# Patient Record
Sex: FEMALE | Race: WHITE | Employment: UNEMPLOYED | URBAN - METROPOLITAN AREA
[De-identification: names, ages, dates, MRNs, and addresses within clinical notes are randomized per-mention and may not be internally consistent; named-entity substitution may affect disease eponyms.]

---

## 2017-02-17 ENCOUNTER — HOSPITAL ENCOUNTER (OUTPATIENT)
Dept: MAMMOGRAPHY | Age: 62
Discharge: HOME | End: 2017-02-20
Attending: INTERNAL MEDICINE | Admitting: INTERNAL MEDICINE

## 2017-02-17 DIAGNOSIS — Z12.39 SCREENING BREAST EXAMINATION: ICD-10-CM

## 2017-02-27 ENCOUNTER — HOSPITAL ENCOUNTER (OUTPATIENT)
Dept: ULTRASOUND IMAGING | Age: 62
Discharge: HOME | End: 2017-02-27
Attending: INTERNAL MEDICINE | Admitting: INTERNAL MEDICINE

## 2017-02-27 DIAGNOSIS — R92.8 ABNORMAL MAMMOGRAM: ICD-10-CM

## 2017-02-27 DIAGNOSIS — N64.89 BREAST ASYMMETRY: ICD-10-CM

## 2017-08-08 ENCOUNTER — HOSPITAL ENCOUNTER (OUTPATIENT)
Dept: ULTRASOUND IMAGING | Age: 62
Discharge: HOME | End: 2017-08-08
Attending: FAMILY MEDICINE | Admitting: FAMILY MEDICINE

## 2017-08-08 DIAGNOSIS — E04.1 THYROID NODULE: ICD-10-CM

## 2017-08-09 ENCOUNTER — HOSPITAL ENCOUNTER (OUTPATIENT)
Dept: ULTRASOUND IMAGING | Age: 62
Discharge: HOME | End: 2017-08-09
Attending: FAMILY MEDICINE | Admitting: FAMILY MEDICINE

## 2017-08-09 DIAGNOSIS — R92.8 CATEGORY 3 MAMMOGRAPHY RESULT WITH SHORT FOLLOW-UP INTERVAL SUGGESTED FOR PROBABLY BENIGN FINDING: ICD-10-CM

## 2017-08-09 DIAGNOSIS — Z09 FOLLOW-UP EXAM, 3-6 MONTHS SINCE PREVIOUS EXAM: ICD-10-CM

## 2017-08-09 DIAGNOSIS — N64.89 BREAST ASYMMETRY: ICD-10-CM

## 2017-09-21 ENCOUNTER — OFFICE VISIT (OUTPATIENT)
Dept: CARDIOLOGY CLINIC | Age: 62
End: 2017-09-21

## 2017-09-21 VITALS
BODY MASS INDEX: 47.09 KG/M2 | WEIGHT: 293 LBS | HEIGHT: 66 IN | SYSTOLIC BLOOD PRESSURE: 120 MMHG | HEART RATE: 70 BPM | DIASTOLIC BLOOD PRESSURE: 64 MMHG

## 2017-09-21 DIAGNOSIS — I21.4 NSTEMI (NON-ST ELEVATED MYOCARDIAL INFARCTION) (HCC): Primary | ICD-10-CM

## 2017-09-21 PROCEDURE — 99214 OFFICE O/P EST MOD 30 MIN: CPT | Performed by: INTERNAL MEDICINE

## 2017-09-21 RX ORDER — SIMVASTATIN 40 MG
40 TABLET ORAL NIGHTLY
Qty: 30 TABLET | Refills: 3 | Status: SHIPPED | OUTPATIENT
Start: 2017-09-21 | End: 2018-06-08 | Stop reason: SDUPTHER

## 2017-09-21 RX ORDER — FUROSEMIDE 40 MG/1
40 TABLET ORAL 2 TIMES DAILY
Qty: 60 TABLET | Refills: 3 | Status: ON HOLD | OUTPATIENT
Start: 2017-09-21 | End: 2018-02-16

## 2017-09-21 RX ORDER — LISINOPRIL 5 MG/1
TABLET ORAL
Status: ON HOLD | COMMUNITY
Start: 2017-09-08 | End: 2018-02-16 | Stop reason: HOSPADM

## 2017-09-21 RX ORDER — POTASSIUM CHLORIDE 20 MEQ/1
TABLET, EXTENDED RELEASE ORAL
COMMUNITY
Start: 2017-09-08 | End: 2018-06-08 | Stop reason: SDUPTHER

## 2017-09-21 RX ORDER — CLOPIDOGREL BISULFATE 75 MG/1
75 TABLET ORAL DAILY
Qty: 30 TABLET | Refills: 3 | Status: SHIPPED | OUTPATIENT
Start: 2017-09-21 | End: 2018-03-21 | Stop reason: SDUPTHER

## 2017-09-21 RX ORDER — VILAZODONE HYDROCHLORIDE 10 MG/1
10 TABLET ORAL 2 TIMES DAILY
Status: ON HOLD | COMMUNITY
End: 2019-08-28 | Stop reason: HOSPADM

## 2017-11-01 ENCOUNTER — TELEPHONE (OUTPATIENT)
Dept: CARDIOLOGY CLINIC | Age: 62
End: 2017-11-01

## 2018-01-30 PROBLEM — J96.90 RESPIRATORY FAILURE (HCC): Status: ACTIVE | Noted: 2018-01-30

## 2018-01-31 DIAGNOSIS — I47.1 SVT (SUPRAVENTRICULAR TACHYCARDIA) (HCC): Primary | ICD-10-CM

## 2018-02-07 PROBLEM — J44.9 COPD (CHRONIC OBSTRUCTIVE PULMONARY DISEASE) (HCC): Status: ACTIVE | Noted: 2018-02-07

## 2018-02-10 PROBLEM — R26.9 GAIT DISTURBANCE: Status: ACTIVE | Noted: 2018-02-10

## 2018-03-02 ENCOUNTER — TELEPHONE (OUTPATIENT)
Dept: CARDIOLOGY CLINIC | Age: 63
End: 2018-03-02

## 2018-03-19 PROBLEM — J96.11 CHRONIC RESPIRATORY FAILURE WITH HYPOXIA AND HYPERCAPNIA (HCC): Status: ACTIVE | Noted: 2018-01-30

## 2018-03-19 PROBLEM — J96.12 CHRONIC RESPIRATORY FAILURE WITH HYPOXIA AND HYPERCAPNIA (HCC): Status: ACTIVE | Noted: 2018-01-30

## 2018-03-21 DIAGNOSIS — I21.4 NSTEMI (NON-ST ELEVATED MYOCARDIAL INFARCTION) (HCC): ICD-10-CM

## 2018-03-21 RX ORDER — CLOPIDOGREL BISULFATE 75 MG/1
75 TABLET ORAL DAILY
Qty: 30 TABLET | Refills: 1 | Status: SHIPPED | OUTPATIENT
Start: 2018-03-21 | End: 2018-06-08 | Stop reason: SDUPTHER

## 2018-03-27 ENCOUNTER — OFFICE VISIT (OUTPATIENT)
Dept: CARDIOLOGY CLINIC | Age: 63
End: 2018-03-27

## 2018-03-27 VITALS
HEIGHT: 66 IN | WEIGHT: 293 LBS | DIASTOLIC BLOOD PRESSURE: 78 MMHG | HEART RATE: 78 BPM | BODY MASS INDEX: 47.09 KG/M2 | SYSTOLIC BLOOD PRESSURE: 126 MMHG

## 2018-03-27 DIAGNOSIS — I35.0 NONRHEUMATIC AORTIC VALVE STENOSIS: Primary | ICD-10-CM

## 2018-03-27 PROCEDURE — G8427 DOCREV CUR MEDS BY ELIG CLIN: HCPCS | Performed by: INTERNAL MEDICINE

## 2018-03-27 PROCEDURE — G8484 FLU IMMUNIZE NO ADMIN: HCPCS | Performed by: INTERNAL MEDICINE

## 2018-03-27 PROCEDURE — 3014F SCREEN MAMMO DOC REV: CPT | Performed by: INTERNAL MEDICINE

## 2018-03-27 PROCEDURE — G8417 CALC BMI ABV UP PARAM F/U: HCPCS | Performed by: INTERNAL MEDICINE

## 2018-03-27 PROCEDURE — 99214 OFFICE O/P EST MOD 30 MIN: CPT | Performed by: INTERNAL MEDICINE

## 2018-03-27 PROCEDURE — 3017F COLORECTAL CA SCREEN DOC REV: CPT | Performed by: INTERNAL MEDICINE

## 2018-03-27 PROCEDURE — 1036F TOBACCO NON-USER: CPT | Performed by: INTERNAL MEDICINE

## 2018-03-27 PROCEDURE — G8598 ASA/ANTIPLAT THER USED: HCPCS | Performed by: INTERNAL MEDICINE

## 2018-03-27 NOTE — PROGRESS NOTES
Base) MCG/ACT inhaler Inhale 2 puffs into the lungs      pantoprazole (PROTONIX) 40 MG tablet Take 40 mg by mouth daily      vilazodone HCl (VIIBRYD) 10 MG TABS Take 10 mg by mouth      potassium chloride (KLOR-CON M) 20 MEQ extended release tablet Take by mouth      simvastatin (ZOCOR) 40 MG tablet Take 1 tablet by mouth nightly 30 tablet 3    Insulin Degludec (TRESIBA FLEXTOUCH SC) Inject into the skin Take 45 units q am      carvedilol (COREG) 3.125 MG tablet Take 3.125 mg by mouth 2 times daily       vitamin D (ERGOCALCIFEROL) 55433 UNITS CAPS capsule Take 50,000 Units by mouth once a week       pramipexole (MIRAPEX) 1 MG tablet Take 1 mg by mouth 3 times daily       fexofenadine (ALLEGRA) 180 MG tablet Take 180 mg by mouth daily       gabapentin (NEURONTIN) 800 MG tablet Take 800 mg by mouth 3 times daily       Ascorbic Acid (VITAMIN C) 500 MG tablet Take 500 mg by mouth daily       cyanocobalamin 1000 MCG/ML injection Inject 1,000 mcg into the muscle every 30 days       HUMALOG KWIKPEN 200 UNIT/ML SOPN pen Inject 35 Units into the skin 3 times daily (before meals)       aspirin 81 MG tablet Take 81 mg by mouth daily      BiPAP Machine MISC by BiPAP route nightly       No current facility-administered medications for this visit. Allergies: Patient has no known allergies.   Past Medical History:   Diagnosis Date    Aortic stenosis     Asthma     Cancer ([address]     Cervical    Carotid artery stenosis     Chest pain     CHF (congestive heart failure) (HCC)     Chronic back pain     COPD exacerbation (HCC)     Depression     Diabetes mellitus ([address]     Family history of coronary artery disease     Fatty liver 10/27/2016    H/O aortic valve stenosis     H/O echocardiogram 11/22/2016    EF 55%, Aortic valve area is 0.84 cm2 with a mean gradient of 36 suggestive of severe aortic stenosis, mild to mos LVH    Headache     Heart murmur     History of nuclear stress test 11/17/2016 and motor nerves examined and were normal  Psychiatric: normal mood and affect    No results found for: CKTOTAL, CKMB, CKMBINDEX, TROPONINI  BNP:  No results found for: BNP  PT/INR:  No results found for: PTINR  Lab Results   Component Value Date    LABA1C 6.0 01/30/2018    LABA1C 8.7 (H) 09/04/2017     No results found for: CHOL, TRIG, HDL, LDLCALC, LDLDIRECT  Lab Results   Component Value Date    ALT 12 01/30/2018    AST 26 01/30/2018     TSH:  No results found for: TSH    Impression:  Aaliyah Graham is a 63 y. o.year old who  has a past medical history of Aortic stenosis; Asthma; Cancer ([facility] [address] Carotid artery stenosis; Chest pain; CHF (congestive heart failure) ([address] Chronic back pain; COPD exacerbation ([address] Depression; Diabetes mellitus ([address] Family history of coronary artery disease; Fatty liver; H/O aortic valve stenosis; H/O echocardiogram; Headache; Heart murmur; History of nuclear stress test; Hypertension; Morbid obesity ([address] Neuropathy ([address] NSTEMI (non-ST elevated myocardial infarction) ([address] Obesity; Osteoarthritis; Palpitations; Peripheral edema; Restless legs syndrome; Sleep apnea; Sleep apnea; and SOB (shortness of breath). and presents with     Plan:  1. S/p TARV: Recovered, she required 12 units of blood transfusion since TAVR  2. Anemia: ? Etiology, agree with Hematology consult and will also recommend GI. 1. Morbid obesity: recommend bariatric surgery, continue lasix to 40 mg daily  2. HTN: stable, continue coreg,   3. DM: stable, continue metformin  4. CAROTID bruit: carotid doppler REVIEWED, has 0-49% stenosis, continue medication as mentioned above  3. Dyslipidemia:stable, off medicationHealth Health maintenance: exerise and diet  All labs, medications and tests reviewed, continue all other medications of all above medical condition listed as is.     @SIA@

## 2018-04-02 ENCOUNTER — HOSPITAL ENCOUNTER (OUTPATIENT)
Dept: OTHER | Age: 63
Discharge: HOME | End: 2018-04-02
Attending: INTERNAL MEDICINE | Admitting: INTERNAL MEDICINE

## 2018-04-02 LAB
ALBUMIN SERPL-MCNC: 4 GM/DL (ref 3.4–5)
ALP BLD-CCNC: 107 IU/L (ref 40–129)
ALT SERPL-CCNC: 13 U/L (ref 10–40)
ANION GAP SERPL CALCULATED.3IONS-SCNC: 7 MMOL/L (ref 4–16)
AST SERPL-CCNC: 21 IU/L (ref 15–37)
BILIRUB SERPL-MCNC: 0.2 MG/DL (ref 0–1)
BUN BLDV-MCNC: 11 MG/DL (ref 6–23)
CALCIUM SERPL-MCNC: 9.3 MG/DL (ref 8.3–10.6)
CHLORIDE BLD-SCNC: 94 MMOL/L (ref 99–110)
CO2: 37 MMOL/L (ref 21–32)
CREAT SERPL-MCNC: 0.6 MG/DL (ref 0.6–1.1)
FERRITIN: 35 NG/ML (ref 15–150)
FOLATE: >20 NG/ML (ref 3.1–17.5)
GFR AFRICAN AMERICAN: >60 ML/MIN/1.73M2
GFR NON-AFRICAN AMERICAN: >60 ML/MIN/1.73M2
GLUCOSE BLD-MCNC: 161 MG/DL (ref 70–99)
IRON: 24 UG/DL (ref 37–145)
PCT TRANSFERRIN: 7 % (ref 10–44)
POTASSIUM SERPL-SCNC: 3.8 MMOL/L (ref 3.5–5.1)
SODIUM BLD-SCNC: 138 MMOL/L (ref 135–145)
TOTAL IRON BINDING CAPACITY: 335 UG/DL (ref 250–450)
TOTAL PROTEIN: 7.5 GM/DL (ref 6.4–8.2)
UNSATURATED IRON BINDING CAPACITY: 311 UG/DL (ref 110–370)
VITAMIN B-12: >2000 PG/ML (ref 211–911)

## 2018-06-08 ENCOUNTER — OFFICE VISIT (OUTPATIENT)
Dept: CARDIOLOGY CLINIC | Age: 63
End: 2018-06-08

## 2018-06-08 VITALS
WEIGHT: 293 LBS | HEART RATE: 84 BPM | SYSTOLIC BLOOD PRESSURE: 118 MMHG | HEIGHT: 66 IN | DIASTOLIC BLOOD PRESSURE: 70 MMHG | BODY MASS INDEX: 47.09 KG/M2

## 2018-06-08 DIAGNOSIS — I21.4 NSTEMI (NON-ST ELEVATED MYOCARDIAL INFARCTION) (HCC): ICD-10-CM

## 2018-06-08 PROCEDURE — 1036F TOBACCO NON-USER: CPT | Performed by: INTERNAL MEDICINE

## 2018-06-08 PROCEDURE — G8427 DOCREV CUR MEDS BY ELIG CLIN: HCPCS | Performed by: INTERNAL MEDICINE

## 2018-06-08 PROCEDURE — 99214 OFFICE O/P EST MOD 30 MIN: CPT | Performed by: INTERNAL MEDICINE

## 2018-06-08 PROCEDURE — 3014F SCREEN MAMMO DOC REV: CPT | Performed by: INTERNAL MEDICINE

## 2018-06-08 PROCEDURE — G8598 ASA/ANTIPLAT THER USED: HCPCS | Performed by: INTERNAL MEDICINE

## 2018-06-08 PROCEDURE — G8417 CALC BMI ABV UP PARAM F/U: HCPCS | Performed by: INTERNAL MEDICINE

## 2018-06-08 PROCEDURE — 3017F COLORECTAL CA SCREEN DOC REV: CPT | Performed by: INTERNAL MEDICINE

## 2018-06-08 RX ORDER — CLOPIDOGREL BISULFATE 75 MG/1
75 TABLET ORAL DAILY
Qty: 30 TABLET | Refills: 1 | Status: SHIPPED | OUTPATIENT
Start: 2018-06-08 | End: 2018-08-20 | Stop reason: SDUPTHER

## 2018-06-08 RX ORDER — NYSTATIN 10B UNIT
POWDER (EA) MISCELLANEOUS 2 TIMES DAILY
COMMUNITY
End: 2020-01-01

## 2018-06-08 RX ORDER — CARVEDILOL 3.12 MG/1
3.12 TABLET ORAL 2 TIMES DAILY
Qty: 60 TABLET | Refills: 3 | Status: ON HOLD | OUTPATIENT
Start: 2018-06-08 | End: 2019-09-27 | Stop reason: HOSPADM

## 2018-06-08 RX ORDER — PNV NO.95/FERROUS FUM/FOLIC AC 28MG-0.8MG
325 TABLET ORAL DAILY
COMMUNITY
End: 2018-09-04

## 2018-06-08 RX ORDER — SIMVASTATIN 40 MG
40 TABLET ORAL NIGHTLY
Qty: 30 TABLET | Refills: 3 | Status: ON HOLD | OUTPATIENT
Start: 2018-06-08 | End: 2019-09-27 | Stop reason: HOSPADM

## 2018-06-08 RX ORDER — FUROSEMIDE 40 MG/1
40 TABLET ORAL 2 TIMES DAILY
Qty: 60 TABLET | Refills: 3 | Status: ON HOLD | OUTPATIENT
Start: 2018-06-08 | End: 2019-06-27 | Stop reason: HOSPADM

## 2018-06-08 RX ORDER — POTASSIUM CHLORIDE 20 MEQ/1
20 TABLET, EXTENDED RELEASE ORAL DAILY
Qty: 60 TABLET | Refills: 3 | Status: ON HOLD | OUTPATIENT
Start: 2018-06-08 | End: 2019-08-21 | Stop reason: SDUPTHER

## 2018-06-08 RX ORDER — MECOBAL/LEVOMEFOLAT CA/B6 PHOS 2-3-35 MG
1 TABLET ORAL 2 TIMES DAILY
Status: ON HOLD | COMMUNITY
End: 2019-06-27 | Stop reason: HOSPADM

## 2018-06-18 ENCOUNTER — HOSPITAL ENCOUNTER (OUTPATIENT)
Dept: OTHER | Age: 63
Discharge: HOME | End: 2018-06-18
Attending: INTERNAL MEDICINE | Admitting: INTERNAL MEDICINE

## 2018-06-18 LAB
ALBUMIN SERPL-MCNC: 4 GM/DL (ref 3.4–5)
ALP BLD-CCNC: 89 IU/L (ref 40–129)
ALT SERPL-CCNC: 15 U/L (ref 10–40)
ANION GAP SERPL CALCULATED.3IONS-SCNC: 9 MMOL/L (ref 4–16)
AST SERPL-CCNC: 24 IU/L (ref 15–37)
BILIRUB SERPL-MCNC: 0.3 MG/DL (ref 0–1)
BUN BLDV-MCNC: 18 MG/DL (ref 6–23)
CALCIUM SERPL-MCNC: 9.6 MG/DL (ref 8.3–10.6)
CHLORIDE BLD-SCNC: 97 MMOL/L (ref 99–110)
CO2: 37 MMOL/L (ref 21–32)
CREAT SERPL-MCNC: 0.6 MG/DL (ref 0.6–1.1)
FERRITIN: 78 NG/ML (ref 15–150)
GFR AFRICAN AMERICAN: >60 ML/MIN/1.73M2
GFR NON-AFRICAN AMERICAN: >60 ML/MIN/1.73M2
GLUCOSE BLD-MCNC: 197 MG/DL (ref 70–99)
IRON: 34 UG/DL (ref 37–145)
PCT TRANSFERRIN: 11 % (ref 10–44)
POTASSIUM SERPL-SCNC: 4.9 MMOL/L (ref 3.5–5.1)
SODIUM BLD-SCNC: 143 MMOL/L (ref 135–145)
TOTAL IRON BINDING CAPACITY: 302 UG/DL (ref 250–450)
TOTAL PROTEIN: 7.6 GM/DL (ref 6.4–8.2)
UNSATURATED IRON BINDING CAPACITY: 268 UG/DL (ref 110–370)

## 2018-07-16 LAB
ALBUMIN SERPL-MCNC: NORMAL G/DL
ALP BLD-CCNC: NORMAL U/L
ALT SERPL-CCNC: NORMAL U/L
ANION GAP SERPL CALCULATED.3IONS-SCNC: NORMAL MMOL/L
AST SERPL-CCNC: NORMAL U/L
BILIRUB SERPL-MCNC: NORMAL MG/DL (ref 0.1–1.4)
BUN BLDV-MCNC: 19 MG/DL
CALCIUM SERPL-MCNC: 9.2 MG/DL
CHLORIDE BLD-SCNC: 96 MMOL/L
CO2: 33 MMOL/L
CREAT SERPL-MCNC: 0.56 MG/DL
GFR CALCULATED: NORMAL
GLUCOSE BLD-MCNC: 192 MG/DL
POTASSIUM SERPL-SCNC: 4.4 MMOL/L
SODIUM BLD-SCNC: 141 MMOL/L
TOTAL PROTEIN: NORMAL

## 2018-07-17 ENCOUNTER — OFFICE VISIT (OUTPATIENT)
Dept: CARDIOLOGY CLINIC | Age: 63
End: 2018-07-17

## 2018-07-17 VITALS
SYSTOLIC BLOOD PRESSURE: 138 MMHG | HEART RATE: 76 BPM | BODY MASS INDEX: 47.09 KG/M2 | WEIGHT: 293 LBS | DIASTOLIC BLOOD PRESSURE: 68 MMHG | HEIGHT: 66 IN

## 2018-07-17 DIAGNOSIS — E66.01 OBESITY, MORBID (HCC): ICD-10-CM

## 2018-07-17 DIAGNOSIS — I35.0 NONRHEUMATIC AORTIC VALVE STENOSIS: Primary | ICD-10-CM

## 2018-07-17 PROCEDURE — G8427 DOCREV CUR MEDS BY ELIG CLIN: HCPCS | Performed by: INTERNAL MEDICINE

## 2018-07-17 PROCEDURE — 1036F TOBACCO NON-USER: CPT | Performed by: INTERNAL MEDICINE

## 2018-07-17 PROCEDURE — 99214 OFFICE O/P EST MOD 30 MIN: CPT | Performed by: INTERNAL MEDICINE

## 2018-07-17 PROCEDURE — G8417 CALC BMI ABV UP PARAM F/U: HCPCS | Performed by: INTERNAL MEDICINE

## 2018-07-17 PROCEDURE — G8598 ASA/ANTIPLAT THER USED: HCPCS | Performed by: INTERNAL MEDICINE

## 2018-07-17 PROCEDURE — 3014F SCREEN MAMMO DOC REV: CPT | Performed by: INTERNAL MEDICINE

## 2018-07-17 PROCEDURE — 3017F COLORECTAL CA SCREEN DOC REV: CPT | Performed by: INTERNAL MEDICINE

## 2018-07-17 RX ORDER — PHENTERMINE HYDROCHLORIDE 37.5 MG/1
37.5 TABLET ORAL
Qty: 30 TABLET | Refills: 0 | Status: SHIPPED | OUTPATIENT
Start: 2018-07-17 | End: 2018-08-13 | Stop reason: SDUPTHER

## 2018-07-17 NOTE — PROGRESS NOTES
artery disease     Fatty liver 10/27/2016    H/O aortic valve stenosis     H/O echocardiogram 2016    EF 55%, Aortic valve area is 0.84 cm2 with a mean gradient of 36 suggestive of severe aortic stenosis, mild to mos LVH    Headache     Heart murmur     History of nuclear stress test 2016    lexiscan-normal,EF70%    Hypertension     Morbid obesity (HCC)     BMI=73.72 kg/m2    Neuropathy (HCC)     [facility]non-[facility]) [facility]     Obesity     Osteoarthritis     Palpitations     Peripheral edema     Restless legs syndrome     Sleep apnea     Has a CPAP    Sleep apnea     SOB (shortness of breath)      Past Surgical History:   Procedure Laterality Date     SECTION      CHOLECYSTECTOMY      GALLBLADDER SURGERY      HYSTERECTOMY      NECK SURGERY      Tumor    TUBAL LIGATION       Family History   Problem Relation Age of Onset    Heart Failure Mother     Heart Attack Mother     Hypertension Mother     Coronary Art Dis Mother         Had PTCA w/stenting.     Heart Failure Father     Heart Failure Brother     Heart Disease Mother     High Blood Pressure Mother     Obesity Mother     Diabetes Mother     Heart Disease Father     High Blood Pressure Father     Obesity Father     Heart Disease Brother     High Blood Pressure Brother     Obesity Brother      Social History   Substance Use Topics    Smoking status: Former Smoker     Types: Cigarettes     Quit date: 3/19/2003    Smokeless tobacco: Never Used      Comment: quit 15 years ago    Alcohol use No      [unfilled]  Review of Systems: morbidy obese  · Constitutional: No Fever or Weight Loss   · Eyes: No Decreased Vision  · ENT: No Headaches, Hearing Loss or Vertigo  · Cardiovascular: No chest pain, dyspnea on exertion, palpitations or loss of consciousness  · Respiratory: No cough or wheezing    · Gastrointestinal: No abdominal pain, appetite loss, blood in stools, constipation, diarrhea or heartburn  · Genitourinary: No dysuria, trouble voiding, or hematuria  · Musculoskeletal:  No gait disturbance, weakness or joint complaints  · Integumentary: No rash or pruritis  · Neurological: No TIA or stroke symptoms  · Psychiatric: No anxiety or depression  · Endocrine: No malaise, fatigue or temperature intolerance  · Hematologic/Lymphatic: No bleeding problems, blood clots or swollen lymph nodes  · Allergic/Immunologic: No nasal congestion or hives  All systems negative except as marked. Objective:  /68   Pulse 76   Ht 5' 6\" (1.676 m)   Wt (!) 461 lb 3.2 oz (209.2 kg)   BMI 74.44 kg/m²   Wt Readings from Last 3 Encounters:   07/17/18 (!) 461 lb 3.2 oz (209.2 kg)   06/08/18 (!) 463 lb (210 kg)   03/27/18 (!) 440 lb (199.6 kg)     Body mass index is 74.44 kg/m². GENERAL - Alert, oriented, pleasant, in no apparent distress,normal grooming  HEENT  pupils are reactive to light and accomodation, cornea intact, conjunctive normal color, ears are normal in exam,throat exam in normal, teeth, gum and palate are normal, oral mucosa is normal without any notation of pallor or cyanosis  Neck - Supple. No jugular venous distention noted. No carotid bruits, no apical -carotid delay  Respiratory:  Normal breath sounds, No respiratory distress, No wheezing, No chest tenderness. ,no use of accessory muscles, diaphragm movement is normal  Cardiovascular: (PMI) apex non displaced,no lifts no thrills, no s3,no s4, Normal heart rate, Normal rhythm, No murmurs, No rubs, No gallops.  Carotid arteries pulse and amplitude are normal no bruit, no abdominal bruit noted ( normal abdominal aorta ausculation), femoral arteries pulse and amplitude are normal no bruit, pedal pulses are normal  Femoral pulses have normal amplitude, no bruits   Extremities - No cyanosis, clubbing, or significant edema, no varicose veins    Abdomen  No masses, tenderness, or organomegaly, no hepato-splenomegally, no bruits  Musculoskeletal  No significant edema, no kyphosis or scoliosis, no deformity in any extremity noted, muscle strength and tone are normal  Skin: no ulcer,no scar,no stasis dermatitis   Neurologic  alert oriented times 3,Cranial nerves II through XII are grossly intact. There were no gross focal neurologic abnormalities. All sensory and motor nerves examined and were normal  Psychiatric: normal mood and affect    No results found for: CKTOTAL, CKMB, CKMBINDEX, TROPONINI  BNP:  No results found for: BNP  PT/INR:  No results found for: PTINR  Lab Results   Component Value Date    LABA1C 6.0 01/30/2018    LABA1C 8.7 (H) 09/04/2017     No results found for: CHOL, TRIG, HDL, LDLCALC, LDLDIRECT  Lab Results   Component Value Date    ALT 15 06/18/2018    AST 24 06/18/2018     TSH:  No results found for: TSH    Impression:  Dot Raphael is a 66 y. o.year old who  has a past medical history of Aortic stenosis; Asthma; Cancer ([address] Carotid artery stenosis; Chest pain; CHF (congestive heart failure) ([address] Chronic back pain; COPD exacerbation ([address] Depression; Diabetes mellitus ([address] Family history of coronary artery disease; Fatty liver; H/O aortic valve stenosis; H/O echocardiogram; Headache; Heart murmur; History of nuclear stress test; Hypertension; Morbid obesity ([address] Neuropathy ([address] NSTEMI (non-ST elevated myocardial infarction) ([address] Obesity; Osteoarthritis; Palpitations; Peripheral edema; Restless legs syndrome; Sleep apnea; Sleep apnea; and SOB (shortness of breath). and presents with     Plan:  1. S/p TARV: Recovered, she required 12 units of blood transfusion since TAVR  2. Anemia: ? Etiology, agree with Hematology consult and will also recommend GI.will get lab test results  12. Morbid obesity: recommend bariatric surgery, continue lasix to 40 mg daily, will get labs test results, patient is not ready for sx yet, discussed with her again today,will add adipex  13. Sleep apnea\" recommend to see sleep specialist  14.  HTN: stable,

## 2018-08-13 ENCOUNTER — OFFICE VISIT (OUTPATIENT)
Dept: CARDIOLOGY CLINIC | Age: 63
End: 2018-08-13

## 2018-08-13 VITALS
DIASTOLIC BLOOD PRESSURE: 88 MMHG | HEART RATE: 86 BPM | SYSTOLIC BLOOD PRESSURE: 138 MMHG | HEIGHT: 66 IN | BODY MASS INDEX: 47.09 KG/M2 | WEIGHT: 293 LBS

## 2018-08-13 DIAGNOSIS — E66.01 OBESITY, MORBID (HCC): ICD-10-CM

## 2018-08-13 PROCEDURE — 99214 OFFICE O/P EST MOD 30 MIN: CPT | Performed by: INTERNAL MEDICINE

## 2018-08-13 PROCEDURE — 3014F SCREEN MAMMO DOC REV: CPT | Performed by: INTERNAL MEDICINE

## 2018-08-13 PROCEDURE — 3017F COLORECTAL CA SCREEN DOC REV: CPT | Performed by: INTERNAL MEDICINE

## 2018-08-13 PROCEDURE — G8428 CUR MEDS NOT DOCUMENT: HCPCS | Performed by: INTERNAL MEDICINE

## 2018-08-13 PROCEDURE — G8417 CALC BMI ABV UP PARAM F/U: HCPCS | Performed by: INTERNAL MEDICINE

## 2018-08-13 PROCEDURE — G8598 ASA/ANTIPLAT THER USED: HCPCS | Performed by: INTERNAL MEDICINE

## 2018-08-13 PROCEDURE — 1036F TOBACCO NON-USER: CPT | Performed by: INTERNAL MEDICINE

## 2018-08-13 RX ORDER — PHENTERMINE HYDROCHLORIDE 37.5 MG/1
37.5 TABLET ORAL
Qty: 30 TABLET | Refills: 0 | Status: SHIPPED | OUTPATIENT
Start: 2018-08-13 | End: 2018-09-12

## 2018-08-13 NOTE — PROGRESS NOTES
Patient did not bring medication bottles or list. Medications were verbally verified with the patient. Patient was reminded to bring medication bottles to appointments.

## 2018-08-13 NOTE — PROGRESS NOTES
Doug Correa MD        OFFICE  FOLLOWUP NOTE    Chief complaints: patient is here for management of s/p TAVR at [facility], DM, Obesity, COPD, Carotid stenosis     Subjective: patient feels better, no chest pain, no shortness of breath, no dizziness, no palpitations    HPI Jose Jenkins is a 66 y. o.year old who  has a past medical history of Aortic stenosis; Asthma; Cancer ([facility] [address] Carotid artery stenosis; Chest pain; CHF (congestive heart failure) ([facility] [address] Chronic back pain; COPD exacerbation ([facility] [address] Depression; Diabetes mellitus ([facility] [address] Family history of coronary artery disease; Fatty liver; H/O aortic valve stenosis; H/O echocardiogram; Headache; Heart murmur; History of nuclear stress test; Hypertension; Morbid obesity ([facility] [address] Neuropathy; NSTEMI (non-ST elevated myocardial infarction) ([facility] [address] Obesity; Osteoarthritis; Palpitations; Peripheral edema; Restless legs syndrome; Sleep apnea; Sleep apnea; and SOB (shortness of breath). and presents for management of s/p TAVR at [facility], DM, Obesity, COPD, Carotid stenosis which are well controlled  She lost 5 lbs on adipex. Current Outpatient Prescriptions   Medication Sig Dispense Refill    phentermine (ADIPEX-P) 37.5 MG tablet Take 1 tablet by mouth every morning (before breakfast) for 30 days. . 30 tablet 0    Suvorexant (BELSOMRA) 10 MG TABS Take 10 mg by mouth daily. Nayla Ram Ferrous Sulfate (IRON) 325 (65 Fe) MG TABS Take 325 mg by mouth daily      L-Methylfolate-B6-B12 (FOLTANX) 3-35-2 MG TABS Take by mouth      nystatin (MYCOSTATIN) POWD powder Apply topically 2 times daily      clopidogrel (PLAVIX) 75 MG tablet Take 1 tablet by mouth daily Patient has appointment on 3/27/18 more refills with be given after she keeps her office visit 30 tablet 1    potassium chloride (KLOR-CON M) 20 MEQ extended release tablet Take 1 tablet by mouth daily 60 tablet 3    furosemide (LASIX) 40 MG tablet Take 1 tablet by mouth 2 times daily 60 tablet 3    carvedilol (COREG) 3.125 MG tablet Take 1 tablet by mouth 2 times daily 60 tablet 3    simvastatin (ZOCOR) 40 MG tablet Take 1 tablet by mouth nightly 30 tablet 3    BiPAP Machine MISC by BiPAP route nightly      metFORMIN (GLUCOPHAGE) 500 MG tablet Take 1 tablet by mouth 2 times daily (with meals) 60 tablet 0    ipratropium-albuterol (DUONEB) 0.5-2.5 (3) MG/3ML SOLN nebulizer solution Inhale 3 mLs into the lungs 4 times daily 360 mL 0    Nebulizers (AIRIAL COMPACT MINI NEBULIZER) MISC 1 Device by Does not apply route 4 times daily 1 each 0    albuterol sulfate  (90 Base) MCG/ACT inhaler Inhale 2 puffs into the lungs      pantoprazole (PROTONIX) 40 MG tablet Take 40 mg by mouth daily      vilazodone HCl (VIIBRYD) 10 MG TABS Take 10 mg by mouth      Insulin Degludec (TRESIBA FLEXTOUCH SC) Inject into the skin Take 45 units q am      vitamin D (ERGOCALCIFEROL) 18352 UNITS CAPS capsule Take 50,000 Units by mouth once a week       pramipexole (MIRAPEX) 1 MG tablet Take 1 mg by mouth 3 times daily       fexofenadine (ALLEGRA) 180 MG tablet Take 180 mg by mouth daily       gabapentin (NEURONTIN) 800 MG tablet Take 800 mg by mouth 3 times daily       Ascorbic Acid (VITAMIN C) 500 MG tablet Take 500 mg by mouth daily       cyanocobalamin 1000 MCG/ML injection Inject 1,000 mcg into the muscle every 30 days       HUMALOG KWIKPEN 200 UNIT/ML SOPN pen Inject 35 Units into the skin 3 times daily (before meals)       aspirin 81 MG tablet Take 81 mg by mouth daily       No current facility-administered medications for this visit. Allergies: Patient has no known allergies.   Past Medical History:   Diagnosis Date    Aortic stenosis     Asthma     Cancer ([facility] [address]     Cervical    Carotid artery stenosis     Chest pain     CHF (congestive heart failure) (HCC)     Chronic back pain     COPD exacerbation (HCC)     Depression     Diabetes mellitus ([facility] [address]     Family history of coronary artery disease  Fatty liver 10/27/2016    H/O aortic valve stenosis     H/O echocardiogram 2016    EF 55%, Aortic valve area is 0.84 cm2 with a mean gradient of 36 suggestive of severe aortic stenosis, mild to mos LVH    Headache     Heart murmur     History of nuclear stress test 2016    lexiscan-normal,EF70%    Hypertension     Morbid obesity (HCC)     BMI=73.72 kg/m2    Neuropathy     NSTEMI (non-ST elevated myocardial infarction) (HCC)     Obesity     Osteoarthritis     Palpitations     Peripheral edema     Restless legs syndrome     Sleep apnea     Has a CPAP    Sleep apnea     SOB (shortness of breath)      Past Surgical History:   Procedure Laterality Date     SECTION      CHOLECYSTECTOMY      GALLBLADDER SURGERY      HYSTERECTOMY      NECK SURGERY      Tumor    TUBAL LIGATION       Family History   Problem Relation Age of Onset    Heart Failure Mother     Heart Attack Mother     Hypertension Mother     Coronary Art Dis Mother         Had PTCA w/stenting.     Heart Failure Father     Heart Failure Brother     Heart Disease Mother     High Blood Pressure Mother     Obesity Mother     Diabetes Mother     Heart Disease Father     High Blood Pressure Father     Obesity Father     Heart Disease Brother     High Blood Pressure Brother     Obesity Brother      Social History   Substance Use Topics    Smoking status: Former Smoker     Types: Cigarettes     Quit date: 3/19/2003    Smokeless tobacco: Never Used      Comment: quit 15 years ago    Alcohol use No      [unfilled]  Review of Systems:   · Constitutional: No Fever or Weight Loss   · Eyes: No Decreased Vision  · ENT: No Headaches, Hearing Loss or Vertigo  · Cardiovascular: No chest pain, dyspnea on exertion, palpitations or loss of consciousness  · Respiratory: No cough or wheezing    · Gastrointestinal: No abdominal pain, appetite loss, blood in stools, constipation, diarrhea or heartburn  · Genitourinary: No dysuria, trouble voiding, or hematuria  · Musculoskeletal:  No gait disturbance, weakness or joint complaints  · Integumentary: No rash or pruritis  · Neurological: No TIA or stroke symptoms  · Psychiatric: No anxiety or depression  · Endocrine: No malaise, fatigue or temperature intolerance  · Hematologic/Lymphatic: No bleeding problems, blood clots or swollen lymph nodes  · Allergic/Immunologic: No nasal congestion or hives  All systems negative except as marked. Objective:  /88   Pulse 86   Ht 5' 6\" (1.676 m)   Wt (!) 456 lb 6.4 oz (207 kg)   BMI 73.66 kg/m²   Wt Readings from Last 3 Encounters:   08/13/18 (!) 456 lb 6.4 oz (207 kg)   08/13/18 (!) 440 lb (199.6 kg)   07/17/18 (!) 461 lb 3.2 oz (209.2 kg)     Body mass index is 73.66 kg/m². GENERAL - Alert, oriented, pleasant, in no apparent distress,normal grooming  HEENT  pupils are reactive to light and accomodation, cornea intact, conjunctive normal color, ears are normal in exam,throat exam in normal, teeth, gum and palate are normal, oral mucosa is normal without any notation of pallor or cyanosis  Neck - Supple. No jugular venous distention noted. No carotid bruits, no apical -carotid delay  Respiratory:  Normal breath sounds, No respiratory distress, No wheezing, No chest tenderness. ,no use of accessory muscles, diaphragm movement is normal  Cardiovascular: (PMI) apex non displaced,no lifts no thrills, no s3,no s4, Normal heart rate, Normal rhythm, No murmurs, No rubs, No gallops.  Carotid arteries pulse and amplitude are normal no bruit, no abdominal bruit noted ( normal abdominal aorta ausculation), femoral arteries pulse and amplitude are normal no bruit, pedal pulses are normal  Femoral pulses have normal amplitude, no bruits   Extremities - No cyanosis, clubbing, or significant edema, no varicose veins    Abdomen  No masses, tenderness, or organomegaly, no hepato-splenomegally, no bruits  Musculoskeletal  No significant edema, no kyphosis or scoliosis, no deformity in any extremity noted, muscle strength and tone are normal  Skin: no ulcer,no scar,no stasis dermatitis   Neurologic  alert oriented times 3,Cranial nerves II through XII are grossly intact. There were no gross focal neurologic abnormalities. All sensory and motor nerves examined and were normal  Psychiatric: normal mood and affect    No results found for: CKTOTAL, CKMB, CKMBINDEX, TROPONINI  BNP:  No results found for: BNP  PT/INR:  No results found for: PTINR  Lab Results   Component Value Date    LABA1C 6.0 01/30/2018    LABA1C 8.7 (H) 09/04/2017     No results found for: CHOL, TRIG, HDL, LDLCALC, LDLDIRECT  Lab Results   Component Value Date    ALT 15 06/18/2018    AST 24 06/18/2018     TSH:  No results found for: TSH    Impression:  Wendi Rodriguez is a 66 y. o.year old who  has a past medical history of Aortic stenosis; Asthma; Cancer ([facility] [address] Carotid artery stenosis; Chest pain; CHF (congestive heart failure) ([address] Chronic back pain; COPD exacerbation ([address] Depression; Diabetes mellitus ([address] Family history of coronary artery disease; Fatty liver; H/O aortic valve stenosis; H/O echocardiogram; Headache; Heart murmur; History of nuclear stress test; Hypertension; Morbid obesity ([address] Neuropathy; NSTEMI (non-ST elevated myocardial infarction) ([address] Obesity; Osteoarthritis; Palpitations; Peripheral edema; Restless legs syndrome; Sleep apnea; Sleep apnea; and SOB (shortness of breath). and presents with     Plan:  1. S/p TARV: Recovered, she required 12 units of blood transfusion since TAVR  2. Anemia: ? Etiology, agree with Hematology consult and will also recommend GI.will get lab test results  1. Morbid obesity: recommend bariatric surgery, continue lasix to 40 mg daily, will get labs test results, patient is not ready for sx yet, discussed with her again today,will renew adipex, she is still reluctant  2. Sleep apnea\" recommend to see sleep specialist  3.  HTN: stable, continue coreg,   4. DM: stable, continue metformin  5. CAROTID bruit: carotid doppler REVIEWED, has 0-49% stenosis, continue medication as mentioned above  Dyslipidemia:stable, off medicationHealth   All labs, medications and tests reviewed, continue all other medications of all above medical condition listed as is.

## 2018-08-20 DIAGNOSIS — I21.4 NSTEMI (NON-ST ELEVATED MYOCARDIAL INFARCTION) (HCC): ICD-10-CM

## 2018-08-20 RX ORDER — CLOPIDOGREL BISULFATE 75 MG/1
75 TABLET ORAL DAILY
Qty: 90 TABLET | Refills: 3 | Status: SHIPPED | OUTPATIENT
Start: 2018-08-20

## 2018-09-04 ENCOUNTER — TELEPHONE (OUTPATIENT)
Dept: CARDIOLOGY CLINIC | Age: 63
End: 2018-09-04

## 2018-10-16 ENCOUNTER — HOSPITAL ENCOUNTER (OUTPATIENT)
Age: 63
Setting detail: SPECIMEN
Discharge: HOME | End: 2018-10-16
Payer: COMMERCIAL

## 2018-10-16 LAB
ALBUMIN SERPL-MCNC: 4 GM/DL (ref 3.4–5)
ALP BLD-CCNC: 94 IU/L (ref 40–129)
ALT SERPL-CCNC: 26 U/L (ref 10–40)
ANION GAP SERPL CALCULATED.3IONS-SCNC: 12 MMOL/L (ref 4–16)
AST SERPL-CCNC: 29 IU/L (ref 15–37)
BILIRUB SERPL-MCNC: 0.3 MG/DL (ref 0–1)
BUN BLDV-MCNC: 13 MG/DL (ref 6–23)
CALCIUM SERPL-MCNC: 9.5 MG/DL (ref 8.3–10.6)
CHLORIDE BLD-SCNC: 94 MMOL/L (ref 99–110)
CO2: 32 MMOL/L (ref 21–32)
CREAT SERPL-MCNC: 0.5 MG/DL (ref 0.6–1.1)
FERRITIN: 30 NG/ML (ref 15–150)
FOLATE: >20 NG/ML (ref 3.1–17.5)
GFR AFRICAN AMERICAN: >60 ML/MIN/1.73M2
GFR NON-AFRICAN AMERICAN: >60 ML/MIN/1.73M2
GLUCOSE BLD-MCNC: 230 MG/DL (ref 70–99)
IRON: 32 UG/DL (ref 37–145)
PCT TRANSFERRIN: 9 % (ref 10–44)
POTASSIUM SERPL-SCNC: 4.1 MMOL/L (ref 3.5–5.1)
RETICULOCYTE COUNT PCT: 2 % (ref 0.2–2.2)
SODIUM BLD-SCNC: 138 MMOL/L (ref 135–145)
TOTAL IRON BINDING CAPACITY: 343 UG/DL (ref 250–450)
TOTAL PROTEIN: 7.9 GM/DL (ref 6.4–8.2)
UNSATURATED IRON BINDING CAPACITY: 311 UG/DL (ref 110–370)
VITAMIN B-12: >2000 PG/ML (ref 211–911)

## 2018-10-16 PROCEDURE — 80053 COMPREHEN METABOLIC PANEL: CPT

## 2018-10-16 PROCEDURE — 83540 ASSAY OF IRON: CPT

## 2018-10-16 PROCEDURE — 83550 IRON BINDING TEST: CPT

## 2018-10-16 PROCEDURE — 85045 AUTOMATED RETICULOCYTE COUNT: CPT

## 2018-10-16 PROCEDURE — 82746 ASSAY OF FOLIC ACID SERUM: CPT

## 2018-10-16 PROCEDURE — 82607 VITAMIN B-12: CPT

## 2018-10-16 PROCEDURE — 82728 ASSAY OF FERRITIN: CPT

## 2018-11-24 ENCOUNTER — HOSPITAL ENCOUNTER (INPATIENT)
Age: 63
LOS: 6 days | Discharge: HOME HEALTH | DRG: 361 | End: 2018-11-30
Attending: EMERGENCY MEDICINE | Admitting: INTERNAL MEDICINE
Payer: COMMERCIAL

## 2018-11-24 ENCOUNTER — APPOINTMENT (OUTPATIENT)
Dept: GENERAL RADIOLOGY | Age: 63
DRG: 361 | End: 2018-11-24
Payer: COMMERCIAL

## 2018-11-24 DIAGNOSIS — E13.621: Primary | ICD-10-CM

## 2018-11-24 DIAGNOSIS — L97.522 SKIN ULCER OF LEFT FOOT WITH FAT LAYER EXPOSED (HCC): ICD-10-CM

## 2018-11-24 DIAGNOSIS — L03.116 CELLULITIS OF LEFT LOWER EXTREMITY: ICD-10-CM

## 2018-11-24 DIAGNOSIS — L97.428: Primary | ICD-10-CM

## 2018-11-24 PROBLEM — L03.90 CELLULITIS: Status: ACTIVE | Noted: 2018-11-24

## 2018-11-24 LAB
ALBUMIN SERPL-MCNC: 3.5 GM/DL (ref 3.4–5)
ALP BLD-CCNC: 98 IU/L (ref 40–129)
ALT SERPL-CCNC: 17 U/L (ref 10–40)
ANION GAP SERPL CALCULATED.3IONS-SCNC: 7 MMOL/L (ref 4–16)
AST SERPL-CCNC: 20 IU/L (ref 15–37)
BASOPHILS ABSOLUTE: 0.1 K/CU MM
BASOPHILS RELATIVE PERCENT: 0.8 % (ref 0–1)
BILIRUB SERPL-MCNC: 0.3 MG/DL (ref 0–1)
BUN BLDV-MCNC: 10 MG/DL (ref 6–23)
CALCIUM SERPL-MCNC: 9.4 MG/DL (ref 8.3–10.6)
CHLORIDE BLD-SCNC: 97 MMOL/L (ref 99–110)
CO2: 36 MMOL/L (ref 21–32)
CREAT SERPL-MCNC: 0.5 MG/DL (ref 0.6–1.1)
DIFFERENTIAL TYPE: ABNORMAL
EOSINOPHILS ABSOLUTE: 0.2 K/CU MM
EOSINOPHILS RELATIVE PERCENT: 2.5 % (ref 0–3)
ESTIMATED AVERAGE GLUCOSE: 223 MG/DL
GFR AFRICAN AMERICAN: >60 ML/MIN/1.73M2
GFR NON-AFRICAN AMERICAN: >60 ML/MIN/1.73M2
GLUCOSE BLD-MCNC: 127 MG/DL (ref 70–99)
GLUCOSE BLD-MCNC: 154 MG/DL (ref 70–99)
GLUCOSE BLD-MCNC: 163 MG/DL (ref 70–99)
GLUCOSE BLD-MCNC: 209 MG/DL (ref 70–99)
HBA1C MFR BLD: 9.4 % (ref 4.2–6.3)
HCT VFR BLD CALC: 32.6 % (ref 37–47)
HEMOGLOBIN: 9.6 GM/DL (ref 12.5–16)
IMMATURE NEUTROPHIL %: 0.9 % (ref 0–0.43)
LACTATE: 1.3 MMOL/L (ref 0.4–2)
LYMPHOCYTES ABSOLUTE: 1.6 K/CU MM
LYMPHOCYTES RELATIVE PERCENT: 24.4 % (ref 24–44)
MCH RBC QN AUTO: 24.1 PG (ref 27–31)
MCHC RBC AUTO-ENTMCNC: 29.4 % (ref 32–36)
MCV RBC AUTO: 81.9 FL (ref 78–100)
MONOCYTES ABSOLUTE: 0.6 K/CU MM
MONOCYTES RELATIVE PERCENT: 9.9 % (ref 0–4)
NUCLEATED RBC %: 0 %
PDW BLD-RTO: 16.1 % (ref 11.7–14.9)
PLATELET # BLD: 309 K/CU MM (ref 140–440)
PMV BLD AUTO: 9.2 FL (ref 7.5–11.1)
POTASSIUM SERPL-SCNC: 4 MMOL/L (ref 3.5–5.1)
RBC # BLD: 3.98 M/CU MM (ref 4.2–5.4)
SEGMENTED NEUTROPHILS ABSOLUTE COUNT: 3.9 K/CU MM
SEGMENTED NEUTROPHILS RELATIVE PERCENT: 61.5 % (ref 36–66)
SODIUM BLD-SCNC: 140 MMOL/L (ref 135–145)
TOTAL IMMATURE NEUTOROPHIL: 0.06 K/CU MM
TOTAL NUCLEATED RBC: 0 K/CU MM
TOTAL PROTEIN: 7.9 GM/DL (ref 6.4–8.2)
WBC # BLD: 6.3 K/CU MM (ref 4–10.5)

## 2018-11-24 PROCEDURE — 6370000000 HC RX 637 (ALT 250 FOR IP): Performed by: INTERNAL MEDICINE

## 2018-11-24 PROCEDURE — 6360000002 HC RX W HCPCS: Performed by: INTERNAL MEDICINE

## 2018-11-24 PROCEDURE — 2700000000 HC OXYGEN THERAPY PER DAY

## 2018-11-24 PROCEDURE — 87040 BLOOD CULTURE FOR BACTERIA: CPT

## 2018-11-24 PROCEDURE — 94640 AIRWAY INHALATION TREATMENT: CPT

## 2018-11-24 PROCEDURE — 6360000002 HC RX W HCPCS: Performed by: PHYSICIAN ASSISTANT

## 2018-11-24 PROCEDURE — 82962 GLUCOSE BLOOD TEST: CPT

## 2018-11-24 PROCEDURE — 80053 COMPREHEN METABOLIC PANEL: CPT

## 2018-11-24 PROCEDURE — 2500000003 HC RX 250 WO HCPCS: Performed by: INTERNAL MEDICINE

## 2018-11-24 PROCEDURE — 85025 COMPLETE CBC W/AUTO DIFF WBC: CPT

## 2018-11-24 PROCEDURE — 6370000000 HC RX 637 (ALT 250 FOR IP): Performed by: SURGERY

## 2018-11-24 PROCEDURE — 96375 TX/PRO/DX INJ NEW DRUG ADDON: CPT

## 2018-11-24 PROCEDURE — 94761 N-INVAS EAR/PLS OXIMETRY MLT: CPT

## 2018-11-24 PROCEDURE — 73620 X-RAY EXAM OF FOOT: CPT

## 2018-11-24 PROCEDURE — 83605 ASSAY OF LACTIC ACID: CPT

## 2018-11-24 PROCEDURE — 6360000002 HC RX W HCPCS: Performed by: SURGERY

## 2018-11-24 PROCEDURE — 96367 TX/PROPH/DG ADDL SEQ IV INF: CPT

## 2018-11-24 PROCEDURE — 2580000003 HC RX 258: Performed by: PHYSICIAN ASSISTANT

## 2018-11-24 PROCEDURE — 1200000000 HC SEMI PRIVATE

## 2018-11-24 PROCEDURE — 83036 HEMOGLOBIN GLYCOSYLATED A1C: CPT

## 2018-11-24 PROCEDURE — 2580000003 HC RX 258: Performed by: INTERNAL MEDICINE

## 2018-11-24 PROCEDURE — 99284 EMERGENCY DEPT VISIT MOD MDM: CPT

## 2018-11-24 PROCEDURE — 96365 THER/PROPH/DIAG IV INF INIT: CPT

## 2018-11-24 PROCEDURE — 94660 CPAP INITIATION&MGMT: CPT

## 2018-11-24 PROCEDURE — 99254 IP/OBS CNSLTJ NEW/EST MOD 60: CPT | Performed by: SURGERY

## 2018-11-24 RX ORDER — CLOPIDOGREL BISULFATE 75 MG/1
75 TABLET ORAL DAILY
Status: DISCONTINUED | OUTPATIENT
Start: 2018-11-24 | End: 2018-11-30 | Stop reason: HOSPADM

## 2018-11-24 RX ORDER — ONDANSETRON 2 MG/ML
4 INJECTION INTRAMUSCULAR; INTRAVENOUS EVERY 30 MIN PRN
Status: DISCONTINUED | OUTPATIENT
Start: 2018-11-24 | End: 2018-11-24

## 2018-11-24 RX ORDER — TRAMADOL HYDROCHLORIDE 50 MG/1
50 TABLET ORAL EVERY 6 HOURS PRN
Status: DISCONTINUED | OUTPATIENT
Start: 2018-11-24 | End: 2018-11-30 | Stop reason: HOSPADM

## 2018-11-24 RX ORDER — ASCORBIC ACID 500 MG
500 TABLET ORAL DAILY
Status: DISCONTINUED | OUTPATIENT
Start: 2018-11-24 | End: 2018-11-30 | Stop reason: HOSPADM

## 2018-11-24 RX ORDER — NICOTINE POLACRILEX 4 MG
15 LOZENGE BUCCAL PRN
Status: DISCONTINUED | OUTPATIENT
Start: 2018-11-24 | End: 2018-11-30 | Stop reason: HOSPADM

## 2018-11-24 RX ORDER — SODIUM CHLORIDE 9 MG/ML
INJECTION, SOLUTION INTRAVENOUS CONTINUOUS
Status: DISCONTINUED | OUTPATIENT
Start: 2018-11-24 | End: 2018-11-24

## 2018-11-24 RX ORDER — MORPHINE SULFATE 4 MG/ML
4 INJECTION, SOLUTION INTRAMUSCULAR; INTRAVENOUS
Status: DISPENSED | OUTPATIENT
Start: 2018-11-24 | End: 2018-11-26

## 2018-11-24 RX ORDER — B12/LEVOMEFOLATE CALCIUM/B-6 2-1.13-25
1 TABLET ORAL 2 TIMES DAILY
Status: DISCONTINUED | OUTPATIENT
Start: 2018-11-24 | End: 2018-11-30 | Stop reason: HOSPADM

## 2018-11-24 RX ORDER — CARVEDILOL 3.12 MG/1
3.12 TABLET ORAL 2 TIMES DAILY
Status: DISCONTINUED | OUTPATIENT
Start: 2018-11-24 | End: 2018-11-30 | Stop reason: HOSPADM

## 2018-11-24 RX ORDER — PANTOPRAZOLE SODIUM 40 MG/1
40 TABLET, DELAYED RELEASE ORAL DAILY
Status: DISCONTINUED | OUTPATIENT
Start: 2018-11-24 | End: 2018-11-30 | Stop reason: HOSPADM

## 2018-11-24 RX ORDER — DEXTROSE MONOHYDRATE 50 MG/ML
100 INJECTION, SOLUTION INTRAVENOUS PRN
Status: DISCONTINUED | OUTPATIENT
Start: 2018-11-24 | End: 2018-11-30 | Stop reason: HOSPADM

## 2018-11-24 RX ORDER — POTASSIUM CHLORIDE 20 MEQ/1
20 TABLET, EXTENDED RELEASE ORAL DAILY
Status: DISCONTINUED | OUTPATIENT
Start: 2018-11-24 | End: 2018-11-30 | Stop reason: HOSPADM

## 2018-11-24 RX ORDER — ERGOCALCIFEROL 1.25 MG/1
50000 CAPSULE ORAL
Status: DISCONTINUED | OUTPATIENT
Start: 2018-11-26 | End: 2018-11-24 | Stop reason: CLARIF

## 2018-11-24 RX ORDER — SIMVASTATIN 40 MG
40 TABLET ORAL NIGHTLY
Status: DISCONTINUED | OUTPATIENT
Start: 2018-11-24 | End: 2018-11-30 | Stop reason: HOSPADM

## 2018-11-24 RX ORDER — IPRATROPIUM BROMIDE AND ALBUTEROL SULFATE 2.5; .5 MG/3ML; MG/3ML
SOLUTION RESPIRATORY (INHALATION)
Status: DISPENSED
Start: 2018-11-24 | End: 2018-11-24

## 2018-11-24 RX ORDER — ERGOCALCIFEROL 1.25 MG/1
50000 CAPSULE ORAL
Status: DISCONTINUED | OUTPATIENT
Start: 2018-11-25 | End: 2018-11-30 | Stop reason: HOSPADM

## 2018-11-24 RX ORDER — MORPHINE SULFATE 2 MG/ML
2 INJECTION, SOLUTION INTRAMUSCULAR; INTRAVENOUS
Status: DISPENSED | OUTPATIENT
Start: 2018-11-24 | End: 2018-11-26

## 2018-11-24 RX ORDER — ACETAMINOPHEN 325 MG/1
650 TABLET ORAL EVERY 4 HOURS PRN
Status: DISCONTINUED | OUTPATIENT
Start: 2018-11-24 | End: 2018-11-30 | Stop reason: HOSPADM

## 2018-11-24 RX ORDER — IPRATROPIUM BROMIDE AND ALBUTEROL SULFATE 2.5; .5 MG/3ML; MG/3ML
3 SOLUTION RESPIRATORY (INHALATION) 4 TIMES DAILY
Status: DISCONTINUED | OUTPATIENT
Start: 2018-11-24 | End: 2018-11-30 | Stop reason: HOSPADM

## 2018-11-24 RX ORDER — INSULIN GLARGINE 100 [IU]/ML
45 INJECTION, SOLUTION SUBCUTANEOUS
Status: DISCONTINUED | OUTPATIENT
Start: 2018-11-25 | End: 2018-11-27

## 2018-11-24 RX ORDER — PRAMIPEXOLE DIHYDROCHLORIDE 0.25 MG/1
1 TABLET ORAL 3 TIMES DAILY
Status: DISCONTINUED | OUTPATIENT
Start: 2018-11-24 | End: 2018-11-30 | Stop reason: HOSPADM

## 2018-11-24 RX ORDER — ALBUTEROL SULFATE 90 UG/1
2 AEROSOL, METERED RESPIRATORY (INHALATION) EVERY 4 HOURS PRN
Status: DISCONTINUED | OUTPATIENT
Start: 2018-11-24 | End: 2018-11-30 | Stop reason: HOSPADM

## 2018-11-24 RX ORDER — ASPIRIN 81 MG/1
81 TABLET, CHEWABLE ORAL DAILY
Status: DISCONTINUED | OUTPATIENT
Start: 2018-11-24 | End: 2018-11-30 | Stop reason: HOSPADM

## 2018-11-24 RX ORDER — MORPHINE SULFATE 4 MG/ML
4 INJECTION, SOLUTION INTRAMUSCULAR; INTRAVENOUS EVERY 30 MIN PRN
Status: DISCONTINUED | OUTPATIENT
Start: 2018-11-24 | End: 2018-11-24

## 2018-11-24 RX ORDER — DEXTROSE MONOHYDRATE 25 G/50ML
12.5 INJECTION, SOLUTION INTRAVENOUS PRN
Status: DISCONTINUED | OUTPATIENT
Start: 2018-11-24 | End: 2018-11-30 | Stop reason: HOSPADM

## 2018-11-24 RX ORDER — ONDANSETRON 2 MG/ML
4 INJECTION INTRAMUSCULAR; INTRAVENOUS EVERY 6 HOURS PRN
Status: DISCONTINUED | OUTPATIENT
Start: 2018-11-24 | End: 2018-11-30 | Stop reason: HOSPADM

## 2018-11-24 RX ORDER — VILAZODONE HYDROCHLORIDE 10 MG/1
10 TABLET ORAL 2 TIMES DAILY
Status: DISCONTINUED | OUTPATIENT
Start: 2018-11-24 | End: 2018-11-30 | Stop reason: HOSPADM

## 2018-11-24 RX ORDER — SODIUM CHLORIDE 0.9 % (FLUSH) 0.9 %
10 SYRINGE (ML) INJECTION EVERY 12 HOURS SCHEDULED
Status: DISCONTINUED | OUTPATIENT
Start: 2018-11-24 | End: 2018-11-30 | Stop reason: HOSPADM

## 2018-11-24 RX ORDER — SODIUM CHLORIDE 0.9 % (FLUSH) 0.9 %
10 SYRINGE (ML) INJECTION PRN
Status: DISCONTINUED | OUTPATIENT
Start: 2018-11-24 | End: 2018-11-30 | Stop reason: HOSPADM

## 2018-11-24 RX ORDER — GABAPENTIN 400 MG/1
800 CAPSULE ORAL 3 TIMES DAILY
Status: DISCONTINUED | OUTPATIENT
Start: 2018-11-24 | End: 2018-11-30 | Stop reason: HOSPADM

## 2018-11-24 RX ORDER — TRAMADOL HYDROCHLORIDE 50 MG/1
100 TABLET ORAL EVERY 6 HOURS PRN
Status: DISCONTINUED | OUTPATIENT
Start: 2018-11-24 | End: 2018-11-30 | Stop reason: HOSPADM

## 2018-11-24 RX ADMIN — CLOPIDOGREL BISULFATE 75 MG: 75 TABLET ORAL at 12:34

## 2018-11-24 RX ADMIN — PRAMIPEXOLE DIHYDROCHLORIDE 1 MG: 0.25 TABLET ORAL at 21:32

## 2018-11-24 RX ADMIN — TAZOBACTAM SODIUM AND PIPERACILLIN SODIUM 4.5 G: 500; 4 INJECTION, SOLUTION INTRAVENOUS at 22:31

## 2018-11-24 RX ADMIN — VANCOMYCIN HYDROCHLORIDE 2000 MG: 1 INJECTION, POWDER, LYOPHILIZED, FOR SOLUTION INTRAVENOUS at 19:02

## 2018-11-24 RX ADMIN — TAZOBACTAM SODIUM AND PIPERACILLIN SODIUM 3.38 G: 375; 3 INJECTION, SOLUTION INTRAVENOUS at 15:44

## 2018-11-24 RX ADMIN — SODIUM HYPOCHLORITE: 2.5 SOLUTION TOPICAL at 21:32

## 2018-11-24 RX ADMIN — INSULIN LISPRO 19 UNITS: 100 INJECTION, SOLUTION INTRAVENOUS; SUBCUTANEOUS at 17:34

## 2018-11-24 RX ADMIN — OXYCODONE HYDROCHLORIDE AND ACETAMINOPHEN 500 MG: 500 TABLET ORAL at 12:34

## 2018-11-24 RX ADMIN — CARVEDILOL 3.12 MG: 3.12 TABLET, FILM COATED ORAL at 12:33

## 2018-11-24 RX ADMIN — VILAZODONE HYDROCHLORIDE 10 MG: 10 TABLET ORAL at 21:32

## 2018-11-24 RX ADMIN — INSULIN LISPRO 19 UNITS: 100 INJECTION, SOLUTION INTRAVENOUS; SUBCUTANEOUS at 12:41

## 2018-11-24 RX ADMIN — VANCOMYCIN HYDROCHLORIDE 2000 MG: 1 INJECTION, POWDER, LYOPHILIZED, FOR SOLUTION INTRAVENOUS at 10:41

## 2018-11-24 RX ADMIN — INSULIN LISPRO 4 UNITS: 100 INJECTION, SOLUTION INTRAVENOUS; SUBCUTANEOUS at 12:41

## 2018-11-24 RX ADMIN — GABAPENTIN 800 MG: 400 CAPSULE ORAL at 21:32

## 2018-11-24 RX ADMIN — MORPHINE SULFATE 4 MG: 4 INJECTION, SOLUTION INTRAMUSCULAR; INTRAVENOUS at 21:42

## 2018-11-24 RX ADMIN — MORPHINE SULFATE 2 MG: 2 INJECTION, SOLUTION INTRAMUSCULAR; INTRAVENOUS at 18:48

## 2018-11-24 RX ADMIN — VILAZODONE HYDROCHLORIDE 10 MG: 10 TABLET ORAL at 12:34

## 2018-11-24 RX ADMIN — IPRATROPIUM BROMIDE AND ALBUTEROL SULFATE 3 ML: .5; 3 SOLUTION RESPIRATORY (INHALATION) at 21:08

## 2018-11-24 RX ADMIN — SODIUM CHLORIDE: 9 INJECTION, SOLUTION INTRAVENOUS at 12:02

## 2018-11-24 RX ADMIN — TRAMADOL HYDROCHLORIDE 100 MG: 50 TABLET, FILM COATED ORAL at 13:39

## 2018-11-24 RX ADMIN — CARVEDILOL 3.12 MG: 3.12 TABLET, FILM COATED ORAL at 21:32

## 2018-11-24 RX ADMIN — IPRATROPIUM BROMIDE AND ALBUTEROL SULFATE 3 ML: .5; 3 SOLUTION RESPIRATORY (INHALATION) at 11:57

## 2018-11-24 RX ADMIN — Medication 1 TABLET: at 12:33

## 2018-11-24 RX ADMIN — IPRATROPIUM BROMIDE AND ALBUTEROL SULFATE 3 ML: .5; 3 SOLUTION RESPIRATORY (INHALATION) at 15:41

## 2018-11-24 RX ADMIN — INSULIN LISPRO 2 UNITS: 100 INJECTION, SOLUTION INTRAVENOUS; SUBCUTANEOUS at 17:33

## 2018-11-24 RX ADMIN — Medication 1 TABLET: at 21:32

## 2018-11-24 RX ADMIN — TAZOBACTAM SODIUM AND PIPERACILLIN SODIUM 4.5 G: 500; 4 INJECTION, SOLUTION INTRAVENOUS at 08:48

## 2018-11-24 RX ADMIN — ASPIRIN 81 MG 81 MG: 81 TABLET ORAL at 12:33

## 2018-11-24 RX ADMIN — SODIUM CHLORIDE: 9 INJECTION, SOLUTION INTRAVENOUS at 10:41

## 2018-11-24 RX ADMIN — SIMVASTATIN 40 MG: 40 TABLET, FILM COATED ORAL at 21:32

## 2018-11-24 RX ADMIN — PANTOPRAZOLE SODIUM 40 MG: 40 TABLET, DELAYED RELEASE ORAL at 12:34

## 2018-11-24 RX ADMIN — MORPHINE SULFATE 4 MG: 4 INJECTION, SOLUTION INTRAMUSCULAR; INTRAVENOUS at 07:57

## 2018-11-24 RX ADMIN — POTASSIUM CHLORIDE 20 MEQ: 20 TABLET, EXTENDED RELEASE ORAL at 12:33

## 2018-11-24 RX ADMIN — MICONAZOLE NITRATE: 2 POWDER TOPICAL at 21:34

## 2018-11-24 RX ADMIN — ENOXAPARIN SODIUM 40 MG: 40 INJECTION SUBCUTANEOUS at 12:37

## 2018-11-24 RX ADMIN — PRAMIPEXOLE DIHYDROCHLORIDE 1 MG: 0.25 TABLET ORAL at 12:34

## 2018-11-24 RX ADMIN — GABAPENTIN 800 MG: 400 CAPSULE ORAL at 12:33

## 2018-11-24 RX ADMIN — SODIUM CHLORIDE, PRESERVATIVE FREE 10 ML: 5 INJECTION INTRAVENOUS at 21:34

## 2018-11-24 ASSESSMENT — ENCOUNTER SYMPTOMS
COLOR CHANGE: 0
VOMITING: 0
EYE REDNESS: 0
SORE THROAT: 0
ABDOMINAL DISTENTION: 0
SHORTNESS OF BREATH: 0
NAUSEA: 0
EYE DISCHARGE: 0
ABDOMINAL PAIN: 0
CONSTIPATION: 0
CHEST TIGHTNESS: 0
DIARRHEA: 0

## 2018-11-24 ASSESSMENT — PAIN DESCRIPTION - PAIN TYPE
TYPE: ACUTE PAIN

## 2018-11-24 ASSESSMENT — PAIN DESCRIPTION - ORIENTATION
ORIENTATION: LEFT

## 2018-11-24 ASSESSMENT — PAIN SCALES - GENERAL
PAINLEVEL_OUTOF10: 7
PAINLEVEL_OUTOF10: 10
PAINLEVEL_OUTOF10: 9
PAINLEVEL_OUTOF10: 8
PAINLEVEL_OUTOF10: 10
PAINLEVEL_OUTOF10: 8
PAINLEVEL_OUTOF10: 7
PAINLEVEL_OUTOF10: 7
PAINLEVEL_OUTOF10: 9

## 2018-11-24 ASSESSMENT — PAIN DESCRIPTION - FREQUENCY
FREQUENCY: CONTINUOUS

## 2018-11-24 ASSESSMENT — PAIN DESCRIPTION - PROGRESSION
CLINICAL_PROGRESSION: NOT CHANGED
CLINICAL_PROGRESSION: GRADUALLY WORSENING
CLINICAL_PROGRESSION: NOT CHANGED
CLINICAL_PROGRESSION: NOT CHANGED
CLINICAL_PROGRESSION: GRADUALLY WORSENING

## 2018-11-24 ASSESSMENT — PAIN DESCRIPTION - LOCATION
LOCATION: FOOT

## 2018-11-24 ASSESSMENT — PAIN DESCRIPTION - DESCRIPTORS
DESCRIPTORS: ACHING
DESCRIPTORS: ACHING
DESCRIPTORS: BURNING;ACHING;SORE
DESCRIPTORS: ACHING;BURNING;SORE

## 2018-11-24 ASSESSMENT — PAIN DESCRIPTION - ONSET
ONSET: ON-GOING
ONSET: GRADUAL

## 2018-11-24 NOTE — ED NOTES
Spoke with Belinda Clarke on [facility] to give report and she stated that she is going to contact the hospitalist in regards to patient not being on Bipap and not really needing ICU stepdown floor.    Awaiting on call back        Cristy Vega RN  11/24/18 [id number]

## 2018-11-24 NOTE — CONSULTS
infarction) ([facility] [[email or web address]     Obesity     Osteoarthritis     Palpitations     Peripheral edema     Restless legs syndrome     Sleep apnea     Has a CPAP    Sleep apnea     SOB (shortness of breath)        Past Surgical History:    Past Surgical History:   Procedure Laterality Date     SECTION      CHOLECYSTECTOMY      GALLBLADDER SURGERY      HYSTERECTOMY      NECK SURGERY      Tumor    TUBAL LIGATION         Current Medications:   Current Facility-Administered Medications   Medication Dose Route Frequency Provider Last Rate Last Dose    albuterol sulfate  (90 Base) MCG/ACT inhaler 2 puff  2 puff Inhalation Q4H PRN Silvia Dennis MD        vitamin C (ASCORBIC ACID) tablet 500 mg  500 mg Oral Daily Silvia Dennis MD   500 mg at 18 1234    aspirin chewable tablet 81 mg  81 mg Oral Daily Silvia Dennis MD   81 mg at 18 1233    carvedilol (COREG) tablet 3.125 mg  3.125 mg Oral BID Silvia Dennis MD   3.125 mg at 18 1233    clopidogrel (PLAVIX) tablet 75 mg  75 mg Oral Daily Silvia Dennis MD   75 mg at 18 1234    gabapentin (NEURONTIN) capsule 800 mg  800 mg Oral TID Silvia Dennis MD   800 mg at 18 1233    ipratropium-albuterol (DUONEB) nebulizer solution 3 mL  3 mL Inhalation 4x daily Silvia Dennis MD   3 mL at  6009    folic acid-pyridoxine-cyancobalamin (FOLTX) 1.13-25-2 MG TABS 1 tablet  1 tablet Oral BID Silvia Dennis MD   1 tablet at 18 1233    pantoprazole (PROTONIX) tablet 40 mg  40 mg Oral Daily Silvia Dennis MD   40 mg at 18 1234    pramipexole (MIRAPEX) tablet 1 mg  1 mg Oral TID Siliva Dennis MD   1 mg at 18 1234    simvastatin (ZOCOR) tablet 40 mg  40 mg Oral Nightly Silvia Dennis MD        vilazodone HCl (VIIBRYD) TABS 10 mg  10 mg Oral BID Silvia Dennis MD   10 mg at 18 1234    potassium chloride (KLOR-CON M) extended release tablet 20 mEq  20 and sore throat. Eyes: Negative for discharge and redness. Respiratory: Negative for chest tightness and shortness of breath. Cardiovascular: Negative for chest pain and palpitations. Gastrointestinal: Negative for abdominal distention, abdominal pain, constipation, diarrhea, nausea and vomiting. Genitourinary: Negative for dysuria and flank pain. Musculoskeletal: Positive for gait problem (pain in left foot with walking). Negative for myalgias. Skin: Positive for wound (left foot, back of left leg). Negative for color change. Neurological: Negative for dizziness and numbness. Psychiatric/Behavioral: Negative for confusion. The patient is not nervous/anxious. I have reviewed the patient's information pertinent to this visit, including medical history, family history, social history and review of systems. PHYSICAL EXAM:    Vitals:    11/24/18 1100 11/24/18 1130 11/24/18 1158 11/24/18 1541   BP: (!) 134/37 (!) 133/49     Pulse: 74      Resp: 17      Temp:  97.8 °F (36.6 °C)     TempSrc:  Oral     SpO2: 97%  98% 96%   Weight:       Height:           Physical Exam   Constitutional: She is oriented to person, place, and time. She appears well-developed. No distress. Morbidly obese   HENT:   Head: Normocephalic and atraumatic. Eyes: Pupils are equal, round, and reactive to light. Right eye exhibits no discharge. Left eye exhibits no discharge. Neck: Normal range of motion. No tracheal deviation present. Cardiovascular: Normal rate and regular rhythm. Pulmonary/Chest: Effort normal. No respiratory distress. She has no wheezes. Abdominal: Soft. There is no tenderness. There is no rebound and no guarding. Morbidly obese, seborrheic crusting over the lower portion of the pannus   Musculoskeletal: She exhibits tenderness (TTP plantar aspect of left foot). She exhibits no deformity. Neurological: She is alert and oriented to person, place, and time. Skin: Skin is warm and dry.

## 2018-11-24 NOTE — ED NOTES
Patient arrives to the ED with complaints of foot pain and leg swelling , she states it has been going on for months and she just can't take the pain anymore. She complains her left foot pain the worse. She was referred to a foot doctor and she has an appointment with them on Decemeber 2.       Zain Lilly RN  11/24/18 9990

## 2018-11-24 NOTE — ED PROVIDER NOTES
that may be inappropriate. If there are any questions or concerns please feel free to contact the dictating provider for clarification.        Brandie Washington MD  11/24/18 6345

## 2018-11-24 NOTE — ED PROVIDER NOTES
Cholecystectomy;  section; Hysterectomy; Tubal ligation; Neck surgery; and Gallbladder surgery. Home medications:   Prior to Admission medications    Medication Sig Start Date End Date Taking? Authorizing Provider   clopidogrel (PLAVIX) 75 MG tablet Take 1 tablet by mouth daily Patient has appointment on 3/27/18 more refills with be given after she keeps her office visit 18   Stanislav Gallegos MD   Suvorexant (BELSOMRA) 10 MG TABS Take 10 mg by mouth daily. Sendy Bustillos     Historical Provider, MD   L-Methylfolate-B6-B12 (FOLTANX) 3-35-2 MG TABS Take 1 tablet by mouth 2 times daily     Historical Provider, MD   nystatin (MYCOSTATIN) POWD powder Apply topically 2 times daily    Historical Provider, MD   potassium chloride (KLOR-CON M) 20 MEQ extended release tablet Take 1 tablet by mouth daily 18   Venice Norman MD   furosemide (LASIX) 40 MG tablet Take 1 tablet by mouth 2 times daily  Patient taking differently: Take 40 mg by mouth daily  18   Venice Norman MD   carvedilol (COREG) 3.125 MG tablet Take 1 tablet by mouth 2 times daily 18   Venice Norman MD   simvastatin (ZOCOR) 40 MG tablet Take 1 tablet by mouth nightly 18   Venice Norman MD   BiPAP Machine MISC by BiPAP route nightly    Historical Provider, MD   metFORMIN (GLUCOPHAGE) 500 MG tablet Take 1 tablet by mouth 2 times daily (with meals) 18   MONI Barrios MD   ipratropium-albuterol (DUONEB) 0.5-2.5 (3) MG/3ML SOLN nebulizer solution Inhale 3 mLs into the lungs 4 times daily 18   MONI Barrios MD   Nebulizers (AIRIAL COMPACT MINI NEBULIZER) MISC 1 Device by Does not apply route 4 times daily 18   MONI Barrios MD   albuterol sulfate  (90 Base) MCG/ACT inhaler Inhale 2 puffs into the lungs    Historical Provider, MD   pantoprazole (PROTONIX) 40 MG tablet Take 40 mg by mouth daily    Historical Provider, MD   vilazodone HCl (VIIBRYD) 10 MG TABS Take 10 mg by mouth 2 times daily

## 2018-11-24 NOTE — ED NOTES
Spoke with Nursing Supervisor and was advised that the bed assignment would be changed again back to a [facility] bed.       Madison Randhawa RN  11/24/18 7345

## 2018-11-25 ENCOUNTER — APPOINTMENT (OUTPATIENT)
Dept: ULTRASOUND IMAGING | Age: 63
DRG: 361 | End: 2018-11-25
Payer: COMMERCIAL

## 2018-11-25 ENCOUNTER — APPOINTMENT (OUTPATIENT)
Dept: CT IMAGING | Age: 63
DRG: 361 | End: 2018-11-25
Payer: COMMERCIAL

## 2018-11-25 LAB
ANION GAP SERPL CALCULATED.3IONS-SCNC: 8 MMOL/L (ref 4–16)
BUN BLDV-MCNC: 12 MG/DL (ref 6–23)
C-REACTIVE PROTEIN, HIGH SENSITIVITY: 27.4 MG/L
CALCIUM SERPL-MCNC: 8.6 MG/DL (ref 8.3–10.6)
CHLORIDE BLD-SCNC: 97 MMOL/L (ref 99–110)
CO2: 36 MMOL/L (ref 21–32)
CREAT SERPL-MCNC: 0.7 MG/DL (ref 0.6–1.1)
ERYTHROCYTE SEDIMENTATION RATE: 93 MM/HR (ref 0–30)
GFR AFRICAN AMERICAN: >60 ML/MIN/1.73M2
GFR NON-AFRICAN AMERICAN: >60 ML/MIN/1.73M2
GLUCOSE BLD-MCNC: 111 MG/DL (ref 70–99)
GLUCOSE BLD-MCNC: 118 MG/DL (ref 70–99)
GLUCOSE BLD-MCNC: 124 MG/DL (ref 70–99)
GLUCOSE BLD-MCNC: 128 MG/DL (ref 70–99)
GLUCOSE BLD-MCNC: 159 MG/DL (ref 70–99)
GLUCOSE BLD-MCNC: 165 MG/DL (ref 70–99)
GLUCOSE BLD-MCNC: 167 MG/DL (ref 70–99)
HCT VFR BLD CALC: 29.5 % (ref 37–47)
HEMOGLOBIN: 8.4 GM/DL (ref 12.5–16)
MCH RBC QN AUTO: 24.3 PG (ref 27–31)
MCHC RBC AUTO-ENTMCNC: 28.5 % (ref 32–36)
MCV RBC AUTO: 85.3 FL (ref 78–100)
PDW BLD-RTO: 16.6 % (ref 11.7–14.9)
PLATELET # BLD: 272 K/CU MM (ref 140–440)
PMV BLD AUTO: 9.4 FL (ref 7.5–11.1)
POTASSIUM SERPL-SCNC: 4.3 MMOL/L (ref 3.5–5.1)
RBC # BLD: 3.46 M/CU MM (ref 4.2–5.4)
REASON FOR REJECTION: NORMAL
REJECTED TEST: NORMAL
SODIUM BLD-SCNC: 141 MMOL/L (ref 135–145)
SOURCE: NORMAL
VANCOMYCIN RANDOM: 43.1 UG/ML
WBC # BLD: 5.1 K/CU MM (ref 4–10.5)

## 2018-11-25 PROCEDURE — 1200000000 HC SEMI PRIVATE

## 2018-11-25 PROCEDURE — 6360000002 HC RX W HCPCS: Performed by: INTERNAL MEDICINE

## 2018-11-25 PROCEDURE — 80202 ASSAY OF VANCOMYCIN: CPT

## 2018-11-25 PROCEDURE — 94640 AIRWAY INHALATION TREATMENT: CPT

## 2018-11-25 PROCEDURE — 99232 SBSQ HOSP IP/OBS MODERATE 35: CPT | Performed by: SURGERY

## 2018-11-25 PROCEDURE — 85652 RBC SED RATE AUTOMATED: CPT

## 2018-11-25 PROCEDURE — 2700000000 HC OXYGEN THERAPY PER DAY

## 2018-11-25 PROCEDURE — 82962 GLUCOSE BLOOD TEST: CPT

## 2018-11-25 PROCEDURE — 73701 CT LOWER EXTREMITY W/DYE: CPT

## 2018-11-25 PROCEDURE — 90686 IIV4 VACC NO PRSV 0.5 ML IM: CPT | Performed by: INTERNAL MEDICINE

## 2018-11-25 PROCEDURE — 85027 COMPLETE CBC AUTOMATED: CPT

## 2018-11-25 PROCEDURE — 80048 BASIC METABOLIC PNL TOTAL CA: CPT

## 2018-11-25 PROCEDURE — 93925 LOWER EXTREMITY STUDY: CPT

## 2018-11-25 PROCEDURE — 6370000000 HC RX 637 (ALT 250 FOR IP): Performed by: INTERNAL MEDICINE

## 2018-11-25 PROCEDURE — 86140 C-REACTIVE PROTEIN: CPT

## 2018-11-25 PROCEDURE — G0008 ADMIN INFLUENZA VIRUS VAC: HCPCS | Performed by: INTERNAL MEDICINE

## 2018-11-25 PROCEDURE — 6360000004 HC RX CONTRAST MEDICATION: Performed by: INTERNAL MEDICINE

## 2018-11-25 PROCEDURE — 6360000002 HC RX W HCPCS: Performed by: SURGERY

## 2018-11-25 PROCEDURE — 94760 N-INVAS EAR/PLS OXIMETRY 1: CPT

## 2018-11-25 PROCEDURE — 36415 COLL VENOUS BLD VENIPUNCTURE: CPT

## 2018-11-25 PROCEDURE — 2580000003 HC RX 258: Performed by: INTERNAL MEDICINE

## 2018-11-25 PROCEDURE — 94761 N-INVAS EAR/PLS OXIMETRY MLT: CPT

## 2018-11-25 RX ORDER — 0.9 % SODIUM CHLORIDE 0.9 %
10 VIAL (ML) INJECTION
Status: COMPLETED | OUTPATIENT
Start: 2018-11-25 | End: 2018-11-25

## 2018-11-25 RX ORDER — SODIUM CHLORIDE 9 MG/ML
INJECTION, SOLUTION INTRAVENOUS ONCE
Status: COMPLETED | OUTPATIENT
Start: 2018-11-25 | End: 2018-11-25

## 2018-11-25 RX ADMIN — INFLUENZA A VIRUS A/MICHIGAN/45/2015 X-275 (H1N1) ANTIGEN (FORMALDEHYDE INACTIVATED), INFLUENZA A VIRUS A/SINGAPORE/INFIMH-16-0019/2016 IVR-186 (H3N2) ANTIGEN (FORMALDEHYDE INACTIVATED), INFLUENZA B VIRUS B/PHUKET/3073/2013 ANTIGEN (FORMALDEHYDE INACTIVATED), AND INFLUENZA B VIRUS B/MARYLAND/15/2016 BX-69A ANTIGEN (FORMALDEHYDE INACTIVATED) 0.5 ML: 15; 15; 15; 15 INJECTION, SUSPENSION INTRAMUSCULAR at 08:24

## 2018-11-25 RX ADMIN — SODIUM CHLORIDE, PRESERVATIVE FREE 10 ML: 5 INJECTION INTRAVENOUS at 08:12

## 2018-11-25 RX ADMIN — SODIUM HYPOCHLORITE: 2.5 SOLUTION TOPICAL at 10:47

## 2018-11-25 RX ADMIN — VANCOMYCIN HYDROCHLORIDE 2000 MG: 1 INJECTION, POWDER, LYOPHILIZED, FOR SOLUTION INTRAVENOUS at 18:21

## 2018-11-25 RX ADMIN — POTASSIUM CHLORIDE 20 MEQ: 20 TABLET, EXTENDED RELEASE ORAL at 08:10

## 2018-11-25 RX ADMIN — PRAMIPEXOLE DIHYDROCHLORIDE 1 MG: 0.25 TABLET ORAL at 13:53

## 2018-11-25 RX ADMIN — IPRATROPIUM BROMIDE AND ALBUTEROL SULFATE 3 ML: .5; 3 SOLUTION RESPIRATORY (INHALATION) at 11:34

## 2018-11-25 RX ADMIN — TRAMADOL HYDROCHLORIDE 100 MG: 50 TABLET, FILM COATED ORAL at 08:12

## 2018-11-25 RX ADMIN — PRAMIPEXOLE DIHYDROCHLORIDE 1 MG: 0.25 TABLET ORAL at 22:00

## 2018-11-25 RX ADMIN — IPRATROPIUM BROMIDE AND ALBUTEROL SULFATE 3 ML: .5; 3 SOLUTION RESPIRATORY (INHALATION) at 16:11

## 2018-11-25 RX ADMIN — OXYCODONE HYDROCHLORIDE AND ACETAMINOPHEN 500 MG: 500 TABLET ORAL at 08:10

## 2018-11-25 RX ADMIN — VILAZODONE HYDROCHLORIDE 10 MG: 10 TABLET ORAL at 22:01

## 2018-11-25 RX ADMIN — GABAPENTIN 800 MG: 400 CAPSULE ORAL at 05:57

## 2018-11-25 RX ADMIN — CARVEDILOL 3.12 MG: 3.12 TABLET, FILM COATED ORAL at 08:12

## 2018-11-25 RX ADMIN — ENOXAPARIN SODIUM 40 MG: 40 INJECTION SUBCUTANEOUS at 08:12

## 2018-11-25 RX ADMIN — SODIUM CHLORIDE 10 ML: 9 INJECTION, SOLUTION INTRAMUSCULAR; INTRAVENOUS; SUBCUTANEOUS at 09:29

## 2018-11-25 RX ADMIN — INSULIN GLARGINE 45 UNITS: 100 INJECTION, SOLUTION SUBCUTANEOUS at 06:29

## 2018-11-25 RX ADMIN — VANCOMYCIN HYDROCHLORIDE 2000 MG: 1 INJECTION, POWDER, LYOPHILIZED, FOR SOLUTION INTRAVENOUS at 10:51

## 2018-11-25 RX ADMIN — ASPIRIN 81 MG 81 MG: 81 TABLET ORAL at 08:10

## 2018-11-25 RX ADMIN — Medication 1 TABLET: at 22:01

## 2018-11-25 RX ADMIN — IOPAMIDOL 75 ML: 755 INJECTION, SOLUTION INTRAVENOUS at 09:28

## 2018-11-25 RX ADMIN — VILAZODONE HYDROCHLORIDE 10 MG: 10 TABLET ORAL at 08:11

## 2018-11-25 RX ADMIN — GABAPENTIN 800 MG: 400 CAPSULE ORAL at 22:01

## 2018-11-25 RX ADMIN — SODIUM CHLORIDE: 9 INJECTION, SOLUTION INTRAVENOUS at 08:28

## 2018-11-25 RX ADMIN — CARVEDILOL 3.12 MG: 3.12 TABLET, FILM COATED ORAL at 22:00

## 2018-11-25 RX ADMIN — INSULIN LISPRO 19 UNITS: 100 INJECTION, SOLUTION INTRAVENOUS; SUBCUTANEOUS at 10:52

## 2018-11-25 RX ADMIN — PRAMIPEXOLE DIHYDROCHLORIDE 1 MG: 0.25 TABLET ORAL at 08:09

## 2018-11-25 RX ADMIN — MORPHINE SULFATE 2 MG: 2 INJECTION, SOLUTION INTRAMUSCULAR; INTRAVENOUS at 10:53

## 2018-11-25 RX ADMIN — CLOPIDOGREL BISULFATE 75 MG: 75 TABLET ORAL at 08:10

## 2018-11-25 RX ADMIN — MORPHINE SULFATE 2 MG: 2 INJECTION, SOLUTION INTRAMUSCULAR; INTRAVENOUS at 22:04

## 2018-11-25 RX ADMIN — TAZOBACTAM SODIUM AND PIPERACILLIN SODIUM 4.5 G: 500; 4 INJECTION, SOLUTION INTRAVENOUS at 22:36

## 2018-11-25 RX ADMIN — INSULIN LISPRO 2 UNITS: 100 INJECTION, SOLUTION INTRAVENOUS; SUBCUTANEOUS at 10:52

## 2018-11-25 RX ADMIN — TAZOBACTAM SODIUM AND PIPERACILLIN SODIUM 4.5 G: 500; 4 INJECTION, SOLUTION INTRAVENOUS at 06:28

## 2018-11-25 RX ADMIN — MICONAZOLE NITRATE: 2 POWDER TOPICAL at 10:47

## 2018-11-25 RX ADMIN — Medication 1 TABLET: at 08:10

## 2018-11-25 RX ADMIN — ERGOCALCIFEROL 50000 UNITS: 1.25 CAPSULE ORAL at 08:13

## 2018-11-25 RX ADMIN — MICONAZOLE NITRATE: 2 POWDER TOPICAL at 22:02

## 2018-11-25 RX ADMIN — INSULIN LISPRO 19 UNITS: 100 INJECTION, SOLUTION INTRAVENOUS; SUBCUTANEOUS at 18:22

## 2018-11-25 RX ADMIN — TAZOBACTAM SODIUM AND PIPERACILLIN SODIUM 4.5 G: 500; 4 INJECTION, SOLUTION INTRAVENOUS at 14:37

## 2018-11-25 RX ADMIN — PANTOPRAZOLE SODIUM 40 MG: 40 TABLET, DELAYED RELEASE ORAL at 05:57

## 2018-11-25 RX ADMIN — GABAPENTIN 800 MG: 400 CAPSULE ORAL at 13:53

## 2018-11-25 RX ADMIN — IPRATROPIUM BROMIDE AND ALBUTEROL SULFATE 3 ML: .5; 3 SOLUTION RESPIRATORY (INHALATION) at 21:01

## 2018-11-25 RX ADMIN — SODIUM CHLORIDE, PRESERVATIVE FREE 10 ML: 5 INJECTION INTRAVENOUS at 22:02

## 2018-11-25 RX ADMIN — VANCOMYCIN HYDROCHLORIDE 2000 MG: 1 INJECTION, POWDER, LYOPHILIZED, FOR SOLUTION INTRAVENOUS at 03:35

## 2018-11-25 RX ADMIN — SIMVASTATIN 40 MG: 40 TABLET, FILM COATED ORAL at 22:01

## 2018-11-25 ASSESSMENT — PAIN SCALES - GENERAL
PAINLEVEL_OUTOF10: 5
PAINLEVEL_OUTOF10: 9
PAINLEVEL_OUTOF10: 7
PAINLEVEL_OUTOF10: 7
PAINLEVEL_OUTOF10: 10
PAINLEVEL_OUTOF10: 9
PAINLEVEL_OUTOF10: 8
PAINLEVEL_OUTOF10: 9

## 2018-11-25 ASSESSMENT — PAIN DESCRIPTION - PROGRESSION
CLINICAL_PROGRESSION: GRADUALLY IMPROVING
CLINICAL_PROGRESSION: GRADUALLY WORSENING
CLINICAL_PROGRESSION: GRADUALLY IMPROVING

## 2018-11-25 ASSESSMENT — PAIN DESCRIPTION - PAIN TYPE
TYPE: ACUTE PAIN
TYPE: ACUTE PAIN

## 2018-11-25 ASSESSMENT — PAIN DESCRIPTION - ONSET
ONSET: ON-GOING
ONSET: ON-GOING

## 2018-11-25 ASSESSMENT — PAIN DESCRIPTION - DESCRIPTORS
DESCRIPTORS: ACHING
DESCRIPTORS: ACHING

## 2018-11-25 ASSESSMENT — PAIN DESCRIPTION - ORIENTATION
ORIENTATION: LEFT
ORIENTATION: LEFT

## 2018-11-25 ASSESSMENT — PAIN DESCRIPTION - LOCATION
LOCATION: FOOT
LOCATION: FOOT

## 2018-11-25 ASSESSMENT — PAIN DESCRIPTION - FREQUENCY
FREQUENCY: CONTINUOUS
FREQUENCY: CONTINUOUS

## 2018-11-25 NOTE — PROGRESS NOTES
11/25/18 0458   NIV Type   Equipment Type v60   Mode BIPAP   Mask Type Full face mask   Mask Size Small   Bonnet size Small   Settings/Measurements   Comfort Level Good   Using Accessory Muscles No   IPAP 18 cmH20   EPAP 6 cmH2O   Rate Ordered 12   Resp 16   SpO2 96   FiO2  30 %   I Time/ I Time % 1.35 s   Vt Exhaled 595 mL   Mask Leak (lpm) 1 lpm   Patient Observation   Observations pt sleeping   Alarm Settings   Alarms On Y   Press Low Alarm 5 cmH2O   High Pressure Alarm 25 cmH2O   Delay Alarm 20 sec(s)   Apnea (secs) 20 secs   Resp Rate Low Alarm 13   High Respiratory Rate 40 br/min

## 2018-11-26 ENCOUNTER — APPOINTMENT (OUTPATIENT)
Dept: NUCLEAR MEDICINE | Age: 63
DRG: 361 | End: 2018-11-26
Payer: COMMERCIAL

## 2018-11-26 LAB
ANION GAP SERPL CALCULATED.3IONS-SCNC: 7 MMOL/L (ref 4–16)
BUN BLDV-MCNC: 13 MG/DL (ref 6–23)
CALCIUM SERPL-MCNC: 8.6 MG/DL (ref 8.3–10.6)
CHLORIDE BLD-SCNC: 96 MMOL/L (ref 99–110)
CO2: 34 MMOL/L (ref 21–32)
CREAT SERPL-MCNC: 0.8 MG/DL (ref 0.6–1.1)
GFR AFRICAN AMERICAN: >60 ML/MIN/1.73M2
GFR NON-AFRICAN AMERICAN: >60 ML/MIN/1.73M2
GLUCOSE BLD-MCNC: 101 MG/DL (ref 70–99)
GLUCOSE BLD-MCNC: 113 MG/DL (ref 70–99)
GLUCOSE BLD-MCNC: 129 MG/DL (ref 70–99)
GLUCOSE BLD-MCNC: 146 MG/DL (ref 70–99)
GLUCOSE BLD-MCNC: 161 MG/DL (ref 70–99)
GLUCOSE BLD-MCNC: 171 MG/DL (ref 70–99)
GLUCOSE BLD-MCNC: 78 MG/DL (ref 70–99)
HCT VFR BLD CALC: 28.8 % (ref 37–47)
HEMOGLOBIN: 8.4 GM/DL (ref 12.5–16)
MCH RBC QN AUTO: 24.7 PG (ref 27–31)
MCHC RBC AUTO-ENTMCNC: 29.2 % (ref 32–36)
MCV RBC AUTO: 84.7 FL (ref 78–100)
PDW BLD-RTO: 16.6 % (ref 11.7–14.9)
PLATELET # BLD: 248 K/CU MM (ref 140–440)
PMV BLD AUTO: 9 FL (ref 7.5–11.1)
POTASSIUM SERPL-SCNC: 4.2 MMOL/L (ref 3.5–5.1)
RBC # BLD: 3.4 M/CU MM (ref 4.2–5.4)
SODIUM BLD-SCNC: 137 MMOL/L (ref 135–145)
VANCOMYCIN TROUGH: 33.1 UG/ML (ref 10–20)
WBC # BLD: 6 K/CU MM (ref 4–10.5)

## 2018-11-26 PROCEDURE — 82962 GLUCOSE BLOOD TEST: CPT

## 2018-11-26 PROCEDURE — 80048 BASIC METABOLIC PNL TOTAL CA: CPT

## 2018-11-26 PROCEDURE — 2580000003 HC RX 258: Performed by: INTERNAL MEDICINE

## 2018-11-26 PROCEDURE — 6370000000 HC RX 637 (ALT 250 FOR IP): Performed by: SURGERY

## 2018-11-26 PROCEDURE — 80202 ASSAY OF VANCOMYCIN: CPT

## 2018-11-26 PROCEDURE — 1200000000 HC SEMI PRIVATE

## 2018-11-26 PROCEDURE — 94761 N-INVAS EAR/PLS OXIMETRY MLT: CPT

## 2018-11-26 PROCEDURE — 94640 AIRWAY INHALATION TREATMENT: CPT

## 2018-11-26 PROCEDURE — 87071 CULTURE AEROBIC QUANT OTHER: CPT

## 2018-11-26 PROCEDURE — 85027 COMPLETE CBC AUTOMATED: CPT

## 2018-11-26 PROCEDURE — 99232 SBSQ HOSP IP/OBS MODERATE 35: CPT | Performed by: SURGERY

## 2018-11-26 PROCEDURE — 3430000000 HC RX DIAGNOSTIC RADIOPHARMACEUTICAL: Performed by: INTERNAL MEDICINE

## 2018-11-26 PROCEDURE — 6370000000 HC RX 637 (ALT 250 FOR IP): Performed by: INTERNAL MEDICINE

## 2018-11-26 PROCEDURE — 11042 DBRDMT SUBQ TIS 1ST 20SQCM/<: CPT | Performed by: SURGERY

## 2018-11-26 PROCEDURE — 78315 BONE IMAGING 3 PHASE: CPT

## 2018-11-26 PROCEDURE — 6360000002 HC RX W HCPCS: Performed by: INTERNAL MEDICINE

## 2018-11-26 PROCEDURE — 2500000003 HC RX 250 WO HCPCS: Performed by: SURGERY

## 2018-11-26 PROCEDURE — A9503 TC99M MEDRONATE: HCPCS | Performed by: INTERNAL MEDICINE

## 2018-11-26 PROCEDURE — 99254 IP/OBS CNSLTJ NEW/EST MOD 60: CPT | Performed by: INTERNAL MEDICINE

## 2018-11-26 PROCEDURE — 87073 CULTURE BACTERIA ANAEROBIC: CPT

## 2018-11-26 PROCEDURE — 36415 COLL VENOUS BLD VENIPUNCTURE: CPT

## 2018-11-26 PROCEDURE — 2700000000 HC OXYGEN THERAPY PER DAY

## 2018-11-26 PROCEDURE — 0HBNXZZ EXCISION OF LEFT FOOT SKIN, EXTERNAL APPROACH: ICD-10-PCS | Performed by: SURGERY

## 2018-11-26 PROCEDURE — 87186 SC STD MICRODIL/AGAR DIL: CPT

## 2018-11-26 PROCEDURE — 2500000003 HC RX 250 WO HCPCS: Performed by: INTERNAL MEDICINE

## 2018-11-26 PROCEDURE — 6360000002 HC RX W HCPCS: Performed by: SURGERY

## 2018-11-26 PROCEDURE — 94660 CPAP INITIATION&MGMT: CPT

## 2018-11-26 PROCEDURE — 87077 CULTURE AEROBIC IDENTIFY: CPT

## 2018-11-26 RX ORDER — TC 99M MEDRONATE 20 MG/10ML
25 INJECTION, POWDER, LYOPHILIZED, FOR SOLUTION INTRAVENOUS
Status: COMPLETED | OUTPATIENT
Start: 2018-11-26 | End: 2018-11-26

## 2018-11-26 RX ORDER — LIDOCAINE HYDROCHLORIDE 10 MG/ML
20 INJECTION, SOLUTION INFILTRATION; PERINEURAL ONCE
Status: COMPLETED | OUTPATIENT
Start: 2018-11-26 | End: 2018-11-26

## 2018-11-26 RX ADMIN — TC 99M MEDRONATE 25 MILLICURIE: 20 INJECTION, POWDER, LYOPHILIZED, FOR SOLUTION INTRAVENOUS at 13:15

## 2018-11-26 RX ADMIN — CARVEDILOL 3.12 MG: 3.12 TABLET, FILM COATED ORAL at 21:33

## 2018-11-26 RX ADMIN — PRAMIPEXOLE DIHYDROCHLORIDE 1 MG: 0.25 TABLET ORAL at 21:32

## 2018-11-26 RX ADMIN — VILAZODONE HYDROCHLORIDE 10 MG: 10 TABLET ORAL at 09:19

## 2018-11-26 RX ADMIN — PANTOPRAZOLE SODIUM 40 MG: 40 TABLET, DELAYED RELEASE ORAL at 06:09

## 2018-11-26 RX ADMIN — ENOXAPARIN SODIUM 40 MG: 40 INJECTION SUBCUTANEOUS at 09:18

## 2018-11-26 RX ADMIN — INSULIN LISPRO 19 UNITS: 100 INJECTION, SOLUTION INTRAVENOUS; SUBCUTANEOUS at 09:19

## 2018-11-26 RX ADMIN — INSULIN GLARGINE 45 UNITS: 100 INJECTION, SOLUTION SUBCUTANEOUS at 06:09

## 2018-11-26 RX ADMIN — METRONIDAZOLE 500 MG: 500 INJECTION, SOLUTION INTRAVENOUS at 13:49

## 2018-11-26 RX ADMIN — TAZOBACTAM SODIUM AND PIPERACILLIN SODIUM 4.5 G: 500; 4 INJECTION, SOLUTION INTRAVENOUS at 06:46

## 2018-11-26 RX ADMIN — IPRATROPIUM BROMIDE AND ALBUTEROL SULFATE 3 ML: .5; 3 SOLUTION RESPIRATORY (INHALATION) at 16:54

## 2018-11-26 RX ADMIN — IPRATROPIUM BROMIDE AND ALBUTEROL SULFATE 3 ML: .5; 3 SOLUTION RESPIRATORY (INHALATION) at 12:43

## 2018-11-26 RX ADMIN — CLOPIDOGREL BISULFATE 75 MG: 75 TABLET ORAL at 09:18

## 2018-11-26 RX ADMIN — CEFEPIME HYDROCHLORIDE 2 G: 2 INJECTION, POWDER, FOR SOLUTION INTRAVENOUS at 15:18

## 2018-11-26 RX ADMIN — MICONAZOLE NITRATE: 2 POWDER TOPICAL at 10:51

## 2018-11-26 RX ADMIN — INSULIN LISPRO 2 UNITS: 100 INJECTION, SOLUTION INTRAVENOUS; SUBCUTANEOUS at 09:21

## 2018-11-26 RX ADMIN — SODIUM CHLORIDE, PRESERVATIVE FREE 10 ML: 5 INJECTION INTRAVENOUS at 10:20

## 2018-11-26 RX ADMIN — VANCOMYCIN HYDROCHLORIDE 2000 MG: 1 INJECTION, POWDER, LYOPHILIZED, FOR SOLUTION INTRAVENOUS at 02:53

## 2018-11-26 RX ADMIN — TRAMADOL HYDROCHLORIDE 100 MG: 50 TABLET, FILM COATED ORAL at 06:16

## 2018-11-26 RX ADMIN — CARVEDILOL 3.12 MG: 3.12 TABLET, FILM COATED ORAL at 09:19

## 2018-11-26 RX ADMIN — POTASSIUM CHLORIDE 20 MEQ: 20 TABLET, EXTENDED RELEASE ORAL at 09:18

## 2018-11-26 RX ADMIN — SODIUM CHLORIDE, PRESERVATIVE FREE 10 ML: 5 INJECTION INTRAVENOUS at 21:35

## 2018-11-26 RX ADMIN — GABAPENTIN 800 MG: 400 CAPSULE ORAL at 21:33

## 2018-11-26 RX ADMIN — MORPHINE SULFATE 2 MG: 2 INJECTION, SOLUTION INTRAMUSCULAR; INTRAVENOUS at 09:29

## 2018-11-26 RX ADMIN — MORPHINE SULFATE 2 MG: 2 INJECTION, SOLUTION INTRAMUSCULAR; INTRAVENOUS at 14:07

## 2018-11-26 RX ADMIN — TRAMADOL HYDROCHLORIDE 100 MG: 50 TABLET, FILM COATED ORAL at 21:32

## 2018-11-26 RX ADMIN — METRONIDAZOLE 500 MG: 500 INJECTION, SOLUTION INTRAVENOUS at 22:16

## 2018-11-26 RX ADMIN — VILAZODONE HYDROCHLORIDE 10 MG: 10 TABLET ORAL at 21:33

## 2018-11-26 RX ADMIN — ASPIRIN 81 MG 81 MG: 81 TABLET ORAL at 09:18

## 2018-11-26 RX ADMIN — MICONAZOLE NITRATE: 2 POWDER TOPICAL at 22:16

## 2018-11-26 RX ADMIN — CEFEPIME HYDROCHLORIDE 2 G: 2 INJECTION, POWDER, FOR SOLUTION INTRAVENOUS at 21:35

## 2018-11-26 RX ADMIN — INSULIN LISPRO 19 UNITS: 100 INJECTION, SOLUTION INTRAVENOUS; SUBCUTANEOUS at 13:51

## 2018-11-26 RX ADMIN — GABAPENTIN 800 MG: 400 CAPSULE ORAL at 13:50

## 2018-11-26 RX ADMIN — GABAPENTIN 800 MG: 400 CAPSULE ORAL at 06:09

## 2018-11-26 RX ADMIN — LIDOCAINE HYDROCHLORIDE 20 ML: 10 INJECTION, SOLUTION INFILTRATION; PERINEURAL at 17:45

## 2018-11-26 RX ADMIN — PRAMIPEXOLE DIHYDROCHLORIDE 1 MG: 0.25 TABLET ORAL at 09:29

## 2018-11-26 RX ADMIN — SIMVASTATIN 40 MG: 40 TABLET, FILM COATED ORAL at 21:33

## 2018-11-26 RX ADMIN — OXYCODONE HYDROCHLORIDE AND ACETAMINOPHEN 500 MG: 500 TABLET ORAL at 09:18

## 2018-11-26 RX ADMIN — PRAMIPEXOLE DIHYDROCHLORIDE 1 MG: 0.25 TABLET ORAL at 13:50

## 2018-11-26 RX ADMIN — SODIUM HYPOCHLORITE: 2.5 SOLUTION TOPICAL at 15:00

## 2018-11-26 RX ADMIN — Medication 1 TABLET: at 09:18

## 2018-11-26 RX ADMIN — IPRATROPIUM BROMIDE AND ALBUTEROL SULFATE 3 ML: .5; 3 SOLUTION RESPIRATORY (INHALATION) at 20:51

## 2018-11-26 RX ADMIN — IPRATROPIUM BROMIDE AND ALBUTEROL SULFATE 3 ML: .5; 3 SOLUTION RESPIRATORY (INHALATION) at 08:52

## 2018-11-26 RX ADMIN — Medication 1 TABLET: at 21:33

## 2018-11-26 ASSESSMENT — PAIN SCALES - GENERAL
PAINLEVEL_OUTOF10: 0
PAINLEVEL_OUTOF10: 7
PAINLEVEL_OUTOF10: 8
PAINLEVEL_OUTOF10: 7
PAINLEVEL_OUTOF10: 4
PAINLEVEL_OUTOF10: 8
PAINLEVEL_OUTOF10: 8

## 2018-11-26 ASSESSMENT — PAIN DESCRIPTION - LOCATION: LOCATION: FOOT

## 2018-11-26 ASSESSMENT — PAIN DESCRIPTION - PAIN TYPE
TYPE: ACUTE PAIN

## 2018-11-26 ASSESSMENT — PAIN DESCRIPTION - ORIENTATION: ORIENTATION: LEFT

## 2018-11-26 ASSESSMENT — PAIN DESCRIPTION - PROGRESSION
CLINICAL_PROGRESSION: GRADUALLY IMPROVING
CLINICAL_PROGRESSION: GRADUALLY WORSENING

## 2018-11-26 ASSESSMENT — PAIN DESCRIPTION - ONSET: ONSET: ON-GOING

## 2018-11-26 ASSESSMENT — PAIN DESCRIPTION - DESCRIPTORS: DESCRIPTORS: ACHING

## 2018-11-26 ASSESSMENT — PAIN DESCRIPTION - FREQUENCY: FREQUENCY: CONTINUOUS

## 2018-11-26 NOTE — PROGRESS NOTES
11/26/18 0400   NIV Type   Equipment Type v60   Mode BIPAP   Mask Type Full face mask   Mask Size Small   Bonnet size Small   Settings/Measurements   Comfort Level Good   Using Accessory Muscles No   IPAP 18 cmH20   EPAP 6 cmH2O   Rate Ordered 12   Resp 17   FiO2  30 %   I Time/ I Time % 1.35 s   Vt Exhaled 595 mL   Mask Leak (lpm) 3 lpm   Patient Observation   Observations pt sleeping   Alarm Settings   Alarms On Y   Press Low Alarm 5 cmH2O   High Pressure Alarm 25 cmH2O   Delay Alarm 20 sec(s)   Apnea (secs) 20 secs   Resp Rate Low Alarm 13   High Respiratory Rate 40 br/min

## 2018-11-26 NOTE — PROGRESS NOTES
Medications:   Medications:    vitamin C  500 mg Oral Daily    aspirin  81 mg Oral Daily    carvedilol  3.125 mg Oral BID    clopidogrel  75 mg Oral Daily    gabapentin  800 mg Oral TID    ipratropium-albuterol  3 mL Inhalation 4x daily    folic acid-pyridoxine-cyancobalamin  1 tablet Oral BID    pantoprazole  40 mg Oral Daily    pramipexole  1 mg Oral TID    simvastatin  40 mg Oral Nightly    vilazodone HCl  10 mg Oral BID    potassium chloride  20 mEq Oral Daily    insulin lispro  0-12 Units Subcutaneous TID WC    insulin lispro  0-6 Units Subcutaneous Nightly    insulin lispro  0.08 Units/kg Subcutaneous TID WC    sodium chloride flush  10 mL Intravenous 2 times per day    enoxaparin  40 mg Subcutaneous Daily    insulin glargine  45 Units Subcutaneous QAM AC    vitamin D  50,000 Units Oral Once per day on Sun Wed    vancomycin  2,000 mg Intravenous Q8H    miconazole   Topical BID    piperacillin-tazobactam  4.5 g Intravenous Q8H    sodium hypochlorite   Irrigation Daily      Infusions:    dextrose       PRN Meds:     albuterol sulfate HFA 2 puff Q4H PRN   glucose 15 g PRN   dextrose 12.5 g PRN   glucagon (rDNA) 1 mg PRN   dextrose 100 mL/hr PRN   sodium chloride flush 10 mL PRN   magnesium hydroxide 30 mL Daily PRN   ondansetron 4 mg Q6H PRN   acetaminophen 650 mg Q4H PRN   traMADol 50 mg Q6H PRN   Or     traMADol 100 mg Q6H PRN   morphine 2 mg Q2H PRN   Or     morphine 4 mg Q2H PRN         Electronically signed by India Rudolph MD on 11/26/2018 at 11:06 AM

## 2018-11-26 NOTE — CONSULTS
Infectious Disease Consult Note  2018   Patient Name: Micheal Butt : 1955   Impression   Left foot diabetic ulcer with cellulitis extending to the shin  o Surrounding tenderness at the ulcer site- head of the 5th metatarsal may be suggestive of phlegmon  o Also did not probe to bone, however the base showed a necrotic slough. We will need to rule out osteomyelitis. Unfortunately patient's obesity does not allow for MRI. We will undertake bone scan however, this might be sub-optimal especially in the setting of Charcot arthropathyFor the more will need to obtain deep wound cultures to help guide antimicrobial therapy  o Vancomycin trough is supratherapeutic and has been placed on hold.  Morbid obesity BMI 71.8 kg/m²   Multi-morbidity: per PMHx  Plan:   MRSA groin screen   To reduce the risk of nephrotoxicity, will discontinue Zosyn and commence cefepime and Flagyl  · Pharmacy to continue to dose vancomycin    Bone scan to rule out osteomyelitis   Would need surgical assistance with obtaining deep wound cultures to help guide antimicrobial therapy,this could be done after the bone scan    Would advise optimization of diabetic control  Thank you for allowing me to consult in the care of this patient.  ------------------------  REASON FOR CONSULT: Infective syndrome   Requested by: Dr. Fox Xiong is a 66 y.o. obese white female with DM for 10 years, poorly compliant with medication who was admitted 2018 for further evaluation and management of left foot pain, swelling and redness over the last 3 days. The patient endorses noticing a blister 2 weeks ago. The patient's pain steadily worsened. She denies any nausea, vomiting, abdominal pain, cough. CT left foot no clear osteomyelitis. She was last admitted in the hospital in 2018 and was treated for pneumonia. On this admission she has received vancomycin and Zosyn. A  who is present, states that:  The

## 2018-11-27 ENCOUNTER — APPOINTMENT (OUTPATIENT)
Dept: CT IMAGING | Age: 63
DRG: 361 | End: 2018-11-27
Payer: COMMERCIAL

## 2018-11-27 LAB
ANION GAP SERPL CALCULATED.3IONS-SCNC: 9 MMOL/L (ref 4–16)
BUN BLDV-MCNC: 15 MG/DL (ref 6–23)
CALCIUM SERPL-MCNC: 8.9 MG/DL (ref 8.3–10.6)
CHLORIDE BLD-SCNC: 98 MMOL/L (ref 99–110)
CO2: 32 MMOL/L (ref 21–32)
CREAT SERPL-MCNC: 0.9 MG/DL (ref 0.6–1.1)
GFR AFRICAN AMERICAN: >60 ML/MIN/1.73M2
GFR NON-AFRICAN AMERICAN: >60 ML/MIN/1.73M2
GLUCOSE BLD-MCNC: 107 MG/DL (ref 70–99)
GLUCOSE BLD-MCNC: 112 MG/DL (ref 70–99)
GLUCOSE BLD-MCNC: 120 MG/DL (ref 70–99)
GLUCOSE BLD-MCNC: 126 MG/DL (ref 70–99)
GLUCOSE BLD-MCNC: 130 MG/DL (ref 70–99)
POTASSIUM SERPL-SCNC: 4.8 MMOL/L (ref 3.5–5.1)
SODIUM BLD-SCNC: 139 MMOL/L (ref 135–145)
VANCOMYCIN RANDOM: 20 UG/ML

## 2018-11-27 PROCEDURE — 99024 POSTOP FOLLOW-UP VISIT: CPT | Performed by: SURGERY

## 2018-11-27 PROCEDURE — 6370000000 HC RX 637 (ALT 250 FOR IP): Performed by: INTERNAL MEDICINE

## 2018-11-27 PROCEDURE — 6360000004 HC RX CONTRAST MEDICATION: Performed by: INTERNAL MEDICINE

## 2018-11-27 PROCEDURE — 87071 CULTURE AEROBIC QUANT OTHER: CPT

## 2018-11-27 PROCEDURE — 82962 GLUCOSE BLOOD TEST: CPT

## 2018-11-27 PROCEDURE — 99211 OFF/OP EST MAY X REQ PHY/QHP: CPT

## 2018-11-27 PROCEDURE — 80048 BASIC METABOLIC PNL TOTAL CA: CPT

## 2018-11-27 PROCEDURE — 87073 CULTURE BACTERIA ANAEROBIC: CPT

## 2018-11-27 PROCEDURE — 2500000003 HC RX 250 WO HCPCS: Performed by: INTERNAL MEDICINE

## 2018-11-27 PROCEDURE — 2580000003 HC RX 258: Performed by: INTERNAL MEDICINE

## 2018-11-27 PROCEDURE — 6360000002 HC RX W HCPCS: Performed by: INTERNAL MEDICINE

## 2018-11-27 PROCEDURE — 94640 AIRWAY INHALATION TREATMENT: CPT

## 2018-11-27 PROCEDURE — 94660 CPAP INITIATION&MGMT: CPT

## 2018-11-27 PROCEDURE — 2700000000 HC OXYGEN THERAPY PER DAY

## 2018-11-27 PROCEDURE — 99232 SBSQ HOSP IP/OBS MODERATE 35: CPT | Performed by: INTERNAL MEDICINE

## 2018-11-27 PROCEDURE — 36415 COLL VENOUS BLD VENIPUNCTURE: CPT

## 2018-11-27 PROCEDURE — 94761 N-INVAS EAR/PLS OXIMETRY MLT: CPT

## 2018-11-27 PROCEDURE — 75635 CT ANGIO ABDOMINAL ARTERIES: CPT

## 2018-11-27 PROCEDURE — 80202 ASSAY OF VANCOMYCIN: CPT

## 2018-11-27 PROCEDURE — 1200000000 HC SEMI PRIVATE

## 2018-11-27 RX ORDER — INSULIN GLARGINE 100 [IU]/ML
20 INJECTION, SOLUTION SUBCUTANEOUS
Status: DISCONTINUED | OUTPATIENT
Start: 2018-11-28 | End: 2018-11-27

## 2018-11-27 RX ORDER — SODIUM CHLORIDE 9 MG/ML
INJECTION, SOLUTION INTRAVENOUS CONTINUOUS
Status: DISCONTINUED | OUTPATIENT
Start: 2018-11-27 | End: 2018-11-30 | Stop reason: HOSPADM

## 2018-11-27 RX ORDER — INSULIN GLARGINE 100 [IU]/ML
20 INJECTION, SOLUTION SUBCUTANEOUS NIGHTLY
Status: DISCONTINUED | OUTPATIENT
Start: 2018-11-27 | End: 2018-11-28

## 2018-11-27 RX ORDER — 0.9 % SODIUM CHLORIDE 0.9 %
10 VIAL (ML) INJECTION
Status: COMPLETED | OUTPATIENT
Start: 2018-11-27 | End: 2018-11-27

## 2018-11-27 RX ADMIN — POTASSIUM CHLORIDE 20 MEQ: 20 TABLET, EXTENDED RELEASE ORAL at 09:42

## 2018-11-27 RX ADMIN — GABAPENTIN 800 MG: 400 CAPSULE ORAL at 13:51

## 2018-11-27 RX ADMIN — PRAMIPEXOLE DIHYDROCHLORIDE 1 MG: 0.25 TABLET ORAL at 20:18

## 2018-11-27 RX ADMIN — SIMVASTATIN 40 MG: 40 TABLET, FILM COATED ORAL at 20:19

## 2018-11-27 RX ADMIN — MICONAZOLE NITRATE: 2 POWDER TOPICAL at 20:19

## 2018-11-27 RX ADMIN — CEFEPIME HYDROCHLORIDE 2 G: 2 INJECTION, POWDER, FOR SOLUTION INTRAVENOUS at 05:03

## 2018-11-27 RX ADMIN — OXYCODONE HYDROCHLORIDE AND ACETAMINOPHEN 500 MG: 500 TABLET ORAL at 09:42

## 2018-11-27 RX ADMIN — CLOPIDOGREL BISULFATE 75 MG: 75 TABLET ORAL at 09:42

## 2018-11-27 RX ADMIN — PRAMIPEXOLE DIHYDROCHLORIDE 1 MG: 0.25 TABLET ORAL at 13:51

## 2018-11-27 RX ADMIN — IPRATROPIUM BROMIDE AND ALBUTEROL SULFATE 3 ML: .5; 3 SOLUTION RESPIRATORY (INHALATION) at 12:10

## 2018-11-27 RX ADMIN — CEFEPIME HYDROCHLORIDE 2 G: 2 INJECTION, POWDER, FOR SOLUTION INTRAVENOUS at 20:18

## 2018-11-27 RX ADMIN — SODIUM CHLORIDE: 9 INJECTION, SOLUTION INTRAVENOUS at 13:38

## 2018-11-27 RX ADMIN — IPRATROPIUM BROMIDE AND ALBUTEROL SULFATE 3 ML: .5; 3 SOLUTION RESPIRATORY (INHALATION) at 08:46

## 2018-11-27 RX ADMIN — PRAMIPEXOLE DIHYDROCHLORIDE 1 MG: 0.25 TABLET ORAL at 09:43

## 2018-11-27 RX ADMIN — SODIUM CHLORIDE 10 ML: 9 INJECTION, SOLUTION INTRAMUSCULAR; INTRAVENOUS; SUBCUTANEOUS at 13:12

## 2018-11-27 RX ADMIN — CARVEDILOL 3.12 MG: 3.12 TABLET, FILM COATED ORAL at 09:42

## 2018-11-27 RX ADMIN — INSULIN GLARGINE 45 UNITS: 100 INJECTION, SOLUTION SUBCUTANEOUS at 09:49

## 2018-11-27 RX ADMIN — CEFEPIME HYDROCHLORIDE 2 G: 2 INJECTION, POWDER, FOR SOLUTION INTRAVENOUS at 13:42

## 2018-11-27 RX ADMIN — VILAZODONE HYDROCHLORIDE 10 MG: 10 TABLET ORAL at 09:43

## 2018-11-27 RX ADMIN — GABAPENTIN 800 MG: 400 CAPSULE ORAL at 05:52

## 2018-11-27 RX ADMIN — IPRATROPIUM BROMIDE AND ALBUTEROL SULFATE 3 ML: .5; 3 SOLUTION RESPIRATORY (INHALATION) at 17:07

## 2018-11-27 RX ADMIN — ENOXAPARIN SODIUM 40 MG: 40 INJECTION SUBCUTANEOUS at 09:44

## 2018-11-27 RX ADMIN — Medication 1 TABLET: at 09:42

## 2018-11-27 RX ADMIN — METRONIDAZOLE 500 MG: 500 INJECTION, SOLUTION INTRAVENOUS at 05:52

## 2018-11-27 RX ADMIN — TRAMADOL HYDROCHLORIDE 100 MG: 50 TABLET, FILM COATED ORAL at 13:52

## 2018-11-27 RX ADMIN — Medication 1 TABLET: at 20:19

## 2018-11-27 RX ADMIN — IOPAMIDOL 200 ML: 755 INJECTION, SOLUTION INTRAVENOUS at 13:12

## 2018-11-27 RX ADMIN — PANTOPRAZOLE SODIUM 40 MG: 40 TABLET, DELAYED RELEASE ORAL at 05:52

## 2018-11-27 RX ADMIN — SODIUM HYPOCHLORITE: 2.5 SOLUTION TOPICAL at 09:40

## 2018-11-27 RX ADMIN — METRONIDAZOLE 500 MG: 500 INJECTION, SOLUTION INTRAVENOUS at 21:22

## 2018-11-27 RX ADMIN — MICONAZOLE NITRATE: 2 POWDER TOPICAL at 09:43

## 2018-11-27 RX ADMIN — METRONIDAZOLE 500 MG: 500 INJECTION, SOLUTION INTRAVENOUS at 13:56

## 2018-11-27 RX ADMIN — ASPIRIN 81 MG 81 MG: 81 TABLET ORAL at 09:42

## 2018-11-27 RX ADMIN — SODIUM CHLORIDE, PRESERVATIVE FREE 10 ML: 5 INJECTION INTRAVENOUS at 09:42

## 2018-11-27 RX ADMIN — GABAPENTIN 800 MG: 400 CAPSULE ORAL at 20:19

## 2018-11-27 RX ADMIN — IPRATROPIUM BROMIDE AND ALBUTEROL SULFATE 3 ML: .5; 3 SOLUTION RESPIRATORY (INHALATION) at 19:55

## 2018-11-27 RX ADMIN — CARVEDILOL 3.12 MG: 3.12 TABLET, FILM COATED ORAL at 20:19

## 2018-11-27 RX ADMIN — TRAMADOL HYDROCHLORIDE 100 MG: 50 TABLET, FILM COATED ORAL at 05:52

## 2018-11-27 RX ADMIN — VILAZODONE HYDROCHLORIDE 10 MG: 10 TABLET ORAL at 20:18

## 2018-11-27 ASSESSMENT — PAIN SCALES - GENERAL
PAINLEVEL_OUTOF10: 7
PAINLEVEL_OUTOF10: 8
PAINLEVEL_OUTOF10: 10

## 2018-11-27 ASSESSMENT — PAIN DESCRIPTION - FREQUENCY: FREQUENCY: CONTINUOUS

## 2018-11-27 ASSESSMENT — PAIN DESCRIPTION - DESCRIPTORS
DESCRIPTORS: ACHING;THROBBING
DESCRIPTORS: NUMBNESS;SHARP

## 2018-11-27 ASSESSMENT — PAIN DESCRIPTION - ORIENTATION: ORIENTATION: LEFT

## 2018-11-27 ASSESSMENT — PAIN DESCRIPTION - LOCATION
LOCATION: FOOT
LOCATION: BACK

## 2018-11-27 ASSESSMENT — PAIN DESCRIPTION - PAIN TYPE: TYPE: ACUTE PAIN

## 2018-11-27 ASSESSMENT — PAIN DESCRIPTION - ONSET
ONSET: ON-GOING
ONSET: ON-GOING

## 2018-11-27 NOTE — PROGRESS NOTES
Infectious Disease Progress Note  2018   Patient Name: Mckenzie Bhatt : 1955   Impression  · Left foot diabetic ulcer with cellulitis extending to the shin  ? ESR 93; CRP 27.4  ? Improving pain  ? Bone scan does not show definitive osteomyelitis  ? Wound culture ngtd     · DM: HbA1c 9.4%  · Morbid obesity BMI 71.8 kg/m²  · Multi-morbidity: per PMHx  Plan:  · F/u MRSA groin screen  · Continue vancomycin, cefepime and flagyl  · Appreciate Surgical service assistance, deep wound cultures were obtained on   · Would advise optimization of diabetic control    Ongoing Antimicrobial Therapy  Vancomycin   Cefepime ? Flagyl   Completed Antimicrobial Therapy  Piperacillin-tazobactam  History:? Interval history noted: bed side wound deep tissue culture sent on   Improving pain, denies n/v/d/f  Physical Exam:  Vital Signs: /61   Pulse 70   Temp 98.9 °F (37.2 °C) (Oral)   Resp 19   Ht 5' 6\" (1.676 m)   Wt (!) 444 lb 0.1 oz (201.4 kg)   SpO2 90%   BMI 71.66 kg/m²     Gen: alert and oriented X3, no distress  Skin: no stigmata of endocarditis  Wounds: Left foot plantar's office at the base of the fifth metatarsal wound base has slough. Surrounding area is markedly tender however no expressed purulence. Redness extending over the dorsum to just below the knee. HEMT: AT/NC Oropharynx pink, moist, and without lesions or exudates; dentition in good state of repair  Eyes: PERRLA, EOMI, conjunctiva pink, sclera anicteric. Neck: Supple. Trachea midline. No LAD. Chest: no distress and CTA. Good air movement. Heart: RRR and no MRG. Abd: soft, non-distended, no tenderness, no hepatomegaly. Normoactive bowel sounds. Ext: no clubbing, cyanosis, or edema  Catheter Site: without erythema or tenderness  Neuro: Mental status intact.  CN 2-12 intact and no focal sensory or motor deficits     Radiologic / Imaging / TESTING  Bone scan reviewed see EMR: no osteomyelitis     Labs: Morbid obesity ([address]    Asthma    Diabetes mellitus ([address]    Hypertension    Sleep apnea    Family history of coronary artery disease    Chest pain    SOB (shortness of breath)    Palpitations    Peripheral edema    Hypervolemia    Chronic respiratory failure with hypoxia and hypercapnia (HCC)    Acute exacerbation of chronic obstructive pulmonary disease (COPD) (HCC)    Chronic obstructive pulmonary disease (HCC)    Gait disturbance    Acute on chronic respiratory failure with hypoxia and hypercapnia (HCC)    Cellulitis    Skin ulcer of left foot with fat layer exposed ([address]       Active Problems  Active Problems:    Cellulitis    Skin ulcer of left foot with fat layer exposed ([address]  Resolved Problems:    * No resolved hospital problems.  *    Electronically signed by: Electronically signed by James Shultz MD on 11/27/2018 at 9:16 AM

## 2018-11-27 NOTE — PROGRESS NOTES
Rested comfortably in bed through out night, call light in reach. Pain well controlled with PRN medication. MRSA screening of groin sent to lab per orders. Up to bedside commode with 2 person assist and walker without difficulty X2. Dressing to left foot clean, dry and intact. VSS. Will cont to monitor.

## 2018-11-27 NOTE — CARE COORDINATION
Reviewed chart for continued discharge planning. Spoke with Dr. Carmen Beaulieu and plan for continued IV Abx upon discharge remains pending on culture results. Pt has had [facility] previously and will likely need them again for wound care and potential long term IV Abx. CM to cont to follow until plan of care established.

## 2018-11-27 NOTE — CONSULTS
[address] [id number] Continence Nurse  Consult Note       Milind Moser  AGE: 66 y.o. GENDER: female  : 1955  TODAY'S DATE:  2018    Subjective:     Reason for  Evaluation and Assessment: wound care assessment      Milind Moser is a 66 y.o. female referred by:   [x] Physician  [] Nursing  [] Other:     Wound Identification:  Wound Type: diabetic  Contributing Factors: diabetes        PAST MEDICAL HISTORY        Diagnosis Date    Aortic stenosis     Asthma     Cancer ([facility]     Cervical    Carotid artery stenosis     Chest pain     [facility]congestive [facility] failure) ([facility])     Chronic back pain     COPD exacerbation ([facility]     Depression     Diabetes mellitus ([facility]     Family history of coronary artery disease     Fatty liver 10/27/2016    H/O aortic valve stenosis     H/O echocardiogram 2016    EF 55%, Aortic valve area is 0.84 cm2 with a mean gradient of 36 suggestive of severe aortic stenosis, mild to mos LVH    Headache     Heart murmur     History of nuclear stress test 2016    lexiscan-normal,EF70%    Hypertension     Morbid obesity ([facility])     BMI=73.72 kg/m2    Neuropathy     NSTEMI (non-ST elevated myocardial infarction) ([facility])     Obesity     Osteoarthritis     Palpitations     Peripheral edema     Restless legs syndrome     Sleep apnea     Has a CPAP    Sleep apnea     SOB (shortness of breath)        PAST SURGICAL HISTORY    Past Surgical History:   Procedure Laterality Date     SECTION      CHOLECYSTECTOMY      GALLBLADDER SURGERY      HYSTERECTOMY      NECK SURGERY      Tumor    TUBAL LIGATION         FAMILY HISTORY    Family History   Problem Relation Age of Onset    Heart Failure Mother     Heart Attack Mother     Hypertension Mother     Coronary Art Dis Mother         Had PTCA w/stenting.     Heart Failure Father     Heart Failure Brother     Heart Disease Mother     High Blood Pressure Mother     Obesity Mother    Leatha Rodriguez Diabetes Mother     Heart Disease Father     High Blood Pressure Father     Obesity Father     Heart Disease Brother     High Blood Pressure Brother     Obesity Brother        SOCIAL HISTORY    Social History   Substance Use Topics    Smoking status: Former Smoker     Types: Cigarettes     Quit date: 3/19/2003    Smokeless tobacco: Never Used      Comment: quit 15 years ago    Alcohol use No       ALLERGIES    No Known Allergies    MEDICATIONS    No current facility-administered medications on file prior to encounter.       Current Outpatient Prescriptions on File Prior to Encounter   Medication Sig Dispense Refill    clopidogrel (PLAVIX) 75 MG tablet Take 1 tablet by mouth daily Patient has appointment on 3/27/18 more refills with be given after she keeps her office visit 90 tablet 3    L-Methylfolate-B6-B12 (FOLTANX) 3-35-2 MG TABS Take 1 tablet by mouth 2 times daily       nystatin (MYCOSTATIN) POWD powder Apply topically 2 times daily      potassium chloride (KLOR-CON M) 20 MEQ extended release tablet Take 1 tablet by mouth daily 60 tablet 3    furosemide (LASIX) 40 MG tablet Take 1 tablet by mouth 2 times daily (Patient taking differently: Take 40 mg by mouth daily ) 60 tablet 3    carvedilol (COREG) 3.125 MG tablet Take 1 tablet by mouth 2 times daily 60 tablet 3    simvastatin (ZOCOR) 40 MG tablet Take 1 tablet by mouth nightly 30 tablet 3    BiPAP Machine MISC by BiPAP route nightly      metFORMIN (GLUCOPHAGE) 500 MG tablet Take 1 tablet by mouth 2 times daily (with meals) 60 tablet 0    ipratropium-albuterol (DUONEB) 0.5-2.5 (3) MG/3ML SOLN nebulizer solution Inhale 3 mLs into the lungs 4 times daily 360 mL 0    Nebulizers (AIRIAL COMPACT MINI NEBULIZER) MISC 1 Device by Does not apply route 4 times daily 1 each 0    albuterol sulfate  (90 Base) MCG/ACT inhaler Inhale 2 puffs into the lungs      pantoprazole (PROTONIX) 40 MG tablet Take 40 mg by mouth daily      vilazodone HCl

## 2018-11-27 NOTE — PLAN OF CARE
Problem: Risk for Impaired Skin Integrity  Goal: Tissue integrity - skin and mucous membranes  Structural intactness and normal physiological function of skin and  mucous membranes.    Outcome: Ongoing      Problem: Pain:  Goal: Pain level will decrease  Pain level will decrease   Outcome: Ongoing      Problem: Falls - Risk of:  Goal: Will remain free from falls  Will remain free from falls   Outcome: Ongoing    Goal: Absence of physical injury  Absence of physical injury   Outcome: Ongoing

## 2018-11-28 LAB
GLUCOSE BLD-MCNC: 116 MG/DL (ref 70–99)
GLUCOSE BLD-MCNC: 122 MG/DL (ref 70–99)
GLUCOSE BLD-MCNC: 126 MG/DL (ref 70–99)
GLUCOSE BLD-MCNC: 93 MG/DL (ref 70–99)
GLUCOSE BLD-MCNC: 98 MG/DL (ref 70–99)
VANCOMYCIN RANDOM: 11 UG/ML

## 2018-11-28 PROCEDURE — 1200000000 HC SEMI PRIVATE

## 2018-11-28 PROCEDURE — 80202 ASSAY OF VANCOMYCIN: CPT

## 2018-11-28 PROCEDURE — 94760 N-INVAS EAR/PLS OXIMETRY 1: CPT

## 2018-11-28 PROCEDURE — 94660 CPAP INITIATION&MGMT: CPT

## 2018-11-28 PROCEDURE — 82962 GLUCOSE BLOOD TEST: CPT

## 2018-11-28 PROCEDURE — 6370000000 HC RX 637 (ALT 250 FOR IP): Performed by: INTERNAL MEDICINE

## 2018-11-28 PROCEDURE — 6360000002 HC RX W HCPCS: Performed by: INTERNAL MEDICINE

## 2018-11-28 PROCEDURE — 36415 COLL VENOUS BLD VENIPUNCTURE: CPT

## 2018-11-28 PROCEDURE — 94640 AIRWAY INHALATION TREATMENT: CPT

## 2018-11-28 PROCEDURE — 2500000003 HC RX 250 WO HCPCS: Performed by: INTERNAL MEDICINE

## 2018-11-28 PROCEDURE — 2580000003 HC RX 258: Performed by: INTERNAL MEDICINE

## 2018-11-28 PROCEDURE — 2700000000 HC OXYGEN THERAPY PER DAY

## 2018-11-28 PROCEDURE — 99232 SBSQ HOSP IP/OBS MODERATE 35: CPT | Performed by: INTERNAL MEDICINE

## 2018-11-28 RX ORDER — INSULIN GLARGINE 100 [IU]/ML
15 INJECTION, SOLUTION SUBCUTANEOUS NIGHTLY
Status: DISCONTINUED | OUTPATIENT
Start: 2018-11-28 | End: 2018-11-30

## 2018-11-28 RX ADMIN — SODIUM CHLORIDE, PRESERVATIVE FREE 10 ML: 5 INJECTION INTRAVENOUS at 23:55

## 2018-11-28 RX ADMIN — Medication 1 TABLET: at 09:40

## 2018-11-28 RX ADMIN — VILAZODONE HYDROCHLORIDE 10 MG: 10 TABLET ORAL at 23:56

## 2018-11-28 RX ADMIN — PRAMIPEXOLE DIHYDROCHLORIDE 1 MG: 0.25 TABLET ORAL at 23:57

## 2018-11-28 RX ADMIN — Medication 1 TABLET: at 23:56

## 2018-11-28 RX ADMIN — CEFEPIME HYDROCHLORIDE 2 G: 2 INJECTION, POWDER, FOR SOLUTION INTRAVENOUS at 23:57

## 2018-11-28 RX ADMIN — PRAMIPEXOLE DIHYDROCHLORIDE 1 MG: 0.25 TABLET ORAL at 14:32

## 2018-11-28 RX ADMIN — ASPIRIN 81 MG 81 MG: 81 TABLET ORAL at 09:40

## 2018-11-28 RX ADMIN — POTASSIUM CHLORIDE 20 MEQ: 20 TABLET, EXTENDED RELEASE ORAL at 09:40

## 2018-11-28 RX ADMIN — SIMVASTATIN 40 MG: 40 TABLET, FILM COATED ORAL at 23:56

## 2018-11-28 RX ADMIN — GABAPENTIN 800 MG: 400 CAPSULE ORAL at 14:32

## 2018-11-28 RX ADMIN — SODIUM CHLORIDE, PRESERVATIVE FREE 10 ML: 5 INJECTION INTRAVENOUS at 09:38

## 2018-11-28 RX ADMIN — MICONAZOLE NITRATE: 2 POWDER TOPICAL at 09:37

## 2018-11-28 RX ADMIN — PRAMIPEXOLE DIHYDROCHLORIDE 1 MG: 0.25 TABLET ORAL at 09:40

## 2018-11-28 RX ADMIN — SODIUM HYPOCHLORITE: 2.5 SOLUTION TOPICAL at 09:37

## 2018-11-28 RX ADMIN — IPRATROPIUM BROMIDE AND ALBUTEROL SULFATE 3 ML: .5; 3 SOLUTION RESPIRATORY (INHALATION) at 07:36

## 2018-11-28 RX ADMIN — METRONIDAZOLE 500 MG: 500 INJECTION, SOLUTION INTRAVENOUS at 14:32

## 2018-11-28 RX ADMIN — IPRATROPIUM BROMIDE AND ALBUTEROL SULFATE 3 ML: .5; 3 SOLUTION RESPIRATORY (INHALATION) at 22:54

## 2018-11-28 RX ADMIN — CEFEPIME HYDROCHLORIDE 2 G: 2 INJECTION, POWDER, FOR SOLUTION INTRAVENOUS at 04:12

## 2018-11-28 RX ADMIN — SODIUM CHLORIDE: 9 INJECTION, SOLUTION INTRAVENOUS at 02:29

## 2018-11-28 RX ADMIN — CEFEPIME HYDROCHLORIDE 2 G: 2 INJECTION, POWDER, FOR SOLUTION INTRAVENOUS at 13:33

## 2018-11-28 RX ADMIN — TRAMADOL HYDROCHLORIDE 100 MG: 50 TABLET, FILM COATED ORAL at 09:40

## 2018-11-28 RX ADMIN — IPRATROPIUM BROMIDE AND ALBUTEROL SULFATE 3 ML: .5; 3 SOLUTION RESPIRATORY (INHALATION) at 15:55

## 2018-11-28 RX ADMIN — GABAPENTIN 800 MG: 400 CAPSULE ORAL at 05:23

## 2018-11-28 RX ADMIN — VILAZODONE HYDROCHLORIDE 10 MG: 10 TABLET ORAL at 09:40

## 2018-11-28 RX ADMIN — OXYCODONE HYDROCHLORIDE AND ACETAMINOPHEN 500 MG: 500 TABLET ORAL at 09:40

## 2018-11-28 RX ADMIN — METRONIDAZOLE 500 MG: 500 INJECTION, SOLUTION INTRAVENOUS at 05:23

## 2018-11-28 RX ADMIN — ENOXAPARIN SODIUM 40 MG: 40 INJECTION SUBCUTANEOUS at 09:39

## 2018-11-28 RX ADMIN — ONDANSETRON 4 MG: 2 INJECTION INTRAMUSCULAR; INTRAVENOUS at 12:59

## 2018-11-28 RX ADMIN — ERGOCALCIFEROL 50000 UNITS: 1.25 CAPSULE ORAL at 09:39

## 2018-11-28 RX ADMIN — SODIUM CHLORIDE: 9 INJECTION, SOLUTION INTRAVENOUS at 23:51

## 2018-11-28 RX ADMIN — CARVEDILOL 3.12 MG: 3.12 TABLET, FILM COATED ORAL at 09:40

## 2018-11-28 RX ADMIN — GABAPENTIN 800 MG: 400 CAPSULE ORAL at 23:56

## 2018-11-28 RX ADMIN — VANCOMYCIN HYDROCHLORIDE 1500 MG: 500 INJECTION, POWDER, LYOPHILIZED, FOR SOLUTION INTRAVENOUS at 12:22

## 2018-11-28 RX ADMIN — CLOPIDOGREL BISULFATE 75 MG: 75 TABLET ORAL at 09:40

## 2018-11-28 RX ADMIN — PANTOPRAZOLE SODIUM 40 MG: 40 TABLET, DELAYED RELEASE ORAL at 05:23

## 2018-11-28 RX ADMIN — CARVEDILOL 3.12 MG: 3.12 TABLET, FILM COATED ORAL at 23:56

## 2018-11-28 ASSESSMENT — PAIN SCALES - GENERAL
PAINLEVEL_OUTOF10: 0
PAINLEVEL_OUTOF10: 6

## 2018-11-28 ASSESSMENT — PAIN DESCRIPTION - ORIENTATION: ORIENTATION: LEFT

## 2018-11-28 ASSESSMENT — PAIN DESCRIPTION - FREQUENCY: FREQUENCY: CONTINUOUS

## 2018-11-28 ASSESSMENT — PAIN DESCRIPTION - LOCATION: LOCATION: LEG

## 2018-11-28 ASSESSMENT — PAIN DESCRIPTION - DESCRIPTORS: DESCRIPTORS: NUMBNESS

## 2018-11-28 ASSESSMENT — PAIN DESCRIPTION - ONSET: ONSET: ON-GOING

## 2018-11-28 ASSESSMENT — PAIN DESCRIPTION - PAIN TYPE: TYPE: CHRONIC PAIN

## 2018-11-28 NOTE — CONSULTS
Labs      11/27/18   1345  11/27/18   1652  11/27/18 2011   POCGLU  120*  130*  107*     Hemoglobin A1C:   Lab Results   Component Value Date    LABA1C 9.4 11/24/2018     Free T4:   Lab Results   Component Value Date    T4FREE 1.07 09/04/2018     Free T3: No results found for: FT3  TSH High Sensitivity:   Lab Results   Component Value Date    TSHHS 1.500 09/04/2018       Xr Foot Left (2 Views)    Result Date: 11/24/2018  EXAMINATION: 2 XRAY VIEWS OF THE LEFT FOOT 11/24/2018 8:37 am COMPARISON: None. HISTORY: Chronic left foot pain and swelling which has been worsening. Diabetic foot wound, concern for osteomyelitis. FINDINGS: No definite erosive changes are seen although patient is osteopenic. Diffuse soft tissue swelling. Achilles and plantar enthesopathy. Extensive atherosclerotic calcification. Diffuse soft tissue swelling of the left foot is nonspecific but could relate to cellulitis. No definite erosive changes seen to suggest osteomyelitis although the patient is osteopenic. If clinically indicated, consider MRI for further evaluation. Nm Bone Scan 3 Phase    Result Date: 11/26/2018  EXAMINATION: THREE PHASE BONE SCAN 11/26/2018 4:36 pm TECHNIQUE: The patient was injected intravenously with 21 mCi of 99 mTc MDP. Initial blood flow and pool images of the feet were acquired. After 3 hours, delayed bone images were acquired.  COMPARISON: Left foot 11/24/2018 HISTORY: ORDERING SYSTEM PROVIDED HISTORY: left foot osteomyelitis TECHNOLOGIST PROVIDED HISTORY: Ordering Physician Provided Reason for Exam: left foot osteo Acuity: Acute Type of Exam: Ongoing Additional signs and symptoms: patient is diabetic; patient with sore on LT foot; patient states came in for foot pain; Relevant Medical/Surgical History: patient is diabetic; patient with sore on LT foot; patient states came in for foot pain; FINDINGS: There is mild asymmetric diffuse blood flow and blood pool activity to the left foot as compared to

## 2018-11-29 LAB
BUN BLDV-MCNC: 12 MG/DL (ref 6–23)
CREAT SERPL-MCNC: 0.8 MG/DL (ref 0.6–1.1)
CULTURE: NORMAL
CULTURE: NORMAL
GFR AFRICAN AMERICAN: >60 ML/MIN/1.73M2
GFR NON-AFRICAN AMERICAN: >60 ML/MIN/1.73M2
GLUCOSE BLD-MCNC: 114 MG/DL (ref 70–99)
GLUCOSE BLD-MCNC: 131 MG/DL (ref 70–99)
GLUCOSE BLD-MCNC: 137 MG/DL (ref 70–99)
GLUCOSE BLD-MCNC: 168 MG/DL (ref 70–99)
Lab: NORMAL
Lab: NORMAL
REPORT STATUS: NORMAL
REPORT STATUS: NORMAL
SPECIMEN: NORMAL
SPECIMEN: NORMAL
VANCOMYCIN TROUGH: 11.9 UG/ML (ref 10–20)

## 2018-11-29 PROCEDURE — 94660 CPAP INITIATION&MGMT: CPT

## 2018-11-29 PROCEDURE — 82962 GLUCOSE BLOOD TEST: CPT

## 2018-11-29 PROCEDURE — 99232 SBSQ HOSP IP/OBS MODERATE 35: CPT | Performed by: INTERNAL MEDICINE

## 2018-11-29 PROCEDURE — 2500000003 HC RX 250 WO HCPCS: Performed by: INTERNAL MEDICINE

## 2018-11-29 PROCEDURE — 6370000000 HC RX 637 (ALT 250 FOR IP): Performed by: INTERNAL MEDICINE

## 2018-11-29 PROCEDURE — 2580000003 HC RX 258: Performed by: INTERNAL MEDICINE

## 2018-11-29 PROCEDURE — 94761 N-INVAS EAR/PLS OXIMETRY MLT: CPT

## 2018-11-29 PROCEDURE — 94640 AIRWAY INHALATION TREATMENT: CPT

## 2018-11-29 PROCEDURE — 80202 ASSAY OF VANCOMYCIN: CPT

## 2018-11-29 PROCEDURE — 2700000000 HC OXYGEN THERAPY PER DAY

## 2018-11-29 PROCEDURE — 82565 ASSAY OF CREATININE: CPT

## 2018-11-29 PROCEDURE — 1200000000 HC SEMI PRIVATE

## 2018-11-29 PROCEDURE — 6360000002 HC RX W HCPCS: Performed by: INTERNAL MEDICINE

## 2018-11-29 PROCEDURE — 36415 COLL VENOUS BLD VENIPUNCTURE: CPT

## 2018-11-29 PROCEDURE — 99024 POSTOP FOLLOW-UP VISIT: CPT | Performed by: SURGERY

## 2018-11-29 PROCEDURE — 84520 ASSAY OF UREA NITROGEN: CPT

## 2018-11-29 RX ADMIN — CARVEDILOL 3.12 MG: 3.12 TABLET, FILM COATED ORAL at 22:16

## 2018-11-29 RX ADMIN — CEFEPIME HYDROCHLORIDE 2 G: 2 INJECTION, POWDER, FOR SOLUTION INTRAVENOUS at 17:14

## 2018-11-29 RX ADMIN — PANTOPRAZOLE SODIUM 40 MG: 40 TABLET, DELAYED RELEASE ORAL at 06:52

## 2018-11-29 RX ADMIN — CARVEDILOL 3.12 MG: 3.12 TABLET, FILM COATED ORAL at 08:36

## 2018-11-29 RX ADMIN — MICONAZOLE NITRATE: 2 POWDER TOPICAL at 08:39

## 2018-11-29 RX ADMIN — PRAMIPEXOLE DIHYDROCHLORIDE 1 MG: 0.25 TABLET ORAL at 08:36

## 2018-11-29 RX ADMIN — METRONIDAZOLE 500 MG: 500 INJECTION, SOLUTION INTRAVENOUS at 00:10

## 2018-11-29 RX ADMIN — VILAZODONE HYDROCHLORIDE 10 MG: 10 TABLET ORAL at 08:36

## 2018-11-29 RX ADMIN — SIMVASTATIN 40 MG: 40 TABLET, FILM COATED ORAL at 22:16

## 2018-11-29 RX ADMIN — INSULIN GLARGINE 15 UNITS: 100 INJECTION, SOLUTION SUBCUTANEOUS at 22:15

## 2018-11-29 RX ADMIN — PRAMIPEXOLE DIHYDROCHLORIDE 1 MG: 0.25 TABLET ORAL at 17:14

## 2018-11-29 RX ADMIN — GABAPENTIN 800 MG: 400 CAPSULE ORAL at 06:52

## 2018-11-29 RX ADMIN — CLOPIDOGREL BISULFATE 75 MG: 75 TABLET ORAL at 08:36

## 2018-11-29 RX ADMIN — Medication 1 TABLET: at 08:36

## 2018-11-29 RX ADMIN — MICONAZOLE NITRATE: 2 POWDER TOPICAL at 23:09

## 2018-11-29 RX ADMIN — OXYCODONE HYDROCHLORIDE AND ACETAMINOPHEN 500 MG: 500 TABLET ORAL at 08:36

## 2018-11-29 RX ADMIN — VILAZODONE HYDROCHLORIDE 10 MG: 10 TABLET ORAL at 22:15

## 2018-11-29 RX ADMIN — IPRATROPIUM BROMIDE AND ALBUTEROL SULFATE 3 ML: .5; 3 SOLUTION RESPIRATORY (INHALATION) at 15:57

## 2018-11-29 RX ADMIN — ASPIRIN 81 MG 81 MG: 81 TABLET ORAL at 08:36

## 2018-11-29 RX ADMIN — SODIUM CHLORIDE: 9 INJECTION, SOLUTION INTRAVENOUS at 17:14

## 2018-11-29 RX ADMIN — PRAMIPEXOLE DIHYDROCHLORIDE 1 MG: 0.25 TABLET ORAL at 23:08

## 2018-11-29 RX ADMIN — TRAMADOL HYDROCHLORIDE 100 MG: 50 TABLET, FILM COATED ORAL at 22:30

## 2018-11-29 RX ADMIN — POTASSIUM CHLORIDE 20 MEQ: 20 TABLET, EXTENDED RELEASE ORAL at 08:36

## 2018-11-29 RX ADMIN — ENOXAPARIN SODIUM 40 MG: 40 INJECTION SUBCUTANEOUS at 08:40

## 2018-11-29 RX ADMIN — IPRATROPIUM BROMIDE AND ALBUTEROL SULFATE 3 ML: .5; 3 SOLUTION RESPIRATORY (INHALATION) at 11:54

## 2018-11-29 RX ADMIN — SODIUM CHLORIDE, PRESERVATIVE FREE 10 ML: 5 INJECTION INTRAVENOUS at 22:30

## 2018-11-29 RX ADMIN — INSULIN LISPRO 2 UNITS: 100 INJECTION, SOLUTION INTRAVENOUS; SUBCUTANEOUS at 18:58

## 2018-11-29 RX ADMIN — GABAPENTIN 800 MG: 400 CAPSULE ORAL at 17:14

## 2018-11-29 RX ADMIN — MICONAZOLE NITRATE: 2 POWDER TOPICAL at 00:11

## 2018-11-29 RX ADMIN — CEFEPIME HYDROCHLORIDE 2 G: 2 INJECTION, POWDER, FOR SOLUTION INTRAVENOUS at 05:24

## 2018-11-29 RX ADMIN — METRONIDAZOLE 500 MG: 500 INJECTION, SOLUTION INTRAVENOUS at 05:04

## 2018-11-29 RX ADMIN — CEFEPIME HYDROCHLORIDE 2 G: 2 INJECTION, POWDER, FOR SOLUTION INTRAVENOUS at 22:31

## 2018-11-29 RX ADMIN — GABAPENTIN 800 MG: 400 CAPSULE ORAL at 22:16

## 2018-11-29 RX ADMIN — IPRATROPIUM BROMIDE AND ALBUTEROL SULFATE 3 ML: .5; 3 SOLUTION RESPIRATORY (INHALATION) at 08:17

## 2018-11-29 RX ADMIN — SODIUM HYPOCHLORITE: 2.5 SOLUTION TOPICAL at 08:39

## 2018-11-29 RX ADMIN — IPRATROPIUM BROMIDE AND ALBUTEROL SULFATE 3 ML: .5; 3 SOLUTION RESPIRATORY (INHALATION) at 22:48

## 2018-11-29 RX ADMIN — INSULIN LISPRO 20 UNITS: 100 INJECTION, SOLUTION INTRAVENOUS; SUBCUTANEOUS at 18:59

## 2018-11-29 RX ADMIN — Medication 1 TABLET: at 22:15

## 2018-11-29 ASSESSMENT — PAIN SCALES - GENERAL: PAINLEVEL_OUTOF10: 8

## 2018-11-29 NOTE — PROGRESS NOTES
If clinically indicated, consider MRI for further evaluation. Nm Bone Scan 3 Phase    Result Date: 11/26/2018  EXAMINATION: THREE PHASE BONE SCAN 11/26/2018 4:36 pm TECHNIQUE: The patient was injected intravenously with 21 mCi of 99 mTc MDP. Initial blood flow and pool images of the feet were acquired. After 3 hours, delayed bone images were acquired. COMPARISON: Left foot 11/24/2018 HISTORY: ORDERING SYSTEM PROVIDED HISTORY: left foot osteomyelitis TECHNOLOGIST PROVIDED HISTORY: Ordering Physician Provided Reason for Exam: left foot osteo Acuity: Acute Type of Exam: Ongoing Additional signs and symptoms: patient is diabetic; patient with sore on LT foot; patient states came in for foot pain; Relevant Medical/Surgical History: patient is diabetic; patient with sore on LT foot; patient states came in for foot pain; FINDINGS: There is mild asymmetric diffuse blood flow and blood pool activity to the left foot as compared to the right. Delayed images show no abnormal foci of radiotracer deposition, particularly in the lateral left forefoot. 1.   Diffuse asymmetric blood flow and blood pool activity to the left foot may be due to soft tissue inflammation and/or altered weight-bearing. 2.  No delayed phase uptake concerning for osteomyelitis. Ct Foot Left W Contrast    Result Date: 11/25/2018  EXAMINATION: CT OF THE LEFT FOOT WITH CONTRAST 11/25/2018 9:30 am TECHNIQUE:   1. Ulceration along the plantar foot at the level of the 5th metatarsal head with no well-defined fluid collection identified to suggest abscess. 2. No CT evidence for osteomyelitis. 3. Diffuse subcutaneous edema. Correlate clinically for cellulitis. 4. Mild-to-moderate osteoarthritis. 5. Osteopenia.      Vl Dup Lower Extremity Arteries Bilateral    Result Date: 11/25/2018  EXAMINATION: ARTERIAL DUPLEX ULTRASOUND OF THE BILATERAL LOWER EXTREMITIES  11/25/2018 9:09 am TECHNIQUE: Duplex ultrasound and Doppler images were obtained of the Heart:  regular rate and rhythm  Abdomen: soft, non-tender; bowel sounds normal; no masses,  no organomegaly  Musculoskeletal: Normal  Extremities: extremities normal, , no edemaLeft foot ulcer  Neurologic:  Awake, alert, oriented to name, place and time. Cranial nerves II-XII are grossly intact. Motor is  intact. Sensory neuropathy. ,  and gait is abnormal and unstable. Assessment:     Patient Active Problem List:     Morbid obesity with BMI of 70 and over, adult [facility])     Essential hypertension     Dyslipidemia     Fecal incontinence     Mild intermittent asthma without complication     S/P laparoscopic cholecystectomy     Type 2 diabetes mellitus without complication, with long-term current use of insulin (HCC)     Fatty liver     Arthritis     H/O parotidectomy     Venous stasis dermatitis of both lower extremities     Aortic stenosis     Morbid obesity (HCC)     Asthma     Diabetes mellitus ([address]     Hypertension     Sleep apnea     Family history of coronary artery disease     Chest pain     SOB (shortness of breath)     Palpitations     Peripheral edema     Hypervolemia     Chronic respiratory failure with hypoxia and hypercapnia (HCC)     Acute exacerbation of chronic obstructive pulmonary disease (COPD) (HCC)     Chronic obstructive pulmonary disease (HCC)     Gait disturbance     Acute on chronic respiratory failure with hypoxia and hypercapnia (HCC)     Cellulitis     Skin ulcer of left foot with fat layer exposed ([address]     Diabetic ulcer of left midfoot associated with type 2 diabetes mellitus, with necrosis of muscle ([address]      Plan:     1. Reviewed POC blood glucose . Labs and X ray results   2. Reviewed Current Medicines   3. On meal/ Correction bolus Humalog/ Basal Lantus Insulin regime\  4. Monitor Blood glucose frequently   5. Modified  the dose of Insulin/ other medicines as needed   6. Will follow     .      Miguel Harmon MD

## 2018-11-29 NOTE — PROGRESS NOTES
11/29/2018  0055 Pt still feeling nauseated. Pt doesn't want to wear the Bipap right now. Pt refused it for now.

## 2018-11-30 VITALS
SYSTOLIC BLOOD PRESSURE: 151 MMHG | OXYGEN SATURATION: 98 % | HEIGHT: 66 IN | RESPIRATION RATE: 20 BRPM | DIASTOLIC BLOOD PRESSURE: 62 MMHG | TEMPERATURE: 98 F | HEART RATE: 75 BPM | WEIGHT: 293 LBS | BODY MASS INDEX: 47.09 KG/M2

## 2018-11-30 LAB
CULTURE: NORMAL
GLUCOSE BLD-MCNC: 121 MG/DL (ref 70–99)
GLUCOSE BLD-MCNC: 86 MG/DL (ref 70–99)
Lab: NORMAL
ORGANISM: NORMAL
REPORT STATUS: NORMAL
SPECIMEN: NORMAL

## 2018-11-30 PROCEDURE — 2580000003 HC RX 258: Performed by: INTERNAL MEDICINE

## 2018-11-30 PROCEDURE — 94660 CPAP INITIATION&MGMT: CPT

## 2018-11-30 PROCEDURE — 6370000000 HC RX 637 (ALT 250 FOR IP): Performed by: INTERNAL MEDICINE

## 2018-11-30 PROCEDURE — 82962 GLUCOSE BLOOD TEST: CPT

## 2018-11-30 PROCEDURE — 6360000002 HC RX W HCPCS: Performed by: INTERNAL MEDICINE

## 2018-11-30 PROCEDURE — 94640 AIRWAY INHALATION TREATMENT: CPT

## 2018-11-30 PROCEDURE — 94761 N-INVAS EAR/PLS OXIMETRY MLT: CPT

## 2018-11-30 PROCEDURE — 99222 1ST HOSP IP/OBS MODERATE 55: CPT | Performed by: INTERNAL MEDICINE

## 2018-11-30 PROCEDURE — 2700000000 HC OXYGEN THERAPY PER DAY

## 2018-11-30 RX ORDER — MOXIFLOXACIN HYDROCHLORIDE 400 MG/1
400 TABLET ORAL DAILY
Qty: 10 TABLET | Refills: 0 | Status: SHIPPED | OUTPATIENT
Start: 2018-11-30 | End: 2018-12-10

## 2018-11-30 RX ORDER — TRAMADOL HYDROCHLORIDE 50 MG/1
50 TABLET ORAL EVERY 6 HOURS PRN
Qty: 30 TABLET | Refills: 0 | Status: SHIPPED | OUTPATIENT
Start: 2018-11-30 | End: 2018-12-10

## 2018-11-30 RX ORDER — CIPROFLOXACIN 500 MG/1
500 TABLET, FILM COATED ORAL 2 TIMES DAILY
Qty: 14 TABLET | Refills: 0 | Status: SHIPPED | OUTPATIENT
Start: 2018-11-30 | End: 2018-11-30 | Stop reason: HOSPADM

## 2018-11-30 RX ORDER — LACTOBACILLUS RHAMNOSUS GG 10B CELL
1 CAPSULE ORAL DAILY
Qty: 10 CAPSULE | Refills: 0 | Status: SHIPPED | OUTPATIENT
Start: 2018-11-30 | End: 2018-12-10

## 2018-11-30 RX ORDER — INSULIN LISPRO 200 [IU]/ML
15 INJECTION, SOLUTION SUBCUTANEOUS
Qty: 2 PEN | Refills: 3 | Status: ON HOLD | OUTPATIENT
Start: 2018-11-30 | End: 2020-01-01 | Stop reason: HOSPADM

## 2018-11-30 RX ORDER — GREEN TEA/HOODIA GORDONII 315-12.5MG
1 CAPSULE ORAL DAILY
Qty: 30 TABLET | Refills: 0 | Status: SHIPPED | OUTPATIENT
Start: 2018-11-30 | End: 2018-12-30

## 2018-11-30 RX ORDER — INSULIN GLARGINE 100 [IU]/ML
10 INJECTION, SOLUTION SUBCUTANEOUS NIGHTLY
Status: DISCONTINUED | OUTPATIENT
Start: 2018-11-30 | End: 2018-11-30 | Stop reason: HOSPADM

## 2018-11-30 RX ADMIN — ASPIRIN 81 MG 81 MG: 81 TABLET ORAL at 10:16

## 2018-11-30 RX ADMIN — PANTOPRAZOLE SODIUM 40 MG: 40 TABLET, DELAYED RELEASE ORAL at 05:45

## 2018-11-30 RX ADMIN — CEFEPIME HYDROCHLORIDE 2 G: 2 INJECTION, POWDER, FOR SOLUTION INTRAVENOUS at 05:45

## 2018-11-30 RX ADMIN — POTASSIUM CHLORIDE 20 MEQ: 20 TABLET, EXTENDED RELEASE ORAL at 10:16

## 2018-11-30 RX ADMIN — ENOXAPARIN SODIUM 40 MG: 40 INJECTION SUBCUTANEOUS at 10:16

## 2018-11-30 RX ADMIN — VILAZODONE HYDROCHLORIDE 10 MG: 10 TABLET ORAL at 11:21

## 2018-11-30 RX ADMIN — GABAPENTIN 800 MG: 400 CAPSULE ORAL at 05:45

## 2018-11-30 RX ADMIN — IPRATROPIUM BROMIDE AND ALBUTEROL SULFATE 3 ML: .5; 3 SOLUTION RESPIRATORY (INHALATION) at 12:00

## 2018-11-30 RX ADMIN — MICONAZOLE NITRATE: 2 POWDER TOPICAL at 10:23

## 2018-11-30 RX ADMIN — Medication 1 TABLET: at 10:53

## 2018-11-30 RX ADMIN — SODIUM CHLORIDE: 9 INJECTION, SOLUTION INTRAVENOUS at 10:15

## 2018-11-30 RX ADMIN — SODIUM HYPOCHLORITE: 2.5 SOLUTION TOPICAL at 10:55

## 2018-11-30 RX ADMIN — CLOPIDOGREL BISULFATE 75 MG: 75 TABLET ORAL at 10:16

## 2018-11-30 RX ADMIN — INSULIN LISPRO 15 UNITS: 100 INJECTION, SOLUTION INTRAVENOUS; SUBCUTANEOUS at 10:24

## 2018-11-30 RX ADMIN — SODIUM CHLORIDE, PRESERVATIVE FREE 10 ML: 5 INJECTION INTRAVENOUS at 10:55

## 2018-11-30 RX ADMIN — IPRATROPIUM BROMIDE AND ALBUTEROL SULFATE 3 ML: .5; 3 SOLUTION RESPIRATORY (INHALATION) at 08:18

## 2018-11-30 RX ADMIN — CARVEDILOL 3.12 MG: 3.12 TABLET, FILM COATED ORAL at 10:16

## 2018-11-30 RX ADMIN — OXYCODONE HYDROCHLORIDE AND ACETAMINOPHEN 500 MG: 500 TABLET ORAL at 10:16

## 2018-11-30 RX ADMIN — PRAMIPEXOLE DIHYDROCHLORIDE 1 MG: 0.25 TABLET ORAL at 10:54

## 2018-11-30 NOTE — DISCHARGE SUMMARY
discharge in the examination, evaluation, counseling and review of medications and discharge plan: 40 minutes       Discharge Physician Signed: Jovanni

## 2018-12-01 LAB
CULTURE: NORMAL
Lab: NORMAL
REPORT STATUS: NORMAL
SPECIMEN: NORMAL

## 2018-12-17 ENCOUNTER — HOSPITAL ENCOUNTER (OUTPATIENT)
Dept: ULTRASOUND IMAGING | Age: 63
Discharge: HOME | End: 2018-12-17
Payer: COMMERCIAL

## 2018-12-17 DIAGNOSIS — L97.509 TYPE 2 DIABETES MELLITUS WITH FOOT ULCER, UNSPECIFIED WHETHER LONG TERM INSULIN USE (HCC): ICD-10-CM

## 2018-12-17 DIAGNOSIS — E11.621 TYPE 2 DIABETES MELLITUS WITH FOOT ULCER, UNSPECIFIED WHETHER LONG TERM INSULIN USE (HCC): ICD-10-CM

## 2018-12-17 PROCEDURE — 93922 UPR/L XTREMITY ART 2 LEVELS: CPT

## 2019-01-01 NOTE — PROGRESS NOTES
Acute on chronic respiratory failure with hypoxia and hypercapnia (HCC)     Cellulitis     Skin ulcer of left foot with fat layer exposed ([address]     Diabetic ulcer of left midfoot associated with type 2 diabetes mellitus, with necrosis of muscle ([address]      Plan:     1. Reviewed POC blood glucose . Labs and X ray results   2. Reviewed Current Medicines   3. On meal/ Correction bolus Humalog/ Basal Lantus Insulin regime\  4. Monitor Blood glucose frequently   5. Modified  the dose of Insulin/ other medicines as needed   6. Will follow     .      Mike Chapin MD

## 2019-02-05 ENCOUNTER — APPOINTMENT (OUTPATIENT)
Dept: GENERAL RADIOLOGY | Age: 64
DRG: 194 | End: 2019-02-05
Payer: COMMERCIAL

## 2019-02-05 ENCOUNTER — HOSPITAL ENCOUNTER (INPATIENT)
Age: 64
LOS: 2 days | Discharge: HOME HEALTH | DRG: 194 | End: 2019-02-08
Attending: EMERGENCY MEDICINE | Admitting: INTERNAL MEDICINE
Payer: COMMERCIAL

## 2019-02-05 DIAGNOSIS — J44.1 COPD EXACERBATION (HCC): ICD-10-CM

## 2019-02-05 DIAGNOSIS — A41.9 SEPTICEMIA (HCC): ICD-10-CM

## 2019-02-05 DIAGNOSIS — J18.9 PNEUMONIA DUE TO ORGANISM: ICD-10-CM

## 2019-02-05 DIAGNOSIS — R06.00 DYSPNEA, UNSPECIFIED TYPE: Primary | ICD-10-CM

## 2019-02-05 DIAGNOSIS — R50.9 FEVER, UNSPECIFIED FEVER CAUSE: ICD-10-CM

## 2019-02-05 LAB
ALBUMIN SERPL-MCNC: 3.3 GM/DL (ref 3.4–5)
ALP BLD-CCNC: 84 IU/L (ref 40–129)
ALT SERPL-CCNC: 12 U/L (ref 10–40)
ANION GAP SERPL CALCULATED.3IONS-SCNC: 8 MMOL/L (ref 4–16)
AST SERPL-CCNC: 18 IU/L (ref 15–37)
BACTERIA: NEGATIVE /HPF
BASOPHILS ABSOLUTE: 0.1 K/CU MM
BASOPHILS RELATIVE PERCENT: 0.6 % (ref 0–1)
BILIRUB SERPL-MCNC: 0.3 MG/DL (ref 0–1)
BILIRUBIN URINE: NEGATIVE MG/DL
BLOOD, URINE: NEGATIVE
BUN BLDV-MCNC: 14 MG/DL (ref 6–23)
CALCIUM SERPL-MCNC: 9.3 MG/DL (ref 8.3–10.6)
CHLORIDE BLD-SCNC: 92 MMOL/L (ref 99–110)
CLARITY: CLEAR
CO2: 34 MMOL/L (ref 21–32)
COLOR: YELLOW
CREAT SERPL-MCNC: 0.5 MG/DL (ref 0.6–1.1)
DIFFERENTIAL TYPE: ABNORMAL
EOSINOPHILS ABSOLUTE: 0.1 K/CU MM
EOSINOPHILS RELATIVE PERCENT: 1.1 % (ref 0–3)
GFR AFRICAN AMERICAN: >60 ML/MIN/1.73M2
GFR NON-AFRICAN AMERICAN: >60 ML/MIN/1.73M2
GLUCOSE BLD-MCNC: 173 MG/DL (ref 70–99)
GLUCOSE, URINE: NEGATIVE MG/DL
HCT VFR BLD CALC: 33.4 % (ref 37–47)
HEMOGLOBIN: 9.8 GM/DL (ref 12.5–16)
IMMATURE NEUTROPHIL %: 1.4 % (ref 0–0.43)
KETONES, URINE: NEGATIVE MG/DL
LACTATE: 2 MMOL/L (ref 0.4–2)
LEUKOCYTE ESTERASE, URINE: ABNORMAL
LYMPHOCYTES ABSOLUTE: 0.5 K/CU MM
LYMPHOCYTES RELATIVE PERCENT: 6.4 % (ref 24–44)
MCH RBC QN AUTO: 24.5 PG (ref 27–31)
MCHC RBC AUTO-ENTMCNC: 29.3 % (ref 32–36)
MCV RBC AUTO: 83.5 FL (ref 78–100)
MONOCYTES ABSOLUTE: 0.6 K/CU MM
MONOCYTES RELATIVE PERCENT: 6.5 % (ref 0–4)
MUCUS: ABNORMAL HPF
NITRITE URINE, QUANTITATIVE: NEGATIVE
NUCLEATED RBC %: 0 %
PDW BLD-RTO: 16.7 % (ref 11.7–14.9)
PH, URINE: 8 (ref 5–8)
PLATELET # BLD: 289 K/CU MM (ref 140–440)
PMV BLD AUTO: 9 FL (ref 7.5–11.1)
POTASSIUM SERPL-SCNC: 4.8 MMOL/L (ref 3.5–5.1)
PRO-BNP: 398.8 PG/ML
PROTEIN UA: 100 MG/DL
RAPID INFLUENZA  B AGN: NEGATIVE
RAPID INFLUENZA A AGN: NEGATIVE
RBC # BLD: 4 M/CU MM (ref 4.2–5.4)
RBC URINE: ABNORMAL /HPF (ref 0–6)
SEGMENTED NEUTROPHILS ABSOLUTE COUNT: 7.1 K/CU MM
SEGMENTED NEUTROPHILS RELATIVE PERCENT: 84 % (ref 36–66)
SODIUM BLD-SCNC: 134 MMOL/L (ref 135–145)
SPECIFIC GRAVITY UA: 1.01 (ref 1–1.03)
SQUAMOUS EPITHELIAL: <1 /HPF
TOTAL IMMATURE NEUTOROPHIL: 0.12 K/CU MM
TOTAL NUCLEATED RBC: 0 K/CU MM
TOTAL PROTEIN: 7.6 GM/DL (ref 6.4–8.2)
TRANSITIONAL EPITHELIAL: <1 /HPF
TRICHOMONAS: ABNORMAL /HPF
TROPONIN T: <0.01 NG/ML
UROBILINOGEN, URINE: NORMAL MG/DL (ref 0.2–1)
WBC # BLD: 8.4 K/CU MM (ref 4–10.5)
WBC UA: 9 /HPF (ref 0–5)

## 2019-02-05 PROCEDURE — 81001 URINALYSIS AUTO W/SCOPE: CPT

## 2019-02-05 PROCEDURE — 2580000003 HC RX 258: Performed by: PHYSICIAN ASSISTANT

## 2019-02-05 PROCEDURE — 87804 INFLUENZA ASSAY W/OPTIC: CPT

## 2019-02-05 PROCEDURE — 96375 TX/PRO/DX INJ NEW DRUG ADDON: CPT

## 2019-02-05 PROCEDURE — 36415 COLL VENOUS BLD VENIPUNCTURE: CPT

## 2019-02-05 PROCEDURE — 84484 ASSAY OF TROPONIN QUANT: CPT

## 2019-02-05 PROCEDURE — 96365 THER/PROPH/DIAG IV INF INIT: CPT

## 2019-02-05 PROCEDURE — 83605 ASSAY OF LACTIC ACID: CPT

## 2019-02-05 PROCEDURE — 6360000002 HC RX W HCPCS: Performed by: PHYSICIAN ASSISTANT

## 2019-02-05 PROCEDURE — 83880 ASSAY OF NATRIURETIC PEPTIDE: CPT

## 2019-02-05 PROCEDURE — 85025 COMPLETE CBC W/AUTO DIFF WBC: CPT

## 2019-02-05 PROCEDURE — 80053 COMPREHEN METABOLIC PANEL: CPT

## 2019-02-05 PROCEDURE — 87040 BLOOD CULTURE FOR BACTERIA: CPT

## 2019-02-05 PROCEDURE — 96366 THER/PROPH/DIAG IV INF ADDON: CPT

## 2019-02-05 PROCEDURE — 96361 HYDRATE IV INFUSION ADD-ON: CPT

## 2019-02-05 PROCEDURE — 71046 X-RAY EXAM CHEST 2 VIEWS: CPT

## 2019-02-05 PROCEDURE — 6370000000 HC RX 637 (ALT 250 FOR IP): Performed by: PHYSICIAN ASSISTANT

## 2019-02-05 PROCEDURE — 99285 EMERGENCY DEPT VISIT HI MDM: CPT

## 2019-02-05 RX ORDER — IPRATROPIUM BROMIDE AND ALBUTEROL SULFATE 2.5; .5 MG/3ML; MG/3ML
1 SOLUTION RESPIRATORY (INHALATION) ONCE
Status: COMPLETED | OUTPATIENT
Start: 2019-02-05 | End: 2019-02-06

## 2019-02-05 RX ORDER — METHYLPREDNISOLONE SODIUM SUCCINATE 125 MG/2ML
40 INJECTION, POWDER, LYOPHILIZED, FOR SOLUTION INTRAMUSCULAR; INTRAVENOUS ONCE
Status: COMPLETED | OUTPATIENT
Start: 2019-02-05 | End: 2019-02-06

## 2019-02-05 RX ORDER — 0.9 % SODIUM CHLORIDE 0.9 %
1000 INTRAVENOUS SOLUTION INTRAVENOUS ONCE
Status: COMPLETED | OUTPATIENT
Start: 2019-02-05 | End: 2019-02-06

## 2019-02-05 RX ORDER — ACETAMINOPHEN 500 MG
1000 TABLET ORAL ONCE
Status: COMPLETED | OUTPATIENT
Start: 2019-02-05 | End: 2019-02-05

## 2019-02-05 RX ADMIN — ACETAMINOPHEN 1000 MG: 500 TABLET ORAL at 20:41

## 2019-02-05 RX ADMIN — AZITHROMYCIN MONOHYDRATE 500 MG: 500 INJECTION, POWDER, LYOPHILIZED, FOR SOLUTION INTRAVENOUS at 20:41

## 2019-02-05 RX ADMIN — SODIUM CHLORIDE 1000 ML: 900 INJECTION INTRAVENOUS at 20:41

## 2019-02-05 RX ADMIN — CEFTRIAXONE SODIUM 1 G: 1 INJECTION, POWDER, FOR SOLUTION INTRAMUSCULAR; INTRAVENOUS at 19:52

## 2019-02-05 ASSESSMENT — PAIN DESCRIPTION - LOCATION
LOCATION: ABDOMEN
LOCATION: GENERALIZED

## 2019-02-05 ASSESSMENT — PAIN DESCRIPTION - DESCRIPTORS: DESCRIPTORS: ACHING

## 2019-02-05 ASSESSMENT — PAIN SCALES - GENERAL
PAINLEVEL_OUTOF10: 10
PAINLEVEL_OUTOF10: 10

## 2019-02-05 ASSESSMENT — PAIN DESCRIPTION - PROGRESSION: CLINICAL_PROGRESSION: NOT CHANGED

## 2019-02-05 ASSESSMENT — PAIN DESCRIPTION - PAIN TYPE
TYPE: ACUTE PAIN
TYPE: ACUTE PAIN

## 2019-02-05 ASSESSMENT — PAIN DESCRIPTION - FREQUENCY: FREQUENCY: CONTINUOUS

## 2019-02-05 ASSESSMENT — PAIN DESCRIPTION - ONSET: ONSET: ON-GOING

## 2019-02-06 PROBLEM — J18.9 PNEUMONIA: Status: ACTIVE | Noted: 2019-02-06

## 2019-02-06 PROBLEM — I50.1 PULMONARY EDEMA WITH CONGESTIVE HEART FAILURE (HCC): Status: ACTIVE | Noted: 2019-02-06

## 2019-02-06 LAB
ANION GAP SERPL CALCULATED.3IONS-SCNC: 11 MMOL/L (ref 4–16)
BUN BLDV-MCNC: 18 MG/DL (ref 6–23)
CALCIUM SERPL-MCNC: 9 MG/DL (ref 8.3–10.6)
CHLORIDE BLD-SCNC: 91 MMOL/L (ref 99–110)
CO2: 34 MMOL/L (ref 21–32)
CREAT SERPL-MCNC: 0.7 MG/DL (ref 0.6–1.1)
GFR AFRICAN AMERICAN: >60 ML/MIN/1.73M2
GFR NON-AFRICAN AMERICAN: >60 ML/MIN/1.73M2
GLUCOSE BLD-MCNC: 192 MG/DL (ref 70–99)
GLUCOSE BLD-MCNC: 197 MG/DL (ref 70–99)
GLUCOSE BLD-MCNC: 239 MG/DL (ref 70–99)
GLUCOSE BLD-MCNC: 315 MG/DL (ref 70–99)
GLUCOSE BLD-MCNC: 355 MG/DL (ref 70–99)
GLUCOSE BLD-MCNC: 388 MG/DL (ref 70–99)
LV EF: 53 %
LVEF MODALITY: NORMAL
POTASSIUM SERPL-SCNC: 4.6 MMOL/L (ref 3.5–5.1)
SODIUM BLD-SCNC: 136 MMOL/L (ref 135–145)
TSH HIGH SENSITIVITY: 0.82 UIU/ML (ref 0.27–4.2)

## 2019-02-06 PROCEDURE — 2140000000 HC CCU INTERMEDIATE R&B

## 2019-02-06 PROCEDURE — 6370000000 HC RX 637 (ALT 250 FOR IP): Performed by: EMERGENCY MEDICINE

## 2019-02-06 PROCEDURE — 51702 INSERT TEMP BLADDER CATH: CPT

## 2019-02-06 PROCEDURE — 94640 AIRWAY INHALATION TREATMENT: CPT

## 2019-02-06 PROCEDURE — 82962 GLUCOSE BLOOD TEST: CPT

## 2019-02-06 PROCEDURE — 99253 IP/OBS CNSLTJ NEW/EST LOW 45: CPT | Performed by: INTERNAL MEDICINE

## 2019-02-06 PROCEDURE — 6360000004 HC RX CONTRAST MEDICATION: Performed by: INTERNAL MEDICINE

## 2019-02-06 PROCEDURE — 84443 ASSAY THYROID STIM HORMONE: CPT

## 2019-02-06 PROCEDURE — 2500000003 HC RX 250 WO HCPCS: Performed by: HOSPITALIST

## 2019-02-06 PROCEDURE — 2700000000 HC OXYGEN THERAPY PER DAY

## 2019-02-06 PROCEDURE — 94660 CPAP INITIATION&MGMT: CPT

## 2019-02-06 PROCEDURE — 6370000000 HC RX 637 (ALT 250 FOR IP): Performed by: HOSPITALIST

## 2019-02-06 PROCEDURE — 36415 COLL VENOUS BLD VENIPUNCTURE: CPT

## 2019-02-06 PROCEDURE — 94761 N-INVAS EAR/PLS OXIMETRY MLT: CPT

## 2019-02-06 PROCEDURE — 6370000000 HC RX 637 (ALT 250 FOR IP): Performed by: INTERNAL MEDICINE

## 2019-02-06 PROCEDURE — 6360000002 HC RX W HCPCS: Performed by: HOSPITALIST

## 2019-02-06 PROCEDURE — 6360000002 HC RX W HCPCS: Performed by: EMERGENCY MEDICINE

## 2019-02-06 PROCEDURE — 99211 OFF/OP EST MAY X REQ PHY/QHP: CPT

## 2019-02-06 PROCEDURE — C8929 TTE W OR WO FOL WCON,DOPPLER: HCPCS

## 2019-02-06 PROCEDURE — 2580000003 HC RX 258: Performed by: INTERNAL MEDICINE

## 2019-02-06 PROCEDURE — 6360000002 HC RX W HCPCS: Performed by: INTERNAL MEDICINE

## 2019-02-06 PROCEDURE — 80048 BASIC METABOLIC PNL TOTAL CA: CPT

## 2019-02-06 RX ORDER — CARVEDILOL 3.12 MG/1
3.12 TABLET ORAL 2 TIMES DAILY
Status: DISCONTINUED | OUTPATIENT
Start: 2019-02-06 | End: 2019-02-08 | Stop reason: HOSPADM

## 2019-02-06 RX ORDER — DEXTROSE MONOHYDRATE 50 MG/ML
100 INJECTION, SOLUTION INTRAVENOUS PRN
Status: DISCONTINUED | OUTPATIENT
Start: 2019-02-06 | End: 2019-02-08 | Stop reason: HOSPADM

## 2019-02-06 RX ORDER — IPRATROPIUM BROMIDE AND ALBUTEROL SULFATE 2.5; .5 MG/3ML; MG/3ML
3 SOLUTION RESPIRATORY (INHALATION) 4 TIMES DAILY
Status: DISCONTINUED | OUTPATIENT
Start: 2019-02-06 | End: 2019-02-08 | Stop reason: HOSPADM

## 2019-02-06 RX ORDER — FUROSEMIDE 10 MG/ML
40 INJECTION INTRAMUSCULAR; INTRAVENOUS 2 TIMES DAILY
Status: DISCONTINUED | OUTPATIENT
Start: 2019-02-06 | End: 2019-02-08

## 2019-02-06 RX ORDER — SIMVASTATIN 40 MG
40 TABLET ORAL NIGHTLY
Status: DISCONTINUED | OUTPATIENT
Start: 2019-02-06 | End: 2019-02-08 | Stop reason: HOSPADM

## 2019-02-06 RX ORDER — CLOPIDOGREL BISULFATE 75 MG/1
75 TABLET ORAL DAILY
Status: DISCONTINUED | OUTPATIENT
Start: 2019-02-06 | End: 2019-02-08 | Stop reason: HOSPADM

## 2019-02-06 RX ORDER — GABAPENTIN 400 MG/1
800 CAPSULE ORAL 3 TIMES DAILY
Status: DISCONTINUED | OUTPATIENT
Start: 2019-02-06 | End: 2019-02-08 | Stop reason: HOSPADM

## 2019-02-06 RX ORDER — POTASSIUM CHLORIDE 20MEQ/15ML
40 LIQUID (ML) ORAL PRN
Status: DISCONTINUED | OUTPATIENT
Start: 2019-02-06 | End: 2019-02-08 | Stop reason: HOSPADM

## 2019-02-06 RX ORDER — PANTOPRAZOLE SODIUM 40 MG/1
40 TABLET, DELAYED RELEASE ORAL DAILY
Status: DISCONTINUED | OUTPATIENT
Start: 2019-02-06 | End: 2019-02-08 | Stop reason: HOSPADM

## 2019-02-06 RX ORDER — DEXTROSE MONOHYDRATE 25 G/50ML
12.5 INJECTION, SOLUTION INTRAVENOUS PRN
Status: DISCONTINUED | OUTPATIENT
Start: 2019-02-06 | End: 2019-02-08 | Stop reason: HOSPADM

## 2019-02-06 RX ORDER — ASPIRIN 81 MG/1
81 TABLET, CHEWABLE ORAL DAILY
Status: DISCONTINUED | OUTPATIENT
Start: 2019-02-06 | End: 2019-02-08 | Stop reason: HOSPADM

## 2019-02-06 RX ORDER — POTASSIUM CHLORIDE 7.45 MG/ML
10 INJECTION INTRAVENOUS PRN
Status: DISCONTINUED | OUTPATIENT
Start: 2019-02-06 | End: 2019-02-08 | Stop reason: HOSPADM

## 2019-02-06 RX ORDER — ONDANSETRON 2 MG/ML
4 INJECTION INTRAMUSCULAR; INTRAVENOUS EVERY 6 HOURS PRN
Status: DISCONTINUED | OUTPATIENT
Start: 2019-02-06 | End: 2019-02-08 | Stop reason: HOSPADM

## 2019-02-06 RX ORDER — INSULIN GLARGINE 100 [IU]/ML
15 INJECTION, SOLUTION SUBCUTANEOUS NIGHTLY
Status: DISCONTINUED | OUTPATIENT
Start: 2019-02-06 | End: 2019-02-08 | Stop reason: HOSPADM

## 2019-02-06 RX ORDER — PRAMIPEXOLE DIHYDROCHLORIDE 1 MG/1
1 TABLET ORAL 3 TIMES DAILY
Status: DISCONTINUED | OUTPATIENT
Start: 2019-02-06 | End: 2019-02-08 | Stop reason: HOSPADM

## 2019-02-06 RX ORDER — LISINOPRIL 5 MG/1
5 TABLET ORAL DAILY
Status: DISCONTINUED | OUTPATIENT
Start: 2019-02-06 | End: 2019-02-08 | Stop reason: HOSPADM

## 2019-02-06 RX ORDER — VILAZODONE HYDROCHLORIDE 10 MG/1
10 TABLET ORAL 2 TIMES DAILY
Status: DISCONTINUED | OUTPATIENT
Start: 2019-02-06 | End: 2019-02-08 | Stop reason: HOSPADM

## 2019-02-06 RX ORDER — NICOTINE POLACRILEX 4 MG
15 LOZENGE BUCCAL PRN
Status: DISCONTINUED | OUTPATIENT
Start: 2019-02-06 | End: 2019-02-08 | Stop reason: HOSPADM

## 2019-02-06 RX ORDER — POTASSIUM CHLORIDE 20 MEQ/1
40 TABLET, EXTENDED RELEASE ORAL PRN
Status: DISCONTINUED | OUTPATIENT
Start: 2019-02-06 | End: 2019-02-08 | Stop reason: HOSPADM

## 2019-02-06 RX ORDER — MAGNESIUM SULFATE 1 G/100ML
1 INJECTION INTRAVENOUS PRN
Status: DISCONTINUED | OUTPATIENT
Start: 2019-02-06 | End: 2019-02-08 | Stop reason: HOSPADM

## 2019-02-06 RX ADMIN — METFORMIN HYDROCHLORIDE 500 MG: 500 TABLET ORAL at 08:46

## 2019-02-06 RX ADMIN — FUROSEMIDE 10 MG/HR: 10 INJECTION, SOLUTION INTRAVENOUS at 03:22

## 2019-02-06 RX ADMIN — GABAPENTIN 800 MG: 400 CAPSULE ORAL at 08:46

## 2019-02-06 RX ADMIN — PRAMIPEXOLE DIHYDROCHLORIDE 1 MG: 1 TABLET ORAL at 08:46

## 2019-02-06 RX ADMIN — ENOXAPARIN SODIUM 40 MG: 100 INJECTION SUBCUTANEOUS at 08:45

## 2019-02-06 RX ADMIN — MICONAZOLE NITRATE: 2 POWDER TOPICAL at 22:35

## 2019-02-06 RX ADMIN — INSULIN LISPRO 15 UNITS: 100 INJECTION, SOLUTION INTRAVENOUS; SUBCUTANEOUS at 08:47

## 2019-02-06 RX ADMIN — PERFLUTREN 1 MG: 6.52 INJECTION, SUSPENSION INTRAVENOUS at 11:17

## 2019-02-06 RX ADMIN — PRAMIPEXOLE DIHYDROCHLORIDE 1 MG: 1 TABLET ORAL at 22:36

## 2019-02-06 RX ADMIN — GABAPENTIN 800 MG: 400 CAPSULE ORAL at 22:34

## 2019-02-06 RX ADMIN — CARVEDILOL 3.12 MG: 3.12 TABLET, FILM COATED ORAL at 08:46

## 2019-02-06 RX ADMIN — INSULIN LISPRO 4 UNITS: 100 INJECTION, SOLUTION INTRAVENOUS; SUBCUTANEOUS at 17:36

## 2019-02-06 RX ADMIN — METHYLPREDNISOLONE SODIUM SUCCINATE 40 MG: 125 INJECTION, POWDER, LYOPHILIZED, FOR SOLUTION INTRAMUSCULAR; INTRAVENOUS at 00:08

## 2019-02-06 RX ADMIN — COLLAGENASE SANTYL: 250 OINTMENT TOPICAL at 14:10

## 2019-02-06 RX ADMIN — VILAZODONE HYDROCHLORIDE 10 MG: 10 TABLET ORAL at 22:36

## 2019-02-06 RX ADMIN — INSULIN LISPRO 15 UNITS: 100 INJECTION, SOLUTION INTRAVENOUS; SUBCUTANEOUS at 12:56

## 2019-02-06 RX ADMIN — SIMVASTATIN 40 MG: 40 TABLET, FILM COATED ORAL at 22:35

## 2019-02-06 RX ADMIN — IPRATROPIUM BROMIDE AND ALBUTEROL SULFATE 3 ML: .5; 3 SOLUTION RESPIRATORY (INHALATION) at 12:30

## 2019-02-06 RX ADMIN — IPRATROPIUM BROMIDE AND ALBUTEROL SULFATE 1 AMPULE: .5; 3 SOLUTION RESPIRATORY (INHALATION) at 00:01

## 2019-02-06 RX ADMIN — LISINOPRIL 5 MG: 5 TABLET ORAL at 08:46

## 2019-02-06 RX ADMIN — FUROSEMIDE 40 MG: 10 INJECTION, SOLUTION INTRAMUSCULAR; INTRAVENOUS at 17:35

## 2019-02-06 RX ADMIN — FUROSEMIDE 40 MG: 10 INJECTION, SOLUTION INTRAMUSCULAR; INTRAVENOUS at 11:50

## 2019-02-06 RX ADMIN — INSULIN LISPRO 15 UNITS: 100 INJECTION, SOLUTION INTRAVENOUS; SUBCUTANEOUS at 17:42

## 2019-02-06 RX ADMIN — INSULIN LISPRO 1 UNITS: 100 INJECTION, SOLUTION INTRAVENOUS; SUBCUTANEOUS at 22:40

## 2019-02-06 RX ADMIN — CLOPIDOGREL BISULFATE 75 MG: 75 TABLET, FILM COATED ORAL at 08:45

## 2019-02-06 RX ADMIN — INSULIN LISPRO 10 UNITS: 100 INJECTION, SOLUTION INTRAVENOUS; SUBCUTANEOUS at 12:56

## 2019-02-06 RX ADMIN — ASPIRIN 81 MG 81 MG: 81 TABLET ORAL at 08:46

## 2019-02-06 RX ADMIN — IPRATROPIUM BROMIDE AND ALBUTEROL SULFATE 3 ML: .5; 3 SOLUTION RESPIRATORY (INHALATION) at 15:56

## 2019-02-06 RX ADMIN — GABAPENTIN 800 MG: 400 CAPSULE ORAL at 17:35

## 2019-02-06 RX ADMIN — INSULIN GLARGINE 15 UNITS: 100 INJECTION, SOLUTION SUBCUTANEOUS at 22:39

## 2019-02-06 RX ADMIN — CARVEDILOL 3.12 MG: 3.12 TABLET, FILM COATED ORAL at 17:36

## 2019-02-06 RX ADMIN — VILAZODONE HYDROCHLORIDE 10 MG: 10 TABLET ORAL at 08:46

## 2019-02-06 RX ADMIN — COLLAGENASE SANTYL: 250 OINTMENT TOPICAL at 03:22

## 2019-02-06 RX ADMIN — VILAZODONE HYDROCHLORIDE 10 MG: 10 TABLET ORAL at 03:22

## 2019-02-06 RX ADMIN — AZITHROMYCIN MONOHYDRATE 500 MG: 500 INJECTION, POWDER, LYOPHILIZED, FOR SOLUTION INTRAVENOUS at 22:34

## 2019-02-06 RX ADMIN — CARVEDILOL 3.12 MG: 3.12 TABLET, FILM COATED ORAL at 03:24

## 2019-02-06 RX ADMIN — CEFTRIAXONE 1 G: 1 INJECTION, POWDER, FOR SOLUTION INTRAMUSCULAR; INTRAVENOUS at 22:34

## 2019-02-06 RX ADMIN — IPRATROPIUM BROMIDE AND ALBUTEROL SULFATE 3 ML: .5; 3 SOLUTION RESPIRATORY (INHALATION) at 08:32

## 2019-02-06 RX ADMIN — MICONAZOLE NITRATE: 2 POWDER TOPICAL at 17:36

## 2019-02-06 RX ADMIN — METFORMIN HYDROCHLORIDE 500 MG: 500 TABLET ORAL at 17:35

## 2019-02-06 RX ADMIN — PRAMIPEXOLE DIHYDROCHLORIDE 1 MG: 1 TABLET ORAL at 17:46

## 2019-02-06 RX ADMIN — CARVEDILOL 3.12 MG: 3.12 TABLET, FILM COATED ORAL at 22:35

## 2019-02-06 RX ADMIN — PANTOPRAZOLE SODIUM 40 MG: 40 TABLET, DELAYED RELEASE ORAL at 08:46

## 2019-02-06 RX ADMIN — IPRATROPIUM BROMIDE AND ALBUTEROL SULFATE 3 ML: .5; 3 SOLUTION RESPIRATORY (INHALATION) at 19:45

## 2019-02-06 ASSESSMENT — PAIN SCALES - GENERAL
PAINLEVEL_OUTOF10: 0

## 2019-02-07 LAB
ANION GAP SERPL CALCULATED.3IONS-SCNC: 10 MMOL/L (ref 4–16)
BASOPHILS ABSOLUTE: 0 K/CU MM
BASOPHILS RELATIVE PERCENT: 0.5 % (ref 0–1)
BUN BLDV-MCNC: 25 MG/DL (ref 6–23)
CALCIUM SERPL-MCNC: 8.8 MG/DL (ref 8.3–10.6)
CHLORIDE BLD-SCNC: 92 MMOL/L (ref 99–110)
CHOLESTEROL: 186 MG/DL
CO2: 36 MMOL/L (ref 21–32)
CREAT SERPL-MCNC: 0.8 MG/DL (ref 0.6–1.1)
DIFFERENTIAL TYPE: ABNORMAL
EOSINOPHILS ABSOLUTE: 0.1 K/CU MM
EOSINOPHILS RELATIVE PERCENT: 1.1 % (ref 0–3)
GFR AFRICAN AMERICAN: >60 ML/MIN/1.73M2
GFR NON-AFRICAN AMERICAN: >60 ML/MIN/1.73M2
GLUCOSE BLD-MCNC: 123 MG/DL (ref 70–99)
GLUCOSE BLD-MCNC: 139 MG/DL (ref 70–99)
GLUCOSE BLD-MCNC: 142 MG/DL (ref 70–99)
GLUCOSE BLD-MCNC: 148 MG/DL (ref 70–99)
GLUCOSE BLD-MCNC: 150 MG/DL (ref 70–99)
GLUCOSE BLD-MCNC: 153 MG/DL (ref 70–99)
HCT VFR BLD CALC: 29.2 % (ref 37–47)
HDLC SERPL-MCNC: 39 MG/DL
HEMOGLOBIN: 8.7 GM/DL (ref 12.5–16)
IMMATURE NEUTROPHIL %: 1.6 % (ref 0–0.43)
LDL CHOLESTEROL DIRECT: 141 MG/DL
LYMPHOCYTES ABSOLUTE: 1.3 K/CU MM
LYMPHOCYTES RELATIVE PERCENT: 17.1 % (ref 24–44)
MAGNESIUM: 1.9 MG/DL (ref 1.8–2.4)
MCH RBC QN AUTO: 24.7 PG (ref 27–31)
MCHC RBC AUTO-ENTMCNC: 29.8 % (ref 32–36)
MCV RBC AUTO: 83 FL (ref 78–100)
MONOCYTES ABSOLUTE: 0.8 K/CU MM
MONOCYTES RELATIVE PERCENT: 11.1 % (ref 0–4)
NUCLEATED RBC %: 0 %
PDW BLD-RTO: 16.7 % (ref 11.7–14.9)
PLATELET # BLD: 273 K/CU MM (ref 140–440)
PMV BLD AUTO: 9.5 FL (ref 7.5–11.1)
POTASSIUM SERPL-SCNC: 3.9 MMOL/L (ref 3.5–5.1)
PROCALCITONIN: 0.16
RBC # BLD: 3.52 M/CU MM (ref 4.2–5.4)
SEGMENTED NEUTROPHILS ABSOLUTE COUNT: 5.2 K/CU MM
SEGMENTED NEUTROPHILS RELATIVE PERCENT: 68.6 % (ref 36–66)
SODIUM BLD-SCNC: 138 MMOL/L (ref 135–145)
TOTAL IMMATURE NEUTOROPHIL: 0.12 K/CU MM
TOTAL NUCLEATED RBC: 0 K/CU MM
TOTAL RETICULOCYTE COUNT: 0.07 K/CU MM
TRIGL SERPL-MCNC: 122 MG/DL
WBC # BLD: 7.6 K/CU MM (ref 4–10.5)

## 2019-02-07 PROCEDURE — 2700000000 HC OXYGEN THERAPY PER DAY

## 2019-02-07 PROCEDURE — 6360000002 HC RX W HCPCS: Performed by: INTERNAL MEDICINE

## 2019-02-07 PROCEDURE — 94761 N-INVAS EAR/PLS OXIMETRY MLT: CPT

## 2019-02-07 PROCEDURE — 83721 ASSAY OF BLOOD LIPOPROTEIN: CPT

## 2019-02-07 PROCEDURE — 36415 COLL VENOUS BLD VENIPUNCTURE: CPT

## 2019-02-07 PROCEDURE — 85025 COMPLETE CBC W/AUTO DIFF WBC: CPT

## 2019-02-07 PROCEDURE — 2140000000 HC CCU INTERMEDIATE R&B

## 2019-02-07 PROCEDURE — 2580000003 HC RX 258: Performed by: INTERNAL MEDICINE

## 2019-02-07 PROCEDURE — 6370000000 HC RX 637 (ALT 250 FOR IP): Performed by: HOSPITALIST

## 2019-02-07 PROCEDURE — 82962 GLUCOSE BLOOD TEST: CPT

## 2019-02-07 PROCEDURE — 6360000002 HC RX W HCPCS: Performed by: HOSPITALIST

## 2019-02-07 PROCEDURE — 84145 PROCALCITONIN (PCT): CPT

## 2019-02-07 PROCEDURE — 80061 LIPID PANEL: CPT

## 2019-02-07 PROCEDURE — 94640 AIRWAY INHALATION TREATMENT: CPT

## 2019-02-07 PROCEDURE — 83735 ASSAY OF MAGNESIUM: CPT

## 2019-02-07 PROCEDURE — 6370000000 HC RX 637 (ALT 250 FOR IP): Performed by: INTERNAL MEDICINE

## 2019-02-07 PROCEDURE — 99232 SBSQ HOSP IP/OBS MODERATE 35: CPT | Performed by: INTERNAL MEDICINE

## 2019-02-07 PROCEDURE — 80048 BASIC METABOLIC PNL TOTAL CA: CPT

## 2019-02-07 PROCEDURE — 94660 CPAP INITIATION&MGMT: CPT

## 2019-02-07 RX ADMIN — CARVEDILOL 3.12 MG: 3.12 TABLET, FILM COATED ORAL at 23:35

## 2019-02-07 RX ADMIN — METFORMIN HYDROCHLORIDE 500 MG: 500 TABLET ORAL at 17:01

## 2019-02-07 RX ADMIN — INSULIN LISPRO 15 UNITS: 100 INJECTION, SOLUTION INTRAVENOUS; SUBCUTANEOUS at 17:01

## 2019-02-07 RX ADMIN — PANTOPRAZOLE SODIUM 40 MG: 40 TABLET, DELAYED RELEASE ORAL at 06:48

## 2019-02-07 RX ADMIN — INSULIN LISPRO 2 UNITS: 100 INJECTION, SOLUTION INTRAVENOUS; SUBCUTANEOUS at 17:01

## 2019-02-07 RX ADMIN — PRAMIPEXOLE DIHYDROCHLORIDE 1 MG: 1 TABLET ORAL at 23:35

## 2019-02-07 RX ADMIN — FUROSEMIDE 40 MG: 10 INJECTION, SOLUTION INTRAMUSCULAR; INTRAVENOUS at 17:01

## 2019-02-07 RX ADMIN — INSULIN LISPRO 15 UNITS: 100 INJECTION, SOLUTION INTRAVENOUS; SUBCUTANEOUS at 09:06

## 2019-02-07 RX ADMIN — INSULIN GLARGINE 5 UNITS: 100 INJECTION, SOLUTION SUBCUTANEOUS at 23:46

## 2019-02-07 RX ADMIN — IPRATROPIUM BROMIDE AND ALBUTEROL SULFATE 3 ML: .5; 3 SOLUTION RESPIRATORY (INHALATION) at 11:11

## 2019-02-07 RX ADMIN — IPRATROPIUM BROMIDE AND ALBUTEROL SULFATE 3 ML: .5; 3 SOLUTION RESPIRATORY (INHALATION) at 15:09

## 2019-02-07 RX ADMIN — SIMVASTATIN 40 MG: 40 TABLET, FILM COATED ORAL at 23:35

## 2019-02-07 RX ADMIN — AZITHROMYCIN MONOHYDRATE 500 MG: 500 INJECTION, POWDER, LYOPHILIZED, FOR SOLUTION INTRAVENOUS at 23:57

## 2019-02-07 RX ADMIN — MICONAZOLE NITRATE: 2 POWDER TOPICAL at 09:13

## 2019-02-07 RX ADMIN — PRAMIPEXOLE DIHYDROCHLORIDE 1 MG: 1 TABLET ORAL at 14:14

## 2019-02-07 RX ADMIN — GABAPENTIN 800 MG: 400 CAPSULE ORAL at 09:11

## 2019-02-07 RX ADMIN — VILAZODONE HYDROCHLORIDE 10 MG: 10 TABLET ORAL at 23:36

## 2019-02-07 RX ADMIN — LISINOPRIL 5 MG: 5 TABLET ORAL at 09:12

## 2019-02-07 RX ADMIN — GABAPENTIN 800 MG: 400 CAPSULE ORAL at 14:14

## 2019-02-07 RX ADMIN — ENOXAPARIN SODIUM 40 MG: 100 INJECTION SUBCUTANEOUS at 09:11

## 2019-02-07 RX ADMIN — COLLAGENASE SANTYL: 250 OINTMENT TOPICAL at 09:13

## 2019-02-07 RX ADMIN — INSULIN LISPRO 2 UNITS: 100 INJECTION, SOLUTION INTRAVENOUS; SUBCUTANEOUS at 09:06

## 2019-02-07 RX ADMIN — CEFTRIAXONE 1 G: 1 INJECTION, POWDER, FOR SOLUTION INTRAMUSCULAR; INTRAVENOUS at 23:27

## 2019-02-07 RX ADMIN — CLOPIDOGREL BISULFATE 75 MG: 75 TABLET, FILM COATED ORAL at 09:11

## 2019-02-07 RX ADMIN — ASPIRIN 81 MG 81 MG: 81 TABLET ORAL at 09:12

## 2019-02-07 RX ADMIN — GABAPENTIN 800 MG: 400 CAPSULE ORAL at 23:35

## 2019-02-07 RX ADMIN — FUROSEMIDE 40 MG: 10 INJECTION, SOLUTION INTRAMUSCULAR; INTRAVENOUS at 09:11

## 2019-02-07 RX ADMIN — PRAMIPEXOLE DIHYDROCHLORIDE 1 MG: 1 TABLET ORAL at 09:12

## 2019-02-07 RX ADMIN — IPRATROPIUM BROMIDE AND ALBUTEROL SULFATE 3 ML: .5; 3 SOLUTION RESPIRATORY (INHALATION) at 21:53

## 2019-02-07 RX ADMIN — CARVEDILOL 3.12 MG: 3.12 TABLET, FILM COATED ORAL at 09:12

## 2019-02-07 RX ADMIN — IPRATROPIUM BROMIDE AND ALBUTEROL SULFATE 3 ML: .5; 3 SOLUTION RESPIRATORY (INHALATION) at 07:27

## 2019-02-07 RX ADMIN — METFORMIN HYDROCHLORIDE 500 MG: 500 TABLET ORAL at 09:12

## 2019-02-07 RX ADMIN — INSULIN LISPRO 15 UNITS: 100 INJECTION, SOLUTION INTRAVENOUS; SUBCUTANEOUS at 12:08

## 2019-02-07 RX ADMIN — VILAZODONE HYDROCHLORIDE 10 MG: 10 TABLET ORAL at 09:12

## 2019-02-07 ASSESSMENT — PAIN SCALES - GENERAL: PAINLEVEL_OUTOF10: 0

## 2019-02-08 VITALS
SYSTOLIC BLOOD PRESSURE: 111 MMHG | HEART RATE: 71 BPM | WEIGHT: 293 LBS | RESPIRATION RATE: 19 BRPM | TEMPERATURE: 98.1 F | HEIGHT: 66 IN | BODY MASS INDEX: 47.09 KG/M2 | DIASTOLIC BLOOD PRESSURE: 47 MMHG | OXYGEN SATURATION: 95 %

## 2019-02-08 PROBLEM — I50.1 PULMONARY EDEMA WITH CONGESTIVE HEART FAILURE (HCC): Status: RESOLVED | Noted: 2019-02-06 | Resolved: 2019-02-08

## 2019-02-08 LAB
ANION GAP SERPL CALCULATED.3IONS-SCNC: 11 MMOL/L (ref 4–16)
BASOPHILS ABSOLUTE: 0 K/CU MM
BASOPHILS RELATIVE PERCENT: 0.7 % (ref 0–1)
BUN BLDV-MCNC: 32 MG/DL (ref 6–23)
CALCIUM SERPL-MCNC: 8.7 MG/DL (ref 8.3–10.6)
CHLORIDE BLD-SCNC: 92 MMOL/L (ref 99–110)
CO2: 37 MMOL/L (ref 21–32)
CREAT SERPL-MCNC: 0.8 MG/DL (ref 0.6–1.1)
DIFFERENTIAL TYPE: ABNORMAL
EOSINOPHILS ABSOLUTE: 0.1 K/CU MM
EOSINOPHILS RELATIVE PERCENT: 1.9 % (ref 0–3)
GFR AFRICAN AMERICAN: >60 ML/MIN/1.73M2
GFR NON-AFRICAN AMERICAN: >60 ML/MIN/1.73M2
GLUCOSE BLD-MCNC: 119 MG/DL (ref 70–99)
GLUCOSE BLD-MCNC: 121 MG/DL (ref 70–99)
GLUCOSE BLD-MCNC: 135 MG/DL (ref 70–99)
GLUCOSE BLD-MCNC: 136 MG/DL (ref 70–99)
HCT VFR BLD CALC: 30.7 % (ref 37–47)
HEMOGLOBIN: 8.7 GM/DL (ref 12.5–16)
IMMATURE NEUTROPHIL %: 1 % (ref 0–0.43)
LYMPHOCYTES ABSOLUTE: 1.5 K/CU MM
LYMPHOCYTES RELATIVE PERCENT: 26 % (ref 24–44)
MCH RBC QN AUTO: 23.8 PG (ref 27–31)
MCHC RBC AUTO-ENTMCNC: 28.3 % (ref 32–36)
MCV RBC AUTO: 84.1 FL (ref 78–100)
MONOCYTES ABSOLUTE: 0.6 K/CU MM
MONOCYTES RELATIVE PERCENT: 10.5 % (ref 0–4)
NUCLEATED RBC %: 0 %
PDW BLD-RTO: 16.8 % (ref 11.7–14.9)
PLATELET # BLD: 284 K/CU MM (ref 140–440)
PMV BLD AUTO: 9.4 FL (ref 7.5–11.1)
POTASSIUM SERPL-SCNC: 3.8 MMOL/L (ref 3.5–5.1)
PRO-BNP: 455.7 PG/ML
RBC # BLD: 3.65 M/CU MM (ref 4.2–5.4)
SEGMENTED NEUTROPHILS ABSOLUTE COUNT: 3.5 K/CU MM
SEGMENTED NEUTROPHILS RELATIVE PERCENT: 59.9 % (ref 36–66)
SODIUM BLD-SCNC: 140 MMOL/L (ref 135–145)
TOTAL IMMATURE NEUTOROPHIL: 0.06 K/CU MM
TOTAL NUCLEATED RBC: 0 K/CU MM
WBC # BLD: 5.9 K/CU MM (ref 4–10.5)

## 2019-02-08 PROCEDURE — 36415 COLL VENOUS BLD VENIPUNCTURE: CPT

## 2019-02-08 PROCEDURE — 6370000000 HC RX 637 (ALT 250 FOR IP): Performed by: INTERNAL MEDICINE

## 2019-02-08 PROCEDURE — 99231 SBSQ HOSP IP/OBS SF/LOW 25: CPT | Performed by: INTERNAL MEDICINE

## 2019-02-08 PROCEDURE — 82962 GLUCOSE BLOOD TEST: CPT

## 2019-02-08 PROCEDURE — 6360000002 HC RX W HCPCS: Performed by: INTERNAL MEDICINE

## 2019-02-08 PROCEDURE — 83880 ASSAY OF NATRIURETIC PEPTIDE: CPT

## 2019-02-08 PROCEDURE — 85025 COMPLETE CBC W/AUTO DIFF WBC: CPT

## 2019-02-08 PROCEDURE — 80048 BASIC METABOLIC PNL TOTAL CA: CPT

## 2019-02-08 PROCEDURE — 94660 CPAP INITIATION&MGMT: CPT

## 2019-02-08 PROCEDURE — 2700000000 HC OXYGEN THERAPY PER DAY

## 2019-02-08 PROCEDURE — 94761 N-INVAS EAR/PLS OXIMETRY MLT: CPT

## 2019-02-08 PROCEDURE — 94640 AIRWAY INHALATION TREATMENT: CPT

## 2019-02-08 PROCEDURE — 6370000000 HC RX 637 (ALT 250 FOR IP): Performed by: HOSPITALIST

## 2019-02-08 RX ORDER — FUROSEMIDE 40 MG/1
40 TABLET ORAL 2 TIMES DAILY
Status: DISCONTINUED | OUTPATIENT
Start: 2019-02-08 | End: 2019-02-08 | Stop reason: HOSPADM

## 2019-02-08 RX ORDER — ACETAMINOPHEN 325 MG/1
650 TABLET ORAL EVERY 4 HOURS PRN
Status: DISCONTINUED | OUTPATIENT
Start: 2019-02-08 | End: 2019-02-08 | Stop reason: HOSPADM

## 2019-02-08 RX ORDER — LISINOPRIL 5 MG/1
5 TABLET ORAL DAILY
Qty: 30 TABLET | Refills: 3 | Status: SHIPPED | OUTPATIENT
Start: 2019-02-09 | End: 2019-04-02 | Stop reason: SDUPTHER

## 2019-02-08 RX ORDER — LEVOFLOXACIN 750 MG/1
750 TABLET ORAL DAILY
Qty: 5 TABLET | Refills: 0 | Status: SHIPPED | OUTPATIENT
Start: 2019-02-08 | End: 2019-02-13

## 2019-02-08 RX ADMIN — GABAPENTIN 800 MG: 400 CAPSULE ORAL at 14:59

## 2019-02-08 RX ADMIN — IPRATROPIUM BROMIDE AND ALBUTEROL SULFATE 3 ML: .5; 3 SOLUTION RESPIRATORY (INHALATION) at 07:36

## 2019-02-08 RX ADMIN — CLOPIDOGREL BISULFATE 75 MG: 75 TABLET, FILM COATED ORAL at 09:41

## 2019-02-08 RX ADMIN — ENOXAPARIN SODIUM 40 MG: 100 INJECTION SUBCUTANEOUS at 09:43

## 2019-02-08 RX ADMIN — ASPIRIN 81 MG 81 MG: 81 TABLET ORAL at 09:42

## 2019-02-08 RX ADMIN — CARVEDILOL 3.12 MG: 3.12 TABLET, FILM COATED ORAL at 09:41

## 2019-02-08 RX ADMIN — INSULIN LISPRO 15 UNITS: 100 INJECTION, SOLUTION INTRAVENOUS; SUBCUTANEOUS at 09:44

## 2019-02-08 RX ADMIN — METFORMIN HYDROCHLORIDE 500 MG: 500 TABLET ORAL at 09:41

## 2019-02-08 RX ADMIN — PRAMIPEXOLE DIHYDROCHLORIDE 1 MG: 1 TABLET ORAL at 14:59

## 2019-02-08 RX ADMIN — PANTOPRAZOLE SODIUM 40 MG: 40 TABLET, DELAYED RELEASE ORAL at 07:01

## 2019-02-08 RX ADMIN — LISINOPRIL 5 MG: 5 TABLET ORAL at 09:40

## 2019-02-08 RX ADMIN — COLLAGENASE SANTYL: 250 OINTMENT TOPICAL at 09:49

## 2019-02-08 RX ADMIN — GABAPENTIN 800 MG: 400 CAPSULE ORAL at 09:40

## 2019-02-08 RX ADMIN — PRAMIPEXOLE DIHYDROCHLORIDE 1 MG: 1 TABLET ORAL at 09:40

## 2019-02-08 RX ADMIN — INSULIN LISPRO 15 UNITS: 100 INJECTION, SOLUTION INTRAVENOUS; SUBCUTANEOUS at 12:06

## 2019-02-08 RX ADMIN — VILAZODONE HYDROCHLORIDE 10 MG: 10 TABLET ORAL at 09:40

## 2019-02-08 RX ADMIN — MICONAZOLE NITRATE: 2 POWDER TOPICAL at 09:42

## 2019-02-08 RX ADMIN — IPRATROPIUM BROMIDE AND ALBUTEROL SULFATE 3 ML: .5; 3 SOLUTION RESPIRATORY (INHALATION) at 11:47

## 2019-02-08 ASSESSMENT — PAIN SCALES - GENERAL: PAINLEVEL_OUTOF10: 0

## 2019-02-10 LAB
CULTURE: NORMAL
CULTURE: NORMAL
Lab: NORMAL
Lab: NORMAL
REPORT STATUS: NORMAL
REPORT STATUS: NORMAL
SPECIMEN: NORMAL
SPECIMEN: NORMAL

## 2019-02-13 LAB
EKG ATRIAL RATE: 105 BPM
EKG DIAGNOSIS: NORMAL
EKG Q-T INTERVAL: 316 MS
EKG QRS DURATION: 80 MS
EKG QTC CALCULATION (BAZETT): 421 MS
EKG R AXIS: 31 DEGREES
EKG T AXIS: 69 DEGREES
EKG VENTRICULAR RATE: 107 BPM

## 2019-02-26 ENCOUNTER — OFFICE VISIT (OUTPATIENT)
Dept: CARDIOLOGY CLINIC | Age: 64
End: 2019-02-26
Payer: COMMERCIAL

## 2019-02-26 VITALS
WEIGHT: 293 LBS | HEIGHT: 66 IN | BODY MASS INDEX: 47.09 KG/M2 | HEART RATE: 70 BPM | DIASTOLIC BLOOD PRESSURE: 72 MMHG | SYSTOLIC BLOOD PRESSURE: 118 MMHG

## 2019-02-26 DIAGNOSIS — E66.01 CLASS 3 SEVERE OBESITY DUE TO EXCESS CALORIES IN ADULT, UNSPECIFIED BMI, UNSPECIFIED WHETHER SERIOUS COMORBIDITY PRESENT (HCC): ICD-10-CM

## 2019-02-26 DIAGNOSIS — I50.30 DIASTOLIC CONGESTIVE HEART FAILURE, UNSPECIFIED HF CHRONICITY (HCC): Primary | ICD-10-CM

## 2019-02-26 PROCEDURE — G8427 DOCREV CUR MEDS BY ELIG CLIN: HCPCS | Performed by: INTERNAL MEDICINE

## 2019-02-26 PROCEDURE — 1036F TOBACCO NON-USER: CPT | Performed by: INTERNAL MEDICINE

## 2019-02-26 PROCEDURE — G8417 CALC BMI ABV UP PARAM F/U: HCPCS | Performed by: INTERNAL MEDICINE

## 2019-02-26 PROCEDURE — G8482 FLU IMMUNIZE ORDER/ADMIN: HCPCS | Performed by: INTERNAL MEDICINE

## 2019-02-26 PROCEDURE — 3017F COLORECTAL CA SCREEN DOC REV: CPT | Performed by: INTERNAL MEDICINE

## 2019-02-26 PROCEDURE — 99214 OFFICE O/P EST MOD 30 MIN: CPT | Performed by: INTERNAL MEDICINE

## 2019-02-26 RX ORDER — DULOXETIN HYDROCHLORIDE 60 MG/1
30 CAPSULE, DELAYED RELEASE ORAL DAILY
Status: ON HOLD | COMMUNITY
End: 2019-08-28 | Stop reason: HOSPADM

## 2019-04-02 ENCOUNTER — OFFICE VISIT (OUTPATIENT)
Dept: CARDIOLOGY CLINIC | Age: 64
End: 2019-04-02
Payer: COMMERCIAL

## 2019-04-02 VITALS
OXYGEN SATURATION: 97 % | WEIGHT: 293 LBS | BODY MASS INDEX: 47.09 KG/M2 | SYSTOLIC BLOOD PRESSURE: 118 MMHG | HEIGHT: 66 IN | HEART RATE: 83 BPM | DIASTOLIC BLOOD PRESSURE: 70 MMHG

## 2019-04-02 DIAGNOSIS — E66.01 MORBID OBESITY WITH BMI OF 70 AND OVER, ADULT (HCC): Primary | ICD-10-CM

## 2019-04-02 DIAGNOSIS — I38 VHD (VALVULAR HEART DISEASE): ICD-10-CM

## 2019-04-02 DIAGNOSIS — E78.5 DYSLIPIDEMIA: ICD-10-CM

## 2019-04-02 DIAGNOSIS — I10 ESSENTIAL HYPERTENSION: ICD-10-CM

## 2019-04-02 DIAGNOSIS — R06.02 SOB (SHORTNESS OF BREATH): ICD-10-CM

## 2019-04-02 PROCEDURE — G8417 CALC BMI ABV UP PARAM F/U: HCPCS | Performed by: NURSE PRACTITIONER

## 2019-04-02 PROCEDURE — 1036F TOBACCO NON-USER: CPT | Performed by: NURSE PRACTITIONER

## 2019-04-02 PROCEDURE — 99214 OFFICE O/P EST MOD 30 MIN: CPT | Performed by: NURSE PRACTITIONER

## 2019-04-02 PROCEDURE — G8427 DOCREV CUR MEDS BY ELIG CLIN: HCPCS | Performed by: NURSE PRACTITIONER

## 2019-04-02 PROCEDURE — 3017F COLORECTAL CA SCREEN DOC REV: CPT | Performed by: NURSE PRACTITIONER

## 2019-04-02 RX ORDER — LISINOPRIL 5 MG/1
5 TABLET ORAL DAILY
Qty: 30 TABLET | Refills: 3 | Status: ON HOLD | OUTPATIENT
Start: 2019-04-02 | End: 2019-08-28 | Stop reason: HOSPADM

## 2019-04-02 NOTE — PROGRESS NOTES
[facility] PHYSICAL REHABILITATION [facility] [address] [facility],[address]  Phone: [phone]    Fax [phone]                  Adryan Shelby MD, Aung Ashford MD, [address], MD, MD Verónica Reyes MD Delia Folks, MD  Nuvia Velez, [facility] [employer] [facility], APRN      4/2/2019    RE: Bandar Law  ([id number])                               TO:   Lucy Pantoja, THEODORE - NP  The primary cardiologist is Dr. Joseph Lantigua    CC: follow up CHF    HPI:    Thank you for involving me in taking care of your patient Bandar Law. She is a 66y.o. year old female with a history of morbid obesity  HTN HLP  SOB and CHF and VHD with TAVR and is being seen in the office today as follow up. She reports that she had mild leg edema. She feels it is getting worse  Her weight is down 1 pound since last visit    Her SOB is ongoing. She does use oxygen around the clock. There is no PND or orthopnea.  She does have HENRY however this is not new or increased  She denies chest pain or palpitations        Vitals:    04/02/19 1707   BP: 118/70   Pulse: 83   SpO2: 97%       Current Outpatient Medications   Medication Sig Dispense Refill    lisinopril (PRINIVIL;ZESTRIL) 5 MG tablet Take 1 tablet by mouth daily 30 tablet 3    DULoxetine (CYMBALTA) 60 MG extended release capsule Take 60 mg by mouth daily      HUMALOG KWIKPEN 200 UNIT/ML SOPN pen Inject 15 Units into the skin 3 times daily (before meals) (Patient taking differently: Inject 35 Units into the skin 3 times daily (before meals) ) 2 pen 3    Insulin Degludec (TRESIBA FLEXTOUCH) 100 UNIT/ML SOPN Inject 15 Units into the skin nightly (Patient taking differently: Inject 45 Units into the skin nightly ) 1 pen 0    clopidogrel (PLAVIX) 75 MG tablet Take 1 tablet by mouth daily Patient has appointment on 3/27/18 more refills with be given after she keeps her office visit 90 tablet (congestive heart failure) ([facility])     Chronic back pain     COPD exacerbation ([facility])     Depression     Diabetes mellitus ([facility]     Family history of coronary artery disease     Fatty liver 10/27/2016    H/O aortic valve stenosis     H/O echocardiogram 2016    EF 55%, Aortic valve area is 0.84 cm2 with a mean gradient of 36 suggestive of severe aortic stenosis, mild to mos LVH    Headache     Heart murmur     History of nuclear stress test 2016    lexiscan-normal,EF70%    Morbid obesity ([facility])     BMI=73.72 kg/m2    Neuropathy     NSTEMI (non-ST elevated myocardial infarction) [facility] 2018    Obesity     Osteoarthritis     Palpitations     Peripheral edema     Restless legs syndrome     Sleep apnea     Has a CPAP    Sleep apnea     SOB (shortness of breath)      Past Surgical History:   Procedure Laterality Date    AORTIC VALVE REPLACEMENT  2017    29mm EvolutR TAVR     SECTION      CHOLECYSTECTOMY      GALLBLADDER SURGERY      HYSTERECTOMY      NECK SURGERY      Tumor    TUBAL LIGATION       Family History   Problem Relation Age of Onset    Heart Failure Mother     Heart Attack Mother     Hypertension Mother     Coronary Art Dis Mother         Had PTCA w/stenting.     Heart Failure Father     Heart Failure Brother     Heart Disease Mother     High Blood Pressure Mother     Obesity Mother     Diabetes Mother     Heart Disease Father     High Blood Pressure Father     Obesity Father     Heart Disease Brother     High Blood Pressure Brother     Obesity Brother      Social History     Tobacco Use    Smoking status: Former Smoker     Types: Cigarettes     Last attempt to quit: 3/19/2003     Years since quittin.0    Smokeless tobacco: Never Used    Tobacco comment: quit 15 years ago   Substance Use Topics    Alcohol use: No        Review of Systems:   · Constitutional: No Fever,no unintentional weight Loss   · Eyes: No change in Vision: · ENT: No Headaches, Hearing Loss or Vertigo. No tinnitus   · Cardiovascular: as per note above   · Respiratory: No cough or wheezing and as per note above. · Gastrointestinal: no abdominal pain, no appetite loss, no blood in stools, constipation, or diarrhea, No heartburn  · Genitourinary: No dysuria, trouble voiding, or hematuria  · Musculoskeletal: decreased strength to legs and arms Yes,  back pain- No: myalgia No,  Arthralgia- Yes  · Integumentary: No rash or pruritis  · Neurological: No TIA or stroke symptoms  · Psychiatric: Anxiety- No: depression-  No   · Endocrine: No malaise-No, fatigue Yes,- no temperature intolerance  · Hematologic/Lymphatic: No bleeding problems, blood clots or swollen lymph nodes  · Allergic/Immunologic: No nasal congestion or hives    Objective:      Physical Exam:  /70   Pulse 83   Ht 5' 6\" (1.676 m)   Wt (!) 434 lb (196.9 kg)   SpO2 97%   BMI 70.05 kg/m²   Wt Readings from Last 3 Encounters:   04/02/19 (!) 434 lb (196.9 kg)   02/26/19 (!) 435 lb (197.3 kg)   02/07/19 (!) 439 lb 14.4 oz (199.5 kg)     Body mass index is 70.05 kg/m². GENERAL - Alert, oriented, pleasant, in no apparent distress. Head unremarkable  Eyes - pupils equal and reactive to light - bilateral conjunctiva are pink: sclera are white   ENT - external ears intact, nose is intact:  tongue is midline pink and moist  Neck - Supple. No jugular venous distention noted. No carotid bruits appreciated. Cardiovascular - Normal S1 and S2:  murmur appreciated Yes, No gallop. Regular rate- Yes,  rhythm regular-Yes. Extremities - No cyanosis, clubbing, trace edema to lower legs. Pulmonary - No respiratory distress. No wheezes or rales. Chest is clear  Pulses: Bilateral radial  pulses normal  Abdomen -  Soft no tenderness, non distended   Musculoskeletal - Normal movement of all extremities   Neurologic - alert and oriented: There are no gross focal neurologic abnormalities.    Skin-  No rash: No Patient is encouraged to exercise even a brisk walk for 30 minutes at least 3 to 4 times a week. Lifestyle and risk factor modificatons discussed. Various goals are discussed and questions answered. Continue current medications. Appropriate prescriptions are addressed. Questions answered and patient verbalizes understanding. Call for any problems, questions, or concerns.

## 2019-05-07 ENCOUNTER — HOSPITAL ENCOUNTER (OUTPATIENT)
Age: 64
Setting detail: SPECIMEN
Discharge: HOME | End: 2019-05-07
Payer: COMMERCIAL

## 2019-05-07 LAB
ALBUMIN SERPL-MCNC: 3.8 GM/DL (ref 3.4–5)
ALP BLD-CCNC: 103 IU/L (ref 40–128)
ALT SERPL-CCNC: 14 U/L (ref 10–40)
ANION GAP SERPL CALCULATED.3IONS-SCNC: 11 MMOL/L (ref 4–16)
AST SERPL-CCNC: 17 IU/L (ref 15–37)
BILIRUB SERPL-MCNC: 0.3 MG/DL (ref 0–1)
BUN BLDV-MCNC: 11 MG/DL (ref 6–23)
CALCIUM SERPL-MCNC: 9 MG/DL (ref 8.3–10.6)
CHLORIDE BLD-SCNC: 92 MMOL/L (ref 99–110)
CO2: 33 MMOL/L (ref 21–32)
CREAT SERPL-MCNC: 0.6 MG/DL (ref 0.6–1.1)
ERYTHROCYTE SEDIMENTATION RATE: 92 MM/HR (ref 0–30)
GFR AFRICAN AMERICAN: >60 ML/MIN/1.73M2
GFR NON-AFRICAN AMERICAN: >60 ML/MIN/1.73M2
GLUCOSE BLD-MCNC: 314 MG/DL (ref 70–99)
POTASSIUM SERPL-SCNC: 4.3 MMOL/L (ref 3.5–5.1)
SODIUM BLD-SCNC: 136 MMOL/L (ref 135–145)
TOTAL PROTEIN: 6.8 GM/DL (ref 6.4–8.2)

## 2019-05-07 PROCEDURE — 82607 VITAMIN B-12: CPT

## 2019-05-07 PROCEDURE — 82728 ASSAY OF FERRITIN: CPT

## 2019-05-07 PROCEDURE — 80053 COMPREHEN METABOLIC PANEL: CPT

## 2019-05-07 PROCEDURE — 83550 IRON BINDING TEST: CPT

## 2019-05-07 PROCEDURE — 85652 RBC SED RATE AUTOMATED: CPT

## 2019-05-07 PROCEDURE — 83540 ASSAY OF IRON: CPT

## 2019-05-07 PROCEDURE — 82746 ASSAY OF FOLIC ACID SERUM: CPT

## 2019-05-07 PROCEDURE — 83615 LACTATE (LD) (LDH) ENZYME: CPT

## 2019-05-08 LAB
FERRITIN: 16 NG/ML (ref 15–150)
FOLATE: >20 NG/ML (ref 3.1–17.5)
IRON: 28 UG/DL (ref 37–145)
LACTATE DEHYDROGENASE: 238 IU/L (ref 120–246)
PCT TRANSFERRIN: 8 % (ref 10–44)
TOTAL IRON BINDING CAPACITY: 338 UG/DL (ref 250–450)
UNSATURATED IRON BINDING CAPACITY: 310 UG/DL (ref 110–370)
VITAMIN B-12: 879.1 PG/ML (ref 211–911)

## 2019-06-16 ENCOUNTER — HOSPITAL ENCOUNTER (INPATIENT)
Age: 64
LOS: 11 days | Discharge: HOME HEALTH | DRG: 720 | End: 2019-06-27
Attending: EMERGENCY MEDICINE | Admitting: FAMILY MEDICINE
Payer: COMMERCIAL

## 2019-06-16 ENCOUNTER — APPOINTMENT (OUTPATIENT)
Dept: GENERAL RADIOLOGY | Age: 64
DRG: 720 | End: 2019-06-16
Payer: COMMERCIAL

## 2019-06-16 ENCOUNTER — APPOINTMENT (OUTPATIENT)
Dept: CT IMAGING | Age: 64
DRG: 720 | End: 2019-06-16
Payer: COMMERCIAL

## 2019-06-16 DIAGNOSIS — R40.0 SOMNOLENCE: ICD-10-CM

## 2019-06-16 DIAGNOSIS — R73.9 HYPERGLYCEMIA: ICD-10-CM

## 2019-06-16 DIAGNOSIS — R65.21 SEPTIC SHOCK (HCC): Primary | ICD-10-CM

## 2019-06-16 DIAGNOSIS — D72.829 LEUKOCYTOSIS, UNSPECIFIED TYPE: ICD-10-CM

## 2019-06-16 DIAGNOSIS — A41.9 SEPTIC SHOCK (HCC): Primary | ICD-10-CM

## 2019-06-16 DIAGNOSIS — J18.9 PNEUMONIA DUE TO ORGANISM: ICD-10-CM

## 2019-06-16 PROBLEM — E11.65 UNCONTROLLED TYPE 2 DIABETES MELLITUS WITH HYPERGLYCEMIA (HCC): Status: ACTIVE | Noted: 2019-06-16

## 2019-06-16 PROBLEM — E66.01 CLASS 3 SEVERE OBESITY DUE TO EXCESS CALORIES WITH BODY MASS INDEX (BMI) GREATER THAN OR EQUAL TO 70 IN ADULT (HCC): Status: ACTIVE | Noted: 2019-06-16

## 2019-06-16 LAB
ALBUMIN SERPL-MCNC: 3.8 GM/DL (ref 3.4–5)
ALP BLD-CCNC: 99 IU/L (ref 40–128)
ALT SERPL-CCNC: 15 U/L (ref 10–40)
ANION GAP SERPL CALCULATED.3IONS-SCNC: 13 MMOL/L (ref 4–16)
AST SERPL-CCNC: 17 IU/L (ref 15–37)
BACTERIA: NEGATIVE /HPF
BASOPHILS ABSOLUTE: 0.1 K/CU MM
BASOPHILS RELATIVE PERCENT: 0.6 % (ref 0–1)
BILIRUB SERPL-MCNC: 0.4 MG/DL (ref 0–1)
BILIRUBIN URINE: NEGATIVE MG/DL
BLOOD, URINE: NEGATIVE
BUN BLDV-MCNC: 14 MG/DL (ref 6–23)
CALCIUM SERPL-MCNC: 9.6 MG/DL (ref 8.3–10.6)
CHLORIDE BLD-SCNC: 90 MMOL/L (ref 99–110)
CLARITY: CLEAR
CO2: 31 MMOL/L (ref 21–32)
COLOR: ABNORMAL
CREAT SERPL-MCNC: 0.6 MG/DL (ref 0.6–1.1)
DIFFERENTIAL TYPE: ABNORMAL
EOSINOPHILS ABSOLUTE: 0.1 K/CU MM
EOSINOPHILS RELATIVE PERCENT: 0.8 % (ref 0–3)
ESTIMATED AVERAGE GLUCOSE: 275 MG/DL
GFR AFRICAN AMERICAN: >60 ML/MIN/1.73M2
GFR NON-AFRICAN AMERICAN: >60 ML/MIN/1.73M2
GLUCOSE BLD-MCNC: 276 MG/DL (ref 70–99)
GLUCOSE BLD-MCNC: 281 MG/DL (ref 70–99)
GLUCOSE BLD-MCNC: 307 MG/DL (ref 70–99)
GLUCOSE, URINE: >500 MG/DL
HBA1C MFR BLD: 11.2 % (ref 4.2–6.3)
HCT VFR BLD CALC: 32.3 % (ref 37–47)
HEMOGLOBIN: 9.2 GM/DL (ref 12.5–16)
IMMATURE NEUTROPHIL %: 1.3 % (ref 0–0.43)
KETONES, URINE: NEGATIVE MG/DL
LACTATE: ABNORMAL MMOL/L (ref 0.4–2)
LACTIC ACID, SEPSIS: 1.5 MMOL/L (ref 0.5–1.9)
LEUKOCYTE ESTERASE, URINE: NEGATIVE
LYMPHOCYTES ABSOLUTE: 0.7 K/CU MM
LYMPHOCYTES RELATIVE PERCENT: 5.7 % (ref 24–44)
MCH RBC QN AUTO: 22.9 PG (ref 27–31)
MCHC RBC AUTO-ENTMCNC: 28.5 % (ref 32–36)
MCV RBC AUTO: 80.3 FL (ref 78–100)
MONOCYTES ABSOLUTE: 0.9 K/CU MM
MONOCYTES RELATIVE PERCENT: 7.8 % (ref 0–4)
NITRITE URINE, QUANTITATIVE: NEGATIVE
NUCLEATED RBC %: 0 %
PDW BLD-RTO: 15.9 % (ref 11.7–14.9)
PH, URINE: 9 (ref 5–8)
PLATELET # BLD: 281 K/CU MM (ref 140–440)
PMV BLD AUTO: 9.6 FL (ref 7.5–11.1)
POTASSIUM SERPL-SCNC: 4.1 MMOL/L (ref 3.5–5.1)
PRO-BNP: 273 PG/ML
PROTEIN UA: 100 MG/DL
RBC # BLD: 4.02 M/CU MM (ref 4.2–5.4)
RBC URINE: 1 /HPF (ref 0–6)
SEGMENTED NEUTROPHILS ABSOLUTE COUNT: 10 K/CU MM
SEGMENTED NEUTROPHILS RELATIVE PERCENT: 83.8 % (ref 36–66)
SODIUM BLD-SCNC: 134 MMOL/L (ref 135–145)
SPECIFIC GRAVITY UA: 1.01 (ref 1–1.03)
SQUAMOUS EPITHELIAL: <1 /HPF
TOTAL IMMATURE NEUTOROPHIL: 0.15 K/CU MM
TOTAL NUCLEATED RBC: 0 K/CU MM
TOTAL PROTEIN: 8.3 GM/DL (ref 6.4–8.2)
TRICHOMONAS: ABNORMAL /HPF
TROPONIN T: <0.01 NG/ML
UROBILINOGEN, URINE: NORMAL MG/DL (ref 0.2–1)
WBC # BLD: 11.9 K/CU MM (ref 4–10.5)
WBC UA: <1 /HPF (ref 0–5)

## 2019-06-16 PROCEDURE — 87899 AGENT NOS ASSAY W/OPTIC: CPT

## 2019-06-16 PROCEDURE — 87798 DETECT AGENT NOS DNA AMP: CPT

## 2019-06-16 PROCEDURE — 96367 TX/PROPH/DG ADDL SEQ IV INF: CPT

## 2019-06-16 PROCEDURE — 80053 COMPREHEN METABOLIC PANEL: CPT

## 2019-06-16 PROCEDURE — 96365 THER/PROPH/DIAG IV INF INIT: CPT

## 2019-06-16 PROCEDURE — 2500000003 HC RX 250 WO HCPCS: Performed by: EMERGENCY MEDICINE

## 2019-06-16 PROCEDURE — 87081 CULTURE SCREEN ONLY: CPT

## 2019-06-16 PROCEDURE — 6370000000 HC RX 637 (ALT 250 FOR IP): Performed by: EMERGENCY MEDICINE

## 2019-06-16 PROCEDURE — 81001 URINALYSIS AUTO W/SCOPE: CPT

## 2019-06-16 PROCEDURE — 87486 CHLMYD PNEUM DNA AMP PROBE: CPT

## 2019-06-16 PROCEDURE — 83036 HEMOGLOBIN GLYCOSYLATED A1C: CPT

## 2019-06-16 PROCEDURE — 87581 M.PNEUMON DNA AMP PROBE: CPT

## 2019-06-16 PROCEDURE — 6370000000 HC RX 637 (ALT 250 FOR IP): Performed by: NURSE PRACTITIONER

## 2019-06-16 PROCEDURE — 71045 X-RAY EXAM CHEST 1 VIEW: CPT

## 2019-06-16 PROCEDURE — 99285 EMERGENCY DEPT VISIT HI MDM: CPT

## 2019-06-16 PROCEDURE — 4500000027

## 2019-06-16 PROCEDURE — 96375 TX/PRO/DX INJ NEW DRUG ADDON: CPT

## 2019-06-16 PROCEDURE — 87040 BLOOD CULTURE FOR BACTERIA: CPT

## 2019-06-16 PROCEDURE — 93005 ELECTROCARDIOGRAM TRACING: CPT | Performed by: EMERGENCY MEDICINE

## 2019-06-16 PROCEDURE — 6360000002 HC RX W HCPCS: Performed by: EMERGENCY MEDICINE

## 2019-06-16 PROCEDURE — 87186 SC STD MICRODIL/AGAR DIL: CPT

## 2019-06-16 PROCEDURE — 87150 DNA/RNA AMPLIFIED PROBE: CPT

## 2019-06-16 PROCEDURE — 4500000029 HC MAJOR PROCEDURE

## 2019-06-16 PROCEDURE — 2580000003 HC RX 258: Performed by: NURSE PRACTITIONER

## 2019-06-16 PROCEDURE — 96366 THER/PROPH/DIAG IV INF ADDON: CPT

## 2019-06-16 PROCEDURE — 2000000000 HC ICU R&B

## 2019-06-16 PROCEDURE — 87449 NOS EACH ORGANISM AG IA: CPT

## 2019-06-16 PROCEDURE — 83605 ASSAY OF LACTIC ACID: CPT

## 2019-06-16 PROCEDURE — 2580000003 HC RX 258: Performed by: EMERGENCY MEDICINE

## 2019-06-16 PROCEDURE — 70450 CT HEAD/BRAIN W/O DYE: CPT

## 2019-06-16 PROCEDURE — 83880 ASSAY OF NATRIURETIC PEPTIDE: CPT

## 2019-06-16 PROCEDURE — 82962 GLUCOSE BLOOD TEST: CPT

## 2019-06-16 PROCEDURE — 85025 COMPLETE CBC W/AUTO DIFF WBC: CPT

## 2019-06-16 PROCEDURE — 36415 COLL VENOUS BLD VENIPUNCTURE: CPT

## 2019-06-16 PROCEDURE — 84484 ASSAY OF TROPONIN QUANT: CPT

## 2019-06-16 PROCEDURE — 96361 HYDRATE IV INFUSION ADD-ON: CPT

## 2019-06-16 PROCEDURE — 87086 URINE CULTURE/COLONY COUNT: CPT

## 2019-06-16 PROCEDURE — 87633 RESP VIRUS 12-25 TARGETS: CPT

## 2019-06-16 RX ORDER — IPRATROPIUM BROMIDE AND ALBUTEROL SULFATE 2.5; .5 MG/3ML; MG/3ML
1 SOLUTION RESPIRATORY (INHALATION) EVERY 4 HOURS PRN
Status: DISCONTINUED | OUTPATIENT
Start: 2019-06-16 | End: 2019-06-27 | Stop reason: HOSPADM

## 2019-06-16 RX ORDER — ACETAMINOPHEN 500 MG
1000 TABLET ORAL ONCE
Status: COMPLETED | OUTPATIENT
Start: 2019-06-16 | End: 2019-06-16

## 2019-06-16 RX ORDER — KETOROLAC TROMETHAMINE 30 MG/ML
15 INJECTION, SOLUTION INTRAMUSCULAR; INTRAVENOUS ONCE
Status: COMPLETED | OUTPATIENT
Start: 2019-06-16 | End: 2019-06-16

## 2019-06-16 RX ORDER — ONDANSETRON 2 MG/ML
4 INJECTION INTRAMUSCULAR; INTRAVENOUS EVERY 6 HOURS PRN
Status: DISCONTINUED | OUTPATIENT
Start: 2019-06-16 | End: 2019-06-27 | Stop reason: HOSPADM

## 2019-06-16 RX ORDER — SODIUM CHLORIDE 0.9 % (FLUSH) 0.9 %
10 SYRINGE (ML) INJECTION PRN
Status: DISCONTINUED | OUTPATIENT
Start: 2019-06-16 | End: 2019-06-27 | Stop reason: HOSPADM

## 2019-06-16 RX ORDER — DEXTROSE MONOHYDRATE 50 MG/ML
100 INJECTION, SOLUTION INTRAVENOUS PRN
Status: DISCONTINUED | OUTPATIENT
Start: 2019-06-16 | End: 2019-06-27 | Stop reason: HOSPADM

## 2019-06-16 RX ORDER — PANTOPRAZOLE SODIUM 40 MG/10ML
40 INJECTION, POWDER, LYOPHILIZED, FOR SOLUTION INTRAVENOUS DAILY
Status: DISCONTINUED | OUTPATIENT
Start: 2019-06-17 | End: 2019-06-27 | Stop reason: HOSPADM

## 2019-06-16 RX ORDER — SODIUM CHLORIDE 0.9 % (FLUSH) 0.9 %
10 SYRINGE (ML) INJECTION EVERY 12 HOURS SCHEDULED
Status: DISCONTINUED | OUTPATIENT
Start: 2019-06-16 | End: 2019-06-27 | Stop reason: HOSPADM

## 2019-06-16 RX ORDER — 0.9 % SODIUM CHLORIDE 0.9 %
1000 INTRAVENOUS SOLUTION INTRAVENOUS ONCE
Status: COMPLETED | OUTPATIENT
Start: 2019-06-16 | End: 2019-06-16

## 2019-06-16 RX ORDER — INSULIN GLARGINE 100 [IU]/ML
25 INJECTION, SOLUTION SUBCUTANEOUS NIGHTLY
Status: DISCONTINUED | OUTPATIENT
Start: 2019-06-17 | End: 2019-06-18

## 2019-06-16 RX ORDER — ACETAMINOPHEN 325 MG/1
650 TABLET ORAL EVERY 4 HOURS PRN
Status: DISCONTINUED | OUTPATIENT
Start: 2019-06-16 | End: 2019-06-27 | Stop reason: HOSPADM

## 2019-06-16 RX ORDER — NYSTATIN 100000 U/G
CREAM TOPICAL 2 TIMES DAILY
Status: DISCONTINUED | OUTPATIENT
Start: 2019-06-16 | End: 2019-06-27 | Stop reason: HOSPADM

## 2019-06-16 RX ORDER — NICOTINE POLACRILEX 4 MG
15 LOZENGE BUCCAL PRN
Status: DISCONTINUED | OUTPATIENT
Start: 2019-06-16 | End: 2019-06-27 | Stop reason: HOSPADM

## 2019-06-16 RX ORDER — DEXTROSE MONOHYDRATE 25 G/50ML
12.5 INJECTION, SOLUTION INTRAVENOUS PRN
Status: DISCONTINUED | OUTPATIENT
Start: 2019-06-16 | End: 2019-06-27 | Stop reason: HOSPADM

## 2019-06-16 RX ORDER — INSULIN GLARGINE 100 [IU]/ML
15 INJECTION, SOLUTION SUBCUTANEOUS NIGHTLY
Status: DISCONTINUED | OUTPATIENT
Start: 2019-06-16 | End: 2019-06-16

## 2019-06-16 RX ORDER — INSULIN GLARGINE 100 [IU]/ML
10 INJECTION, SOLUTION SUBCUTANEOUS ONCE
Status: COMPLETED | OUTPATIENT
Start: 2019-06-17 | End: 2019-06-17

## 2019-06-16 RX ORDER — SODIUM CHLORIDE 9 MG/ML
INJECTION, SOLUTION INTRAVENOUS CONTINUOUS
Status: DISCONTINUED | OUTPATIENT
Start: 2019-06-16 | End: 2019-06-21

## 2019-06-16 RX ORDER — ACETAMINOPHEN 10 MG/ML
1000 INJECTION, SOLUTION INTRAVENOUS ONCE
Status: COMPLETED | OUTPATIENT
Start: 2019-06-16 | End: 2019-06-17

## 2019-06-16 RX ADMIN — ACETAMINOPHEN 1000 MG: 500 TABLET ORAL at 15:42

## 2019-06-16 RX ADMIN — KETOROLAC TROMETHAMINE 15 MG: 30 INJECTION, SOLUTION INTRAMUSCULAR; INTRAVENOUS at 17:51

## 2019-06-16 RX ADMIN — INSULIN GLARGINE 15 UNITS: 100 INJECTION, SOLUTION SUBCUTANEOUS at 21:59

## 2019-06-16 RX ADMIN — INSULIN LISPRO 5 UNITS: 100 INJECTION, SOLUTION INTRAVENOUS; SUBCUTANEOUS at 21:59

## 2019-06-16 RX ADMIN — ACETAMINOPHEN 1000 MG: 10 INJECTION, SOLUTION INTRAVENOUS at 22:17

## 2019-06-16 RX ADMIN — SODIUM CHLORIDE, PRESERVATIVE FREE 10 ML: 5 INJECTION INTRAVENOUS at 22:16

## 2019-06-16 RX ADMIN — SODIUM CHLORIDE 1000 ML: 9 INJECTION, SOLUTION INTRAVENOUS at 18:35

## 2019-06-16 RX ADMIN — VANCOMYCIN HYDROCHLORIDE 2000 MG: 1 INJECTION, POWDER, LYOPHILIZED, FOR SOLUTION INTRAVENOUS at 17:10

## 2019-06-16 RX ADMIN — SODIUM CHLORIDE 1000 ML: 9 INJECTION, SOLUTION INTRAVENOUS at 15:42

## 2019-06-16 RX ADMIN — CEFEPIME 2 G: 2 INJECTION, POWDER, FOR SOLUTION INTRAVENOUS at 16:20

## 2019-06-16 RX ADMIN — SODIUM CHLORIDE: 9 INJECTION, SOLUTION INTRAVENOUS at 21:30

## 2019-06-16 RX ADMIN — Medication 5 MCG/MIN: at 19:32

## 2019-06-16 ASSESSMENT — PAIN DESCRIPTION - LOCATION
LOCATION: FOOT
LOCATION: HEAD

## 2019-06-16 ASSESSMENT — PAIN DESCRIPTION - PAIN TYPE
TYPE: ACUTE PAIN
TYPE: ACUTE PAIN

## 2019-06-16 ASSESSMENT — PAIN SCALES - WONG BAKER
WONGBAKER_NUMERICALRESPONSE: 6
WONGBAKER_NUMERICALRESPONSE: 6

## 2019-06-16 ASSESSMENT — PAIN DESCRIPTION - ORIENTATION: ORIENTATION: LEFT

## 2019-06-16 ASSESSMENT — PAIN SCALES - GENERAL: PAINLEVEL_OUTOF10: 8

## 2019-06-16 NOTE — ED NOTES
Dr. Elbert Jett notified of patient's BP 79/30. Awaiting orders.      Wale Watters RN  06/16/19 9511

## 2019-06-16 NOTE — ED NOTES
Attempted to give report to ICU nurse. They will call this nurse back.      Kaci Lane, RN  06/16/19 [id number]

## 2019-06-16 NOTE — ED TRIAGE NOTES
Patient to ED from home with reports of increased shortness of breath x 3 weeks, headache, and left foot pain from diabetic ulcer. Patient alert and oriented x 4.

## 2019-06-16 NOTE — ED NOTES
This nurse called pharmacy enquiring about Vancomycin. Pharmacy to send medication when it's ready.      Chu Dietz RN  06/16/19 6610

## 2019-06-16 NOTE — ED PROVIDER NOTES
eMERGENCY dEPARTMENT eNCOUnter      CHIEF COMPLAINT:   Altered Mental Status    CRITICAL CARE NOTE:  There was a high probability of clinically significant life-threatening deterioration of the patient's condition requiring my urgent intervention. Total critical care time is 51 minutes  This includes vital sign monitoring, pulse oximetry monitoring, telemetry monitoring, clinical response to the IV medications, reviewing the nursing notes, consultation time, dictation/documetation time. (This time excludes time spent performing procedures). HPI: Tim Del Toro is a 60 y.o. female who presents via EMS from home for evaluation of altered mental status. The patient is confused and so information is obtained from her  at bedside. He states that she has not been feeling well for the past 3 weeks, however she has been too weak to go to the doctor. She began to complain of worsening shortness of breath today and so her daughter called the ambulance. On arrival to the hospital, the nurse states that the patient was awake and alert. However, when I go to evaluate the patient and she is lethargic and unable to provide any history.  states she has been coughing. She has a diabetic foot ulcer on her left foot that they have been treating. No further information is able to be obtained. REVIEW OF SYSTEMS:   \"Remaining review of systems unable to obtain due to patient with altered mental status. I have reviewed the nursing triage documentation and agree unless otherwise noted below. \"      PAST MEDICAL HISTORY:   Past Medical History:   Diagnosis Date    Aortic stenosis     Asthma     Cancer ([facility] [address]     Cervical    Carotid artery stenosis     Chest pain     CHF (congestive heart failure) (HCC)     Chronic back pain     COPD exacerbation (HCC)     Depression     Diabetes mellitus ([facility] [address]     Family history of coronary artery disease     Fatty liver 10/27/2016    H/O aortic valve stenosis     H/O echocardiogram 2016    EF 55%, Aortic valve area is 0.84 cm2 with a mean gradient of 36 suggestive of severe aortic stenosis, mild to mos LVH    Headache     Heart murmur     History of nuclear stress test 2016    lexiscan-normal,EF70%    Morbid obesity (HCC)     BMI=73.72 kg/m2    Neuropathy     NSTEMI (non-ST elevated myocardial infarction) ([facility] [address] 2018    Obesity     Osteoarthritis     Palpitations     Peripheral edema     Restless legs syndrome     Sleep apnea     Has a CPAP    Sleep apnea     SOB (shortness of breath)        CURRENT MEDICATIONS:   Home medications reviewed. SURGICAL HISTORY:   Past Surgical History:   Procedure Laterality Date    AORTIC VALVE REPLACEMENT  2017    29mm EvolutR TAVR     SECTION      CHOLECYSTECTOMY      GALLBLADDER SURGERY      HYSTERECTOMY      NECK SURGERY      Tumor    TUBAL LIGATION         FAMILY HISTORY:   Family History   Problem Relation Age of Onset    Heart Failure Mother     Heart Attack Mother     Hypertension Mother     Coronary Art Dis Mother         Had PTCA w/stenting.     Heart Failure Father     Heart Failure Brother     Heart Disease Mother     High Blood Pressure Mother     Obesity Mother     Diabetes Mother     Heart Disease Father     High Blood Pressure Father     Obesity Father     Heart Disease Brother     High Blood Pressure Brother     Obesity Brother        SOCIAL HISTORY:   Social History     Socioeconomic History    Marital status:      Spouse name: Not on file    Number of children: Not on file    Years of education: Not on file    Highest education level: Not on file   Occupational History    Not on file   Social Needs    Financial resource strain: Not on file    Food insecurity:     Worry: Not on file     Inability: Not on file    Transportation needs:     Medical: Not on file     Non-medical: Not on file   Tobacco Use    Smoking status: Former Smoker     Types: Cigarettes     Last attempt to quit: 3/19/2003     Years since quittin.2    Smokeless tobacco: Never Used    Tobacco comment: quit 15 years ago   Substance and Sexual Activity    Alcohol use: No    Drug use: No    Sexual activity: Never   Lifestyle    Physical activity:     Days per week: Not on file     Minutes per session: Not on file    Stress: Not on file   Relationships    Social connections:     Talks on phone: Not on file     Gets together: Not on file     Attends [employer] service: Not on file     Active member of club or organization: Not on file     Attends meetings of clubs or organizations: Not on file     Relationship status: Not on file    Intimate partner violence:     Fear of current or ex partner: Not on file     Emotionally abused: Not on file     Physically abused: Not on file     Forced sexual activity: Not on file   Other Topics Concern    Not on file   Social History Narrative    ** Merged History Encounter **            ALLERGIES: Patient has no known allergies. PHYSICAL EXAM:  VITAL SIGNS:   ED Triage Vitals [19 1512]   Enc Vitals Group      BP (!) 120/103      Pulse 110      Resp (!) 100      Temp 100.4 °F (38 °C)      Temp Source Oral      SpO2 100 %      Weight (!) 434 lb (196.9 kg)      Height 5' 6\" (1.676 m)      Head Circumference       Peak Flow       Pain Score       Pain Loc       Pain Edu? Excl. in [address]? Constitutional: Drowsy, Ill appearing, Febrile  HENT: Normocephalic, Atraumatic, Bilateral external ears normal, Oropharynx dry, No oral exudates, Nose normal.  Eyes:  PERRL, EOMI, Conjunctiva normal, No discharge. Neck: Normal range of motion, No tenderness, Supple, No stridor, No meningeal signs,No lymphadenopathy   Cardiovascular:  Tachycardic, Regular rhythm  Pulmonary/Chest:  Diminished breath sounds bilaterally, Dyspneic  Abdomen:   Bowel sounds normal, Soft, No tenderness, No masses, No pulsatile masses  Back:  No tenderness, No CVA tenderness  Extremities:  Normal range of motion, Intact distal pulses, No edema, No tenderness, Feet are extremely dry and flaking, There is a well healed diabetic ulcer noted to the ball of the left foot with no evidence of infection  Neurologic:  Confused, Follows some simple commands, Hand grasps equal bilaterally, Moves all extremities spontaneously  Skin:  Warm, Dry, No erythema, No rash      EKG Interpretation  Interpreted by me  Compared to 2/5/19  Rhythm: sinus tachycardia  Rate: tachycardic 112  Axis: normal  Ectopy: none  Conduction: normal  ST Segments: no acute change  T Waves: no acute change  Clinical Impression: sinus tachycardia    Cardiac Monitor Strip Interpretation  Interpreted by me  Monitor strip interpreted for greater than 10 seconds  Rhythm: sinus tachycardia  Rate: tachycardic  Ectopy: none  ST Segments: normal      Radiology / Procedures:     XR CHEST PORTABLE (Final result)   Result time 06/16/19 21:57:35   Final result by Jasvir Tom MD (06/16/19 21:57:35)                Impression:    Right subclavian central venous catheter terminating in the superior vena  cava. Narrative:    EXAMINATION:  ONE XRAY VIEW OF THE CHEST    6/16/2019 8:44 pm    COMPARISON:  Chest radiograph 06/16/2019. HISTORY:  ORDERING SYSTEM PROVIDED HISTORY: CVC placement  TECHNOLOGIST PROVIDED HISTORY:  Reason for exam:->CVC placement  Ordering Physician Provided Reason for Exam: line placement    FINDINGS:  A right subclavian central venous catheter terminates in the superior vena  cava.  The cardiomediastinal silhouette is unchanged.  At the lung bases are  not included in the field of view.  No pneumothorax.  Pulmonary vascular  congestion without significant change.  No acute osseous abnormality.                    CT Head WO Contrast (Final result)   Result time 06/16/19 20:06:56   Final result by Scot Beckford MD (06/16/19 20:06:56)                Impression:    No acute intracranial abnormality. Narrative:    EXAMINATION:  CT OF THE HEAD WITHOUT CONTRAST  6/16/2019 7:16 pm    TECHNIQUE:  CT of the head was performed without the administration of intravenous  contrast. Dose modulation, iterative reconstruction, and/or weight based  adjustment of the mA/kV was utilized to reduce the radiation dose to as low  as reasonably achievable. COMPARISON:  None. HISTORY:  ORDERING SYSTEM PROVIDED HISTORY: CONFUSION/DELIRIUM, ALTERED LOC, UNEXPLAINED  TECHNOLOGIST PROVIDED HISTORY:  Has a \"code stroke\" or \"stroke alert\" been called? ->No  Ordering Physician Provided Reason for Exam: Confusion/delirium, altered loc,  unexplained; pt is 434 lbs repeated lower brain scan due to scatter.  Best  images possible  Acuity: Acute  Type of Exam: Initial  Relevant Medical/Surgical History: none    FINDINGS:  BRAIN/VENTRICLES: There is no acute intracranial hemorrhage, mass effect or  midline shift.  No abnormal extra-axial fluid collection.  The gray-white  differentiation is maintained without evidence of an acute infarct.  There is  no evidence of hydrocephalus. ORBITS: The visualized portion of the orbits demonstrate no acute abnormality. SINUSES: There is an opacified right sphenoid sinus. SOFT TISSUES/SKULL:  No acute abnormality of the visualized skull or soft  tissues.                    XR CHEST 1 VW (Preliminary result)   Result time 06/16/19 17:20:15   Procedure changed from XR CHEST STANDARD (2 VW)   Preliminary result by Vel Green MD (06/16/19 17:20:15)                Impression:    Pulmonary vascular congestion and patchy bilateral perihilar and bibasilar  airspace opacities may reflect pulmonary edema or atypical infection.             Narrative:    EXAMINATION:  ONE XRAY VIEW OF THE CHEST    6/16/2019 4:11 pm    COMPARISON:  Chest radiograph 02/05/2019    HISTORY:  ORDERING SYSTEM PROVIDED HISTORY: Fever, shortness of breath  TECHNOLOGIST PROVIDED HISTORY:  Reason for exam:->Fever, Abnormal; Notable for the following components:    Hemoglobin A1C 11.2 (*)     All other components within normal limits   POCT GLUCOSE - Abnormal; Notable for the following components:   POCT GLUCOSE - Abnormal; Notable for the following components:   POCT GLUCOSE - Abnormal; Notable for the following components:     Please see [facility] procedure note for central line placement    ED COURSE & MEDICAL DECISION MAKING:  Pertinent Labs & Imaging studies reviewed. (See chart for details)  On exam, the patient is febrile and ill appearing. She is tachycardic. She is initially normotensive, but then has an episode of hypertension followed by hypotension. She is otherwise hemodynamically stable. She is lethargic with a decreased level of alertness.  states that this is typical for her when she is sick. EKG shows sinus tachycardia with no ST elevation or depression. Labs are obtained and are significant for leukocytosis, anemia, hyperglycemia and an elevated lactic acid. CT head is negative. Chest x-ray shows pulmonary vascular congestion and patchy bilateral perihilar and bibasilar airspace opacities that may reflect pulmonary edema or atypical infection. The patient was treated with 2 L of IV normal saline. She was not treated with 30 ml/kg of IV normal saline due to concerns for pulmonary edema on CXR. She was also treated with Tylenol and Toradol for her fever. The fever initially improved, but then came back and she was given IV Tylenol as it has been 4 hours since her first dose of Tylenol. She was treated empirically with vancomycin and cefepime. Despite fluid resuscitation, the patient became increasingly hypotensive. Levophed was started peripherally and a central line was placed. Chest x-ray shows appropriate placement of a right IJ. The Levophed was switched to a central line. I suspect that the patient has septic shock due to pneumonia. She also may have pulmonary edema.  I have a low suspicion for CVA, intracranial hemorrhage, intoxication, psychosis, meningitis, brain mass, or impending respiratory failure. I recommended admission to the hospital and the patient/family were agreeable. I discussed the case with the hospitalist who will admit the patient for further treatment and care. Clinical Impression:  1. Septic shock ([address]    2. Pneumonia due to organism    3. Somnolence    4. Hyperglycemia    5. Leukocytosis, unspecified type        Comment: Please note this report has been produced using speech recognition software and may contain errors related to that system including errors in grammar, punctuation, and spelling, as well as words and phrases that may be inappropriate. If there are any questions or concerns please feel free to contact the dictating provider for clarification.           Giuseppe Elias MD  06/17/19 [address], [place]  06/17/19 1053

## 2019-06-16 NOTE — PROGRESS NOTES
[address] [facility]  consulted by Dr. Kanika Suarez for monitoring and adjustment. Indication for treatment: Diabetic foot ulcer  Goal trough: 15 mcg/mL     Pertinent Laboratory Values:   Temp Readings from Last 3 Encounters:   06/16/19 100.4 °F (38 °C) (Oral)   02/08/19 98.1 °F (36.7 °C) (Oral)   11/30/18 98 °F (36.7 °C) (Oral)     Recent Labs     06/16/19  1522   WBC 11.9*   LACTATE 3.3  LACTI CALLED TO DR BAKER @ [phone] [facility] MLT  RESULTS READ BACK  *     Recent Labs     06/16/19  1522   BUN 14   CREATININE 0.6     Estimated Creatinine Clearance: 175 mL/min (based on SCr of 0.6 mg/dL). No intake or output data in the 24 hours ending 06/16/19 1623      Vancomycin level:   TROUGH:  No results for input(s): VANCOTROUGH in the last 72 hours. RANDOM:  No results for input(s): VANCORANDOM in the last 72 hours. Assessment:  WBC and temperature: elevated  SCr, BUN, and urine output: at baseline    Plan: Will order a one time dose of vancomycin 2000 mg IVPB   Vancomycin will need to be re-ordered if to continue upon admission     Thank you for the consult.   Jake Mcmullen, [address]  6/16/2019 4:23 PM

## 2019-06-16 NOTE — ED NOTES
Bed: ED-22  Expected date:   Expected time:   Means of arrival:   Comments:  christine Brower RN  06/16/19 [id number]

## 2019-06-16 NOTE — H&P
History and Physical  [facility] [place] [facility] [place]   Internal Medicine Hospitalist        Name:  Natalya Wilkinson /Age/Sex:   (60 y.o. female)   MRN & CSN:  [id number] & [id number] Admission Date/Time: 2019  3:10 PM   Location:  ED22/ED-22 PCP: Cedric Soto 15 Day: 1      Supervising Physician: Dr. Delfina House     Chief Complaint: Shortness of Breath; Headache; Foot Pain, and Confusion. Assessment and Plan:   Natalya Wilkinson is a 60 y.o. morbidly obese female who presents with Sepsis ([address]     Septic shock, possibly d/t CAP vs foot ulcer - met Sepsis criteria by: fever (100.4), tachypnea, tachycardia, hypotensive, WBC 11.9, Lactic 3.3   Community acquired pneumonia - CXR result; pulmonary vascular congestion and patchy bilateral perihilar and bibasilar  airspace opacities may reflect atypical infection or pulmonary edema but no signs of fluid overload. - admit inpatient, telemetry monitoring         - ABx: Cefepime, Vanc         - cont Levophed started in ED         - cont oxygen support via NC         - continue IVF resuscitation         - series Lactate         - check daily lab works in AM         - pending blood and urine CXs, Resp PCR, Legionella, sterp antigen, UA            Acute metabolic encephalopathy, likely 2/2 to above condition - became lethargic, drowsy, and confused in ED.         - Neuro checks         - orthostatic BP and pulse         - Fall precautions         - pending CT head     Uncontrolled type 2 DM with hyperglycemia - serum glucose 307; Last HgbA1C 9.4 (2018); consult Endocrinology, Dr. Herminio Beach, hold DM2 home med for now, c/w Degludec + ISS, monitor POCT, diabetic diet, pending HgbA1C.  Class 3 severe obesity due to excess calories with BMI greater than or equal to 70 in adult - current BMI 70.2, consult dietician for weight control, low carb diet, and plan based diet.      Diabetic ulcer of left plantar associated with type 2 diabetes mellitus -  stated wounds appears much better, B/L LE very dry, no drainage noted, wound care, f/u at wound center podiatry, Dr. Turcios Single consulted, pending XR left foot, wound cx.  Chronic Illnesses: will continue current home medications unless contraindicated by above plan and assessment.          - COPD/Asthma - not in exacerbation, stable on her BL 4 L oxygen at home via NC w/ O2 sat >95%. Continue nebulization.          - CHF - does not appear decompensated, daily weights/monitor I/O, Low sodium diet, cont to monitor for clinical symptoms of hypervolemia. - Hyperlipidemia         - Hypertension - now hypotensive, hold BP meds, hypotensive, closed BP , MAP monitoring.         - Restless legs syndrome     Current diagnosis and plan of management discussed with the patient and family at the time of admission in lay language who agree to the above plan and disposition of admission for further care. All concerns and questions addressed. Patient assessment and plan in conjunction with supervising physician - Dr. Glenys Estrada NPO effective now, may change once patient fully alert/awake. DVT Prophylaxis [x] Lovenox, []  Heparin, [] SCDs, [] Ambulation  [] Long term AC   GI Prophylaxis [x] PPI,  [] H2 Blocker,  [] Carafate,  [] Diet,  [] No GI prophylaxis, N/A: patient is not under significant medical stress, non-ICU or is receiving a diet/tube feeds   Code Status Full CODE status, discussed with patient and family at bedside upon admission. Disposition Patient requires continued admission due to Sepsis [facility]), community acquired pneumonia. Discharge Plan: Patient plans to return home upon discharge.    MDM [] Low, [] Moderate,[x]  High  Patient's risk as above due to:      [x] One or more chronic illnesses with mild exacerbation progression      [] Two or more stable chronic illnesses      [] Undiagnosed new problem with uncertain prognosis      [] Elective major surgery      []Prescription drug management     History of Present Illness:     Principal Problem: Sepsis ([address]  Bryn Dykes is a 60 y.o. morbidly obese female who presents to the ED with  for complaints of worsening shortness of breath, headache, not feeling good, and confusion. Patient has PMH of severely morbid obesity, COPD/Asthma, CHF, DM type 2, hyperlipidemia, hypertension, and restless legs syndrome. Patient appears lethargic, very drowsy, and open eyes to voice. She is able to answers question in 1-2 words and follow commands. However patient is confused and poor historian.  at bedside stated that the patient appears always like this when she has infection. Patient reports that she was not feeling good for over 3 weeks and does not want to go to the doctor because she is not strong enough.  stated that today, patient has been complaining of worsening shortness of breath and shaking, then daughter called ambulance. Radha Moore, ED nurse stated that the patient actually awake, alert, and oriented to to person, situation, but disoriented to time and place when she arrived to ED. Pt seen and examined. Patient denies CP, palpitation, fever, chills or diaphoresis, and cough. Denies any other symptoms including paresthesias, abdominal pain, N/V/D, constipation, changes in bowels, dysuria, hematuria, frequency or urgency, and B/L weakness. Upon interview, the patient, , and ED nurse provided the history as above. ED Course: Discussed case with ED physician prior to admission. ROS: As per HPI, otherwise 10-systems reviewed negative.     Objective:   No intake or output data in the 24 hours ending 06/16/19 1909     Vitals:   Vitals:    06/16/19 1810 06/16/19 1811 06/16/19 1832 06/16/19 1903   BP: (!) 113/44 (!) 113/44 (!) 95/45 (!) 79/30   Pulse: 106 107 110 110   Resp: 25 22 23 22   Temp: 99.2 °F (37.3 °C)      TempSrc: Oral      SpO2: 100% 100% 97% 98%   Weight: Height:         Physical Exam: 06/16/19    GEN  -lethargic, drowsy, appearing severe morbidly obese female, lying in bed, cooperative but unable to give adequate history. NAD. No body habitus. Appears given age. EYES -PERRL. No vision changes. No scleral erythema, discharge, or conjunctivitis. HENT -MM are moist. Oral pharynx without exudates, no evidence of thrush. NECK -Supple, no apparent thyromegaly or masses. RESP -tachypnea, LS diminished bilateral, with scattered rhonchi. Symmetric chest movement. No respiratory distress noted. C/V  -S1/S2 auscultated. RRR, tachycardia without appreciable M/R/G. No JVD or carotid bruits. Peripheral pulses equal bilaterally and palpable. No peripheral edema. No reproducible chest wall tenderness. GI  -Abdomen is soft non-distended, no significant tenderness. No masses or guarding. + BS in all quadrants. Rectal exam deferred.   -No CVA tenderness. Mata catheter is not present. LYMPH  -No palpable cervical lymphadenopathy and no hepatosplenomegaly. No petechiae or ecchymoses. MS  -B/L extremities strong muscles strength. Full movements. No gross joint deformities. No swelling, intact sensation symmetrical.   SKIN  -Normal coloration, warm, dry. No open wounds or ulcers. Left foot ulcer dry, appears healing, no drainage noted. NEURO  -CN 2-12 appear grossly intact, normal speech, no lateralizing weakness. [address] [facility], drowsy, oriented to place and situation but disoriented to person and time. Limited verbal interaction due to condition.      Past Medical History:      Past Medical History:   Diagnosis Date    Aortic stenosis     Asthma     Cancer ([facility]     Cervical    Carotid artery stenosis     Chest pain     CHF (congestive heart failure) (HCC)     Chronic back pain     COPD exacerbation (HCC)     Depression     Diabetes mellitus ([facility]     Family history of coronary artery disease     Fatty liver 10/27/2016    H/O aortic valve stenosis     H/O echocardiogram 2016    EF 55%, Aortic valve area is 0.84 cm2 with a mean gradient of 36 suggestive of severe aortic stenosis, mild to mos LVH    Headache     Heart murmur     History of nuclear stress test 2016    lexiscan-normal,EF70%    Morbid obesity (HCC)     BMI=73.72 kg/m2    Neuropathy     NSTEMI (non-ST elevated myocardial infarction) ([facility] 2018    Obesity     Osteoarthritis     Palpitations     Peripheral edema     Restless legs syndrome     Sleep apnea     Has a CPAP    Sleep apnea     SOB (shortness of breath)      Past Surgery History:  Patient  has a past surgical history that includes Cholecystectomy;  section; Hysterectomy; Tubal ligation; Neck surgery; Gallbladder surgery; and Aortic valve replacement (2017). Social History:    FAM HX: Assessed: family history includes Coronary Art Dis in her mother; Diabetes in her mother; Heart Attack in her mother; Heart Disease in her brother, father, and mother; Heart Failure in her brother, father, and mother; High Blood Pressure in her brother, father, and mother; Hypertension in her mother; Obesity in her brother, father, and mother.   Soc HX:   Social History     Socioeconomic History    Marital status:      Spouse name: None    Number of children: None    Years of education: None    Highest education level: None   Occupational History    None   Social Needs    Financial resource strain: None    Food insecurity:     Worry: None     Inability: None    Transportation needs:     Medical: None     Non-medical: None   Tobacco Use    Smoking status: Former Smoker     Types: Cigarettes     Last attempt to quit: 3/19/2003     Years since quittin.2    Smokeless tobacco: Never Used    Tobacco comment: quit 15 years ago   Substance and Sexual Activity    Alcohol use: No    Drug use: No    Sexual activity: Never   Lifestyle    Physical activity:     Days per week: None     Minutes per session: None    Stress: None   Relationships    Social connections:     Talks on phone: None     Gets together: None     Attends Latter-day service: None     Active member of club or organization: None     Attends meetings of clubs or organizations: None     Relationship status: None    Intimate partner violence:     Fear of current or ex partner: None     Emotionally abused: None     Physically abused: None     Forced sexual activity: None   Other Topics Concern    None   Social History Narrative    ** Merged History Encounter **          TOBACCO:   reports that she quit smoking about 16 years ago. Her smoking use included cigarettes. She has never used smokeless tobacco.  ETOH:   reports that she does not drink alcohol. Drugs:  reports that she does not use drugs. Allergies: No Known Allergies  Medications:   Medications:    vancomycin  2,000 mg Intravenous Once    sodium chloride  1,000 mL Intravenous Once      Infusions:   PRN Meds:    Prior to Admission Meds:  Prior to Admission medications    Medication Sig Start Date End Date Taking?  Authorizing Provider   lisinopril (PRINIVIL;ZESTRIL) 5 MG tablet Take 1 tablet by mouth daily 4/2/19   Levonne Lung, APRN - CNP   DULoxetine (CYMBALTA) 60 MG extended release capsule Take 60 mg by mouth daily    Historical Provider, MD   HUMALOG KWIKPEN 200 UNIT/ML SOPN pen Inject 15 Units into the skin 3 times daily (before meals)  Patient taking differently: Inject 35 Units into the skin 3 times daily (before meals)  11/30/18   Celso Vasques MD   Insulin Degludec (TRESIBA FLEXTOUCH) 100 UNIT/ML SOPN Inject 15 Units into the skin nightly  Patient taking differently: Inject 45 Units into the skin nightly  11/30/18   Celso Vasques MD   clopidogrel (PLAVIX) 75 MG tablet Take 1 tablet by mouth daily Patient has appointment on 3/27/18 more refills with be given after she keeps her office visit 8/20/18   Adryan Shelby MD   W-Nhisoykiqoze-P5-B12 (FOLTANX) 3-35-2 MG TABS Take 1 tablet by mouth 2 times daily     Historical Provider, MD   nystatin (MYCOSTATIN) POWD powder Apply topically 2 times daily    Historical Provider, MD   potassium chloride (KLOR-CON M) 20 MEQ extended release tablet Take 1 tablet by mouth daily 6/8/18   Rakesh Gu MD   furosemide (LASIX) 40 MG tablet Take 1 tablet by mouth 2 times daily  Patient taking differently: Take 40 mg by mouth daily  6/8/18   Rakesh Gu MD   carvedilol (COREG) 3.125 MG tablet Take 1 tablet by mouth 2 times daily 6/8/18   Rakesh Gu MD   simvastatin (ZOCOR) 40 MG tablet Take 1 tablet by mouth nightly 6/8/18   Rakesh uG MD   BiPAP Machine MISC by BiPAP route nightly    Historical Provider, MD   metFORMIN (GLUCOPHAGE) 500 MG tablet Take 1 tablet by mouth 2 times daily (with meals) 2/16/18   MONI Bone MD   ipratropium-albuterol (DUONEB) 0.5-2.5 (3) MG/3ML SOLN nebulizer solution Inhale 3 mLs into the lungs 4 times daily 2/16/18   MONI Bone MD   Nebulizers (AIRIAL COMPACT MINI NEBULIZER) MISC 1 Device by Does not apply route 4 times daily 2/16/18   MONI Bone MD   albuterol sulfate  (90 Base) MCG/ACT inhaler Inhale 2 puffs into the lungs    Historical Provider, MD   pantoprazole (PROTONIX) 40 MG tablet Take 40 mg by mouth daily    Historical Provider, MD   vilazodone HCl (VIIBRYD) 10 MG TABS Take 10 mg by mouth 2 times daily     Historical Provider, MD   vitamin D (ERGOCALCIFEROL) 80747 UNITS CAPS capsule Take 50,000 Units by mouth twice a week On Sundays and wednesdays 10/21/16   Historical Provider, MD   pramipexole (MIRAPEX) 1 MG tablet Take 1 mg by mouth 3 times daily  10/21/16   Historical Provider, MD   fexofenadine (ALLEGRA) 180 MG tablet Take 180 mg by mouth daily  10/21/16   Historical Provider, MD   gabapentin (NEURONTIN) 800 MG tablet Take 800 mg by mouth 3 times daily  10/21/16   Historical Provider, MD   Ascorbic Acid (VITAMIN C) 500 MG tablet Take 500 mg by mouth

## 2019-06-16 NOTE — ED PROVIDER NOTES
_______________________________________________________________________      [employer] Procedure Note    Indication: central venous monitoring  Consent:   Verbal/written consent obtained from    Procedure:   -Time out for patient identification performed. - The patient was positioned appropriately and the skin over the right Internal jugular was prepped with betadine and draped in a sterile fashion  - Local anesthesia was obtained by infiltration using 2% Lidocaine without epinephrine  -  Using ultrasound assistance, a large bore needle was used to identify the vein. - A guide wire was then inserted into the vein through the needle. A triple lumen catheter was then inserted into the vessel over the guide wire using the Seldinger technique  - All ports showed good, free flowing blood return and were flushed with saline solution.  - The catheter was then securely fastened to the skin with sutures and covered with a sterile dressing.    - A post procedure X-ray was ordered. - The patient tolerated the procedure well.     Complications: None      _______________________________________________________________________     My care for this patient was in the above procedure only     Ashley Orellana PA-C  06/17/19 [id number]

## 2019-06-16 NOTE — ED NOTES
Bed: 03TR-03  Expected date:   Expected time:   Means of arrival:   Comments:  Moving  [address] [facility]  06/16/19 5612

## 2019-06-17 ENCOUNTER — APPOINTMENT (OUTPATIENT)
Dept: GENERAL RADIOLOGY | Age: 64
DRG: 720 | End: 2019-06-17
Payer: COMMERCIAL

## 2019-06-17 ENCOUNTER — APPOINTMENT (OUTPATIENT)
Dept: ULTRASOUND IMAGING | Age: 64
DRG: 720 | End: 2019-06-17
Payer: COMMERCIAL

## 2019-06-17 LAB
ADENOVIRUS DETECTION BY PCR: NOT DETECTED
ANION GAP SERPL CALCULATED.3IONS-SCNC: 8 MMOL/L (ref 4–16)
BASOPHILS ABSOLUTE: 0 K/CU MM
BASOPHILS RELATIVE PERCENT: 0.3 % (ref 0–1)
BORDETELLA PERTUSSIS PCR: NOT DETECTED
BUN BLDV-MCNC: 16 MG/DL (ref 6–23)
CALCIUM SERPL-MCNC: 8.5 MG/DL (ref 8.3–10.6)
CHLAMYDOPHILA PNEUMONIA PCR: NOT DETECTED
CHLORIDE BLD-SCNC: 92 MMOL/L (ref 99–110)
CO2: 33 MMOL/L (ref 21–32)
CORONAVIRUS 229E PCR: NOT DETECTED
CORONAVIRUS HKU1 PCR: NOT DETECTED
CORONAVIRUS NL63 PCR: NOT DETECTED
CORONAVIRUS OC43 PCR: NOT DETECTED
CREAT SERPL-MCNC: 0.8 MG/DL (ref 0.6–1.1)
DIFFERENTIAL TYPE: ABNORMAL
EOSINOPHILS ABSOLUTE: 0 K/CU MM
EOSINOPHILS RELATIVE PERCENT: 0.2 % (ref 0–3)
GFR AFRICAN AMERICAN: >60 ML/MIN/1.73M2
GFR NON-AFRICAN AMERICAN: >60 ML/MIN/1.73M2
GLUCOSE BLD-MCNC: 195 MG/DL (ref 70–99)
GLUCOSE BLD-MCNC: 223 MG/DL (ref 70–99)
GLUCOSE BLD-MCNC: 224 MG/DL (ref 70–99)
GLUCOSE BLD-MCNC: 254 MG/DL (ref 70–99)
GLUCOSE BLD-MCNC: 270 MG/DL (ref 70–99)
GLUCOSE BLD-MCNC: 276 MG/DL (ref 70–99)
GLUCOSE BLD-MCNC: 311 MG/DL (ref 70–99)
HCT VFR BLD CALC: 27.3 % (ref 37–47)
HEMOGLOBIN: 7.7 GM/DL (ref 12.5–16)
HUMAN METAPNEUMOVIRUS PCR: NOT DETECTED
IMMATURE NEUTROPHIL %: 1.2 % (ref 0–0.43)
INFLUENZA A BY PCR: NOT DETECTED
INFLUENZA A H1 (2009) PCR: NOT DETECTED
INFLUENZA A H1 PANDEMIC PCR: NOT DETECTED
INFLUENZA A H3 PCR: NOT DETECTED
INFLUENZA B BY PCR: NOT DETECTED
LACTATE: 1.2 MMOL/L (ref 0.4–2)
LACTIC ACID, SEPSIS: ABNORMAL MMOL/L (ref 0.5–1.9)
LEGIONELLA URINARY AG: NEGATIVE
LYMPHOCYTES ABSOLUTE: 0.8 K/CU MM
LYMPHOCYTES RELATIVE PERCENT: 6.8 % (ref 24–44)
MCH RBC QN AUTO: 22.7 PG (ref 27–31)
MCHC RBC AUTO-ENTMCNC: 28.2 % (ref 32–36)
MCV RBC AUTO: 80.5 FL (ref 78–100)
MONOCYTES ABSOLUTE: 0.9 K/CU MM
MONOCYTES RELATIVE PERCENT: 6.9 % (ref 0–4)
MYCOPLASMA PNEUMONIAE PCR: NOT DETECTED
NUCLEATED RBC %: 0 %
PARAINFLUENZA 1 PCR: NOT DETECTED
PARAINFLUENZA 2 PCR: NOT DETECTED
PARAINFLUENZA 3 PCR: NOT DETECTED
PARAINFLUENZA 4 PCR: NOT DETECTED
PDW BLD-RTO: 16.3 % (ref 11.7–14.9)
PLATELET # BLD: 233 K/CU MM (ref 140–440)
PMV BLD AUTO: 9.4 FL (ref 7.5–11.1)
POTASSIUM SERPL-SCNC: 4.2 MMOL/L (ref 3.5–5.1)
RBC # BLD: 3.39 M/CU MM (ref 4.2–5.4)
RHINOVIRUS ENTEROVIRUS PCR: NOT DETECTED
RSV PCR: NOT DETECTED
SEGMENTED NEUTROPHILS ABSOLUTE COUNT: 10.4 K/CU MM
SEGMENTED NEUTROPHILS RELATIVE PERCENT: 84.6 % (ref 36–66)
SODIUM BLD-SCNC: 133 MMOL/L (ref 135–145)
STREP PNEUMONIAE ANTIGEN: NORMAL
TOTAL IMMATURE NEUTOROPHIL: 0.15 K/CU MM
TOTAL NUCLEATED RBC: 0 K/CU MM
WBC # BLD: 12.3 K/CU MM (ref 4–10.5)

## 2019-06-17 PROCEDURE — 6370000000 HC RX 637 (ALT 250 FOR IP): Performed by: NURSE PRACTITIONER

## 2019-06-17 PROCEDURE — 2000000000 HC ICU R&B

## 2019-06-17 PROCEDURE — 85025 COMPLETE CBC W/AUTO DIFF WBC: CPT

## 2019-06-17 PROCEDURE — 36592 COLLECT BLOOD FROM PICC: CPT

## 2019-06-17 PROCEDURE — 83605 ASSAY OF LACTIC ACID: CPT

## 2019-06-17 PROCEDURE — 6370000000 HC RX 637 (ALT 250 FOR IP): Performed by: HOSPITALIST

## 2019-06-17 PROCEDURE — 94660 CPAP INITIATION&MGMT: CPT

## 2019-06-17 PROCEDURE — 93010 ELECTROCARDIOGRAM REPORT: CPT | Performed by: INTERNAL MEDICINE

## 2019-06-17 PROCEDURE — 6360000002 HC RX W HCPCS: Performed by: FAMILY MEDICINE

## 2019-06-17 PROCEDURE — C9113 INJ PANTOPRAZOLE SODIUM, VIA: HCPCS | Performed by: NURSE PRACTITIONER

## 2019-06-17 PROCEDURE — 94761 N-INVAS EAR/PLS OXIMETRY MLT: CPT

## 2019-06-17 PROCEDURE — 93970 EXTREMITY STUDY: CPT

## 2019-06-17 PROCEDURE — 73620 X-RAY EXAM OF FOOT: CPT

## 2019-06-17 PROCEDURE — 6370000000 HC RX 637 (ALT 250 FOR IP): Performed by: INTERNAL MEDICINE

## 2019-06-17 PROCEDURE — 2580000003 HC RX 258: Performed by: NURSE PRACTITIONER

## 2019-06-17 PROCEDURE — 6360000002 HC RX W HCPCS: Performed by: NURSE PRACTITIONER

## 2019-06-17 PROCEDURE — 2580000003 HC RX 258: Performed by: FAMILY MEDICINE

## 2019-06-17 PROCEDURE — 80048 BASIC METABOLIC PNL TOTAL CA: CPT

## 2019-06-17 PROCEDURE — 2700000000 HC OXYGEN THERAPY PER DAY

## 2019-06-17 PROCEDURE — 99211 OFF/OP EST MAY X REQ PHY/QHP: CPT

## 2019-06-17 PROCEDURE — 82962 GLUCOSE BLOOD TEST: CPT

## 2019-06-17 RX ORDER — 0.9 % SODIUM CHLORIDE 0.9 %
500 INTRAVENOUS SOLUTION INTRAVENOUS ONCE
Status: COMPLETED | OUTPATIENT
Start: 2019-06-17 | End: 2019-06-17

## 2019-06-17 RX ORDER — PRAMIPEXOLE DIHYDROCHLORIDE 1 MG/1
1 TABLET ORAL 3 TIMES DAILY
Status: DISCONTINUED | OUTPATIENT
Start: 2019-06-17 | End: 2019-06-27 | Stop reason: HOSPADM

## 2019-06-17 RX ORDER — GABAPENTIN 400 MG/1
800 CAPSULE ORAL 3 TIMES DAILY
Status: DISCONTINUED | OUTPATIENT
Start: 2019-06-17 | End: 2019-06-25

## 2019-06-17 RX ADMIN — SODIUM CHLORIDE, PRESERVATIVE FREE 10 ML: 5 INJECTION INTRAVENOUS at 08:34

## 2019-06-17 RX ADMIN — PRAMIPEXOLE DIHYDROCHLORIDE 1 MG: 0.25 TABLET ORAL at 05:03

## 2019-06-17 RX ADMIN — PRAMIPEXOLE DIHYDROCHLORIDE 1 MG: 0.25 TABLET ORAL at 13:16

## 2019-06-17 RX ADMIN — INSULIN GLARGINE 10 UNITS: 100 INJECTION, SOLUTION SUBCUTANEOUS at 00:36

## 2019-06-17 RX ADMIN — GABAPENTIN 800 MG: 400 CAPSULE ORAL at 08:33

## 2019-06-17 RX ADMIN — VANCOMYCIN HYDROCHLORIDE 1500 MG: 5 INJECTION, POWDER, LYOPHILIZED, FOR SOLUTION INTRAVENOUS at 17:19

## 2019-06-17 RX ADMIN — INSULIN LISPRO 20 UNITS: 100 INJECTION, SOLUTION INTRAVENOUS; SUBCUTANEOUS at 17:47

## 2019-06-17 RX ADMIN — GABAPENTIN 800 MG: 400 CAPSULE ORAL at 05:03

## 2019-06-17 RX ADMIN — VANCOMYCIN HYDROCHLORIDE 1500 MG: 5 INJECTION, POWDER, LYOPHILIZED, FOR SOLUTION INTRAVENOUS at 04:33

## 2019-06-17 RX ADMIN — INSULIN GLARGINE 25 UNITS: 100 INJECTION, SOLUTION SUBCUTANEOUS at 21:29

## 2019-06-17 RX ADMIN — NYSTATIN CREAM: 100000 CREAM TOPICAL at 08:58

## 2019-06-17 RX ADMIN — ACETAMINOPHEN 650 MG: 325 TABLET ORAL at 04:33

## 2019-06-17 RX ADMIN — SODIUM CHLORIDE 500 ML: 9 INJECTION, SOLUTION INTRAVENOUS at 01:15

## 2019-06-17 RX ADMIN — NYSTATIN CREAM: 100000 CREAM TOPICAL at 22:46

## 2019-06-17 RX ADMIN — PANTOPRAZOLE SODIUM 40 MG: 40 INJECTION, POWDER, FOR SOLUTION INTRAVENOUS at 08:33

## 2019-06-17 RX ADMIN — CEFEPIME 2 G: 2 INJECTION, POWDER, FOR SOLUTION INTRAVENOUS at 15:35

## 2019-06-17 RX ADMIN — INSULIN LISPRO 20 UNITS: 100 INJECTION, SOLUTION INTRAVENOUS; SUBCUTANEOUS at 13:17

## 2019-06-17 RX ADMIN — SODIUM CHLORIDE: 9 INJECTION, SOLUTION INTRAVENOUS at 18:28

## 2019-06-17 RX ADMIN — NYSTATIN CREAM: 100000 CREAM TOPICAL at 00:19

## 2019-06-17 RX ADMIN — INSULIN LISPRO 4 UNITS: 100 INJECTION, SOLUTION INTRAVENOUS; SUBCUTANEOUS at 12:59

## 2019-06-17 RX ADMIN — GABAPENTIN 800 MG: 400 CAPSULE ORAL at 13:27

## 2019-06-17 RX ADMIN — INSULIN LISPRO 3 UNITS: 100 INJECTION, SOLUTION INTRAVENOUS; SUBCUTANEOUS at 03:04

## 2019-06-17 RX ADMIN — SODIUM CHLORIDE: 9 INJECTION, SOLUTION INTRAVENOUS at 03:17

## 2019-06-17 RX ADMIN — CEFEPIME 2 G: 2 INJECTION, POWDER, FOR SOLUTION INTRAVENOUS at 05:59

## 2019-06-17 RX ADMIN — INSULIN LISPRO 8 UNITS: 100 INJECTION, SOLUTION INTRAVENOUS; SUBCUTANEOUS at 09:16

## 2019-06-17 RX ADMIN — GABAPENTIN 800 MG: 400 CAPSULE ORAL at 21:19

## 2019-06-17 RX ADMIN — ACETAMINOPHEN 650 MG: 325 TABLET ORAL at 09:32

## 2019-06-17 RX ADMIN — INSULIN LISPRO 15 UNITS: 100 INJECTION, SOLUTION INTRAVENOUS; SUBCUTANEOUS at 09:18

## 2019-06-17 RX ADMIN — INSULIN LISPRO 2 UNITS: 100 INJECTION, SOLUTION INTRAVENOUS; SUBCUTANEOUS at 21:26

## 2019-06-17 RX ADMIN — ENOXAPARIN SODIUM 40 MG: 100 INJECTION SUBCUTANEOUS at 08:33

## 2019-06-17 RX ADMIN — ACETAMINOPHEN 650 MG: 325 TABLET ORAL at 19:10

## 2019-06-17 RX ADMIN — INSULIN LISPRO 2 UNITS: 100 INJECTION, SOLUTION INTRAVENOUS; SUBCUTANEOUS at 17:49

## 2019-06-17 RX ADMIN — PRAMIPEXOLE DIHYDROCHLORIDE 1 MG: 0.25 TABLET ORAL at 21:18

## 2019-06-17 ASSESSMENT — PAIN DESCRIPTION - ORIENTATION
ORIENTATION: LOWER
ORIENTATION: LEFT;RIGHT

## 2019-06-17 ASSESSMENT — PAIN SCALES - GENERAL
PAINLEVEL_OUTOF10: 0
PAINLEVEL_OUTOF10: 4
PAINLEVEL_OUTOF10: 0
PAINLEVEL_OUTOF10: 0
PAINLEVEL_OUTOF10: 5
PAINLEVEL_OUTOF10: 0
PAINLEVEL_OUTOF10: 5

## 2019-06-17 ASSESSMENT — PAIN DESCRIPTION - LOCATION
LOCATION: HEAD
LOCATION: COCCYX
LOCATION: HEAD

## 2019-06-17 ASSESSMENT — PAIN SCALES - WONG BAKER
WONGBAKER_NUMERICALRESPONSE: 6
WONGBAKER_NUMERICALRESPONSE: 6

## 2019-06-17 ASSESSMENT — PAIN DESCRIPTION - DESCRIPTORS
DESCRIPTORS: ACHING;HEADACHE
DESCRIPTORS: HEADACHE
DESCRIPTORS: HEADACHE

## 2019-06-17 ASSESSMENT — PAIN DESCRIPTION - PAIN TYPE
TYPE: ACUTE PAIN
TYPE: ACUTE PAIN
TYPE: ACUTE PAIN;CHRONIC PAIN

## 2019-06-17 NOTE — PLAN OF CARE
Nutrition Problem: Increased nutrient needs  Intervention: Food and/or Nutrient Delivery: Continue current diet, Start ONS  Nutritional Goals: pt will consume greater than 75% of her meals and supplements provided

## 2019-06-17 NOTE — PROGRESS NOTES
Patient UOP since 2025 100ml aminah, hazy, malodorous. Dr. Apolinar Nageotte notified. Give 500ml NS bolus over 200 hours.

## 2019-06-17 NOTE — PROGRESS NOTES
Patient arrived to room 2114 at this time. 9 mcg Levophed running from ER. Patient oriented to person, place, situation, disoriented to time. Temp 102.7 and placed on cooling blanket. Oriented to room and surroundings. Skin assessment performed with FARIHA Gary. Heavy yeast growth noted to abdomen, abdominal pannus, [place] area, BLE and feet. Redness and excoriation noted in [place] area and abdominal pannus with moisture associated skin breakdown and excoriation under right abdominal pannus. Areas cleansed with soap and water and interdry placed in abdominal pannus and under breast. Antifungal cream applied to belly and feet. Patient has a small skin tear noted in the gluteal cleft, vascular arevalo on BLE and an unstageable chronic diabetic foot ulcer on left bottom of foot. Podiatry consulted for foot wound. Will continue to monitor.

## 2019-06-17 NOTE — PROGRESS NOTES
Pressure Mother     Obesity Mother     Diabetes Mother     Heart Disease Father     High Blood Pressure Father     Obesity Father     Heart Disease Brother     High Blood Pressure Brother     Obesity Brother        SOCIAL HISTORY    Social History     Tobacco Use    Smoking status: Former Smoker     Types: Cigarettes     Last attempt to quit: 3/19/2003     Years since quittin.2    Smokeless tobacco: Never Used    Tobacco comment: quit 15 years ago   Substance Use Topics    Alcohol use: No    Drug use: No       ALLERGIES    No Known Allergies    MEDICATIONS    No current facility-administered medications on file prior to encounter.       Current Outpatient Medications on File Prior to Encounter   Medication Sig Dispense Refill    BiPAP Machine MISC by BiPAP route nightly      lisinopril (PRINIVIL;ZESTRIL) 5 MG tablet Take 1 tablet by mouth daily 30 tablet 3    DULoxetine (CYMBALTA) 60 MG extended release capsule Take 30 mg by mouth daily       HUMALOG KWIKPEN 200 UNIT/ML SOPN pen Inject 15 Units into the skin 3 times daily (before meals) (Patient taking differently: Inject 35 Units into the skin 3 times daily (before meals) ) 2 pen 3    Insulin Degludec (TRESIBA FLEXTOUCH) 100 UNIT/ML SOPN Inject 15 Units into the skin nightly (Patient taking differently: Inject 45 Units into the skin nightly ) 1 pen 0    clopidogrel (PLAVIX) 75 MG tablet Take 1 tablet by mouth daily Patient has appointment on 3/27/18 more refills with be given after she keeps her office visit 90 tablet 3    L-Methylfolate-B6-B12 (FOLTANX) 3-35-2 MG TABS Take 1 tablet by mouth 2 times daily       nystatin (MYCOSTATIN) POWD powder Apply topically 2 times daily      potassium chloride (KLOR-CON M) 20 MEQ extended release tablet Take 1 tablet by mouth daily 60 tablet 3    furosemide (LASIX) 40 MG tablet Take 1 tablet by mouth 2 times daily (Patient taking differently: Take 40 mg by mouth daily ) 60 tablet 3    carvedilol (COREG) 3.125 MG tablet Take 1 tablet by mouth 2 times daily 60 tablet 3    simvastatin (ZOCOR) 40 MG tablet Take 1 tablet by mouth nightly 30 tablet 3    metFORMIN (GLUCOPHAGE) 500 MG tablet Take 1 tablet by mouth 2 times daily (with meals) 60 tablet 0    ipratropium-albuterol (DUONEB) 0.5-2.5 (3) MG/3ML SOLN nebulizer solution Inhale 3 mLs into the lungs 4 times daily 360 mL 0    Nebulizers (AIRRevivn COMPACT MINI NEBULIZER) MISC 1 Device by Does not apply route 4 times daily 1 each 0    albuterol sulfate  (90 Base) MCG/ACT inhaler Inhale 2 puffs into the lungs      pantoprazole (PROTONIX) 40 MG tablet Take 40 mg by mouth daily      vilazodone HCl (VIIBRYD) 10 MG TABS Take 10 mg by mouth 2 times daily       vitamin D (ERGOCALCIFEROL) 63850 UNITS CAPS capsule Take 50,000 Units by mouth twice a week On Sundays and wednesdays      pramipexole (MIRAPEX) 1 MG tablet Take 1 mg by mouth 3 times daily       fexofenadine (ALLEGRA) 180 MG tablet Take 180 mg by mouth daily       gabapentin (NEURONTIN) 800 MG tablet Take 800 mg by mouth 3 times daily       Ascorbic Acid (VITAMIN C) 500 MG tablet Take 500 mg by mouth daily       aspirin 81 MG tablet Take 81 mg by mouth daily           Objective:      BP (!) 132/50   Pulse 82   Temp 100 °F (37.8 °C) (Axillary)   Resp 23   Ht 5' 6\" (1.676 m)   Wt (!) 453 lb 3.2 oz (205.6 kg)   SpO2 92%   BMI 73.15 kg/m²   Yaya Risk Score: Yaya Scale Score: 11    LABS    CBC:   Lab Results   Component Value Date    WBC 12.3 06/17/2019    RBC 3.39 06/17/2019    HGB 7.7 06/17/2019    HCT 27.3 06/17/2019    MCV 80.5 06/17/2019    MCH 22.7 06/17/2019    MCHC 28.2 06/17/2019    RDW 16.3 06/17/2019     06/17/2019    MPV 9.4 06/17/2019     CMP:    Lab Results   Component Value Date     06/17/2019    K 4.2 06/17/2019    CL 92 06/17/2019    CO2 33 06/17/2019    BUN 16 06/17/2019    CREATININE 0.8 06/17/2019    GFRAA >60 06/17/2019    LABGLOM >60 06/17/2019 GLUCOSE 276 06/17/2019    PROT 8.3 06/16/2019    LABALBU 3.8 06/16/2019    CALCIUM 8.5 06/17/2019    BILITOT 0.4 06/16/2019    ALKPHOS 99 06/16/2019    AST 17 06/16/2019    ALT 15 06/16/2019     Albumin:    Lab Results   Component Value Date    LABALBU 3.8 06/16/2019     PT/INR:    Lab Results   Component Value Date    PROTIME 12.9 01/30/2018    INR 1.11 01/30/2018     HgBA1c:    Lab Results   Component Value Date    LABA1C 11.2 06/16/2019         Assessment:     Patient Active Problem List   Diagnosis    Morbid obesity with BMI of 70 and over, adult ([address]    Essential hypertension    Dyslipidemia    Fecal incontinence    Mild intermittent asthma without complication    S/P laparoscopic cholecystectomy    Type 2 diabetes mellitus without complication, with long-term current use of insulin (HCC)    Fatty liver    Arthritis    H/O parotidectomy    Venous stasis dermatitis of both lower extremities    Aortic stenosis    Morbid obesity ([address]    Asthma    Diabetes mellitus ([address]    Hypertension    Sleep apnea    Family history of coronary artery disease    Chest pain    SOB (shortness of breath)    Palpitations    Peripheral edema    Hypervolemia    Chronic respiratory failure with hypoxia and hypercapnia (HCC)    Acute exacerbation of chronic obstructive pulmonary disease (COPD) (HCC)    Chronic obstructive pulmonary disease (HCC)    Gait disturbance    Acute on chronic respiratory failure with hypoxia and hypercapnia (HCC)    Cellulitis    Skin ulcer of left foot with fat layer exposed ([address]    Diabetic ulcer of left midfoot associated with type 2 diabetes mellitus, with necrosis of muscle (HCC)    Pneumonia    VHD (valvular heart disease)    Septic shock ([address]    Community acquired pneumonia    Uncontrolled type 2 diabetes mellitus with hyperglycemia (HCC)    Class 3 severe obesity due to excess calories with body mass index (BMI) greater than or equal to 70 in adult [facility]) Measurements:  Wound 11/24/18 Other (Comment) Thigh Left open (Active)   Number of days: 204       Wound 11/24/18 Other (Comment) Thigh Left; Inner open (Active)   Number of days: 204       Wound 02/06/19 Other (Comment) Left plantar DFU (Active)   Number of days: 131       Wound 06/16/19 Foot Left;Plantar white yello, healed callous (Active)   Wound Pressure Unstageable 6/16/2019  9:00 PM   Dressing Status Clean;Dry; Intact 6/17/2019 10:16 AM   Dressing Changed Changed/New 6/17/2019 10:16 AM   Dressing/Treatment Open to air 6/17/2019  8:32 AM   Wound Cleansed Rinsed/Irrigated with saline 6/17/2019 10:16 AM   Wound Length (cm) 0.2 cm 6/17/2019 10:16 AM   Wound Width (cm) 0.5 cm 6/17/2019 10:16 AM   Wound Depth (cm) 0.4 cm 6/17/2019 10:16 AM   Wound Surface Area (cm^2) 0.1 cm^2 6/17/2019 10:16 AM   Change in Wound Size % (l*w) 97.5 6/17/2019 10:16 AM   Wound Volume (cm^3) 0.04 cm^3 6/17/2019 10:16 AM   Distance Tunneling (cm) 0 cm 6/17/2019 10:16 AM   Tunneling Position ___ O'Clock 0 6/17/2019 10:16 AM   Undermining Starts ___ O'Clock 0 6/17/2019 10:16 AM   Undermining Ends___ O'Clock 0 6/17/2019 10:16 AM   Undermining Maxium Distance (cm) 0 6/17/2019 10:16 AM   Wound Assessment Yellow; White 6/17/2019 10:16 AM   Drainage Amount None 6/17/2019 10:16 AM   Odor None 6/17/2019 10:16 AM   Margins Defined edges 6/17/2019 10:16 AM   Sierra-wound Assessment Clean;Dry; Intact 6/17/2019 10:16 AM   Non-staged Wound Description Full thickness 6/17/2019 10:16 AM   [place]%Wound Bed 0 6/17/2019 10:16 AM   Red%Wound Bed 0 6/17/2019 10:16 AM   Yellow%Wound Bed 10 6/17/2019 10:16 AM   Black%Wound Bed 0 6/17/2019 10:16 AM   Purple%Wound Bed 0 6/17/2019 10:16 AM   Other%Wound Bed 90 white 6/17/2019 10:16 AM   Number of days: 0       Wound 06/17/19 Thigh Left;Proximal;Posterior (Active)   Wound Other 6/17/2019 10:16 AM   Dressing/Treatment Moisture barrier 6/17/2019 10:16 AM   Wound Cleansed Rinsed/Irrigated with saline 6/17/2019 10:16 AM Wound Length (cm) 1 cm 6/17/2019 10:16 AM   Wound Width (cm) 1 cm 6/17/2019 10:16 AM   Wound Depth (cm) 0.1 cm 6/17/2019 10:16 AM   Wound Surface Area (cm^2) 1 cm^2 6/17/2019 10:16 AM   Wound Volume (cm^3) 0.1 cm^3 6/17/2019 10:16 AM   Distance Tunneling (cm) 0 cm 6/17/2019 10:16 AM   Tunneling Position ___ O'Clock 0 6/17/2019 10:16 AM   Undermining Starts ___ O'Clock 0 6/17/2019 10:16 AM   Undermining Ends___ O'Clock 0 6/17/2019 10:16 AM   Undermining Maxium Distance (cm) 0 6/17/2019 10:16 AM   Wound Assessment Yellow; Purple 6/17/2019 10:16 AM   Drainage Amount None 6/17/2019 10:16 AM   Margins Attached edges 6/17/2019 10:16 AM   Sierra-wound Assessment Pink 6/17/2019 10:16 AM   [place] II%Wound Bed 0 6/17/2019 10:16 AM   Red%Wound Bed 0 6/17/2019 10:16 AM   Yellow%Wound Bed 50 6/17/2019 10:16 AM   Black%Wound Bed 0 6/17/2019 10:16 AM   Purple%Wound Bed 50 6/17/2019 10:16 AM   Other%Wound Bed 0 6/17/2019 10:16 AM   Number of days: 0       Response to treatment:  Well tolerated by patient. Pain Assessment:  Severity:  0  Quality of pain: 0  Wound Pain Timing/Severity: 0  Premedicated: no    Plan:     Plan of Care: Wound 06/16/19 Foot Left;Plantar white yello, healed callous-Dressing/Treatment: (sorbact, fibracol, mepilex border)  Wound 06/17/19 Thigh Left;Proximal;Posterior-Dressing/Treatment: Moisture barrier    Specialty Bed Required : yes  [] Low Air Loss   [x] Pressure Redistribution  [] Fluid Immersion  [] Bariatric  [] Total Pressure Relief  [] Other:     Discharge Plan:  Placement for patient upon discharge: TBD  Hospice Care: no  Patient appropriate for Outpatient [address] [place] [address] Road: no    Patient/Caregiver Teaching:  Level of patient/caregiver understanding able to:   Direction given at bedside.          Electronically signed by Koby Pal LPN,  on 9/44/0938 at 11:28 AM

## 2019-06-17 NOTE — PROGRESS NOTES
HOSPITALIST PROGRESS NOTE  Date: 6/17/2019   Name: Augie Wilkinson   MRN: [id number]   YOB: 1955      Subjective/Interval Hx:   Patient remains very lethargic, has been trying to cough up material in her chest.  Also complains of continued shortness of breath especially with exertion    Objective:   Physical Exam:   BP (!) 132/50   Pulse 82   Temp 100 °F (37.8 °C) (Axillary)   Resp 23   Ht 5' 6\" (1.676 m)   Wt (!) 453 lb 3.2 oz (205.6 kg)   SpO2 92%   BMI 73.15 kg/m²   General: no acute distress, well nourished and well hydrated  HEENT: NCAT  Heart: S1S2 RRR  Lungs: Coarse scattered rhonchi anterior chest with diffuse expiratory wheezing posterior chest, respiratory effort normal  Abdomen: soft, NT/ND, positive bowel sounds  Extremities: no pitting edema, nontender   Neuro: patient is awake, alert and orientated times 3, no gross deficits  Skin: no rashes or ecchymosis        Meds:   Meds:    vancomycin  1,500 mg Intravenous Q12H    gabapentin  800 mg Oral TID    pramipexole  1 mg Oral TID    sodium chloride flush  10 mL Intravenous 2 times per day    sodium chloride flush  10 mL Intravenous 2 times per day    enoxaparin  40 mg Subcutaneous Daily    pantoprazole  40 mg Intravenous Daily    cefepime  2 g Intravenous Q12H    nystatin   Topical BID    insulin glargine  25 Units Subcutaneous Nightly    insulin lispro  15 Units Subcutaneous TID WC    insulin lispro  0-12 Units Subcutaneous TID WC    insulin lispro  0-6 Units Subcutaneous 2 times per day      Infusions:    sodium chloride 100 mL/hr at 06/17/19 0317    dextrose      norepinephrine 1 mcg/min (06/17/19 1125)     PRN Meds:   sodium chloride flush 10 mL PRN   sodium chloride flush 10 mL PRN   magnesium hydroxide 30 mL Daily PRN   ondansetron 4 mg Q6H PRN   ipratropium-albuterol 1 ampule Q4H PRN   acetaminophen 650 mg Q4H PRN   glucose 15 g PRN   dextrose 12.5 g PRN   glucagon (rDNA) 1 mg PRN   dextrose 100 mL/hr PRN Data/Labs:     Recent Labs     06/16/19  1522 06/17/19  0500   WBC 11.9* 12.3*   HGB 9.2* 7.7*   HCT 32.3* 27.3*    233      Recent Labs     06/16/19  1522 06/17/19  0500   * 133*   K 4.1 4.2   CL 90* 92*   CO2 31 33*   BUN 14 16   CREATININE 0.6 0.8     Recent Labs     06/16/19  1522   AST 17   ALT 15   BILITOT 0.4   ALKPHOS 99     No results for input(s): INR in the last 72 hours. Recent Labs     06/16/19  1522   TROPONINT <0.010       I/O last 3 completed shifts: In: 2737 [I.V.:2237; IV Piggyback:500]  Out: 950 [Urine:950]    Intake/Output Summary (Last 24 hours) at 6/17/2019 1130  Last data filed at 6/17/2019 1031  Gross per 24 hour   Intake 2877 ml   Output 950 ml   Net 1927 ml        Assessment/Plan:   1. Sepsis due to suspected pneumonia-chest x-ray is normal but patient's lungs are very diminished in the posterior bases with wheezing symptomatically is very lethargic and ill appearing. Will treat with empiric IV antibiotics, IV fluids, DuoNeb treatments, steroids  2. Insulin-dependent diabetes mellitus-Lantus at night, sliding scale, monitor blood glucose  3. GERD-Protonix IV daily  4.  Diabetic neuropathy-gabapentin 3 times a day    DVT Prophylaxis: lovenox  Diet: DIET CARB CONTROL;  Code Status: Full Code    Dispo: when stable     Electronically signed by Juan Keith MD on 6/17/2019 at 11:30 AM

## 2019-06-17 NOTE — CONSULTS
Endocrinology   Consult Note  Dear Doctor Vanessa Alexander     Thank You for the Consult     Pt. Was Admitted for : Redness of breath leg pain and headache    Reason for Consult: Better control of blood glucose      History Obtained From:  Patient/ EMR       HISTORY OF PRESENT ILLNESS:                The patient is a 60 y.o. female with significant past medical history of morbid obesity, aortic stenosis had aortic valve replaced, asthma, cervical cancer, carotid artery disease, CHF, COPD, diabetes mellitus was admitted to hospital for severe shortness of breath headache and leg pain confusion. I was  consulted for better control of blood glucose. ROS:   Pt's ROS done in detail. Abnormal ROS are noted in Medical and Surgical History Section below:      Other Medical History:        Diagnosis Date    Aortic stenosis     Asthma     Cancer ([facility]     Cervical    Carotid artery stenosis     Chest pain     CHF (congestive heart failure) ([facility])     Chronic back pain     COPD exacerbation ([facility])     Depression     Diabetes mellitus ([facility]     Family history of coronary artery disease     Fatty liver 10/27/2016    H/O aortic valve stenosis     H/O echocardiogram 2016    EF 55%, Aortic valve area is 0.84 cm2 with a mean gradient of 36 suggestive of severe aortic stenosis, mild to mos LVH    Headache     Heart murmur     History of nuclear stress test 2016    lexiscan-normal,EF70%    Morbid obesity ([facility])     BMI=73.72 kg/m2    Neuropathy     NSTEMI (non-ST elevated myocardial infarction) ([facility] 2018    Obesity     Osteoarthritis     Palpitations     Peripheral edema     Restless legs syndrome     Sleep apnea     Has a CPAP    Sleep apnea     SOB (shortness of breath)      Surgical History:        Procedure Laterality Date    AORTIC VALVE REPLACEMENT  2017    29mm EvolutR TAVR     SECTION      CHOLECYSTECTOMY      GALLBLADDER SURGERY      HYSTERECTOMY      NECK SURGERY 6/17/2019] insulin glargine  10 Units Subcutaneous Once    [START ON 6/17/2019] insulin glargine  25 Units Subcutaneous Nightly    [START ON 6/17/2019] insulin lispro  15 Units Subcutaneous TID WC    [START ON 6/17/2019] insulin lispro  0-12 Units Subcutaneous TID WC    [START ON 6/17/2019] insulin lispro  0-6 Units Subcutaneous 2 times per day      Infusions:    sodium chloride 100 mL/hr at 06/16/19 2130    dextrose      norepinephrine 5 mcg/min (06/16/19 2235)         IMPRESSION    Patient Active Problem List   Diagnosis    Morbid obesity with BMI of 70 and over, adult ([facility]    Essential hypertension    Dyslipidemia    Fecal incontinence    Mild intermittent asthma without complication    S/P laparoscopic cholecystectomy    Type 2 diabetes mellitus without complication, with long-term current use of insulin (HCC)    Fatty liver    Arthritis    H/O parotidectomy    Venous stasis dermatitis of both lower extremities    Aortic stenosis    Morbid obesity (HCC)    Asthma    Diabetes mellitus ([address]    Hypertension    Sleep apnea    Family history of coronary artery disease    Chest pain    SOB (shortness of breath)    Palpitations    Peripheral edema    Hypervolemia    Chronic respiratory failure with hypoxia and hypercapnia (HCC)    Acute exacerbation of chronic obstructive pulmonary disease (COPD) (HCC)    Chronic obstructive pulmonary disease (HCC)    Gait disturbance    Acute on chronic respiratory failure with hypoxia and hypercapnia (HCC)    Cellulitis    Skin ulcer of left foot with fat layer exposed ([address]    Diabetic ulcer of left midfoot associated with type 2 diabetes mellitus, with necrosis of muscle (HCC)    Pneumonia    VHD (valvular heart disease)    Septic shock ([address]    Community acquired pneumonia    Uncontrolled type 2 diabetes mellitus with hyperglycemia (HCC)    Class 3 severe obesity due to excess calories with body mass index (BMI) greater than or equal to 79 in adult [facility])         RECOMMENDATIONS:      1. Reviewed POC blood glucose . Labs and X ray results   2. Reviewed Home and Current Medicines   3. Will Start On meal/ Correction bolus Humalog/ Lantus Insulin regime   4. Monitor Blood glucose frequently   5. Modify  the dose of Insulin as needed        Will follow with you  Again thank you for sharing pt's care with me.      Truly yours,       Adan Dougherty MD

## 2019-06-17 NOTE — CONSULTS
Nutrition Assessment    Type and Reason for Visit: Initial, Consult    Nutrition Recommendations:   · Continue current  · Start low calorie, high protein supplements    Nutrition Assessment: RD consulted for diet education regarding a healthy diet. Spoke with pt family as pt was asleep during my visit. Pt family states that she is having a hard time giving up diet soda and is wanting to have bariatric surgery but the cost is difficult for them. Pt also has an unstagable wound on her heel. Will order low calorie, high protein supplements and continue to follow. Malnutrition Assessment:  · Malnutrition Status: At risk for malnutrition  · Context: Chronic illness  · Findings of the 6 clinical characteristics of malnutrition (Minimum of 2 out of 6 clinical characteristics is required to make the diagnosis of moderate or severe Protein Calorie Malnutrition based on AND/ASPEN Guidelines):  1. Energy Intake-Greater than 75% of estimated energy requirement, Greater than or equal to 3 months    2. Weight Loss-No significant weight loss, in 1 year  3. Fat Loss-No significant subcutaneous fat loss, Orbital  4. Muscle Loss-No significant muscle mass loss, Clavicles (pectoralis and deltoids)  5. Fluid Accumulation-No significant fluid accumulation, Extremities  6.   Strength-Not measured    Nutrition Risk Level: High    Nutrient Needs:  · Estimated Daily Total Kcal: 5333-3028 based on [address] jeor  · Estimated Daily Protein (g): 77-90 based on 1.2-1.4 g/kg/IBW  · Estimated Daily Total Fluid (ml/day): per nephrology    Nutrition Diagnosis:   · Problem: Increased nutrient needs  · Etiology: related to Endocrine dysfunction     Signs and symptoms:  as evidenced by Presence of wounds    Objective Information:  · Wound Type: Diabetic Ulcer, Unstageable, Pressure Ulcer  · Current Nutrition Therapies:  · Oral Diet Orders: Carb Control 4 Carbs/Meal   · Oral Diet intake: %  · Oral Nutrition Supplement (ONS) Orders: None  · Anthropometric Measures:  · Ht: 5' 6\" (167.6 cm)   · Current Body Wt: 453 lb (205.5 kg)  · Admission Body Wt: 453 lb (205.5 kg)  · Usual Body Wt: 444 lb (201.4 kg)  · % Weight Change: none noted  · Ideal Body Wt: 142 lb (64.4 kg), % Ideal Body 319%  · BMI Classification: BMI > or equal to 40.0 Obese Class III    Nutrition Interventions:   Continue current diet, Start ONS  Continued Inpatient Monitoring, Education Initiated, Coordination of Care    Nutrition Evaluation:   · Evaluation: Goals set   · Goals: pt will consume greater than 75% of her meals and supplements provided    · Monitoring: Meal Intake, Pertinent Labs, Weight, Supplement Intake, Wound Healing      Electronically signed by Hilton Murry RD, LD on 3/98/76 at 12:31 PM    Contact Number: [phone]

## 2019-06-18 LAB
ANION GAP SERPL CALCULATED.3IONS-SCNC: 7 MMOL/L (ref 4–16)
BASOPHILS ABSOLUTE: 0 K/CU MM
BASOPHILS RELATIVE PERCENT: 0.5 % (ref 0–1)
BUN BLDV-MCNC: 13 MG/DL (ref 6–23)
CALCIUM SERPL-MCNC: 8.3 MG/DL (ref 8.3–10.6)
CHLORIDE BLD-SCNC: 95 MMOL/L (ref 99–110)
CO2: 33 MMOL/L (ref 21–32)
CREAT SERPL-MCNC: 0.7 MG/DL (ref 0.6–1.1)
CULTURE: NORMAL
CULTURE: NORMAL
DIFFERENTIAL TYPE: ABNORMAL
DOSE AMOUNT: NORMAL
DOSE TIME: NORMAL
EOSINOPHILS ABSOLUTE: 0.1 K/CU MM
EOSINOPHILS RELATIVE PERCENT: 0.9 % (ref 0–3)
ERYTHROCYTE SEDIMENTATION RATE: >120 MM/HR (ref 0–30)
GFR AFRICAN AMERICAN: >60 ML/MIN/1.73M2
GFR NON-AFRICAN AMERICAN: >60 ML/MIN/1.73M2
GLUCOSE BLD-MCNC: 199 MG/DL (ref 70–99)
GLUCOSE BLD-MCNC: 209 MG/DL (ref 70–99)
GLUCOSE BLD-MCNC: 222 MG/DL (ref 70–99)
GLUCOSE BLD-MCNC: 234 MG/DL (ref 70–99)
GLUCOSE BLD-MCNC: 254 MG/DL (ref 70–99)
GLUCOSE BLD-MCNC: 259 MG/DL (ref 70–99)
HCT VFR BLD CALC: 25.5 % (ref 37–47)
HEMOGLOBIN: 7 GM/DL (ref 12.5–16)
HIGH SENSITIVE C-REACTIVE PROTEIN: 209.9 MG/L
IMMATURE NEUTROPHIL %: 1.4 % (ref 0–0.43)
LYMPHOCYTES ABSOLUTE: 1.1 K/CU MM
LYMPHOCYTES RELATIVE PERCENT: 12.7 % (ref 24–44)
Lab: NORMAL
Lab: NORMAL
MCH RBC QN AUTO: 22.4 PG (ref 27–31)
MCHC RBC AUTO-ENTMCNC: 27.5 % (ref 32–36)
MCV RBC AUTO: 81.7 FL (ref 78–100)
MONOCYTES ABSOLUTE: 0.9 K/CU MM
MONOCYTES RELATIVE PERCENT: 10.4 % (ref 0–4)
NUCLEATED RBC %: 0 %
PDW BLD-RTO: 16.4 % (ref 11.7–14.9)
PLATELET # BLD: 210 K/CU MM (ref 140–440)
PMV BLD AUTO: 9.8 FL (ref 7.5–11.1)
POTASSIUM SERPL-SCNC: 4.1 MMOL/L (ref 3.5–5.1)
RBC # BLD: 3.12 M/CU MM (ref 4.2–5.4)
SEGMENTED NEUTROPHILS ABSOLUTE COUNT: 6.3 K/CU MM
SEGMENTED NEUTROPHILS RELATIVE PERCENT: 74.1 % (ref 36–66)
SODIUM BLD-SCNC: 135 MMOL/L (ref 135–145)
SPECIMEN: NORMAL
SPECIMEN: NORMAL
TOTAL IMMATURE NEUTOROPHIL: 0.12 K/CU MM
TOTAL NUCLEATED RBC: 0 K/CU MM
VANCOMYCIN TROUGH: 10.1 UG/ML (ref 10–20)
WBC # BLD: 8.6 K/CU MM (ref 4–10.5)

## 2019-06-18 PROCEDURE — C9113 INJ PANTOPRAZOLE SODIUM, VIA: HCPCS | Performed by: NURSE PRACTITIONER

## 2019-06-18 PROCEDURE — 2580000003 HC RX 258: Performed by: INTERNAL MEDICINE

## 2019-06-18 PROCEDURE — 85025 COMPLETE CBC W/AUTO DIFF WBC: CPT

## 2019-06-18 PROCEDURE — 86141 C-REACTIVE PROTEIN HS: CPT

## 2019-06-18 PROCEDURE — 6370000000 HC RX 637 (ALT 250 FOR IP): Performed by: NURSE PRACTITIONER

## 2019-06-18 PROCEDURE — 82962 GLUCOSE BLOOD TEST: CPT

## 2019-06-18 PROCEDURE — 80202 ASSAY OF VANCOMYCIN: CPT

## 2019-06-18 PROCEDURE — 1200000000 HC SEMI PRIVATE

## 2019-06-18 PROCEDURE — 99254 IP/OBS CNSLTJ NEW/EST MOD 60: CPT | Performed by: INTERNAL MEDICINE

## 2019-06-18 PROCEDURE — 2580000003 HC RX 258: Performed by: NURSE PRACTITIONER

## 2019-06-18 PROCEDURE — 6370000000 HC RX 637 (ALT 250 FOR IP): Performed by: INTERNAL MEDICINE

## 2019-06-18 PROCEDURE — 6360000002 HC RX W HCPCS: Performed by: NURSE PRACTITIONER

## 2019-06-18 PROCEDURE — 2580000003 HC RX 258: Performed by: FAMILY MEDICINE

## 2019-06-18 PROCEDURE — 6360000002 HC RX W HCPCS: Performed by: FAMILY MEDICINE

## 2019-06-18 PROCEDURE — 6360000002 HC RX W HCPCS: Performed by: INTERNAL MEDICINE

## 2019-06-18 PROCEDURE — 6370000000 HC RX 637 (ALT 250 FOR IP): Performed by: HOSPITALIST

## 2019-06-18 PROCEDURE — 85652 RBC SED RATE AUTOMATED: CPT

## 2019-06-18 PROCEDURE — 80048 BASIC METABOLIC PNL TOTAL CA: CPT

## 2019-06-18 RX ORDER — INSULIN GLARGINE 100 [IU]/ML
30 INJECTION, SOLUTION SUBCUTANEOUS NIGHTLY
Status: DISCONTINUED | OUTPATIENT
Start: 2019-06-18 | End: 2019-06-19

## 2019-06-18 RX ADMIN — PRAMIPEXOLE DIHYDROCHLORIDE 1 MG: 0.25 TABLET ORAL at 09:05

## 2019-06-18 RX ADMIN — INSULIN LISPRO 25 UNITS: 100 INJECTION, SOLUTION INTRAVENOUS; SUBCUTANEOUS at 18:19

## 2019-06-18 RX ADMIN — SODIUM CHLORIDE, PRESERVATIVE FREE 10 ML: 5 INJECTION INTRAVENOUS at 21:38

## 2019-06-18 RX ADMIN — SODIUM CHLORIDE 3 G: 900 INJECTION, SOLUTION INTRAVENOUS at 19:18

## 2019-06-18 RX ADMIN — SODIUM CHLORIDE, PRESERVATIVE FREE 10 ML: 5 INJECTION INTRAVENOUS at 09:06

## 2019-06-18 RX ADMIN — INSULIN LISPRO 25 UNITS: 100 INJECTION, SOLUTION INTRAVENOUS; SUBCUTANEOUS at 09:13

## 2019-06-18 RX ADMIN — PANTOPRAZOLE SODIUM 40 MG: 40 INJECTION, POWDER, FOR SOLUTION INTRAVENOUS at 09:04

## 2019-06-18 RX ADMIN — CEFEPIME 2 G: 2 INJECTION, POWDER, FOR SOLUTION INTRAVENOUS at 15:32

## 2019-06-18 RX ADMIN — GABAPENTIN 800 MG: 400 CAPSULE ORAL at 09:05

## 2019-06-18 RX ADMIN — NYSTATIN CREAM: 100000 CREAM TOPICAL at 15:54

## 2019-06-18 RX ADMIN — INSULIN LISPRO 6 UNITS: 100 INJECTION, SOLUTION INTRAVENOUS; SUBCUTANEOUS at 14:27

## 2019-06-18 RX ADMIN — INSULIN LISPRO 6 UNITS: 100 INJECTION, SOLUTION INTRAVENOUS; SUBCUTANEOUS at 18:18

## 2019-06-18 RX ADMIN — INSULIN GLARGINE 30 UNITS: 100 INJECTION, SOLUTION SUBCUTANEOUS at 21:31

## 2019-06-18 RX ADMIN — LINAGLIPTIN 5 MG: 5 TABLET, FILM COATED ORAL at 09:05

## 2019-06-18 RX ADMIN — INSULIN LISPRO 4 UNITS: 100 INJECTION, SOLUTION INTRAVENOUS; SUBCUTANEOUS at 09:14

## 2019-06-18 RX ADMIN — INSULIN LISPRO 2 UNITS: 100 INJECTION, SOLUTION INTRAVENOUS; SUBCUTANEOUS at 02:56

## 2019-06-18 RX ADMIN — INSULIN LISPRO 2 UNITS: 100 INJECTION, SOLUTION INTRAVENOUS; SUBCUTANEOUS at 21:31

## 2019-06-18 RX ADMIN — VANCOMYCIN HYDROCHLORIDE 1500 MG: 5 INJECTION, POWDER, LYOPHILIZED, FOR SOLUTION INTRAVENOUS at 07:07

## 2019-06-18 RX ADMIN — CEFEPIME 2 G: 2 INJECTION, POWDER, FOR SOLUTION INTRAVENOUS at 07:07

## 2019-06-18 RX ADMIN — INSULIN LISPRO 25 UNITS: 100 INJECTION, SOLUTION INTRAVENOUS; SUBCUTANEOUS at 14:24

## 2019-06-18 RX ADMIN — ENOXAPARIN SODIUM 40 MG: 100 INJECTION SUBCUTANEOUS at 09:05

## 2019-06-18 RX ADMIN — SODIUM CHLORIDE, PRESERVATIVE FREE 10 ML: 5 INJECTION INTRAVENOUS at 09:05

## 2019-06-18 RX ADMIN — GABAPENTIN 800 MG: 400 CAPSULE ORAL at 21:31

## 2019-06-18 RX ADMIN — PRAMIPEXOLE DIHYDROCHLORIDE 1 MG: 0.25 TABLET ORAL at 15:29

## 2019-06-18 RX ADMIN — NYSTATIN CREAM: 100000 CREAM TOPICAL at 21:31

## 2019-06-18 RX ADMIN — GABAPENTIN 800 MG: 400 CAPSULE ORAL at 15:29

## 2019-06-18 RX ADMIN — ACETAMINOPHEN 650 MG: 325 TABLET ORAL at 02:43

## 2019-06-18 ASSESSMENT — PAIN SCALES - GENERAL
PAINLEVEL_OUTOF10: 8
PAINLEVEL_OUTOF10: 0
PAINLEVEL_OUTOF10: 0
PAINLEVEL_OUTOF10: 4
PAINLEVEL_OUTOF10: 0
PAINLEVEL_OUTOF10: 0

## 2019-06-18 ASSESSMENT — PAIN DESCRIPTION - LOCATION: LOCATION: COCCYX

## 2019-06-18 ASSESSMENT — PAIN SCALES - WONG BAKER
WONGBAKER_NUMERICALRESPONSE: 6

## 2019-06-18 ASSESSMENT — PAIN DESCRIPTION - PAIN TYPE: TYPE: ACUTE PAIN;CHRONIC PAIN

## 2019-06-18 ASSESSMENT — PAIN DESCRIPTION - ORIENTATION: ORIENTATION: LOWER

## 2019-06-18 NOTE — CONSULTS
[address] [place] [address], [facility]                                  CONSULTATION    PATIENT NAME: Christianne Magdaleno                 :        1955  MED REC NO:   [id number]                          ROOM:       2114  ACCOUNT NO:   [de-identified]                           ADMIT DATE: 2019  PROVIDER:     Jocelyn Juarez DPM    CONSULT DATE:  2019    HISTORY OF PRESENT ILLNESS:  The patient is a very pleasant 74-year-old  female who was seen at bedside for consultation of left diabetic foot  ulcer. The patient subsequently is being following in her office with  Dr. Mariel Graham and most recently did have a graft put on that foot  in order to help this region heal, but has not been seen in about 4 to 6  weeks due to her having difficulty leaving home and states that she has  not been feeling well. The patient is seen at bedside today. The  patient denies any nausea, vomiting, fever or chills. The patient's past medical history is reviewed and submitted to chart as  well as her family history and social history. The patient did have an elevated white count at 12.3, but it is down to  8.6. PHYSICAL EXAMINATION:  VITAL SIGNS:  The patient's physical examination does show that  currently her vitals are 98.9 temperature, respirations 17, pulse 86,  and blood pressure 140/49. GENERAL:  The patient's physical examination does shows that she has  palpable pulses, DP and PT on the left. Cap fill time less than 5  seconds. Skin temperature is within normal limits. DERMATOLOGICAL:  The patient does have atrophic skin changes. The  patient _____ skin changes. The patient does have an ulceration that  measures 4 mm in diameter, a 2 mm in depth with a granular base, little  bit of minimal hyperkeratosis. There is no deep probe to bone. There  is no extending redness or cellulitis associated to the region. There  is no purulence associated to the region. There is no sinus tracking  noted. No odor present  to the site as well. ASSESSMENT AT THIS TIME:  Diabetic ulceration to the left plantar foot  which is full thickness in nature. TREATMENT:  The patient at this time is educated about her condition. The patient currently has been getting dressed with Josiephine Edward,  and Mepilex border which I definitely agree that is perfectly acceptable  excellent choice. The patient does have an offloading shoe, but it is  at home. I want her to make arrangements with her  to bring that  in. She is also going to utilize a walker. The patient must understand  that in order to get this plantar wound to heal that it is imperative  that she try to reduce the pressure to the site as much as possible. I  know this is going to be a difficult task for her and we understand that  but that would be our recommendation. Continue with current dressing  changes and antibiotics appropriate. The patient at this time, when she  gets discharge, please make sure she has a followup appointment to see  us in the office [phone] because I talked to Dr. Blaze Pierson that in  the future if it is slow to heal then she will consider putting another  graft on in the office.         Steven Pineda DPM    D: 06/18/2019 8:00:08       T: 06/18/2019 8:59:35     LUANA  Job#: [id number]     Doc#: [id number]    CC:

## 2019-06-18 NOTE — PROGRESS NOTES
Pt insisted that she would rather use the toilet set and refused all attempts to get her to use the bed pan. Her legs were almost out of bed when I answered her call light. Pt stated that she had  had no bowel movement on a long while and would rather prefer to have it this time sitting on the toilet seat.

## 2019-06-18 NOTE — CONSULTS
Infectious Disease Consult Note  2019   Patient Name: Remington Rogers : 1955   Impression   Sepsis secondary to GBS bacteremia  · Unknown source, previously treated for infected left foot diabetic ulcer in 2018. Incidentally deep wound culture was positive for Morganella morganii, GBS and coagulase-negative Staphylococcus. May have left foot abscess/osteomyelitis. Other possibility might be GI source but there are no localizing signs  · Urinalysis was negative  · Unlikely to be pneumonia   DM  · Morbid obesity   Multi-morbidity: per PMHx hx of TAVR  Plan:   Discontinue vancomycin and cefepime   Start Unasyn   Bone scan   Repeat blood cultures   If negative, obtain CT of the abdomen and pelvis with p.o. and IV contrast   Obtain CRP and ESR    Thank you for allowing me to consult in the care of this patient.  ------------------------  REASON FOR CONSULT: Infective syndrome   Requested by: Dr. Augustine Valdes is a 60 y.o. morbidly obese female with type 2 diabetes mellitus, diabetic polyneuropathy, ANMOL on CPAP who was admitted 2019 for further evaluation and management of 3-week history of malaise. Prompted to come to the hospital due to sudden onset of AMS, confusion and incoherent speech. Endorsed shortness of breath at rest, but denied productive cough. She denies dysuria, abdominal pain. She has a left plantar ulcer which now has a callus over it. Previously treated in 2018 for cellulitis, wound culture was positive for Morganella morganii, group B streptococcus (GBS) and coagulase-negative Staphylococcus (CON S) with moxifloxacin. She denies diarrhea. She was treated with vancomycin and cefepime. 2 of 2 sets of blood cultures were positive for GBS. ? Infectious diseases service was consulted to evaluate the pt, and recommend further investigative and therapeutic measures.   ROS: Other systems reviewed Including eyes, ENT, respiratory, cardiovascular, GI, , dermatologic, neurologic, psych, hem/lymphatic, musculoskeletal and endocrine were negative other than what is mentioned above.      Patient Active Problem List    Diagnosis Date Noted    Acute exacerbation of chronic obstructive pulmonary disease (COPD) ([address]      Priority: High    Hypervolemia      Priority: High    Septic shock ([address] 06/16/2019    Community acquired pneumonia 06/16/2019    Uncontrolled type 2 diabetes mellitus with hyperglycemia ([address] 06/16/2019    Class 3 severe obesity due to excess calories with body mass index (BMI) greater than or equal to 70 in adult [facility]) 06/16/2019    VHD (valvular heart disease) 04/02/2019    Pneumonia 02/06/2019    Diabetic ulcer of left midfoot associated with type 2 diabetes mellitus, with necrosis of muscle ([address]     Skin ulcer of left foot with fat layer exposed ([address]     Cellulitis 11/24/2018    Acute on chronic respiratory failure with hypoxia and hypercapnia (HCC)     Gait disturbance 02/10/2018    Chronic obstructive pulmonary disease ([address] 02/07/2018    Chronic respiratory failure with hypoxia and hypercapnia ([address] 01/30/2018    Morbid obesity ([address]     Asthma     Diabetes mellitus ([address]     Hypertension     Sleep apnea     Family history of coronary artery disease     Chest pain     SOB (shortness of breath)     Palpitations     Peripheral edema     Aortic stenosis     Morbid obesity with BMI of 70 and over, adult ([address] 10/27/2016    Essential hypertension 10/27/2016    Dyslipidemia 10/27/2016    Fecal incontinence 10/27/2016    Mild intermittent asthma without complication 04/97/0732    S/P laparoscopic cholecystectomy 10/27/2016    Type 2 diabetes mellitus without complication, with long-term current use of insulin ([address] 10/27/2016    Fatty liver 10/27/2016    Arthritis 10/27/2016    H/O parotidectomy 10/27/2016    Venous stasis dermatitis of both lower extremities 10/27/2016     Past Medical History:   Diagnosis Date    Aortic stenosis     Asthma     Cancer ([facility]     Cervical    Carotid artery stenosis     Chest pain     CHF [facility]congestive heart failure) [facility]     Chronic back pain     COPD exacerbation ([facility])     Depression     Diabetes mellitus ([facility]     Family history of coronary artery disease     Fatty liver 10/27/2016    H/O aortic valve stenosis     H/O echocardiogram 2016    EF 55%, Aortic valve area is 0.84 cm2 with a mean gradient of 36 suggestive of severe aortic stenosis, mild to mos LVH    Headache     Heart murmur     History of nuclear stress test 2016    lexiscan-normal,EF70%    Morbid obesity ([facility])     BMI=73.72 kg/m2    Neuropathy     NSTEMI (non-ST elevated myocardial infarction) ([facility] 2018    Obesity     Osteoarthritis     Palpitations     Peripheral edema     Restless legs syndrome     Sleep apnea     Has a CPAP    Sleep apnea     SOB (shortness of breath)       Past Surgical History:   Procedure Laterality Date    AORTIC VALVE REPLACEMENT  2017    29mm EvolutR TAVR     SECTION      CHOLECYSTECTOMY      GALLBLADDER SURGERY      HYSTERECTOMY      NECK SURGERY      Tumor    TUBAL LIGATION        Family History   Problem Relation Age of Onset    Heart Failure Mother     Heart Attack Mother     Hypertension Mother     Coronary Art Dis Mother         Had PTCA w/stenting.  Heart Failure Father     Heart Failure Brother     Heart Disease Mother     High Blood Pressure Mother     Obesity Mother     Diabetes Mother     Heart Disease Father     High Blood Pressure Father     Obesity Father     Heart Disease Brother     High Blood Pressure Brother     Obesity Brother       Infectious disease related family history - not contibutory.    SOCIAL HISTORY  Social History     Tobacco Use    Smoking status: Former Smoker     Types: Cigarettes     Last attempt to quit: 3/19/2003     Years since quittin.2    Smokeless tobacco: Never Used    Tobacco comment: quit 15 years ago   Substance Use Topics    Alcohol use: No       Born:  [address] Occupation:   No recent travel of significance.  No recent unusual exposures.  NO pets   ? ALLERGIES  No Known Allergies   MEDICATIONS  Reviewed and are per the chart/EMR. ? Antibiotics:   Vancomycin 6/16-18  Cefepime 6/16-18  ?  -------------------------------------------------------------------------------------------------------------------    Vital Signs:  Vitals:    06/18/19 1402   BP: (!) 125/46   Pulse: 83   Resp: 19   Temp:    SpO2: 100%         Exam:    VS: noted; wt 205.6 kg  Gen: alert and oriented X3, no distress  Skin: no stigmata of endocarditis  Wounds: Left plantar ulcer, does not probe to bone has callus over it C/D/I  HEMT: AT/NC Oropharynx pink, moist, and without lesions or exudates; dentition in good state of repair  Eyes: PERRLA, EOMI, conjunctiva pink, sclera anicteric. Neck: Supple. Trachea midline. No LAD. Chest: no distress and CTA. Good air movement. Heart: RRR and no MRG. Abd: soft, non-distended, no tenderness, no hepatomegaly. Normoactive bowel sounds. Ext: no clubbing, cyanosis, or edema  Catheter Site: without erythema or tenderness  Neuro: Mental status intact. CN 2-12 intact and no focal sensory or motor deficits    ? Diagnostic Studies: reviewed  ? ? I have examined this patient and available medical records on this date and have made the above observations, conclusions and recommendations. Electronically signed by: Electronically signed by Isa Milton MD on 6/18/2019 at 2:30 PM  This dictation was created with voice recognition software. While attempts have been made to review the dictation as it is transcribed, on occasion the spoken word can be misinterpreted by the technology leading to omissions or inappropriate words, phrases or sentences.     Electronically signed by: Electronically signed by Isa Milton MD on

## 2019-06-18 NOTE — PROGRESS NOTES
Progress Note( Dr. Pradeep Nicole)  6/17/2019  Subjective:   Admit Date: 6/16/2019  PCP: THEODROE Roberts NP    Admitted For : Shortness of breath leg pain and headache    Consulted For: Better control of blood glucose    Interval History: Somewhat better but still complaining of leg pain    Denies any chest pains,   Denies SOB . Denies nausea or vomiting. No new bowel or bladder symptoms. Intake/Output Summary (Last 24 hours) at 6/17/2019 2248  Last data filed at 6/17/2019 1912  Gross per 24 hour   Intake 4833.43 ml   Output 1350 ml   Net 3483.43 ml       DATA    CBC:   Recent Labs     06/16/19  1522 06/17/19  0500   WBC 11.9* 12.3*   HGB 9.2* 7.7*    233    CMP:  Recent Labs     06/16/19  1522 06/17/19  0500   * 133*   K 4.1 4.2   CL 90* 92*   CO2 31 33*   BUN 14 16   CREATININE 0.6 0.8   CALCIUM 9.6 8.5   PROT 8.3*  --    LABALBU 3.8  --    BILITOT 0.4  --    ALKPHOS 99  --    AST 17  --    ALT 15  --      Lipids:   Lab Results   Component Value Date    CHOL 186 02/07/2019    HDL 39 02/07/2019    TRIG 122 02/07/2019     Glucose:  Recent Labs     06/17/19  1258 06/17/19  1743 06/17/19  2125   POCGLU 223* 195* 224*     [id number]:  Lab Results   Component Value Date    LABA1C 11.2 06/16/2019     High Sensitivity TSH:   Lab Results   Component Value Date    TSHHS 0.820 02/06/2019     Free T3: No results found for: FT3  Free T4:  Lab Results   Component Value Date    T4FREE 1.07 09/04/2018       Xr Foot Left (2 Views)    Result Date: 6/17/2019  EXAMINATION: TWO XRAY VIEWS OF THE LEFT FOOT 6/17/2019 8:52 am COMPARISON: 11/24/2018 HISTORY: ORDERING SYSTEM PROVIDED HISTORY: left diabetic ulcer TECHNOLOGIST PROVIDED HISTORY: Reason for exam:->left diabetic ulcer Ordering Physician Provided Reason for Exam: left diabetic ulcer FINDINGS: No fracture or dislocation is demonstrated. The Lisfranc alignment is maintained. No erosive changes to suggest osteomyelitis are demonstrated.  There is a moderate plantar calcaneal spur. Mild degenerative changes of the midfoot are present. Enthesophyte formation at the Achilles insertion is noted. Peripheral vascular atherosclerotic calcifications are present. There is generalized soft tissue swelling, which is most notable over the anterior ankle. No acute osseous abnormality of the left foot. No evidence of osteomyelitis. Ct Head Wo Contrast    Result Date: 6/16/2019  EXAMINATION: CT OF THE HEAD WITHOUT CONTRAST  6/16/2019 7:16 pm TECHNIQUE: CT of the head was performed without the administration of intravenous contrast. Dose modulation, iterative reconstruction, and/or weight based adjustment of the mA/kV was utilized to reduce the radiation dose to as low as reasonably achievable. COMPARISON: None. HISTORY: ORDERING SYSTEM PROVIDED HISTORY: CONFUSION/DELIRIUM, ALTERED LOC, UNEXPLAINED TECHNOLOGIST PROVIDED HISTORY: Has a \"code stroke\" or \"stroke alert\" been called? ->No Ordering Physician Provided Reason for Exam: Confusion/delirium, altered loc, unexplained; pt is 434 lbs repeated lower brain scan due to scatter. Best images possible Acuity: Acute Type of Exam: Initial Relevant Medical/Surgical History: none FINDINGS: BRAIN/VENTRICLES: There is no acute intracranial hemorrhage, mass effect or midline shift. No abnormal extra-axial fluid collection. The gray-white differentiation is maintained without evidence of an acute infarct. There is no evidence of hydrocephalus. ORBITS: The visualized portion of the orbits demonstrate no acute abnormality. SINUSES: There is an opacified right sphenoid sinus. SOFT TISSUES/SKULL:  No acute abnormality of the visualized skull or soft tissues. No acute intracranial abnormality. Xr Chest Portable    Result Date: 6/16/2019  EXAMINATION: ONE XRAY VIEW OF THE CHEST 6/16/2019 8:44 pm COMPARISON: Chest radiograph 06/16/2019.  HISTORY: ORDERING SYSTEM PROVIDED HISTORY: CVC placement TECHNOLOGIST PROVIDED HISTORY: Reason for exam:->CVC placement Ordering Physician Provided Reason for Exam: line placement FINDINGS: A right subclavian central venous catheter terminates in the superior vena cava. The cardiomediastinal silhouette is unchanged. At the lung bases are not included in the field of view. No pneumothorax. Pulmonary vascular congestion without significant change. No acute osseous abnormality. Right subclavian central venous catheter terminating in the superior vena cava. Xr Chest 1 Vw    Result Date: 6/17/2019  EXAMINATION: ONE XRAY VIEW OF THE CHEST 6/16/2019 4:11 pm COMPARISON: Chest radiograph 02/05/2019 HISTORY: ORDERING SYSTEM PROVIDED HISTORY: Fever, shortness of breath TECHNOLOGIST PROVIDED HISTORY: Reason for exam:->Fever, shortness of breath Ordering Physician Provided Reason for Exam: fever, shortness of breath Acuity: Acute Type of Exam: Initial Relevant Medical/Surgical History: pt unresponsive and suffers from morbid obesity FINDINGS: Stable cardiomegaly. Pulmonary vascular congestion. Patchy bilateral perihilar and bibasilar airspace opacities. No pleural effusion or pneumothorax. Pulmonary vascular congestion and patchy bilateral perihilar and bibasilar airspace opacities may reflect pulmonary edema or atypical infection. Vl Dup Lower Extremity Venous Bilateral    Result Date: 6/17/2019  EXAMINATION: DUPLEX VENOUS ULTRASOUND OF THE BILATERAL LOWER EXTREMITIES, 6/17/2019 6:03 am TECHNIQUE: Duplex ultrasound and Doppler images were obtained of the bilateral lower extremities COMPARISON: None. HISTORY: ORDERING SYSTEM PROVIDED HISTORY: edema TECHNOLOGIST PROVIDED HISTORY: Reason for exam:->edema Ordering Physician Provided Reason for Exam: edema Acuity: Chronic Type of Exam: Initial FINDINGS: The visualized veins of the bilateral lower extremities are patent and free of echogenic thrombus. The veins are normally compressible and have normal phasic flow.  Evaluation is technically limited by body habitus. No evidence of DVT in either lower extremity. Technically limited by body habitus. Scheduled Medicines   Medications:    vancomycin  1,500 mg Intravenous Q12H    gabapentin  800 mg Oral TID    pramipexole  1 mg Oral TID    insulin lispro  20 Units Subcutaneous TID     sodium chloride flush  10 mL Intravenous 2 times per day    sodium chloride flush  10 mL Intravenous 2 times per day    enoxaparin  40 mg Subcutaneous Daily    pantoprazole  40 mg Intravenous Daily    cefepime  2 g Intravenous Q12H    nystatin   Topical BID    insulin glargine  25 Units Subcutaneous Nightly    insulin lispro  0-12 Units Subcutaneous TID WC    insulin lispro  0-6 Units Subcutaneous 2 times per day      Infusions:    sodium chloride 100 mL/hr at 06/17/19 1828    dextrose           Objective:   Vitals: BP (!) 140/49   Pulse 86   Temp 98.9 °F (37.2 °C) (Oral)   Resp 17   Ht 5' 6\" (1.676 m)   Wt (!) 453 lb 3.2 oz (205.6 kg)   SpO2 98%   BMI 73.15 kg/m²   General appearance: alert and cooperative with exam  Neck: no JVD or bruit  Thyroid : Normal lobes   Lungs: Has Vesicular Breath sounds   Heart:  regular rate and rhythm  Abdomen: soft, non-tender; bowel sounds normal; no masses,  no organomegaly  Musculoskeletal: Normal  Extremities: extremities normal, , as edema. Leg cellulitis  Neurologic:  Awake, alert, oriented to name, place and time. Cranial nerves II-XII are grossly intact. Motor is  intact. Sensory is intact. ,  and gait is abnormal and unstable.     Assessment:     Patient Active Problem List:     Morbid obesity with BMI of 70 and over, adult [facility])     Essential hypertension     Dyslipidemia     Fecal incontinence     Mild intermittent asthma without complication     S/P laparoscopic cholecystectomy     Type 2 diabetes mellitus without complication, with long-term current use of insulin (HCC)     Fatty liver     Arthritis     H/O parotidectomy     Venous stasis dermatitis of both lower extremities     Aortic stenosis     Morbid obesity (HCC)     Asthma     Diabetes mellitus ([address]     Hypertension     Sleep apnea     Family history of coronary artery disease     Chest pain     SOB (shortness of breath)     Palpitations     Peripheral edema     Hypervolemia     Chronic respiratory failure with hypoxia and hypercapnia (HCC)     Acute exacerbation of chronic obstructive pulmonary disease (COPD) (HCC)     Chronic obstructive pulmonary disease (HCC)     Gait disturbance     Acute on chronic respiratory failure with hypoxia and hypercapnia (HCC)     Cellulitis     Skin ulcer of left foot with fat layer exposed ([address]     Diabetic ulcer of left midfoot associated with type 2 diabetes mellitus, with necrosis of muscle (HCC)     Pneumonia     VHD (valvular heart disease)     Septic shock (HCC)     Community acquired pneumonia     Uncontrolled type 2 diabetes mellitus with hyperglycemia (HCC)     Class 3 severe obesity due to excess calories with body mass index (BMI) greater than or equal to 70 in adult [facility])      Plan:     1. Reviewed POC blood glucose . Labs and X ray results   2. Reviewed Current Medicines   3. On meal/ Correction bolus Humalog/ Basal Lantus Insulin regime / and Oral Hypoglycemic drugs   4. Monitor Blood glucose frequently   5. Modified  the dose of Insulin/ other medicines as needed   6. Will follow     .      Delia Villatoro MD

## 2019-06-18 NOTE — PROGRESS NOTES
[address] [facility]  consulted by Dr. Paul Brandt for monitoring and adjustment. Indication for treatment: Septic Shock 2/2 CAP vs. Diabetic Foot Ulcer  Goal trough: 15 mcg/mL     Pertinent Laboratory Values:   Temp Readings from Last 3 Encounters:   19 98.4 °F (36.9 °C) (Oral)   19 98.1 °F (36.7 °C) (Oral)   18 98 °F (36.7 °C) (Oral)     Recent Labs     19  1522 19  0220 19  0500 19  0650   WBC 11.9*  --  12.3* 8.6   LACTATE 3.3  LACTI CALLED TO DR BAKER @ [phone] [id number] [facility]  RESULTS READ BACK  * 1.2  --   --      Recent Labs     19  1522 19  0500 19  0650   BUN 14 16 13   CREATININE 0.6 0.8 0.7     Estimated Creatinine Clearance: 155 mL/min (based on SCr of 0.7 mg/dL). Intake/Output Summary (Last 24 hours) at 2019 1258  Last data filed at 2019 1041  Gross per 24 hour   Intake 2256.43 ml   Output 2500 ml   Net -243.57 ml       Pertinent Cultures:  Date    Source    Results     Blood    Beta Strep, PCR     Urine    NGTD     MRSA nasal   Negative     Resp panel   Negative     Legionella/Strep  Negative    Vancomycin level:   TROUGH:    Recent Labs     19  0650   VANCOTROUGH 10.1     RANDOM:  No results for input(s): VANCORANDOM in the last 72 hours. Assessment:  WBC and temperature: WBC WNL. Tmax = 101.2F.  SCr, BUN, and urine output: Stable, WNL. Day(s) of therapy: #2  Vancomycin level: 10.1 mcg/mL    Plan:  Vancomycin trough level obtained 14 hours post-previous vancomycin dose. Level was obtained after x3 doses. Expect true trough level ~15 mcg/mL if obtained at 12 hours and at steady state (>4 doses). Renal labs are stable at this time. Plan to continue current vancomycin dosin mg IVPB every 12 hours. Plan to repeat a trough level in 72 hours to check for accumulation 2/2 BMI. Pharmacy will continue to monitor patient and adjust therapy as indicated.     Thank you for the consult,    Shara Phillips, PharmD, [facility]

## 2019-06-18 NOTE — CARE COORDINATION
Cm met with pt to initiate discharge planning. Pt sitting up in chair. Pt admitted with sepsis. Pt, spouse, dgt (only child) and grandaughter reside together in a 2 story home with pt's bed, bath and laundry on the main floor. Pt's  works outside the home. Pt's dgt is not working outside the home. Pt's  and dgt drive. Pt has the following DME; BSC, bipap, nebulizer, 02, walker and glucometer. Pt is interested in getting a trapeze and shower chair. Pt has PCP and insurance to assist with cost of Rxs. Pt has had HC in the past but does not anticipate a need for it at discharge although she states that having a HHA was helpful with her bathing. Cm discussed with pt whether she has considered bariatric surgery. Pt has considered it and has gone to informational meetings but states that a 300.00 dollar payment is required (in installments) and she has not felt that she had the money to do so. Cm encouraged pt to make an appt with the surgeon or his office to see if alternative arrangements could be made. Pt anticipates a discharge to home. Cm will investigate a possible trapeze and shower chair for pt at discharge. White board updated with CM name and pH#.

## 2019-06-19 ENCOUNTER — APPOINTMENT (OUTPATIENT)
Dept: NUCLEAR MEDICINE | Age: 64
DRG: 720 | End: 2019-06-19
Payer: COMMERCIAL

## 2019-06-19 LAB
ANION GAP SERPL CALCULATED.3IONS-SCNC: 6 MMOL/L (ref 4–16)
BASOPHILS ABSOLUTE: 0 K/CU MM
BASOPHILS ABSOLUTE: 0 K/CU MM
BASOPHILS RELATIVE PERCENT: 0.5 % (ref 0–1)
BASOPHILS RELATIVE PERCENT: 0.5 % (ref 0–1)
BUN BLDV-MCNC: 18 MG/DL (ref 6–23)
CALCIUM SERPL-MCNC: 8.5 MG/DL (ref 8.3–10.6)
CHLORIDE BLD-SCNC: 96 MMOL/L (ref 99–110)
CO2: 35 MMOL/L (ref 21–32)
CREAT SERPL-MCNC: 0.8 MG/DL (ref 0.6–1.1)
CULTURE: ABNORMAL
DIFFERENTIAL TYPE: ABNORMAL
DIFFERENTIAL TYPE: ABNORMAL
EOSINOPHILS ABSOLUTE: 0.1 K/CU MM
EOSINOPHILS ABSOLUTE: 0.1 K/CU MM
EOSINOPHILS RELATIVE PERCENT: 1.5 % (ref 0–3)
EOSINOPHILS RELATIVE PERCENT: 2.1 % (ref 0–3)
FERRITIN: 67 NG/ML (ref 15–150)
FOLATE: >20 NG/ML (ref 3.1–17.5)
GFR AFRICAN AMERICAN: >60 ML/MIN/1.73M2
GFR NON-AFRICAN AMERICAN: >60 ML/MIN/1.73M2
GLUCOSE BLD-MCNC: 182 MG/DL (ref 70–99)
GLUCOSE BLD-MCNC: 188 MG/DL (ref 70–99)
GLUCOSE BLD-MCNC: 199 MG/DL (ref 70–99)
GLUCOSE BLD-MCNC: 206 MG/DL (ref 70–99)
GLUCOSE BLD-MCNC: 220 MG/DL (ref 70–99)
GLUCOSE BLD-MCNC: 225 MG/DL (ref 70–99)
HCT VFR BLD CALC: 24.1 % (ref 37–47)
HCT VFR BLD CALC: 25.2 % (ref 37–47)
HEMOGLOBIN: 7.1 GM/DL (ref 12.5–16)
HEMOGLOBIN: ABNORMAL GM/DL (ref 12.5–16)
IMMATURE NEUTROPHIL %: 2 % (ref 0–0.43)
IMMATURE NEUTROPHIL %: 2.2 % (ref 0–0.43)
IRON: 18 UG/DL (ref 37–145)
LYMPHOCYTES ABSOLUTE: 1 K/CU MM
LYMPHOCYTES ABSOLUTE: 1.1 K/CU MM
LYMPHOCYTES RELATIVE PERCENT: 13.5 % (ref 24–44)
LYMPHOCYTES RELATIVE PERCENT: 15.7 % (ref 24–44)
Lab: ABNORMAL
Lab: ABNORMAL
MCH RBC QN AUTO: 23 PG (ref 27–31)
MCH RBC QN AUTO: 23 PG (ref 27–31)
MCHC RBC AUTO-ENTMCNC: 28.2 % (ref 32–36)
MCHC RBC AUTO-ENTMCNC: 28.2 % (ref 32–36)
MCV RBC AUTO: 81.4 FL (ref 78–100)
MCV RBC AUTO: 81.6 FL (ref 78–100)
MONOCYTES ABSOLUTE: 0.7 K/CU MM
MONOCYTES ABSOLUTE: 1 K/CU MM
MONOCYTES RELATIVE PERCENT: 10.7 % (ref 0–4)
MONOCYTES RELATIVE PERCENT: 12.3 % (ref 0–4)
NUCLEATED RBC %: 0 %
NUCLEATED RBC %: 0 %
PCT TRANSFERRIN: 6 % (ref 10–44)
PDW BLD-RTO: 16.4 % (ref 11.7–14.9)
PDW BLD-RTO: 16.5 % (ref 11.7–14.9)
PLATELET # BLD: 208 K/CU MM (ref 140–440)
PLATELET # BLD: 208 K/CU MM (ref 140–440)
PMV BLD AUTO: 9.7 FL (ref 7.5–11.1)
PMV BLD AUTO: 9.9 FL (ref 7.5–11.1)
POTASSIUM SERPL-SCNC: 4.1 MMOL/L (ref 3.5–5.1)
RBC # BLD: 2.96 M/CU MM (ref 4.2–5.4)
RBC # BLD: 3.09 M/CU MM (ref 4.2–5.4)
RETICULOCYTE COUNT PCT: 1.6 % (ref 0.2–2.2)
SEGMENTED NEUTROPHILS ABSOLUTE COUNT: 4.3 K/CU MM
SEGMENTED NEUTROPHILS ABSOLUTE COUNT: 5.5 K/CU MM
SEGMENTED NEUTROPHILS RELATIVE PERCENT: 68.8 % (ref 36–66)
SEGMENTED NEUTROPHILS RELATIVE PERCENT: 70.2 % (ref 36–66)
SODIUM BLD-SCNC: 137 MMOL/L (ref 135–145)
SPECIMEN: ABNORMAL
SPECIMEN: ABNORMAL
TOTAL IMMATURE NEUTOROPHIL: 0.14 K/CU MM
TOTAL IMMATURE NEUTOROPHIL: 0.16 K/CU MM
TOTAL IRON BINDING CAPACITY: 287 UG/DL (ref 250–450)
TOTAL NUCLEATED RBC: 0 K/CU MM
TOTAL NUCLEATED RBC: 0 K/CU MM
UNSATURATED IRON BINDING CAPACITY: 269 UG/DL (ref 110–370)
VITAMIN B-12: 983.4 PG/ML (ref 211–911)
WBC # BLD: 6.3 K/CU MM (ref 4–10.5)
WBC # BLD: 7.9 K/CU MM (ref 4–10.5)

## 2019-06-19 PROCEDURE — 78315 BONE IMAGING 3 PHASE: CPT

## 2019-06-19 PROCEDURE — 6360000002 HC RX W HCPCS: Performed by: NURSE PRACTITIONER

## 2019-06-19 PROCEDURE — 6370000000 HC RX 637 (ALT 250 FOR IP): Performed by: INTERNAL MEDICINE

## 2019-06-19 PROCEDURE — 3430000000 HC RX DIAGNOSTIC RADIOPHARMACEUTICAL: Performed by: INTERNAL MEDICINE

## 2019-06-19 PROCEDURE — P9016 RBC LEUKOCYTES REDUCED: HCPCS

## 2019-06-19 PROCEDURE — 6360000002 HC RX W HCPCS: Performed by: INTERNAL MEDICINE

## 2019-06-19 PROCEDURE — 83550 IRON BINDING TEST: CPT

## 2019-06-19 PROCEDURE — 36430 TRANSFUSION BLD/BLD COMPNT: CPT

## 2019-06-19 PROCEDURE — 83540 ASSAY OF IRON: CPT

## 2019-06-19 PROCEDURE — 2580000003 HC RX 258: Performed by: NURSE PRACTITIONER

## 2019-06-19 PROCEDURE — 80048 BASIC METABOLIC PNL TOTAL CA: CPT

## 2019-06-19 PROCEDURE — 86901 BLOOD TYPING SEROLOGIC RH(D): CPT

## 2019-06-19 PROCEDURE — 1200000000 HC SEMI PRIVATE

## 2019-06-19 PROCEDURE — 94660 CPAP INITIATION&MGMT: CPT

## 2019-06-19 PROCEDURE — 6370000000 HC RX 637 (ALT 250 FOR IP): Performed by: NURSE PRACTITIONER

## 2019-06-19 PROCEDURE — 99233 SBSQ HOSP IP/OBS HIGH 50: CPT | Performed by: INTERNAL MEDICINE

## 2019-06-19 PROCEDURE — 82962 GLUCOSE BLOOD TEST: CPT

## 2019-06-19 PROCEDURE — A9503 TC99M MEDRONATE: HCPCS | Performed by: INTERNAL MEDICINE

## 2019-06-19 PROCEDURE — 2580000003 HC RX 258: Performed by: INTERNAL MEDICINE

## 2019-06-19 PROCEDURE — 6370000000 HC RX 637 (ALT 250 FOR IP): Performed by: HOSPITALIST

## 2019-06-19 PROCEDURE — 85025 COMPLETE CBC W/AUTO DIFF WBC: CPT

## 2019-06-19 PROCEDURE — 82607 VITAMIN B-12: CPT

## 2019-06-19 PROCEDURE — 86922 COMPATIBILITY TEST ANTIGLOB: CPT

## 2019-06-19 PROCEDURE — 82728 ASSAY OF FERRITIN: CPT

## 2019-06-19 PROCEDURE — 82746 ASSAY OF FOLIC ACID SERUM: CPT

## 2019-06-19 PROCEDURE — C9113 INJ PANTOPRAZOLE SODIUM, VIA: HCPCS | Performed by: NURSE PRACTITIONER

## 2019-06-19 PROCEDURE — 86850 RBC ANTIBODY SCREEN: CPT

## 2019-06-19 PROCEDURE — 85045 AUTOMATED RETICULOCYTE COUNT: CPT

## 2019-06-19 PROCEDURE — 86900 BLOOD TYPING SEROLOGIC ABO: CPT

## 2019-06-19 RX ORDER — 0.9 % SODIUM CHLORIDE 0.9 %
250 INTRAVENOUS SOLUTION INTRAVENOUS ONCE
Status: COMPLETED | OUTPATIENT
Start: 2019-06-19 | End: 2019-06-20

## 2019-06-19 RX ORDER — INSULIN GLARGINE 100 [IU]/ML
40 INJECTION, SOLUTION SUBCUTANEOUS NIGHTLY
Status: DISCONTINUED | OUTPATIENT
Start: 2019-06-19 | End: 2019-06-27 | Stop reason: HOSPADM

## 2019-06-19 RX ORDER — TC 99M MEDRONATE 20 MG/10ML
25 INJECTION, POWDER, LYOPHILIZED, FOR SOLUTION INTRAVENOUS
Status: COMPLETED | OUTPATIENT
Start: 2019-06-19 | End: 2019-06-19

## 2019-06-19 RX ADMIN — INSULIN LISPRO 3 UNITS: 100 INJECTION, SOLUTION INTRAVENOUS; SUBCUTANEOUS at 09:51

## 2019-06-19 RX ADMIN — INSULIN LISPRO 25 UNITS: 100 INJECTION, SOLUTION INTRAVENOUS; SUBCUTANEOUS at 09:53

## 2019-06-19 RX ADMIN — INSULIN LISPRO 6 UNITS: 100 INJECTION, SOLUTION INTRAVENOUS; SUBCUTANEOUS at 13:14

## 2019-06-19 RX ADMIN — SODIUM CHLORIDE, PRESERVATIVE FREE 10 ML: 5 INJECTION INTRAVENOUS at 22:13

## 2019-06-19 RX ADMIN — INSULIN GLARGINE 40 UNITS: 100 INJECTION, SOLUTION SUBCUTANEOUS at 22:07

## 2019-06-19 RX ADMIN — INSULIN LISPRO 25 UNITS: 100 INJECTION, SOLUTION INTRAVENOUS; SUBCUTANEOUS at 13:15

## 2019-06-19 RX ADMIN — PRAMIPEXOLE DIHYDROCHLORIDE 1 MG: 0.25 TABLET ORAL at 22:06

## 2019-06-19 RX ADMIN — INSULIN LISPRO 3 UNITS: 100 INJECTION, SOLUTION INTRAVENOUS; SUBCUTANEOUS at 18:33

## 2019-06-19 RX ADMIN — SODIUM CHLORIDE, PRESERVATIVE FREE 10 ML: 5 INJECTION INTRAVENOUS at 22:12

## 2019-06-19 RX ADMIN — ACETAMINOPHEN 650 MG: 325 TABLET ORAL at 01:56

## 2019-06-19 RX ADMIN — PANTOPRAZOLE SODIUM 40 MG: 40 INJECTION, POWDER, FOR SOLUTION INTRAVENOUS at 09:50

## 2019-06-19 RX ADMIN — NYSTATIN CREAM: 100000 CREAM TOPICAL at 22:06

## 2019-06-19 RX ADMIN — PRAMIPEXOLE DIHYDROCHLORIDE 1 MG: 0.25 TABLET ORAL at 13:14

## 2019-06-19 RX ADMIN — INSULIN LISPRO 25 UNITS: 100 INJECTION, SOLUTION INTRAVENOUS; SUBCUTANEOUS at 18:35

## 2019-06-19 RX ADMIN — INSULIN LISPRO 3 UNITS: 100 INJECTION, SOLUTION INTRAVENOUS; SUBCUTANEOUS at 22:07

## 2019-06-19 RX ADMIN — INSULIN LISPRO 2 UNITS: 100 INJECTION, SOLUTION INTRAVENOUS; SUBCUTANEOUS at 01:57

## 2019-06-19 RX ADMIN — GABAPENTIN 800 MG: 400 CAPSULE ORAL at 22:05

## 2019-06-19 RX ADMIN — LINAGLIPTIN 5 MG: 5 TABLET, FILM COATED ORAL at 09:50

## 2019-06-19 RX ADMIN — SODIUM CHLORIDE 3 G: 900 INJECTION, SOLUTION INTRAVENOUS at 06:47

## 2019-06-19 RX ADMIN — SODIUM CHLORIDE 3 G: 900 INJECTION, SOLUTION INTRAVENOUS at 17:52

## 2019-06-19 RX ADMIN — SODIUM CHLORIDE 3 G: 900 INJECTION, SOLUTION INTRAVENOUS at 12:05

## 2019-06-19 RX ADMIN — ENOXAPARIN SODIUM 40 MG: 100 INJECTION SUBCUTANEOUS at 09:50

## 2019-06-19 RX ADMIN — NYSTATIN CREAM: 100000 CREAM TOPICAL at 09:50

## 2019-06-19 RX ADMIN — GABAPENTIN 800 MG: 400 CAPSULE ORAL at 09:50

## 2019-06-19 RX ADMIN — SODIUM CHLORIDE 250 ML: 9 INJECTION, SOLUTION INTRAVENOUS at 16:29

## 2019-06-19 RX ADMIN — GABAPENTIN 800 MG: 400 CAPSULE ORAL at 13:13

## 2019-06-19 RX ADMIN — SODIUM CHLORIDE 3 G: 900 INJECTION, SOLUTION INTRAVENOUS at 00:25

## 2019-06-19 RX ADMIN — TC 99M MEDRONATE 25 MILLICURIE: 20 INJECTION, POWDER, LYOPHILIZED, FOR SOLUTION INTRAVENOUS at 10:15

## 2019-06-19 RX ADMIN — SODIUM CHLORIDE, PRESERVATIVE FREE 10 ML: 5 INJECTION INTRAVENOUS at 09:51

## 2019-06-19 ASSESSMENT — PAIN SCALES - GENERAL
PAINLEVEL_OUTOF10: 0
PAINLEVEL_OUTOF10: 0

## 2019-06-19 NOTE — PROGRESS NOTES
hours. Recent Labs     06/16/19  1522   TROPONINT <0.010       I/O last 3 completed shifts: In: 2256.4 [P.O.:540; I.V.:1116.4; IV Piggyback:600]  Out: 2500 [Urine:2500]    Intake/Output Summary (Last 24 hours) at 6/18/2019 2239  Last data filed at 6/18/2019 2138  Gross per 24 hour   Intake 560 ml   Output 2200 ml   Net -1640 ml        Assessment/Plan:   1. Sepsis due to Group B strep-bone scan to be done  2. Insulin-dependent diabetes mellitus-Lantus at night, sliding scale, monitor blood glucose - continue to monitor sugar  3. GERD-Protonix IV daily  4. Diabetic neuropathy-gabapentin 3 times a day    DVT Prophylaxis: lovenox  Diet: DIET CARB CONTROL;   Dietary Nutrition Supplements: Low Calorie High Protein Supplement  Code Status: Full Code    Dispo: when stable     Electronically signed by Ponce Lira MD on 6/18/2019 at 10:39 PM

## 2019-06-19 NOTE — PROGRESS NOTES
MMOL/L    Chloride 96 (L) 99 - 110 mMol/L    CO2 35 (H) 21 - 32 MMOL/L    Anion Gap 6 4 - 16    BUN 18 6 - 23 MG/DL    CREATININE 0.8 0.6 - 1.1 MG/DL    Glucose 206 (H) 70 - 99 MG/DL    Calcium 8.5 8.3 - 10.6 MG/DL    GFR Non-African American >60 >60 mL/min/1.73m2    GFR African American >60 >60 mL/min/1.73m2   CBC auto differential    Collection Time: 06/19/19  4:50 AM   Result Value Ref Range    WBC 7.9 4.0 - 10.5 K/CU MM    RBC 2.96 (L) 4.2 - 5.4 M/CU MM    Hemoglobin (LL) 12.5 - 16.0 GM/DL     6.8  HGB CALLED TO RICARDO SHAIKH RN 6/19 0515 BY CARLYN GUEVARA      Hematocrit 24.1 (L) 37 - 47 %    MCV 81.4 78 - 100 FL    MCH 23.0 (L) 27 - 31 PG    MCHC 28.2 (L) 32.0 - 36.0 %    RDW 16.4 (H) 11.7 - 14.9 %    Platelets 622 122 - 887 K/CU MM    MPV 9.7 7.5 - 11.1 FL    Differential Type AUTOMATED DIFFERENTIAL     Segs Relative 70.2 (H) 36 - 66 %    Lymphocytes % 13.5 (L) 24 - 44 %    Monocytes % 12.3 (H) 0 - 4 %    Eosinophils % 1.5 0 - 3 %    Basophils % 0.5 0 - 1 %    Segs Absolute 5.5 K/CU MM    Lymphocytes # 1.1 K/CU MM    Monocytes # 1.0 K/CU MM    Eosinophils # 0.1 K/CU MM    Basophils # 0.0 K/CU MM    Nucleated RBC % 0.0 %    Total Nucleated RBC 0.0 K/CU MM    Total Immature Neutrophil 0.16 K/CU MM    Immature Neutrophil % 2.0 (H) 0 - 0.43 %   POCT Glucose    Collection Time: 06/19/19  8:56 AM   Result Value Ref Range    POC Glucose 199 (H) 70 - 99 MG/DL   ANEMIA PANEL    Collection Time: 06/19/19 12:10 PM   Result Value Ref Range    WBC 6.3 4.0 - 10.5 K/CU MM    RBC 3.09 (L) 4.2 - 5.4 M/CU MM    Hemoglobin 7.1 (L) 12.5 - 16.0 GM/DL    Hematocrit 25.2 (L) 37 - 47 %    MCV 81.6 78 - 100 FL    MCH 23.0 (L) 27 - 31 PG    MCHC 28.2 (L) 32.0 - 36.0 %    RDW 16.5 (H) 11.7 - 14.9 %    Platelets 411 959 - 908 K/CU MM    MPV 9.9 7.5 - 11.1 FL    Differential Type AUTOMATED DIFFERENTIAL     Segs Relative 68.8 (H) 36 - 66 %    Lymphocytes % 15.7 (L) 24 - 44 %    Monocytes % 10.7 (H) 0 - 4 %    Eosinophils % 2.1 0 - 3 % failure with hypoxia and hypercapnia (HCC)    Acute exacerbation of chronic obstructive pulmonary disease (COPD) (HCC)    Chronic obstructive pulmonary disease (HCC)    Gait disturbance    Acute on chronic respiratory failure with hypoxia and hypercapnia (HCC)    Cellulitis    Skin ulcer of left foot with fat layer exposed ([address]    Diabetic ulcer of left midfoot associated with type 2 diabetes mellitus, with necrosis of muscle (HCC)    Pneumonia    VHD (valvular heart disease)    Septic shock (HCC)    Community acquired pneumonia    Uncontrolled type 2 diabetes mellitus with hyperglycemia (HCC)    Class 3 severe obesity due to excess calories with body mass index (BMI) greater than or equal to 70 in [facility])       Active Problems  Principal Problem:    Septic shock (HCC)  Active Problems:    Diabetic ulcer of left midfoot associated with type 2 diabetes mellitus, with necrosis of muscle (HCC)    Community acquired pneumonia    Uncontrolled type 2 diabetes mellitus with hyperglycemia (HCC)    Class 3 severe obesity due to excess calories with body mass index (BMI) greater than or equal to 70 in [facility])  Resolved Problems:    * No resolved hospital problems.  *    Electronically signed by: Electronically signed by Tiara Flores MD on 6/19/2019 at 7:48 PM

## 2019-06-19 NOTE — PROGRESS NOTES
Progress Note( Dr. Parra Ant)  6/18/2019  Subjective:   Admit Date: 6/16/2019  PCP: THEODORE Bonilla NP    Admitted For : Shortness of breath leg pain and headache    Consulted For: Better control of blood glucose    Interval History: Somewhat better but still complaining of leg pain    Denies any chest pains,   Denies SOB . Denies nausea or vomiting. No new bowel or bladder symptoms. Intake/Output Summary (Last 24 hours) at 6/18/2019 2247  Last data filed at 6/18/2019 2138  Gross per 24 hour   Intake 560 ml   Output 2200 ml   Net -1640 ml       DATA    CBC:   Recent Labs     06/16/19  1522 06/17/19  0500 06/18/19  0650   WBC 11.9* 12.3* 8.6   HGB 9.2* 7.7* 7.0*    233 210    CMP:  Recent Labs     06/16/19  1522 06/17/19  0500 06/18/19  0650   * 133* 135   K 4.1 4.2 4.1   CL 90* 92* 95*   CO2 31 33* 33*   BUN 14 16 13   CREATININE 0.6 0.8 0.7   CALCIUM 9.6 8.5 8.3   PROT 8.3*  --   --    LABALBU 3.8  --   --    BILITOT 0.4  --   --    ALKPHOS 99  --   --    AST 17  --   --    ALT 15  --   --      Lipids:   Lab Results   Component Value Date    CHOL 186 02/07/2019    HDL 39 02/07/2019    TRIG 122 02/07/2019     Glucose:  Recent Labs     06/18/19  1423 06/18/19  1817 06/18/19  2130   POCGLU 254* 259* 222*     MynsippervL8X:  Lab Results   Component Value Date    LABA1C 11.2 06/16/2019     High Sensitivity TSH:   Lab Results   Component Value Date    TSHHS 0.820 02/06/2019     Free T3: No results found for: FT3  Free T4:  Lab Results   Component Value Date    T4FREE 1.07 09/04/2018       Xr Foot Left (2 Views)    Result Date: 6/17/2019  EXAMINATION: TWO XRAY VIEWS OF THE LEFT FOOT 6/17/2019 8:52 am COMPARISON: 11/24/2018 HISTORY: ORDERING SYSTEM PROVIDED HISTORY: left diabetic ulcer   No acute osseous abnormality of the left foot. No evidence of osteomyelitis.      Ct Head Wo Contrast    Result Date: 6/16/2019  EXAMINATION: CT OF THE HEAD WITHOUT CONTRAST  6/16/2019 7:16 pm     No acute intracranial abnormality. Xr Chest 1 Vw    Result Date: 6/17/2019  EXAMINATION: ONE XRAY VIEW OF THE CHEST 6/16/2019 4:11 pm COMPARISON: Chest radiograph 02/05/2019 HISTORY: ORDERING SYSTEM PROVIDED HISTORY: Fever, shortness of breath      Pulmonary vascular congestion and patchy bilateral perihilar and bibasilar airspace opacities may reflect pulmonary edema or atypical infection. Vl Dup Lower Extremity Venous Bilateral    Result Date: 6/17/2019  EXAMINATION: DUPLEX VENOUS ULTRASOUND OF THE BILATERAL LOWER EXTREMITIES, 6/17/2019 6:03 am        No evidence of DVT in either lower extremity. Technically limited by body habitus. Scheduled Medicines   Medications:    insulin glargine  30 Units Subcutaneous Nightly    insulin lispro  25 Units Subcutaneous TID WC    linagliptin  5 mg Oral Daily    ampicillin-sulbactam  3 g Intravenous Q6H    gabapentin  800 mg Oral TID    pramipexole  1 mg Oral TID    sodium chloride flush  10 mL Intravenous 2 times per day    sodium chloride flush  10 mL Intravenous 2 times per day    enoxaparin  40 mg Subcutaneous Daily    pantoprazole  40 mg Intravenous Daily    nystatin   Topical BID    insulin lispro  0-12 Units Subcutaneous TID WC    insulin lispro  0-6 Units Subcutaneous 2 times per day      Infusions:    sodium chloride 100 mL/hr at 06/17/19 1828    dextrose           Objective:   Vitals: BP (!) 153/70   Pulse 93   Temp 99.3 °F (37.4 °C) (Oral)   Resp 22   Ht 5' 6\" (1.676 m)   Wt (!) 453 lb 3.2 oz (205.6 kg)   SpO2 94%   BMI 73.15 kg/m²   General appearance: alert and cooperative with exam  Neck: no JVD or bruit  Thyroid : Normal lobes   Lungs: Has Vesicular Breath sounds   Heart:  regular rate and rhythm  Abdomen: soft, non-tender; bowel sounds normal; no masses,  no organomegaly  Musculoskeletal: Normal  Extremities: extremities normal, , edema ulcerations of both legs  Neurologic:  Awake, alert, oriented to name, place and time.   Cranial

## 2019-06-19 NOTE — PROGRESS NOTES
Progress Note( Dr. Rangel Echeverria)  6/19/2019  Subjective:   Admit Date: 6/16/2019  PCP: THEODORE Moran NP    Admitted For : Shortness of breath leg pain and headache    Consulted For: Better control of blood glucose    Interval History: Somewhat better but still complaining of leg pain    Denies any chest pains,   Denies SOB . Denies nausea or vomiting. No new bowel or bladder symptoms. Intake/Output Summary (Last 24 hours) at 6/19/2019 0650  Last data filed at 6/18/2019 2138  Gross per 24 hour   Intake 560 ml   Output 2200 ml   Net -1640 ml       DATA    CBC:   Recent Labs     06/17/19  0500 06/18/19  0650 06/19/19  0450   WBC 12.3* 8.6 7.9   HGB 7.7* 7.0* 6.8  HGB CALLED TO RICARDO SHAIKH RN 6/19 0515 BY CARLYN GUEVARA  *    210 208    CMP:  Recent Labs     06/16/19  1522 06/17/19  0500 06/18/19  0650 06/19/19  0450   * 133* 135 137   K 4.1 4.2 4.1 4.1   CL 90* 92* 95* 96*   CO2 31 33* 33* 35*   BUN 14 16 13 18   CREATININE 0.6 0.8 0.7 0.8   CALCIUM 9.6 8.5 8.3 8.5   PROT 8.3*  --   --   --    LABALBU 3.8  --   --   --    BILITOT 0.4  --   --   --    ALKPHOS 99  --   --   --    AST 17  --   --   --    ALT 15  --   --   --      Lipids:   Lab Results   Component Value Date    CHOL 186 02/07/2019    HDL 39 02/07/2019    TRIG 122 02/07/2019     Glucose:  Recent Labs     06/18/19  1817 06/18/19  2130 06/19/19  0153   POCGLU 259* 222* 220*     OoiynajtbuO4L:  Lab Results   Component Value Date    LABA1C 11.2 06/16/2019     High Sensitivity TSH:   Lab Results   Component Value Date    TSHHS 0.820 02/06/2019     Free T3: No results found for: FT3  Free T4:  Lab Results   Component Value Date    T4FREE 1.07 09/04/2018       Xr Foot Left (2 Views)    Result Date: 6/17/2019  EXAMINATION: TWO XRAY VIEWS OF THE LEFT FOOT 6/17/2019 8:52 am COMPARISON: 11/24/2018 HISTORY: ORDERING SYSTEM PROVIDED HISTORY: left diabetic ulcer   No acute osseous abnormality of the left foot. No evidence of osteomyelitis. organomegaly  Musculoskeletal: Normal  Extremities: extremities normal, , edema ulcerations of both legs  Neurologic:  Awake, alert, oriented to name, place and time. Cranial nerves II-XII are grossly intact. Motor is  intact. Sensory neuropathy t.,  and gait is abnormal and unstable    Assessment:     Patient Active Problem List:     Morbid obesity with BMI of 70 and over, [facility])     Essential hypertension     Dyslipidemia     Fecal incontinence     Mild intermittent asthma without complication     S/P laparoscopic cholecystectomy     Type 2 diabetes mellitus without complication, with long-term current use of insulin (HCC)     Fatty liver     Arthritis     H/O parotidectomy     Venous stasis dermatitis of both lower extremities     Aortic stenosis     Morbid obesity (HCC)     Asthma     Diabetes mellitus ([address]     Hypertension     Sleep apnea     Family history of coronary artery disease     Chest pain     SOB (shortness of breath)     Palpitations     Peripheral edema     Hypervolemia     Chronic respiratory failure with hypoxia and hypercapnia (HCC)     Acute exacerbation of chronic obstructive pulmonary disease (COPD) (HCC)     Chronic obstructive pulmonary disease (HCC)     Gait disturbance     Acute on chronic respiratory failure with hypoxia and hypercapnia (HCC)     Cellulitis     Skin ulcer of left foot with fat layer exposed ([address]     Diabetic ulcer of left midfoot associated with type 2 diabetes mellitus, with necrosis of muscle (HCC)     Pneumonia     VHD (valvular heart disease)     Septic shock ([address]     Community acquired pneumonia     Uncontrolled type 2 diabetes mellitus with hyperglycemia ([facility])     Class 3 severe obesity due to excess calories with body mass index (BMI) greater than or equal to 70 in [facility])      Plan:     1. Reviewed POC blood glucose . Labs and X ray results   2. Reviewed Current Medicines   3.  On meal/ Correction bolus Humalog/ Basal Lantus Insulin regime 4. Monitor Blood glucose frequently   5. Modified  the dose of Insulin/ other medicines as needed   6. Will follow     .      Cara Robert MD

## 2019-06-20 ENCOUNTER — APPOINTMENT (OUTPATIENT)
Dept: CT IMAGING | Age: 64
DRG: 720 | End: 2019-06-20
Payer: COMMERCIAL

## 2019-06-20 LAB
ABO/RH: NORMAL
ANION GAP SERPL CALCULATED.3IONS-SCNC: 7 MMOL/L (ref 4–16)
ANISOCYTOSIS: ABNORMAL
ANTIBODY SCREEN: NEGATIVE
BANDED NEUTROPHILS ABSOLUTE COUNT: 0.77 K/CU MM
BANDED NEUTROPHILS RELATIVE PERCENT: 11 % (ref 5–11)
BASOPHILS ABSOLUTE: 0.1 K/CU MM
BASOPHILS RELATIVE PERCENT: 1 % (ref 0–1)
BUN BLDV-MCNC: 21 MG/DL (ref 6–23)
CALCIUM SERPL-MCNC: 8.6 MG/DL (ref 8.3–10.6)
CHLORIDE BLD-SCNC: 98 MMOL/L (ref 99–110)
CO2: 34 MMOL/L (ref 21–32)
COMPONENT: NORMAL
CREAT SERPL-MCNC: 0.8 MG/DL (ref 0.6–1.1)
CROSSMATCH RESULT: NORMAL
DIFFERENTIAL TYPE: ABNORMAL
EOSINOPHILS ABSOLUTE: 0.2 K/CU MM
EOSINOPHILS RELATIVE PERCENT: 3 % (ref 0–3)
GFR AFRICAN AMERICAN: >60 ML/MIN/1.73M2
GFR NON-AFRICAN AMERICAN: >60 ML/MIN/1.73M2
GLUCOSE BLD-MCNC: 150 MG/DL (ref 70–99)
GLUCOSE BLD-MCNC: 153 MG/DL (ref 70–99)
GLUCOSE BLD-MCNC: 163 MG/DL (ref 70–99)
GLUCOSE BLD-MCNC: 174 MG/DL (ref 70–99)
GLUCOSE BLD-MCNC: 175 MG/DL (ref 70–99)
GLUCOSE BLD-MCNC: 178 MG/DL (ref 70–99)
HCT VFR BLD CALC: 25.4 % (ref 37–47)
HEMOGLOBIN: 7.1 GM/DL (ref 12.5–16)
LYMPHOCYTES ABSOLUTE: 1 K/CU MM
LYMPHOCYTES RELATIVE PERCENT: 14 % (ref 24–44)
MCH RBC QN AUTO: 23.1 PG (ref 27–31)
MCHC RBC AUTO-ENTMCNC: 28 % (ref 32–36)
MCV RBC AUTO: 82.7 FL (ref 78–100)
METAMYELOCYTES ABSOLUTE COUNT: 0.14 K/CU MM
METAMYELOCYTES PERCENT: 2 %
MONOCYTES ABSOLUTE: 0.7 K/CU MM
MONOCYTES RELATIVE PERCENT: 10 % (ref 0–4)
PDW BLD-RTO: 16.5 % (ref 11.7–14.9)
PLATELET # BLD: 232 K/CU MM (ref 140–440)
PLT MORPHOLOGY: ABNORMAL
PMV BLD AUTO: 10 FL (ref 7.5–11.1)
POLYCHROMASIA: ABNORMAL
POTASSIUM SERPL-SCNC: 4.2 MMOL/L (ref 3.5–5.1)
RBC # BLD: 3.07 M/CU MM (ref 4.2–5.4)
SEGMENTED NEUTROPHILS ABSOLUTE COUNT: 4.1 K/CU MM
SEGMENTED NEUTROPHILS RELATIVE PERCENT: 59 % (ref 36–66)
SODIUM BLD-SCNC: 139 MMOL/L (ref 135–145)
STATUS: NORMAL
TRANSFUSION STATUS: NORMAL
UNIT DIVISION: 0
UNIT NUMBER: NORMAL
WBC # BLD: 7 K/CU MM (ref 4–10.5)

## 2019-06-20 PROCEDURE — 2700000000 HC OXYGEN THERAPY PER DAY

## 2019-06-20 PROCEDURE — C9113 INJ PANTOPRAZOLE SODIUM, VIA: HCPCS | Performed by: NURSE PRACTITIONER

## 2019-06-20 PROCEDURE — 1200000000 HC SEMI PRIVATE

## 2019-06-20 PROCEDURE — 82962 GLUCOSE BLOOD TEST: CPT

## 2019-06-20 PROCEDURE — 6360000002 HC RX W HCPCS: Performed by: INTERNAL MEDICINE

## 2019-06-20 PROCEDURE — 80048 BASIC METABOLIC PNL TOTAL CA: CPT

## 2019-06-20 PROCEDURE — 36415 COLL VENOUS BLD VENIPUNCTURE: CPT

## 2019-06-20 PROCEDURE — 99253 IP/OBS CNSLTJ NEW/EST LOW 45: CPT | Performed by: INTERNAL MEDICINE

## 2019-06-20 PROCEDURE — 87040 BLOOD CULTURE FOR BACTERIA: CPT

## 2019-06-20 PROCEDURE — 2580000003 HC RX 258: Performed by: INTERNAL MEDICINE

## 2019-06-20 PROCEDURE — 85007 BL SMEAR W/DIFF WBC COUNT: CPT

## 2019-06-20 PROCEDURE — 85027 COMPLETE CBC AUTOMATED: CPT

## 2019-06-20 PROCEDURE — 74177 CT ABD & PELVIS W/CONTRAST: CPT

## 2019-06-20 PROCEDURE — 6360000004 HC RX CONTRAST MEDICATION: Performed by: INTERNAL MEDICINE

## 2019-06-20 PROCEDURE — 6360000002 HC RX W HCPCS: Performed by: NURSE PRACTITIONER

## 2019-06-20 PROCEDURE — 99232 SBSQ HOSP IP/OBS MODERATE 35: CPT | Performed by: INTERNAL MEDICINE

## 2019-06-20 PROCEDURE — 94761 N-INVAS EAR/PLS OXIMETRY MLT: CPT

## 2019-06-20 PROCEDURE — 6370000000 HC RX 637 (ALT 250 FOR IP): Performed by: INTERNAL MEDICINE

## 2019-06-20 PROCEDURE — 2580000003 HC RX 258: Performed by: NURSE PRACTITIONER

## 2019-06-20 PROCEDURE — 6370000000 HC RX 637 (ALT 250 FOR IP): Performed by: HOSPITALIST

## 2019-06-20 RX ORDER — 0.9 % SODIUM CHLORIDE 0.9 %
10 VIAL (ML) INJECTION
Status: DISCONTINUED | OUTPATIENT
Start: 2019-06-20 | End: 2019-06-27 | Stop reason: HOSPADM

## 2019-06-20 RX ADMIN — SODIUM CHLORIDE, PRESERVATIVE FREE 10 ML: 5 INJECTION INTRAVENOUS at 10:23

## 2019-06-20 RX ADMIN — INSULIN LISPRO 3 UNITS: 100 INJECTION, SOLUTION INTRAVENOUS; SUBCUTANEOUS at 03:26

## 2019-06-20 RX ADMIN — SODIUM CHLORIDE 3 G: 900 INJECTION, SOLUTION INTRAVENOUS at 12:25

## 2019-06-20 RX ADMIN — IOHEXOL 50 ML: 240 INJECTION, SOLUTION INTRATHECAL; INTRAVASCULAR; INTRAVENOUS; ORAL at 11:21

## 2019-06-20 RX ADMIN — SODIUM CHLORIDE 3 G: 900 INJECTION, SOLUTION INTRAVENOUS at 01:25

## 2019-06-20 RX ADMIN — INSULIN GLARGINE 40 UNITS: 100 INJECTION, SOLUTION SUBCUTANEOUS at 21:31

## 2019-06-20 RX ADMIN — NYSTATIN CREAM: 100000 CREAM TOPICAL at 10:22

## 2019-06-20 RX ADMIN — INSULIN LISPRO 25 UNITS: 100 INJECTION, SOLUTION INTRAVENOUS; SUBCUTANEOUS at 13:43

## 2019-06-20 RX ADMIN — GABAPENTIN 800 MG: 400 CAPSULE ORAL at 14:34

## 2019-06-20 RX ADMIN — ENOXAPARIN SODIUM 40 MG: 100 INJECTION SUBCUTANEOUS at 10:21

## 2019-06-20 RX ADMIN — SODIUM CHLORIDE, PRESERVATIVE FREE 10 ML: 5 INJECTION INTRAVENOUS at 21:24

## 2019-06-20 RX ADMIN — IOPAMIDOL 75 ML: 755 INJECTION, SOLUTION INTRAVENOUS at 11:21

## 2019-06-20 RX ADMIN — INSULIN LISPRO 25 UNITS: 100 INJECTION, SOLUTION INTRAVENOUS; SUBCUTANEOUS at 17:55

## 2019-06-20 RX ADMIN — INSULIN LISPRO 3 UNITS: 100 INJECTION, SOLUTION INTRAVENOUS; SUBCUTANEOUS at 21:30

## 2019-06-20 RX ADMIN — PRAMIPEXOLE DIHYDROCHLORIDE 1 MG: 0.25 TABLET ORAL at 21:23

## 2019-06-20 RX ADMIN — PANTOPRAZOLE SODIUM 40 MG: 40 INJECTION, POWDER, FOR SOLUTION INTRAVENOUS at 10:21

## 2019-06-20 RX ADMIN — SODIUM CHLORIDE 3 G: 900 INJECTION, SOLUTION INTRAVENOUS at 23:54

## 2019-06-20 RX ADMIN — SODIUM CHLORIDE, PRESERVATIVE FREE 10 ML: 5 INJECTION INTRAVENOUS at 10:02

## 2019-06-20 RX ADMIN — SODIUM CHLORIDE 3 G: 900 INJECTION, SOLUTION INTRAVENOUS at 06:26

## 2019-06-20 RX ADMIN — INSULIN LISPRO 3 UNITS: 100 INJECTION, SOLUTION INTRAVENOUS; SUBCUTANEOUS at 13:39

## 2019-06-20 RX ADMIN — NYSTATIN CREAM: 100000 CREAM TOPICAL at 21:30

## 2019-06-20 RX ADMIN — GABAPENTIN 800 MG: 400 CAPSULE ORAL at 21:23

## 2019-06-20 RX ADMIN — PRAMIPEXOLE DIHYDROCHLORIDE 1 MG: 0.25 TABLET ORAL at 14:35

## 2019-06-20 RX ADMIN — SODIUM CHLORIDE 3 G: 900 INJECTION, SOLUTION INTRAVENOUS at 18:34

## 2019-06-20 RX ADMIN — INSULIN LISPRO 3 UNITS: 100 INJECTION, SOLUTION INTRAVENOUS; SUBCUTANEOUS at 17:52

## 2019-06-20 ASSESSMENT — PAIN SCALES - GENERAL
PAINLEVEL_OUTOF10: 0
PAINLEVEL_OUTOF10: 0

## 2019-06-20 NOTE — CARE COORDINATION
Cm spoke with Maria Luisa Alvarado at [facility] [employer] who advised that they do have a trapeze that can accomodate pt's weight. They not have a shower bench or shower chair that will accomodate pt's weight. They also are unable to bill pt's insurance for the Shower bench due to reimbursement issues. The company will order a shower seat for pt that will accomodate 400.00+ pounds but pt would need to pay for the seat upfront and then the company would order it for the pt. The bariatric shower chair is 85.36 and the bariatric transfer tub bench is 125. 34.

## 2019-06-20 NOTE — CONSULTS
CARDIOLOGY CONSULT NOTE     Reason for consultation:  Need for TE Echo    Referring physician:   Manuel Swift MD     Primary care physician:  Merlin Love, APRN - NP    History of present illness:  59 Y/O Morbidly Obese white female S/P TAVR 2017, now with GBS bacteremia, consulted for TE Echo. Past medical history:    has a past medical history of Aortic stenosis, Asthma, Cancer ([address] Carotid artery stenosis, Chest pain, CHF (congestive heart failure) ([address] Chronic back pain, COPD exacerbation ([address] Depression, Diabetes mellitus ([address] Family history of coronary artery disease, Fatty liver, H/O aortic valve stenosis, H/O echocardiogram, Headache, Heart murmur, History of nuclear stress test, Morbid obesity ([address] Neuropathy, NSTEMI (non-ST elevated myocardial infarction) ([address] Obesity, Osteoarthritis, Palpitations, Peripheral edema, Restless legs syndrome, Sleep apnea, Sleep apnea, and SOB (shortness of breath). Past surgical history:   has a past surgical history that includes Cholecystectomy;  section; Hysterectomy; Tubal ligation; Neck surgery; Gallbladder surgery; and Aortic valve replacement (2017). Social History:   reports that she quit smoking about 16 years ago. Her smoking use included cigarettes. She has never used smokeless tobacco. She reports that she does not drink alcohol or use drugs. Family history:  As Reviewed family history includes Coronary Art Dis in her mother; Diabetes in her mother; Heart Attack in her mother; Heart Disease in her brother, father, and mother; Heart Failure in her brother, father, and mother; High Blood Pressure in her brother, father, and mother; Hypertension in her mother; Obesity in her brother, father, and mother.     No Known Allergies      sodium chloride (PF) 0.9 % injection 10 mL ONCE PRN   insulin glargine (LANTUS) injection vial 40 Units Nightly   insulin lispro (HUMALOG) lymph nodes  · Allergic/Immunologic: No nasal congestion or hives    Physical Examination:    /63   Pulse 80   Temp 97.7 °F (36.5 °C)   Resp 18   Ht 5' 6\" (1.676 m)   Wt (!) 453 lb 3.2 oz (205.6 kg)   SpO2 96%   BMI 73.15 kg/m²      General Appearance:  Non-obese/Well Nourished  1. Skin: It is warm & dry. No rashes noted. 2. Eyes: No conjunctival Pallor seen. No jaundice noted. 3. HEENT: atraumatic / normocephalic. EOMI. Neck is supple there is no elevation of JVD. No thyromegaly  4. Respiratory:  · Resp Assessment: Normal  · Resp Auscultation: Normal breath sounds without dullness  5. Cardiovascular:  · ALZANYMMRFEU:7/4 systolic murmur. · Carotid Arteries: Normal  · Pedal Pulses: 2+ and equal   6. Abdomen:  · Cannot Palpate  7. Musculoskeletal: No joint deformities. No muscle wasting  8. Extremities:  ·  No Cyanosis or Clubbing. edema + skin changes  9. Rectal / genital: deferred. 10. Neurological/Psychiatric:  · Oriented to time, place, and person  · Non-anxious      Lab Review   Recent Labs     06/20/19  0520   WBC 7.0   HGB 7.1*   HCT 25.4*         Recent Labs     06/20/19  0520      K 4.2   CL 98*   CO2 34*   BUN 21   CREATININE 0.8     No results for input(s): AST, ALT, ALB, BILIDIR, BILITOT, ALKPHOS in the last 72 hours. No results for input(s): TROPONINI in the last 72 hours.   No results found for: BNP  Lab Results   Component Value Date    INR 1.11 01/30/2018    PROTIME 12.9 01/30/2018       ECHO:   Limited study due to patients body habitus.   Definity given during procedure.   Left ventricular function is low normal, EF is estimated at 50-55 %.   Grade III diastolic dysfunction.   Severe left ventricular hypertrophy.   S/P 29 mm EvolutR TAVR 11/13/2017   No para valvular leak or significant gradient across the valve   Structurally normal mitral valve.   Trace mitral regurgitation is present.   Tricuspid valve is structurally normal.   RVSP is 31 mmHg.   Trace tricuspid regurgitation.   Trace/physiological pericardial fluid noted, No difference compared to   previous echo in Feb 2018   No evidence of pleural effusion.     Assessment: GBS Bacteremia  Patient Active Problem List   Diagnosis Code    Morbid obesity with BMI of 70 and over, adult ([facility] E66.01, Z68.45    Essential hypertension I10    Dyslipidemia E78.5    Fecal incontinence R15.9    Mild intermittent asthma without complication N91.27    S/P laparoscopic cholecystectomy Z90.49    Type 2 diabetes mellitus without complication, with long-term current use of insulin ([facility]) E11.9, Z79.4    Fatty liver K76.0    Arthritis M19.90    H/O parotidectomy Z90.49    Venous stasis dermatitis of both lower extremities I87.2    Aortic stenosis I35.0    Morbid obesity ([facility]) E66.01    Asthma J45.909    Diabetes mellitus ([facility] E11.9    Hypertension I10    Sleep apnea G47.30    Family history of coronary artery disease Z82.49    Chest pain R07.9    SOB (shortness of breath) R06.02    Palpitations R00.2    Peripheral edema R60.9    Hypervolemia E87.70    Chronic respiratory failure with hypoxia and hypercapnia ([facility]) J96.11, J96.12    Acute exacerbation of chronic obstructive pulmonary disease (COPD) ([facility]) J44.1    Chronic obstructive pulmonary disease ([facility]) J44.9    Gait disturbance R26.9    Acute on chronic respiratory failure with hypoxia and hypercapnia ([facility]) J96.21, J96.22    Cellulitis L03.90    Skin ulcer of left foot with fat layer exposed ([facility] L97.522    Diabetic ulcer of left midfoot associated with type 2 diabetes mellitus, with necrosis of muscle ([facility]) E11.621, L97.423    Pneumonia J18.9    VHD (valvular heart disease) I38    Septic shock ([facility]) A41.9, R65.21    Community acquired pneumonia J18.9    Uncontrolled type 2 diabetes mellitus with hyperglycemia ([facility]) E11.65    Class 3 severe obesity due to excess calories with body mass index (BMI) greater than or equal to 70 in adult ([facility] E66.01, Z68.45       Recommendations:   Understand the need for TE Echo but with patient's body habitus will have anesthesia help out for the procedure. Patient wants to be knocked out during the procedure too. Could not do the procedure today as patient was not NPO. Will keep NPO past midnight & try to get the study done tomorrow.        Zena Ansari MD, 6/20/2019 6:47 PM

## 2019-06-20 NOTE — PROGRESS NOTES
Continues to refuse to be repositioned. Risks of not changing positions reviewed and verbalized understanding.

## 2019-06-20 NOTE — PLAN OF CARE
Problem: Falls - Risk of:  Goal: Will remain free from falls  Description  Will remain free from falls  Outcome: Ongoing  Goal: Absence of physical injury  Description  Absence of physical injury  Outcome: Ongoing     Problem: Risk for Impaired Skin Integrity  Goal: Tissue integrity - skin and mucous membranes  Description  Structural intactness and normal physiological function of skin and  mucous membranes.   Outcome: Ongoing     Problem: Serum Glucose Level - Abnormal:  Goal: Ability to maintain appropriate glucose levels has stabilized  Description  Ability to maintain appropriate glucose levels has stabilized  Outcome: Ongoing     Problem: Cardiac Output - Decreased:  Goal: Hemodynamic stability will improve  Description  Hemodynamic stability will improve  Outcome: Ongoing     Problem: Nutrition  Goal: Optimal nutrition therapy  Outcome: Ongoing

## 2019-06-20 NOTE — PROGRESS NOTES
Progress Note( Dr. Maricarmen Dietz)  6/20/2019  Subjective:   Admit Date: 6/16/2019  PCP: THEODORE Burks NP    Admitted For : Shortness of breath leg pain and headache    Consulted For: Better control of blood glucose    Interval History: Somewhat better but still complaining of leg pain    Denies any chest pains,   Denies SOB . Denies nausea or vomiting. No new bowel or bladder symptoms. Intake/Output Summary (Last 24 hours) at 6/20/2019 0648  Last data filed at 6/20/2019 0511  Gross per 24 hour   Intake 825 ml   Output 1825 ml   Net -1000 ml       DATA    CBC:   Recent Labs     06/19/19  0450 06/19/19  1210 06/20/19  0520   WBC 7.9 6.3 7.0   HGB 6.8  HGB CALLED TO RICARDO SHAIKH RN 6/19 0515 BY CARLYN GUEVARA  * 7.1* 7.1*    208 232    CMP:  Recent Labs     06/18/19  0650 06/19/19  0450 06/20/19  0520    137 139   K 4.1 4.1 4.2   CL 95* 96* 98*   CO2 33* 35* 34*   BUN 13 18 21   CREATININE 0.7 0.8 0.8   CALCIUM 8.3 8.5 8.6     Lipids:   Lab Results   Component Value Date    CHOL 186 02/07/2019    HDL 39 02/07/2019    TRIG 122 02/07/2019     Glucose:  Recent Labs     06/19/19  1751 06/19/19  2204 06/20/19  0323   POCGLU 182* 188* 163*     [id number]:  Lab Results   Component Value Date    LABA1C 11.2 06/16/2019     High Sensitivity TSH:   Lab Results   Component Value Date    TSHHS 0.820 02/06/2019     Free T3: No results found for: FT3  Free T4:  Lab Results   Component Value Date    T4FREE 1.07 09/04/2018       Xr Foot Left (2 Views)    Result Date: 6/17/2019  EXAMINATION: TWO XRAY VIEWS OF THE LEFT FOOT 6/17/2019 8:52 am COMPARISON: 11/24/2018 HISTORY: ORDERING SYSTEM PROVIDED HISTORY: left diabetic ulcer   No acute osseous abnormality of the left foot. No evidence of osteomyelitis. Ct Head Wo Contrast    Result Date: 6/16/2019  EXAMINATION: CT OF THE HEAD WITHOUT CONTRAST  6/16/2019 7:16 pm     No acute intracranial abnormality.          Xr Chest 1 Vw    Result Date: 6/17/2019  EXAMINATION: ONE XRAY VIEW OF THE CHEST 6/16/2019 4:11 pm COMPARISON: Chest radiograph 02/05/2019 HISTORY: ORDERING SYSTEM PROVIDED HISTORY: Fever, shortness of breath      Pulmonary vascular congestion and patchy bilateral perihilar and bibasilar airspace opacities may reflect pulmonary edema or atypical infection. Vl Dup Lower Extremity Venous Bilateral    Result Date: 6/17/2019  EXAMINATION: DUPLEX VENOUS ULTRASOUND OF THE BILATERAL LOWER EXTREMITIES, 6/17/2019 6:03 am        No evidence of DVT in either lower extremity. Technically limited by body habitus. Scheduled Medicines   Medications:    insulin glargine  40 Units Subcutaneous Nightly    insulin lispro  0-18 Units Subcutaneous TID WC    insulin lispro  0-18 Units Subcutaneous 2 times per day    linagliptin  5 mg Oral Daily    insulin lispro  25 Units Subcutaneous TID WC    ampicillin-sulbactam  3 g Intravenous Q6H    gabapentin  800 mg Oral TID    pramipexole  1 mg Oral TID    sodium chloride flush  10 mL Intravenous 2 times per day    sodium chloride flush  10 mL Intravenous 2 times per day    enoxaparin  40 mg Subcutaneous Daily    pantoprazole  40 mg Intravenous Daily    nystatin   Topical BID      Infusions:    sodium chloride 100 mL/hr at 06/17/19 1828    dextrose           Objective:   Vitals: BP (!) 126/59   Pulse 81   Temp 98.5 °F (36.9 °C) (Oral)   Resp 16   Ht 5' 6\" (1.676 m)   Wt (!) 453 lb 3.2 oz (205.6 kg)   SpO2 97%   BMI 73.15 kg/m²   General appearance: alert and cooperative with exam  Neck: no JVD or bruit  Thyroid : Normal lobes   Lungs: Has Vesicular Breath sounds   Heart:  regular rate and rhythm  Abdomen: soft, non-tender; bowel sounds normal; no masses,  no organomegaly  Musculoskeletal: Normal  Extremities: extremities normal, , edema ulcerations of both legs  Neurologic:  Awake, alert, oriented to name, place and time. Cranial nerves II-XII are grossly intact. Motor is  intact.   Sensory neuropathy t.,  and gait is abnormal and unstable    Assessment:     Patient Active Problem List:     Morbid obesity with BMI of 70 and over, adult [facility])     Essential hypertension     Dyslipidemia     Fecal incontinence     Mild intermittent asthma without complication     S/P laparoscopic cholecystectomy     Type 2 diabetes mellitus without complication, with long-term current use of insulin (HCC)     Fatty liver     Arthritis     H/O parotidectomy     Venous stasis dermatitis of both lower extremities     Aortic stenosis     Morbid obesity (HCC)     Asthma     Diabetes mellitus ([facility] [address]     Hypertension     Sleep apnea     Family history of coronary artery disease     Chest pain     SOB (shortness of breath)     Palpitations     Peripheral edema     Hypervolemia     Chronic respiratory failure with hypoxia and hypercapnia (HCC)     Acute exacerbation of chronic obstructive pulmonary disease (COPD) (HCC)     Chronic obstructive pulmonary disease (HCC)     Gait disturbance     Acute on chronic respiratory failure with hypoxia and hypercapnia (HCC)     Cellulitis     Skin ulcer of left foot with fat layer exposed ([facility] [address]     Diabetic ulcer of left midfoot associated with type 2 diabetes mellitus, with necrosis of muscle (HCC)     Pneumonia     VHD (valvular heart disease)     Septic shock ([facility] [address]     Community acquired pneumonia     Uncontrolled type 2 diabetes mellitus with hyperglycemia ([facility])     Class 3 severe obesity due to excess calories with body mass index (BMI) greater than or equal to 70 in adult [facility])      Plan:     1. Reviewed POC blood glucose . Labs and X ray results   2. Reviewed Current Medicines   3. On meal/ Correction bolus Humalog/ Basal Lantus Insulin regime   4. Monitor Blood glucose frequently   5. Modified  the dose of Insulin/ other medicines as needed   6. Will follow     .      Emily Sheets MD

## 2019-06-20 NOTE — PROGRESS NOTES
Infectious Disease Progress Note  2019   Patient Name: Bandar Law : 1955   Impression  · Sepsis secondary to GBS bacteremia  ? Afebrile for 24 hours  ? Bone scan negative, CT of the abdomen and pelvis with IV contrast does not show an abscess. ? Blood cx  2/2  ? Hx of TAVR in 2017, 3 week hx of being unwell, consider endocarditis  · DM  ? Morbid obesity  · Multi-morbidity: per PMHx  Plan:  · discontinue Unasyn   · Start ampicillin  · YANI: Cardiology consult  · Blood cx 2 sets ordered, if repeat blood cultures are negative and there is no endocarditis and will treat with 2 weeks of ampicillin      Ongoing Antimicrobial Therapy  Ampicillin   Completed Antimicrobial Therapy  Vancomycin   Cefepime   Unasyn ? History:? Interval history noted, GBS bacteremia  Feels better, denies abdominal pain, fever or chills. Denies n/v/d or untoward effects of antibiotics  Physical Exam:  Vital Signs: BP (!) 126/59   Pulse 81   Temp 98.5 °F (36.9 °C) (Oral)   Resp 16   Ht 5' 6\" (1.676 m)   Wt (!) 453 lb 3.2 oz (205.6 kg)   SpO2 97%   BMI 73.15 kg/m²     Gen: alert and oriented X3, no distress  Skin: no stigmata of endocarditis  Wounds: Left plantar ulcer, does not probe to bone has callus over it C/D/I  HEMT: AT/NC Oropharynx pink, moist, and without lesions or exudates; dentition in good state of repair  Eyes: PERRLA, EOMI, conjunctiva pink, sclera anicteric. Neck: Supple. Trachea midline. No LAD. Chest: no distress and CTA. Good air movement. Heart: RRR and no MRG. Abd: soft, non-distended, no tenderness, no hepatomegaly. Normoactive bowel sounds. Ext: no clubbing, cyanosis, or edema  Catheter Site: without erythema or tenderness  Neuro: Mental status intact. CN 2-12 intact and no focal sensory or motor deficits       Radiologic / Imaging / TESTING  Bone scan.   CT abdomen and pelvis with p.o. and IV contrast.     Labs:    Recent Results (from the past 24 hour(s))

## 2019-06-21 ENCOUNTER — ANESTHESIA (OUTPATIENT)
Dept: NON INVASIVE DIAGNOSTICS | Age: 64
DRG: 720 | End: 2019-06-21
Payer: COMMERCIAL

## 2019-06-21 ENCOUNTER — APPOINTMENT (OUTPATIENT)
Dept: GENERAL RADIOLOGY | Age: 64
DRG: 720 | End: 2019-06-21
Payer: COMMERCIAL

## 2019-06-21 ENCOUNTER — ANESTHESIA EVENT (OUTPATIENT)
Dept: NON INVASIVE DIAGNOSTICS | Age: 64
DRG: 720 | End: 2019-06-21
Payer: COMMERCIAL

## 2019-06-21 LAB
ANION GAP SERPL CALCULATED.3IONS-SCNC: 7 MMOL/L (ref 4–16)
ANISOCYTOSIS: ABNORMAL
BANDED NEUTROPHILS ABSOLUTE COUNT: 0.82 K/CU MM
BANDED NEUTROPHILS RELATIVE PERCENT: 12 % (ref 5–11)
BASOPHILS ABSOLUTE: 0.1 K/CU MM
BASOPHILS RELATIVE PERCENT: 2 % (ref 0–1)
BUN BLDV-MCNC: 16 MG/DL (ref 6–23)
CALCIUM SERPL-MCNC: 8.8 MG/DL (ref 8.3–10.6)
CHLORIDE BLD-SCNC: 98 MMOL/L (ref 99–110)
CO2: 35 MMOL/L (ref 21–32)
CREAT SERPL-MCNC: 0.6 MG/DL (ref 0.6–1.1)
DIFFERENTIAL TYPE: ABNORMAL
DOSE AMOUNT: ABNORMAL
DOSE TIME: ABNORMAL
EOSINOPHILS ABSOLUTE: 0.3 K/CU MM
EOSINOPHILS RELATIVE PERCENT: 4 % (ref 0–3)
GFR AFRICAN AMERICAN: >60 ML/MIN/1.73M2
GFR NON-AFRICAN AMERICAN: >60 ML/MIN/1.73M2
GLUCOSE BLD-MCNC: 131 MG/DL (ref 70–99)
GLUCOSE BLD-MCNC: 140 MG/DL (ref 70–99)
GLUCOSE BLD-MCNC: 150 MG/DL (ref 70–99)
GLUCOSE BLD-MCNC: 190 MG/DL (ref 70–99)
GLUCOSE BLD-MCNC: 208 MG/DL (ref 70–99)
HCT VFR BLD CALC: 27.1 % (ref 37–47)
HEMOGLOBIN: 7.5 GM/DL (ref 12.5–16)
LV EF: 53 %
LVEF MODALITY: NORMAL
LYMPHOCYTES ABSOLUTE: 1.4 K/CU MM
LYMPHOCYTES RELATIVE PERCENT: 21 % (ref 24–44)
MCH RBC QN AUTO: 22.7 PG (ref 27–31)
MCHC RBC AUTO-ENTMCNC: 27.7 % (ref 32–36)
MCV RBC AUTO: 82.1 FL (ref 78–100)
METAMYELOCYTES ABSOLUTE COUNT: 0.2 K/CU MM
METAMYELOCYTES PERCENT: 3 %
MONOCYTES ABSOLUTE: 0.5 K/CU MM
MONOCYTES RELATIVE PERCENT: 8 % (ref 0–4)
MYELOCYTE PERCENT: 2 %
MYELOCYTES ABSOLUTE COUNT: 0.14 K/CU MM
NUCLEATED RED BLOOD CELLS: 1
PDW BLD-RTO: 16.8 % (ref 11.7–14.9)
PLATELET # BLD: 275 K/CU MM (ref 140–440)
PLT MORPHOLOGY: ABNORMAL
PMV BLD AUTO: 9.6 FL (ref 7.5–11.1)
POLYCHROMASIA: ABNORMAL
POTASSIUM SERPL-SCNC: 4.4 MMOL/L (ref 3.5–5.1)
RBC # BLD: 3.3 M/CU MM (ref 4.2–5.4)
SEGMENTED NEUTROPHILS ABSOLUTE COUNT: 3.3 K/CU MM
SEGMENTED NEUTROPHILS RELATIVE PERCENT: 48 % (ref 36–66)
SODIUM BLD-SCNC: 140 MMOL/L (ref 135–145)
VANCOMYCIN TROUGH: <4 UG/ML (ref 10–20)
WBC # BLD: 6.8 K/CU MM (ref 4–10.5)

## 2019-06-21 PROCEDURE — 80202 ASSAY OF VANCOMYCIN: CPT

## 2019-06-21 PROCEDURE — 6370000000 HC RX 637 (ALT 250 FOR IP): Performed by: NURSE PRACTITIONER

## 2019-06-21 PROCEDURE — 2580000003 HC RX 258: Performed by: NURSE PRACTITIONER

## 2019-06-21 PROCEDURE — 1200000000 HC SEMI PRIVATE

## 2019-06-21 PROCEDURE — 6370000000 HC RX 637 (ALT 250 FOR IP): Performed by: HOSPITALIST

## 2019-06-21 PROCEDURE — 2580000003 HC RX 258: Performed by: INTERNAL MEDICINE

## 2019-06-21 PROCEDURE — C9113 INJ PANTOPRAZOLE SODIUM, VIA: HCPCS | Performed by: NURSE PRACTITIONER

## 2019-06-21 PROCEDURE — 6360000002 HC RX W HCPCS: Performed by: INTERNAL MEDICINE

## 2019-06-21 PROCEDURE — 6370000000 HC RX 637 (ALT 250 FOR IP): Performed by: INTERNAL MEDICINE

## 2019-06-21 PROCEDURE — 82962 GLUCOSE BLOOD TEST: CPT

## 2019-06-21 PROCEDURE — 2700000000 HC OXYGEN THERAPY PER DAY

## 2019-06-21 PROCEDURE — 99232 SBSQ HOSP IP/OBS MODERATE 35: CPT | Performed by: INTERNAL MEDICINE

## 2019-06-21 PROCEDURE — APPSS30 APP SPLIT SHARED TIME 16-30 MINUTES: Performed by: NURSE PRACTITIONER

## 2019-06-21 PROCEDURE — 85027 COMPLETE CBC AUTOMATED: CPT

## 2019-06-21 PROCEDURE — 6360000002 HC RX W HCPCS: Performed by: NURSE PRACTITIONER

## 2019-06-21 PROCEDURE — 85007 BL SMEAR W/DIFF WBC COUNT: CPT

## 2019-06-21 PROCEDURE — 93306 TTE W/DOPPLER COMPLETE: CPT

## 2019-06-21 PROCEDURE — 80048 BASIC METABOLIC PNL TOTAL CA: CPT

## 2019-06-21 PROCEDURE — 94761 N-INVAS EAR/PLS OXIMETRY MLT: CPT

## 2019-06-21 PROCEDURE — 71045 X-RAY EXAM CHEST 1 VIEW: CPT

## 2019-06-21 RX ORDER — POTASSIUM CHLORIDE 20 MEQ/1
20 TABLET, EXTENDED RELEASE ORAL 2 TIMES DAILY WITH MEALS
Status: COMPLETED | OUTPATIENT
Start: 2019-06-21 | End: 2019-06-22

## 2019-06-21 RX ORDER — FUROSEMIDE 40 MG/1
40 TABLET ORAL DAILY
Status: DISCONTINUED | OUTPATIENT
Start: 2019-06-22 | End: 2019-06-22

## 2019-06-21 RX ORDER — FUROSEMIDE 10 MG/ML
40 INJECTION INTRAMUSCULAR; INTRAVENOUS EVERY 12 HOURS SCHEDULED
Status: COMPLETED | OUTPATIENT
Start: 2019-06-21 | End: 2019-06-22

## 2019-06-21 RX ADMIN — SODIUM CHLORIDE 3 G: 900 INJECTION, SOLUTION INTRAVENOUS at 17:54

## 2019-06-21 RX ADMIN — SODIUM CHLORIDE 3 G: 900 INJECTION, SOLUTION INTRAVENOUS at 11:35

## 2019-06-21 RX ADMIN — SODIUM CHLORIDE, PRESERVATIVE FREE 10 ML: 5 INJECTION INTRAVENOUS at 21:22

## 2019-06-21 RX ADMIN — INSULIN LISPRO 3 UNITS: 100 INJECTION, SOLUTION INTRAVENOUS; SUBCUTANEOUS at 17:59

## 2019-06-21 RX ADMIN — LINAGLIPTIN 5 MG: 5 TABLET, FILM COATED ORAL at 11:36

## 2019-06-21 RX ADMIN — PRAMIPEXOLE DIHYDROCHLORIDE 1 MG: 0.25 TABLET ORAL at 11:36

## 2019-06-21 RX ADMIN — ACETAMINOPHEN 650 MG: 325 TABLET ORAL at 15:40

## 2019-06-21 RX ADMIN — INSULIN GLARGINE 40 UNITS: 100 INJECTION, SOLUTION SUBCUTANEOUS at 23:02

## 2019-06-21 RX ADMIN — IRON SUCROSE 300 MG: 20 INJECTION, SOLUTION INTRAVENOUS at 11:44

## 2019-06-21 RX ADMIN — SODIUM CHLORIDE 3 G: 900 INJECTION, SOLUTION INTRAVENOUS at 06:02

## 2019-06-21 RX ADMIN — GABAPENTIN 800 MG: 400 CAPSULE ORAL at 11:36

## 2019-06-21 RX ADMIN — POTASSIUM CHLORIDE 20 MEQ: 20 TABLET, EXTENDED RELEASE ORAL at 21:21

## 2019-06-21 RX ADMIN — INSULIN LISPRO 6 UNITS: 100 INJECTION, SOLUTION INTRAVENOUS; SUBCUTANEOUS at 23:07

## 2019-06-21 RX ADMIN — PRAMIPEXOLE DIHYDROCHLORIDE 1 MG: 0.25 TABLET ORAL at 21:21

## 2019-06-21 RX ADMIN — ENOXAPARIN SODIUM 40 MG: 100 INJECTION SUBCUTANEOUS at 11:36

## 2019-06-21 RX ADMIN — FUROSEMIDE 40 MG: 10 INJECTION, SOLUTION INTRAMUSCULAR; INTRAVENOUS at 21:18

## 2019-06-21 RX ADMIN — PANTOPRAZOLE SODIUM 40 MG: 40 INJECTION, POWDER, FOR SOLUTION INTRAVENOUS at 11:35

## 2019-06-21 RX ADMIN — GABAPENTIN 800 MG: 400 CAPSULE ORAL at 21:20

## 2019-06-21 ASSESSMENT — ENCOUNTER SYMPTOMS: SHORTNESS OF BREATH: 1

## 2019-06-21 ASSESSMENT — PAIN SCALES - GENERAL
PAINLEVEL_OUTOF10: 0
PAINLEVEL_OUTOF10: 0
PAINLEVEL_OUTOF10: 6

## 2019-06-21 ASSESSMENT — LIFESTYLE VARIABLES: SMOKING_STATUS: 0

## 2019-06-21 NOTE — CONSULTS
Past Surgical History:   Procedure Laterality Date    AORTIC VALVE REPLACEMENT  2017    29mm EvolutR TAVR     SECTION      CHOLECYSTECTOMY      GALLBLADDER SURGERY      HYSTERECTOMY      NECK SURGERY      Tumor    TUBAL LIGATION         FAMILY HISTORY    Family History   Problem Relation Age of Onset    Heart Failure Mother     Heart Attack Mother     Hypertension Mother     Coronary Art Dis Mother         Had PTCA w/stenting.     Heart Failure Father     Heart Failure Brother     Heart Disease Mother     High Blood Pressure Mother     Obesity Mother     Diabetes Mother     Heart Disease Father     High Blood Pressure Father     Obesity Father     Heart Disease Brother     High Blood Pressure Brother     Obesity Brother        SOCIAL HISTORY    Social History     Socioeconomic History    Marital status:      Spouse name: None    Number of children: None    Years of education: None    Highest education level: None   Occupational History    None   Social Needs    Financial resource strain: None    Food insecurity:     Worry: None     Inability: None    Transportation needs:     Medical: None     Non-medical: None   Tobacco Use    Smoking status: Former Smoker     Types: Cigarettes     Last attempt to quit: 3/19/2003     Years since quittin.2    Smokeless tobacco: Never Used    Tobacco comment: quit 15 years ago   Substance and Sexual Activity    Alcohol use: No    Drug use: No    Sexual activity: Never   Lifestyle    Physical activity:     Days per week: None     Minutes per session: None    Stress: None   Relationships    Social connections:     Talks on phone: None     Gets together: None     Attends Rastafarian service: None     Active member of club or organization: None     Attends meetings of clubs or organizations: None     Relationship status: None    Intimate partner violence:     Fear of current or ex partner: None     Emotionally abused: None Physically abused: None     Forced sexual activity: None   Other Topics Concern    None   Social History Narrative    ** Merged History Encounter **            REVIEW OF SYSTEMS    Constitutional:  Denies fever, chills, loss of appetite, weight loss. Has fatigue, tiredness and weakness   HEENT:  Denies swelling of neck glands  Respiratory:  Denies cough, shortness of breath or hemoptysis  Cardiovascular:  Denies chest pain, palpitations or swelling   GI:  Denies abdominal pain, nausea, vomiting,, constipation or diarrhea   Musculoskeletal:  Denies back pain   Skin:  Denies rash   Neurologic:  Denies headache, focal weakness or sensory changes   All systems negative except as marked. PHYSICAL EXAM    Vitals: BP (!) 128/53   Pulse 83   Temp 98.2 °F (36.8 °C) (Oral)   Resp 20   Ht 5' 6\" (1.676 m)   Wt (!) 453 lb 3.2 oz (205.6 kg)   SpO2 96%   BMI 73.15 kg/m²   General appearance: alert, appears stated age and cooperative  Skin: Skin color, texture, turgor normal. No rashes or lesions  HEENT: Head: Normocephalic, no lesions, without obvious abnormality.   Neck: no adenopathy, no carotid bruit, no JVD, supple, symmetrical, trachea midline and thyroid not enlarged, symmetric, no tenderness/mass/nodules  Lungs: clear to auscultation bilaterally  Heart: regular rate and rhythm, S1, S2 normal, no murmur, click, rub or gallop  Abdomen: soft, non-tender; bowel sounds normal; no masses,  no organomegaly  Extremities: Chronic venous stasis signs present, left plantar ulcer noticed   Neurologic: Mental status: Alert, oriented, thought content appropriate    LABORATORY RESULTS  CBC:   Recent Labs     06/19/19  1210 06/20/19  0520 06/21/19  0400   WBC 6.3 7.0 6.8   HGB 7.1* 7.1* 7.5*    232 275     BMP:    Recent Labs     06/19/19  0450 06/20/19  0520 06/21/19  0400    139 140   K 4.1 4.2 4.4   CL 96* 98* 98*   CO2 35* 34* 35*   BUN 18 21 16   CREATININE 0.8 0.8 0.6   GLUCOSE 206* 150* 131*     Hepatic: No results for input(s): AST, ALT, ALB, BILITOT, ALKPHOS in the last 72 hours. INR: No results for input(s): INR in the last 72 hours. ASSESSMENT  1. Iron deficiency anemia due to bleeding from [place] lesion    RECOMMENDATION  I have been following her for iron deficiency anemia and she requires iron infusion periodically. She missed iron infusion appointment on 5, 2019. Her iron study is consistent with iron infusion and I will give iron infusion when she is in hospital.     Please continue with other management as per primary team. We will follow the patient. Thank you for allowing me to participate in the care of your patient.

## 2019-06-21 NOTE — PROGRESS NOTES
hepatomegaly. Normoactive bowel sounds. Ext: no clubbing, cyanosis, or edema  Catheter Site: right IJ CVC without erythema or tenderness  Neuro: Mental status intact. CN 2-12 intact and no focal sensory or motor deficits       Radiologic / Imaging / TESTING  Bone scan.   CT abdomen and pelvis with p.o. and IV contrast.     Labs:    Recent Results (from the past 24 hour(s))   POCT Glucose    Collection Time: 06/20/19 12:14 PM   Result Value Ref Range    POC Glucose 178 (H) 70 - 99 MG/DL   POCT Glucose    Collection Time: 06/20/19  5:12 PM   Result Value Ref Range    POC Glucose 174 (H) 70 - 99 MG/DL   POCT Glucose    Collection Time: 06/20/19  8:59 PM   Result Value Ref Range    POC Glucose 175 (H) 70 - 99 MG/DL   POCT Glucose    Collection Time: 06/21/19  2:38 AM   Result Value Ref Range    POC Glucose 140 (H) 70 - 99 MG/DL   Basic Metabolic Panel w/ Reflex to MG    Collection Time: 06/21/19  4:00 AM   Result Value Ref Range    Sodium 140 135 - 145 MMOL/L    Potassium 4.4 3.5 - 5.1 MMOL/L    Chloride 98 (L) 99 - 110 mMol/L    CO2 35 (H) 21 - 32 MMOL/L    Anion Gap 7 4 - 16    BUN 16 6 - 23 MG/DL    CREATININE 0.6 0.6 - 1.1 MG/DL    Glucose 131 (H) 70 - 99 MG/DL    Calcium 8.8 8.3 - 10.6 MG/DL    GFR Non-African American >60 >60 mL/min/1.73m2    GFR African American >60 >60 mL/min/1.73m2   CBC auto differential    Collection Time: 06/21/19  4:00 AM   Result Value Ref Range    WBC 6.8 4.0 - 10.5 K/CU MM    RBC 3.30 (L) 4.2 - 5.4 M/CU MM    Hemoglobin 7.5 (L) 12.5 - 16.0 GM/DL    Hematocrit 27.1 (L) 37 - 47 %    MCV 82.1 78 - 100 FL    MCH 22.7 (L) 27 - 31 PG    MCHC 27.7 (L) 32.0 - 36.0 %    RDW 16.8 (H) 11.7 - 14.9 %    Platelets 677 436 - 134 K/CU MM    MPV 9.6 7.5 - 11.1 FL    Myelocytes Relative 2 (H) 0.0 %    Metamyelocytes Relative 3 (H) 0.0 %    Bands Relative 12 (H) 5 - 11 %    Segs Relative 48.0 36 - 66 %    Eosinophils % 4.0 (H) 0 - 3 %    Basophils % 2.0 (H) 0 - 1 %    Lymphocytes % 21.0 (L) 24 - 44 % Monocytes % 8.0 (H) 0 - 4 %    nRBC 1     Myelocytes Absolute 0.14 K/CU MM    Metamyelocytes Absolute 0.20 K/CU MM    Bands Absolute 0.82 K/CU MM    Segs Absolute 3.3 K/CU MM    Eosinophils # 0.3 K/CU MM    Basophils # 0.1 K/CU MM    Lymphocytes # 1.4 K/CU MM    Monocytes # 0.5 K/CU MM    Differential Type MANUAL DIFFERENTIAL     Anisocytosis 1+     Polychromasia 1+     PLT Morphology FEW  LARGE  PLT NOTED ON SCAN      Vancomycin, trough    Collection Time: 06/21/19  4:00 AM   Result Value Ref Range    Vancomycin Tr <4.0 (L) 10 - 20 UG/ML    DOSE AMOUNT DOSE AMT.  GIVEN - 1500 mg     DOSE TIME DOSE TIME GIVEN - 0630/1830      CULTURE results: Invalid input(s): BLOOD CULTURE,  URINE CULTURE, SURGICAL CULTURE    Diagnosis:  Patient Active Problem List   Diagnosis    Morbid obesity with BMI of 70 and over, adult ([address]    Essential hypertension    Dyslipidemia    Fecal incontinence    Mild intermittent asthma without complication    S/P laparoscopic cholecystectomy    Type 2 diabetes mellitus without complication, with long-term current use of insulin (HCC)    Fatty liver    Arthritis    H/O parotidectomy    Venous stasis dermatitis of both lower extremities    Aortic stenosis    Morbid obesity ([address]    Asthma    Diabetes mellitus ([address]    Hypertension    Sleep apnea    Family history of coronary artery disease    Chest pain    SOB (shortness of breath)    Palpitations    Peripheral edema    Hypervolemia    Chronic respiratory failure with hypoxia and hypercapnia (HCC)    Acute exacerbation of chronic obstructive pulmonary disease (COPD) (HCC)    Chronic obstructive pulmonary disease (HCC)    Gait disturbance    Acute on chronic respiratory failure with hypoxia and hypercapnia (HCC)    Cellulitis    Skin ulcer of left foot with fat layer exposed ([address]    Diabetic ulcer of left midfoot associated with type 2 diabetes mellitus, with necrosis of muscle (HCC)    Pneumonia    VHD (valvular heart disease)    Septic shock ([address]    Community acquired pneumonia    Uncontrolled type 2 diabetes mellitus with hyperglycemia (HCC)    Class 3 severe obesity due to excess calories with body mass index (BMI) greater than or equal to 70 in adult [facility])       Active Problems  Principal Problem:    Septic shock (HCC)  Active Problems:    Diabetic ulcer of left midfoot associated with type 2 diabetes mellitus, with necrosis of muscle ([address]    Community acquired pneumonia    Uncontrolled type 2 diabetes mellitus with hyperglycemia (HCC)    Class 3 severe obesity due to excess calories with body mass index (BMI) greater than or equal to 70 in [facility])  Resolved Problems:    * No resolved hospital problems.  *    Electronically signed by: Electronically signed by Mirta Vincent MD on 6/21/2019 at 9:40 AM

## 2019-06-21 NOTE — ANESTHESIA PRE PROCEDURE
Department of Anesthesiology  Preprocedure Note       Name:  Sandrine Haskins   Age:  60 y.o.  :  1955                                          MRN:  [id number]         Date:  2019      Surgeon: * Surgery not found *    Procedure: TRANSESOPHAGEAL ECHOCARDIOGRAM    Medications prior to admission:   Prior to Admission medications    Medication Sig Start Date End Date Taking?  Authorizing Provider   lisinopril (PRINIVIL;ZESTRIL) 5 MG tablet Take 1 tablet by mouth daily 19   THEODORE Solorzano - CNP   DULoxetine (CYMBALTA) 60 MG extended release capsule Take 30 mg by mouth daily     Historical Provider, MD   HUMALOG KWIKPEN 200 UNIT/ML SOPN pen Inject 15 Units into the skin 3 times daily (before meals)  Patient taking differently: Inject 35 Units into the skin 3 times daily (before meals)  18   MD Jovanni   Insulin Degludec (TRESIBA FLEXTOUCH) 100 UNIT/ML SOPN Inject 15 Units into the skin nightly  Patient taking differently: Inject 45 Units into the skin nightly  18   MD Jovanni   clopidogrel (PLAVIX) 75 MG tablet Take 1 tablet by mouth daily Patient has appointment on 3/27/18 more refills with be given after she keeps her office visit 18   Jacquelin Stanton MD   O-Rgrgwlsudyfa-Z1-B12 (FOLTANX) 3-35-2 MG TABS Take 1 tablet by mouth 2 times daily     Historical Provider, MD   nystatin (MYCOSTATIN) POWD powder Apply topically 2 times daily    Historical Provider, MD   potassium chloride (KLOR-CON M) 20 MEQ extended release tablet Take 1 tablet by mouth daily 18   Anna Calvert MD   furosemide (LASIX) 40 MG tablet Take 1 tablet by mouth 2 times daily  Patient taking differently: Take 40 mg by mouth daily  18   Anna Calvert MD   carvedilol (COREG) 3.125 MG tablet Take 1 tablet by mouth 2 times daily 18   Anna Calvert MD   simvastatin (ZOCOR) 40 MG tablet Take 1 tablet by mouth nightly 18   Anna Calvert MD   BiPAP Machine MISC by BiPAP route nightly    Historical Provider, MD   metFORMIN (GLUCOPHAGE) 500 MG tablet Take 1 tablet by mouth 2 times daily (with meals) 2/16/18   C Saad Calvillo MD   ipratropium-albuterol (DUONEB) 0.5-2.5 (3) MG/3ML SOLN nebulizer solution Inhale 3 mLs into the lungs 4 times daily 2/16/18   C Saad Calvillo MD   Nebulizers Paty Hudson COMPACT MINI NEBULIZER) MISC 1 Device by Does not apply route 4 times daily 2/16/18   C Saad Calvillo MD   albuterol sulfate  (90 Base) MCG/ACT inhaler Inhale 2 puffs into the lungs    Historical Provider, MD   pantoprazole (PROTONIX) 40 MG tablet Take 40 mg by mouth daily    Historical Provider, MD   vilazodone HCl (VIIBRYD) 10 MG TABS Take 10 mg by mouth 2 times daily     Historical Provider, MD   vitamin D (ERGOCALCIFEROL) 41222 UNITS CAPS capsule Take 50,000 Units by mouth twice a week On Sundays and wednesdays 10/21/16   Historical Provider, MD   pramipexole (MIRAPEX) 1 MG tablet Take 1 mg by mouth 3 times daily  10/21/16   Historical Provider, MD   fexofenadine (ALLEGRA) 180 MG tablet Take 180 mg by mouth daily  10/21/16   Historical Provider, MD   gabapentin (NEURONTIN) 800 MG tablet Take 800 mg by mouth 3 times daily  10/21/16   Historical Provider, MD   Ascorbic Acid (VITAMIN C) 500 MG tablet Take 500 mg by mouth daily  10/17/16   Historical Provider, MD   aspirin 81 MG tablet Take 81 mg by mouth daily    Historical Provider, MD       Current medications:    No current facility-administered medications for this encounter. No current outpatient medications on file.      Facility-Administered Medications Ordered in Other Encounters   Medication Dose Route Frequency Provider Last Rate Last Dose    iron sucrose (VENOFER) 300 mg in sodium chloride 0.9 % 250 mL IVPB  300 mg Intravenous Q24H Mesfin Mario MD        sodium chloride (PF) 0.9 % injection 10 mL  10 mL Intravenous ONCE PRN Shahana Worrell MD        insulin glargine (LANTUS) injection vial 40 Units  40 Units Subcutaneous Nightly Alexandria Neff MD   40 Units at 06/20/19 2131    insulin lispro (HUMALOG) injection vial 0-18 Units  0-18 Units Subcutaneous TID  Alexandria Neff MD   3 Units at 06/20/19 1752    insulin lispro (HUMALOG) injection vial 0-18 Units  0-18 Units Subcutaneous 2 times per day Alexandria Neff MD   3 Units at 06/20/19 2130    linagliptin (TRADJENTA) tablet 5 mg  5 mg Oral Daily Alexandria Neff MD   5 mg at 06/19/19 0950    insulin lispro (HUMALOG) injection vial 25 Units  25 Units Subcutaneous TID  Alexandria Neff MD   25 Units at 06/20/19 1755    ampicillin-sulbactam (UNASYN) 3 g ivpb minibag  3 g Intravenous Q6H Kiki Thakur MD   Stopped at 06/21/19 3166    gabapentin (NEURONTIN) capsule 800 mg  800 mg Oral TID Scott Ramesh MD   800 mg at 06/20/19 2123    pramipexole (MIRAPEX) tablet 1 mg  1 mg Oral TID Scott Ramesh MD   1 mg at 06/20/19 2123    sodium chloride flush 0.9 % injection 10 mL  10 mL Intravenous 2 times per day Tyler Dejon, APRN - CNP   10 mL at 06/20/19 2124    sodium chloride flush 0.9 % injection 10 mL  10 mL Intravenous PRN Tyler Dejon, APRN - CNP        0.9 % sodium chloride infusion   Intravenous Continuous Tyler Dejon APRN -  mL/hr at 06/17/19 1828      sodium chloride flush 0.9 % injection 10 mL  10 mL Intravenous 2 times per day Tyler Dejon APRN - CNP   10 mL at 06/20/19 2124    sodium chloride flush 0.9 % injection 10 mL  10 mL Intravenous PRN Tyler Djeon, APRN - CNP        magnesium hydroxide (MILK OF MAGNESIA) 400 MG/5ML suspension 30 mL  30 mL Oral Daily PRN Tyler Dejon APRN - CNP        ondansetron (ZOFRAN) injection 4 mg  4 mg Intravenous Q6H PRN Chelsey Lala APRN - CNP        enoxaparin (LOVENOX) injection 40 mg  40 mg Subcutaneous Daily THEODORE Llanos - CNP   40 mg at 06/20/19 1021    pantoprazole (PROTONIX) injection 40 mg  40 mg Intravenous Daily THEODORE Seo CNP   40 mg at 06/20/19 1021    ipratropium-albuterol (DUONEB) nebulizer solution 1 ampule  1 ampule Inhalation Q4H PRN Tegan Buckley, APRN - CNP        acetaminophen (TYLENOL) tablet 650 mg  650 mg Oral Q4H PRN Clyde Marcio, APRN - CNP   650 mg at 06/19/19 0156    glucose (GLUTOSE) 40 % oral gel 15 g  15 g Oral PRN Chelsey Lala, APRN - CNP        dextrose 50 % IV solution  12.5 g Intravenous PRN Chelsey Lala, APRN - CNP        glucagon (rDNA) injection 1 mg  1 mg Intramuscular PRN Chelsey Lala, APRN - CNP        dextrose 5 % solution  100 mL/hr Intravenous PRN Chelsey Lala, APRN - CNP        nystatin (MYCOSTATIN) cream   Topical BID Ky Connolly, APRN - CNP           Allergies:  No Known Allergies    Problem List:    Patient Active Problem List   Diagnosis Code    Morbid obesity with BMI of 70 and over, adult ([facility] E66.01, Z68.45    Essential hypertension I10    Dyslipidemia E78.5    Fecal incontinence R15.9    Mild intermittent asthma without complication U25.66    S/P laparoscopic cholecystectomy Z90.49    Type 2 diabetes mellitus without complication, with long-term current use of insulin ([facility] E11.9, Z79.4    Fatty liver K76.0    Arthritis M19.90    H/O parotidectomy Z90.49    Venous stasis dermatitis of both lower extremities I87.2    Aortic stenosis I35.0    Morbid obesity ([facility] E66.01    Asthma J45.909    Diabetes mellitus ([facility] E11.9    Hypertension I10    Sleep apnea G47.30    Family history of coronary artery disease Z82.49    Chest pain R07.9    SOB (shortness of breath) R06.02    Palpitations R00.2    Peripheral edema R60.9    Hypervolemia E87.70    Chronic respiratory failure with hypoxia and hypercapnia (HCC) J96.11, J96.12    Acute exacerbation of chronic obstructive pulmonary disease (COPD) (HCC) J44.1    Chronic obstructive pulmonary disease (HCC) J44.9    Gait disturbance R26.9    Acute on chronic respiratory failure with hypoxia and hypercapnia (HCC) J96.21, J96.22    Cellulitis L03.90    Skin ulcer of left foot with fat layer exposed ([facility] L97.522    Diabetic ulcer of left midfoot associated with type 2 diabetes mellitus, with necrosis of muscle ([facility]) E11.621, L97.423    Pneumonia J18.9    VHD (valvular heart disease) I38    Septic shock ([facility]) A41.9, R65.21    Community acquired pneumonia J18.9    Uncontrolled type 2 diabetes mellitus with hyperglycemia ([facility]) E11.65    Class 3 severe obesity due to excess calories with body mass index (BMI) greater than or equal to 70 in adult ([facility]) E66.01, Z68.45       Past Medical History:        Diagnosis Date    Aortic stenosis     Asthma     Cancer ([facility]     Cervical    Carotid artery stenosis     Chest pain     CHF (congestive heart failure) ([facility])     Chronic back pain     COPD exacerbation ([facility])     Depression     Diabetes mellitus ([facility]     Family history of coronary artery disease     Fatty liver 10/27/2016    H/O aortic valve stenosis     H/O echocardiogram 2016    EF 55%, Aortic valve area is 0.84 cm2 with a mean gradient of 36 suggestive of severe aortic stenosis, mild to mos LVH    Headache     Heart murmur     History of nuclear stress test 2016    lexiscan-normal,EF70%    Morbid obesity ([facility])     BMI=73.72 kg/m2    Neuropathy     NSTEMI (non-ST elevated myocardial infarction) ([facility] 2018    Obesity     Osteoarthritis     Palpitations     Peripheral edema     Restless legs syndrome     Sleep apnea     Has a CPAP    Sleep apnea     SOB (shortness of breath)        Past Surgical History:        Procedure Laterality Date    AORTIC VALVE REPLACEMENT  2017    29mm EvolutR TAVR     SECTION      CHOLECYSTECTOMY      GALLBLADDER SURGERY      HYSTERECTOMY      NECK SURGERY      Tumor    TUBAL LIGATION         Social History:    Social History     Tobacco Use    Smoking status: Former Smoker     Types: Cigarettes     Last attempt to quit: 3/19/2003     Years since [facility]:      Comment: Numerous missing and loose teeth. Very poor dentition. Spoke to patient regarding the risk off teeth coming out due to bite block. Patient voiced understanding. She states ' they need to come out they are very loose. \"    Pulmonary:normal exam    (+) pneumonia: resolved,  COPD:  shortness of breath:  sleep apnea: on CPAP,  asthma:     (-) not a current smoker ( Former - Quit 03)                          ROS comment: Former Smoker  Quit Smokin03       Cardiovascular:  Exercise tolerance: poor (<4 METS),   (+) hypertension:, valvular problems/murmurs ( s/p TAVR): AS, past MI: > 6 months, CAD:, CHF ( Peripheral edema): diastolic, hyperlipidemia      ECG reviewed      Echocardiogram reviewed  Stress test reviewed       Beta Blocker:  Order written and Dose within 24 Hrs      ROS comment: ECHO 2019:  Summary   Limited study due to patients body habitus.   Definity given during procedure.   Left ventricular function is low normal, EF is estimated at 50-55 %.   Grade III diastolic dysfunction.   Severe left ventricular hypertrophy.   S/P 29 mm EvolutR TAVR 2017   No para valvular leak or significant gradient across the valve   Structurally normal mitral valve.   Trace mitral regurgitation is present.   Tricuspid valve is structurally normal.   RVSP is 31 mmHg.   Trace tricuspid regurgitation.   Trace/physiological pericardial fluid noted, No difference compared to   previous echo in 2018   No evidence of pleural effusion.      Signature      ------------------------------------------------------------------   Electronically signed by Maranda Telles MD (Interpreting   physician) on 2019 at 02:21 PM   ------------------------------------------------------------------    STRESS TEST:  lexiscan-normal,EF70%    ECG:  Sinus tachycardia   Low voltage QRS   Nonspecific ST abnormality   Abnormal ECG   When compared with ECG of 2019 19:01,   No significant change was found   Confirmed by Juliana Dickson MD, Artemio Primer ([id number]) on 6/17/2019 3:49:57 PM      Neuro/Psych:   (+) neuromuscular disease ( RLS):, headaches:,             GI/Hepatic/Renal:   (+) liver disease ( fatty liver):, morbid obesity          Endo/Other:    (+) Diabetes (neuropathy)Type II DM, , : arthritis: OA., malignancy/cancer ( cervical). ROS comment: CT FINDINGS:  Lower Chest: Mild ground-glass changes at the lung base with subsegmental  right basilar airspace disease.  No pleural effusion is identified.  Aortic  valvular replacement noted.  No distal esophageal thickening is identified.     Organs: Hepatomegaly is identified.  No focal liver mass is identified.  No  adrenal mass is identified.  No pancreatic mass.  No peripancreatic  inflammatory process is identified.  Cholecystectomy changes are noted.  No  focal enhancing or hypodense mass identified within the kidneys.  No renal  calculi, ureteral calculi, or urinary tract obstruction is identified.     GI/Bowel: Colonic diverticulosis is identified.  No acute diverticulitis is  identified.  Bowel contrast is noted, with no extravasation of contrast  identified.  Contrast reaches the level of the cecum.  The appendix is not  clearly identified, though no secondary signs of acute appendicitis.  No  ileus or obstruction.     Pelvis: No pelvic mass is identified.  A Mata catheter is seen within the  bladder.  A small amount of air is seen in the bladder lumen is well related  to Mata catheter placement.  No free fluid.     Peritoneum/Retroperitoneum: Diffuse atherosclerotic disease identified  throughout the aorta, iliac branches as well as mesenteric branches.  No  intraperitoneal free air is identified.  No retroperitoneal or mesenteric  bulky lymphadenopathy is identified.  Diastasis of the rectus musculature is  identified, with fat and bowel herniation seen within the umbilical hernia.   No bowel obstruction is identified.     Bones/Soft Tissues: There is skin thickening and subcutaneous stranding  identified within the patient's pannus and anterior abdominal wall seen just  below the umbilicus.  No acute osseous abnormality is identified.  Multilevel  degenerative changes are seen within the spine.  No destructive changes are  identified. Abdominal:   (+) obese,         Vascular:   + PVD, aortic or cerebral ( carotid artery stenosis), . Anesthesia Plan      MAC     ASA 4       Induction: intravenous. Anesthetic plan and risks discussed with patient. Jodee Concepcion, APRN - CRNA   6/21/2019      Pre Anesthesia Evaluation complete. Anesthesia plan, risks, benefits, alternatives, and personnel discussed with patient and/or legal guardian. Patient and/or legal guardian verbalized an understanding and agreed to proceed. Anesthesia plan discussed with care team members and agreed upon.   Karyle Rise, APRN - CRNA  6/21/2019    Dr. Ata Dillard cancelled case preoperatively

## 2019-06-21 NOTE — PROGRESS NOTES
Discussed with Dr. Brittni Huntley, ID, Transthoracic Echo ordered.      Electronically signed by THEODORE Caldwell CNP on 6/21/2019 at 1:18 PM

## 2019-06-21 NOTE — PROGRESS NOTES
ANESTHESIA DISCUSSED WITH PATIENT THE POSSIBILITY OF LOSING TEETH THAT ARE LOOSE DURING YANI. PATIENT VERBALIZED UNDERSTANDING.

## 2019-06-22 ENCOUNTER — APPOINTMENT (OUTPATIENT)
Dept: GENERAL RADIOLOGY | Age: 64
DRG: 720 | End: 2019-06-22
Payer: COMMERCIAL

## 2019-06-22 LAB
ANION GAP SERPL CALCULATED.3IONS-SCNC: 7 MMOL/L (ref 4–16)
ANION GAP SERPL CALCULATED.3IONS-SCNC: 7 MMOL/L (ref 4–16)
ANISOCYTOSIS: ABNORMAL
BANDED NEUTROPHILS ABSOLUTE COUNT: 0.8 K/CU MM
BANDED NEUTROPHILS RELATIVE PERCENT: 12 % (ref 5–11)
BASE EXCESS MIXED: ABNORMAL (ref 0–2.3)
BUN BLDV-MCNC: 10 MG/DL (ref 6–23)
BUN BLDV-MCNC: 9 MG/DL (ref 6–23)
CALCIUM SERPL-MCNC: 8.7 MG/DL (ref 8.3–10.6)
CALCIUM SERPL-MCNC: 9.1 MG/DL (ref 8.3–10.6)
CHLORIDE BLD-SCNC: 95 MMOL/L (ref 99–110)
CHLORIDE BLD-SCNC: 99 MMOL/L (ref 99–110)
CO2: 39 MMOL/L (ref 21–32)
CO2: 42 MMOL/L (ref 21–32)
COMMENT: ABNORMAL
CREAT SERPL-MCNC: 0.6 MG/DL (ref 0.6–1.1)
CREAT SERPL-MCNC: 0.7 MG/DL (ref 0.6–1.1)
DIFFERENTIAL TYPE: ABNORMAL
EOSINOPHILS ABSOLUTE: 0.3 K/CU MM
EOSINOPHILS RELATIVE PERCENT: 5 % (ref 0–3)
GFR AFRICAN AMERICAN: >60 ML/MIN/1.73M2
GFR AFRICAN AMERICAN: >60 ML/MIN/1.73M2
GFR NON-AFRICAN AMERICAN: >60 ML/MIN/1.73M2
GFR NON-AFRICAN AMERICAN: >60 ML/MIN/1.73M2
GLUCOSE BLD-MCNC: 163 MG/DL (ref 70–99)
GLUCOSE BLD-MCNC: 164 MG/DL (ref 70–99)
GLUCOSE BLD-MCNC: 174 MG/DL (ref 70–99)
GLUCOSE BLD-MCNC: 174 MG/DL (ref 70–99)
GLUCOSE BLD-MCNC: 175 MG/DL (ref 70–99)
GLUCOSE BLD-MCNC: 176 MG/DL (ref 70–99)
GLUCOSE BLD-MCNC: 179 MG/DL (ref 70–99)
GLUCOSE BLD-MCNC: 192 MG/DL (ref 70–99)
HCO3 VENOUS: 48.9 MMOL/L (ref 19–25)
HCT VFR BLD CALC: 28 % (ref 37–47)
HEMOGLOBIN: 7.6 GM/DL (ref 12.5–16)
HYPOCHROMIA: ABNORMAL
LYMPHOCYTES ABSOLUTE: 1.4 K/CU MM
LYMPHOCYTES RELATIVE PERCENT: 21 % (ref 24–44)
MCH RBC QN AUTO: 22.7 PG (ref 27–31)
MCHC RBC AUTO-ENTMCNC: 27.1 % (ref 32–36)
MCV RBC AUTO: 83.6 FL (ref 78–100)
MICROCYTES: ABNORMAL
MONOCYTES ABSOLUTE: 0.2 K/CU MM
MONOCYTES RELATIVE PERCENT: 3 % (ref 0–4)
MYELOCYTE PERCENT: 6 %
MYELOCYTES ABSOLUTE COUNT: 0.4 K/CU MM
NUCLEATED RED BLOOD CELLS: 2
O2 SAT, VEN: 93.3 % (ref 50–70)
PCO2, VEN: 79 MMHG (ref 38–52)
PDW BLD-RTO: 17 % (ref 11.7–14.9)
PH VENOUS: 7.4 (ref 7.32–7.42)
PLATELET # BLD: 302 K/CU MM (ref 140–440)
PMV BLD AUTO: 9.3 FL (ref 7.5–11.1)
PO2, VEN: 76 MMHG (ref 28–48)
POLYCHROMASIA: ABNORMAL
POTASSIUM SERPL-SCNC: 4.2 MMOL/L (ref 3.5–5.1)
POTASSIUM SERPL-SCNC: 4.4 MMOL/L (ref 3.5–5.1)
PROMYELOCYTES ABSOLUTE COUNT: 0.13 K/CU MM
PROMYELOCYTES PERCENT: 2 %
RBC # BLD: 3.35 M/CU MM (ref 4.2–5.4)
SEGMENTED NEUTROPHILS ABSOLUTE COUNT: 3.5 K/CU MM
SEGMENTED NEUTROPHILS RELATIVE PERCENT: 51 % (ref 36–66)
SODIUM BLD-SCNC: 144 MMOL/L (ref 135–145)
SODIUM BLD-SCNC: 145 MMOL/L (ref 135–145)
WBC # BLD: 6.7 K/CU MM (ref 4–10.5)

## 2019-06-22 PROCEDURE — 6360000002 HC RX W HCPCS: Performed by: NURSE PRACTITIONER

## 2019-06-22 PROCEDURE — 6360000002 HC RX W HCPCS: Performed by: INTERNAL MEDICINE

## 2019-06-22 PROCEDURE — 2580000003 HC RX 258: Performed by: NURSE PRACTITIONER

## 2019-06-22 PROCEDURE — 71045 X-RAY EXAM CHEST 1 VIEW: CPT

## 2019-06-22 PROCEDURE — 82805 BLOOD GASES W/O2 SATURATION: CPT

## 2019-06-22 PROCEDURE — 2580000003 HC RX 258: Performed by: INTERNAL MEDICINE

## 2019-06-22 PROCEDURE — 94660 CPAP INITIATION&MGMT: CPT

## 2019-06-22 PROCEDURE — 2140000000 HC CCU INTERMEDIATE R&B

## 2019-06-22 PROCEDURE — 6370000000 HC RX 637 (ALT 250 FOR IP): Performed by: INTERNAL MEDICINE

## 2019-06-22 PROCEDURE — 80048 BASIC METABOLIC PNL TOTAL CA: CPT

## 2019-06-22 PROCEDURE — 6370000000 HC RX 637 (ALT 250 FOR IP): Performed by: NURSE PRACTITIONER

## 2019-06-22 PROCEDURE — 6370000000 HC RX 637 (ALT 250 FOR IP): Performed by: HOSPITALIST

## 2019-06-22 PROCEDURE — 82962 GLUCOSE BLOOD TEST: CPT

## 2019-06-22 PROCEDURE — 85007 BL SMEAR W/DIFF WBC COUNT: CPT

## 2019-06-22 PROCEDURE — 85027 COMPLETE CBC AUTOMATED: CPT

## 2019-06-22 PROCEDURE — 94640 AIRWAY INHALATION TREATMENT: CPT

## 2019-06-22 PROCEDURE — C9113 INJ PANTOPRAZOLE SODIUM, VIA: HCPCS | Performed by: NURSE PRACTITIONER

## 2019-06-22 RX ORDER — METOLAZONE 5 MG/1
5 TABLET ORAL ONCE
Status: COMPLETED | OUTPATIENT
Start: 2019-06-22 | End: 2019-06-22

## 2019-06-22 RX ORDER — FUROSEMIDE 10 MG/ML
40 INJECTION INTRAMUSCULAR; INTRAVENOUS 2 TIMES DAILY
Status: DISCONTINUED | OUTPATIENT
Start: 2019-06-22 | End: 2019-06-22

## 2019-06-22 RX ORDER — FUROSEMIDE 10 MG/ML
40 INJECTION INTRAMUSCULAR; INTRAVENOUS 2 TIMES DAILY
Status: DISCONTINUED | OUTPATIENT
Start: 2019-06-23 | End: 2019-06-25

## 2019-06-22 RX ORDER — POTASSIUM CHLORIDE 20 MEQ/1
40 TABLET, EXTENDED RELEASE ORAL ONCE
Status: COMPLETED | OUTPATIENT
Start: 2019-06-22 | End: 2019-06-22

## 2019-06-22 RX ADMIN — INSULIN LISPRO 3 UNITS: 100 INJECTION, SOLUTION INTRAVENOUS; SUBCUTANEOUS at 18:12

## 2019-06-22 RX ADMIN — IPRATROPIUM BROMIDE AND ALBUTEROL SULFATE 1 AMPULE: .5; 3 SOLUTION RESPIRATORY (INHALATION) at 01:20

## 2019-06-22 RX ADMIN — PRAMIPEXOLE DIHYDROCHLORIDE 1 MG: 0.25 TABLET ORAL at 09:12

## 2019-06-22 RX ADMIN — INSULIN LISPRO 3 UNITS: 100 INJECTION, SOLUTION INTRAVENOUS; SUBCUTANEOUS at 03:00

## 2019-06-22 RX ADMIN — SODIUM CHLORIDE, PRESERVATIVE FREE 10 ML: 5 INJECTION INTRAVENOUS at 09:16

## 2019-06-22 RX ADMIN — SODIUM CHLORIDE 3 G: 900 INJECTION, SOLUTION INTRAVENOUS at 18:09

## 2019-06-22 RX ADMIN — FUROSEMIDE 40 MG: 10 INJECTION, SOLUTION INTRAMUSCULAR; INTRAVENOUS at 12:37

## 2019-06-22 RX ADMIN — GABAPENTIN 800 MG: 400 CAPSULE ORAL at 14:31

## 2019-06-22 RX ADMIN — GABAPENTIN 800 MG: 400 CAPSULE ORAL at 09:10

## 2019-06-22 RX ADMIN — POTASSIUM CHLORIDE 20 MEQ: 20 TABLET, EXTENDED RELEASE ORAL at 09:11

## 2019-06-22 RX ADMIN — SODIUM CHLORIDE 3 G: 900 INJECTION, SOLUTION INTRAVENOUS at 05:45

## 2019-06-22 RX ADMIN — SODIUM CHLORIDE, PRESERVATIVE FREE 10 ML: 5 INJECTION INTRAVENOUS at 20:25

## 2019-06-22 RX ADMIN — PANTOPRAZOLE SODIUM 40 MG: 40 INJECTION, POWDER, FOR SOLUTION INTRAVENOUS at 09:10

## 2019-06-22 RX ADMIN — INSULIN LISPRO 3 UNITS: 100 INJECTION, SOLUTION INTRAVENOUS; SUBCUTANEOUS at 09:23

## 2019-06-22 RX ADMIN — PRAMIPEXOLE DIHYDROCHLORIDE 1 MG: 0.25 TABLET ORAL at 14:31

## 2019-06-22 RX ADMIN — INSULIN LISPRO 3 UNITS: 100 INJECTION, SOLUTION INTRAVENOUS; SUBCUTANEOUS at 22:13

## 2019-06-22 RX ADMIN — FUROSEMIDE 40 MG: 10 INJECTION, SOLUTION INTRAMUSCULAR; INTRAVENOUS at 09:11

## 2019-06-22 RX ADMIN — PRAMIPEXOLE DIHYDROCHLORIDE 1 MG: 0.25 TABLET ORAL at 22:12

## 2019-06-22 RX ADMIN — SODIUM CHLORIDE 3 G: 900 INJECTION, SOLUTION INTRAVENOUS at 01:13

## 2019-06-22 RX ADMIN — SODIUM CHLORIDE 3 G: 900 INJECTION, SOLUTION INTRAVENOUS at 12:37

## 2019-06-22 RX ADMIN — GABAPENTIN 800 MG: 400 CAPSULE ORAL at 20:28

## 2019-06-22 RX ADMIN — METOLAZONE 5 MG: 5 TABLET ORAL at 12:06

## 2019-06-22 RX ADMIN — INSULIN GLARGINE 40 UNITS: 100 INJECTION, SOLUTION SUBCUTANEOUS at 22:13

## 2019-06-22 RX ADMIN — POTASSIUM CHLORIDE 40 MEQ: 20 TABLET, EXTENDED RELEASE ORAL at 12:05

## 2019-06-22 RX ADMIN — ENOXAPARIN SODIUM 40 MG: 100 INJECTION SUBCUTANEOUS at 09:11

## 2019-06-22 RX ADMIN — LINAGLIPTIN 5 MG: 5 TABLET, FILM COATED ORAL at 09:12

## 2019-06-22 RX ADMIN — INSULIN LISPRO 3 UNITS: 100 INJECTION, SOLUTION INTRAVENOUS; SUBCUTANEOUS at 13:18

## 2019-06-22 RX ADMIN — IRON SUCROSE 300 MG: 20 INJECTION, SOLUTION INTRAVENOUS at 09:57

## 2019-06-22 ASSESSMENT — PAIN SCALES - GENERAL: PAINLEVEL_OUTOF10: 0

## 2019-06-22 NOTE — PROGRESS NOTES
HOSPITALIST PROGRESS NOTE  Date: 6/21/2019   Name: Bonnie Mtz   MRN: [id number]   YOB: 1955      Subjective/Interval Hx:   Having some dyspnea today    Objective:   Physical Exam:   BP (!) 149/65   Pulse 81   Temp 98.3 °F (36.8 °C) (Oral)   Resp 24   Ht 5' 6\" (1.676 m)   Wt (!) 453 lb 3.2 oz (205.6 kg)   SpO2 96%   BMI 73.15 kg/m²   Physical Exam   Constitutional: She appears well-developed. Pulmonary/Chest:   Mild dyspnea with speech   Neurological: She is alert.            Meds:   Meds:    iron sucrose  300 mg Intravenous Q24H    furosemide  40 mg Intravenous Q12H    potassium chloride  20 mEq Oral BID WC    [START ON 6/22/2019] furosemide  40 mg Oral Daily    insulin glargine  40 Units Subcutaneous Nightly    insulin lispro  0-18 Units Subcutaneous TID WC    insulin lispro  0-18 Units Subcutaneous 2 times per day    linagliptin  5 mg Oral Daily    insulin lispro  25 Units Subcutaneous TID WC    ampicillin-sulbactam  3 g Intravenous Q6H    gabapentin  800 mg Oral TID    pramipexole  1 mg Oral TID    sodium chloride flush  10 mL Intravenous 2 times per day    sodium chloride flush  10 mL Intravenous 2 times per day    enoxaparin  40 mg Subcutaneous Daily    pantoprazole  40 mg Intravenous Daily    nystatin   Topical BID      Infusions:    dextrose       PRN Meds:     sodium chloride (PF) 10 mL ONCE PRN   sodium chloride flush 10 mL PRN   sodium chloride flush 10 mL PRN   magnesium hydroxide 30 mL Daily PRN   ondansetron 4 mg Q6H PRN   ipratropium-albuterol 1 ampule Q4H PRN   acetaminophen 650 mg Q4H PRN   glucose 15 g PRN   dextrose 12.5 g PRN   glucagon (rDNA) 1 mg PRN   dextrose 100 mL/hr PRN       Data/Labs:     Recent Labs     06/19/19  1210 06/20/19  0520 06/21/19  0400   WBC 6.3 7.0 6.8   HGB 7.1* 7.1* 7.5*   HCT 25.2* 25.4* 27.1*    232 275      Recent Labs     06/19/19  0450 06/20/19  0520 06/21/19  0400    139 140   K 4.1 4.2 4.4   CL 96*

## 2019-06-22 NOTE — PROGRESS NOTES
Progress Note( Dr. Bernardino Goff)  6/21/2019  Subjective:   Admit Date: 6/16/2019  PCP: THEODORE Castellano NP    Admitted For : Shortness of breath leg pain and headache    Consulted For: Better control of blood glucose    Interval History: Somewhat better but still complaining of leg pain    Denies any chest pains,   Denies SOB . Denies nausea or vomiting. No new bowel or bladder symptoms. Intake/Output Summary (Last 24 hours) at 6/21/2019 2301  Last data filed at 6/21/2019 2100  Gross per 24 hour   Intake 200 ml   Output 1550 ml   Net -1350 ml       DATA    CBC:   Recent Labs     06/19/19  1210 06/20/19  0520 06/21/19  0400   WBC 6.3 7.0 6.8   HGB 7.1* 7.1* 7.5*    232 275    CMP:  Recent Labs     06/19/19  0450 06/20/19  0520 06/21/19  0400    139 140   K 4.1 4.2 4.4   CL 96* 98* 98*   CO2 35* 34* 35*   BUN 18 21 16   CREATININE 0.8 0.8 0.6   CALCIUM 8.5 8.6 8.8     Lipids:   Lab Results   Component Value Date    CHOL 186 02/07/2019    HDL 39 02/07/2019    TRIG 122 02/07/2019     Glucose:  Recent Labs     06/21/19  0238 06/21/19  1143 06/21/19  1758   POCGLU 140* 150* 190*     QldcqhnvlcC8R:  Lab Results   Component Value Date    LABA1C 11.2 06/16/2019     High Sensitivity TSH:   Lab Results   Component Value Date    TSHHS 0.820 02/06/2019     Free T3: No results found for: FT3  Free T4:  Lab Results   Component Value Date    T4FREE 1.07 09/04/2018       Xr Foot Left (2 Views)    Result Date: 6/17/2019  EXAMINATION: TWO XRAY VIEWS OF THE LEFT FOOT 6/17/2019 8:52 am COMPARISON: 11/24/2018 HISTORY: ORDERING SYSTEM PROVIDED HISTORY: left diabetic ulcer   No acute osseous abnormality of the left foot. No evidence of osteomyelitis. Ct Head Wo Contrast    Result Date: 6/16/2019  EXAMINATION: CT OF THE HEAD WITHOUT CONTRAST  6/16/2019 7:16 pm     No acute intracranial abnormality.          Xr Chest 1 Vw    Result Date: 6/17/2019  EXAMINATION: ONE XRAY VIEW OF THE CHEST 6/16/2019 4:11 pm COMPARISON: Chest radiograph 02/05/2019 HISTORY: ORDERING SYSTEM PROVIDED HISTORY: Fever, shortness of breath      Pulmonary vascular congestion and patchy bilateral perihilar and bibasilar airspace opacities may reflect pulmonary edema or atypical infection. Vl Dup Lower Extremity Venous Bilateral    Result Date: 6/17/2019  EXAMINATION: DUPLEX VENOUS ULTRASOUND OF THE BILATERAL LOWER EXTREMITIES, 6/17/2019 6:03 am        No evidence of DVT in either lower extremity. Technically limited by body habitus.        Scheduled Medicines   Medications:    iron sucrose  300 mg Intravenous Q24H    furosemide  40 mg Intravenous 2 times per day    potassium chloride  20 mEq Oral BID WC    [START ON 6/22/2019] furosemide  40 mg Oral Daily    insulin glargine  40 Units Subcutaneous Nightly    insulin lispro  0-18 Units Subcutaneous TID WC    insulin lispro  0-18 Units Subcutaneous 2 times per day    linagliptin  5 mg Oral Daily    insulin lispro  25 Units Subcutaneous TID     ampicillin-sulbactam  3 g Intravenous Q6H    gabapentin  800 mg Oral TID    pramipexole  1 mg Oral TID    sodium chloride flush  10 mL Intravenous 2 times per day    sodium chloride flush  10 mL Intravenous 2 times per day    enoxaparin  40 mg Subcutaneous Daily    pantoprazole  40 mg Intravenous Daily    nystatin   Topical BID      Infusions:    dextrose           Objective:   Vitals: BP (!) 129/58   Pulse 84   Temp 98.3 °F (36.8 °C) (Oral)   Resp 18   Ht 5' 6\" (1.676 m)   Wt (!) 453 lb 3.2 oz (205.6 kg)   SpO2 97%   BMI 73.15 kg/m²   General appearance: alert and cooperative with exam  Neck: no JVD or bruit  Thyroid : Normal lobes   Lungs: Has Vesicular Breath sounds   Heart:  regular rate and rhythm  Abdomen: soft, non-tender; bowel sounds normal; no masses,  no organomegaly  Musculoskeletal: Normal  Extremities: extremities normal, , edema ulcerations of both legs  Neurologic:  Awake, alert, oriented to name, place and

## 2019-06-23 ENCOUNTER — APPOINTMENT (OUTPATIENT)
Dept: GENERAL RADIOLOGY | Age: 64
DRG: 720 | End: 2019-06-23
Payer: COMMERCIAL

## 2019-06-23 LAB
ANION GAP SERPL CALCULATED.3IONS-SCNC: 4 MMOL/L (ref 4–16)
ANISOCYTOSIS: ABNORMAL
BANDED NEUTROPHILS ABSOLUTE COUNT: 0.6 K/CU MM
BANDED NEUTROPHILS RELATIVE PERCENT: 7 % (ref 5–11)
BASE EXCESS MIXED: ABNORMAL (ref 0–2.3)
BUN BLDV-MCNC: 10 MG/DL (ref 6–23)
C-REACTIVE PROTEIN, HIGH SENSITIVITY: 71.1 MG/L
CALCIUM SERPL-MCNC: 9.1 MG/DL (ref 8.3–10.6)
CARBON MONOXIDE, BLOOD: 2.5 % (ref 0–5)
CHLORIDE BLD-SCNC: 95 MMOL/L (ref 99–110)
CO2 CONTENT: 50.9 MMOL/L (ref 19–24)
CO2: 44 MMOL/L (ref 21–32)
COMMENT: ABNORMAL
CREAT SERPL-MCNC: 0.6 MG/DL (ref 0.6–1.1)
DIFFERENTIAL TYPE: ABNORMAL
EOSINOPHILS ABSOLUTE: 0.3 K/CU MM
EOSINOPHILS RELATIVE PERCENT: 4 % (ref 0–3)
ERYTHROCYTE SEDIMENTATION RATE: 116 MM/HR (ref 0–30)
GFR AFRICAN AMERICAN: >60 ML/MIN/1.73M2
GFR NON-AFRICAN AMERICAN: >60 ML/MIN/1.73M2
GLUCOSE BLD-MCNC: 101 MG/DL (ref 70–99)
GLUCOSE BLD-MCNC: 132 MG/DL (ref 70–99)
GLUCOSE BLD-MCNC: 161 MG/DL (ref 70–99)
GLUCOSE BLD-MCNC: 165 MG/DL (ref 70–99)
GLUCOSE BLD-MCNC: 167 MG/DL (ref 70–99)
GLUCOSE BLD-MCNC: 187 MG/DL (ref 70–99)
HCO3 ARTERIAL: 48.6 MMOL/L (ref 18–23)
HCT VFR BLD CALC: 28.9 % (ref 37–47)
HEMOGLOBIN: 7.9 GM/DL (ref 12.5–16)
HYPOCHROMIA: ABNORMAL
LYMPHOCYTES ABSOLUTE: 1.5 K/CU MM
LYMPHOCYTES RELATIVE PERCENT: 18 % (ref 24–44)
MACROCYTES: ABNORMAL
MCH RBC QN AUTO: 23.2 PG (ref 27–31)
MCHC RBC AUTO-ENTMCNC: 27.3 % (ref 32–36)
MCV RBC AUTO: 84.8 FL (ref 78–100)
METAMYELOCYTES ABSOLUTE COUNT: 0.43 K/CU MM
METAMYELOCYTES PERCENT: 5 %
METHEMOGLOBIN ARTERIAL: 0.9 %
MICROCYTES: ABNORMAL
MONOCYTES ABSOLUTE: 0.3 K/CU MM
MONOCYTES RELATIVE PERCENT: 3 % (ref 0–4)
MYELOCYTE PERCENT: 7 %
MYELOCYTES ABSOLUTE COUNT: 0.6 K/CU MM
O2 SATURATION: 96.5 % (ref 96–97)
PCO2 ARTERIAL: 75 MMHG (ref 32–45)
PDW BLD-RTO: 17.4 % (ref 11.7–14.9)
PH BLOOD: 7.42 (ref 7.34–7.45)
PLATELET # BLD: 316 K/CU MM (ref 140–440)
PMV BLD AUTO: 9.3 FL (ref 7.5–11.1)
PO2 ARTERIAL: 157 MMHG (ref 75–100)
POLYCHROMASIA: ABNORMAL
POTASSIUM SERPL-SCNC: 4.2 MMOL/L (ref 3.5–5.1)
PRO-BNP: 880.2 PG/ML
PROMYELOCYTES ABSOLUTE COUNT: 0.26 K/CU MM
PROMYELOCYTES PERCENT: 3 %
RBC # BLD: 3.41 M/CU MM (ref 4.2–5.4)
SEGMENTED NEUTROPHILS ABSOLUTE COUNT: 4.5 K/CU MM
SEGMENTED NEUTROPHILS RELATIVE PERCENT: 53 % (ref 36–66)
SODIUM BLD-SCNC: 143 MMOL/L (ref 135–145)
WBC # BLD: 8.5 K/CU MM (ref 4–10.5)

## 2019-06-23 PROCEDURE — 80048 BASIC METABOLIC PNL TOTAL CA: CPT

## 2019-06-23 PROCEDURE — 85027 COMPLETE CBC AUTOMATED: CPT

## 2019-06-23 PROCEDURE — 2580000003 HC RX 258: Performed by: INTERNAL MEDICINE

## 2019-06-23 PROCEDURE — 2700000000 HC OXYGEN THERAPY PER DAY

## 2019-06-23 PROCEDURE — 85652 RBC SED RATE AUTOMATED: CPT

## 2019-06-23 PROCEDURE — 82803 BLOOD GASES ANY COMBINATION: CPT

## 2019-06-23 PROCEDURE — 85007 BL SMEAR W/DIFF WBC COUNT: CPT

## 2019-06-23 PROCEDURE — 71045 X-RAY EXAM CHEST 1 VIEW: CPT

## 2019-06-23 PROCEDURE — 82962 GLUCOSE BLOOD TEST: CPT

## 2019-06-23 PROCEDURE — 99253 IP/OBS CNSLTJ NEW/EST LOW 45: CPT | Performed by: INTERNAL MEDICINE

## 2019-06-23 PROCEDURE — 94761 N-INVAS EAR/PLS OXIMETRY MLT: CPT

## 2019-06-23 PROCEDURE — 6360000002 HC RX W HCPCS: Performed by: INTERNAL MEDICINE

## 2019-06-23 PROCEDURE — 2140000000 HC CCU INTERMEDIATE R&B

## 2019-06-23 PROCEDURE — 6360000002 HC RX W HCPCS: Performed by: NURSE PRACTITIONER

## 2019-06-23 PROCEDURE — 83880 ASSAY OF NATRIURETIC PEPTIDE: CPT

## 2019-06-23 PROCEDURE — 6370000000 HC RX 637 (ALT 250 FOR IP): Performed by: HOSPITALIST

## 2019-06-23 PROCEDURE — 6370000000 HC RX 637 (ALT 250 FOR IP): Performed by: NURSE PRACTITIONER

## 2019-06-23 PROCEDURE — 6370000000 HC RX 637 (ALT 250 FOR IP): Performed by: INTERNAL MEDICINE

## 2019-06-23 PROCEDURE — 86140 C-REACTIVE PROTEIN: CPT

## 2019-06-23 PROCEDURE — 94660 CPAP INITIATION&MGMT: CPT

## 2019-06-23 PROCEDURE — 2580000003 HC RX 258: Performed by: NURSE PRACTITIONER

## 2019-06-23 PROCEDURE — C9113 INJ PANTOPRAZOLE SODIUM, VIA: HCPCS | Performed by: NURSE PRACTITIONER

## 2019-06-23 RX ADMIN — PANTOPRAZOLE SODIUM 40 MG: 40 INJECTION, POWDER, FOR SOLUTION INTRAVENOUS at 10:17

## 2019-06-23 RX ADMIN — GABAPENTIN 800 MG: 400 CAPSULE ORAL at 17:00

## 2019-06-23 RX ADMIN — SODIUM CHLORIDE, PRESERVATIVE FREE 10 ML: 5 INJECTION INTRAVENOUS at 10:18

## 2019-06-23 RX ADMIN — FUROSEMIDE 40 MG: 10 INJECTION, SOLUTION INTRAMUSCULAR; INTRAVENOUS at 18:32

## 2019-06-23 RX ADMIN — INSULIN LISPRO 25 UNITS: 100 INJECTION, SOLUTION INTRAVENOUS; SUBCUTANEOUS at 12:20

## 2019-06-23 RX ADMIN — SODIUM CHLORIDE 3 G: 900 INJECTION, SOLUTION INTRAVENOUS at 01:15

## 2019-06-23 RX ADMIN — INSULIN GLARGINE 20 UNITS: 100 INJECTION, SOLUTION SUBCUTANEOUS at 23:52

## 2019-06-23 RX ADMIN — PRAMIPEXOLE DIHYDROCHLORIDE 1 MG: 0.25 TABLET ORAL at 17:01

## 2019-06-23 RX ADMIN — ENOXAPARIN SODIUM 40 MG: 100 INJECTION SUBCUTANEOUS at 10:18

## 2019-06-23 RX ADMIN — INSULIN LISPRO 25 UNITS: 100 INJECTION, SOLUTION INTRAVENOUS; SUBCUTANEOUS at 09:08

## 2019-06-23 RX ADMIN — SODIUM CHLORIDE, PRESERVATIVE FREE 10 ML: 5 INJECTION INTRAVENOUS at 23:29

## 2019-06-23 RX ADMIN — IRON SUCROSE 300 MG: 20 INJECTION, SOLUTION INTRAVENOUS at 10:20

## 2019-06-23 RX ADMIN — SODIUM CHLORIDE, PRESERVATIVE FREE 10 ML: 5 INJECTION INTRAVENOUS at 10:17

## 2019-06-23 RX ADMIN — SODIUM CHLORIDE, PRESERVATIVE FREE 10 ML: 5 INJECTION INTRAVENOUS at 23:30

## 2019-06-23 RX ADMIN — SODIUM CHLORIDE 3 G: 900 INJECTION, SOLUTION INTRAVENOUS at 06:16

## 2019-06-23 RX ADMIN — LINAGLIPTIN 5 MG: 5 TABLET, FILM COATED ORAL at 10:17

## 2019-06-23 RX ADMIN — INSULIN LISPRO 3 UNITS: 100 INJECTION, SOLUTION INTRAVENOUS; SUBCUTANEOUS at 09:06

## 2019-06-23 RX ADMIN — NYSTATIN CREAM: 100000 CREAM TOPICAL at 17:02

## 2019-06-23 RX ADMIN — INSULIN LISPRO 3 UNITS: 100 INJECTION, SOLUTION INTRAVENOUS; SUBCUTANEOUS at 12:18

## 2019-06-23 RX ADMIN — PRAMIPEXOLE DIHYDROCHLORIDE 1 MG: 0.25 TABLET ORAL at 23:26

## 2019-06-23 RX ADMIN — FUROSEMIDE 40 MG: 10 INJECTION, SOLUTION INTRAMUSCULAR; INTRAVENOUS at 10:17

## 2019-06-23 RX ADMIN — GABAPENTIN 800 MG: 400 CAPSULE ORAL at 10:17

## 2019-06-23 RX ADMIN — PRAMIPEXOLE DIHYDROCHLORIDE 1 MG: 0.25 TABLET ORAL at 10:21

## 2019-06-23 RX ADMIN — GABAPENTIN 800 MG: 400 CAPSULE ORAL at 23:26

## 2019-06-23 RX ADMIN — SODIUM CHLORIDE 3 G: 900 INJECTION, SOLUTION INTRAVENOUS at 18:32

## 2019-06-23 RX ADMIN — SODIUM CHLORIDE 3 G: 900 INJECTION, SOLUTION INTRAVENOUS at 12:18

## 2019-06-23 RX ADMIN — INSULIN LISPRO 3 UNITS: 100 INJECTION, SOLUTION INTRAVENOUS; SUBCUTANEOUS at 01:49

## 2019-06-23 RX ADMIN — NYSTATIN CREAM: 100000 CREAM TOPICAL at 23:29

## 2019-06-23 ASSESSMENT — PAIN SCALES - GENERAL: PAINLEVEL_OUTOF10: 0

## 2019-06-23 NOTE — PROGRESS NOTES
06/23/19 1538   NIV Type   Equipment Type v60   Mode BIPAP   Mask Type Full face mask   Mask Size Large   Settings/Measurements   Comfort Level Good   Using Accessory Muscles No   IPAP 22 cmH20   EPAP 12 cmH2O   Rate Ordered 12   Resp 21   SpO2 96   FiO2  30 %   Vt Exhaled 696 mL   Mask Leak (lpm) 41 lpm   Skin Protection for O2 Device No   Alarm Settings   Alarms On Y   Press Low Alarm 3 cmH2O   High Pressure Alarm 30 cmH2O   Delay Alarm 20 sec(s)   Resp Rate Low Alarm 12   High Respiratory Rate 35 br/min   bipap settings changed per order of pulmonology

## 2019-06-23 NOTE — PROGRESS NOTES
Notified hospitalist pt has desated intermittently throughout the shift several times and is currently on 60%fio2

## 2019-06-23 NOTE — CONSULTS
Pulmonary Consult Note      Reason for Consult: Hypoxia  Requesting Physician: Dr. Deng Kaplan:   CHIEF COMPLAINT :SOB    Patient Active Problem List    Diagnosis Date Noted    Acute exacerbation of chronic obstructive pulmonary disease (COPD) ([address]      Priority: High    Hypervolemia      Priority: High    Bacteremia due to group B Streptococcus     Septic shock ([address] 06/16/2019    Community acquired pneumonia 06/16/2019    Uncontrolled type 2 diabetes mellitus with hyperglycemia ([address] 06/16/2019    Class 3 severe obesity due to excess calories with body mass index (BMI) greater than or equal to 70 in adult [facility]) 06/16/2019    VHD (valvular heart disease) 04/02/2019     Overview Note:     H/o TAVR      Pneumonia 02/06/2019    Diabetic ulcer of left midfoot associated with type 2 diabetes mellitus, with necrosis of muscle ([address]     Skin ulcer of left foot with fat layer exposed ([address]     Cellulitis 11/24/2018    Acute on chronic respiratory failure with hypoxia and hypercapnia (HCC)     Gait disturbance 02/10/2018    Chronic obstructive pulmonary disease ([address] 02/07/2018    Chronic respiratory failure with hypoxia and hypercapnia ([address] 01/30/2018    Morbid obesity ([address]     Asthma     Diabetes mellitus ([address]     Hypertension     Sleep apnea     Family history of coronary artery disease     Chest pain     SOB (shortness of breath)     Palpitations     Peripheral edema     Aortic stenosis     Morbid obesity with BMI of 70 and over, adult ([address] 10/27/2016    Essential hypertension 10/27/2016    Dyslipidemia 10/27/2016    Fecal incontinence 10/27/2016    Mild intermittent asthma without complication 04/02/2398    S/P laparoscopic cholecystectomy 10/27/2016    Type 2 diabetes mellitus without complication, with long-term current use of insulin ([address] 10/27/2016    Fatty liver 10/27/2016    Arthritis 10/27/2016    H/O parotidectomy 10/27/2016    Venous stasis dermatitis of both lower extremities 10/27/2016        HPI:                The patient is a 60 y.o. female with significant past medical history of Carotid stenosis, CHD, COPD, depression, DM, Morbid obesity, ANMOL  presents with complaints of SOB and headache. He was found to have hypercapnea and hypoxia. He had no fever, no n/v, no abdominal pian, no diarrhea or dysuria. He was put on a BIPAP. At this time he is lying in th bed He is not in acute resp distress.       Past Medical History:      Diagnosis Date    Aortic stenosis     Asthma     Cancer ([facility]     Cervical    Carotid artery stenosis     Chest pain     CH[facility]congestive heart failure) ([facility])     Chronic back pain     COPD exacerbation ([facility])     Depression     Diabetes mellitus ([facility]     Family history of coronary artery disease     Fatty liver 10/27/2016    H/O aortic valve stenosis     H/O echocardiogram 2016    EF 55%, Aortic valve area is 0.84 cm2 with a mean gradient of 36 suggestive of severe aortic stenosis, mild to mos LVH    Headache     Heart murmur     History of nuclear stress test 2016    lexiscan-normal,EF70%    Morbid obesity ([facility])     BMI=73.72 kg/m2    Neuropathy     NSTEMI (non-ST elevated myocardial infarction) ([facility] 2018    Obesity     Osteoarthritis     Palpitations     Peripheral edema     Restless legs syndrome     Sleep apnea     Has a CPAP    Sleep apnea     SOB (shortness of breath)       Past Surgical History:        Procedure Laterality Date    AORTIC VALVE REPLACEMENT  2017    29mm EvolutR TAVR     SECTION      CHOLECYSTECTOMY      GALLBLADDER SURGERY      HYSTERECTOMY      NECK SURGERY      Tumor    TUBAL LIGATION       Current Medications:     furosemide  40 mg Intravenous BID    insulin glargine  40 Units Subcutaneous Nightly    insulin lispro  0-18 Units Subcutaneous TID WC    insulin lispro  0-18 Units Subcutaneous 2 times per day    linagliptin  5 mg Oral Daily    insulin lispro  25 Units Subcutaneous TID     ampicillin-sulbactam  3 g Intravenous Q6H    gabapentin  800 mg Oral TID    pramipexole  1 mg Oral TID    sodium chloride flush  10 mL Intravenous 2 times per day    sodium chloride flush  10 mL Intravenous 2 times per day    enoxaparin  40 mg Subcutaneous Daily    pantoprazole  40 mg Intravenous Daily    nystatin   Topical BID     Allergies:    Social History:    TOBACCO:   reports that she quit smoking about 16 years ago. Her smoking use included cigarettes. She has never used smokeless tobacco.  ETOH:   reports that she does not drink alcohol. Patient currently lives independently    Family History:       Problem Relation Age of Onset    Heart Failure Mother     Heart Attack Mother     Hypertension Mother     Coronary Art Dis Mother         Had PTCA w/stenting.  Heart Failure Father     Heart Failure Brother     Heart Disease Mother     High Blood Pressure Mother     Obesity Mother     Diabetes Mother     Heart Disease Father     High Blood Pressure Father     Obesity Father     Heart Disease Brother     High Blood Pressure Brother     Obesity Brother        REVIEW OF SYSTEMS:    CONSTITUTIONAL:  negative for fevers, chills, diaphoresis, activity change, appetite change, fatigue, night sweats and unexpected weight change.    EYES:  negative for blurred vision, eye discharge, visual disturbance and icterus  HEENT:  negative for hearing loss, tinnitus, ear drainage, sinus pressure, nasal congestion, epistaxis and snoring  RESPIRATORY:  See HPI  CARDIOVASCULAR:  negative for chest pain, palpitations, exertional chest pressure/discomfort, edema, syncope  GASTROINTESTINAL:  negative for nausea, vomiting, diarrhea, constipation, blood in stool and abdominal pain  GENITOURINARY:  negative for frequency, dysuria, urinary incontinence, decreased urine volume, and hematuria  HEMATOLOGIC/LYMPHATIC:  negative for easy bruising, bleeding and lymphadenopathy  ALLERGIC/IMMUNOLOGIC:  negative for recurrent infections, angioedema, anaphylaxis and drug reactions  ENDOCRINE:  negative for weight changes and diabetic symptoms including polyuria, polydipsia and polyphagia  MUSCULOSKELETAL:  negative for  pain, joint swelling, decreased range of motion and muscle weakness  NEUROLOGICAL:  negative for headaches, slurred speech, unilateral weakness  PSYCHIATRIC/BEHAVIORAL: negative for hallucinations, behavioral problems, confusion and agitation. Objective:   PHYSICAL EXAM:      VITALS:  /77   Pulse 83   Temp 98.8 °F (37.1 °C)   Resp 25   Ht 5' 6\" (1.676 m)   Wt (!) 446 lb 3.4 oz (202.4 kg)   SpO2 96%   BMI 72.02 kg/m²   24HR INTAKE/OUTPUT:      Intake/Output Summary (Last 24 hours) at 2019 1517  Last data filed at 2019 1420  Gross per 24 hour   Intake 653.6 ml   Output 7650 ml   Net -6996.4 ml     CURRENT PULSE OXIMETRY:  SpO2: 96 %  24HR PULSE OXIMETRY RANGE:  SpO2  Av.1 %  Min: 72 %  Max: 99 %    CONSTITUTIONAL:  awake, alert, cooperative, no apparent distress, and appears stated age  NECK:  Supple, symmetrical, trachea midline, no adenopathy, thyroid symmetric, not enlarged and no tenderness, skin normal  LUNGS:  Decreased air entry bilateral bases  CARDIOVASCULAR:  normal S1 and S2, no edema and no JVD  ABDOMEN:  normal bowel sounds, non-distended and no masses palpated, and no tenderness to palpation. No hepatospleenomegaly  LYMPHADENOPATHY:  no axillary or supraclavicular adenopathy. No cervical adnenopathy  PSYCHIATRIC: Oriented to person place and time. No obvious depression or anxiety. MUSCULOSKELETAL: No obvious misalignment or effusion of the joints. No clubbing, cyanosis of the digits. SKIN:  normal skin color, texture, turgor and no redness, warmth, or swelling.  No palpable nodules    DATA:    Old records have been reviewed  CBC with Differential:    Lab Results   Component Value Date    WBC 8.5 2019    RBC ulcer of left foot with fat layer exposed ([address]     Cellulitis 11/24/2018    Acute on chronic respiratory failure with hypoxia and hypercapnia (HCC)     Gait disturbance 02/10/2018    Chronic obstructive pulmonary disease (HCC) 02/07/2018    Chronic respiratory failure with hypoxia and hypercapnia ([address] 01/30/2018    Morbid obesity ([address]     Asthma     Diabetes mellitus ([address]     Hypertension     Sleep apnea     Family history of coronary artery disease     Chest pain     SOB (shortness of breath)     Palpitations     Peripheral edema     Aortic stenosis     Morbid obesity with BMI of 70 and over, adult ([address] 10/27/2016    Essential hypertension 10/27/2016    Dyslipidemia 10/27/2016    Fecal incontinence 10/27/2016    Mild intermittent asthma without complication 83/72/8580    S/P laparoscopic cholecystectomy 10/27/2016    Type 2 diabetes mellitus without complication, with long-term current use of insulin ([facility] 10/27/2016    Fatty liver 10/27/2016    Arthritis 10/27/2016    H/O parotidectomy 10/27/2016    Venous stasis dermatitis of both lower extremities 10/27/2016     Anemia  Acute on chronic Hypoxic hypercapneic resp failure  Chronic Metabolic alkalosis  Morbid Obesity  ANMOL  ? OHS  Pulmonary edema  Bilateral Pleural effusions  Diastolic Dysfunction    PLAN  1. BIPAP  2. ICS  3. CPT  4.c/w nebs  5. Abx  6. F/u C&S  7. DVT and GI prophylaxis  8.  C/w present management  Electronically signed by Mehran Condon MD on 6/23/2019 at 3:17 PM

## 2019-06-23 NOTE — CARE COORDINATION
Consult received d/t pt will be going home on an IV atb, possibly Unasyn 3 grams q 6hrs times 2 weeks according to Dr Jeffrey Asp note on 6/21. CM met with pt to discuss the need for a SNF d/t the frequency of the IV atb. Pt adamantly refused a SNF. She states that her daughter has medical training and will be able to administer the other doses of the IV atb. Pt is agreeable to [place] [id number] and does not have a preference. [facility] Referral made to Wendy/[employer]. Facesheet and H&P faxed. IV atb will need to be set up with a pharmacy and will need to notify [employer] of the pharmacy. CM to follow and will order the IV atb when order is in the [place] [address] order.   TE

## 2019-06-24 ENCOUNTER — APPOINTMENT (OUTPATIENT)
Dept: GENERAL RADIOLOGY | Age: 64
DRG: 720 | End: 2019-06-24
Payer: COMMERCIAL

## 2019-06-24 LAB
ANION GAP SERPL CALCULATED.3IONS-SCNC: 7 MMOL/L (ref 4–16)
ANISOCYTOSIS: ABNORMAL
BANDED NEUTROPHILS ABSOLUTE COUNT: 0.84 K/CU MM
BANDED NEUTROPHILS RELATIVE PERCENT: 10 % (ref 5–11)
BASOPHILIC STIPPLING: PRESENT
BASOPHILS ABSOLUTE: 0.2 K/CU MM
BASOPHILS RELATIVE PERCENT: 2 % (ref 0–1)
BUN BLDV-MCNC: 11 MG/DL (ref 6–23)
CALCIUM SERPL-MCNC: 9.2 MG/DL (ref 8.3–10.6)
CHLORIDE BLD-SCNC: 91 MMOL/L (ref 99–110)
CO2: 47 MMOL/L (ref 21–32)
CREAT SERPL-MCNC: 0.7 MG/DL (ref 0.6–1.1)
DIFFERENTIAL TYPE: ABNORMAL
EOSINOPHILS ABSOLUTE: 0.3 K/CU MM
EOSINOPHILS RELATIVE PERCENT: 3 % (ref 0–3)
GFR AFRICAN AMERICAN: >60 ML/MIN/1.73M2
GFR NON-AFRICAN AMERICAN: >60 ML/MIN/1.73M2
GLUCOSE BLD-MCNC: 110 MG/DL (ref 70–99)
GLUCOSE BLD-MCNC: 123 MG/DL (ref 70–99)
GLUCOSE BLD-MCNC: 126 MG/DL (ref 70–99)
GLUCOSE BLD-MCNC: 131 MG/DL (ref 70–99)
GLUCOSE BLD-MCNC: 159 MG/DL (ref 70–99)
HCT VFR BLD CALC: 30.2 % (ref 37–47)
HEMOGLOBIN: 8.1 GM/DL (ref 12.5–16)
HYPOCHROMIA: ABNORMAL
LYMPHOCYTES ABSOLUTE: 1.8 K/CU MM
LYMPHOCYTES RELATIVE PERCENT: 22 % (ref 24–44)
MACROCYTES: ABNORMAL
MCH RBC QN AUTO: 22.9 PG (ref 27–31)
MCHC RBC AUTO-ENTMCNC: 26.8 % (ref 32–36)
MCV RBC AUTO: 85.6 FL (ref 78–100)
METAMYELOCYTES ABSOLUTE COUNT: 0.25 K/CU MM
METAMYELOCYTES PERCENT: 3 %
MICROCYTES: ABNORMAL
MONOCYTES ABSOLUTE: 0.3 K/CU MM
MONOCYTES RELATIVE PERCENT: 3 % (ref 0–4)
MYELOCYTE PERCENT: 5 %
MYELOCYTES ABSOLUTE COUNT: 0.42 K/CU MM
NUCLEATED RED BLOOD CELLS: 1
PDW BLD-RTO: 17.8 % (ref 11.7–14.9)
PLATELET # BLD: 322 K/CU MM (ref 140–440)
PMV BLD AUTO: 8.9 FL (ref 7.5–11.1)
POTASSIUM SERPL-SCNC: 3.9 MMOL/L (ref 3.5–5.1)
PRO-BNP: 650.5 PG/ML
RBC # BLD: 3.53 M/CU MM (ref 4.2–5.4)
SEGMENTED NEUTROPHILS ABSOLUTE COUNT: 4.3 K/CU MM
SEGMENTED NEUTROPHILS RELATIVE PERCENT: 52 % (ref 36–66)
SODIUM BLD-SCNC: 145 MMOL/L (ref 135–145)
WBC # BLD: 8.4 K/CU MM (ref 4–10.5)

## 2019-06-24 PROCEDURE — 36569 INSJ PICC 5 YR+ W/O IMAGING: CPT

## 2019-06-24 PROCEDURE — 83880 ASSAY OF NATRIURETIC PEPTIDE: CPT

## 2019-06-24 PROCEDURE — 6370000000 HC RX 637 (ALT 250 FOR IP): Performed by: HOSPITALIST

## 2019-06-24 PROCEDURE — 6360000002 HC RX W HCPCS: Performed by: INTERNAL MEDICINE

## 2019-06-24 PROCEDURE — 6370000000 HC RX 637 (ALT 250 FOR IP): Performed by: INTERNAL MEDICINE

## 2019-06-24 PROCEDURE — 1200000000 HC SEMI PRIVATE

## 2019-06-24 PROCEDURE — C1751 CATH, INF, PER/CENT/MIDLINE: HCPCS

## 2019-06-24 PROCEDURE — 2580000003 HC RX 258: Performed by: INTERNAL MEDICINE

## 2019-06-24 PROCEDURE — 76937 US GUIDE VASCULAR ACCESS: CPT

## 2019-06-24 PROCEDURE — 02HV33Z INSERTION OF INFUSION DEVICE INTO SUPERIOR VENA CAVA, PERCUTANEOUS APPROACH: ICD-10-PCS | Performed by: FAMILY MEDICINE

## 2019-06-24 PROCEDURE — 82962 GLUCOSE BLOOD TEST: CPT

## 2019-06-24 PROCEDURE — C9113 INJ PANTOPRAZOLE SODIUM, VIA: HCPCS | Performed by: NURSE PRACTITIONER

## 2019-06-24 PROCEDURE — 80048 BASIC METABOLIC PNL TOTAL CA: CPT

## 2019-06-24 PROCEDURE — 2580000003 HC RX 258: Performed by: NURSE PRACTITIONER

## 2019-06-24 PROCEDURE — 85027 COMPLETE CBC AUTOMATED: CPT

## 2019-06-24 PROCEDURE — 2700000000 HC OXYGEN THERAPY PER DAY

## 2019-06-24 PROCEDURE — 71045 X-RAY EXAM CHEST 1 VIEW: CPT

## 2019-06-24 PROCEDURE — 6360000002 HC RX W HCPCS: Performed by: NURSE PRACTITIONER

## 2019-06-24 PROCEDURE — 85007 BL SMEAR W/DIFF WBC COUNT: CPT

## 2019-06-24 PROCEDURE — 2500000003 HC RX 250 WO HCPCS: Performed by: INTERNAL MEDICINE

## 2019-06-24 PROCEDURE — 94761 N-INVAS EAR/PLS OXIMETRY MLT: CPT

## 2019-06-24 PROCEDURE — 99211 OFF/OP EST MAY X REQ PHY/QHP: CPT

## 2019-06-24 PROCEDURE — 99232 SBSQ HOSP IP/OBS MODERATE 35: CPT | Performed by: INTERNAL MEDICINE

## 2019-06-24 RX ORDER — SODIUM CHLORIDE 0.9 % (FLUSH) 0.9 %
10 SYRINGE (ML) INJECTION PRN
Status: DISCONTINUED | OUTPATIENT
Start: 2019-06-24 | End: 2019-06-27 | Stop reason: HOSPADM

## 2019-06-24 RX ORDER — SODIUM CHLORIDE 0.9 % (FLUSH) 0.9 %
10 SYRINGE (ML) INJECTION EVERY 12 HOURS SCHEDULED
Status: DISCONTINUED | OUTPATIENT
Start: 2019-06-24 | End: 2019-06-27 | Stop reason: HOSPADM

## 2019-06-24 RX ORDER — LIDOCAINE HYDROCHLORIDE 10 MG/ML
5 INJECTION, SOLUTION EPIDURAL; INFILTRATION; INTRACAUDAL; PERINEURAL ONCE
Status: COMPLETED | OUTPATIENT
Start: 2019-06-24 | End: 2019-06-24

## 2019-06-24 RX ORDER — METOLAZONE 5 MG/1
5 TABLET ORAL DAILY
Status: DISCONTINUED | OUTPATIENT
Start: 2019-06-24 | End: 2019-06-25

## 2019-06-24 RX ORDER — LISINOPRIL 5 MG/1
5 TABLET ORAL DAILY
Status: DISCONTINUED | OUTPATIENT
Start: 2019-06-24 | End: 2019-06-25

## 2019-06-24 RX ORDER — CARVEDILOL 3.12 MG/1
3.12 TABLET ORAL 2 TIMES DAILY
Status: DISCONTINUED | OUTPATIENT
Start: 2019-06-24 | End: 2019-06-27 | Stop reason: HOSPADM

## 2019-06-24 RX ORDER — ACETAZOLAMIDE 250 MG/1
500 TABLET ORAL 2 TIMES DAILY
Status: DISCONTINUED | OUTPATIENT
Start: 2019-06-24 | End: 2019-06-27 | Stop reason: HOSPADM

## 2019-06-24 RX ORDER — METRONIDAZOLE 250 MG/1
500 TABLET ORAL EVERY 8 HOURS SCHEDULED
Status: DISCONTINUED | OUTPATIENT
Start: 2019-06-24 | End: 2019-06-27 | Stop reason: HOSPADM

## 2019-06-24 RX ADMIN — METRONIDAZOLE 500 MG: 250 TABLET ORAL at 14:13

## 2019-06-24 RX ADMIN — LIDOCAINE HYDROCHLORIDE 5 ML: 10 INJECTION, SOLUTION EPIDURAL; INFILTRATION; INTRACAUDAL; PERINEURAL at 16:22

## 2019-06-24 RX ADMIN — SODIUM CHLORIDE 3 G: 900 INJECTION, SOLUTION INTRAVENOUS at 06:43

## 2019-06-24 RX ADMIN — ACETAZOLAMIDE 500 MG: 250 TABLET ORAL at 22:32

## 2019-06-24 RX ADMIN — FUROSEMIDE 40 MG: 10 INJECTION, SOLUTION INTRAMUSCULAR; INTRAVENOUS at 18:06

## 2019-06-24 RX ADMIN — ENOXAPARIN SODIUM 40 MG: 100 INJECTION SUBCUTANEOUS at 10:27

## 2019-06-24 RX ADMIN — GABAPENTIN 800 MG: 400 CAPSULE ORAL at 10:26

## 2019-06-24 RX ADMIN — PANTOPRAZOLE SODIUM 40 MG: 40 INJECTION, POWDER, FOR SOLUTION INTRAVENOUS at 10:22

## 2019-06-24 RX ADMIN — CARVEDILOL 3.12 MG: 3.12 TABLET, FILM COATED ORAL at 17:13

## 2019-06-24 RX ADMIN — LISINOPRIL 5 MG: 5 TABLET ORAL at 17:13

## 2019-06-24 RX ADMIN — GABAPENTIN 800 MG: 400 CAPSULE ORAL at 22:29

## 2019-06-24 RX ADMIN — SODIUM CHLORIDE 3 G: 900 INJECTION, SOLUTION INTRAVENOUS at 01:36

## 2019-06-24 RX ADMIN — PRAMIPEXOLE DIHYDROCHLORIDE 1 MG: 0.25 TABLET ORAL at 14:13

## 2019-06-24 RX ADMIN — SODIUM CHLORIDE, PRESERVATIVE FREE 10 ML: 5 INJECTION INTRAVENOUS at 13:56

## 2019-06-24 RX ADMIN — METRONIDAZOLE 500 MG: 250 TABLET ORAL at 22:30

## 2019-06-24 RX ADMIN — GABAPENTIN 800 MG: 400 CAPSULE ORAL at 14:13

## 2019-06-24 RX ADMIN — NYSTATIN CREAM: 100000 CREAM TOPICAL at 10:28

## 2019-06-24 RX ADMIN — CEFTRIAXONE SODIUM 2 G: 2 INJECTION, POWDER, FOR SOLUTION INTRAMUSCULAR; INTRAVENOUS at 14:06

## 2019-06-24 RX ADMIN — SODIUM CHLORIDE, PRESERVATIVE FREE 10 ML: 5 INJECTION INTRAVENOUS at 10:29

## 2019-06-24 RX ADMIN — INSULIN LISPRO 25 UNITS: 100 INJECTION, SOLUTION INTRAVENOUS; SUBCUTANEOUS at 13:52

## 2019-06-24 RX ADMIN — METOLAZONE 5 MG: 5 TABLET ORAL at 22:30

## 2019-06-24 RX ADMIN — SODIUM CHLORIDE, PRESERVATIVE FREE 10 ML: 5 INJECTION INTRAVENOUS at 10:22

## 2019-06-24 RX ADMIN — FUROSEMIDE 40 MG: 10 INJECTION, SOLUTION INTRAMUSCULAR; INTRAVENOUS at 10:23

## 2019-06-24 RX ADMIN — INSULIN LISPRO 3 UNITS: 100 INJECTION, SOLUTION INTRAVENOUS; SUBCUTANEOUS at 13:49

## 2019-06-24 RX ADMIN — PRAMIPEXOLE DIHYDROCHLORIDE 1 MG: 0.25 TABLET ORAL at 10:40

## 2019-06-24 ASSESSMENT — PAIN SCALES - GENERAL
PAINLEVEL_OUTOF10: 0

## 2019-06-24 NOTE — PROGRESS NOTES
Progress Note( Dr. Chuck Morton)  6/23/2019  Subjective:   Admit Date: 6/16/2019  PCP: THEODORE Barry NP    Admitted For : Shortness of breath leg pain and headache    Consulted For: Better control of blood glucose    Interval History: Somewhat better but still complaining of leg pain    Denies any chest pains,   Denies SOB . Denies nausea or vomiting. No new bowel or bladder symptoms. Intake/Output Summary (Last 24 hours) at 6/23/2019 2305  Last data filed at 6/23/2019 1643  Gross per 24 hour   Intake 340 ml   Output 4300 ml   Net -3960 ml       DATA    CBC:   Recent Labs     06/21/19  0400 06/22/19  0530 06/23/19  0600   WBC 6.8 6.7 8.5   HGB 7.5* 7.6* 7.9*    302 316    CMP:  Recent Labs     06/22/19  0530 06/22/19  1805 06/23/19  0600    144 143   K 4.2 4.4 4.2   CL 99 95* 95*   CO2 39* 42* 44*   BUN 10 9 10   CREATININE 0.7 0.6 0.6   CALCIUM 8.7 9.1 9.1     Lipids:   Lab Results   Component Value Date    CHOL 186 02/07/2019    HDL 39 02/07/2019    TRIG 122 02/07/2019     Glucose:  Recent Labs     06/23/19  0756 06/23/19  1120 06/23/19  1610   POCGLU 165* 161* 101*     [id number]:  Lab Results   Component Value Date    LABA1C 11.2 06/16/2019     High Sensitivity TSH:   Lab Results   Component Value Date    TSHHS 0.820 02/06/2019     Free T3: No results found for: FT3  Free T4:  Lab Results   Component Value Date    T4FREE 1.07 09/04/2018       Xr Foot Left (2 Views)    Result Date: 6/17/2019  EXAMINATION: TWO XRAY VIEWS OF THE LEFT FOOT 6/17/2019 8:52 am COMPARISON: 11/24/2018 HISTORY: ORDERING SYSTEM PROVIDED HISTORY: left diabetic ulcer   No acute osseous abnormality of the left foot. No evidence of osteomyelitis. Ct Head Wo Contrast    Result Date: 6/16/2019  EXAMINATION: CT OF THE HEAD WITHOUT CONTRAST  6/16/2019 7:16 pm     No acute intracranial abnormality.          Xr Chest 1 Vw    Result Date: 6/17/2019  EXAMINATION: ONE XRAY VIEW OF THE CHEST 6/16/2019 4:11 pm COMPARISON: Chest radiograph 02/05/2019 HISTORY: ORDERING SYSTEM PROVIDED HISTORY: Fever, shortness of breath      Pulmonary vascular congestion and patchy bilateral perihilar and bibasilar airspace opacities may reflect pulmonary edema or atypical infection. Vl Dup Lower Extremity Venous Bilateral    Result Date: 6/17/2019  EXAMINATION: DUPLEX VENOUS ULTRASOUND OF THE BILATERAL LOWER EXTREMITIES, 6/17/2019 6:03 am        No evidence of DVT in either lower extremity. Technically limited by body habitus. Scheduled Medicines   Medications:    furosemide  40 mg Intravenous BID    insulin glargine  40 Units Subcutaneous Nightly    insulin lispro  0-18 Units Subcutaneous TID WC    insulin lispro  0-18 Units Subcutaneous 2 times per day    linagliptin  5 mg Oral Daily    insulin lispro  25 Units Subcutaneous TID WC    ampicillin-sulbactam  3 g Intravenous Q6H    gabapentin  800 mg Oral TID    pramipexole  1 mg Oral TID    sodium chloride flush  10 mL Intravenous 2 times per day    sodium chloride flush  10 mL Intravenous 2 times per day    enoxaparin  40 mg Subcutaneous Daily    pantoprazole  40 mg Intravenous Daily    nystatin   Topical BID      Infusions:    dextrose           Objective:   Vitals: /77   Pulse 83   Temp 98.8 °F (37.1 °C)   Resp 21   Ht 5' 6\" (1.676 m)   Wt (!) 446 lb 3.4 oz (202.4 kg)   SpO2 96%   BMI 72.02 kg/m²   General appearance: alert and cooperative with exam  Neck: no JVD or bruit  Thyroid : Normal lobes   Lungs: Has Vesicular Breath sounds   Heart:  regular rate and rhythm  Abdomen: soft, non-tender; bowel sounds normal; no masses,  no organomegaly  Musculoskeletal: Normal  Extremities: extremities normal, , edema ulcerations of both legs  Neurologic:  Awake, alert, oriented to name, place and time. Cranial nerves II-XII are grossly intact. Motor is  intact.   Sensory neuropathy t.,  and gait is abnormal and unstable    Assessment:     Patient Active Problem List:     Morbid obesity with BMI of 70 and over, adult [facility])     Essential hypertension     Dyslipidemia     Fecal incontinence     Mild intermittent asthma without complication     S/P laparoscopic cholecystectomy     Type 2 diabetes mellitus without complication, with long-term current use of insulin (HCC)     Fatty liver     Arthritis     H/O parotidectomy     Venous stasis dermatitis of both lower extremities     Aortic stenosis     Morbid obesity (HCC)     Asthma     Diabetes mellitus ([facility]     Hypertension     Sleep apnea     Family history of coronary artery disease     Chest pain     SOB (shortness of breath)     Palpitations     Peripheral edema     Hypervolemia     Chronic respiratory failure with hypoxia and hypercapnia (HCC)     Acute exacerbation of chronic obstructive pulmonary disease (COPD) (HCC)     Chronic obstructive pulmonary disease (HCC)     Gait disturbance     Acute on chronic respiratory failure with hypoxia and hypercapnia (HCC)     Cellulitis     Skin ulcer of left foot with fat layer exposed ([facility]     Diabetic ulcer of left midfoot associated with type 2 diabetes mellitus, with necrosis of muscle (HCC)     Pneumonia     VHD (valvular heart disease)     Septic shock ([facility]     Community acquired pneumonia     Uncontrolled type 2 diabetes mellitus with hyperglycemia (HCC)     Class 3 severe obesity due to excess calories with body mass index (BMI) greater than or equal to 70 in adult [facility])      Plan:     1. Reviewed POC blood glucose . Labs and X ray results   2. Reviewed Current Medicines   3. On meal/ Correction bolus Humalog/ Basal Lantus Insulin regime   4. Monitor Blood glucose frequently   5. Modified  the dose of Insulin/ other medicines as needed   6. Will follow     .      Murtaza Hudson MD

## 2019-06-24 NOTE — CONSULTS
Consult for midline for outpatient antibiotics. Education regarding insertion of Midline reviewed with patient. Risk/benefit/and alternatives discussed. verbalized understanding. Consent given by Patient . Time out done at 1600. Midline inserted right upper arm  without difficulty per protocol. Pt tolerated well. Good blood return and flushes easily. Educational booklet left at bedside.   Carlos Morataya

## 2019-06-24 NOTE — PROGRESS NOTES
[facility]  [facility]  Consult Note       Joaquim Meckel Douberly  AGE: 60 y.o. GENDER: female  : 1955  TODAY'S DATE:  2019    Subjective:     Reason for [address] Evaluation and Assessment: assess area to left plantar and left thigh/buttock      Ana Case is a 60 y.o. female referred by:   [x] Physician  [] Nursing  [] Other:     Wound Identification:  Wound Type: diabetic  Contributing Factors: diabetes        PAST MEDICAL HISTORY        Diagnosis Date    Aortic stenosis     Asthma     Cancer ([facility]     Cervical    Carotid artery stenosis     Chest pain     CH[facility]congestive heart failure) ([facility])     Chronic back pain     COPD exacerbation ([facility]     Depression     Diabetes mellitus ([facility]     Family history of coronary artery disease     Fatty liver 10/27/2016    H/O aortic valve stenosis     H/O echocardiogram 2016    EF 55%, Aortic valve area is 0.84 cm2 with a mean gradient of 36 suggestive of severe aortic stenosis, mild to mos LVH    Headache     Heart murmur     History of nuclear stress test 2016    lexiscan-normal,EF70%    Morbid obesity ([facility])     BMI=73.72 kg/m2    Neuropathy     NSTEMI (non-ST elevated myocardial infarction) ([facility] 2018    Obesity     Osteoarthritis     Palpitations     Peripheral edema     Restless legs syndrome     Sleep apnea     Has a CPAP    Sleep apnea     SOB (shortness of breath)        PAST SURGICAL HISTORY    Past Surgical History:   Procedure Laterality Date    AORTIC VALVE REPLACEMENT  2017    29mm EvolutR TAVR     SECTION      CHOLECYSTECTOMY      GALLBLADDER SURGERY      HYSTERECTOMY      NECK SURGERY      Tumor    TUBAL LIGATION         FAMILY HISTORY    Family History   Problem Relation Age of Onset    Heart Failure Mother     Heart Attack Mother     Hypertension Mother     Coronary Art Dis Mother         Had PTCA w/stenting.     Heart Failure Father     Heart Failure Brother     Heart Disease Mother     High Blood Pressure Mother     Obesity Mother     Diabetes Mother     Heart Disease Father     High Blood Pressure Father     Obesity Father     Heart Disease Brother     High Blood Pressure Brother     Obesity Brother        SOCIAL HISTORY    Social History     Tobacco Use    Smoking status: Former Smoker     Types: Cigarettes     Last attempt to quit: 3/19/2003     Years since quittin.2    Smokeless tobacco: Never Used    Tobacco comment: quit 15 years ago   Substance Use Topics    Alcohol use: No    Drug use: No       ALLERGIES    No Known Allergies    MEDICATIONS    No current facility-administered medications on file prior to encounter.       Current Outpatient Medications on File Prior to Encounter   Medication Sig Dispense Refill    BiPAP Machine MISC by BiPAP route nightly      lisinopril (PRINIVIL;ZESTRIL) 5 MG tablet Take 1 tablet by mouth daily 30 tablet 3    DULoxetine (CYMBALTA) 60 MG extended release capsule Take 30 mg by mouth daily       HUMALOG KWIKPEN 200 UNIT/ML SOPN pen Inject 15 Units into the skin 3 times daily (before meals) (Patient taking differently: Inject 35 Units into the skin 3 times daily (before meals) ) 2 pen 3    Insulin Degludec (TRESIBA FLEXTOUCH) 100 UNIT/ML SOPN Inject 15 Units into the skin nightly (Patient taking differently: Inject 45 Units into the skin nightly ) 1 pen 0    clopidogrel (PLAVIX) 75 MG tablet Take 1 tablet by mouth daily Patient has appointment on 3/27/18 more refills with be given after she keeps her office visit 90 tablet 3    L-Methylfolate-B6-B12 (FOLTANX) 3-35-2 MG TABS Take 1 tablet by mouth 2 times daily       nystatin (MYCOSTATIN) POWD powder Apply topically 2 times daily      potassium chloride (KLOR-CON M) 20 MEQ extended release tablet Take 1 tablet by mouth daily 60 tablet 3    furosemide (LASIX) 40 MG tablet Take 1 tablet by mouth 2 times daily (Patient taking differently: Take 40 mg by mouth daily ) 60 tablet 3    carvedilol (COREG) 3.125 MG tablet Take 1 tablet by mouth 2 times daily 60 tablet 3    simvastatin (ZOCOR) 40 MG tablet Take 1 tablet by mouth nightly 30 tablet 3    metFORMIN (GLUCOPHAGE) 500 MG tablet Take 1 tablet by mouth 2 times daily (with meals) 60 tablet 0    ipratropium-albuterol (DUONEB) 0.5-2.5 (3) MG/3ML SOLN nebulizer solution Inhale 3 mLs into the lungs 4 times daily 360 mL 0    Nebulizers (AIRIAL COMPACT MINI NEBULIZER) MISC 1 Device by Does not apply route 4 times daily 1 each 0    albuterol sulfate  (90 Base) MCG/ACT inhaler Inhale 2 puffs into the lungs      pantoprazole (PROTONIX) 40 MG tablet Take 40 mg by mouth daily      vilazodone HCl (VIIBRYD) 10 MG TABS Take 10 mg by mouth 2 times daily       vitamin D (ERGOCALCIFEROL) 74145 UNITS CAPS capsule Take 50,000 Units by mouth twice a week On Sundays and wednesdays      pramipexole (MIRAPEX) 1 MG tablet Take 1 mg by mouth 3 times daily       fexofenadine (ALLEGRA) 180 MG tablet Take 180 mg by mouth daily       gabapentin (NEURONTIN) 800 MG tablet Take 800 mg by mouth 3 times daily       Ascorbic Acid (VITAMIN C) 500 MG tablet Take 500 mg by mouth daily       aspirin 81 MG tablet Take 81 mg by mouth daily           Objective:      BP (!) 142/49   Pulse 71   Temp 99 °F (37.2 °C)   Resp 23   Ht 5' 6\" (1.676 m)   Wt (!) 442 lb 14.5 oz (200.9 kg)   SpO2 98%   BMI 71.49 kg/m²   Yaya Risk Score: Yaya Scale Score: 18    LABS    CBC:   Lab Results   Component Value Date    WBC 8.4 06/24/2019    RBC 3.53 06/24/2019    HGB 8.1 06/24/2019    HCT 30.2 06/24/2019    MCV 85.6 06/24/2019    MCH 22.9 06/24/2019    MCHC 26.8 06/24/2019    RDW 17.8 06/24/2019     06/24/2019    MPV 8.9 06/24/2019     CMP:    Lab Results   Component Value Date     06/24/2019    K 3.9 06/24/2019    CL 91 06/24/2019    CO2 47 06/24/2019    BUN 11 06/24/2019    CREATININE 0.7 06/24/2019    GFRAA >60 06/24/2019    LABGLOM >60 06/24/2019    GLUCOSE 123 06/24/2019    PROT 8.3 06/16/2019    LABALBU 3.8 06/16/2019    CALCIUM 9.2 06/24/2019    BILITOT 0.4 06/16/2019    ALKPHOS 99 06/16/2019    AST 17 06/16/2019    ALT 15 06/16/2019     Albumin:    Lab Results   Component Value Date    LABALBU 3.8 06/16/2019     PT/INR:    Lab Results   Component Value Date    PROTIME 12.9 01/30/2018    INR 1.11 01/30/2018     HgBA1c:    Lab Results   Component Value Date    LABA1C 11.2 06/16/2019         Assessment:     Patient Active Problem List   Diagnosis    Morbid obesity with BMI of 70 and over, adult ([address]    Essential hypertension    Dyslipidemia    Fecal incontinence    Mild intermittent asthma without complication    S/P laparoscopic cholecystectomy    Type 2 diabetes mellitus without complication, with long-term current use of insulin ([address]    Fatty liver    Arthritis    H/O parotidectomy    Venous stasis dermatitis of both lower extremities    Aortic stenosis    Morbid obesity ([address]    Asthma    Diabetes mellitus ([address]    Hypertension    Sleep apnea    Family history of coronary artery disease    Chest pain    SOB (shortness of breath)    Palpitations    Peripheral edema    Hypervolemia    Chronic respiratory failure with hypoxia and hypercapnia (HCC)    Acute exacerbation of chronic obstructive pulmonary disease (COPD) (HCC)    Chronic obstructive pulmonary disease (HCC)    Gait disturbance    Acute on chronic respiratory failure with hypoxia and hypercapnia (HCC)    Cellulitis    Skin ulcer of left foot with fat layer exposed ([address]    Diabetic ulcer of left midfoot associated with type 2 diabetes mellitus, with necrosis of muscle (HCC)    Pneumonia    VHD (valvular heart disease)    Septic shock ([address]    Community acquired pneumonia    Uncontrolled type 2 diabetes mellitus with hyperglycemia (HCC)    Class 3 severe obesity due to excess calories with body mass index (BMI) greater than or equal to 70 in adult AM   Yellow%Wound Bed 0 6/24/2019 10:17 AM   Black%Wound Bed 0 6/24/2019 10:17 AM   Purple%Wound Bed 0 6/24/2019 10:17 AM   Other%Wound Bed 100 brown 6/24/2019 10:17 AM   Number of days: 7       Wound 06/17/19 Thigh Left;Proximal;Posterior (Active)   Wound Other 6/17/2019 10:16 AM   Dressing Status Intact 6/23/2019 11:17 PM   Dressing Changed Changed/New 6/20/2019  4:12 PM   Dressing/Treatment Moisture barrier 6/24/2019 10:17 AM   Wound Cleansed Other (Comment) 6/23/2019  5:05 PM   Wound Length (cm) 0 cm 6/24/2019 10:17 AM   Wound Width (cm) 0 cm 6/24/2019 10:17 AM   Wound Depth (cm) 0 cm 6/24/2019 10:17 AM   Wound Surface Area (cm^2) 0 cm^2 6/24/2019 10:17 AM   Change in Wound Size % (l*w) 100 6/24/2019 10:17 AM   Wound Volume (cm^3) 0 cm^3 6/24/2019 10:17 AM   Wound Healing % 100 6/24/2019 10:17 AM   Distance Tunneling (cm) 0 cm 6/17/2019 10:16 AM   Tunneling Position ___ O'Clock 0 6/24/2019 10:17 AM   Undermining Starts ___ O'Clock 0 6/24/2019 10:17 AM   Undermining Ends___ O'Clock 0 6/24/2019 10:17 AM   Undermining Maxium Distance (cm) 0 6/24/2019 10:17 AM   Wound Assessment Clean;Dry; Intact 6/24/2019 10:17 AM   Drainage Amount None 6/24/2019 10:17 AM   Margins Attached edges 6/24/2019 10:17 AM   [place]-wound Assessment Pink 6/24/2019 10:17 AM   [place]%Wound Bed 100 6/24/2019 10:17 AM   Red%Wound Bed 0 6/24/2019 10:17 AM   Yellow%Wound Bed 0 6/24/2019 10:17 AM   Black%Wound Bed 0 6/24/2019 10:17 AM   Purple%Wound Bed 0 6/24/2019 10:17 AM   Other%Wound Bed 0 6/24/2019 10:17 AM   Number of days: 6       Response to treatment:  Well tolerated by patient.      Pain Assessment:  Severity:  0  Quality of pain: 0  Wound Pain Timing/Severity: 0  Premedicated: no    Plan:     Plan of Care: Wound 06/16/19 Foot Left;Plantar white yellow, healed callous-Dressing/Treatment: (sorbact, fibracol, mepilex border)  Wound 06/17/19 Thigh Left;Proximal;Posterior-Dressing/Treatment: Moisture barrier    Specialty Bed Required : yes  [] Low Air Loss   [x] Pressure Redistribution  [] Fluid Immersion  [] Bariatric  [] Total Pressure Relief  [] Other:     Discharge Plan:  Placement for patient upon discharge: home  Hospice Care: no  Patient appropriate for [address]: no    Patient/Caregiver Teaching:  Level of patient/caregiver understanding able to:   Patient able to verbalize understanding       Electronically signed by Heri Roper LPN,  on 2/55/9937 at 10:20 AM

## 2019-06-24 NOTE — PROGRESS NOTES
COMPARISON: Chest radiograph 02/05/2019 HISTORY: ORDERING SYSTEM PROVIDED HISTORY: Fever, shortness of breath      Pulmonary vascular congestion and patchy bilateral perihilar and bibasilar airspace opacities may reflect pulmonary edema or atypical infection. Vl Dup Lower Extremity Venous Bilateral    Result Date: 6/17/2019  EXAMINATION: DUPLEX VENOUS ULTRASOUND OF THE BILATERAL LOWER EXTREMITIES, 6/17/2019 6:03 am        No evidence of DVT in either lower extremity. Technically limited by body habitus. Scheduled Medicines   Medications:    furosemide  40 mg Intravenous BID    insulin glargine  40 Units Subcutaneous Nightly    insulin lispro  0-18 Units Subcutaneous TID WC    insulin lispro  0-18 Units Subcutaneous 2 times per day    linagliptin  5 mg Oral Daily    insulin lispro  25 Units Subcutaneous TID WC    ampicillin-sulbactam  3 g Intravenous Q6H    gabapentin  800 mg Oral TID    pramipexole  1 mg Oral TID    sodium chloride flush  10 mL Intravenous 2 times per day    sodium chloride flush  10 mL Intravenous 2 times per day    enoxaparin  40 mg Subcutaneous Daily    pantoprazole  40 mg Intravenous Daily    nystatin   Topical BID      Infusions:    dextrose           Objective:   Vitals: /77   Pulse 83   Temp 98.8 °F (37.1 °C)   Resp 21   Ht 5' 6\" (1.676 m)   Wt (!) 446 lb 3.4 oz (202.4 kg)   SpO2 96%   BMI 72.02 kg/m²   General appearance: alert and cooperative with exam  Neck: no JVD or bruit  Thyroid : Normal lobes   Lungs: Has Vesicular Breath sounds   Heart:  regular rate and rhythm  Abdomen: soft, non-tender; bowel sounds normal; no masses,  no organomegaly  Musculoskeletal: Normal  Extremities: extremities normal, , edema ulcerations of both legs  Neurologic:  Awake, alert, oriented to name, place and time. Cranial nerves II-XII are grossly intact. Motor is  intact.   Sensory neuropathy t.,  and gait is abnormal and unstable    Assessment:     Patient Active Problem List:     Morbid obesity with BMI of 70 and over, adult [facility])     Essential hypertension     Dyslipidemia     Fecal incontinence     Mild intermittent asthma without complication     S/P laparoscopic cholecystectomy     Type 2 diabetes mellitus without complication, with long-term current use of insulin (HCC)     Fatty liver     Arthritis     H/O parotidectomy     Venous stasis dermatitis of both lower extremities     Aortic stenosis     Morbid obesity (HCC)     Asthma     Diabetes mellitus ([facility]     Hypertension     Sleep apnea     Family history of coronary artery disease     Chest pain     SOB (shortness of breath)     Palpitations     Peripheral edema     Hypervolemia     Chronic respiratory failure with hypoxia and hypercapnia (HCC)     Acute exacerbation of chronic obstructive pulmonary disease (COPD) (HCC)     Chronic obstructive pulmonary disease (HCC)     Gait disturbance     Acute on chronic respiratory failure with hypoxia and hypercapnia (HCC)     Cellulitis     Skin ulcer of left foot with fat layer exposed ([facility]     Diabetic ulcer of left midfoot associated with type 2 diabetes mellitus, with necrosis of muscle (HCC)     Pneumonia     VHD (valvular heart disease)     Septic shock ([facility]     Community acquired pneumonia     Uncontrolled type 2 diabetes mellitus with hyperglycemia (HCC)     Class 3 severe obesity due to excess calories with body mass index (BMI) greater than or equal to 70 in adult [facility])      Plan:     1. Reviewed POC blood glucose . Labs and X ray results   2. Reviewed Current Medicines   3. On meal/ Correction bolus Humalog/ Basal Lantus Insulin regime   4. Monitor Blood glucose frequently   5. Modified  the dose of Insulin/ other medicines as needed   6. Will follow     .      Trevor Escudero MD

## 2019-06-24 NOTE — PROGRESS NOTES
Transferred to [facility] room [id number] [id number]. Gave telephone report to [facility] Patient transferred with her belongings. Meal tray brought done to her. Blood sugar 110.

## 2019-06-24 NOTE — PROGRESS NOTES
results for input(s): INR in the last 72 hours. No results for input(s): CKTOTAL, CKMB, CKMBINDEX, TROPONINT in the last 72 hours. I/O last 3 completed shifts: In: 340 [P.O.:240; IV Piggyback:100]  Out: 4300 [Urine:4300]    Intake/Output Summary (Last 24 hours) at 6/23/2019 2319  Last data filed at 6/23/2019 1643  Gross per 24 hour   Intake 340 ml   Output 4300 ml   Net -3960 ml        Assessment/Plan:       Diurese    Needs iv abx at home    Needs trapeze at home        Sepsis due to Group B strep  -6/19 - CT abdomen to look for source, continue antibiotics  -6/20 - CT no obvious source - YANI planned as she is s/p TAVR  -6/21 - not cleared for YANI due to respiratory status - CXR showed pulmonary edema - IV Lasix ordered, was on PO Lasix at home which has been on hold since admission  -6/22 - had 4 L urine output after Lasix and Zaroxolyn , later became more hypercapnic and hypoxic and transferred from [place] [address] to [place] - will consult pulmonary    Acute pulmonary edema  -6/23 - finally breathing better today, but SAT drops to 85% at times when supine, and at times lethargic and has to be woken up - rehcec    YANI NOT DONE Friday DUE TO RESPIRATORY STATUS - NEED TO REASSESS HER STATUS TOMORROW AND DISCUSS WITH ID/CARDIOLOGY    1. Insulin-dependent diabetes mellitus-Lantus at night, sliding scale, monitor blood glucose - continue to monitor sugar - continue insulin - blood sugar at goal today  2. GERD-Protonix IV daily  3. Diabetic neuropathy-gabapentin 3 times a day  5.    Anemia - HGB 6.8 on 6/19 - hospital day 3 - 1 units given, will consult oncology - she missed appts with Dr. Sabrina Bobo due to mobility issues  -6/20 - HGB is 7.1 today  -6/21 - IV iron given      LLE skin graft 6 weeks ago     WB status LLE              DVT Prophylaxis: lovenox  Diet: DIET CARB CONTROL; Daily Fluid Restriction: 1800 ml  Code Status: Full Code    Dispo: when stable     Electronically signed by Dana Izaguirre MD on 6/23/2019 at 11:19 PM

## 2019-06-24 NOTE — PROGRESS NOTES
Progress Note( Dr. Abner Abernathy)  6/24/2019  Subjective:   Admit Date: 6/16/2019  PCP: THEODORE Burk NP    Admitted For : Shortness of breath leg pain and headache    Consulted For: Better control of blood glucose    Interval History: Somewhat better but still complaining of leg pain    Denies any chest pains,   Denies SOB . Denies nausea or vomiting. No new bowel or bladder symptoms. Intake/Output Summary (Last 24 hours) at 6/24/2019 0729  Last data filed at 6/24/2019 5082  Gross per 24 hour   Intake 250 ml   Output 5050 ml   Net -4800 ml       DATA    CBC:   Recent Labs     06/22/19  0530 06/23/19  0600 06/24/19  0430   WBC 6.7 8.5 8.4   HGB 7.6* 7.9* 8.1*    316 322    CMP:  Recent Labs     06/22/19  1805 06/23/19  0600 06/24/19  0430    143 145   K 4.4 4.2 3.9   CL 95* 95* 91*   CO2 42* 44* 47*   BUN 9 10 11   CREATININE 0.6 0.6 0.7   CALCIUM 9.1 9.1 9.2     Lipids:   Lab Results   Component Value Date    CHOL 186 02/07/2019    HDL 39 02/07/2019    TRIG 122 02/07/2019     Glucose:  Recent Labs     06/23/19  1120 06/23/19  1610 06/23/19  2256   POCGLU 161* 101* 132*     [id number]:  Lab Results   Component Value Date    LABA1C 11.2 06/16/2019     High Sensitivity TSH:   Lab Results   Component Value Date    TSHHS 0.820 02/06/2019     Free T3: No results found for: FT3  Free T4:  Lab Results   Component Value Date    T4FREE 1.07 09/04/2018       Xr Foot Left (2 Views)    Result Date: 6/17/2019  EXAMINATION: TWO XRAY VIEWS OF THE LEFT FOOT 6/17/2019 8:52 am COMPARISON: 11/24/2018 HISTORY: ORDERING SYSTEM PROVIDED HISTORY: left diabetic ulcer   No acute osseous abnormality of the left foot. No evidence of osteomyelitis. Ct Head Wo Contrast    Result Date: 6/16/2019  EXAMINATION: CT OF THE HEAD WITHOUT CONTRAST  6/16/2019 7:16 pm     No acute intracranial abnormality.          Xr Chest 1 Vw    Result Date: 6/17/2019  EXAMINATION: ONE XRAY VIEW OF THE CHEST 6/16/2019 4:11 pm COMPARISON: Chest radiograph 02/05/2019 HISTORY: ORDERING SYSTEM PROVIDED HISTORY: Fever, shortness of breath      Pulmonary vascular congestion and patchy bilateral perihilar and bibasilar airspace opacities may reflect pulmonary edema or atypical infection. Vl Dup Lower Extremity Venous Bilateral    Result Date: 6/17/2019  EXAMINATION: DUPLEX VENOUS ULTRASOUND OF THE BILATERAL LOWER EXTREMITIES, 6/17/2019 6:03 am        No evidence of DVT in either lower extremity. Technically limited by body habitus. Scheduled Medicines   Medications:    furosemide  40 mg Intravenous BID    insulin glargine  40 Units Subcutaneous Nightly    insulin lispro  0-18 Units Subcutaneous TID WC    insulin lispro  0-18 Units Subcutaneous 2 times per day    linagliptin  5 mg Oral Daily    insulin lispro  25 Units Subcutaneous TID WC    ampicillin-sulbactam  3 g Intravenous Q6H    gabapentin  800 mg Oral TID    pramipexole  1 mg Oral TID    sodium chloride flush  10 mL Intravenous 2 times per day    sodium chloride flush  10 mL Intravenous 2 times per day    enoxaparin  40 mg Subcutaneous Daily    pantoprazole  40 mg Intravenous Daily    nystatin   Topical BID      Infusions:    dextrose           Objective:   Vitals: BP (!) 112/33   Pulse 71   Temp 98.9 °F (37.2 °C) (Oral)   Resp 23   Ht 5' 6\" (1.676 m)   Wt (!) 442 lb 14.5 oz (200.9 kg)   SpO2 98%   BMI 71.49 kg/m²   General appearance: alert and cooperative with exam  Neck: no JVD or bruit  Thyroid : Normal lobes   Lungs: Has Vesicular Breath sounds   Heart:  regular rate and rhythm  Abdomen: soft, non-tender; bowel sounds normal; no masses,  no organomegaly  Musculoskeletal: Normal  Extremities: extremities normal, , edema ulcerations of both legs  Neurologic:  Awake, alert, oriented to name, place and time. Cranial nerves II-XII are grossly intact. Motor is  intact.   Sensory neuropathy t.,  and gait is abnormal and unstable,  Mostly bed ridden Assessment:     Patient Active Problem List:     Morbid obesity with BMI of 70 and over, adult [facility])     Essential hypertension     Dyslipidemia     Fecal incontinence     Mild intermittent asthma without complication     S/P laparoscopic cholecystectomy     Type 2 diabetes mellitus without complication, with long-term current use of insulin (HCC)     Fatty liver     Arthritis     H/O parotidectomy     Venous stasis dermatitis of both lower extremities     Aortic stenosis     Morbid obesity (HCC)     Asthma     Diabetes mellitus ([facility]     Hypertension     Sleep apnea     Family history of coronary artery disease     Chest pain     SOB (shortness of breath)     Palpitations     Peripheral edema     Hypervolemia     Chronic respiratory failure with hypoxia and hypercapnia (HCC)     Acute exacerbation of chronic obstructive pulmonary disease (COPD) (HCC)     Chronic obstructive pulmonary disease (HCC)     Gait disturbance     Acute on chronic respiratory failure with hypoxia and hypercapnia (HCC)     Cellulitis     Skin ulcer of left foot with fat layer exposed ([facility]     Diabetic ulcer of left midfoot associated with type 2 diabetes mellitus, with necrosis of muscle (HCC)     Pneumonia     VHD (valvular heart disease)     Septic shock ([facility]     Community acquired pneumonia     Uncontrolled type 2 diabetes mellitus with hyperglycemia ([facility])     Class 3 severe obesity due to excess calories with body mass index (BMI) greater than or equal to 70 in adult [facility])      Plan:     1. Reviewed POC blood glucose . Labs and X ray results   2. Reviewed Current Medicines   3. On meal/ Correction bolus Humalog/ Basal Lantus Insulin regime   4. Monitor Blood glucose frequently   5. Modified  the dose of Insulin/ other medicines as needed   6. Will follow     .      Luis Antonio Leigh MD

## 2019-06-24 NOTE — PROGRESS NOTES
Infectious Disease Progress Note  2019   Patient Name: Lora Yo : 1955   Impression  · Sepsis secondary to GBS bacteremia  ? Afebrile for 48 hours  ? Bone scan negative, CT of the abdomen and pelvis with IV contrast does not show an abscess. ? Blood cx   ? Hx of TAVR in 2017, 3 week hx of being unwell, endocarditis was considered. Unfortunately low oxygen saturation, loose teeth, obesity posed greater risks to doing a YANI, therefore this was cancelled  ? Dental decay may be possible source of infection, hence Unasyn used which would cover anerobes  ? TTE poor quality owing to body habitus  · DM  ? Morbid obesity  · Multi-morbidity: per PMHx  Plan:  · discontinue Unasyn  · Midline order placed  · Ceftriaxone 2 g IV daily and metronidazole 500 mg po q 8h (end date 19)  Weekly labs drawn on Monday during the course of treatment  CBC, CMP, ESR, CRP  Fax results to Attn: [facility] Staff  · # [phone]  · Requires dental extraction  · f/up in 2 weeks      Ongoing Antimicrobial Therapy  Cefriaxone   Metronidazole   Completed Antimicrobial Therapy  Vancomycin -  Cefepime -  Unasyn -? History:? Interval history noted, GBS bacteremia  Feels better, denies abdominal pain, fever or chills. Denies n/v/d or untoward effects of antibiotics  Physical Exam:  Vital Signs: BP (!) 112/33   Pulse 71   Temp 98.9 °F (37.2 °C) (Oral)   Resp 23   Ht 5' 6\" (1.676 m)   Wt (!) 442 lb 14.5 oz (200.9 kg)   SpO2 98%   BMI 71.49 kg/m²     Gen: alert and oriented X3, no distress  Skin: no stigmata of endocarditis  Wounds: Left plantar callus  HEMT: AT/NC Oropharynx pink, moist, and without lesions or exudates; dentition in good state of repair  Eyes: PERRLA, EOMI, conjunctiva pink, sclera anicteric. Neck: Supple. Trachea midline. No LAD. Chest: no distress and CTA. Good air movement. Heart: RRR and no MRG.    Abd: soft, non-distended, no tenderness, Absolute 0.84 K/CU MM    Segs Absolute 4.3 K/CU MM    Eosinophils # 0.3 K/CU MM    Basophils # 0.2 K/CU MM    Lymphocytes # 1.8 K/CU MM    Monocytes # 0.3 K/CU MM    Differential Type MANUAL DIFFERENTIAL     Anisocytosis 1+     Macrocytes 1+     Microcytes 2+     Hypochromia 2+     Basophilic Stippling PRESENT    Brain Natriuretic Peptide    Collection Time: 06/24/19  4:30 AM   Result Value Ref Range    Pro-.5 (H) <300 PG/ML   POCT Glucose    Collection Time: 06/24/19  7:16 AM   Result Value Ref Range    POC Glucose 131 (H) 70 - 99 MG/DL     CULTURE results: Invalid input(s): BLOOD CULTURE,  URINE CULTURE, SURGICAL CULTURE    Diagnosis:  Patient Active Problem List   Diagnosis    Morbid obesity with BMI of 70 and over, adult ([facility] [address]    Essential hypertension    Dyslipidemia    Fecal incontinence    Mild intermittent asthma without complication    S/P laparoscopic cholecystectomy    Type 2 diabetes mellitus without complication, with long-term current use of insulin (HCC)    Fatty liver    Arthritis    H/O parotidectomy    Venous stasis dermatitis of both lower extremities    Aortic stenosis    Morbid obesity (HCC)    Asthma    Diabetes mellitus ([address]    Hypertension    Sleep apnea    Family history of coronary artery disease    Chest pain    SOB (shortness of breath)    Palpitations    Peripheral edema    Hypervolemia    Chronic respiratory failure with hypoxia and hypercapnia (HCC)    Acute exacerbation of chronic obstructive pulmonary disease (COPD) (HCC)    Chronic obstructive pulmonary disease (HCC)    Gait disturbance    Acute on chronic respiratory failure with hypoxia and hypercapnia (HCC)    Cellulitis    Skin ulcer of left foot with fat layer exposed ([address]    Diabetic ulcer of left midfoot associated with type 2 diabetes mellitus, with necrosis of muscle (HCC)    Pneumonia    VHD (valvular heart disease)    Septic shock ([address]    Community acquired pneumonia    Uncontrolled type 2 diabetes mellitus with hyperglycemia (HCC)    Class 3 severe obesity due to excess calories with body mass index (BMI) greater than or equal to 70 in adult [facility])    Bacteremia due to group B Streptococcus       Active Problems  Principal Problem:    Septic shock (HCC)  Active Problems:    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  *    Electronically signed by: Electronically signed by Shahana Worrell MD on 6/24/2019 at 10:15 AM

## 2019-06-25 ENCOUNTER — APPOINTMENT (OUTPATIENT)
Dept: GENERAL RADIOLOGY | Age: 64
DRG: 720 | End: 2019-06-25
Payer: COMMERCIAL

## 2019-06-25 LAB
ALBUMIN SERPL-MCNC: 3.7 GM/DL (ref 3.4–5)
ANION GAP SERPL CALCULATED.3IONS-SCNC: 10 MMOL/L (ref 4–16)
ANISOCYTOSIS: ABNORMAL
BANDED NEUTROPHILS ABSOLUTE COUNT: 0.75 K/CU MM
BANDED NEUTROPHILS RELATIVE PERCENT: 9 % (ref 5–11)
BUN BLDV-MCNC: 17 MG/DL (ref 6–23)
CALCIUM SERPL-MCNC: 9.2 MG/DL (ref 8.3–10.6)
CHLORIDE BLD-SCNC: 87 MMOL/L (ref 99–110)
CO2: 45 MMOL/L (ref 21–32)
CREAT SERPL-MCNC: 1.1 MG/DL (ref 0.6–1.1)
CULTURE: NORMAL
CULTURE: NORMAL
DIFFERENTIAL TYPE: ABNORMAL
GFR AFRICAN AMERICAN: >60 ML/MIN/1.73M2
GFR NON-AFRICAN AMERICAN: 50 ML/MIN/1.73M2
GLUCOSE BLD-MCNC: 123 MG/DL (ref 70–99)
GLUCOSE BLD-MCNC: 134 MG/DL (ref 70–99)
GLUCOSE BLD-MCNC: 139 MG/DL (ref 70–99)
GLUCOSE BLD-MCNC: 144 MG/DL (ref 70–99)
GLUCOSE BLD-MCNC: 155 MG/DL (ref 70–99)
GLUCOSE BLD-MCNC: 159 MG/DL (ref 70–99)
GLUCOSE BLD-MCNC: 97 MG/DL (ref 70–99)
HCT VFR BLD CALC: 32.1 % (ref 37–47)
HEMOGLOBIN: 8.9 GM/DL (ref 12.5–16)
HYPOCHROMIA: ABNORMAL
LYMPHOCYTES ABSOLUTE: 2.1 K/CU MM
LYMPHOCYTES RELATIVE PERCENT: 25 % (ref 24–44)
Lab: NORMAL
Lab: NORMAL
MACROCYTES: ABNORMAL
MAGNESIUM: 1.6 MG/DL (ref 1.8–2.4)
MCH RBC QN AUTO: 23.5 PG (ref 27–31)
MCHC RBC AUTO-ENTMCNC: 27.7 % (ref 32–36)
MCV RBC AUTO: 84.7 FL (ref 78–100)
METAMYELOCYTES ABSOLUTE COUNT: 0.25 K/CU MM
METAMYELOCYTES PERCENT: 3 %
MICROCYTES: ABNORMAL
MONOCYTES ABSOLUTE: 0.3 K/CU MM
MONOCYTES RELATIVE PERCENT: 3 % (ref 0–4)
MYELOCYTE PERCENT: 4 %
MYELOCYTES ABSOLUTE COUNT: 0.33 K/CU MM
PDW BLD-RTO: 19.5 % (ref 11.7–14.9)
PHOSPHORUS: 4.8 MG/DL (ref 2.5–4.9)
PLATELET # BLD: 357 K/CU MM (ref 140–440)
PMV BLD AUTO: 9.3 FL (ref 7.5–11.1)
POTASSIUM SERPL-SCNC: 4.3 MMOL/L (ref 3.5–5.1)
PRO-BNP: 248.9 PG/ML
RBC # BLD: 3.79 M/CU MM (ref 4.2–5.4)
SEGMENTED NEUTROPHILS ABSOLUTE COUNT: 4.6 K/CU MM
SEGMENTED NEUTROPHILS RELATIVE PERCENT: 56 % (ref 36–66)
SODIUM BLD-SCNC: 142 MMOL/L (ref 135–145)
SPECIMEN: NORMAL
SPECIMEN: NORMAL
WBC # BLD: 8.3 K/CU MM (ref 4–10.5)

## 2019-06-25 PROCEDURE — 6360000002 HC RX W HCPCS: Performed by: NURSE PRACTITIONER

## 2019-06-25 PROCEDURE — 1200000000 HC SEMI PRIVATE

## 2019-06-25 PROCEDURE — 2580000003 HC RX 258: Performed by: INTERNAL MEDICINE

## 2019-06-25 PROCEDURE — 83735 ASSAY OF MAGNESIUM: CPT

## 2019-06-25 PROCEDURE — 94660 CPAP INITIATION&MGMT: CPT

## 2019-06-25 PROCEDURE — 6370000000 HC RX 637 (ALT 250 FOR IP): Performed by: HOSPITALIST

## 2019-06-25 PROCEDURE — C9113 INJ PANTOPRAZOLE SODIUM, VIA: HCPCS | Performed by: NURSE PRACTITIONER

## 2019-06-25 PROCEDURE — 83880 ASSAY OF NATRIURETIC PEPTIDE: CPT

## 2019-06-25 PROCEDURE — 6360000002 HC RX W HCPCS: Performed by: INTERNAL MEDICINE

## 2019-06-25 PROCEDURE — 85007 BL SMEAR W/DIFF WBC COUNT: CPT

## 2019-06-25 PROCEDURE — 84100 ASSAY OF PHOSPHORUS: CPT

## 2019-06-25 PROCEDURE — 6370000000 HC RX 637 (ALT 250 FOR IP): Performed by: INTERNAL MEDICINE

## 2019-06-25 PROCEDURE — 80048 BASIC METABOLIC PNL TOTAL CA: CPT

## 2019-06-25 PROCEDURE — 85027 COMPLETE CBC AUTOMATED: CPT

## 2019-06-25 PROCEDURE — 99232 SBSQ HOSP IP/OBS MODERATE 35: CPT | Performed by: INTERNAL MEDICINE

## 2019-06-25 PROCEDURE — 36592 COLLECT BLOOD FROM PICC: CPT

## 2019-06-25 PROCEDURE — 71045 X-RAY EXAM CHEST 1 VIEW: CPT

## 2019-06-25 PROCEDURE — 82040 ASSAY OF SERUM ALBUMIN: CPT

## 2019-06-25 RX ORDER — ASPIRIN 81 MG/1
81 TABLET, CHEWABLE ORAL DAILY
Status: DISCONTINUED | OUTPATIENT
Start: 2019-06-25 | End: 2019-06-27 | Stop reason: HOSPADM

## 2019-06-25 RX ORDER — SIMVASTATIN 40 MG
40 TABLET ORAL NIGHTLY
Status: DISCONTINUED | OUTPATIENT
Start: 2019-06-25 | End: 2019-06-27 | Stop reason: HOSPADM

## 2019-06-25 RX ORDER — DULOXETIN HYDROCHLORIDE 30 MG/1
30 CAPSULE, DELAYED RELEASE ORAL DAILY
Status: DISCONTINUED | OUTPATIENT
Start: 2019-06-25 | End: 2019-06-27 | Stop reason: HOSPADM

## 2019-06-25 RX ORDER — CLOPIDOGREL BISULFATE 75 MG/1
75 TABLET ORAL DAILY
Status: DISCONTINUED | OUTPATIENT
Start: 2019-06-25 | End: 2019-06-27 | Stop reason: HOSPADM

## 2019-06-25 RX ADMIN — INSULIN LISPRO 3 UNITS: 100 INJECTION, SOLUTION INTRAVENOUS; SUBCUTANEOUS at 14:34

## 2019-06-25 RX ADMIN — PRAMIPEXOLE DIHYDROCHLORIDE 1 MG: 0.25 TABLET ORAL at 00:09

## 2019-06-25 RX ADMIN — INSULIN LISPRO 25 UNITS: 100 INJECTION, SOLUTION INTRAVENOUS; SUBCUTANEOUS at 14:35

## 2019-06-25 RX ADMIN — SIMVASTATIN 40 MG: 40 TABLET, FILM COATED ORAL at 21:44

## 2019-06-25 RX ADMIN — ENOXAPARIN SODIUM 40 MG: 100 INJECTION SUBCUTANEOUS at 09:12

## 2019-06-25 RX ADMIN — PRAMIPEXOLE DIHYDROCHLORIDE 1 MG: 0.25 TABLET ORAL at 09:15

## 2019-06-25 RX ADMIN — PRAMIPEXOLE DIHYDROCHLORIDE 1 MG: 0.25 TABLET ORAL at 21:44

## 2019-06-25 RX ADMIN — METRONIDAZOLE 500 MG: 250 TABLET ORAL at 21:44

## 2019-06-25 RX ADMIN — ACETAZOLAMIDE 500 MG: 250 TABLET ORAL at 09:15

## 2019-06-25 RX ADMIN — GABAPENTIN 800 MG: 400 CAPSULE ORAL at 14:36

## 2019-06-25 RX ADMIN — ACETAZOLAMIDE 500 MG: 250 TABLET ORAL at 21:44

## 2019-06-25 RX ADMIN — METRONIDAZOLE 500 MG: 250 TABLET ORAL at 14:36

## 2019-06-25 RX ADMIN — PRAMIPEXOLE DIHYDROCHLORIDE 1 MG: 0.25 TABLET ORAL at 14:36

## 2019-06-25 RX ADMIN — PANTOPRAZOLE SODIUM 40 MG: 40 INJECTION, POWDER, FOR SOLUTION INTRAVENOUS at 09:36

## 2019-06-25 RX ADMIN — LINAGLIPTIN 5 MG: 5 TABLET, FILM COATED ORAL at 09:13

## 2019-06-25 RX ADMIN — METRONIDAZOLE 500 MG: 250 TABLET ORAL at 06:42

## 2019-06-25 RX ADMIN — METOLAZONE 5 MG: 5 TABLET ORAL at 09:14

## 2019-06-25 RX ADMIN — DULOXETINE HYDROCHLORIDE 30 MG: 30 CAPSULE, DELAYED RELEASE ORAL at 09:12

## 2019-06-25 RX ADMIN — FUROSEMIDE 40 MG: 10 INJECTION, SOLUTION INTRAMUSCULAR; INTRAVENOUS at 09:34

## 2019-06-25 RX ADMIN — LISINOPRIL 5 MG: 5 TABLET ORAL at 09:13

## 2019-06-25 RX ADMIN — CLOPIDOGREL BISULFATE 75 MG: 75 TABLET ORAL at 09:11

## 2019-06-25 RX ADMIN — CEFTRIAXONE SODIUM 2 G: 2 INJECTION, POWDER, FOR SOLUTION INTRAMUSCULAR; INTRAVENOUS at 14:37

## 2019-06-25 RX ADMIN — GABAPENTIN 800 MG: 400 CAPSULE ORAL at 09:11

## 2019-06-25 RX ADMIN — ASPIRIN 81 MG 81 MG: 81 TABLET ORAL at 09:14

## 2019-06-25 RX ADMIN — CARVEDILOL 3.12 MG: 3.12 TABLET, FILM COATED ORAL at 09:12

## 2019-06-25 RX ADMIN — SODIUM CHLORIDE, PRESERVATIVE FREE 10 ML: 5 INJECTION INTRAVENOUS at 21:44

## 2019-06-25 RX ADMIN — INSULIN LISPRO 3 UNITS: 100 INJECTION, SOLUTION INTRAVENOUS; SUBCUTANEOUS at 03:28

## 2019-06-25 NOTE — CONSULTS
[address] [place] [address], [address] [place] [address]                                  CONSULTATION    PATIENT NAME: vEelina Sahu                 :        1955  MED REC NO:   [id number]                          ROOM:       0379  ACCOUNT NO:   [de-identified]                           ADMIT DATE: 2019  PROVIDER:     Carmen Blue MD    CONSULT DATE:  2019    CONSULT REQUESTED BY:  Octaviano Mcwilliams MD    REASON FOR CONSULT:  Fluid overload and metabolic alkalosis. BRIEF HISTORY:  The patient is a morbidly obese 74-year-old female who  had valvular disease and status post TAVR, was brought to the emergency  room on 2019 with mental status changes and increasing shortness  of breath. In the emergency room, the patient was hemodynamically  stable, but underwent several diagnostic tests, which include  biochemical and imaging. Imaging study suggested pulmonary edema. Biochemical testing suggested sepsis and she was subsequently admitted. Her hospital course was complicated by group B beta streptococcus  bacteremia of unknown origin. She is on antimicrobial agent. No TEA  could be done because of shortness of breath, dental problem, etc.  She  was diuresed successfully but despite of that still had pulmonary edema  by chest x-ray and also developed metabolic alkalosis; hence the renal  consult was requested. Her plain urinalysis showed 100 mg per deciliter of albumin. I had  quantified mixture. She does not have significant proteinuria. She is  diabetic since . Fortunately, her creatinine is still 0.7, but she is anemic with a  hemoglobin of 8.1 or so. PAST MEDICAL HISTORY:  1.  Diabetes mellitus since , has been in insulin therapy since  .  2.  Several acute decompensated CHF, mainly from diastolic dysfunction. 3.  Aortic valvular disease. She underwent TAVR.   4.  Obstructive sleep apnea and COPD.  She is on BiPAP as well as home  oxygen therapy. 5.  Morbid obesity with chronic hypercapnia. 6.  Probable neuropathy. 7.  Hypertension. 8.  Hyperlipidemia. PAST SURGICAL HISTORY:  1. Significant for aortic valve replacement that was done with TAVR. 2.  . 3.  Hysterectomy. 4.  Gallbladder surgery. OB/GYN HISTORY:   1, para 1. She had  emergency. Unsure whether she had preeclampsia or not. FAMILY HISTORY:  Heart disease runs in the family. SOCIAL HISTORY:  The patient is  for 27 years, but she has one  daughter of course who is 39years old. She is retired. Used to work  in a convenience store. She retired in . She has some activity  level, but not great. She does use the walker. ALLERGIES:  No known drug allergies. CURRENT MEDICATIONS:  Here in the hospital include:  She is on Lasix 40  mg IV b.i.d. She is on Coreg, Rocephin, Lovenox, gabapentin, several  insulin regimen, Tradjenta, lisinopril, Protonix, and Mirapex. REVIEW OF SYSTEMS:  She feels better, has less shortness of breath. Still has some dyspnea on exertion. Leg edema is better. No fever,  chills, or rigor. No specific urinary symptoms. She does have Mata  catheter. Rest of the review of systems is negative other than previous  paragraph. PHYSICAL EXAMINATION:  VITAL SIGNS:  At the time of examination reveal temperature afebrile,  blood pressure 130s/40s, pulse 70, respiratory rate 17, saturating 97%  with oxygen. GENERAL:  The patient without any acute distress. She does have some  dyspnea on exertion. She uses a walker. HEENT:  Head and neck:  Normocephalic, atraumatic. Eyes:  Positive  conjunctival pallor. Ear, mouth and throat:  Poor oral hygiene. CARDIOVASCULAR:  Irregular rhythm. RESPIRATORY:  Poor airflow. Few crackles at the base. ABDOMEN:  Obese. EXTREMITIES:  Edema probably 1+. LABORATORY VALUES AND ANCILLARY SERVICES:  As mentioned earlier. Hemoglobin is 8.1. Her bicarb is 47, potassium is 3.9, BUN and  creatinine 11 and 0.7 respectively. IMPRESSION:  A 60-year-old female with metabolic alkalosis and fluid  overload. 1.  Metabolic alkalosis. It is likely from diuretics and chronic CO2  retention. 2.  Severe fluid overload with underlying morbid obesity as well as  diastolic dysfunction and aortic valve replacement. 3.  Sepsis with group A beta streptococcus of unknown source, on  antibiotic. 4.  Obstructive sleep apnea, COPD, and morbid obesity. 5.  Profound anemia. PLAN:  1. Add thiazide as well as p.o. carbonic anhydrase inhibitor for  alkalosis. 2.  Redo UA and urinary indices. 3.  Watch for adequate plasma refill. 4.  Try to get her as close as normal volume as possible. 5.  Anemia workup if possible. If she has significant proteinuria, then  add some serology of course. Otherwise follow clinically including  proBNP level.         Sarai Silverman MD    D: 06/24/2019 20:14:53       T: 06/25/2019 1:18:05     [email or web address]  Job#: [id number]     Doc#: [id number]    CC:

## 2019-06-25 NOTE — PROGRESS NOTES
Pt seen ,examined,interviewed and chart reviewed. Please see the dictated consult for details     Imp :   1. Met alkalosis- likely from diuretics and Ch co2 re tension  2. Severe fluid overload with underlying morbid obesity. diastolic dys Fx and TAVR   3. Sepsis with Gr B beta streo- unknown source on abx   4. ANMOL/COPD/ morbid obesity  5. Anemia has sever iron def      Plan:  1. Add tahzide and po CA inh ( acetazolamide)  2. Redo UA and urinary indices   3. Watch for adequate plams refill \"   4. Try get to her as close to normovolemia as posbe;  5. Periodic pro BNP   6.  Follow clinically       Thanks for the consult    #[id number]

## 2019-06-25 NOTE — PROGRESS NOTES
Progress Note( Dr. Shandra Jules)  6/25/2019  Subjective:   Admit Date: 6/16/2019  PCP: THEODORE Forrester NP    Admitted For : Shortness of breath leg pain and headache    Consulted For: Better control of blood glucose    Interval History: Somewhat better but still complaining of leg pain    Denies any chest pains,   Denies SOB . Denies nausea or vomiting. No new bowel or bladder symptoms. Intake/Output Summary (Last 24 hours) at 6/25/2019 0701  Last data filed at 6/25/2019 0645  Gross per 24 hour   Intake 320 ml   Output 3090 ml   Net -2770 ml       DATA    CBC:   Recent Labs     06/23/19  0600 06/24/19  0430   WBC 8.5 8.4   HGB 7.9* 8.1*    322    CMP:  Recent Labs     06/22/19  1805 06/23/19  0600 06/24/19  0430    143 145   K 4.4 4.2 3.9   CL 95* 95* 91*   CO2 42* 44* 47*   BUN 9 10 11   CREATININE 0.6 0.6 0.7   CALCIUM 9.1 9.1 9.2     Lipids:   Lab Results   Component Value Date    CHOL 186 02/07/2019    HDL 39 02/07/2019    TRIG 122 02/07/2019     Glucose:  Recent Labs     06/24/19  1708 06/25/19  0004 06/25/19  0324   POCGLU 110* 139* 144*     [id number]:  Lab Results   Component Value Date    LABA1C 11.2 06/16/2019     High Sensitivity TSH:   Lab Results   Component Value Date    TSHHS 0.820 02/06/2019     Free T3: No results found for: FT3  Free T4:  Lab Results   Component Value Date    T4FREE 1.07 09/04/2018       Xr Foot Left (2 Views)    Result Date: 6/17/2019  EXAMINATION: TWO XRAY VIEWS OF THE LEFT FOOT 6/17/2019 8:52 am COMPARISON: 11/24/2018 HISTORY: ORDERING SYSTEM PROVIDED HISTORY: left diabetic ulcer   No acute osseous abnormality of the left foot. No evidence of osteomyelitis. Ct Head Wo Contrast    Result Date: 6/16/2019  EXAMINATION: CT OF THE HEAD WITHOUT CONTRAST  6/16/2019 7:16 pm     No acute intracranial abnormality.          Xr Chest 1 Vw    Result Date: 6/17/2019  EXAMINATION: ONE XRAY VIEW OF THE CHEST 6/16/2019 4:11 pm COMPARISON: Chest radiograph 02/05/2019 HISTORY: ORDERING SYSTEM PROVIDED HISTORY: Fever, shortness of breath      Pulmonary vascular congestion and patchy bilateral perihilar and bibasilar airspace opacities may reflect pulmonary edema or atypical infection. Vl Dup Lower Extremity Venous Bilateral    Result Date: 6/17/2019  EXAMINATION: DUPLEX VENOUS ULTRASOUND OF THE BILATERAL LOWER EXTREMITIES, 6/17/2019 6:03 am        No evidence of DVT in either lower extremity. Technically limited by body habitus.        Scheduled Medicines   Medications:    aspirin  81 mg Oral Daily    clopidogrel  75 mg Oral Daily    DULoxetine  30 mg Oral Daily    simvastatin  40 mg Oral Nightly    metroNIDAZOLE  500 mg Oral 3 times per day    cefTRIAXone (ROCEPHIN) IV  2 g Intravenous Q24H    sodium chloride flush  10 mL Intravenous 2 times per day    carvedilol  3.125 mg Oral BID    lisinopril  5 mg Oral Daily    acetaZOLAMIDE  500 mg Oral BID    metolazone  5 mg Oral Daily    furosemide  40 mg Intravenous BID    insulin glargine  40 Units Subcutaneous Nightly    insulin lispro  0-18 Units Subcutaneous TID WC    insulin lispro  0-18 Units Subcutaneous 2 times per day    linagliptin  5 mg Oral Daily    insulin lispro  25 Units Subcutaneous TID WC    gabapentin  800 mg Oral TID    pramipexole  1 mg Oral TID    sodium chloride flush  10 mL Intravenous 2 times per day    sodium chloride flush  10 mL Intravenous 2 times per day    enoxaparin  40 mg Subcutaneous Daily    pantoprazole  40 mg Intravenous Daily    nystatin   Topical BID      Infusions:    dextrose           Objective:   Vitals: BP (!) 91/43   Pulse 62   Temp 97.8 °F (36.6 °C) (Oral)   Resp 16   Ht 5' 6\" (1.676 m)   Wt (!) 442 lb 14.5 oz (200.9 kg)   SpO2 100%   BMI 71.49 kg/m²   General appearance: alert and cooperative with exam  Neck: no JVD or bruit  Thyroid : Normal lobes   Lungs: Has Vesicular Breath sounds   Heart:  regular rate and rhythm  Abdomen: soft, non-tender; bowel sounds normal; no masses,  no organomegaly  Musculoskeletal: Normal  Extremities: extremities normal, , edema ulcerations of both legs  Neurologic:  Awake, alert, oriented to name, place and time. Cranial nerves II-XII are grossly intact. Motor is  intact. Sensory neuropathy t.,  and gait is abnormal and unstable. ,mostly bed ridden     Assessment:     Patient Active Problem List:     Morbid obesity with BMI of 70 and over, [facility])     Essential hypertension     Dyslipidemia     Fecal incontinence     Mild intermittent asthma without complication     S/P laparoscopic cholecystectomy     Type 2 diabetes mellitus without complication, with long-term current use of insulin (HCC)     Fatty liver     Arthritis     H/O parotidectomy     Venous stasis dermatitis of both lower extremities     Aortic stenosis     Morbid obesity (HCC)     Asthma     Diabetes mellitus ([address]     Hypertension     Sleep apnea     Family history of coronary artery disease     Chest pain     SOB (shortness of breath)     Palpitations     Peripheral edema     Hypervolemia     Chronic respiratory failure with hypoxia and hypercapnia (HCC)     Acute exacerbation of chronic obstructive pulmonary disease (COPD) (HCC)     Chronic obstructive pulmonary disease (HCC)     Gait disturbance     Acute on chronic respiratory failure with hypoxia and hypercapnia (HCC)     Cellulitis     Skin ulcer of left foot with fat layer exposed ([address]     Diabetic ulcer of left midfoot associated with type 2 diabetes mellitus, with necrosis of muscle (HCC)     Pneumonia     VHD (valvular heart disease)     Septic shock ([address]     Community acquired pneumonia     Uncontrolled type 2 diabetes mellitus with hyperglycemia (HCC)     Class 3 severe obesity due to excess calories with body mass index (BMI) greater than or equal to 70 in [facility])      Plan:     1. Reviewed POC blood glucose . Labs and X ray results   2. Reviewed Current Medicines   3.  On meal/ Correction bolus Humalog/ Basal Lantus Insulin regime   4. Monitor Blood glucose frequently   5. Modified  the dose of Insulin/ other medicines as needed   6. Will follow     .      Cara Robert MD

## 2019-06-25 NOTE — PROGRESS NOTES
HOSPITALIST PROGRESS NOTE  Date: 6/25/2019   Name: Therese Nuno   MRN: [id number]   YOB: 1955      Subjective/Interval Hx: Woke up wheezing and felt she had \"crackles\", becomes hypoxic quickly when takes off oxygen    Objective:   Physical Exam:   BP (!) 110/39   Pulse 62   Temp 98.5 °F (36.9 °C) (Oral)   Resp 19   Ht 5' 6\" (1.676 m)   Wt (!) 442 lb 14.5 oz (200.9 kg)   SpO2 99%   BMI 71.49 kg/m²   Physical Exam   Constitutional: She appears well-developed. Pulmonary/Chest: Effort normal.   Mild dyspnea with speech has now resolved   Skin: Skin is dry.            Meds:   Meds:    metroNIDAZOLE  500 mg Oral 3 times per day    cefTRIAXone (ROCEPHIN) IV  2 g Intravenous Q24H    sodium chloride flush  10 mL Intravenous 2 times per day    carvedilol  3.125 mg Oral BID    lisinopril  5 mg Oral Daily    acetaZOLAMIDE  500 mg Oral BID    metolazone  5 mg Oral Daily    furosemide  40 mg Intravenous BID    insulin glargine  40 Units Subcutaneous Nightly    insulin lispro  0-18 Units Subcutaneous TID WC    insulin lispro  0-18 Units Subcutaneous 2 times per day    linagliptin  5 mg Oral Daily    insulin lispro  25 Units Subcutaneous TID WC    gabapentin  800 mg Oral TID    pramipexole  1 mg Oral TID    sodium chloride flush  10 mL Intravenous 2 times per day    sodium chloride flush  10 mL Intravenous 2 times per day    enoxaparin  40 mg Subcutaneous Daily    pantoprazole  40 mg Intravenous Daily    nystatin   Topical BID      Infusions:    dextrose       PRN Meds:     sodium chloride flush 10 mL PRN   sodium chloride (PF) 10 mL ONCE PRN   sodium chloride flush 10 mL PRN   sodium chloride flush 10 mL PRN   magnesium hydroxide 30 mL Daily PRN   ondansetron 4 mg Q6H PRN   ipratropium-albuterol 1 ampule Q4H PRN   acetaminophen 650 mg Q4H PRN   glucose 15 g PRN   dextrose 12.5 g PRN   glucagon (rDNA) 1 mg PRN   dextrose 100 mL/hr PRN       Data/Labs:     Recent Labs 06/22/19  0530 06/23/19  0600 06/24/19  0430   WBC 6.7 8.5 8.4   HGB 7.6* 7.9* 8.1*   HCT 28.0* 28.9* 30.2*    316 322      Recent Labs     06/22/19  1805 06/23/19  0600 06/24/19  0430    143 145   K 4.4 4.2 3.9   CL 95* 95* 91*   CO2 42* 44* 47*   BUN 9 10 11   CREATININE 0.6 0.6 0.7     No results for input(s): AST, ALT, ALB, BILIDIR, BILITOT, ALKPHOS in the last 72 hours. No results for input(s): INR in the last 72 hours. No results for input(s): CKTOTAL, CKMB, CKMBINDEX, TROPONINT in the last 72 hours. I/O last 3 completed shifts: In: 230 [P.O.:220; I.V.:10]  Out: 4740 [Urine:4740]    Intake/Output Summary (Last 24 hours) at 6/25/2019 0504  Last data filed at 6/24/2019 2200  Gross per 24 hour   Intake 220 ml   Output 2890 ml   Net -2670 ml        Assessment/Plan:       Needs trapeze at home    Plan - despite 17 liter negative I/O past 3 days remains in pulmonary edema - not ready for dc today, consult nephrology to assist with diuresis. Discussed with ID - OK to dc from ID perspective. Sepsis due to Group B strep bacteremia, with septic shock needing pressors   -6/20 - CT no obvious source - YANI planned as she is s/p TAVR  -6/21 - not cleared for YANI due to respiratory status - CXR showed pulmonary edema - IV Lasix ordered, was on PO Lasix at home which has been on hold since admission  -6/24 - cs nephrology for severe fluid overload, discussed with ID, anticipate home on IV abx - Rocephin and Flagyl until July 2    Acute pulmonary edema  -acute on chronic diastolic heart failure  -suspect right sided heart failure as well  -6/24 - restart home meds Coreg and lisinopril as BP high today,     Volume overload  -6/24 - becoming very alkalotic, which can exacerbate hypercapnia, consult nephrology to assist with diuresis    Severe morbid obesity Body mass index is 71.49 kg/m².  with acute on chronic hypercapnic respiratory failure, chronic metabolic alkalosis, ANMOL, and possible OHS -

## 2019-06-25 NOTE — PROGRESS NOTES
Department of Internal Medicine  General Internal Medicine  Attending Progress Note      SUBJECTIVE:  She has no complaints at this time.       OBJECTIVE      Medications    Current Facility-Administered Medications: aspirin chewable tablet 81 mg, 81 mg, Oral, Daily  clopidogrel (PLAVIX) tablet 75 mg, 75 mg, Oral, Daily  DULoxetine (CYMBALTA) extended release capsule 30 mg, 30 mg, Oral, Daily  simvastatin (ZOCOR) tablet 40 mg, 40 mg, Oral, Nightly  metroNIDAZOLE (FLAGYL) tablet 500 mg, 500 mg, Oral, 3 times per day  cefTRIAXone (ROCEPHIN) 2 g in dextrose 5 % 50 mL IVPB, 2 g, Intravenous, Q24H  sodium chloride flush 0.9 % injection 10 mL, 10 mL, Intravenous, 2 times per day  sodium chloride flush 0.9 % injection 10 mL, 10 mL, Intravenous, PRN  carvedilol (COREG) tablet 3.125 mg, 3.125 mg, Oral, BID  acetaZOLAMIDE (DIAMOX) tablet 500 mg, 500 mg, Oral, BID  sodium chloride (PF) 0.9 % injection 10 mL, 10 mL, Intravenous, ONCE PRN  insulin glargine (LANTUS) injection vial 40 Units, 40 Units, Subcutaneous, Nightly  insulin lispro (HUMALOG) injection vial 0-18 Units, 0-18 Units, Subcutaneous, TID WC  insulin lispro (HUMALOG) injection vial 0-18 Units, 0-18 Units, Subcutaneous, 2 times per day  linagliptin (TRADJENTA) tablet 5 mg, 5 mg, Oral, Daily  insulin lispro (HUMALOG) injection vial 25 Units, 25 Units, Subcutaneous, TID WC  pramipexole (MIRAPEX) tablet 1 mg, 1 mg, Oral, TID  sodium chloride flush 0.9 % injection 10 mL, 10 mL, Intravenous, 2 times per day  sodium chloride flush 0.9 % injection 10 mL, 10 mL, Intravenous, PRN  sodium chloride flush 0.9 % injection 10 mL, 10 mL, Intravenous, 2 times per day  sodium chloride flush 0.9 % injection 10 mL, 10 mL, Intravenous, PRN  magnesium hydroxide (MILK OF MAGNESIA) 400 MG/5ML suspension 30 mL, 30 mL, Oral, Daily PRN  ondansetron (ZOFRAN) injection 4 mg, 4 mg, Intravenous, Q6H PRN  enoxaparin (LOVENOX) injection 40 mg, 40 mg, Subcutaneous, Daily  pantoprazole (PROTONIX) injection 40 mg, 40 mg, Intravenous, Daily  ipratropium-albuterol (DUONEB) nebulizer solution 1 ampule, 1 ampule, Inhalation, Q4H PRN  acetaminophen (TYLENOL) tablet 650 mg, 650 mg, Oral, Q4H PRN  glucose (GLUTOSE) 40 % oral gel 15 g, 15 g, Oral, PRN  dextrose 50 % IV solution, 12.5 g, Intravenous, PRN  glucagon (rDNA) injection 1 mg, 1 mg, Intramuscular, PRN  dextrose 5 % solution, 100 mL/hr, Intravenous, PRN  nystatin (MYCOSTATIN) cream, , Topical, BID  Physical    VITALS:  BP (!) 116/45   Pulse 67   Temp 98.3 °F (36.8 °C) (Oral)   Resp 17   Ht 5' 6\" (1.676 m)   Wt (!) 442 lb 14.5 oz (200.9 kg)   SpO2 94%   BMI 71.49 kg/m²   Gen - A & O x 3  HEENT - PERRLA, EOMI  CV - RRR no M/G/R, normal s1, s2  Lungs - CTA bilatearlly no W/C/R  Abd - soft, NT, ND  Ext -  +  Mottled skin      Data    CBC:   Lab Results   Component Value Date    WBC 8.3 06/25/2019    RBC 3.79 06/25/2019    HGB 8.9 06/25/2019    HCT 32.1 06/25/2019    MCV 84.7 06/25/2019    MCH 23.5 06/25/2019    MCHC 27.7 06/25/2019    RDW 19.5 06/25/2019     06/25/2019    MPV 9.3 06/25/2019       ASSESSMENT AND PLAN    Needs trapeze at home     Plan - despite 17 liter negative I/O past 3 days remains in pulmonary edema - not ready for dc today, consult nephrology to assist with diuresis.   Discussed with ID - OK to dc from ID perspective.            Sepsis due to Group B strep bacteremia, with septic shock needing pressors   -6/20 - CT no obvious source - YANI planned as she is s/p TAVR  -6/21 - not cleared for YANI due to respiratory status - CXR showed pulmonary edema - IV Lasix ordered, was on PO Lasix at home which has been on hold since admission  -6/24 - cs nephrology for severe fluid overload, discussed with ID, anticipate home on IV abx - Rocephin and Flagyl until July 2     Acute pulmonary edema  -acute on chronic diastolic heart failure  -suspect right sided heart failure as well  -6/24 - restart home meds Coreg and lisinopril as BP high

## 2019-06-25 NOTE — PROGRESS NOTES
Nephrology Progress Note  6/25/2019 3:19 PM        Subjective:   Admit Date: 6/16/2019  PCP: THEODORE Fields NP    Interval History: sleeping . Could not wake her up    Diet: ? poor    ROS:  Low / concentrated  Urine with BIPAP     Data:     Current med's:    aspirin  81 mg Oral Daily    clopidogrel  75 mg Oral Daily    DULoxetine  30 mg Oral Daily    simvastatin  40 mg Oral Nightly    metroNIDAZOLE  500 mg Oral 3 times per day    cefTRIAXone (ROCEPHIN) IV  2 g Intravenous Q24H    sodium chloride flush  10 mL Intravenous 2 times per day    carvedilol  3.125 mg Oral BID    lisinopril  5 mg Oral Daily    acetaZOLAMIDE  500 mg Oral BID    insulin glargine  40 Units Subcutaneous Nightly    insulin lispro  0-18 Units Subcutaneous TID WC    insulin lispro  0-18 Units Subcutaneous 2 times per day    linagliptin  5 mg Oral Daily    insulin lispro  25 Units Subcutaneous TID WC    pramipexole  1 mg Oral TID    sodium chloride flush  10 mL Intravenous 2 times per day    sodium chloride flush  10 mL Intravenous 2 times per day    enoxaparin  40 mg Subcutaneous Daily    pantoprazole  40 mg Intravenous Daily    nystatin   Topical BID      dextrose           I/O last 3 completed shifts:   In: 200 [P.O.:200]  Out: 1590 [[id number]]    CBC:   Recent Labs     06/23/19  0600 06/24/19  0430 06/25/19  1015   WBC 8.5 8.4 8.3   HGB 7.9* 8.1* 8.9*    322 357          Recent Labs     06/23/19  0600 06/24/19  0430 06/25/19  1015    145 142   K 4.2 3.9 4.3   CL 95* 91* 87*   CO2 44* 47* 45*   BUN 10 11 17   CREATININE 0.6 0.7 1.1   GLUCOSE 167* 123* 159*       Lab Results   Component Value Date    CALCIUM 9.2 06/25/2019    PHOS 4.8 06/25/2019       Objective:     Vitals: BP (!) 98/46   Pulse 66   Temp 98.3 °F (36.8 °C) (Axillary)   Resp 18   Ht 5' 6\" (1.676 m)   Wt (!) 442 lb 14.5 oz (200.9 kg)   SpO2 100%   BMI 71.49 kg/m²     General appearance:  Sleeping   HEENT:  No pallor  Neck: supple  Lungs:   Limited exam   Heart:  irregular  Abdomen: soft  Extremities:  No overt edema now     Problem List :         Impression :     1. JOSE- UOP also slowing down - she need loop holiday - also has low BP with acei mught be causing reduced renal perfusion hold  acei for now   2. Met alkalosis- better and keep diamox for now   3. Sever fluid poevarldo with morbid obesity   4. Sepsis with gr B beta step / anemia     Recommendation/Plan  :     1. Keep diamox  2. Hold loop/ thiazide  3. Repeat CXR in am   4. Pro BNP level  5. See how she does   6. She may have good UOP as post ATI diuresis - assuming she has some structural ATI   7.  Follow clinically       Margaret Alcazar MD

## 2019-06-25 NOTE — PROGRESS NOTES
Nutrition Assessment    Type and Reason for Visit: Reassess    Nutrition Recommendations: Continue carb controlled diet    Resume low calorie, high protein ensure if requested by patient   Encourage consistent meal intake     Nutrition Assessment: Remains on carb controlled diet, 1800 ml fluid restriction with meal intake usually %. Remains with increased nutrient needs at this time. Malnutrition Assessment:  · Malnutrition Status: At risk for malnutrition  · Context: Chronic illness  · Findings of the 6 clinical characteristics of malnutrition (Minimum of 2 out of 6 clinical characteristics is required to make the diagnosis of moderate or severe Protein Calorie Malnutrition based on AND/ASPEN Guidelines):  1. Energy Intake-Greater than 75% of estimated energy requirement, Greater than or equal to 3 months    2. Weight Loss-No significant weight loss, in 1 year  3. Fat Loss-No significant subcutaneous fat loss, Orbital  4. Muscle Loss-No significant muscle mass loss, Clavicles (pectoralis and deltoids)  5. Fluid Accumulation-No significant fluid accumulation, Extremities  6.   Strength-Not measured    Nutrition Risk Level: High    Nutrient Needs:  · Estimated Daily Total Kcal: 7352-1826 based on [address] jeor  · Estimated Daily Protein (g): 77-90 based on 1.2-1.4 g/kg/IBW  · Estimated Daily Total Fluid (ml/day): per nephrology    Nutrition Diagnosis:   · Problem: Increased nutrient needs  · Etiology: related to Endocrine dysfunction     Signs and symptoms:  as evidenced by Presence of wounds    Objective Information:  · Nutrition-Focused Physical Findings: some shortness of breath with talking, obese female, poor dentition   · Wound Type: Diabetic Ulcer, Unstageable, Pressure Ulcer  · Current Nutrition Therapies:  · Oral Diet Orders: Carb Control 5 Carbs/Meal, Fluid Restriction   · Oral Diet intake: %, 51-75%  · Oral Nutrition Supplement (ONS) Orders: None  · ONS intake:

## 2019-06-25 NOTE — PROGRESS NOTES
Infectious Disease Progress Note  2019   Patient Name: Avtar Ozuna : 1955   Impression  · Sepsis secondary to GBS bacteremia  ? Afebrile  ? Bone scan negative, CT of the abdomen and pelvis with IV contrast does not show an abscess. ? Blood cx   ? Hx of TAVR in 2017, 3 week hx of being unwell, endocarditis was considered. Unfortunately low oxygen saturation, loose teeth, obesity posed greater risks to doing a YANI, therefore this was cancelled  ? Dental decay may be possible source of infection, hence Unasyn used which would cover anerobes  ? TTE poor quality owing to body habitus  · DM  ? Morbid obesity  · Multi-morbidity: per PMHx  Plan:  · Ceftriaxone 2 g IV daily and metronidazole 500 mg po q 8h (end date 19)  Weekly labs drawn on Monday during the course of treatment  CBC, CMP, ESR, CRP  Fax results to Attn: [facility] Staff  · # [phone]  · Requires dental extraction  · f/up in 2 weeks  · Will sign off and not follow the patient actively, please reconsult as needed. Ongoing Antimicrobial Therapy  Cefriaxone   Metronidazole   Completed Antimicrobial Therapy  Vancomycin -  Cefepime -  Unasyn -? History:? Interval history noted, GBS bacteremia  Feels better, denies abdominal pain, fever or chills. Denies n/v/d or untoward effects of antibiotics  Physical Exam:  Vital Signs: BP (!) 91/43   Pulse 62   Temp 97.8 °F (36.6 °C) (Oral)   Resp 16   Ht 5' 6\" (1.676 m)   Wt (!) 442 lb 14.5 oz (200.9 kg)   SpO2 100%   BMI 71.49 kg/m²     Gen: alert and oriented X3, no distress  Skin: no stigmata of endocarditis  Wounds: Left plantar callus  HEMT: AT/NC Oropharynx pink, moist, and without lesions or exudates; dentition in good state of repair  Eyes: PERRLA, EOMI, conjunctiva pink, sclera anicteric. Neck: Supple. Trachea midline. No LAD. Chest: no distress and CTA. Good air movement. Heart: RRR and no MRG.    Abd: soft, non-distended, no tenderness, no hepatomegaly. Normoactive bowel sounds. Ext: no clubbing, cyanosis, or edema  Catheter Site: right IJ CVC without erythema or tenderness  Neuro: Mental status intact. CN 2-12 intact and no focal sensory or motor deficits       Radiologic / Imaging / TESTING  Bone scan.   CT abdomen and pelvis with p.o. and IV contrast.     Labs:    Recent Results (from the past 24 hour(s))   POCT Glucose    Collection Time: 06/24/19 11:59 AM   Result Value Ref Range    POC Glucose 159 (H) 70 - 99 MG/DL   POCT Glucose    Collection Time: 06/24/19  5:08 PM   Result Value Ref Range    POC Glucose 110 (H) 70 - 99 MG/DL   POCT Glucose    Collection Time: 06/25/19 12:04 AM   Result Value Ref Range    POC Glucose 139 (H) 70 - 99 MG/DL   POCT Glucose    Collection Time: 06/25/19  3:24 AM   Result Value Ref Range    POC Glucose 144 (H) 70 - 99 MG/DL   POCT Glucose    Collection Time: 06/25/19  7:59 AM   Result Value Ref Range    POC Glucose 123 (H) 70 - 99 MG/DL     CULTURE results: Invalid input(s): BLOOD CULTURE,  URINE CULTURE, SURGICAL CULTURE    Diagnosis:  Patient Active Problem List   Diagnosis    Morbid obesity with BMI of 70 and over, adult ([address]    Essential hypertension    Dyslipidemia    Fecal incontinence    Mild intermittent asthma without complication    S/P laparoscopic cholecystectomy    Type 2 diabetes mellitus without complication, with long-term current use of insulin (HCC)    Fatty liver    Arthritis    H/O parotidectomy    Venous stasis dermatitis of both lower extremities    Aortic stenosis    Morbid obesity (HCC)    Asthma    Diabetes mellitus ([address]    Hypertension    Sleep apnea    Family history of coronary artery disease    Chest pain    SOB (shortness of breath)    Palpitations    Peripheral edema    Hypervolemia    Chronic respiratory failure with hypoxia and hypercapnia (HCC)    Acute exacerbation of chronic obstructive pulmonary disease (COPD) ([address]    Chronic obstructive pulmonary disease (HCC)    Gait disturbance    Acute on chronic respiratory failure with hypoxia and hypercapnia (HCC)    Cellulitis    Skin ulcer of left foot with fat layer exposed ([address]    Diabetic ulcer of left midfoot associated with type 2 diabetes mellitus, with necrosis of muscle (HCC)    Pneumonia    VHD (valvular heart disease)    Septic shock (HCC)    Community acquired pneumonia    Uncontrolled type 2 diabetes mellitus with hyperglycemia ([facility])    Class 3 severe obesity due to excess calories with body mass index (BMI) greater than or equal to 70 in adult [facility])    Bacteremia due to group B Streptococcus       Active Problems  Principal Problem:    Septic shock (HCC)  Active Problems:    Diabetic ulcer of left midfoot associated with type 2 diabetes mellitus, with necrosis of muscle (HCC)    Community acquired pneumonia    Uncontrolled type 2 diabetes mellitus with hyperglycemia ([facility])    Class 3 severe obesity due to excess calories with body mass index (BMI) greater than or equal to 70 in adult [facility])    Bacteremia due to group B Streptococcus  Resolved Problems:    * No resolved hospital problems.  *    Electronically signed by: Electronically signed by Isa Milton MD on 6/25/2019 at 8:18 AM

## 2019-06-26 ENCOUNTER — APPOINTMENT (OUTPATIENT)
Dept: GENERAL RADIOLOGY | Age: 64
DRG: 720 | End: 2019-06-26
Payer: COMMERCIAL

## 2019-06-26 LAB
ANION GAP SERPL CALCULATED.3IONS-SCNC: 9 MMOL/L (ref 4–16)
ANISOCYTOSIS: ABNORMAL
BANDED NEUTROPHILS ABSOLUTE COUNT: 0.06 K/CU MM
BANDED NEUTROPHILS RELATIVE PERCENT: 1 % (ref 5–11)
BASE EXCESS MIXED: ABNORMAL (ref 0–2.3)
BASOPHILIC STIPPLING: PRESENT
BUN BLDV-MCNC: 21 MG/DL (ref 6–23)
CALCIUM SERPL-MCNC: 8.4 MG/DL (ref 8.3–10.6)
CARBON MONOXIDE, BLOOD: 2.7 % (ref 0–5)
CHLORIDE BLD-SCNC: 89 MMOL/L (ref 99–110)
CO2 CONTENT: 51.4 MMOL/L (ref 19–24)
CO2: 46 MMOL/L (ref 21–32)
COMMENT: ABNORMAL
CREAT SERPL-MCNC: 1 MG/DL (ref 0.6–1.1)
DIFFERENTIAL TYPE: ABNORMAL
EOSINOPHILS ABSOLUTE: 0.1 K/CU MM
EOSINOPHILS RELATIVE PERCENT: 1 % (ref 0–3)
GFR AFRICAN AMERICAN: >60 ML/MIN/1.73M2
GFR NON-AFRICAN AMERICAN: 56 ML/MIN/1.73M2
GIANT PLATELETS: PRESENT
GLUCOSE BLD-MCNC: 140 MG/DL (ref 70–99)
GLUCOSE BLD-MCNC: 140 MG/DL (ref 70–99)
GLUCOSE BLD-MCNC: 141 MG/DL (ref 70–99)
GLUCOSE BLD-MCNC: 157 MG/DL (ref 70–99)
GLUCOSE BLD-MCNC: 175 MG/DL (ref 70–99)
HCO3 ARTERIAL: 49.1 MMOL/L (ref 18–23)
HCT VFR BLD CALC: 31.5 % (ref 37–47)
HEMOGLOBIN: 8.3 GM/DL (ref 12.5–16)
HYPOCHROMIA: ABNORMAL
LYMPHOCYTES ABSOLUTE: 0.9 K/CU MM
LYMPHOCYTES RELATIVE PERCENT: 16 % (ref 24–44)
MACROCYTES: ABNORMAL
MCH RBC QN AUTO: 23.1 PG (ref 27–31)
MCHC RBC AUTO-ENTMCNC: 26.3 % (ref 32–36)
MCV RBC AUTO: 87.5 FL (ref 78–100)
METAMYELOCYTES ABSOLUTE COUNT: 0.06 K/CU MM
METAMYELOCYTES PERCENT: 1 %
METHEMOGLOBIN ARTERIAL: 1.3 %
MONOCYTES ABSOLUTE: 0.4 K/CU MM
MONOCYTES RELATIVE PERCENT: 7 % (ref 0–4)
MYELOCYTE PERCENT: 1 %
MYELOCYTES ABSOLUTE COUNT: 0.06 K/CU MM
O2 SATURATION: 91.2 % (ref 96–97)
PCO2 ARTERIAL: 74 MMHG (ref 32–45)
PDW BLD-RTO: 19.3 % (ref 11.7–14.9)
PH BLOOD: 7.43 (ref 7.34–7.45)
PLATELET # BLD: 315 K/CU MM (ref 140–440)
PLT MORPHOLOGY: ABNORMAL
PMV BLD AUTO: 9.6 FL (ref 7.5–11.1)
PO2 ARTERIAL: 64 MMHG (ref 75–100)
POLYCHROMASIA: ABNORMAL
POTASSIUM SERPL-SCNC: 3.4 MMOL/L (ref 3.5–5.1)
PRO-BNP: 253.6 PG/ML
RBC # BLD: 3.6 M/CU MM (ref 4.2–5.4)
SEGMENTED NEUTROPHILS ABSOLUTE COUNT: 4.3 K/CU MM
SEGMENTED NEUTROPHILS RELATIVE PERCENT: 73 % (ref 36–66)
SODIUM BLD-SCNC: 144 MMOL/L (ref 135–145)
STOMATOCYTES: ABNORMAL
TOXIC GRANULATION: PRESENT
WBC # BLD: 5.9 K/CU MM (ref 4–10.5)

## 2019-06-26 PROCEDURE — 85007 BL SMEAR W/DIFF WBC COUNT: CPT

## 2019-06-26 PROCEDURE — 6370000000 HC RX 637 (ALT 250 FOR IP): Performed by: INTERNAL MEDICINE

## 2019-06-26 PROCEDURE — 6370000000 HC RX 637 (ALT 250 FOR IP): Performed by: HOSPITALIST

## 2019-06-26 PROCEDURE — 83880 ASSAY OF NATRIURETIC PEPTIDE: CPT

## 2019-06-26 PROCEDURE — 2580000003 HC RX 258: Performed by: NURSE PRACTITIONER

## 2019-06-26 PROCEDURE — 80048 BASIC METABOLIC PNL TOTAL CA: CPT

## 2019-06-26 PROCEDURE — 36592 COLLECT BLOOD FROM PICC: CPT

## 2019-06-26 PROCEDURE — 2580000003 HC RX 258: Performed by: INTERNAL MEDICINE

## 2019-06-26 PROCEDURE — 71045 X-RAY EXAM CHEST 1 VIEW: CPT

## 2019-06-26 PROCEDURE — 82803 BLOOD GASES ANY COMBINATION: CPT

## 2019-06-26 PROCEDURE — 36600 WITHDRAWAL OF ARTERIAL BLOOD: CPT

## 2019-06-26 PROCEDURE — 1200000000 HC SEMI PRIVATE

## 2019-06-26 PROCEDURE — 82962 GLUCOSE BLOOD TEST: CPT

## 2019-06-26 PROCEDURE — 94660 CPAP INITIATION&MGMT: CPT

## 2019-06-26 PROCEDURE — C9113 INJ PANTOPRAZOLE SODIUM, VIA: HCPCS | Performed by: NURSE PRACTITIONER

## 2019-06-26 PROCEDURE — 6360000002 HC RX W HCPCS: Performed by: INTERNAL MEDICINE

## 2019-06-26 PROCEDURE — 6360000002 HC RX W HCPCS: Performed by: NURSE PRACTITIONER

## 2019-06-26 PROCEDURE — 2700000000 HC OXYGEN THERAPY PER DAY

## 2019-06-26 PROCEDURE — 85027 COMPLETE CBC AUTOMATED: CPT

## 2019-06-26 RX ORDER — INSULIN GLARGINE 100 [IU]/ML
20 INJECTION, SOLUTION SUBCUTANEOUS ONCE
Status: COMPLETED | OUTPATIENT
Start: 2019-06-26 | End: 2019-06-26

## 2019-06-26 RX ORDER — POTASSIUM CHLORIDE 20 MEQ/1
40 TABLET, EXTENDED RELEASE ORAL 2 TIMES DAILY WITH MEALS
Status: COMPLETED | OUTPATIENT
Start: 2019-06-26 | End: 2019-06-27

## 2019-06-26 RX ADMIN — METRONIDAZOLE 500 MG: 250 TABLET ORAL at 12:32

## 2019-06-26 RX ADMIN — METRONIDAZOLE 500 MG: 250 TABLET ORAL at 07:59

## 2019-06-26 RX ADMIN — PRAMIPEXOLE DIHYDROCHLORIDE 1 MG: 0.25 TABLET ORAL at 07:59

## 2019-06-26 RX ADMIN — CEFTRIAXONE SODIUM 2 G: 2 INJECTION, POWDER, FOR SOLUTION INTRAMUSCULAR; INTRAVENOUS at 12:33

## 2019-06-26 RX ADMIN — POTASSIUM CHLORIDE 40 MEQ: 20 TABLET, EXTENDED RELEASE ORAL at 17:32

## 2019-06-26 RX ADMIN — PRAMIPEXOLE DIHYDROCHLORIDE 1 MG: 0.25 TABLET ORAL at 12:32

## 2019-06-26 RX ADMIN — INSULIN LISPRO 3 UNITS: 100 INJECTION, SOLUTION INTRAVENOUS; SUBCUTANEOUS at 22:15

## 2019-06-26 RX ADMIN — INSULIN LISPRO 3 UNITS: 100 INJECTION, SOLUTION INTRAVENOUS; SUBCUTANEOUS at 17:42

## 2019-06-26 RX ADMIN — SODIUM CHLORIDE, PRESERVATIVE FREE 10 ML: 5 INJECTION INTRAVENOUS at 22:03

## 2019-06-26 RX ADMIN — ASPIRIN 81 MG 81 MG: 81 TABLET ORAL at 08:00

## 2019-06-26 RX ADMIN — ACETAZOLAMIDE 500 MG: 250 TABLET ORAL at 08:02

## 2019-06-26 RX ADMIN — CARVEDILOL 3.12 MG: 3.12 TABLET, FILM COATED ORAL at 22:01

## 2019-06-26 RX ADMIN — INSULIN LISPRO 25 UNITS: 100 INJECTION, SOLUTION INTRAVENOUS; SUBCUTANEOUS at 17:44

## 2019-06-26 RX ADMIN — SODIUM CHLORIDE, PRESERVATIVE FREE 10 ML: 5 INJECTION INTRAVENOUS at 22:04

## 2019-06-26 RX ADMIN — INSULIN GLARGINE 20 UNITS: 100 INJECTION, SOLUTION SUBCUTANEOUS at 23:01

## 2019-06-26 RX ADMIN — METRONIDAZOLE 500 MG: 250 TABLET ORAL at 22:01

## 2019-06-26 RX ADMIN — SIMVASTATIN 40 MG: 40 TABLET, FILM COATED ORAL at 22:01

## 2019-06-26 RX ADMIN — ACETAZOLAMIDE 500 MG: 250 TABLET ORAL at 22:01

## 2019-06-26 RX ADMIN — DULOXETINE HYDROCHLORIDE 30 MG: 30 CAPSULE, DELAYED RELEASE ORAL at 08:00

## 2019-06-26 RX ADMIN — INSULIN LISPRO 3 UNITS: 100 INJECTION, SOLUTION INTRAVENOUS; SUBCUTANEOUS at 02:44

## 2019-06-26 RX ADMIN — LINAGLIPTIN 5 MG: 5 TABLET, FILM COATED ORAL at 08:00

## 2019-06-26 RX ADMIN — ENOXAPARIN SODIUM 40 MG: 100 INJECTION SUBCUTANEOUS at 08:00

## 2019-06-26 RX ADMIN — PANTOPRAZOLE SODIUM 40 MG: 40 INJECTION, POWDER, FOR SOLUTION INTRAVENOUS at 07:59

## 2019-06-26 RX ADMIN — PRAMIPEXOLE DIHYDROCHLORIDE 1 MG: 0.25 TABLET ORAL at 22:15

## 2019-06-26 RX ADMIN — CARVEDILOL 3.12 MG: 3.12 TABLET, FILM COATED ORAL at 07:59

## 2019-06-26 RX ADMIN — CLOPIDOGREL BISULFATE 75 MG: 75 TABLET ORAL at 07:59

## 2019-06-26 ASSESSMENT — PAIN SCALES - GENERAL: PAINLEVEL_OUTOF10: 0

## 2019-06-26 NOTE — PROGRESS NOTES
today,      Volume overload  -6/24 - becoming very alkalotic, which can exacerbate hypercapnia, consult nephrology to assist with diuresis     Severe morbid obesity Body mass index is 71.49 kg/m². with acute on chronic hypercapnic respiratory failure, chronic metabolic alkalosis, ANMOL, and possible OHS - appreciate pulm cs = on BiPAP at home, continue     Insulin-dependent diabetes mellitus- continue insulin     GERD-Protonix IV daily     Diabetic neuropathy-gabapentin 3 times a day     Anemia - HGB 6.8 on 6/19 - hospital day 3 - 1 units given, will consult oncology - she missed appts with Dr. Chace Paredes due to mobility issues  -6/20 - HGB is 7.1 today  -6/24 - had a 3 day course of iron 300 mg IV daily ending yesterday     LLE skin graft 6 weeks ago     WB status LLE apparently limited due to above skin graft         Venus Oppenheim MD

## 2019-06-26 NOTE — PROGRESS NOTES
Progress Note( Dr. Low Hartman)  6/26/2019  Subjective:   Admit Date: 6/16/2019  PCP: THEODORE Tamez NP    Admitted For : Shortness of breath leg pain and headache    Consulted For: Better control of blood glucose    Interval History: Somewhat better but still complaining of leg pain    Denies any chest pains,   Denies SOB . Denies nausea or vomiting. No new bowel or bladder symptoms. Intake/Output Summary (Last 24 hours) at 6/26/2019 0701  Last data filed at 6/26/2019 2581  Gross per 24 hour   Intake --   Output 1400 ml   Net -1400 ml       DATA    CBC:   Recent Labs     06/24/19  0430 06/25/19  1015   WBC 8.4 8.3   HGB 8.1* 8.9*    357    CMP:  Recent Labs     06/24/19  0430 06/25/19  1015    142   K 3.9 4.3   CL 91* 87*   CO2 47* 45*   BUN 11 17   CREATININE 0.7 1.1   CALCIUM 9.2 9.2   LABALBU  --  3.7     Lipids:   Lab Results   Component Value Date    CHOL 186 02/07/2019    HDL 39 02/07/2019    TRIG 122 02/07/2019     Glucose:  Recent Labs     06/25/19  1738 06/25/19  2137 06/26/19  0240   POCGLU 97 134* 140*     [id number]:  Lab Results   Component Value Date    LABA1C 11.2 06/16/2019     High Sensitivity TSH:   Lab Results   Component Value Date    TSHHS 0.820 02/06/2019     Free T3: No results found for: FT3  Free T4:  Lab Results   Component Value Date    T4FREE 1.07 09/04/2018       Xr Foot Left (2 Views)    Result Date: 6/17/2019  EXAMINATION: TWO XRAY VIEWS OF THE LEFT FOOT 6/17/2019 8:52 am COMPARISON: 11/24/2018 HISTORY: ORDERING SYSTEM PROVIDED HISTORY: left diabetic ulcer   No acute osseous abnormality of the left foot. No evidence of osteomyelitis. Ct Head Wo Contrast    Result Date: 6/16/2019  EXAMINATION: CT OF THE HEAD WITHOUT CONTRAST  6/16/2019 7:16 pm     No acute intracranial abnormality.          Xr Chest 1 Vw    Result Date: 6/17/2019  EXAMINATION: ONE XRAY VIEW OF THE CHEST 6/16/2019 4:11 pm COMPARISON: Chest radiograph 02/05/2019 HISTORY: ORDERING SYSTEM PROVIDED HISTORY: Fever, shortness of breath      Pulmonary vascular congestion and patchy bilateral perihilar and bibasilar airspace opacities may reflect pulmonary edema or atypical infection. Vl Dup Lower Extremity Venous Bilateral    Result Date: 6/17/2019  EXAMINATION: DUPLEX VENOUS ULTRASOUND OF THE BILATERAL LOWER EXTREMITIES, 6/17/2019 6:03 am        No evidence of DVT in either lower extremity. Technically limited by body habitus.        Scheduled Medicines   Medications:    aspirin  81 mg Oral Daily    clopidogrel  75 mg Oral Daily    DULoxetine  30 mg Oral Daily    simvastatin  40 mg Oral Nightly    metroNIDAZOLE  500 mg Oral 3 times per day    cefTRIAXone (ROCEPHIN) IV  2 g Intravenous Q24H    sodium chloride flush  10 mL Intravenous 2 times per day    carvedilol  3.125 mg Oral BID    acetaZOLAMIDE  500 mg Oral BID    insulin glargine  40 Units Subcutaneous Nightly    insulin lispro  0-18 Units Subcutaneous TID WC    insulin lispro  0-18 Units Subcutaneous 2 times per day    linagliptin  5 mg Oral Daily    insulin lispro  25 Units Subcutaneous TID WC    pramipexole  1 mg Oral TID    sodium chloride flush  10 mL Intravenous 2 times per day    sodium chloride flush  10 mL Intravenous 2 times per day    enoxaparin  40 mg Subcutaneous Daily    pantoprazole  40 mg Intravenous Daily    nystatin   Topical BID      Infusions:    dextrose           Objective:   Vitals: BP (!) 117/51   Pulse 59   Temp 97.8 °F (36.6 °C) (Oral)   Resp 16   Ht 5' 6\" (1.676 m)   Wt (!) 442 lb 14.5 oz (200.9 kg)   SpO2 100%   BMI 71.49 kg/m²   General appearance: alert and cooperative with exam  Neck: no JVD or bruit  Thyroid : Normal lobes   Lungs: Has Vesicular Breath sounds   Heart:  regular rate and rhythm  Abdomen: soft, non-tender; bowel sounds normal; no masses,  no organomegaly  Musculoskeletal: Normal  Extremities: extremities normal, , edema ulcerations of both legs  Neurologic:  Awake, alert, oriented to name, place and time. Cranial nerves II-XII are grossly intact. Motor is  intact. Sensory neuropathy t.,  and gait is abnormal and unstable. ,mostly bed ridden     Assessment:     Patient Active Problem List:     Morbid obesity with BMI of 70 and over, adult [facility])     Essential hypertension     Dyslipidemia     Fecal incontinence     Mild intermittent asthma without complication     S/P laparoscopic cholecystectomy     Type 2 diabetes mellitus without complication, with long-term current use of insulin (HCC)     Fatty liver     Arthritis     H/O parotidectomy     Venous stasis dermatitis of both lower extremities     Aortic stenosis     Morbid obesity (HCC)     Asthma     Diabetes mellitus ([facility]     Hypertension     Sleep apnea     Family history of coronary artery disease     Chest pain     SOB (shortness of breath)     Palpitations     Peripheral edema     Hypervolemia     Chronic respiratory failure with hypoxia and hypercapnia (HCC)     Acute exacerbation of chronic obstructive pulmonary disease (COPD) (HCC)     Chronic obstructive pulmonary disease (HCC)     Gait disturbance     Acute on chronic respiratory failure with hypoxia and hypercapnia (HCC)     Cellulitis     Skin ulcer of left foot with fat layer exposed ([facility]     Diabetic ulcer of left midfoot associated with type 2 diabetes mellitus, with necrosis of muscle (HCC)     Pneumonia     VHD (valvular heart disease)     Septic shock ([facility]     Community acquired pneumonia     Uncontrolled type 2 diabetes mellitus with hyperglycemia (HCC)     Class 3 severe obesity due to excess calories with body mass index (BMI) greater than or equal to 70 in [facility])      Plan:     1. Reviewed POC blood glucose . Labs and X ray results   2. Reviewed Current Medicines   3. On meal/ Correction bolus Humalog/ Basal Lantus Insulin regime   4. Monitor Blood glucose frequently   5. Modified  the dose of Insulin/ other medicines as needed   6. Will follow     . Janet Byrne MD

## 2019-06-26 NOTE — PROGRESS NOTES
Hospitalist and patient aware of patients room change. Patient stated that patient wants to make family aware of room change.

## 2019-06-26 NOTE — PROGRESS NOTES
Nephrology Progress Note  6/26/2019 6:42 AM        Subjective:   Admit Date: 6/16/2019  PCP: THEODORE Forrester NP    Interval History: awake and had good conversation this am     Diet: better    ROS:  feels much better with breathing    Data:     Current med's:    aspirin  81 mg Oral Daily    clopidogrel  75 mg Oral Daily    DULoxetine  30 mg Oral Daily    simvastatin  40 mg Oral Nightly    metroNIDAZOLE  500 mg Oral 3 times per day    cefTRIAXone (ROCEPHIN) IV  2 g Intravenous Q24H    sodium chloride flush  10 mL Intravenous 2 times per day    carvedilol  3.125 mg Oral BID    acetaZOLAMIDE  500 mg Oral BID    insulin glargine  40 Units Subcutaneous Nightly    insulin lispro  0-18 Units Subcutaneous TID WC    insulin lispro  0-18 Units Subcutaneous 2 times per day    linagliptin  5 mg Oral Daily    insulin lispro  25 Units Subcutaneous TID WC    pramipexole  1 mg Oral TID    sodium chloride flush  10 mL Intravenous 2 times per day    sodium chloride flush  10 mL Intravenous 2 times per day    enoxaparin  40 mg Subcutaneous Daily    pantoprazole  40 mg Intravenous Daily    nystatin   Topical BID      dextrose           I/O last 3 completed shifts:   In: 100 [P.O.:100]  Out: 1450 [Urine:1450]    CBC:   Recent Labs     06/24/19  0430 06/25/19  1015   WBC 8.4 8.3   HGB 8.1* 8.9*    357          Recent Labs     06/24/19  0430 06/25/19  1015    142   K 3.9 4.3   CL 91* 87*   CO2 47* 45*   BUN 11 17   CREATININE 0.7 1.1   GLUCOSE 123* 159*       Lab Results   Component Value Date    CALCIUM 9.2 06/25/2019    PHOS 4.8 06/25/2019       Objective:     Vitals: BP (!) 117/51   Pulse 59   Temp 97.8 °F (36.6 °C) (Oral)   Resp 16   Ht 5' 6\" (1.676 m)   Wt (!) 442 lb 14.5 oz (200.9 kg)   SpO2 100%   BMI 71.49 kg/m²     General appearance:  No distress  HEENT:  Mild pallor  Neck:  supple  Lungs:  Limited study- no gross crackles  Heart:  Seems irregular  Abdomen: soft  Extremities:

## 2019-06-26 NOTE — PROGRESS NOTES
pulmonary      SUBJECTIVE: she is much more alert and feels better     OBJECTIVE    VITALS:  BP (!) 117/51   Pulse 59   Temp 97.8 °F (36.6 °C) (Oral)   Resp 16   Ht 5' 6\" (1.676 m)   Wt (!) 442 lb 14.5 oz (200.9 kg)   SpO2 100%   BMI 71.49 kg/m²   HEAD AND FACE EXAM:  No throat injection, no active exudate,no thrush  NECK EXAM;No JVD, no masses, symmetrical  CHEST EXAM; Expansion equal and symmetrical, no masses  LUNG EXAM; Good breath sounds bilaterally. There are expiratory wheezes both lungs, there are crackles at both lung bases  CARDIOVASCULAR EXAM: Positive S1 and S2, no S3 or S4, no clicks ,no murmurs  RIGHT AND LEFT LOWER EXTRIMITY EXAM: No edema, no swelling, no inflamation  CNS EXAM: Alert and oriented X3          LABS   Lab Results   Component Value Date    WBC 5.9 06/26/2019    HGB 8.3 (L) 06/26/2019    HCT 31.5 (L) 06/26/2019    MCV 87.5 06/26/2019     06/26/2019     Lab Results   Component Value Date    CREATININE 1.0 06/26/2019    BUN 21 06/26/2019     06/26/2019    K 3.4 (L) 06/26/2019    CL 89 (L) 06/26/2019    CO2 46 (H) 06/26/2019     Lab Results   Component Value Date    INR 1.11 01/30/2018    PROTIME 12.9 01/30/2018          Lab Results   Component Value Date    PHOS 4.8 06/25/2019    PHOS 4.0 01/30/2018    PHOS 3.0 11/17/2017        Recent Labs     06/26/19  0900   PH 7.43   PO2ART 64*   FSZ0NDF 74.0*   O2SAT 91.2*         Wt Readings from Last 3 Encounters:   06/24/19 (!) 442 lb 14.5 oz (200.9 kg)   04/02/19 (!) 434 lb (196.9 kg)   02/26/19 (!) 435 lb (197.3 kg)               ASSESMENT  Ac on ch resp failure  Ac copd  ronni  chf  Small roberto pl effusion        PLAN  1. cpm  2.  She has bipap and uses it at home    6/26/2019  Daryn Isbell M.D.

## 2019-06-27 VITALS
SYSTOLIC BLOOD PRESSURE: 153 MMHG | BODY MASS INDEX: 47.09 KG/M2 | HEART RATE: 77 BPM | WEIGHT: 293 LBS | HEIGHT: 66 IN | OXYGEN SATURATION: 92 % | TEMPERATURE: 97.5 F | DIASTOLIC BLOOD PRESSURE: 69 MMHG | RESPIRATION RATE: 19 BRPM

## 2019-06-27 PROBLEM — R65.21 SEPTIC SHOCK (HCC): Status: RESOLVED | Noted: 2019-06-16 | Resolved: 2019-06-27

## 2019-06-27 PROBLEM — E11.65 UNCONTROLLED TYPE 2 DIABETES MELLITUS WITH HYPERGLYCEMIA (HCC): Status: RESOLVED | Noted: 2019-06-16 | Resolved: 2019-06-27

## 2019-06-27 PROBLEM — A41.9 SEPTIC SHOCK (HCC): Status: RESOLVED | Noted: 2019-06-16 | Resolved: 2019-06-27

## 2019-06-27 PROBLEM — J18.9 COMMUNITY ACQUIRED PNEUMONIA: Status: RESOLVED | Noted: 2019-06-16 | Resolved: 2019-06-27

## 2019-06-27 LAB
ANION GAP SERPL CALCULATED.3IONS-SCNC: 9 MMOL/L (ref 4–16)
ANISOCYTOSIS: ABNORMAL
BACTERIA: NEGATIVE /HPF
BANDED NEUTROPHILS ABSOLUTE COUNT: 0.87 K/CU MM
BANDED NEUTROPHILS RELATIVE PERCENT: 15 % (ref 5–11)
BASOPHILIC STIPPLING: PRESENT
BILIRUBIN URINE: NEGATIVE MG/DL
BLOOD, URINE: ABNORMAL
BUN BLDV-MCNC: 17 MG/DL (ref 6–23)
CALCIUM SERPL-MCNC: 8.8 MG/DL (ref 8.3–10.6)
CHLORIDE BLD-SCNC: 93 MMOL/L (ref 99–110)
CLARITY: ABNORMAL
CO2: 40 MMOL/L (ref 21–32)
COLOR: YELLOW
CREAT SERPL-MCNC: 0.7 MG/DL (ref 0.6–1.1)
CREATININE URINE: 59.6 MG/DL (ref 28–217)
DIFFERENTIAL TYPE: ABNORMAL
EOSINOPHILS ABSOLUTE: 0.2 K/CU MM
EOSINOPHILS RELATIVE PERCENT: 3 % (ref 0–3)
GFR AFRICAN AMERICAN: >60 ML/MIN/1.73M2
GFR NON-AFRICAN AMERICAN: >60 ML/MIN/1.73M2
GLUCOSE BLD-MCNC: 115 MG/DL (ref 70–99)
GLUCOSE BLD-MCNC: 132 MG/DL (ref 70–99)
GLUCOSE BLD-MCNC: 133 MG/DL (ref 70–99)
GLUCOSE BLD-MCNC: 145 MG/DL (ref 70–99)
GLUCOSE, URINE: NEGATIVE MG/DL
HCT VFR BLD CALC: 32.6 % (ref 37–47)
HEMOGLOBIN: 8.9 GM/DL (ref 12.5–16)
KETONES, URINE: NEGATIVE MG/DL
LEUKOCYTE ESTERASE, URINE: ABNORMAL
LYMPHOCYTES ABSOLUTE: 1 K/CU MM
LYMPHOCYTES RELATIVE PERCENT: 17 % (ref 24–44)
MACROCYTES: ABNORMAL
MCH RBC QN AUTO: 23.5 PG (ref 27–31)
MCHC RBC AUTO-ENTMCNC: 27.3 % (ref 32–36)
MCV RBC AUTO: 86 FL (ref 78–100)
METAMYELOCYTES ABSOLUTE COUNT: 0.23 K/CU MM
METAMYELOCYTES PERCENT: 4 %
MONOCYTES ABSOLUTE: 0.4 K/CU MM
MONOCYTES RELATIVE PERCENT: 7 % (ref 0–4)
MYELOCYTE PERCENT: 1 %
MYELOCYTES ABSOLUTE COUNT: 0.06 K/CU MM
NITRITE URINE, QUANTITATIVE: NEGATIVE
PDW BLD-RTO: 19.4 % (ref 11.7–14.9)
PH, URINE: 9 (ref 5–8)
PLATELET # BLD: 285 K/CU MM (ref 140–440)
PMV BLD AUTO: 9.3 FL (ref 7.5–11.1)
POLYCHROMASIA: ABNORMAL
POTASSIUM SERPL-SCNC: 4 MMOL/L (ref 3.5–5.1)
PRO-BNP: 352.6 PG/ML
PROT/CREAT RATIO, UR: ABNORMAL
PROTEIN UA: 100 MG/DL
RBC # BLD: 3.79 M/CU MM (ref 4.2–5.4)
RBC URINE: 97 /HPF (ref 0–6)
SEGMENTED NEUTROPHILS ABSOLUTE COUNT: 3 K/CU MM
SEGMENTED NEUTROPHILS RELATIVE PERCENT: 53 % (ref 36–66)
SODIUM BLD-SCNC: 142 MMOL/L (ref 135–145)
SODIUM URINE: 159 MMOL/L (ref 35–167)
SPECIFIC GRAVITY UA: 1 (ref 1–1.03)
SQUAMOUS EPITHELIAL: 1 /HPF
TOXIC GRANULATION: PRESENT
URINE TOTAL PROTEIN: 74.8 MG/DL
UROBILINOGEN, URINE: <2 MG/DL (ref 0.2–1)
WBC # BLD: 5.8 K/CU MM (ref 4–10.5)
WBC UA: 7 /HPF (ref 0–5)

## 2019-06-27 PROCEDURE — 94660 CPAP INITIATION&MGMT: CPT

## 2019-06-27 PROCEDURE — 2700000000 HC OXYGEN THERAPY PER DAY

## 2019-06-27 PROCEDURE — 82962 GLUCOSE BLOOD TEST: CPT

## 2019-06-27 PROCEDURE — 94761 N-INVAS EAR/PLS OXIMETRY MLT: CPT

## 2019-06-27 PROCEDURE — 82570 ASSAY OF URINE CREATININE: CPT

## 2019-06-27 PROCEDURE — 6370000000 HC RX 637 (ALT 250 FOR IP): Performed by: INTERNAL MEDICINE

## 2019-06-27 PROCEDURE — 85007 BL SMEAR W/DIFF WBC COUNT: CPT

## 2019-06-27 PROCEDURE — 6370000000 HC RX 637 (ALT 250 FOR IP): Performed by: HOSPITALIST

## 2019-06-27 PROCEDURE — 84300 ASSAY OF URINE SODIUM: CPT

## 2019-06-27 PROCEDURE — 84156 ASSAY OF PROTEIN URINE: CPT

## 2019-06-27 PROCEDURE — 36592 COLLECT BLOOD FROM PICC: CPT

## 2019-06-27 PROCEDURE — 80048 BASIC METABOLIC PNL TOTAL CA: CPT

## 2019-06-27 PROCEDURE — 6360000002 HC RX W HCPCS: Performed by: INTERNAL MEDICINE

## 2019-06-27 PROCEDURE — C9113 INJ PANTOPRAZOLE SODIUM, VIA: HCPCS | Performed by: NURSE PRACTITIONER

## 2019-06-27 PROCEDURE — 2580000003 HC RX 258: Performed by: INTERNAL MEDICINE

## 2019-06-27 PROCEDURE — 6360000002 HC RX W HCPCS: Performed by: NURSE PRACTITIONER

## 2019-06-27 PROCEDURE — 85027 COMPLETE CBC AUTOMATED: CPT

## 2019-06-27 PROCEDURE — 81001 URINALYSIS AUTO W/SCOPE: CPT

## 2019-06-27 PROCEDURE — 83880 ASSAY OF NATRIURETIC PEPTIDE: CPT

## 2019-06-27 PROCEDURE — 36415 COLL VENOUS BLD VENIPUNCTURE: CPT

## 2019-06-27 RX ORDER — CHLORTHALIDONE 25 MG/1
25 TABLET ORAL DAILY
Qty: 30 TABLET | Refills: 3 | Status: ON HOLD | OUTPATIENT
Start: 2019-06-27 | End: 2019-08-21 | Stop reason: HOSPADM

## 2019-06-27 RX ORDER — ACETAZOLAMIDE 250 MG/1
500 TABLET ORAL 2 TIMES DAILY
Qty: 90 TABLET | Refills: 3 | Status: ON HOLD | OUTPATIENT
Start: 2019-06-27 | End: 2019-08-28 | Stop reason: HOSPADM

## 2019-06-27 RX ORDER — TORSEMIDE 20 MG/1
20 TABLET ORAL 2 TIMES DAILY
Qty: 60 TABLET | Refills: 1 | Status: ON HOLD | OUTPATIENT
Start: 2019-06-27 | End: 2019-08-28 | Stop reason: SDUPTHER

## 2019-06-27 RX ADMIN — INSULIN LISPRO 3 UNITS: 100 INJECTION, SOLUTION INTRAVENOUS; SUBCUTANEOUS at 09:49

## 2019-06-27 RX ADMIN — CLOPIDOGREL BISULFATE 75 MG: 75 TABLET ORAL at 09:32

## 2019-06-27 RX ADMIN — CEFTRIAXONE SODIUM 2 G: 2 INJECTION, POWDER, FOR SOLUTION INTRAMUSCULAR; INTRAVENOUS at 13:39

## 2019-06-27 RX ADMIN — CARVEDILOL 3.12 MG: 3.12 TABLET, FILM COATED ORAL at 09:32

## 2019-06-27 RX ADMIN — LINAGLIPTIN 5 MG: 5 TABLET, FILM COATED ORAL at 09:32

## 2019-06-27 RX ADMIN — DULOXETINE HYDROCHLORIDE 30 MG: 30 CAPSULE, DELAYED RELEASE ORAL at 09:32

## 2019-06-27 RX ADMIN — ASPIRIN 81 MG 81 MG: 81 TABLET ORAL at 09:32

## 2019-06-27 RX ADMIN — PRAMIPEXOLE DIHYDROCHLORIDE 1 MG: 0.25 TABLET ORAL at 13:51

## 2019-06-27 RX ADMIN — SODIUM CHLORIDE, PRESERVATIVE FREE 10 ML: 5 INJECTION INTRAVENOUS at 10:11

## 2019-06-27 RX ADMIN — METRONIDAZOLE 500 MG: 250 TABLET ORAL at 13:51

## 2019-06-27 RX ADMIN — INSULIN LISPRO 25 UNITS: 100 INJECTION, SOLUTION INTRAVENOUS; SUBCUTANEOUS at 09:49

## 2019-06-27 RX ADMIN — METRONIDAZOLE 500 MG: 250 TABLET ORAL at 06:07

## 2019-06-27 RX ADMIN — ENOXAPARIN SODIUM 40 MG: 100 INJECTION SUBCUTANEOUS at 09:33

## 2019-06-27 RX ADMIN — POTASSIUM CHLORIDE 40 MEQ: 20 TABLET, EXTENDED RELEASE ORAL at 09:32

## 2019-06-27 RX ADMIN — PRAMIPEXOLE DIHYDROCHLORIDE 1 MG: 0.25 TABLET ORAL at 09:31

## 2019-06-27 RX ADMIN — PANTOPRAZOLE SODIUM 40 MG: 40 INJECTION, POWDER, FOR SOLUTION INTRAVENOUS at 10:13

## 2019-06-27 RX ADMIN — ACETAZOLAMIDE 500 MG: 250 TABLET ORAL at 09:31

## 2019-06-27 ASSESSMENT — PAIN SCALES - GENERAL: PAINLEVEL_OUTOF10: 0

## 2019-06-27 NOTE — PROGRESS NOTES
Liaison aware of discharge & will initiate home infusion. Nurse Akila Alas will give atb's before pt leaves today. Next dose will be due 6/28/19. Delivery tonight.

## 2019-06-27 NOTE — PLAN OF CARE
Problem: Falls - Risk of:  Goal: Will remain free from falls  Description  Will remain free from falls  6/27/2019 0006 by Morgan Regan RN  Outcome: Ongoing  6/26/2019 1824 by Sukhwinder Ardon. FARIHA Ling  Outcome: Ongoing  Goal: Absence of physical injury  Description  Absence of physical injury  6/27/2019 0006 by Morgan Regan RN  Outcome: Ongoing  6/26/2019 1824 by Sukhwinder Ardon. FARIHA Ling  Outcome: Ongoing     Problem: Risk for Impaired Skin Integrity  Goal: Tissue integrity - skin and mucous membranes  Description  Structural intactness and normal physiological function of skin and  mucous membranes. 6/27/2019 0006 by Morgan Regan RN  Outcome: Ongoing  6/26/2019 1824 by Sukhwinder Ardon. FARIHA Ling  Outcome: Ongoing     Problem: Serum Glucose Level - Abnormal:  Goal: Ability to maintain appropriate glucose levels has stabilized  Description  Ability to maintain appropriate glucose levels has stabilized  6/27/2019 0006 by Morgan Regan RN  Outcome: Ongoing  6/26/2019 1824 by Sukhwinder Ardon. FARIHA Ling  Outcome: Ongoing     Problem: Cardiac Output - Decreased:  Goal: Hemodynamic stability will improve  Description  Hemodynamic stability will improve  6/27/2019 0006 by Morgan Regan RN  Outcome: Ongoing  6/26/2019 1824 by Trinh Ling RN  Outcome: Ongoing     Problem: Nutrition  Goal: Optimal nutrition therapy  6/27/2019 0006 by Morgan Regan RN  Outcome: Ongoing  6/26/2019 1824 by Sukhwinder Ardon.  FARIHA Ling  Outcome: Ongoing

## 2019-06-27 NOTE — PROGRESS NOTES
Progress Note( Dr. Venancio Lee)  6/27/2019  Subjective:   Admit Date: 6/16/2019  PCP: THEODORE Perdomo - NP    Admitted For : Shortness of breath leg pain and headache    Consulted For: Better control of blood glucose    Interval History: Somewhat better but less leg pain    Denies any chest pains,   Denies SOB . Denies nausea or vomiting. No new bowel or bladder symptoms. Intake/Output Summary (Last 24 hours) at 6/27/2019 0730  Last data filed at 6/27/2019 0501  Gross per 24 hour   Intake --   Output 2000 ml   Net -2000 ml       DATA    CBC:   Recent Labs     06/25/19  1015 06/26/19  0621 06/27/19  0634   WBC 8.3 5.9 5.8   HGB 8.9* 8.3* 8.9*    315 285    CMP:  Recent Labs     06/25/19  1015 06/26/19  0621 06/27/19  0634    144 142   K 4.3 3.4* 4.0   CL 87* 89* 93*   CO2 45* 46* 40*   BUN 17 21 17   CREATININE 1.1 1.0 0.7   CALCIUM 9.2 8.4 8.8   LABALBU 3.7  --   --      Lipids:   Lab Results   Component Value Date    CHOL 186 02/07/2019    HDL 39 02/07/2019    TRIG 122 02/07/2019     Glucose:  Recent Labs     06/26/19  1737 06/26/19  2213 06/27/19  0205   POCGLU 157* 140* 132*     [id number]:  Lab Results   Component Value Date    LABA1C 11.2 06/16/2019     High Sensitivity TSH:   Lab Results   Component Value Date    TSHHS 0.820 02/06/2019     Free T3: No results found for: FT3  Free T4:  Lab Results   Component Value Date    T4FREE 1.07 09/04/2018       Xr Foot Left (2 Views)    Result Date: 6/17/2019  EXAMINATION: TWO XRAY VIEWS OF THE LEFT FOOT 6/17/2019 8:52 am COMPARISON: 11/24/2018 HISTORY: ORDERING SYSTEM PROVIDED HISTORY: left diabetic ulcer   No acute osseous abnormality of the left foot. No evidence of osteomyelitis. Ct Head Wo Contrast    Result Date: 6/16/2019  EXAMINATION: CT OF THE HEAD WITHOUT CONTRAST  6/16/2019 7:16 pm     No acute intracranial abnormality.          Xr Chest 1 Vw    Result Date: 6/17/2019  EXAMINATION: ONE XRAY VIEW OF THE CHEST 6/16/2019 4:11 pm COMPARISON: Chest radiograph 02/05/2019 HISTORY: ORDERING SYSTEM PROVIDED HISTORY: Fever, shortness of breath      Pulmonary vascular congestion and patchy bilateral perihilar and bibasilar airspace opacities may reflect pulmonary edema or atypical infection. Vl Dup Lower Extremity Venous Bilateral    Result Date: 6/17/2019  EXAMINATION: DUPLEX VENOUS ULTRASOUND OF THE BILATERAL LOWER EXTREMITIES, 6/17/2019 6:03 am        No evidence of DVT in either lower extremity. Technically limited by body habitus.        Scheduled Medicines   Medications:    potassium chloride  40 mEq Oral BID WC    aspirin  81 mg Oral Daily    clopidogrel  75 mg Oral Daily    DULoxetine  30 mg Oral Daily    simvastatin  40 mg Oral Nightly    metroNIDAZOLE  500 mg Oral 3 times per day    cefTRIAXone (ROCEPHIN) IV  2 g Intravenous Q24H    sodium chloride flush  10 mL Intravenous 2 times per day    carvedilol  3.125 mg Oral BID    acetaZOLAMIDE  500 mg Oral BID    insulin glargine  40 Units Subcutaneous Nightly    insulin lispro  0-18 Units Subcutaneous TID WC    insulin lispro  0-18 Units Subcutaneous 2 times per day    linagliptin  5 mg Oral Daily    insulin lispro  25 Units Subcutaneous TID WC    pramipexole  1 mg Oral TID    sodium chloride flush  10 mL Intravenous 2 times per day    sodium chloride flush  10 mL Intravenous 2 times per day    enoxaparin  40 mg Subcutaneous Daily    pantoprazole  40 mg Intravenous Daily    nystatin   Topical BID      Infusions:    dextrose           Objective:   Vitals: /62   Pulse 67   Temp 97.6 °F (36.4 °C) (Oral)   Resp 17   Ht 5' 6\" (1.676 m)   Wt (!) 432 lb 1.6 oz (196 kg)   SpO2 96%   BMI 69.74 kg/m²   General appearance: alert and cooperative with exam  Neck: no JVD or bruit  Thyroid : Normal lobes   Lungs: Has Vesicular Breath sounds   Heart:  regular rate and rhythm  Abdomen: soft, non-tender; bowel sounds normal; no masses,  no organomegaly  Musculoskeletal: Normal  Extremities: extremities normal, , edema ulcerations of both legs  Neurologic:  Awake, alert, oriented to name, place and time. Cranial nerves II-XII are grossly intact. Motor is  intact. Sensory neuropathy t.,  and gait is abnormal and unstable. ,mostly bed ridden     Assessment:     Patient Active Problem List:     Morbid obesity with BMI of 70 and over, [facility])     Essential hypertension     Dyslipidemia     Fecal incontinence     Mild intermittent asthma without complication     S/P laparoscopic cholecystectomy     Type 2 diabetes mellitus without complication, with long-term current use of insulin (HCC)     Fatty liver     Arthritis     H/O parotidectomy     Venous stasis dermatitis of both lower extremities     Aortic stenosis     Morbid obesity (HCC)     Asthma     Diabetes mellitus ([address]     Hypertension     Sleep apnea     Family history of coronary artery disease     Chest pain     SOB (shortness of breath)     Palpitations     Peripheral edema     Hypervolemia     Chronic respiratory failure with hypoxia and hypercapnia (HCC)     Acute exacerbation of chronic obstructive pulmonary disease (COPD) (HCC)     Chronic obstructive pulmonary disease (HCC)     Gait disturbance     Acute on chronic respiratory failure with hypoxia and hypercapnia (HCC)     Cellulitis     Skin ulcer of left foot with fat layer exposed ([address]     Diabetic ulcer of left midfoot associated with type 2 diabetes mellitus, with necrosis of muscle (HCC)     Pneumonia     VHD (valvular heart disease)     Septic shock ([address]     Community acquired pneumonia     Uncontrolled type 2 diabetes mellitus with hyperglycemia ([facility])     Class 3 severe obesity due to excess calories with body mass index (BMI) greater than or equal to 70 in [facility])      Plan:     1. Reviewed POC blood glucose . Labs and X ray results   2. Reviewed Current Medicines   3. On meal/ Correction bolus Humalog/ Basal Lantus Insulin regime   4.  Monitor Blood glucose frequently   5. Modified  the dose of Insulin/ other medicines as needed   6. Will follow     .      Murtaza Hudson MD

## 2019-06-27 NOTE — PROGRESS NOTES
Patient pulled out CVC line. Stated \" I thought it was a sticker. \" Site assessed, bleeding present with sutures still intact on line. Site cleaned up and sutures removed. Dry dressing applied with Tegaderm cover. ARINA Wilkins, notified and aware of the situation. Patient still has PICC line to right upper arm.

## 2019-06-27 NOTE — CARE COORDINATION
Pt on discharge- notified [facility] of pt's discharge and faxed info/orders to [phone]. Notified Tatiana with [facility]/ [facility] of pt's discharge as well.

## 2019-06-27 NOTE — PROGRESS NOTES
Nephrology Progress Note  6/27/2019 10:41 AM        Subjective:   Admit Date: 6/16/2019  PCP: THEODORE Moran NP    Interval History: doing better    Diet: better    ROS:  Off BIPAP    Data:     Current meds:    aspirin  81 mg Oral Daily    clopidogrel  75 mg Oral Daily    DULoxetine  30 mg Oral Daily    simvastatin  40 mg Oral Nightly    metroNIDAZOLE  500 mg Oral 3 times per day    cefTRIAXone (ROCEPHIN) IV  2 g Intravenous Q24H    sodium chloride flush  10 mL Intravenous 2 times per day    carvedilol  3.125 mg Oral BID    acetaZOLAMIDE  500 mg Oral BID    insulin glargine  40 Units Subcutaneous Nightly    insulin lispro  0-18 Units Subcutaneous TID WC    insulin lispro  0-18 Units Subcutaneous 2 times per day    linagliptin  5 mg Oral Daily    insulin lispro  25 Units Subcutaneous TID WC    pramipexole  1 mg Oral TID    sodium chloride flush  10 mL Intravenous 2 times per day    sodium chloride flush  10 mL Intravenous 2 times per day    enoxaparin  40 mg Subcutaneous Daily    pantoprazole  40 mg Intravenous Daily    nystatin   Topical BID      dextrose           I/O last 3 completed shifts:  In: -   Out: 2000 [Urine:2000]    CBC:   Recent Labs     06/25/19  1015 06/26/19  0621 06/27/19  0634   WBC 8.3 5.9 5.8   HGB 8.9* 8.3* 8.9*    315 285          Recent Labs     06/25/19  1015 06/26/19  0621 06/27/19  0634    144 142   K 4.3 3.4* 4.0   CL 87* 89* 93*   CO2 45* 46* 40*   BUN 17 21 17   CREATININE 1.1 1.0 0.7   GLUCOSE 159* 141* 133*       Lab Results   Component Value Date    CALCIUM 8.8 06/27/2019    PHOS 4.8 06/25/2019       Objective:     Vitals: /62   Pulse 67   Temp 97.6 °F (36.4 °C) (Oral)   Resp 16   Ht 5' 6\" (1.676 m)   Wt (!) 432 lb 1.6 oz (196 kg)   SpO2 96%   BMI 69.74 kg/m²     General appearance:  No distress, alert and orineted  HEENT:  No pallor  Neck:  supple  Lungs:  No gross crackles  Heart:  Seems irregular  Abdomen: soft  Extremities: Mild edema       Problem List :         Impression :     1. JOSE- better   2. ADHF/ fluid overload-   3. Sepsis - with GBS  4. DM with proteinuria -     Recommendation/Plan  :     1. Ok to d/c   2. Restart acei small dose - lisinopril 5/d   3. Also may d/c with torsemide 20 BID and diamox and chlorthalidone 25/d   4. BMP, mg in 2 wks  5.  F/u with  Me  in 1 mo       Arnold Ramos MD

## 2019-06-27 NOTE — DISCHARGE SUMMARY
Discharge Summary    Name:  Chantel Fuller /Age/Sex: [birth year]  [de-identified]60 y.o. female)   MRN & CSN:  [id number] & [id number] Admission Date/Time: 2019  3:10 PM   Attending:  Sorin Lopes MD Discharging Physician: Rosa Medrano MD     Hospital Course:   Chantel Fuller is a 60 y.o.  female  who presents with Septic shock ([address] from group B strep bacteremia. Her condition improved. She was seen by ID and nephrology. She will start chlorthalidone, acetazolamide, and change furosemide to torsemide on discharge. She needs a trapeze at home due to her class III obesity. She also needs home health for home antibiotics. She will need ceftriaxone and metronidazole on discharge until . The patient expressed appropriate understanding of and agreement with the discharge recommendations, medications, and plan.      Consults this admission:  PHARMACY TO DOSE VANCOMYCIN  IP CONSULT TO HOSPITALIST  IP CONSULT TO DIETITIAN  IP CONSULT TO PODIATRY  IP CONSULT TO ENDOCRINOLOGY  IP CONSULT TO INFECTIOUS DISEASES  IP CONSULT TO ENDOCRINOLOGY  IP CONSULT TO ONCOLOGY  IP CONSULT TO CARDIOLOGY  IP CONSULT TO CASE MANAGEMENT  IP CONSULT TO PULMONOLOGY  IP CONSULT TO PULMONOLOGY    Discharge Instruction:   Follow up appointments:  ID in 2 weeks  Primary care physician:  within 2 weeks    Diet:  low fat, low cholesterol diet   Activity: activity as tolerated  Disposition: Discharged to:   []Home, [x]C, []SNF, []Acute Rehab, []Hospice   Condition on discharge: Stable    Discharge Medications:      Suzanna Diaz   Home Medication Instructions KARINA:[id number]    Printed on:19 1240   Medication Information                      acetaZOLAMIDE (DIAMOX) 250 MG tablet  Take 2 tablets by mouth 2 times daily             albuterol sulfate  (90 Base) MCG/ACT inhaler  Inhale 2 puffs into the lungs             Ascorbic Acid (VITAMIN C) 500 MG tablet  Take 500 mg by mouth daily aspirin 81 MG tablet  Take 81 mg by mouth daily             BiPAP Machine MISC  by BiPAP route nightly             carvedilol (COREG) 3.125 MG tablet  Take 1 tablet by mouth 2 times daily             chlorthalidone (HYGROTON) 25 MG tablet  Take 1 tablet by mouth daily             clopidogrel (PLAVIX) 75 MG tablet  Take 1 tablet by mouth daily Patient has appointment on 3/27/18 more refills with be given after she keeps her office visit             DULoxetine (CYMBALTA) 60 MG extended release capsule  Take 30 mg by mouth daily              fexofenadine (ALLEGRA) 180 MG tablet  Take 180 mg by mouth daily              gabapentin (NEURONTIN) 800 MG tablet  Take 800 mg by mouth 3 times daily              HUMALOG KWIKPEN 200 UNIT/ML SOPN pen  Inject 15 Units into the skin 3 times daily (before meals)             Insulin Degludec (TRESIBA FLEXTOUCH) 100 UNIT/ML SOPN  Inject 15 Units into the skin nightly             ipratropium-albuterol (DUONEB) 0.5-2.5 (3) MG/3ML SOLN nebulizer solution  Inhale 3 mLs into the lungs 4 times daily             lisinopril (PRINIVIL;ZESTRIL) 5 MG tablet  Take 1 tablet by mouth daily             metFORMIN (GLUCOPHAGE) 500 MG tablet  Take 1 tablet by mouth 2 times daily (with meals)             Nebulizers ([employer]Me[employer] COMPACT MINI NEBULIZER) MISC  1 Device by Does not apply route 4 times daily             nystatin (MYCOSTATIN) POWD powder  Apply topically 2 times daily             pantoprazole (PROTONIX) 40 MG tablet  Take 40 mg by mouth daily             potassium chloride (KLOR-CON M) 20 MEQ extended release tablet  Take 1 tablet by mouth daily             pramipexole (MIRAPEX) 1 MG tablet  Take 1 mg by mouth 3 times daily              simvastatin (ZOCOR) 40 MG tablet  Take 1 tablet by mouth nightly             torsemide (DEMADEX) 20 MG tablet  Take 1 tablet by mouth 2 times daily             vilazodone HCl (VIIBRYD) 10 MG TABS  Take 10 mg by mouth 2 times daily              vitamin D (ERGOCALCIFEROL) 24295 UNITS CAPS capsule  Take 50,000 Units by mouth twice a week On Sundays and wednesdays                 Objective Findings at Discharge:   BP (!) 153/69   Pulse 77   Temp 97.5 °F (36.4 °C)   Resp 19   Ht 5' 6\" (1.676 m)   Wt (!) 432 lb 1.6 oz (196 kg)   SpO2 92%   BMI 69.74 kg/m²            PHYSICAL EXAM   GEN Awake female, sitting upright in bed in no apparent distress. Appears given age. EYES Pupils are equally round. No scleral erythema, discharge, or conjunctivitis. HENT Mucous membranes are moist. Oral pharynx without exudates, no evidence of thrush. NECK Supple, no apparent thyromegaly or masses. RESP Clear to auscultation, no wheezes, rales or rhonchi. Symmetric chest movement while on room air. CARDIO/VASC S1/S2 auscultated. Regular rate without appreciable murmurs, rubs, or gallops. No JVD or carotid bruits. Peripheral pulses equal bilaterally and palpable. No peripheral edema. GI Abdomen is soft without significant tenderness, masses, or guarding. Bowel sounds are normoactive. Rectal exam deferred.  No costovertebral angle tenderness. Normal appearing external genitalia. Mata catheter is not present. HEME/LYMPH No palpable cervical lymphadenopathy and no hepatosplenomegaly. No petechiae or ecchymoses. MSK No gross joint deformities. SKIN Normal coloration, warm, dry. NEURO Cranial nerves appear grossly intact, normal speech, no lateralizing weakness. PSYCH Awake, alert, oriented x 4. Affect appropriate.     BMP/CBC  Recent Labs     06/25/19  1015 06/26/19  0621 06/27/19  0634    144 142   K 4.3 3.4* 4.0   CL 87* 89* 93*   CO2 45* 46* 40*   BUN 17 21 17   CREATININE 1.1 1.0 0.7   WBC 8.3 5.9 5.8   HCT 32.1* 31.5* 32.6*    315 285           Discharge Time of 35 minutes    Electronically signed by Venus Oppenheim, MD on 6/27/2019 at 12:46 PM

## 2019-06-28 LAB
EKG ATRIAL RATE: 112 BPM
EKG DIAGNOSIS: NORMAL
EKG P AXIS: 109 DEGREES
EKG P-R INTERVAL: 190 MS
EKG Q-T INTERVAL: 302 MS
EKG QRS DURATION: 80 MS
EKG QTC CALCULATION (BAZETT): 412 MS
EKG R AXIS: 33 DEGREES
EKG T AXIS: 80 DEGREES
EKG VENTRICULAR RATE: 112 BPM

## 2019-07-02 ENCOUNTER — TELEPHONE (OUTPATIENT)
Dept: INFECTIOUS DISEASES | Age: 64
End: 2019-07-02

## 2019-07-05 ENCOUNTER — HOSPITAL ENCOUNTER (EMERGENCY)
Age: 64
Discharge: HOME | End: 2019-07-05
Attending: EMERGENCY MEDICINE
Payer: COMMERCIAL

## 2019-07-05 VITALS
RESPIRATION RATE: 18 BRPM | TEMPERATURE: 98.6 F | WEIGHT: 293 LBS | HEIGHT: 66 IN | BODY MASS INDEX: 47.09 KG/M2 | DIASTOLIC BLOOD PRESSURE: 39 MMHG | SYSTOLIC BLOOD PRESSURE: 96 MMHG | HEART RATE: 84 BPM | OXYGEN SATURATION: 97 %

## 2019-07-05 DIAGNOSIS — Z45.2 PIC LINE (PERIPHERALLY INSERTED CENTRAL CATHETER) REMOVAL: Primary | ICD-10-CM

## 2019-07-05 PROCEDURE — 99283 EMERGENCY DEPT VISIT LOW MDM: CPT

## 2019-07-05 NOTE — ED PROVIDER NOTES
eMERGENCY dEPARTMENT eNCOUnter      PCP: THEODORE Perez NP    CHIEF COMPLAINT    Chief Complaint   Patient presents with    Vascular Access Problem     pt presents to ED with her  due to PICC line coming out; pt had been recieving antibiotics at home; home nurse sent patient to ED to have another PICC line placed       HPI    Carmina Azar is a 60 y.o. female who presents with concern for PICC line coming out. States she is been getting IV antibiotics at home due to sepsis that she was admitted for recently. She states that she had one more dose to get last night but the day prior the PICC line came out. She states that the home health nurse came out, completely removed it and she was not able to give her the antibiotics. She states she has 1 dose left. She states that she was told to come to the emergency department get a new PICC line, and then they would give her 1 dose tomorrow and then take it out. She has no other complaints at this time. REVIEW OF SYSTEMS    Constitutional:  Denies fever, chills.    HENT:  Denies sore throat or ear pain   Cardiovascular:  Denies chest pain, palpitations   Respiratory:  Denies cough or shortness of breath    GI:  Denies abdominal pain, nausea, vomiting, or diarrhea  :  Denies any urinary symptoms, flank pain  Musculoskeletal:  Denies back pain, extremity pain  Skin:  Denies rash, color change  Neurologic:  Denies headache, focal weakness or sensory changes   Lymphatic:  Denies swollen glands, edema    All other review of systems are negative  See HPI and nursing notes for additional information     PAST MEDICAL AND SURGICAL HISTORY    Past Medical History:   Diagnosis Date    Aortic stenosis     Asthma     [facility]     Cervical    Carotid artery stenosis     Chest pain     CHF (congestive heart failure) (HCC)     Chronic back pain     COPD exacerbation (HCC)     Depression     Diabetes mellitus [facility]     Family history of office visit 90 tablet 3    nystatin (MYCOSTATIN) POWD powder Apply topically 2 times daily      potassium chloride (KLOR-CON M) 20 MEQ extended release tablet Take 1 tablet by mouth daily 60 tablet 3    carvedilol (COREG) 3.125 MG tablet Take 1 tablet by mouth 2 times daily 60 tablet 3    simvastatin (ZOCOR) 40 MG tablet Take 1 tablet by mouth nightly 30 tablet 3    BiPAP Machine MISC by BiPAP route nightly      metFORMIN (GLUCOPHAGE) 500 MG tablet Take 1 tablet by mouth 2 times daily (with meals) 60 tablet 0    ipratropium-albuterol (DUONEB) 0.5-2.5 (3) MG/3ML SOLN nebulizer solution Inhale 3 mLs into the lungs 4 times daily 360 mL 0    Nebulizers ([employer] COMPACT MINI NEBULIZER) MISC 1 Device by Does not apply route 4 times daily 1 each 0    albuterol sulfate  (90 Base) MCG/ACT inhaler Inhale 2 puffs into the lungs      pantoprazole (PROTONIX) 40 MG tablet Take 40 mg by mouth daily      vilazodone HCl (VIIBRYD) 10 MG TABS Take 10 mg by mouth 2 times daily       vitamin D (ERGOCALCIFEROL) 58338 UNITS CAPS capsule Take 50,000 Units by mouth twice a week On Sundays and wednesdays      pramipexole (MIRAPEX) 1 MG tablet Take 1 mg by mouth 3 times daily       fexofenadine (ALLEGRA) 180 MG tablet Take 180 mg by mouth daily       gabapentin (NEURONTIN) 800 MG tablet Take 800 mg by mouth 3 times daily       Ascorbic Acid (VITAMIN C) 500 MG tablet Take 500 mg by mouth daily       aspirin 81 MG tablet Take 81 mg by mouth daily         ALLERGIES    No Known Allergies    SOCIAL AND FAMILY HISTORY    Social History     Socioeconomic History    Marital status:      Spouse name: None    Number of children: None    Years of education: None    Highest education level: None   Occupational History    None   Social Needs    Financial resource strain: None    Food insecurity:     Worry: None     Inability: None    Transportation needs:     Medical: None     Non-medical: None   Tobacco Use   

## 2019-08-11 ENCOUNTER — APPOINTMENT (OUTPATIENT)
Dept: GENERAL RADIOLOGY | Age: 64
DRG: 720 | End: 2019-08-11
Payer: COMMERCIAL

## 2019-08-11 ENCOUNTER — APPOINTMENT (OUTPATIENT)
Dept: CT IMAGING | Age: 64
DRG: 720 | End: 2019-08-11
Payer: COMMERCIAL

## 2019-08-11 ENCOUNTER — HOSPITAL ENCOUNTER (INPATIENT)
Age: 64
LOS: 9 days | Discharge: HOME HEALTH | DRG: 720 | End: 2019-08-21
Attending: EMERGENCY MEDICINE | Admitting: INTERNAL MEDICINE
Payer: COMMERCIAL

## 2019-08-11 DIAGNOSIS — A41.9 SEPTICEMIA (HCC): Primary | ICD-10-CM

## 2019-08-11 DIAGNOSIS — L97.501 DIABETIC ULCER OF FOOT ASSOCIATED WITH DIABETES MELLITUS DUE TO UNDERLYING CONDITION, LIMITED TO BREAKDOWN OF SKIN, UNSPECIFIED LATERALITY, UNSPECIFIED PART OF FOOT (HCC): ICD-10-CM

## 2019-08-11 DIAGNOSIS — E08.621 DIABETIC ULCER OF FOOT ASSOCIATED WITH DIABETES MELLITUS DUE TO UNDERLYING CONDITION, LIMITED TO BREAKDOWN OF SKIN, UNSPECIFIED LATERALITY, UNSPECIFIED PART OF FOOT (HCC): ICD-10-CM

## 2019-08-11 LAB
ALBUMIN SERPL-MCNC: 3.2 GM/DL (ref 3.4–5)
ALP BLD-CCNC: 84 IU/L (ref 40–129)
ALT SERPL-CCNC: 12 U/L (ref 10–40)
ANION GAP SERPL CALCULATED.3IONS-SCNC: 10 MMOL/L (ref 4–16)
AST SERPL-CCNC: 16 IU/L (ref 15–37)
BACTERIA: NEGATIVE /HPF
BASE EXCESS MIXED: ABNORMAL (ref 0–2.3)
BASOPHILS ABSOLUTE: 0 K/CU MM
BASOPHILS RELATIVE PERCENT: 0.3 % (ref 0–1)
BETA-HYDROXYBUTYRATE: 1.7 MG/DL (ref 0–3)
BILIRUB SERPL-MCNC: 0.2 MG/DL (ref 0–1)
BILIRUBIN URINE: NEGATIVE MG/DL
BLOOD, URINE: NEGATIVE
BUN BLDV-MCNC: 28 MG/DL (ref 6–23)
CALCIUM SERPL-MCNC: 8.8 MG/DL (ref 8.3–10.6)
CHLORIDE BLD-SCNC: 94 MMOL/L (ref 99–110)
CLARITY: CLEAR
CO2: 29 MMOL/L (ref 21–32)
COLOR: YELLOW
COMMENT: ABNORMAL
CREAT SERPL-MCNC: 1 MG/DL (ref 0.6–1.1)
DIFFERENTIAL TYPE: ABNORMAL
EOSINOPHILS ABSOLUTE: 0.1 K/CU MM
EOSINOPHILS RELATIVE PERCENT: 1 % (ref 0–3)
GFR AFRICAN AMERICAN: >60 ML/MIN/1.73M2
GFR NON-AFRICAN AMERICAN: 56 ML/MIN/1.73M2
GLUCOSE BLD-MCNC: 201 MG/DL (ref 70–99)
GLUCOSE BLD-MCNC: 207 MG/DL (ref 70–99)
GLUCOSE, URINE: NEGATIVE MG/DL
HCO3 VENOUS: 29.9 MMOL/L (ref 19–25)
HCT VFR BLD CALC: 31.3 % (ref 37–47)
HEMOGLOBIN: 8.9 GM/DL (ref 12.5–16)
HYALINE CASTS: 10 /LPF
IMMATURE NEUTROPHIL %: 0.5 % (ref 0–0.43)
INR BLD: 1.03 INDEX
KETONES, URINE: NEGATIVE MG/DL
LACTATE: 1.8 MMOL/L (ref 0.4–2)
LEUKOCYTE ESTERASE, URINE: ABNORMAL
LYMPHOCYTES ABSOLUTE: 0.9 K/CU MM
LYMPHOCYTES RELATIVE PERCENT: 7.5 % (ref 24–44)
MCH RBC QN AUTO: 24.7 PG (ref 27–31)
MCHC RBC AUTO-ENTMCNC: 28.4 % (ref 32–36)
MCV RBC AUTO: 86.9 FL (ref 78–100)
MONOCYTES ABSOLUTE: 0.6 K/CU MM
MONOCYTES RELATIVE PERCENT: 5.4 % (ref 0–4)
NITRITE URINE, QUANTITATIVE: NEGATIVE
NUCLEATED RBC %: 0 %
O2 SAT, VEN: 94.3 % (ref 50–70)
PCO2, VEN: 44 MMHG (ref 38–52)
PDW BLD-RTO: 19.3 % (ref 11.7–14.9)
PH VENOUS: 7.44 (ref 7.32–7.42)
PH, URINE: 5 (ref 5–8)
PLATELET # BLD: 228 K/CU MM (ref 140–440)
PMV BLD AUTO: 9 FL (ref 7.5–11.1)
PO2, VEN: 91 MMHG (ref 28–48)
POTASSIUM SERPL-SCNC: 4.2 MMOL/L (ref 3.5–5.1)
PROTEIN UA: NEGATIVE MG/DL
PROTHROMBIN TIME: 11.7 SECONDS (ref 9.12–12.5)
RBC # BLD: 3.6 M/CU MM (ref 4.2–5.4)
RBC URINE: ABNORMAL /HPF (ref 0–6)
SEGMENTED NEUTROPHILS ABSOLUTE COUNT: 9.8 K/CU MM
SEGMENTED NEUTROPHILS RELATIVE PERCENT: 85.3 % (ref 36–66)
SODIUM BLD-SCNC: 133 MMOL/L (ref 135–145)
SPECIFIC GRAVITY UA: 1.01 (ref 1–1.03)
TOTAL IMMATURE NEUTOROPHIL: 0.06 K/CU MM
TOTAL NUCLEATED RBC: 0 K/CU MM
TOTAL PROTEIN: 7.2 GM/DL (ref 6.4–8.2)
TRICHOMONAS: ABNORMAL /HPF
TROPONIN T: <0.01 NG/ML
UROBILINOGEN, URINE: NORMAL MG/DL (ref 0.2–1)
WBC # BLD: 11.5 K/CU MM (ref 4–10.5)
WBC UA: <1 /HPF (ref 0–5)

## 2019-08-11 PROCEDURE — 85610 PROTHROMBIN TIME: CPT

## 2019-08-11 PROCEDURE — 96365 THER/PROPH/DIAG IV INF INIT: CPT

## 2019-08-11 PROCEDURE — 93005 ELECTROCARDIOGRAM TRACING: CPT | Performed by: PHYSICIAN ASSISTANT

## 2019-08-11 PROCEDURE — 6360000002 HC RX W HCPCS: Performed by: EMERGENCY MEDICINE

## 2019-08-11 PROCEDURE — 2580000003 HC RX 258: Performed by: EMERGENCY MEDICINE

## 2019-08-11 PROCEDURE — 85025 COMPLETE CBC W/AUTO DIFF WBC: CPT

## 2019-08-11 PROCEDURE — 82805 BLOOD GASES W/O2 SATURATION: CPT

## 2019-08-11 PROCEDURE — 6370000000 HC RX 637 (ALT 250 FOR IP)

## 2019-08-11 PROCEDURE — 80053 COMPREHEN METABOLIC PANEL: CPT

## 2019-08-11 PROCEDURE — 2580000003 HC RX 258: Performed by: PHYSICIAN ASSISTANT

## 2019-08-11 PROCEDURE — 81001 URINALYSIS AUTO W/SCOPE: CPT

## 2019-08-11 PROCEDURE — 6360000002 HC RX W HCPCS: Performed by: PHYSICIAN ASSISTANT

## 2019-08-11 PROCEDURE — 83605 ASSAY OF LACTIC ACID: CPT

## 2019-08-11 PROCEDURE — 70450 CT HEAD/BRAIN W/O DYE: CPT

## 2019-08-11 PROCEDURE — 71046 X-RAY EXAM CHEST 2 VIEWS: CPT

## 2019-08-11 PROCEDURE — 99285 EMERGENCY DEPT VISIT HI MDM: CPT

## 2019-08-11 PROCEDURE — 82010 KETONE BODYS QUAN: CPT

## 2019-08-11 PROCEDURE — 82962 GLUCOSE BLOOD TEST: CPT

## 2019-08-11 PROCEDURE — 96367 TX/PROPH/DG ADDL SEQ IV INF: CPT

## 2019-08-11 PROCEDURE — 96366 THER/PROPH/DIAG IV INF ADDON: CPT

## 2019-08-11 PROCEDURE — 84484 ASSAY OF TROPONIN QUANT: CPT

## 2019-08-11 RX ORDER — ACETAMINOPHEN 650 MG/1
650 SUPPOSITORY RECTAL ONCE
Status: DISCONTINUED | OUTPATIENT
Start: 2019-08-11 | End: 2019-08-11

## 2019-08-11 RX ORDER — ACETAMINOPHEN 325 MG/1
650 TABLET ORAL ONCE
Status: COMPLETED | OUTPATIENT
Start: 2019-08-11 | End: 2019-08-11

## 2019-08-11 RX ORDER — ACETAMINOPHEN 325 MG/1
TABLET ORAL
Status: COMPLETED
Start: 2019-08-11 | End: 2019-08-11

## 2019-08-11 RX ORDER — 0.9 % SODIUM CHLORIDE 0.9 %
1000 INTRAVENOUS SOLUTION INTRAVENOUS ONCE
Status: COMPLETED | OUTPATIENT
Start: 2019-08-11 | End: 2019-08-11

## 2019-08-11 RX ORDER — 0.9 % SODIUM CHLORIDE 0.9 %
1000 INTRAVENOUS SOLUTION INTRAVENOUS ONCE
Status: COMPLETED | OUTPATIENT
Start: 2019-08-11 | End: 2019-08-12

## 2019-08-11 RX ADMIN — ACETAMINOPHEN 650 MG: 325 TABLET ORAL at 20:40

## 2019-08-11 RX ADMIN — CEFEPIME 2 G: 2 INJECTION, POWDER, FOR SOLUTION INTRAVENOUS at 20:10

## 2019-08-11 RX ADMIN — SODIUM CHLORIDE 1000 ML: 9 INJECTION, SOLUTION INTRAVENOUS at 19:45

## 2019-08-11 RX ADMIN — VANCOMYCIN HYDROCHLORIDE 2000 MG: 1 INJECTION, POWDER, LYOPHILIZED, FOR SOLUTION INTRAVENOUS at 20:42

## 2019-08-11 RX ADMIN — SODIUM CHLORIDE 1000 ML: 9 INJECTION, SOLUTION INTRAVENOUS at 02:10

## 2019-08-11 ASSESSMENT — PAIN SCALES - GENERAL: PAINLEVEL_OUTOF10: 5

## 2019-08-11 NOTE — ED PROVIDER NOTES
Intravenous Q6H PRN Shraddha Coe MD        enoxaparin (LOVENOX) injection 40 mg  40 mg Subcutaneous Daily Shraddha Coe MD   40 mg at 08/25/19 0957    norepinephrine (LEVOPHED) 16 mg in dextrose 5% 250 mL infusion  10 mcg/min Intravenous Continuous Shraddha Coe MD   Stopped at 08/25/19 1342    lidocaine PF 1 % injection 5 mL  5 mL Intradermal Once Charles Padilla MD        sodium chloride flush 0.9 % injection 10 mL  10 mL Intravenous 2 times per day Charles Padilla MD   10 mL at 08/25/19 1000    sodium chloride flush 0.9 % injection 10 mL  10 mL Intravenous PRN Charles Padilla MD   10 mL at 08/23/19 2036     No Known Allergies    Nursing Notes Reviewed    Physical Exam:  ED Triage Vitals [08/11/19 1916]   Enc Vitals Group      BP (!) 113/51      Pulse 88      Resp 22      Temp 100.8 °F (38.2 °C)      Temp Source Oral      SpO2 98 %      Weight (!) 455 lb (206.4 kg)      Height 5' 6\" (1.676 m)      Head Circumference       Peak Flow       Pain Score       Pain Loc       Pain Edu? Excl. in [address]? General :Patient is awake alert oriented person place and time no acute distress nontoxic appearing  HEENT: Pupils are equally round and reactive to light extraocular motors are intact conjunctivae clear sclerae white there is no injection no icterus. Nose without any rhinorrhea or epistaxis. Oral mucosa is moist no exudate buccal mucosa shows no ulcerations. Uvula is midline    Neck: Neck is supple full range of motion trachea midline thyroid nonpalpable  Cardiac: Heart regular rate rhythm no murmurs rubs clicks or gallops  Lungs: Lungs are clear to auscultation there is no wheezing rhonchi or rales. There is no use of accessory muscles no nasal flaring identified. Chest wall: There is no tenderness to palpation over the chest wall or over ribs  Abdomen: Abdomen is soft nontender nondistended.  There is no firm or pulsatile masses no rebound rigidity or guarding negative Spencer's negative McBurney, no YELLOW    Clarity, UA CLEAR CLEAR    Glucose, Urine NEGATIVE NEGATIVE MG/DL    Bilirubin Urine NEGATIVE NEGATIVE MG/DL    Ketones, Urine NEGATIVE NEGATIVE MG/DL    Specific Gravity, UA 1.008 1.001 - 1.035    Blood, Urine NEGATIVE NEGATIVE    pH, Urine 5.0 5.0 - 8.0    Protein, UA NEGATIVE NEGATIVE MG/DL    Urobilinogen, Urine NORMAL 0.2 - 1.0 MG/DL    Nitrite Urine, Quantitative NEGATIVE NEGATIVE    Leukocyte Esterase, Urine TRACE (A) NEGATIVE    RBC, UA NONE SEEN 0 - 6 /HPF    WBC, UA <1 0 - 5 /HPF    Bacteria, UA NEGATIVE NEGATIVE /HPF    Trichomonas, UA NONE SEEN NONE SEEN /HPF    Hyaline Casts, UA 10 /LPF   Lactic Acid, Plasma   Result Value Ref Range    Lactate 1.8 0.4 - 2.0 mMOL/L   Blood Gas, Venous   Result Value Ref Range    pH, Maikel 7.44 (H) 7.32 - 7.42    pCO2, Maikel 44 38 - 52 mmHG    pO2, Maikel 91 (H) 28 - 48 mmHG    Base Exc, Mixed 5  PLUS   (H) 0 - 2.3    HCO3, Venous 29.9 (H) 19 - 25 MMOL/L    O2 Sat, Maikel 94.3 (H) 50 - 70 %    Comment VBG    Beta-Hydroxybutyrate   Result Value Ref Range    Beta-Hydroxybutyrate 1.7 0.0 - 3.0 MG/DL   PT - INR   Result Value Ref Range    Protime 11.7 9.12 - 12.5 SECONDS    INR 1.03 INDEX   Troponin   Result Value Ref Range    Troponin T <0.010 <0.01 NG/ML   Lactate, Sepsis   Result Value Ref Range    Lactic Acid, Sepsis 1.1 0.5 - 1.9 mMOL/L   Basic Metabolic Panel w/ Reflex to MG   Result Value Ref Range    Sodium 137 135 - 145 MMOL/L    Potassium 4.5 3.5 - 5.1 MMOL/L    Chloride 98 (L) 99 - 110 mMol/L    CO2 31 21 - 32 MMOL/L    Anion Gap 8 4 - 16    BUN 30 (H) 6 - 23 MG/DL    CREATININE 1.4 (H) 0.6 - 1.1 MG/DL    Glucose 261 (H) 70 - 99 MG/DL    Calcium 8.4 8.3 - 10.6 MG/DL    GFR Non- 38 (L) >60 mL/min/1.73m2    GFR  46 (L) >60 mL/min/1.73m2   Blood gas, arterial   Result Value Ref Range    pH, Bld 7.28 (L) 7.34 - 7.45    pCO2, Arterial 67.0 (H) 32 - 45 MMHG    pO2, Arterial 91 75 - 100 MMHG    Base Exc, Mixed 2.8  PLUS   (H) 0 - 2.3 HCO3, Arterial 31.5 (H) 18 - 23 MMOL/L    CO2 Content 33.6 (H) 19 - 24 MMOL/L    O2 Sat 96.3 96 - 97 %    Carbon Monoxide, Blood 2.9 0 - 5 %    Methemoglobin, Arterial 0.9 <1.5 %    Comment 30 .95    C-Reactive Protein   Result Value Ref Range    CRP, High Sensitivity 46.6 mg/L   Procalcitonin   Result Value Ref Range    Procalcitonin 0.116    Blood gas, arterial   Result Value Ref Range    pH, Bld 7.24 (L) 7.34 - 7.45    pCO2, Arterial 75.0 (H) 32 - 45 MMHG    pO2, Arterial 85 75 - 100 MMHG    Base Exc, Mixed 2.4  PLUS   (H) 0 - 2.3    HCO3, Arterial 32.1 (H) 18 - 23 MMOL/L    CO2 Content 34.4 (H) 19 - 24 MMOL/L    O2 Sat 95.3 (L) 96 - 97 %    Carbon Monoxide, Blood 2.8 0 - 5 %    Methemoglobin, Arterial 1.2 <1.5 %    Comment BIPAP 15 5 .40    Sedimentation Rate   Result Value Ref Range    Sed Rate 73 (H) 0 - 30 MM/HR   Blood gas, arterial   Result Value Ref Range    pH, Bld 7.36 7.34 - 7.45    pCO2, Arterial 55.0 (H) 32 - 45 MMHG    pO2, Arterial 206 (H) 75 - 100 MMHG    Base Exc, Mixed 4.2  PLUS   (H) 0 - 2.3    HCO3, Arterial 31.1 (H) 18 - 23 MMOL/L    CO2 Content 32.8 (H) 19 - 24 MMOL/L    O2 Sat 97.0 96 - 97 %    Carbon Monoxide, Blood 2.1 0 - 5 %    Methemoglobin, Arterial 1.0 <1.5 %    Comment AC 16 500 .70 PEEP 5    CBC auto differential   Result Value Ref Range    WBC 6.7 4.0 - 10.5 K/CU MM    RBC 3.52 (L) 4.2 - 5.4 M/CU MM    Hemoglobin 8.5 (L) 12.5 - 16.0 GM/DL    Hematocrit 29.9 (L) 37 - 47 %    MCV 84.9 78 - 100 FL    MCH 24.1 (L) 27 - 31 PG    MCHC 28.4 (L) 32.0 - 36.0 %    RDW 19.6 (H) 11.7 - 14.9 %    Platelets 698 356 - 656 K/CU MM    MPV 9.6 7.5 - 11.1 FL    Differential Type AUTOMATED DIFFERENTIAL     Segs Relative 89.7 (H) 36 - 66 %    Lymphocytes % 6.7 (L) 24 - 44 %    Monocytes % 1.8 0 - 4 %    Eosinophils % 0.3 0 - 3 %    Basophils % 0.3 0 - 1 %    Segs Absolute 6.0 K/CU MM    Lymphocytes Absolute 0.5 K/CU MM    Monocytes Absolute 0.1 K/CU MM    Eosinophils Absolute 0.0 K/CU MM    Basophils MG/DL   POCT Glucose   Result Value Ref Range    POC Glucose 180 (H) 70 - 99 MG/DL   POCT Glucose   Result Value Ref Range    POC Glucose 174 (H) 70 - 99 MG/DL   POCT Glucose   Result Value Ref Range    POC Glucose 238 (H) 70 - 99 MG/DL   POCT Glucose   Result Value Ref Range    POC Glucose 170 (H) 70 - 99 MG/DL   POCT Glucose   Result Value Ref Range    POC Glucose 230 (H) 70 - 99 MG/DL   POCT Glucose   Result Value Ref Range    POC Glucose 161 (H) 70 - 99 MG/DL   POCT Glucose   Result Value Ref Range    POC Glucose 185 (H) 70 - 99 MG/DL   POCT Glucose   Result Value Ref Range    POC Glucose 200 (H) 70 - 99 MG/DL   POCT Glucose   Result Value Ref Range    POC Glucose 155 (H) 70 - 99 MG/DL   POCT Glucose   Result Value Ref Range    POC Glucose 228 (H) 70 - 99 MG/DL   POCT Glucose   Result Value Ref Range    POC Glucose 156 (H) 70 - 99 MG/DL   POCT Glucose   Result Value Ref Range    POC Glucose 242 (H) 70 - 99 MG/DL   POCT Glucose   Result Value Ref Range    POC Glucose 159 (H) 70 - 99 MG/DL   POCT Glucose   Result Value Ref Range    POC Glucose 170 (H) 70 - 99 MG/DL   POCT Glucose   Result Value Ref Range    POC Glucose 165 (H) 70 - 99 MG/DL   POCT Glucose   Result Value Ref Range    POC Glucose 180 (H) 70 - 99 MG/DL   POCT Glucose   Result Value Ref Range    POC Glucose 254 (H) 70 - 99 MG/DL   POCT Glucose   Result Value Ref Range    POC Glucose 185 (H) 70 - 99 MG/DL   POCT Glucose   Result Value Ref Range    POC Glucose 166 (H) 70 - 99 MG/DL   POCT Glucose   Result Value Ref Range    POC Glucose 197 (H) 70 - 99 MG/DL   POCT Glucose   Result Value Ref Range    POC Glucose 265 (H) 70 - 99 MG/DL   POCT Glucose   Result Value Ref Range    POC Glucose 243 (H) 70 - 99 MG/DL   POCT Glucose   Result Value Ref Range    POC Glucose 237 (H) 70 - 99 MG/DL   POCT Glucose   Result Value Ref Range    POC Glucose 193 (H) 70 - 99 MG/DL   POCT Glucose   Result Value Ref Range    POC Glucose 267 (H) 70 - 99 MG/DL   EKG 12 Lead CT Head WO Contrast   Final Result   No acute intracranial abnormality. Right sphenoid sinus disease with evidence of sinus expansion, perhaps due to   mucocele. There is suggestion of thinning of the walls of the sphenoid   sinus, unclear if this is related to patient positioning or motion. Dedicated imaging of the sinuses may consider on outpatient basis as   clinically warranted. Critical results were called by Dr. Abigail Duke to [id number] on   8/11/2019 at 20:08. EKG (if obtained):   Please See Note of attending physician for EKG interpretation. Chart review shows recent radiograph(s):  No results found. MDM:   Was admitted here to the emergency department. For evaluation for generalized weakness. There was some concern by her  the patient had some slurred speech even though patient denies it. Patient is neurologically intact upon initial repeat examinations. Stroke alert was called upon arrival.  And stroke team neurology was consulted. They advised that patient is not a candidate for TPA. Secondary to her not having any significant focal deficits as well as her \"last known well\" being outside of the window. Patient work-up is essentially unremarkable here for any acute neurologic processes urinalysis is clean CBC shows no acute abnormalities to account for patient's symptoms. Metabolic panel did reveal some hyperglycemia as well as beta BUN likely secondary to decreased oral intake. Remainder of her work-up was negative here. However patient will require admission    On-call physician Was consulted regarding patient.   After thorough discussion regarding patient's history, physical exam.  laboratory values, radiographic evidence (if applicable  theymyself as well as my attending physician agreed Given the patient's presenting concerns, medical history and clinical findings, the patient will be admitted at this time to undergo further evaluation by mouth daily, Disp-30 tablet, R-1Print      predniSONE (DELTASONE) 10 MG tablet Take 1 tablet by mouth daily for 5 days, Disp-5 tablet, R-0Print               Comment: Please note this report has been produced using speech recognition software and may contain errors related to that system including errors in grammar, punctuation, and spelling, as well as words and phrases that may be inappropriate. If there are any questions or concerns please feel free to contact the dictating provider for clarification.       [facility], [address] [place] [address]  08/25/19 2723

## 2019-08-12 ENCOUNTER — APPOINTMENT (OUTPATIENT)
Dept: GENERAL RADIOLOGY | Age: 64
DRG: 720 | End: 2019-08-12
Payer: COMMERCIAL

## 2019-08-12 ENCOUNTER — ANESTHESIA (OUTPATIENT)
Dept: ICU | Age: 64
DRG: 720 | End: 2019-08-12
Payer: COMMERCIAL

## 2019-08-12 ENCOUNTER — ANESTHESIA EVENT (OUTPATIENT)
Dept: ICU | Age: 64
DRG: 720 | End: 2019-08-12
Payer: COMMERCIAL

## 2019-08-12 PROBLEM — A41.9 SEPSIS (HCC): Status: ACTIVE | Noted: 2019-08-12

## 2019-08-12 LAB
ANION GAP SERPL CALCULATED.3IONS-SCNC: 8 MMOL/L (ref 4–16)
BASE EXCESS MIXED: ABNORMAL (ref 0–2.3)
BUN BLDV-MCNC: 30 MG/DL (ref 6–23)
CALCIUM SERPL-MCNC: 8.4 MG/DL (ref 8.3–10.6)
CARBON MONOXIDE, BLOOD: 2.1 % (ref 0–5)
CARBON MONOXIDE, BLOOD: 2.8 % (ref 0–5)
CARBON MONOXIDE, BLOOD: 2.9 % (ref 0–5)
CHLORIDE BLD-SCNC: 98 MMOL/L (ref 99–110)
CO2 CONTENT: 32.8 MMOL/L (ref 19–24)
CO2 CONTENT: 33.6 MMOL/L (ref 19–24)
CO2 CONTENT: 34.4 MMOL/L (ref 19–24)
CO2: 31 MMOL/L (ref 21–32)
COMMENT: 30 .95
COMMENT: ABNORMAL
COMMENT: ABNORMAL
CREAT SERPL-MCNC: 1.4 MG/DL (ref 0.6–1.1)
ERYTHROCYTE SEDIMENTATION RATE: 73 MM/HR (ref 0–30)
GFR AFRICAN AMERICAN: 46 ML/MIN/1.73M2
GFR NON-AFRICAN AMERICAN: 38 ML/MIN/1.73M2
GLUCOSE BLD-MCNC: 155 MG/DL (ref 70–99)
GLUCOSE BLD-MCNC: 159 MG/DL (ref 70–99)
GLUCOSE BLD-MCNC: 190 MG/DL (ref 70–99)
GLUCOSE BLD-MCNC: 194 MG/DL (ref 70–99)
GLUCOSE BLD-MCNC: 205 MG/DL (ref 70–99)
GLUCOSE BLD-MCNC: 261 MG/DL (ref 70–99)
GLUCOSE BLD-MCNC: 265 MG/DL (ref 70–99)
HCO3 ARTERIAL: 31.1 MMOL/L (ref 18–23)
HCO3 ARTERIAL: 31.5 MMOL/L (ref 18–23)
HCO3 ARTERIAL: 32.1 MMOL/L (ref 18–23)
HIGH SENSITIVE C-REACTIVE PROTEIN: 46.6 MG/L
LACTIC ACID, SEPSIS: 1.1 MMOL/L (ref 0.5–1.9)
LV EF: 70 %
LVEF MODALITY: NORMAL
METHEMOGLOBIN ARTERIAL: 0.9 %
METHEMOGLOBIN ARTERIAL: 1 %
METHEMOGLOBIN ARTERIAL: 1.2 %
O2 SATURATION: 95.3 % (ref 96–97)
O2 SATURATION: 96.3 % (ref 96–97)
O2 SATURATION: 97 % (ref 96–97)
PCO2 ARTERIAL: 55 MMHG (ref 32–45)
PCO2 ARTERIAL: 67 MMHG (ref 32–45)
PCO2 ARTERIAL: 75 MMHG (ref 32–45)
PH BLOOD: 7.24 (ref 7.34–7.45)
PH BLOOD: 7.28 (ref 7.34–7.45)
PH BLOOD: 7.36 (ref 7.34–7.45)
PO2 ARTERIAL: 206 MMHG (ref 75–100)
PO2 ARTERIAL: 85 MMHG (ref 75–100)
PO2 ARTERIAL: 91 MMHG (ref 75–100)
POTASSIUM SERPL-SCNC: 4.5 MMOL/L (ref 3.5–5.1)
PRO-BNP: 393.5 PG/ML
PROCALCITONIN: 0.12
SODIUM BLD-SCNC: 137 MMOL/L (ref 135–145)

## 2019-08-12 PROCEDURE — 96366 THER/PROPH/DIAG IV INF ADDON: CPT

## 2019-08-12 PROCEDURE — 80202 ASSAY OF VANCOMYCIN: CPT

## 2019-08-12 PROCEDURE — 2580000003 HC RX 258

## 2019-08-12 PROCEDURE — 2500000003 HC RX 250 WO HCPCS: Performed by: ANESTHESIOLOGY

## 2019-08-12 PROCEDURE — 31500 INSERT EMERGENCY AIRWAY: CPT

## 2019-08-12 PROCEDURE — 51702 INSERT TEMP BLADDER CATH: CPT

## 2019-08-12 PROCEDURE — 73630 X-RAY EXAM OF FOOT: CPT

## 2019-08-12 PROCEDURE — 0BH17EZ INSERTION OF ENDOTRACHEAL AIRWAY INTO TRACHEA, VIA NATURAL OR ARTIFICIAL OPENING: ICD-10-PCS | Performed by: INTERNAL MEDICINE

## 2019-08-12 PROCEDURE — 84145 PROCALCITONIN (PCT): CPT

## 2019-08-12 PROCEDURE — 6370000000 HC RX 637 (ALT 250 FOR IP): Performed by: INTERNAL MEDICINE

## 2019-08-12 PROCEDURE — 87040 BLOOD CULTURE FOR BACTERIA: CPT

## 2019-08-12 PROCEDURE — 93010 ELECTROCARDIOGRAM REPORT: CPT | Performed by: INTERNAL MEDICINE

## 2019-08-12 PROCEDURE — 5A1945Z RESPIRATORY VENTILATION, 24-96 CONSECUTIVE HOURS: ICD-10-PCS | Performed by: INTERNAL MEDICINE

## 2019-08-12 PROCEDURE — C8929 TTE W OR WO FOL WCON,DOPPLER: HCPCS

## 2019-08-12 PROCEDURE — 6360000004 HC RX CONTRAST MEDICATION: Performed by: INTERNAL MEDICINE

## 2019-08-12 PROCEDURE — 80048 BASIC METABOLIC PNL TOTAL CA: CPT

## 2019-08-12 PROCEDURE — 83605 ASSAY OF LACTIC ACID: CPT

## 2019-08-12 PROCEDURE — 2500000003 HC RX 250 WO HCPCS: Performed by: INTERNAL MEDICINE

## 2019-08-12 PROCEDURE — 82803 BLOOD GASES ANY COMBINATION: CPT

## 2019-08-12 PROCEDURE — 85652 RBC SED RATE AUTOMATED: CPT

## 2019-08-12 PROCEDURE — 2700000000 HC OXYGEN THERAPY PER DAY

## 2019-08-12 PROCEDURE — 2000000000 HC ICU R&B

## 2019-08-12 PROCEDURE — 94761 N-INVAS EAR/PLS OXIMETRY MLT: CPT

## 2019-08-12 PROCEDURE — 6360000002 HC RX W HCPCS: Performed by: INTERNAL MEDICINE

## 2019-08-12 PROCEDURE — 99254 IP/OBS CNSLTJ NEW/EST MOD 60: CPT | Performed by: INTERNAL MEDICINE

## 2019-08-12 PROCEDURE — 2500000003 HC RX 250 WO HCPCS

## 2019-08-12 PROCEDURE — 83880 ASSAY OF NATRIURETIC PEPTIDE: CPT

## 2019-08-12 PROCEDURE — 36415 COLL VENOUS BLD VENIPUNCTURE: CPT

## 2019-08-12 PROCEDURE — 94640 AIRWAY INHALATION TREATMENT: CPT

## 2019-08-12 PROCEDURE — 36600 WITHDRAWAL OF ARTERIAL BLOOD: CPT

## 2019-08-12 PROCEDURE — 31500 INSERT EMERGENCY AIRWAY: CPT | Performed by: ANESTHESIOLOGY

## 2019-08-12 PROCEDURE — 71045 X-RAY EXAM CHEST 1 VIEW: CPT

## 2019-08-12 PROCEDURE — 86141 C-REACTIVE PROTEIN HS: CPT

## 2019-08-12 PROCEDURE — 2580000003 HC RX 258: Performed by: PHYSICIAN ASSISTANT

## 2019-08-12 PROCEDURE — 2580000003 HC RX 258: Performed by: INTERNAL MEDICINE

## 2019-08-12 PROCEDURE — 82962 GLUCOSE BLOOD TEST: CPT

## 2019-08-12 PROCEDURE — 94660 CPAP INITIATION&MGMT: CPT

## 2019-08-12 PROCEDURE — 94002 VENT MGMT INPAT INIT DAY: CPT

## 2019-08-12 RX ORDER — ROCURONIUM BROMIDE 10 MG/ML
INJECTION, SOLUTION INTRAVENOUS PRN
Status: DISCONTINUED | OUTPATIENT
Start: 2019-08-12 | End: 2019-08-13 | Stop reason: HOSPADM

## 2019-08-12 RX ORDER — DULOXETIN HYDROCHLORIDE 30 MG/1
30 CAPSULE, DELAYED RELEASE ORAL DAILY
Status: DISCONTINUED | OUTPATIENT
Start: 2019-08-12 | End: 2019-08-21 | Stop reason: HOSPADM

## 2019-08-12 RX ORDER — CETIRIZINE HYDROCHLORIDE 10 MG/1
10 TABLET ORAL DAILY
Status: DISCONTINUED | OUTPATIENT
Start: 2019-08-12 | End: 2019-08-21 | Stop reason: HOSPADM

## 2019-08-12 RX ORDER — PANTOPRAZOLE SODIUM 40 MG/1
40 TABLET, DELAYED RELEASE ORAL DAILY
Status: DISCONTINUED | OUTPATIENT
Start: 2019-08-12 | End: 2019-08-13

## 2019-08-12 RX ORDER — VILAZODONE HYDROCHLORIDE 10 MG/1
10 TABLET ORAL 2 TIMES DAILY
Status: DISCONTINUED | OUTPATIENT
Start: 2019-08-12 | End: 2019-08-21 | Stop reason: HOSPADM

## 2019-08-12 RX ORDER — SIMVASTATIN 40 MG
40 TABLET ORAL NIGHTLY
Status: DISCONTINUED | OUTPATIENT
Start: 2019-08-12 | End: 2019-08-21 | Stop reason: HOSPADM

## 2019-08-12 RX ORDER — MIDODRINE HYDROCHLORIDE 5 MG/1
5 TABLET ORAL
Status: DISCONTINUED | OUTPATIENT
Start: 2019-08-12 | End: 2019-08-15

## 2019-08-12 RX ORDER — METHYLPREDNISOLONE SODIUM SUCCINATE 40 MG/ML
40 INJECTION, POWDER, LYOPHILIZED, FOR SOLUTION INTRAMUSCULAR; INTRAVENOUS EVERY 12 HOURS
Status: DISCONTINUED | OUTPATIENT
Start: 2019-08-12 | End: 2019-08-16

## 2019-08-12 RX ORDER — DEXTROSE MONOHYDRATE 50 MG/ML
100 INJECTION, SOLUTION INTRAVENOUS PRN
Status: DISCONTINUED | OUTPATIENT
Start: 2019-08-12 | End: 2019-08-21 | Stop reason: HOSPADM

## 2019-08-12 RX ORDER — GABAPENTIN 400 MG/1
800 CAPSULE ORAL 3 TIMES DAILY
Status: DISCONTINUED | OUTPATIENT
Start: 2019-08-12 | End: 2019-08-21 | Stop reason: HOSPADM

## 2019-08-12 RX ORDER — ASCORBIC ACID 500 MG
500 TABLET ORAL DAILY
Status: DISCONTINUED | OUTPATIENT
Start: 2019-08-12 | End: 2019-08-21 | Stop reason: HOSPADM

## 2019-08-12 RX ORDER — ACETAZOLAMIDE 250 MG/1
500 TABLET ORAL 2 TIMES DAILY
Status: DISCONTINUED | OUTPATIENT
Start: 2019-08-12 | End: 2019-08-21 | Stop reason: HOSPADM

## 2019-08-12 RX ORDER — CLOPIDOGREL BISULFATE 75 MG/1
75 TABLET ORAL DAILY
Status: DISCONTINUED | OUTPATIENT
Start: 2019-08-12 | End: 2019-08-21 | Stop reason: HOSPADM

## 2019-08-12 RX ORDER — NICOTINE POLACRILEX 4 MG
15 LOZENGE BUCCAL PRN
Status: DISCONTINUED | OUTPATIENT
Start: 2019-08-12 | End: 2019-08-21 | Stop reason: HOSPADM

## 2019-08-12 RX ORDER — SODIUM CHLORIDE 0.9 % (FLUSH) 0.9 %
10 SYRINGE (ML) INJECTION PRN
Status: DISCONTINUED | OUTPATIENT
Start: 2019-08-12 | End: 2019-08-21 | Stop reason: HOSPADM

## 2019-08-12 RX ORDER — SODIUM CHLORIDE 0.9 % (FLUSH) 0.9 %
10 SYRINGE (ML) INJECTION EVERY 12 HOURS SCHEDULED
Status: DISCONTINUED | OUTPATIENT
Start: 2019-08-12 | End: 2019-08-21 | Stop reason: HOSPADM

## 2019-08-12 RX ORDER — ALBUTEROL SULFATE 90 UG/1
2 AEROSOL, METERED RESPIRATORY (INHALATION) EVERY 4 HOURS PRN
Status: DISCONTINUED | OUTPATIENT
Start: 2019-08-12 | End: 2019-08-21 | Stop reason: HOSPADM

## 2019-08-12 RX ORDER — SODIUM CHLORIDE 9 MG/ML
INJECTION, SOLUTION INTRAVENOUS CONTINUOUS
Status: DISCONTINUED | OUTPATIENT
Start: 2019-08-12 | End: 2019-08-12

## 2019-08-12 RX ORDER — PRAMIPEXOLE DIHYDROCHLORIDE 1 MG/1
1 TABLET ORAL 3 TIMES DAILY
Status: DISCONTINUED | OUTPATIENT
Start: 2019-08-12 | End: 2019-08-21 | Stop reason: HOSPADM

## 2019-08-12 RX ORDER — ETOMIDATE 2 MG/ML
INJECTION INTRAVENOUS PRN
Status: DISCONTINUED | OUTPATIENT
Start: 2019-08-12 | End: 2019-08-13 | Stop reason: HOSPADM

## 2019-08-12 RX ORDER — CHLORHEXIDINE GLUCONATE 0.12 MG/ML
15 RINSE ORAL 2 TIMES DAILY
Status: DISCONTINUED | OUTPATIENT
Start: 2019-08-12 | End: 2019-08-15

## 2019-08-12 RX ORDER — IPRATROPIUM BROMIDE AND ALBUTEROL SULFATE 2.5; .5 MG/3ML; MG/3ML
3 SOLUTION RESPIRATORY (INHALATION) 4 TIMES DAILY
Status: DISCONTINUED | OUTPATIENT
Start: 2019-08-12 | End: 2019-08-14

## 2019-08-12 RX ORDER — INSULIN GLARGINE 100 [IU]/ML
15 INJECTION, SOLUTION SUBCUTANEOUS NIGHTLY
Status: DISCONTINUED | OUTPATIENT
Start: 2019-08-12 | End: 2019-08-14

## 2019-08-12 RX ORDER — SIMVASTATIN 40 MG
40 TABLET ORAL NIGHTLY
Status: DISCONTINUED | OUTPATIENT
Start: 2019-08-12 | End: 2019-08-12

## 2019-08-12 RX ORDER — 0.9 % SODIUM CHLORIDE 0.9 %
1000 INTRAVENOUS SOLUTION INTRAVENOUS ONCE
Status: COMPLETED | OUTPATIENT
Start: 2019-08-12 | End: 2019-08-12

## 2019-08-12 RX ORDER — PROPOFOL 10 MG/ML
10 INJECTION, EMULSION INTRAVENOUS CONTINUOUS
Status: DISCONTINUED | OUTPATIENT
Start: 2019-08-12 | End: 2019-08-15

## 2019-08-12 RX ORDER — POTASSIUM CHLORIDE 20 MEQ/1
20 TABLET, EXTENDED RELEASE ORAL DAILY
Status: DISCONTINUED | OUTPATIENT
Start: 2019-08-12 | End: 2019-08-19

## 2019-08-12 RX ORDER — ASPIRIN 81 MG/1
81 TABLET, CHEWABLE ORAL DAILY
Status: DISCONTINUED | OUTPATIENT
Start: 2019-08-12 | End: 2019-08-21 | Stop reason: HOSPADM

## 2019-08-12 RX ORDER — DEXTROSE MONOHYDRATE 25 G/50ML
12.5 INJECTION, SOLUTION INTRAVENOUS PRN
Status: DISCONTINUED | OUTPATIENT
Start: 2019-08-12 | End: 2019-08-21 | Stop reason: HOSPADM

## 2019-08-12 RX ADMIN — MIDODRINE HYDROCHLORIDE 5 MG: 5 TABLET ORAL at 21:32

## 2019-08-12 RX ADMIN — SODIUM CHLORIDE: 900 INJECTION INTRAVENOUS at 02:29

## 2019-08-12 RX ADMIN — PANTOPRAZOLE SODIUM 40 MG: 40 TABLET, DELAYED RELEASE ORAL at 07:00

## 2019-08-12 RX ADMIN — ACETAZOLAMIDE 500 MG: 250 TABLET ORAL at 21:32

## 2019-08-12 RX ADMIN — OXYCODONE HYDROCHLORIDE AND ACETAMINOPHEN 500 MG: 500 TABLET ORAL at 09:41

## 2019-08-12 RX ADMIN — GABAPENTIN 800 MG: 400 CAPSULE ORAL at 21:32

## 2019-08-12 RX ADMIN — INSULIN LISPRO 2 UNITS: 100 INJECTION, SOLUTION INTRAVENOUS; SUBCUTANEOUS at 18:42

## 2019-08-12 RX ADMIN — ROCURONIUM BROMIDE 50 MG: 10 INJECTION INTRAVENOUS at 16:44

## 2019-08-12 RX ADMIN — Medication 10 ML: at 21:33

## 2019-08-12 RX ADMIN — MICONAZOLE NITRATE: 20 POWDER TOPICAL at 03:21

## 2019-08-12 RX ADMIN — IPRATROPIUM BROMIDE AND ALBUTEROL SULFATE 3 ML: .5; 3 SOLUTION RESPIRATORY (INHALATION) at 15:38

## 2019-08-12 RX ADMIN — ENOXAPARIN SODIUM 40 MG: 40 INJECTION SUBCUTANEOUS at 09:40

## 2019-08-12 RX ADMIN — SIMVASTATIN 40 MG: 40 TABLET, FILM COATED ORAL at 21:32

## 2019-08-12 RX ADMIN — CEFEPIME HYDROCHLORIDE 2 G: 2 INJECTION, POWDER, FOR SOLUTION INTRAVENOUS at 21:32

## 2019-08-12 RX ADMIN — ACETAZOLAMIDE 500 MG: 250 TABLET ORAL at 09:41

## 2019-08-12 RX ADMIN — INSULIN LISPRO 2 UNITS: 100 INJECTION, SOLUTION INTRAVENOUS; SUBCUTANEOUS at 21:33

## 2019-08-12 RX ADMIN — IPRATROPIUM BROMIDE AND ALBUTEROL SULFATE 3 ML: .5; 3 SOLUTION RESPIRATORY (INHALATION) at 20:30

## 2019-08-12 RX ADMIN — IPRATROPIUM BROMIDE AND ALBUTEROL SULFATE 3 ML: .5; 3 SOLUTION RESPIRATORY (INHALATION) at 12:17

## 2019-08-12 RX ADMIN — PRAMIPEXOLE DIHYDROCHLORIDE 1 MG: 1 TABLET ORAL at 09:41

## 2019-08-12 RX ADMIN — VANCOMYCIN HYDROCHLORIDE 1500 MG: 5 INJECTION, POWDER, LYOPHILIZED, FOR SOLUTION INTRAVENOUS at 22:36

## 2019-08-12 RX ADMIN — MICONAZOLE NITRATE: 20 POWDER TOPICAL at 21:43

## 2019-08-12 RX ADMIN — PROPOFOL 30 MCG/KG/MIN: 10 INJECTION, EMULSION INTRAVENOUS at 21:38

## 2019-08-12 RX ADMIN — ASPIRIN 81 MG 81 MG: 81 TABLET ORAL at 09:41

## 2019-08-12 RX ADMIN — VILAZODONE HYDROCHLORIDE 10 MG: 10 TABLET ORAL at 21:33

## 2019-08-12 RX ADMIN — Medication 25 MCG/HR: at 18:27

## 2019-08-12 RX ADMIN — CLOPIDOGREL BISULFATE 75 MG: 75 TABLET ORAL at 09:41

## 2019-08-12 RX ADMIN — CHLORHEXIDINE GLUCONATE 0.12% ORAL RINSE 15 ML: 1.2 LIQUID ORAL at 21:32

## 2019-08-12 RX ADMIN — PROPOFOL 10 MCG/KG/MIN: 10 INJECTION, EMULSION INTRAVENOUS at 17:12

## 2019-08-12 RX ADMIN — METHYLPREDNISOLONE SODIUM SUCCINATE 40 MG: 40 INJECTION, POWDER, LYOPHILIZED, FOR SOLUTION INTRAMUSCULAR; INTRAVENOUS at 18:27

## 2019-08-12 RX ADMIN — ETOMIDATE 16 MG: 2 INJECTION, SOLUTION INTRAVENOUS at 16:51

## 2019-08-12 RX ADMIN — INSULIN LISPRO 2 UNITS: 100 INJECTION, SOLUTION INTRAVENOUS; SUBCUTANEOUS at 13:23

## 2019-08-12 RX ADMIN — CETIRIZINE HYDROCHLORIDE 10 MG: 10 TABLET, FILM COATED ORAL at 09:41

## 2019-08-12 RX ADMIN — VILAZODONE HYDROCHLORIDE 10 MG: 10 TABLET ORAL at 09:45

## 2019-08-12 RX ADMIN — CEFEPIME HYDROCHLORIDE 2 G: 2 INJECTION, POWDER, FOR SOLUTION INTRAVENOUS at 04:27

## 2019-08-12 RX ADMIN — DULOXETINE HYDROCHLORIDE 30 MG: 30 CAPSULE, DELAYED RELEASE ORAL at 09:41

## 2019-08-12 RX ADMIN — SODIUM CHLORIDE 1000 ML: 9 INJECTION, SOLUTION INTRAVENOUS at 01:07

## 2019-08-12 RX ADMIN — POTASSIUM CHLORIDE 20 MEQ: 20 TABLET, EXTENDED RELEASE ORAL at 09:41

## 2019-08-12 RX ADMIN — PERFLUTREN 2.2 ML: 6.52 INJECTION, SUSPENSION INTRAVENOUS at 13:52

## 2019-08-12 RX ADMIN — PRAMIPEXOLE DIHYDROCHLORIDE 1 MG: 1 TABLET ORAL at 21:32

## 2019-08-12 RX ADMIN — GABAPENTIN 800 MG: 400 CAPSULE ORAL at 09:41

## 2019-08-12 RX ADMIN — INSULIN GLARGINE 15 UNITS: 100 INJECTION, SOLUTION SUBCUTANEOUS at 21:33

## 2019-08-12 RX ADMIN — MICONAZOLE NITRATE: 20 POWDER TOPICAL at 09:42

## 2019-08-12 RX ADMIN — CEFEPIME HYDROCHLORIDE 2 G: 2 INJECTION, POWDER, FOR SOLUTION INTRAVENOUS at 13:06

## 2019-08-12 RX ADMIN — INSULIN LISPRO 4 UNITS: 100 INJECTION, SOLUTION INTRAVENOUS; SUBCUTANEOUS at 09:44

## 2019-08-12 RX ADMIN — IPRATROPIUM BROMIDE AND ALBUTEROL SULFATE 3 ML: .5; 3 SOLUTION RESPIRATORY (INHALATION) at 08:48

## 2019-08-12 RX ADMIN — Medication 10 ML: at 09:41

## 2019-08-12 RX ADMIN — VANCOMYCIN HYDROCHLORIDE 1500 MG: 5 INJECTION, POWDER, LYOPHILIZED, FOR SOLUTION INTRAVENOUS at 09:45

## 2019-08-12 ASSESSMENT — PULMONARY FUNCTION TESTS
PIF_VALUE: 26
PIF_VALUE: 25

## 2019-08-12 ASSESSMENT — PAIN DESCRIPTION - LOCATION
LOCATION: GENERALIZED;LEG
LOCATION: LEG

## 2019-08-12 ASSESSMENT — PAIN DESCRIPTION - DESCRIPTORS
DESCRIPTORS: ACHING
DESCRIPTORS: ACHING

## 2019-08-12 ASSESSMENT — PAIN DESCRIPTION - FREQUENCY
FREQUENCY: INTERMITTENT
FREQUENCY: INTERMITTENT

## 2019-08-12 ASSESSMENT — PAIN SCALES - GENERAL
PAINLEVEL_OUTOF10: 4
PAINLEVEL_OUTOF10: 4
PAINLEVEL_OUTOF10: 0

## 2019-08-12 ASSESSMENT — PAIN DESCRIPTION - ORIENTATION
ORIENTATION: RIGHT;LEFT
ORIENTATION: RIGHT;LEFT

## 2019-08-12 ASSESSMENT — PAIN DESCRIPTION - PAIN TYPE
TYPE: CHRONIC PAIN
TYPE: CHRONIC PAIN

## 2019-08-12 NOTE — CONSULTS
Infectious Disease Consult Note  2019   Patient Name: Dolores Guzman : 1955   Impression   Fever  · AMS, elevated pulse pressure  · Know from previous admission for GBS bacteremia and treated for 4 weeks   · No clear source of infection. Non-infectious etiology may be playing a role: venous thrombosis, drug fever, central fever   Encephalopathy   JOSE   Left foot plantar ulcer  o Tenderness to the touch, no overt sign of infection however.  Multi-morbidity: per PMHx diabetes mellitus, COPD, chronic hypoxic respiratory failure, aortic stenosis status post TAVR. Plan:   Continue vancomycin and cefepime   Follow-up blood cultures   Check ABG, limited echocardiogram   Check procalcitonin, BNP,ESR and CRP   Left foot x-ray    Thank you for allowing me to consult in the care of this patient.  ------------------------  REASON FOR CONSULT: Infective syndrome   Requested by: Dr. Agustina El  History obtained from chart review, RN Darrall Kocher and family member as the patient is somnolent    HPI:Patient is a 60 y.o. white female who was admitted 2019 for further evaluation and management of  morbid obesity, diabetes mellitus type 2, insulin-dependent, COPD, asthma, chronic respiratory failure on 4 L of oxygen, restless leg syndrome, aortic stenosis status post TAVR, diastolic congestive heart failure, chronic diabetic foot ulcer, chronic anemia, obstructive sleep apnea, chronic bilateral lower extremity edema known to me from previous admission where she was treated for group B strep bacteremia. No clear source was found hence she was treated presumptively for endocarditis. Patient was admitted on 2019 after she was brought in by her family for altered mental status. History is obtained from the chart the patient is somnolent. She is arousable but unable to answer questions clearly. Apparently she was fine until afternoon. She began to stop as per her .   On admission she had a dermatitis of both lower extremities 10/27/2016     Past Medical History:   Diagnosis Date    Aortic stenosis     Asthma     Cancer ([facility]     Cervical    Carotid artery stenosis     Chest pain     [facility]est[facility] failure) [facility]     Chronic back pain     COPD exacerbation ([facility])     Depression     Diabetes mellitus ([facility]     Family history of coronary artery disease     Fatty liver 10/27/2016    H/O aortic valve stenosis     H/O echocardiogram 2016    EF 55%, Aortic valve area is 0.84 cm2 with a mean gradient of 36 suggestive of severe aortic stenosis, mild to mos LVH    Headache     Heart murmur     History of nuclear stress test 2016    lexiscan-normal,EF70%    Morbid obesity ([facility])     BMI=73.72 kg/m2    Neuropathy     NSTEMI (non-ST elevated myocardial infarction) ([facility] 2018    Obesity     Osteoarthritis     Palpitations     Peripheral edema     Restless legs syndrome     Sleep apnea     Has a CPAP    Sleep apnea     SOB (shortness of breath)       Past Surgical History:   Procedure Laterality Date    AORTIC VALVE REPLACEMENT  2017    29mm EvolutR TAVR     SECTION      CHOLECYSTECTOMY      GALLBLADDER SURGERY      HYSTERECTOMY      NECK SURGERY      Tumor    TUBAL LIGATION        Family History   Problem Relation Age of Onset    Heart Failure Mother     Heart Attack Mother     Hypertension Mother     Coronary Art Dis Mother         Had PTCA w/stenting.  Heart Failure Father     Heart Failure Brother     Heart Disease Mother     High Blood Pressure Mother     Obesity Mother     Diabetes Mother     Heart Disease Father     High Blood Pressure Father     Obesity Father     Heart Disease Brother     High Blood Pressure Brother     Obesity Brother       Infectious disease related family history - not contibutory.    SOCIAL HISTORY  Social History     Tobacco Use    Smoking status: Former Smoker     Types: Cigarettes     Last

## 2019-08-12 NOTE — PROGRESS NOTES
Discuss ABG with Jed Valiente, she will send Dr. Waylon Gonzales a message. Results for Yoana Lin (MRN [id number]) as of 8/12/2019 18:31   Ref. Range 8/12/2019 18:00   pH, Bld Latest Ref Range: 7.34 - 7.45  7.36   O2 Sat Latest Ref Range: 96 - 97 % 97.0   Carbon Monoxide, Blood Latest Ref Range: 0 - 5 % 2.1   CO2 Content Latest Ref Range: 19 - 24 MMOL/L 32.8 (H)   pCO2, Arterial Latest Ref Range: 32 - 45 MMHG 55.0 (H)   pO2, Arterial Latest Ref Range: 75 - 100 MMHG 206 (H)   HCO3, Arterial Latest Ref Range: 18 - 23 MMOL/L 31.1 (H)   Methemoglobin, Arterial Latest Ref Range: <1.5 % 1.0   Base Exc, Mixed Latest Ref Range: 0 - 2.3  4.2...  (H)

## 2019-08-12 NOTE — PROGRESS NOTES
I called [facility] ED RN, and informed him that the bariatric bed arrived to ICU room 2111, he said he would be up soon.

## 2019-08-12 NOTE — PLAN OF CARE
Nutrition Problem: Increased nutrient needs  Intervention: Food and/or Nutrient Delivery: Continue current diet, Start ONS  Nutritional Goals: pt will consume greater than 75% of her meals and supplements

## 2019-08-12 NOTE — H&P
Latest Ref Range: 99 - 110 mMol/L 98 (L)   CO2 Latest Ref Range: 21 - 32 MMOL/L 31   BUN Latest Ref Range: 6 - 23 MG/DL 30 (H)   Creatinine Latest Ref Range: 0.6 - 1.1 MG/DL 1.4 (H)   Anion Gap Latest Ref Range: 4 - 16  8   GFR Non- Latest Ref Range: >60 mL/min/1.73m2 38 (L)   GFR  Latest Ref Range: >60 mL/min/1.73m2 46 (L)   Lactic Acid, Sepsis Latest Ref Range: 0.5 - 1.9 mMOL/L 1.1   Glucose Latest Ref Range: 70 - 99 MG/ (H)   Calcium Latest Ref Range: 8.3 - 10.6 MG/DL 8.4     Results for Radha Sung (MRN [id number]) as of 8/12/2019 05:57   Ref.  Range 8/11/2019 19:05   WBC Latest Ref Range: 4.0 - 10.5 K/CU MM 11.5 (H)   RBC Latest Ref Range: 4.2 - 5.4 M/CU MM 3.60 (L)   Hemoglobin Quant Latest Ref Range: 12.5 - 16.0 GM/DL 8.9 (L)   Hematocrit Latest Ref Range: 37 - 47 % 31.3 (L)   MCV Latest Ref Range: 78 - 100 FL 86.9   MCH Latest Ref Range: 27 - 31 PG 24.7 (L)   MCHC Latest Ref Range: 32.0 - 36.0 % 28.4 (L)   MPV Latest Ref Range: 7.5 - 11.1 FL 9.0   RDW Latest Ref Range: 11.7 - 14.9 % 19.3 (H)   Platelet Count Latest Ref Range: 140 - 440 K/CU    Lymphocyte % Latest Ref Range: 24 - 44 % 7.5 (L)   Monocytes % Latest Ref Range: 0 - 4 % 5.4 (H)   Eosinophils % Latest Ref Range: 0 - 3 % 1.0   Basophils % Latest Ref Range: 0 - 1 % 0.3   Lymphocytes # Latest Units: K/CU MM 0.9   Monocytes # Latest Units: K/CU MM 0.6   Eosinophils # Latest Units: K/CU MM 0.1   Basophils # Latest Units: K/CU MM 0.0     Recent Imaging    Narrative   EXAMINATION:   TWO XRAY VIEWS OF THE CHEST       8/11/2019 9:29 pm       COMPARISON:   06/26/2019.       HISTORY:   ORDERING SYSTEM PROVIDED HISTORY: altered   TECHNOLOGIST PROVIDED HISTORY:   Reason for exam:->altered   Reason for Exam: altered mental status   Acuity: Acute   Type of Exam: Initial   Additional signs and symptoms: lateral exposures problematic due to morbid   obesity       FINDINGS:   Study is limited by patient's body

## 2019-08-12 NOTE — PROGRESS NOTES
Patient attached to vent with the settings AC 16 500 100% peep 5. O2 titrated to 80%. Patient intubated by Dr. Dallin Adrian and DIMAS Rosado at bedside. Will continue to monitor.

## 2019-08-12 NOTE — CONSULTS
BID  albuterol sulfate  (90 Base) MCG/ACT inhaler 2 puff, 2 puff, Inhalation, Q4H PRN  vitamin C (ASCORBIC ACID) tablet 500 mg, 500 mg, Oral, Daily  aspirin chewable tablet 81 mg, 81 mg, Oral, Daily  clopidogrel (PLAVIX) tablet 75 mg, 75 mg, Oral, Daily  DULoxetine (CYMBALTA) extended release capsule 30 mg, 30 mg, Oral, Daily  cetirizine (ZYRTEC) tablet 10 mg, 10 mg, Oral, Daily  gabapentin (NEURONTIN) capsule 800 mg, 800 mg, Oral, TID  ipratropium-albuterol (DUONEB) nebulizer solution 3 mL, 3 mL, Inhalation, 4x Daily  miconazole (MICOTIN) 2 % powder, , Topical, BID  pantoprazole (PROTONIX) tablet 40 mg, 40 mg, Oral, Daily  potassium chloride (KLOR-CON M) extended release tablet 20 mEq, 20 mEq, Oral, Daily  pramipexole (MIRAPEX) tablet 1 mg, 1 mg, Oral, TID  vilazodone HCl (VIIBRYD) TABS 10 mg, 10 mg, Oral, BID  insulin lispro (HUMALOG) injection vial 0-12 Units, 0-12 Units, Subcutaneous, TID WC  insulin lispro (HUMALOG) injection vial 0-6 Units, 0-6 Units, Subcutaneous, Nightly  glucose (GLUTOSE) 40 % oral gel 15 g, 15 g, Oral, PRN  dextrose 50 % IV solution, 12.5 g, Intravenous, PRN  glucagon (rDNA) injection 1 mg, 1 mg, Intramuscular, PRN  dextrose 5 % solution, 100 mL/hr, Intravenous, PRN  insulin glargine (LANTUS) injection vial 15 Units, 15 Units, Subcutaneous, Nightly  vancomycin (VANCOCIN) 1,500 mg in dextrose 5 % 500 mL IVPB, 1,500 mg, Intravenous, Q12H  simvastatin (ZOCOR) tablet 40 mg, 40 mg, Oral, Nightly  chlorhexidine (PERIDEX) 0.12 % solution 15 mL, 15 mL, Mouth/Throat, BID  propofol injection, 10 mcg/kg/min, Intravenous, Continuous  methylPREDNISolone sodium (SOLU-MEDROL) injection 40 mg, 40 mg, Intravenous, Q12H  Facility-Administered Medications Ordered in Other Encounters: etomidate (AMIDATE) injection, , , PRN  rocuronium (ZEMURON) injection, , , PRN    No Known Allergies    Social History:    Social History     Socioeconomic History    Marital status:      Spouse name: None   

## 2019-08-12 NOTE — PROGRESS NOTES
PHARMACY TO DOSE VANCOMYCIN PER DR. Los Quintana    INFECTION BEING TREATED = Sepsis  GOAL TROUGH = 15-20 mcg/ml  CULTURES = blood-- pending  OTHER ABT'S = cefepime    AGE = 60 y.o.     SEX = female  HEIGHT = 5' 6\" (1.676 m)  Wt Readings from Last 3 Encounters:   08/12/19 (!) 359 lb 6.4 oz (163 kg)   07/05/19 (!) 432 lb (196 kg)   06/27/19 (!) 432 lb 1.6 oz (196 kg)     Estimated Creatinine Clearance: 66 mL/min (A) (based on SCr of 1.4 mg/dL (H)).     Recent Labs     08/11/19  1905 08/11/19  1908 08/12/19  0439   WBC 11.5*  --   --    BUN 28*  --  30*   CREATININE 1.0  --  1.4*   LACTATE  --  1.8  --      DOSING PLAN COMMENTS:  - Patient received a loading dose of vancomycin 2000 mg in the ED  - Plan to start a scheduled regimen of vancomycin 1500 mg q12h    VANCO TROUGH SCHEDULED FOR 08/12/2019 @[address] [place] [address]  8/12/2019   5:45 AM  _______________________________________

## 2019-08-13 ENCOUNTER — APPOINTMENT (OUTPATIENT)
Dept: GENERAL RADIOLOGY | Age: 64
DRG: 720 | End: 2019-08-13
Payer: COMMERCIAL

## 2019-08-13 LAB
ANION GAP SERPL CALCULATED.3IONS-SCNC: 13 MMOL/L (ref 4–16)
BASOPHILS ABSOLUTE: 0 K/CU MM
BASOPHILS RELATIVE PERCENT: 0.3 % (ref 0–1)
BUN BLDV-MCNC: 32 MG/DL (ref 6–23)
CALCIUM SERPL-MCNC: 8.6 MG/DL (ref 8.3–10.6)
CHLORIDE BLD-SCNC: 97 MMOL/L (ref 99–110)
CO2: 26 MMOL/L (ref 21–32)
CREAT SERPL-MCNC: 1 MG/DL (ref 0.6–1.1)
DIFFERENTIAL TYPE: ABNORMAL
DOSE AMOUNT: ABNORMAL
DOSE TIME: ABNORMAL
EOSINOPHILS ABSOLUTE: 0 K/CU MM
EOSINOPHILS RELATIVE PERCENT: 0.3 % (ref 0–3)
GFR AFRICAN AMERICAN: >60 ML/MIN/1.73M2
GFR NON-AFRICAN AMERICAN: 56 ML/MIN/1.73M2
GLUCOSE BLD-MCNC: 249 MG/DL (ref 70–99)
GLUCOSE BLD-MCNC: 250 MG/DL (ref 70–99)
GLUCOSE BLD-MCNC: 253 MG/DL (ref 70–99)
GLUCOSE BLD-MCNC: 269 MG/DL (ref 70–99)
GLUCOSE BLD-MCNC: 273 MG/DL (ref 70–99)
GLUCOSE BLD-MCNC: 284 MG/DL (ref 70–99)
GLUCOSE BLD-MCNC: 285 MG/DL (ref 70–99)
HCT VFR BLD CALC: 29.9 % (ref 37–47)
HEMOGLOBIN: 8.5 GM/DL (ref 12.5–16)
IMMATURE NEUTROPHIL %: 1.2 % (ref 0–0.43)
LYMPHOCYTES ABSOLUTE: 0.5 K/CU MM
LYMPHOCYTES RELATIVE PERCENT: 6.7 % (ref 24–44)
MCH RBC QN AUTO: 24.1 PG (ref 27–31)
MCHC RBC AUTO-ENTMCNC: 28.4 % (ref 32–36)
MCV RBC AUTO: 84.9 FL (ref 78–100)
MONOCYTES ABSOLUTE: 0.1 K/CU MM
MONOCYTES RELATIVE PERCENT: 1.8 % (ref 0–4)
NUCLEATED RBC %: 0 %
PDW BLD-RTO: 19.6 % (ref 11.7–14.9)
PLATELET # BLD: 208 K/CU MM (ref 140–440)
PMV BLD AUTO: 9.6 FL (ref 7.5–11.1)
POTASSIUM SERPL-SCNC: 4.8 MMOL/L (ref 3.5–5.1)
PROCALCITONIN: 0.08
RBC # BLD: 3.52 M/CU MM (ref 4.2–5.4)
SEGMENTED NEUTROPHILS ABSOLUTE COUNT: 6 K/CU MM
SEGMENTED NEUTROPHILS RELATIVE PERCENT: 89.7 % (ref 36–66)
SODIUM BLD-SCNC: 136 MMOL/L (ref 135–145)
TOTAL IMMATURE NEUTOROPHIL: 0.08 K/CU MM
TOTAL NUCLEATED RBC: 0 K/CU MM
VANCOMYCIN TROUGH: 20.5 UG/ML (ref 10–20)
WBC # BLD: 6.7 K/CU MM (ref 4–10.5)

## 2019-08-13 PROCEDURE — 6360000002 HC RX W HCPCS: Performed by: INTERNAL MEDICINE

## 2019-08-13 PROCEDURE — 80048 BASIC METABOLIC PNL TOTAL CA: CPT

## 2019-08-13 PROCEDURE — C9113 INJ PANTOPRAZOLE SODIUM, VIA: HCPCS | Performed by: HOSPITALIST

## 2019-08-13 PROCEDURE — 87205 SMEAR GRAM STAIN: CPT

## 2019-08-13 PROCEDURE — 87070 CULTURE OTHR SPECIMN AEROBIC: CPT

## 2019-08-13 PROCEDURE — 6360000002 HC RX W HCPCS: Performed by: HOSPITALIST

## 2019-08-13 PROCEDURE — 31720 CLEARANCE OF AIRWAYS: CPT

## 2019-08-13 PROCEDURE — 94750 HC PULMONARY COMPLIANCE STUDY: CPT

## 2019-08-13 PROCEDURE — 94761 N-INVAS EAR/PLS OXIMETRY MLT: CPT

## 2019-08-13 PROCEDURE — 76937 US GUIDE VASCULAR ACCESS: CPT

## 2019-08-13 PROCEDURE — 2000000000 HC ICU R&B

## 2019-08-13 PROCEDURE — 2580000003 HC RX 258: Performed by: INTERNAL MEDICINE

## 2019-08-13 PROCEDURE — 82962 GLUCOSE BLOOD TEST: CPT

## 2019-08-13 PROCEDURE — 2700000000 HC OXYGEN THERAPY PER DAY

## 2019-08-13 PROCEDURE — 94003 VENT MGMT INPAT SUBQ DAY: CPT

## 2019-08-13 PROCEDURE — 99211 OFF/OP EST MAY X REQ PHY/QHP: CPT

## 2019-08-13 PROCEDURE — 2500000003 HC RX 250 WO HCPCS: Performed by: INTERNAL MEDICINE

## 2019-08-13 PROCEDURE — 6370000000 HC RX 637 (ALT 250 FOR IP): Performed by: INTERNAL MEDICINE

## 2019-08-13 PROCEDURE — 71045 X-RAY EXAM CHEST 1 VIEW: CPT

## 2019-08-13 PROCEDURE — 85025 COMPLETE CBC W/AUTO DIFF WBC: CPT

## 2019-08-13 PROCEDURE — 89220 SPUTUM SPECIMEN COLLECTION: CPT

## 2019-08-13 PROCEDURE — 36569 INSJ PICC 5 YR+ W/O IMAGING: CPT

## 2019-08-13 PROCEDURE — 80202 ASSAY OF VANCOMYCIN: CPT

## 2019-08-13 PROCEDURE — C1751 CATH, INF, PER/CENT/MIDLINE: HCPCS

## 2019-08-13 PROCEDURE — 2580000003 HC RX 258: Performed by: HOSPITALIST

## 2019-08-13 PROCEDURE — 94640 AIRWAY INHALATION TREATMENT: CPT

## 2019-08-13 PROCEDURE — 36415 COLL VENOUS BLD VENIPUNCTURE: CPT

## 2019-08-13 PROCEDURE — 84145 PROCALCITONIN (PCT): CPT

## 2019-08-13 PROCEDURE — 2500000003 HC RX 250 WO HCPCS: Performed by: HOSPITALIST

## 2019-08-13 RX ORDER — SODIUM CHLORIDE 0.9 % (FLUSH) 0.9 %
10 SYRINGE (ML) INJECTION EVERY 12 HOURS SCHEDULED
Status: DISCONTINUED | OUTPATIENT
Start: 2019-08-13 | End: 2019-08-21 | Stop reason: HOSPADM

## 2019-08-13 RX ORDER — PANTOPRAZOLE SODIUM 40 MG/10ML
40 INJECTION, POWDER, LYOPHILIZED, FOR SOLUTION INTRAVENOUS DAILY
Status: DISCONTINUED | OUTPATIENT
Start: 2019-08-13 | End: 2019-08-21 | Stop reason: HOSPADM

## 2019-08-13 RX ORDER — LIDOCAINE HYDROCHLORIDE 10 MG/ML
5 INJECTION, SOLUTION EPIDURAL; INFILTRATION; INTRACAUDAL; PERINEURAL ONCE
Status: COMPLETED | OUTPATIENT
Start: 2019-08-13 | End: 2019-08-13

## 2019-08-13 RX ORDER — SODIUM CHLORIDE 0.9 % (FLUSH) 0.9 %
10 SYRINGE (ML) INJECTION PRN
Status: DISCONTINUED | OUTPATIENT
Start: 2019-08-13 | End: 2019-08-21 | Stop reason: HOSPADM

## 2019-08-13 RX ADMIN — ACETAZOLAMIDE 500 MG: 250 TABLET ORAL at 08:35

## 2019-08-13 RX ADMIN — IPRATROPIUM BROMIDE AND ALBUTEROL SULFATE 3 ML: .5; 3 SOLUTION RESPIRATORY (INHALATION) at 11:55

## 2019-08-13 RX ADMIN — VANCOMYCIN HYDROCHLORIDE 1500 MG: 5 INJECTION, POWDER, LYOPHILIZED, FOR SOLUTION INTRAVENOUS at 11:20

## 2019-08-13 RX ADMIN — CHLORHEXIDINE GLUCONATE 0.12% ORAL RINSE 15 ML: 1.2 LIQUID ORAL at 08:36

## 2019-08-13 RX ADMIN — PROPOFOL 40 MCG/KG/MIN: 10 INJECTION, EMULSION INTRAVENOUS at 11:20

## 2019-08-13 RX ADMIN — OXYCODONE HYDROCHLORIDE AND ACETAMINOPHEN 500 MG: 500 TABLET ORAL at 08:35

## 2019-08-13 RX ADMIN — GABAPENTIN 800 MG: 400 CAPSULE ORAL at 20:18

## 2019-08-13 RX ADMIN — GABAPENTIN 800 MG: 400 CAPSULE ORAL at 14:34

## 2019-08-13 RX ADMIN — INSULIN LISPRO 6 UNITS: 100 INJECTION, SOLUTION INTRAVENOUS; SUBCUTANEOUS at 02:11

## 2019-08-13 RX ADMIN — INSULIN GLARGINE 15 UNITS: 100 INJECTION, SOLUTION SUBCUTANEOUS at 20:59

## 2019-08-13 RX ADMIN — Medication 10 ML: at 20:19

## 2019-08-13 RX ADMIN — PROPOFOL 50 MCG/KG/MIN: 10 INJECTION, EMULSION INTRAVENOUS at 16:19

## 2019-08-13 RX ADMIN — METHYLPREDNISOLONE SODIUM SUCCINATE 40 MG: 40 INJECTION, POWDER, LYOPHILIZED, FOR SOLUTION INTRAMUSCULAR; INTRAVENOUS at 16:28

## 2019-08-13 RX ADMIN — PROPOFOL 40 MCG/KG/MIN: 10 INJECTION, EMULSION INTRAVENOUS at 05:53

## 2019-08-13 RX ADMIN — IPRATROPIUM BROMIDE AND ALBUTEROL SULFATE 3 ML: .5; 3 SOLUTION RESPIRATORY (INHALATION) at 15:44

## 2019-08-13 RX ADMIN — INSULIN LISPRO 6 UNITS: 100 INJECTION, SOLUTION INTRAVENOUS; SUBCUTANEOUS at 15:05

## 2019-08-13 RX ADMIN — CETIRIZINE HYDROCHLORIDE 10 MG: 10 TABLET, FILM COATED ORAL at 08:35

## 2019-08-13 RX ADMIN — PROPOFOL 40 MCG/KG/MIN: 10 INJECTION, EMULSION INTRAVENOUS at 00:39

## 2019-08-13 RX ADMIN — ACETAZOLAMIDE 500 MG: 250 TABLET ORAL at 20:18

## 2019-08-13 RX ADMIN — INSULIN LISPRO 6 UNITS: 100 INJECTION, SOLUTION INTRAVENOUS; SUBCUTANEOUS at 18:11

## 2019-08-13 RX ADMIN — CEFEPIME HYDROCHLORIDE 2 G: 2 INJECTION, POWDER, FOR SOLUTION INTRAVENOUS at 20:11

## 2019-08-13 RX ADMIN — Medication 10 ML: at 08:36

## 2019-08-13 RX ADMIN — VILAZODONE HYDROCHLORIDE 10 MG: 10 TABLET ORAL at 20:18

## 2019-08-13 RX ADMIN — CHLORHEXIDINE GLUCONATE 0.12% ORAL RINSE 15 ML: 1.2 LIQUID ORAL at 20:18

## 2019-08-13 RX ADMIN — MICONAZOLE NITRATE: 20 POWDER TOPICAL at 20:23

## 2019-08-13 RX ADMIN — CLOPIDOGREL BISULFATE 75 MG: 75 TABLET ORAL at 08:35

## 2019-08-13 RX ADMIN — CEFEPIME HYDROCHLORIDE 2 G: 2 INJECTION, POWDER, FOR SOLUTION INTRAVENOUS at 14:35

## 2019-08-13 RX ADMIN — PROPOFOL 40 MCG/KG/MIN: 10 INJECTION, EMULSION INTRAVENOUS at 21:07

## 2019-08-13 RX ADMIN — DULOXETINE HYDROCHLORIDE 30 MG: 30 CAPSULE, DELAYED RELEASE ORAL at 08:35

## 2019-08-13 RX ADMIN — LIDOCAINE HYDROCHLORIDE 5 ML: 10 INJECTION, SOLUTION EPIDURAL; INFILTRATION; INTRACAUDAL; PERINEURAL at 13:52

## 2019-08-13 RX ADMIN — PANTOPRAZOLE SODIUM 40 MG: 40 INJECTION, POWDER, FOR SOLUTION INTRAVENOUS at 16:28

## 2019-08-13 RX ADMIN — Medication 10 ML: at 20:23

## 2019-08-13 RX ADMIN — PROPOFOL 40 MCG/KG/MIN: 10 INJECTION, EMULSION INTRAVENOUS at 02:50

## 2019-08-13 RX ADMIN — Medication 50 MCG/HR: at 14:40

## 2019-08-13 RX ADMIN — CEFEPIME HYDROCHLORIDE 2 G: 2 INJECTION, POWDER, FOR SOLUTION INTRAVENOUS at 04:09

## 2019-08-13 RX ADMIN — ENOXAPARIN SODIUM 40 MG: 40 INJECTION SUBCUTANEOUS at 08:36

## 2019-08-13 RX ADMIN — PRAMIPEXOLE DIHYDROCHLORIDE 1 MG: 1 TABLET ORAL at 14:55

## 2019-08-13 RX ADMIN — INSULIN LISPRO 6 UNITS: 100 INJECTION, SOLUTION INTRAVENOUS; SUBCUTANEOUS at 21:00

## 2019-08-13 RX ADMIN — IPRATROPIUM BROMIDE AND ALBUTEROL SULFATE 3 ML: .5; 3 SOLUTION RESPIRATORY (INHALATION) at 07:57

## 2019-08-13 RX ADMIN — METHYLPREDNISOLONE SODIUM SUCCINATE 40 MG: 40 INJECTION, POWDER, LYOPHILIZED, FOR SOLUTION INTRAMUSCULAR; INTRAVENOUS at 05:43

## 2019-08-13 RX ADMIN — PRAMIPEXOLE DIHYDROCHLORIDE 1 MG: 1 TABLET ORAL at 08:35

## 2019-08-13 RX ADMIN — MIDODRINE HYDROCHLORIDE 5 MG: 5 TABLET ORAL at 16:28

## 2019-08-13 RX ADMIN — PROPOFOL 40 MCG/KG/MIN: 10 INJECTION, EMULSION INTRAVENOUS at 18:39

## 2019-08-13 RX ADMIN — INSULIN LISPRO 4 UNITS: 100 INJECTION, SOLUTION INTRAVENOUS; SUBCUTANEOUS at 11:34

## 2019-08-13 RX ADMIN — VILAZODONE HYDROCHLORIDE 10 MG: 10 TABLET ORAL at 08:40

## 2019-08-13 RX ADMIN — PRAMIPEXOLE DIHYDROCHLORIDE 1 MG: 1 TABLET ORAL at 20:18

## 2019-08-13 RX ADMIN — ASPIRIN 81 MG 81 MG: 81 TABLET ORAL at 08:35

## 2019-08-13 RX ADMIN — SIMVASTATIN 40 MG: 40 TABLET, FILM COATED ORAL at 20:23

## 2019-08-13 RX ADMIN — MICONAZOLE NITRATE: 20 POWDER TOPICAL at 08:37

## 2019-08-13 RX ADMIN — INSULIN LISPRO 6 UNITS: 100 INJECTION, SOLUTION INTRAVENOUS; SUBCUTANEOUS at 05:43

## 2019-08-13 RX ADMIN — GABAPENTIN 800 MG: 400 CAPSULE ORAL at 08:35

## 2019-08-13 RX ADMIN — PROPOFOL 40 MCG/KG/MIN: 10 INJECTION, EMULSION INTRAVENOUS at 14:03

## 2019-08-13 RX ADMIN — IPRATROPIUM BROMIDE AND ALBUTEROL SULFATE 3 ML: .5; 3 SOLUTION RESPIRATORY (INHALATION) at 19:45

## 2019-08-13 ASSESSMENT — PULMONARY FUNCTION TESTS
PIF_VALUE: 29
PIF_VALUE: 26
PIF_VALUE: 28
PIF_VALUE: 27
PIF_VALUE: 30
PIF_VALUE: 30
PIF_VALUE: 25
PIF_VALUE: 30
PIF_VALUE: 29
PIF_VALUE: 27
PIF_VALUE: 25
PIF_VALUE: 26
PIF_VALUE: 27
PIF_VALUE: 27
PIF_VALUE: 24
PIF_VALUE: 28
PIF_VALUE: 24
PIF_VALUE: 28
PIF_VALUE: 24
PIF_VALUE: 25
PIF_VALUE: 23
PIF_VALUE: 28
PIF_VALUE: 25
PIF_VALUE: 27
PIF_VALUE: 27
PIF_VALUE: 28
PIF_VALUE: 25
PIF_VALUE: 27
PIF_VALUE: 25

## 2019-08-13 ASSESSMENT — PAIN SCALES - GENERAL
PAINLEVEL_OUTOF10: 0

## 2019-08-13 NOTE — CONSULTS
Consult completed. Procedure/rationale explained to pt's daughter @ bedside including benefits vs potential risks/complications; questions answered & consent obtained. #5Fr Triple Lumen PowerPICC HF initiated to RUE Basilic Vein using sterile, UltraSound-guided technique without difficulty/complications. Sterile dressing with SkinPrep, StatLock Securing Device, BioPatch, SwabCaps, and Limb Precautions band applied. Pt tolerated well without s/s of anxiety/distress/pain. Marylen Deis, Primary RN, notified and no other c/o or needs noted or reported.

## 2019-08-13 NOTE — CONSULTS
[address] [address] Continence Nurse  Consult Note       Onetha Alpers  AGE: 60 y.o. GENDER: female  : 1955  TODAY'S DATE:  2019    Subjective:     Reason for Evaluation and Assessment: eval Onetha Alpers is a 60 y.o. female referred by:   [x] Physician  [] Nursing  [] Other:     Wound Identification:  Wound Type: diabetic  Contributing Factors: diabetes, poor glucose control and obesity        PAST MEDICAL HISTORY        Diagnosis Date    Aortic stenosis     Asthma     Cancer ([facility]     Cervical    Carotid artery stenosis     Chest pain     CHF [facility]congestive heart failure) ([facility]     Chronic back pain     COPD exacerbation ([facility]     Depression     Diabetes mellitus ([facility]     Family history of coronary artery disease     Fatty liver 10/27/2016    H/O aortic valve stenosis     H/O echocardiogram 2016    EF 55%, Aortic valve area is 0.84 cm2 with a mean gradient of 36 suggestive of severe aortic stenosis, mild to mos LVH    Headache     Heart murmur     History of nuclear stress test 2016    lexiscan-normal,EF70%    Morbid obesity ([facility])     BMI=73.72 kg/m2    Neuropathy     NSTEMI (non-ST elevated myocardial infarction) ([facility] 2018    Obesity     Osteoarthritis     Palpitations     Peripheral edema     Restless legs syndrome     Sleep apnea     Has a CPAP    Sleep apnea     SOB (shortness of breath)        PAST SURGICAL HISTORY    Past Surgical History:   Procedure Laterality Date    AORTIC VALVE REPLACEMENT  2017    29mm EvolutR TAVR     SECTION      CHOLECYSTECTOMY      GALLBLADDER SURGERY      HYSTERECTOMY      NECK SURGERY      Tumor    TUBAL LIGATION         FAMILY HISTORY    Family History   Problem Relation Age of Onset    Heart Failure Mother     Heart Attack Mother     Hypertension Mother     Coronary Art Dis Mother         Had PTCA w/stenting.     Heart Failure Father     Heart Failure Brother     Heart Disease 08/13/2019    LABGLOM 56 08/13/2019    GLUCOSE 273 08/13/2019    PROT 7.2 08/11/2019    LABALBU 3.2 08/11/2019    CALCIUM 8.6 08/13/2019    BILITOT 0.2 08/11/2019    ALKPHOS 84 08/11/2019    AST 16 08/11/2019    ALT 12 08/11/2019     Albumin:    Lab Results   Component Value Date    LABALBU 3.2 08/11/2019     PT/INR:    Lab Results   Component Value Date    PROTIME 11.7 08/11/2019    INR 1.03 08/11/2019     HgBA1c:    Lab Results   Component Value Date    LABA1C 11.2 06/16/2019         Assessment:     Patient Active Problem List   Diagnosis    Morbid obesity with BMI of 70 and over, adult ([address]    Essential hypertension    Dyslipidemia    Fecal incontinence    Mild intermittent asthma without complication    S/P laparoscopic cholecystectomy    Type 2 diabetes mellitus without complication, with long-term current use of insulin ([address]    Fatty liver    Arthritis    H/O parotidectomy    Venous stasis dermatitis of both lower extremities    Aortic stenosis    Morbid obesity ([address]    Asthma    Diabetes mellitus ([address]    Hypertension    Sleep apnea    Family history of coronary artery disease    Chest pain    SOB (shortness of breath)    Palpitations    Peripheral edema    Hypervolemia    Chronic respiratory failure with hypoxia and hypercapnia (HCC)    Acute exacerbation of chronic obstructive pulmonary disease (COPD) (HCC)    Chronic obstructive pulmonary disease (HCC)    Gait disturbance    Acute on chronic respiratory failure with hypoxia and hypercapnia (HCC)    Cellulitis    Skin ulcer of left foot with fat layer exposed ([address]    Pneumonia    VHD (valvular heart disease)    Class 3 severe obesity due to excess calories with body mass index (BMI) greater than or equal to 70 in adult ([address]    Sepsis ([address]       Measurements:  Wound 11/24/18 Other (Comment) Thigh Left open (Active)   Number of days: 261       Wound 11/24/18 Other (Comment) Thigh Left; Inner open (Active)   Number of 8/13/2019 10:16 AM   Purple%Wound Bed 0 8/13/2019 10:16 AM   Other%Wound Bed 0 8/13/2019 10:16 AM   Number of days: 1       Response to treatment:  Well tolerated by patient. Pain Assessment:  Severity:    Quality of pain:   Wound Pain Timing/Severity:   Premedicated:     Plan:     Plan of Care: Wound 08/12/19 Foot Plantar;Left;Upper-Dressing/Treatment: (fibracol, mepilex border)    Open area dressed with fibracol and mepilex border. Heels intact. Place interdry to all folds for moisture management.     Specialty Bed Required : yes  [] Low Air Loss   [] Pressure Redistribution  [] Fluid Immersion  [x] Bariatric  [] Total Pressure Relief  [] Other:     Discharge Plan:  Placement for patient upon discharge: TBD  Hospice Care: no  Patient appropriate for Outpatient [address] [facility] [address]: [facility]    Patient/Caregiver Teaching:  Level of patient/caregiver understanding able to:   Care explained as given       Electronically signed by Long Chan LPN, on 6/69/1161 at 10:36 AM

## 2019-08-13 NOTE — PROGRESS NOTES
Wound  -seen by wound care  7. Morbid obesity, BMI is 83.12 kg/m². Will need referral to bariatrics as outpatient  8. ANMOL   on BiPAP at home  9. COPD   Duonebs   10. Chronic BLE edema. 11. HTN  hold Coreg as blood pressure is on the lower side  -on proamatine. 12. DMII, insulin-dependent w/ neuropathy  SSI and lantus  On gabapentin 600 mg TID for neuropathy  13. Dyslipidemia  continue Zocor  14. Mood disorder  continue vilazodone (Viibryd)  15. Depression  Continue Cymbalta   16.  RLS  on Mirapex apparently for RLS - continue    DVT Prophylaxis: lovenox  GI Prophylaxis: protonix  Diet: Diet NPO Effective Now  Home O2: 4LNC  Code Status: Full Code      Dispo:  -see if can be extubated tomorrow    Aletha Cranker, MD  8/13/2019    Meds:   Meds:    sodium chloride flush  10 mL Intravenous 2 times per day    [START ON 8/14/2019] vancomycin  1,500 mg Intravenous Q18H    pantoprazole  40 mg Intravenous Daily    sodium chloride flush  10 mL Intravenous 2 times per day    enoxaparin  40 mg Subcutaneous Daily    cefepime  2 g Intravenous Q8H    acetaZOLAMIDE  500 mg Oral BID    vitamin C  500 mg Oral Daily    aspirin  81 mg Oral Daily    clopidogrel  75 mg Oral Daily    DULoxetine  30 mg Oral Daily    cetirizine  10 mg Oral Daily    gabapentin  800 mg Oral TID    ipratropium-albuterol  3 mL Inhalation 4x Daily    miconazole   Topical BID    potassium chloride  20 mEq Oral Daily    pramipexole  1 mg Oral TID    vilazodone HCl  10 mg Oral BID    insulin glargine  15 Units Subcutaneous Nightly    simvastatin  40 mg Oral Nightly    chlorhexidine  15 mL Mouth/Throat BID    methylPREDNISolone  40 mg Intravenous Q12H    midodrine  5 mg Oral TID WC    insulin lispro  0-12 Units Subcutaneous Q4H      Infusions:    [START ON 8/14/2019] dexmedetomidine (PRECEDEX) IV infusion      dextrose      propofol 50 mcg/kg/min (08/13/19 1404)    fentanyl 50 mcg/hr (08/13/19 1440)     PRN Meds:   sodium chloride flush 10 mL PRN   sodium chloride flush 10 mL PRN   albuterol sulfate HFA 2 puff Q4H PRN   glucose 15 g PRN   dextrose 12.5 g PRN   glucagon (rDNA) 1 mg PRN   dextrose 100 mL/hr PRN       Data/Labs:     Recent Labs     08/11/19 1905 08/13/19  0545   WBC 11.5* 6.7   HGB 8.9* 8.5*   HCT 31.3* 29.9*    208      Recent Labs     08/11/19  1905 08/12/19  0439 08/13/19  0545   * 137 136   K 4.2 4.5 4.8   CL 94* 98* 97*   CO2 29 31 26   BUN 28* 30* 32*   CREATININE 1.0 1.4* 1.0     Recent Labs     08/11/19 1905   AST 16   ALT 12   BILITOT 0.2   ALKPHOS 84     Recent Labs     08/11/19 1905   INR 1.03     Recent Labs     08/11/19 1905   TROPONINT <0.010     HgBA1c:   Lab Results   Component Value Date    LABA1C 11.2 06/16/2019     CALCIUM:  8.6/26 (08/13 0545)    I/O last 3 completed shifts:   In: 8692 [I.V.:9623]  Out: 1855 [Urine:1525; Emesis/NG output:330]    Intake/Output Summary (Last 24 hours) at 8/13/2019 1606  Last data filed at 8/13/2019 1600  Gross per 24 hour   Intake 3762 ml   Output 2955 ml   Net 807 ml

## 2019-08-14 ENCOUNTER — APPOINTMENT (OUTPATIENT)
Dept: GENERAL RADIOLOGY | Age: 64
DRG: 720 | End: 2019-08-14
Payer: COMMERCIAL

## 2019-08-14 LAB
ANION GAP SERPL CALCULATED.3IONS-SCNC: 9 MMOL/L (ref 4–16)
BASE EXCESS MIXED: ABNORMAL (ref 0–2.3)
BUN BLDV-MCNC: 29 MG/DL (ref 6–23)
CALCIUM SERPL-MCNC: 9 MG/DL (ref 8.3–10.6)
CARBON MONOXIDE, BLOOD: 1.9 % (ref 0–5)
CHLORIDE BLD-SCNC: 94 MMOL/L (ref 99–110)
CO2 CONTENT: 32.7 MMOL/L (ref 19–24)
CO2: 30 MMOL/L (ref 21–32)
COMMENT: ABNORMAL
CREAT SERPL-MCNC: 0.8 MG/DL (ref 0.6–1.1)
GFR AFRICAN AMERICAN: >60 ML/MIN/1.73M2
GFR NON-AFRICAN AMERICAN: >60 ML/MIN/1.73M2
GLUCOSE BLD-MCNC: 174 MG/DL (ref 70–99)
GLUCOSE BLD-MCNC: 190 MG/DL (ref 70–99)
GLUCOSE BLD-MCNC: 228 MG/DL (ref 70–99)
GLUCOSE BLD-MCNC: 229 MG/DL (ref 70–99)
GLUCOSE BLD-MCNC: 238 MG/DL (ref 70–99)
GLUCOSE BLD-MCNC: 239 MG/DL (ref 70–99)
GLUCOSE BLD-MCNC: 260 MG/DL (ref 70–99)
GLUCOSE BLD-MCNC: 269 MG/DL (ref 70–99)
HCO3 ARTERIAL: 30.9 MMOL/L (ref 18–23)
HCT VFR BLD CALC: 31.9 % (ref 37–47)
HEMOGLOBIN: 9.5 GM/DL (ref 12.5–16)
MCH RBC QN AUTO: 24.5 PG (ref 27–31)
MCHC RBC AUTO-ENTMCNC: 29.8 % (ref 32–36)
MCV RBC AUTO: 82.4 FL (ref 78–100)
METHEMOGLOBIN ARTERIAL: 1.2 %
O2 SATURATION: 94.5 % (ref 96–97)
PCO2 ARTERIAL: 60 MMHG (ref 32–45)
PDW BLD-RTO: 19 % (ref 11.7–14.9)
PH BLOOD: 7.32 (ref 7.34–7.45)
PLATELET # BLD: 228 K/CU MM (ref 140–440)
PMV BLD AUTO: 9.7 FL (ref 7.5–11.1)
PO2 ARTERIAL: 79 MMHG (ref 75–100)
POTASSIUM SERPL-SCNC: 4.6 MMOL/L (ref 3.5–5.1)
PRO-BNP: 931 PG/ML
RBC # BLD: 3.87 M/CU MM (ref 4.2–5.4)
SODIUM BLD-SCNC: 133 MMOL/L (ref 135–145)
WBC # BLD: 6.3 K/CU MM (ref 4–10.5)

## 2019-08-14 PROCEDURE — 31720 CLEARANCE OF AIRWAYS: CPT

## 2019-08-14 PROCEDURE — 6370000000 HC RX 637 (ALT 250 FOR IP): Performed by: INTERNAL MEDICINE

## 2019-08-14 PROCEDURE — 80048 BASIC METABOLIC PNL TOTAL CA: CPT

## 2019-08-14 PROCEDURE — 94003 VENT MGMT INPAT SUBQ DAY: CPT

## 2019-08-14 PROCEDURE — 94761 N-INVAS EAR/PLS OXIMETRY MLT: CPT

## 2019-08-14 PROCEDURE — 2500000003 HC RX 250 WO HCPCS: Performed by: INTERNAL MEDICINE

## 2019-08-14 PROCEDURE — 2580000003 HC RX 258: Performed by: HOSPITALIST

## 2019-08-14 PROCEDURE — C9113 INJ PANTOPRAZOLE SODIUM, VIA: HCPCS | Performed by: HOSPITALIST

## 2019-08-14 PROCEDURE — 99233 SBSQ HOSP IP/OBS HIGH 50: CPT | Performed by: INTERNAL MEDICINE

## 2019-08-14 PROCEDURE — 71045 X-RAY EXAM CHEST 1 VIEW: CPT

## 2019-08-14 PROCEDURE — 2700000000 HC OXYGEN THERAPY PER DAY

## 2019-08-14 PROCEDURE — 82803 BLOOD GASES ANY COMBINATION: CPT

## 2019-08-14 PROCEDURE — 2000000000 HC ICU R&B

## 2019-08-14 PROCEDURE — 2580000003 HC RX 258: Performed by: INTERNAL MEDICINE

## 2019-08-14 PROCEDURE — 6360000002 HC RX W HCPCS: Performed by: INTERNAL MEDICINE

## 2019-08-14 PROCEDURE — 94660 CPAP INITIATION&MGMT: CPT

## 2019-08-14 PROCEDURE — 36592 COLLECT BLOOD FROM PICC: CPT

## 2019-08-14 PROCEDURE — 85027 COMPLETE CBC AUTOMATED: CPT

## 2019-08-14 PROCEDURE — 6360000002 HC RX W HCPCS: Performed by: HOSPITALIST

## 2019-08-14 PROCEDURE — 89220 SPUTUM SPECIMEN COLLECTION: CPT

## 2019-08-14 PROCEDURE — 94640 AIRWAY INHALATION TREATMENT: CPT

## 2019-08-14 PROCEDURE — 36600 WITHDRAWAL OF ARTERIAL BLOOD: CPT

## 2019-08-14 PROCEDURE — 82962 GLUCOSE BLOOD TEST: CPT

## 2019-08-14 PROCEDURE — 83880 ASSAY OF NATRIURETIC PEPTIDE: CPT

## 2019-08-14 RX ORDER — FUROSEMIDE 10 MG/ML
20 INJECTION INTRAMUSCULAR; INTRAVENOUS 2 TIMES DAILY
Status: DISCONTINUED | OUTPATIENT
Start: 2019-08-15 | End: 2019-08-18

## 2019-08-14 RX ORDER — INSULIN GLARGINE 100 [IU]/ML
20 INJECTION, SOLUTION SUBCUTANEOUS NIGHTLY
Status: DISCONTINUED | OUTPATIENT
Start: 2019-08-15 | End: 2019-08-15

## 2019-08-14 RX ORDER — IPRATROPIUM BROMIDE AND ALBUTEROL SULFATE 2.5; .5 MG/3ML; MG/3ML
1 SOLUTION RESPIRATORY (INHALATION)
Status: DISCONTINUED | OUTPATIENT
Start: 2019-08-14 | End: 2019-08-21 | Stop reason: HOSPADM

## 2019-08-14 RX ADMIN — ACETAZOLAMIDE 500 MG: 250 TABLET ORAL at 08:08

## 2019-08-14 RX ADMIN — INSULIN LISPRO 4 UNITS: 100 INJECTION, SOLUTION INTRAVENOUS; SUBCUTANEOUS at 14:12

## 2019-08-14 RX ADMIN — INSULIN LISPRO 6 UNITS: 100 INJECTION, SOLUTION INTRAVENOUS; SUBCUTANEOUS at 01:55

## 2019-08-14 RX ADMIN — VANCOMYCIN HYDROCHLORIDE 1500 MG: 5 INJECTION, POWDER, LYOPHILIZED, FOR SOLUTION INTRAVENOUS at 06:08

## 2019-08-14 RX ADMIN — ACETAZOLAMIDE 500 MG: 250 TABLET ORAL at 20:14

## 2019-08-14 RX ADMIN — IPRATROPIUM BROMIDE AND ALBUTEROL SULFATE 1 AMPULE: .5; 3 SOLUTION RESPIRATORY (INHALATION) at 20:30

## 2019-08-14 RX ADMIN — CHLORHEXIDINE GLUCONATE 0.12% ORAL RINSE 15 ML: 1.2 LIQUID ORAL at 08:08

## 2019-08-14 RX ADMIN — INSULIN LISPRO 2 UNITS: 100 INJECTION, SOLUTION INTRAVENOUS; SUBCUTANEOUS at 18:01

## 2019-08-14 RX ADMIN — GABAPENTIN 800 MG: 400 CAPSULE ORAL at 20:14

## 2019-08-14 RX ADMIN — Medication 10 ML: at 08:09

## 2019-08-14 RX ADMIN — Medication 10 ML: at 20:15

## 2019-08-14 RX ADMIN — MICONAZOLE NITRATE: 20 POWDER TOPICAL at 08:11

## 2019-08-14 RX ADMIN — INSULIN LISPRO 6 UNITS: 100 INJECTION, SOLUTION INTRAVENOUS; SUBCUTANEOUS at 09:33

## 2019-08-14 RX ADMIN — INSULIN GLARGINE 15 UNITS: 100 INJECTION, SOLUTION SUBCUTANEOUS at 20:13

## 2019-08-14 RX ADMIN — SIMVASTATIN 40 MG: 40 TABLET, FILM COATED ORAL at 20:14

## 2019-08-14 RX ADMIN — VILAZODONE HYDROCHLORIDE 10 MG: 10 TABLET ORAL at 08:11

## 2019-08-14 RX ADMIN — INSULIN LISPRO 4 UNITS: 100 INJECTION, SOLUTION INTRAVENOUS; SUBCUTANEOUS at 22:26

## 2019-08-14 RX ADMIN — MICONAZOLE NITRATE: 20 POWDER TOPICAL at 20:14

## 2019-08-14 RX ADMIN — CETIRIZINE HYDROCHLORIDE 10 MG: 10 TABLET, FILM COATED ORAL at 08:08

## 2019-08-14 RX ADMIN — CLOPIDOGREL BISULFATE 75 MG: 75 TABLET ORAL at 08:08

## 2019-08-14 RX ADMIN — METHYLPREDNISOLONE SODIUM SUCCINATE 40 MG: 40 INJECTION, POWDER, LYOPHILIZED, FOR SOLUTION INTRAMUSCULAR; INTRAVENOUS at 18:01

## 2019-08-14 RX ADMIN — PRAMIPEXOLE DIHYDROCHLORIDE 1 MG: 1 TABLET ORAL at 08:11

## 2019-08-14 RX ADMIN — OXYCODONE HYDROCHLORIDE AND ACETAMINOPHEN 500 MG: 500 TABLET ORAL at 08:08

## 2019-08-14 RX ADMIN — PRAMIPEXOLE DIHYDROCHLORIDE 1 MG: 1 TABLET ORAL at 20:14

## 2019-08-14 RX ADMIN — CEFEPIME HYDROCHLORIDE 2 G: 2 INJECTION, POWDER, FOR SOLUTION INTRAVENOUS at 04:20

## 2019-08-14 RX ADMIN — IPRATROPIUM BROMIDE AND ALBUTEROL SULFATE 1 AMPULE: .5; 3 SOLUTION RESPIRATORY (INHALATION) at 15:55

## 2019-08-14 RX ADMIN — ASPIRIN 81 MG 81 MG: 81 TABLET ORAL at 08:08

## 2019-08-14 RX ADMIN — INSULIN LISPRO 4 UNITS: 100 INJECTION, SOLUTION INTRAVENOUS; SUBCUTANEOUS at 06:08

## 2019-08-14 RX ADMIN — ENOXAPARIN SODIUM 40 MG: 40 INJECTION SUBCUTANEOUS at 08:07

## 2019-08-14 RX ADMIN — PROPOFOL 40 MCG/KG/MIN: 10 INJECTION, EMULSION INTRAVENOUS at 00:18

## 2019-08-14 RX ADMIN — METHYLPREDNISOLONE SODIUM SUCCINATE 40 MG: 40 INJECTION, POWDER, LYOPHILIZED, FOR SOLUTION INTRAMUSCULAR; INTRAVENOUS at 04:43

## 2019-08-14 RX ADMIN — VILAZODONE HYDROCHLORIDE 10 MG: 10 TABLET ORAL at 20:14

## 2019-08-14 RX ADMIN — Medication 10 ML: at 08:13

## 2019-08-14 RX ADMIN — PANTOPRAZOLE SODIUM 40 MG: 40 INJECTION, POWDER, FOR SOLUTION INTRAVENOUS at 08:08

## 2019-08-14 RX ADMIN — GABAPENTIN 800 MG: 400 CAPSULE ORAL at 08:08

## 2019-08-14 RX ADMIN — IPRATROPIUM BROMIDE AND ALBUTEROL SULFATE 3 ML: .5; 3 SOLUTION RESPIRATORY (INHALATION) at 07:37

## 2019-08-14 RX ADMIN — DEXMEDETOMIDINE HYDROCHLORIDE 0.2 MCG/KG/HR: 100 INJECTION, SOLUTION INTRAVENOUS at 04:43

## 2019-08-14 RX ADMIN — PROPOFOL 40 MCG/KG/MIN: 10 INJECTION, EMULSION INTRAVENOUS at 03:14

## 2019-08-14 RX ADMIN — IPRATROPIUM BROMIDE AND ALBUTEROL SULFATE 1 AMPULE: .5; 3 SOLUTION RESPIRATORY (INHALATION) at 11:24

## 2019-08-14 ASSESSMENT — PULMONARY FUNCTION TESTS
PIF_VALUE: 20
PIF_VALUE: 21
PIF_VALUE: 20
PIF_VALUE: 21
PIF_VALUE: 24
PIF_VALUE: 26
PIF_VALUE: 21
PIF_VALUE: 21
PIF_VALUE: 27
PIF_VALUE: 27
PIF_VALUE: 21
PIF_VALUE: 28
PIF_VALUE: 25
PIF_VALUE: 25
PIF_VALUE: 23
PIF_VALUE: 20

## 2019-08-14 ASSESSMENT — PAIN SCALES - GENERAL
PAINLEVEL_OUTOF10: 0
PAINLEVEL_OUTOF10: 1

## 2019-08-14 NOTE — PROGRESS NOTES
Foot Wound  Seen by wound care  7. Morbid obesity, BMI is 83.12 kg/m². Will need referral to bariatrics as outpatient  8. ANMOL   on BiPAP at home  9. COPD   Duonebs   10. Chronic BLE edema. 11. HTN  hold Coreg as blood pressure is on the lower side  -place parameters on proamatine  12. DMII, insulin-dependent w/ neuropathy  SSI and lantus  On gabapentin 600 mg TID for neuropathy  -increase lantus and start prandial humalog  13. Dyslipidemia  continue Zocor  14. Mood disorder  continue vilazodone (Viibryd)  15. Depression  Continue Cymbalta   16.  RLS  on Mirapex apparently for RLS - continue    DVT Prophylaxis: lovenox  GI Prophylaxis: protonix  Diet: Diet NPO Effective Now  Home O2: 4LNC, bipap at night  Code Status: Full Code      Dispo:  -PT/OT, try to get patient off daytime bipap    Faustino Haile MD  8/14/2019    Meds:   Meds:    ipratropium-albuterol  1 ampule Inhalation Q4H WA    sodium chloride flush  10 mL Intravenous 2 times per day    pantoprazole  40 mg Intravenous Daily    sodium chloride flush  10 mL Intravenous 2 times per day    enoxaparin  40 mg Subcutaneous Daily    acetaZOLAMIDE  500 mg Oral BID    vitamin C  500 mg Oral Daily    aspirin  81 mg Oral Daily    clopidogrel  75 mg Oral Daily    DULoxetine  30 mg Oral Daily    cetirizine  10 mg Oral Daily    gabapentin  800 mg Oral TID    miconazole   Topical BID    potassium chloride  20 mEq Oral Daily    pramipexole  1 mg Oral TID    vilazodone HCl  10 mg Oral BID    insulin glargine  15 Units Subcutaneous Nightly    simvastatin  40 mg Oral Nightly    chlorhexidine  15 mL Mouth/Throat BID    methylPREDNISolone  40 mg Intravenous Q12H    midodrine  5 mg Oral TID WC    insulin lispro  0-12 Units Subcutaneous Q4H      Infusions:    dexmedetomidine (PRECEDEX) IV infusion 0.4 mcg/kg/hr (08/14/19 0558)    dextrose      propofol Stopped (08/14/19 0520)    fentanyl Stopped (08/14/19 0530)     PRN Meds:     sodium chloride flush 10 mL

## 2019-08-14 NOTE — PROGRESS NOTES
[facility]  consulted by Dr. Kade Morrison for monitoring and adjustment. Indication for treatment: Febrile, hx of GBS bacteremia   Goal trough: 15 mcg/mL     Pertinent Laboratory Values:   Temp Readings from Last 3 Encounters:   08/14/19 97 °F (36.1 °C) (Rectal)   07/05/19 98.6 °F (37 °C) (Oral)   06/27/19 97.5 °F (36.4 °C)     Recent Labs     08/11/19  1905 08/11/19  1908 08/13/19  0545 08/14/19  0550   WBC 11.5*  --  6.7 6.3   LACTATE  --  1.8  --   --      Recent Labs     08/12/19  0439 08/13/19  0545 08/14/19  0550   BUN 30* 32* 29*   CREATININE 1.4* 1.0 0.8     Estimated Creatinine Clearance: 116 mL/min (based on SCr of 0.8 mg/dL). Intake/Output Summary (Last 24 hours) at 8/14/2019 0836  Last data filed at 8/14/2019 7971  Gross per 24 hour   Intake 1830 ml   Output 3080 ml   Net -1250 ml       Vancomycin level:   TROUGH:    Recent Labs     08/13/19  1011   VANCOTROUGH 20.5*     RANDOM:  No results for input(s): VANCORANDOM in the last 72 hours. Assessment:  · WBC and temperature: WNL  · SCr, BUN, and urine output: stable   · Day(s) of therapy: 4   · Vancomycin level: previously supra-therapeutic    Plan:  · Ms. Chapman was on vancomycin 1500 mg q12h IVPB with a supra-therapeutic trough   · Dose reduced to vancomycin 1500 mg q18h IVPB   · Repeat trough tomorrow AM   · Pharmacy will continue to monitor patient and adjust therapy as indicated    Thank you for the consult.   Andrew Westbrook, [address]  8/14/2019 8:36 AM

## 2019-08-15 ENCOUNTER — APPOINTMENT (OUTPATIENT)
Dept: GENERAL RADIOLOGY | Age: 64
DRG: 720 | End: 2019-08-15
Payer: COMMERCIAL

## 2019-08-15 LAB
ANION GAP SERPL CALCULATED.3IONS-SCNC: 7 MMOL/L (ref 4–16)
BUN BLDV-MCNC: 30 MG/DL (ref 6–23)
CALCIUM SERPL-MCNC: 9.2 MG/DL (ref 8.3–10.6)
CHLORIDE BLD-SCNC: 96 MMOL/L (ref 99–110)
CO2: 31 MMOL/L (ref 21–32)
CREAT SERPL-MCNC: 0.7 MG/DL (ref 0.6–1.1)
DOSE AMOUNT: NORMAL
DOSE TIME: NORMAL
GFR AFRICAN AMERICAN: >60 ML/MIN/1.73M2
GFR NON-AFRICAN AMERICAN: >60 ML/MIN/1.73M2
GLUCOSE BLD-MCNC: 142 MG/DL (ref 70–99)
GLUCOSE BLD-MCNC: 150 MG/DL (ref 70–99)
GLUCOSE BLD-MCNC: 159 MG/DL (ref 70–99)
GLUCOSE BLD-MCNC: 174 MG/DL (ref 70–99)
GLUCOSE BLD-MCNC: 180 MG/DL (ref 70–99)
GLUCOSE BLD-MCNC: 213 MG/DL (ref 70–99)
GLUCOSE BLD-MCNC: 217 MG/DL (ref 70–99)
HCT VFR BLD CALC: 31.4 % (ref 37–47)
HEMOGLOBIN: 9.3 GM/DL (ref 12.5–16)
MCH RBC QN AUTO: 24.3 PG (ref 27–31)
MCHC RBC AUTO-ENTMCNC: 29.6 % (ref 32–36)
MCV RBC AUTO: 82.2 FL (ref 78–100)
PDW BLD-RTO: 19.4 % (ref 11.7–14.9)
PLATELET # BLD: 246 K/CU MM (ref 140–440)
PMV BLD AUTO: 9.5 FL (ref 7.5–11.1)
POTASSIUM SERPL-SCNC: 4 MMOL/L (ref 3.5–5.1)
RBC # BLD: 3.82 M/CU MM (ref 4.2–5.4)
SODIUM BLD-SCNC: 134 MMOL/L (ref 135–145)
VANCOMYCIN TROUGH: 10.9 UG/ML (ref 10–20)
WBC # BLD: 8.3 K/CU MM (ref 4–10.5)

## 2019-08-15 PROCEDURE — 85027 COMPLETE CBC AUTOMATED: CPT

## 2019-08-15 PROCEDURE — 94761 N-INVAS EAR/PLS OXIMETRY MLT: CPT

## 2019-08-15 PROCEDURE — 82962 GLUCOSE BLOOD TEST: CPT

## 2019-08-15 PROCEDURE — 2580000003 HC RX 258: Performed by: INTERNAL MEDICINE

## 2019-08-15 PROCEDURE — 6370000000 HC RX 637 (ALT 250 FOR IP): Performed by: INTERNAL MEDICINE

## 2019-08-15 PROCEDURE — 6370000000 HC RX 637 (ALT 250 FOR IP): Performed by: HOSPITALIST

## 2019-08-15 PROCEDURE — C9113 INJ PANTOPRAZOLE SODIUM, VIA: HCPCS | Performed by: HOSPITALIST

## 2019-08-15 PROCEDURE — 2580000003 HC RX 258: Performed by: HOSPITALIST

## 2019-08-15 PROCEDURE — 6360000002 HC RX W HCPCS: Performed by: HOSPITALIST

## 2019-08-15 PROCEDURE — 2060000000 HC ICU INTERMEDIATE R&B

## 2019-08-15 PROCEDURE — 2700000000 HC OXYGEN THERAPY PER DAY

## 2019-08-15 PROCEDURE — 94640 AIRWAY INHALATION TREATMENT: CPT

## 2019-08-15 PROCEDURE — 36592 COLLECT BLOOD FROM PICC: CPT

## 2019-08-15 PROCEDURE — 80048 BASIC METABOLIC PNL TOTAL CA: CPT

## 2019-08-15 PROCEDURE — 6360000002 HC RX W HCPCS: Performed by: INTERNAL MEDICINE

## 2019-08-15 PROCEDURE — 94660 CPAP INITIATION&MGMT: CPT

## 2019-08-15 PROCEDURE — 99231 SBSQ HOSP IP/OBS SF/LOW 25: CPT | Performed by: INTERNAL MEDICINE

## 2019-08-15 PROCEDURE — 80202 ASSAY OF VANCOMYCIN: CPT

## 2019-08-15 RX ORDER — INSULIN GLARGINE 100 [IU]/ML
19 INJECTION, SOLUTION SUBCUTANEOUS NIGHTLY
Status: DISCONTINUED | OUTPATIENT
Start: 2019-08-15 | End: 2019-08-21 | Stop reason: HOSPADM

## 2019-08-15 RX ADMIN — POTASSIUM CHLORIDE 20 MEQ: 20 TABLET, EXTENDED RELEASE ORAL at 09:51

## 2019-08-15 RX ADMIN — IPRATROPIUM BROMIDE AND ALBUTEROL SULFATE 1 AMPULE: .5; 3 SOLUTION RESPIRATORY (INHALATION) at 21:16

## 2019-08-15 RX ADMIN — IPRATROPIUM BROMIDE AND ALBUTEROL SULFATE 1 AMPULE: .5; 3 SOLUTION RESPIRATORY (INHALATION) at 15:36

## 2019-08-15 RX ADMIN — MICONAZOLE NITRATE: 20 POWDER TOPICAL at 10:06

## 2019-08-15 RX ADMIN — INSULIN LISPRO 2 UNITS: 100 INJECTION, SOLUTION INTRAVENOUS; SUBCUTANEOUS at 18:40

## 2019-08-15 RX ADMIN — PRAMIPEXOLE DIHYDROCHLORIDE 1 MG: 1 TABLET ORAL at 10:05

## 2019-08-15 RX ADMIN — IPRATROPIUM BROMIDE AND ALBUTEROL SULFATE 1 AMPULE: .5; 3 SOLUTION RESPIRATORY (INHALATION) at 12:13

## 2019-08-15 RX ADMIN — Medication 10 ML: at 06:31

## 2019-08-15 RX ADMIN — SIMVASTATIN 40 MG: 40 TABLET, FILM COATED ORAL at 20:43

## 2019-08-15 RX ADMIN — FUROSEMIDE 20 MG: 10 INJECTION, SOLUTION INTRAMUSCULAR; INTRAVENOUS at 17:48

## 2019-08-15 RX ADMIN — GABAPENTIN 800 MG: 400 CAPSULE ORAL at 20:43

## 2019-08-15 RX ADMIN — Medication 10 ML: at 20:43

## 2019-08-15 RX ADMIN — INSULIN LISPRO 6 UNITS: 100 INJECTION, SOLUTION INTRAVENOUS; SUBCUTANEOUS at 13:37

## 2019-08-15 RX ADMIN — INSULIN LISPRO 2 UNITS: 100 INJECTION, SOLUTION INTRAVENOUS; SUBCUTANEOUS at 20:42

## 2019-08-15 RX ADMIN — GABAPENTIN 800 MG: 400 CAPSULE ORAL at 17:47

## 2019-08-15 RX ADMIN — Medication 10 ML: at 20:42

## 2019-08-15 RX ADMIN — ENOXAPARIN SODIUM 40 MG: 40 INJECTION SUBCUTANEOUS at 09:50

## 2019-08-15 RX ADMIN — PRAMIPEXOLE DIHYDROCHLORIDE 1 MG: 1 TABLET ORAL at 20:42

## 2019-08-15 RX ADMIN — GABAPENTIN 800 MG: 400 CAPSULE ORAL at 09:50

## 2019-08-15 RX ADMIN — INSULIN LISPRO 4 UNITS: 100 INJECTION, SOLUTION INTRAVENOUS; SUBCUTANEOUS at 13:36

## 2019-08-15 RX ADMIN — PANTOPRAZOLE SODIUM 40 MG: 40 INJECTION, POWDER, FOR SOLUTION INTRAVENOUS at 09:50

## 2019-08-15 RX ADMIN — FUROSEMIDE 20 MG: 10 INJECTION, SOLUTION INTRAMUSCULAR; INTRAVENOUS at 09:50

## 2019-08-15 RX ADMIN — ACETAZOLAMIDE 500 MG: 250 TABLET ORAL at 20:43

## 2019-08-15 RX ADMIN — CLOPIDOGREL BISULFATE 75 MG: 75 TABLET ORAL at 09:51

## 2019-08-15 RX ADMIN — INSULIN LISPRO 2 UNITS: 100 INJECTION, SOLUTION INTRAVENOUS; SUBCUTANEOUS at 06:24

## 2019-08-15 RX ADMIN — INSULIN GLARGINE 19 UNITS: 100 INJECTION, SOLUTION SUBCUTANEOUS at 20:42

## 2019-08-15 RX ADMIN — METHYLPREDNISOLONE SODIUM SUCCINATE 40 MG: 40 INJECTION, POWDER, LYOPHILIZED, FOR SOLUTION INTRAMUSCULAR; INTRAVENOUS at 17:48

## 2019-08-15 RX ADMIN — DULOXETINE HYDROCHLORIDE 30 MG: 30 CAPSULE, DELAYED RELEASE ORAL at 09:51

## 2019-08-15 RX ADMIN — CETIRIZINE HYDROCHLORIDE 10 MG: 10 TABLET, FILM COATED ORAL at 09:50

## 2019-08-15 RX ADMIN — VILAZODONE HYDROCHLORIDE 10 MG: 10 TABLET ORAL at 20:42

## 2019-08-15 RX ADMIN — VILAZODONE HYDROCHLORIDE 10 MG: 10 TABLET ORAL at 10:06

## 2019-08-15 RX ADMIN — ASPIRIN 81 MG 81 MG: 81 TABLET ORAL at 09:51

## 2019-08-15 RX ADMIN — ACETAZOLAMIDE 500 MG: 250 TABLET ORAL at 09:50

## 2019-08-15 RX ADMIN — Medication 10 ML: at 09:51

## 2019-08-15 RX ADMIN — INSULIN LISPRO 4 UNITS: 100 INJECTION, SOLUTION INTRAVENOUS; SUBCUTANEOUS at 01:42

## 2019-08-15 RX ADMIN — PRAMIPEXOLE DIHYDROCHLORIDE 1 MG: 1 TABLET ORAL at 17:47

## 2019-08-15 RX ADMIN — OXYCODONE HYDROCHLORIDE AND ACETAMINOPHEN 500 MG: 500 TABLET ORAL at 09:51

## 2019-08-15 RX ADMIN — METHYLPREDNISOLONE SODIUM SUCCINATE 40 MG: 40 INJECTION, POWDER, LYOPHILIZED, FOR SOLUTION INTRAMUSCULAR; INTRAVENOUS at 06:31

## 2019-08-15 RX ADMIN — Medication 10 ML: at 09:52

## 2019-08-15 RX ADMIN — INSULIN LISPRO 6 UNITS: 100 INJECTION, SOLUTION INTRAVENOUS; SUBCUTANEOUS at 18:42

## 2019-08-15 ASSESSMENT — PAIN SCALES - GENERAL
PAINLEVEL_OUTOF10: 0
PAINLEVEL_OUTOF10: 0

## 2019-08-15 NOTE — PROGRESS NOTES
[facility] PROGRESS NOTE  Date: 8/15/2019   Name: Lauren See   MRN: [id number]   YOB: 1955  Chief Complaint   Patient presents with    Other     possible stroke alert, slurred speech, no energy,         Subjective/Interval Hx:     -on bipap when asleep and on nasal canula when awake  -blood pressures stable without the need of midodrine  -making urine    ROS: negative unless mentioned above  Objective:   Physical Exam:   BP (!) 147/53   Pulse 73   Temp 98.6 °F (37 °C) (Rectal)   Resp 16   Ht 5' 6\" (1.676 m)   Wt (!) 359 lb 1.6 oz (162.9 kg)   SpO2 94%   BMI 57.96 kg/m²   CONSTITUTIONAL:  no acute distress, laying in bed  HENT:  NCAT  LUNGS:  On bipap, fine wheeze  CARDIOVASCULAR:  s1s2 rrr  ABDOMEN:  Soft nt nd  GENITAL/URINARY:  Has hernandez  MUSCULOSKELETAL/Extremities:  Trace edema  NEUROLOGIC:  Awake and alert, oriented to person and place  SKIN:  No gross lesion    Assessment/Plan:     1. Possible sepsis with no clear source of infection:  Fever,  hypotension, leukocytosis. UA unremarkable. CXR: no acute process. Left foot Diabetic ulcer does not appear infected. on vancomycin, cefepime empirically. ID following. Wound Care following. Procalcitonin: 0.076. ESR 73. Left foot XR: soft tissue swelling, no osteomyelitis  -ID has stopped abx since there is no clear source of infection. Observe. BCx: NGTD  2. Acute on Chronic Respiratory Failure, due to hyperapnea,COPD exacerbation/CHF  Intubated 8/12 due to hypercapnia. Failed bipap. ABG improved today. Pulm following. On solumedrol. Echo EF70%. Extubated 8/14. CXR suggest pulm edema. BP has improved. Started on lasix. Repeat BNP is slightly elevated. Daily weights and I/Os  -Breathing appears to be improving. 3. Mild JOSE  IVF. Cr mckenzie then improved. 4. Encephalopathy, likely due to hypercapnea and hypoxia  -improving  5. Acute COPD exacerbation  -steroids, bronchodilators, bipap  6.  Aortic stenosis s/p TAVR  Stable at this

## 2019-08-16 ENCOUNTER — APPOINTMENT (OUTPATIENT)
Dept: GENERAL RADIOLOGY | Age: 64
DRG: 720 | End: 2019-08-16
Payer: COMMERCIAL

## 2019-08-16 LAB
ANION GAP SERPL CALCULATED.3IONS-SCNC: 5 MMOL/L (ref 4–16)
BUN BLDV-MCNC: 30 MG/DL (ref 6–23)
CALCIUM SERPL-MCNC: 9.4 MG/DL (ref 8.3–10.6)
CHLORIDE BLD-SCNC: 98 MMOL/L (ref 99–110)
CO2: 37 MMOL/L (ref 21–32)
CREAT SERPL-MCNC: 0.7 MG/DL (ref 0.6–1.1)
CULTURE: ABNORMAL
GFR AFRICAN AMERICAN: >60 ML/MIN/1.73M2
GFR NON-AFRICAN AMERICAN: >60 ML/MIN/1.73M2
GLUCOSE BLD-MCNC: 149 MG/DL (ref 70–99)
GLUCOSE BLD-MCNC: 170 MG/DL (ref 70–99)
GLUCOSE BLD-MCNC: 174 MG/DL (ref 70–99)
GLUCOSE BLD-MCNC: 230 MG/DL (ref 70–99)
GLUCOSE BLD-MCNC: 238 MG/DL (ref 70–99)
GRAM SMEAR: ABNORMAL
HCT VFR BLD CALC: 32.7 % (ref 37–47)
HEMOGLOBIN: 9.3 GM/DL (ref 12.5–16)
Lab: ABNORMAL
MCH RBC QN AUTO: 23.7 PG (ref 27–31)
MCHC RBC AUTO-ENTMCNC: 28.4 % (ref 32–36)
MCV RBC AUTO: 83.4 FL (ref 78–100)
PDW BLD-RTO: 19.4 % (ref 11.7–14.9)
PLATELET # BLD: 247 K/CU MM (ref 140–440)
PMV BLD AUTO: 9.3 FL (ref 7.5–11.1)
POTASSIUM SERPL-SCNC: 3.9 MMOL/L (ref 3.5–5.1)
RBC # BLD: 3.92 M/CU MM (ref 4.2–5.4)
SODIUM BLD-SCNC: 140 MMOL/L (ref 135–145)
SPECIMEN: ABNORMAL
WBC # BLD: 7.4 K/CU MM (ref 4–10.5)

## 2019-08-16 PROCEDURE — 2140000000 HC CCU INTERMEDIATE R&B

## 2019-08-16 PROCEDURE — 80048 BASIC METABOLIC PNL TOTAL CA: CPT

## 2019-08-16 PROCEDURE — 6370000000 HC RX 637 (ALT 250 FOR IP): Performed by: HOSPITALIST

## 2019-08-16 PROCEDURE — 2580000003 HC RX 258: Performed by: HOSPITALIST

## 2019-08-16 PROCEDURE — 85027 COMPLETE CBC AUTOMATED: CPT

## 2019-08-16 PROCEDURE — C9113 INJ PANTOPRAZOLE SODIUM, VIA: HCPCS | Performed by: HOSPITALIST

## 2019-08-16 PROCEDURE — 97116 GAIT TRAINING THERAPY: CPT

## 2019-08-16 PROCEDURE — 6370000000 HC RX 637 (ALT 250 FOR IP): Performed by: INTERNAL MEDICINE

## 2019-08-16 PROCEDURE — 82962 GLUCOSE BLOOD TEST: CPT

## 2019-08-16 PROCEDURE — 97163 PT EVAL HIGH COMPLEX 45 MIN: CPT

## 2019-08-16 PROCEDURE — 2580000003 HC RX 258: Performed by: INTERNAL MEDICINE

## 2019-08-16 PROCEDURE — 6360000002 HC RX W HCPCS: Performed by: INTERNAL MEDICINE

## 2019-08-16 PROCEDURE — 97530 THERAPEUTIC ACTIVITIES: CPT

## 2019-08-16 PROCEDURE — 97166 OT EVAL MOD COMPLEX 45 MIN: CPT

## 2019-08-16 PROCEDURE — 94640 AIRWAY INHALATION TREATMENT: CPT

## 2019-08-16 PROCEDURE — 6360000002 HC RX W HCPCS: Performed by: HOSPITALIST

## 2019-08-16 PROCEDURE — 97535 SELF CARE MNGMENT TRAINING: CPT

## 2019-08-16 RX ORDER — METHYLPREDNISOLONE SODIUM SUCCINATE 40 MG/ML
20 INJECTION, POWDER, LYOPHILIZED, FOR SOLUTION INTRAMUSCULAR; INTRAVENOUS EVERY 12 HOURS
Status: DISCONTINUED | OUTPATIENT
Start: 2019-08-16 | End: 2019-08-18

## 2019-08-16 RX ADMIN — Medication 10 ML: at 21:31

## 2019-08-16 RX ADMIN — FUROSEMIDE 20 MG: 10 INJECTION, SOLUTION INTRAMUSCULAR; INTRAVENOUS at 17:08

## 2019-08-16 RX ADMIN — ACETAZOLAMIDE 500 MG: 250 TABLET ORAL at 10:18

## 2019-08-16 RX ADMIN — INSULIN LISPRO 6 UNITS: 100 INJECTION, SOLUTION INTRAVENOUS; SUBCUTANEOUS at 13:20

## 2019-08-16 RX ADMIN — INSULIN LISPRO 4 UNITS: 100 INJECTION, SOLUTION INTRAVENOUS; SUBCUTANEOUS at 13:19

## 2019-08-16 RX ADMIN — PRAMIPEXOLE DIHYDROCHLORIDE 1 MG: 1 TABLET ORAL at 21:30

## 2019-08-16 RX ADMIN — INSULIN LISPRO 6 UNITS: 100 INJECTION, SOLUTION INTRAVENOUS; SUBCUTANEOUS at 17:10

## 2019-08-16 RX ADMIN — POTASSIUM CHLORIDE 20 MEQ: 20 TABLET, EXTENDED RELEASE ORAL at 10:15

## 2019-08-16 RX ADMIN — GABAPENTIN 800 MG: 400 CAPSULE ORAL at 10:15

## 2019-08-16 RX ADMIN — ACETAZOLAMIDE 500 MG: 250 TABLET ORAL at 21:31

## 2019-08-16 RX ADMIN — MICONAZOLE NITRATE: 20 POWDER TOPICAL at 01:27

## 2019-08-16 RX ADMIN — INSULIN GLARGINE 19 UNITS: 100 INJECTION, SOLUTION SUBCUTANEOUS at 22:48

## 2019-08-16 RX ADMIN — PANTOPRAZOLE SODIUM 40 MG: 40 INJECTION, POWDER, FOR SOLUTION INTRAVENOUS at 10:15

## 2019-08-16 RX ADMIN — INSULIN LISPRO 2 UNITS: 100 INJECTION, SOLUTION INTRAVENOUS; SUBCUTANEOUS at 10:20

## 2019-08-16 RX ADMIN — OXYCODONE HYDROCHLORIDE AND ACETAMINOPHEN 500 MG: 500 TABLET ORAL at 10:15

## 2019-08-16 RX ADMIN — FUROSEMIDE 20 MG: 10 INJECTION, SOLUTION INTRAMUSCULAR; INTRAVENOUS at 10:16

## 2019-08-16 RX ADMIN — SIMVASTATIN 40 MG: 40 TABLET, FILM COATED ORAL at 21:29

## 2019-08-16 RX ADMIN — GABAPENTIN 800 MG: 400 CAPSULE ORAL at 13:18

## 2019-08-16 RX ADMIN — GABAPENTIN 800 MG: 400 CAPSULE ORAL at 21:29

## 2019-08-16 RX ADMIN — MICONAZOLE NITRATE: 20 POWDER TOPICAL at 22:53

## 2019-08-16 RX ADMIN — DULOXETINE HYDROCHLORIDE 30 MG: 30 CAPSULE, DELAYED RELEASE ORAL at 10:15

## 2019-08-16 RX ADMIN — MICONAZOLE NITRATE: 20 POWDER TOPICAL at 10:18

## 2019-08-16 RX ADMIN — IPRATROPIUM BROMIDE AND ALBUTEROL SULFATE 1 AMPULE: .5; 3 SOLUTION RESPIRATORY (INHALATION) at 19:36

## 2019-08-16 RX ADMIN — ASPIRIN 81 MG 81 MG: 81 TABLET ORAL at 10:15

## 2019-08-16 RX ADMIN — Medication 10 ML: at 10:18

## 2019-08-16 RX ADMIN — METHYLPREDNISOLONE SODIUM SUCCINATE 40 MG: 40 INJECTION, POWDER, LYOPHILIZED, FOR SOLUTION INTRAMUSCULAR; INTRAVENOUS at 06:57

## 2019-08-16 RX ADMIN — PRAMIPEXOLE DIHYDROCHLORIDE 1 MG: 1 TABLET ORAL at 13:19

## 2019-08-16 RX ADMIN — VILAZODONE HYDROCHLORIDE 10 MG: 10 TABLET ORAL at 13:19

## 2019-08-16 RX ADMIN — METHYLPREDNISOLONE SODIUM SUCCINATE 20 MG: 40 INJECTION, POWDER, FOR SOLUTION INTRAMUSCULAR; INTRAVENOUS at 17:07

## 2019-08-16 RX ADMIN — Medication 10 ML: at 22:54

## 2019-08-16 RX ADMIN — Medication 10 ML: at 10:15

## 2019-08-16 RX ADMIN — ENOXAPARIN SODIUM 40 MG: 40 INJECTION SUBCUTANEOUS at 10:16

## 2019-08-16 RX ADMIN — CLOPIDOGREL BISULFATE 75 MG: 75 TABLET ORAL at 10:15

## 2019-08-16 RX ADMIN — INSULIN LISPRO 2 UNITS: 100 INJECTION, SOLUTION INTRAVENOUS; SUBCUTANEOUS at 17:08

## 2019-08-16 RX ADMIN — INSULIN LISPRO 4 UNITS: 100 INJECTION, SOLUTION INTRAVENOUS; SUBCUTANEOUS at 22:48

## 2019-08-16 RX ADMIN — CETIRIZINE HYDROCHLORIDE 10 MG: 10 TABLET, FILM COATED ORAL at 10:15

## 2019-08-16 RX ADMIN — VILAZODONE HYDROCHLORIDE 10 MG: 10 TABLET ORAL at 21:29

## 2019-08-16 RX ADMIN — INSULIN LISPRO 6 UNITS: 100 INJECTION, SOLUTION INTRAVENOUS; SUBCUTANEOUS at 10:26

## 2019-08-16 ASSESSMENT — PAIN SCALES - GENERAL
PAINLEVEL_OUTOF10: 0

## 2019-08-16 NOTE — PROGRESS NOTES
Dtr has RA but does not need physical assist (has a lot of Dr appointments throughout the week)     Examination of body systems (includes body structures/functions, activity/participation limitations):  · Orientation: WFL   · Cognition:  WFL but delayed processing  · Observation:  Received pt in bed. Alert and cooperative. 3-4 L of O2. · Vision:  glasses  · Hearing:  WFL  · ROM:  Lacking 30* AROM of shldrs. Otherwise WFL. · Strength: 4-/5 shldrs, 5/5 distally  · Sensation: WFL    ADLs  Feeding: ROBERTO    Grooming: SBA in seated    Dressing: UB Rachael in seated LB DEP. Unable to manage distal components, even at baseline. Brief management not assessed    Bathing: UB Rachael in seated LB DEP/Max. Can likely mange upper LEs in seated. Toileting: ModA (assisted with buttock hygiene)    *Some ADL determined per observation of actual ADL performance, functional mobility, balance, activity tolerance, and cognition. AM-PAC 6 click short form for inpatient daily activity:  Raw Score: 14  24/24 = unimpaired  23/24 = 1-20% impaired   20/24-22/24 = 21-40% impaired  15/24-19/24 = 41-59% impaired   10/24-14/24 = 60%-79% impaired  7/24-9/24 = 80%-99% impaired  6/24 = 100% impaired    Functional Mobility  Bed mobility:   Supine to sit: MaxAx2 c bed features  Sit to supine: MOdA (assisted c LEs into bed)      Sitting balance: Supervision    Transfers:   Sit to stand: CGA from EOB, ModA from low commode c cue to use grab bar. Stand to sit: CGA c cues for safe hand placement. Standing balance: CGA c 1-2 UE support on walker static and light dynamic    Ambulation:  CGA c RW within room . Slow pace. Activity tolerance  Good, but below baseline.      Assessment:  Assessment  Performance deficits / Impairments: Decreased endurance, Decreased high-level IADLs, Decreased ADL status, Decreased functional mobility , Decreased ROM, Decreased strength, Decreased safe awareness, Decreased balance  Treatment Diagnosis: resp

## 2019-08-16 NOTE — PROGRESS NOTES
Discharge Recommendations: Subacute/Skilled Nursing Facility  Equipment: no needs at this time     Goals:  Short term goals  Time Frame for Short term goals: 1 week   Short term goal 1: Pt will perform sit><supine maxA   Short term goal 2: Pt will transfer sit><stand SBA   Short term goal 3: Pt will transfer to bed/recliner SBA   Short term goal 4: Pt will ambulate 50ft (with pressure relief shoe) SBA using RW   Short term goal 5: Pt will ascend/descend curb step with RW CGA        Treatment plan:  Strengthening; Balance Training; Transfer Training; ROM; Functional Mobility Training; ADL/Self-care Training; Endurance Training; Gait Training; Stair training; Neuromuscular Re-education; Home Exercise Program; Modalities; Safety Education & Training; Equipment Evaluation, Education, & procurement; Positioning; Patient/Caregiver Education & Training    Recommendations for NURSING mobility: up to [facility] with RW.  Ambulate to bathroom (if pressure relief shoe available)     Time:   Time in: 1010  Time out: 1109  Timed treatment minutes: 39  Total time: 59    Electronically signed by:    Hali English [id number]  8/16/2019, 11:57 AM

## 2019-08-16 NOTE — PROGRESS NOTES
Nutrition Assessment    Type and Reason for Visit: Reassess    Nutrition Recommendations:   Continue current diet  Resume wound healing oral nutrition supplement bid    Nutrition Assessment: Adequate intake, consuming greater than half of meals. Will resume wound healing oral supplement. Pt working with therapy during room visit. Malnutrition Assessment:  · Malnutrition Status: At risk for malnutrition    Nutrition Risk Level:  Moderate    Nutrient Needs:  · Estimated Daily Total Kcal: 5785-7672 based on [address] jeor  · Estimated Daily Protein (g): 61-73 based on 1-1.2 g/kg/IBW  · Estimated Daily Total Fluid (ml/day): per nephrology    Nutrition Diagnosis:   · Problem: Increased nutrient needs  · Etiology: related to Endocrine dysfunction     Signs and symptoms:  as evidenced by Presence of wounds    Objective Information:  · Wound Type: Diabetic Ulcer  · Current Nutrition Therapies:  · Oral Diet Orders: Cardiac, Carb Control 4 Carbs/Meal, 2gm Sodium   · Oral Diet intake: 51-75%  · Oral Nutrition Supplement (ONS) Orders: None  · Anthropometric Measures:  · Ht: 5' 6\" (167.6 cm)   · Current Body Wt: 430 lb (195 kg)  · Admission Body Wt: 359 lb (162.8 kg)  · Usual Body Wt: (FRANKI)  · % Weight Change:    FRANKI  · Ideal Body Wt: 130 lb (59 kg), % [place] Body 331  · BMI Classification: BMI > or equal to 40.0 Obese Class III    Nutrition Interventions:   Continue current diet, Start ONS  Continued Inpatient Monitoring, Education not appropriate at this time, Coordination of Care    Nutrition Evaluation:   · Evaluation: Progressing toward goals   · Goals: pt will consume greater than 75% of her meals and supplements    · Monitoring: Meal Intake, Weight, Pertinent Labs, Supplement Intake, Wound Healing      Electronically signed by Baljeet Silver RD, LD on 8/16/19 at 11:58 AM    Contact Number: [phone]

## 2019-08-17 ENCOUNTER — APPOINTMENT (OUTPATIENT)
Dept: GENERAL RADIOLOGY | Age: 64
DRG: 720 | End: 2019-08-17
Payer: COMMERCIAL

## 2019-08-17 LAB
ANION GAP SERPL CALCULATED.3IONS-SCNC: 8 MMOL/L (ref 4–16)
BUN BLDV-MCNC: 30 MG/DL (ref 6–23)
CALCIUM SERPL-MCNC: 9.6 MG/DL (ref 8.3–10.6)
CHLORIDE BLD-SCNC: 98 MMOL/L (ref 99–110)
CLOSTRIDIUM DIFFICILE, PCR: ABNORMAL
CLOSTRIDIUM DIFFICILE, PCR: ABNORMAL
CO2: 37 MMOL/L (ref 21–32)
CREAT SERPL-MCNC: 0.7 MG/DL (ref 0.6–1.1)
CULTURE: NORMAL
CULTURE: NORMAL
GFR AFRICAN AMERICAN: >60 ML/MIN/1.73M2
GFR NON-AFRICAN AMERICAN: >60 ML/MIN/1.73M2
GLUCOSE BLD-MCNC: 133 MG/DL (ref 70–99)
GLUCOSE BLD-MCNC: 155 MG/DL (ref 70–99)
GLUCOSE BLD-MCNC: 161 MG/DL (ref 70–99)
GLUCOSE BLD-MCNC: 185 MG/DL (ref 70–99)
GLUCOSE BLD-MCNC: 200 MG/DL (ref 70–99)
GLUCOSE BLD-MCNC: 228 MG/DL (ref 70–99)
HCT VFR BLD CALC: 34.3 % (ref 37–47)
HEMOGLOBIN: 9.7 GM/DL (ref 12.5–16)
Lab: NORMAL
Lab: NORMAL
MCH RBC QN AUTO: 23.8 PG (ref 27–31)
MCHC RBC AUTO-ENTMCNC: 28.3 % (ref 32–36)
MCV RBC AUTO: 84.1 FL (ref 78–100)
PDW BLD-RTO: 19.1 % (ref 11.7–14.9)
PLATELET # BLD: 268 K/CU MM (ref 140–440)
PMV BLD AUTO: 9.2 FL (ref 7.5–11.1)
POTASSIUM SERPL-SCNC: 3.7 MMOL/L (ref 3.5–5.1)
RBC # BLD: 4.08 M/CU MM (ref 4.2–5.4)
SODIUM BLD-SCNC: 143 MMOL/L (ref 135–145)
SPECIMEN: NORMAL
SPECIMEN: NORMAL
WBC # BLD: 8.6 K/CU MM (ref 4–10.5)

## 2019-08-17 PROCEDURE — 94640 AIRWAY INHALATION TREATMENT: CPT

## 2019-08-17 PROCEDURE — 94660 CPAP INITIATION&MGMT: CPT

## 2019-08-17 PROCEDURE — 80048 BASIC METABOLIC PNL TOTAL CA: CPT

## 2019-08-17 PROCEDURE — 2140000000 HC CCU INTERMEDIATE R&B

## 2019-08-17 PROCEDURE — 6360000002 HC RX W HCPCS: Performed by: HOSPITALIST

## 2019-08-17 PROCEDURE — 6370000000 HC RX 637 (ALT 250 FOR IP): Performed by: HOSPITALIST

## 2019-08-17 PROCEDURE — 6360000002 HC RX W HCPCS: Performed by: INTERNAL MEDICINE

## 2019-08-17 PROCEDURE — 6370000000 HC RX 637 (ALT 250 FOR IP): Performed by: INTERNAL MEDICINE

## 2019-08-17 PROCEDURE — 85027 COMPLETE CBC AUTOMATED: CPT

## 2019-08-17 PROCEDURE — 2700000000 HC OXYGEN THERAPY PER DAY

## 2019-08-17 PROCEDURE — 87324 CLOSTRIDIUM AG IA: CPT

## 2019-08-17 PROCEDURE — 2580000003 HC RX 258: Performed by: INTERNAL MEDICINE

## 2019-08-17 PROCEDURE — 94761 N-INVAS EAR/PLS OXIMETRY MLT: CPT

## 2019-08-17 PROCEDURE — 82962 GLUCOSE BLOOD TEST: CPT

## 2019-08-17 PROCEDURE — C9113 INJ PANTOPRAZOLE SODIUM, VIA: HCPCS | Performed by: HOSPITALIST

## 2019-08-17 PROCEDURE — 2580000003 HC RX 258: Performed by: HOSPITALIST

## 2019-08-17 RX ADMIN — ENOXAPARIN SODIUM 40 MG: 40 INJECTION SUBCUTANEOUS at 09:51

## 2019-08-17 RX ADMIN — ASPIRIN 81 MG 81 MG: 81 TABLET ORAL at 09:50

## 2019-08-17 RX ADMIN — INSULIN GLARGINE 19 UNITS: 100 INJECTION, SOLUTION SUBCUTANEOUS at 21:24

## 2019-08-17 RX ADMIN — MICONAZOLE NITRATE: 20 POWDER TOPICAL at 21:18

## 2019-08-17 RX ADMIN — PRAMIPEXOLE DIHYDROCHLORIDE 1 MG: 1 TABLET ORAL at 10:19

## 2019-08-17 RX ADMIN — Medication 10 ML: at 21:15

## 2019-08-17 RX ADMIN — ACETAZOLAMIDE 500 MG: 250 TABLET ORAL at 21:19

## 2019-08-17 RX ADMIN — POTASSIUM CHLORIDE 20 MEQ: 20 TABLET, EXTENDED RELEASE ORAL at 09:50

## 2019-08-17 RX ADMIN — PRAMIPEXOLE DIHYDROCHLORIDE 1 MG: 1 TABLET ORAL at 13:50

## 2019-08-17 RX ADMIN — METHYLPREDNISOLONE SODIUM SUCCINATE 20 MG: 40 INJECTION, POWDER, FOR SOLUTION INTRAMUSCULAR; INTRAVENOUS at 05:21

## 2019-08-17 RX ADMIN — GABAPENTIN 800 MG: 400 CAPSULE ORAL at 09:50

## 2019-08-17 RX ADMIN — OXYCODONE HYDROCHLORIDE AND ACETAMINOPHEN 500 MG: 500 TABLET ORAL at 09:50

## 2019-08-17 RX ADMIN — FUROSEMIDE 20 MG: 10 INJECTION, SOLUTION INTRAMUSCULAR; INTRAVENOUS at 09:51

## 2019-08-17 RX ADMIN — PRAMIPEXOLE DIHYDROCHLORIDE 1 MG: 1 TABLET ORAL at 21:18

## 2019-08-17 RX ADMIN — Medication 125 MG: at 23:59

## 2019-08-17 RX ADMIN — INSULIN LISPRO 6 UNITS: 100 INJECTION, SOLUTION INTRAVENOUS; SUBCUTANEOUS at 12:20

## 2019-08-17 RX ADMIN — ACETAZOLAMIDE 500 MG: 250 TABLET ORAL at 10:19

## 2019-08-17 RX ADMIN — IPRATROPIUM BROMIDE AND ALBUTEROL SULFATE 1 AMPULE: .5; 3 SOLUTION RESPIRATORY (INHALATION) at 20:23

## 2019-08-17 RX ADMIN — MICONAZOLE NITRATE: 20 POWDER TOPICAL at 10:20

## 2019-08-17 RX ADMIN — Medication 10 ML: at 09:51

## 2019-08-17 RX ADMIN — INSULIN LISPRO 6 UNITS: 100 INJECTION, SOLUTION INTRAVENOUS; SUBCUTANEOUS at 17:56

## 2019-08-17 RX ADMIN — GABAPENTIN 800 MG: 400 CAPSULE ORAL at 13:50

## 2019-08-17 RX ADMIN — CLOPIDOGREL BISULFATE 75 MG: 75 TABLET ORAL at 09:50

## 2019-08-17 RX ADMIN — INSULIN LISPRO 2 UNITS: 100 INJECTION, SOLUTION INTRAVENOUS; SUBCUTANEOUS at 10:17

## 2019-08-17 RX ADMIN — INSULIN LISPRO 4 UNITS: 100 INJECTION, SOLUTION INTRAVENOUS; SUBCUTANEOUS at 21:24

## 2019-08-17 RX ADMIN — CETIRIZINE HYDROCHLORIDE 10 MG: 10 TABLET, FILM COATED ORAL at 09:50

## 2019-08-17 RX ADMIN — VILAZODONE HYDROCHLORIDE 10 MG: 10 TABLET ORAL at 10:19

## 2019-08-17 RX ADMIN — INSULIN LISPRO 2 UNITS: 100 INJECTION, SOLUTION INTRAVENOUS; SUBCUTANEOUS at 17:57

## 2019-08-17 RX ADMIN — FUROSEMIDE 20 MG: 10 INJECTION, SOLUTION INTRAMUSCULAR; INTRAVENOUS at 17:00

## 2019-08-17 RX ADMIN — IPRATROPIUM BROMIDE AND ALBUTEROL SULFATE 1 AMPULE: .5; 3 SOLUTION RESPIRATORY (INHALATION) at 12:42

## 2019-08-17 RX ADMIN — Medication 10 ML: at 09:52

## 2019-08-17 RX ADMIN — GABAPENTIN 800 MG: 400 CAPSULE ORAL at 21:18

## 2019-08-17 RX ADMIN — VILAZODONE HYDROCHLORIDE 10 MG: 10 TABLET ORAL at 21:19

## 2019-08-17 RX ADMIN — Medication 125 MG: at 16:59

## 2019-08-17 RX ADMIN — DULOXETINE HYDROCHLORIDE 30 MG: 30 CAPSULE, DELAYED RELEASE ORAL at 10:20

## 2019-08-17 RX ADMIN — METHYLPREDNISOLONE SODIUM SUCCINATE 20 MG: 40 INJECTION, POWDER, FOR SOLUTION INTRAMUSCULAR; INTRAVENOUS at 16:29

## 2019-08-17 RX ADMIN — INSULIN LISPRO 6 UNITS: 100 INJECTION, SOLUTION INTRAVENOUS; SUBCUTANEOUS at 10:18

## 2019-08-17 RX ADMIN — PANTOPRAZOLE SODIUM 40 MG: 40 INJECTION, POWDER, FOR SOLUTION INTRAVENOUS at 09:51

## 2019-08-17 RX ADMIN — SIMVASTATIN 40 MG: 40 TABLET, FILM COATED ORAL at 21:19

## 2019-08-17 RX ADMIN — INSULIN LISPRO 4 UNITS: 100 INJECTION, SOLUTION INTRAVENOUS; SUBCUTANEOUS at 12:20

## 2019-08-17 RX ADMIN — IPRATROPIUM BROMIDE AND ALBUTEROL SULFATE 1 AMPULE: .5; 3 SOLUTION RESPIRATORY (INHALATION) at 09:02

## 2019-08-17 ASSESSMENT — PAIN SCALES - GENERAL
PAINLEVEL_OUTOF10: 0
PAINLEVEL_OUTOF10: 0

## 2019-08-17 NOTE — PROGRESS NOTES
pulmonary      SUBJECTIVE: doing fair     OBJECTIVE    VITALS:  BP (!) 142/57   Pulse 64   Temp 97.8 °F (36.6 °C) (Oral)   Resp 14   Ht 5' 6\" (1.676 m)   Wt (!) 430 lb 1.6 oz (195.1 kg)   SpO2 98%   BMI 69.42 kg/m²   HEAD AND FACE EXAM:  No throat injection, no active exudate,no thrush  NECK EXAM;No JVD, no masses, symmetrical  CHEST EXAM; Expansion equal and symmetrical, no masses  LUNG EXAM; Good breath sounds bilaterally. There are expiratory wheezes both lungs, there are crackles at both lung bases  CARDIOVASCULAR EXAM: Positive S1 and S2, no S3 or S4, no clicks ,no murmurs  RIGHT AND LEFT LOWER EXTRIMITY EXAM: No edema, no swelling, no inflamation  CNS EXAM: Alert and oriented X3          LABS   Lab Results   Component Value Date    WBC 8.6 08/17/2019    HGB 9.7 (L) 08/17/2019    HCT 34.3 (L) 08/17/2019    MCV 84.1 08/17/2019     08/17/2019     Lab Results   Component Value Date    CREATININE 0.7 08/17/2019    BUN 30 (H) 08/17/2019     08/17/2019    K 3.7 08/17/2019    CL 98 (L) 08/17/2019    CO2 37 (H) 08/17/2019     Lab Results   Component Value Date    INR 1.03 08/11/2019    PROTIME 11.7 08/11/2019          Lab Results   Component Value Date    PHOS 4.8 06/25/2019    PHOS 4.0 01/30/2018    PHOS 3.0 11/17/2017      No results for input(s): PH, PO2ART, RIT1UCK, HCO3, BEART, O2SAT in the last 72 hours. Wt Readings from Last 3 Encounters:   08/16/19 (!) 430 lb 1.6 oz (195.1 kg)   07/05/19 (!) 432 lb (196 kg)   06/27/19 (!) 432 lb 1.6 oz (196 kg)               ASSESMENT  Ac on ch resp failure  Ac copd  ronni        PLAN  1. cpm  2.  Cont pap rx    8/17/2019  Ubaldo Davis M.D.

## 2019-08-17 NOTE — PROGRESS NOTES
Was paged by nurse that c diff is positive  Started on PO vancomycin 125 mg q6h oral solution  On isolation and precaution

## 2019-08-17 NOTE — PROGRESS NOTES
DULoxetine  30 mg Oral Daily    cetirizine  10 mg Oral Daily    gabapentin  800 mg Oral TID    miconazole   Topical BID    potassium chloride  20 mEq Oral Daily    pramipexole  1 mg Oral TID    vilazodone HCl  10 mg Oral BID    simvastatin  40 mg Oral Nightly      Infusions:    dextrose       PRN Meds:     sodium chloride flush 10 mL PRN   sodium chloride flush 10 mL PRN   albuterol sulfate HFA 2 puff Q4H PRN   glucose 15 g PRN   dextrose 12.5 g PRN   glucagon (rDNA) 1 mg PRN   dextrose 100 mL/hr PRN       Data/Labs:     Recent Labs     08/15/19  0500 08/16/19  0820 08/17/19  0520   WBC 8.3 7.4 8.6   HGB 9.3* 9.3* 9.7*   HCT 31.4* 32.7* 34.3*    247 268      Recent Labs     08/15/19  0500 08/16/19  0820 08/17/19  0520   * 140 143   K 4.0 3.9 3.7   CL 96* 98* 98*   CO2 31 37* 37*   BUN 30* 30* 30*   CREATININE 0.7 0.7 0.7     No results for input(s): AST, ALT, ALB, BILIDIR, BILITOT, ALKPHOS in the last 72 hours. No results for input(s): INR in the last 72 hours. No results for input(s): CKTOTAL, CKMB, CKMBINDEX, TROPONINT in the last 72 hours. HgBA1c:   Lab Results   Component Value Date    LABA1C 11.2 06/16/2019     CALCIUM:  9.6/37 (08/17 0520)    I/O last 3 completed shifts:   In: 480 [P.O.:480]  Out: 5250 [Urine:5250]    Intake/Output Summary (Last 24 hours) at 8/17/2019 1054  Last data filed at 8/17/2019 0650  Gross per 24 hour   Intake 480 ml   Output 4550 ml   Net -4070 ml

## 2019-08-18 ENCOUNTER — APPOINTMENT (OUTPATIENT)
Dept: GENERAL RADIOLOGY | Age: 64
DRG: 720 | End: 2019-08-18
Payer: COMMERCIAL

## 2019-08-18 LAB
ALBUMIN SERPL-MCNC: 3.5 GM/DL (ref 3.4–5)
ALP BLD-CCNC: 71 IU/L (ref 40–129)
ALT SERPL-CCNC: 10 U/L (ref 10–40)
ANION GAP SERPL CALCULATED.3IONS-SCNC: 7 MMOL/L (ref 4–16)
AST SERPL-CCNC: 9 IU/L (ref 15–37)
BACTERIA: NEGATIVE /HPF
BILIRUB SERPL-MCNC: 0.2 MG/DL (ref 0–1)
BILIRUBIN URINE: NEGATIVE MG/DL
BLOOD, URINE: ABNORMAL
BUN BLDV-MCNC: 30 MG/DL (ref 6–23)
CALCIUM SERPL-MCNC: 9.7 MG/DL (ref 8.3–10.6)
CHLORIDE BLD-SCNC: 95 MMOL/L (ref 99–110)
CHLORIDE URINE RANDOM: 124 MMOL/L (ref 43–210)
CLARITY: CLEAR
CO2: 36 MMOL/L (ref 21–32)
COLOR: ABNORMAL
CREAT SERPL-MCNC: 0.7 MG/DL (ref 0.6–1.1)
CREATININE URINE: 18.8 MG/DL (ref 28–217)
GFR AFRICAN AMERICAN: >60 ML/MIN/1.73M2
GFR NON-AFRICAN AMERICAN: >60 ML/MIN/1.73M2
GLUCOSE BLD-MCNC: 156 MG/DL (ref 70–99)
GLUCOSE BLD-MCNC: 156 MG/DL (ref 70–99)
GLUCOSE BLD-MCNC: 159 MG/DL (ref 70–99)
GLUCOSE BLD-MCNC: 170 MG/DL (ref 70–99)
GLUCOSE BLD-MCNC: 242 MG/DL (ref 70–99)
GLUCOSE, URINE: NEGATIVE MG/DL
HCT VFR BLD CALC: 35.1 % (ref 37–47)
HEMOGLOBIN: 10.3 GM/DL (ref 12.5–16)
KETONES, URINE: NEGATIVE MG/DL
LEUKOCYTE ESTERASE, URINE: NEGATIVE
MAGNESIUM: 1.6 MG/DL (ref 1.8–2.4)
MCH RBC QN AUTO: 24.4 PG (ref 27–31)
MCHC RBC AUTO-ENTMCNC: 29.3 % (ref 32–36)
MCV RBC AUTO: 83.2 FL (ref 78–100)
MUCUS: ABNORMAL HPF
NITRITE URINE, QUANTITATIVE: NEGATIVE
PDW BLD-RTO: 19.1 % (ref 11.7–14.9)
PH, URINE: 7 (ref 5–8)
PHOSPHORUS: 3.6 MG/DL (ref 2.5–4.9)
PLATELET # BLD: 267 K/CU MM (ref 140–440)
PMV BLD AUTO: 9.2 FL (ref 7.5–11.1)
POTASSIUM SERPL-SCNC: 4 MMOL/L (ref 3.5–5.1)
POTASSIUM, UR: 19 MMOL/L (ref 22–119)
PROT/CREAT RATIO, UR: ABNORMAL
PROTEIN UA: NEGATIVE MG/DL
RBC # BLD: 4.22 M/CU MM (ref 4.2–5.4)
RBC URINE: 7 /HPF (ref 0–6)
SODIUM BLD-SCNC: 138 MMOL/L (ref 135–145)
SODIUM URINE: 131 MMOL/L (ref 35–167)
SPECIFIC GRAVITY UA: 1.01 (ref 1–1.03)
SQUAMOUS EPITHELIAL: <1 /HPF
TOTAL PROTEIN: 6.9 GM/DL (ref 6.4–8.2)
TRICHOMONAS: ABNORMAL /HPF
URINE TOTAL PROTEIN: 12.3 MG/DL
UROBILINOGEN, URINE: NORMAL MG/DL (ref 0.2–1)
WBC # BLD: 9.5 K/CU MM (ref 4–10.5)
WBC UA: <1 /HPF (ref 0–5)

## 2019-08-18 PROCEDURE — 6360000002 HC RX W HCPCS: Performed by: HOSPITALIST

## 2019-08-18 PROCEDURE — 71045 X-RAY EXAM CHEST 1 VIEW: CPT

## 2019-08-18 PROCEDURE — 6370000000 HC RX 637 (ALT 250 FOR IP): Performed by: HOSPITALIST

## 2019-08-18 PROCEDURE — 80048 BASIC METABOLIC PNL TOTAL CA: CPT

## 2019-08-18 PROCEDURE — 82570 ASSAY OF URINE CREATININE: CPT

## 2019-08-18 PROCEDURE — 2580000003 HC RX 258: Performed by: HOSPITALIST

## 2019-08-18 PROCEDURE — C9113 INJ PANTOPRAZOLE SODIUM, VIA: HCPCS | Performed by: HOSPITALIST

## 2019-08-18 PROCEDURE — 94640 AIRWAY INHALATION TREATMENT: CPT

## 2019-08-18 PROCEDURE — 6370000000 HC RX 637 (ALT 250 FOR IP): Performed by: INTERNAL MEDICINE

## 2019-08-18 PROCEDURE — 6360000002 HC RX W HCPCS: Performed by: INTERNAL MEDICINE

## 2019-08-18 PROCEDURE — 94660 CPAP INITIATION&MGMT: CPT

## 2019-08-18 PROCEDURE — 85027 COMPLETE CBC AUTOMATED: CPT

## 2019-08-18 PROCEDURE — 84100 ASSAY OF PHOSPHORUS: CPT

## 2019-08-18 PROCEDURE — 83735 ASSAY OF MAGNESIUM: CPT

## 2019-08-18 PROCEDURE — 2140000000 HC CCU INTERMEDIATE R&B

## 2019-08-18 PROCEDURE — 2700000000 HC OXYGEN THERAPY PER DAY

## 2019-08-18 PROCEDURE — 2580000003 HC RX 258: Performed by: INTERNAL MEDICINE

## 2019-08-18 PROCEDURE — 84133 ASSAY OF URINE POTASSIUM: CPT

## 2019-08-18 PROCEDURE — 82962 GLUCOSE BLOOD TEST: CPT

## 2019-08-18 PROCEDURE — 80053 COMPREHEN METABOLIC PANEL: CPT

## 2019-08-18 PROCEDURE — 84300 ASSAY OF URINE SODIUM: CPT

## 2019-08-18 PROCEDURE — 94761 N-INVAS EAR/PLS OXIMETRY MLT: CPT

## 2019-08-18 PROCEDURE — 82436 ASSAY OF URINE CHLORIDE: CPT

## 2019-08-18 PROCEDURE — 81001 URINALYSIS AUTO W/SCOPE: CPT

## 2019-08-18 PROCEDURE — 84156 ASSAY OF PROTEIN URINE: CPT

## 2019-08-18 RX ORDER — METOLAZONE 2.5 MG/1
2.5 TABLET ORAL DAILY
Status: DISCONTINUED | OUTPATIENT
Start: 2019-08-18 | End: 2019-08-19

## 2019-08-18 RX ORDER — MAGNESIUM SULFATE 4 G/50ML
4 INJECTION INTRAVENOUS ONCE
Status: COMPLETED | OUTPATIENT
Start: 2019-08-18 | End: 2019-08-18

## 2019-08-18 RX ORDER — METHYLPREDNISOLONE SODIUM SUCCINATE 40 MG/ML
20 INJECTION, POWDER, LYOPHILIZED, FOR SOLUTION INTRAMUSCULAR; INTRAVENOUS DAILY
Status: DISCONTINUED | OUTPATIENT
Start: 2019-08-19 | End: 2019-08-21 | Stop reason: HOSPADM

## 2019-08-18 RX ORDER — TORSEMIDE 20 MG/1
20 TABLET ORAL 2 TIMES DAILY
Status: DISCONTINUED | OUTPATIENT
Start: 2019-08-18 | End: 2019-08-19

## 2019-08-18 RX ADMIN — INSULIN LISPRO 2 UNITS: 100 INJECTION, SOLUTION INTRAVENOUS; SUBCUTANEOUS at 16:44

## 2019-08-18 RX ADMIN — ENOXAPARIN SODIUM 40 MG: 40 INJECTION SUBCUTANEOUS at 08:36

## 2019-08-18 RX ADMIN — INSULIN LISPRO 2 UNITS: 100 INJECTION, SOLUTION INTRAVENOUS; SUBCUTANEOUS at 08:40

## 2019-08-18 RX ADMIN — Medication 10 ML: at 23:30

## 2019-08-18 RX ADMIN — CETIRIZINE HYDROCHLORIDE 10 MG: 10 TABLET, FILM COATED ORAL at 08:35

## 2019-08-18 RX ADMIN — PANTOPRAZOLE SODIUM 40 MG: 40 INJECTION, POWDER, FOR SOLUTION INTRAVENOUS at 08:36

## 2019-08-18 RX ADMIN — GABAPENTIN 800 MG: 400 CAPSULE ORAL at 23:29

## 2019-08-18 RX ADMIN — IPRATROPIUM BROMIDE AND ALBUTEROL SULFATE 1 AMPULE: .5; 3 SOLUTION RESPIRATORY (INHALATION) at 09:00

## 2019-08-18 RX ADMIN — INSULIN GLARGINE 19 UNITS: 100 INJECTION, SOLUTION SUBCUTANEOUS at 23:25

## 2019-08-18 RX ADMIN — PRAMIPEXOLE DIHYDROCHLORIDE 1 MG: 1 TABLET ORAL at 11:52

## 2019-08-18 RX ADMIN — MICONAZOLE NITRATE: 20 POWDER TOPICAL at 23:32

## 2019-08-18 RX ADMIN — Medication 125 MG: at 18:35

## 2019-08-18 RX ADMIN — PRAMIPEXOLE DIHYDROCHLORIDE 1 MG: 1 TABLET ORAL at 16:50

## 2019-08-18 RX ADMIN — MAGNESIUM SULFATE HEPTAHYDRATE 4 G: 80 INJECTION, SOLUTION INTRAVENOUS at 11:56

## 2019-08-18 RX ADMIN — Medication 10 ML: at 08:37

## 2019-08-18 RX ADMIN — Medication 125 MG: at 12:06

## 2019-08-18 RX ADMIN — GABAPENTIN 800 MG: 400 CAPSULE ORAL at 08:36

## 2019-08-18 RX ADMIN — ASPIRIN 81 MG 81 MG: 81 TABLET ORAL at 08:35

## 2019-08-18 RX ADMIN — POTASSIUM CHLORIDE 20 MEQ: 20 TABLET, EXTENDED RELEASE ORAL at 08:35

## 2019-08-18 RX ADMIN — INSULIN LISPRO 6 UNITS: 100 INJECTION, SOLUTION INTRAVENOUS; SUBCUTANEOUS at 08:43

## 2019-08-18 RX ADMIN — OXYCODONE HYDROCHLORIDE AND ACETAMINOPHEN 500 MG: 500 TABLET ORAL at 08:36

## 2019-08-18 RX ADMIN — VILAZODONE HYDROCHLORIDE 10 MG: 10 TABLET ORAL at 23:29

## 2019-08-18 RX ADMIN — METOLAZONE 2.5 MG: 2.5 TABLET ORAL at 14:38

## 2019-08-18 RX ADMIN — TORSEMIDE 20 MG: 20 TABLET ORAL at 11:53

## 2019-08-18 RX ADMIN — INSULIN LISPRO 2 UNITS: 100 INJECTION, SOLUTION INTRAVENOUS; SUBCUTANEOUS at 23:25

## 2019-08-18 RX ADMIN — FUROSEMIDE 20 MG: 10 INJECTION, SOLUTION INTRAMUSCULAR; INTRAVENOUS at 08:36

## 2019-08-18 RX ADMIN — ACETAZOLAMIDE 500 MG: 250 TABLET ORAL at 08:35

## 2019-08-18 RX ADMIN — INSULIN LISPRO 6 UNITS: 100 INJECTION, SOLUTION INTRAVENOUS; SUBCUTANEOUS at 16:47

## 2019-08-18 RX ADMIN — CLOPIDOGREL BISULFATE 75 MG: 75 TABLET ORAL at 08:36

## 2019-08-18 RX ADMIN — MICONAZOLE NITRATE: 20 POWDER TOPICAL at 08:38

## 2019-08-18 RX ADMIN — VILAZODONE HYDROCHLORIDE 10 MG: 10 TABLET ORAL at 08:36

## 2019-08-18 RX ADMIN — PRAMIPEXOLE DIHYDROCHLORIDE 1 MG: 1 TABLET ORAL at 23:29

## 2019-08-18 RX ADMIN — ACETAZOLAMIDE 500 MG: 250 TABLET ORAL at 23:29

## 2019-08-18 RX ADMIN — SIMVASTATIN 40 MG: 40 TABLET, FILM COATED ORAL at 23:29

## 2019-08-18 RX ADMIN — INSULIN LISPRO 6 UNITS: 100 INJECTION, SOLUTION INTRAVENOUS; SUBCUTANEOUS at 12:09

## 2019-08-18 RX ADMIN — IPRATROPIUM BROMIDE AND ALBUTEROL SULFATE 1 AMPULE: .5; 3 SOLUTION RESPIRATORY (INHALATION) at 19:55

## 2019-08-18 RX ADMIN — INSULIN LISPRO 4 UNITS: 100 INJECTION, SOLUTION INTRAVENOUS; SUBCUTANEOUS at 12:07

## 2019-08-18 RX ADMIN — TORSEMIDE 20 MG: 20 TABLET ORAL at 18:35

## 2019-08-18 RX ADMIN — DULOXETINE HYDROCHLORIDE 30 MG: 30 CAPSULE, DELAYED RELEASE ORAL at 08:35

## 2019-08-18 RX ADMIN — Medication 125 MG: at 23:42

## 2019-08-18 RX ADMIN — METHYLPREDNISOLONE SODIUM SUCCINATE 20 MG: 40 INJECTION, POWDER, FOR SOLUTION INTRAMUSCULAR; INTRAVENOUS at 06:48

## 2019-08-18 RX ADMIN — Medication 125 MG: at 06:49

## 2019-08-18 RX ADMIN — GABAPENTIN 800 MG: 400 CAPSULE ORAL at 14:38

## 2019-08-18 NOTE — PROGRESS NOTES
encephalopathy--due to sepsis of unknown source, and resp failure and ARF,  pt with sepsis, acute on chronic resp failure, JOSE, treated with IV antibiotics, IVF, tylenol, labs, resolved  5. Acute COPD exacerbation  Bronchodilators, bipap  -Pulm tapering steroids  6. Aortic stenosis s/p TAVR  Stable at this time  7. Chronic anemia  sees Dr. Morna Babinski for intermittent iron infusions. Hgb appears to be stable, check iron studies  8. Left Foot Wound  Seen by wound care, no evidence of osteo  9. Morbid obesity, BMI is 83.12 kg/m². Will need referral to bariatrics as outpatient, nutrition consulted  10. ANMOL-nocturnal cpap     11. HTN   stable  12. DMII, insulin-dependent w/ neuropathy--uncontrolled, a1c 11.2, endo consulted  SSI and lantus. On gabapentin 600 mg TID for neuropathy. Increased lantus and start prandial humalog  13. Dyslipidemia  continue Zocor  14. Mood disorder  continue vilazodone (Viibryd)  15. Depression  Continue Cymbalta   16.  RLS  on Mirapex        PT/OT Eval Status:ordered    DVT Prophylaxis: SQ lovenox  Diet: DIET CARDIAC; Carb Control: 4 carb choices (60 gms)/meal; Low Sodium (2 GM)  Dietary Nutrition Supplements: Wound Healing Oral Supplement  Code Status: Full Code      Dispo - d/c hernandez tomorrow, renal diuretics and BP recs, weaning steroids, stopped IV lasix and switched to PO demadex, d/c to SNF vs. C in 1-2 days    Camilo Hahn MD

## 2019-08-18 NOTE — PROGRESS NOTES
pulmonary      SUBJECTIVE: no sob     OBJECTIVE    VITALS:  /82   Pulse 68   Temp 98.9 °F (37.2 °C) (Oral)   Resp 19   Ht 5' 6\" (1.676 m)   Wt (!) 430 lb 1.6 oz (195.1 kg)   SpO2 97%   BMI 69.42 kg/m²   HEAD AND FACE EXAM:  No throat injection, no active exudate,no thrush  NECK EXAM;No JVD, no masses, symmetrical  CHEST EXAM; Expansion equal and symmetrical, no masses  LUNG EXAM; Good breath sounds bilaterally. There are expiratory wheezes both lungs, there are crackles at both lung bases  CARDIOVASCULAR EXAM: Positive S1 and S2, no S3 or S4, no clicks ,no murmurs  RIGHT AND LEFT LOWER EXTRIMITY EXAM: No edema, no swelling, no inflamation            LABS   Lab Results   Component Value Date    WBC 9.5 08/18/2019    HGB 10.3 (L) 08/18/2019    HCT 35.1 (L) 08/18/2019    MCV 83.2 08/18/2019     08/18/2019     Lab Results   Component Value Date    CREATININE 0.7 08/18/2019    BUN 30 (H) 08/18/2019     08/18/2019    K 4.0 08/18/2019    CL 95 (L) 08/18/2019    CO2 36 (H) 08/18/2019     Lab Results   Component Value Date    INR 1.03 08/11/2019    PROTIME 11.7 08/11/2019          Lab Results   Component Value Date    PHOS 3.6 08/18/2019    PHOS 4.8 06/25/2019    PHOS 4.0 01/30/2018      No results for input(s): PH, PO2ART, JSC7KOP, HCO3, BEART, O2SAT in the last 72 hours. Wt Readings from Last 3 Encounters:   08/16/19 (!) 430 lb 1.6 oz (195.1 kg)   07/05/19 (!) 432 lb (196 kg)   06/27/19 (!) 432 lb 1.6 oz (196 kg)               ASSESMENT  Ac on ch resp failure  Ac copd  ronni        PLAN  1. cpm  2. On tapering steroids  3.  Pap rx at [facility]    8/18/2019  Jon Hillman M.D. complains of pain/discomfort

## 2019-08-19 ENCOUNTER — APPOINTMENT (OUTPATIENT)
Dept: GENERAL RADIOLOGY | Age: 64
DRG: 720 | End: 2019-08-19
Payer: COMMERCIAL

## 2019-08-19 LAB
ALBUMIN SERPL-MCNC: 3.9 GM/DL (ref 3.4–5)
ANION GAP SERPL CALCULATED.3IONS-SCNC: 10 MMOL/L (ref 4–16)
BUN BLDV-MCNC: 39 MG/DL (ref 6–23)
CALCIUM SERPL-MCNC: 10.2 MG/DL (ref 8.3–10.6)
CHLORIDE BLD-SCNC: 85 MMOL/L (ref 99–110)
CO2: 42 MMOL/L (ref 21–32)
CREAT SERPL-MCNC: 0.9 MG/DL (ref 0.6–1.1)
FERRITIN: 39 NG/ML (ref 15–150)
FOLATE: 16.6 NG/ML (ref 3.1–17.5)
GFR AFRICAN AMERICAN: >60 ML/MIN/1.73M2
GFR NON-AFRICAN AMERICAN: >60 ML/MIN/1.73M2
GLUCOSE BLD-MCNC: 145 MG/DL (ref 70–99)
GLUCOSE BLD-MCNC: 165 MG/DL (ref 70–99)
GLUCOSE BLD-MCNC: 180 MG/DL (ref 70–99)
GLUCOSE BLD-MCNC: 185 MG/DL (ref 70–99)
GLUCOSE BLD-MCNC: 254 MG/DL (ref 70–99)
HCT VFR BLD CALC: 39.4 % (ref 37–47)
HEMOGLOBIN: 11.5 GM/DL (ref 12.5–16)
IRON: 37 UG/DL (ref 37–145)
MCH RBC QN AUTO: 24.2 PG (ref 27–31)
MCHC RBC AUTO-ENTMCNC: 29.2 % (ref 32–36)
MCV RBC AUTO: 82.9 FL (ref 78–100)
PCT TRANSFERRIN: 12 % (ref 10–44)
PDW BLD-RTO: 19.3 % (ref 11.7–14.9)
PHOSPHORUS: 4.9 MG/DL (ref 2.5–4.9)
PLATELET # BLD: 283 K/CU MM (ref 140–440)
PMV BLD AUTO: 9.1 FL (ref 7.5–11.1)
POTASSIUM SERPL-SCNC: 3.2 MMOL/L (ref 3.5–5.1)
RBC # BLD: 4.75 M/CU MM (ref 4.2–5.4)
SODIUM BLD-SCNC: 137 MMOL/L (ref 135–145)
TOTAL IRON BINDING CAPACITY: 308 UG/DL (ref 250–450)
UNSATURATED IRON BINDING CAPACITY: 271 UG/DL (ref 110–370)
VITAMIN B-12: 1021 PG/ML (ref 211–911)
WBC # BLD: 10.7 K/CU MM (ref 4–10.5)

## 2019-08-19 PROCEDURE — 80048 BASIC METABOLIC PNL TOTAL CA: CPT

## 2019-08-19 PROCEDURE — 80069 RENAL FUNCTION PANEL: CPT

## 2019-08-19 PROCEDURE — 97530 THERAPEUTIC ACTIVITIES: CPT

## 2019-08-19 PROCEDURE — 94761 N-INVAS EAR/PLS OXIMETRY MLT: CPT

## 2019-08-19 PROCEDURE — 6370000000 HC RX 637 (ALT 250 FOR IP): Performed by: INTERNAL MEDICINE

## 2019-08-19 PROCEDURE — 85027 COMPLETE CBC AUTOMATED: CPT

## 2019-08-19 PROCEDURE — 83550 IRON BINDING TEST: CPT

## 2019-08-19 PROCEDURE — 2580000003 HC RX 258: Performed by: INTERNAL MEDICINE

## 2019-08-19 PROCEDURE — 6360000002 HC RX W HCPCS: Performed by: INTERNAL MEDICINE

## 2019-08-19 PROCEDURE — 82728 ASSAY OF FERRITIN: CPT

## 2019-08-19 PROCEDURE — 87086 URINE CULTURE/COLONY COUNT: CPT

## 2019-08-19 PROCEDURE — 2140000000 HC CCU INTERMEDIATE R&B

## 2019-08-19 PROCEDURE — 6370000000 HC RX 637 (ALT 250 FOR IP): Performed by: HOSPITALIST

## 2019-08-19 PROCEDURE — 94640 AIRWAY INHALATION TREATMENT: CPT

## 2019-08-19 PROCEDURE — 82962 GLUCOSE BLOOD TEST: CPT

## 2019-08-19 PROCEDURE — 82607 VITAMIN B-12: CPT

## 2019-08-19 PROCEDURE — 97116 GAIT TRAINING THERAPY: CPT

## 2019-08-19 PROCEDURE — 97535 SELF CARE MNGMENT TRAINING: CPT

## 2019-08-19 PROCEDURE — 2580000003 HC RX 258: Performed by: HOSPITALIST

## 2019-08-19 PROCEDURE — 83540 ASSAY OF IRON: CPT

## 2019-08-19 PROCEDURE — 82746 ASSAY OF FOLIC ACID SERUM: CPT

## 2019-08-19 PROCEDURE — C9113 INJ PANTOPRAZOLE SODIUM, VIA: HCPCS | Performed by: HOSPITALIST

## 2019-08-19 PROCEDURE — 6360000002 HC RX W HCPCS: Performed by: HOSPITALIST

## 2019-08-19 PROCEDURE — 2700000000 HC OXYGEN THERAPY PER DAY

## 2019-08-19 PROCEDURE — 94660 CPAP INITIATION&MGMT: CPT

## 2019-08-19 RX ORDER — TORSEMIDE 20 MG/1
20 TABLET ORAL DAILY
Status: DISCONTINUED | OUTPATIENT
Start: 2019-08-20 | End: 2019-08-19

## 2019-08-19 RX ORDER — TORSEMIDE 20 MG/1
20 TABLET ORAL 2 TIMES DAILY
Status: DISCONTINUED | OUTPATIENT
Start: 2019-08-19 | End: 2019-08-21 | Stop reason: HOSPADM

## 2019-08-19 RX ORDER — METOLAZONE 2.5 MG/1
2.5 TABLET ORAL DAILY
Status: DISCONTINUED | OUTPATIENT
Start: 2019-08-19 | End: 2019-08-21 | Stop reason: HOSPADM

## 2019-08-19 RX ORDER — CALCIUM CARBONATE 200(500)MG
500 TABLET,CHEWABLE ORAL 3 TIMES DAILY PRN
Status: DISCONTINUED | OUTPATIENT
Start: 2019-08-19 | End: 2019-08-21 | Stop reason: HOSPADM

## 2019-08-19 RX ORDER — POTASSIUM CHLORIDE 20 MEQ/1
40 TABLET, EXTENDED RELEASE ORAL ONCE
Status: COMPLETED | OUTPATIENT
Start: 2019-08-19 | End: 2019-08-19

## 2019-08-19 RX ORDER — METOLAZONE 2.5 MG/1
2.5 TABLET ORAL DAILY
Status: DISCONTINUED | OUTPATIENT
Start: 2019-08-20 | End: 2019-08-19

## 2019-08-19 RX ORDER — POTASSIUM CHLORIDE 20 MEQ/1
20 TABLET, EXTENDED RELEASE ORAL ONCE
Status: COMPLETED | OUTPATIENT
Start: 2019-08-19 | End: 2019-08-19

## 2019-08-19 RX ADMIN — IPRATROPIUM BROMIDE AND ALBUTEROL SULFATE 1 AMPULE: .5; 3 SOLUTION RESPIRATORY (INHALATION) at 15:54

## 2019-08-19 RX ADMIN — GABAPENTIN 800 MG: 400 CAPSULE ORAL at 16:02

## 2019-08-19 RX ADMIN — TORSEMIDE 20 MG: 20 TABLET ORAL at 18:03

## 2019-08-19 RX ADMIN — Medication 10 ML: at 21:42

## 2019-08-19 RX ADMIN — VILAZODONE HYDROCHLORIDE 10 MG: 10 TABLET ORAL at 21:42

## 2019-08-19 RX ADMIN — POTASSIUM CHLORIDE 40 MEQ: 20 TABLET, EXTENDED RELEASE ORAL at 11:21

## 2019-08-19 RX ADMIN — INSULIN GLARGINE 19 UNITS: 100 INJECTION, SOLUTION SUBCUTANEOUS at 21:45

## 2019-08-19 RX ADMIN — Medication 125 MG: at 18:09

## 2019-08-19 RX ADMIN — INSULIN LISPRO 2 UNITS: 100 INJECTION, SOLUTION INTRAVENOUS; SUBCUTANEOUS at 12:35

## 2019-08-19 RX ADMIN — PANTOPRAZOLE SODIUM 40 MG: 40 INJECTION, POWDER, FOR SOLUTION INTRAVENOUS at 11:20

## 2019-08-19 RX ADMIN — Medication 10 ML: at 11:23

## 2019-08-19 RX ADMIN — MICONAZOLE NITRATE: 20 POWDER TOPICAL at 12:34

## 2019-08-19 RX ADMIN — IPRATROPIUM BROMIDE AND ALBUTEROL SULFATE 1 AMPULE: .5; 3 SOLUTION RESPIRATORY (INHALATION) at 11:51

## 2019-08-19 RX ADMIN — PRAMIPEXOLE DIHYDROCHLORIDE 1 MG: 1 TABLET ORAL at 21:42

## 2019-08-19 RX ADMIN — CLOPIDOGREL BISULFATE 75 MG: 75 TABLET ORAL at 11:19

## 2019-08-19 RX ADMIN — ASPIRIN 81 MG 81 MG: 81 TABLET ORAL at 11:20

## 2019-08-19 RX ADMIN — CETIRIZINE HYDROCHLORIDE 10 MG: 10 TABLET, FILM COATED ORAL at 11:19

## 2019-08-19 RX ADMIN — ENOXAPARIN SODIUM 40 MG: 40 INJECTION SUBCUTANEOUS at 11:21

## 2019-08-19 RX ADMIN — METOLAZONE 2.5 MG: 2.5 TABLET ORAL at 18:03

## 2019-08-19 RX ADMIN — IPRATROPIUM BROMIDE AND ALBUTEROL SULFATE 1 AMPULE: .5; 3 SOLUTION RESPIRATORY (INHALATION) at 08:23

## 2019-08-19 RX ADMIN — VILAZODONE HYDROCHLORIDE 10 MG: 10 TABLET ORAL at 11:21

## 2019-08-19 RX ADMIN — Medication 125 MG: at 07:06

## 2019-08-19 RX ADMIN — POTASSIUM CHLORIDE 20 MEQ: 20 TABLET, EXTENDED RELEASE ORAL at 21:42

## 2019-08-19 RX ADMIN — PRAMIPEXOLE DIHYDROCHLORIDE 1 MG: 1 TABLET ORAL at 16:03

## 2019-08-19 RX ADMIN — METHYLPREDNISOLONE SODIUM SUCCINATE 20 MG: 40 INJECTION, POWDER, FOR SOLUTION INTRAMUSCULAR; INTRAVENOUS at 11:20

## 2019-08-19 RX ADMIN — Medication 125 MG: at 12:34

## 2019-08-19 RX ADMIN — GABAPENTIN 800 MG: 400 CAPSULE ORAL at 21:42

## 2019-08-19 RX ADMIN — INSULIN LISPRO 6 UNITS: 100 INJECTION, SOLUTION INTRAVENOUS; SUBCUTANEOUS at 17:55

## 2019-08-19 RX ADMIN — INSULIN LISPRO 2 UNITS: 100 INJECTION, SOLUTION INTRAVENOUS; SUBCUTANEOUS at 21:45

## 2019-08-19 RX ADMIN — SIMVASTATIN 40 MG: 40 TABLET, FILM COATED ORAL at 21:42

## 2019-08-19 RX ADMIN — IPRATROPIUM BROMIDE AND ALBUTEROL SULFATE 1 AMPULE: .5; 3 SOLUTION RESPIRATORY (INHALATION) at 20:08

## 2019-08-19 RX ADMIN — OXYCODONE HYDROCHLORIDE AND ACETAMINOPHEN 500 MG: 500 TABLET ORAL at 11:20

## 2019-08-19 RX ADMIN — GABAPENTIN 800 MG: 400 CAPSULE ORAL at 11:20

## 2019-08-19 RX ADMIN — DULOXETINE HYDROCHLORIDE 30 MG: 30 CAPSULE, DELAYED RELEASE ORAL at 11:19

## 2019-08-19 RX ADMIN — ACETAZOLAMIDE 500 MG: 250 TABLET ORAL at 11:22

## 2019-08-19 RX ADMIN — INSULIN LISPRO 6 UNITS: 100 INJECTION, SOLUTION INTRAVENOUS; SUBCUTANEOUS at 17:56

## 2019-08-19 RX ADMIN — PRAMIPEXOLE DIHYDROCHLORIDE 1 MG: 1 TABLET ORAL at 11:22

## 2019-08-19 RX ADMIN — INSULIN LISPRO 6 UNITS: 100 INJECTION, SOLUTION INTRAVENOUS; SUBCUTANEOUS at 12:39

## 2019-08-19 RX ADMIN — ACETAZOLAMIDE 500 MG: 250 TABLET ORAL at 21:42

## 2019-08-19 ASSESSMENT — PAIN SCALES - GENERAL
PAINLEVEL_OUTOF10: 0
PAINLEVEL_OUTOF10: 0

## 2019-08-19 NOTE — PROGRESS NOTES
Physical Therapy    Physical Therapy Treatment Note  Name: Jennifer Alejandro MRN: [id number] :   1955   Date:  2019   Admission Date: 2019 Room:  [address] [place] [address]   Restrictions/Precautions:        Fall risk  Communication with other providers:  Co-tx with HANEY, handoff to RN  Subjective:  Patient states:  Amenable to therapy  Pain:   Location, Type, Intensity (0/10 to 10/10): Denies  Objective:    Observation:  Sitting EOB with HANEY. Treatment, including education/measures:  STS: performed from bed and chair with min A x2 and RW, cues for anterior weight shift and scooting to edge of service, increased time to performed transfer. Performed from commode with min/mod A x2, standing balance for pericare. After STS from commode patient with intermittent extreme tachy (HR in 180's) briefly and then returned to 110-120, discussed with RN and patient asymptomatic. Gait: ambulated x30ft with slow kalpana and narrow YOUNG, cues for increased YOUNG and decreased AD excursion. Performed with chair follow for safety. Assessment / Impression:       Patient's tolerance of treatment:  Tolerated well   Adverse Reaction: None  Significant change in status and impact:  Improved mobility  Barriers to improvement:  Strength, endurance, balance, BMI  Plan for Next Session:    Cont POC.   Time in:  1435  Time out:  1505  Timed treatment minutes:  30  Total treatment time:  30    Previously filed items:  Social/Functional History  Lives With: Spouse, Daughter(spouse works, dtr home w/ pt 90% of the time per pt. )  Type of Home: House  Home Layout: Two level, Able to Live on Main level with bedroom/bathroom, Performs ADL's on one level  Home Access: Stairs to enter without rails  Entrance Stairs - Number of Steps: 2+1  Bathroom Shower/Tub: Walk-in shower  Bathroom Toilet: Standard  Bathroom Equipment: 3-in-1 commode, Shower chair(keeps BSC beside her chair )  Home Equipment: 4 wheeled walker, Oxygen, Reacher,

## 2019-08-20 LAB
ALBUMIN SERPL-MCNC: 4.2 GM/DL (ref 3.4–5)
ANION GAP SERPL CALCULATED.3IONS-SCNC: 14 MMOL/L (ref 4–16)
BUN BLDV-MCNC: 56 MG/DL (ref 6–23)
CALCIUM SERPL-MCNC: 10.7 MG/DL (ref 8.3–10.6)
CHLORIDE BLD-SCNC: 87 MMOL/L (ref 99–110)
CO2: 41 MMOL/L (ref 21–32)
CREAT SERPL-MCNC: 0.9 MG/DL (ref 0.6–1.1)
CULTURE: NORMAL
GFR AFRICAN AMERICAN: >60 ML/MIN/1.73M2
GFR NON-AFRICAN AMERICAN: >60 ML/MIN/1.73M2
GLUCOSE BLD-MCNC: 166 MG/DL (ref 70–99)
GLUCOSE BLD-MCNC: 179 MG/DL (ref 70–99)
GLUCOSE BLD-MCNC: 197 MG/DL (ref 70–99)
GLUCOSE BLD-MCNC: 237 MG/DL (ref 70–99)
GLUCOSE BLD-MCNC: 243 MG/DL (ref 70–99)
GLUCOSE BLD-MCNC: 265 MG/DL (ref 70–99)
HCT VFR BLD CALC: 41.3 % (ref 37–47)
HEMOGLOBIN: 11.9 GM/DL (ref 12.5–16)
Lab: NORMAL
MAGNESIUM: 1.8 MG/DL (ref 1.8–2.4)
MCH RBC QN AUTO: 24.1 PG (ref 27–31)
MCHC RBC AUTO-ENTMCNC: 28.8 % (ref 32–36)
MCV RBC AUTO: 83.6 FL (ref 78–100)
PDW BLD-RTO: 19.3 % (ref 11.7–14.9)
PHOSPHORUS: 5.6 MG/DL (ref 2.5–4.9)
PLATELET # BLD: 275 K/CU MM (ref 140–440)
PMV BLD AUTO: 9.3 FL (ref 7.5–11.1)
POTASSIUM SERPL-SCNC: 3.2 MMOL/L (ref 3.5–5.1)
RBC # BLD: 4.94 M/CU MM (ref 4.2–5.4)
SODIUM BLD-SCNC: 142 MMOL/L (ref 135–145)
SPECIMEN: NORMAL
WBC # BLD: 10.6 K/CU MM (ref 4–10.5)

## 2019-08-20 PROCEDURE — 6360000002 HC RX W HCPCS: Performed by: HOSPITALIST

## 2019-08-20 PROCEDURE — 6370000000 HC RX 637 (ALT 250 FOR IP): Performed by: INTERNAL MEDICINE

## 2019-08-20 PROCEDURE — 2140000000 HC CCU INTERMEDIATE R&B

## 2019-08-20 PROCEDURE — 85027 COMPLETE CBC AUTOMATED: CPT

## 2019-08-20 PROCEDURE — 80069 RENAL FUNCTION PANEL: CPT

## 2019-08-20 PROCEDURE — 97110 THERAPEUTIC EXERCISES: CPT

## 2019-08-20 PROCEDURE — 6360000002 HC RX W HCPCS: Performed by: INTERNAL MEDICINE

## 2019-08-20 PROCEDURE — 94660 CPAP INITIATION&MGMT: CPT

## 2019-08-20 PROCEDURE — C9113 INJ PANTOPRAZOLE SODIUM, VIA: HCPCS | Performed by: HOSPITALIST

## 2019-08-20 PROCEDURE — 97116 GAIT TRAINING THERAPY: CPT

## 2019-08-20 PROCEDURE — 97530 THERAPEUTIC ACTIVITIES: CPT

## 2019-08-20 PROCEDURE — 2700000000 HC OXYGEN THERAPY PER DAY

## 2019-08-20 PROCEDURE — 6370000000 HC RX 637 (ALT 250 FOR IP): Performed by: HOSPITALIST

## 2019-08-20 PROCEDURE — 2580000003 HC RX 258: Performed by: HOSPITALIST

## 2019-08-20 PROCEDURE — 83735 ASSAY OF MAGNESIUM: CPT

## 2019-08-20 PROCEDURE — 80048 BASIC METABOLIC PNL TOTAL CA: CPT

## 2019-08-20 PROCEDURE — 94761 N-INVAS EAR/PLS OXIMETRY MLT: CPT

## 2019-08-20 PROCEDURE — 2580000003 HC RX 258: Performed by: INTERNAL MEDICINE

## 2019-08-20 PROCEDURE — 82962 GLUCOSE BLOOD TEST: CPT

## 2019-08-20 RX ORDER — POTASSIUM CHLORIDE 20 MEQ/1
20 TABLET, EXTENDED RELEASE ORAL 2 TIMES DAILY WITH MEALS
Status: DISCONTINUED | OUTPATIENT
Start: 2019-08-20 | End: 2019-08-21 | Stop reason: HOSPADM

## 2019-08-20 RX ADMIN — GABAPENTIN 800 MG: 400 CAPSULE ORAL at 09:04

## 2019-08-20 RX ADMIN — MICONAZOLE NITRATE: 20 POWDER TOPICAL at 09:29

## 2019-08-20 RX ADMIN — CETIRIZINE HYDROCHLORIDE 10 MG: 10 TABLET, FILM COATED ORAL at 09:04

## 2019-08-20 RX ADMIN — SIMVASTATIN 40 MG: 40 TABLET, FILM COATED ORAL at 21:28

## 2019-08-20 RX ADMIN — PRAMIPEXOLE DIHYDROCHLORIDE 1 MG: 1 TABLET ORAL at 21:28

## 2019-08-20 RX ADMIN — Medication 10 ML: at 21:30

## 2019-08-20 RX ADMIN — VILAZODONE HYDROCHLORIDE 10 MG: 10 TABLET ORAL at 21:28

## 2019-08-20 RX ADMIN — POTASSIUM CHLORIDE 20 MEQ: 20 TABLET, EXTENDED RELEASE ORAL at 17:32

## 2019-08-20 RX ADMIN — INSULIN LISPRO 6 UNITS: 100 INJECTION, SOLUTION INTRAVENOUS; SUBCUTANEOUS at 09:20

## 2019-08-20 RX ADMIN — INSULIN LISPRO 2 UNITS: 100 INJECTION, SOLUTION INTRAVENOUS; SUBCUTANEOUS at 12:29

## 2019-08-20 RX ADMIN — INSULIN GLARGINE 19 UNITS: 100 INJECTION, SOLUTION SUBCUTANEOUS at 21:29

## 2019-08-20 RX ADMIN — MICONAZOLE NITRATE: 20 POWDER TOPICAL at 21:27

## 2019-08-20 RX ADMIN — PRAMIPEXOLE DIHYDROCHLORIDE 1 MG: 1 TABLET ORAL at 16:17

## 2019-08-20 RX ADMIN — TORSEMIDE 20 MG: 20 TABLET ORAL at 09:04

## 2019-08-20 RX ADMIN — CLOPIDOGREL BISULFATE 75 MG: 75 TABLET ORAL at 09:04

## 2019-08-20 RX ADMIN — ASPIRIN 81 MG 81 MG: 81 TABLET ORAL at 09:04

## 2019-08-20 RX ADMIN — INSULIN LISPRO 4 UNITS: 100 INJECTION, SOLUTION INTRAVENOUS; SUBCUTANEOUS at 18:32

## 2019-08-20 RX ADMIN — Medication 125 MG: at 01:26

## 2019-08-20 RX ADMIN — INSULIN LISPRO 2 UNITS: 100 INJECTION, SOLUTION INTRAVENOUS; SUBCUTANEOUS at 09:20

## 2019-08-20 RX ADMIN — PRAMIPEXOLE DIHYDROCHLORIDE 1 MG: 1 TABLET ORAL at 09:04

## 2019-08-20 RX ADMIN — OXYCODONE HYDROCHLORIDE AND ACETAMINOPHEN 500 MG: 500 TABLET ORAL at 09:04

## 2019-08-20 RX ADMIN — INSULIN LISPRO 4 UNITS: 100 INJECTION, SOLUTION INTRAVENOUS; SUBCUTANEOUS at 21:28

## 2019-08-20 RX ADMIN — ACETAZOLAMIDE 500 MG: 250 TABLET ORAL at 09:04

## 2019-08-20 RX ADMIN — Medication 125 MG: at 17:33

## 2019-08-20 RX ADMIN — GABAPENTIN 800 MG: 400 CAPSULE ORAL at 16:17

## 2019-08-20 RX ADMIN — DULOXETINE HYDROCHLORIDE 30 MG: 30 CAPSULE, DELAYED RELEASE ORAL at 09:04

## 2019-08-20 RX ADMIN — INSULIN LISPRO 6 UNITS: 100 INJECTION, SOLUTION INTRAVENOUS; SUBCUTANEOUS at 12:28

## 2019-08-20 RX ADMIN — POTASSIUM CHLORIDE 20 MEQ: 20 TABLET, EXTENDED RELEASE ORAL at 12:33

## 2019-08-20 RX ADMIN — ENOXAPARIN SODIUM 40 MG: 40 INJECTION SUBCUTANEOUS at 09:05

## 2019-08-20 RX ADMIN — VILAZODONE HYDROCHLORIDE 10 MG: 10 TABLET ORAL at 09:04

## 2019-08-20 RX ADMIN — PANTOPRAZOLE SODIUM 40 MG: 40 INJECTION, POWDER, FOR SOLUTION INTRAVENOUS at 09:05

## 2019-08-20 RX ADMIN — Medication 125 MG: at 05:48

## 2019-08-20 RX ADMIN — METHYLPREDNISOLONE SODIUM SUCCINATE 20 MG: 40 INJECTION, POWDER, FOR SOLUTION INTRAMUSCULAR; INTRAVENOUS at 09:05

## 2019-08-20 RX ADMIN — METOLAZONE 2.5 MG: 2.5 TABLET ORAL at 09:04

## 2019-08-20 RX ADMIN — Medication 125 MG: at 12:36

## 2019-08-20 RX ADMIN — TORSEMIDE 20 MG: 20 TABLET ORAL at 17:33

## 2019-08-20 RX ADMIN — Medication 10 ML: at 09:06

## 2019-08-20 RX ADMIN — GABAPENTIN 800 MG: 400 CAPSULE ORAL at 21:28

## 2019-08-20 RX ADMIN — ACETAZOLAMIDE 500 MG: 250 TABLET ORAL at 21:28

## 2019-08-20 RX ADMIN — INSULIN LISPRO 6 UNITS: 100 INJECTION, SOLUTION INTRAVENOUS; SUBCUTANEOUS at 18:33

## 2019-08-20 ASSESSMENT — PAIN SCALES - GENERAL
PAINLEVEL_OUTOF10: 0
PAINLEVEL_OUTOF10: 0

## 2019-08-20 NOTE — PROGRESS NOTES
Physical Therapy    Physical Therapy Treatment Note  Name: Tabatha Tafoya MRN: [id number] :   1955   Date:  2019   Admission Date: 2019 Room:  [address] [place], [place] [address]A   Restrictions/Precautions:        Contact precautions, fall risk   Communication with other providers:  Tremayne Whitaker RN states pt is ok to see for therapy. Co-treat with OT  Subjective:  Patient states: Agreeable to therapy and eager to go home. Pain:   Location, Type, Intensity (0/10 to 10/10): Expressed some discomfort in back of L knee, no number given. Objective:    Observation:  Patient sitting in chair A&O watching tv. Treatment, including education/measures:  Transfers  Scooting : SBA  Sit to stand :Min A of 1 and CGA of 1  Stand to sit : Min A of 1   Gait:  Pt amb with RW for 65 ft with SBA x1 and chair follow with assist of tele and O2, 2L. Pt had a slow kalpana and narrow YOUNG. Pt needed VC's for deep breathing and pathway. Sitting Exercises: Ankle pumps x 10  LAQ's x 10  Marching x 10   Pillow squeezes x 10  Hip Abd x 10, with Green T-Band  Therapeutic Exercise:  Therapeutic exercises were instructed today. Cues were given for technique, safety, recruitment, and rationale. Cues were verbal and/or tactile. Safety  Patient left safely in the chair, with call light/phone in reach was found by nursing. Gait belt was used for transfers and gait. Assessment / Impression:    Patient's tolerance of treatment:  Good, patient able to complete all exercises and walking well. Pt had some difficulty following cues.   Adverse Reaction: none  Significant change in status and impact:  none  Barriers to improvement:  Endurance, strength, BMI  Plan for Next Session:    Will cont to work towards pt's goals per her tolerance  Time in:  1323  Time out:  1431  Timed treatment minutes:  68  Total treatment time:  76  Previously filed items:  Social/Functional History  Lives With: Spouse, Daughter(spouse works, dtr home w/ pt 90% of the time per

## 2019-08-20 NOTE — PROGRESS NOTES
Occupational Therapy    Occupational Therapy Treatment Note  Name: Evan Juarez MRN: [id number] :   1955   Date:  2019   Admission Date: 2019 Room:  [address] [place] [id number]   Restrictions/Precautions:    Contact precautions    Communication with other providers:   Cleared for treatment by Isael Mirza RN. Subjective:  Patient states:  Pt agreeable to do therapy. Pain:   Location, Type, Intensity (0/10 to 10/10):  0/10    Objective:    Observation:  Pt alert and oriented. Objective Measures:  HR up to 122 when completing functional mobility and recovered to 88 at rest.  Treatment, including education:  Pt co-treated with PT for safety concerns. Sit to stand to RW with Min A x 1 SBA x 1. Pt completed functional mobility x 65' with SBA x 2 . For BUE strengthening for ADL & functional mobility Indep pt performed BUE strengthening HEP c 3# hand weights x 10 reps x 6 exercises with Slight difficulty. Assessment / Impression:        Patient's tolerance of treatment: Good   Adverse Reaction: None  Significant change in status and impact:  None  Barriers to improvement:  None    Plan for Next Session:    Continue with POC.     Time in:  1323  Time out: 1431  Timed treatment minutes: 68  Total treatment time:  68  Electronically signed by:    Van Lopez, [address]    2019, 2:22 PM    Previously filed values:

## 2019-08-20 NOTE — PROGRESS NOTES
Recent Labs     08/18/19  0645 08/19/19  0715 08/20/19  0550    137 142   K 4.0 3.2* 3.2*   CL 95* 85* 87*   CO2 36* 42* 41*   BUN 30* 39* 56*   CREATININE 0.7 0.9 0.9   GLUCOSE 156* 145* 179*     Hepatic:   Recent Labs     08/18/19  0645   AST 9*   ALT 10   BILITOT 0.2   ALKPHOS 71     Troponin: No results for input(s): TROPONINI in the last 72 hours. BNP: No results for input(s): BNP in the last 72 hours. Lipids: No results for input(s): CHOL, HDL in the last 72 hours. Invalid input(s): LDLCALCU  ABGs:   Lab Results   Component Value Date    PO2ART 79 08/14/2019    MMM3SIN 60.0 08/14/2019     INR: No results for input(s): INR in the last 72 hours. Renal Labs  Albumin:    Lab Results   Component Value Date    LABALBU 4.2 08/20/2019     Calcium:    Lab Results   Component Value Date    CALCIUM 10.7 08/20/2019     Phosphorus:    Lab Results   Component Value Date    PHOS 5.6 08/20/2019     U/A:    Lab Results   Component Value Date    NITRU NEGATIVE 08/18/2019    COLORU STRAW 08/18/2019    WBCUA <1 08/18/2019    RBCUA 7 08/18/2019    MUCUS RARE 08/18/2019    TRICHOMONAS NONE SEEN 08/18/2019    YEAST OCCASIONAL 01/30/2018    BACTERIA NEGATIVE 08/18/2019    CLARITYU CLEAR 08/18/2019    SPECGRAV 1.008 08/18/2019    UROBILINOGEN NORMAL 08/18/2019    BILIRUBINUR NEGATIVE 08/18/2019    BLOODU SMALL 08/18/2019    KETUA NEGATIVE 08/18/2019     ABG:    Lab Results   Component Value Date    BNM6HID 60.0 08/14/2019    PO2ART 79 08/14/2019    PVS0CBG 30.9 08/14/2019     HgBA1c:    Lab Results   Component Value Date    LABA1C 11.2 06/16/2019     Microalbumen/Creatinine ratio:  No components found for: RUCREAT          Objective:   Vitals: /61   Pulse 84   Temp 98.1 °F (36.7 °C) (Oral)   Resp 17   Ht 5' 6\" (1.676 m)   Wt (!) 421 lb 14.4 oz (191.4 kg)   SpO2 94%   BMI 68.10 kg/m²   General appearance: awake weak  HEENT: Head: Normal, normocephalic, atraumatic.   Neck: supple, symmetrical, trachea

## 2019-08-21 VITALS
TEMPERATURE: 97.8 F | WEIGHT: 293 LBS | RESPIRATION RATE: 15 BRPM | SYSTOLIC BLOOD PRESSURE: 140 MMHG | BODY MASS INDEX: 47.09 KG/M2 | HEART RATE: 76 BPM | OXYGEN SATURATION: 95 % | DIASTOLIC BLOOD PRESSURE: 63 MMHG | HEIGHT: 66 IN

## 2019-08-21 LAB
ALBUMIN SERPL-MCNC: 4.3 GM/DL (ref 3.4–5)
ANION GAP SERPL CALCULATED.3IONS-SCNC: 14 MMOL/L (ref 4–16)
BUN BLDV-MCNC: 70 MG/DL (ref 6–23)
CALCIUM SERPL-MCNC: 10.4 MG/DL (ref 8.3–10.6)
CHLORIDE BLD-SCNC: 84 MMOL/L (ref 99–110)
CO2: 42 MMOL/L (ref 21–32)
CREAT SERPL-MCNC: 1.1 MG/DL (ref 0.6–1.1)
GFR AFRICAN AMERICAN: >60 ML/MIN/1.73M2
GFR NON-AFRICAN AMERICAN: 50 ML/MIN/1.73M2
GLUCOSE BLD-MCNC: 193 MG/DL (ref 70–99)
GLUCOSE BLD-MCNC: 194 MG/DL (ref 70–99)
GLUCOSE BLD-MCNC: 267 MG/DL (ref 70–99)
HCT VFR BLD CALC: 41.6 % (ref 37–47)
HEMOGLOBIN: 12.3 GM/DL (ref 12.5–16)
MAGNESIUM: 2 MG/DL (ref 1.8–2.4)
MCH RBC QN AUTO: 24.4 PG (ref 27–31)
MCHC RBC AUTO-ENTMCNC: 29.6 % (ref 32–36)
MCV RBC AUTO: 82.5 FL (ref 78–100)
PDW BLD-RTO: 19 % (ref 11.7–14.9)
PHOSPHORUS: 5.6 MG/DL (ref 2.5–4.9)
PLATELET # BLD: 303 K/CU MM (ref 140–440)
PMV BLD AUTO: 9.6 FL (ref 7.5–11.1)
POTASSIUM SERPL-SCNC: 3.1 MMOL/L (ref 3.5–5.1)
RBC # BLD: 5.04 M/CU MM (ref 4.2–5.4)
SODIUM BLD-SCNC: 140 MMOL/L (ref 135–145)
WBC # BLD: 10.4 K/CU MM (ref 4–10.5)

## 2019-08-21 PROCEDURE — 80069 RENAL FUNCTION PANEL: CPT

## 2019-08-21 PROCEDURE — 6370000000 HC RX 637 (ALT 250 FOR IP): Performed by: INTERNAL MEDICINE

## 2019-08-21 PROCEDURE — C9113 INJ PANTOPRAZOLE SODIUM, VIA: HCPCS | Performed by: HOSPITALIST

## 2019-08-21 PROCEDURE — 94761 N-INVAS EAR/PLS OXIMETRY MLT: CPT

## 2019-08-21 PROCEDURE — 85027 COMPLETE CBC AUTOMATED: CPT

## 2019-08-21 PROCEDURE — 6360000002 HC RX W HCPCS: Performed by: INTERNAL MEDICINE

## 2019-08-21 PROCEDURE — 6360000002 HC RX W HCPCS: Performed by: HOSPITALIST

## 2019-08-21 PROCEDURE — 2700000000 HC OXYGEN THERAPY PER DAY

## 2019-08-21 PROCEDURE — 82962 GLUCOSE BLOOD TEST: CPT

## 2019-08-21 PROCEDURE — 6370000000 HC RX 637 (ALT 250 FOR IP): Performed by: HOSPITALIST

## 2019-08-21 PROCEDURE — 80048 BASIC METABOLIC PNL TOTAL CA: CPT

## 2019-08-21 PROCEDURE — 2580000003 HC RX 258: Performed by: INTERNAL MEDICINE

## 2019-08-21 PROCEDURE — 94640 AIRWAY INHALATION TREATMENT: CPT

## 2019-08-21 PROCEDURE — 83735 ASSAY OF MAGNESIUM: CPT

## 2019-08-21 PROCEDURE — 2580000003 HC RX 258: Performed by: HOSPITALIST

## 2019-08-21 RX ORDER — METOLAZONE 2.5 MG/1
2.5 TABLET ORAL DAILY
Qty: 30 TABLET | Refills: 1 | Status: ON HOLD | OUTPATIENT
Start: 2019-08-22 | End: 2019-08-28 | Stop reason: HOSPADM

## 2019-08-21 RX ORDER — POTASSIUM CHLORIDE 20 MEQ/1
20 TABLET, EXTENDED RELEASE ORAL 2 TIMES DAILY
Qty: 60 TABLET | Refills: 1 | Status: ON HOLD | OUTPATIENT
Start: 2019-08-21 | End: 2019-08-28 | Stop reason: HOSPADM

## 2019-08-21 RX ORDER — PREDNISONE 10 MG/1
10 TABLET ORAL DAILY
Qty: 5 TABLET | Refills: 0 | Status: ON HOLD | OUTPATIENT
Start: 2019-08-21 | End: 2019-08-28 | Stop reason: HOSPADM

## 2019-08-21 RX ADMIN — CLOPIDOGREL BISULFATE 75 MG: 75 TABLET ORAL at 08:26

## 2019-08-21 RX ADMIN — Medication 10 ML: at 08:25

## 2019-08-21 RX ADMIN — INSULIN LISPRO 6 UNITS: 100 INJECTION, SOLUTION INTRAVENOUS; SUBCUTANEOUS at 11:25

## 2019-08-21 RX ADMIN — POTASSIUM CHLORIDE 20 MEQ: 20 TABLET, EXTENDED RELEASE ORAL at 08:25

## 2019-08-21 RX ADMIN — ACETAZOLAMIDE 500 MG: 250 TABLET ORAL at 08:25

## 2019-08-21 RX ADMIN — DULOXETINE HYDROCHLORIDE 30 MG: 30 CAPSULE, DELAYED RELEASE ORAL at 08:25

## 2019-08-21 RX ADMIN — METHYLPREDNISOLONE SODIUM SUCCINATE 20 MG: 40 INJECTION, POWDER, FOR SOLUTION INTRAMUSCULAR; INTRAVENOUS at 08:25

## 2019-08-21 RX ADMIN — METOLAZONE 2.5 MG: 2.5 TABLET ORAL at 08:26

## 2019-08-21 RX ADMIN — INSULIN LISPRO 6 UNITS: 100 INJECTION, SOLUTION INTRAVENOUS; SUBCUTANEOUS at 11:27

## 2019-08-21 RX ADMIN — Medication 125 MG: at 00:38

## 2019-08-21 RX ADMIN — TORSEMIDE 20 MG: 20 TABLET ORAL at 08:25

## 2019-08-21 RX ADMIN — INSULIN LISPRO 2 UNITS: 100 INJECTION, SOLUTION INTRAVENOUS; SUBCUTANEOUS at 06:32

## 2019-08-21 RX ADMIN — OXYCODONE HYDROCHLORIDE AND ACETAMINOPHEN 500 MG: 500 TABLET ORAL at 08:25

## 2019-08-21 RX ADMIN — PRAMIPEXOLE DIHYDROCHLORIDE 1 MG: 1 TABLET ORAL at 08:26

## 2019-08-21 RX ADMIN — PANTOPRAZOLE SODIUM 40 MG: 40 INJECTION, POWDER, FOR SOLUTION INTRAVENOUS at 08:25

## 2019-08-21 RX ADMIN — MICONAZOLE NITRATE: 20 POWDER TOPICAL at 11:23

## 2019-08-21 RX ADMIN — CETIRIZINE HYDROCHLORIDE 10 MG: 10 TABLET, FILM COATED ORAL at 08:26

## 2019-08-21 RX ADMIN — Medication 10 ML: at 08:27

## 2019-08-21 RX ADMIN — IPRATROPIUM BROMIDE AND ALBUTEROL SULFATE 1 AMPULE: .5; 3 SOLUTION RESPIRATORY (INHALATION) at 08:40

## 2019-08-21 RX ADMIN — ENOXAPARIN SODIUM 40 MG: 40 INJECTION SUBCUTANEOUS at 08:26

## 2019-08-21 RX ADMIN — IPRATROPIUM BROMIDE AND ALBUTEROL SULFATE 1 AMPULE: .5; 3 SOLUTION RESPIRATORY (INHALATION) at 14:40

## 2019-08-21 RX ADMIN — Medication 125 MG: at 06:33

## 2019-08-21 RX ADMIN — GABAPENTIN 800 MG: 400 CAPSULE ORAL at 08:26

## 2019-08-21 RX ADMIN — ASPIRIN 81 MG 81 MG: 81 TABLET ORAL at 08:25

## 2019-08-21 RX ADMIN — VILAZODONE HYDROCHLORIDE 10 MG: 10 TABLET ORAL at 08:26

## 2019-08-21 RX ADMIN — Medication 125 MG: at 11:23

## 2019-08-21 ASSESSMENT — PAIN SCALES - GENERAL: PAINLEVEL_OUTOF10: 0

## 2019-08-21 NOTE — PROGRESS NOTES
pulmonary      SUBJECTIVE: stable     OBJECTIVE    VITALS:  BP (!) 140/63   Pulse 76   Temp 97.8 °F (36.6 °C) (Oral)   Resp 14   Ht 5' 6\" (1.676 m)   Wt (!) 421 lb 14.4 oz (191.4 kg)   SpO2 93%   BMI 68.10 kg/m²   HEAD AND FACE EXAM:  No throat injection, no active exudate,no thrush  NECK EXAM;No JVD, no masses, symmetrical  CHEST EXAM; Expansion equal and symmetrical, no masses  LUNG EXAM; Good breath sounds bilaterally. There are expiratory wheezes both lungs, there are crackles at both lung bases  CARDIOVASCULAR EXAM: Positive S1 and S2, no S3 or S4, no clicks ,no murmurs  RIGHT AND LEFT LOWER EXTRIMITY EXAM: No edema, no swelling, no inflamation  CNS EXAM: Alert and oriented X3          LABS   Lab Results   Component Value Date    WBC 10.4 08/21/2019    HGB 12.3 (L) 08/21/2019    HCT 41.6 08/21/2019    MCV 82.5 08/21/2019     08/21/2019     Lab Results   Component Value Date    CREATININE 1.1 08/21/2019    BUN 70 (H) 08/21/2019     08/21/2019    K 3.1 (L) 08/21/2019    CL 84 (L) 08/21/2019    CO2 42 (H) 08/21/2019     Lab Results   Component Value Date    INR 1.03 08/11/2019    PROTIME 11.7 08/11/2019          Lab Results   Component Value Date    PHOS 5.6 08/21/2019    PHOS 5.6 08/20/2019    PHOS 4.9 08/19/2019      No results for input(s): PH, PO2ART, QUO6RGJ, HCO3, BEART, O2SAT in the last 72 hours. Wt Readings from Last 3 Encounters:   08/19/19 (!) 421 lb 14.4 oz (191.4 kg)   07/05/19 (!) 432 lb (196 kg)   06/27/19 (!) 432 lb 1.6 oz (196 kg)               ASSESMENT  Ac on ch resp failure  Ac copd  ronni        PLAN  1. cpm  2.  Cont pap rx    8/21/2019  Susana Ramirez M.D.

## 2019-08-21 NOTE — DISCHARGE SUMMARY
Reason for exam:->ETT placement Reason for Exam: ETT placement Acuity: Acute Type of Exam: Initial Additional signs and symptoms: na Relevant Medical/Surgical History: diabetes, chf, copd FINDINGS: The cardiac silhouette is enlarged and stable. Stable prominence of the central pulmonary vascularity. There is evidence of previous TAVR. Dependent right basilar opacification, improved compared to the previous study. The lungs otherwise are grossly clear. Endotracheal tube has been withdrawn with tip approximately 3.9 cm above the lizz. Enteric catheter extends below the lower margin of the image. Postoperative clips in the left axilla. Satisfactory position of endotracheal tube. Cardiomegaly with central vascular congestion. Right basilar opacification with interval improvement. Xr Chest Portable    Result Date: 8/12/2019  EXAMINATION: ONE XRAY VIEW OF THE CHEST 8/12/2019 5:02 pm COMPARISON: August 11, 2019 HISTORY: ORDERING SYSTEM PROVIDED HISTORY: ett placement TECHNOLOGIST PROVIDED HISTORY: Reason for exam:->ett placement Reason for Exam: ETplacement Type of Exam: Subsequent/Follow-up FINDINGS: Endotracheal tube with tip within the proximal right mainstem bronchus. Cardiac silhouette is enlarged. Pulmonary vascular congestion. Bibasilar opacities. No acute bony abnormality. Endotracheal tube with tip in the right mainstem bronchus proximally. Recommend repositioning.  Findings as above which may represent congestive heart failure with edema       Discharge Time of 39 minutes    Electronically signed by Kota Samayoa MD on 8/21/2019 at 12:15 PM

## 2019-08-22 ENCOUNTER — APPOINTMENT (OUTPATIENT)
Dept: GENERAL RADIOLOGY | Age: 64
DRG: 720 | End: 2019-08-22
Payer: COMMERCIAL

## 2019-08-22 ENCOUNTER — APPOINTMENT (OUTPATIENT)
Dept: CT IMAGING | Age: 64
DRG: 720 | End: 2019-08-22
Payer: COMMERCIAL

## 2019-08-22 ENCOUNTER — HOSPITAL ENCOUNTER (INPATIENT)
Age: 64
LOS: 7 days | Discharge: NURSING, REHAB OR LONG-TERM CARE | DRG: 720 | End: 2019-08-29
Attending: EMERGENCY MEDICINE | Admitting: INTERNAL MEDICINE
Payer: COMMERCIAL

## 2019-08-22 DIAGNOSIS — R65.10 SIRS (SYSTEMIC INFLAMMATORY RESPONSE SYNDROME) (HCC): Primary | ICD-10-CM

## 2019-08-22 DIAGNOSIS — I95.9 HYPOTENSION, UNSPECIFIED HYPOTENSION TYPE: ICD-10-CM

## 2019-08-22 PROBLEM — N17.9 ACUTE KIDNEY INJURY (HCC): Status: ACTIVE | Noted: 2019-08-22

## 2019-08-22 PROBLEM — R57.9 SHOCK, UNSPECIFIED (HCC): Status: ACTIVE | Noted: 2019-08-22

## 2019-08-22 PROBLEM — G93.41 ACUTE METABOLIC ENCEPHALOPATHY: Status: ACTIVE | Noted: 2019-08-22

## 2019-08-22 LAB
ALBUMIN SERPL-MCNC: 3.7 GM/DL (ref 3.4–5)
ALP BLD-CCNC: 74 IU/L (ref 40–129)
ALT SERPL-CCNC: 13 U/L (ref 10–40)
AMMONIA: 43 UMOL/L (ref 11–51)
ANION GAP SERPL CALCULATED.3IONS-SCNC: 16 MMOL/L (ref 4–16)
AST SERPL-CCNC: 15 IU/L (ref 15–37)
BACTERIA: NEGATIVE /HPF
BASE EXCESS MIXED: ABNORMAL (ref 0–2.3)
BASOPHILS ABSOLUTE: 0.1 K/CU MM
BASOPHILS RELATIVE PERCENT: 0.8 % (ref 0–1)
BILIRUB SERPL-MCNC: 0.4 MG/DL (ref 0–1)
BILIRUBIN URINE: NEGATIVE MG/DL
BLOOD, URINE: NEGATIVE
BUN BLDV-MCNC: 86 MG/DL (ref 6–23)
CALCIUM SERPL-MCNC: 9.9 MG/DL (ref 8.3–10.6)
CARBON MONOXIDE, BLOOD: 2.2 % (ref 0–5)
CHLORIDE BLD-SCNC: 80 MMOL/L (ref 99–110)
CLARITY: ABNORMAL
CO2 CONTENT: 52.7 MMOL/L (ref 19–24)
CO2: 35 MMOL/L (ref 21–32)
COLOR: YELLOW
COMMENT: ABNORMAL
CREAT SERPL-MCNC: 1.9 MG/DL (ref 0.6–1.1)
DIFFERENTIAL TYPE: ABNORMAL
EKG ATRIAL RATE: 63 BPM
EKG ATRIAL RATE: 85 BPM
EKG DIAGNOSIS: NORMAL
EKG DIAGNOSIS: NORMAL
EKG P AXIS: 15 DEGREES
EKG P AXIS: 46 DEGREES
EKG P-R INTERVAL: 176 MS
EKG P-R INTERVAL: 204 MS
EKG Q-T INTERVAL: 372 MS
EKG Q-T INTERVAL: 464 MS
EKG QRS DURATION: 80 MS
EKG QRS DURATION: 94 MS
EKG QTC CALCULATION (BAZETT): 442 MS
EKG QTC CALCULATION (BAZETT): 474 MS
EKG R AXIS: 14 DEGREES
EKG R AXIS: 14 DEGREES
EKG T AXIS: 58 DEGREES
EKG T AXIS: 60 DEGREES
EKG VENTRICULAR RATE: 63 BPM
EKG VENTRICULAR RATE: 85 BPM
EOSINOPHILS ABSOLUTE: 0.1 K/CU MM
EOSINOPHILS RELATIVE PERCENT: 1 % (ref 0–3)
GFR AFRICAN AMERICAN: 32 ML/MIN/1.73M2
GFR NON-AFRICAN AMERICAN: 27 ML/MIN/1.73M2
GLUCOSE BLD-MCNC: 266 MG/DL (ref 70–99)
GLUCOSE BLD-MCNC: 313 MG/DL (ref 70–99)
GLUCOSE, URINE: NEGATIVE MG/DL
HCO3 ARTERIAL: 50.4 MMOL/L (ref 18–23)
HCT VFR BLD CALC: 41.5 % (ref 37–47)
HEMOGLOBIN: 11.8 GM/DL (ref 12.5–16)
HYALINE CASTS: 5 /LPF
IMMATURE NEUTROPHIL %: 1.4 % (ref 0–0.43)
KETONES, URINE: NEGATIVE MG/DL
LACTATE: ABNORMAL MMOL/L (ref 0.4–2)
LEUKOCYTE ESTERASE, URINE: NEGATIVE
LYMPHOCYTES ABSOLUTE: 1.3 K/CU MM
LYMPHOCYTES RELATIVE PERCENT: 10.2 % (ref 24–44)
MCH RBC QN AUTO: 24.1 PG (ref 27–31)
MCHC RBC AUTO-ENTMCNC: 28.4 % (ref 32–36)
MCV RBC AUTO: 84.9 FL (ref 78–100)
METHEMOGLOBIN ARTERIAL: 0.6 %
MONOCYTES ABSOLUTE: 0.9 K/CU MM
MONOCYTES RELATIVE PERCENT: 6.9 % (ref 0–4)
MUCUS: ABNORMAL HPF
NITRITE URINE, QUANTITATIVE: NEGATIVE
NUCLEATED RBC %: 0 %
O2 SATURATION: 97.3 % (ref 96–97)
PCO2 ARTERIAL: 76 MMHG (ref 32–45)
PDW BLD-RTO: 19.3 % (ref 11.7–14.9)
PH BLOOD: 7.43 (ref 7.34–7.45)
PH, URINE: 5 (ref 5–8)
PLATELET # BLD: 302 K/CU MM (ref 140–440)
PMV BLD AUTO: 9.9 FL (ref 7.5–11.1)
PO2 ARTERIAL: 188 MMHG (ref 75–100)
POTASSIUM SERPL-SCNC: 3.2 MMOL/L (ref 3.5–5.1)
PROTEIN UA: NEGATIVE MG/DL
RBC # BLD: 4.89 M/CU MM (ref 4.2–5.4)
RBC URINE: ABNORMAL /HPF (ref 0–6)
SEGMENTED NEUTROPHILS ABSOLUTE COUNT: 10.4 K/CU MM
SEGMENTED NEUTROPHILS RELATIVE PERCENT: 79.7 % (ref 36–66)
SODIUM BLD-SCNC: 131 MMOL/L (ref 135–145)
SPECIFIC GRAVITY UA: 1.01 (ref 1–1.03)
SQUAMOUS EPITHELIAL: 1 /HPF
TOTAL IMMATURE NEUTOROPHIL: 0.18 K/CU MM
TOTAL NUCLEATED RBC: 0 K/CU MM
TOTAL PROTEIN: 7.8 GM/DL (ref 6.4–8.2)
TRICHOMONAS: ABNORMAL /HPF
TROPONIN T: 0.01 NG/ML
UROBILINOGEN, URINE: NORMAL MG/DL (ref 0.2–1)
WBC # BLD: 13 K/CU MM (ref 4–10.5)
WBC UA: <1 /HPF (ref 0–5)

## 2019-08-22 PROCEDURE — 36569 INSJ PICC 5 YR+ W/O IMAGING: CPT

## 2019-08-22 PROCEDURE — 84484 ASSAY OF TROPONIN QUANT: CPT

## 2019-08-22 PROCEDURE — 87040 BLOOD CULTURE FOR BACTERIA: CPT

## 2019-08-22 PROCEDURE — 2580000003 HC RX 258: Performed by: EMERGENCY MEDICINE

## 2019-08-22 PROCEDURE — 71045 X-RAY EXAM CHEST 1 VIEW: CPT

## 2019-08-22 PROCEDURE — 2000000000 HC ICU R&B

## 2019-08-22 PROCEDURE — 94660 CPAP INITIATION&MGMT: CPT

## 2019-08-22 PROCEDURE — 87324 CLOSTRIDIUM AG IA: CPT

## 2019-08-22 PROCEDURE — 93005 ELECTROCARDIOGRAM TRACING: CPT | Performed by: EMERGENCY MEDICINE

## 2019-08-22 PROCEDURE — 93010 ELECTROCARDIOGRAM REPORT: CPT | Performed by: INTERNAL MEDICINE

## 2019-08-22 PROCEDURE — 82140 ASSAY OF AMMONIA: CPT

## 2019-08-22 PROCEDURE — 2580000003 HC RX 258

## 2019-08-22 PROCEDURE — G0378 HOSPITAL OBSERVATION PER HR: HCPCS

## 2019-08-22 PROCEDURE — 81001 URINALYSIS AUTO W/SCOPE: CPT

## 2019-08-22 PROCEDURE — 83605 ASSAY OF LACTIC ACID: CPT

## 2019-08-22 PROCEDURE — 82803 BLOOD GASES ANY COMBINATION: CPT

## 2019-08-22 PROCEDURE — 6360000002 HC RX W HCPCS: Performed by: INTERNAL MEDICINE

## 2019-08-22 PROCEDURE — 93005 ELECTROCARDIOGRAM TRACING: CPT | Performed by: INTERNAL MEDICINE

## 2019-08-22 PROCEDURE — 96365 THER/PROPH/DIAG IV INF INIT: CPT

## 2019-08-22 PROCEDURE — 2580000003 HC RX 258: Performed by: INTERNAL MEDICINE

## 2019-08-22 PROCEDURE — 96366 THER/PROPH/DIAG IV INF ADDON: CPT

## 2019-08-22 PROCEDURE — 36415 COLL VENOUS BLD VENIPUNCTURE: CPT

## 2019-08-22 PROCEDURE — 4A023N6 MEASUREMENT OF CARDIAC SAMPLING AND PRESSURE, RIGHT HEART, PERCUTANEOUS APPROACH: ICD-10-PCS | Performed by: INTERNAL MEDICINE

## 2019-08-22 PROCEDURE — 2500000003 HC RX 250 WO HCPCS: Performed by: EMERGENCY MEDICINE

## 2019-08-22 PROCEDURE — 76937 US GUIDE VASCULAR ACCESS: CPT

## 2019-08-22 PROCEDURE — 6370000000 HC RX 637 (ALT 250 FOR IP): Performed by: INTERNAL MEDICINE

## 2019-08-22 PROCEDURE — 36600 WITHDRAWAL OF ARTERIAL BLOOD: CPT

## 2019-08-22 PROCEDURE — 02HV33Z INSERTION OF INFUSION DEVICE INTO SUPERIOR VENA CAVA, PERCUTANEOUS APPROACH: ICD-10-PCS | Performed by: EMERGENCY MEDICINE

## 2019-08-22 PROCEDURE — 94761 N-INVAS EAR/PLS OXIMETRY MLT: CPT

## 2019-08-22 PROCEDURE — C1751 CATH, INF, PER/CENT/MIDLINE: HCPCS

## 2019-08-22 PROCEDURE — 96367 TX/PROPH/DG ADDL SEQ IV INF: CPT

## 2019-08-22 PROCEDURE — 82962 GLUCOSE BLOOD TEST: CPT

## 2019-08-22 PROCEDURE — 70450 CT HEAD/BRAIN W/O DYE: CPT

## 2019-08-22 PROCEDURE — 2700000000 HC OXYGEN THERAPY PER DAY

## 2019-08-22 PROCEDURE — 96368 THER/DIAG CONCURRENT INF: CPT

## 2019-08-22 PROCEDURE — 85025 COMPLETE CBC W/AUTO DIFF WBC: CPT

## 2019-08-22 PROCEDURE — 6360000002 HC RX W HCPCS: Performed by: EMERGENCY MEDICINE

## 2019-08-22 PROCEDURE — 99285 EMERGENCY DEPT VISIT HI MDM: CPT

## 2019-08-22 PROCEDURE — 80053 COMPREHEN METABOLIC PANEL: CPT

## 2019-08-22 RX ORDER — SODIUM CHLORIDE 9 MG/ML
INJECTION, SOLUTION INTRAVENOUS CONTINUOUS
Status: DISCONTINUED | OUTPATIENT
Start: 2019-08-22 | End: 2019-08-23

## 2019-08-22 RX ORDER — LINEZOLID 2 MG/ML
600 INJECTION, SOLUTION INTRAVENOUS EVERY 12 HOURS
Status: DISCONTINUED | OUTPATIENT
Start: 2019-08-22 | End: 2019-08-26

## 2019-08-22 RX ORDER — LACTOBACILLUS RHAMNOSUS GG 10B CELL
1 CAPSULE ORAL DAILY
Status: DISCONTINUED | OUTPATIENT
Start: 2019-08-23 | End: 2019-08-29 | Stop reason: HOSPADM

## 2019-08-22 RX ORDER — IPRATROPIUM BROMIDE AND ALBUTEROL SULFATE 2.5; .5 MG/3ML; MG/3ML
3 SOLUTION RESPIRATORY (INHALATION)
Status: DISCONTINUED | OUTPATIENT
Start: 2019-08-23 | End: 2019-08-29 | Stop reason: HOSPADM

## 2019-08-22 RX ORDER — INSULIN GLARGINE 100 [IU]/ML
15 INJECTION, SOLUTION SUBCUTANEOUS NIGHTLY
Status: DISCONTINUED | OUTPATIENT
Start: 2019-08-22 | End: 2019-08-23

## 2019-08-22 RX ORDER — LIDOCAINE HYDROCHLORIDE 10 MG/ML
5 INJECTION, SOLUTION EPIDURAL; INFILTRATION; INTRACAUDAL; PERINEURAL ONCE
Status: DISCONTINUED | OUTPATIENT
Start: 2019-08-22 | End: 2019-08-29 | Stop reason: HOSPADM

## 2019-08-22 RX ORDER — ERGOCALCIFEROL 1.25 MG/1
50000 CAPSULE ORAL
Status: DISCONTINUED | OUTPATIENT
Start: 2019-08-22 | End: 2019-08-29 | Stop reason: HOSPADM

## 2019-08-22 RX ORDER — SODIUM CHLORIDE 0.9 % (FLUSH) 0.9 %
10 SYRINGE (ML) INJECTION EVERY 12 HOURS SCHEDULED
Status: DISCONTINUED | OUTPATIENT
Start: 2019-08-22 | End: 2019-08-29 | Stop reason: HOSPADM

## 2019-08-22 RX ORDER — VANCOMYCIN HYDROCHLORIDE 1 G/200ML
1000 INJECTION, SOLUTION INTRAVENOUS ONCE
Status: COMPLETED | OUTPATIENT
Start: 2019-08-22 | End: 2019-08-22

## 2019-08-22 RX ORDER — ASPIRIN 81 MG/1
81 TABLET, CHEWABLE ORAL DAILY
Status: DISCONTINUED | OUTPATIENT
Start: 2019-08-23 | End: 2019-08-29 | Stop reason: HOSPADM

## 2019-08-22 RX ORDER — SIMVASTATIN 40 MG
40 TABLET ORAL NIGHTLY
Status: DISCONTINUED | OUTPATIENT
Start: 2019-08-22 | End: 2019-08-29 | Stop reason: HOSPADM

## 2019-08-22 RX ORDER — CLOPIDOGREL BISULFATE 75 MG/1
75 TABLET ORAL DAILY
Status: DISCONTINUED | OUTPATIENT
Start: 2019-08-23 | End: 2019-08-29 | Stop reason: HOSPADM

## 2019-08-22 RX ORDER — 0.9 % SODIUM CHLORIDE 0.9 %
1000 INTRAVENOUS SOLUTION INTRAVENOUS ONCE
Status: COMPLETED | OUTPATIENT
Start: 2019-08-22 | End: 2019-08-22

## 2019-08-22 RX ORDER — SODIUM CHLORIDE 9 MG/ML
INJECTION, SOLUTION INTRAVENOUS
Status: COMPLETED
Start: 2019-08-22 | End: 2019-08-22

## 2019-08-22 RX ORDER — SODIUM CHLORIDE 9 MG/ML
INJECTION, SOLUTION INTRAVENOUS CONTINUOUS
Status: DISCONTINUED | OUTPATIENT
Start: 2019-08-22 | End: 2019-08-24

## 2019-08-22 RX ORDER — SODIUM CHLORIDE 0.9 % (FLUSH) 0.9 %
10 SYRINGE (ML) INJECTION PRN
Status: DISCONTINUED | OUTPATIENT
Start: 2019-08-22 | End: 2019-08-29 | Stop reason: HOSPADM

## 2019-08-22 RX ORDER — ONDANSETRON 2 MG/ML
4 INJECTION INTRAMUSCULAR; INTRAVENOUS EVERY 6 HOURS PRN
Status: DISCONTINUED | OUTPATIENT
Start: 2019-08-22 | End: 2019-08-29 | Stop reason: HOSPADM

## 2019-08-22 RX ORDER — GABAPENTIN 300 MG/1
300 CAPSULE ORAL 3 TIMES DAILY
Status: DISCONTINUED | OUTPATIENT
Start: 2019-08-22 | End: 2019-08-25

## 2019-08-22 RX ORDER — PANTOPRAZOLE SODIUM 40 MG/10ML
40 INJECTION, POWDER, LYOPHILIZED, FOR SOLUTION INTRAVENOUS DAILY
Status: DISCONTINUED | OUTPATIENT
Start: 2019-08-23 | End: 2019-08-29 | Stop reason: HOSPADM

## 2019-08-22 RX ADMIN — SIMVASTATIN 40 MG: 40 TABLET, FILM COATED ORAL at 23:01

## 2019-08-22 RX ADMIN — INSULIN GLARGINE 15 UNITS: 100 INJECTION, SOLUTION SUBCUTANEOUS at 22:31

## 2019-08-22 RX ADMIN — SODIUM CHLORIDE 1000 ML: 9 INJECTION, SOLUTION INTRAVENOUS at 18:30

## 2019-08-22 RX ADMIN — CEFEPIME HYDROCHLORIDE 2 G: 2 INJECTION, POWDER, FOR SOLUTION INTRAVENOUS at 18:02

## 2019-08-22 RX ADMIN — SODIUM CHLORIDE: 9 INJECTION, SOLUTION INTRAVENOUS at 20:30

## 2019-08-22 RX ADMIN — VANCOMYCIN HYDROCHLORIDE 1000 MG: 1 INJECTION, SOLUTION INTRAVENOUS at 20:34

## 2019-08-22 RX ADMIN — Medication 2 MCG/MIN: at 19:41

## 2019-08-22 RX ADMIN — PIPERACILLIN SODIUM,TAZOBACTAM SODIUM 2.25 G: 2; .25 INJECTION, POWDER, FOR SOLUTION INTRAVENOUS at 23:16

## 2019-08-22 RX ADMIN — ERGOCALCIFEROL 50000 UNITS: 1.25 CAPSULE ORAL at 23:03

## 2019-08-22 RX ADMIN — GABAPENTIN 300 MG: 300 CAPSULE ORAL at 23:01

## 2019-08-22 RX ADMIN — Medication 125 MG: at 23:03

## 2019-08-22 RX ADMIN — SODIUM CHLORIDE 1000 ML: 9 INJECTION, SOLUTION INTRAVENOUS at 18:03

## 2019-08-22 RX ADMIN — LINEZOLID 600 MG: 600 INJECTION, SOLUTION INTRAVENOUS at 23:11

## 2019-08-22 ASSESSMENT — PAIN SCALES - GENERAL: PAINLEVEL_OUTOF10: 0

## 2019-08-22 NOTE — ED PROVIDER NOTES
as words and phrases that may be inappropriate. Efforts were made to edit the dictations.         Dariusz Cueva MD  08/22/19 5415

## 2019-08-22 NOTE — H&P
23 MMOL/L    CO2 Content 52.7 (H) 19 - 24 MMOL/L    O2 Sat 97.3 (H) 96 - 97 %    Carbon Monoxide, Blood 2.2 0 - 5 %    Methemoglobin, Arterial 0.6 <1.5 %    Comment 6L NC    Lactic Acid, Plasma   Result Value Ref Range    Lactate (HH) 0.4 - 2.0 mMOL/L     2.9  LACTI CALLED TO ED [facility] MD [id number] [address] MLT  RESULTS READ BACK     EKG 12 Lead   Result Value Ref Range    Ventricular Rate 63 BPM    Atrial Rate 63 BPM    P-R Interval 176 ms    QRS Duration 94 ms    Q-T Interval 464 ms    QTc Calculation (Bazett) 474 ms    P Axis 15 degrees    R Axis 14 degrees    T Axis 58 degrees    Diagnosis       Normal sinus rhythm  Cannot rule out Anterior infarct , age undetermined  Abnormal ECG  When compared with ECG of 11-AUG-2019 19:31,  No significant change was found       XR CHEST PORTABLE   Final Result   1. No acute cardiopulmonary disease. 2. Cardiomegaly. CT Head WO Contrast    (Results Pending)          ASSESSMENT/IMPRESSION:        Acute kidney injury ([address]): Likely from dehydration. Gentle hydration and nephro consult  Mata catheter placement and replace electrolytes. Closely monitor BMP.  Acute metabolic encephalopathy likely from hypotension and hypercapnia.  Shock, unspecified ([address]): Broad-spectrum antibiotics and vancomycin oral for C. difficile   Uncontrolled diabetes mellitus ([address]): Lantus, prandial insulin and sliding scale. Endo consult   Chronic respiratory failure with hypoxia and hypercapnia ([address]): Continue home BiPAP. Pulmonology consult and nebulization treatments. Monitor in ICU for potential intubation if needed   Morbid obesity with BMI of 70 and over, adult [facility])    ? DVT prophylaxis: Lovenox     Old records reviewed. Patient is a Full Code   Total critical care time spent bedside 42 minutes    All  concerns addressed at this time, and family is in agreement with current treatment plan.      Renetta Naik MD   Hospitalist at [facility]

## 2019-08-23 ENCOUNTER — APPOINTMENT (OUTPATIENT)
Dept: CT IMAGING | Age: 64
DRG: 720 | End: 2019-08-23
Payer: COMMERCIAL

## 2019-08-23 LAB
ANION GAP SERPL CALCULATED.3IONS-SCNC: 19 MMOL/L (ref 4–16)
BACTERIA: NEGATIVE /HPF
BASE EXCESS MIXED: ABNORMAL (ref 0–2.3)
BILIRUBIN URINE: NEGATIVE MG/DL
BLOOD, URINE: ABNORMAL
BUN BLDV-MCNC: 80 MG/DL (ref 6–23)
CALCIUM SERPL-MCNC: 9 MG/DL (ref 8.3–10.6)
CARBON MONOXIDE, BLOOD: 2 % (ref 0–5)
CHLORIDE BLD-SCNC: 87 MMOL/L (ref 99–110)
CLARITY: ABNORMAL
CLOSTRIDIUM DIFFICILE, PCR: NORMAL
CLOSTRIDIUM DIFFICILE, PCR: NORMAL
CO2 CONTENT: 42.3 MMOL/L (ref 19–24)
CO2: 33 MMOL/L (ref 21–32)
COLOR: YELLOW
COMMENT: ABNORMAL
CREAT SERPL-MCNC: 1.4 MG/DL (ref 0.6–1.1)
EKG ATRIAL RATE: 63 BPM
EKG DIAGNOSIS: NORMAL
EKG P AXIS: 41 DEGREES
EKG P-R INTERVAL: 222 MS
EKG Q-T INTERVAL: 452 MS
EKG QRS DURATION: 104 MS
EKG QTC CALCULATION (BAZETT): 462 MS
EKG R AXIS: 9 DEGREES
EKG T AXIS: 81 DEGREES
EKG VENTRICULAR RATE: 63 BPM
GFR AFRICAN AMERICAN: 46 ML/MIN/1.73M2
GFR NON-AFRICAN AMERICAN: 38 ML/MIN/1.73M2
GLUCOSE BLD-MCNC: 213 MG/DL (ref 70–99)
GLUCOSE BLD-MCNC: 245 MG/DL (ref 70–99)
GLUCOSE BLD-MCNC: 269 MG/DL (ref 70–99)
GLUCOSE BLD-MCNC: 274 MG/DL (ref 70–99)
GLUCOSE BLD-MCNC: 290 MG/DL (ref 70–99)
GLUCOSE, URINE: 150 MG/DL
HCO3 ARTERIAL: 40.2 MMOL/L (ref 18–23)
HCT VFR BLD CALC: 41.3 % (ref 37–47)
HEMOCCULT STL QL: POSITIVE
HEMOGLOBIN: 12.4 GM/DL (ref 12.5–16)
HIGH SENSITIVE C-REACTIVE PROTEIN: 43.6 MG/L
KETONES, URINE: NEGATIVE MG/DL
LACTATE: 1.1 MMOL/L (ref 0.4–2)
LEUKOCYTE ESTERASE, URINE: ABNORMAL
MAGNESIUM: 2 MG/DL (ref 1.8–2.4)
MAGNESIUM: 2.2 MG/DL (ref 1.8–2.4)
MCH RBC QN AUTO: 24.6 PG (ref 27–31)
MCHC RBC AUTO-ENTMCNC: 30 % (ref 32–36)
MCV RBC AUTO: 81.8 FL (ref 78–100)
METHEMOGLOBIN ARTERIAL: 1.3 %
MUCUS: ABNORMAL HPF
NITRITE URINE, QUANTITATIVE: NEGATIVE
O2 SATURATION: 95.4 % (ref 96–97)
PCO2 ARTERIAL: 68 MMHG (ref 32–45)
PDW BLD-RTO: 19.9 % (ref 11.7–14.9)
PH BLOOD: 7.38 (ref 7.34–7.45)
PH, URINE: 5 (ref 5–8)
PHOSPHORUS: 3.8 MG/DL (ref 2.5–4.9)
PHOSPHORUS: 4.6 MG/DL (ref 2.5–4.9)
PLATELET # BLD: 292 K/CU MM (ref 140–440)
PMV BLD AUTO: 10.5 FL (ref 7.5–11.1)
PO2 ARTERIAL: 93 MMHG (ref 75–100)
POTASSIUM SERPL-SCNC: 3.2 MMOL/L (ref 3.5–5.1)
POTASSIUM SERPL-SCNC: ABNORMAL MMOL/L (ref 3.5–5.1)
PROCALCITONIN: 0.25
PROTEIN UA: 30 MG/DL
RBC # BLD: 5.05 M/CU MM (ref 4.2–5.4)
RBC URINE: 7 /HPF (ref 0–6)
REASON FOR REJECTION: NORMAL
REJECTED TEST: NORMAL
SODIUM BLD-SCNC: 139 MMOL/L (ref 135–145)
SPECIFIC GRAVITY UA: 1.01 (ref 1–1.03)
SQUAMOUS EPITHELIAL: 1 /HPF
TOTAL CK: 230 IU/L (ref 26–140)
TRANSITIONAL EPITHELIAL: 1 /HPF
TRICHOMONAS: ABNORMAL /HPF
TROPONIN T: 0.01 NG/ML
TROPONIN T: 0.03 NG/ML
UROBILINOGEN, URINE: NORMAL MG/DL (ref 0.2–1)
WBC # BLD: 21.7 K/CU MM (ref 4–10.5)
WBC CLUMP: ABNORMAL /HPF
WBC UA: 47 /HPF (ref 0–5)
YEAST: ABNORMAL /HPF

## 2019-08-23 PROCEDURE — 2700000000 HC OXYGEN THERAPY PER DAY

## 2019-08-23 PROCEDURE — 96367 TX/PROPH/DG ADDL SEQ IV INF: CPT

## 2019-08-23 PROCEDURE — 82550 ASSAY OF CK (CPK): CPT

## 2019-08-23 PROCEDURE — 83605 ASSAY OF LACTIC ACID: CPT

## 2019-08-23 PROCEDURE — C9113 INJ PANTOPRAZOLE SODIUM, VIA: HCPCS | Performed by: INTERNAL MEDICINE

## 2019-08-23 PROCEDURE — 84100 ASSAY OF PHOSPHORUS: CPT

## 2019-08-23 PROCEDURE — 96366 THER/PROPH/DIAG IV INF ADDON: CPT

## 2019-08-23 PROCEDURE — G0378 HOSPITAL OBSERVATION PER HR: HCPCS

## 2019-08-23 PROCEDURE — 82803 BLOOD GASES ANY COMBINATION: CPT

## 2019-08-23 PROCEDURE — 86141 C-REACTIVE PROTEIN HS: CPT

## 2019-08-23 PROCEDURE — 81001 URINALYSIS AUTO W/SCOPE: CPT

## 2019-08-23 PROCEDURE — 96375 TX/PRO/DX INJ NEW DRUG ADDON: CPT

## 2019-08-23 PROCEDURE — 2580000003 HC RX 258: Performed by: EMERGENCY MEDICINE

## 2019-08-23 PROCEDURE — 6370000000 HC RX 637 (ALT 250 FOR IP): Performed by: INTERNAL MEDICINE

## 2019-08-23 PROCEDURE — 2580000003 HC RX 258: Performed by: INTERNAL MEDICINE

## 2019-08-23 PROCEDURE — 93010 ELECTROCARDIOGRAM REPORT: CPT | Performed by: INTERNAL MEDICINE

## 2019-08-23 PROCEDURE — 2000000000 HC ICU R&B

## 2019-08-23 PROCEDURE — 84145 PROCALCITONIN (PCT): CPT

## 2019-08-23 PROCEDURE — 82962 GLUCOSE BLOOD TEST: CPT

## 2019-08-23 PROCEDURE — 6360000002 HC RX W HCPCS: Performed by: INTERNAL MEDICINE

## 2019-08-23 PROCEDURE — 96372 THER/PROPH/DIAG INJ SC/IM: CPT

## 2019-08-23 PROCEDURE — 94660 CPAP INITIATION&MGMT: CPT

## 2019-08-23 PROCEDURE — 94761 N-INVAS EAR/PLS OXIMETRY MLT: CPT

## 2019-08-23 PROCEDURE — 74176 CT ABD & PELVIS W/O CONTRAST: CPT

## 2019-08-23 PROCEDURE — 36600 WITHDRAWAL OF ARTERIAL BLOOD: CPT

## 2019-08-23 PROCEDURE — 84484 ASSAY OF TROPONIN QUANT: CPT

## 2019-08-23 PROCEDURE — 36592 COLLECT BLOOD FROM PICC: CPT

## 2019-08-23 PROCEDURE — 84132 ASSAY OF SERUM POTASSIUM: CPT

## 2019-08-23 PROCEDURE — 83735 ASSAY OF MAGNESIUM: CPT

## 2019-08-23 PROCEDURE — 87086 URINE CULTURE/COLONY COUNT: CPT

## 2019-08-23 PROCEDURE — 93005 ELECTROCARDIOGRAM TRACING: CPT | Performed by: INTERNAL MEDICINE

## 2019-08-23 PROCEDURE — 85027 COMPLETE CBC AUTOMATED: CPT

## 2019-08-23 PROCEDURE — 80048 BASIC METABOLIC PNL TOTAL CA: CPT

## 2019-08-23 PROCEDURE — 94640 AIRWAY INHALATION TREATMENT: CPT

## 2019-08-23 PROCEDURE — 82272 OCCULT BLD FECES 1-3 TESTS: CPT

## 2019-08-23 PROCEDURE — 99255 IP/OBS CONSLTJ NEW/EST HI 80: CPT | Performed by: INTERNAL MEDICINE

## 2019-08-23 RX ORDER — GUAIFENESIN 600 MG/1
600 TABLET, EXTENDED RELEASE ORAL 2 TIMES DAILY
Qty: 30 TABLET | Refills: 0 | Status: SHIPPED | OUTPATIENT
Start: 2019-08-23 | End: 2019-08-23 | Stop reason: SDUPTHER

## 2019-08-23 RX ORDER — DEXTROSE MONOHYDRATE 50 MG/ML
100 INJECTION, SOLUTION INTRAVENOUS PRN
Status: DISCONTINUED | OUTPATIENT
Start: 2019-08-23 | End: 2019-08-29 | Stop reason: HOSPADM

## 2019-08-23 RX ORDER — POTASSIUM CHLORIDE 29.8 MG/ML
60 INJECTION INTRAVENOUS ONCE
Status: DISCONTINUED | OUTPATIENT
Start: 2019-08-23 | End: 2019-08-23 | Stop reason: SDUPTHER

## 2019-08-23 RX ORDER — POTASSIUM CHLORIDE 29.8 MG/ML
60 INJECTION INTRAVENOUS ONCE
Status: COMPLETED | OUTPATIENT
Start: 2019-08-23 | End: 2019-08-23

## 2019-08-23 RX ORDER — POTASSIUM CHLORIDE 29.8 MG/ML
20 INJECTION INTRAVENOUS ONCE
Status: COMPLETED | OUTPATIENT
Start: 2019-08-23 | End: 2019-08-23

## 2019-08-23 RX ORDER — POTASSIUM CHLORIDE 7.45 MG/ML
10 INJECTION INTRAVENOUS PRN
Status: DISCONTINUED | OUTPATIENT
Start: 2019-08-23 | End: 2019-08-23 | Stop reason: CLARIF

## 2019-08-23 RX ORDER — GUAIFENESIN 600 MG/1
600 TABLET, EXTENDED RELEASE ORAL 2 TIMES DAILY
Qty: 30 TABLET | Refills: 0 | Status: SHIPPED | OUTPATIENT
Start: 2019-08-23 | End: 2019-09-07

## 2019-08-23 RX ORDER — MIDODRINE HYDROCHLORIDE 5 MG/1
5 TABLET ORAL
Status: DISCONTINUED | OUTPATIENT
Start: 2019-08-23 | End: 2019-08-27

## 2019-08-23 RX ORDER — NICOTINE POLACRILEX 4 MG
15 LOZENGE BUCCAL PRN
Status: DISCONTINUED | OUTPATIENT
Start: 2019-08-23 | End: 2019-08-29 | Stop reason: HOSPADM

## 2019-08-23 RX ORDER — POTASSIUM CHLORIDE 29.8 MG/ML
20 INJECTION INTRAVENOUS ONCE
Status: COMPLETED | OUTPATIENT
Start: 2019-08-23 | End: 2019-08-24

## 2019-08-23 RX ORDER — DEXTROSE MONOHYDRATE 25 G/50ML
12.5 INJECTION, SOLUTION INTRAVENOUS PRN
Status: DISCONTINUED | OUTPATIENT
Start: 2019-08-23 | End: 2019-08-29 | Stop reason: HOSPADM

## 2019-08-23 RX ORDER — PRAMIPEXOLE DIHYDROCHLORIDE 0.25 MG/1
1 TABLET ORAL 3 TIMES DAILY
Status: DISCONTINUED | OUTPATIENT
Start: 2019-08-23 | End: 2019-08-29 | Stop reason: HOSPADM

## 2019-08-23 RX ORDER — INSULIN GLARGINE 100 [IU]/ML
30 INJECTION, SOLUTION SUBCUTANEOUS NIGHTLY
Status: DISCONTINUED | OUTPATIENT
Start: 2019-08-23 | End: 2019-08-24

## 2019-08-23 RX ORDER — ESMOLOL HYDROCHLORIDE 10 MG/ML
50 INJECTION, SOLUTION INTRAVENOUS CONTINUOUS
Status: DISCONTINUED | OUTPATIENT
Start: 2019-08-23 | End: 2019-08-23 | Stop reason: CLARIF

## 2019-08-23 RX ADMIN — PIPERACILLIN SODIUM,TAZOBACTAM SODIUM 2.25 G: 2; .25 INJECTION, POWDER, FOR SOLUTION INTRAVENOUS at 14:16

## 2019-08-23 RX ADMIN — IPRATROPIUM BROMIDE AND ALBUTEROL SULFATE 3 ML: .5; 3 SOLUTION RESPIRATORY (INHALATION) at 21:18

## 2019-08-23 RX ADMIN — PANTOPRAZOLE SODIUM 40 MG: 40 INJECTION, POWDER, FOR SOLUTION INTRAVENOUS at 08:38

## 2019-08-23 RX ADMIN — SODIUM CHLORIDE: 9 INJECTION, SOLUTION INTRAVENOUS at 20:34

## 2019-08-23 RX ADMIN — Medication 125 MG: at 12:02

## 2019-08-23 RX ADMIN — LINEZOLID 600 MG: 600 INJECTION, SOLUTION INTRAVENOUS at 10:22

## 2019-08-23 RX ADMIN — GABAPENTIN 300 MG: 300 CAPSULE ORAL at 22:00

## 2019-08-23 RX ADMIN — SODIUM CHLORIDE, PRESERVATIVE FREE 10 ML: 5 INJECTION INTRAVENOUS at 08:38

## 2019-08-23 RX ADMIN — POTASSIUM CHLORIDE 20 MEQ: 400 INJECTION, SOLUTION INTRAVENOUS at 22:01

## 2019-08-23 RX ADMIN — ENOXAPARIN SODIUM 40 MG: 100 INJECTION SUBCUTANEOUS at 08:38

## 2019-08-23 RX ADMIN — LINEZOLID 600 MG: 600 INJECTION, SOLUTION INTRAVENOUS at 22:21

## 2019-08-23 RX ADMIN — SODIUM CHLORIDE, PRESERVATIVE FREE 10 ML: 5 INJECTION INTRAVENOUS at 20:36

## 2019-08-23 RX ADMIN — INSULIN GLARGINE 30 UNITS: 100 INJECTION, SOLUTION SUBCUTANEOUS at 21:07

## 2019-08-23 RX ADMIN — SODIUM CHLORIDE: 9 INJECTION, SOLUTION INTRAVENOUS at 06:03

## 2019-08-23 RX ADMIN — IPRATROPIUM BROMIDE AND ALBUTEROL SULFATE 3 ML: .5; 3 SOLUTION RESPIRATORY (INHALATION) at 07:55

## 2019-08-23 RX ADMIN — Medication 125 MG: at 06:02

## 2019-08-23 RX ADMIN — POTASSIUM CHLORIDE 60 MEQ: 400 INJECTION, SOLUTION INTRAVENOUS at 12:25

## 2019-08-23 RX ADMIN — POTASSIUM CHLORIDE 20 MEQ: 400 INJECTION, SOLUTION INTRAVENOUS at 21:00

## 2019-08-23 RX ADMIN — SIMVASTATIN 40 MG: 40 TABLET, FILM COATED ORAL at 21:59

## 2019-08-23 RX ADMIN — Medication 125 MG: at 17:19

## 2019-08-23 RX ADMIN — PRAMIPEXOLE DIHYDROCHLORIDE 1 MG: 0.25 TABLET ORAL at 21:59

## 2019-08-23 RX ADMIN — SODIUM CHLORIDE, PRESERVATIVE FREE 10 ML: 5 INJECTION INTRAVENOUS at 00:04

## 2019-08-23 RX ADMIN — PIPERACILLIN SODIUM,TAZOBACTAM SODIUM 2.25 G: 2; .25 INJECTION, POWDER, FOR SOLUTION INTRAVENOUS at 06:03

## 2019-08-23 RX ADMIN — Medication 10 ML: at 20:36

## 2019-08-23 RX ADMIN — SODIUM CHLORIDE, PRESERVATIVE FREE 10 ML: 5 INJECTION INTRAVENOUS at 08:54

## 2019-08-23 RX ADMIN — PIPERACILLIN SODIUM,TAZOBACTAM SODIUM 2.25 G: 2; .25 INJECTION, POWDER, FOR SOLUTION INTRAVENOUS at 22:08

## 2019-08-23 ASSESSMENT — PAIN SCALES - GENERAL
PAINLEVEL_OUTOF10: 0

## 2019-08-23 NOTE — CONSULTS
MLT  RESULTS READ BACK  *   CL 84* 80* 87*   CO2 42* 35* 33*   BUN 70* 86* 80*   CREATININE 1.1 1.9* 1.4*   GLUCOSE 194* 313* 274*     Magnesium:   Lab Results   Component Value Date    MG 2.2 08/23/2019     Hepatic:   Recent Labs     08/22/19  1355   AST 15   ALT 13   BILITOT 0.4   ALKPHOS 74     No results for input(s): LIPASE, AMYLASE in the last 72 hours. No results for input(s): PROTIME, INR in the last 72 hours. No results for input(s): PTT in the last 72 hours. Lipids: No results for input(s): CHOL, HDL in the last 72 hours. Invalid input(s): LDLCALCU  INR: No results for input(s): INR in the last 72 hours.   TSH: No results found for: TSH    Intake/Output Summary (Last 24 hours) at 8/23/2019 1617  Last data filed at 8/23/2019 0615  Gross per 24 hour   Intake 1335.71 ml   Output 1800 ml   Net -464.29 ml      sodium chloride 100 mL/hr at 08/23/19 0603    norepinephrine 7 mcg/min (08/23/19 1556)       IMPRESSION:      Patient Active Problem List   Diagnosis Code    Morbid obesity with BMI of 70 and over, adult ([email or web address] E66.01, Z68.45    Essential hypertension I10    Dyslipidemia E78.5    Fecal incontinence R15.9    Mild intermittent asthma without complication U20.05    S/P laparoscopic cholecystectomy Z90.49    Type 2 diabetes mellitus without complication, with long-term current use of insulin ([facility]) E11.9, Z79.4    Fatty liver K76.0    Arthritis M19.90    H/O parotidectomy Z90.49    Venous stasis dermatitis of both lower extremities I87.2    Aortic stenosis I35.0    Morbid obesity ([facility]) E66.01    Asthma J45.909    Diabetes mellitus ([facility] E11.9    Hypertension I10    Sleep apnea G47.30    Family history of coronary artery disease Z82.49    Chest pain R07.9    SOB (shortness of breath) R06.02    Palpitations R00.2    Peripheral edema R60.9    Hypervolemia E87.70    Chronic respiratory failure with hypoxia and hypercapnia ([facility]) J96.11, J96.12    Acute exacerbation of chronic

## 2019-08-23 NOTE — ED NOTES
Pt noted to have bigeminal PVC's.   Tele strip printed, attempted to catch on repeat EKG, pt back in sinus rhythm.  ekg shown to Dr. Malena Dotson RN  08/22/19 2056       Jack Angelo RN  08/22/19 1726

## 2019-08-23 NOTE — CONSULTS
08/21/19  0600 08/22/19  1355   WBC 10.4 13.0*   HGB 12.3* 11.8*    302     BMP:    Recent Labs     08/21/19  0600 08/22/19  1355    131*   K 3.1* 3.2*   CL 84* 80*   CO2 42* 35*   BUN 70* 86*   CREATININE 1.1 1.9*   GLUCOSE 194* 313*     Hepatic:   Recent Labs     08/22/19  1355   AST 15   ALT 13   BILITOT 0.4   ALKPHOS 74     Troponin: No results for input(s): TROPONINI in the last 72 hours. Mg, Phos:   Recent Labs     08/21/19  0600   MG 2.0   PHOS 5.6*       ABGs:   Lab Results   Component Value Date    PO2ART 93 08/23/2019    SMC7NTT 68.0 08/23/2019     INR: No results for input(s): INR in the last 72 hours. -----------------------------------------------------------------  Assessment and Recommendations    IMP:  JOSE from ATN in setting of sepsis and on ace, loop, and thiazide at discharge  Hypokalemia   AMS in above setting of sepsis and increased   CO2  sepsis with ongoing c.  Diff infection   decompensated heart failure with diastolic dysfunction   DM2   Hx HTN but currently hypotensive with sepsis   ANMOL with bipap    PLAN:   Hold all ace/diuretics/nsaids  Gentle IVF ut closely monitor for overload with heart failure   Monitor BMP  Renal labs and urine  Monitor I&O's  Replete K+  On bipap   Monitor blood gases  ID consulted   abx   Cultures pending   SSI and Lantus   Monitor volume   Daily weight   Will follow      Patient Active Problem List   Diagnosis Code    Morbid obesity with BMI of 70 and over, adult ([facility] E66.01, Z68.45    Essential hypertension I10    Dyslipidemia E78.5    Fecal incontinence R15.9    Mild intermittent asthma without complication T93.48    S/P laparoscopic cholecystectomy Z90.49    Type 2 diabetes mellitus without complication, with long-term current use of insulin ([facility]) E11.9, Z79.4    Fatty liver K76.0    Arthritis M19.90    H/O parotidectomy Z90.49    Venous stasis dermatitis of both lower extremities I87.2    Aortic stenosis I35.0    Morbid obesity

## 2019-08-23 NOTE — CONSULTS
(HUMALOG) injection vial 10 Units, 10 Units, Subcutaneous, TID WC  insulin glargine (LANTUS) injection vial 15 Units, 15 Units, Subcutaneous, Nightly  ipratropium-albuterol (DUONEB) nebulizer solution 3 mL, 3 mL, Inhalation, Q4H WA  linezolid (ZYVOX) IVPB 600 mg, 600 mg, Intravenous, Q12H  piperacillin-tazobactam (ZOSYN) 2.25 g in dextrose 5 % 50 mL IVPB (mini-bag), 2.25 g, Intravenous, Q8H  pantoprazole (PROTONIX) injection 40 mg, 40 mg, Intravenous, Daily  simvastatin (ZOCOR) tablet 40 mg, 40 mg, Oral, Nightly  0.9 % sodium chloride infusion, , Intravenous, Continuous  vancomycin (VANCOCIN) oral solution 125 mg, 125 mg, Oral, 4 times per day  vitamin D (ERGOCALCIFEROL) capsule 50,000 Units, 50,000 Units, Oral, Once per day on Mon Thu  lactobacillus (CULTURELLE) capsule 1 capsule, 1 capsule, Oral, Daily  sodium chloride flush 0.9 % injection 10 mL, 10 mL, Intravenous, 2 times per day  sodium chloride flush 0.9 % injection 10 mL, 10 mL, Intravenous, PRN  magnesium hydroxide (MILK OF MAGNESIA) 400 MG/5ML suspension 30 mL, 30 mL, Oral, Daily PRN  ondansetron (ZOFRAN) injection 4 mg, 4 mg, Intravenous, Q6H PRN  enoxaparin (LOVENOX) injection 40 mg, 40 mg, Subcutaneous, Daily  norepinephrine (LEVOPHED) 16 mg in dextrose 5% 250 mL infusion, 10 mcg/min, Intravenous, Continuous  lidocaine PF 1 % injection 5 mL, 5 mL, Intradermal, Once  sodium chloride flush 0.9 % injection 10 mL, 10 mL, Intravenous, 2 times per day  sodium chloride flush 0.9 % injection 10 mL, 10 mL, Intravenous, PRN    No Known Allergies    Social History:    Social History     Socioeconomic History    Marital status:      Spouse name: None    Number of children: None    Years of education: None    Highest education level: None   Occupational History    None   Social Needs    Financial resource strain: None    Food insecurity:     Worry: None     Inability: None    Transportation needs:     Medical: None     Non-medical: None   Tobacco Use  Smoking status: Former Smoker     Types: Cigarettes     Last attempt to quit: 3/19/2003     Years since quittin.4    Smokeless tobacco: Never Used    Tobacco comment: quit 15 years ago   Substance and Sexual Activity    Alcohol use: No    Drug use: No    Sexual activity: Never   Lifestyle    Physical activity:     Days per week: None     Minutes per session: None    Stress: None   Relationships    Social connections:     Talks on phone: None     Gets together: None     Attends Taoism service: None     Active member of club or organization: None     Attends meetings of clubs or organizations: None     Relationship status: None    Intimate partner violence:     Fear of current or ex partner: None     Emotionally abused: None     Physically abused: None     Forced sexual activity: None   Other Topics Concern    None   Social History Narrative    ** Merged History Encounter **            Family History:   Family History   Problem Relation Age of Onset    Heart Failure Mother     Heart Attack Mother     Hypertension Mother     Coronary Art Dis Mother         Had PTCA w/stenting.     Heart Failure Father     Heart Failure Brother     Heart Disease Mother     High Blood Pressure Mother     Obesity Mother     Diabetes Mother     Heart Disease Father     High Blood Pressure Father     Obesity Father     Heart Disease Brother     High Blood Pressure Brother     Obesity Brother        Immunization:  Immunization History   Administered Date(s) Administered    Influenza Vaccine, unspecified formulation 10/02/2017    Influenza, Quadv, 6 mo and older, IM, PF (Flulaval, Fluarix) 2018           Objective:   PHYSICAL EXAM:      VITALS:    Vitals:    19 1032 19 1047 19 1102 19 1117   BP: (!) 68/55 (!) 94/40 (!) 96/41 (!) 109/44   Pulse: 69 63 62 61   Resp: 16 15 14 14   Temp:       TempSrc:       SpO2: 100% 100% 100% 100%   Weight:       Height:               NECK:

## 2019-08-23 NOTE — ED NOTES
Attempted to call report to ICU RN, no answer.  FARIHA Otoole to have pt RN call me back     Michael Rogers RN  08/22/19 2052

## 2019-08-23 NOTE — CONSULTS
Endocrinology   Consult Note  Dear Doctor    Thank You for the Consult     Pt. Was Admitted for : Change in mental state and severe hypoxia with oxygen desaturation  Patient was discharged yesterday and was returned and was admitted today    Reason for Consult: Better control of blood glucose      History Obtained From:  Patient/ EMR       HISTORY OF PRESENT ILLNESS:                The patient is a 60 y.o. female with significant past medical history of aortic stenosis, cervical cancer, morbid obesity, COPD, congestive heart failure, sleep apnea on CPAP, was recently discharged home after being treated for his sepsis only to be readmitted to hospital this morning because patient was very sleepy not arousable and possibly hypoxic with with oxygen desaturation. I was  consulted for better control of blood glucose. ROS:   Pt's ROS done in detail. Abnormal ROS are noted in Medical and Surgical History Section below:      Other Medical History:        Diagnosis Date    Acute kidney injury ([facility] 8/22/2019    Aortic stenosis     Asthma     Cancer [facility])     Cervical    Carotid artery stenosis     Chest pain     CH[facility]congestive [facility] failure) [facility]     Chronic back pain     COPD exacerbation ([facility] [address]     Depression     Diabetes mellitus ([address]     Family history of coronary artery disease     Fatty liver 10/27/2016    H/O aortic valve stenosis     H/O echocardiogram 11/22/2016    EF 55%, Aortic valve area is 0.84 cm2 with a mean gradient of 36 suggestive of severe aortic stenosis, mild to mos LVH    Headache     Heart murmur     History of nuclear stress test 11/17/2016    lexiscan-normal,EF70%    Morbid obesity ([facility])     BMI=73.72 kg/m2    Neuropathy     NSTEMI (non-ST elevated myocardial infarction) ([address] 02/07/2018    Obesity     Osteoarthritis     Palpitations     Peripheral edema     Restless legs syndrome     Sleep apnea     Has a CPAP    Sleep apnea     SOB (shortness of breath) Surgical History:        Procedure Laterality Date    AORTIC VALVE REPLACEMENT  2017    29mm EvolutR TAVR     SECTION      CHOLECYSTECTOMY      GALLBLADDER SURGERY      HYSTERECTOMY      NECK SURGERY      Tumor    TUBAL LIGATION         Allergies:  Patient has no known allergies. Family History:       Problem Relation Age of Onset    Heart Failure Mother     Heart Attack Mother     Hypertension Mother     Coronary Art Dis Mother         Had PTCA w/stenting.  Heart Failure Father     Heart Failure Brother     Heart Disease Mother     High Blood Pressure Mother     Obesity Mother     Diabetes Mother     Heart Disease Father     High Blood Pressure Father     Obesity Father     Heart Disease Brother     High Blood Pressure Brother     Obesity Brother      REVIEW OF SYSTEMS:  Review of System Done as noted above     PHYSICAL EXAM:      Vitals:    BP (!) 130/59   Pulse 70   Temp 97.6 °F (36.4 °C) (Axillary)   Resp 18   Ht 5' 6\" (1.676 m)   Wt (!) 391 lb 12.1 oz (177.7 kg)   SpO2 98%   BMI 63.23 kg/m²     CONSTITUTIONAL:  awake but very drowsy and has BiPAP on her this time t, cooperative, appears stated age   EYES:  vision intact Fundoscopic Exam not performed   ENT:Normal  NECK:  Supple, No JVD. Thyroid Exam:Normal   LUNGS:  Has Vesicular Breath Sounds, has  wheezing and rales as BiPAP on her  CARDIOVASCULAR:  Normal apical impulse, regular rate and rhythm, normal S1 and S2, no S3 or S4, and has no  murmur   ABDOMEN:  No scars, normal bowel sounds, soft, non-distended, non-tender, no masses palpated, no hepatolienomegaly  Musculoskeletal: Normal  Extremities: Normal, peripheral pulses normal, , has no edema   NEUROLOGIC:  Awake, drowsy, oriented to name, place and time. Cranial nerves II-XII are grossly intact. Motor is  intact.   Sensory neuropathy t. ,  and gait is abnormal and stable gait  DATA:    CBC:   Recent Labs     19  0600 19  1355

## 2019-08-23 NOTE — PROGRESS NOTES
Effort normal.   Skin: Skin is warm.          Medications:   Medications:    insulin lispro  0-6 Units Subcutaneous TID WC    insulin lispro  0-3 Units Subcutaneous Nightly    aspirin  81 mg Oral Daily    clopidogrel  75 mg Oral Daily    gabapentin  300 mg Oral TID    insulin lispro  10 Units Subcutaneous TID WC    insulin glargine  15 Units Subcutaneous Nightly    ipratropium-albuterol  3 mL Inhalation Q4H WA    linezolid  600 mg Intravenous Q12H    piperacillin-tazobactam  2.25 g Intravenous Q8H    pantoprazole  40 mg Intravenous Daily    simvastatin  40 mg Oral Nightly    vancomycin  125 mg Oral 4 times per day    vitamin D  50,000 Units Oral Once per day on Mon Thu    lactobacillus  1 capsule Oral Daily    sodium chloride flush  10 mL Intravenous 2 times per day    enoxaparin  40 mg Subcutaneous Daily    lidocaine PF  5 mL Intradermal Once    sodium chloride flush  10 mL Intravenous 2 times per day      Infusions:    sodium chloride 150 mL/hr at 08/22/19 2030    sodium chloride 100 mL/hr at 08/23/19 0603    norepinephrine 2 mcg/min (08/23/19 0615)     PRN Meds:   sodium chloride flush 10 mL PRN   magnesium hydroxide 30 mL Daily PRN   ondansetron 4 mg Q6H PRN   sodium chloride flush 10 mL PRN         Electronically signed by Ning Wells MD on 8/23/2019 at 9:33 AM

## 2019-08-24 ENCOUNTER — APPOINTMENT (OUTPATIENT)
Dept: GENERAL RADIOLOGY | Age: 64
DRG: 720 | End: 2019-08-24
Payer: COMMERCIAL

## 2019-08-24 PROBLEM — R65.10 SIRS (SYSTEMIC INFLAMMATORY RESPONSE SYNDROME) (HCC): Status: ACTIVE | Noted: 2019-08-12

## 2019-08-24 LAB
ALBUMIN SERPL-MCNC: 3.6 GM/DL (ref 3.4–5)
ALP BLD-CCNC: 72 IU/L (ref 40–129)
ALT SERPL-CCNC: 10 U/L (ref 10–40)
ANION GAP SERPL CALCULATED.3IONS-SCNC: 14 MMOL/L (ref 4–16)
AST SERPL-CCNC: 15 IU/L (ref 15–37)
BILIRUB SERPL-MCNC: 0.4 MG/DL (ref 0–1)
BUN BLDV-MCNC: 65 MG/DL (ref 6–23)
CALCIUM SERPL-MCNC: 8.9 MG/DL (ref 8.3–10.6)
CHLORIDE BLD-SCNC: 93 MMOL/L (ref 99–110)
CO2: 32 MMOL/L (ref 21–32)
CREAT SERPL-MCNC: 1.1 MG/DL (ref 0.6–1.1)
CULTURE: NORMAL
ESTIMATED AVERAGE GLUCOSE: 220 MG/DL
GFR AFRICAN AMERICAN: >60 ML/MIN/1.73M2
GFR NON-AFRICAN AMERICAN: 50 ML/MIN/1.73M2
GLUCOSE BLD-MCNC: 206 MG/DL (ref 70–99)
GLUCOSE BLD-MCNC: 226 MG/DL (ref 70–99)
GLUCOSE BLD-MCNC: 255 MG/DL (ref 70–99)
GLUCOSE BLD-MCNC: 259 MG/DL (ref 70–99)
GLUCOSE BLD-MCNC: 275 MG/DL (ref 70–99)
GLUCOSE BLD-MCNC: 276 MG/DL (ref 70–99)
GLUCOSE BLD-MCNC: 283 MG/DL (ref 70–99)
HBA1C MFR BLD: 9.3 % (ref 4.2–6.3)
LACTATE: 1.2 MMOL/L (ref 0.4–2)
Lab: NORMAL
MAGNESIUM: 1.9 MG/DL (ref 1.8–2.4)
PHOSPHORUS: 2.5 MG/DL (ref 2.5–4.9)
POTASSIUM SERPL-SCNC: 3.1 MMOL/L (ref 3.5–5.1)
PRO-BNP: 424.9 PG/ML
PRO-BNP: 484.7 PG/ML
SODIUM BLD-SCNC: 139 MMOL/L (ref 135–145)
SPECIMEN: NORMAL
TOTAL PROTEIN: 6.6 GM/DL (ref 6.4–8.2)
TROPONIN T: 0.02 NG/ML

## 2019-08-24 PROCEDURE — 2580000003 HC RX 258: Performed by: EMERGENCY MEDICINE

## 2019-08-24 PROCEDURE — G0378 HOSPITAL OBSERVATION PER HR: HCPCS

## 2019-08-24 PROCEDURE — 6370000000 HC RX 637 (ALT 250 FOR IP): Performed by: INTERNAL MEDICINE

## 2019-08-24 PROCEDURE — 96372 THER/PROPH/DIAG INJ SC/IM: CPT

## 2019-08-24 PROCEDURE — 82962 GLUCOSE BLOOD TEST: CPT

## 2019-08-24 PROCEDURE — 96366 THER/PROPH/DIAG IV INF ADDON: CPT

## 2019-08-24 PROCEDURE — 94640 AIRWAY INHALATION TREATMENT: CPT

## 2019-08-24 PROCEDURE — 71045 X-RAY EXAM CHEST 1 VIEW: CPT

## 2019-08-24 PROCEDURE — 2700000000 HC OXYGEN THERAPY PER DAY

## 2019-08-24 PROCEDURE — 84100 ASSAY OF PHOSPHORUS: CPT

## 2019-08-24 PROCEDURE — 36592 COLLECT BLOOD FROM PICC: CPT

## 2019-08-24 PROCEDURE — 83036 HEMOGLOBIN GLYCOSYLATED A1C: CPT

## 2019-08-24 PROCEDURE — 6360000002 HC RX W HCPCS: Performed by: INTERNAL MEDICINE

## 2019-08-24 PROCEDURE — 2000000000 HC ICU R&B

## 2019-08-24 PROCEDURE — 80053 COMPREHEN METABOLIC PANEL: CPT

## 2019-08-24 PROCEDURE — 99233 SBSQ HOSP IP/OBS HIGH 50: CPT | Performed by: INTERNAL MEDICINE

## 2019-08-24 PROCEDURE — 83880 ASSAY OF NATRIURETIC PEPTIDE: CPT

## 2019-08-24 PROCEDURE — 2580000003 HC RX 258: Performed by: INTERNAL MEDICINE

## 2019-08-24 PROCEDURE — 94664 DEMO&/EVAL PT USE INHALER: CPT

## 2019-08-24 PROCEDURE — C9113 INJ PANTOPRAZOLE SODIUM, VIA: HCPCS | Performed by: INTERNAL MEDICINE

## 2019-08-24 PROCEDURE — 94761 N-INVAS EAR/PLS OXIMETRY MLT: CPT

## 2019-08-24 PROCEDURE — 2500000003 HC RX 250 WO HCPCS: Performed by: INTERNAL MEDICINE

## 2019-08-24 PROCEDURE — 83735 ASSAY OF MAGNESIUM: CPT

## 2019-08-24 PROCEDURE — 96376 TX/PRO/DX INJ SAME DRUG ADON: CPT

## 2019-08-24 PROCEDURE — 83605 ASSAY OF LACTIC ACID: CPT

## 2019-08-24 PROCEDURE — 84484 ASSAY OF TROPONIN QUANT: CPT

## 2019-08-24 RX ORDER — INSULIN GLARGINE 100 [IU]/ML
40 INJECTION, SOLUTION SUBCUTANEOUS NIGHTLY
Status: DISCONTINUED | OUTPATIENT
Start: 2019-08-24 | End: 2019-08-25

## 2019-08-24 RX ADMIN — ASPIRIN 81 MG 81 MG: 81 TABLET ORAL at 08:15

## 2019-08-24 RX ADMIN — ENOXAPARIN SODIUM 40 MG: 100 INJECTION SUBCUTANEOUS at 08:14

## 2019-08-24 RX ADMIN — SODIUM CHLORIDE, PRESERVATIVE FREE 10 ML: 5 INJECTION INTRAVENOUS at 08:16

## 2019-08-24 RX ADMIN — Medication 125 MG: at 00:07

## 2019-08-24 RX ADMIN — INSULIN GLARGINE 40 UNITS: 100 INJECTION, SOLUTION SUBCUTANEOUS at 21:12

## 2019-08-24 RX ADMIN — Medication 9 MCG/MIN: at 15:44

## 2019-08-24 RX ADMIN — PRAMIPEXOLE DIHYDROCHLORIDE 1 MG: 0.25 TABLET ORAL at 14:37

## 2019-08-24 RX ADMIN — POTASSIUM CHLORIDE 20 MEQ: 400 INJECTION, SOLUTION INTRAVENOUS at 00:05

## 2019-08-24 RX ADMIN — MIDODRINE HYDROCHLORIDE 5 MG: 5 TABLET ORAL at 16:36

## 2019-08-24 RX ADMIN — Medication 125 MG: at 06:03

## 2019-08-24 RX ADMIN — PIPERACILLIN SODIUM,TAZOBACTAM SODIUM 2.25 G: 2; .25 INJECTION, POWDER, FOR SOLUTION INTRAVENOUS at 21:51

## 2019-08-24 RX ADMIN — SODIUM CHLORIDE, PRESERVATIVE FREE 10 ML: 5 INJECTION INTRAVENOUS at 21:10

## 2019-08-24 RX ADMIN — Medication 125 MG: at 18:08

## 2019-08-24 RX ADMIN — IPRATROPIUM BROMIDE AND ALBUTEROL SULFATE 3 ML: .5; 3 SOLUTION RESPIRATORY (INHALATION) at 16:25

## 2019-08-24 RX ADMIN — GABAPENTIN 300 MG: 300 CAPSULE ORAL at 14:38

## 2019-08-24 RX ADMIN — PRAMIPEXOLE DIHYDROCHLORIDE 1 MG: 0.25 TABLET ORAL at 08:45

## 2019-08-24 RX ADMIN — Medication 1 CAPSULE: at 08:15

## 2019-08-24 RX ADMIN — PANTOPRAZOLE SODIUM 40 MG: 40 INJECTION, POWDER, FOR SOLUTION INTRAVENOUS at 08:14

## 2019-08-24 RX ADMIN — PRAMIPEXOLE DIHYDROCHLORIDE 1 MG: 0.25 TABLET ORAL at 21:07

## 2019-08-24 RX ADMIN — CLOPIDOGREL BISULFATE 75 MG: 75 TABLET ORAL at 08:14

## 2019-08-24 RX ADMIN — IPRATROPIUM BROMIDE AND ALBUTEROL SULFATE 3 ML: .5; 3 SOLUTION RESPIRATORY (INHALATION) at 12:07

## 2019-08-24 RX ADMIN — SIMVASTATIN 40 MG: 40 TABLET, FILM COATED ORAL at 21:07

## 2019-08-24 RX ADMIN — MIDODRINE HYDROCHLORIDE 5 MG: 5 TABLET ORAL at 13:00

## 2019-08-24 RX ADMIN — SODIUM CHLORIDE: 9 INJECTION, SOLUTION INTRAVENOUS at 07:25

## 2019-08-24 RX ADMIN — IPRATROPIUM BROMIDE AND ALBUTEROL SULFATE 3 ML: .5; 3 SOLUTION RESPIRATORY (INHALATION) at 07:28

## 2019-08-24 RX ADMIN — Medication 125 MG: at 13:19

## 2019-08-24 RX ADMIN — GABAPENTIN 300 MG: 300 CAPSULE ORAL at 21:07

## 2019-08-24 RX ADMIN — PIPERACILLIN SODIUM,TAZOBACTAM SODIUM 2.25 G: 2; .25 INJECTION, POWDER, FOR SOLUTION INTRAVENOUS at 14:38

## 2019-08-24 RX ADMIN — PIPERACILLIN SODIUM,TAZOBACTAM SODIUM 2.25 G: 2; .25 INJECTION, POWDER, FOR SOLUTION INTRAVENOUS at 06:03

## 2019-08-24 RX ADMIN — GABAPENTIN 300 MG: 300 CAPSULE ORAL at 08:14

## 2019-08-24 RX ADMIN — LINEZOLID 600 MG: 600 INJECTION, SOLUTION INTRAVENOUS at 11:11

## 2019-08-24 RX ADMIN — SODIUM CHLORIDE, PRESERVATIVE FREE 10 ML: 5 INJECTION INTRAVENOUS at 08:15

## 2019-08-24 RX ADMIN — SODIUM CHLORIDE, PRESERVATIVE FREE 10 ML: 5 INJECTION INTRAVENOUS at 21:09

## 2019-08-24 RX ADMIN — MIDODRINE HYDROCHLORIDE 5 MG: 5 TABLET ORAL at 08:15

## 2019-08-24 RX ADMIN — LINEZOLID 600 MG: 600 INJECTION, SOLUTION INTRAVENOUS at 21:48

## 2019-08-24 ASSESSMENT — PAIN SCALES - GENERAL
PAINLEVEL_OUTOF10: 0

## 2019-08-24 NOTE — PROGRESS NOTES
Hospitalist Progress Note      Name:  Lokesh Rivas /Age/Sex: 1955  (60 y.o. female)   MRN & CSN:  [id number] & [id number] Admission Date/Time: 2019  1:52 PM   Location:  -A PCP: THEODORE Lovett NP         Hospital Day: 3    Assessment and Plan:   Lokesh Rivas is a 60 y.o.  female  who presents with:    Presenting problems:   Mental status change  Hypotensive  PICC placed in ER  Zosyn, Zyvox, PO Vanco started  Lactic acid 2.9 in ER      10 am - Levo 1 mcg, BiPAP,       Assessment and Plan       Acute kidney injury [facility]): Improving with hydration. Continue Mata,Closely monitor BMP.  Acute metabolic encephalopathy likely from hypotension and hypercapnia. Improving   Shock, unspecified ([address]): Broad-spectrum antibiotics and vancomycin oral for C. Difficile.  Uncontrolled diabetes mellitus ([address]): Lantus, prandial insulin and sliding scale. Endo consult   Chronic respiratory failure with hypoxia and hypercapnia ([address]): Continue home BiPAP. Pulmonology consult and nebulization treatments.  Morbid obesity with BMI of 70 and over, adult [facility])    Subjective:     Patient is looking much more awake today  Denied any complaints to me. No acute overnight events    Objective: Intake/Output Summary (Last 24 hours) at 2019 1551  Last data filed at 2019 1450  Gross per 24 hour   Intake 4244.16 ml   Output 1710 ml   Net 2534.16 ml      Vitals:   Vitals:    19 1517   BP: 125/60   Pulse: 66   Resp: 19   Temp:    SpO2: 97%     Physical Exam:    Physical Exam   Constitutional: She is oriented to person, place, and time. She appears well-developed. HENT:   Head: Normocephalic and atraumatic. Neck: Neck supple. No JVD present. No tracheal deviation present. Cardiovascular: Normal rate and regular rhythm. Pulmonary/Chest: Effort normal.   Slightly diminished BS B/L   Abdominal: Soft.  Bowel sounds are normal.   Neurological: She is alert and

## 2019-08-24 NOTE — PROGRESS NOTES
AM   Result Value Ref Range    Procalcitonin 0.249    POCT Glucose    Collection Time: 08/23/19 12:00 PM   Result Value Ref Range    POC Glucose 290 (H) 70 - 99 MG/DL   POCT Glucose    Collection Time: 08/23/19  5:17 PM   Result Value Ref Range    POC Glucose 245 (H) 70 - 99 MG/DL   Potassium Lab    Collection Time: 08/23/19  5:35 PM   Result Value Ref Range    Potassium 3.2 (L) 3.5 - 5.1 MMOL/L   Magnesium    Collection Time: 08/23/19  5:35 PM   Result Value Ref Range    Magnesium 2.0 1.8 - 2.4 mg/dl   Phosphorus    Collection Time: 08/23/19  5:35 PM   Result Value Ref Range    Phosphorus 3.8 2.5 - 4.9 MG/DL   EKG 12 Lead    Collection Time: 08/23/19  6:01 PM   Result Value Ref Range    Ventricular Rate 64 BPM    Atrial Rate 64 BPM    P-R Interval 204 ms    QRS Duration 96 ms    Q-T Interval 444 ms    QTc Calculation (Bazett) 458 ms    P Axis 18 degrees    R Axis 19 degrees    T Axis 21 degrees    Diagnosis       Normal sinus rhythm  Low voltage QRS  Cannot rule out Inferior infarct (cited on or before 22-AUG-2019)  Cannot rule out Anterior infarct , age undetermined  Abnormal ECG  When compared with ECG of 22-AUG-2019 20:43,  No significant change was found     POCT Glucose    Collection Time: 08/23/19  9:05 PM   Result Value Ref Range    POC Glucose 213 (H) 70 - 99 MG/DL     CULTURE results: Invalid input(s): BLOOD CULTURE,  URINE CULTURE, SURGICAL CULTURE    Diagnosis:  Patient Active Problem List   Diagnosis    Morbid obesity with BMI of 70 and over, adult ([facility] [address]    Essential hypertension    Dyslipidemia    Fecal incontinence    Mild intermittent asthma without complication    S/P laparoscopic cholecystectomy    Type 2 diabetes mellitus without complication, with long-term current use of insulin (HCC)    Fatty liver    Arthritis    H/O parotidectomy    Venous stasis dermatitis of both lower extremities    Aortic stenosis    Morbid obesity (HCC)    Asthma    Diabetes mellitus ([address]    Hypertension

## 2019-08-24 NOTE — PROGRESS NOTES
Nephrology Progress Note  8/24/2019 8:29 AM        Subjective:   Admit Date: 8/22/2019  PCP: THEODORE Robles NP    Interval History: with BIPAP    Diet: ? Some     ROS:  Low UOP/ wt gain - sob- still with pressor     Data:     Current meds:    insulin glargine  40 Units Subcutaneous Nightly    midodrine  5 mg Oral TID WC    insulin lispro  0-18 Units Subcutaneous Q4H    pramipexole  1 mg Oral TID    aspirin  81 mg Oral Daily    clopidogrel  75 mg Oral Daily    gabapentin  300 mg Oral TID    ipratropium-albuterol  3 mL Inhalation Q4H WA    linezolid  600 mg Intravenous Q12H    piperacillin-tazobactam  2.25 g Intravenous Q8H    pantoprazole  40 mg Intravenous Daily    simvastatin  40 mg Oral Nightly    vancomycin  125 mg Oral 4 times per day    vitamin D  50,000 Units Oral Once per day on Mon Thu    lactobacillus  1 capsule Oral Daily    sodium chloride flush  10 mL Intravenous 2 times per day    enoxaparin  40 mg Subcutaneous Daily    lidocaine PF  5 mL Intradermal Once    sodium chloride flush  10 mL Intravenous 2 times per day      dextrose      norepinephrine 10 mcg/min (08/23/19 2010)         I/O last 3 completed shifts:   In: 3689.8 [I.V.:3111.8; IV Piggyback:578]  Out: 2527 [Urine:1205; JQKGJ:958]    CBC:   Recent Labs     08/22/19  1355 08/23/19  0935   WBC 13.0* 21.7*   HGB 11.8* 12.4*    292          Recent Labs     08/22/19  1355 08/23/19  0935 08/23/19  1735   * 139  --    K 3.2* 2.8  K CALLED TO JACQUELINE ACEVEDO RN @ [phone] [id number] BY FRANSISCO HENNING  RESULTS READ BACK  * 3.2*   CL 80* 87*  --    CO2 35* 33*  --    BUN 86* 80*  --    CREATININE 1.9* 1.4*  --    GLUCOSE 313* 274*  --        Lab Results   Component Value Date    CALCIUM 9.0 08/23/2019    PHOS 2.5 08/24/2019       Objective:     Vitals: BP (!) 99/33   Pulse 69   Temp 98.4 °F (36.9 °C) (Oral)   Resp 23   Ht 5' 6\" (1.676 m)   Wt (!) 465 lb 14.4 oz (211.3 kg)   SpO2 99%   BMI 75.20 kg/m²     General

## 2019-08-24 NOTE — PROGRESS NOTES
Progress Note( Dr. Ina Mccarthy)  8/24/2019  Subjective:   Admit Date: 8/22/2019  PCP: THEODORE Mendez NP    Admitted For :Change in mental state and severe hypoxia with oxygen desaturation  Patient was discharged  and was returned and was admitted day after discharge . Consulted For:Better control of Blood glucose     Interval History: Feela better On BIPAP    Denies any chest pains,   Still SOB  On BiPAP . Denies nausea or vomiting. No new bowel or bladder symptoms.        Intake/Output Summary (Last 24 hours) at 8/24/2019 1727  Last data filed at 8/24/2019 1647  Gross per 24 hour   Intake 4334.61 ml   Output 2910 ml   Net 1424.61 ml       DATA    CBC:   Recent Labs     08/22/19  1355 08/23/19  0935   WBC 13.0* 21.7*   HGB 11.8* 12.4*    292    CMP:  Recent Labs     08/22/19  1355 08/23/19  0935 08/23/19  1735 08/24/19  1325   * 139  --  139   K 3.2* 2.8  K CALLED TO JACQUELINE ACEVEDO RN @ [phone] [id number] BY FRANSISCO MLT  RESULTS READ BACK  * 3.2* 3.1*   CL 80* 87*  --  93*   CO2 35* 33*  --  32   BUN 86* 80*  --  65*   CREATININE 1.9* 1.4*  --  1.1   CALCIUM 9.9 9.0  --  8.9   PROT 7.8  --   --  6.6   LABALBU 3.7  --   --  3.6   BILITOT 0.4  --   --  0.4   ALKPHOS 74  --   --  72   AST 15  --   --  15   ALT 13  --   --  10     Lipids:   Lab Results   Component Value Date    CHOL 186 02/07/2019    HDL 39 02/07/2019    TRIG 122 02/07/2019     Glucose:  Recent Labs     08/24/19  0852 08/24/19  1315 08/24/19  1627   POCGLU 226* 276* 259*     [id number]:  Lab Results   Component Value Date    LABA1C 9.3 08/24/2019     High Sensitivity TSH:   Lab Results   Component Value Date    TSHHS 0.820 02/06/2019     Free T3: No results found for: FT3  Free T4:  Lab Results   Component Value Date    T4FREE 1.07 09/04/2018       Ct Abdomen Pelvis Wo Contrast Additional Contrast? None    Result Date: 8/24/2019  EXAMINATION: CT OF THE ABDOMEN AND PELVIS WITHOUT CONTRAST, 8/23/2019 9:44 pm TORDERING SYSTEM

## 2019-08-24 NOTE — PROGRESS NOTES
Uncontrolled diabetes mellitus ([address] 08/22/2019    Sepsis ([address] 08/12/2019    Class 3 severe obesity due to excess calories with body mass index (BMI) greater than or equal to 70 in adult [facility]) 06/16/2019    VHD (valvular heart disease) 04/02/2019     Overview Note:     H/o TAVR      Pneumonia 02/06/2019    Skin ulcer of left foot with fat layer exposed ([address]     Cellulitis 11/24/2018    Acute on chronic respiratory failure with hypoxia and hypercapnia (HCC)     Gait disturbance 02/10/2018    Chronic obstructive pulmonary disease ([address] 02/07/2018    Chronic respiratory failure with hypoxia and hypercapnia ([address] 01/30/2018    Morbid obesity ([address]     Asthma     Diabetes mellitus ([address]     Hypertension     Sleep apnea     Family history of coronary artery disease     Chest pain     SOB (shortness of breath)     Palpitations     Peripheral edema     Aortic stenosis     Morbid obesity with BMI of 70 and over, adult ([address] 10/27/2016    Essential hypertension 10/27/2016    Dyslipidemia 10/27/2016    Fecal incontinence 10/27/2016    Mild intermittent asthma without complication 47/44/9872    S/P laparoscopic cholecystectomy 10/27/2016    Type 2 diabetes mellitus without complication, with long-term current use of insulin ([address] 10/27/2016    Fatty liver 10/27/2016    Arthritis 10/27/2016    H/O parotidectomy 10/27/2016    Venous stasis dermatitis of both lower extremities 10/27/2016     Leukocytosis  JOSE- improved  Morbid Obesity  Acute on chronic hypoxic hypercapneic resp failure  COPD  C diff Colitis     1. BIPAP  2. Nebs  3. Abx  4. F/u C&S  5. Wean off Levophed for a MAP > 65 mmhg  6. PO Vanco  7. DVT and GI Prophylaxis  8. C/w present management  9. D/c to step down in am  No follow-ups on file.     Electronically signed by Ciera Mejía MD on 8/24/2019 at 11:10 AM

## 2019-08-24 NOTE — CARE COORDINATION
TC to pt's spouse and dghtr to continue discharge planning. Per spouse he and his dghtr agree pt needs SNF at discharge and they would like [facility] as it is close to their home. Permission given to share PHI. Faxed info to Casandra (at both [facility] and her office in [place]). Left vm for Kristine to follow up with [facility]. CM on Monday regarding referral/ bed availability. Placed sticky note requesting PT/OT orders and placed whiteboard note to therapy requesting evals as they will be needed for SNF precert.

## 2019-08-24 NOTE — DISCHARGE INSTR - COC
E11.9    Hypertension I10    Sleep apnea G47.30    Family history of coronary artery disease Z82.49    Chest pain R07.9    SOB (shortness of breath) R06.02    Palpitations R00.2    Peripheral edema R60.9    Hypervolemia E87.70    Chronic respiratory failure with hypoxia and hypercapnia ([facility]) J96.11, J96.12    Acute exacerbation of chronic obstructive pulmonary disease (COPD) ([facility]) J44.1    Chronic obstructive pulmonary disease ([facility]) J44.9    Gait disturbance R26.9    Acute on chronic respiratory failure with hypoxia and hypercapnia ([facility]) J96.21, J96.22    Cellulitis L03.90    Skin ulcer of left foot with fat layer exposed ([facility] L97.522    Pneumonia J18.9    VHD (valvular heart disease) I38    Class 3 severe obesity due to excess calories with body mass index (BMI) greater than or equal to 70 in adult ([facility] E66.01, Z68.45    Sepsis ([facility]) A41.9    Acute kidney injury ([facility] N17.9    Acute metabolic encephalopathy Q25.14    Shock, unspecified ([facility]) R57.9    Uncontrolled diabetes mellitus ([facility] E11.65       Isolation/Infection:   Isolation          C Diff Contact            Nurse Assessment:  Last Vital Signs: BP (!) 99/33   Pulse 69   Temp 98.4 °F (36.9 °C) (Oral)   Resp 23   Ht 5' 6\" (1.676 m)   Wt (!) 465 lb 14.4 oz (211.3 kg)   SpO2 99%   BMI 75.20 kg/m²     Last documented pain score (0-10 scale): Pain Level: 0  Last Weight:   Wt Readings from Last 1 Encounters:   08/24/19 (!) 465 lb 14.4 oz (211.3 kg)     Mental Status:  oriented and alert    IV Access:  - None    Nursing Mobility/ADLs:  Walking   Assisted  Transfer  Assisted  Bathing  Assisted  Dressing  Assisted  Toileting  Assisted  Feeding  Independent  Med Admin  Independent  Med Delivery   whole    Wound Care Documentation and Therapy:  Wound 11/24/18 Other (Comment) Thigh Left open (Active)   Number of days: 272       Wound 11/24/18 Other (Comment) Thigh Left; Inner open (Active)   Number of days: 272       Wound 02/06/19 Other H&P    PHYSICIAN SIGNATURE:  Electronically signed by Britta Balderas MD on 8/29/19 at 12:37 PM

## 2019-08-25 LAB
ANION GAP SERPL CALCULATED.3IONS-SCNC: 13 MMOL/L (ref 4–16)
BUN BLDV-MCNC: 45 MG/DL (ref 6–23)
CALCIUM SERPL-MCNC: 9.4 MG/DL (ref 8.3–10.6)
CHLORIDE BLD-SCNC: 92 MMOL/L (ref 99–110)
CO2: 35 MMOL/L (ref 21–32)
CREAT SERPL-MCNC: 0.8 MG/DL (ref 0.6–1.1)
EKG ATRIAL RATE: 64 BPM
EKG DIAGNOSIS: NORMAL
EKG P AXIS: 18 DEGREES
EKG P-R INTERVAL: 204 MS
EKG Q-T INTERVAL: 444 MS
EKG QRS DURATION: 96 MS
EKG QTC CALCULATION (BAZETT): 458 MS
EKG R AXIS: 19 DEGREES
EKG T AXIS: 21 DEGREES
EKG VENTRICULAR RATE: 64 BPM
GFR AFRICAN AMERICAN: >60 ML/MIN/1.73M2
GFR NON-AFRICAN AMERICAN: >60 ML/MIN/1.73M2
GLUCOSE BLD-MCNC: 206 MG/DL (ref 70–99)
GLUCOSE BLD-MCNC: 214 MG/DL (ref 70–99)
GLUCOSE BLD-MCNC: 236 MG/DL (ref 70–99)
GLUCOSE BLD-MCNC: 237 MG/DL (ref 70–99)
GLUCOSE BLD-MCNC: 246 MG/DL (ref 70–99)
GLUCOSE BLD-MCNC: 265 MG/DL (ref 70–99)
GLUCOSE BLD-MCNC: 295 MG/DL (ref 70–99)
LACTATE: 1.1 MMOL/L (ref 0.4–2)
PHOSPHORUS: 1.9 MG/DL (ref 2.5–4.9)
POTASSIUM SERPL-SCNC: ABNORMAL MMOL/L (ref 3.5–5.1)
SODIUM BLD-SCNC: 140 MMOL/L (ref 135–145)

## 2019-08-25 PROCEDURE — 6360000002 HC RX W HCPCS: Performed by: INTERNAL MEDICINE

## 2019-08-25 PROCEDURE — 82962 GLUCOSE BLOOD TEST: CPT

## 2019-08-25 PROCEDURE — 2700000000 HC OXYGEN THERAPY PER DAY

## 2019-08-25 PROCEDURE — 83605 ASSAY OF LACTIC ACID: CPT

## 2019-08-25 PROCEDURE — G0378 HOSPITAL OBSERVATION PER HR: HCPCS

## 2019-08-25 PROCEDURE — 2580000003 HC RX 258: Performed by: EMERGENCY MEDICINE

## 2019-08-25 PROCEDURE — 2580000003 HC RX 258: Performed by: INTERNAL MEDICINE

## 2019-08-25 PROCEDURE — 36415 COLL VENOUS BLD VENIPUNCTURE: CPT

## 2019-08-25 PROCEDURE — 96376 TX/PRO/DX INJ SAME DRUG ADON: CPT

## 2019-08-25 PROCEDURE — 94640 AIRWAY INHALATION TREATMENT: CPT

## 2019-08-25 PROCEDURE — 36592 COLLECT BLOOD FROM PICC: CPT

## 2019-08-25 PROCEDURE — 96367 TX/PROPH/DG ADDL SEQ IV INF: CPT

## 2019-08-25 PROCEDURE — 6370000000 HC RX 637 (ALT 250 FOR IP): Performed by: INTERNAL MEDICINE

## 2019-08-25 PROCEDURE — 2000000000 HC ICU R&B

## 2019-08-25 PROCEDURE — 96366 THER/PROPH/DIAG IV INF ADDON: CPT

## 2019-08-25 PROCEDURE — 80048 BASIC METABOLIC PNL TOTAL CA: CPT

## 2019-08-25 PROCEDURE — 93010 ELECTROCARDIOGRAM REPORT: CPT | Performed by: INTERNAL MEDICINE

## 2019-08-25 PROCEDURE — 84100 ASSAY OF PHOSPHORUS: CPT

## 2019-08-25 PROCEDURE — C9113 INJ PANTOPRAZOLE SODIUM, VIA: HCPCS | Performed by: INTERNAL MEDICINE

## 2019-08-25 PROCEDURE — 96372 THER/PROPH/DIAG INJ SC/IM: CPT

## 2019-08-25 PROCEDURE — 99233 SBSQ HOSP IP/OBS HIGH 50: CPT | Performed by: INTERNAL MEDICINE

## 2019-08-25 RX ORDER — POTASSIUM CHLORIDE 20 MEQ/1
40 TABLET, EXTENDED RELEASE ORAL 2 TIMES DAILY WITH MEALS
Status: DISCONTINUED | OUTPATIENT
Start: 2019-08-25 | End: 2019-08-27

## 2019-08-25 RX ORDER — POTASSIUM CHLORIDE 29.8 MG/ML
20 INJECTION INTRAVENOUS
Status: COMPLETED | OUTPATIENT
Start: 2019-08-25 | End: 2019-08-25

## 2019-08-25 RX ORDER — POTASSIUM CHLORIDE 29.8 MG/ML
80 INJECTION INTRAVENOUS ONCE
Status: DISCONTINUED | OUTPATIENT
Start: 2019-08-25 | End: 2019-08-25 | Stop reason: CLARIF

## 2019-08-25 RX ORDER — POTASSIUM CHLORIDE 29.8 MG/ML
60 INJECTION INTRAVENOUS ONCE
Status: DISCONTINUED | OUTPATIENT
Start: 2019-08-25 | End: 2019-08-25 | Stop reason: ALTCHOICE

## 2019-08-25 RX ORDER — INSULIN GLARGINE 100 [IU]/ML
50 INJECTION, SOLUTION SUBCUTANEOUS NIGHTLY
Status: DISCONTINUED | OUTPATIENT
Start: 2019-08-25 | End: 2019-08-29 | Stop reason: HOSPADM

## 2019-08-25 RX ORDER — SPIRONOLACTONE 25 MG/1
25 TABLET ORAL DAILY
Status: DISCONTINUED | OUTPATIENT
Start: 2019-08-25 | End: 2019-08-29 | Stop reason: HOSPADM

## 2019-08-25 RX ADMIN — IPRATROPIUM BROMIDE AND ALBUTEROL SULFATE 3 ML: .5; 3 SOLUTION RESPIRATORY (INHALATION) at 11:20

## 2019-08-25 RX ADMIN — POTASSIUM CHLORIDE 40 MEQ: 20 TABLET, EXTENDED RELEASE ORAL at 19:55

## 2019-08-25 RX ADMIN — MIDODRINE HYDROCHLORIDE 5 MG: 5 TABLET ORAL at 19:55

## 2019-08-25 RX ADMIN — MIDODRINE HYDROCHLORIDE 5 MG: 5 TABLET ORAL at 12:24

## 2019-08-25 RX ADMIN — MIDODRINE HYDROCHLORIDE 5 MG: 5 TABLET ORAL at 09:55

## 2019-08-25 RX ADMIN — Medication 125 MG: at 12:34

## 2019-08-25 RX ADMIN — POTASSIUM CHLORIDE 40 MEQ: 20 TABLET, EXTENDED RELEASE ORAL at 09:55

## 2019-08-25 RX ADMIN — PANTOPRAZOLE SODIUM 40 MG: 40 INJECTION, POWDER, FOR SOLUTION INTRAVENOUS at 09:56

## 2019-08-25 RX ADMIN — PRAMIPEXOLE DIHYDROCHLORIDE 1 MG: 0.25 TABLET ORAL at 10:10

## 2019-08-25 RX ADMIN — POTASSIUM CHLORIDE 20 MEQ: 400 INJECTION, SOLUTION INTRAVENOUS at 12:24

## 2019-08-25 RX ADMIN — Medication 1 CAPSULE: at 09:55

## 2019-08-25 RX ADMIN — Medication 125 MG: at 23:01

## 2019-08-25 RX ADMIN — Medication 125 MG: at 00:29

## 2019-08-25 RX ADMIN — PRAMIPEXOLE DIHYDROCHLORIDE 1 MG: 0.25 TABLET ORAL at 21:34

## 2019-08-25 RX ADMIN — Medication 125 MG: at 06:22

## 2019-08-25 RX ADMIN — SODIUM CHLORIDE, PRESERVATIVE FREE 10 ML: 5 INJECTION INTRAVENOUS at 10:00

## 2019-08-25 RX ADMIN — POTASSIUM CHLORIDE 20 MEQ: 400 INJECTION, SOLUTION INTRAVENOUS at 18:50

## 2019-08-25 RX ADMIN — LINEZOLID 600 MG: 600 INJECTION, SOLUTION INTRAVENOUS at 21:42

## 2019-08-25 RX ADMIN — DIBASIC SODIUM PHOSPHATE, MONOBASIC POTASSIUM PHOSPHATE AND MONOBASIC SODIUM PHOSPHATE 1 TABLET: 852; 155; 130 TABLET ORAL at 09:53

## 2019-08-25 RX ADMIN — PRAMIPEXOLE DIHYDROCHLORIDE 1 MG: 0.25 TABLET ORAL at 13:40

## 2019-08-25 RX ADMIN — PIPERACILLIN SODIUM,TAZOBACTAM SODIUM 2.25 G: 2; .25 INJECTION, POWDER, FOR SOLUTION INTRAVENOUS at 06:20

## 2019-08-25 RX ADMIN — LINEZOLID 600 MG: 600 INJECTION, SOLUTION INTRAVENOUS at 12:23

## 2019-08-25 RX ADMIN — POTASSIUM CHLORIDE 20 MEQ: 400 INJECTION, SOLUTION INTRAVENOUS at 17:19

## 2019-08-25 RX ADMIN — INSULIN GLARGINE 50 UNITS: 100 INJECTION, SOLUTION SUBCUTANEOUS at 21:39

## 2019-08-25 RX ADMIN — DIBASIC SODIUM PHOSPHATE, MONOBASIC POTASSIUM PHOSPHATE AND MONOBASIC SODIUM PHOSPHATE 1 TABLET: 852; 155; 130 TABLET ORAL at 21:34

## 2019-08-25 RX ADMIN — Medication 125 MG: at 18:51

## 2019-08-25 RX ADMIN — SODIUM CHLORIDE, PRESERVATIVE FREE 10 ML: 5 INJECTION INTRAVENOUS at 21:43

## 2019-08-25 RX ADMIN — PIPERACILLIN SODIUM,TAZOBACTAM SODIUM 2.25 G: 2; .25 INJECTION, POWDER, FOR SOLUTION INTRAVENOUS at 13:46

## 2019-08-25 RX ADMIN — ASPIRIN 81 MG 81 MG: 81 TABLET ORAL at 09:55

## 2019-08-25 RX ADMIN — POTASSIUM CHLORIDE 20 MEQ: 400 INJECTION, SOLUTION INTRAVENOUS at 09:56

## 2019-08-25 RX ADMIN — SPIRONOLACTONE 25 MG: 25 TABLET ORAL at 10:02

## 2019-08-25 RX ADMIN — ENOXAPARIN SODIUM 40 MG: 100 INJECTION SUBCUTANEOUS at 09:57

## 2019-08-25 RX ADMIN — CLOPIDOGREL BISULFATE 75 MG: 75 TABLET ORAL at 09:55

## 2019-08-25 RX ADMIN — PIPERACILLIN SODIUM,TAZOBACTAM SODIUM 2.25 G: 2; .25 INJECTION, POWDER, FOR SOLUTION INTRAVENOUS at 22:56

## 2019-08-25 RX ADMIN — SIMVASTATIN 40 MG: 40 TABLET, FILM COATED ORAL at 21:34

## 2019-08-25 ASSESSMENT — PAIN SCALES - GENERAL
PAINLEVEL_OUTOF10: 0

## 2019-08-25 NOTE — PROGRESS NOTES
General appearance: alert and cooperative with exam  Neck: no JVD or bruit  Thyroid : Normal lobes Nodular Goiter   Lungs: Has Vesicular Breath sounds has wheezing and rales . ONBIPAP   Heart:  regular rate and rhythm systolic murmur  Abdomen: soft, non-tender; bowel sounds normal; no masses,  no organomegaly  Musculoskeletal: Normal  Extremities: extremities normal, , has  edema  Neurologic:  Awake, alert, oriented to name, place and time. Cranial nerves II-XII are grossly intact. Motor weakness. Sensory neuropathy ,  and gait is abnormal. And unstable     Assessment:     Patient Active Problem List:     Morbid obesity with BMI of 70 and over, adult [facility])     Essential hypertension     Dyslipidemia     Fecal incontinence     Mild intermittent asthma without complication     S/P laparoscopic cholecystectomy     Type 2 diabetes mellitus without complication, with long-term current use of insulin (HCC)     Fatty liver     Arthritis     H/O parotidectomy     Venous stasis dermatitis of both lower extremities     Aortic stenosis     Asthma     Sleep apnean     SOB (shortness of breath)     Palpitations     Peripheral edema     Hypervolemia     Chronic respiratory failure with hypoxia and hypercapnia (HCC)     Acute exacerbation of chronic obstructive pulmonary disease (COPD) (HCC)     Gait disturbance     Acute metabolic encephalopathy     Shock, unspecified ([address]        Plan:     1. Reviewed POC blood glucose . Labs and X ray results   2. Reviewed Current Medicines   3. On Correction bolus Humalog/ Basal Lantus Insulin regime   4. Monitor Blood glucose frequently   5. Modified  the dose of Insulin/ other medicines as needed   6. Will follow     .      Andrew Covarrubias MD

## 2019-08-26 LAB
ALBUMIN SERPL-MCNC: 3.4 GM/DL (ref 3.4–5)
ALP BLD-CCNC: 65 IU/L (ref 40–129)
ALT SERPL-CCNC: 10 U/L (ref 10–40)
ANION GAP SERPL CALCULATED.3IONS-SCNC: 10 MMOL/L (ref 4–16)
AST SERPL-CCNC: 14 IU/L (ref 15–37)
BASOPHILS ABSOLUTE: 0.1 K/CU MM
BASOPHILS RELATIVE PERCENT: 0.7 % (ref 0–1)
BILIRUB SERPL-MCNC: 0.4 MG/DL (ref 0–1)
BUN BLDV-MCNC: 31 MG/DL (ref 6–23)
CALCIUM SERPL-MCNC: 9.1 MG/DL (ref 8.3–10.6)
CHLORIDE BLD-SCNC: 91 MMOL/L (ref 99–110)
CO2: 33 MMOL/L (ref 21–32)
CREAT SERPL-MCNC: 0.8 MG/DL (ref 0.6–1.1)
DIFFERENTIAL TYPE: ABNORMAL
EOSINOPHILS ABSOLUTE: 0.4 K/CU MM
EOSINOPHILS RELATIVE PERCENT: 3.5 % (ref 0–3)
GFR AFRICAN AMERICAN: >60 ML/MIN/1.73M2
GFR NON-AFRICAN AMERICAN: >60 ML/MIN/1.73M2
GLUCOSE BLD-MCNC: 153 MG/DL (ref 70–99)
GLUCOSE BLD-MCNC: 155 MG/DL (ref 70–99)
GLUCOSE BLD-MCNC: 155 MG/DL (ref 70–99)
GLUCOSE BLD-MCNC: 164 MG/DL (ref 70–99)
GLUCOSE BLD-MCNC: 173 MG/DL (ref 70–99)
GLUCOSE BLD-MCNC: 173 MG/DL (ref 70–99)
GLUCOSE BLD-MCNC: 255 MG/DL (ref 70–99)
HCT VFR BLD CALC: 34.2 % (ref 37–47)
HEMOGLOBIN: 10 GM/DL (ref 12.5–16)
IMMATURE NEUTROPHIL %: 1.4 % (ref 0–0.43)
LACTATE: 1.1 MMOL/L (ref 0.4–2)
LYMPHOCYTES ABSOLUTE: 2 K/CU MM
LYMPHOCYTES RELATIVE PERCENT: 20.1 % (ref 24–44)
MAGNESIUM: 2 MG/DL (ref 1.8–2.4)
MCH RBC QN AUTO: 24.7 PG (ref 27–31)
MCHC RBC AUTO-ENTMCNC: 29.2 % (ref 32–36)
MCV RBC AUTO: 84.4 FL (ref 78–100)
MONOCYTES ABSOLUTE: 0.7 K/CU MM
MONOCYTES RELATIVE PERCENT: 6.6 % (ref 0–4)
NUCLEATED RBC %: 0 %
PDW BLD-RTO: 19.3 % (ref 11.7–14.9)
PHOSPHORUS: 1.5 MG/DL (ref 2.5–4.9)
PLATELET # BLD: 208 K/CU MM (ref 140–440)
PMV BLD AUTO: 10.1 FL (ref 7.5–11.1)
POTASSIUM SERPL-SCNC: 3.7 MMOL/L (ref 3.5–5.1)
RBC # BLD: 4.05 M/CU MM (ref 4.2–5.4)
SEGMENTED NEUTROPHILS ABSOLUTE COUNT: 6.8 K/CU MM
SEGMENTED NEUTROPHILS RELATIVE PERCENT: 67.7 % (ref 36–66)
SODIUM BLD-SCNC: 134 MMOL/L (ref 135–145)
TOTAL IMMATURE NEUTOROPHIL: 0.14 K/CU MM
TOTAL NUCLEATED RBC: 0 K/CU MM
TOTAL PROTEIN: 6.2 GM/DL (ref 6.4–8.2)
WBC # BLD: 10 K/CU MM (ref 4–10.5)

## 2019-08-26 PROCEDURE — 6370000000 HC RX 637 (ALT 250 FOR IP): Performed by: INTERNAL MEDICINE

## 2019-08-26 PROCEDURE — C9113 INJ PANTOPRAZOLE SODIUM, VIA: HCPCS | Performed by: INTERNAL MEDICINE

## 2019-08-26 PROCEDURE — 1200000000 HC SEMI PRIVATE

## 2019-08-26 PROCEDURE — 96366 THER/PROPH/DIAG IV INF ADDON: CPT

## 2019-08-26 PROCEDURE — 96367 TX/PROPH/DG ADDL SEQ IV INF: CPT

## 2019-08-26 PROCEDURE — 85025 COMPLETE CBC W/AUTO DIFF WBC: CPT

## 2019-08-26 PROCEDURE — 6360000002 HC RX W HCPCS: Performed by: INTERNAL MEDICINE

## 2019-08-26 PROCEDURE — 96375 TX/PRO/DX INJ NEW DRUG ADDON: CPT

## 2019-08-26 PROCEDURE — 82962 GLUCOSE BLOOD TEST: CPT

## 2019-08-26 PROCEDURE — 2700000000 HC OXYGEN THERAPY PER DAY

## 2019-08-26 PROCEDURE — 97530 THERAPEUTIC ACTIVITIES: CPT

## 2019-08-26 PROCEDURE — 99232 SBSQ HOSP IP/OBS MODERATE 35: CPT | Performed by: INTERNAL MEDICINE

## 2019-08-26 PROCEDURE — 94761 N-INVAS EAR/PLS OXIMETRY MLT: CPT

## 2019-08-26 PROCEDURE — 97535 SELF CARE MNGMENT TRAINING: CPT

## 2019-08-26 PROCEDURE — 83735 ASSAY OF MAGNESIUM: CPT

## 2019-08-26 PROCEDURE — 2580000003 HC RX 258: Performed by: INTERNAL MEDICINE

## 2019-08-26 PROCEDURE — 2580000003 HC RX 258: Performed by: EMERGENCY MEDICINE

## 2019-08-26 PROCEDURE — 96376 TX/PRO/DX INJ SAME DRUG ADON: CPT

## 2019-08-26 PROCEDURE — 97166 OT EVAL MOD COMPLEX 45 MIN: CPT

## 2019-08-26 PROCEDURE — 80053 COMPREHEN METABOLIC PANEL: CPT

## 2019-08-26 PROCEDURE — 2500000003 HC RX 250 WO HCPCS: Performed by: INTERNAL MEDICINE

## 2019-08-26 PROCEDURE — 83605 ASSAY OF LACTIC ACID: CPT

## 2019-08-26 PROCEDURE — 97162 PT EVAL MOD COMPLEX 30 MIN: CPT

## 2019-08-26 PROCEDURE — 97116 GAIT TRAINING THERAPY: CPT

## 2019-08-26 PROCEDURE — 94660 CPAP INITIATION&MGMT: CPT

## 2019-08-26 PROCEDURE — 84100 ASSAY OF PHOSPHORUS: CPT

## 2019-08-26 PROCEDURE — 96372 THER/PROPH/DIAG INJ SC/IM: CPT

## 2019-08-26 PROCEDURE — G0378 HOSPITAL OBSERVATION PER HR: HCPCS

## 2019-08-26 PROCEDURE — 94640 AIRWAY INHALATION TREATMENT: CPT

## 2019-08-26 RX ORDER — SODIUM PHOSPHATE, MONOBASIC, MONOHYDRATE 276; 142 MG/ML; MG/ML
20 INJECTION, SOLUTION INTRAVENOUS ONCE
Status: DISCONTINUED | OUTPATIENT
Start: 2019-08-26 | End: 2019-08-26 | Stop reason: CLARIF

## 2019-08-26 RX ADMIN — POTASSIUM CHLORIDE 40 MEQ: 20 TABLET, EXTENDED RELEASE ORAL at 08:57

## 2019-08-26 RX ADMIN — IPRATROPIUM BROMIDE AND ALBUTEROL SULFATE 3 ML: .5; 3 SOLUTION RESPIRATORY (INHALATION) at 07:47

## 2019-08-26 RX ADMIN — Medication 125 MG: at 18:21

## 2019-08-26 RX ADMIN — Medication 1 CAPSULE: at 08:57

## 2019-08-26 RX ADMIN — POTASSIUM CHLORIDE 40 MEQ: 20 TABLET, EXTENDED RELEASE ORAL at 18:21

## 2019-08-26 RX ADMIN — ENOXAPARIN SODIUM 40 MG: 100 INJECTION SUBCUTANEOUS at 08:58

## 2019-08-26 RX ADMIN — SODIUM PHOSPHATE, MONOBASIC, MONOHYDRATE 20 MMOL: 276; 142 INJECTION, SOLUTION INTRAVENOUS at 10:24

## 2019-08-26 RX ADMIN — ASPIRIN 81 MG 81 MG: 81 TABLET ORAL at 08:57

## 2019-08-26 RX ADMIN — DIBASIC SODIUM PHOSPHATE, MONOBASIC POTASSIUM PHOSPHATE AND MONOBASIC SODIUM PHOSPHATE 1 TABLET: 852; 155; 130 TABLET ORAL at 23:04

## 2019-08-26 RX ADMIN — PRAMIPEXOLE DIHYDROCHLORIDE 1 MG: 0.25 TABLET ORAL at 23:04

## 2019-08-26 RX ADMIN — PANTOPRAZOLE SODIUM 40 MG: 40 INJECTION, POWDER, FOR SOLUTION INTRAVENOUS at 08:56

## 2019-08-26 RX ADMIN — SPIRONOLACTONE 25 MG: 25 TABLET ORAL at 08:57

## 2019-08-26 RX ADMIN — Medication 125 MG: at 13:18

## 2019-08-26 RX ADMIN — ONDANSETRON 4 MG: 2 INJECTION INTRAMUSCULAR; INTRAVENOUS at 09:15

## 2019-08-26 RX ADMIN — ERGOCALCIFEROL 50000 UNITS: 1.25 CAPSULE ORAL at 18:21

## 2019-08-26 RX ADMIN — LINEZOLID 600 MG: 600 INJECTION, SOLUTION INTRAVENOUS at 10:51

## 2019-08-26 RX ADMIN — SODIUM CHLORIDE, PRESERVATIVE FREE 10 ML: 5 INJECTION INTRAVENOUS at 10:28

## 2019-08-26 RX ADMIN — PRAMIPEXOLE DIHYDROCHLORIDE 1 MG: 0.25 TABLET ORAL at 08:56

## 2019-08-26 RX ADMIN — CLOPIDOGREL BISULFATE 75 MG: 75 TABLET ORAL at 08:57

## 2019-08-26 RX ADMIN — PRAMIPEXOLE DIHYDROCHLORIDE 1 MG: 0.25 TABLET ORAL at 15:15

## 2019-08-26 RX ADMIN — DIBASIC SODIUM PHOSPHATE, MONOBASIC POTASSIUM PHOSPHATE AND MONOBASIC SODIUM PHOSPHATE 1 TABLET: 852; 155; 130 TABLET ORAL at 08:56

## 2019-08-26 RX ADMIN — MIDODRINE HYDROCHLORIDE 5 MG: 5 TABLET ORAL at 08:57

## 2019-08-26 RX ADMIN — SIMVASTATIN 40 MG: 40 TABLET, FILM COATED ORAL at 23:04

## 2019-08-26 RX ADMIN — Medication 125 MG: at 05:40

## 2019-08-26 RX ADMIN — PIPERACILLIN SODIUM,TAZOBACTAM SODIUM 2.25 G: 2; .25 INJECTION, POWDER, FOR SOLUTION INTRAVENOUS at 05:41

## 2019-08-26 ASSESSMENT — PAIN SCALES - GENERAL: PAINLEVEL_OUTOF10: 0

## 2019-08-26 NOTE — PROGRESS NOTES
Occupational Therapy   Occupational Therapy Initial Assessment  Date: 2019   Patient Name: Bennett Mccurdy  MRN: [id number]     : 1955    Date of Service: 2019    Discharge Recommendations:  Subacute/Skilled Nursing Facility  OT Equipment Recommendations  Other: defer    Assessment   Performance deficits / Impairments: Decreased functional mobility ; Decreased strength;Decreased endurance;Decreased ADL status; Decreased safe awareness;Decreased high-level IADLs;Decreased balance;Decreased cognition  Treatment Diagnosis: Shock  Prognosis: Good  Decision Making: Medium Complexity  Assistance / Modification: Pt is a 59 yo female admitted from home for shock. Pt currently presents w/ above deficits and requires increased assistance for functional activities. Pt would benefit from continued acute care OT services w/ discharge to SNF. OT Education: OT Role;Plan of Care;Transfer Training;Equipment  Patient Education: use of grab bars for safe toilet transfers  Barriers to Learning: decreased cognition  REQUIRES OT FOLLOW UP: Yes  Activity Tolerance  Activity Tolerance: Patient Tolerated treatment well  Safety Devices  Safety Devices in place: Yes  Type of devices: All fall risk precautions in place;Call light within reach; Chair alarm in place; Left in chair;Patient at risk for falls;Gait belt;Nurse notified(family in room at end of session)  Restraints  Initially in place: No           Patient Diagnosis(es): The primary encounter diagnosis was SIRS (systemic inflammatory response syndrome) ([facility] [address] A diagnosis of Hypotension, unspecified hypotension type was also pertinent to this visit.      has a past medical history of Acute kidney injury ([address] Aortic stenosis, Asthma, Bacteremia due to group B Streptococcus, Cancer ([address] Carotid artery stenosis, Chest pain, CHF (congestive heart failure) ([address] Chronic back pain, COPD exacerbation ([address] Depression, Diabetes mellitus ([address] Family history of coronary artery disease, Fatty liver, GI bleed, H/O aortic valve stenosis, H/O echocardiogram, Headache, Heart murmur, History of nuclear stress test, Morbid obesity ([facility] [address] Neuropathy, NSTEMI (non-ST elevated myocardial infarction) ([facility] [address] Obesity, Osteoarthritis, Palpitations, Peripheral edema, Restless legs syndrome, Sleep apnea, Sleep apnea, and SOB (shortness of breath). has a past surgical history that includes Cholecystectomy;  section; Hysterectomy; Tubal ligation; Neck surgery; Gallbladder surgery; and Aortic valve replacement (2017). Treatment Diagnosis: Shock      Restrictions  Restrictions/Precautions  Restrictions/Precautions: General Precautions, Fall Risk, Contact Precautions  Required Braces or Orthoses?: No    Subjective   General  Chart Reviewed: Yes  Patient assessed for rehabilitation services?: Yes  Family / Caregiver Present: Yes  Patient Currently in Pain: No  Vital Signs  Patient Currently in Pain: No     Social/Functional History  Social/Functional History  Lives With: Family( and daughter)  Type of Home: House  Home Layout: Two level, Able to Live on Main level with bedroom/bathroom, Performs ADL's on one level  Home Access: Stairs to enter with rails  Entrance Stairs - Number of Steps: 3  Entrance Stairs - Rails: Left  Bathroom Shower/Tub: Walk-in shower, Shower chair without back  Bathroom Accessibility: Accessible  Home Equipment: Oxygen, Rolling walker, Reacher(4L of 02 at home)  Receives Help From: Family  ADL Assistance: Needs assistance(for LE ADLs only)  Homemaking Assistance: Needs assistance(can do chores sitting upright)  Ambulation Assistance: Independent  Transfer Assistance: Independent  Active : No  Patient's  Info: family   Occupation: Retired  Additional Comments: Pt reports one fall in last month slipping off of toilet.         Objective   Vision: Impaired  Vision Exceptions: Wears glasses at all times  Hearing: Within functional limits

## 2019-08-26 NOTE — PROGRESS NOTES
[facility]        Progress Note       2019  8:25 AM    Patient:    Santiago Euceda  : 1955   60 y.o. MRN: [id number]  Admitted: 2019  1:52 PM ATT: Gisselle Addison MD   [id number]  AdmitDx: Shock, unspecified ([facility] [address] [R57.9]  SIRS (systemic inflammatory response syndrome) ([facility] [address] [R65.10]  Hypotension, unspecified hypotension type [I95.9]  PCP: THEODORE Metcalf - NP    SUBJECTIVE:  Chart reviewed, events noted  Patient feeling better. No complaints. No BM. Tolerating diet some. Has nausea at times. Denies vomiting. Denies abdominal pain.     ROS:  The positive ROS will be identified in bold     CONSTITUTIONAL:  Neg  Weight loss, fatigue, fever  MOUTH/THROAT:  Neg  Bleeding gums, hoarseness or sore throat  RESPIRATORY:   Neg SOB, wheeze, cough, hemoptysis or bronchitis  CARDIOVASCULAR:  Neg Chest pain, palpitations, dyspnea on exertion, edema  GASTROINTESTINAL:  SEE HPI  HEMATOLOGIC/LYMPHATIC:  Neg  Anemia, bleeding tendency  MUSCULOSKELETAL: Neg  New myalgias, joint pain, swelling or stiffness  NEUROLOGICAL:  Neg  Loss of Consciousness, memory loss, forgetfulness, periods of confusion, difficulty concentrating, seizures, insomnia, aphasia    SKIN:  Neg No itching, rashes, or sores  PSYCHIATRIC:  Neg Depression, personality changes, anxiety    OBJECTIVE:      BP (!) 100/46   Pulse 65   Temp 98 °F (36.7 °C)   Resp 14   Ht 5' 6\" (1.676 m)   Wt (!) 465 lb 6.4 oz (211.1 kg)   SpO2 96%   BMI 75.12 kg/m²     NAD, appears comfortable, obese  Lips and mucous membranes pink and moist  RRR, Nl s1s2  Lungs CTA bilaterally, respirations even and unlabored on NC  Abdomen soft, ND, NT, bowel sounds normal  Skin pink, warm and dry  2+ edema bilateral lower extremities   AAOx3    CBC:   Recent Labs     19  0935 19  0510   WBC 21.7* 10.0   HGB 12.4* 10.0*    208     BMP:    Recent Labs     19  1325 19  0533 19  0510    140 ID following  Transfuse to keep hgb > 7.0  H&H Daily  Encurage activity    Discussed plan of care with patient     Patient clinical, biochemical, and radiological information discussed with Dr. Shanti Lewis. He agrees with the assessment and plan. David Phillips CNP  8/26/2019  8:25 AM     I have seen and examined this patient personally, and independently of the nurse practitioner. The plan was developed mutually at the time of the visit with the patient. David Phillips and myself have spoken with patient, nursing staff and provided written and verbal instructions .     The above note has been reviewed and I agree with the Assessment,  Diagnosis, and Treatment plan as suggested by David Phillips CNP      [address] [facility] gastroenterology

## 2019-08-26 NOTE — PROGRESS NOTES
Medications:    insulin lispro  0-18 Units Subcutaneous TID     insulin lispro  10 Units Subcutaneous TID     insulin lispro  0-18 Units Subcutaneous 2 times per day    insulin glargine  50 Units Subcutaneous Nightly    spironolactone  25 mg Oral Daily    potassium chloride  40 mEq Oral BID     phosphorus  250 mg Oral BID    midodrine  5 mg Oral TID     pramipexole  1 mg Oral TID    aspirin  81 mg Oral Daily    clopidogrel  75 mg Oral Daily    ipratropium-albuterol  3 mL Inhalation Q4H WA    pantoprazole  40 mg Intravenous Daily    simvastatin  40 mg Oral Nightly    vancomycin  125 mg Oral 4 times per day    vitamin D  50,000 Units Oral Once per day on Mon Thu    lactobacillus  1 capsule Oral Daily    sodium chloride flush  10 mL Intravenous 2 times per day    enoxaparin  40 mg Subcutaneous Daily    lidocaine PF  5 mL Intradermal Once    sodium chloride flush  10 mL Intravenous 2 times per day      Infusions:    dextrose      norepinephrine Stopped (08/25/19 1342)     PRN Meds:     glucose 15 g PRN   dextrose 12.5 g PRN   glucagon (rDNA) 1 mg PRN   dextrose 100 mL/hr PRN   sodium chloride flush 10 mL PRN   magnesium hydroxide 30 mL Daily PRN   ondansetron 4 mg Q6H PRN   sodium chloride flush 10 mL PRN         Electronically signed by Buck Alvarez MD on 8/26/2019 at 2:34 PM

## 2019-08-26 NOTE — PROGRESS NOTES
Nephrology Progress Note  8/26/2019 8:50 AM  Subjective:   Admit Date: 8/22/2019  PCP: THEODORE Jones - NP  Interval History: out of bed to bedside commode; diarrhea slowing down; renal stable and +UOP    Diet: DIET GENERAL; Carb Control: 4 carb choices (60 gms)/meal  Pain is:Mild  Data:   Scheduled Meds:   insulin lispro  0-18 Units Subcutaneous TID WC    insulin lispro  10 Units Subcutaneous TID WC    insulin lispro  0-18 Units Subcutaneous 2 times per day    sodium phosphates  20 mEq Intravenous Once    insulin glargine  50 Units Subcutaneous Nightly    spironolactone  25 mg Oral Daily    potassium chloride  40 mEq Oral BID WC    phosphorus  250 mg Oral BID    midodrine  5 mg Oral TID WC    pramipexole  1 mg Oral TID    aspirin  81 mg Oral Daily    clopidogrel  75 mg Oral Daily    ipratropium-albuterol  3 mL Inhalation Q4H WA    linezolid  600 mg Intravenous Q12H    piperacillin-tazobactam  2.25 g Intravenous Q8H    pantoprazole  40 mg Intravenous Daily    simvastatin  40 mg Oral Nightly    vancomycin  125 mg Oral 4 times per day    vitamin D  50,000 Units Oral Once per day on Mon Thu    lactobacillus  1 capsule Oral Daily    sodium chloride flush  10 mL Intravenous 2 times per day    enoxaparin  40 mg Subcutaneous Daily    lidocaine PF  5 mL Intradermal Once    sodium chloride flush  10 mL Intravenous 2 times per day     Continuous Infusions:   dextrose      norepinephrine Stopped (08/25/19 1342)     PRN Meds:glucose, dextrose, glucagon (rDNA), dextrose, sodium chloride flush, magnesium hydroxide, ondansetron, sodium chloride flush  I/O last 3 completed shifts: In: 1081.7 [I.V.:419.1; IV Piggyback:662.6]  Out: 1100 [Urine:950; Stool:150]  No intake/output data recorded.     Intake/Output Summary (Last 24 hours) at 8/26/2019 0850  Last data filed at 8/26/2019 0543  Gross per 24 hour   Intake 1081.66 ml   Output 1100 ml   Net -18.34 ml     CBC:   Recent Labs     08/23/19  0935

## 2019-08-26 NOTE — PROGRESS NOTES
 Type 2 diabetes mellitus without complication, with long-term current use of insulin (HCC)    Fatty liver    Arthritis    H/O parotidectomy    Venous stasis dermatitis of both lower extremities    Aortic stenosis    Morbid obesity (HCC)    Asthma    Diabetes mellitus ([address]    Hypertension    Sleep apnea    Family history of coronary artery disease    Chest pain    SOB (shortness of breath)    Palpitations    Peripheral edema    Hypervolemia    Chronic respiratory failure with hypoxia and hypercapnia (HCC)    Acute exacerbation of chronic obstructive pulmonary disease (COPD) (HCC)    Chronic obstructive pulmonary disease (HCC)    Gait disturbance    Acute on chronic respiratory failure with hypoxia and hypercapnia (HCC)    Cellulitis    Skin ulcer of left foot with fat layer exposed ([address]    Pneumonia    VHD (valvular heart disease)    Class 3 severe obesity due to excess calories with body mass index (BMI) greater than or equal to 70 in adult ([address]    SIRS (systemic inflammatory response syndrome) (HCC)    Acute kidney injury ([address]    Acute metabolic encephalopathy    Shock, unspecified ([address]    Uncontrolled diabetes mellitus ([address]       Active Problems  Principal Problem:    Shock, unspecified ([address]  Active Problems: Morbid obesity with BMI of 70 and over, adult ([address]    Chronic respiratory failure with hypoxia and hypercapnia (HCC)    SIRS (systemic inflammatory response syndrome) (HCC)    Acute kidney injury ([address]    Acute metabolic encephalopathy    Uncontrolled diabetes mellitus ([address]  Resolved Problems:    * No resolved hospital problems. *    Electronically signed by: Electronically signed by Irma Peña.  THEODORE Shah CNP on 8/26/2019 at 10:14 AM

## 2019-08-27 LAB
CULTURE: NORMAL
GLUCOSE BLD-MCNC: 124 MG/DL (ref 70–99)
GLUCOSE BLD-MCNC: 137 MG/DL (ref 70–99)
GLUCOSE BLD-MCNC: 157 MG/DL (ref 70–99)
GLUCOSE BLD-MCNC: 166 MG/DL (ref 70–99)
LACTATE: 1 MMOL/L (ref 0.4–2)
Lab: NORMAL
PHOSPHORUS: 2.5 MG/DL (ref 2.5–4.9)
SPECIMEN: NORMAL

## 2019-08-27 PROCEDURE — 6370000000 HC RX 637 (ALT 250 FOR IP): Performed by: INTERNAL MEDICINE

## 2019-08-27 PROCEDURE — G0378 HOSPITAL OBSERVATION PER HR: HCPCS

## 2019-08-27 PROCEDURE — 94640 AIRWAY INHALATION TREATMENT: CPT

## 2019-08-27 PROCEDURE — 6360000002 HC RX W HCPCS: Performed by: INTERNAL MEDICINE

## 2019-08-27 PROCEDURE — 83605 ASSAY OF LACTIC ACID: CPT

## 2019-08-27 PROCEDURE — C9113 INJ PANTOPRAZOLE SODIUM, VIA: HCPCS | Performed by: INTERNAL MEDICINE

## 2019-08-27 PROCEDURE — 2580000003 HC RX 258: Performed by: INTERNAL MEDICINE

## 2019-08-27 PROCEDURE — 99231 SBSQ HOSP IP/OBS SF/LOW 25: CPT | Performed by: INTERNAL MEDICINE

## 2019-08-27 PROCEDURE — 1200000000 HC SEMI PRIVATE

## 2019-08-27 PROCEDURE — 82962 GLUCOSE BLOOD TEST: CPT

## 2019-08-27 PROCEDURE — 84100 ASSAY OF PHOSPHORUS: CPT

## 2019-08-27 PROCEDURE — 80048 BASIC METABOLIC PNL TOTAL CA: CPT

## 2019-08-27 PROCEDURE — 96376 TX/PRO/DX INJ SAME DRUG ADON: CPT

## 2019-08-27 PROCEDURE — 94761 N-INVAS EAR/PLS OXIMETRY MLT: CPT

## 2019-08-27 PROCEDURE — 2580000003 HC RX 258: Performed by: EMERGENCY MEDICINE

## 2019-08-27 PROCEDURE — 96372 THER/PROPH/DIAG INJ SC/IM: CPT

## 2019-08-27 RX ORDER — POTASSIUM CHLORIDE 750 MG/1
10 TABLET, FILM COATED, EXTENDED RELEASE ORAL 2 TIMES DAILY WITH MEALS
Status: DISCONTINUED | OUTPATIENT
Start: 2019-08-27 | End: 2019-08-29 | Stop reason: HOSPADM

## 2019-08-27 RX ORDER — INSULIN GLARGINE 100 [IU]/ML
20 INJECTION, SOLUTION SUBCUTANEOUS ONCE
Status: COMPLETED | OUTPATIENT
Start: 2019-08-27 | End: 2019-08-27

## 2019-08-27 RX ORDER — CARVEDILOL 3.12 MG/1
3.12 TABLET ORAL 2 TIMES DAILY
Status: DISCONTINUED | OUTPATIENT
Start: 2019-08-27 | End: 2019-08-29 | Stop reason: HOSPADM

## 2019-08-27 RX ADMIN — ASPIRIN 81 MG 81 MG: 81 TABLET ORAL at 08:42

## 2019-08-27 RX ADMIN — DIBASIC SODIUM PHOSPHATE, MONOBASIC POTASSIUM PHOSPHATE AND MONOBASIC SODIUM PHOSPHATE 1 TABLET: 852; 155; 130 TABLET ORAL at 21:22

## 2019-08-27 RX ADMIN — IPRATROPIUM BROMIDE AND ALBUTEROL SULFATE 3 ML: .5; 3 SOLUTION RESPIRATORY (INHALATION) at 22:34

## 2019-08-27 RX ADMIN — Medication 125 MG: at 06:33

## 2019-08-27 RX ADMIN — CARVEDILOL 3.12 MG: 3.12 TABLET, FILM COATED ORAL at 13:29

## 2019-08-27 RX ADMIN — SODIUM CHLORIDE, PRESERVATIVE FREE 10 ML: 5 INJECTION INTRAVENOUS at 21:23

## 2019-08-27 RX ADMIN — INSULIN GLARGINE 20 UNITS: 100 INJECTION, SOLUTION SUBCUTANEOUS at 22:32

## 2019-08-27 RX ADMIN — Medication 125 MG: at 02:37

## 2019-08-27 RX ADMIN — SODIUM CHLORIDE, PRESERVATIVE FREE 10 ML: 5 INJECTION INTRAVENOUS at 02:40

## 2019-08-27 RX ADMIN — PRAMIPEXOLE DIHYDROCHLORIDE 1 MG: 0.25 TABLET ORAL at 13:29

## 2019-08-27 RX ADMIN — Medication 125 MG: at 13:29

## 2019-08-27 RX ADMIN — DIBASIC SODIUM PHOSPHATE, MONOBASIC POTASSIUM PHOSPHATE AND MONOBASIC SODIUM PHOSPHATE 250 MG: 852; 155; 130 TABLET ORAL at 08:41

## 2019-08-27 RX ADMIN — PANTOPRAZOLE SODIUM 40 MG: 40 INJECTION, POWDER, FOR SOLUTION INTRAVENOUS at 08:41

## 2019-08-27 RX ADMIN — SIMVASTATIN 40 MG: 40 TABLET, FILM COATED ORAL at 21:23

## 2019-08-27 RX ADMIN — CLOPIDOGREL BISULFATE 75 MG: 75 TABLET ORAL at 08:42

## 2019-08-27 RX ADMIN — IPRATROPIUM BROMIDE AND ALBUTEROL SULFATE 3 ML: .5; 3 SOLUTION RESPIRATORY (INHALATION) at 16:06

## 2019-08-27 RX ADMIN — PRAMIPEXOLE DIHYDROCHLORIDE 1 MG: 0.25 TABLET ORAL at 21:23

## 2019-08-27 RX ADMIN — Medication 1 CAPSULE: at 08:41

## 2019-08-27 RX ADMIN — SODIUM CHLORIDE, PRESERVATIVE FREE 10 ML: 5 INJECTION INTRAVENOUS at 08:43

## 2019-08-27 RX ADMIN — ONDANSETRON 4 MG: 2 INJECTION INTRAMUSCULAR; INTRAVENOUS at 08:41

## 2019-08-27 RX ADMIN — IPRATROPIUM BROMIDE AND ALBUTEROL SULFATE 3 ML: .5; 3 SOLUTION RESPIRATORY (INHALATION) at 11:56

## 2019-08-27 RX ADMIN — Medication 125 MG: at 18:05

## 2019-08-27 RX ADMIN — MIDODRINE HYDROCHLORIDE 5 MG: 5 TABLET ORAL at 08:42

## 2019-08-27 RX ADMIN — Medication 125 MG: at 23:20

## 2019-08-27 RX ADMIN — POTASSIUM CHLORIDE 10 MEQ: 750 TABLET, FILM COATED, EXTENDED RELEASE ORAL at 18:05

## 2019-08-27 RX ADMIN — ENOXAPARIN SODIUM 40 MG: 100 INJECTION SUBCUTANEOUS at 08:41

## 2019-08-27 RX ADMIN — PRAMIPEXOLE DIHYDROCHLORIDE 1 MG: 0.25 TABLET ORAL at 08:41

## 2019-08-27 RX ADMIN — SODIUM CHLORIDE, PRESERVATIVE FREE 10 ML: 5 INJECTION INTRAVENOUS at 02:41

## 2019-08-27 RX ADMIN — SPIRONOLACTONE 25 MG: 25 TABLET ORAL at 08:42

## 2019-08-27 RX ADMIN — POTASSIUM CHLORIDE 40 MEQ: 20 TABLET, EXTENDED RELEASE ORAL at 08:41

## 2019-08-27 RX ADMIN — IPRATROPIUM BROMIDE AND ALBUTEROL SULFATE 3 ML: .5; 3 SOLUTION RESPIRATORY (INHALATION) at 09:06

## 2019-08-27 ASSESSMENT — PAIN SCALES - GENERAL: PAINLEVEL_OUTOF10: 0

## 2019-08-27 NOTE — PROGRESS NOTES
Hospitalist Progress Note      Name:  Jai Chapman /Age/Sex: 1955  (60 y.o. female)   MRN & CSN:  [id number] & [id number] Admission Date/Time: 2019  1:52 PM   Location:  [address] [place] [address] PCP: Erich Harper Day: 6    Assessment and Plan:   Jai Chapman is a 60 y.o.  female  who presents with:     Acute kidney injury ([address]): Improved with hydration. DC Mata,Closely monitor BMP.  Acute metabolic encephalopathy likely from hypotension and hypercapnia. Improved on BiPAP   Shock, unspecified ([address]): Broad-spectrum antibiotics and vancomycin oral for C. Difficile.  Uncontrolled diabetes mellitus ([address]): Lantus, prandial insulin and sliding scale. Endo is on board.  Chronic respiratory failure with hypoxia and hypercapnia ([address]): Continue home BiPAP. Pulmonology on board. continue nebulization treatment.  Morbid obesity with BMI of 70 and over, adult [facility])    PT/OT recommend ECF. Possible DC to ECF upon insurance approval.  Medically stable for DC    Subjective:     Patient is sitting in chair  Denied any complaints to me. No acute overnight events    Objective:     No intake or output data in the 24 hours ending 19 1526   Vitals:   Vitals:    19 1042   BP: (!) 159/72   Pulse: 86   Resp: 17   Temp: 98 °F (36.7 °C)   SpO2: 100%     Physical Exam:    Physical Exam   Constitutional: She is oriented to person, place, and time. She appears well-developed. HENT:   Head: Normocephalic and atraumatic. Neck: Neck supple. No JVD present. No tracheal deviation present. Cardiovascular: Normal rate and regular rhythm. Pulmonary/Chest: Effort normal.   Slightly diminished BS B/L   Abdominal: Soft. Bowel sounds are normal.   Neurological: She is alert and oriented to person, place, and time. Skin: Skin is warm and dry. Psychiatric: She has a normal mood and affect.          Medications:   Medications:    potassium chloride  10 mEq Oral BID WC   

## 2019-08-27 NOTE — PROGRESS NOTES
Progress Note( Dr. Ginger Flores)  8/26/2019  Subjective:   Admit Date: 8/22/2019  PCP: THEODORE Silva - NP    Admitted For :Change in mental state and severe hypoxia with oxygen desaturation  Patient was discharged  and was returned and was admitted day after discharge . Consulted For:Better control of Blood glucose     Interval History: Feel much better On BIPAP mostly at night     Denies any chest pains,   Still SOB   . Denies nausea or vomiting.has poor apatite . hust takes few bites and feels full    No new bowel or bladder symptoms. Intake/Output Summary (Last 24 hours) at 8/26/2019 2217  Last data filed at 8/26/2019 0543  Gross per 24 hour   Intake 351 ml   Output --   Net 351 ml       DATA    CBC:   Recent Labs     08/26/19  0510   WBC 10.0   HGB 10.0*       CMP:  Recent Labs     08/24/19  1325 08/25/19  0533 08/26/19  0510    140 134*   K 3.1* 2.8  K CALLED TO EDGARDO ESCALANTE RN ICU [id number] [facility]   RESULTS READ BACK  * 3.7   CL 93* 92* 91*   CO2 32 35* 33*   BUN 65* 45* 31*   CREATININE 1.1 0.8 0.8   CALCIUM 8.9 9.4 9.1   PROT 6.6  --  6.2*   LABALBU 3.6  --  3.4   BILITOT 0.4  --  0.4   ALKPHOS 72  --  65   AST 15  --  14*   ALT 10  --  10     Lipids:   Lab Results   Component Value Date    CHOL 186 02/07/2019    HDL 39 02/07/2019    TRIG 122 02/07/2019     Glucose:  Recent Labs     08/26/19  0900 08/26/19  1255 08/26/19  1727   POCGLU 155* 255* 173*     YfodyjjantA2X:  Lab Results   Component Value Date    LABA1C 9.3 08/24/2019     High Sensitivity TSH:   Lab Results   Component Value Date    TSHHS 0.820 02/06/2019     Free T3: No results found for: FT3  Free T4:  Lab Results   Component Value Date    T4FREE 1.07 09/04/2018       Ct Abdomen Pelvis Wo Contrast Additional Contrast? None    Result Date: 8/24/2019  EXAMINATION: CT OF THE ABDOMEN AND PELVIS WITHOUT CONTRAST, 8/23/2019 9:44 pm TORDERING SYSTEM PROVIDED HISTORY: Diarrhea, septic shock      No acute findings.  Colonic rate and rhythm systolic murmur  Abdomen: soft, non-tender; bowel sounds normal; no masses,  no organomegaly  Musculoskeletal: Normal  Extremities: extremities normal, , has  edema  Neurologic:  Awake, alert, oriented to name, place and time. Cranial nerves II-XII are grossly intact. Motor weakness. Sensory neuropathy ,  and gait is abnormal. And unstable     Assessment:     Patient Active Problem List:     Morbid obesity with BMI of 70 and over, adult [facility])     Essential hypertension     Dyslipidemia     Fecal incontinence     Mild intermittent asthma without complication     S/P laparoscopic cholecystectomy     Type 2 diabetes mellitus without complication, with long-term current use of insulin (HCC)     Fatty liver     Arthritis     H/O parotidectomy     Venous stasis dermatitis of both lower extremities     Aortic stenosis     Asthma     Sleep apnean     SOB (shortness of breath)     Palpitations     Peripheral edema     Hypervolemia     Chronic respiratory failure with hypoxia and hypercapnia (HCC)     Acute exacerbation of chronic obstructive pulmonary disease (COPD) (HCC)     Gait disturbance     Acute metabolic encephalopathy     Shock, unspecified ([facility] [address]        Plan:     1. Reviewed POC blood glucose . Labs and X ray results   2. Reviewed Current Medicines   3. Will start on meal +Correction bolus Humalog/ Basal Lantus Insulin regime   4. Monitor Blood glucose frequently   5. Modified  the dose of Insulin/ other medicines as needed   6. Will follow     .      Beverly Diggs MD

## 2019-08-27 NOTE — PROGRESS NOTES
Infectious Disease Progress Note  2019   Patient Name: Angela Dumont : 1955       Impression  · Shock (resolved)  ? Afebrile, no focal signs of disease  ? Concerned shock was from possible pulmonary hypertension and right ventricular failure   ? Leukocytosis likely to be stress related  ? C diff diarrhea  ? Stool is getting more formed    Plan:  · Monitor off ABX  · Cardiology consult to evaluate for right sided HF and pulmonary HTN  · Continue vancomycin orally, will need 6-week taper. Ongoing Antimicrobial Therapy:  vancomycin po -  ? Completed Antimicrobial Therapy  cefepime -19  ampicillin-sulbactam 19   linezolid -19  piperacillin-tazobactam -19  ? History:? Interval history noted  Has nausea. Denies chest pain, abdominal pain or suprapubic pain. Physical Exam:  Vital Signs: /72   Pulse 69   Temp 98.5 °F (36.9 °C) (Oral)   Resp 18   Ht 5' 6\" (1.676 m)   Wt (!) 465 lb 6.4 oz (211.1 kg)   SpO2 100%   BMI 75.12 kg/m²     Gen: alert and oriented X3, no distress  Skin: no stigmata of endocarditis  Wounds: Left foot plantar ulcer dressing clean and dry. HEMT: AT/NC Oropharynx pink, moist, and without lesions or exudates; dentition in good state of repair  Eyes: PERRLA, EOMI, conjunctiva pink, sclera anicteric. Neck: Supple. Trachea midline. No LAD. Chest: no distress and CTA. Good air movement. Heart: RRR and no MRG. Abd: soft, non-distended, no tenderness, no hepatomegaly. Normoactive bowel sounds. Ext: no clubbing, cyanosis, or edema  Catheter Site: without erythema or tenderness  Neuro: Mental status intact.  CN 2-12 intact and no focal sensory or motor deficits     Radiologic / Imaging / TESTING  2019 XR Chest Portable:  FINDINGS:   The right-sided PICC line is unchanged in position.  The cardiac silhouette   is enlarged with prominence of the pulmonary vasculature, which is minimally   improved.  Otherwise, no convincing  Sleep apnea    Family history of coronary artery disease    Chest pain    SOB (shortness of breath)    Palpitations    Peripheral edema    Hypervolemia    Chronic respiratory failure with hypoxia and hypercapnia (HCC)    Acute exacerbation of chronic obstructive pulmonary disease (COPD) (HCC)    Chronic obstructive pulmonary disease (HCC)    Gait disturbance    Acute on chronic respiratory failure with hypoxia and hypercapnia (HCC)    Cellulitis    Skin ulcer of left foot with fat layer exposed ([address]    Pneumonia    VHD (valvular heart disease)    Class 3 severe obesity due to excess calories with body mass index (BMI) greater than or equal to 70 in adult ([address]    SIRS (systemic inflammatory response syndrome) (HCC)    Acute kidney injury ([address]    Acute metabolic encephalopathy    Shock, unspecified ([address]    Uncontrolled diabetes mellitus ([address]       Active Problems  Principal Problem:    Shock, unspecified ([address]  Active Problems: Morbid obesity with BMI of 70 and over, adult ([address]    Chronic respiratory failure with hypoxia and hypercapnia (HCC)    SIRS (systemic inflammatory response syndrome) (HCC)    Acute kidney injury ([address]    Acute metabolic encephalopathy    Uncontrolled diabetes mellitus ([address]  Resolved Problems:    * No resolved hospital problems.  *    Electronically signed by: Electronically signed by Sowmya Burrell MD on 8/27/2019 at 10:37 AM

## 2019-08-27 NOTE — CARE COORDINATION
CM called and spoke with Rachello regarding precert. CM discussed pt and [place] will review the pt/ot notes from yest and call with determination. [facility]  1025 CM received call from [place] asking the width of w/c. CM into see pt and pt does not have a w/c here. Pt uses walker. CM called PT and spoke with Rikki Claude who confirmed that pt ambulated with walker. CM called Kristine back at the facility and updated. CM will await precert for SNF placement.   CM called and spoke with Fozia Ochoa with [id number]. CM provided the wgt/hgt. and asked what measurement of W/c pt would require. CM informed that pt would need a 22 in w/c and from wheel to wheel it would be 32\". CM called and informed [place] at [facility].   1230 CM called Kristine and pt has been approved to go to [facility] but precert is pending.  [facility]

## 2019-08-27 NOTE — PROGRESS NOTES
Nephrology Progress Note  8/27/2019 8:47 AM  Subjective:   Admit Date: 8/22/2019  PCP: Muriel Golden APRN - NP     Interval History: patient sitting up at edge of bed and taking  Her am medications. BMP ordered for this am; recheck SHAE Diet: DIET GENERAL; Carb Control: 4 carb choices (60 gms)/meal     Data:   Scheduled Meds:   insulin lispro  0-18 Units Subcutaneous TID WC    insulin lispro  10 Units Subcutaneous TID WC    insulin lispro  0-18 Units Subcutaneous 2 times per day    insulin glargine  50 Units Subcutaneous Nightly    spironolactone  25 mg Oral Daily    potassium chloride  40 mEq Oral BID WC    phosphorus  250 mg Oral BID    midodrine  5 mg Oral TID WC    pramipexole  1 mg Oral TID    aspirin  81 mg Oral Daily    clopidogrel  75 mg Oral Daily    ipratropium-albuterol  3 mL Inhalation Q4H WA    pantoprazole  40 mg Intravenous Daily    simvastatin  40 mg Oral Nightly    vancomycin  125 mg Oral 4 times per day    vitamin D  50,000 Units Oral Once per day on Mon Thu    lactobacillus  1 capsule Oral Daily    sodium chloride flush  10 mL Intravenous 2 times per day    enoxaparin  40 mg Subcutaneous Daily    lidocaine PF  5 mL Intradermal Once    sodium chloride flush  10 mL Intravenous 2 times per day     Continuous Infusions:   dextrose      norepinephrine Stopped (08/25/19 1342)     PRN Meds:glucose, dextrose, glucagon (rDNA), dextrose, sodium chloride flush, magnesium hydroxide, ondansetron, sodium chloride flush  No intake/output data recorded. No intake/output data recorded.   No intake or output data in the 24 hours ending 08/27/19 0847  CBC:   Recent Labs     08/26/19  0510   WBC 10.0   HGB 10.0*        BMP:    Recent Labs     08/24/19  1325 08/25/19  0533 08/26/19  0510    140 134*   K 3.1* 2.8  K CALLED TO EDGARDO ESCALANTE RN [facility] [id number] [facility]   RESULTS READ BACK  * 3.7   CL 93* 92* 91*   CO2 32 35* 33*   BUN 65* 45* 31*   CREATININE 1.1 0.8 0.8

## 2019-08-28 LAB
ALBUMIN SERPL-MCNC: 3.6 GM/DL (ref 3.4–5)
ANION GAP SERPL CALCULATED.3IONS-SCNC: 14 MMOL/L (ref 4–16)
BUN BLDV-MCNC: 15 MG/DL (ref 6–23)
CALCIUM SERPL-MCNC: 9.3 MG/DL (ref 8.3–10.6)
CHLORIDE BLD-SCNC: 94 MMOL/L (ref 99–110)
CO2: 31 MMOL/L (ref 21–32)
CREAT SERPL-MCNC: 0.8 MG/DL (ref 0.6–1.1)
GFR AFRICAN AMERICAN: >60 ML/MIN/1.73M2
GFR NON-AFRICAN AMERICAN: >60 ML/MIN/1.73M2
GLUCOSE BLD-MCNC: 109 MG/DL (ref 70–99)
GLUCOSE BLD-MCNC: 124 MG/DL (ref 70–99)
GLUCOSE BLD-MCNC: 125 MG/DL (ref 70–99)
GLUCOSE BLD-MCNC: 130 MG/DL (ref 70–99)
GLUCOSE BLD-MCNC: 153 MG/DL (ref 70–99)
LACTATE: 0.7 MMOL/L (ref 0.4–2)
PHOSPHORUS: 3.2 MG/DL (ref 2.5–4.9)
POTASSIUM SERPL-SCNC: 3.7 MMOL/L (ref 3.5–5.1)
SODIUM BLD-SCNC: 139 MMOL/L (ref 135–145)

## 2019-08-28 PROCEDURE — 6360000002 HC RX W HCPCS

## 2019-08-28 PROCEDURE — C1751 CATH, INF, PER/CENT/MIDLINE: HCPCS

## 2019-08-28 PROCEDURE — 2580000003 HC RX 258

## 2019-08-28 PROCEDURE — 96376 TX/PRO/DX INJ SAME DRUG ADON: CPT

## 2019-08-28 PROCEDURE — 82962 GLUCOSE BLOOD TEST: CPT

## 2019-08-28 PROCEDURE — 2580000003 HC RX 258: Performed by: INTERNAL MEDICINE

## 2019-08-28 PROCEDURE — 2580000003 HC RX 258: Performed by: EMERGENCY MEDICINE

## 2019-08-28 PROCEDURE — 80069 RENAL FUNCTION PANEL: CPT

## 2019-08-28 PROCEDURE — 6370000000 HC RX 637 (ALT 250 FOR IP): Performed by: INTERNAL MEDICINE

## 2019-08-28 PROCEDURE — 6360000002 HC RX W HCPCS: Performed by: INTERNAL MEDICINE

## 2019-08-28 PROCEDURE — 96372 THER/PROPH/DIAG INJ SC/IM: CPT

## 2019-08-28 PROCEDURE — C9113 INJ PANTOPRAZOLE SODIUM, VIA: HCPCS | Performed by: INTERNAL MEDICINE

## 2019-08-28 PROCEDURE — 99254 IP/OBS CNSLTJ NEW/EST MOD 60: CPT | Performed by: INTERNAL MEDICINE

## 2019-08-28 PROCEDURE — 93451 RIGHT HEART CATH: CPT

## 2019-08-28 PROCEDURE — C1769 GUIDE WIRE: HCPCS

## 2019-08-28 PROCEDURE — G0378 HOSPITAL OBSERVATION PER HR: HCPCS

## 2019-08-28 PROCEDURE — 2709999900 HC NON-CHARGEABLE SUPPLY

## 2019-08-28 PROCEDURE — 2700000000 HC OXYGEN THERAPY PER DAY

## 2019-08-28 PROCEDURE — 93451 RIGHT HEART CATH: CPT | Performed by: INTERNAL MEDICINE

## 2019-08-28 PROCEDURE — 80048 BASIC METABOLIC PNL TOTAL CA: CPT

## 2019-08-28 PROCEDURE — 94640 AIRWAY INHALATION TREATMENT: CPT

## 2019-08-28 PROCEDURE — C1894 INTRO/SHEATH, NON-LASER: HCPCS

## 2019-08-28 PROCEDURE — 1200000000 HC SEMI PRIVATE

## 2019-08-28 PROCEDURE — 2500000003 HC RX 250 WO HCPCS

## 2019-08-28 PROCEDURE — 83605 ASSAY OF LACTIC ACID: CPT

## 2019-08-28 RX ORDER — LACTOBACILLUS RHAMNOSUS GG 10B CELL
1 CAPSULE ORAL DAILY
Qty: 30 CAPSULE | Refills: 0 | Status: SHIPPED | OUTPATIENT
Start: 2019-08-28 | End: 2020-01-01

## 2019-08-28 RX ORDER — TORSEMIDE 20 MG/1
20 TABLET ORAL DAILY
Qty: 60 TABLET | Refills: 1 | Status: ON HOLD | OUTPATIENT
Start: 2019-08-28 | End: 2019-11-04 | Stop reason: SDUPTHER

## 2019-08-28 RX ORDER — SPIRONOLACTONE 25 MG/1
25 TABLET ORAL DAILY
Qty: 30 TABLET | Refills: 3 | Status: SHIPPED | OUTPATIENT
Start: 2019-08-28 | End: 2020-01-01 | Stop reason: SDUPTHER

## 2019-08-28 RX ADMIN — CARVEDILOL 3.12 MG: 3.12 TABLET, FILM COATED ORAL at 21:36

## 2019-08-28 RX ADMIN — PANTOPRAZOLE SODIUM 40 MG: 40 INJECTION, POWDER, FOR SOLUTION INTRAVENOUS at 10:10

## 2019-08-28 RX ADMIN — SODIUM CHLORIDE, PRESERVATIVE FREE 10 ML: 5 INJECTION INTRAVENOUS at 00:00

## 2019-08-28 RX ADMIN — PRAMIPEXOLE DIHYDROCHLORIDE 1 MG: 0.25 TABLET ORAL at 16:44

## 2019-08-28 RX ADMIN — Medication 125 MG: at 17:14

## 2019-08-28 RX ADMIN — IPRATROPIUM BROMIDE AND ALBUTEROL SULFATE 3 ML: .5; 3 SOLUTION RESPIRATORY (INHALATION) at 08:22

## 2019-08-28 RX ADMIN — IPRATROPIUM BROMIDE AND ALBUTEROL SULFATE 3 ML: .5; 3 SOLUTION RESPIRATORY (INHALATION) at 12:10

## 2019-08-28 RX ADMIN — SODIUM CHLORIDE, PRESERVATIVE FREE 10 ML: 5 INJECTION INTRAVENOUS at 09:35

## 2019-08-28 RX ADMIN — POTASSIUM CHLORIDE 10 MEQ: 750 TABLET, FILM COATED, EXTENDED RELEASE ORAL at 16:44

## 2019-08-28 RX ADMIN — Medication 125 MG: at 06:01

## 2019-08-28 RX ADMIN — ENOXAPARIN SODIUM 40 MG: 100 INJECTION SUBCUTANEOUS at 10:10

## 2019-08-28 RX ADMIN — SIMVASTATIN 40 MG: 40 TABLET, FILM COATED ORAL at 21:36

## 2019-08-28 RX ADMIN — PRAMIPEXOLE DIHYDROCHLORIDE 1 MG: 0.25 TABLET ORAL at 21:35

## 2019-08-28 RX ADMIN — DIBASIC SODIUM PHOSPHATE, MONOBASIC POTASSIUM PHOSPHATE AND MONOBASIC SODIUM PHOSPHATE 1 TABLET: 852; 155; 130 TABLET ORAL at 21:35

## 2019-08-28 RX ADMIN — SODIUM CHLORIDE, PRESERVATIVE FREE 10 ML: 5 INJECTION INTRAVENOUS at 09:29

## 2019-08-28 ASSESSMENT — PAIN SCALES - GENERAL
PAINLEVEL_OUTOF10: 0

## 2019-08-28 NOTE — PROGRESS NOTES
Progress Note( Dr. Monika Buckley)  8/28/2019  Subjective:   Admit Date: 8/22/2019  PCP: THEODORE Mccauley - NP    Admitted For :Change in mental state and severe hypoxia with oxygen desaturation  Patient was discharged  and was returned and was admitted day after discharge . Consulted For:Better control of Blood glucose     Interval History: Feel much better On BIPAP mostly at night   More alert      Denies any chest pains,   Still SOB   . Denies nausea or vomiting.has poor apatite . hust takes few bites and feels full    No new bowel or bladder symptoms. Intake/Output Summary (Last 24 hours) at 8/28/2019 0734  Last data filed at 8/28/2019 0615  Gross per 24 hour   Intake 360 ml   Output --   Net 360 ml       DATA    CBC:   Recent Labs     08/26/19  0510   WBC 10.0   HGB 10.0*       CMP:  Recent Labs     08/26/19  0510 08/28/19  0515   * 139   K 3.7 3.7   CL 91* 94*   CO2 33* 31   BUN 31* 15   CREATININE 0.8 0.8   CALCIUM 9.1 9.3   PROT 6.2*  --    LABALBU 3.4 3.6   BILITOT 0.4  --    ALKPHOS 65  --    AST 14*  --    ALT 10  --      Lipids:   Lab Results   Component Value Date    CHOL 186 02/07/2019    HDL 39 02/07/2019    TRIG 122 02/07/2019     Glucose:  Recent Labs     08/27/19  1758 08/27/19  2115 08/28/19  0235   POCGLU 124* 137* 125*     [id number]:  Lab Results   Component Value Date    LABA1C 9.3 08/24/2019     High Sensitivity TSH:   Lab Results   Component Value Date    TSHHS 0.820 02/06/2019     Free T3: No results found for: FT3  Free T4:  Lab Results   Component Value Date    T4FREE 1.07 09/04/2018       Ct Abdomen Pelvis Wo Contrast Additional Contrast? None    Result Date: 8/24/2019  EXAMINATION: CT OF THE ABDOMEN AND PELVIS WITHOUT CONTRAST, 8/23/2019 9:44 pm TORDERING SYSTEM PROVIDED HISTORY: Diarrhea, septic shock      No acute findings. Colonic diverticulosis. Fat containing umbilical hernia. Bibasilar atelectasis.      Ct Head Wo Contrast    Result Date:

## 2019-08-28 NOTE — CONSULTS
CARDIOLOGY CONSULT NOTE   Reason for consultation:  Right sided heart failure and pulmonary HTN management    Referring physician:  Christine Mcguire MD     Primary care physician: Annella Lombard, APRN - NP      Dear Dr. Kris Cedeño  Thanks for the consult. History of present illness:Olivia is a 60 y. o.year old who  presents with unresponsiveness,she is morbidly obese and had TAVR 2017 in [facility] was admitted for septic shock which is resolbed. , she had diabetic ulcer in the planter aspect of the left foot and was treated with antibiotics, cardiology consult is called for the evaluation of pulmonary HTN and right sided heart failure    Blood pressure, cholesterol, blood glucose and weight are well controlled. Past medical history:    has a past medical history of Acute kidney injury ([address] Aortic stenosis, Asthma, Bacteremia due to group B Streptococcus, Cancer ([address] Carotid artery stenosis, Chest pain, CHF (congestive heart failure) ([address] Chronic back pain, COPD exacerbation ([address] Depression, Diabetes mellitus ([address] Family history of coronary artery disease, Fatty liver, GI bleed, H/O aortic valve stenosis, H/O echocardiogram, Headache, Heart murmur, History of nuclear stress test, Morbid obesity ([address] Neuropathy, NSTEMI (non-ST elevated myocardial infarction) ([address] Obesity, Osteoarthritis, Palpitations, Peripheral edema, Restless legs syndrome, Sleep apnea, Sleep apnea, and SOB (shortness of breath). Past surgical history:   has a past surgical history that includes Cholecystectomy;  section; Hysterectomy; Tubal ligation; Neck surgery; Gallbladder surgery; and Aortic valve replacement (2017). Social History:   reports that she quit smoking about 16 years ago. Her smoking use included cigarettes. She has never used smokeless tobacco. She reports that she does not drink alcohol or use drugs.   Family history:   no family history of CAD, STROKE of DM    No Known Allergies      potassium chloride (KLOR-CON) extended release tablet 10 mEq BID    carvedilol (COREG) tablet 3.125 mg BID   insulin lispro (HUMALOG) injection vial 0-18 Units TID    insulin lispro (HUMALOG) injection vial 10 Units TID    insulin lispro (HUMALOG) injection vial 0-18 Units 2 times per day   insulin glargine (LANTUS) injection vial 50 Units Nightly   spironolactone (ALDACTONE) tablet 25 mg Daily   phosphorus (K PHOS NEUTRAL) tablet 1 tablet BID   glucose (GLUTOSE) 40 % oral gel 15 g PRN   dextrose 50 % IV solution PRN   glucagon (rDNA) injection 1 mg PRN   dextrose 5 % solution PRN   pramipexole (MIRAPEX) tablet 1 mg TID   aspirin chewable tablet 81 mg Daily   clopidogrel (PLAVIX) tablet 75 mg Daily   ipratropium-albuterol (DUONEB) nebulizer solution 3 mL Q4H WA   pantoprazole (PROTONIX) injection 40 mg Daily   simvastatin (ZOCOR) tablet 40 mg Nightly   vancomycin (VANCOCIN) oral solution 125 mg 4 times per day   vitamin D (ERGOCALCIFEROL) capsule 50,000 Units Once per day on Mon Thu   lactobacillus (CULTURELLE) capsule 1 capsule Daily   sodium chloride flush 0.9 % injection 10 mL 2 times per day   sodium chloride flush 0.9 % injection 10 mL PRN   magnesium hydroxide (MILK OF MAGNESIA) 400 MG/5ML suspension 30 mL Daily PRN   ondansetron (ZOFRAN) injection 4 mg Q6H PRN   enoxaparin (LOVENOX) injection 40 mg Daily   lidocaine PF 1 % injection 5 mL Once   sodium chloride flush 0.9 % injection 10 mL 2 times per day   sodium chloride flush 0.9 % injection 10 mL PRN     Current Facility-Administered Medications   Medication Dose Route Frequency Provider Last Rate Last Dose    potassium chloride (KLOR-CON) extended release tablet 10 mEq  10 mEq Oral BID  Joe Castro MD   10 mEq at 08/27/19 1805    carvedilol (COREG) tablet 3.125 mg  3.125 mg Oral BID Joe Castro MD   3.125 mg at 08/27/19 1329    insulin lispro (HUMALOG) injection vial 0-18 Units  0-18 Units Subcutaneous TID  BRYAN Mckeon MD   Stopped at Encounters:   08/25/19 (!) 465 lb 6.4 oz (211.1 kg)   08/19/19 (!) 421 lb 14.4 oz (191.4 kg)   07/05/19 (!) 432 lb (196 kg)     Body mass index is 75.12 kg/m². General Appearance:  No distress, conversant    Constitutional:  Well developed, Well nourished, No acute distress, Non-toxic appearance. HENT:  Normocephalic, Atraumatic, Bilateral external ears normal, Oropharynx moist, No oral exudates, Nose normal. Neck- Normal range of motion, No tenderness, Supple, No stridor,no apical-carotid delay, no carotid bruit  Eyes:  PERRL, EOMI, Conjunctiva normal, No discharge. Respiratory:  Normal breath sounds, No respiratory distress, No wheezing, No chest tenderness. ,no use of accessory muscles, diaphragm movement is normal  Cardiovascular: (PMI) apex non displaced,no lifts no thrills, no s3,no s4, Normal heart rate, Normal rhythm, No murmurs, No rubs, No gallops. Carotid arteries pulse and amplitude are normal no bruit, no abdominal bruit noted ( normal abdominal aorta ausculation), femoral arteries pulse and amplitude are normal no bruit, pedal pulses are normal  GI:  Bowel sounds normal, Soft, No tenderness, No masses, No pulsatile masses, no hepatosplenomegally, no bruits  : External genitalia appear normal, No masses or lesions. No discharge. No CVA tenderness. Musculoskeletal:  Intact distal pulses, No edema, No tenderness, No cyanosis, No clubbing. Good range of motion in all major joints. No tenderness to palpation or major deformities noted. Back- No tenderness. Integument:  Warm, Dry, No erythema, No rash. Skin: no rash, no ulcers  Lymphatic:  No lymphadenopathy noted. Neurologic:  Alert & oriented x 3, Normal motor function, Normal sensory function, No focal deficits noted.    Psychiatric:  Affect normal, Judgment normal, Mood normal.   Lab Review   Recent Labs     08/26/19  0510   WBC 10.0   HGB 10.0*   HCT 34.2*         Recent Labs     08/28/19  0515      K 3.7   CL 94*   CO2 31

## 2019-08-28 NOTE — PROGRESS NOTES
Nephrology Progress Note  8/28/2019 1:38 PM  Subjective:   Admit Date: 8/22/2019  PCP: THEODORE Mendez - NP     Interval History: patient resting in bed and napping; possible RHC today?; renal stable but no I&O's documented at this time     Diet: Diet NPO Effective Now     Data:   Scheduled Meds:   potassium chloride  10 mEq Oral BID WC    carvedilol  3.125 mg Oral BID    insulin lispro  0-18 Units Subcutaneous TID WC    insulin lispro  10 Units Subcutaneous TID WC    insulin lispro  0-18 Units Subcutaneous 2 times per day    insulin glargine  50 Units Subcutaneous Nightly    spironolactone  25 mg Oral Daily    phosphorus  250 mg Oral BID    pramipexole  1 mg Oral TID    aspirin  81 mg Oral Daily    clopidogrel  75 mg Oral Daily    ipratropium-albuterol  3 mL Inhalation Q4H WA    pantoprazole  40 mg Intravenous Daily    simvastatin  40 mg Oral Nightly    vancomycin  125 mg Oral 4 times per day    vitamin D  50,000 Units Oral Once per day on Mon Thu    lactobacillus  1 capsule Oral Daily    sodium chloride flush  10 mL Intravenous 2 times per day    enoxaparin  40 mg Subcutaneous Daily    lidocaine PF  5 mL Intradermal Once    sodium chloride flush  10 mL Intravenous 2 times per day     Continuous Infusions:   dextrose       PRN Meds:glucose, dextrose, glucagon (rDNA), dextrose, sodium chloride flush, magnesium hydroxide, ondansetron, sodium chloride flush  I/O last 3 completed shifts:   In: 360 [P.O.:360]  Out: -   I/O this shift:  In: 10 [I.V.:10]  Out: -     Intake/Output Summary (Last 24 hours) at 8/28/2019 1338  Last data filed at 8/28/2019 0929  Gross per 24 hour   Intake 370 ml   Output --   Net 370 ml     CBC:   Recent Labs     08/26/19  0510   WBC 10.0   HGB 10.0*        BMP:    Recent Labs     08/26/19 0510 08/28/19  0515   * 139   K 3.7 3.7   CL 91* 94*   CO2 33* 31   BUN 31* 15   CREATININE 0.8 0.8   GLUCOSE 155* 124*     Hepatic:   Recent Labs     08/26/19 0510 8/22/2019  PCP: THEODORE Mills NP    Interval History: I have personally performed face to face diagnostic evaluation on this patient. I have personally reviewed pertinent labs and imaging and agree with the care plan above. My additional findings are as follows:   Pt weak rhc today, resting in bed    Objective:   Vitals: BP (!) 120/58   Pulse 73   Temp 98.6 °F (37 °C) (Oral)   Resp 17   Ht 5' 6\" (1.676 m)   Wt (!) 465 lb 6.4 oz (211.1 kg)   SpO2 98%   BMI 75.12 kg/m²   Awake weak  Obese  edema    Assessment and Plan:  IMP:  As stated above    Plan     1 rhc today  2 keep negative  3 renal baseline  4 na stable  5 ssi  monitor           Electronically signed by Fiona Pardo MD on 8/28/2019 at 3:50 PM

## 2019-08-29 VITALS
BODY MASS INDEX: 47.09 KG/M2 | OXYGEN SATURATION: 99 % | RESPIRATION RATE: 16 BRPM | DIASTOLIC BLOOD PRESSURE: 72 MMHG | SYSTOLIC BLOOD PRESSURE: 178 MMHG | WEIGHT: 293 LBS | HEART RATE: 65 BPM | TEMPERATURE: 97.7 F | HEIGHT: 66 IN

## 2019-08-29 LAB
ALBUMIN SERPL-MCNC: 3.5 GM/DL (ref 3.4–5)
ANION GAP SERPL CALCULATED.3IONS-SCNC: 14 MMOL/L (ref 4–16)
BUN BLDV-MCNC: 10 MG/DL (ref 6–23)
CALCIUM SERPL-MCNC: 9.1 MG/DL (ref 8.3–10.6)
CHLORIDE BLD-SCNC: 94 MMOL/L (ref 99–110)
CO2: 30 MMOL/L (ref 21–32)
CREAT SERPL-MCNC: 0.7 MG/DL (ref 0.6–1.1)
GFR AFRICAN AMERICAN: >60 ML/MIN/1.73M2
GFR NON-AFRICAN AMERICAN: >60 ML/MIN/1.73M2
GLUCOSE BLD-MCNC: 125 MG/DL (ref 70–99)
GLUCOSE BLD-MCNC: 128 MG/DL (ref 70–99)
GLUCOSE BLD-MCNC: 129 MG/DL (ref 70–99)
GLUCOSE BLD-MCNC: 135 MG/DL (ref 70–99)
LACTATE: 0.7 MMOL/L (ref 0.4–2)
PHOSPHORUS: 3.5 MG/DL (ref 2.5–4.9)
POTASSIUM SERPL-SCNC: 3.6 MMOL/L (ref 3.5–5.1)
SODIUM BLD-SCNC: 138 MMOL/L (ref 135–145)

## 2019-08-29 PROCEDURE — 2700000000 HC OXYGEN THERAPY PER DAY

## 2019-08-29 PROCEDURE — 83605 ASSAY OF LACTIC ACID: CPT

## 2019-08-29 PROCEDURE — 2580000003 HC RX 258: Performed by: INTERNAL MEDICINE

## 2019-08-29 PROCEDURE — C9113 INJ PANTOPRAZOLE SODIUM, VIA: HCPCS | Performed by: INTERNAL MEDICINE

## 2019-08-29 PROCEDURE — 6370000000 HC RX 637 (ALT 250 FOR IP): Performed by: INTERNAL MEDICINE

## 2019-08-29 PROCEDURE — 84100 ASSAY OF PHOSPHORUS: CPT

## 2019-08-29 PROCEDURE — 94761 N-INVAS EAR/PLS OXIMETRY MLT: CPT

## 2019-08-29 PROCEDURE — 6360000002 HC RX W HCPCS: Performed by: INTERNAL MEDICINE

## 2019-08-29 PROCEDURE — 94660 CPAP INITIATION&MGMT: CPT

## 2019-08-29 PROCEDURE — 2580000003 HC RX 258: Performed by: EMERGENCY MEDICINE

## 2019-08-29 PROCEDURE — 94640 AIRWAY INHALATION TREATMENT: CPT

## 2019-08-29 PROCEDURE — G0378 HOSPITAL OBSERVATION PER HR: HCPCS

## 2019-08-29 PROCEDURE — 82962 GLUCOSE BLOOD TEST: CPT

## 2019-08-29 PROCEDURE — 96376 TX/PRO/DX INJ SAME DRUG ADON: CPT

## 2019-08-29 PROCEDURE — 80069 RENAL FUNCTION PANEL: CPT

## 2019-08-29 PROCEDURE — 99231 SBSQ HOSP IP/OBS SF/LOW 25: CPT | Performed by: INTERNAL MEDICINE

## 2019-08-29 PROCEDURE — 96372 THER/PROPH/DIAG INJ SC/IM: CPT

## 2019-08-29 RX ADMIN — SODIUM CHLORIDE, PRESERVATIVE FREE 10 ML: 5 INJECTION INTRAVENOUS at 09:39

## 2019-08-29 RX ADMIN — CARVEDILOL 3.12 MG: 3.12 TABLET, FILM COATED ORAL at 09:37

## 2019-08-29 RX ADMIN — PRAMIPEXOLE DIHYDROCHLORIDE 1 MG: 0.25 TABLET ORAL at 09:47

## 2019-08-29 RX ADMIN — ENOXAPARIN SODIUM 40 MG: 100 INJECTION SUBCUTANEOUS at 09:38

## 2019-08-29 RX ADMIN — SODIUM CHLORIDE, PRESERVATIVE FREE 10 ML: 5 INJECTION INTRAVENOUS at 09:38

## 2019-08-29 RX ADMIN — Medication 125 MG: at 11:59

## 2019-08-29 RX ADMIN — SPIRONOLACTONE 25 MG: 25 TABLET ORAL at 09:37

## 2019-08-29 RX ADMIN — Medication 125 MG: at 05:42

## 2019-08-29 RX ADMIN — Medication 1 CAPSULE: at 09:37

## 2019-08-29 RX ADMIN — PRAMIPEXOLE DIHYDROCHLORIDE 1 MG: 0.25 TABLET ORAL at 14:02

## 2019-08-29 RX ADMIN — DIBASIC SODIUM PHOSPHATE, MONOBASIC POTASSIUM PHOSPHATE AND MONOBASIC SODIUM PHOSPHATE 1 TABLET: 852; 155; 130 TABLET ORAL at 09:38

## 2019-08-29 RX ADMIN — PANTOPRAZOLE SODIUM 40 MG: 40 INJECTION, POWDER, FOR SOLUTION INTRAVENOUS at 09:38

## 2019-08-29 RX ADMIN — ASPIRIN 81 MG 81 MG: 81 TABLET ORAL at 09:37

## 2019-08-29 RX ADMIN — Medication 125 MG: at 00:39

## 2019-08-29 RX ADMIN — POTASSIUM CHLORIDE 10 MEQ: 750 TABLET, FILM COATED, EXTENDED RELEASE ORAL at 09:37

## 2019-08-29 RX ADMIN — CLOPIDOGREL BISULFATE 75 MG: 75 TABLET ORAL at 09:37

## 2019-08-29 RX ADMIN — IPRATROPIUM BROMIDE AND ALBUTEROL SULFATE 3 ML: .5; 3 SOLUTION RESPIRATORY (INHALATION) at 08:08

## 2019-08-29 NOTE — CARE COORDINATION
CM called Kristine at [facility]. Pt is able to go to SNF whenever medically cleared. CM will continue to follow.   1300 pt is discharged and transport has been set up for 3 pm. CM called Grace Cotter at [facility] and informed.   CM into see pt and inform. Pt shared that she will call her family. CM spoke with Lisa Gomez RN. CM will remain available for any needs or concerns.    Envelope in soft chart,

## 2019-08-29 NOTE — PROGRESS NOTES
Nephrology Progress Note  8/29/2019 12:36 PM  Subjective:   Admit Date: 8/22/2019  PCP: THEODORE Vital - NP     Interval History:  Sp RHC; no findings of pulmonary HTN. -patient is voicing no pressing health concerns today. Diet: DIET CARB CONTROL; Carb Control: 4 carb choices (60 gms)/meal     Data:   Scheduled Meds:   potassium chloride  10 mEq Oral BID WC    carvedilol  3.125 mg Oral BID    insulin lispro  0-18 Units Subcutaneous TID WC    insulin lispro  10 Units Subcutaneous TID WC    insulin lispro  0-18 Units Subcutaneous 2 times per day    insulin glargine  50 Units Subcutaneous Nightly    spironolactone  25 mg Oral Daily    phosphorus  250 mg Oral BID    pramipexole  1 mg Oral TID    aspirin  81 mg Oral Daily    clopidogrel  75 mg Oral Daily    ipratropium-albuterol  3 mL Inhalation Q4H WA    pantoprazole  40 mg Intravenous Daily    simvastatin  40 mg Oral Nightly    vancomycin  125 mg Oral 4 times per day    vitamin D  50,000 Units Oral Once per day on Mon Thu    lactobacillus  1 capsule Oral Daily    sodium chloride flush  10 mL Intravenous 2 times per day    enoxaparin  40 mg Subcutaneous Daily    lidocaine PF  5 mL Intradermal Once    sodium chloride flush  10 mL Intravenous 2 times per day     Continuous Infusions:   dextrose       PRN Meds:glucose, dextrose, glucagon (rDNA), dextrose, sodium chloride flush, magnesium hydroxide, ondansetron, sodium chloride flush  I/O last 3 completed shifts: In: 130 [P.O.:120; I.V.:10]  Out: -   No intake/output data recorded. Intake/Output Summary (Last 24 hours) at 8/29/2019 1236  Last data filed at 8/28/2019 2133  Gross per 24 hour   Intake 120 ml   Output --   Net 120 ml     CBC:   No results for input(s): WBC, HGB, PLT in the last 72 hours.   BMP:    Recent Labs     08/28/19  0515 08/29/19  0500    138   K 3.7 3.6   CL 94* 94*   CO2 31 30   BUN 15 10   CREATININE 0.8 0.7   GLUCOSE 124* 129*     Hepatic:   No results meq BID  -recheck chemistry panel before follow-up appointment with nephrology   6.   -on Lantus and SSI for diabetes management     Patient to be discharged today. Schedule post-hospital follow-up   In 1 - 2 weeks and complete lab work before appointment. Electronically signed by THEODORE Escobar CNP            Nephrology Attending Progress Note  8/29/2019 2:47 PM  Subjective:   Admit Date: 8/22/2019  PCP: THEODORE Cruz NP    Interval History: I have personally performed face to face diagnostic evaluation on this patient. I have personally reviewed pertinent labs and imaging and agree with the care plan above.  My additional findings are as follows:  Pt appear doing ok and not pulm htn, will monitor    Objective:   Vitals: BP (!) 178/72   Pulse 65   Temp 97.7 °F (36.5 °C) (Oral)   Resp 16   Ht 5' 6\" (1.676 m)   Wt (!) 465 lb 6.4 oz (211.1 kg)   SpO2 99%   BMI 75.12 kg/m²   Awake weak  Soft obese  Trace edema    Assessment and Plan:  IMP:  As stated above    Plan     1 bp increase today and adjust meds  2 volume appear stbale with RHC and keep negative  3 renal stable  4 ssi  5 K stable  Will monitor and rec rehab           Electronically signed by Bay Palomo MD on 8/29/2019 at 2:47 PM

## 2019-09-21 ENCOUNTER — HOSPITAL ENCOUNTER (INPATIENT)
Age: 64
LOS: 6 days | Discharge: HOME HEALTH | DRG: 720 | End: 2019-09-27
Attending: FAMILY MEDICINE | Admitting: INTERNAL MEDICINE
Payer: COMMERCIAL

## 2019-09-21 ENCOUNTER — APPOINTMENT (OUTPATIENT)
Dept: GENERAL RADIOLOGY | Age: 64
DRG: 720 | End: 2019-09-21
Payer: COMMERCIAL

## 2019-09-21 DIAGNOSIS — A41.9 SEPTICEMIA (HCC): Primary | ICD-10-CM

## 2019-09-21 LAB
ALBUMIN SERPL-MCNC: 2.9 GM/DL (ref 3.4–5)
ALP BLD-CCNC: 115 IU/L (ref 40–128)
ALT SERPL-CCNC: 8 U/L (ref 10–40)
ANION GAP SERPL CALCULATED.3IONS-SCNC: 13 MMOL/L (ref 4–16)
AST SERPL-CCNC: <5 IU/L (ref 15–37)
BANDED NEUTROPHILS ABSOLUTE COUNT: 5.12 K/CU MM
BANDED NEUTROPHILS RELATIVE PERCENT: 33 % (ref 5–11)
BASE EXCESS MIXED: ABNORMAL (ref 0–2.3)
BILIRUB SERPL-MCNC: 0.5 MG/DL (ref 0–1)
BUN BLDV-MCNC: 16 MG/DL (ref 6–23)
CALCIUM SERPL-MCNC: 8.5 MG/DL (ref 8.3–10.6)
CHLORIDE BLD-SCNC: 96 MMOL/L (ref 99–110)
CO2: 25 MMOL/L (ref 21–32)
COMMENT: ABNORMAL
CREAT SERPL-MCNC: 0.9 MG/DL (ref 0.6–1.1)
DIFFERENTIAL TYPE: ABNORMAL
GFR AFRICAN AMERICAN: >60 ML/MIN/1.73M2
GFR NON-AFRICAN AMERICAN: >60 ML/MIN/1.73M2
GLUCOSE BLD-MCNC: 225 MG/DL (ref 70–99)
HCO3 VENOUS: 25.4 MMOL/L (ref 19–25)
HCT VFR BLD CALC: 29.6 % (ref 37–47)
HEMOGLOBIN: 9.1 GM/DL (ref 12.5–16)
LACTATE: ABNORMAL MMOL/L (ref 0.4–2)
LYMPHOCYTES ABSOLUTE: 0.3 K/CU MM
LYMPHOCYTES RELATIVE PERCENT: 2 % (ref 24–44)
MCH RBC QN AUTO: 25.3 PG (ref 27–31)
MCHC RBC AUTO-ENTMCNC: 30.7 % (ref 32–36)
MCV RBC AUTO: 82.5 FL (ref 78–100)
MONOCYTES ABSOLUTE: 0.5 K/CU MM
MONOCYTES RELATIVE PERCENT: 3 % (ref 0–4)
O2 SAT, VEN: 90.5 % (ref 50–70)
PCO2, VEN: 43 MMHG (ref 38–52)
PDW BLD-RTO: 17.7 % (ref 11.7–14.9)
PH VENOUS: 7.38 (ref 7.32–7.42)
PLATELET # BLD: 287 K/CU MM (ref 140–440)
PMV BLD AUTO: 9.1 FL (ref 7.5–11.1)
PO2, VEN: 62 MMHG (ref 28–48)
POTASSIUM SERPL-SCNC: 3.2 MMOL/L (ref 3.5–5.1)
PRO-BNP: 2624 PG/ML
RAPID INFLUENZA  B AGN: NEGATIVE
RAPID INFLUENZA A AGN: NEGATIVE
RBC # BLD: 3.59 M/CU MM (ref 4.2–5.4)
SEGMENTED NEUTROPHILS ABSOLUTE COUNT: 9.6 K/CU MM
SEGMENTED NEUTROPHILS RELATIVE PERCENT: 62 % (ref 36–66)
SODIUM BLD-SCNC: 134 MMOL/L (ref 135–145)
TOTAL PROTEIN: 6.6 GM/DL (ref 6.4–8.2)
TROPONIN T: 0.35 NG/ML
WBC # BLD: 15.5 K/CU MM (ref 4–10.5)

## 2019-09-21 PROCEDURE — 96365 THER/PROPH/DIAG IV INF INIT: CPT

## 2019-09-21 PROCEDURE — 80053 COMPREHEN METABOLIC PANEL: CPT

## 2019-09-21 PROCEDURE — 85007 BL SMEAR W/DIFF WBC COUNT: CPT

## 2019-09-21 PROCEDURE — 94640 AIRWAY INHALATION TREATMENT: CPT

## 2019-09-21 PROCEDURE — 85027 COMPLETE CBC AUTOMATED: CPT

## 2019-09-21 PROCEDURE — 82533 TOTAL CORTISOL: CPT

## 2019-09-21 PROCEDURE — 6370000000 HC RX 637 (ALT 250 FOR IP): Performed by: FAMILY MEDICINE

## 2019-09-21 PROCEDURE — 2700000000 HC OXYGEN THERAPY PER DAY

## 2019-09-21 PROCEDURE — 94761 N-INVAS EAR/PLS OXIMETRY MLT: CPT

## 2019-09-21 PROCEDURE — 06HY33Z INSERTION OF INFUSION DEVICE INTO LOWER VEIN, PERCUTANEOUS APPROACH: ICD-10-PCS | Performed by: EMERGENCY MEDICINE

## 2019-09-21 PROCEDURE — 84484 ASSAY OF TROPONIN QUANT: CPT

## 2019-09-21 PROCEDURE — 83605 ASSAY OF LACTIC ACID: CPT

## 2019-09-21 PROCEDURE — 87804 INFLUENZA ASSAY W/OPTIC: CPT

## 2019-09-21 PROCEDURE — 94660 CPAP INITIATION&MGMT: CPT

## 2019-09-21 PROCEDURE — 82805 BLOOD GASES W/O2 SATURATION: CPT

## 2019-09-21 PROCEDURE — 2580000003 HC RX 258

## 2019-09-21 PROCEDURE — 81001 URINALYSIS AUTO W/SCOPE: CPT

## 2019-09-21 PROCEDURE — 96375 TX/PRO/DX INJ NEW DRUG ADDON: CPT

## 2019-09-21 PROCEDURE — 71045 X-RAY EXAM CHEST 1 VIEW: CPT

## 2019-09-21 PROCEDURE — 2000000000 HC ICU R&B

## 2019-09-21 PROCEDURE — 84145 PROCALCITONIN (PCT): CPT

## 2019-09-21 PROCEDURE — 87040 BLOOD CULTURE FOR BACTERIA: CPT

## 2019-09-21 PROCEDURE — 99285 EMERGENCY DEPT VISIT HI MDM: CPT

## 2019-09-21 PROCEDURE — 6360000002 HC RX W HCPCS: Performed by: FAMILY MEDICINE

## 2019-09-21 PROCEDURE — 83880 ASSAY OF NATRIURETIC PEPTIDE: CPT

## 2019-09-21 PROCEDURE — 93005 ELECTROCARDIOGRAM TRACING: CPT | Performed by: FAMILY MEDICINE

## 2019-09-21 PROCEDURE — 5A09357 ASSISTANCE WITH RESPIRATORY VENTILATION, LESS THAN 24 CONSECUTIVE HOURS, CONTINUOUS POSITIVE AIRWAY PRESSURE: ICD-10-PCS | Performed by: INTERNAL MEDICINE

## 2019-09-21 RX ORDER — IPRATROPIUM BROMIDE AND ALBUTEROL SULFATE 2.5; .5 MG/3ML; MG/3ML
3 SOLUTION RESPIRATORY (INHALATION) ONCE
Status: COMPLETED | OUTPATIENT
Start: 2019-09-21 | End: 2019-09-21

## 2019-09-21 RX ORDER — SODIUM CHLORIDE 0.9 % (FLUSH) 0.9 %
10 SYRINGE (ML) INJECTION EVERY 12 HOURS SCHEDULED
Status: DISCONTINUED | OUTPATIENT
Start: 2019-09-21 | End: 2019-09-26 | Stop reason: SDUPTHER

## 2019-09-21 RX ORDER — DEXAMETHASONE SODIUM PHOSPHATE 10 MG/ML
10 INJECTION, SOLUTION INTRAMUSCULAR; INTRAVENOUS ONCE
Status: COMPLETED | OUTPATIENT
Start: 2019-09-21 | End: 2019-09-22

## 2019-09-21 RX ORDER — ACETAMINOPHEN 500 MG
1000 TABLET ORAL ONCE
Status: COMPLETED | OUTPATIENT
Start: 2019-09-21 | End: 2019-09-21

## 2019-09-21 RX ORDER — 0.9 % SODIUM CHLORIDE 0.9 %
30 INTRAVENOUS SOLUTION INTRAVENOUS ONCE
Status: COMPLETED | OUTPATIENT
Start: 2019-09-21 | End: 2019-09-22

## 2019-09-21 RX ORDER — KETOROLAC TROMETHAMINE 30 MG/ML
30 INJECTION, SOLUTION INTRAMUSCULAR; INTRAVENOUS ONCE
Status: COMPLETED | OUTPATIENT
Start: 2019-09-21 | End: 2019-09-21

## 2019-09-21 RX ORDER — POTASSIUM CHLORIDE 20 MEQ/1
40 TABLET, EXTENDED RELEASE ORAL ONCE
Status: COMPLETED | OUTPATIENT
Start: 2019-09-21 | End: 2019-09-22

## 2019-09-21 RX ORDER — SODIUM CHLORIDE 9 MG/ML
INJECTION, SOLUTION INTRAVENOUS
Status: COMPLETED
Start: 2019-09-21 | End: 2019-09-22

## 2019-09-21 RX ORDER — 0.9 % SODIUM CHLORIDE 0.9 %
1000 INTRAVENOUS SOLUTION INTRAVENOUS ONCE
Status: DISCONTINUED | OUTPATIENT
Start: 2019-09-21 | End: 2019-09-21

## 2019-09-21 RX ORDER — SODIUM CHLORIDE 0.9 % (FLUSH) 0.9 %
10 SYRINGE (ML) INJECTION PRN
Status: DISCONTINUED | OUTPATIENT
Start: 2019-09-21 | End: 2019-09-26 | Stop reason: SDUPTHER

## 2019-09-21 RX ADMIN — KETOROLAC TROMETHAMINE 30 MG: 30 INJECTION, SOLUTION INTRAMUSCULAR at 21:27

## 2019-09-21 RX ADMIN — ACETAMINOPHEN 1000 MG: 500 TABLET ORAL at 20:07

## 2019-09-21 RX ADMIN — IPRATROPIUM BROMIDE AND ALBUTEROL SULFATE 3 AMPULE: .5; 3 SOLUTION RESPIRATORY (INHALATION) at 20:01

## 2019-09-21 RX ADMIN — Medication 1000 ML: at 21:45

## 2019-09-21 RX ADMIN — SODIUM CHLORIDE 1000 ML: 9 INJECTION, SOLUTION INTRAVENOUS at 21:45

## 2019-09-21 ASSESSMENT — PAIN SCALES - GENERAL
PAINLEVEL_OUTOF10: 10
PAINLEVEL_OUTOF10: 10

## 2019-09-21 NOTE — ED NOTES
Bed: 02TR-02  Expected date:   Expected time:   Means of arrival:   Comments:  M3: 53F Bariatric Resp Distress     Carolyn Roper RN  09/21/19 [id number]

## 2019-09-22 ENCOUNTER — APPOINTMENT (OUTPATIENT)
Dept: CT IMAGING | Age: 64
DRG: 720 | End: 2019-09-22
Payer: COMMERCIAL

## 2019-09-22 LAB
ANION GAP SERPL CALCULATED.3IONS-SCNC: 14 MMOL/L (ref 4–16)
BANDED NEUTROPHILS ABSOLUTE COUNT: 4.26 K/CU MM
BANDED NEUTROPHILS RELATIVE PERCENT: 21 % (ref 5–11)
BASOPHILS ABSOLUTE: 0.2 K/CU MM
BASOPHILS RELATIVE PERCENT: 1 % (ref 0–1)
BUN BLDV-MCNC: 18 MG/DL (ref 6–23)
CALCIUM SERPL-MCNC: 7.5 MG/DL (ref 8.3–10.6)
CHLORIDE BLD-SCNC: 98 MMOL/L (ref 99–110)
CO2: 24 MMOL/L (ref 21–32)
CORTISOL, PLASMA: 33.3
CREAT SERPL-MCNC: 1.2 MG/DL (ref 0.6–1.1)
DIFFERENTIAL TYPE: ABNORMAL
ESTIMATED AVERAGE GLUCOSE: 192 MG/DL
GFR AFRICAN AMERICAN: 55 ML/MIN/1.73M2
GFR NON-AFRICAN AMERICAN: 46 ML/MIN/1.73M2
GLUCOSE BLD-MCNC: 233 MG/DL (ref 70–99)
GLUCOSE BLD-MCNC: 270 MG/DL (ref 70–99)
GLUCOSE BLD-MCNC: 285 MG/DL (ref 70–99)
GLUCOSE BLD-MCNC: 287 MG/DL (ref 70–99)
GLUCOSE BLD-MCNC: 310 MG/DL (ref 70–99)
GLUCOSE BLD-MCNC: 311 MG/DL (ref 70–99)
HBA1C MFR BLD: 8.3 % (ref 4.2–6.3)
HCT VFR BLD CALC: 31.5 % (ref 37–47)
HEMOGLOBIN: 9.3 GM/DL (ref 12.5–16)
HIGH SENSITIVE C-REACTIVE PROTEIN: 364.2 MG/L
LACTIC ACID, SEPSIS: 1.6 MMOL/L (ref 0.5–1.9)
LACTIC ACID, SEPSIS: 2 MMOL/L (ref 0.5–1.9)
LEGIONELLA URINARY AG: NEGATIVE
LYMPHOCYTES ABSOLUTE: 1.2 K/CU MM
LYMPHOCYTES RELATIVE PERCENT: 6 % (ref 24–44)
MAGNESIUM: 1.6 MG/DL (ref 1.8–2.4)
MCH RBC QN AUTO: 24.9 PG (ref 27–31)
MCHC RBC AUTO-ENTMCNC: 29.5 % (ref 32–36)
MCV RBC AUTO: 84.2 FL (ref 78–100)
METAMYELOCYTES ABSOLUTE COUNT: 0.2 K/CU MM
METAMYELOCYTES PERCENT: 1 %
PDW BLD-RTO: 18 % (ref 11.7–14.9)
PHOSPHORUS: 4.4 MG/DL (ref 2.5–4.9)
PLATELET # BLD: 289 K/CU MM (ref 140–440)
PMV BLD AUTO: 9.4 FL (ref 7.5–11.1)
POTASSIUM SERPL-SCNC: 3.8 MMOL/L (ref 3.5–5.1)
PROCALCITONIN: 1.15
PROCALCITONIN: 2.22
RBC # BLD: 3.74 M/CU MM (ref 4.2–5.4)
SEGMENTED NEUTROPHILS ABSOLUTE COUNT: 14.4 K/CU MM
SEGMENTED NEUTROPHILS RELATIVE PERCENT: 71 % (ref 36–66)
SODIUM BLD-SCNC: 136 MMOL/L (ref 135–145)
STREP PNEUMONIAE ANTIGEN: NORMAL
TROPONIN T: 0.25 NG/ML
TROPONIN T: 0.29 NG/ML
WBC # BLD: 20.3 K/CU MM (ref 4–10.5)

## 2019-09-22 PROCEDURE — 6370000000 HC RX 637 (ALT 250 FOR IP): Performed by: INTERNAL MEDICINE

## 2019-09-22 PROCEDURE — 2000000000 HC ICU R&B

## 2019-09-22 PROCEDURE — 87150 DNA/RNA AMPLIFIED PROBE: CPT

## 2019-09-22 PROCEDURE — 87449 NOS EACH ORGANISM AG IA: CPT

## 2019-09-22 PROCEDURE — 2700000000 HC OXYGEN THERAPY PER DAY

## 2019-09-22 PROCEDURE — 84145 PROCALCITONIN (PCT): CPT

## 2019-09-22 PROCEDURE — 2580000003 HC RX 258: Performed by: INTERNAL MEDICINE

## 2019-09-22 PROCEDURE — 6360000002 HC RX W HCPCS: Performed by: INTERNAL MEDICINE

## 2019-09-22 PROCEDURE — 6370000000 HC RX 637 (ALT 250 FOR IP): Performed by: NURSE PRACTITIONER

## 2019-09-22 PROCEDURE — 94761 N-INVAS EAR/PLS OXIMETRY MLT: CPT

## 2019-09-22 PROCEDURE — 96366 THER/PROPH/DIAG IV INF ADDON: CPT

## 2019-09-22 PROCEDURE — 85007 BL SMEAR W/DIFF WBC COUNT: CPT

## 2019-09-22 PROCEDURE — 80048 BASIC METABOLIC PNL TOTAL CA: CPT

## 2019-09-22 PROCEDURE — 87186 SC STD MICRODIL/AGAR DIL: CPT

## 2019-09-22 PROCEDURE — 83605 ASSAY OF LACTIC ACID: CPT

## 2019-09-22 PROCEDURE — 6370000000 HC RX 637 (ALT 250 FOR IP): Performed by: FAMILY MEDICINE

## 2019-09-22 PROCEDURE — 83036 HEMOGLOBIN GLYCOSYLATED A1C: CPT

## 2019-09-22 PROCEDURE — 86141 C-REACTIVE PROTEIN HS: CPT

## 2019-09-22 PROCEDURE — 2500000003 HC RX 250 WO HCPCS: Performed by: INTERNAL MEDICINE

## 2019-09-22 PROCEDURE — 96375 TX/PRO/DX INJ NEW DRUG ADDON: CPT

## 2019-09-22 PROCEDURE — 36592 COLLECT BLOOD FROM PICC: CPT

## 2019-09-22 PROCEDURE — 94640 AIRWAY INHALATION TREATMENT: CPT

## 2019-09-22 PROCEDURE — 83735 ASSAY OF MAGNESIUM: CPT

## 2019-09-22 PROCEDURE — 94660 CPAP INITIATION&MGMT: CPT

## 2019-09-22 PROCEDURE — 87899 AGENT NOS ASSAY W/OPTIC: CPT

## 2019-09-22 PROCEDURE — 85027 COMPLETE CBC AUTOMATED: CPT

## 2019-09-22 PROCEDURE — 84100 ASSAY OF PHOSPHORUS: CPT

## 2019-09-22 PROCEDURE — 87040 BLOOD CULTURE FOR BACTERIA: CPT

## 2019-09-22 PROCEDURE — 96368 THER/DIAG CONCURRENT INF: CPT

## 2019-09-22 PROCEDURE — 2580000003 HC RX 258

## 2019-09-22 PROCEDURE — 86738 MYCOPLASMA ANTIBODY: CPT

## 2019-09-22 PROCEDURE — 6360000002 HC RX W HCPCS: Performed by: FAMILY MEDICINE

## 2019-09-22 PROCEDURE — 84484 ASSAY OF TROPONIN QUANT: CPT

## 2019-09-22 PROCEDURE — 99254 IP/OBS CNSLTJ NEW/EST MOD 60: CPT | Performed by: INTERNAL MEDICINE

## 2019-09-22 PROCEDURE — 93005 ELECTROCARDIOGRAM TRACING: CPT | Performed by: INTERNAL MEDICINE

## 2019-09-22 PROCEDURE — 82962 GLUCOSE BLOOD TEST: CPT

## 2019-09-22 RX ORDER — SODIUM CHLORIDE 9 MG/ML
INJECTION, SOLUTION INTRAVENOUS
Status: COMPLETED
Start: 2019-09-22 | End: 2019-09-22

## 2019-09-22 RX ORDER — ALBUTEROL SULFATE 90 UG/1
2 AEROSOL, METERED RESPIRATORY (INHALATION) EVERY 4 HOURS PRN
Status: DISCONTINUED | OUTPATIENT
Start: 2019-09-22 | End: 2019-09-27 | Stop reason: HOSPADM

## 2019-09-22 RX ORDER — ASPIRIN 81 MG/1
81 TABLET ORAL DAILY
Status: DISCONTINUED | OUTPATIENT
Start: 2019-09-22 | End: 2019-09-27 | Stop reason: HOSPADM

## 2019-09-22 RX ORDER — DEXTROSE MONOHYDRATE 25 G/50ML
12.5 INJECTION, SOLUTION INTRAVENOUS PRN
Status: DISCONTINUED | OUTPATIENT
Start: 2019-09-22 | End: 2019-09-27 | Stop reason: HOSPADM

## 2019-09-22 RX ORDER — PANTOPRAZOLE SODIUM 40 MG/1
40 TABLET, DELAYED RELEASE ORAL DAILY
Status: DISCONTINUED | OUTPATIENT
Start: 2019-09-22 | End: 2019-09-27 | Stop reason: HOSPADM

## 2019-09-22 RX ORDER — SODIUM CHLORIDE, SODIUM LACTATE, POTASSIUM CHLORIDE, CALCIUM CHLORIDE 600; 310; 30; 20 MG/100ML; MG/100ML; MG/100ML; MG/100ML
1000 INJECTION, SOLUTION INTRAVENOUS CONTINUOUS
Status: DISCONTINUED | OUTPATIENT
Start: 2019-09-22 | End: 2019-09-25

## 2019-09-22 RX ORDER — SIMVASTATIN 40 MG
40 TABLET ORAL NIGHTLY
Status: DISCONTINUED | OUTPATIENT
Start: 2019-09-22 | End: 2019-09-22

## 2019-09-22 RX ORDER — INSULIN GLARGINE 100 [IU]/ML
30 INJECTION, SOLUTION SUBCUTANEOUS NIGHTLY
Status: DISCONTINUED | OUTPATIENT
Start: 2019-09-22 | End: 2019-09-27 | Stop reason: HOSPADM

## 2019-09-22 RX ORDER — CLOPIDOGREL BISULFATE 75 MG/1
75 TABLET ORAL DAILY
Status: DISCONTINUED | OUTPATIENT
Start: 2019-09-22 | End: 2019-09-27 | Stop reason: HOSPADM

## 2019-09-22 RX ORDER — TRAMADOL HYDROCHLORIDE 50 MG/1
100 TABLET ORAL EVERY 6 HOURS PRN
Status: DISCONTINUED | OUTPATIENT
Start: 2019-09-22 | End: 2019-09-27 | Stop reason: HOSPADM

## 2019-09-22 RX ORDER — LEVOFLOXACIN 5 MG/ML
500 INJECTION, SOLUTION INTRAVENOUS EVERY 24 HOURS
Status: DISCONTINUED | OUTPATIENT
Start: 2019-09-22 | End: 2019-09-25

## 2019-09-22 RX ORDER — MIDODRINE HYDROCHLORIDE 5 MG/1
5 TABLET ORAL
Status: DISCONTINUED | OUTPATIENT
Start: 2019-09-22 | End: 2019-09-24

## 2019-09-22 RX ORDER — OXYCODONE HYDROCHLORIDE AND ACETAMINOPHEN 5; 325 MG/1; MG/1
1 TABLET ORAL EVERY 4 HOURS PRN
Status: DISCONTINUED | OUTPATIENT
Start: 2019-09-22 | End: 2019-09-27 | Stop reason: HOSPADM

## 2019-09-22 RX ORDER — MAGNESIUM SULFATE IN WATER 40 MG/ML
2 INJECTION, SOLUTION INTRAVENOUS ONCE
Status: COMPLETED | OUTPATIENT
Start: 2019-09-22 | End: 2019-09-22

## 2019-09-22 RX ORDER — IPRATROPIUM BROMIDE AND ALBUTEROL SULFATE 2.5; .5 MG/3ML; MG/3ML
3 SOLUTION RESPIRATORY (INHALATION) 4 TIMES DAILY
Status: DISCONTINUED | OUTPATIENT
Start: 2019-09-22 | End: 2019-09-27 | Stop reason: HOSPADM

## 2019-09-22 RX ORDER — ATORVASTATIN CALCIUM 40 MG/1
80 TABLET, FILM COATED ORAL NIGHTLY
Status: DISCONTINUED | OUTPATIENT
Start: 2019-09-22 | End: 2019-09-27 | Stop reason: HOSPADM

## 2019-09-22 RX ORDER — DEXTROSE MONOHYDRATE 50 MG/ML
100 INJECTION, SOLUTION INTRAVENOUS PRN
Status: DISCONTINUED | OUTPATIENT
Start: 2019-09-22 | End: 2019-09-27 | Stop reason: HOSPADM

## 2019-09-22 RX ORDER — NICOTINE POLACRILEX 4 MG
15 LOZENGE BUCCAL PRN
Status: DISCONTINUED | OUTPATIENT
Start: 2019-09-22 | End: 2019-09-27 | Stop reason: HOSPADM

## 2019-09-22 RX ADMIN — OXYCODONE HYDROCHLORIDE AND ACETAMINOPHEN 1 TABLET: 5; 325 TABLET ORAL at 08:46

## 2019-09-22 RX ADMIN — SODIUM CHLORIDE 2000 ML: 9 INJECTION, SOLUTION INTRAVENOUS at 00:41

## 2019-09-22 RX ADMIN — SODIUM CHLORIDE, POTASSIUM CHLORIDE, SODIUM LACTATE AND CALCIUM CHLORIDE 1000 ML: 600; 310; 30; 20 INJECTION, SOLUTION INTRAVENOUS at 02:01

## 2019-09-22 RX ADMIN — LEVOFLOXACIN 500 MG: 5 INJECTION, SOLUTION INTRAVENOUS at 10:15

## 2019-09-22 RX ADMIN — SODIUM CHLORIDE, POTASSIUM CHLORIDE, SODIUM LACTATE AND CALCIUM CHLORIDE 1000 ML: 600; 310; 30; 20 INJECTION, SOLUTION INTRAVENOUS at 14:38

## 2019-09-22 RX ADMIN — METOPROLOL TARTRATE 25 MG: 25 TABLET ORAL at 10:15

## 2019-09-22 RX ADMIN — Medication 10 ML: at 02:04

## 2019-09-22 RX ADMIN — HYDROMORPHONE HYDROCHLORIDE 1 MG: 1 INJECTION, SOLUTION INTRAMUSCULAR; INTRAVENOUS; SUBCUTANEOUS at 21:23

## 2019-09-22 RX ADMIN — PANTOPRAZOLE SODIUM 40 MG: 40 TABLET, DELAYED RELEASE ORAL at 08:46

## 2019-09-22 RX ADMIN — Medication 10 ML: at 11:22

## 2019-09-22 RX ADMIN — POTASSIUM CHLORIDE 40 MEQ: 20 TABLET, EXTENDED RELEASE ORAL at 00:55

## 2019-09-22 RX ADMIN — Medication 10 ML: at 21:25

## 2019-09-22 RX ADMIN — CEFEPIME HYDROCHLORIDE 2 G: 2 INJECTION, POWDER, FOR SOLUTION INTRAVENOUS at 00:30

## 2019-09-22 RX ADMIN — MICONAZOLE NITRATE: 20 POWDER TOPICAL at 08:49

## 2019-09-22 RX ADMIN — PIPERACILLIN AND TAZOBACTAM 3.38 G: 3; .375 INJECTION, POWDER, LYOPHILIZED, FOR SOLUTION INTRAVENOUS at 18:11

## 2019-09-22 RX ADMIN — VANCOMYCIN HYDROCHLORIDE 2000 MG: 1 INJECTION, POWDER, LYOPHILIZED, FOR SOLUTION INTRAVENOUS at 02:36

## 2019-09-22 RX ADMIN — IPRATROPIUM BROMIDE AND ALBUTEROL SULFATE 3 ML: .5; 3 SOLUTION RESPIRATORY (INHALATION) at 20:07

## 2019-09-22 RX ADMIN — MAGNESIUM SULFATE HEPTAHYDRATE 2 G: 40 INJECTION, SOLUTION INTRAVENOUS at 10:20

## 2019-09-22 RX ADMIN — HYDROCORTISONE SODIUM SUCCINATE 100 MG: 100 INJECTION, POWDER, FOR SOLUTION INTRAMUSCULAR; INTRAVENOUS at 00:31

## 2019-09-22 RX ADMIN — ATORVASTATIN CALCIUM 80 MG: 40 TABLET, FILM COATED ORAL at 21:22

## 2019-09-22 RX ADMIN — MIDODRINE HYDROCHLORIDE 5 MG: 5 TABLET ORAL at 17:32

## 2019-09-22 RX ADMIN — HYDROMORPHONE HYDROCHLORIDE 1 MG: 1 INJECTION, SOLUTION INTRAMUSCULAR; INTRAVENOUS; SUBCUTANEOUS at 10:16

## 2019-09-22 RX ADMIN — Medication 2000 ML: at 00:41

## 2019-09-22 RX ADMIN — LINAGLIPTIN 5 MG: 5 TABLET, FILM COATED ORAL at 10:15

## 2019-09-22 RX ADMIN — HYDROMORPHONE HYDROCHLORIDE 1 MG: 1 INJECTION, SOLUTION INTRAMUSCULAR; INTRAVENOUS; SUBCUTANEOUS at 15:27

## 2019-09-22 RX ADMIN — INSULIN LISPRO 2 UNITS: 100 INJECTION, SOLUTION INTRAVENOUS; SUBCUTANEOUS at 01:46

## 2019-09-22 RX ADMIN — IPRATROPIUM BROMIDE AND ALBUTEROL SULFATE 3 ML: .5; 3 SOLUTION RESPIRATORY (INHALATION) at 12:03

## 2019-09-22 RX ADMIN — INSULIN GLARGINE 30 UNITS: 100 INJECTION, SOLUTION SUBCUTANEOUS at 21:23

## 2019-09-22 RX ADMIN — ENOXAPARIN SODIUM 120 MG: 120 INJECTION SUBCUTANEOUS at 21:22

## 2019-09-22 RX ADMIN — VANCOMYCIN HYDROCHLORIDE 2000 MG: 1 INJECTION, POWDER, LYOPHILIZED, FOR SOLUTION INTRAVENOUS at 14:32

## 2019-09-22 RX ADMIN — Medication 10 ML: at 11:21

## 2019-09-22 RX ADMIN — MICONAZOLE NITRATE: 20 POWDER TOPICAL at 21:29

## 2019-09-22 RX ADMIN — ASPIRIN 81 MG: 81 TABLET, COATED ORAL at 08:46

## 2019-09-22 RX ADMIN — ENOXAPARIN SODIUM 120 MG: 120 INJECTION SUBCUTANEOUS at 11:21

## 2019-09-22 RX ADMIN — Medication 10 ML: at 15:28

## 2019-09-22 RX ADMIN — PIPERACILLIN AND TAZOBACTAM 3.38 G: 3; .375 INJECTION, POWDER, LYOPHILIZED, FOR SOLUTION INTRAVENOUS at 10:30

## 2019-09-22 RX ADMIN — ENOXAPARIN SODIUM 40 MG: 40 INJECTION SUBCUTANEOUS at 08:46

## 2019-09-22 RX ADMIN — Medication 10 ML: at 12:43

## 2019-09-22 RX ADMIN — Medication 10 ML: at 08:47

## 2019-09-22 RX ADMIN — Medication 10 ML: at 14:38

## 2019-09-22 RX ADMIN — MIDODRINE HYDROCHLORIDE 5 MG: 5 TABLET ORAL at 12:42

## 2019-09-22 RX ADMIN — Medication 10 ML: at 12:46

## 2019-09-22 RX ADMIN — Medication 10 MCG/MIN: at 00:44

## 2019-09-22 RX ADMIN — CLOPIDOGREL BISULFATE 75 MG: 75 TABLET ORAL at 08:46

## 2019-09-22 RX ADMIN — IPRATROPIUM BROMIDE AND ALBUTEROL SULFATE 3 ML: .5; 3 SOLUTION RESPIRATORY (INHALATION) at 08:14

## 2019-09-22 RX ADMIN — DEXAMETHASONE SODIUM PHOSPHATE 10 MG: 10 INJECTION, SOLUTION INTRAMUSCULAR; INTRAVENOUS at 00:31

## 2019-09-22 ASSESSMENT — PAIN DESCRIPTION - PAIN TYPE
TYPE: CHRONIC PAIN
TYPE: ACUTE PAIN

## 2019-09-22 ASSESSMENT — PAIN DESCRIPTION - LOCATION
LOCATION: SACRUM
LOCATION: SACRUM
LOCATION: BUTTOCKS
LOCATION: BUTTOCKS

## 2019-09-22 ASSESSMENT — PAIN SCALES - GENERAL
PAINLEVEL_OUTOF10: 10
PAINLEVEL_OUTOF10: 7
PAINLEVEL_OUTOF10: 4
PAINLEVEL_OUTOF10: 10
PAINLEVEL_OUTOF10: 10
PAINLEVEL_OUTOF10: 0
PAINLEVEL_OUTOF10: 3
PAINLEVEL_OUTOF10: 10

## 2019-09-22 ASSESSMENT — PAIN DESCRIPTION - DESCRIPTORS: DESCRIPTORS: ACHING;DISCOMFORT

## 2019-09-22 ASSESSMENT — PAIN DESCRIPTION - ORIENTATION: ORIENTATION: MID

## 2019-09-22 NOTE — ED NOTES
23: [address] returned call -- parked call @ [phone] -- notified Dr Sabrina Massey  09/21/19 [id number]

## 2019-09-22 NOTE — H&P
History and Physical      Name:  Tabatha Tafoya /Age/Sex:   (60 y.o. female)   MRN & CSN:  [id number] & [id number] Admission Date/Time: 2019  7:13 PM   [facility] PCP: Cedric Juarez 15 Day: 2    Assessment and Plan:   Tabatha Tafoya is a 60 y.o.  female  with multiple comorbidities, diabetes mellitus type 2, severe obesity, severe aortic stenosis status post TAVR, fatty liver, obstructive sleep apnea on BiPAP, restless leg syndrome, C. difficile colitis 2019 was hospitalized [facility] 2019 had shock C. difficile colitis and sepsis discharged to skilled nursing facility. Patient was discharged home 1 week ago. She presented with shortness of breath.    -Shock, probably septic. Patient had fever, tachycardia and leukocytosis, unclear source. Chest x-ray clear. Blood cultures and urine culture pending. Respiratory culture pending.   Respiratory culture pending  -Recent C. difficile colitis 2019 completed p.o. vancomycin therapy  -Hypokalemia; replete  - Elevated troponin; likely demand ischemia   -Severe obesity, weight 465 pounds  -Obstructive sleep apnea on BiPAP  -Diabetes mellitus type 2  -Status post TAVR     Plan  Admit to ICU, inpatient status  Give additional normal saline 500 mL bolus followed by lactated Ringer's at 100 mL/h  Empiric antibiotic coverage Vancomycin and cefepime  Follow blood cultures urine culture and respiratory culture  Serial lactic acid  Start levophed goal mean arterial pressure above 65 mmHg  Consult cardiology for elevated troponin; defer anticoagulation to cardiology      Diet Diet NPO Effective Now Exceptions are: Sips with Meds   DVT Prophylaxis [x] Lovenox, []  Heparin, [] SCDs, [] Ambulation   GI Prophylaxis [x] PPI,  [] H2 Blocker,  [] Carafate,  [] Diet/Tube Feeds   Code Status Full Code   Disposition Patient requires continued admission due to shock   MDM [] peripheral edema. GI Abdomen is soft without significant tenderness, masses, or guarding. Bowel sounds are normoactive. Rectal exam deferred.  No costovertebral angle tenderness. Mata catheter is present. HEME/LYMPH No palpable cervical lymphadenopathy and no hepatosplenomegaly. No petechiae or ecchymoses. MSK No gross joint deformities. SKIN Normal coloration, warm, dry. NEURO Cranial nerves appear grossly intact, normal speech, no lateralizing weakness. PSYCH Awake, alert, oriented x 4. Affect appropriate. Past Medical History:      Past Medical History:   Diagnosis Date    Acute kidney injury ([facility] 2019    Aortic stenosis     Asthma     Bacteremia due to group B Streptococcus     [facility] in 2017 - felt to be due to cellulitis    Cancer [facility])     Cervical    Carotid artery stenosis     Chest pain     CHF [facility]congestive heart failure) (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)     BMI=73.72 kg/m2    Neuropathy     NSTEMI (non-ST elevated myocardial infarction) ([facility] 2018    Obesity     Osteoarthritis     Palpitations     Peripheral edema     Restless legs syndrome     Sleep apnea     Has a CPAP    Sleep apnea     SOB (shortness of breath)      PSHX:  has a past surgical history that includes Cholecystectomy;  section; Hysterectomy; Tubal ligation;  Neck surgery; Gallbladder surgery; and Aortic valve replacement (2017). Allergies: No Known Allergies    FAM HX: family history includes Coronary Art Dis in her mother; Diabetes in her mother; Heart Attack in her mother; Heart Disease in her brother, father, and mother; Heart Failure in her brother, father, and mother; High Blood Pressure in her brother, father, and mother; Hypertension in her mother; Obesity in her brother, father, and mother.   Soc HX:   Social History     Socioeconomic History    Marital status:      Spouse name: None    Number of children: None    Years of education: None    Highest education level: None   Occupational History    None   Social Needs    Financial resource strain: None    Food insecurity:     Worry: None     Inability: None    Transportation needs:     Medical: None     Non-medical: None   Tobacco Use    Smoking status: Former Smoker     Types: Cigarettes     Last attempt to quit: 3/19/2003     Years since quittin.5    Smokeless tobacco: Never Used    Tobacco comment: quit 15 years ago   Substance and Sexual Activity    Alcohol use: No    Drug use: No    Sexual activity: Never   Lifestyle    Physical activity:     Days per week: None     Minutes per session: None    Stress: None   Relationships    Social connections:     Talks on phone: None     Gets together: None     Attends Mandaeism service: None     Active member of club or organization: None     Attends meetings of clubs or organizations: None     Relationship status: None    Intimate partner violence:     Fear of current or ex partner: None     Emotionally abused: None     Physically abused: None     Forced sexual activity: None   Other Topics Concern    None   Social History Narrative    ** Merged History Encounter **            Medications:   Medications:    insulin lispro  0-6 Units Subcutaneous Q4H    insulin lispro  0-6 Units Subcutaneous TID WC    insulin lispro  0-3 Units Subcutaneous Nightly    [COMPLETED] sodium chloride  30 mL/kg

## 2019-09-22 NOTE — CARE COORDINATION
CM review of pt chart for readmission risk, last admission 8/22-29/2019 SOB, Pt discharged to [facility] for rehab. Pt has [employer]. Into speak with pt and Nav/spouse at bedside, pt on bipap unable to talk, spoke with Tyrone Wheeler who states pt was discharge from [facility] one week ago states she came home and was doing very well walking with walker, pt has a w/c to use when needed, able to move around the house cleaning and cooking then this morning did not feel well and had increased SOB and weakness. Had low grade fever and increase need for o2, Tyrone Wheeler states he increased O2 from 2 to 3L today but pt did not improve. Pt was not able to express her wishes for a discharge plan at this time, hm with [facility] vs return to SNF. Tyrone Wheeler states pt was to be discharged from [facility] with [facility] but no agency has contacted pt. Tyrone Wheeler states pt is in need of a w/c ramp, wanting to know if this can be gotten through insurance or somewhere for free as they can not pay for one out of their own pocket. This CM unable to answer this question and at this hour of the night 2130, unable to contact anyone for information.     CM to follow for needs, explained CM to follow to discuss discharge plan of [facility] vs return to SNF. LARISA,RN/CM

## 2019-09-22 NOTE — PROGRESS NOTES
Dr. Wiliam Zavala at bedside to discuss plan of care with patient. No plans for angio at this time, new diet orders per Dr. Wiliam Zavala. All questions answered by physician at this time.

## 2019-09-22 NOTE — PROGRESS NOTES
Attempted to get CT scan of chest.  Patient was put on table, but was unable to fit into the scanner.   Discontinuing order

## 2019-09-22 NOTE — PROGRESS NOTES
Patient arrived to the unit and was transferred to the unit bed, skin assessment was done, she has multiple wound sites and bruising, she has CVC in the right femoral, IV in the right forearm, on NC when she arrived, able to answer questions, washed her up and removed the excess items from under the patient,  at bedside

## 2019-09-22 NOTE — PROGRESS NOTES
Value Date    LABA1C 8.3 09/22/2019     CARDIAC ENZYMES  No results for input(s): CKTOTAL, CKMB, CKMBINDEX, TROPONINI in the last 72 hours. Troponin:   Recent Labs     09/21/19  2105 09/22/19  0455 09/22/19  1055   TROPONINT 0.347* 0.292* 0.248*     BNP:   Recent Labs     09/21/19  2105   PROBNP 2,624*     U/A:    Lab Results   Component Value Date    COLORU YELLOW 08/23/2019    WBCUA 47 08/23/2019    RBCUA 7 08/23/2019    MUCUS RARE 08/23/2019    BACTERIA NEGATIVE 08/23/2019    CLARITYU SLIGHTLY CLOUDY 08/23/2019    SPECGRAV 1.015 08/23/2019    LEUKOCYTESUR MODERATE 08/23/2019    BLOODU MODERATE 08/23/2019       Ct Abdomen Pelvis Wo Contrast Additional Contrast? None    Result Date: 8/24/2019  EXAMINATION: CT OF THE ABDOMEN AND PELVIS WITHOUT CONTRAST, 8/23/2019 9:44 pm TECHNIQUE: CT of the abdomen and pelvis was performed without the administration of intravenous contrast. Multiplanar reformatted images are provided for review. Dose modulation, iterative reconstruction, and/or weight based adjustment of the mA/kV was utilized to reduce the radiation dose to as low as reasonably achievable. COMPARISON: None HISTORY: ORDERING SYSTEM PROVIDED HISTORY: Diarrhea, septic shock TECHNOLOGIST PROVIDED HISTORY: Additional Contrast?->None Reason for Exam: Diarrhea, septic shock, wt 475# Acuity: Unknown Type of Exam: Initial Additional signs and symptoms: None Relevant Medical/Surgical History: Hx/ AVR, marycruz, hyster, CHF FINDINGS: Lower Chest: Bibasilar atelectasis is seen. Organs: There has been a cholecystectomy. The liver, spleen, adrenal glands, pancreas, and kidneys are grossly unremarkable. GI/Bowel: There is no evidence of bowel obstruction. Appendix is normal. Colonic diverticulosis is seen. Pelvis: A Mata catheter is seen within a collapsed bladder. Rectal tube is noted. There is no free fluid within the pelvis. Peritoneum/Retroperitoneum: There is no evidence of free air or free fluid.  There is no

## 2019-09-22 NOTE — ED PROVIDER NOTES
 Heart Attack Mother     Hypertension Mother     Coronary Art Dis Mother         Had PTCA w/stenting.     Heart Failure Father     Heart Failure Brother     Heart Disease Mother     High Blood Pressure Mother     Obesity Mother     Diabetes Mother     Heart Disease Father     High Blood Pressure Father     Obesity Father     Heart Disease Brother     High Blood Pressure Brother     Obesity Brother      Social History     Socioeconomic History    Marital status:      Spouse name: Not on file    Number of children: Not on file    Years of education: Not on file    Highest education level: Not on file   Occupational History    Not on file   Social Needs    Financial resource strain: Not on file    Food insecurity:     Worry: Not on file     Inability: Not on file    Transportation needs:     Medical: Not on file     Non-medical: Not on file   Tobacco Use    Smoking status: Former Smoker     Types: Cigarettes     Last attempt to quit: 3/19/2003     Years since quittin.5    Smokeless tobacco: Never Used    Tobacco comment: quit 15 years ago   Substance and Sexual Activity    Alcohol use: No    Drug use: No    Sexual activity: Never   Lifestyle    Physical activity:     Days per week: Not on file     Minutes per session: Not on file    Stress: Not on file   Relationships    Social connections:     Talks on phone: Not on file     Gets together: Not on file     Attends Denominational service: Not on file     Active member of club or organization: Not on file     Attends meetings of clubs or organizations: Not on file     Relationship status: Not on file    Intimate partner violence:     Fear of current or ex partner: Not on file     Emotionally abused: Not on file     Physically abused: Not on file     Forced sexual activity: Not on file   Other Topics Concern    Not on file   Social History Narrative    ** Merged History Encounter **          Current Facility-Administered Medications   Medication Dose Route Frequency Provider Last Rate Last Dose    ondansetron (ZOFRAN) injection 4 mg  4 mg Intravenous Q6H PRN Doyle Myers MD   4 mg at 09/23/19 1203    pramipexole (MIRAPEX) tablet 1 mg  1 mg Oral TID Doyle Myers MD   1 mg at 09/24/19 0808    albuterol sulfate  (90 Base) MCG/ACT inhaler 2 puff  2 puff Inhalation Q4H PRN Zuleyma Rizvi MD        aspirin EC tablet 81 mg  81 mg Oral Daily Zuleyma Rizvi MD   81 mg at 09/24/19 1265    clopidogrel (PLAVIX) tablet 75 mg  75 mg Oral Daily Zuleyma Rizvi MD   75 mg at 09/24/19 0808    ipratropium-albuterol (DUONEB) nebulizer solution 3 mL  3 mL Inhalation 4x daily Zuleyma Rizvi MD   3 mL at 09/24/19 0735    miconazole (MICOTIN) 2 % powder   Topical BID Zuleyma Rizvi MD        pantoprazole (PROTONIX) tablet 40 mg  40 mg Oral Daily Zuleyma Rizvi MD   40 mg at 09/24/19 6922    lactated ringers infusion 1,000 mL  1,000 mL Intravenous Continuous Zuleyma Rizvi  mL/hr at 09/23/19 2346 1,000 mL at 09/23/19 2346    glucose (GLUTOSE) 40 % oral gel 15 g  15 g Oral PRN Zuleyma Rizvi MD        dextrose 50 % IV solution  12.5 g Intravenous PRN Zuleyma Rizvi MD        glucagon (rDNA) injection 1 mg  1 mg Intramuscular PRN Zuleyma Rizvi MD        dextrose 5 % solution  100 mL/hr Intravenous PRN Zuleyma Rizvi MD        insulin lispro (HUMALOG) injection vial 0-6 Units  0-6 Units Subcutaneous TID WC Zuleyma Rizvi MD   1 Units at 09/23/19 1818    influenza quadrivalent split vaccine (FLUZONE;FLUARIX;FLULAVAL;AFLURIA) injection 0.5 mL  0.5 mL Intramuscular Once Zuleyma Rizvi MD        insulin lispro (HUMALOG) injection vial 0-3 Units  0-3 Units Subcutaneous Nightly THEODORE Choudhury - CNP   2 Units at 09/22/19 2123    oxyCODONE-acetaminophen (PERCOCET) 5-325 MG per tablet 1 tablet  1 tablet Oral Q4H PRN THEODORE Choudhury - CNP   1 tablet at 09/22/19 0846    enoxaparin (LOVENOX) injection 120 97 %      Weight 09/21/19 1914 (!) 465 lb (210.9 kg)      Height 09/21/19 1914 5' 6\" (1.676 m)      Head Circumference --       Peak Flow --       Pain Score --       Pain Loc --       Pain Edu? --       Excl. in [address]? --        My pulse ox interpretation is - normal    General appearance:  No acute distress. Skin:  Warm. Dry. No petechiae or purpura. Eye:  Extraocular movements intact. PERRLA  Ears, nose, mouth and throat:  Oral mucosa moist, no trismus. Tympanic membranes bilaterally normal.  Oropharynx with no exudate or erythema. Neck:  Trachea midline. Supple. No cervical lymphadenopathy  Extremity:  No swelling. Normal ROM. No calf pain or asymmetric swelling. No lower extremity edema  Heart:  Regular rate and rhythm, normal S1 & S2, no extra heart sounds. Perfusion:  Intact, capillary refill less than 2 seconds  Respiratory:  Lungs clear to auscultation bilaterally. Respirations nonlabored. Abdominal:  Normal bowel sounds. Soft. No peritoneal signs. No hepatosplenomegaly. Back:  No CVA tenderness to palpation. No bruising. No CTL tenderness to palpation or step-off  Neurological:  Alert and oriented times 3. No focal neuro deficits. Cranial nerves II through XII are grossly intact.           Psychiatric:  Appropriate    I have reviewed and interpreted all of the currently available lab results from this visit (if applicable):  Results for orders placed or performed during the hospital encounter of 09/21/19   Culture blood 1   Result Value Ref Range    Specimen BLOOD     Special Requests NONE     Culture BETA STREP SPECIES SEE ACC [id number] FOR ID (A)    Culture blood 2   Result Value Ref Range    Specimen BLOOD     Special Requests NONE     Culture (A)      BETA STREPTOCOCCUS GROUP G DETECTED BY PCR SENSITIVITY IN PROGRESS    Culture       (NOTE) CALLED TO REBEKAH GAMBLE RN AT [address] [place] [address] ON [id number] BY  CARLYN EMANUEL FAXED TO PHARMACY AT 1900 ON [id number] BY  CARLYN SANTIAGO MLT CALLED Phosphatase 115 40 - 128 IU/L    GFR Non-African American >60 >60 mL/min/1.73m2    GFR African American >60 >60 mL/min/1.73m2    Anion Gap 13 4 - 16   Lactic Acid, Plasma   Result Value Ref Range    Lactate (HH) 0.4 - 2.0 mMOL/L     2.7  LACT  CALLED TO COLEEN SANTA AT 2201 ON [id number] BY  MT   RESULTS READ BACK     Troponin   Result Value Ref Range    Troponin T 0.347 (HH) <0.01 NG/ML   Brain Natriuretic Peptide   Result Value Ref Range    Pro-BNP 2,624 (H) <300 PG/ML   Blood Gas, Venous   Result Value Ref Range    pH, Maikel 7.38 7.32 - 7.42    pCO2, Maikel 43 38 - 52 mmHG    pO2, Maikel 62 (H) 28 - 48 mmHG    Base Exc, Mixed 0  PLUS   0 - 2.3    HCO3, Venous 25.4 (H) 19 - 25 MMOL/L    O2 Sat, Maikel 90.5 (H) 50 - 70 %    Comment VBG    Lactate, Sepsis   Result Value Ref Range    Lactic Acid, Sepsis 2.0 (HH) 0.5 - 1.9 mMOL/L   Cortisol   Result Value Ref Range    Cortisol, Plasma 33.3    Procalcitonin   Result Value Ref Range    Procalcitonin 1.15    Hemoglobin A1c   Result Value Ref Range    Hemoglobin A1C 8.3 (H) 4.2 - 6.3 %    eAG 192 mg/dL   Troponin   Result Value Ref Range    Troponin T 0.292 (HH) <0.01 NG/ML   Troponin   Result Value Ref Range    Troponin T 0.248 (HH) <0.01 NG/ML   Basic Metabolic Panel w/ Reflex to MG   Result Value Ref Range    Sodium 136 135 - 145 MMOL/L    Potassium 3.8 3.5 - 5.1 MMOL/L    Chloride 98 (L) 99 - 110 mMol/L    CO2 24 21 - 32 MMOL/L    Anion Gap 14 4 - 16    BUN 18 6 - 23 MG/DL    CREATININE 1.2 (H) 0.6 - 1.1 MG/DL    Glucose 311 (H) 70 - 99 MG/DL    Calcium 7.5 (L) 8.3 - 10.6 MG/DL    GFR Non- 46 (L) >60 mL/min/1.73m2    GFR  55 (L) >60 mL/min/1.73m2   CBC auto differential   Result Value Ref Range    WBC 20.3 (H) 4.0 - 10.5 K/CU MM    RBC 3.74 (L) 4.2 - 5.4 M/CU MM    Hemoglobin 9.3 (L) 12.5 - 16.0 GM/DL    Hematocrit 31.5 (L) 37 - 47 %    MCV 84.2 78 - 100 FL    MCH 24.9 (L) 27 - 31 PG    MCHC 29.5 (L) 32.0 - 36.0 %    RDW 18.0 (H) 11.7 - 14.9 %

## 2019-09-23 LAB
ANION GAP SERPL CALCULATED.3IONS-SCNC: 11 MMOL/L (ref 4–16)
BUN BLDV-MCNC: 27 MG/DL (ref 6–23)
CALCIUM SERPL-MCNC: 7.6 MG/DL (ref 8.3–10.6)
CHLORIDE BLD-SCNC: 99 MMOL/L (ref 99–110)
CO2: 26 MMOL/L (ref 21–32)
CREAT SERPL-MCNC: 1.2 MG/DL (ref 0.6–1.1)
D DIMER: 1440 NG/ML(DDU)
DOSE AMOUNT: NORMAL
DOSE TIME: NORMAL
EKG ATRIAL RATE: 101 BPM
EKG ATRIAL RATE: 76 BPM
EKG DIAGNOSIS: NORMAL
EKG DIAGNOSIS: NORMAL
EKG P AXIS: 42 DEGREES
EKG P AXIS: 48 DEGREES
EKG P-R INTERVAL: 180 MS
EKG P-R INTERVAL: 212 MS
EKG Q-T INTERVAL: 354 MS
EKG Q-T INTERVAL: 420 MS
EKG QRS DURATION: 82 MS
EKG QRS DURATION: 84 MS
EKG QTC CALCULATION (BAZETT): 459 MS
EKG QTC CALCULATION (BAZETT): 472 MS
EKG R AXIS: 22 DEGREES
EKG R AXIS: 36 DEGREES
EKG T AXIS: 38 DEGREES
EKG T AXIS: 76 DEGREES
EKG VENTRICULAR RATE: 101 BPM
EKG VENTRICULAR RATE: 76 BPM
GFR AFRICAN AMERICAN: 55 ML/MIN/1.73M2
GFR NON-AFRICAN AMERICAN: 46 ML/MIN/1.73M2
GLUCOSE BLD-MCNC: 143 MG/DL (ref 70–99)
GLUCOSE BLD-MCNC: 167 MG/DL (ref 70–99)
GLUCOSE BLD-MCNC: 170 MG/DL (ref 70–99)
GLUCOSE BLD-MCNC: 182 MG/DL (ref 70–99)
GLUCOSE BLD-MCNC: 220 MG/DL (ref 70–99)
HCT VFR BLD CALC: 29.3 % (ref 37–47)
HEMOGLOBIN: 8.6 GM/DL (ref 12.5–16)
LACTATE: 1.4 MMOL/L (ref 0.4–2)
MAGNESIUM: 2.3 MG/DL (ref 1.8–2.4)
MCH RBC QN AUTO: 24.9 PG (ref 27–31)
MCHC RBC AUTO-ENTMCNC: 29.4 % (ref 32–36)
MCV RBC AUTO: 84.9 FL (ref 78–100)
PDW BLD-RTO: 18.3 % (ref 11.7–14.9)
PHOSPHORUS: 4.5 MG/DL (ref 2.5–4.9)
PLATELET # BLD: 271 K/CU MM (ref 140–440)
PMV BLD AUTO: 9.2 FL (ref 7.5–11.1)
POTASSIUM SERPL-SCNC: 4.2 MMOL/L (ref 3.5–5.1)
RBC # BLD: 3.45 M/CU MM (ref 4.2–5.4)
SODIUM BLD-SCNC: 136 MMOL/L (ref 135–145)
VANCOMYCIN RANDOM: 15.9 UG/ML
VANCOMYCIN RANDOM: NORMAL UG/ML
WBC # BLD: 11.4 K/CU MM (ref 4–10.5)

## 2019-09-23 PROCEDURE — 80202 ASSAY OF VANCOMYCIN: CPT

## 2019-09-23 PROCEDURE — 2580000003 HC RX 258: Performed by: INTERNAL MEDICINE

## 2019-09-23 PROCEDURE — 93010 ELECTROCARDIOGRAM REPORT: CPT | Performed by: INTERNAL MEDICINE

## 2019-09-23 PROCEDURE — 82962 GLUCOSE BLOOD TEST: CPT

## 2019-09-23 PROCEDURE — 94660 CPAP INITIATION&MGMT: CPT

## 2019-09-23 PROCEDURE — 2000000000 HC ICU R&B

## 2019-09-23 PROCEDURE — 99233 SBSQ HOSP IP/OBS HIGH 50: CPT | Performed by: INTERNAL MEDICINE

## 2019-09-23 PROCEDURE — 6370000000 HC RX 637 (ALT 250 FOR IP): Performed by: INTERNAL MEDICINE

## 2019-09-23 PROCEDURE — 83735 ASSAY OF MAGNESIUM: CPT

## 2019-09-23 PROCEDURE — 2700000000 HC OXYGEN THERAPY PER DAY

## 2019-09-23 PROCEDURE — 85379 FIBRIN DEGRADATION QUANT: CPT

## 2019-09-23 PROCEDURE — 94761 N-INVAS EAR/PLS OXIMETRY MLT: CPT

## 2019-09-23 PROCEDURE — 99211 OFF/OP EST MAY X REQ PHY/QHP: CPT

## 2019-09-23 PROCEDURE — 80048 BASIC METABOLIC PNL TOTAL CA: CPT

## 2019-09-23 PROCEDURE — 6360000002 HC RX W HCPCS: Performed by: INTERNAL MEDICINE

## 2019-09-23 PROCEDURE — 84100 ASSAY OF PHOSPHORUS: CPT

## 2019-09-23 PROCEDURE — 85027 COMPLETE CBC AUTOMATED: CPT

## 2019-09-23 PROCEDURE — 94640 AIRWAY INHALATION TREATMENT: CPT

## 2019-09-23 PROCEDURE — 83605 ASSAY OF LACTIC ACID: CPT

## 2019-09-23 RX ORDER — ONDANSETRON 2 MG/ML
4 INJECTION INTRAMUSCULAR; INTRAVENOUS EVERY 6 HOURS PRN
Status: DISCONTINUED | OUTPATIENT
Start: 2019-09-23 | End: 2019-09-27 | Stop reason: HOSPADM

## 2019-09-23 RX ORDER — PRAMIPEXOLE DIHYDROCHLORIDE 0.25 MG/1
1 TABLET ORAL 3 TIMES DAILY
Status: DISCONTINUED | OUTPATIENT
Start: 2019-09-23 | End: 2019-09-26

## 2019-09-23 RX ADMIN — SODIUM CHLORIDE, POTASSIUM CHLORIDE, SODIUM LACTATE AND CALCIUM CHLORIDE 1000 ML: 600; 310; 30; 20 INJECTION, SOLUTION INTRAVENOUS at 13:52

## 2019-09-23 RX ADMIN — CLOPIDOGREL BISULFATE 75 MG: 75 TABLET ORAL at 09:26

## 2019-09-23 RX ADMIN — PIPERACILLIN AND TAZOBACTAM 3.38 G: 3; .375 INJECTION, POWDER, LYOPHILIZED, FOR SOLUTION INTRAVENOUS at 10:29

## 2019-09-23 RX ADMIN — HYDROMORPHONE HYDROCHLORIDE 1 MG: 1 INJECTION, SOLUTION INTRAMUSCULAR; INTRAVENOUS; SUBCUTANEOUS at 17:21

## 2019-09-23 RX ADMIN — ONDANSETRON 4 MG: 2 INJECTION INTRAMUSCULAR; INTRAVENOUS at 12:03

## 2019-09-23 RX ADMIN — IPRATROPIUM BROMIDE AND ALBUTEROL SULFATE 3 ML: .5; 3 SOLUTION RESPIRATORY (INHALATION) at 15:11

## 2019-09-23 RX ADMIN — HYDROMORPHONE HYDROCHLORIDE 1 MG: 1 INJECTION, SOLUTION INTRAMUSCULAR; INTRAVENOUS; SUBCUTANEOUS at 02:05

## 2019-09-23 RX ADMIN — PANTOPRAZOLE SODIUM 40 MG: 40 TABLET, DELAYED RELEASE ORAL at 09:26

## 2019-09-23 RX ADMIN — IPRATROPIUM BROMIDE AND ALBUTEROL SULFATE 3 ML: .5; 3 SOLUTION RESPIRATORY (INHALATION) at 11:06

## 2019-09-23 RX ADMIN — MIDODRINE HYDROCHLORIDE 5 MG: 5 TABLET ORAL at 12:03

## 2019-09-23 RX ADMIN — MIDODRINE HYDROCHLORIDE 5 MG: 5 TABLET ORAL at 09:27

## 2019-09-23 RX ADMIN — PIPERACILLIN AND TAZOBACTAM 3.38 G: 3; .375 INJECTION, POWDER, LYOPHILIZED, FOR SOLUTION INTRAVENOUS at 18:34

## 2019-09-23 RX ADMIN — ENOXAPARIN SODIUM 120 MG: 120 INJECTION SUBCUTANEOUS at 21:07

## 2019-09-23 RX ADMIN — IPRATROPIUM BROMIDE AND ALBUTEROL SULFATE 3 ML: .5; 3 SOLUTION RESPIRATORY (INHALATION) at 07:18

## 2019-09-23 RX ADMIN — PRAMIPEXOLE DIHYDROCHLORIDE 1 MG: 0.25 TABLET ORAL at 16:13

## 2019-09-23 RX ADMIN — Medication 10 ML: at 21:10

## 2019-09-23 RX ADMIN — SODIUM CHLORIDE, POTASSIUM CHLORIDE, SODIUM LACTATE AND CALCIUM CHLORIDE 1000 ML: 600; 310; 30; 20 INJECTION, SOLUTION INTRAVENOUS at 02:05

## 2019-09-23 RX ADMIN — IPRATROPIUM BROMIDE AND ALBUTEROL SULFATE 3 ML: .5; 3 SOLUTION RESPIRATORY (INHALATION) at 21:50

## 2019-09-23 RX ADMIN — ENOXAPARIN SODIUM 120 MG: 120 INJECTION SUBCUTANEOUS at 09:29

## 2019-09-23 RX ADMIN — HYDROMORPHONE HYDROCHLORIDE 1 MG: 1 INJECTION, SOLUTION INTRAMUSCULAR; INTRAVENOUS; SUBCUTANEOUS at 07:53

## 2019-09-23 RX ADMIN — HYDROMORPHONE HYDROCHLORIDE 1 MG: 1 INJECTION, SOLUTION INTRAMUSCULAR; INTRAVENOUS; SUBCUTANEOUS at 22:25

## 2019-09-23 RX ADMIN — PRAMIPEXOLE DIHYDROCHLORIDE 1 MG: 0.25 TABLET ORAL at 21:07

## 2019-09-23 RX ADMIN — ATORVASTATIN CALCIUM 80 MG: 40 TABLET, FILM COATED ORAL at 21:07

## 2019-09-23 RX ADMIN — Medication 10 ML: at 07:53

## 2019-09-23 RX ADMIN — VANCOMYCIN HYDROCHLORIDE 2000 MG: 1 INJECTION, POWDER, LYOPHILIZED, FOR SOLUTION INTRAVENOUS at 15:08

## 2019-09-23 RX ADMIN — INSULIN GLARGINE 30 UNITS: 100 INJECTION, SOLUTION SUBCUTANEOUS at 21:08

## 2019-09-23 RX ADMIN — SODIUM CHLORIDE, POTASSIUM CHLORIDE, SODIUM LACTATE AND CALCIUM CHLORIDE 1000 ML: 600; 310; 30; 20 INJECTION, SOLUTION INTRAVENOUS at 23:46

## 2019-09-23 RX ADMIN — MICONAZOLE NITRATE: 20 POWDER TOPICAL at 21:15

## 2019-09-23 RX ADMIN — LINAGLIPTIN 5 MG: 5 TABLET, FILM COATED ORAL at 09:27

## 2019-09-23 RX ADMIN — MICONAZOLE NITRATE: 20 POWDER TOPICAL at 10:37

## 2019-09-23 RX ADMIN — LEVOFLOXACIN 500 MG: 5 INJECTION, SOLUTION INTRAVENOUS at 09:29

## 2019-09-23 RX ADMIN — PIPERACILLIN AND TAZOBACTAM 3.38 G: 3; .375 INJECTION, POWDER, LYOPHILIZED, FOR SOLUTION INTRAVENOUS at 02:09

## 2019-09-23 RX ADMIN — ASPIRIN 81 MG: 81 TABLET, COATED ORAL at 09:26

## 2019-09-23 ASSESSMENT — PAIN DESCRIPTION - DESCRIPTORS: DESCRIPTORS: ACHING;BURNING;DISCOMFORT

## 2019-09-23 ASSESSMENT — PAIN SCALES - GENERAL
PAINLEVEL_OUTOF10: 0
PAINLEVEL_OUTOF10: 8
PAINLEVEL_OUTOF10: 10
PAINLEVEL_OUTOF10: 7
PAINLEVEL_OUTOF10: 9
PAINLEVEL_OUTOF10: 3

## 2019-09-23 ASSESSMENT — PAIN DESCRIPTION - PAIN TYPE: TYPE: ACUTE PAIN

## 2019-09-23 ASSESSMENT — PAIN DESCRIPTION - LOCATION: LOCATION: BUTTOCKS

## 2019-09-23 ASSESSMENT — PAIN DESCRIPTION - ONSET: ONSET: PROGRESSIVE

## 2019-09-23 ASSESSMENT — PAIN DESCRIPTION - ORIENTATION: ORIENTATION: MID

## 2019-09-23 ASSESSMENT — PAIN DESCRIPTION - FREQUENCY: FREQUENCY: CONTINUOUS

## 2019-09-23 NOTE — PROGRESS NOTES
Hospitalist Progress Note         Admit Date: 9/21/2019    PCP: THEODORE Metcalf NP     Chief Complaint   Patient presents with    Shortness of Breath        Assessment and Plan:      Septic shock ([address] possibly from gram-negative pneumonia: Broad-spectrum antibiotics. Gentle IV fluids,Levophed and midodrine. Unable to get CT chest while she will not fit in the scanner.  Troponin elevation likely from NSTEMI type II: Continue aspirin, statin, Plavix, BB and Lovenox.  Uncontrolled diabetes mellitus: Continue current, Lantus linagliptin, SSI. Follow Accu-Cheks   Sacral ulcer/wounds/back pain: Wound care team on board. Ultram/Dilaudid for pain.  Morbid obesity/ANMOL continue BiPAP support. Diet and exercise counseling. VTE prophylaxis LMWH for full therapeutic anticoagulation.     Current Facility-Administered Medications   Medication Dose Route Frequency Provider Last Rate Last Dose    ondansetron (ZOFRAN) injection 4 mg  4 mg Intravenous Q6H PRN Gisselle Addison MD        albuterol sulfate  (90 Base) MCG/ACT inhaler 2 puff  2 puff Inhalation Q4H PRN Adiranna Stanton MD        aspirin EC tablet 81 mg  81 mg Oral Daily Adrianna Stanton MD   81 mg at 09/23/19 0926    clopidogrel (PLAVIX) tablet 75 mg  75 mg Oral Daily Adrianna Stanton MD   75 mg at 09/23/19 0926    ipratropium-albuterol (DUONEB) nebulizer solution 3 mL  3 mL Inhalation 4x daily Adrianna Stanton MD   3 mL at 09/23/19 1106    miconazole (MICOTIN) 2 % powder   Topical BID Adrianna Stanton MD        pantoprazole (PROTONIX) tablet 40 mg  40 mg Oral Daily Adrianna Stanton MD   40 mg at 09/23/19 0926    lactated ringers infusion 1,000 mL  1,000 mL Intravenous Continuous Adrianna Stanton  mL/hr at 09/23/19 0205 1,000 mL at 09/23/19 0205    glucose (GLUTOSE) 40 % oral gel 15 g  15 g Oral PRN Adrianna Stanton MD        dextrose 50 % IV solution  12.5 g Intravenous PRN Adrianna Stanton MD        glucagon (rDNA) injection 1 mg  1

## 2019-09-23 NOTE — PROGRESS NOTES
[address] [facility]  consulted by Dr. Monica Villarreal for monitoring and adjustment. Indication for treatment: Sepsis  Goal trough: 15-20 mcg/mL     Pertinent Laboratory Values:   Temp Readings from Last 3 Encounters:   09/23/19 96 °F (35.6 °C) (Oral)   08/29/19 97.7 °F (36.5 °C) (Oral)   08/21/19 97.8 °F (36.6 °C) (Oral)     Recent Labs     09/21/19  2105 09/22/19  0455 09/23/19  0500 09/23/19  1158   WBC 15.5* 20.3*  --  11.4*   LACTATE 2.7  LACT  CALLED TO COLEEN SANTA AT 2201 ON [id number] BY Dr. [facility]   RESULTS READ BACK  *  --  1.4  --      Recent Labs     09/21/19 2105 09/22/19  0455 09/23/19  0500   BUN 16 18 27*   CREATININE 0.9 1.2* 1.2*     Estimated Creatinine Clearance: 89 mL/min (A) (based on SCr of 1.2 mg/dL (H)). Intake/Output Summary (Last 24 hours) at 9/23/2019 1412  Last data filed at 9/23/2019 0753  Gross per 24 hour   Intake 3897.09 ml   Output 1200 ml   Net 2697.09 ml       Pertinent Cultures:  Date    Source    Results  9/22   Strep pneumo/ legionella Negative  9/22   Blood    Beta strep group B   9/21   Influenza   Negative     Vancomycin level:   TROUGH:  No results for input(s): VANCOTROUGH in the last 72 hours. RANDOM:    Recent Labs     09/23/19  0500   VANCORANDOM 15.9  (NOTE)  Therapeutic Range Interpretation: Please refer to current dosing   guidelines. Assessment:  · WBC and temperature: elevated  · SCr, BUN, and urine output: Scr remains elevated  · Day(s) of therapy: 2   · Vancomycin level: ok to re-dose     Plan:  · Ms. Chapman received vancomycin 2000 mg IVPB x 2  · Repeat vancomycin 2000 mg x 1   · Random concentration tomorrow AM   · Pharmacy will continue to monitor patient and adjust therapy as indicated    Thank you for the consult.   Kayla Aranda [facility]  9/23/2019 2:12 PM

## 2019-09-24 LAB
ANION GAP SERPL CALCULATED.3IONS-SCNC: 10 MMOL/L (ref 4–16)
BUN BLDV-MCNC: 24 MG/DL (ref 6–23)
CALCIUM SERPL-MCNC: 8.5 MG/DL (ref 8.3–10.6)
CHLORIDE BLD-SCNC: 99 MMOL/L (ref 99–110)
CO2: 28 MMOL/L (ref 21–32)
CREAT SERPL-MCNC: 0.9 MG/DL (ref 0.6–1.1)
CULTURE: ABNORMAL
DOSE AMOUNT: NORMAL
DOSE TIME: NORMAL
GFR AFRICAN AMERICAN: >60 ML/MIN/1.73M2
GFR NON-AFRICAN AMERICAN: >60 ML/MIN/1.73M2
GLUCOSE BLD-MCNC: 102 MG/DL (ref 70–99)
GLUCOSE BLD-MCNC: 107 MG/DL (ref 70–99)
GLUCOSE BLD-MCNC: 113 MG/DL (ref 70–99)
GLUCOSE BLD-MCNC: 115 MG/DL (ref 70–99)
GLUCOSE BLD-MCNC: 115 MG/DL (ref 70–99)
HCT VFR BLD CALC: 29.6 % (ref 37–47)
HEMOGLOBIN: 8.7 GM/DL (ref 12.5–16)
LACTATE: 1.4 MMOL/L (ref 0.4–2)
Lab: ABNORMAL
Lab: ABNORMAL
MAGNESIUM: 2.2 MG/DL (ref 1.8–2.4)
MCH RBC QN AUTO: 24.8 PG (ref 27–31)
MCHC RBC AUTO-ENTMCNC: 29.4 % (ref 32–36)
MCV RBC AUTO: 84.3 FL (ref 78–100)
PDW BLD-RTO: 18.4 % (ref 11.7–14.9)
PHOSPHORUS: 2.9 MG/DL (ref 2.5–4.9)
PLATELET # BLD: 252 K/CU MM (ref 140–440)
PMV BLD AUTO: 9.2 FL (ref 7.5–11.1)
POTASSIUM SERPL-SCNC: 3.9 MMOL/L (ref 3.5–5.1)
RBC # BLD: 3.51 M/CU MM (ref 4.2–5.4)
SODIUM BLD-SCNC: 137 MMOL/L (ref 135–145)
SPECIMEN: ABNORMAL
SPECIMEN: ABNORMAL
VANCOMYCIN RANDOM: 16.3 UG/ML
VANCOMYCIN RANDOM: NORMAL UG/ML
WBC # BLD: 9.5 K/CU MM (ref 4–10.5)

## 2019-09-24 PROCEDURE — 6360000002 HC RX W HCPCS: Performed by: INTERNAL MEDICINE

## 2019-09-24 PROCEDURE — 83735 ASSAY OF MAGNESIUM: CPT

## 2019-09-24 PROCEDURE — 83605 ASSAY OF LACTIC ACID: CPT

## 2019-09-24 PROCEDURE — 2580000003 HC RX 258: Performed by: INTERNAL MEDICINE

## 2019-09-24 PROCEDURE — 80048 BASIC METABOLIC PNL TOTAL CA: CPT

## 2019-09-24 PROCEDURE — 2000000000 HC ICU R&B

## 2019-09-24 PROCEDURE — 99232 SBSQ HOSP IP/OBS MODERATE 35: CPT | Performed by: INTERNAL MEDICINE

## 2019-09-24 PROCEDURE — 2700000000 HC OXYGEN THERAPY PER DAY

## 2019-09-24 PROCEDURE — 80202 ASSAY OF VANCOMYCIN: CPT

## 2019-09-24 PROCEDURE — 94640 AIRWAY INHALATION TREATMENT: CPT

## 2019-09-24 PROCEDURE — 93308 TTE F-UP OR LMTD: CPT

## 2019-09-24 PROCEDURE — 84100 ASSAY OF PHOSPHORUS: CPT

## 2019-09-24 PROCEDURE — 6360000004 HC RX CONTRAST MEDICATION: Performed by: INTERNAL MEDICINE

## 2019-09-24 PROCEDURE — 85027 COMPLETE CBC AUTOMATED: CPT

## 2019-09-24 PROCEDURE — 94761 N-INVAS EAR/PLS OXIMETRY MLT: CPT

## 2019-09-24 PROCEDURE — 82962 GLUCOSE BLOOD TEST: CPT

## 2019-09-24 PROCEDURE — 6370000000 HC RX 637 (ALT 250 FOR IP): Performed by: INTERNAL MEDICINE

## 2019-09-24 PROCEDURE — B246ZZZ ULTRASONOGRAPHY OF RIGHT AND LEFT HEART: ICD-10-PCS | Performed by: INTERNAL MEDICINE

## 2019-09-24 RX ORDER — MIDODRINE HYDROCHLORIDE 5 MG/1
10 TABLET ORAL
Status: DISCONTINUED | OUTPATIENT
Start: 2019-09-24 | End: 2019-09-26

## 2019-09-24 RX ADMIN — PRAMIPEXOLE DIHYDROCHLORIDE 1 MG: 0.25 TABLET ORAL at 22:30

## 2019-09-24 RX ADMIN — PIPERACILLIN AND TAZOBACTAM 3.38 G: 3; .375 INJECTION, POWDER, LYOPHILIZED, FOR SOLUTION INTRAVENOUS at 10:55

## 2019-09-24 RX ADMIN — SODIUM CHLORIDE, POTASSIUM CHLORIDE, SODIUM LACTATE AND CALCIUM CHLORIDE 1000 ML: 600; 310; 30; 20 INJECTION, SOLUTION INTRAVENOUS at 22:06

## 2019-09-24 RX ADMIN — METOPROLOL TARTRATE 25 MG: 25 TABLET ORAL at 10:40

## 2019-09-24 RX ADMIN — PIPERACILLIN AND TAZOBACTAM 3.38 G: 3; .375 INJECTION, POWDER, LYOPHILIZED, FOR SOLUTION INTRAVENOUS at 17:44

## 2019-09-24 RX ADMIN — HYDROMORPHONE HYDROCHLORIDE 1 MG: 1 INJECTION, SOLUTION INTRAMUSCULAR; INTRAVENOUS; SUBCUTANEOUS at 15:46

## 2019-09-24 RX ADMIN — VANCOMYCIN HYDROCHLORIDE 2000 MG: 1 INJECTION, POWDER, LYOPHILIZED, FOR SOLUTION INTRAVENOUS at 12:03

## 2019-09-24 RX ADMIN — PIPERACILLIN AND TAZOBACTAM 3.38 G: 3; .375 INJECTION, POWDER, LYOPHILIZED, FOR SOLUTION INTRAVENOUS at 01:39

## 2019-09-24 RX ADMIN — SODIUM CHLORIDE, POTASSIUM CHLORIDE, SODIUM LACTATE AND CALCIUM CHLORIDE 1000 ML: 600; 310; 30; 20 INJECTION, SOLUTION INTRAVENOUS at 10:59

## 2019-09-24 RX ADMIN — Medication 10 ML: at 08:09

## 2019-09-24 RX ADMIN — PANTOPRAZOLE SODIUM 40 MG: 40 TABLET, DELAYED RELEASE ORAL at 08:08

## 2019-09-24 RX ADMIN — MIDODRINE HYDROCHLORIDE 10 MG: 5 TABLET ORAL at 10:41

## 2019-09-24 RX ADMIN — ATORVASTATIN CALCIUM 80 MG: 40 TABLET, FILM COATED ORAL at 20:30

## 2019-09-24 RX ADMIN — ASPIRIN 81 MG: 81 TABLET, COATED ORAL at 08:08

## 2019-09-24 RX ADMIN — PERFLUTREN 0.3 MG: 6.52 INJECTION, SUSPENSION INTRAVENOUS at 12:24

## 2019-09-24 RX ADMIN — IPRATROPIUM BROMIDE AND ALBUTEROL SULFATE 3 ML: .5; 3 SOLUTION RESPIRATORY (INHALATION) at 23:45

## 2019-09-24 RX ADMIN — LEVOFLOXACIN 500 MG: 5 INJECTION, SOLUTION INTRAVENOUS at 08:09

## 2019-09-24 RX ADMIN — PRAMIPEXOLE DIHYDROCHLORIDE 1 MG: 0.25 TABLET ORAL at 08:08

## 2019-09-24 RX ADMIN — CLOPIDOGREL BISULFATE 75 MG: 75 TABLET ORAL at 08:08

## 2019-09-24 RX ADMIN — PRAMIPEXOLE DIHYDROCHLORIDE 1 MG: 0.25 TABLET ORAL at 13:38

## 2019-09-24 RX ADMIN — ENOXAPARIN SODIUM 120 MG: 120 INJECTION SUBCUTANEOUS at 20:32

## 2019-09-24 RX ADMIN — MIDODRINE HYDROCHLORIDE 10 MG: 5 TABLET ORAL at 17:44

## 2019-09-24 RX ADMIN — Medication 10 ML: at 20:32

## 2019-09-24 RX ADMIN — IPRATROPIUM BROMIDE AND ALBUTEROL SULFATE 3 ML: .5; 3 SOLUTION RESPIRATORY (INHALATION) at 11:32

## 2019-09-24 RX ADMIN — MICONAZOLE NITRATE: 20 POWDER TOPICAL at 10:55

## 2019-09-24 RX ADMIN — IPRATROPIUM BROMIDE AND ALBUTEROL SULFATE 3 ML: .5; 3 SOLUTION RESPIRATORY (INHALATION) at 16:30

## 2019-09-24 RX ADMIN — LINAGLIPTIN 5 MG: 5 TABLET, FILM COATED ORAL at 08:08

## 2019-09-24 RX ADMIN — IPRATROPIUM BROMIDE AND ALBUTEROL SULFATE 3 ML: .5; 3 SOLUTION RESPIRATORY (INHALATION) at 07:35

## 2019-09-24 RX ADMIN — ENOXAPARIN SODIUM 120 MG: 120 INJECTION SUBCUTANEOUS at 08:09

## 2019-09-24 RX ADMIN — MICONAZOLE NITRATE: 20 POWDER TOPICAL at 20:31

## 2019-09-24 RX ADMIN — HYDROMORPHONE HYDROCHLORIDE 1 MG: 1 INJECTION, SOLUTION INTRAMUSCULAR; INTRAVENOUS; SUBCUTANEOUS at 08:09

## 2019-09-24 ASSESSMENT — PAIN SCALES - GENERAL
PAINLEVEL_OUTOF10: 10
PAINLEVEL_OUTOF10: 0
PAINLEVEL_OUTOF10: 10
PAINLEVEL_OUTOF10: 0
PAINLEVEL_OUTOF10: 0

## 2019-09-24 NOTE — PROGRESS NOTES
[address] [facility]  consulted by Dr. Chaparro Lazo for monitoring and adjustment. Indication for treatment: Sepsis  Goal trough: 15-20 mcg/mL     Pertinent Laboratory Values:   Temp Readings from Last 3 Encounters:   09/24/19 98.1 °F (36.7 °C) (Oral)   08/29/19 97.7 °F (36.5 °C) (Oral)   08/21/19 97.8 °F (36.6 °C) (Oral)     Recent Labs     09/21/19  2105 09/22/19  0455 09/23/19  0500 09/23/19  1158 09/24/19  0535   WBC 15.5* 20.3*  --  11.4* 9.5   LACTATE 2.7  LACT  CALLED TO COLEEN SANTA AT 2201 ON [id number] BY [facility]   RESULTS READ BACK  *  --  1.4  --  1.4     Recent Labs     09/22/19  0455 09/23/19  0500 09/24/19  0535   BUN 18 27* 24*   CREATININE 1.2* 1.2* 0.9     Estimated Creatinine Clearance: 120 mL/min (based on SCr of 0.9 mg/dL). Intake/Output Summary (Last 24 hours) at 9/24/2019 1043  Last data filed at 9/24/2019 6216  Gross per 24 hour   Intake 1983.54 ml   Output 2100 ml   Net -116.46 ml       Pertinent Cultures:  Date    Source    Results  9/22   Strep pneumo/ legionella Negative  9/22   Blood    Beta strep group B   9/21   Influenza   Negative     Vancomycin level:   TROUGH:  No results for input(s): VANCOTROUGH in the last 72 hours. RANDOM:    Recent Labs     09/23/19  0500 09/24/19  0535   VANCORANDOM 15.9  (NOTE)  Therapeutic Range Interpretation: Please refer to current dosing   guidelines. 16.3  (NOTE)  Therapeutic Range Interpretation: Please refer to current dosing   guidelines. Assessment:  · WBC and temperature: elevated  · SCr, BUN, and urine output: Scr returning to baseline   · Day(s) of therapy: 3   · Vancomycin level: ok to re-dose     Plan:  · Ms. Chapman received vancomycin 2000 mg IVPB x 3  · Start 2000 mg q18h IVPB   · Trough in 48h   · Pharmacy will continue to monitor patient and adjust therapy as indicated    Thank you for the consult.   Jefferson Pablo [place]  9/24/2019 10:43 AM

## 2019-09-24 NOTE — PROGRESS NOTES
Obesity in her brother, father, and mother. No Known Allergies      Objective:   BP (!) 107/57   Pulse 69   Temp 98.1 °F (36.7 °C) (Oral)   Resp (!) 31   Ht 5' 6\" (1.676 m)   Wt (!) 447 lb 8.5 oz (203 kg)   SpO2 94%   BMI 72.23 kg/m²       Intake/Output Summary (Last 24 hours) at 9/24/2019 1631  Last data filed at 9/24/2019 1550  Gross per 24 hour   Intake 1983.54 ml   Output 3050 ml   Net -1066.46 ml       TELEMETRY: Sinus     Physical Exam:  Constitutional:  Well developed, Well nourished, No acute distress, Non-toxic appearance. HENT:  Normocephalic, Atraumatic, Bilateral external ears normal, Oropharynx moist, No oral exudates, Nose normal. Neck- Normal range of motion, No tenderness, Supple, No stridor. Eyes:  PERRL, EOMI, Conjunctiva normal, No discharge. Respiratory:  Normal breath sounds, No respiratory distress, No wheezing, No chest tenderness. ,no use of accessory muscles, diaphragm movement is normal  Cardiovascular: (PMI) apex non displaced,no lifts no thrills, no s3,no s4, Normal heart rate, Normal rhythm, No murmurs, No rubs, No gallops. Carotid arteries pulse and amplitude are normal no bruit, no abdominal bruit noted ( normal abdominal aorta ausculation), femoral arteries pulse and amplitude are normal no bruit, pedal pulses are normal  GI:  Bowel sounds normal, Soft, No tenderness, No masses, No pulsatile masses. : External genitalia appear normal, No masses or lesions. No discharge. No CVA tenderness. Musculoskeletal:  Intact distal pulses, No edema, No tenderness, No cyanosis, No clubbing. Good range of motion in all major joints. No tenderness to palpation or major deformities noted. Back- No tenderness. Integument:  Warm, Dry, No erythema, No rash. Lymphatic:  No lymphadenopathy noted. Neurologic:  Alert & oriented x 3, Normal motor function, Normal sensory function, No focal deficits noted.    Psychiatric:  Affect normal, Judgment normal, Mood normal.     Medications:

## 2019-09-25 ENCOUNTER — APPOINTMENT (OUTPATIENT)
Dept: GENERAL RADIOLOGY | Age: 64
DRG: 720 | End: 2019-09-25
Payer: COMMERCIAL

## 2019-09-25 LAB
ANION GAP SERPL CALCULATED.3IONS-SCNC: 6 MMOL/L (ref 4–16)
BUN BLDV-MCNC: 21 MG/DL (ref 6–23)
CALCIUM SERPL-MCNC: 8.3 MG/DL (ref 8.3–10.6)
CHLORIDE BLD-SCNC: 97 MMOL/L (ref 99–110)
CO2: 31 MMOL/L (ref 21–32)
CREAT SERPL-MCNC: 0.8 MG/DL (ref 0.6–1.1)
GFR AFRICAN AMERICAN: >60 ML/MIN/1.73M2
GFR NON-AFRICAN AMERICAN: >60 ML/MIN/1.73M2
GLUCOSE BLD-MCNC: 104 MG/DL (ref 70–99)
GLUCOSE BLD-MCNC: 114 MG/DL (ref 70–99)
GLUCOSE BLD-MCNC: 116 MG/DL (ref 70–99)
GLUCOSE BLD-MCNC: 122 MG/DL (ref 70–99)
GLUCOSE BLD-MCNC: 125 MG/DL (ref 70–99)
HCT VFR BLD CALC: 27.6 % (ref 37–47)
HEMOGLOBIN: 8.1 GM/DL (ref 12.5–16)
LACTATE: 0.9 MMOL/L (ref 0.4–2)
MAGNESIUM: 2.1 MG/DL (ref 1.8–2.4)
MCH RBC QN AUTO: 25.2 PG (ref 27–31)
MCHC RBC AUTO-ENTMCNC: 29.3 % (ref 32–36)
MCV RBC AUTO: 85.7 FL (ref 78–100)
MYCOPLASMA PNEUMONIAE IGG: 1.51 U/L
MYCOPLASMA PNEUMONIAE IGG: ABNORMAL U/L
MYCOPLASMA PNEUMONIAE IGM: 0.12 U/L
MYCOPLASMA PNEUMONIAE IGM: ABNORMAL U/L
PDW BLD-RTO: 18.4 % (ref 11.7–14.9)
PHOSPHORUS: 3.1 MG/DL (ref 2.5–4.9)
PLATELET # BLD: 244 K/CU MM (ref 140–440)
PMV BLD AUTO: 9.7 FL (ref 7.5–11.1)
POTASSIUM SERPL-SCNC: 3.8 MMOL/L (ref 3.5–5.1)
RBC # BLD: 3.22 M/CU MM (ref 4.2–5.4)
SODIUM BLD-SCNC: 134 MMOL/L (ref 135–145)
WBC # BLD: 6.2 K/CU MM (ref 4–10.5)

## 2019-09-25 PROCEDURE — 83735 ASSAY OF MAGNESIUM: CPT

## 2019-09-25 PROCEDURE — 6360000002 HC RX W HCPCS: Performed by: INTERNAL MEDICINE

## 2019-09-25 PROCEDURE — 2060000000 HC ICU INTERMEDIATE R&B

## 2019-09-25 PROCEDURE — 71045 X-RAY EXAM CHEST 1 VIEW: CPT

## 2019-09-25 PROCEDURE — 90686 IIV4 VACC NO PRSV 0.5 ML IM: CPT | Performed by: INTERNAL MEDICINE

## 2019-09-25 PROCEDURE — 2700000000 HC OXYGEN THERAPY PER DAY

## 2019-09-25 PROCEDURE — G0008 ADMIN INFLUENZA VIRUS VAC: HCPCS | Performed by: INTERNAL MEDICINE

## 2019-09-25 PROCEDURE — 6370000000 HC RX 637 (ALT 250 FOR IP): Performed by: INTERNAL MEDICINE

## 2019-09-25 PROCEDURE — 6360000002 HC RX W HCPCS: Performed by: HOSPITALIST

## 2019-09-25 PROCEDURE — 2580000003 HC RX 258: Performed by: HOSPITALIST

## 2019-09-25 PROCEDURE — 84100 ASSAY OF PHOSPHORUS: CPT

## 2019-09-25 PROCEDURE — 80048 BASIC METABOLIC PNL TOTAL CA: CPT

## 2019-09-25 PROCEDURE — 36592 COLLECT BLOOD FROM PICC: CPT

## 2019-09-25 PROCEDURE — 94761 N-INVAS EAR/PLS OXIMETRY MLT: CPT

## 2019-09-25 PROCEDURE — 85027 COMPLETE CBC AUTOMATED: CPT

## 2019-09-25 PROCEDURE — 2580000003 HC RX 258: Performed by: INTERNAL MEDICINE

## 2019-09-25 PROCEDURE — 94640 AIRWAY INHALATION TREATMENT: CPT

## 2019-09-25 PROCEDURE — 6370000000 HC RX 637 (ALT 250 FOR IP): Performed by: NURSE PRACTITIONER

## 2019-09-25 PROCEDURE — 83605 ASSAY OF LACTIC ACID: CPT

## 2019-09-25 PROCEDURE — 82962 GLUCOSE BLOOD TEST: CPT

## 2019-09-25 RX ADMIN — IPRATROPIUM BROMIDE AND ALBUTEROL SULFATE 3 ML: .5; 3 SOLUTION RESPIRATORY (INHALATION) at 12:30

## 2019-09-25 RX ADMIN — METOPROLOL TARTRATE 25 MG: 25 TABLET ORAL at 09:24

## 2019-09-25 RX ADMIN — PIPERACILLIN AND TAZOBACTAM 3.38 G: 3; .375 INJECTION, POWDER, LYOPHILIZED, FOR SOLUTION INTRAVENOUS at 01:48

## 2019-09-25 RX ADMIN — MIDODRINE HYDROCHLORIDE 10 MG: 5 TABLET ORAL at 11:59

## 2019-09-25 RX ADMIN — INFLUENZA A VIRUS A/BRISBANE/02/2018 IVR-190 (H1N1) ANTIGEN (PROPIOLACTONE INACTIVATED), INFLUENZA A VIRUS A/KANSAS/14/2017 X-327 (H3N2) ANTIGEN (PROPIOLACTONE INACTIVATED), INFLUENZA B VIRUS B/MARYLAND/15/2016 ANTIGEN (PROPIOLACTONE INACTIVATED), INFLUENZA B VIRUS B/PHUKET/3073/2013 BVR-1B ANTIGEN (PROPIOLACTONE INACTIVATED) 0.5 ML: 15; 15; 15; 15 INJECTION, SUSPENSION INTRAMUSCULAR at 09:27

## 2019-09-25 RX ADMIN — LEVOFLOXACIN 500 MG: 5 INJECTION, SOLUTION INTRAVENOUS at 09:26

## 2019-09-25 RX ADMIN — Medication 10 ML: at 21:07

## 2019-09-25 RX ADMIN — PRAMIPEXOLE DIHYDROCHLORIDE 1 MG: 0.25 TABLET ORAL at 09:24

## 2019-09-25 RX ADMIN — MICONAZOLE NITRATE: 20 POWDER TOPICAL at 09:28

## 2019-09-25 RX ADMIN — HYDROMORPHONE HYDROCHLORIDE 1 MG: 1 INJECTION, SOLUTION INTRAMUSCULAR; INTRAVENOUS; SUBCUTANEOUS at 04:39

## 2019-09-25 RX ADMIN — IPRATROPIUM BROMIDE AND ALBUTEROL SULFATE 3 ML: .5; 3 SOLUTION RESPIRATORY (INHALATION) at 21:38

## 2019-09-25 RX ADMIN — CLOPIDOGREL BISULFATE 75 MG: 75 TABLET ORAL at 09:23

## 2019-09-25 RX ADMIN — MIDODRINE HYDROCHLORIDE 10 MG: 5 TABLET ORAL at 09:23

## 2019-09-25 RX ADMIN — ATORVASTATIN CALCIUM 80 MG: 40 TABLET, FILM COATED ORAL at 21:06

## 2019-09-25 RX ADMIN — ENOXAPARIN SODIUM 120 MG: 120 INJECTION SUBCUTANEOUS at 21:06

## 2019-09-25 RX ADMIN — MIDODRINE HYDROCHLORIDE 10 MG: 5 TABLET ORAL at 17:08

## 2019-09-25 RX ADMIN — VANCOMYCIN HYDROCHLORIDE 2000 MG: 1 INJECTION, POWDER, LYOPHILIZED, FOR SOLUTION INTRAVENOUS at 06:08

## 2019-09-25 RX ADMIN — LINAGLIPTIN 5 MG: 5 TABLET, FILM COATED ORAL at 09:22

## 2019-09-25 RX ADMIN — PRAMIPEXOLE DIHYDROCHLORIDE 1 MG: 0.25 TABLET ORAL at 15:18

## 2019-09-25 RX ADMIN — OXYCODONE HYDROCHLORIDE AND ACETAMINOPHEN 1 TABLET: 5; 325 TABLET ORAL at 15:18

## 2019-09-25 RX ADMIN — ENOXAPARIN SODIUM 120 MG: 120 INJECTION SUBCUTANEOUS at 09:25

## 2019-09-25 RX ADMIN — ASPIRIN 81 MG: 81 TABLET, COATED ORAL at 09:23

## 2019-09-25 RX ADMIN — PANTOPRAZOLE SODIUM 40 MG: 40 TABLET, DELAYED RELEASE ORAL at 09:23

## 2019-09-25 RX ADMIN — SODIUM CHLORIDE 3 G: 900 INJECTION INTRAVENOUS at 23:38

## 2019-09-25 RX ADMIN — DEXTROSE MONOHYDRATE 100 ML/HR: 5 INJECTION INTRAVENOUS at 09:25

## 2019-09-25 RX ADMIN — SODIUM CHLORIDE 3 G: 900 INJECTION INTRAVENOUS at 11:34

## 2019-09-25 RX ADMIN — Medication 10 ML: at 09:26

## 2019-09-25 RX ADMIN — IPRATROPIUM BROMIDE AND ALBUTEROL SULFATE 3 ML: .5; 3 SOLUTION RESPIRATORY (INHALATION) at 08:58

## 2019-09-25 RX ADMIN — PRAMIPEXOLE DIHYDROCHLORIDE 1 MG: 0.25 TABLET ORAL at 21:07

## 2019-09-25 RX ADMIN — SODIUM CHLORIDE 3 G: 900 INJECTION INTRAVENOUS at 15:47

## 2019-09-25 RX ADMIN — MICONAZOLE NITRATE: 20 POWDER TOPICAL at 21:12

## 2019-09-25 ASSESSMENT — PAIN SCALES - GENERAL
PAINLEVEL_OUTOF10: 0
PAINLEVEL_OUTOF10: 0
PAINLEVEL_OUTOF10: 10
PAINLEVEL_OUTOF10: 8
PAINLEVEL_OUTOF10: 0

## 2019-09-25 NOTE — PROGRESS NOTES
[facility] Hospitalist Progress Note     Admit Date: 9/21/2019      Hospital Day: 5      Subjective:   Pt seen and examined. She currently has no complaints. She denies SOB at rest, CP, abdominal pain, leg pain. She denies cough, sputum, fever or chills. She uses home O2 and home BiPAP. She sees Dr. Gerry Clarke. General ROS: No fever, chills. Cardiovascular ROS: no chest pain. Respiratory ROS: no cough, shortness of breath. Gastrointestinal ROS: no abdominal pain, diarrhea, N/V. Objective:   BP (!) 101/52   Pulse 54   Temp 98.3 °F (36.8 °C) (Oral)   Resp 14   Ht 5' 6\" (1.676 m)   Wt (!) 447 lb 8.5 oz (203 kg)   SpO2 98%   BMI 72.23 kg/m²    General: The patient appears as stated age, morbidly obese. Mental status: Alert, Oriented x3. Coherent. No agitation. Eyes: ALVINA. Normal conjunctiva. ENT/Mouth: normal appearing jaw and neck, no neck nodes or sinus tenderness. Clear oropharynx with moist mucous membrane. Cardiovascular:  normal rate, regular rhythm, normal S1, S2, no murmurs, rubs, clicks or gallops. No peripheral edema. Dorsal pedis pulses 2+ bilaterally. Respiratory: +mild rhonchi, no wheezes, air entry is symmetric. Gastrointestinal: soft, nontender, nondistended, no masses or organomegaly. Genitourinary:  No CVA tenderness. Musculoskletal:  no clubbing or cyanosis. No joint swelling, warmth, or tenderness. Skin:  normal coloration and turgor, no rashes, no suspicious skin lesions noted. Neurologic: Normal speech, no focal findings or movement disorder noted. Data Review  Labs were reviewed.     Lab Results   Component Value Date    WBC 6.2 09/25/2019    HGB 8.1 (L) 09/25/2019    HCT 27.6 (L) 09/25/2019    MCV 85.7 09/25/2019     09/25/2019     Lab Results   Component Value Date     09/25/2019    K 3.8 09/25/2019    CL 97 09/25/2019    CO2 31 09/25/2019    BUN 21 09/25/2019    CREATININE 0.8 09/25/2019    GLUCOSE 104 09/25/2019    CALCIUM 8.3 09/25/2019          Imaging films was personally reviewed with finding below. Xr Chest Portable    Result Date: 9/21/2019  EXAMINATION: ONE XRAY VIEW OF THE CHEST 9/21/2019 7:36 pm COMPARISON: 08/24/2019 HISTORY: ORDERING SYSTEM PROVIDED HISTORY: chest pain TECHNOLOGIST PROVIDED HISTORY: Reason for exam:->chest pain Reason for Exam: chest pain Acuity: Acute Type of Exam: Initial Additional signs and symptoms: na Relevant Medical/Surgical History: diabetes, copd FINDINGS: Low lung volumes. Stable cardiomegaly with pulmonary vascular congestion. The patient slightly rotated. No definite pleural effusion, pneumothorax or pleural effusion. The osseous structures are stable. Cardiomegaly with pulmonary vascular congestion. Telemetry EKG strip was personally reviewed. NSR. HR 60s. Assessment/Plan:     · Resolved septic shock, with Group G streptococcus bacteremia, initially suspected pneumonia due to possible GNRs and MRSA, although there was no apparent infiltrate on initial CXR. Improving VS, resp status. De-escalate zosyn and vancomycin, to Unasyn alone, targeting Group G streptococcus and possibly anaerobes. Needs 2 weeks of antibiotics. · Acute on chronic hypoxic respiratory failure with COPD and obesity hypoventilation. CXR  Continue O2 and nocturnal bipap. Consult Dr. Estephania Spain, as needed if her resp conditions worsens. · Acute metabolic encephalopathy from above, resolved. · Elevated trop, suspect type II ischemia. PE is less likely given the presence of pneumonia and bacteremia. Will hold off on further PE studies, especially she did not fit CT.   · Diabetes mellitus on insulin. Continue current Lantus and Humalog. · Sacral ulcer/wound, present on admission. Continue wound care per wound care team recommendation. Continue opioid for pain control. · Morbid obesity. Needs life style modifications as outpt. F/u with PCP. May benefit from bariatric surgery.

## 2019-09-25 NOTE — PROGRESS NOTES
Pt arrive to unit bed 2030 via bed from ICU. Pt alert and oriented at this time. All belongings brought in a bag. Pt oriented to room. Nothing needed at this time.

## 2019-09-26 ENCOUNTER — APPOINTMENT (OUTPATIENT)
Dept: ULTRASOUND IMAGING | Age: 64
DRG: 720 | End: 2019-09-26
Payer: COMMERCIAL

## 2019-09-26 LAB
ANION GAP SERPL CALCULATED.3IONS-SCNC: 9 MMOL/L (ref 4–16)
BUN BLDV-MCNC: 13 MG/DL (ref 6–23)
CALCIUM SERPL-MCNC: 8.8 MG/DL (ref 8.3–10.6)
CHLORIDE BLD-SCNC: 99 MMOL/L (ref 99–110)
CO2: 33 MMOL/L (ref 21–32)
CREAT SERPL-MCNC: 0.7 MG/DL (ref 0.6–1.1)
GFR AFRICAN AMERICAN: >60 ML/MIN/1.73M2
GFR NON-AFRICAN AMERICAN: >60 ML/MIN/1.73M2
GLUCOSE BLD-MCNC: 107 MG/DL (ref 70–99)
GLUCOSE BLD-MCNC: 114 MG/DL (ref 70–99)
GLUCOSE BLD-MCNC: 127 MG/DL (ref 70–99)
GLUCOSE BLD-MCNC: 130 MG/DL (ref 70–99)
GLUCOSE BLD-MCNC: 140 MG/DL (ref 70–99)
HCT VFR BLD CALC: 29.5 % (ref 37–47)
HEMOGLOBIN: 8.6 GM/DL (ref 12.5–16)
LACTATE: 0.8 MMOL/L (ref 0.4–2)
MAGNESIUM: 1.8 MG/DL (ref 1.8–2.4)
MCH RBC QN AUTO: 24.6 PG (ref 27–31)
MCHC RBC AUTO-ENTMCNC: 29.2 % (ref 32–36)
MCV RBC AUTO: 84.3 FL (ref 78–100)
PDW BLD-RTO: 18.4 % (ref 11.7–14.9)
PHOSPHORUS: 3.5 MG/DL (ref 2.5–4.9)
PLATELET # BLD: 274 K/CU MM (ref 140–440)
PMV BLD AUTO: 9.5 FL (ref 7.5–11.1)
POTASSIUM SERPL-SCNC: 4.1 MMOL/L (ref 3.5–5.1)
RBC # BLD: 3.5 M/CU MM (ref 4.2–5.4)
SODIUM BLD-SCNC: 141 MMOL/L (ref 135–145)
WBC # BLD: 7.1 K/CU MM (ref 4–10.5)

## 2019-09-26 PROCEDURE — 83605 ASSAY OF LACTIC ACID: CPT

## 2019-09-26 PROCEDURE — 2580000003 HC RX 258: Performed by: HOSPITALIST

## 2019-09-26 PROCEDURE — 36569 INSJ PICC 5 YR+ W/O IMAGING: CPT

## 2019-09-26 PROCEDURE — 84100 ASSAY OF PHOSPHORUS: CPT

## 2019-09-26 PROCEDURE — B54MZZA ULTRASONOGRAPHY OF RIGHT UPPER EXTREMITY VEINS, GUIDANCE: ICD-10-PCS | Performed by: HOSPITALIST

## 2019-09-26 PROCEDURE — 05HB33Z INSERTION OF INFUSION DEVICE INTO RIGHT BASILIC VEIN, PERCUTANEOUS APPROACH: ICD-10-PCS | Performed by: HOSPITALIST

## 2019-09-26 PROCEDURE — 83735 ASSAY OF MAGNESIUM: CPT

## 2019-09-26 PROCEDURE — 94660 CPAP INITIATION&MGMT: CPT

## 2019-09-26 PROCEDURE — 97116 GAIT TRAINING THERAPY: CPT

## 2019-09-26 PROCEDURE — 2060000000 HC ICU INTERMEDIATE R&B

## 2019-09-26 PROCEDURE — C1751 CATH, INF, PER/CENT/MIDLINE: HCPCS

## 2019-09-26 PROCEDURE — 80048 BASIC METABOLIC PNL TOTAL CA: CPT

## 2019-09-26 PROCEDURE — 82962 GLUCOSE BLOOD TEST: CPT

## 2019-09-26 PROCEDURE — 6370000000 HC RX 637 (ALT 250 FOR IP): Performed by: NURSE PRACTITIONER

## 2019-09-26 PROCEDURE — 6370000000 HC RX 637 (ALT 250 FOR IP): Performed by: INTERNAL MEDICINE

## 2019-09-26 PROCEDURE — 2700000000 HC OXYGEN THERAPY PER DAY

## 2019-09-26 PROCEDURE — 6360000002 HC RX W HCPCS: Performed by: INTERNAL MEDICINE

## 2019-09-26 PROCEDURE — 97162 PT EVAL MOD COMPLEX 30 MIN: CPT

## 2019-09-26 PROCEDURE — 6360000002 HC RX W HCPCS: Performed by: HOSPITALIST

## 2019-09-26 PROCEDURE — 97530 THERAPEUTIC ACTIVITIES: CPT

## 2019-09-26 PROCEDURE — 2500000003 HC RX 250 WO HCPCS: Performed by: HOSPITALIST

## 2019-09-26 PROCEDURE — 76937 US GUIDE VASCULAR ACCESS: CPT

## 2019-09-26 PROCEDURE — 94640 AIRWAY INHALATION TREATMENT: CPT

## 2019-09-26 PROCEDURE — 85027 COMPLETE CBC AUTOMATED: CPT

## 2019-09-26 PROCEDURE — 93970 EXTREMITY STUDY: CPT

## 2019-09-26 PROCEDURE — 6370000000 HC RX 637 (ALT 250 FOR IP): Performed by: HOSPITALIST

## 2019-09-26 PROCEDURE — 2580000003 HC RX 258: Performed by: INTERNAL MEDICINE

## 2019-09-26 PROCEDURE — 94761 N-INVAS EAR/PLS OXIMETRY MLT: CPT

## 2019-09-26 RX ORDER — FUROSEMIDE 40 MG/1
40 TABLET ORAL 2 TIMES DAILY
Status: DISCONTINUED | OUTPATIENT
Start: 2019-09-26 | End: 2019-09-27 | Stop reason: HOSPADM

## 2019-09-26 RX ORDER — SODIUM CHLORIDE 0.9 % (FLUSH) 0.9 %
10 SYRINGE (ML) INJECTION EVERY 12 HOURS SCHEDULED
Status: DISCONTINUED | OUTPATIENT
Start: 2019-09-26 | End: 2019-09-27 | Stop reason: HOSPADM

## 2019-09-26 RX ORDER — PRAMIPEXOLE DIHYDROCHLORIDE 1 MG/1
1 TABLET ORAL 3 TIMES DAILY
Status: DISCONTINUED | OUTPATIENT
Start: 2019-09-26 | End: 2019-09-27 | Stop reason: HOSPADM

## 2019-09-26 RX ORDER — FUROSEMIDE 40 MG/1
40 TABLET ORAL DAILY
Status: DISCONTINUED | OUTPATIENT
Start: 2019-09-26 | End: 2019-09-26

## 2019-09-26 RX ORDER — LIDOCAINE HYDROCHLORIDE 10 MG/ML
5 INJECTION, SOLUTION EPIDURAL; INFILTRATION; INTRACAUDAL; PERINEURAL ONCE
Status: COMPLETED | OUTPATIENT
Start: 2019-09-26 | End: 2019-09-26

## 2019-09-26 RX ORDER — HYDROMORPHONE HCL 110MG/55ML
1 PATIENT CONTROLLED ANALGESIA SYRINGE INTRAVENOUS
Status: DISCONTINUED | OUTPATIENT
Start: 2019-09-26 | End: 2019-09-27 | Stop reason: HOSPADM

## 2019-09-26 RX ORDER — SODIUM CHLORIDE 0.9 % (FLUSH) 0.9 %
10 SYRINGE (ML) INJECTION PRN
Status: DISCONTINUED | OUTPATIENT
Start: 2019-09-26 | End: 2019-09-27 | Stop reason: HOSPADM

## 2019-09-26 RX ADMIN — PRAMIPEXOLE DIHYDROCHLORIDE 1 MG: 1 TABLET ORAL at 11:04

## 2019-09-26 RX ADMIN — MICONAZOLE NITRATE: 20 POWDER TOPICAL at 07:54

## 2019-09-26 RX ADMIN — ATORVASTATIN CALCIUM 80 MG: 40 TABLET, FILM COATED ORAL at 20:40

## 2019-09-26 RX ADMIN — METOPROLOL TARTRATE 25 MG: 25 TABLET ORAL at 07:47

## 2019-09-26 RX ADMIN — CLOPIDOGREL BISULFATE 75 MG: 75 TABLET ORAL at 07:46

## 2019-09-26 RX ADMIN — IPRATROPIUM BROMIDE AND ALBUTEROL SULFATE 3 ML: .5; 3 SOLUTION RESPIRATORY (INHALATION) at 11:29

## 2019-09-26 RX ADMIN — Medication 10 ML: at 07:47

## 2019-09-26 RX ADMIN — METOPROLOL TARTRATE 25 MG: 25 TABLET ORAL at 20:40

## 2019-09-26 RX ADMIN — ASPIRIN 81 MG: 81 TABLET, COATED ORAL at 07:46

## 2019-09-26 RX ADMIN — SODIUM CHLORIDE 3 G: 900 INJECTION INTRAVENOUS at 04:38

## 2019-09-26 RX ADMIN — FUROSEMIDE 40 MG: 40 TABLET ORAL at 17:43

## 2019-09-26 RX ADMIN — IPRATROPIUM BROMIDE AND ALBUTEROL SULFATE 3 ML: .5; 3 SOLUTION RESPIRATORY (INHALATION) at 08:07

## 2019-09-26 RX ADMIN — LIDOCAINE HYDROCHLORIDE 5 ML: 10 INJECTION, SOLUTION EPIDURAL; INFILTRATION; INTRACAUDAL; PERINEURAL at 15:59

## 2019-09-26 RX ADMIN — PRAMIPEXOLE DIHYDROCHLORIDE 1 MG: 1 TABLET ORAL at 17:43

## 2019-09-26 RX ADMIN — LINAGLIPTIN 5 MG: 5 TABLET, FILM COATED ORAL at 07:46

## 2019-09-26 RX ADMIN — PRAMIPEXOLE DIHYDROCHLORIDE 1 MG: 1 TABLET ORAL at 20:39

## 2019-09-26 RX ADMIN — ENOXAPARIN SODIUM 120 MG: 120 INJECTION SUBCUTANEOUS at 07:48

## 2019-09-26 RX ADMIN — MIDODRINE HYDROCHLORIDE 10 MG: 5 TABLET ORAL at 07:46

## 2019-09-26 RX ADMIN — OXYCODONE HYDROCHLORIDE AND ACETAMINOPHEN 1 TABLET: 5; 325 TABLET ORAL at 07:47

## 2019-09-26 RX ADMIN — HYDROMORPHONE HYDROCHLORIDE 1 MG: 2 INJECTION, SOLUTION INTRAMUSCULAR; INTRAVENOUS; SUBCUTANEOUS at 18:20

## 2019-09-26 RX ADMIN — SODIUM CHLORIDE 3 G: 900 INJECTION INTRAVENOUS at 23:21

## 2019-09-26 RX ADMIN — IPRATROPIUM BROMIDE AND ALBUTEROL SULFATE 3 ML: .5; 3 SOLUTION RESPIRATORY (INHALATION) at 16:30

## 2019-09-26 RX ADMIN — SODIUM CHLORIDE 3 G: 900 INJECTION INTRAVENOUS at 11:05

## 2019-09-26 RX ADMIN — SODIUM CHLORIDE 3 G: 900 INJECTION INTRAVENOUS at 17:44

## 2019-09-26 RX ADMIN — INSULIN GLARGINE 30 UNITS: 100 INJECTION, SOLUTION SUBCUTANEOUS at 20:40

## 2019-09-26 RX ADMIN — IPRATROPIUM BROMIDE AND ALBUTEROL SULFATE 3 ML: .5; 3 SOLUTION RESPIRATORY (INHALATION) at 20:52

## 2019-09-26 RX ADMIN — PANTOPRAZOLE SODIUM 40 MG: 40 TABLET, DELAYED RELEASE ORAL at 07:46

## 2019-09-26 ASSESSMENT — PAIN DESCRIPTION - PAIN TYPE: TYPE: ACUTE PAIN

## 2019-09-26 ASSESSMENT — PAIN DESCRIPTION - ORIENTATION: ORIENTATION: MID

## 2019-09-26 ASSESSMENT — PAIN SCALES - GENERAL
PAINLEVEL_OUTOF10: 10
PAINLEVEL_OUTOF10: 10

## 2019-09-26 ASSESSMENT — PAIN DESCRIPTION - LOCATION: LOCATION: BUTTOCKS

## 2019-09-26 NOTE — PLAN OF CARE
1612 called Nurse Juanpablo Brown to ask if pt ready for US. Pt is getting a PIC line. She said to come in 25 min.  RE

## 2019-09-26 NOTE — CONSULTS
Consult completed. Procedure/rationale explained to pt including benefits vs potential complications/risks; questions answered & consent obtained. #4Fr PowerPICC SOLO initiated to RUE Basilic Vein using sterile, UltraSound-guided technique without difficulty/complications. Sterile dressing with SkinPrep, [employer] Device, [employer], SwabCap, and Limb Precautions band applied. Pt tolerated well & denies other c/o or needs. Sondra Daily, Primary RN, notified.
FAMILY HISTORY    Family History   Problem Relation Age of Onset    Heart Failure Mother     Heart Attack Mother     Hypertension Mother     Coronary Art Dis Mother         Had PTCA w/stenting.  Heart Failure Father     Heart Failure Brother     Heart Disease Mother     High Blood Pressure Mother     Obesity Mother     Diabetes Mother     Heart Disease Father     High Blood Pressure Father     Obesity Father     Heart Disease Brother     High Blood Pressure Brother     Obesity Brother        SOCIAL HISTORY    Social History     Tobacco Use    Smoking status: Former Smoker     Types: Cigarettes     Last attempt to quit: 3/19/2003     Years since quittin.5    Smokeless tobacco: Never Used    Tobacco comment: quit 15 years ago   Substance Use Topics    Alcohol use: No    Drug use: No       ALLERGIES    No Known Allergies    MEDICATIONS    No current facility-administered medications on file prior to encounter.       Current Outpatient Medications on File Prior to Encounter   Medication Sig Dispense Refill    Insulin Degludec (TRESIBA FLEXTOUCH) 100 UNIT/ML SOPN Inject 45 Units into the skin nightly 1 pen 0    lactobacillus (CULTURELLE) capsule Take 1 capsule by mouth daily 30 capsule 0    spironolactone (ALDACTONE) 25 MG tablet Take 1 tablet by mouth daily 30 tablet 3    torsemide (DEMADEX) 20 MG tablet Take 1 tablet by mouth daily 60 tablet 1    phosphorus (K PHOS NEUTRAL) 155-852-130 MG tablet Take 1 tablet by mouth 2 times daily 60 tablet 3    HUMALOG KWIKPEN 200 UNIT/ML SOPN pen Inject 15 Units into the skin 3 times daily (before meals) (Patient taking differently: Inject 35 Units into the skin 3 times daily (before meals) ) 2 pen 3    clopidogrel (PLAVIX) 75 MG tablet Take 1 tablet by mouth daily Patient has appointment on 3/27/18 more refills with be given after she keeps her office visit 90 tablet 3    nystatin (MYCOSTATIN) POWD powder Apply topically 2 times daily     
smoking about 16 years ago. Her smoking use included cigarettes. She has never used smokeless tobacco. She reports that she does not drink alcohol or use drugs.   Family history:   no family history of CAD, STROKE of DM    No Known Allergies      albuterol sulfate  (90 Base) MCG/ACT inhaler 2 puff Q4H PRN   aspirin EC tablet 81 mg Daily   clopidogrel (PLAVIX) tablet 75 mg Daily   ipratropium-albuterol (DUONEB) nebulizer solution 3 mL 4x daily   miconazole (MICOTIN) 2 % powder BID   pantoprazole (PROTONIX) tablet 40 mg Daily   lactated ringers infusion 1,000 mL Continuous   glucose (GLUTOSE) 40 % oral gel 15 g PRN   dextrose 50 % IV solution PRN   glucagon (rDNA) injection 1 mg PRN   dextrose 5 % solution PRN   insulin lispro (HUMALOG) injection vial 0-6 Units TID    [START ON 9/23/2019] influenza quadrivalent split vaccine (FLUZONE;FLUARIX;FLULAVAL;AFLURIA) injection 0.5 mL Once   insulin lispro (HUMALOG) injection vial 0-3 Units Nightly   oxyCODONE-acetaminophen (PERCOCET) 5-325 MG per tablet 1 tablet Q4H PRN   enoxaparin (LOVENOX) injection 120 mg BID   metoprolol tartrate (LOPRESSOR) tablet 25 mg BID   traMADol (ULTRAM) tablet 100 mg Q6H PRN   atorvastatin (LIPITOR) tablet 80 mg Nightly   piperacillin-tazobactam (ZOSYN) 3.375 g in dextrose 5 % 50 mL IVPB extended infusion (mini-bag) Q8H   levofloxacin (LEVAQUIN) 500 MG/100ML infusion 500 mg Q24H   midodrine (PROAMATINE) tablet 5 mg TID    linagliptin (TRADJENTA) tablet 5 mg Daily   HYDROmorphone (DILAUDID) injection 1 mg Q3H PRN   vancomycin (VANCOCIN) intermittent dosing (placeholder) RX Placeholder   insulin glargine (LANTUS) injection vial 30 Units Nightly   sodium chloride flush 0.9 % injection 10 mL 2 times per day   sodium chloride flush 0.9 % injection 10 mL PRN   norepinephrine (LEVOPHED) 16 mg in dextrose 5% 250 mL infusion Continuous     Current Facility-Administered Medications   Medication Dose Route Frequency Provider Last Rate Last Dose

## 2019-09-27 VITALS
WEIGHT: 293 LBS | BODY MASS INDEX: 47.09 KG/M2 | HEIGHT: 66 IN | DIASTOLIC BLOOD PRESSURE: 72 MMHG | OXYGEN SATURATION: 97 % | TEMPERATURE: 98.1 F | HEART RATE: 74 BPM | RESPIRATION RATE: 14 BRPM | SYSTOLIC BLOOD PRESSURE: 94 MMHG

## 2019-09-27 LAB
ANION GAP SERPL CALCULATED.3IONS-SCNC: 9 MMOL/L (ref 4–16)
BUN BLDV-MCNC: 9 MG/DL (ref 6–23)
CALCIUM SERPL-MCNC: 8.9 MG/DL (ref 8.3–10.6)
CHLORIDE BLD-SCNC: 99 MMOL/L (ref 99–110)
CO2: 34 MMOL/L (ref 21–32)
CREAT SERPL-MCNC: 0.6 MG/DL (ref 0.6–1.1)
GFR AFRICAN AMERICAN: >60 ML/MIN/1.73M2
GFR NON-AFRICAN AMERICAN: >60 ML/MIN/1.73M2
GLUCOSE BLD-MCNC: 107 MG/DL (ref 70–99)
GLUCOSE BLD-MCNC: 124 MG/DL (ref 70–99)
HCT VFR BLD CALC: 31.4 % (ref 37–47)
HEMOGLOBIN: 9.2 GM/DL (ref 12.5–16)
LACTATE: 1 MMOL/L (ref 0.4–2)
MAGNESIUM: 1.5 MG/DL (ref 1.8–2.4)
MCH RBC QN AUTO: 24.8 PG (ref 27–31)
MCHC RBC AUTO-ENTMCNC: 29.3 % (ref 32–36)
MCV RBC AUTO: 84.6 FL (ref 78–100)
PDW BLD-RTO: 18.2 % (ref 11.7–14.9)
PHOSPHORUS: 3.1 MG/DL (ref 2.5–4.9)
PLATELET # BLD: 313 K/CU MM (ref 140–440)
PMV BLD AUTO: 9.2 FL (ref 7.5–11.1)
POTASSIUM SERPL-SCNC: 3.8 MMOL/L (ref 3.5–5.1)
RBC # BLD: 3.71 M/CU MM (ref 4.2–5.4)
SODIUM BLD-SCNC: 142 MMOL/L (ref 135–145)
WBC # BLD: 8.8 K/CU MM (ref 4–10.5)

## 2019-09-27 PROCEDURE — 80048 BASIC METABOLIC PNL TOTAL CA: CPT

## 2019-09-27 PROCEDURE — 85027 COMPLETE CBC AUTOMATED: CPT

## 2019-09-27 PROCEDURE — 6360000002 HC RX W HCPCS: Performed by: HOSPITALIST

## 2019-09-27 PROCEDURE — 6370000000 HC RX 637 (ALT 250 FOR IP): Performed by: INTERNAL MEDICINE

## 2019-09-27 PROCEDURE — 83605 ASSAY OF LACTIC ACID: CPT

## 2019-09-27 PROCEDURE — 6370000000 HC RX 637 (ALT 250 FOR IP): Performed by: HOSPITALIST

## 2019-09-27 PROCEDURE — 82962 GLUCOSE BLOOD TEST: CPT

## 2019-09-27 PROCEDURE — 83735 ASSAY OF MAGNESIUM: CPT

## 2019-09-27 PROCEDURE — 94761 N-INVAS EAR/PLS OXIMETRY MLT: CPT

## 2019-09-27 PROCEDURE — 2580000003 HC RX 258: Performed by: HOSPITALIST

## 2019-09-27 PROCEDURE — 84100 ASSAY OF PHOSPHORUS: CPT

## 2019-09-27 PROCEDURE — 94660 CPAP INITIATION&MGMT: CPT

## 2019-09-27 PROCEDURE — 94640 AIRWAY INHALATION TREATMENT: CPT

## 2019-09-27 RX ORDER — CEFTRIAXONE 1 G/1
2 INJECTION, POWDER, FOR SOLUTION INTRAMUSCULAR; INTRAVENOUS EVERY 24 HOURS
Qty: 14 G | Refills: 0 | Status: SHIPPED | OUTPATIENT
Start: 2019-09-28 | End: 2019-10-05

## 2019-09-27 RX ORDER — ATORVASTATIN CALCIUM 80 MG/1
80 TABLET, FILM COATED ORAL NIGHTLY
Qty: 30 TABLET | Refills: 3 | Status: ON HOLD | OUTPATIENT
Start: 2019-09-27 | End: 2020-01-01 | Stop reason: SDUPTHER

## 2019-09-27 RX ADMIN — ASPIRIN 81 MG: 81 TABLET, COATED ORAL at 08:24

## 2019-09-27 RX ADMIN — CLOPIDOGREL BISULFATE 75 MG: 75 TABLET ORAL at 08:24

## 2019-09-27 RX ADMIN — Medication 10 ML: at 08:25

## 2019-09-27 RX ADMIN — SODIUM CHLORIDE 3 G: 900 INJECTION INTRAVENOUS at 11:01

## 2019-09-27 RX ADMIN — LINAGLIPTIN 5 MG: 5 TABLET, FILM COATED ORAL at 08:24

## 2019-09-27 RX ADMIN — FUROSEMIDE 40 MG: 40 TABLET ORAL at 08:24

## 2019-09-27 RX ADMIN — IPRATROPIUM BROMIDE AND ALBUTEROL SULFATE 3 ML: .5; 3 SOLUTION RESPIRATORY (INHALATION) at 07:48

## 2019-09-27 RX ADMIN — METOPROLOL TARTRATE 25 MG: 25 TABLET ORAL at 08:24

## 2019-09-27 RX ADMIN — PRAMIPEXOLE DIHYDROCHLORIDE 1 MG: 1 TABLET ORAL at 08:24

## 2019-09-27 RX ADMIN — SODIUM CHLORIDE 3 G: 900 INJECTION INTRAVENOUS at 05:08

## 2019-09-27 RX ADMIN — PANTOPRAZOLE SODIUM 40 MG: 40 TABLET, DELAYED RELEASE ORAL at 08:24

## 2019-09-27 RX ADMIN — ENOXAPARIN SODIUM 40 MG: 40 INJECTION SUBCUTANEOUS at 08:24

## 2019-09-27 RX ADMIN — CEFTRIAXONE 2 G: 1 INJECTION, POWDER, FOR SOLUTION INTRAMUSCULAR; INTRAVENOUS at 11:01

## 2019-09-27 NOTE — PROGRESS NOTES
09/26/19 2207   NIV Type   Equipment Type v60   Mode Bilevel   Mask Type Full face mask   Mask Size Large   Settings/Measurements   IPAP 15 cmH20   CPAP/EPAP 6 cmH2O   Resp 15   FiO2  40 %   I Time/ I Time % 1.1 s   Vt Exhaled 446 mL   Mask Leak (lpm) 4 lpm   Using Accessory Muscles No   SpO2 99   Alarm Settings   Alarms On Y   Press Low Alarm 3 cmH2O   High Pressure Alarm 20 cmH2O   Resp Rate Low Alarm 12   High Respiratory Rate 40 br/min

## 2019-09-27 NOTE — DISCHARGE SUMMARY
Discharge Summary    Name: Amber Kearney  :     MRN:  [id number]    Primary Care Doctor:  Eri Michele APRN - NP  Admit date:  2019  Discharge date:   19    Admitting Physician: Zuleyma Rizvi MD   Discharge Physician: Manjeet Guaman MD    Reason for admission:  Below copied from H&P and no change required. \" Amber Kearney is a 60 y.o.  female with multiple comorbidities, diabetes mellitus type 2, severe obesity, severe aortic stenosis status post TAVR, fatty liver, obstructive sleep apnea on BiPAP, restless leg syndrome, C. difficile colitis 2019 was hospitalized [facility] 2019 had shock C. difficile colitis and sepsis discharged to skilled nursing facility. Patient was discharged home 1 week ago. She was doing well. Her  reported that patient was not doing well the day of presentation. She had generalized weakness, shortness of breath, fever documented at 101 °F at home and could not get up from couch. Patient was brought to emergency room by ambulance. At the time my evaluation patient was on BiPAP FiO2 35% IPAP 12 and EPAP 6. She received 2 L normal saline bolus. MAP was 55 mmHg. Femoral central line was placed and she is about to be started on levophed. The ER temperature was 102.7 degree Fahrenheit. \"    Diagnosis / Hospital Course:       · Septic shock, with Group G streptococcus bacteremia, initially suspected pneumonia due to possible GNRs and MRSA, although there was no apparent infiltrate on initial CXR. Other potential entry is her sacral wound which shows no apparent cellulitis. She was treated empirically with zosyn and vancomycin initially with improvement. Antibiotcis was then de-escalated to Unasyn alone, targeting Group G streptococcus and possibly anaerobes, then was switched to rocephin 2g iv q24h to complete 2 weeks course. She had no evidence of endocarditis.     · Acute on chronic hypoxic respiratory failure with COPD and obesity hypoventilation. CXR did not show acute process. Continue O2 and nocturnal bipap. She did not receive steroid during this admission. · Acute metabolic encephalopathy from above, resolved. · Elevated trop, suspect type II ischemia. PE is less likely given the presence of pneumonia and bacteremia. Will hold off on further PE studies, especially she did not fit CT.   · Diabetes mellitus on insulin. Continue current Lantus and Humalog. · Sacral ulcer/wound, present on admission. Continue wound care per wound care team recommendation. Gave opioid in hospital for pain control. · Morbid obesity. Needs life style modifications as outpt. F/u with PCP. May benefit from bariatric surgery in the future. .     Condition at the time of discharge:  Stable    Disposition:  Home with HC. Discharge FOLLOW UP  THEODORE Mittal - SAVANNAH  [facility]  [phone]    Schedule an appointment as soon as possible for a visit in 4 days      Wild Avilez MD  [address] [address]  [phone]    In 2 weeks      Toney Mills MD  [place] [address], [address]  [address] [facility]  [phone]    Schedule an appointment as soon as possible for a visit in 3 weeks      Physical Examination upon discharge:   Pt was personally examined by me on the day of discharge with the following findings:  BP 94/72   Pulse 74   Temp 98.1 °F (36.7 °C) (Oral)   Resp 14   Ht 5' 6\" (1.676 m)   Wt (!) 432 lb 6.4 oz (196.1 kg)   SpO2 97%   BMI 69.79 kg/m²   General: The patient appears as stated age, morbidly obese. Mental status: Alert, Oriented x3. Coherent. No agitation. Eyes: ALVINA. Normal conjunctiva. ENT/Mouth: normal appearing jaw and neck, no neck nodes or sinus tenderness. Clear oropharynx with moist mucous membrane.    Cardiovascular:  normal rate, regular rhythm, normal S1, S2, no murmurs, rubs,

## 2019-09-27 NOTE — PROGRESS NOTES
09/27/19 0126   NIV Type   Equipment Type v60   Mode Bilevel   Mask Type Full face mask   Mask Size Large   Settings/Measurements   IPAP 15 cmH20   CPAP/EPAP 6 cmH2O   Resp 15   FiO2  40 %   I Time/ I Time % 1.1 s   Vt Exhaled 571 mL   Mask Leak (lpm) 53 lpm   Using Accessory Muscles No   SpO2 100   Alarm Settings   Alarms On Y   Press Low Alarm 3 cmH2O   High Pressure Alarm 20 cmH2O   Apnea (secs) 20 secs   Resp Rate Low Alarm 12   High Respiratory Rate 40 br/min

## 2019-10-29 ENCOUNTER — APPOINTMENT (OUTPATIENT)
Dept: GENERAL RADIOLOGY | Age: 64
DRG: 720 | End: 2019-10-29
Payer: COMMERCIAL

## 2019-10-29 ENCOUNTER — HOSPITAL ENCOUNTER (INPATIENT)
Age: 64
LOS: 6 days | Discharge: HOME HEALTH | DRG: 720 | End: 2019-11-04
Attending: EMERGENCY MEDICINE | Admitting: INTERNAL MEDICINE
Payer: COMMERCIAL

## 2019-10-29 ENCOUNTER — APPOINTMENT (OUTPATIENT)
Dept: CT IMAGING | Age: 64
DRG: 720 | End: 2019-10-29
Payer: COMMERCIAL

## 2019-10-29 DIAGNOSIS — N39.0 URINARY TRACT INFECTION WITHOUT HEMATURIA, SITE UNSPECIFIED: ICD-10-CM

## 2019-10-29 DIAGNOSIS — E83.51 HYPOCALCEMIA: ICD-10-CM

## 2019-10-29 DIAGNOSIS — E83.42 HYPOMAGNESEMIA: ICD-10-CM

## 2019-10-29 DIAGNOSIS — E87.6 HYPOKALEMIA: ICD-10-CM

## 2019-10-29 DIAGNOSIS — R41.82 ALTERED MENTAL STATUS, UNSPECIFIED ALTERED MENTAL STATUS TYPE: Primary | ICD-10-CM

## 2019-10-29 DIAGNOSIS — A41.9 SEPSIS, DUE TO UNSPECIFIED ORGANISM, UNSPECIFIED WHETHER ACUTE ORGAN DYSFUNCTION PRESENT (HCC): ICD-10-CM

## 2019-10-29 DIAGNOSIS — D64.9 ANEMIA, UNSPECIFIED TYPE: ICD-10-CM

## 2019-10-29 LAB
ALBUMIN SERPL-MCNC: 2.3 GM/DL (ref 3.4–5)
ALP BLD-CCNC: 71 IU/L (ref 40–129)
ALT SERPL-CCNC: 6 U/L (ref 10–40)
ANION GAP SERPL CALCULATED.3IONS-SCNC: 8 MMOL/L (ref 4–16)
AST SERPL-CCNC: 10 IU/L (ref 15–37)
BACTERIA: NEGATIVE /HPF
BASE EXCESS MIXED: ABNORMAL (ref 0–2.3)
BASOPHILS ABSOLUTE: 0 K/CU MM
BASOPHILS RELATIVE PERCENT: 0.3 % (ref 0–1)
BILIRUB SERPL-MCNC: 0.1 MG/DL (ref 0–1)
BILIRUBIN URINE: NEGATIVE MG/DL
BLOOD, URINE: ABNORMAL
BUN BLDV-MCNC: 31 MG/DL (ref 6–23)
CALCIUM IONIZED: 3.44 MG/DL (ref 4.48–5.28)
CALCIUM SERPL-MCNC: ABNORMAL MG/DL (ref 8.3–10.6)
CHLORIDE BLD-SCNC: 106 MMOL/L (ref 99–110)
CLARITY: ABNORMAL
CO2: 21 MMOL/L (ref 21–32)
COLOR: YELLOW
COMMENT: ABNORMAL
CREAT SERPL-MCNC: 1.1 MG/DL (ref 0.6–1.1)
DIFFERENTIAL TYPE: ABNORMAL
EOSINOPHILS ABSOLUTE: 0.1 K/CU MM
EOSINOPHILS RELATIVE PERCENT: 0.9 % (ref 0–3)
GFR AFRICAN AMERICAN: >60 ML/MIN/1.73M2
GFR NON-AFRICAN AMERICAN: 50 ML/MIN/1.73M2
GLUCOSE BLD-MCNC: 127 MG/DL (ref 70–99)
GLUCOSE BLD-MCNC: 184 MG/DL (ref 70–99)
GLUCOSE, URINE: NEGATIVE MG/DL
HCO3 VENOUS: 32.5 MMOL/L (ref 19–25)
HCT VFR BLD CALC: 22.8 % (ref 37–47)
HEMOGLOBIN: ABNORMAL GM/DL (ref 12.5–16)
IMMATURE NEUTROPHIL %: 0.6 % (ref 0–0.43)
INR BLD: 1.11 INDEX
IONIZED CA: 0.86 MMOL/L (ref 1.12–1.32)
KETONES, URINE: NEGATIVE MG/DL
LACTATE: 0.9 MMOL/L (ref 0.4–2)
LEUKOCYTE ESTERASE, URINE: ABNORMAL
LYMPHOCYTES ABSOLUTE: 0.8 K/CU MM
LYMPHOCYTES RELATIVE PERCENT: 9.7 % (ref 24–44)
MAGNESIUM: 1.5 MG/DL (ref 1.8–2.4)
MCH RBC QN AUTO: 25.4 PG (ref 27–31)
MCHC RBC AUTO-ENTMCNC: 28.1 % (ref 32–36)
MCV RBC AUTO: 90.5 FL (ref 78–100)
MONOCYTES ABSOLUTE: 0.4 K/CU MM
MONOCYTES RELATIVE PERCENT: 4.3 % (ref 0–4)
NITRITE URINE, QUANTITATIVE: NEGATIVE
NUCLEATED RBC %: 0 %
O2 SAT, VEN: 94.5 % (ref 50–70)
PCO2, VEN: 87 MMHG (ref 38–52)
PDW BLD-RTO: 17.2 % (ref 11.7–14.9)
PH VENOUS: 7.18 (ref 7.32–7.42)
PH, URINE: 5 (ref 5–8)
PLATELET # BLD: 192 K/CU MM (ref 140–440)
PMV BLD AUTO: 9.7 FL (ref 7.5–11.1)
PO2, VEN: 138 MMHG (ref 28–48)
POTASSIUM SERPL-SCNC: ABNORMAL MMOL/L (ref 3.5–5.1)
PRO-BNP: 751.8 PG/ML
PROTEIN UA: 100 MG/DL
PROTHROMBIN TIME: 12.9 SECONDS (ref 11.7–14.5)
RBC # BLD: 2.52 M/CU MM (ref 4.2–5.4)
RBC URINE: 31 /HPF (ref 0–6)
SEGMENTED NEUTROPHILS ABSOLUTE COUNT: 7.2 K/CU MM
SEGMENTED NEUTROPHILS RELATIVE PERCENT: 84.2 % (ref 36–66)
SODIUM BLD-SCNC: 135 MMOL/L (ref 135–145)
SPECIFIC GRAVITY UA: 1.02 (ref 1–1.03)
SQUAMOUS EPITHELIAL: 11 /HPF
TOTAL IMMATURE NEUTOROPHIL: 0.05 K/CU MM
TOTAL NUCLEATED RBC: 0 K/CU MM
TOTAL PROTEIN: 5.6 GM/DL (ref 6.4–8.2)
TRICHOMONAS: ABNORMAL /HPF
TROPONIN T: 0.04 NG/ML
UROBILINOGEN, URINE: NORMAL MG/DL (ref 0.2–1)
WBC # BLD: 8.6 K/CU MM (ref 4–10.5)
WBC UA: 1301 /HPF (ref 0–5)

## 2019-10-29 PROCEDURE — 85610 PROTHROMBIN TIME: CPT

## 2019-10-29 PROCEDURE — 82805 BLOOD GASES W/O2 SATURATION: CPT

## 2019-10-29 PROCEDURE — 87186 SC STD MICRODIL/AGAR DIL: CPT

## 2019-10-29 PROCEDURE — 82330 ASSAY OF CALCIUM: CPT

## 2019-10-29 PROCEDURE — 6360000002 HC RX W HCPCS: Performed by: PHYSICIAN ASSISTANT

## 2019-10-29 PROCEDURE — 87077 CULTURE AEROBIC IDENTIFY: CPT

## 2019-10-29 PROCEDURE — 74018 RADEX ABDOMEN 1 VIEW: CPT

## 2019-10-29 PROCEDURE — 70450 CT HEAD/BRAIN W/O DYE: CPT

## 2019-10-29 PROCEDURE — 83735 ASSAY OF MAGNESIUM: CPT

## 2019-10-29 PROCEDURE — 80053 COMPREHEN METABOLIC PANEL: CPT

## 2019-10-29 PROCEDURE — 84145 PROCALCITONIN (PCT): CPT

## 2019-10-29 PROCEDURE — 93005 ELECTROCARDIOGRAM TRACING: CPT | Performed by: PHYSICIAN ASSISTANT

## 2019-10-29 PROCEDURE — 86901 BLOOD TYPING SEROLOGIC RH(D): CPT

## 2019-10-29 PROCEDURE — 71045 X-RAY EXAM CHEST 1 VIEW: CPT

## 2019-10-29 PROCEDURE — 94761 N-INVAS EAR/PLS OXIMETRY MLT: CPT

## 2019-10-29 PROCEDURE — 86850 RBC ANTIBODY SCREEN: CPT

## 2019-10-29 PROCEDURE — 96367 TX/PROPH/DG ADDL SEQ IV INF: CPT

## 2019-10-29 PROCEDURE — 83880 ASSAY OF NATRIURETIC PEPTIDE: CPT

## 2019-10-29 PROCEDURE — P9016 RBC LEUKOCYTES REDUCED: HCPCS

## 2019-10-29 PROCEDURE — 86922 COMPATIBILITY TEST ANTIGLOB: CPT

## 2019-10-29 PROCEDURE — 85025 COMPLETE CBC W/AUTO DIFF WBC: CPT

## 2019-10-29 PROCEDURE — 82962 GLUCOSE BLOOD TEST: CPT

## 2019-10-29 PROCEDURE — 6360000002 HC RX W HCPCS: Performed by: INTERNAL MEDICINE

## 2019-10-29 PROCEDURE — 87040 BLOOD CULTURE FOR BACTERIA: CPT

## 2019-10-29 PROCEDURE — 99291 CRITICAL CARE FIRST HOUR: CPT

## 2019-10-29 PROCEDURE — 86900 BLOOD TYPING SEROLOGIC ABO: CPT

## 2019-10-29 PROCEDURE — 2700000000 HC OXYGEN THERAPY PER DAY

## 2019-10-29 PROCEDURE — 2500000003 HC RX 250 WO HCPCS: Performed by: EMERGENCY MEDICINE

## 2019-10-29 PROCEDURE — 96365 THER/PROPH/DIAG IV INF INIT: CPT

## 2019-10-29 PROCEDURE — 2580000003 HC RX 258: Performed by: EMERGENCY MEDICINE

## 2019-10-29 PROCEDURE — 81001 URINALYSIS AUTO W/SCOPE: CPT

## 2019-10-29 PROCEDURE — 2000000000 HC ICU R&B

## 2019-10-29 PROCEDURE — 87086 URINE CULTURE/COLONY COUNT: CPT

## 2019-10-29 PROCEDURE — 2580000003 HC RX 258: Performed by: PHYSICIAN ASSISTANT

## 2019-10-29 PROCEDURE — 84484 ASSAY OF TROPONIN QUANT: CPT

## 2019-10-29 PROCEDURE — 96361 HYDRATE IV INFUSION ADD-ON: CPT

## 2019-10-29 PROCEDURE — 94660 CPAP INITIATION&MGMT: CPT

## 2019-10-29 PROCEDURE — 96375 TX/PRO/DX INJ NEW DRUG ADDON: CPT

## 2019-10-29 PROCEDURE — 83605 ASSAY OF LACTIC ACID: CPT

## 2019-10-29 RX ORDER — POTASSIUM CHLORIDE 7.45 MG/ML
10 INJECTION INTRAVENOUS ONCE
Status: COMPLETED | OUTPATIENT
Start: 2019-10-29 | End: 2019-10-29

## 2019-10-29 RX ORDER — DEXTROSE MONOHYDRATE 25 G/50ML
12.5 INJECTION, SOLUTION INTRAVENOUS PRN
Status: DISCONTINUED | OUTPATIENT
Start: 2019-10-29 | End: 2019-11-04 | Stop reason: HOSPADM

## 2019-10-29 RX ORDER — CALCIUM CHLORIDE 100 MG/ML
1 INJECTION INTRAVENOUS; INTRAVENTRICULAR ONCE
Status: DISCONTINUED | OUTPATIENT
Start: 2019-10-29 | End: 2019-10-29

## 2019-10-29 RX ORDER — MAGNESIUM SULFATE IN WATER 40 MG/ML
2 INJECTION, SOLUTION INTRAVENOUS ONCE
Status: COMPLETED | OUTPATIENT
Start: 2019-10-29 | End: 2019-10-29

## 2019-10-29 RX ORDER — POTASSIUM CHLORIDE 7.45 MG/ML
10 INJECTION INTRAVENOUS ONCE
Status: COMPLETED | OUTPATIENT
Start: 2019-10-30 | End: 2019-10-30

## 2019-10-29 RX ORDER — POTASSIUM CHLORIDE 7.45 MG/ML
10 INJECTION INTRAVENOUS PRN
Status: DISCONTINUED | OUTPATIENT
Start: 2019-10-29 | End: 2019-11-04 | Stop reason: HOSPADM

## 2019-10-29 RX ORDER — POTASSIUM CHLORIDE 7.45 MG/ML
40 INJECTION INTRAVENOUS ONCE
Status: DISCONTINUED | OUTPATIENT
Start: 2019-10-29 | End: 2019-10-29 | Stop reason: CLARIF

## 2019-10-29 RX ORDER — VANCOMYCIN HYDROCHLORIDE 1 G/200ML
1000 INJECTION, SOLUTION INTRAVENOUS ONCE
Status: COMPLETED | OUTPATIENT
Start: 2019-10-30 | End: 2019-10-30

## 2019-10-29 RX ORDER — CALCIUM GLUCONATE 94 MG/ML
1 INJECTION, SOLUTION INTRAVENOUS ONCE
Status: COMPLETED | OUTPATIENT
Start: 2019-10-29 | End: 2019-10-29

## 2019-10-29 RX ORDER — MAGNESIUM SULFATE 1 G/100ML
1 INJECTION INTRAVENOUS PRN
Status: DISCONTINUED | OUTPATIENT
Start: 2019-10-29 | End: 2019-11-04 | Stop reason: HOSPADM

## 2019-10-29 RX ORDER — ONDANSETRON 2 MG/ML
4 INJECTION INTRAMUSCULAR; INTRAVENOUS EVERY 6 HOURS PRN
Status: DISCONTINUED | OUTPATIENT
Start: 2019-10-29 | End: 2019-11-04 | Stop reason: HOSPADM

## 2019-10-29 RX ORDER — PANTOPRAZOLE SODIUM 40 MG/10ML
40 INJECTION, POWDER, LYOPHILIZED, FOR SOLUTION INTRAVENOUS DAILY
Status: DISCONTINUED | OUTPATIENT
Start: 2019-10-30 | End: 2019-10-30

## 2019-10-29 RX ORDER — DEXTROSE MONOHYDRATE 50 MG/ML
100 INJECTION, SOLUTION INTRAVENOUS PRN
Status: DISCONTINUED | OUTPATIENT
Start: 2019-10-29 | End: 2019-11-04 | Stop reason: HOSPADM

## 2019-10-29 RX ORDER — ACETAMINOPHEN 325 MG/1
650 TABLET ORAL EVERY 4 HOURS PRN
Status: DISCONTINUED | OUTPATIENT
Start: 2019-10-29 | End: 2019-11-04 | Stop reason: HOSPADM

## 2019-10-29 RX ORDER — 0.9 % SODIUM CHLORIDE 0.9 %
250 INTRAVENOUS SOLUTION INTRAVENOUS ONCE
Status: COMPLETED | OUTPATIENT
Start: 2019-10-29 | End: 2019-10-30

## 2019-10-29 RX ORDER — IPRATROPIUM BROMIDE AND ALBUTEROL SULFATE 2.5; .5 MG/3ML; MG/3ML
1 SOLUTION RESPIRATORY (INHALATION) 4 TIMES DAILY
Status: DISCONTINUED | OUTPATIENT
Start: 2019-10-30 | End: 2019-10-30

## 2019-10-29 RX ORDER — SODIUM CHLORIDE 9 MG/ML
INJECTION, SOLUTION INTRAVENOUS
Status: DISPENSED
Start: 2019-10-29 | End: 2019-10-30

## 2019-10-29 RX ORDER — 0.9 % SODIUM CHLORIDE 0.9 %
1000 INTRAVENOUS SOLUTION INTRAVENOUS ONCE
Status: COMPLETED | OUTPATIENT
Start: 2019-10-29 | End: 2019-10-29

## 2019-10-29 RX ORDER — NICOTINE POLACRILEX 4 MG
15 LOZENGE BUCCAL PRN
Status: DISCONTINUED | OUTPATIENT
Start: 2019-10-29 | End: 2019-11-04 | Stop reason: HOSPADM

## 2019-10-29 RX ORDER — SODIUM CHLORIDE 0.9 % (FLUSH) 0.9 %
10 SYRINGE (ML) INJECTION PRN
Status: DISCONTINUED | OUTPATIENT
Start: 2019-10-29 | End: 2019-11-04 | Stop reason: HOSPADM

## 2019-10-29 RX ORDER — SODIUM CHLORIDE 0.9 % (FLUSH) 0.9 %
10 SYRINGE (ML) INJECTION EVERY 12 HOURS SCHEDULED
Status: DISCONTINUED | OUTPATIENT
Start: 2019-10-29 | End: 2019-11-04 | Stop reason: HOSPADM

## 2019-10-29 RX ORDER — VANCOMYCIN HYDROCHLORIDE 1 G/200ML
1000 INJECTION, SOLUTION INTRAVENOUS ONCE
Status: COMPLETED | OUTPATIENT
Start: 2019-10-29 | End: 2019-10-29

## 2019-10-29 RX ORDER — CLOPIDOGREL BISULFATE 75 MG/1
75 TABLET ORAL DAILY
Status: CANCELLED | OUTPATIENT
Start: 2019-10-30

## 2019-10-29 RX ORDER — POTASSIUM CHLORIDE 7.45 MG/ML
10 INJECTION INTRAVENOUS ONCE
Status: COMPLETED | OUTPATIENT
Start: 2019-10-29 | End: 2019-10-30

## 2019-10-29 RX ADMIN — CALCIUM GLUCONATE 1 G: 98 INJECTION, SOLUTION INTRAVENOUS at 21:27

## 2019-10-29 RX ADMIN — MAGNESIUM SULFATE HEPTAHYDRATE 2 G: 40 INJECTION, SOLUTION INTRAVENOUS at 21:33

## 2019-10-29 RX ADMIN — POTASSIUM CHLORIDE 10 MEQ: 7.46 INJECTION, SOLUTION INTRAVENOUS at 22:55

## 2019-10-29 RX ADMIN — SODIUM CHLORIDE 250 ML: 9 INJECTION, SOLUTION INTRAVENOUS at 20:22

## 2019-10-29 RX ADMIN — Medication 5 MCG/MIN: at 20:31

## 2019-10-29 RX ADMIN — CEFEPIME 2 G: 2 INJECTION, POWDER, FOR SOLUTION INTRAVENOUS at 20:13

## 2019-10-29 RX ADMIN — POTASSIUM CHLORIDE 10 MEQ: 7.46 INJECTION, SOLUTION INTRAVENOUS at 23:00

## 2019-10-29 RX ADMIN — SODIUM CHLORIDE 1000 ML: 9 INJECTION, SOLUTION INTRAVENOUS at 18:34

## 2019-10-29 RX ADMIN — VANCOMYCIN HYDROCHLORIDE 1000 MG: 1 INJECTION, SOLUTION INTRAVENOUS at 21:15

## 2019-10-29 RX ADMIN — POTASSIUM CHLORIDE 10 MEQ: 7.46 INJECTION, SOLUTION INTRAVENOUS at 21:23

## 2019-10-30 ENCOUNTER — APPOINTMENT (OUTPATIENT)
Dept: GENERAL RADIOLOGY | Age: 64
DRG: 720 | End: 2019-10-30
Payer: COMMERCIAL

## 2019-10-30 ENCOUNTER — ANESTHESIA EVENT (OUTPATIENT)
Dept: ICU | Age: 64
DRG: 720 | End: 2019-10-30
Payer: COMMERCIAL

## 2019-10-30 ENCOUNTER — ANESTHESIA (OUTPATIENT)
Dept: ICU | Age: 64
DRG: 720 | End: 2019-10-30
Payer: COMMERCIAL

## 2019-10-30 LAB
ADENOVIRUS DETECTION BY PCR: NOT DETECTED
ALBUMIN SERPL-MCNC: 3.2 GM/DL (ref 3.4–5)
ALP BLD-CCNC: 117 IU/L (ref 40–129)
ALT SERPL-CCNC: 11 U/L (ref 10–40)
ANION GAP SERPL CALCULATED.3IONS-SCNC: 11 MMOL/L (ref 4–16)
AST SERPL-CCNC: 18 IU/L (ref 15–37)
BANDED NEUTROPHILS ABSOLUTE COUNT: 1.28 K/CU MM
BANDED NEUTROPHILS RELATIVE PERCENT: 7 % (ref 5–11)
BASE EXCESS MIXED: ABNORMAL (ref 0–2.3)
BASE EXCESS: ABNORMAL (ref 0–2.4)
BILIRUB SERPL-MCNC: 0.4 MG/DL (ref 0–1)
BORDETELLA PERTUSSIS PCR: NOT DETECTED
BUN BLDV-MCNC: 43 MG/DL (ref 6–23)
CALCIUM IONIZED: 4.16 MG/DL (ref 4.48–5.28)
CALCIUM SERPL-MCNC: 8.2 MG/DL (ref 8.3–10.6)
CARBON MONOXIDE, BLOOD: 2 % (ref 0–5)
CARBON MONOXIDE, BLOOD: 2.3 % (ref 0–5)
CHLAMYDOPHILA PNEUMONIA PCR: NOT DETECTED
CHLORIDE BLD-SCNC: 92 MMOL/L (ref 99–110)
CO2 CONTENT: 27.9 MMOL/L (ref 19–24)
CO2 CONTENT: 29.7 MMOL/L (ref 19–24)
CO2: 28 MMOL/L (ref 21–32)
COMMENT: ABNORMAL
CORONAVIRUS 229E PCR: NOT DETECTED
CORONAVIRUS HKU1 PCR: NOT DETECTED
CORONAVIRUS NL63 PCR: NOT DETECTED
CORONAVIRUS OC43 PCR: NOT DETECTED
CORTISOL - AM: 18 UG/DL (ref 6–18.4)
CREAT SERPL-MCNC: 1.5 MG/DL (ref 0.6–1.1)
DIFFERENTIAL TYPE: ABNORMAL
ESTIMATED AVERAGE GLUCOSE: 192 MG/DL
GFR AFRICAN AMERICAN: 43 ML/MIN/1.73M2
GFR NON-AFRICAN AMERICAN: 35 ML/MIN/1.73M2
GLUCOSE BLD-MCNC: 187 MG/DL (ref 70–99)
GLUCOSE BLD-MCNC: 206 MG/DL (ref 70–99)
GLUCOSE BLD-MCNC: 210 MG/DL (ref 70–99)
GLUCOSE BLD-MCNC: 217 MG/DL (ref 70–99)
GLUCOSE BLD-MCNC: 221 MG/DL (ref 70–99)
GLUCOSE BLD-MCNC: 224 MG/DL (ref 70–99)
GLUCOSE BLD-MCNC: 232 MG/DL (ref 70–99)
HBA1C MFR BLD: 8.3 % (ref 4.2–6.3)
HCO3 ARTERIAL: 26.4 MMOL/L (ref 18–23)
HCO3 ARTERIAL: 27.9 MMOL/L (ref 18–23)
HCT VFR BLD CALC: 28.9 % (ref 37–47)
HCT VFR BLD CALC: 30.2 % (ref 37–47)
HCT VFR BLD CALC: 31.4 % (ref 37–47)
HEMOGLOBIN: 8.4 GM/DL (ref 12.5–16)
HEMOGLOBIN: 9 GM/DL (ref 12.5–16)
HEMOGLOBIN: ABNORMAL GM/DL (ref 12.5–16)
HUMAN METAPNEUMOVIRUS PCR: NOT DETECTED
INFLUENZA A BY PCR: NOT DETECTED
INFLUENZA A H1 (2009) PCR: NOT DETECTED
INFLUENZA A H1 PANDEMIC PCR: NOT DETECTED
INFLUENZA A H3 PCR: NOT DETECTED
INFLUENZA B BY PCR: NOT DETECTED
IONIZED CA: 1.04 MMOL/L (ref 1.12–1.32)
LACTATE: 1 MMOL/L (ref 0.4–2)
LACTATE: 1.3 MMOL/L (ref 0.4–2)
LACTATE: 1.7 MMOL/L (ref 0.4–2)
LYMPHOCYTES ABSOLUTE: 2 K/CU MM
LYMPHOCYTES RELATIVE PERCENT: 11 % (ref 24–44)
MAGNESIUM: 2.2 MG/DL (ref 1.8–2.4)
MCH RBC QN AUTO: 25.6 PG (ref 27–31)
MCHC RBC AUTO-ENTMCNC: 29.1 % (ref 32–36)
MCV RBC AUTO: 88.1 FL (ref 78–100)
METHEMOGLOBIN ARTERIAL: 0.9 %
METHEMOGLOBIN ARTERIAL: 1.2 %
MONOCYTES ABSOLUTE: 1.1 K/CU MM
MONOCYTES RELATIVE PERCENT: 6 % (ref 0–4)
MYCOPLASMA PNEUMONIAE PCR: NOT DETECTED
O2 SATURATION: 94.1 % (ref 96–97)
O2 SATURATION: 96.9 % (ref 96–97)
PARAINFLUENZA 1 PCR: NOT DETECTED
PARAINFLUENZA 2 PCR: NOT DETECTED
PARAINFLUENZA 3 PCR: NOT DETECTED
PARAINFLUENZA 4 PCR: NOT DETECTED
PCO2 ARTERIAL: 49 MMHG (ref 32–45)
PCO2 ARTERIAL: 58 MMHG (ref 32–45)
PDW BLD-RTO: 17.2 % (ref 11.7–14.9)
PH BLOOD: 7.29 (ref 7.34–7.45)
PH BLOOD: 7.34 (ref 7.34–7.45)
PHOSPHORUS: 6.2 MG/DL (ref 2.5–4.9)
PLATELET # BLD: 297 K/CU MM (ref 140–440)
PMV BLD AUTO: 10 FL (ref 7.5–11.1)
PO2 ARTERIAL: 226 MMHG (ref 75–100)
PO2 ARTERIAL: 69 MMHG (ref 75–100)
POTASSIUM SERPL-SCNC: 4.2 MMOL/L (ref 3.5–5.1)
PRO-BNP: 1115 PG/ML
RBC # BLD: 3.28 M/CU MM (ref 4.2–5.4)
RBC # BLD: ABNORMAL 10*6/UL
RHINOVIRUS ENTEROVIRUS PCR: NOT DETECTED
RSV PCR: NOT DETECTED
SEGMENTED NEUTROPHILS ABSOLUTE COUNT: 13.9 K/CU MM
SEGMENTED NEUTROPHILS RELATIVE PERCENT: 76 % (ref 36–66)
SODIUM BLD-SCNC: 131 MMOL/L (ref 135–145)
TOTAL CK: 69 IU/L (ref 26–140)
TOTAL PROTEIN: 7.2 GM/DL (ref 6.4–8.2)
TROPONIN T: 0.04 NG/ML
WBC # BLD: 18.3 K/CU MM (ref 4–10.5)

## 2019-10-30 PROCEDURE — 83880 ASSAY OF NATRIURETIC PEPTIDE: CPT

## 2019-10-30 PROCEDURE — 83735 ASSAY OF MAGNESIUM: CPT

## 2019-10-30 PROCEDURE — 6370000000 HC RX 637 (ALT 250 FOR IP)

## 2019-10-30 PROCEDURE — 87070 CULTURE OTHR SPECIMN AEROBIC: CPT

## 2019-10-30 PROCEDURE — 2580000003 HC RX 258: Performed by: EMERGENCY MEDICINE

## 2019-10-30 PROCEDURE — 87581 M.PNEUMON DNA AMP PROBE: CPT

## 2019-10-30 PROCEDURE — 94761 N-INVAS EAR/PLS OXIMETRY MLT: CPT

## 2019-10-30 PROCEDURE — 2500000003 HC RX 250 WO HCPCS: Performed by: EMERGENCY MEDICINE

## 2019-10-30 PROCEDURE — 6360000002 HC RX W HCPCS: Performed by: INTERNAL MEDICINE

## 2019-10-30 PROCEDURE — 82803 BLOOD GASES ANY COMBINATION: CPT

## 2019-10-30 PROCEDURE — 6370000000 HC RX 637 (ALT 250 FOR IP): Performed by: INTERNAL MEDICINE

## 2019-10-30 PROCEDURE — 85027 COMPLETE CBC AUTOMATED: CPT

## 2019-10-30 PROCEDURE — 83036 HEMOGLOBIN GLYCOSYLATED A1C: CPT

## 2019-10-30 PROCEDURE — 85014 HEMATOCRIT: CPT

## 2019-10-30 PROCEDURE — 83605 ASSAY OF LACTIC ACID: CPT

## 2019-10-30 PROCEDURE — 5A1945Z RESPIRATORY VENTILATION, 24-96 CONSECUTIVE HOURS: ICD-10-PCS | Performed by: INTERNAL MEDICINE

## 2019-10-30 PROCEDURE — 51702 INSERT TEMP BLADDER CATH: CPT

## 2019-10-30 PROCEDURE — 82533 TOTAL CORTISOL: CPT

## 2019-10-30 PROCEDURE — 84132 ASSAY OF SERUM POTASSIUM: CPT

## 2019-10-30 PROCEDURE — 2580000003 HC RX 258: Performed by: INTERNAL MEDICINE

## 2019-10-30 PROCEDURE — 6360000002 HC RX W HCPCS: Performed by: NURSE ANESTHETIST, CERTIFIED REGISTERED

## 2019-10-30 PROCEDURE — 80053 COMPREHEN METABOLIC PANEL: CPT

## 2019-10-30 PROCEDURE — 85018 HEMOGLOBIN: CPT

## 2019-10-30 PROCEDURE — 31720 CLEARANCE OF AIRWAYS: CPT

## 2019-10-30 PROCEDURE — 36592 COLLECT BLOOD FROM PICC: CPT

## 2019-10-30 PROCEDURE — 99254 IP/OBS CNSLTJ NEW/EST MOD 60: CPT | Performed by: INTERNAL MEDICINE

## 2019-10-30 PROCEDURE — 74018 RADEX ABDOMEN 1 VIEW: CPT

## 2019-10-30 PROCEDURE — 2000000000 HC ICU R&B

## 2019-10-30 PROCEDURE — 82962 GLUCOSE BLOOD TEST: CPT

## 2019-10-30 PROCEDURE — 2500000003 HC RX 250 WO HCPCS: Performed by: INTERNAL MEDICINE

## 2019-10-30 PROCEDURE — 82550 ASSAY OF CK (CPK): CPT

## 2019-10-30 PROCEDURE — 36430 TRANSFUSION BLD/BLD COMPNT: CPT

## 2019-10-30 PROCEDURE — 6360000002 HC RX W HCPCS: Performed by: NURSE PRACTITIONER

## 2019-10-30 PROCEDURE — C9113 INJ PANTOPRAZOLE SODIUM, VIA: HCPCS | Performed by: INTERNAL MEDICINE

## 2019-10-30 PROCEDURE — 6360000002 HC RX W HCPCS: Performed by: PHYSICIAN ASSISTANT

## 2019-10-30 PROCEDURE — 82330 ASSAY OF CALCIUM: CPT

## 2019-10-30 PROCEDURE — 93010 ELECTROCARDIOGRAM REPORT: CPT | Performed by: INTERNAL MEDICINE

## 2019-10-30 PROCEDURE — 89220 SPUTUM SPECIMEN COLLECTION: CPT

## 2019-10-30 PROCEDURE — 2580000003 HC RX 258: Performed by: NURSE PRACTITIONER

## 2019-10-30 PROCEDURE — 94660 CPAP INITIATION&MGMT: CPT

## 2019-10-30 PROCEDURE — 94002 VENT MGMT INPAT INIT DAY: CPT

## 2019-10-30 PROCEDURE — 84484 ASSAY OF TROPONIN QUANT: CPT

## 2019-10-30 PROCEDURE — 2500000003 HC RX 250 WO HCPCS: Performed by: NURSE ANESTHETIST, CERTIFIED REGISTERED

## 2019-10-30 PROCEDURE — 99291 CRITICAL CARE FIRST HOUR: CPT | Performed by: INTERNAL MEDICINE

## 2019-10-30 PROCEDURE — 87205 SMEAR GRAM STAIN: CPT

## 2019-10-30 PROCEDURE — 87798 DETECT AGENT NOS DNA AMP: CPT

## 2019-10-30 PROCEDURE — 2700000000 HC OXYGEN THERAPY PER DAY

## 2019-10-30 PROCEDURE — 87633 RESP VIRUS 12-25 TARGETS: CPT

## 2019-10-30 PROCEDURE — 36556 INSERT NON-TUNNEL CV CATH: CPT

## 2019-10-30 PROCEDURE — 85007 BL SMEAR W/DIFF WBC COUNT: CPT

## 2019-10-30 PROCEDURE — 36600 WITHDRAWAL OF ARTERIAL BLOOD: CPT

## 2019-10-30 PROCEDURE — 84100 ASSAY OF PHOSPHORUS: CPT

## 2019-10-30 PROCEDURE — 71045 X-RAY EXAM CHEST 1 VIEW: CPT

## 2019-10-30 PROCEDURE — 87486 CHLMYD PNEUM DNA AMP PROBE: CPT

## 2019-10-30 PROCEDURE — 0BH18EZ INSERTION OF ENDOTRACHEAL AIRWAY INTO TRACHEA, VIA NATURAL OR ARTIFICIAL OPENING ENDOSCOPIC: ICD-10-PCS | Performed by: INTERNAL MEDICINE

## 2019-10-30 PROCEDURE — 31500 INSERT EMERGENCY AIRWAY: CPT

## 2019-10-30 PROCEDURE — 94750 HC PULMONARY COMPLIANCE STUDY: CPT

## 2019-10-30 PROCEDURE — 94640 AIRWAY INHALATION TREATMENT: CPT

## 2019-10-30 PROCEDURE — 31500 INSERT EMERGENCY AIRWAY: CPT | Performed by: NURSE ANESTHETIST, CERTIFIED REGISTERED

## 2019-10-30 RX ORDER — ATORVASTATIN CALCIUM 40 MG/1
80 TABLET, FILM COATED ORAL NIGHTLY
Status: DISCONTINUED | OUTPATIENT
Start: 2019-10-30 | End: 2019-11-04 | Stop reason: HOSPADM

## 2019-10-30 RX ORDER — 0.9 % SODIUM CHLORIDE 0.9 %
500 INTRAVENOUS SOLUTION INTRAVENOUS ONCE
Status: DISCONTINUED | OUTPATIENT
Start: 2019-10-30 | End: 2019-10-30

## 2019-10-30 RX ORDER — FUROSEMIDE 10 MG/ML
20 INJECTION INTRAMUSCULAR; INTRAVENOUS 2 TIMES DAILY
Status: COMPLETED | OUTPATIENT
Start: 2019-10-30 | End: 2019-10-31

## 2019-10-30 RX ORDER — MIDAZOLAM HYDROCHLORIDE 1 MG/ML
INJECTION INTRAMUSCULAR; INTRAVENOUS PRN
Status: DISCONTINUED | OUTPATIENT
Start: 2019-10-30 | End: 2019-10-30 | Stop reason: SDUPTHER

## 2019-10-30 RX ORDER — DOPAMINE HYDROCHLORIDE 160 MG/100ML
2.5 INJECTION, SOLUTION INTRAVENOUS CONTINUOUS
Status: DISCONTINUED | OUTPATIENT
Start: 2019-10-30 | End: 2019-10-30

## 2019-10-30 RX ORDER — ACETAMINOPHEN 650 MG/1
SUPPOSITORY RECTAL
Status: COMPLETED
Start: 2019-10-30 | End: 2019-10-30

## 2019-10-30 RX ORDER — ASPIRIN 81 MG/1
81 TABLET ORAL DAILY
Status: DISCONTINUED | OUTPATIENT
Start: 2019-10-30 | End: 2019-10-30

## 2019-10-30 RX ORDER — CHLORHEXIDINE GLUCONATE 0.12 MG/ML
15 RINSE ORAL 2 TIMES DAILY
Status: DISCONTINUED | OUTPATIENT
Start: 2019-10-30 | End: 2019-11-02 | Stop reason: ALTCHOICE

## 2019-10-30 RX ORDER — ETOMIDATE 2 MG/ML
INJECTION INTRAVENOUS PRN
Status: DISCONTINUED | OUTPATIENT
Start: 2019-10-30 | End: 2019-10-30 | Stop reason: SDUPTHER

## 2019-10-30 RX ORDER — PROPOFOL 10 MG/ML
10 INJECTION, EMULSION INTRAVENOUS CONTINUOUS
Status: DISCONTINUED | OUTPATIENT
Start: 2019-10-30 | End: 2019-11-02 | Stop reason: ALTCHOICE

## 2019-10-30 RX ORDER — ACETAMINOPHEN 650 MG/1
650 SUPPOSITORY RECTAL EVERY 4 HOURS PRN
Status: DISCONTINUED | OUTPATIENT
Start: 2019-10-30 | End: 2019-11-04 | Stop reason: HOSPADM

## 2019-10-30 RX ORDER — ALBUTEROL SULFATE 90 UG/1
4 AEROSOL, METERED RESPIRATORY (INHALATION) EVERY 4 HOURS
Status: DISCONTINUED | OUTPATIENT
Start: 2019-10-30 | End: 2019-11-02

## 2019-10-30 RX ORDER — LACTOBACILLUS RHAMNOSUS GG 10B CELL
1 CAPSULE ORAL DAILY
Status: DISCONTINUED | OUTPATIENT
Start: 2019-10-30 | End: 2019-11-04 | Stop reason: HOSPADM

## 2019-10-30 RX ADMIN — Medication 10 ML: at 20:22

## 2019-10-30 RX ADMIN — Medication 4 PUFF: at 22:24

## 2019-10-30 RX ADMIN — ALBUTEROL SULFATE 4 PUFF: 90 AEROSOL, METERED RESPIRATORY (INHALATION) at 22:24

## 2019-10-30 RX ADMIN — ACETAMINOPHEN 650 MG: 650 SUPPOSITORY RECTAL at 17:53

## 2019-10-30 RX ADMIN — ACETAMINOPHEN 650 MG: 325 TABLET ORAL at 14:33

## 2019-10-30 RX ADMIN — Medication 21 MCG/MIN: at 12:24

## 2019-10-30 RX ADMIN — INSULIN LISPRO 2 UNITS: 100 INJECTION, SOLUTION INTRAVENOUS; SUBCUTANEOUS at 12:26

## 2019-10-30 RX ADMIN — INSULIN LISPRO 2 UNITS: 100 INJECTION, SOLUTION INTRAVENOUS; SUBCUTANEOUS at 01:27

## 2019-10-30 RX ADMIN — VANCOMYCIN HYDROCHLORIDE 1000 MG: 1 INJECTION, SOLUTION INTRAVENOUS at 01:32

## 2019-10-30 RX ADMIN — MICONAZOLE NITRATE: 20 POWDER TOPICAL at 11:16

## 2019-10-30 RX ADMIN — INSULIN LISPRO 2 UNITS: 100 INJECTION, SOLUTION INTRAVENOUS; SUBCUTANEOUS at 20:19

## 2019-10-30 RX ADMIN — CEFEPIME HYDROCHLORIDE 2 G: 2 INJECTION, POWDER, FOR SOLUTION INTRAVENOUS at 20:00

## 2019-10-30 RX ADMIN — PANTOPRAZOLE SODIUM 8 MG/HR: 40 INJECTION, POWDER, FOR SOLUTION INTRAVENOUS at 12:23

## 2019-10-30 RX ADMIN — CHLORHEXIDINE GLUCONATE 0.12% ORAL RINSE 15 ML: 1.2 LIQUID ORAL at 21:00

## 2019-10-30 RX ADMIN — CEFEPIME HYDROCHLORIDE 2 G: 2 INJECTION, POWDER, FOR SOLUTION INTRAVENOUS at 11:16

## 2019-10-30 RX ADMIN — ETOMIDATE 14 MG: 2 INJECTION, SOLUTION INTRAVENOUS at 09:42

## 2019-10-30 RX ADMIN — INSULIN LISPRO 2 UNITS: 100 INJECTION, SOLUTION INTRAVENOUS; SUBCUTANEOUS at 06:15

## 2019-10-30 RX ADMIN — PANTOPRAZOLE SODIUM 8 MG/HR: 40 INJECTION, POWDER, FOR SOLUTION INTRAVENOUS at 21:00

## 2019-10-30 RX ADMIN — IRON SUCROSE 300 MG: 20 INJECTION, SOLUTION INTRAVENOUS at 14:29

## 2019-10-30 RX ADMIN — SODIUM CHLORIDE 250 ML: 9 INJECTION, SOLUTION INTRAVENOUS at 00:06

## 2019-10-30 RX ADMIN — PROPOFOL 15 MCG/KG/MIN: 10 INJECTION, EMULSION INTRAVENOUS at 23:21

## 2019-10-30 RX ADMIN — PROPOFOL 10 MCG/KG/MIN: 10 INJECTION, EMULSION INTRAVENOUS at 16:48

## 2019-10-30 RX ADMIN — Medication 4 PUFF: at 15:54

## 2019-10-30 RX ADMIN — CHLORHEXIDINE GLUCONATE 0.12% ORAL RINSE 15 ML: 1.2 LIQUID ORAL at 12:24

## 2019-10-30 RX ADMIN — Medication 10 ML: at 09:00

## 2019-10-30 RX ADMIN — MIDAZOLAM 4 MG: 1 INJECTION INTRAMUSCULAR; INTRAVENOUS at 09:42

## 2019-10-30 RX ADMIN — ALBUTEROL SULFATE 4 PUFF: 90 AEROSOL, METERED RESPIRATORY (INHALATION) at 15:53

## 2019-10-30 RX ADMIN — IPRATROPIUM BROMIDE AND ALBUTEROL SULFATE 1 AMPULE: .5; 3 SOLUTION RESPIRATORY (INHALATION) at 08:35

## 2019-10-30 RX ADMIN — INSULIN LISPRO 2 UNITS: 100 INJECTION, SOLUTION INTRAVENOUS; SUBCUTANEOUS at 18:33

## 2019-10-30 RX ADMIN — ATORVASTATIN CALCIUM 80 MG: 40 TABLET, FILM COATED ORAL at 20:22

## 2019-10-30 RX ADMIN — FUROSEMIDE 20 MG: 10 INJECTION, SOLUTION INTRAVENOUS at 18:38

## 2019-10-30 RX ADMIN — POTASSIUM CHLORIDE 10 MEQ: 7.46 INJECTION, SOLUTION INTRAVENOUS at 00:06

## 2019-10-30 RX ADMIN — POTASSIUM CHLORIDE 10 MEQ: 7.46 INJECTION, SOLUTION INTRAVENOUS at 00:00

## 2019-10-30 ASSESSMENT — PULMONARY FUNCTION TESTS
PIF_VALUE: 37
PIF_VALUE: 24
PIF_VALUE: 23
PIF_VALUE: 33
PIF_VALUE: 31
PIF_VALUE: 32
PIF_VALUE: 31
PIF_VALUE: 30
PIF_VALUE: 31
PIF_VALUE: 30
PIF_VALUE: 31
PIF_VALUE: 30
PIF_VALUE: 34
PIF_VALUE: 30
PIF_VALUE: 31
PIF_VALUE: 30
PIF_VALUE: 27
PIF_VALUE: 31
PIF_VALUE: 35
PIF_VALUE: 29
PIF_VALUE: 28

## 2019-10-31 ENCOUNTER — APPOINTMENT (OUTPATIENT)
Dept: GENERAL RADIOLOGY | Age: 64
DRG: 720 | End: 2019-10-31
Payer: COMMERCIAL

## 2019-10-31 LAB
ALBUMIN SERPL-MCNC: 3.1 GM/DL (ref 3.4–5)
ALP BLD-CCNC: 127 IU/L (ref 40–128)
ALT SERPL-CCNC: 9 U/L (ref 10–40)
ANION GAP SERPL CALCULATED.3IONS-SCNC: 12 MMOL/L (ref 4–16)
AST SERPL-CCNC: 15 IU/L (ref 15–37)
BILIRUB SERPL-MCNC: 0.4 MG/DL (ref 0–1)
BUN BLDV-MCNC: 36 MG/DL (ref 6–23)
CALCIUM IONIZED: 4.48 MG/DL (ref 4.48–5.28)
CALCIUM SERPL-MCNC: 8.6 MG/DL (ref 8.3–10.6)
CHLORIDE BLD-SCNC: 99 MMOL/L (ref 99–110)
CO2: 29 MMOL/L (ref 21–32)
CREAT SERPL-MCNC: 1.2 MG/DL (ref 0.6–1.1)
GFR AFRICAN AMERICAN: 55 ML/MIN/1.73M2
GFR NON-AFRICAN AMERICAN: 46 ML/MIN/1.73M2
GLUCOSE BLD-MCNC: 193 MG/DL (ref 70–99)
GLUCOSE BLD-MCNC: 199 MG/DL (ref 70–99)
GLUCOSE BLD-MCNC: 204 MG/DL (ref 70–99)
GLUCOSE BLD-MCNC: 218 MG/DL (ref 70–99)
GLUCOSE BLD-MCNC: 225 MG/DL (ref 70–99)
GLUCOSE BLD-MCNC: 228 MG/DL (ref 70–99)
GLUCOSE BLD-MCNC: 245 MG/DL (ref 70–99)
IONIZED CA: 1.12 MMOL/L (ref 1.12–1.32)
LACTATE: 1.3 MMOL/L (ref 0.4–2)
MAGNESIUM: 2.3 MG/DL (ref 1.8–2.4)
PHOSPHORUS: 3.6 MG/DL (ref 2.5–4.9)
POTASSIUM SERPL-SCNC: 3.4 MMOL/L (ref 3.5–5.1)
POTASSIUM SERPL-SCNC: 3.6 MMOL/L (ref 3.5–5.1)
PROCALCITONIN: 0.07
SODIUM BLD-SCNC: 140 MMOL/L (ref 135–145)
TOTAL CK: 647 IU/L (ref 26–140)
TOTAL PROTEIN: 7.2 GM/DL (ref 6.4–8.2)

## 2019-10-31 PROCEDURE — 82550 ASSAY OF CK (CPK): CPT

## 2019-10-31 PROCEDURE — 2580000003 HC RX 258: Performed by: NURSE PRACTITIONER

## 2019-10-31 PROCEDURE — 2500000003 HC RX 250 WO HCPCS: Performed by: EMERGENCY MEDICINE

## 2019-10-31 PROCEDURE — 83735 ASSAY OF MAGNESIUM: CPT

## 2019-10-31 PROCEDURE — 82330 ASSAY OF CALCIUM: CPT

## 2019-10-31 PROCEDURE — C9113 INJ PANTOPRAZOLE SODIUM, VIA: HCPCS | Performed by: INTERNAL MEDICINE

## 2019-10-31 PROCEDURE — 82962 GLUCOSE BLOOD TEST: CPT

## 2019-10-31 PROCEDURE — 94761 N-INVAS EAR/PLS OXIMETRY MLT: CPT

## 2019-10-31 PROCEDURE — 6370000000 HC RX 637 (ALT 250 FOR IP): Performed by: INTERNAL MEDICINE

## 2019-10-31 PROCEDURE — 94003 VENT MGMT INPAT SUBQ DAY: CPT

## 2019-10-31 PROCEDURE — 71045 X-RAY EXAM CHEST 1 VIEW: CPT

## 2019-10-31 PROCEDURE — 99233 SBSQ HOSP IP/OBS HIGH 50: CPT | Performed by: INTERNAL MEDICINE

## 2019-10-31 PROCEDURE — 80053 COMPREHEN METABOLIC PANEL: CPT

## 2019-10-31 PROCEDURE — 36592 COLLECT BLOOD FROM PICC: CPT

## 2019-10-31 PROCEDURE — 73630 X-RAY EXAM OF FOOT: CPT

## 2019-10-31 PROCEDURE — 84100 ASSAY OF PHOSPHORUS: CPT

## 2019-10-31 PROCEDURE — 2000000000 HC ICU R&B

## 2019-10-31 PROCEDURE — 6360000002 HC RX W HCPCS: Performed by: INTERNAL MEDICINE

## 2019-10-31 PROCEDURE — 6360000002 HC RX W HCPCS

## 2019-10-31 PROCEDURE — 2580000003 HC RX 258: Performed by: INTERNAL MEDICINE

## 2019-10-31 PROCEDURE — 6370000000 HC RX 637 (ALT 250 FOR IP): Performed by: HOSPITALIST

## 2019-10-31 PROCEDURE — 2500000003 HC RX 250 WO HCPCS

## 2019-10-31 PROCEDURE — 31720 CLEARANCE OF AIRWAYS: CPT

## 2019-10-31 PROCEDURE — 84132 ASSAY OF SERUM POTASSIUM: CPT

## 2019-10-31 PROCEDURE — 6360000002 HC RX W HCPCS: Performed by: NURSE PRACTITIONER

## 2019-10-31 PROCEDURE — 99213 OFFICE O/P EST LOW 20 MIN: CPT

## 2019-10-31 PROCEDURE — 99253 IP/OBS CNSLTJ NEW/EST LOW 45: CPT | Performed by: SURGERY

## 2019-10-31 PROCEDURE — 2580000003 HC RX 258

## 2019-10-31 PROCEDURE — 94750 HC PULMONARY COMPLIANCE STUDY: CPT

## 2019-10-31 PROCEDURE — 83605 ASSAY OF LACTIC ACID: CPT

## 2019-10-31 PROCEDURE — 2700000000 HC OXYGEN THERAPY PER DAY

## 2019-10-31 PROCEDURE — 99291 CRITICAL CARE FIRST HOUR: CPT | Performed by: INTERNAL MEDICINE

## 2019-10-31 PROCEDURE — 94640 AIRWAY INHALATION TREATMENT: CPT

## 2019-10-31 PROCEDURE — 2500000003 HC RX 250 WO HCPCS: Performed by: INTERNAL MEDICINE

## 2019-10-31 RX ORDER — FUROSEMIDE 10 MG/ML
20 INJECTION INTRAMUSCULAR; INTRAVENOUS 2 TIMES DAILY
Status: DISCONTINUED | OUTPATIENT
Start: 2019-10-31 | End: 2019-11-01

## 2019-10-31 RX ORDER — SPIRONOLACTONE 25 MG/1
25 TABLET ORAL 2 TIMES DAILY
Status: DISCONTINUED | OUTPATIENT
Start: 2019-10-31 | End: 2019-11-04 | Stop reason: HOSPADM

## 2019-10-31 RX ORDER — POTASSIUM CHLORIDE 1.5 G/1.77G
20 POWDER, FOR SOLUTION ORAL 2 TIMES DAILY
Status: DISCONTINUED | OUTPATIENT
Start: 2019-10-31 | End: 2019-11-01

## 2019-10-31 RX ORDER — PANTOPRAZOLE SODIUM 40 MG/10ML
40 INJECTION, POWDER, LYOPHILIZED, FOR SOLUTION INTRAVENOUS 2 TIMES DAILY
Status: DISCONTINUED | OUTPATIENT
Start: 2019-10-31 | End: 2019-11-04 | Stop reason: HOSPADM

## 2019-10-31 RX ORDER — BACITRACIN ZINC AND POLYMYXIN B SULFATE 500; 10000 [USP'U]/G; [USP'U]/G
OINTMENT OPHTHALMIC EVERY 12 HOURS SCHEDULED
Status: DISCONTINUED | OUTPATIENT
Start: 2019-10-31 | End: 2019-11-04 | Stop reason: HOSPADM

## 2019-10-31 RX ORDER — PRAMIPEXOLE DIHYDROCHLORIDE 0.25 MG/1
1 TABLET ORAL 3 TIMES DAILY
Status: DISCONTINUED | OUTPATIENT
Start: 2019-10-31 | End: 2019-10-31

## 2019-10-31 RX ORDER — POTASSIUM BICARBONATE 25 MEQ/1
25 TABLET, EFFERVESCENT ORAL 2 TIMES DAILY
Status: DISCONTINUED | OUTPATIENT
Start: 2019-10-31 | End: 2019-10-31 | Stop reason: ALTCHOICE

## 2019-10-31 RX ORDER — POTASSIUM CHLORIDE 20 MEQ/1
40 TABLET, EXTENDED RELEASE ORAL 2 TIMES DAILY WITH MEALS
Status: DISCONTINUED | OUTPATIENT
Start: 2019-10-31 | End: 2019-10-31

## 2019-10-31 RX ORDER — PRAMIPEXOLE DIHYDROCHLORIDE 1 MG/1
1 TABLET ORAL 3 TIMES DAILY
Status: DISCONTINUED | OUTPATIENT
Start: 2019-10-31 | End: 2019-11-04

## 2019-10-31 RX ADMIN — PRAMIPEXOLE DIHYDROCHLORIDE 1 MG: 1 TABLET ORAL at 12:03

## 2019-10-31 RX ADMIN — Medication 10 ML: at 20:30

## 2019-10-31 RX ADMIN — INSULIN LISPRO 2 UNITS: 100 INJECTION, SOLUTION INTRAVENOUS; SUBCUTANEOUS at 04:05

## 2019-10-31 RX ADMIN — PROPOFOL 30 MCG/KG/MIN: 10 INJECTION, EMULSION INTRAVENOUS at 13:47

## 2019-10-31 RX ADMIN — PANTOPRAZOLE SODIUM 8 MG/HR: 40 INJECTION, POWDER, FOR SOLUTION INTRAVENOUS at 05:39

## 2019-10-31 RX ADMIN — PROPOFOL 25 MCG/KG/MIN: 10 INJECTION, EMULSION INTRAVENOUS at 08:38

## 2019-10-31 RX ADMIN — Medication 10 MCG/MIN: at 18:20

## 2019-10-31 RX ADMIN — CHLORHEXIDINE GLUCONATE 0.12% ORAL RINSE 15 ML: 1.2 LIQUID ORAL at 20:33

## 2019-10-31 RX ADMIN — Medication 4 PUFF: at 23:40

## 2019-10-31 RX ADMIN — ALBUTEROL SULFATE 4 PUFF: 90 AEROSOL, METERED RESPIRATORY (INHALATION) at 05:14

## 2019-10-31 RX ADMIN — Medication 10 ML: at 20:31

## 2019-10-31 RX ADMIN — Medication 10 ML: at 08:24

## 2019-10-31 RX ADMIN — INSULIN LISPRO 2 UNITS: 100 INJECTION, SOLUTION INTRAVENOUS; SUBCUTANEOUS at 08:31

## 2019-10-31 RX ADMIN — PANTOPRAZOLE SODIUM 40 MG: 40 INJECTION, POWDER, FOR SOLUTION INTRAVENOUS at 20:30

## 2019-10-31 RX ADMIN — Medication 10 ML: at 20:29

## 2019-10-31 RX ADMIN — PRAMIPEXOLE DIHYDROCHLORIDE 1 MG: 1 TABLET ORAL at 22:44

## 2019-10-31 RX ADMIN — MICONAZOLE NITRATE: 20 POWDER TOPICAL at 20:30

## 2019-10-31 RX ADMIN — POTASSIUM CHLORIDE 20 MEQ: 1.5 POWDER, FOR SOLUTION ORAL at 12:04

## 2019-10-31 RX ADMIN — POTASSIUM CHLORIDE 10 MEQ: 7.46 INJECTION, SOLUTION INTRAVENOUS at 05:39

## 2019-10-31 RX ADMIN — CEFEPIME HYDROCHLORIDE 2 G: 2 INJECTION, POWDER, FOR SOLUTION INTRAVENOUS at 09:59

## 2019-10-31 RX ADMIN — Medication 4 PUFF: at 20:00

## 2019-10-31 RX ADMIN — SPIRONOLACTONE 25 MG: 25 TABLET ORAL at 11:06

## 2019-10-31 RX ADMIN — ATORVASTATIN CALCIUM 80 MG: 40 TABLET, FILM COATED ORAL at 20:29

## 2019-10-31 RX ADMIN — ALBUTEROL SULFATE 4 PUFF: 90 AEROSOL, METERED RESPIRATORY (INHALATION) at 11:07

## 2019-10-31 RX ADMIN — INSULIN LISPRO 2 UNITS: 100 INJECTION, SOLUTION INTRAVENOUS; SUBCUTANEOUS at 17:04

## 2019-10-31 RX ADMIN — INSULIN LISPRO 1 UNITS: 100 INJECTION, SOLUTION INTRAVENOUS; SUBCUTANEOUS at 20:51

## 2019-10-31 RX ADMIN — Medication 4 PUFF: at 05:16

## 2019-10-31 RX ADMIN — PROPOFOL 15 MCG/KG/MIN: 10 INJECTION, EMULSION INTRAVENOUS at 05:46

## 2019-10-31 RX ADMIN — POTASSIUM CHLORIDE 20 MEQ: 1.5 POWDER, FOR SOLUTION ORAL at 20:30

## 2019-10-31 RX ADMIN — MICONAZOLE NITRATE: 20 POWDER TOPICAL at 00:08

## 2019-10-31 RX ADMIN — Medication 4 PUFF: at 07:13

## 2019-10-31 RX ADMIN — IRON SUCROSE 300 MG: 20 INJECTION, SOLUTION INTRAVENOUS at 14:16

## 2019-10-31 RX ADMIN — VANCOMYCIN HYDROCHLORIDE 2000 MG: 1 INJECTION, POWDER, LYOPHILIZED, FOR SOLUTION INTRAVENOUS at 11:30

## 2019-10-31 RX ADMIN — Medication 4 PUFF: at 00:54

## 2019-10-31 RX ADMIN — CEFEPIME HYDROCHLORIDE 2 G: 2 INJECTION, POWDER, FOR SOLUTION INTRAVENOUS at 20:30

## 2019-10-31 RX ADMIN — FUROSEMIDE 20 MG: 10 INJECTION, SOLUTION INTRAVENOUS at 11:06

## 2019-10-31 RX ADMIN — ALBUTEROL SULFATE 4 PUFF: 90 AEROSOL, METERED RESPIRATORY (INHALATION) at 23:40

## 2019-10-31 RX ADMIN — POTASSIUM CHLORIDE 10 MEQ: 7.46 INJECTION, SOLUTION INTRAVENOUS at 07:15

## 2019-10-31 RX ADMIN — VANCOMYCIN HYDROCHLORIDE 2000 MG: 1 INJECTION, POWDER, LYOPHILIZED, FOR SOLUTION INTRAVENOUS at 00:08

## 2019-10-31 RX ADMIN — FUROSEMIDE 20 MG: 10 INJECTION, SOLUTION INTRAVENOUS at 18:20

## 2019-10-31 RX ADMIN — PROPOFOL 15 MCG/KG/MIN: 10 INJECTION, EMULSION INTRAVENOUS at 18:20

## 2019-10-31 RX ADMIN — BACITRACIN ZINC AND POLYMYXIN B SULFATE: 500; 10000 OINTMENT OPHTHALMIC at 21:02

## 2019-10-31 RX ADMIN — INSULIN LISPRO 2 UNITS: 100 INJECTION, SOLUTION INTRAVENOUS; SUBCUTANEOUS at 12:10

## 2019-10-31 RX ADMIN — Medication 4 PUFF: at 11:07

## 2019-10-31 RX ADMIN — Medication 22 MCG/MIN: at 01:42

## 2019-10-31 RX ADMIN — CHLORHEXIDINE GLUCONATE 0.12% ORAL RINSE 15 ML: 1.2 LIQUID ORAL at 08:15

## 2019-10-31 RX ADMIN — POTASSIUM CHLORIDE 10 MEQ: 7.46 INJECTION, SOLUTION INTRAVENOUS at 08:06

## 2019-10-31 RX ADMIN — ALBUTEROL SULFATE 4 PUFF: 90 AEROSOL, METERED RESPIRATORY (INHALATION) at 19:57

## 2019-10-31 RX ADMIN — ALBUTEROL SULFATE 4 PUFF: 90 AEROSOL, METERED RESPIRATORY (INHALATION) at 14:12

## 2019-10-31 RX ADMIN — MICONAZOLE NITRATE: 20 POWDER TOPICAL at 08:25

## 2019-10-31 RX ADMIN — PROPOFOL 25 MCG/KG/MIN: 10 INJECTION, EMULSION INTRAVENOUS at 11:23

## 2019-10-31 RX ADMIN — ACETAMINOPHEN 650 MG: 650 SUPPOSITORY RECTAL at 17:49

## 2019-10-31 RX ADMIN — ALBUTEROL SULFATE 4 PUFF: 90 AEROSOL, METERED RESPIRATORY (INHALATION) at 07:13

## 2019-10-31 RX ADMIN — POTASSIUM CHLORIDE 10 MEQ: 7.46 INJECTION, SOLUTION INTRAVENOUS at 04:30

## 2019-10-31 RX ADMIN — Medication 10 ML: at 10:02

## 2019-10-31 RX ADMIN — SPIRONOLACTONE 25 MG: 25 TABLET ORAL at 18:20

## 2019-10-31 RX ADMIN — Medication 4 PUFF: at 14:12

## 2019-10-31 RX ADMIN — INSULIN LISPRO 1 UNITS: 100 INJECTION, SOLUTION INTRAVENOUS; SUBCUTANEOUS at 00:00

## 2019-10-31 RX ADMIN — FUROSEMIDE 20 MG: 10 INJECTION, SOLUTION INTRAVENOUS at 08:16

## 2019-10-31 RX ADMIN — ALBUTEROL SULFATE 4 PUFF: 90 AEROSOL, METERED RESPIRATORY (INHALATION) at 00:54

## 2019-10-31 RX ADMIN — Medication 1 CAPSULE: at 08:16

## 2019-10-31 ASSESSMENT — PULMONARY FUNCTION TESTS
PIF_VALUE: 30
PIF_VALUE: 28
PIF_VALUE: 27
PIF_VALUE: 21
PIF_VALUE: 30
PIF_VALUE: 37
PIF_VALUE: 32
PIF_VALUE: 32
PIF_VALUE: 29
PIF_VALUE: 29
PIF_VALUE: 32
PIF_VALUE: 32
PIF_VALUE: 31
PIF_VALUE: 30
PIF_VALUE: 34
PIF_VALUE: 34
PIF_VALUE: 29
PIF_VALUE: 28

## 2019-10-31 ASSESSMENT — PAIN SCALES - GENERAL: PAINLEVEL_OUTOF10: 2

## 2019-11-01 ENCOUNTER — APPOINTMENT (OUTPATIENT)
Dept: GENERAL RADIOLOGY | Age: 64
DRG: 720 | End: 2019-11-01
Payer: COMMERCIAL

## 2019-11-01 LAB
AMMONIA: 33 UMOL/L (ref 11–51)
ANION GAP SERPL CALCULATED.3IONS-SCNC: 11 MMOL/L (ref 4–16)
BASE EXCESS MIXED: ABNORMAL (ref 0–2.3)
BUN BLDV-MCNC: 21 MG/DL (ref 6–23)
CALCIUM IONIZED: 4.8 MG/DL (ref 4.48–5.28)
CALCIUM SERPL-MCNC: 9.3 MG/DL (ref 8.3–10.6)
CARBON MONOXIDE, BLOOD: 2.1 % (ref 0–5)
CHLORIDE BLD-SCNC: 103 MMOL/L (ref 99–110)
CO2 CONTENT: 38.6 MMOL/L (ref 19–24)
CO2: 33 MMOL/L (ref 21–32)
COMMENT: ABNORMAL
CREAT SERPL-MCNC: 0.9 MG/DL (ref 0.6–1.1)
CULTURE: ABNORMAL
CULTURE: ABNORMAL
CULTURE: NORMAL
DOSE AMOUNT: ABNORMAL
DOSE TIME: ABNORMAL
GFR AFRICAN AMERICAN: >60 ML/MIN/1.73M2
GFR NON-AFRICAN AMERICAN: >60 ML/MIN/1.73M2
GLUCOSE BLD-MCNC: 168 MG/DL (ref 70–99)
GLUCOSE BLD-MCNC: 177 MG/DL (ref 70–99)
GLUCOSE BLD-MCNC: 178 MG/DL (ref 70–99)
GLUCOSE BLD-MCNC: 179 MG/DL (ref 70–99)
GLUCOSE BLD-MCNC: 179 MG/DL (ref 70–99)
GLUCOSE BLD-MCNC: 185 MG/DL (ref 70–99)
GLUCOSE BLD-MCNC: 188 MG/DL (ref 70–99)
GRAM SMEAR: NORMAL
HCO3 ARTERIAL: 36.8 MMOL/L (ref 18–23)
HCT VFR BLD CALC: 27.6 % (ref 37–47)
HEMOCCULT STL QL: POSITIVE
HEMOGLOBIN: 8.2 GM/DL (ref 12.5–16)
IONIZED CA: 1.2 MMOL/L (ref 1.12–1.32)
LACTATE: 1.6 MMOL/L (ref 0.4–2)
Lab: ABNORMAL
Lab: NORMAL
MAGNESIUM: 1.9 MG/DL (ref 1.8–2.4)
MAGNESIUM: 2.2 MG/DL (ref 1.8–2.4)
MCH RBC QN AUTO: 25.9 PG (ref 27–31)
MCHC RBC AUTO-ENTMCNC: 29.7 % (ref 32–36)
MCV RBC AUTO: 87.1 FL (ref 78–100)
METHEMOGLOBIN ARTERIAL: 1.1 %
O2 SATURATION: 95 % (ref 96–97)
PCO2 ARTERIAL: 58 MMHG (ref 32–45)
PDW BLD-RTO: 17.4 % (ref 11.7–14.9)
PH BLOOD: 7.41 (ref 7.34–7.45)
PHOSPHORUS: 3.1 MG/DL (ref 2.5–4.9)
PLATELET # BLD: 275 K/CU MM (ref 140–440)
PMV BLD AUTO: 9.5 FL (ref 7.5–11.1)
PO2 ARTERIAL: 75 MMHG (ref 75–100)
POTASSIUM SERPL-SCNC: 3.6 MMOL/L (ref 3.5–5.1)
POTASSIUM SERPL-SCNC: 4 MMOL/L (ref 3.5–5.1)
RBC # BLD: 3.17 M/CU MM (ref 4.2–5.4)
SODIUM BLD-SCNC: 147 MMOL/L (ref 135–145)
SPECIMEN: ABNORMAL
SPECIMEN: NORMAL
TOTAL CK: 825 IU/L (ref 26–140)
TOTAL COLONY COUNT: ABNORMAL
TSH HIGH SENSITIVITY: 2.64 UIU/ML (ref 0.27–4.2)
VANCOMYCIN TROUGH: 23.1 UG/ML (ref 10–20)
WBC # BLD: 8.6 K/CU MM (ref 4–10.5)

## 2019-11-01 PROCEDURE — 84443 ASSAY THYROID STIM HORMONE: CPT

## 2019-11-01 PROCEDURE — 94761 N-INVAS EAR/PLS OXIMETRY MLT: CPT

## 2019-11-01 PROCEDURE — 82550 ASSAY OF CK (CPK): CPT

## 2019-11-01 PROCEDURE — 36600 WITHDRAWAL OF ARTERIAL BLOOD: CPT

## 2019-11-01 PROCEDURE — 6370000000 HC RX 637 (ALT 250 FOR IP): Performed by: INTERNAL MEDICINE

## 2019-11-01 PROCEDURE — 2500000003 HC RX 250 WO HCPCS

## 2019-11-01 PROCEDURE — 71045 X-RAY EXAM CHEST 1 VIEW: CPT

## 2019-11-01 PROCEDURE — 2580000003 HC RX 258: Performed by: HOSPITALIST

## 2019-11-01 PROCEDURE — 94640 AIRWAY INHALATION TREATMENT: CPT

## 2019-11-01 PROCEDURE — 99291 CRITICAL CARE FIRST HOUR: CPT | Performed by: INTERNAL MEDICINE

## 2019-11-01 PROCEDURE — 80202 ASSAY OF VANCOMYCIN: CPT

## 2019-11-01 PROCEDURE — 82803 BLOOD GASES ANY COMBINATION: CPT

## 2019-11-01 PROCEDURE — 94003 VENT MGMT INPAT SUBQ DAY: CPT

## 2019-11-01 PROCEDURE — 87086 URINE CULTURE/COLONY COUNT: CPT

## 2019-11-01 PROCEDURE — 2580000003 HC RX 258: Performed by: NURSE PRACTITIONER

## 2019-11-01 PROCEDURE — 99233 SBSQ HOSP IP/OBS HIGH 50: CPT | Performed by: INTERNAL MEDICINE

## 2019-11-01 PROCEDURE — 80048 BASIC METABOLIC PNL TOTAL CA: CPT

## 2019-11-01 PROCEDURE — 83735 ASSAY OF MAGNESIUM: CPT

## 2019-11-01 PROCEDURE — 6360000002 HC RX W HCPCS: Performed by: HOSPITALIST

## 2019-11-01 PROCEDURE — 82140 ASSAY OF AMMONIA: CPT

## 2019-11-01 PROCEDURE — 99255 IP/OBS CONSLTJ NEW/EST HI 80: CPT | Performed by: INTERNAL MEDICINE

## 2019-11-01 PROCEDURE — 82272 OCCULT BLD FECES 1-3 TESTS: CPT

## 2019-11-01 PROCEDURE — 6370000000 HC RX 637 (ALT 250 FOR IP): Performed by: HOSPITALIST

## 2019-11-01 PROCEDURE — 6360000002 HC RX W HCPCS: Performed by: NURSE PRACTITIONER

## 2019-11-01 PROCEDURE — 36592 COLLECT BLOOD FROM PICC: CPT

## 2019-11-01 PROCEDURE — 6360000002 HC RX W HCPCS: Performed by: INTERNAL MEDICINE

## 2019-11-01 PROCEDURE — 2580000003 HC RX 258: Performed by: INTERNAL MEDICINE

## 2019-11-01 PROCEDURE — 85027 COMPLETE CBC AUTOMATED: CPT

## 2019-11-01 PROCEDURE — 82962 GLUCOSE BLOOD TEST: CPT

## 2019-11-01 PROCEDURE — 31720 CLEARANCE OF AIRWAYS: CPT

## 2019-11-01 PROCEDURE — 99232 SBSQ HOSP IP/OBS MODERATE 35: CPT | Performed by: SURGERY

## 2019-11-01 PROCEDURE — C9113 INJ PANTOPRAZOLE SODIUM, VIA: HCPCS | Performed by: INTERNAL MEDICINE

## 2019-11-01 PROCEDURE — 83605 ASSAY OF LACTIC ACID: CPT

## 2019-11-01 PROCEDURE — 89220 SPUTUM SPECIMEN COLLECTION: CPT

## 2019-11-01 PROCEDURE — 84100 ASSAY OF PHOSPHORUS: CPT

## 2019-11-01 PROCEDURE — APPNB45 APP NON BILLABLE 31-45 MINUTES: Performed by: NURSE PRACTITIONER

## 2019-11-01 PROCEDURE — 84132 ASSAY OF SERUM POTASSIUM: CPT

## 2019-11-01 PROCEDURE — 6370000000 HC RX 637 (ALT 250 FOR IP): Performed by: NURSE PRACTITIONER

## 2019-11-01 PROCEDURE — 82330 ASSAY OF CALCIUM: CPT

## 2019-11-01 PROCEDURE — 2700000000 HC OXYGEN THERAPY PER DAY

## 2019-11-01 PROCEDURE — 2000000000 HC ICU R&B

## 2019-11-01 RX ORDER — METOPROLOL TARTRATE 5 MG/5ML
INJECTION INTRAVENOUS
Status: COMPLETED
Start: 2019-11-01 | End: 2019-11-01

## 2019-11-01 RX ORDER — LACTULOSE 10 G/15ML
30 SOLUTION ORAL ONCE
Status: COMPLETED | OUTPATIENT
Start: 2019-11-01 | End: 2019-11-01

## 2019-11-01 RX ORDER — METOPROLOL TARTRATE 5 MG/5ML
5 INJECTION INTRAVENOUS EVERY 6 HOURS PRN
Status: DISCONTINUED | OUTPATIENT
Start: 2019-11-01 | End: 2019-11-04 | Stop reason: HOSPADM

## 2019-11-01 RX ORDER — MAGNESIUM SULFATE IN WATER 40 MG/ML
2 INJECTION, SOLUTION INTRAVENOUS ONCE
Status: DISCONTINUED | OUTPATIENT
Start: 2019-11-01 | End: 2019-11-04

## 2019-11-01 RX ADMIN — CHLORHEXIDINE GLUCONATE 0.12% ORAL RINSE 15 ML: 1.2 LIQUID ORAL at 08:37

## 2019-11-01 RX ADMIN — MEROPENEM 1 G: 1 INJECTION, POWDER, FOR SOLUTION INTRAVENOUS at 23:28

## 2019-11-01 RX ADMIN — PANTOPRAZOLE SODIUM 40 MG: 40 INJECTION, POWDER, FOR SOLUTION INTRAVENOUS at 20:59

## 2019-11-01 RX ADMIN — Medication 4 PUFF: at 15:26

## 2019-11-01 RX ADMIN — PROPOFOL 20 MCG/KG/MIN: 10 INJECTION, EMULSION INTRAVENOUS at 14:34

## 2019-11-01 RX ADMIN — METOPROLOL TARTRATE 5 MG: 5 INJECTION INTRAVENOUS at 08:23

## 2019-11-01 RX ADMIN — BACITRACIN ZINC AND POLYMYXIN B SULFATE: 500; 10000 OINTMENT OPHTHALMIC at 10:00

## 2019-11-01 RX ADMIN — Medication 10 ML: at 21:04

## 2019-11-01 RX ADMIN — Medication 4 PUFF: at 21:46

## 2019-11-01 RX ADMIN — ALBUTEROL SULFATE 4 PUFF: 90 AEROSOL, METERED RESPIRATORY (INHALATION) at 21:45

## 2019-11-01 RX ADMIN — PROPOFOL 25 MCG/KG/MIN: 10 INJECTION, EMULSION INTRAVENOUS at 03:12

## 2019-11-01 RX ADMIN — Medication 10 ML: at 06:05

## 2019-11-01 RX ADMIN — PANTOPRAZOLE SODIUM 40 MG: 40 INJECTION, POWDER, FOR SOLUTION INTRAVENOUS at 06:05

## 2019-11-01 RX ADMIN — VANCOMYCIN HYDROCHLORIDE 2000 MG: 1 INJECTION, POWDER, LYOPHILIZED, FOR SOLUTION INTRAVENOUS at 06:05

## 2019-11-01 RX ADMIN — INSULIN LISPRO 1 UNITS: 100 INJECTION, SOLUTION INTRAVENOUS; SUBCUTANEOUS at 04:45

## 2019-11-01 RX ADMIN — SPIRONOLACTONE 25 MG: 25 TABLET ORAL at 17:47

## 2019-11-01 RX ADMIN — ALBUTEROL SULFATE 4 PUFF: 90 AEROSOL, METERED RESPIRATORY (INHALATION) at 07:41

## 2019-11-01 RX ADMIN — ATORVASTATIN CALCIUM 80 MG: 40 TABLET, FILM COATED ORAL at 20:59

## 2019-11-01 RX ADMIN — Medication 4 PUFF: at 03:29

## 2019-11-01 RX ADMIN — INSULIN LISPRO 1 UNITS: 100 INJECTION, SOLUTION INTRAVENOUS; SUBCUTANEOUS at 16:24

## 2019-11-01 RX ADMIN — Medication 4 PUFF: at 11:24

## 2019-11-01 RX ADMIN — MEROPENEM 1 G: 1 INJECTION, POWDER, FOR SOLUTION INTRAVENOUS at 14:33

## 2019-11-01 RX ADMIN — ALBUTEROL SULFATE 4 PUFF: 90 AEROSOL, METERED RESPIRATORY (INHALATION) at 11:24

## 2019-11-01 RX ADMIN — POTASSIUM CHLORIDE 20 MEQ: 1.5 POWDER, FOR SOLUTION ORAL at 08:37

## 2019-11-01 RX ADMIN — INSULIN LISPRO 1 UNITS: 100 INJECTION, SOLUTION INTRAVENOUS; SUBCUTANEOUS at 21:08

## 2019-11-01 RX ADMIN — INSULIN LISPRO 1 UNITS: 100 INJECTION, SOLUTION INTRAVENOUS; SUBCUTANEOUS at 12:54

## 2019-11-01 RX ADMIN — PRAMIPEXOLE DIHYDROCHLORIDE 1 MG: 1 TABLET ORAL at 08:38

## 2019-11-01 RX ADMIN — Medication 4 PUFF: at 07:41

## 2019-11-01 RX ADMIN — MICONAZOLE NITRATE: 20 POWDER TOPICAL at 21:05

## 2019-11-01 RX ADMIN — POTASSIUM CHLORIDE 10 MEQ: 7.46 INJECTION, SOLUTION INTRAVENOUS at 09:19

## 2019-11-01 RX ADMIN — INSULIN LISPRO 1 UNITS: 100 INJECTION, SOLUTION INTRAVENOUS; SUBCUTANEOUS at 00:29

## 2019-11-01 RX ADMIN — Medication 1 CAPSULE: at 08:38

## 2019-11-01 RX ADMIN — PRAMIPEXOLE DIHYDROCHLORIDE 1 MG: 1 TABLET ORAL at 21:04

## 2019-11-01 RX ADMIN — MICONAZOLE NITRATE: 20 POWDER TOPICAL at 08:39

## 2019-11-01 RX ADMIN — INSULIN LISPRO 1 UNITS: 100 INJECTION, SOLUTION INTRAVENOUS; SUBCUTANEOUS at 07:25

## 2019-11-01 RX ADMIN — SPIRONOLACTONE 25 MG: 25 TABLET ORAL at 08:39

## 2019-11-01 RX ADMIN — POTASSIUM CHLORIDE 10 MEQ: 7.46 INJECTION, SOLUTION INTRAVENOUS at 08:28

## 2019-11-01 RX ADMIN — AMIODARONE HYDROCHLORIDE 1 MG/MIN: 50 INJECTION, SOLUTION INTRAVENOUS at 16:13

## 2019-11-01 RX ADMIN — LACTULOSE 30 G: 10 SOLUTION ORAL at 09:16

## 2019-11-01 RX ADMIN — MAGNESIUM SULFATE HEPTAHYDRATE 1 G: 1 INJECTION, SOLUTION INTRAVENOUS at 08:24

## 2019-11-01 RX ADMIN — BACITRACIN ZINC AND POLYMYXIN B SULFATE: 500; 10000 OINTMENT OPHTHALMIC at 21:04

## 2019-11-01 RX ADMIN — PRAMIPEXOLE DIHYDROCHLORIDE 1 MG: 1 TABLET ORAL at 14:39

## 2019-11-01 RX ADMIN — IRON SUCROSE 300 MG: 20 INJECTION, SOLUTION INTRAVENOUS at 16:13

## 2019-11-01 RX ADMIN — CHLORHEXIDINE GLUCONATE 0.12% ORAL RINSE 15 ML: 1.2 LIQUID ORAL at 20:59

## 2019-11-01 RX ADMIN — PROPOFOL 20 MCG/KG/MIN: 10 INJECTION, EMULSION INTRAVENOUS at 10:59

## 2019-11-01 RX ADMIN — Medication 10 ML: at 08:38

## 2019-11-01 RX ADMIN — METOPROLOL TARTRATE 25 MG: 25 TABLET ORAL at 09:16

## 2019-11-01 RX ADMIN — MAGNESIUM SULFATE HEPTAHYDRATE 1 G: 1 INJECTION, SOLUTION INTRAVENOUS at 09:20

## 2019-11-01 RX ADMIN — PROPOFOL 20 MCG/KG/MIN: 10 INJECTION, EMULSION INTRAVENOUS at 19:40

## 2019-11-01 RX ADMIN — ALBUTEROL SULFATE 4 PUFF: 90 AEROSOL, METERED RESPIRATORY (INHALATION) at 15:26

## 2019-11-01 RX ADMIN — CEFEPIME HYDROCHLORIDE 2 G: 2 INJECTION, POWDER, FOR SOLUTION INTRAVENOUS at 07:06

## 2019-11-01 RX ADMIN — ALBUTEROL SULFATE 4 PUFF: 90 AEROSOL, METERED RESPIRATORY (INHALATION) at 03:28

## 2019-11-01 RX ADMIN — PROPOFOL 25 MCG/KG/MIN: 10 INJECTION, EMULSION INTRAVENOUS at 00:19

## 2019-11-01 ASSESSMENT — PULMONARY FUNCTION TESTS
PIF_VALUE: 29
PIF_VALUE: 25
PIF_VALUE: 31
PIF_VALUE: 30
PIF_VALUE: 21
PIF_VALUE: 27
PIF_VALUE: 29
PIF_VALUE: 24
PIF_VALUE: 21
PIF_VALUE: 30
PIF_VALUE: 21
PIF_VALUE: 29
PIF_VALUE: 21
PIF_VALUE: 31
PIF_VALUE: 30
PIF_VALUE: 20
PIF_VALUE: 28
PIF_VALUE: 32
PIF_VALUE: 32
PIF_VALUE: 31

## 2019-11-01 ASSESSMENT — PAIN SCALES - GENERAL
PAINLEVEL_OUTOF10: 0
PAINLEVEL_OUTOF10: 0

## 2019-11-02 ENCOUNTER — APPOINTMENT (OUTPATIENT)
Dept: GENERAL RADIOLOGY | Age: 64
DRG: 720 | End: 2019-11-02
Payer: COMMERCIAL

## 2019-11-02 LAB
ABO/RH: NORMAL
ALBUMIN SERPL-MCNC: 3.1 GM/DL (ref 3.4–5)
ALP BLD-CCNC: 110 IU/L (ref 40–128)
ALT SERPL-CCNC: 6 U/L (ref 10–40)
ANION GAP SERPL CALCULATED.3IONS-SCNC: 12 MMOL/L (ref 4–16)
ANTIBODY SCREEN: NEGATIVE
AST SERPL-CCNC: 20 IU/L (ref 15–37)
BASE EXCESS MIXED: ABNORMAL (ref 0–2.3)
BILIRUB SERPL-MCNC: 0.5 MG/DL (ref 0–1)
BUN BLDV-MCNC: 21 MG/DL (ref 6–23)
CALCIUM IONIZED: 4.48 MG/DL (ref 4.48–5.28)
CALCIUM SERPL-MCNC: 9.1 MG/DL (ref 8.3–10.6)
CARBON MONOXIDE, BLOOD: 2.3 % (ref 0–5)
CHLORIDE BLD-SCNC: 99 MMOL/L (ref 99–110)
CO2 CONTENT: 37.2 MMOL/L (ref 19–24)
CO2: 31 MMOL/L (ref 21–32)
COMMENT: ABNORMAL
COMPONENT: NORMAL
CREAT SERPL-MCNC: 0.9 MG/DL (ref 0.6–1.1)
CROSSMATCH RESULT: NORMAL
CULTURE: NORMAL
GFR AFRICAN AMERICAN: >60 ML/MIN/1.73M2
GFR NON-AFRICAN AMERICAN: >60 ML/MIN/1.73M2
GLUCOSE BLD-MCNC: 153 MG/DL (ref 70–99)
GLUCOSE BLD-MCNC: 159 MG/DL (ref 70–99)
GLUCOSE BLD-MCNC: 170 MG/DL (ref 70–99)
GLUCOSE BLD-MCNC: 174 MG/DL (ref 70–99)
GLUCOSE BLD-MCNC: 188 MG/DL (ref 70–99)
GLUCOSE BLD-MCNC: 218 MG/DL (ref 70–99)
GLUCOSE BLD-MCNC: 251 MG/DL (ref 70–99)
HCO3 ARTERIAL: 35.7 MMOL/L (ref 18–23)
HCT VFR BLD CALC: 28.7 % (ref 37–47)
HEMOGLOBIN: 8.4 GM/DL (ref 12.5–16)
IONIZED CA: 1.12 MMOL/L (ref 1.12–1.32)
LACTATE: 1.5 MMOL/L (ref 0.4–2)
Lab: NORMAL
MAGNESIUM: 2.1 MG/DL (ref 1.8–2.4)
MCH RBC QN AUTO: 25.9 PG (ref 27–31)
MCHC RBC AUTO-ENTMCNC: 29.3 % (ref 32–36)
MCV RBC AUTO: 88.6 FL (ref 78–100)
METHEMOGLOBIN ARTERIAL: 1.3 %
O2 SATURATION: 95.1 % (ref 96–97)
PCO2 ARTERIAL: 49 MMHG (ref 32–45)
PDW BLD-RTO: 17.6 % (ref 11.7–14.9)
PH BLOOD: 7.47 (ref 7.34–7.45)
PHOSPHORUS: 3.2 MG/DL (ref 2.5–4.9)
PLATELET # BLD: 253 K/CU MM (ref 140–440)
PMV BLD AUTO: 9.4 FL (ref 7.5–11.1)
PO2 ARTERIAL: 88 MMHG (ref 75–100)
POTASSIUM SERPL-SCNC: 4.1 MMOL/L (ref 3.5–5.1)
RBC # BLD: 3.24 M/CU MM (ref 4.2–5.4)
SODIUM BLD-SCNC: 142 MMOL/L (ref 135–145)
SPECIMEN: NORMAL
STATUS: NORMAL
TOTAL CK: 682 IU/L (ref 26–140)
TOTAL PROTEIN: 7.4 GM/DL (ref 6.4–8.2)
TRANSFUSION STATUS: NORMAL
UNIT DIVISION: 0
UNIT NUMBER: NORMAL
WBC # BLD: 10 K/CU MM (ref 4–10.5)

## 2019-11-02 PROCEDURE — 36600 WITHDRAWAL OF ARTERIAL BLOOD: CPT

## 2019-11-02 PROCEDURE — 83735 ASSAY OF MAGNESIUM: CPT

## 2019-11-02 PROCEDURE — 85027 COMPLETE CBC AUTOMATED: CPT

## 2019-11-02 PROCEDURE — 99232 SBSQ HOSP IP/OBS MODERATE 35: CPT | Performed by: INTERNAL MEDICINE

## 2019-11-02 PROCEDURE — 94003 VENT MGMT INPAT SUBQ DAY: CPT

## 2019-11-02 PROCEDURE — 6370000000 HC RX 637 (ALT 250 FOR IP): Performed by: INTERNAL MEDICINE

## 2019-11-02 PROCEDURE — 82803 BLOOD GASES ANY COMBINATION: CPT

## 2019-11-02 PROCEDURE — 6360000002 HC RX W HCPCS: Performed by: INTERNAL MEDICINE

## 2019-11-02 PROCEDURE — 80053 COMPREHEN METABOLIC PANEL: CPT

## 2019-11-02 PROCEDURE — 2700000000 HC OXYGEN THERAPY PER DAY

## 2019-11-02 PROCEDURE — 2500000003 HC RX 250 WO HCPCS: Performed by: INTERNAL MEDICINE

## 2019-11-02 PROCEDURE — 82962 GLUCOSE BLOOD TEST: CPT

## 2019-11-02 PROCEDURE — 71045 X-RAY EXAM CHEST 1 VIEW: CPT

## 2019-11-02 PROCEDURE — 6370000000 HC RX 637 (ALT 250 FOR IP): Performed by: HOSPITALIST

## 2019-11-02 PROCEDURE — C9113 INJ PANTOPRAZOLE SODIUM, VIA: HCPCS | Performed by: INTERNAL MEDICINE

## 2019-11-02 PROCEDURE — 2580000003 HC RX 258: Performed by: HOSPITALIST

## 2019-11-02 PROCEDURE — 2000000000 HC ICU R&B

## 2019-11-02 PROCEDURE — 94640 AIRWAY INHALATION TREATMENT: CPT

## 2019-11-02 PROCEDURE — 83605 ASSAY OF LACTIC ACID: CPT

## 2019-11-02 PROCEDURE — 94761 N-INVAS EAR/PLS OXIMETRY MLT: CPT

## 2019-11-02 PROCEDURE — 84100 ASSAY OF PHOSPHORUS: CPT

## 2019-11-02 PROCEDURE — 2580000003 HC RX 258: Performed by: INTERNAL MEDICINE

## 2019-11-02 PROCEDURE — 82330 ASSAY OF CALCIUM: CPT

## 2019-11-02 PROCEDURE — 87081 CULTURE SCREEN ONLY: CPT

## 2019-11-02 PROCEDURE — 82550 ASSAY OF CK (CPK): CPT

## 2019-11-02 PROCEDURE — 36592 COLLECT BLOOD FROM PICC: CPT

## 2019-11-02 PROCEDURE — 6360000002 HC RX W HCPCS: Performed by: HOSPITALIST

## 2019-11-02 RX ORDER — AMIODARONE HYDROCHLORIDE 200 MG/1
200 TABLET ORAL DAILY
Status: DISCONTINUED | OUTPATIENT
Start: 2019-11-02 | End: 2019-11-04 | Stop reason: HOSPADM

## 2019-11-02 RX ORDER — FUROSEMIDE 10 MG/ML
20 INJECTION INTRAMUSCULAR; INTRAVENOUS 2 TIMES DAILY
Status: DISCONTINUED | OUTPATIENT
Start: 2019-11-02 | End: 2019-11-04 | Stop reason: HOSPADM

## 2019-11-02 RX ORDER — IPRATROPIUM BROMIDE AND ALBUTEROL SULFATE 2.5; .5 MG/3ML; MG/3ML
1 SOLUTION RESPIRATORY (INHALATION)
Status: DISCONTINUED | OUTPATIENT
Start: 2019-11-02 | End: 2019-11-04 | Stop reason: HOSPADM

## 2019-11-02 RX ADMIN — VANCOMYCIN HYDROCHLORIDE 1500 MG: 5 INJECTION, POWDER, LYOPHILIZED, FOR SOLUTION INTRAVENOUS at 06:35

## 2019-11-02 RX ADMIN — MEROPENEM 1 G: 1 INJECTION, POWDER, FOR SOLUTION INTRAVENOUS at 22:36

## 2019-11-02 RX ADMIN — DEXMEDETOMIDINE 0.2 MCG/KG/HR: 100 INJECTION, SOLUTION, CONCENTRATE INTRAVENOUS at 04:50

## 2019-11-02 RX ADMIN — MEROPENEM 1 G: 1 INJECTION, POWDER, FOR SOLUTION INTRAVENOUS at 15:57

## 2019-11-02 RX ADMIN — IPRATROPIUM BROMIDE AND ALBUTEROL SULFATE 1 AMPULE: .5; 3 SOLUTION RESPIRATORY (INHALATION) at 15:44

## 2019-11-02 RX ADMIN — Medication 1 CAPSULE: at 10:00

## 2019-11-02 RX ADMIN — PRAMIPEXOLE DIHYDROCHLORIDE 1 MG: 1 TABLET ORAL at 10:00

## 2019-11-02 RX ADMIN — INSULIN LISPRO 1 UNITS: 100 INJECTION, SOLUTION INTRAVENOUS; SUBCUTANEOUS at 15:58

## 2019-11-02 RX ADMIN — CHLORHEXIDINE GLUCONATE 0.12% ORAL RINSE 15 ML: 1.2 LIQUID ORAL at 10:00

## 2019-11-02 RX ADMIN — PROPOFOL 20 MCG/KG/MIN: 10 INJECTION, EMULSION INTRAVENOUS at 03:30

## 2019-11-02 RX ADMIN — ALBUTEROL SULFATE 4 PUFF: 90 AEROSOL, METERED RESPIRATORY (INHALATION) at 00:30

## 2019-11-02 RX ADMIN — ALBUTEROL SULFATE 4 PUFF: 90 AEROSOL, METERED RESPIRATORY (INHALATION) at 04:20

## 2019-11-02 RX ADMIN — PANTOPRAZOLE SODIUM 40 MG: 40 INJECTION, POWDER, FOR SOLUTION INTRAVENOUS at 06:52

## 2019-11-02 RX ADMIN — MICONAZOLE NITRATE: 20 POWDER TOPICAL at 21:22

## 2019-11-02 RX ADMIN — PROPOFOL 20 MCG/KG/MIN: 10 INJECTION, EMULSION INTRAVENOUS at 00:26

## 2019-11-02 RX ADMIN — ALBUTEROL SULFATE 4 PUFF: 90 AEROSOL, METERED RESPIRATORY (INHALATION) at 08:44

## 2019-11-02 RX ADMIN — Medication 4 PUFF: at 04:21

## 2019-11-02 RX ADMIN — AMIODARONE HYDROCHLORIDE 200 MG: 200 TABLET ORAL at 21:24

## 2019-11-02 RX ADMIN — BACITRACIN ZINC AND POLYMYXIN B SULFATE: 500; 10000 OINTMENT OPHTHALMIC at 21:22

## 2019-11-02 RX ADMIN — FUROSEMIDE 20 MG: 10 INJECTION, SOLUTION INTRAMUSCULAR; INTRAVENOUS at 18:11

## 2019-11-02 RX ADMIN — Medication 4 PUFF: at 08:44

## 2019-11-02 RX ADMIN — INSULIN LISPRO 3 UNITS: 100 INJECTION, SOLUTION INTRAVENOUS; SUBCUTANEOUS at 08:46

## 2019-11-02 RX ADMIN — MICONAZOLE NITRATE: 20 POWDER TOPICAL at 10:00

## 2019-11-02 RX ADMIN — SPIRONOLACTONE 25 MG: 25 TABLET ORAL at 18:11

## 2019-11-02 RX ADMIN — MEROPENEM 1 G: 1 INJECTION, POWDER, FOR SOLUTION INTRAVENOUS at 06:20

## 2019-11-02 RX ADMIN — PANTOPRAZOLE SODIUM 40 MG: 40 INJECTION, POWDER, FOR SOLUTION INTRAVENOUS at 21:17

## 2019-11-02 RX ADMIN — INSULIN LISPRO 1 UNITS: 100 INJECTION, SOLUTION INTRAVENOUS; SUBCUTANEOUS at 00:52

## 2019-11-02 RX ADMIN — ATORVASTATIN CALCIUM 80 MG: 40 TABLET, FILM COATED ORAL at 21:16

## 2019-11-02 RX ADMIN — INSULIN LISPRO 1 UNITS: 100 INJECTION, SOLUTION INTRAVENOUS; SUBCUTANEOUS at 13:29

## 2019-11-02 RX ADMIN — INSULIN LISPRO 1 UNITS: 100 INJECTION, SOLUTION INTRAVENOUS; SUBCUTANEOUS at 20:32

## 2019-11-02 RX ADMIN — Medication 4 PUFF: at 00:35

## 2019-11-02 RX ADMIN — PRAMIPEXOLE DIHYDROCHLORIDE 1 MG: 1 TABLET ORAL at 21:18

## 2019-11-02 RX ADMIN — INSULIN LISPRO 1 UNITS: 100 INJECTION, SOLUTION INTRAVENOUS; SUBCUTANEOUS at 04:17

## 2019-11-02 RX ADMIN — DEXMEDETOMIDINE 0.6 MCG/KG/HR: 100 INJECTION, SOLUTION, CONCENTRATE INTRAVENOUS at 07:55

## 2019-11-02 RX ADMIN — Medication 10 ML: at 21:19

## 2019-11-02 RX ADMIN — SPIRONOLACTONE 25 MG: 25 TABLET ORAL at 10:00

## 2019-11-02 RX ADMIN — Medication 10 ML: at 10:00

## 2019-11-02 RX ADMIN — BACITRACIN ZINC AND POLYMYXIN B SULFATE: 500; 10000 OINTMENT OPHTHALMIC at 10:00

## 2019-11-02 ASSESSMENT — PAIN SCALES - GENERAL
PAINLEVEL_OUTOF10: 0

## 2019-11-02 ASSESSMENT — PULMONARY FUNCTION TESTS
PIF_VALUE: 21
PIF_VALUE: 40
PIF_VALUE: 40

## 2019-11-03 ENCOUNTER — APPOINTMENT (OUTPATIENT)
Dept: GENERAL RADIOLOGY | Age: 64
DRG: 720 | End: 2019-11-03
Payer: COMMERCIAL

## 2019-11-03 LAB
ALBUMIN SERPL-MCNC: 2.9 GM/DL (ref 3.4–5)
ALP BLD-CCNC: 91 IU/L (ref 40–128)
ALT SERPL-CCNC: 5 U/L (ref 10–40)
ANION GAP SERPL CALCULATED.3IONS-SCNC: 10 MMOL/L (ref 4–16)
AST SERPL-CCNC: 18 IU/L (ref 15–37)
BILIRUB SERPL-MCNC: 0.4 MG/DL (ref 0–1)
BUN BLDV-MCNC: 17 MG/DL (ref 6–23)
CALCIUM IONIZED: 4.28 MG/DL (ref 4.48–5.28)
CALCIUM SERPL-MCNC: 8.7 MG/DL (ref 8.3–10.6)
CHLORIDE BLD-SCNC: 97 MMOL/L (ref 99–110)
CO2: 33 MMOL/L (ref 21–32)
CREAT SERPL-MCNC: 0.7 MG/DL (ref 0.6–1.1)
CULTURE: NORMAL
DOSE AMOUNT: ABNORMAL
DOSE TIME: ABNORMAL
GFR AFRICAN AMERICAN: >60 ML/MIN/1.73M2
GFR NON-AFRICAN AMERICAN: >60 ML/MIN/1.73M2
GLUCOSE BLD-MCNC: 124 MG/DL (ref 70–99)
GLUCOSE BLD-MCNC: 137 MG/DL (ref 70–99)
GLUCOSE BLD-MCNC: 139 MG/DL (ref 70–99)
GLUCOSE BLD-MCNC: 144 MG/DL (ref 70–99)
GLUCOSE BLD-MCNC: 145 MG/DL (ref 70–99)
GLUCOSE BLD-MCNC: 146 MG/DL (ref 70–99)
GLUCOSE BLD-MCNC: 149 MG/DL (ref 70–99)
IONIZED CA: 1.07 MMOL/L (ref 1.12–1.32)
LACTATE: 1 MMOL/L (ref 0.4–2)
Lab: NORMAL
MAGNESIUM: 1.8 MG/DL (ref 1.8–2.4)
PHOSPHORUS: 2.9 MG/DL (ref 2.5–4.9)
POTASSIUM SERPL-SCNC: 3.8 MMOL/L (ref 3.5–5.1)
SODIUM BLD-SCNC: 140 MMOL/L (ref 135–145)
SPECIMEN: NORMAL
TOTAL CK: 454 IU/L (ref 26–140)
TOTAL PROTEIN: 6.9 GM/DL (ref 6.4–8.2)
VANCOMYCIN TROUGH: 25.4 UG/ML (ref 10–20)

## 2019-11-03 PROCEDURE — 6360000002 HC RX W HCPCS: Performed by: HOSPITALIST

## 2019-11-03 PROCEDURE — C9113 INJ PANTOPRAZOLE SODIUM, VIA: HCPCS | Performed by: INTERNAL MEDICINE

## 2019-11-03 PROCEDURE — 2580000003 HC RX 258: Performed by: HOSPITALIST

## 2019-11-03 PROCEDURE — 94660 CPAP INITIATION&MGMT: CPT

## 2019-11-03 PROCEDURE — 82550 ASSAY OF CK (CPK): CPT

## 2019-11-03 PROCEDURE — 2700000000 HC OXYGEN THERAPY PER DAY

## 2019-11-03 PROCEDURE — 80202 ASSAY OF VANCOMYCIN: CPT

## 2019-11-03 PROCEDURE — 80053 COMPREHEN METABOLIC PANEL: CPT

## 2019-11-03 PROCEDURE — 6370000000 HC RX 637 (ALT 250 FOR IP): Performed by: HOSPITALIST

## 2019-11-03 PROCEDURE — 6360000002 HC RX W HCPCS: Performed by: INTERNAL MEDICINE

## 2019-11-03 PROCEDURE — 6370000000 HC RX 637 (ALT 250 FOR IP): Performed by: INTERNAL MEDICINE

## 2019-11-03 PROCEDURE — 6370000000 HC RX 637 (ALT 250 FOR IP): Performed by: NURSE PRACTITIONER

## 2019-11-03 PROCEDURE — 94761 N-INVAS EAR/PLS OXIMETRY MLT: CPT

## 2019-11-03 PROCEDURE — 82962 GLUCOSE BLOOD TEST: CPT

## 2019-11-03 PROCEDURE — 83735 ASSAY OF MAGNESIUM: CPT

## 2019-11-03 PROCEDURE — 83605 ASSAY OF LACTIC ACID: CPT

## 2019-11-03 PROCEDURE — 84100 ASSAY OF PHOSPHORUS: CPT

## 2019-11-03 PROCEDURE — 94640 AIRWAY INHALATION TREATMENT: CPT

## 2019-11-03 PROCEDURE — 2580000003 HC RX 258: Performed by: INTERNAL MEDICINE

## 2019-11-03 PROCEDURE — 82330 ASSAY OF CALCIUM: CPT

## 2019-11-03 PROCEDURE — 1200000000 HC SEMI PRIVATE

## 2019-11-03 PROCEDURE — 82565 ASSAY OF CREATININE: CPT

## 2019-11-03 RX ADMIN — INSULIN LISPRO 1 UNITS: 100 INJECTION, SOLUTION INTRAVENOUS; SUBCUTANEOUS at 04:24

## 2019-11-03 RX ADMIN — METOPROLOL TARTRATE 25 MG: 25 TABLET ORAL at 08:38

## 2019-11-03 RX ADMIN — IPRATROPIUM BROMIDE AND ALBUTEROL SULFATE 1 AMPULE: .5; 3 SOLUTION RESPIRATORY (INHALATION) at 08:45

## 2019-11-03 RX ADMIN — IPRATROPIUM BROMIDE AND ALBUTEROL SULFATE 1 AMPULE: .5; 3 SOLUTION RESPIRATORY (INHALATION) at 00:50

## 2019-11-03 RX ADMIN — METOPROLOL TARTRATE 25 MG: 25 TABLET ORAL at 22:05

## 2019-11-03 RX ADMIN — PRAMIPEXOLE DIHYDROCHLORIDE 1 MG: 1 TABLET ORAL at 08:42

## 2019-11-03 RX ADMIN — PANTOPRAZOLE SODIUM 40 MG: 40 INJECTION, POWDER, FOR SOLUTION INTRAVENOUS at 22:10

## 2019-11-03 RX ADMIN — FUROSEMIDE 20 MG: 10 INJECTION, SOLUTION INTRAMUSCULAR; INTRAVENOUS at 09:40

## 2019-11-03 RX ADMIN — Medication 10 ML: at 06:51

## 2019-11-03 RX ADMIN — PANTOPRAZOLE SODIUM 40 MG: 40 INJECTION, POWDER, FOR SOLUTION INTRAVENOUS at 06:51

## 2019-11-03 RX ADMIN — INSULIN LISPRO 1 UNITS: 100 INJECTION, SOLUTION INTRAVENOUS; SUBCUTANEOUS at 07:58

## 2019-11-03 RX ADMIN — IPRATROPIUM BROMIDE AND ALBUTEROL SULFATE 1 AMPULE: .5; 3 SOLUTION RESPIRATORY (INHALATION) at 21:22

## 2019-11-03 RX ADMIN — PRAMIPEXOLE DIHYDROCHLORIDE 1 MG: 1 TABLET ORAL at 21:59

## 2019-11-03 RX ADMIN — BACITRACIN ZINC AND POLYMYXIN B SULFATE: 500; 10000 OINTMENT OPHTHALMIC at 08:46

## 2019-11-03 RX ADMIN — BACITRACIN ZINC AND POLYMYXIN B SULFATE: 500; 10000 OINTMENT OPHTHALMIC at 22:01

## 2019-11-03 RX ADMIN — ENOXAPARIN SODIUM 40 MG: 40 INJECTION SUBCUTANEOUS at 11:34

## 2019-11-03 RX ADMIN — Medication 10 ML: at 22:01

## 2019-11-03 RX ADMIN — INSULIN LISPRO 1 UNITS: 100 INJECTION, SOLUTION INTRAVENOUS; SUBCUTANEOUS at 00:10

## 2019-11-03 RX ADMIN — SPIRONOLACTONE 25 MG: 25 TABLET ORAL at 17:18

## 2019-11-03 RX ADMIN — VANCOMYCIN HYDROCHLORIDE 1500 MG: 5 INJECTION, POWDER, LYOPHILIZED, FOR SOLUTION INTRAVENOUS at 01:27

## 2019-11-03 RX ADMIN — MEROPENEM 1 G: 1 INJECTION, POWDER, FOR SOLUTION INTRAVENOUS at 22:08

## 2019-11-03 RX ADMIN — MEROPENEM 1 G: 1 INJECTION, POWDER, FOR SOLUTION INTRAVENOUS at 06:50

## 2019-11-03 RX ADMIN — MEROPENEM 1 G: 1 INJECTION, POWDER, FOR SOLUTION INTRAVENOUS at 15:06

## 2019-11-03 RX ADMIN — MICONAZOLE NITRATE: 20 POWDER TOPICAL at 08:46

## 2019-11-03 RX ADMIN — INSULIN LISPRO 1 UNITS: 100 INJECTION, SOLUTION INTRAVENOUS; SUBCUTANEOUS at 11:26

## 2019-11-03 RX ADMIN — MICONAZOLE NITRATE: 20 POWDER TOPICAL at 22:02

## 2019-11-03 RX ADMIN — Medication 40 ML: at 08:38

## 2019-11-03 RX ADMIN — IPRATROPIUM BROMIDE AND ALBUTEROL SULFATE 1 AMPULE: .5; 3 SOLUTION RESPIRATORY (INHALATION) at 12:30

## 2019-11-03 RX ADMIN — FUROSEMIDE 20 MG: 10 INJECTION, SOLUTION INTRAMUSCULAR; INTRAVENOUS at 17:18

## 2019-11-03 RX ADMIN — PRAMIPEXOLE DIHYDROCHLORIDE 1 MG: 1 TABLET ORAL at 15:06

## 2019-11-03 RX ADMIN — SPIRONOLACTONE 25 MG: 25 TABLET ORAL at 08:38

## 2019-11-03 RX ADMIN — AMIODARONE HYDROCHLORIDE 200 MG: 200 TABLET ORAL at 08:38

## 2019-11-03 RX ADMIN — ATORVASTATIN CALCIUM 80 MG: 40 TABLET, FILM COATED ORAL at 21:59

## 2019-11-03 RX ADMIN — Medication 1 CAPSULE: at 08:38

## 2019-11-03 ASSESSMENT — PAIN SCALES - GENERAL
PAINLEVEL_OUTOF10: 0

## 2019-11-04 VITALS
HEART RATE: 66 BPM | DIASTOLIC BLOOD PRESSURE: 56 MMHG | SYSTOLIC BLOOD PRESSURE: 138 MMHG | WEIGHT: 293 LBS | TEMPERATURE: 98.3 F | HEIGHT: 68 IN | OXYGEN SATURATION: 98 % | RESPIRATION RATE: 18 BRPM | BODY MASS INDEX: 44.41 KG/M2

## 2019-11-04 LAB
ANION GAP SERPL CALCULATED.3IONS-SCNC: 7 MMOL/L (ref 4–16)
BUN BLDV-MCNC: 15 MG/DL (ref 6–23)
CALCIUM IONIZED: 4.4 MG/DL (ref 4.48–5.28)
CALCIUM SERPL-MCNC: 9 MG/DL (ref 8.3–10.6)
CHLORIDE BLD-SCNC: 96 MMOL/L (ref 99–110)
CO2: 37 MMOL/L (ref 21–32)
CREAT SERPL-MCNC: 0.7 MG/DL (ref 0.6–1.1)
GFR AFRICAN AMERICAN: >60 ML/MIN/1.73M2
GFR NON-AFRICAN AMERICAN: >60 ML/MIN/1.73M2
GLUCOSE BLD-MCNC: 114 MG/DL (ref 70–99)
GLUCOSE BLD-MCNC: 124 MG/DL (ref 70–99)
GLUCOSE BLD-MCNC: 124 MG/DL (ref 70–99)
GLUCOSE BLD-MCNC: 126 MG/DL (ref 70–99)
GLUCOSE BLD-MCNC: 138 MG/DL (ref 70–99)
IONIZED CA: 1.1 MMOL/L (ref 1.12–1.32)
LACTATE: 1.1 MMOL/L (ref 0.4–2)
MAGNESIUM: 1.6 MG/DL (ref 1.8–2.4)
PHOSPHORUS: 2.6 MG/DL (ref 2.5–4.9)
POTASSIUM SERPL-SCNC: 4 MMOL/L (ref 3.5–5.1)
SODIUM BLD-SCNC: 140 MMOL/L (ref 135–145)
TOTAL CK: 325 IU/L (ref 26–140)

## 2019-11-04 PROCEDURE — 6370000000 HC RX 637 (ALT 250 FOR IP): Performed by: INTERNAL MEDICINE

## 2019-11-04 PROCEDURE — 02HV33Z INSERTION OF INFUSION DEVICE INTO SUPERIOR VENA CAVA, PERCUTANEOUS APPROACH: ICD-10-PCS | Performed by: INTERNAL MEDICINE

## 2019-11-04 PROCEDURE — 6370000000 HC RX 637 (ALT 250 FOR IP): Performed by: HOSPITALIST

## 2019-11-04 PROCEDURE — 82550 ASSAY OF CK (CPK): CPT

## 2019-11-04 PROCEDURE — C1751 CATH, INF, PER/CENT/MIDLINE: HCPCS

## 2019-11-04 PROCEDURE — 2700000000 HC OXYGEN THERAPY PER DAY

## 2019-11-04 PROCEDURE — 99231 SBSQ HOSP IP/OBS SF/LOW 25: CPT | Performed by: INTERNAL MEDICINE

## 2019-11-04 PROCEDURE — 85018 HEMOGLOBIN: CPT

## 2019-11-04 PROCEDURE — 6360000002 HC RX W HCPCS: Performed by: INTERNAL MEDICINE

## 2019-11-04 PROCEDURE — 2580000003 HC RX 258: Performed by: HOSPITALIST

## 2019-11-04 PROCEDURE — 94761 N-INVAS EAR/PLS OXIMETRY MLT: CPT

## 2019-11-04 PROCEDURE — 84100 ASSAY OF PHOSPHORUS: CPT

## 2019-11-04 PROCEDURE — 36410 VNPNXR 3YR/> PHY/QHP DX/THER: CPT

## 2019-11-04 PROCEDURE — 76937 US GUIDE VASCULAR ACCESS: CPT

## 2019-11-04 PROCEDURE — 85014 HEMATOCRIT: CPT

## 2019-11-04 PROCEDURE — 2580000003 HC RX 258: Performed by: INTERNAL MEDICINE

## 2019-11-04 PROCEDURE — 36415 COLL VENOUS BLD VENIPUNCTURE: CPT

## 2019-11-04 PROCEDURE — 83605 ASSAY OF LACTIC ACID: CPT

## 2019-11-04 PROCEDURE — 80048 BASIC METABOLIC PNL TOTAL CA: CPT

## 2019-11-04 PROCEDURE — 82330 ASSAY OF CALCIUM: CPT

## 2019-11-04 PROCEDURE — 6360000002 HC RX W HCPCS: Performed by: HOSPITALIST

## 2019-11-04 PROCEDURE — C9113 INJ PANTOPRAZOLE SODIUM, VIA: HCPCS | Performed by: INTERNAL MEDICINE

## 2019-11-04 PROCEDURE — 99232 SBSQ HOSP IP/OBS MODERATE 35: CPT | Performed by: INTERNAL MEDICINE

## 2019-11-04 PROCEDURE — 6370000000 HC RX 637 (ALT 250 FOR IP): Performed by: NURSE PRACTITIONER

## 2019-11-04 PROCEDURE — 83735 ASSAY OF MAGNESIUM: CPT

## 2019-11-04 PROCEDURE — APPSS30 APP SPLIT SHARED TIME 16-30 MINUTES: Performed by: NURSE PRACTITIONER

## 2019-11-04 PROCEDURE — 82962 GLUCOSE BLOOD TEST: CPT

## 2019-11-04 PROCEDURE — 94640 AIRWAY INHALATION TREATMENT: CPT

## 2019-11-04 RX ORDER — MAGNESIUM SULFATE IN WATER 40 MG/ML
2 INJECTION, SOLUTION INTRAVENOUS ONCE
Status: COMPLETED | OUTPATIENT
Start: 2019-11-04 | End: 2019-11-04

## 2019-11-04 RX ORDER — ERGOCALCIFEROL 1.25 MG/1
50000 CAPSULE ORAL WEEKLY
Qty: 4 CAPSULE | Refills: 0 | Status: SHIPPED | OUTPATIENT
Start: 2019-11-04 | End: 2020-01-01

## 2019-11-04 RX ORDER — PRAMIPEXOLE DIHYDROCHLORIDE 1 MG/1
1 TABLET ORAL 3 TIMES DAILY
Status: DISCONTINUED | OUTPATIENT
Start: 2019-11-04 | End: 2019-11-04 | Stop reason: HOSPADM

## 2019-11-04 RX ORDER — CALCIUM CARBONATE 200(500)MG
750 TABLET,CHEWABLE ORAL 2 TIMES DAILY
Qty: 90 TABLET | Refills: 0 | Status: SHIPPED | OUTPATIENT
Start: 2019-11-04 | End: 2019-12-04

## 2019-11-04 RX ORDER — CALCITRIOL 0.25 UG/1
0.25 CAPSULE, LIQUID FILLED ORAL DAILY
Status: DISCONTINUED | OUTPATIENT
Start: 2019-11-04 | End: 2019-11-04 | Stop reason: HOSPADM

## 2019-11-04 RX ORDER — TORSEMIDE 20 MG/1
20 TABLET ORAL 2 TIMES DAILY
Qty: 60 TABLET | Refills: 1 | Status: SHIPPED | OUTPATIENT
Start: 2019-11-04 | End: 2020-01-01

## 2019-11-04 RX ORDER — AMIODARONE HYDROCHLORIDE 200 MG/1
200 TABLET ORAL DAILY
Qty: 30 TABLET | Refills: 3 | Status: SHIPPED | OUTPATIENT
Start: 2019-11-04

## 2019-11-04 RX ORDER — CALCIUM CARBONATE 200(500)MG
750 TABLET,CHEWABLE ORAL 2 TIMES DAILY
Status: DISCONTINUED | OUTPATIENT
Start: 2019-11-04 | End: 2019-11-04 | Stop reason: HOSPADM

## 2019-11-04 RX ORDER — CALCITRIOL 0.25 UG/1
0.25 CAPSULE, LIQUID FILLED ORAL DAILY
Qty: 30 CAPSULE | Refills: 3 | Status: ON HOLD | OUTPATIENT
Start: 2019-11-04 | End: 2020-01-01 | Stop reason: HOSPADM

## 2019-11-04 RX ORDER — ERGOCALCIFEROL 1.25 MG/1
50000 CAPSULE ORAL WEEKLY
Status: DISCONTINUED | OUTPATIENT
Start: 2019-11-04 | End: 2019-11-04 | Stop reason: HOSPADM

## 2019-11-04 RX ADMIN — MICONAZOLE NITRATE: 20 POWDER TOPICAL at 10:17

## 2019-11-04 RX ADMIN — AMIODARONE HYDROCHLORIDE 200 MG: 200 TABLET ORAL at 10:08

## 2019-11-04 RX ADMIN — IPRATROPIUM BROMIDE AND ALBUTEROL SULFATE 1 AMPULE: .5; 3 SOLUTION RESPIRATORY (INHALATION) at 08:34

## 2019-11-04 RX ADMIN — ERGOCALCIFEROL 50000 UNITS: 1.25 CAPSULE ORAL at 12:19

## 2019-11-04 RX ADMIN — IPRATROPIUM BROMIDE AND ALBUTEROL SULFATE 1 AMPULE: .5; 3 SOLUTION RESPIRATORY (INHALATION) at 11:33

## 2019-11-04 RX ADMIN — CALCIUM GLUCONATE 2 G: 98 INJECTION, SOLUTION INTRAVENOUS at 10:32

## 2019-11-04 RX ADMIN — BACITRACIN ZINC AND POLYMYXIN B SULFATE: 500; 10000 OINTMENT OPHTHALMIC at 10:04

## 2019-11-04 RX ADMIN — MEROPENEM 1 G: 1 INJECTION, POWDER, FOR SOLUTION INTRAVENOUS at 06:17

## 2019-11-04 RX ADMIN — Medication 10 ML: at 10:07

## 2019-11-04 RX ADMIN — Medication 10 ML: at 10:33

## 2019-11-04 RX ADMIN — SPIRONOLACTONE 25 MG: 25 TABLET ORAL at 10:08

## 2019-11-04 RX ADMIN — MAGNESIUM SULFATE HEPTAHYDRATE 2 G: 40 INJECTION, SOLUTION INTRAVENOUS at 11:46

## 2019-11-04 RX ADMIN — ENOXAPARIN SODIUM 40 MG: 40 INJECTION SUBCUTANEOUS at 10:03

## 2019-11-04 RX ADMIN — CALCIUM CARBONATE 750 MG: 500 TABLET, CHEWABLE ORAL at 12:19

## 2019-11-04 RX ADMIN — Medication 1 CAPSULE: at 10:08

## 2019-11-04 RX ADMIN — PANTOPRAZOLE SODIUM 40 MG: 40 INJECTION, POWDER, FOR SOLUTION INTRAVENOUS at 06:17

## 2019-11-04 RX ADMIN — MEROPENEM 1 G: 1 INJECTION, POWDER, FOR SOLUTION INTRAVENOUS at 15:51

## 2019-11-04 RX ADMIN — FUROSEMIDE 20 MG: 10 INJECTION, SOLUTION INTRAMUSCULAR; INTRAVENOUS at 10:14

## 2019-11-04 RX ADMIN — PRAMIPEXOLE DIHYDROCHLORIDE 1 MG: 1 TABLET ORAL at 13:56

## 2019-11-04 RX ADMIN — PRAMIPEXOLE DIHYDROCHLORIDE 1 MG: 1 TABLET ORAL at 10:07

## 2019-11-04 RX ADMIN — CALCITRIOL CAPSULES 0.25 MCG 0.25 MCG: 0.25 CAPSULE ORAL at 12:19

## 2019-11-04 RX ADMIN — METOPROLOL TARTRATE 25 MG: 25 TABLET ORAL at 10:08

## 2019-11-04 ASSESSMENT — PAIN SCALES - GENERAL
PAINLEVEL_OUTOF10: 0
PAINLEVEL_OUTOF10: 0

## 2019-11-05 LAB
EKG ATRIAL RATE: 49 BPM
EKG DIAGNOSIS: NORMAL
EKG Q-T INTERVAL: 526 MS
EKG QRS DURATION: 92 MS
EKG QTC CALCULATION (BAZETT): 469 MS
EKG R AXIS: 44 DEGREES
EKG T AXIS: 74 DEGREES
EKG VENTRICULAR RATE: 48 BPM

## 2019-11-14 ENCOUNTER — TELEPHONE (OUTPATIENT)
Dept: INFECTIOUS DISEASES | Age: 64
End: 2019-11-14

## 2020-01-01 ENCOUNTER — APPOINTMENT (OUTPATIENT)
Dept: CT IMAGING | Age: 65
DRG: 720 | End: 2020-01-01
Payer: COMMERCIAL

## 2020-01-01 ENCOUNTER — APPOINTMENT (OUTPATIENT)
Dept: GENERAL RADIOLOGY | Age: 65
DRG: 720 | End: 2020-01-01
Payer: COMMERCIAL

## 2020-01-01 ENCOUNTER — APPOINTMENT (OUTPATIENT)
Dept: GENERAL RADIOLOGY | Age: 65
End: 2020-01-01
Payer: COMMERCIAL

## 2020-01-01 ENCOUNTER — HOSPITAL ENCOUNTER (EMERGENCY)
Age: 65
Discharge: ANOTHER HOSPITAL | End: 2020-05-24
Attending: EMERGENCY MEDICINE
Payer: COMMERCIAL

## 2020-01-01 ENCOUNTER — TELEPHONE (OUTPATIENT)
Dept: CARDIOLOGY CLINIC | Age: 65
End: 2020-01-01

## 2020-01-01 ENCOUNTER — HOSPITAL ENCOUNTER (INPATIENT)
Age: 65
LOS: 11 days | Discharge: HOME HEALTH | DRG: 194 | End: 2020-07-06
Attending: EMERGENCY MEDICINE | Admitting: INTERNAL MEDICINE
Payer: COMMERCIAL

## 2020-01-01 ENCOUNTER — ANESTHESIA (OUTPATIENT)
Dept: ICU | Age: 65
DRG: 720 | End: 2020-01-01
Payer: COMMERCIAL

## 2020-01-01 ENCOUNTER — APPOINTMENT (OUTPATIENT)
Dept: CT IMAGING | Age: 65
End: 2020-01-01
Payer: COMMERCIAL

## 2020-01-01 ENCOUNTER — APPOINTMENT (OUTPATIENT)
Dept: INTERVENTIONAL RADIOLOGY/VASCULAR | Age: 65
DRG: 720 | End: 2020-01-01
Payer: COMMERCIAL

## 2020-01-01 ENCOUNTER — APPOINTMENT (OUTPATIENT)
Dept: ULTRASOUND IMAGING | Age: 65
DRG: 720 | End: 2020-01-01
Payer: COMMERCIAL

## 2020-01-01 ENCOUNTER — ANESTHESIA EVENT (OUTPATIENT)
Dept: ICU | Age: 65
DRG: 720 | End: 2020-01-01
Payer: COMMERCIAL

## 2020-01-01 ENCOUNTER — CARE COORDINATION (OUTPATIENT)
Dept: CARE COORDINATION | Age: 65
End: 2020-01-01

## 2020-01-01 ENCOUNTER — CARE COORDINATION (OUTPATIENT)
Dept: CASE MANAGEMENT | Age: 65
End: 2020-01-01

## 2020-01-01 ENCOUNTER — HOSPITAL ENCOUNTER (EMERGENCY)
Age: 65
Discharge: HOME | End: 2020-12-09
Attending: EMERGENCY MEDICINE
Payer: COMMERCIAL

## 2020-01-01 ENCOUNTER — HOSPITAL ENCOUNTER (EMERGENCY)
Age: 65
Discharge: ANOTHER HOSPITAL | End: 2020-06-04
Payer: COMMERCIAL

## 2020-01-01 ENCOUNTER — APPOINTMENT (OUTPATIENT)
Dept: GENERAL RADIOLOGY | Age: 65
DRG: 194 | End: 2020-01-01
Payer: COMMERCIAL

## 2020-01-01 ENCOUNTER — VIRTUAL VISIT (OUTPATIENT)
Dept: CARDIOLOGY CLINIC | Age: 65
End: 2020-01-01
Payer: COMMERCIAL

## 2020-01-01 ENCOUNTER — APPOINTMENT (OUTPATIENT)
Dept: INTERVENTIONAL RADIOLOGY/VASCULAR | Age: 65
DRG: 194 | End: 2020-01-01
Payer: COMMERCIAL

## 2020-01-01 ENCOUNTER — HOSPITAL ENCOUNTER (EMERGENCY)
Age: 65
Discharge: HOME | End: 2020-04-14
Payer: COMMERCIAL

## 2020-01-01 ENCOUNTER — HOSPITAL ENCOUNTER (INPATIENT)
Age: 65
LOS: 14 days | Discharge: NURSING, REHAB OR LONG-TERM CARE | DRG: 720 | End: 2020-09-08
Attending: EMERGENCY MEDICINE | Admitting: INTERNAL MEDICINE
Payer: COMMERCIAL

## 2020-01-01 ENCOUNTER — HOSPITAL ENCOUNTER (EMERGENCY)
Age: 65
Discharge: HOME | End: 2020-04-08
Attending: EMERGENCY MEDICINE
Payer: COMMERCIAL

## 2020-01-01 VITALS
HEART RATE: 62 BPM | OXYGEN SATURATION: 98 % | BODY MASS INDEX: 47.09 KG/M2 | SYSTOLIC BLOOD PRESSURE: 141 MMHG | RESPIRATION RATE: 23 BRPM | DIASTOLIC BLOOD PRESSURE: 73 MMHG | TEMPERATURE: 98 F | WEIGHT: 293 LBS | HEIGHT: 66 IN

## 2020-01-01 VITALS
RESPIRATION RATE: 20 BRPM | TEMPERATURE: 99.9 F | OXYGEN SATURATION: 100 % | HEART RATE: 63 BPM | HEIGHT: 66 IN | DIASTOLIC BLOOD PRESSURE: 44 MMHG | WEIGHT: 293 LBS | BODY MASS INDEX: 47.09 KG/M2 | SYSTOLIC BLOOD PRESSURE: 115 MMHG

## 2020-01-01 VITALS
HEART RATE: 69 BPM | SYSTOLIC BLOOD PRESSURE: 114 MMHG | OXYGEN SATURATION: 95 % | RESPIRATION RATE: 17 BRPM | TEMPERATURE: 97.6 F | DIASTOLIC BLOOD PRESSURE: 53 MMHG

## 2020-01-01 VITALS
DIASTOLIC BLOOD PRESSURE: 75 MMHG | HEIGHT: 66 IN | HEART RATE: 65 BPM | WEIGHT: 293 LBS | SYSTOLIC BLOOD PRESSURE: 154 MMHG | OXYGEN SATURATION: 100 % | RESPIRATION RATE: 20 BRPM | TEMPERATURE: 96.6 F | BODY MASS INDEX: 47.09 KG/M2

## 2020-01-01 VITALS
BODY MASS INDEX: 47.09 KG/M2 | DIASTOLIC BLOOD PRESSURE: 82 MMHG | WEIGHT: 293 LBS | RESPIRATION RATE: 16 BRPM | HEIGHT: 66 IN | TEMPERATURE: 97.8 F | SYSTOLIC BLOOD PRESSURE: 135 MMHG | HEART RATE: 66 BPM | OXYGEN SATURATION: 92 %

## 2020-01-01 VITALS
WEIGHT: 293 LBS | SYSTOLIC BLOOD PRESSURE: 105 MMHG | TEMPERATURE: 98.4 F | RESPIRATION RATE: 18 BRPM | HEART RATE: 61 BPM | OXYGEN SATURATION: 100 % | BODY MASS INDEX: 47.09 KG/M2 | HEIGHT: 66 IN | DIASTOLIC BLOOD PRESSURE: 35 MMHG

## 2020-01-01 VITALS
HEIGHT: 68 IN | RESPIRATION RATE: 12 BRPM | TEMPERATURE: 97.6 F | DIASTOLIC BLOOD PRESSURE: 46 MMHG | BODY MASS INDEX: 44.41 KG/M2 | HEART RATE: 64 BPM | OXYGEN SATURATION: 97 % | WEIGHT: 293 LBS | SYSTOLIC BLOOD PRESSURE: 124 MMHG

## 2020-01-01 LAB
ABO/RH: NORMAL
ACETAMINOPHEN LEVEL: <5 UG/ML (ref 15–30)
ADENOVIRUS DETECTION BY PCR: NOT DETECTED
ADENOVIRUS DETECTION BY PCR: NOT DETECTED
ALBUMIN SERPL-MCNC: 2.9 GM/DL (ref 3.4–5)
ALBUMIN SERPL-MCNC: 2.9 GM/DL (ref 3.4–5)
ALBUMIN SERPL-MCNC: 3 GM/DL (ref 3.4–5)
ALBUMIN SERPL-MCNC: 3.2 GM/DL (ref 3.4–5)
ALBUMIN SERPL-MCNC: 3.2 GM/DL (ref 3.4–5)
ALBUMIN SERPL-MCNC: 3.3 GM/DL (ref 3.4–5)
ALBUMIN SERPL-MCNC: 3.4 GM/DL (ref 3.4–5)
ALBUMIN SERPL-MCNC: 3.5 GM/DL (ref 3.4–5)
ALBUMIN SERPL-MCNC: 3.6 GM/DL (ref 3.4–5)
ALBUMIN SERPL-MCNC: 3.7 GM/DL (ref 3.4–5)
ALBUMIN SERPL-MCNC: 3.7 GM/DL (ref 3.4–5)
ALBUMIN SERPL-MCNC: 3.8 GM/DL (ref 3.4–5)
ALBUMIN SERPL-MCNC: 3.9 GM/DL (ref 3.4–5)
ALBUMIN SERPL-MCNC: 3.9 GM/DL (ref 3.4–5)
ALBUMIN SERPL-MCNC: 4 GM/DL (ref 3.4–5)
ALCOHOL SCREEN SERUM: <0.01 %WT/VOL
ALP BLD-CCNC: 105 IU/L (ref 40–129)
ALP BLD-CCNC: 106 IU/L (ref 40–129)
ALP BLD-CCNC: 107 IU/L (ref 40–128)
ALP BLD-CCNC: 118 IU/L (ref 40–129)
ALP BLD-CCNC: 119 IU/L (ref 40–128)
ALP BLD-CCNC: 120 IU/L (ref 40–129)
ALP BLD-CCNC: 122 IU/L (ref 40–128)
ALP BLD-CCNC: 122 IU/L (ref 40–129)
ALP BLD-CCNC: 122 IU/L (ref 40–129)
ALP BLD-CCNC: 125 IU/L (ref 40–128)
ALP BLD-CCNC: 130 IU/L (ref 40–129)
ALP BLD-CCNC: 140 IU/L (ref 40–128)
ALP BLD-CCNC: 165 IU/L (ref 40–128)
ALP BLD-CCNC: 64 IU/L (ref 40–128)
ALP BLD-CCNC: 65 IU/L (ref 40–128)
ALP BLD-CCNC: 66 IU/L (ref 40–128)
ALP BLD-CCNC: 69 IU/L (ref 40–129)
ALP BLD-CCNC: 71 IU/L (ref 40–128)
ALP BLD-CCNC: 76 IU/L (ref 40–128)
ALP BLD-CCNC: 78 IU/L (ref 40–129)
ALP BLD-CCNC: 79 IU/L (ref 40–128)
ALP BLD-CCNC: 79 IU/L (ref 40–129)
ALP BLD-CCNC: 81 IU/L (ref 40–128)
ALP BLD-CCNC: 86 IU/L (ref 40–128)
ALP BLD-CCNC: 90 IU/L (ref 40–128)
ALP BLD-CCNC: 94 IU/L (ref 40–128)
ALT SERPL-CCNC: 10 U/L (ref 10–40)
ALT SERPL-CCNC: 11 U/L (ref 10–40)
ALT SERPL-CCNC: 12 U/L (ref 10–40)
ALT SERPL-CCNC: 13 U/L (ref 10–40)
ALT SERPL-CCNC: 15 U/L (ref 10–40)
ALT SERPL-CCNC: 15 U/L (ref 10–40)
ALT SERPL-CCNC: 16 U/L (ref 10–40)
ALT SERPL-CCNC: 5 U/L (ref 10–40)
ALT SERPL-CCNC: 6 U/L (ref 10–40)
ALT SERPL-CCNC: 7 U/L (ref 10–40)
ALT SERPL-CCNC: 8 U/L (ref 10–40)
AMMONIA: 30 UMOL/L (ref 11–51)
AMMONIA: 36 UMOL/L (ref 11–51)
AMPHETAMINES: NEGATIVE
ANION GAP SERPL CALCULATED.3IONS-SCNC: 10 MMOL/L (ref 4–16)
ANION GAP SERPL CALCULATED.3IONS-SCNC: 11 MMOL/L (ref 4–16)
ANION GAP SERPL CALCULATED.3IONS-SCNC: 12 MMOL/L (ref 4–16)
ANION GAP SERPL CALCULATED.3IONS-SCNC: 13 MMOL/L (ref 4–16)
ANION GAP SERPL CALCULATED.3IONS-SCNC: 13 MMOL/L (ref 4–16)
ANION GAP SERPL CALCULATED.3IONS-SCNC: 14 MMOL/L (ref 4–16)
ANION GAP SERPL CALCULATED.3IONS-SCNC: 15 MMOL/L (ref 4–16)
ANION GAP SERPL CALCULATED.3IONS-SCNC: 15 MMOL/L (ref 4–16)
ANION GAP SERPL CALCULATED.3IONS-SCNC: 20 MMOL/L (ref 4–16)
ANION GAP SERPL CALCULATED.3IONS-SCNC: 3 MMOL/L (ref 4–16)
ANION GAP SERPL CALCULATED.3IONS-SCNC: 4 MMOL/L (ref 4–16)
ANION GAP SERPL CALCULATED.3IONS-SCNC: 4 MMOL/L (ref 4–16)
ANION GAP SERPL CALCULATED.3IONS-SCNC: 5 MMOL/L (ref 4–16)
ANION GAP SERPL CALCULATED.3IONS-SCNC: 6 MMOL/L (ref 4–16)
ANION GAP SERPL CALCULATED.3IONS-SCNC: 7 MMOL/L (ref 4–16)
ANION GAP SERPL CALCULATED.3IONS-SCNC: 8 MMOL/L (ref 4–16)
ANION GAP SERPL CALCULATED.3IONS-SCNC: 8 MMOL/L (ref 4–16)
ANION GAP SERPL CALCULATED.3IONS-SCNC: 9 MMOL/L (ref 4–16)
ANISOCYTOSIS: ABNORMAL
ANISOCYTOSIS: ABNORMAL
ANTIBODY SCREEN: NEGATIVE
APTT: 26.2 SECONDS (ref 25.1–37.1)
APTT: 28.3 SECONDS (ref 25.1–37.1)
APTT: 28.5 SECONDS (ref 25.1–37.1)
APTT: 30 SECONDS (ref 25.1–37.1)
APTT: 30 SECONDS (ref 25.1–37.1)
APTT: 30.4 SECONDS (ref 25.1–37.1)
APTT: 30.6 SECONDS (ref 25.1–37.1)
APTT: 30.8 SECONDS (ref 25.1–37.1)
APTT: 31.8 SECONDS (ref 25.1–37.1)
APTT: 32 SECONDS (ref 25.1–37.1)
APTT: 33.7 SECONDS (ref 25.1–37.1)
APTT: 35.2 SECONDS (ref 25.1–37.1)
AST SERPL-CCNC: 10 IU/L (ref 15–37)
AST SERPL-CCNC: 10 IU/L (ref 15–37)
AST SERPL-CCNC: 11 IU/L (ref 15–37)
AST SERPL-CCNC: 12 IU/L (ref 15–37)
AST SERPL-CCNC: 13 IU/L (ref 15–37)
AST SERPL-CCNC: 14 IU/L (ref 15–37)
AST SERPL-CCNC: 14 IU/L (ref 15–37)
AST SERPL-CCNC: 15 IU/L (ref 15–37)
AST SERPL-CCNC: 15 IU/L (ref 15–37)
AST SERPL-CCNC: 16 IU/L (ref 15–37)
AST SERPL-CCNC: 17 IU/L (ref 15–37)
AST SERPL-CCNC: 17 IU/L (ref 15–37)
AST SERPL-CCNC: 19 IU/L (ref 15–37)
AST SERPL-CCNC: 23 IU/L (ref 15–37)
AST SERPL-CCNC: 24 IU/L (ref 15–37)
AST SERPL-CCNC: 24 IU/L (ref 15–37)
AST SERPL-CCNC: 7 IU/L (ref 15–37)
AST SERPL-CCNC: 8 IU/L (ref 15–37)
AST SERPL-CCNC: 81 IU/L (ref 15–37)
AST SERPL-CCNC: 9 IU/L (ref 15–37)
AST SERPL-CCNC: 9 IU/L (ref 15–37)
BACTERIA: ABNORMAL /HPF
BACTERIA: NEGATIVE /HPF
BARBITURATE SCREEN URINE: NEGATIVE
BASE EXCESS MIXED: 1.1 (ref 0–2.3)
BASE EXCESS MIXED: 15 (ref 0–2.3)
BASE EXCESS MIXED: 16.5 (ref 0–2.3)
BASE EXCESS MIXED: 17.4 (ref 0–2.3)
BASE EXCESS MIXED: 2.5 (ref 0–2.3)
BASE EXCESS MIXED: 2.8 (ref 0–2.3)
BASE EXCESS MIXED: 21.8 (ref 0–2.3)
BASE EXCESS MIXED: 24.1 (ref 0–2.3)
BASE EXCESS MIXED: 25.7 (ref 0–2.3)
BASE EXCESS MIXED: 26 (ref 0–2.3)
BASE EXCESS MIXED: 3.2 (ref 0–2.3)
BASE EXCESS MIXED: 6.3 (ref 0–2.3)
BASE EXCESS MIXED: 6.5 (ref 0–2.3)
BASE EXCESS MIXED: 6.9 (ref 0–2.3)
BASE EXCESS MIXED: 8.2 (ref 0–2.3)
BASE EXCESS: 7 (ref 0–2.4)
BASE EXCESS: 8 (ref 0–2.4)
BASOPHILS ABSOLUTE: 0 K/CU MM
BASOPHILS ABSOLUTE: 0.1 K/CU MM
BASOPHILS RELATIVE PERCENT: 0 % (ref 0–1)
BASOPHILS RELATIVE PERCENT: 0 % (ref 0–1)
BASOPHILS RELATIVE PERCENT: 0.2 % (ref 0–1)
BASOPHILS RELATIVE PERCENT: 0.3 % (ref 0–1)
BASOPHILS RELATIVE PERCENT: 0.3 % (ref 0–1)
BASOPHILS RELATIVE PERCENT: 0.4 % (ref 0–1)
BASOPHILS RELATIVE PERCENT: 0.5 % (ref 0–1)
BASOPHILS RELATIVE PERCENT: 0.6 % (ref 0–1)
BASOPHILS RELATIVE PERCENT: 0.6 % (ref 0–1)
BASOPHILS RELATIVE PERCENT: 0.7 % (ref 0–1)
BASOPHILS RELATIVE PERCENT: 0.8 % (ref 0–1)
BASOPHILS RELATIVE PERCENT: 0.8 % (ref 0–1)
BASOPHILS RELATIVE PERCENT: 0.9 % (ref 0–1)
BASOPHILS RELATIVE PERCENT: 1 % (ref 0–1)
BASOPHILS RELATIVE PERCENT: 1.1 % (ref 0–1)
BASOPHILS RELATIVE PERCENT: 1.2 % (ref 0–1)
BASOPHILS RELATIVE PERCENT: 1.4 % (ref 0–1)
BENZODIAZEPINE SCREEN, URINE: NEGATIVE
BILIRUB SERPL-MCNC: 0.2 MG/DL (ref 0–1)
BILIRUB SERPL-MCNC: 0.3 MG/DL (ref 0–1)
BILIRUB SERPL-MCNC: 0.4 MG/DL (ref 0–1)
BILIRUB SERPL-MCNC: 0.5 MG/DL (ref 0–1)
BILIRUB SERPL-MCNC: 0.6 MG/DL (ref 0–1)
BILIRUB SERPL-MCNC: 0.7 MG/DL (ref 0–1)
BILIRUB SERPL-MCNC: 0.9 MG/DL (ref 0–1)
BILIRUBIN URINE: NEGATIVE MG/DL
BLOOD, URINE: ABNORMAL
BLOOD, URINE: NEGATIVE
BORDETELLA PARAPERTUSSIS BY PCR: NOT DETECTED
BORDETELLA PARAPERTUSSIS BY PCR: NOT DETECTED
BORDETELLA PERTUSSIS PCR: NOT DETECTED
BORDETELLA PERTUSSIS PCR: NOT DETECTED
BUN BLDV-MCNC: 121 MG/DL (ref 6–23)
BUN BLDV-MCNC: 14 MG/DL (ref 6–23)
BUN BLDV-MCNC: 14 MG/DL (ref 6–23)
BUN BLDV-MCNC: 16 MG/DL (ref 6–23)
BUN BLDV-MCNC: 17 MG/DL (ref 6–23)
BUN BLDV-MCNC: 17 MG/DL (ref 6–23)
BUN BLDV-MCNC: 19 MG/DL (ref 6–23)
BUN BLDV-MCNC: 20 MG/DL (ref 6–23)
BUN BLDV-MCNC: 21 MG/DL (ref 6–23)
BUN BLDV-MCNC: 22 MG/DL (ref 6–23)
BUN BLDV-MCNC: 22 MG/DL (ref 6–23)
BUN BLDV-MCNC: 23 MG/DL (ref 6–23)
BUN BLDV-MCNC: 25 MG/DL (ref 6–23)
BUN BLDV-MCNC: 25 MG/DL (ref 6–23)
BUN BLDV-MCNC: 26 MG/DL (ref 6–23)
BUN BLDV-MCNC: 27 MG/DL (ref 6–23)
BUN BLDV-MCNC: 28 MG/DL (ref 6–23)
BUN BLDV-MCNC: 28 MG/DL (ref 6–23)
BUN BLDV-MCNC: 29 MG/DL (ref 6–23)
BUN BLDV-MCNC: 30 MG/DL (ref 6–23)
BUN BLDV-MCNC: 32 MG/DL (ref 6–23)
BUN BLDV-MCNC: 35 MG/DL (ref 6–23)
BUN BLDV-MCNC: 36 MG/DL (ref 6–23)
BUN BLDV-MCNC: 37 MG/DL (ref 6–23)
BUN BLDV-MCNC: 38 MG/DL (ref 6–23)
BUN BLDV-MCNC: 39 MG/DL (ref 6–23)
BUN BLDV-MCNC: 39 MG/DL (ref 6–23)
BUN BLDV-MCNC: 41 MG/DL (ref 6–23)
BUN BLDV-MCNC: 41 MG/DL (ref 6–23)
BUN BLDV-MCNC: 43 MG/DL (ref 6–23)
BUN BLDV-MCNC: 45 MG/DL (ref 6–23)
BUN BLDV-MCNC: 47 MG/DL (ref 6–23)
BUN BLDV-MCNC: 88 MG/DL (ref 6–23)
BUN BLDV-MCNC: 89 MG/DL (ref 6–23)
BURR CELLS: ABNORMAL
CALCIUM SERPL-MCNC: 10 MG/DL (ref 8.3–10.6)
CALCIUM SERPL-MCNC: 7.7 MG/DL (ref 8.3–10.6)
CALCIUM SERPL-MCNC: 7.8 MG/DL (ref 8.3–10.6)
CALCIUM SERPL-MCNC: 7.9 MG/DL (ref 8.3–10.6)
CALCIUM SERPL-MCNC: 7.9 MG/DL (ref 8.3–10.6)
CALCIUM SERPL-MCNC: 8 MG/DL (ref 8.3–10.6)
CALCIUM SERPL-MCNC: 8.2 MG/DL (ref 8.3–10.6)
CALCIUM SERPL-MCNC: 8.3 MG/DL (ref 8.3–10.6)
CALCIUM SERPL-MCNC: 8.5 MG/DL (ref 8.3–10.6)
CALCIUM SERPL-MCNC: 8.6 MG/DL (ref 8.3–10.6)
CALCIUM SERPL-MCNC: 8.8 MG/DL (ref 8.3–10.6)
CALCIUM SERPL-MCNC: 8.9 MG/DL (ref 8.3–10.6)
CALCIUM SERPL-MCNC: 9 MG/DL (ref 8.3–10.6)
CALCIUM SERPL-MCNC: 9.1 MG/DL (ref 8.3–10.6)
CALCIUM SERPL-MCNC: 9.2 MG/DL (ref 8.3–10.6)
CALCIUM SERPL-MCNC: 9.3 MG/DL (ref 8.3–10.6)
CALCIUM SERPL-MCNC: 9.5 MG/DL (ref 8.3–10.6)
CALCIUM SERPL-MCNC: 9.8 MG/DL (ref 8.3–10.6)
CALCIUM SERPL-MCNC: 9.9 MG/DL (ref 8.3–10.6)
CANNABINOID SCREEN URINE: NEGATIVE
CARBON MONOXIDE, BLOOD: 1.5 % (ref 0–5)
CARBON MONOXIDE, BLOOD: 1.5 % (ref 0–5)
CARBON MONOXIDE, BLOOD: 1.6 % (ref 0–5)
CARBON MONOXIDE, BLOOD: 1.6 % (ref 0–5)
CARBON MONOXIDE, BLOOD: 1.7 % (ref 0–5)
CARBON MONOXIDE, BLOOD: 1.9 % (ref 0–5)
CARBON MONOXIDE, BLOOD: 1.9 % (ref 0–5)
CARBON MONOXIDE, BLOOD: 2 % (ref 0–5)
CARBON MONOXIDE, BLOOD: 2 % (ref 0–5)
CARBON MONOXIDE, BLOOD: 2.1 % (ref 0–5)
CARBON MONOXIDE, BLOOD: 2.2 % (ref 0–5)
CARBON MONOXIDE, BLOOD: 2.3 % (ref 0–5)
CARBON MONOXIDE, BLOOD: 2.3 % (ref 0–5)
CARBON MONOXIDE, BLOOD: 2.4 % (ref 0–5)
CHLAMYDOPHILA PNEUMONIA PCR: NOT DETECTED
CHLAMYDOPHILA PNEUMONIA PCR: NOT DETECTED
CHLORIDE BLD-SCNC: 100 MMOL/L (ref 99–110)
CHLORIDE BLD-SCNC: 101 MMOL/L (ref 99–110)
CHLORIDE BLD-SCNC: 102 MMOL/L (ref 99–110)
CHLORIDE BLD-SCNC: 103 MMOL/L (ref 99–110)
CHLORIDE BLD-SCNC: 104 MMOL/L (ref 99–110)
CHLORIDE BLD-SCNC: 104 MMOL/L (ref 99–110)
CHLORIDE BLD-SCNC: 105 MMOL/L (ref 99–110)
CHLORIDE BLD-SCNC: 85 MMOL/L (ref 99–110)
CHLORIDE BLD-SCNC: 86 MMOL/L (ref 99–110)
CHLORIDE BLD-SCNC: 87 MMOL/L (ref 99–110)
CHLORIDE BLD-SCNC: 87 MMOL/L (ref 99–110)
CHLORIDE BLD-SCNC: 88 MMOL/L (ref 99–110)
CHLORIDE BLD-SCNC: 89 MMOL/L (ref 99–110)
CHLORIDE BLD-SCNC: 89 MMOL/L (ref 99–110)
CHLORIDE BLD-SCNC: 90 MMOL/L (ref 99–110)
CHLORIDE BLD-SCNC: 94 MMOL/L (ref 99–110)
CHLORIDE BLD-SCNC: 95 MMOL/L (ref 99–110)
CHLORIDE BLD-SCNC: 95 MMOL/L (ref 99–110)
CHLORIDE BLD-SCNC: 96 MMOL/L (ref 99–110)
CHLORIDE BLD-SCNC: 97 MMOL/L (ref 99–110)
CHLORIDE BLD-SCNC: 98 MMOL/L (ref 99–110)
CHLORIDE BLD-SCNC: 98 MMOL/L (ref 99–110)
CHLORIDE BLD-SCNC: 99 MMOL/L (ref 99–110)
CHOLESTEROL: 117 MG/DL
CHP ED QC CHECK: 124
CHP ED QC CHECK: NORMAL
CLARITY: ABNORMAL
CLARITY: CLEAR
CLARITY: CLEAR
CO2 CONTENT: 23.5 MMOL/L (ref 19–24)
CO2 CONTENT: 24 MMOL/L (ref 19–24)
CO2 CONTENT: 29.3 MMOL/L (ref 19–24)
CO2 CONTENT: 30 MMOL/L (ref 19–24)
CO2 CONTENT: 31.5 MMOL/L (ref 19–24)
CO2 CONTENT: 35.9 MMOL/L (ref 19–24)
CO2 CONTENT: 36.5 MMOL/L (ref 19–24)
CO2 CONTENT: 37.7 MMOL/L (ref 19–24)
CO2 CONTENT: 44.8 MMOL/L (ref 19–24)
CO2 CONTENT: 46.3 MMOL/L (ref 19–24)
CO2 CONTENT: 55.6 MMOL/L (ref 19–24)
CO2 CONTENT: 56.3 MMOL/L (ref 19–24)
CO2 CONTENT: 56.4 MMOL/L (ref 19–24)
CO2 CONTENT: 58.1 MMOL/L (ref 19–24)
CO2: 19 MMOL/L (ref 21–32)
CO2: 27 MMOL/L (ref 21–32)
CO2: 28 MMOL/L (ref 21–32)
CO2: 28 MMOL/L (ref 21–32)
CO2: 29 MMOL/L (ref 21–32)
CO2: 29 MMOL/L (ref 21–32)
CO2: 30 MMOL/L (ref 21–32)
CO2: 31 MMOL/L (ref 21–32)
CO2: 31 MMOL/L (ref 21–32)
CO2: 32 MMOL/L (ref 21–32)
CO2: 33 MMOL/L (ref 21–32)
CO2: 35 MMOL/L (ref 21–32)
CO2: 35 MMOL/L (ref 21–32)
CO2: 36 MMOL/L (ref 21–32)
CO2: 38 MMOL/L (ref 21–32)
CO2: 38 MMOL/L (ref 21–32)
CO2: 39 MMOL/L (ref 21–32)
CO2: 39 MMOL/L (ref 21–32)
CO2: 42 MMOL/L (ref 21–32)
CO2: 44 MMOL/L (ref 21–32)
CO2: 44 MMOL/L (ref 21–32)
CO2: 45 MMOL/L (ref 21–32)
CO2: 48 MMOL/L (ref 21–32)
CO2: 49 MMOL/L (ref 21–32)
CO2: 50 MMOL/L (ref 21–32)
CO2: 50 MMOL/L (ref 21–32)
COCAINE METABOLITE: NEGATIVE
COLOR: ABNORMAL
COLOR: YELLOW
COMMENT: ABNORMAL
COMPONENT: NORMAL
CORONAVIRUS 229E PCR: NOT DETECTED
CORONAVIRUS 229E PCR: NOT DETECTED
CORONAVIRUS HKU1 PCR: NOT DETECTED
CORONAVIRUS HKU1 PCR: NOT DETECTED
CORONAVIRUS NL63 PCR: NOT DETECTED
CORONAVIRUS NL63 PCR: NOT DETECTED
CORONAVIRUS OC43 PCR: NOT DETECTED
CORONAVIRUS OC43 PCR: NOT DETECTED
CREAT SERPL-MCNC: 0.4 MG/DL (ref 0.6–1.1)
CREAT SERPL-MCNC: 0.8 MG/DL (ref 0.6–1.1)
CREAT SERPL-MCNC: 0.9 MG/DL (ref 0.6–1.1)
CREAT SERPL-MCNC: 1 MG/DL (ref 0.6–1.1)
CREAT SERPL-MCNC: 1.1 MG/DL (ref 0.6–1.1)
CREAT SERPL-MCNC: 1.2 MG/DL (ref 0.6–1.1)
CREAT SERPL-MCNC: 1.3 MG/DL (ref 0.6–1.1)
CREAT SERPL-MCNC: 1.5 MG/DL (ref 0.6–1.1)
CREAT SERPL-MCNC: 1.6 MG/DL (ref 0.6–1.1)
CREAT SERPL-MCNC: 1.6 MG/DL (ref 0.6–1.1)
CREAT SERPL-MCNC: 1.7 MG/DL (ref 0.6–1.1)
CREAT SERPL-MCNC: 1.7 MG/DL (ref 0.6–1.1)
CREAT SERPL-MCNC: 1.8 MG/DL (ref 0.6–1.1)
CREAT SERPL-MCNC: 1.8 MG/DL (ref 0.6–1.1)
CREAT SERPL-MCNC: 2 MG/DL (ref 0.6–1.1)
CREAT SERPL-MCNC: 2.1 MG/DL (ref 0.6–1.1)
CREAT SERPL-MCNC: 2.2 MG/DL (ref 0.6–1.1)
CREAT SERPL-MCNC: 2.2 MG/DL (ref 0.6–1.1)
CREAT SERPL-MCNC: 2.4 MG/DL (ref 0.6–1.1)
CREAT SERPL-MCNC: 3 MG/DL (ref 0.6–1.1)
CREAT SERPL-MCNC: 3.4 MG/DL (ref 0.6–1.1)
CREAT SERPL-MCNC: 3.6 MG/DL (ref 0.6–1.1)
CREAT SERPL-MCNC: 3.7 MG/DL (ref 0.6–1.1)
CREAT SERPL-MCNC: 3.7 MG/DL (ref 0.6–1.1)
CREAT SERPL-MCNC: 5.2 MG/DL (ref 0.6–1.1)
CREATININE URINE: 220.9 MG/DL
CROSSMATCH RESULT: NORMAL
CULTURE: ABNORMAL
CULTURE: NORMAL
D DIMER: 1729 NG/ML(DDU)
D DIMER: 2145 NG/ML(DDU)
D DIMER: 418 NG/ML(DDU)
DIFFERENTIAL TYPE: ABNORMAL
DOHLE BODIES: PRESENT
DOSE AMOUNT: ABNORMAL
DOSE AMOUNT: ABNORMAL
DOSE AMOUNT: NORMAL
DOSE TIME: ABNORMAL
DOSE TIME: ABNORMAL
DOSE TIME: NORMAL
EKG ATRIAL RATE: 101 BPM
EKG ATRIAL RATE: 258 BPM
EKG ATRIAL RATE: 288 BPM
EKG ATRIAL RATE: 56 BPM
EKG ATRIAL RATE: 58 BPM
EKG ATRIAL RATE: 59 BPM
EKG ATRIAL RATE: 69 BPM
EKG ATRIAL RATE: 72 BPM
EKG DIAGNOSIS: NORMAL
EKG P AXIS: 51 DEGREES
EKG P AXIS: 52 DEGREES
EKG P AXIS: 57 DEGREES
EKG P AXIS: 63 DEGREES
EKG P AXIS: 76 DEGREES
EKG P-R INTERVAL: 202 MS
EKG P-R INTERVAL: 206 MS
EKG P-R INTERVAL: 224 MS
EKG P-R INTERVAL: 234 MS
EKG P-R INTERVAL: 248 MS
EKG Q-T INTERVAL: 406 MS
EKG Q-T INTERVAL: 408 MS
EKG Q-T INTERVAL: 438 MS
EKG Q-T INTERVAL: 458 MS
EKG Q-T INTERVAL: 458 MS
EKG Q-T INTERVAL: 510 MS
EKG Q-T INTERVAL: 556 MS
EKG Q-T INTERVAL: 580 MS
EKG QRS DURATION: 104 MS
EKG QRS DURATION: 112 MS
EKG QRS DURATION: 78 MS
EKG QRS DURATION: 82 MS
EKG QRS DURATION: 84 MS
EKG QRS DURATION: 88 MS
EKG QRS DURATION: 88 MS
EKG QRS DURATION: 94 MS
EKG QTC CALCULATION (BAZETT): 433 MS
EKG QTC CALCULATION (BAZETT): 435 MS
EKG QTC CALCULATION (BAZETT): 441 MS
EKG QTC CALCULATION (BAZETT): 446 MS
EKG QTC CALCULATION (BAZETT): 449 MS
EKG QTC CALCULATION (BAZETT): 455 MS
EKG QTC CALCULATION (BAZETT): 561 MS
EKG QTC CALCULATION (BAZETT): 680 MS
EKG R AXIS: 10 DEGREES
EKG R AXIS: 14 DEGREES
EKG R AXIS: 20 DEGREES
EKG R AXIS: 27 DEGREES
EKG R AXIS: 32 DEGREES
EKG R AXIS: 38 DEGREES
EKG R AXIS: 42 DEGREES
EKG R AXIS: 53 DEGREES
EKG T AXIS: 18 DEGREES
EKG T AXIS: 57 DEGREES
EKG T AXIS: 59 DEGREES
EKG T AXIS: 60 DEGREES
EKG T AXIS: 62 DEGREES
EKG T AXIS: 66 DEGREES
EKG T AXIS: 67 DEGREES
EKG T AXIS: 73 DEGREES
EKG VENTRICULAR RATE: 37 BPM
EKG VENTRICULAR RATE: 56 BPM
EKG VENTRICULAR RATE: 58 BPM
EKG VENTRICULAR RATE: 59 BPM
EKG VENTRICULAR RATE: 69 BPM
EKG VENTRICULAR RATE: 72 BPM
EKG VENTRICULAR RATE: 73 BPM
EKG VENTRICULAR RATE: 90 BPM
EMERGENT DISEASE RESULT: NORMAL
EOSINOPHILS ABSOLUTE: 0 K/CU MM
EOSINOPHILS ABSOLUTE: 0.1 K/CU MM
EOSINOPHILS ABSOLUTE: 0.2 K/CU MM
EOSINOPHILS ABSOLUTE: 0.3 K/CU MM
EOSINOPHILS ABSOLUTE: 0.4 K/CU MM
EOSINOPHILS RELATIVE PERCENT: 0 % (ref 0–3)
EOSINOPHILS RELATIVE PERCENT: 0.2 % (ref 0–3)
EOSINOPHILS RELATIVE PERCENT: 0.2 % (ref 0–3)
EOSINOPHILS RELATIVE PERCENT: 0.3 % (ref 0–3)
EOSINOPHILS RELATIVE PERCENT: 0.5 % (ref 0–3)
EOSINOPHILS RELATIVE PERCENT: 0.9 % (ref 0–3)
EOSINOPHILS RELATIVE PERCENT: 0.9 % (ref 0–3)
EOSINOPHILS RELATIVE PERCENT: 1.2 % (ref 0–3)
EOSINOPHILS RELATIVE PERCENT: 1.4 % (ref 0–3)
EOSINOPHILS RELATIVE PERCENT: 1.4 % (ref 0–3)
EOSINOPHILS RELATIVE PERCENT: 1.5 % (ref 0–3)
EOSINOPHILS RELATIVE PERCENT: 1.6 % (ref 0–3)
EOSINOPHILS RELATIVE PERCENT: 1.8 % (ref 0–3)
EOSINOPHILS RELATIVE PERCENT: 2.1 % (ref 0–3)
EOSINOPHILS RELATIVE PERCENT: 2.6 % (ref 0–3)
EOSINOPHILS RELATIVE PERCENT: 3.1 % (ref 0–3)
EOSINOPHILS RELATIVE PERCENT: 3.6 % (ref 0–3)
EOSINOPHILS RELATIVE PERCENT: 3.9 % (ref 0–3)
EOSINOPHILS RELATIVE PERCENT: 4.3 % (ref 0–3)
EOSINOPHILS RELATIVE PERCENT: 4.4 % (ref 0–3)
EOSINOPHILS RELATIVE PERCENT: 4.6 % (ref 0–3)
EOSINOPHILS RELATIVE PERCENT: 4.7 % (ref 0–3)
EOSINOPHILS RELATIVE PERCENT: 4.8 % (ref 0–3)
EOSINOPHILS RELATIVE PERCENT: 5.5 % (ref 0–3)
EOSINOPHILS RELATIVE PERCENT: 5.7 % (ref 0–3)
EOSINOPHILS RELATIVE PERCENT: 6.2 % (ref 0–3)
EOSINOPHILS RELATIVE PERCENT: 6.3 % (ref 0–3)
ESTIMATED AVERAGE GLUCOSE: 126 MG/DL
ESTIMATED AVERAGE GLUCOSE: 128 MG/DL
FERRITIN: 123 NG/ML (ref 15–150)
FERRITIN: 57 NG/ML (ref 15–150)
FERRITIN: 65 NG/ML (ref 15–150)
FIBRINOGEN LEVEL: 409 MG/DL (ref 196.9–442.1)
FIBRINOGEN LEVEL: 427 MG/DL (ref 196.9–442.1)
FOLATE: >20 NG/ML (ref 3.1–17.5)
FOLATE: >20 NG/ML (ref 3.1–17.5)
GFR AFRICAN AMERICAN: 10 ML/MIN/1.73M2
GFR AFRICAN AMERICAN: 15 ML/MIN/1.73M2
GFR AFRICAN AMERICAN: 17 ML/MIN/1.73M2
GFR AFRICAN AMERICAN: 19 ML/MIN/1.73M2
GFR AFRICAN AMERICAN: 25 ML/MIN/1.73M2
GFR AFRICAN AMERICAN: 27 ML/MIN/1.73M2
GFR AFRICAN AMERICAN: 27 ML/MIN/1.73M2
GFR AFRICAN AMERICAN: 29 ML/MIN/1.73M2
GFR AFRICAN AMERICAN: 30 ML/MIN/1.73M2
GFR AFRICAN AMERICAN: 34 ML/MIN/1.73M2
GFR AFRICAN AMERICAN: 34 ML/MIN/1.73M2
GFR AFRICAN AMERICAN: 37 ML/MIN/1.73M2
GFR AFRICAN AMERICAN: 37 ML/MIN/1.73M2
GFR AFRICAN AMERICAN: 39 ML/MIN/1.73M2
GFR AFRICAN AMERICAN: 39 ML/MIN/1.73M2
GFR AFRICAN AMERICAN: 42 ML/MIN/1.73M2
GFR AFRICAN AMERICAN: 50 ML/MIN/1.73M2
GFR AFRICAN AMERICAN: 55 ML/MIN/1.73M2
GFR AFRICAN AMERICAN: >60 ML/MIN/1.73M2
GFR NON-AFRICAN AMERICAN: 12 ML/MIN/1.73M2
GFR NON-AFRICAN AMERICAN: 12 ML/MIN/1.73M2
GFR NON-AFRICAN AMERICAN: 13 ML/MIN/1.73M2
GFR NON-AFRICAN AMERICAN: 14 ML/MIN/1.73M2
GFR NON-AFRICAN AMERICAN: 16 ML/MIN/1.73M2
GFR NON-AFRICAN AMERICAN: 20 ML/MIN/1.73M2
GFR NON-AFRICAN AMERICAN: 23 ML/MIN/1.73M2
GFR NON-AFRICAN AMERICAN: 23 ML/MIN/1.73M2
GFR NON-AFRICAN AMERICAN: 24 ML/MIN/1.73M2
GFR NON-AFRICAN AMERICAN: 25 ML/MIN/1.73M2
GFR NON-AFRICAN AMERICAN: 28 ML/MIN/1.73M2
GFR NON-AFRICAN AMERICAN: 28 ML/MIN/1.73M2
GFR NON-AFRICAN AMERICAN: 30 ML/MIN/1.73M2
GFR NON-AFRICAN AMERICAN: 30 ML/MIN/1.73M2
GFR NON-AFRICAN AMERICAN: 33 ML/MIN/1.73M2
GFR NON-AFRICAN AMERICAN: 33 ML/MIN/1.73M2
GFR NON-AFRICAN AMERICAN: 35 ML/MIN/1.73M2
GFR NON-AFRICAN AMERICAN: 41 ML/MIN/1.73M2
GFR NON-AFRICAN AMERICAN: 45 ML/MIN/1.73M2
GFR NON-AFRICAN AMERICAN: 50 ML/MIN/1.73M2
GFR NON-AFRICAN AMERICAN: 56 ML/MIN/1.73M2
GFR NON-AFRICAN AMERICAN: 8 ML/MIN/1.73M2
GFR NON-AFRICAN AMERICAN: >60 ML/MIN/1.73M2
GLUCOSE BLD-MCNC: 100 MG/DL (ref 70–99)
GLUCOSE BLD-MCNC: 101 MG/DL (ref 70–99)
GLUCOSE BLD-MCNC: 102 MG/DL (ref 70–99)
GLUCOSE BLD-MCNC: 103 MG/DL (ref 70–99)
GLUCOSE BLD-MCNC: 104 MG/DL (ref 70–99)
GLUCOSE BLD-MCNC: 104 MG/DL (ref 70–99)
GLUCOSE BLD-MCNC: 105 MG/DL (ref 70–99)
GLUCOSE BLD-MCNC: 106 MG/DL (ref 70–99)
GLUCOSE BLD-MCNC: 107 MG/DL (ref 70–99)
GLUCOSE BLD-MCNC: 108 MG/DL (ref 70–99)
GLUCOSE BLD-MCNC: 109 MG/DL (ref 70–99)
GLUCOSE BLD-MCNC: 111 MG/DL (ref 70–99)
GLUCOSE BLD-MCNC: 112 MG/DL (ref 70–99)
GLUCOSE BLD-MCNC: 113 MG/DL (ref 70–99)
GLUCOSE BLD-MCNC: 114 MG/DL (ref 70–99)
GLUCOSE BLD-MCNC: 115 MG/DL (ref 70–99)
GLUCOSE BLD-MCNC: 115 MG/DL (ref 70–99)
GLUCOSE BLD-MCNC: 117 MG/DL (ref 70–99)
GLUCOSE BLD-MCNC: 118 MG/DL (ref 70–99)
GLUCOSE BLD-MCNC: 119 MG/DL (ref 70–99)
GLUCOSE BLD-MCNC: 120 MG/DL (ref 70–99)
GLUCOSE BLD-MCNC: 121 MG/DL (ref 70–99)
GLUCOSE BLD-MCNC: 122 MG/DL (ref 70–99)
GLUCOSE BLD-MCNC: 123 MG/DL (ref 70–99)
GLUCOSE BLD-MCNC: 124 MG/DL
GLUCOSE BLD-MCNC: 124 MG/DL (ref 70–99)
GLUCOSE BLD-MCNC: 125 MG/DL (ref 70–99)
GLUCOSE BLD-MCNC: 126 MG/DL (ref 70–99)
GLUCOSE BLD-MCNC: 126 MG/DL (ref 70–99)
GLUCOSE BLD-MCNC: 127 MG/DL (ref 70–99)
GLUCOSE BLD-MCNC: 128 MG/DL (ref 70–99)
GLUCOSE BLD-MCNC: 130 MG/DL (ref 70–99)
GLUCOSE BLD-MCNC: 130 MG/DL (ref 70–99)
GLUCOSE BLD-MCNC: 131 MG/DL (ref 70–99)
GLUCOSE BLD-MCNC: 131 MG/DL (ref 70–99)
GLUCOSE BLD-MCNC: 132 MG/DL (ref 70–99)
GLUCOSE BLD-MCNC: 133 MG/DL (ref 70–99)
GLUCOSE BLD-MCNC: 134 MG/DL (ref 70–99)
GLUCOSE BLD-MCNC: 135 MG/DL (ref 70–99)
GLUCOSE BLD-MCNC: 136 MG/DL (ref 70–99)
GLUCOSE BLD-MCNC: 136 MG/DL (ref 70–99)
GLUCOSE BLD-MCNC: 137 MG/DL (ref 70–99)
GLUCOSE BLD-MCNC: 137 MG/DL (ref 70–99)
GLUCOSE BLD-MCNC: 141 MG/DL (ref 70–99)
GLUCOSE BLD-MCNC: 142 MG/DL (ref 70–99)
GLUCOSE BLD-MCNC: 143 MG/DL (ref 70–99)
GLUCOSE BLD-MCNC: 144 MG/DL (ref 70–99)
GLUCOSE BLD-MCNC: 147 MG/DL (ref 70–99)
GLUCOSE BLD-MCNC: 149 MG/DL (ref 70–99)
GLUCOSE BLD-MCNC: 149 MG/DL (ref 70–99)
GLUCOSE BLD-MCNC: 153 MG/DL (ref 70–99)
GLUCOSE BLD-MCNC: 154 MG/DL (ref 70–99)
GLUCOSE BLD-MCNC: 155 MG/DL (ref 70–99)
GLUCOSE BLD-MCNC: 156 MG/DL (ref 70–99)
GLUCOSE BLD-MCNC: 160 MG/DL (ref 70–99)
GLUCOSE BLD-MCNC: 163 MG/DL (ref 70–99)
GLUCOSE BLD-MCNC: 163 MG/DL (ref 70–99)
GLUCOSE BLD-MCNC: 172 MG/DL (ref 70–99)
GLUCOSE BLD-MCNC: 174 MG/DL (ref 70–99)
GLUCOSE BLD-MCNC: 176 MG/DL (ref 70–99)
GLUCOSE BLD-MCNC: 182 MG/DL (ref 70–99)
GLUCOSE BLD-MCNC: 184 MG/DL (ref 70–99)
GLUCOSE BLD-MCNC: 191 MG/DL (ref 70–99)
GLUCOSE BLD-MCNC: 199 MG/DL (ref 70–99)
GLUCOSE BLD-MCNC: 210 MG/DL (ref 70–99)
GLUCOSE BLD-MCNC: 71 MG/DL (ref 70–99)
GLUCOSE BLD-MCNC: 77 MG/DL (ref 70–99)
GLUCOSE BLD-MCNC: 78 MG/DL (ref 70–99)
GLUCOSE BLD-MCNC: 84 MG/DL (ref 70–99)
GLUCOSE BLD-MCNC: 86 MG/DL (ref 70–99)
GLUCOSE BLD-MCNC: 88 MG/DL (ref 70–99)
GLUCOSE BLD-MCNC: 89 MG/DL (ref 70–99)
GLUCOSE BLD-MCNC: 89 MG/DL (ref 70–99)
GLUCOSE BLD-MCNC: 90 MG/DL (ref 70–99)
GLUCOSE BLD-MCNC: 91 MG/DL (ref 70–99)
GLUCOSE BLD-MCNC: 92 MG/DL (ref 70–99)
GLUCOSE BLD-MCNC: 93 MG/DL (ref 70–99)
GLUCOSE BLD-MCNC: 93 MG/DL (ref 70–99)
GLUCOSE BLD-MCNC: 94 MG/DL (ref 70–99)
GLUCOSE BLD-MCNC: 94 MG/DL (ref 70–99)
GLUCOSE BLD-MCNC: 95 MG/DL (ref 70–99)
GLUCOSE BLD-MCNC: 96 MG/DL (ref 70–99)
GLUCOSE BLD-MCNC: 97 MG/DL (ref 70–99)
GLUCOSE BLD-MCNC: 98 MG/DL (ref 70–99)
GLUCOSE BLD-MCNC: 99 MG/DL (ref 70–99)
GLUCOSE, URINE: NEGATIVE MG/DL
GRAM SMEAR: NORMAL
HBA1C MFR BLD: 6 % (ref 4.2–6.3)
HBA1C MFR BLD: 6.1 % (ref 4.2–6.3)
HBV SURFACE AB TITR SER: <3.5 {TITER}
HCO3 ARTERIAL: 21.6 MMOL/L (ref 18–23)
HCO3 ARTERIAL: 22.2 MMOL/L (ref 18–23)
HCO3 ARTERIAL: 27.9 MMOL/L (ref 18–23)
HCO3 ARTERIAL: 28.3 MMOL/L (ref 18–23)
HCO3 ARTERIAL: 29.9 MMOL/L (ref 18–23)
HCO3 ARTERIAL: 34.1 MMOL/L (ref 18–23)
HCO3 ARTERIAL: 34.5 MMOL/L (ref 18–23)
HCO3 ARTERIAL: 35.8 MMOL/L (ref 18–23)
HCO3 ARTERIAL: 42.8 MMOL/L (ref 18–23)
HCO3 ARTERIAL: 44.4 MMOL/L (ref 18–23)
HCO3 ARTERIAL: 53.4 MMOL/L (ref 18–23)
HCO3 ARTERIAL: 53.5 MMOL/L (ref 18–23)
HCO3 ARTERIAL: 53.8 MMOL/L (ref 18–23)
HCO3 ARTERIAL: 55.6 MMOL/L (ref 18–23)
HCO3 VENOUS: 30.7 MMOL/L (ref 19–25)
HCO3 VENOUS: 33.1 MMOL/L (ref 19–25)
HCO3 VENOUS: 46.3 MMOL/L (ref 19–25)
HCT VFR BLD CALC: 25.4 % (ref 37–47)
HCT VFR BLD CALC: 25.7 % (ref 37–47)
HCT VFR BLD CALC: 25.7 % (ref 37–47)
HCT VFR BLD CALC: 26.1 % (ref 37–47)
HCT VFR BLD CALC: 26.7 % (ref 37–47)
HCT VFR BLD CALC: 27.3 % (ref 37–47)
HCT VFR BLD CALC: 27.8 % (ref 37–47)
HCT VFR BLD CALC: 28 % (ref 37–47)
HCT VFR BLD CALC: 28.1 % (ref 37–47)
HCT VFR BLD CALC: 28.3 % (ref 37–47)
HCT VFR BLD CALC: 28.7 % (ref 37–47)
HCT VFR BLD CALC: 29.1 % (ref 37–47)
HCT VFR BLD CALC: 29.6 % (ref 37–47)
HCT VFR BLD CALC: 29.6 % (ref 37–47)
HCT VFR BLD CALC: 29.9 % (ref 37–47)
HCT VFR BLD CALC: 29.9 % (ref 37–47)
HCT VFR BLD CALC: 30.9 % (ref 37–47)
HCT VFR BLD CALC: 30.9 % (ref 37–47)
HCT VFR BLD CALC: 31.2 % (ref 37–47)
HCT VFR BLD CALC: 31.2 % (ref 37–47)
HCT VFR BLD CALC: 31.7 % (ref 37–47)
HCT VFR BLD CALC: 31.8 % (ref 37–47)
HCT VFR BLD CALC: 31.8 % (ref 37–47)
HCT VFR BLD CALC: 32 % (ref 37–47)
HCT VFR BLD CALC: 32.2 % (ref 37–47)
HCT VFR BLD CALC: 32.5 % (ref 37–47)
HCT VFR BLD CALC: 33.2 % (ref 37–47)
HCT VFR BLD CALC: 33.6 % (ref 37–47)
HCT VFR BLD CALC: 34.3 % (ref 37–47)
HCT VFR BLD CALC: 35.6 % (ref 37–47)
HCT VFR BLD CALC: 35.6 % (ref 37–47)
HCT VFR BLD CALC: 36.7 % (ref 37–47)
HCT VFR BLD CALC: 37 % (ref 37–47)
HCT VFR BLD CALC: 37.1 % (ref 37–47)
HDLC SERPL-MCNC: 36 MG/DL
HEMOGLOBIN: 10 GM/DL (ref 12.5–16)
HEMOGLOBIN: 10 GM/DL (ref 12.5–16)
HEMOGLOBIN: 10.1 GM/DL (ref 12.5–16)
HEMOGLOBIN: 10.2 GM/DL (ref 12.5–16)
HEMOGLOBIN: 10.6 GM/DL (ref 12.5–16)
HEMOGLOBIN: 10.6 GM/DL (ref 12.5–16)
HEMOGLOBIN: 10.9 GM/DL (ref 12.5–16)
HEMOGLOBIN: 6.9 GM/DL (ref 12.5–16)
HEMOGLOBIN: 7.2 GM/DL (ref 12.5–16)
HEMOGLOBIN: 7.6 GM/DL (ref 12.5–16)
HEMOGLOBIN: 7.8 GM/DL (ref 12.5–16)
HEMOGLOBIN: 8 GM/DL (ref 12.5–16)
HEMOGLOBIN: 8.1 GM/DL (ref 12.5–16)
HEMOGLOBIN: 8.1 GM/DL (ref 12.5–16)
HEMOGLOBIN: 8.2 GM/DL (ref 12.5–16)
HEMOGLOBIN: 8.3 GM/DL (ref 12.5–16)
HEMOGLOBIN: 8.3 GM/DL (ref 12.5–16)
HEMOGLOBIN: 8.4 GM/DL (ref 12.5–16)
HEMOGLOBIN: 8.4 GM/DL (ref 12.5–16)
HEMOGLOBIN: 8.5 GM/DL (ref 12.5–16)
HEMOGLOBIN: 8.7 GM/DL (ref 12.5–16)
HEMOGLOBIN: 8.8 GM/DL (ref 12.5–16)
HEMOGLOBIN: 8.8 GM/DL (ref 12.5–16)
HEMOGLOBIN: 8.9 GM/DL (ref 12.5–16)
HEMOGLOBIN: 9 GM/DL (ref 12.5–16)
HEMOGLOBIN: 9.1 GM/DL (ref 12.5–16)
HEMOGLOBIN: 9.2 GM/DL (ref 12.5–16)
HEMOGLOBIN: 9.2 GM/DL (ref 12.5–16)
HEMOGLOBIN: 9.3 GM/DL (ref 12.5–16)
HEMOGLOBIN: 9.3 GM/DL (ref 12.5–16)
HEMOGLOBIN: 9.4 GM/DL (ref 12.5–16)
HEMOGLOBIN: 9.4 GM/DL (ref 12.5–16)
HEMOGLOBIN: 9.5 GM/DL (ref 12.5–16)
HEMOGLOBIN: 9.5 GM/DL (ref 12.5–16)
HEPARIN INDUCED PLATELET ANTIBODY: NEGATIVE
HEPARIN UNFRACTIONATED HD: 0 %
HEPARIN UNFRACTIONATED LD: 0 %
HEPATITIS B SURFACE ANTIGEN: NON REACTIVE
HIGH SENSITIVE C-REACTIVE PROTEIN: 17.1 MG/L
HIGH SENSITIVE C-REACTIVE PROTEIN: 175.7 MG/L
HIGH SENSITIVE C-REACTIVE PROTEIN: 216.1 MG/L
HIGH SENSITIVE C-REACTIVE PROTEIN: 273.3 MG/L
HIGH SENSITIVE C-REACTIVE PROTEIN: 286.3 MG/L
HIGH SENSITIVE C-REACTIVE PROTEIN: 44.7 MG/L
HIGH SENSITIVE C-REACTIVE PROTEIN: 66.5 MG/L
HUMAN METAPNEUMOVIRUS PCR: NOT DETECTED
HUMAN METAPNEUMOVIRUS PCR: NOT DETECTED
HYALINE CASTS: 0 /LPF
HYALINE CASTS: 10 /LPF
HYALINE CASTS: 2 /LPF
IMMATURE NEUTROPHIL %: 0.2 % (ref 0–0.43)
IMMATURE NEUTROPHIL %: 0.4 % (ref 0–0.43)
IMMATURE NEUTROPHIL %: 0.4 % (ref 0–0.43)
IMMATURE NEUTROPHIL %: 0.5 % (ref 0–0.43)
IMMATURE NEUTROPHIL %: 0.5 % (ref 0–0.43)
IMMATURE NEUTROPHIL %: 0.6 % (ref 0–0.43)
IMMATURE NEUTROPHIL %: 0.7 % (ref 0–0.43)
IMMATURE NEUTROPHIL %: 0.9 % (ref 0–0.43)
IMMATURE NEUTROPHIL %: 1 % (ref 0–0.43)
IMMATURE NEUTROPHIL %: 1.1 % (ref 0–0.43)
IMMATURE NEUTROPHIL %: 1.1 % (ref 0–0.43)
IMMATURE NEUTROPHIL %: 1.2 % (ref 0–0.43)
IMMATURE NEUTROPHIL %: 1.2 % (ref 0–0.43)
IMMATURE NEUTROPHIL %: 1.3 % (ref 0–0.43)
IMMATURE NEUTROPHIL %: 1.4 % (ref 0–0.43)
IMMATURE NEUTROPHIL %: 1.5 % (ref 0–0.43)
IMMATURE NEUTROPHIL %: 1.6 % (ref 0–0.43)
IMMATURE NEUTROPHIL %: 1.8 % (ref 0–0.43)
IMMATURE NEUTROPHIL %: 2 % (ref 0–0.43)
IMMATURE NEUTROPHIL %: 2.5 % (ref 0–0.43)
IMMATURE NEUTROPHIL %: 2.6 % (ref 0–0.43)
INFLUENZA A BY PCR: NOT DETECTED
INFLUENZA A BY PCR: NOT DETECTED
INFLUENZA A H1 (2009) PCR: NOT DETECTED
INFLUENZA A H1 (2009) PCR: NOT DETECTED
INFLUENZA A H1 PANDEMIC PCR: NOT DETECTED
INFLUENZA A H1 PANDEMIC PCR: NOT DETECTED
INFLUENZA A H3 PCR: NOT DETECTED
INFLUENZA A H3 PCR: NOT DETECTED
INFLUENZA B BY PCR: NOT DETECTED
INFLUENZA B BY PCR: NOT DETECTED
INR BLD: 0.97 INDEX
INR BLD: 1.03 INDEX
INR BLD: 1.07 INDEX
INTERPRETATION: NORMAL
IRON: 24 UG/DL (ref 37–145)
IRON: 31 UG/DL (ref 37–145)
IRON: 82 UG/DL (ref 37–145)
KETONES, URINE: ABNORMAL MG/DL
KETONES, URINE: NEGATIVE MG/DL
LACTATE DEHYDROGENASE: 252 IU/L (ref 120–246)
LACTATE: 0.3 MMOL/L (ref 0.4–2)
LACTATE: 0.7 MMOL/L (ref 0.4–2)
LACTATE: 0.8 MMOL/L (ref 0.4–2)
LACTATE: 0.9 MMOL/L (ref 0.4–2)
LACTATE: 1 MMOL/L (ref 0.4–2)
LACTATE: 1.1 MMOL/L (ref 0.4–2)
LACTATE: 1.2 MMOL/L (ref 0.4–2)
LACTATE: 1.3 MMOL/L (ref 0.4–2)
LACTATE: 1.3 MMOL/L (ref 0.4–2)
LACTATE: 1.6 MMOL/L (ref 0.4–2)
LACTATE: 2.8 MMOL/L (ref 0.4–2)
LACTIC ACID, SEPSIS: 0.9 MMOL/L (ref 0.5–1.9)
LACTIC ACID, SEPSIS: 0.9 MMOL/L (ref 0.5–1.9)
LACTIC ACID, SEPSIS: 1.6 MMOL/L (ref 0.5–1.9)
LACTIC ACID, SEPSIS: 3.1 MMOL/L (ref 0.5–1.9)
LDL CHOLESTEROL DIRECT: 58 MG/DL
LEUKOCYTE ESTERASE, URINE: ABNORMAL
LEUKOCYTE ESTERASE, URINE: NEGATIVE
LIPASE: 17 IU/L (ref 13–60)
LIPASE: 40 IU/L (ref 13–60)
LV EF: 55 %
LVEF MODALITY: NORMAL
LYMPHOCYTES ABSOLUTE: 0.4 K/CU MM
LYMPHOCYTES ABSOLUTE: 0.5 K/CU MM
LYMPHOCYTES ABSOLUTE: 0.6 K/CU MM
LYMPHOCYTES ABSOLUTE: 0.6 K/CU MM
LYMPHOCYTES ABSOLUTE: 0.7 K/CU MM
LYMPHOCYTES ABSOLUTE: 0.8 K/CU MM
LYMPHOCYTES ABSOLUTE: 0.9 K/CU MM
LYMPHOCYTES ABSOLUTE: 1 K/CU MM
LYMPHOCYTES ABSOLUTE: 1.1 K/CU MM
LYMPHOCYTES ABSOLUTE: 1.1 K/CU MM
LYMPHOCYTES ABSOLUTE: 1.2 K/CU MM
LYMPHOCYTES ABSOLUTE: 1.2 K/CU MM
LYMPHOCYTES ABSOLUTE: 1.3 K/CU MM
LYMPHOCYTES ABSOLUTE: 1.5 K/CU MM
LYMPHOCYTES ABSOLUTE: 1.6 K/CU MM
LYMPHOCYTES ABSOLUTE: 1.6 K/CU MM
LYMPHOCYTES ABSOLUTE: 1.7 K/CU MM
LYMPHOCYTES ABSOLUTE: 1.8 K/CU MM
LYMPHOCYTES ABSOLUTE: 2 K/CU MM
LYMPHOCYTES RELATIVE PERCENT: 10 % (ref 24–44)
LYMPHOCYTES RELATIVE PERCENT: 11 % (ref 24–44)
LYMPHOCYTES RELATIVE PERCENT: 12.7 % (ref 24–44)
LYMPHOCYTES RELATIVE PERCENT: 12.9 % (ref 24–44)
LYMPHOCYTES RELATIVE PERCENT: 13 % (ref 24–44)
LYMPHOCYTES RELATIVE PERCENT: 13.1 % (ref 24–44)
LYMPHOCYTES RELATIVE PERCENT: 15.3 % (ref 24–44)
LYMPHOCYTES RELATIVE PERCENT: 16.6 % (ref 24–44)
LYMPHOCYTES RELATIVE PERCENT: 18 % (ref 24–44)
LYMPHOCYTES RELATIVE PERCENT: 18.2 % (ref 24–44)
LYMPHOCYTES RELATIVE PERCENT: 19.4 % (ref 24–44)
LYMPHOCYTES RELATIVE PERCENT: 20.1 % (ref 24–44)
LYMPHOCYTES RELATIVE PERCENT: 20.4 % (ref 24–44)
LYMPHOCYTES RELATIVE PERCENT: 20.7 % (ref 24–44)
LYMPHOCYTES RELATIVE PERCENT: 21.5 % (ref 24–44)
LYMPHOCYTES RELATIVE PERCENT: 22.7 % (ref 24–44)
LYMPHOCYTES RELATIVE PERCENT: 23 % (ref 24–44)
LYMPHOCYTES RELATIVE PERCENT: 23.5 % (ref 24–44)
LYMPHOCYTES RELATIVE PERCENT: 23.7 % (ref 24–44)
LYMPHOCYTES RELATIVE PERCENT: 23.8 % (ref 24–44)
LYMPHOCYTES RELATIVE PERCENT: 24 % (ref 24–44)
LYMPHOCYTES RELATIVE PERCENT: 25.1 % (ref 24–44)
LYMPHOCYTES RELATIVE PERCENT: 26.4 % (ref 24–44)
LYMPHOCYTES RELATIVE PERCENT: 27.2 % (ref 24–44)
LYMPHOCYTES RELATIVE PERCENT: 28.2 % (ref 24–44)
LYMPHOCYTES RELATIVE PERCENT: 28.4 % (ref 24–44)
LYMPHOCYTES RELATIVE PERCENT: 30.1 % (ref 24–44)
LYMPHOCYTES RELATIVE PERCENT: 38.5 % (ref 24–44)
LYMPHOCYTES RELATIVE PERCENT: 6.1 % (ref 24–44)
LYMPHOCYTES RELATIVE PERCENT: 6.3 % (ref 24–44)
LYMPHOCYTES RELATIVE PERCENT: 6.6 % (ref 24–44)
LYMPHOCYTES RELATIVE PERCENT: 7.4 % (ref 24–44)
Lab: ABNORMAL
Lab: NORMAL
MAGNESIUM: 1.4 MG/DL (ref 1.8–2.4)
MAGNESIUM: 1.5 MG/DL (ref 1.8–2.4)
MAGNESIUM: 1.6 MG/DL (ref 1.8–2.4)
MAGNESIUM: 1.7 MG/DL (ref 1.8–2.4)
MAGNESIUM: 1.8 MG/DL (ref 1.8–2.4)
MAGNESIUM: 1.9 MG/DL (ref 1.8–2.4)
MAGNESIUM: 2 MG/DL (ref 1.8–2.4)
MAGNESIUM: 2 MG/DL (ref 1.8–2.4)
MAGNESIUM: 2.1 MG/DL (ref 1.8–2.4)
MAGNESIUM: 2.3 MG/DL (ref 1.8–2.4)
MAGNESIUM: 2.6 MG/DL (ref 1.8–2.4)
MCH RBC QN AUTO: 22.7 PG (ref 27–31)
MCH RBC QN AUTO: 22.8 PG (ref 27–31)
MCH RBC QN AUTO: 22.9 PG (ref 27–31)
MCH RBC QN AUTO: 23 PG (ref 27–31)
MCH RBC QN AUTO: 23.1 PG (ref 27–31)
MCH RBC QN AUTO: 23.1 PG (ref 27–31)
MCH RBC QN AUTO: 23.2 PG (ref 27–31)
MCH RBC QN AUTO: 23.3 PG (ref 27–31)
MCH RBC QN AUTO: 23.9 PG (ref 27–31)
MCH RBC QN AUTO: 24.4 PG (ref 27–31)
MCH RBC QN AUTO: 24.4 PG (ref 27–31)
MCH RBC QN AUTO: 24.5 PG (ref 27–31)
MCH RBC QN AUTO: 24.5 PG (ref 27–31)
MCH RBC QN AUTO: 24.7 PG (ref 27–31)
MCH RBC QN AUTO: 24.7 PG (ref 27–31)
MCH RBC QN AUTO: 24.8 PG (ref 27–31)
MCH RBC QN AUTO: 24.9 PG (ref 27–31)
MCH RBC QN AUTO: 24.9 PG (ref 27–31)
MCH RBC QN AUTO: 25 PG (ref 27–31)
MCH RBC QN AUTO: 25.1 PG (ref 27–31)
MCH RBC QN AUTO: 25.1 PG (ref 27–31)
MCH RBC QN AUTO: 25.2 PG (ref 27–31)
MCH RBC QN AUTO: 25.3 PG (ref 27–31)
MCH RBC QN AUTO: 25.4 PG (ref 27–31)
MCH RBC QN AUTO: 25.4 PG (ref 27–31)
MCH RBC QN AUTO: 25.5 PG (ref 27–31)
MCHC RBC AUTO-ENTMCNC: 26.4 % (ref 32–36)
MCHC RBC AUTO-ENTMCNC: 28 % (ref 32–36)
MCHC RBC AUTO-ENTMCNC: 28.1 % (ref 32–36)
MCHC RBC AUTO-ENTMCNC: 28.3 % (ref 32–36)
MCHC RBC AUTO-ENTMCNC: 28.4 % (ref 32–36)
MCHC RBC AUTO-ENTMCNC: 28.5 % (ref 32–36)
MCHC RBC AUTO-ENTMCNC: 28.6 % (ref 32–36)
MCHC RBC AUTO-ENTMCNC: 28.7 % (ref 32–36)
MCHC RBC AUTO-ENTMCNC: 28.8 % (ref 32–36)
MCHC RBC AUTO-ENTMCNC: 28.8 % (ref 32–36)
MCHC RBC AUTO-ENTMCNC: 28.9 % (ref 32–36)
MCHC RBC AUTO-ENTMCNC: 29.2 % (ref 32–36)
MCHC RBC AUTO-ENTMCNC: 29.3 % (ref 32–36)
MCHC RBC AUTO-ENTMCNC: 29.3 % (ref 32–36)
MCHC RBC AUTO-ENTMCNC: 29.5 % (ref 32–36)
MCHC RBC AUTO-ENTMCNC: 29.6 % (ref 32–36)
MCHC RBC AUTO-ENTMCNC: 29.6 % (ref 32–36)
MCHC RBC AUTO-ENTMCNC: 29.7 % (ref 32–36)
MCHC RBC AUTO-ENTMCNC: 29.8 % (ref 32–36)
MCHC RBC AUTO-ENTMCNC: 29.9 % (ref 32–36)
MCHC RBC AUTO-ENTMCNC: 30.3 % (ref 32–36)
MCHC RBC AUTO-ENTMCNC: 30.4 % (ref 32–36)
MCHC RBC AUTO-ENTMCNC: 30.4 % (ref 32–36)
MCHC RBC AUTO-ENTMCNC: 30.7 % (ref 32–36)
MCHC RBC AUTO-ENTMCNC: 31.1 % (ref 32–36)
MCV RBC AUTO: 78.2 FL (ref 78–100)
MCV RBC AUTO: 79.4 FL (ref 78–100)
MCV RBC AUTO: 79.4 FL (ref 78–100)
MCV RBC AUTO: 79.6 FL (ref 78–100)
MCV RBC AUTO: 79.9 FL (ref 78–100)
MCV RBC AUTO: 80 FL (ref 78–100)
MCV RBC AUTO: 80.6 FL (ref 78–100)
MCV RBC AUTO: 80.6 FL (ref 78–100)
MCV RBC AUTO: 81.2 FL (ref 78–100)
MCV RBC AUTO: 81.3 FL (ref 78–100)
MCV RBC AUTO: 81.5 FL (ref 78–100)
MCV RBC AUTO: 81.5 FL (ref 78–100)
MCV RBC AUTO: 81.7 FL (ref 78–100)
MCV RBC AUTO: 81.8 FL (ref 78–100)
MCV RBC AUTO: 82 FL (ref 78–100)
MCV RBC AUTO: 82 FL (ref 78–100)
MCV RBC AUTO: 82.2 FL (ref 78–100)
MCV RBC AUTO: 82.3 FL (ref 78–100)
MCV RBC AUTO: 82.5 FL (ref 78–100)
MCV RBC AUTO: 82.6 FL (ref 78–100)
MCV RBC AUTO: 83.7 FL (ref 78–100)
MCV RBC AUTO: 84.5 FL (ref 78–100)
MCV RBC AUTO: 84.6 FL (ref 78–100)
MCV RBC AUTO: 84.9 FL (ref 78–100)
MCV RBC AUTO: 85.5 FL (ref 78–100)
MCV RBC AUTO: 86.3 FL (ref 78–100)
MCV RBC AUTO: 86.7 FL (ref 78–100)
MCV RBC AUTO: 87.1 FL (ref 78–100)
MCV RBC AUTO: 87.9 FL (ref 78–100)
MCV RBC AUTO: 90.2 FL (ref 78–100)
METHEMOGLOBIN ARTERIAL: 0.5 %
METHEMOGLOBIN ARTERIAL: 1 %
METHEMOGLOBIN ARTERIAL: 1 %
METHEMOGLOBIN ARTERIAL: 1.1 %
METHEMOGLOBIN ARTERIAL: 1.1 %
METHEMOGLOBIN ARTERIAL: 1.2 %
METHEMOGLOBIN ARTERIAL: 1.3 %
METHEMOGLOBIN ARTERIAL: 1.4 %
METHEMOGLOBIN ARTERIAL: 1.8 %
MONOCYTES ABSOLUTE: 0.1 K/CU MM
MONOCYTES ABSOLUTE: 0.3 K/CU MM
MONOCYTES ABSOLUTE: 0.3 K/CU MM
MONOCYTES ABSOLUTE: 0.4 K/CU MM
MONOCYTES ABSOLUTE: 0.5 K/CU MM
MONOCYTES ABSOLUTE: 0.6 K/CU MM
MONOCYTES ABSOLUTE: 0.7 K/CU MM
MONOCYTES ABSOLUTE: 0.8 K/CU MM
MONOCYTES ABSOLUTE: 1.1 K/CU MM
MONOCYTES RELATIVE PERCENT: 10 % (ref 0–4)
MONOCYTES RELATIVE PERCENT: 10 % (ref 0–4)
MONOCYTES RELATIVE PERCENT: 10.2 % (ref 0–4)
MONOCYTES RELATIVE PERCENT: 10.4 % (ref 0–4)
MONOCYTES RELATIVE PERCENT: 10.6 % (ref 0–4)
MONOCYTES RELATIVE PERCENT: 10.7 % (ref 0–4)
MONOCYTES RELATIVE PERCENT: 10.7 % (ref 0–4)
MONOCYTES RELATIVE PERCENT: 11.4 % (ref 0–4)
MONOCYTES RELATIVE PERCENT: 13 % (ref 0–4)
MONOCYTES RELATIVE PERCENT: 13.6 % (ref 0–4)
MONOCYTES RELATIVE PERCENT: 2.4 % (ref 0–4)
MONOCYTES RELATIVE PERCENT: 3.9 % (ref 0–4)
MONOCYTES RELATIVE PERCENT: 5.2 % (ref 0–4)
MONOCYTES RELATIVE PERCENT: 7.1 % (ref 0–4)
MONOCYTES RELATIVE PERCENT: 7.2 % (ref 0–4)
MONOCYTES RELATIVE PERCENT: 7.3 % (ref 0–4)
MONOCYTES RELATIVE PERCENT: 7.7 % (ref 0–4)
MONOCYTES RELATIVE PERCENT: 7.7 % (ref 0–4)
MONOCYTES RELATIVE PERCENT: 8.1 % (ref 0–4)
MONOCYTES RELATIVE PERCENT: 8.2 % (ref 0–4)
MONOCYTES RELATIVE PERCENT: 8.3 % (ref 0–4)
MONOCYTES RELATIVE PERCENT: 8.3 % (ref 0–4)
MONOCYTES RELATIVE PERCENT: 8.5 % (ref 0–4)
MONOCYTES RELATIVE PERCENT: 8.6 % (ref 0–4)
MONOCYTES RELATIVE PERCENT: 8.7 % (ref 0–4)
MONOCYTES RELATIVE PERCENT: 8.8 % (ref 0–4)
MONOCYTES RELATIVE PERCENT: 8.9 % (ref 0–4)
MONOCYTES RELATIVE PERCENT: 9.1 % (ref 0–4)
MONOCYTES RELATIVE PERCENT: 9.4 % (ref 0–4)
MONOCYTES RELATIVE PERCENT: 9.7 % (ref 0–4)
MONOCYTES RELATIVE PERCENT: 9.8 % (ref 0–4)
MONOCYTES RELATIVE PERCENT: 9.9 % (ref 0–4)
MUCUS: ABNORMAL HPF
MYCOPLASMA PNEUMONIAE PCR: NOT DETECTED
MYCOPLASMA PNEUMONIAE PCR: NOT DETECTED
NITRITE URINE, QUANTITATIVE: NEGATIVE
NITRITE URINE, QUANTITATIVE: POSITIVE
NUCLEATED RBC %: 0 %
NUCLEATED RBC %: 0.3 %
NUCLEATED RBC %: 0.3 %
NUCLEATED RBC %: 0.4 %
NUCLEATED RBC %: 0.5 %
NUCLEATED RBC %: 0.7 %
O2 SAT, VEN: 90.3 % (ref 50–70)
O2 SAT, VEN: 90.6 % (ref 50–70)
O2 SAT, VEN: 94.9 % (ref 50–70)
O2 SATURATION: 32.9 % (ref 96–97)
O2 SATURATION: 56.1 % (ref 96–97)
O2 SATURATION: 66.1 % (ref 96–97)
O2 SATURATION: 91.9 % (ref 96–97)
O2 SATURATION: 92.7 % (ref 96–97)
O2 SATURATION: 92.7 % (ref 96–97)
O2 SATURATION: 93.4 % (ref 96–97)
O2 SATURATION: 94.6 % (ref 96–97)
O2 SATURATION: 95.2 % (ref 96–97)
O2 SATURATION: 95.5 % (ref 96–97)
O2 SATURATION: 95.7 % (ref 96–97)
O2 SATURATION: 95.9 % (ref 96–97)
O2 SATURATION: 95.9 % (ref 96–97)
O2 SATURATION: 96 % (ref 96–97)
OPIATES, URINE: NEGATIVE
OSMOLALITY URINE: 336 MOS/L (ref 292–1090)
OXYCODONE: NEGATIVE
PARAINFLUENZA 1 PCR: NOT DETECTED
PARAINFLUENZA 1 PCR: NOT DETECTED
PARAINFLUENZA 2 PCR: NOT DETECTED
PARAINFLUENZA 2 PCR: NOT DETECTED
PARAINFLUENZA 3 PCR: NOT DETECTED
PARAINFLUENZA 3 PCR: NOT DETECTED
PARAINFLUENZA 4 PCR: NOT DETECTED
PARAINFLUENZA 4 PCR: NOT DETECTED
PCO2 ARTERIAL: 45 MMHG (ref 32–45)
PCO2 ARTERIAL: 53 MMHG (ref 32–45)
PCO2 ARTERIAL: 55 MMHG (ref 32–45)
PCO2 ARTERIAL: 58 MMHG (ref 32–45)
PCO2 ARTERIAL: 59 MMHG (ref 32–45)
PCO2 ARTERIAL: 61 MMHG (ref 32–45)
PCO2 ARTERIAL: 62 MMHG (ref 32–45)
PCO2 ARTERIAL: 62 MMHG (ref 32–45)
PCO2 ARTERIAL: 64 MMHG (ref 32–45)
PCO2 ARTERIAL: 66 MMHG (ref 32–45)
PCO2 ARTERIAL: 67 MMHG (ref 32–45)
PCO2 ARTERIAL: 80 MMHG (ref 32–45)
PCO2 ARTERIAL: 81 MMHG (ref 32–45)
PCO2 ARTERIAL: 99 MMHG (ref 32–45)
PCO2, VEN: 56 MMHG (ref 38–52)
PCO2, VEN: 61 MMHG (ref 38–52)
PCO2, VEN: 82 MMHG (ref 38–52)
PCT TRANSFERRIN: 12 % (ref 10–44)
PCT TRANSFERRIN: 25 % (ref 10–44)
PCT TRANSFERRIN: 8 % (ref 10–44)
PDW BLD-RTO: 15.6 % (ref 11.7–14.9)
PDW BLD-RTO: 15.9 % (ref 11.7–14.9)
PDW BLD-RTO: 16.9 % (ref 11.7–14.9)
PDW BLD-RTO: 17.2 % (ref 11.7–14.9)
PDW BLD-RTO: 19.8 % (ref 11.7–14.9)
PDW BLD-RTO: 19.8 % (ref 11.7–14.9)
PDW BLD-RTO: 19.9 % (ref 11.7–14.9)
PDW BLD-RTO: 20.2 % (ref 11.7–14.9)
PDW BLD-RTO: 20.3 % (ref 11.7–14.9)
PDW BLD-RTO: 20.4 % (ref 11.7–14.9)
PDW BLD-RTO: 20.5 % (ref 11.7–14.9)
PDW BLD-RTO: 20.6 % (ref 11.7–14.9)
PDW BLD-RTO: 20.7 % (ref 11.7–14.9)
PDW BLD-RTO: 20.8 % (ref 11.7–14.9)
PDW BLD-RTO: 21.2 % (ref 11.7–14.9)
PDW BLD-RTO: 21.9 % (ref 11.7–14.9)
PDW BLD-RTO: 21.9 % (ref 11.7–14.9)
PDW BLD-RTO: 22.3 % (ref 11.7–14.9)
PDW BLD-RTO: 22.5 % (ref 11.7–14.9)
PDW BLD-RTO: 22.6 % (ref 11.7–14.9)
PH BLOOD: 7.15 (ref 7.34–7.45)
PH BLOOD: 7.19 (ref 7.34–7.45)
PH BLOOD: 7.32 (ref 7.34–7.45)
PH BLOOD: 7.34 (ref 7.34–7.45)
PH BLOOD: 7.34 (ref 7.34–7.45)
PH BLOOD: 7.36 (ref 7.34–7.45)
PH BLOOD: 7.37 (ref 7.34–7.45)
PH BLOOD: 7.37 (ref 7.34–7.45)
PH BLOOD: 7.4 (ref 7.34–7.45)
PH BLOOD: 7.42 (ref 7.34–7.45)
PH BLOOD: 7.43 (ref 7.34–7.45)
PH BLOOD: 7.45 (ref 7.34–7.45)
PH BLOOD: 7.47 (ref 7.34–7.45)
PH BLOOD: 7.51 (ref 7.34–7.45)
PH VENOUS: 7.31 (ref 7.32–7.42)
PH VENOUS: 7.36 (ref 7.32–7.42)
PH VENOUS: 7.38 (ref 7.32–7.42)
PH, URINE: 5 (ref 5–8)
PH, URINE: 6 (ref 5–8)
PH, URINE: 8 (ref 5–8)
PHENCYCLIDINE, URINE: NEGATIVE
PHOSPHORUS: 2.7 MG/DL (ref 2.5–4.9)
PHOSPHORUS: 3.2 MG/DL (ref 2.5–4.9)
PHOSPHORUS: 5.8 MG/DL (ref 2.5–4.9)
PLATELET # BLD: 103 K/CU MM (ref 140–440)
PLATELET # BLD: 122 K/CU MM (ref 140–440)
PLATELET # BLD: 128 K/CU MM (ref 140–440)
PLATELET # BLD: 165 K/CU MM (ref 140–440)
PLATELET # BLD: 175 K/CU MM (ref 140–440)
PLATELET # BLD: 187 K/CU MM (ref 140–440)
PLATELET # BLD: 213 K/CU MM (ref 140–440)
PLATELET # BLD: 219 K/CU MM (ref 140–440)
PLATELET # BLD: 220 K/CU MM (ref 140–440)
PLATELET # BLD: 223 K/CU MM (ref 140–440)
PLATELET # BLD: 228 K/CU MM (ref 140–440)
PLATELET # BLD: 234 K/CU MM (ref 140–440)
PLATELET # BLD: 237 K/CU MM (ref 140–440)
PLATELET # BLD: 240 K/CU MM (ref 140–440)
PLATELET # BLD: 249 K/CU MM (ref 140–440)
PLATELET # BLD: 251 K/CU MM (ref 140–440)
PLATELET # BLD: 251 K/CU MM (ref 140–440)
PLATELET # BLD: 255 K/CU MM (ref 140–440)
PLATELET # BLD: 256 K/CU MM (ref 140–440)
PLATELET # BLD: 256 K/CU MM (ref 140–440)
PLATELET # BLD: 257 K/CU MM (ref 140–440)
PLATELET # BLD: 258 K/CU MM (ref 140–440)
PLATELET # BLD: 267 K/CU MM (ref 140–440)
PLATELET # BLD: 268 K/CU MM (ref 140–440)
PLATELET # BLD: 284 K/CU MM (ref 140–440)
PLATELET # BLD: 284 K/CU MM (ref 140–440)
PLATELET # BLD: 314 K/CU MM (ref 140–440)
PLATELET # BLD: 327 K/CU MM (ref 140–440)
PLATELET # BLD: 333 K/CU MM (ref 140–440)
PLATELET # BLD: 368 K/CU MM (ref 140–440)
PLATELET # BLD: 90 K/CU MM (ref 140–440)
PLATELET # BLD: 93 K/CU MM (ref 140–440)
PMV BLD AUTO: 10.1 FL (ref 7.5–11.1)
PMV BLD AUTO: 10.3 FL (ref 7.5–11.1)
PMV BLD AUTO: 10.4 FL (ref 7.5–11.1)
PMV BLD AUTO: 10.5 FL (ref 7.5–11.1)
PMV BLD AUTO: 10.5 FL (ref 7.5–11.1)
PMV BLD AUTO: 8.8 FL (ref 7.5–11.1)
PMV BLD AUTO: 8.9 FL (ref 7.5–11.1)
PMV BLD AUTO: 8.9 FL (ref 7.5–11.1)
PMV BLD AUTO: 9 FL (ref 7.5–11.1)
PMV BLD AUTO: 9.1 FL (ref 7.5–11.1)
PMV BLD AUTO: 9.1 FL (ref 7.5–11.1)
PMV BLD AUTO: 9.2 FL (ref 7.5–11.1)
PMV BLD AUTO: 9.3 FL (ref 7.5–11.1)
PMV BLD AUTO: 9.4 FL (ref 7.5–11.1)
PMV BLD AUTO: 9.5 FL (ref 7.5–11.1)
PMV BLD AUTO: 9.5 FL (ref 7.5–11.1)
PMV BLD AUTO: 9.6 FL (ref 7.5–11.1)
PMV BLD AUTO: 9.7 FL (ref 7.5–11.1)
PMV BLD AUTO: 9.9 FL (ref 7.5–11.1)
PMV BLD AUTO: 9.9 FL (ref 7.5–11.1)
PO2 ARTERIAL: 100 MMHG (ref 75–100)
PO2 ARTERIAL: 108 MMHG (ref 75–100)
PO2 ARTERIAL: 17 MMHG (ref 75–100)
PO2 ARTERIAL: 29 MMHG (ref 75–100)
PO2 ARTERIAL: 35 MMHG (ref 75–100)
PO2 ARTERIAL: 68 MMHG (ref 75–100)
PO2 ARTERIAL: 68 MMHG (ref 75–100)
PO2 ARTERIAL: 69 MMHG (ref 75–100)
PO2 ARTERIAL: 71 MMHG (ref 75–100)
PO2 ARTERIAL: 77 MMHG (ref 75–100)
PO2 ARTERIAL: 82 MMHG (ref 75–100)
PO2 ARTERIAL: 94 MMHG (ref 75–100)
PO2 ARTERIAL: 94 MMHG (ref 75–100)
PO2 ARTERIAL: 97 MMHG (ref 75–100)
PO2, VEN: 148 MMHG (ref 28–48)
PO2, VEN: 240 MMHG (ref 28–48)
PO2, VEN: 70 MMHG (ref 28–48)
POTASSIUM SERPL-SCNC: 3 MMOL/L (ref 3.5–5.1)
POTASSIUM SERPL-SCNC: 3.2 MMOL/L (ref 3.5–5.1)
POTASSIUM SERPL-SCNC: 3.3 MMOL/L (ref 3.5–5.1)
POTASSIUM SERPL-SCNC: 3.4 MMOL/L (ref 3.5–5.1)
POTASSIUM SERPL-SCNC: 3.5 MMOL/L (ref 3.5–5.1)
POTASSIUM SERPL-SCNC: 3.5 MMOL/L (ref 3.5–5.1)
POTASSIUM SERPL-SCNC: 3.6 MMOL/L (ref 3.5–5.1)
POTASSIUM SERPL-SCNC: 3.7 MMOL/L (ref 3.5–5.1)
POTASSIUM SERPL-SCNC: 3.8 MMOL/L (ref 3.5–5.1)
POTASSIUM SERPL-SCNC: 3.9 MMOL/L (ref 3.5–5.1)
POTASSIUM SERPL-SCNC: 4 MMOL/L (ref 3.5–5.1)
POTASSIUM SERPL-SCNC: 4 MMOL/L (ref 3.5–5.1)
POTASSIUM SERPL-SCNC: 4.1 MMOL/L (ref 3.5–5.1)
POTASSIUM SERPL-SCNC: 4.2 MMOL/L (ref 3.5–5.1)
POTASSIUM SERPL-SCNC: 4.2 MMOL/L (ref 3.5–5.1)
POTASSIUM SERPL-SCNC: 4.3 MMOL/L (ref 3.5–5.1)
POTASSIUM SERPL-SCNC: 4.4 MMOL/L (ref 3.5–5.1)
POTASSIUM SERPL-SCNC: 4.5 MMOL/L (ref 3.5–5.1)
POTASSIUM SERPL-SCNC: 4.5 MMOL/L (ref 3.5–5.1)
POTASSIUM SERPL-SCNC: 4.6 MMOL/L (ref 3.5–5.1)
POTASSIUM SERPL-SCNC: 4.6 MMOL/L (ref 3.5–5.1)
POTASSIUM SERPL-SCNC: 4.7 MMOL/L (ref 3.5–5.1)
POTASSIUM SERPL-SCNC: 4.8 MMOL/L (ref 3.5–5.1)
POTASSIUM SERPL-SCNC: 4.9 MMOL/L (ref 3.5–5.1)
POTASSIUM SERPL-SCNC: 5.6 MMOL/L (ref 3.5–5.1)
POTASSIUM SERPL-SCNC: 6.1 MMOL/L (ref 3.5–5.1)
POTASSIUM SERPL-SCNC: 6.2 MMOL/L (ref 3.5–5.1)
POTASSIUM SERPL-SCNC: 6.8 MMOL/L (ref 3.5–5.1)
POTASSIUM SERPL-SCNC: 6.9 MMOL/L (ref 3.5–5.1)
PRO-BNP: 1100 PG/ML
PRO-BNP: 1413 PG/ML
PRO-BNP: 2571 PG/ML
PRO-BNP: 2785 PG/ML
PRO-BNP: 2896 PG/ML
PRO-BNP: 4057 PG/ML
PRO-BNP: 410.9 PG/ML
PRO-BNP: 5053 PG/ML
PRO-BNP: 8767 PG/ML
PRO-BNP: 9499 PG/ML
PRO-BNP: 9520 PG/ML
PROCALCITONIN: 0.16
PROCALCITONIN: 0.23
PROCALCITONIN: 0.25
PROCALCITONIN: 1.47
PROTEIN UA: 100 MG/DL
PROTEIN UA: 30 MG/DL
PROTEIN UA: NEGATIVE MG/DL
PROTEIN UA: NEGATIVE MG/DL
PROTHROMBIN TIME: 11.7 SECONDS (ref 11.7–14.5)
PROTHROMBIN TIME: 12.5 SECONDS (ref 11.7–14.5)
PROTHROMBIN TIME: 13 SECONDS (ref 11.7–14.5)
RAPID INFLUENZA  B AGN: NEGATIVE
RAPID INFLUENZA A AGN: NEGATIVE
RBC # BLD: 2.85 M/CU MM (ref 4.2–5.4)
RBC # BLD: 3.01 M/CU MM (ref 4.2–5.4)
RBC # BLD: 3.04 M/CU MM (ref 4.2–5.4)
RBC # BLD: 3.11 M/CU MM (ref 4.2–5.4)
RBC # BLD: 3.19 M/CU MM (ref 4.2–5.4)
RBC # BLD: 3.26 M/CU MM (ref 4.2–5.4)
RBC # BLD: 3.31 M/CU MM (ref 4.2–5.4)
RBC # BLD: 3.32 M/CU MM (ref 4.2–5.4)
RBC # BLD: 3.32 M/CU MM (ref 4.2–5.4)
RBC # BLD: 3.38 M/CU MM (ref 4.2–5.4)
RBC # BLD: 3.45 M/CU MM (ref 4.2–5.4)
RBC # BLD: 3.59 M/CU MM (ref 4.2–5.4)
RBC # BLD: 3.61 M/CU MM (ref 4.2–5.4)
RBC # BLD: 3.62 M/CU MM (ref 4.2–5.4)
RBC # BLD: 3.64 M/CU MM (ref 4.2–5.4)
RBC # BLD: 3.67 M/CU MM (ref 4.2–5.4)
RBC # BLD: 3.68 M/CU MM (ref 4.2–5.4)
RBC # BLD: 3.71 M/CU MM (ref 4.2–5.4)
RBC # BLD: 3.77 M/CU MM (ref 4.2–5.4)
RBC # BLD: 3.79 M/CU MM (ref 4.2–5.4)
RBC # BLD: 3.79 M/CU MM (ref 4.2–5.4)
RBC # BLD: 3.87 M/CU MM (ref 4.2–5.4)
RBC # BLD: 3.9 M/CU MM (ref 4.2–5.4)
RBC # BLD: 3.98 M/CU MM (ref 4.2–5.4)
RBC # BLD: 4.03 M/CU MM (ref 4.2–5.4)
RBC # BLD: 4.05 M/CU MM (ref 4.2–5.4)
RBC # BLD: 4.1 M/CU MM (ref 4.2–5.4)
RBC # BLD: 4.32 M/CU MM (ref 4.2–5.4)
RBC # BLD: 4.45 M/CU MM (ref 4.2–5.4)
RBC # BLD: 4.62 M/CU MM (ref 4.2–5.4)
RBC # BLD: 4.66 M/CU MM (ref 4.2–5.4)
RBC # BLD: 4.73 M/CU MM (ref 4.2–5.4)
RBC URINE: 1 /HPF (ref 0–6)
RBC URINE: 117 /HPF (ref 0–6)
RBC URINE: 156 /HPF (ref 0–6)
RBC URINE: 1699 /HPF (ref 0–6)
RBC URINE: 19 /HPF (ref 0–6)
RBC URINE: 2 /HPF (ref 0–6)
RBC URINE: 5 /HPF (ref 0–6)
RBC URINE: <1 /HPF (ref 0–6)
RETICULOCYTE COUNT PCT: 0.9 % (ref 0.2–2.2)
RETICULOCYTE COUNT PCT: 1.8 % (ref 0.2–2.2)
RHINOVIRUS ENTEROVIRUS PCR: NOT DETECTED
RHINOVIRUS ENTEROVIRUS PCR: NOT DETECTED
RSV PCR: NOT DETECTED
RSV PCR: NOT DETECTED
SALICYLATE LEVEL: <0.3 MG/DL (ref 15–30)
SARS-COV-2, NAAT: NOT DETECTED
SARS-COV-2: NOT DETECTED
SEGMENTED NEUTROPHILS ABSOLUTE COUNT: 12.3 K/CU MM
SEGMENTED NEUTROPHILS ABSOLUTE COUNT: 2.3 K/CU MM
SEGMENTED NEUTROPHILS ABSOLUTE COUNT: 2.4 K/CU MM
SEGMENTED NEUTROPHILS ABSOLUTE COUNT: 2.5 K/CU MM
SEGMENTED NEUTROPHILS ABSOLUTE COUNT: 2.6 K/CU MM
SEGMENTED NEUTROPHILS ABSOLUTE COUNT: 2.8 K/CU MM
SEGMENTED NEUTROPHILS ABSOLUTE COUNT: 2.9 K/CU MM
SEGMENTED NEUTROPHILS ABSOLUTE COUNT: 3 K/CU MM
SEGMENTED NEUTROPHILS ABSOLUTE COUNT: 3 K/CU MM
SEGMENTED NEUTROPHILS ABSOLUTE COUNT: 3.1 K/CU MM
SEGMENTED NEUTROPHILS ABSOLUTE COUNT: 3.1 K/CU MM
SEGMENTED NEUTROPHILS ABSOLUTE COUNT: 3.4 K/CU MM
SEGMENTED NEUTROPHILS ABSOLUTE COUNT: 3.4 K/CU MM
SEGMENTED NEUTROPHILS ABSOLUTE COUNT: 3.5 K/CU MM
SEGMENTED NEUTROPHILS ABSOLUTE COUNT: 3.5 K/CU MM
SEGMENTED NEUTROPHILS ABSOLUTE COUNT: 3.7 K/CU MM
SEGMENTED NEUTROPHILS ABSOLUTE COUNT: 3.8 K/CU MM
SEGMENTED NEUTROPHILS ABSOLUTE COUNT: 3.9 K/CU MM
SEGMENTED NEUTROPHILS ABSOLUTE COUNT: 4 K/CU MM
SEGMENTED NEUTROPHILS ABSOLUTE COUNT: 4 K/CU MM
SEGMENTED NEUTROPHILS ABSOLUTE COUNT: 4.1 K/CU MM
SEGMENTED NEUTROPHILS ABSOLUTE COUNT: 4.4 K/CU MM
SEGMENTED NEUTROPHILS ABSOLUTE COUNT: 4.5 K/CU MM
SEGMENTED NEUTROPHILS ABSOLUTE COUNT: 5.4 K/CU MM
SEGMENTED NEUTROPHILS ABSOLUTE COUNT: 5.5 K/CU MM
SEGMENTED NEUTROPHILS ABSOLUTE COUNT: 7.3 K/CU MM
SEGMENTED NEUTROPHILS ABSOLUTE COUNT: 7.9 K/CU MM
SEGMENTED NEUTROPHILS RELATIVE PERCENT: 46.3 % (ref 36–66)
SEGMENTED NEUTROPHILS RELATIVE PERCENT: 53.6 % (ref 36–66)
SEGMENTED NEUTROPHILS RELATIVE PERCENT: 54.2 % (ref 36–66)
SEGMENTED NEUTROPHILS RELATIVE PERCENT: 55.7 % (ref 36–66)
SEGMENTED NEUTROPHILS RELATIVE PERCENT: 55.8 % (ref 36–66)
SEGMENTED NEUTROPHILS RELATIVE PERCENT: 58.7 % (ref 36–66)
SEGMENTED NEUTROPHILS RELATIVE PERCENT: 60.1 % (ref 36–66)
SEGMENTED NEUTROPHILS RELATIVE PERCENT: 60.2 % (ref 36–66)
SEGMENTED NEUTROPHILS RELATIVE PERCENT: 60.4 % (ref 36–66)
SEGMENTED NEUTROPHILS RELATIVE PERCENT: 60.5 % (ref 36–66)
SEGMENTED NEUTROPHILS RELATIVE PERCENT: 60.5 % (ref 36–66)
SEGMENTED NEUTROPHILS RELATIVE PERCENT: 61.8 % (ref 36–66)
SEGMENTED NEUTROPHILS RELATIVE PERCENT: 62.3 % (ref 36–66)
SEGMENTED NEUTROPHILS RELATIVE PERCENT: 62.4 % (ref 36–66)
SEGMENTED NEUTROPHILS RELATIVE PERCENT: 63.1 % (ref 36–66)
SEGMENTED NEUTROPHILS RELATIVE PERCENT: 63.7 % (ref 36–66)
SEGMENTED NEUTROPHILS RELATIVE PERCENT: 65.3 % (ref 36–66)
SEGMENTED NEUTROPHILS RELATIVE PERCENT: 66 % (ref 36–66)
SEGMENTED NEUTROPHILS RELATIVE PERCENT: 67.1 % (ref 36–66)
SEGMENTED NEUTROPHILS RELATIVE PERCENT: 70 % (ref 36–66)
SEGMENTED NEUTROPHILS RELATIVE PERCENT: 70.6 % (ref 36–66)
SEGMENTED NEUTROPHILS RELATIVE PERCENT: 71.9 % (ref 36–66)
SEGMENTED NEUTROPHILS RELATIVE PERCENT: 73 % (ref 36–66)
SEGMENTED NEUTROPHILS RELATIVE PERCENT: 74.8 % (ref 36–66)
SEGMENTED NEUTROPHILS RELATIVE PERCENT: 75.1 % (ref 36–66)
SEGMENTED NEUTROPHILS RELATIVE PERCENT: 75.6 % (ref 36–66)
SEGMENTED NEUTROPHILS RELATIVE PERCENT: 79.8 % (ref 36–66)
SEGMENTED NEUTROPHILS RELATIVE PERCENT: 82.8 % (ref 36–66)
SEGMENTED NEUTROPHILS RELATIVE PERCENT: 82.8 % (ref 36–66)
SEGMENTED NEUTROPHILS RELATIVE PERCENT: 83.6 % (ref 36–66)
SEGMENTED NEUTROPHILS RELATIVE PERCENT: 84.3 % (ref 36–66)
SEGMENTED NEUTROPHILS RELATIVE PERCENT: 90.6 % (ref 36–66)
SODIUM BLD-SCNC: 132 MMOL/L (ref 135–145)
SODIUM BLD-SCNC: 133 MMOL/L (ref 135–145)
SODIUM BLD-SCNC: 133 MMOL/L (ref 135–145)
SODIUM BLD-SCNC: 134 MMOL/L (ref 135–145)
SODIUM BLD-SCNC: 135 MMOL/L (ref 135–145)
SODIUM BLD-SCNC: 135 MMOL/L (ref 135–145)
SODIUM BLD-SCNC: 136 MMOL/L (ref 135–145)
SODIUM BLD-SCNC: 136 MMOL/L (ref 135–145)
SODIUM BLD-SCNC: 137 MMOL/L (ref 135–145)
SODIUM BLD-SCNC: 138 MMOL/L (ref 135–145)
SODIUM BLD-SCNC: 139 MMOL/L (ref 135–145)
SODIUM BLD-SCNC: 140 MMOL/L (ref 135–145)
SODIUM BLD-SCNC: 141 MMOL/L (ref 135–145)
SODIUM BLD-SCNC: 142 MMOL/L (ref 135–145)
SODIUM BLD-SCNC: 143 MMOL/L (ref 135–145)
SODIUM BLD-SCNC: 144 MMOL/L (ref 135–145)
SODIUM BLD-SCNC: 145 MMOL/L (ref 135–145)
SODIUM BLD-SCNC: 146 MMOL/L (ref 135–145)
SODIUM URINE: 30 MMOL/L (ref 35–167)
SOURCE: NORMAL
SPECIFIC GRAVITY UA: 1.01 (ref 1–1.03)
SPECIFIC GRAVITY UA: 1.02 (ref 1–1.03)
SPECIMEN: ABNORMAL
SPECIMEN: NORMAL
SQUAMOUS EPITHELIAL: 1 /HPF
SQUAMOUS EPITHELIAL: 1 /HPF
SQUAMOUS EPITHELIAL: 13 /HPF
SQUAMOUS EPITHELIAL: 22 /HPF
SQUAMOUS EPITHELIAL: 3 /HPF
SQUAMOUS EPITHELIAL: <1 /HPF
STATUS: NORMAL
TOTAL CK: 193 IU/L (ref 26–140)
TOTAL CK: 5614 IU/L (ref 26–140)
TOTAL IMMATURE NEUTOROPHIL: 0.01 K/CU MM
TOTAL IMMATURE NEUTOROPHIL: 0.02 K/CU MM
TOTAL IMMATURE NEUTOROPHIL: 0.03 K/CU MM
TOTAL IMMATURE NEUTOROPHIL: 0.04 K/CU MM
TOTAL IMMATURE NEUTOROPHIL: 0.05 K/CU MM
TOTAL IMMATURE NEUTOROPHIL: 0.06 K/CU MM
TOTAL IMMATURE NEUTOROPHIL: 0.07 K/CU MM
TOTAL IMMATURE NEUTOROPHIL: 0.08 K/CU MM
TOTAL IMMATURE NEUTOROPHIL: 0.09 K/CU MM
TOTAL IMMATURE NEUTOROPHIL: 0.11 K/CU MM
TOTAL IMMATURE NEUTOROPHIL: 0.11 K/CU MM
TOTAL IMMATURE NEUTOROPHIL: 0.13 K/CU MM
TOTAL IRON BINDING CAPACITY: 269 UG/DL (ref 250–450)
TOTAL IRON BINDING CAPACITY: 310 UG/DL (ref 250–450)
TOTAL IRON BINDING CAPACITY: 333 UG/DL (ref 250–450)
TOTAL NUCLEATED RBC: 0 K/CU MM
TOTAL PROTEIN: 5.8 GM/DL (ref 6.4–8.2)
TOTAL PROTEIN: 6.1 GM/DL (ref 6.4–8.2)
TOTAL PROTEIN: 6.2 GM/DL (ref 6.4–8.2)
TOTAL PROTEIN: 6.3 GM/DL (ref 6.4–8.2)
TOTAL PROTEIN: 6.4 GM/DL (ref 6.4–8.2)
TOTAL PROTEIN: 6.6 GM/DL (ref 6.4–8.2)
TOTAL PROTEIN: 6.8 GM/DL (ref 6.4–8.2)
TOTAL PROTEIN: 6.8 GM/DL (ref 6.4–8.2)
TOTAL PROTEIN: 6.9 GM/DL (ref 6.4–8.2)
TOTAL PROTEIN: 7 GM/DL (ref 6.4–8.2)
TOTAL PROTEIN: 7 GM/DL (ref 6.4–8.2)
TOTAL PROTEIN: 7.1 GM/DL (ref 6.4–8.2)
TOTAL PROTEIN: 7.2 GM/DL (ref 6.4–8.2)
TOTAL PROTEIN: 7.3 GM/DL (ref 6.4–8.2)
TOTAL PROTEIN: 7.6 GM/DL (ref 6.4–8.2)
TOTAL PROTEIN: 7.7 GM/DL (ref 6.4–8.2)
TOTAL PROTEIN: 7.8 GM/DL (ref 6.4–8.2)
TOTAL PROTEIN: 7.8 GM/DL (ref 6.4–8.2)
TOTAL PROTEIN: 7.9 GM/DL (ref 6.4–8.2)
TOTAL PROTEIN: 7.9 GM/DL (ref 6.4–8.2)
TOTAL PROTEIN: 8.1 GM/DL (ref 6.4–8.2)
TOTAL PROTEIN: 8.4 GM/DL (ref 6.4–8.2)
TOTAL RETICULOCYTE COUNT: 0.1 K/CU MM
TOXIC GRANULATION: PRESENT
TRANSFUSION STATUS: NORMAL
TRANSITIONAL EPITHELIAL: 2 /HPF
TRICHOMONAS: ABNORMAL /HPF
TRIGL SERPL-MCNC: 155 MG/DL
TROPONIN T: 0.02 NG/ML
TROPONIN T: 0.03 NG/ML
TROPONIN T: 0.04 NG/ML
TROPONIN T: 0.04 NG/ML
TROPONIN T: 0.06 NG/ML
TROPONIN T: 0.1 NG/ML
TROPONIN T: <0.01 NG/ML
TSH HIGH SENSITIVITY: 3.49 UIU/ML (ref 0.27–4.2)
TSH HIGH SENSITIVITY: 4.51 UIU/ML (ref 0.27–4.2)
TSH HIGH SENSITIVITY: 5.34 UIU/ML (ref 0.27–4.2)
UNIT DIVISION: 0
UNIT NUMBER: NORMAL
UNSATURATED IRON BINDING CAPACITY: 238 UG/DL (ref 110–370)
UNSATURATED IRON BINDING CAPACITY: 251 UG/DL (ref 110–370)
UNSATURATED IRON BINDING CAPACITY: 286 UG/DL (ref 110–370)
UROBILINOGEN, URINE: 1 MG/DL (ref 0.2–1)
UROBILINOGEN, URINE: NORMAL MG/DL (ref 0.2–1)
VANCOMYCIN RANDOM: 10.8 UG/ML
VANCOMYCIN RANDOM: 16.7 UG/ML
VANCOMYCIN RANDOM: 18.7 UG/ML
VANCOMYCIN RANDOM: 19.2 UG/ML
VANCOMYCIN RANDOM: 20.8 UG/ML
VITAMIN B-12: 651.1 PG/ML (ref 211–911)
VITAMIN B-12: >1500 PG/ML (ref 180–914)
WBC # BLD: 14.6 K/CU MM (ref 4–10.5)
WBC # BLD: 3.9 K/CU MM (ref 4–10.5)
WBC # BLD: 4.2 K/CU MM (ref 4–10.5)
WBC # BLD: 4.3 K/CU MM (ref 4–10.5)
WBC # BLD: 4.4 K/CU MM (ref 4–10.5)
WBC # BLD: 4.6 K/CU MM (ref 4–10.5)
WBC # BLD: 4.7 K/CU MM (ref 4–10.5)
WBC # BLD: 4.9 K/CU MM (ref 4–10.5)
WBC # BLD: 4.9 K/CU MM (ref 4–10.5)
WBC # BLD: 5.1 K/CU MM (ref 4–10.5)
WBC # BLD: 5.1 K/CU MM (ref 4–10.5)
WBC # BLD: 5.2 K/CU MM (ref 4–10.5)
WBC # BLD: 5.3 K/CU MM (ref 4–10.5)
WBC # BLD: 5.5 K/CU MM (ref 4–10.5)
WBC # BLD: 5.6 K/CU MM (ref 4–10.5)
WBC # BLD: 5.7 K/CU MM (ref 4–10.5)
WBC # BLD: 5.9 K/CU MM (ref 4–10.5)
WBC # BLD: 6 K/CU MM (ref 4–10.5)
WBC # BLD: 6.1 K/CU MM (ref 4–10.5)
WBC # BLD: 6.4 K/CU MM (ref 4–10.5)
WBC # BLD: 6.7 K/CU MM (ref 4–10.5)
WBC # BLD: 7 K/CU MM (ref 4–10.5)
WBC # BLD: 7.2 K/CU MM (ref 4–10.5)
WBC # BLD: 8.8 K/CU MM (ref 4–10.5)
WBC # BLD: 9.5 K/CU MM (ref 4–10.5)
WBC CLUMP: ABNORMAL /HPF
WBC CLUMP: ABNORMAL /HPF
WBC UA: 10 /HPF (ref 0–5)
WBC UA: 128 /HPF (ref 0–5)
WBC UA: 161 /HPF (ref 0–5)
WBC UA: 2 /HPF (ref 0–5)
WBC UA: 29 /HPF (ref 0–5)
WBC UA: 5 /HPF (ref 0–5)
WBC UA: 653 /HPF (ref 0–5)
WBC UA: ABNORMAL /HPF (ref 0–5)
YEAST: ABNORMAL /HPF

## 2020-01-01 PROCEDURE — 6370000000 HC RX 637 (ALT 250 FOR IP): Performed by: INTERNAL MEDICINE

## 2020-01-01 PROCEDURE — 6360000002 HC RX W HCPCS: Performed by: NURSE PRACTITIONER

## 2020-01-01 PROCEDURE — 6360000002 HC RX W HCPCS: Performed by: INTERNAL MEDICINE

## 2020-01-01 PROCEDURE — 4500000027

## 2020-01-01 PROCEDURE — 2500000003 HC RX 250 WO HCPCS: Performed by: INTERNAL MEDICINE

## 2020-01-01 PROCEDURE — 85025 COMPLETE CBC W/AUTO DIFF WBC: CPT

## 2020-01-01 PROCEDURE — 6370000000 HC RX 637 (ALT 250 FOR IP): Performed by: NURSE PRACTITIONER

## 2020-01-01 PROCEDURE — 36592 COLLECT BLOOD FROM PICC: CPT

## 2020-01-01 PROCEDURE — 83615 LACTATE (LD) (LDH) ENZYME: CPT

## 2020-01-01 PROCEDURE — 83880 ASSAY OF NATRIURETIC PEPTIDE: CPT

## 2020-01-01 PROCEDURE — APPNB45 APP NON BILLABLE 31-45 MINUTES: Performed by: NURSE PRACTITIONER

## 2020-01-01 PROCEDURE — 5A1945Z RESPIRATORY VENTILATION, 24-96 CONSECUTIVE HOURS: ICD-10-PCS | Performed by: INTERNAL MEDICINE

## 2020-01-01 PROCEDURE — 83605 ASSAY OF LACTIC ACID: CPT

## 2020-01-01 PROCEDURE — 94003 VENT MGMT INPAT SUBQ DAY: CPT

## 2020-01-01 PROCEDURE — 1200000000 HC SEMI PRIVATE

## 2020-01-01 PROCEDURE — 87581 M.PNEUMON DNA AMP PROBE: CPT

## 2020-01-01 PROCEDURE — 81001 URINALYSIS AUTO W/SCOPE: CPT

## 2020-01-01 PROCEDURE — 36415 COLL VENOUS BLD VENIPUNCTURE: CPT

## 2020-01-01 PROCEDURE — 85730 THROMBOPLASTIN TIME PARTIAL: CPT

## 2020-01-01 PROCEDURE — 94640 AIRWAY INHALATION TREATMENT: CPT

## 2020-01-01 PROCEDURE — 6360000002 HC RX W HCPCS: Performed by: EMERGENCY MEDICINE

## 2020-01-01 PROCEDURE — 94660 CPAP INITIATION&MGMT: CPT

## 2020-01-01 PROCEDURE — 99285 EMERGENCY DEPT VISIT HI MDM: CPT

## 2020-01-01 PROCEDURE — 36600 WITHDRAWAL OF ARTERIAL BLOOD: CPT

## 2020-01-01 PROCEDURE — 83036 HEMOGLOBIN GLYCOSYLATED A1C: CPT

## 2020-01-01 PROCEDURE — 2580000003 HC RX 258: Performed by: INTERNAL MEDICINE

## 2020-01-01 PROCEDURE — 84443 ASSAY THYROID STIM HORMONE: CPT

## 2020-01-01 PROCEDURE — 86141 C-REACTIVE PROTEIN HS: CPT

## 2020-01-01 PROCEDURE — 2580000003 HC RX 258: Performed by: NURSE PRACTITIONER

## 2020-01-01 PROCEDURE — 82803 BLOOD GASES ANY COMBINATION: CPT

## 2020-01-01 PROCEDURE — 80048 BASIC METABOLIC PNL TOTAL CA: CPT

## 2020-01-01 PROCEDURE — 80053 COMPREHEN METABOLIC PANEL: CPT

## 2020-01-01 PROCEDURE — C1725 CATH, TRANSLUMIN NON-LASER: HCPCS

## 2020-01-01 PROCEDURE — 96375 TX/PRO/DX INJ NEW DRUG ADDON: CPT

## 2020-01-01 PROCEDURE — 82607 VITAMIN B-12: CPT

## 2020-01-01 PROCEDURE — 2580000003 HC RX 258: Performed by: HOSPITALIST

## 2020-01-01 PROCEDURE — 6360000002 HC RX W HCPCS: Performed by: HOSPITALIST

## 2020-01-01 PROCEDURE — 94761 N-INVAS EAR/PLS OXIMETRY MLT: CPT

## 2020-01-01 PROCEDURE — 6370000000 HC RX 637 (ALT 250 FOR IP): Performed by: EMERGENCY MEDICINE

## 2020-01-01 PROCEDURE — 97530 THERAPEUTIC ACTIVITIES: CPT

## 2020-01-01 PROCEDURE — U0002 COVID-19 LAB TEST NON-CDC: HCPCS

## 2020-01-01 PROCEDURE — 84484 ASSAY OF TROPONIN QUANT: CPT

## 2020-01-01 PROCEDURE — 2140000000 HC CCU INTERMEDIATE R&B

## 2020-01-01 PROCEDURE — C9113 INJ PANTOPRAZOLE SODIUM, VIA: HCPCS | Performed by: NURSE PRACTITIONER

## 2020-01-01 PROCEDURE — 76937 US GUIDE VASCULAR ACCESS: CPT

## 2020-01-01 PROCEDURE — 80202 ASSAY OF VANCOMYCIN: CPT

## 2020-01-01 PROCEDURE — 02H633Z INSERTION OF INFUSION DEVICE INTO RIGHT ATRIUM, PERCUTANEOUS APPROACH: ICD-10-PCS | Performed by: RADIOLOGY

## 2020-01-01 PROCEDURE — 82962 GLUCOSE BLOOD TEST: CPT

## 2020-01-01 PROCEDURE — 6360000002 HC RX W HCPCS

## 2020-01-01 PROCEDURE — 83735 ASSAY OF MAGNESIUM: CPT

## 2020-01-01 PROCEDURE — 87088 URINE BACTERIA CULTURE: CPT

## 2020-01-01 PROCEDURE — 85045 AUTOMATED RETICULOCYTE COUNT: CPT

## 2020-01-01 PROCEDURE — 2000000000 HC ICU R&B

## 2020-01-01 PROCEDURE — 6370000000 HC RX 637 (ALT 250 FOR IP): Performed by: PHYSICIAN ASSISTANT

## 2020-01-01 PROCEDURE — 96366 THER/PROPH/DIAG IV INF ADDON: CPT

## 2020-01-01 PROCEDURE — 2700000000 HC OXYGEN THERAPY PER DAY

## 2020-01-01 PROCEDURE — 99291 CRITICAL CARE FIRST HOUR: CPT

## 2020-01-01 PROCEDURE — 71045 X-RAY EXAM CHEST 1 VIEW: CPT

## 2020-01-01 PROCEDURE — 97535 SELF CARE MNGMENT TRAINING: CPT

## 2020-01-01 PROCEDURE — 85610 PROTHROMBIN TIME: CPT

## 2020-01-01 PROCEDURE — 2580000003 HC RX 258: Performed by: PHYSICIAN ASSISTANT

## 2020-01-01 PROCEDURE — P9016 RBC LEUKOCYTES REDUCED: HCPCS

## 2020-01-01 PROCEDURE — 87070 CULTURE OTHR SPECIMN AEROBIC: CPT

## 2020-01-01 PROCEDURE — 2022F DILAT RTA XM EVC RTNOPTHY: CPT | Performed by: NURSE PRACTITIONER

## 2020-01-01 PROCEDURE — 83550 IRON BINDING TEST: CPT

## 2020-01-01 PROCEDURE — 99232 SBSQ HOSP IP/OBS MODERATE 35: CPT | Performed by: INTERNAL MEDICINE

## 2020-01-01 PROCEDURE — 1036F TOBACCO NON-USER: CPT | Performed by: NURSE PRACTITIONER

## 2020-01-01 PROCEDURE — 97162 PT EVAL MOD COMPLEX 30 MIN: CPT

## 2020-01-01 PROCEDURE — 99213 OFFICE O/P EST LOW 20 MIN: CPT

## 2020-01-01 PROCEDURE — 93010 ELECTROCARDIOGRAM REPORT: CPT | Performed by: INTERNAL MEDICINE

## 2020-01-01 PROCEDURE — 87340 HEPATITIS B SURFACE AG IA: CPT

## 2020-01-01 PROCEDURE — 80061 LIPID PANEL: CPT

## 2020-01-01 PROCEDURE — 85027 COMPLETE CBC AUTOMATED: CPT

## 2020-01-01 PROCEDURE — G0480 DRUG TEST DEF 1-7 CLASSES: HCPCS

## 2020-01-01 PROCEDURE — 93005 ELECTROCARDIOGRAM TRACING: CPT | Performed by: PHYSICIAN ASSISTANT

## 2020-01-01 PROCEDURE — 6360000002 HC RX W HCPCS: Performed by: FAMILY MEDICINE

## 2020-01-01 PROCEDURE — 87086 URINE CULTURE/COLONY COUNT: CPT

## 2020-01-01 PROCEDURE — 99233 SBSQ HOSP IP/OBS HIGH 50: CPT | Performed by: INTERNAL MEDICINE

## 2020-01-01 PROCEDURE — 85018 HEMOGLOBIN: CPT

## 2020-01-01 PROCEDURE — 87186 SC STD MICRODIL/AGAR DIL: CPT

## 2020-01-01 PROCEDURE — 86706 HEP B SURFACE ANTIBODY: CPT

## 2020-01-01 PROCEDURE — 2500000003 HC RX 250 WO HCPCS: Performed by: EMERGENCY MEDICINE

## 2020-01-01 PROCEDURE — 87798 DETECT AGENT NOS DNA AMP: CPT

## 2020-01-01 PROCEDURE — 99214 OFFICE O/P EST MOD 30 MIN: CPT | Performed by: NURSE PRACTITIONER

## 2020-01-01 PROCEDURE — 2500000003 HC RX 250 WO HCPCS: Performed by: NURSE PRACTITIONER

## 2020-01-01 PROCEDURE — 97116 GAIT TRAINING THERAPY: CPT

## 2020-01-01 PROCEDURE — 5A1D70Z PERFORMANCE OF URINARY FILTRATION, INTERMITTENT, LESS THAN 6 HOURS PER DAY: ICD-10-PCS | Performed by: INTERNAL MEDICINE

## 2020-01-01 PROCEDURE — 85379 FIBRIN DEGRADATION QUANT: CPT

## 2020-01-01 PROCEDURE — 86922 COMPATIBILITY TEST ANTIGLOB: CPT

## 2020-01-01 PROCEDURE — G8427 DOCREV CUR MEDS BY ELIG CLIN: HCPCS | Performed by: NURSE PRACTITIONER

## 2020-01-01 PROCEDURE — 83690 ASSAY OF LIPASE: CPT

## 2020-01-01 PROCEDURE — 97110 THERAPEUTIC EXERCISES: CPT

## 2020-01-01 PROCEDURE — 80307 DRUG TEST PRSMV CHEM ANLYZR: CPT

## 2020-01-01 PROCEDURE — 93005 ELECTROCARDIOGRAM TRACING: CPT | Performed by: EMERGENCY MEDICINE

## 2020-01-01 PROCEDURE — C1751 CATH, INF, PER/CENT/MIDLINE: HCPCS

## 2020-01-01 PROCEDURE — 71275 CT ANGIOGRAPHY CHEST: CPT

## 2020-01-01 PROCEDURE — 2580000003 HC RX 258: Performed by: EMERGENCY MEDICINE

## 2020-01-01 PROCEDURE — 87040 BLOOD CULTURE FOR BACTERIA: CPT

## 2020-01-01 PROCEDURE — 84300 ASSAY OF URINE SODIUM: CPT

## 2020-01-01 PROCEDURE — 2709999900 HC NON-CHARGEABLE SUPPLY

## 2020-01-01 PROCEDURE — 90935 HEMODIALYSIS ONE EVALUATION: CPT

## 2020-01-01 PROCEDURE — ~ 87635 HC SO COVID-19 ANY TECHNIQUE NON-CDC

## 2020-01-01 PROCEDURE — 05HM33Z INSERTION OF INFUSION DEVICE INTO RIGHT INTERNAL JUGULAR VEIN, PERCUTANEOUS APPROACH: ICD-10-PCS | Performed by: RADIOLOGY

## 2020-01-01 PROCEDURE — 84100 ASSAY OF PHOSPHORUS: CPT

## 2020-01-01 PROCEDURE — 36556 INSERT NON-TUNNEL CV CATH: CPT

## 2020-01-01 PROCEDURE — 85384 FIBRINOGEN ACTIVITY: CPT

## 2020-01-01 PROCEDURE — 82805 BLOOD GASES W/O2 SATURATION: CPT

## 2020-01-01 PROCEDURE — 70450 CT HEAD/BRAIN W/O DYE: CPT

## 2020-01-01 PROCEDURE — 82550 ASSAY OF CK (CPK): CPT

## 2020-01-01 PROCEDURE — 99255 IP/OBS CONSLTJ NEW/EST HI 80: CPT | Performed by: INTERNAL MEDICINE

## 2020-01-01 PROCEDURE — 82570 ASSAY OF URINE CREATININE: CPT

## 2020-01-01 PROCEDURE — 99232 SBSQ HOSP IP/OBS MODERATE 35: CPT | Performed by: NURSE PRACTITIONER

## 2020-01-01 PROCEDURE — C1887 CATHETER, GUIDING: HCPCS

## 2020-01-01 PROCEDURE — 83540 ASSAY OF IRON: CPT

## 2020-01-01 PROCEDURE — 36593 DECLOT VASCULAR DEVICE: CPT

## 2020-01-01 PROCEDURE — 83721 ASSAY OF BLOOD LIPOPROTEIN: CPT

## 2020-01-01 PROCEDURE — 6360000002 HC RX W HCPCS: Performed by: PHYSICIAN ASSISTANT

## 2020-01-01 PROCEDURE — 87486 CHLMYD PNEUM DNA AMP PROBE: CPT

## 2020-01-01 PROCEDURE — C1769 GUIDE WIRE: HCPCS

## 2020-01-01 PROCEDURE — 96365 THER/PROPH/DIAG IV INF INIT: CPT

## 2020-01-01 PROCEDURE — C1752 CATH,HEMODIALYSIS,SHORT-TERM: HCPCS

## 2020-01-01 PROCEDURE — 99253 IP/OBS CNSLTJ NEW/EST LOW 45: CPT | Performed by: INTERNAL MEDICINE

## 2020-01-01 PROCEDURE — 85014 HEMATOCRIT: CPT

## 2020-01-01 PROCEDURE — 84145 PROCALCITONIN (PCT): CPT

## 2020-01-01 PROCEDURE — 76775 US EXAM ABDO BACK WALL LIM: CPT

## 2020-01-01 PROCEDURE — 99233 SBSQ HOSP IP/OBS HIGH 50: CPT | Performed by: NURSE PRACTITIONER

## 2020-01-01 PROCEDURE — 94750 HC PULMONARY COMPLIANCE STUDY: CPT

## 2020-01-01 PROCEDURE — 74018 RADEX ABDOMEN 1 VIEW: CPT

## 2020-01-01 PROCEDURE — 2500000003 HC RX 250 WO HCPCS

## 2020-01-01 PROCEDURE — 87633 RESP VIRUS 12-25 TARGETS: CPT

## 2020-01-01 PROCEDURE — C1894 INTRO/SHEATH, NON-LASER: HCPCS

## 2020-01-01 PROCEDURE — APPSS60 APP SPLIT SHARED TIME 46-60 MINUTES: Performed by: NURSE PRACTITIONER

## 2020-01-01 PROCEDURE — 99253 IP/OBS CNSLTJ NEW/EST LOW 45: CPT | Performed by: NURSE PRACTITIONER

## 2020-01-01 PROCEDURE — 86900 BLOOD TYPING SEROLOGIC ABO: CPT

## 2020-01-01 PROCEDURE — 6360000004 HC RX CONTRAST MEDICATION: Performed by: PHYSICIAN ASSISTANT

## 2020-01-01 PROCEDURE — 82728 ASSAY OF FERRITIN: CPT

## 2020-01-01 PROCEDURE — 87205 SMEAR GRAM STAIN: CPT

## 2020-01-01 PROCEDURE — G8417 CALC BMI ABV UP PARAM F/U: HCPCS | Performed by: NURSE PRACTITIONER

## 2020-01-01 PROCEDURE — 71250 CT THORAX DX C-: CPT

## 2020-01-01 PROCEDURE — 93306 TTE W/DOPPLER COMPLETE: CPT

## 2020-01-01 PROCEDURE — 97166 OT EVAL MOD COMPLEX 45 MIN: CPT

## 2020-01-01 PROCEDURE — 87804 INFLUENZA ASSAY W/OPTIC: CPT

## 2020-01-01 PROCEDURE — 99284 EMERGENCY DEPT VISIT MOD MDM: CPT

## 2020-01-01 PROCEDURE — 2580000003 HC RX 258: Performed by: FAMILY MEDICINE

## 2020-01-01 PROCEDURE — 89220 SPUTUM SPECIMEN COLLECTION: CPT

## 2020-01-01 PROCEDURE — 36430 TRANSFUSION BLD/BLD COMPNT: CPT

## 2020-01-01 PROCEDURE — B5181ZA FLUOROSCOPY OF SUPERIOR VENA CAVA USING LOW OSMOLAR CONTRAST, GUIDANCE: ICD-10-PCS | Performed by: RADIOLOGY

## 2020-01-01 PROCEDURE — 3046F HEMOGLOBIN A1C LEVEL >9.0%: CPT | Performed by: NURSE PRACTITIONER

## 2020-01-01 PROCEDURE — 31500 INSERT EMERGENCY AIRWAY: CPT | Performed by: NURSE ANESTHETIST, CERTIFIED REGISTERED

## 2020-01-01 PROCEDURE — 0BH17EZ INSERTION OF ENDOTRACHEAL AIRWAY INTO TRACHEA, VIA NATURAL OR ARTIFICIAL OPENING: ICD-10-PCS | Performed by: INTERNAL MEDICINE

## 2020-01-01 PROCEDURE — 0JH63XZ INSERTION OF TUNNELED VASCULAR ACCESS DEVICE INTO CHEST SUBCUTANEOUS TISSUE AND FASCIA, PERCUTANEOUS APPROACH: ICD-10-PCS | Performed by: RADIOLOGY

## 2020-01-01 PROCEDURE — 80069 RENAL FUNCTION PANEL: CPT

## 2020-01-01 PROCEDURE — 82140 ASSAY OF AMMONIA: CPT

## 2020-01-01 PROCEDURE — 86901 BLOOD TYPING SEROLOGIC RH(D): CPT

## 2020-01-01 PROCEDURE — 92610 EVALUATE SWALLOWING FUNCTION: CPT | Performed by: SPEECH-LANGUAGE PATHOLOGIST

## 2020-01-01 PROCEDURE — 4500000030 HC CRITICAL CARE PROCEDURE

## 2020-01-01 PROCEDURE — 86850 RBC ANTIBODY SCREEN: CPT

## 2020-01-01 PROCEDURE — 97167 OT EVAL HIGH COMPLEX 60 MIN: CPT

## 2020-01-01 PROCEDURE — 3017F COLORECTAL CA SCREEN DOC REV: CPT | Performed by: NURSE PRACTITIONER

## 2020-01-01 PROCEDURE — 99292 CRITICAL CARE ADDL 30 MIN: CPT

## 2020-01-01 PROCEDURE — 99254 IP/OBS CNSLTJ NEW/EST MOD 60: CPT | Performed by: INTERNAL MEDICINE

## 2020-01-01 PROCEDURE — 83935 ASSAY OF URINE OSMOLALITY: CPT

## 2020-01-01 PROCEDURE — 94002 VENT MGMT INPAT INIT DAY: CPT

## 2020-01-01 PROCEDURE — 51798 US URINE CAPACITY MEASURE: CPT

## 2020-01-01 PROCEDURE — 82746 ASSAY OF FOLIC ACID SERUM: CPT

## 2020-01-01 PROCEDURE — 86022 PLATELET ANTIBODIES: CPT

## 2020-01-01 PROCEDURE — 99231 SBSQ HOSP IP/OBS SF/LOW 25: CPT | Performed by: NURSE PRACTITIONER

## 2020-01-01 PROCEDURE — 6370000000 HC RX 637 (ALT 250 FOR IP): Performed by: HOSPITALIST

## 2020-01-01 PROCEDURE — 36569 INSJ PICC 5 YR+ W/O IMAGING: CPT

## 2020-01-01 PROCEDURE — 96368 THER/DIAG CONCURRENT INF: CPT

## 2020-01-01 PROCEDURE — 87635 SARS-COV-2 COVID-19 AMP PRB: CPT

## 2020-01-01 PROCEDURE — 2060000000 HC ICU INTERMEDIATE R&B

## 2020-01-01 PROCEDURE — 92953 TEMPORARY EXTERNAL PACING: CPT

## 2020-01-01 RX ORDER — CHLORHEXIDINE GLUCONATE 0.12 MG/ML
15 RINSE ORAL 2 TIMES DAILY
Status: DISCONTINUED | OUTPATIENT
Start: 2020-01-01 | End: 2020-01-01

## 2020-01-01 RX ORDER — ATORVASTATIN CALCIUM 40 MG/1
80 TABLET, FILM COATED ORAL NIGHTLY
Status: DISCONTINUED | OUTPATIENT
Start: 2020-01-01 | End: 2020-01-01 | Stop reason: HOSPADM

## 2020-01-01 RX ORDER — LISINOPRIL 5 MG/1
10 TABLET ORAL DAILY
Qty: 30 TABLET | Refills: 0 | Status: ON HOLD
Start: 2020-01-01 | End: 2020-01-01 | Stop reason: SDUPTHER

## 2020-01-01 RX ORDER — ASPIRIN 81 MG/1
81 TABLET, CHEWABLE ORAL DAILY
Status: DISCONTINUED | OUTPATIENT
Start: 2020-01-01 | End: 2020-01-01 | Stop reason: HOSPADM

## 2020-01-01 RX ORDER — 0.9 % SODIUM CHLORIDE 0.9 %
1000 INTRAVENOUS SOLUTION INTRAVENOUS ONCE
Status: COMPLETED | OUTPATIENT
Start: 2020-01-01 | End: 2020-01-01

## 2020-01-01 RX ORDER — SPIRONOLACTONE 25 MG/1
25 TABLET ORAL DAILY
Qty: 30 TABLET | Refills: 0 | Status: ON HOLD | OUTPATIENT
Start: 2020-01-01 | End: 2021-01-01 | Stop reason: HOSPADM

## 2020-01-01 RX ORDER — DEXTROSE MONOHYDRATE 25 G/50ML
12.5 INJECTION, SOLUTION INTRAVENOUS PRN
Status: DISCONTINUED | OUTPATIENT
Start: 2020-01-01 | End: 2020-01-01 | Stop reason: SDUPTHER

## 2020-01-01 RX ORDER — CALCIUM GLUCONATE 94 MG/ML
1 INJECTION, SOLUTION INTRAVENOUS ONCE
Status: COMPLETED | OUTPATIENT
Start: 2020-01-01 | End: 2020-01-01

## 2020-01-01 RX ORDER — DIVALPROEX SODIUM 500 MG/1
500 TABLET, EXTENDED RELEASE ORAL DAILY
Status: ON HOLD | COMMUNITY
End: 2020-01-01

## 2020-01-01 RX ORDER — FENTANYL CITRATE 50 UG/ML
25 INJECTION, SOLUTION INTRAMUSCULAR; INTRAVENOUS
Status: DISCONTINUED | OUTPATIENT
Start: 2020-01-01 | End: 2020-01-01

## 2020-01-01 RX ORDER — SODIUM CHLORIDE 0.9 % (FLUSH) 0.9 %
10 SYRINGE (ML) INJECTION PRN
Status: DISCONTINUED | OUTPATIENT
Start: 2020-01-01 | End: 2020-01-01 | Stop reason: HOSPADM

## 2020-01-01 RX ORDER — HEPARIN SODIUM 1000 [USP'U]/ML
2600 INJECTION, SOLUTION INTRAVENOUS; SUBCUTANEOUS ONCE
Status: COMPLETED | OUTPATIENT
Start: 2020-01-01 | End: 2020-01-01

## 2020-01-01 RX ORDER — TORSEMIDE 20 MG/1
20 TABLET ORAL DAILY
DISCHARGE
Start: 2020-01-01 | End: 2020-01-01 | Stop reason: SDUPTHER

## 2020-01-01 RX ORDER — IPRATROPIUM BROMIDE AND ALBUTEROL SULFATE 2.5; .5 MG/3ML; MG/3ML
1 SOLUTION RESPIRATORY (INHALATION) ONCE
Status: DISCONTINUED | OUTPATIENT
Start: 2020-01-01 | End: 2020-01-01 | Stop reason: HOSPADM

## 2020-01-01 RX ORDER — DEXTROSE MONOHYDRATE 50 MG/ML
100 INJECTION, SOLUTION INTRAVENOUS PRN
Status: DISCONTINUED | OUTPATIENT
Start: 2020-01-01 | End: 2020-01-01 | Stop reason: HOSPADM

## 2020-01-01 RX ORDER — ATORVASTATIN CALCIUM 80 MG/1
40 TABLET, FILM COATED ORAL NIGHTLY
Qty: 30 TABLET | Refills: 3 | Status: SHIPPED | OUTPATIENT
Start: 2020-01-01

## 2020-01-01 RX ORDER — MAGNESIUM SULFATE IN WATER 40 MG/ML
2 INJECTION, SOLUTION INTRAVENOUS ONCE
Status: COMPLETED | OUTPATIENT
Start: 2020-01-01 | End: 2020-01-01

## 2020-01-01 RX ORDER — ROPINIROLE 2 MG/1
2 TABLET, FILM COATED ORAL 3 TIMES DAILY
Status: ON HOLD | COMMUNITY
End: 2020-01-01 | Stop reason: HOSPADM

## 2020-01-01 RX ORDER — ALBUTEROL SULFATE 90 UG/1
2 AEROSOL, METERED RESPIRATORY (INHALATION) 4 TIMES DAILY
Status: DISCONTINUED | OUTPATIENT
Start: 2020-01-01 | End: 2020-01-01 | Stop reason: HOSPADM

## 2020-01-01 RX ORDER — NICOTINE POLACRILEX 4 MG
15 LOZENGE BUCCAL PRN
Status: DISCONTINUED | OUTPATIENT
Start: 2020-01-01 | End: 2020-01-01 | Stop reason: HOSPADM

## 2020-01-01 RX ORDER — PANTOPRAZOLE SODIUM 40 MG/10ML
40 INJECTION, POWDER, LYOPHILIZED, FOR SOLUTION INTRAVENOUS DAILY
Status: DISCONTINUED | OUTPATIENT
Start: 2020-01-01 | End: 2020-01-01 | Stop reason: HOSPADM

## 2020-01-01 RX ORDER — AMILORIDE HYDROCHLORIDE 5 MG/1
5 TABLET ORAL DAILY
Status: DISCONTINUED | OUTPATIENT
Start: 2020-01-01 | End: 2020-01-01

## 2020-01-01 RX ORDER — POLYETHYLENE GLYCOL 3350 17 G/17G
17 POWDER, FOR SOLUTION ORAL DAILY PRN
Status: DISCONTINUED | OUTPATIENT
Start: 2020-01-01 | End: 2020-01-01 | Stop reason: SDUPTHER

## 2020-01-01 RX ORDER — FERROUS SULFATE 325(65) MG
325 TABLET ORAL
Status: ON HOLD | COMMUNITY
End: 2021-01-01 | Stop reason: ALTCHOICE

## 2020-01-01 RX ORDER — HEPARIN SODIUM 5000 [USP'U]/ML
5000 INJECTION, SOLUTION INTRAVENOUS; SUBCUTANEOUS EVERY 8 HOURS SCHEDULED
Status: DISCONTINUED | OUTPATIENT
Start: 2020-01-01 | End: 2020-01-01

## 2020-01-01 RX ORDER — ALBUTEROL SULFATE 90 UG/1
2 AEROSOL, METERED RESPIRATORY (INHALATION) ONCE
Status: COMPLETED | OUTPATIENT
Start: 2020-01-01 | End: 2020-01-01

## 2020-01-01 RX ORDER — OMEGA-3S/DHA/EPA/FISH OIL/D3 300MG-1000
400 CAPSULE ORAL DAILY
Status: DISCONTINUED | OUTPATIENT
Start: 2020-01-01 | End: 2020-01-01

## 2020-01-01 RX ORDER — DEXTROSE MONOHYDRATE 25 G/50ML
12.5 INJECTION, SOLUTION INTRAVENOUS PRN
Status: DISCONTINUED | OUTPATIENT
Start: 2020-01-01 | End: 2020-01-01 | Stop reason: HOSPADM

## 2020-01-01 RX ORDER — DOXEPIN HYDROCHLORIDE 10 MG/1
10 CAPSULE ORAL NIGHTLY
Status: DISCONTINUED | OUTPATIENT
Start: 2020-01-01 | End: 2020-01-01 | Stop reason: HOSPADM

## 2020-01-01 RX ORDER — CALCIUM CARBONATE 200(500)MG
1 TABLET,CHEWABLE ORAL DAILY
Status: DISCONTINUED | OUTPATIENT
Start: 2020-01-01 | End: 2020-01-01

## 2020-01-01 RX ORDER — CLOPIDOGREL BISULFATE 75 MG/1
75 TABLET ORAL DAILY
Status: DISCONTINUED | OUTPATIENT
Start: 2020-01-01 | End: 2020-01-01 | Stop reason: HOSPADM

## 2020-01-01 RX ORDER — SODIUM CHLORIDE 9 MG/ML
INJECTION, SOLUTION INTRAVENOUS CONTINUOUS
Status: DISCONTINUED | OUTPATIENT
Start: 2020-01-01 | End: 2020-01-01

## 2020-01-01 RX ORDER — FONDAPARINUX SODIUM 10 MG/.8ML
10 INJECTION SUBCUTANEOUS DAILY
Status: DISCONTINUED | OUTPATIENT
Start: 2020-01-01 | End: 2020-01-01

## 2020-01-01 RX ORDER — HEPARIN SODIUM 1000 [USP'U]/ML
2600 INJECTION, SOLUTION INTRAVENOUS; SUBCUTANEOUS
Status: COMPLETED | OUTPATIENT
Start: 2020-01-01 | End: 2020-01-01

## 2020-01-01 RX ORDER — PRAMIPEXOLE DIHYDROCHLORIDE 0.25 MG/1
1 TABLET ORAL NIGHTLY
Status: DISCONTINUED | OUTPATIENT
Start: 2020-01-01 | End: 2020-01-01

## 2020-01-01 RX ORDER — FONDAPARINUX SODIUM 2.5 MG/.5ML
5 INJECTION SUBCUTANEOUS DAILY
Status: DISCONTINUED | OUTPATIENT
Start: 2020-01-01 | End: 2020-01-01 | Stop reason: ALTCHOICE

## 2020-01-01 RX ORDER — SODIUM CHLORIDE 0.9 % (FLUSH) 0.9 %
10 SYRINGE (ML) INJECTION EVERY 12 HOURS SCHEDULED
Status: DISCONTINUED | OUTPATIENT
Start: 2020-01-01 | End: 2020-01-01 | Stop reason: HOSPADM

## 2020-01-01 RX ORDER — AMIODARONE HYDROCHLORIDE 200 MG/1
200 TABLET ORAL DAILY
Status: DISCONTINUED | OUTPATIENT
Start: 2020-01-01 | End: 2020-01-01 | Stop reason: HOSPADM

## 2020-01-01 RX ORDER — CALCITRIOL 0.25 UG/1
0.25 CAPSULE, LIQUID FILLED ORAL DAILY
Status: DISCONTINUED | OUTPATIENT
Start: 2020-01-01 | End: 2020-01-01

## 2020-01-01 RX ORDER — SODIUM POLYSTYRENE SULFONATE 15 G/60ML
15 SUSPENSION ORAL; RECTAL ONCE
Status: DISCONTINUED | OUTPATIENT
Start: 2020-01-01 | End: 2020-01-01

## 2020-01-01 RX ORDER — BUSPIRONE HYDROCHLORIDE 10 MG/1
10 TABLET ORAL 3 TIMES DAILY
Status: ON HOLD | DISCHARGE
Start: 2020-01-01 | End: 2021-01-01 | Stop reason: ALTCHOICE

## 2020-01-01 RX ORDER — BISACODYL 10 MG
10 SUPPOSITORY, RECTAL RECTAL ONCE
Status: COMPLETED | OUTPATIENT
Start: 2020-01-01 | End: 2020-01-01

## 2020-01-01 RX ORDER — TORSEMIDE 20 MG/1
50 TABLET ORAL 2 TIMES DAILY
Status: DISCONTINUED | OUTPATIENT
Start: 2020-01-01 | End: 2020-01-01

## 2020-01-01 RX ORDER — LORAZEPAM 2 MG/ML
1 INJECTION INTRAMUSCULAR ONCE
Status: DISCONTINUED | OUTPATIENT
Start: 2020-01-01 | End: 2020-01-01 | Stop reason: HOSPADM

## 2020-01-01 RX ORDER — ATORVASTATIN CALCIUM 40 MG/1
40 TABLET, FILM COATED ORAL NIGHTLY
Status: DISCONTINUED | OUTPATIENT
Start: 2020-01-01 | End: 2020-01-01 | Stop reason: HOSPADM

## 2020-01-01 RX ORDER — DOPAMINE HYDROCHLORIDE 160 MG/100ML
2.5 INJECTION, SOLUTION INTRAVENOUS CONTINUOUS
Status: DISCONTINUED | OUTPATIENT
Start: 2020-01-01 | End: 2020-01-01

## 2020-01-01 RX ORDER — PANTOPRAZOLE SODIUM 40 MG/1
40 TABLET, DELAYED RELEASE ORAL DAILY
Status: DISCONTINUED | OUTPATIENT
Start: 2020-01-01 | End: 2020-01-01 | Stop reason: HOSPADM

## 2020-01-01 RX ORDER — POTASSIUM CHLORIDE 7.45 MG/ML
40 INJECTION INTRAVENOUS ONCE
Status: COMPLETED | OUTPATIENT
Start: 2020-01-01 | End: 2020-01-01

## 2020-01-01 RX ORDER — ATROPINE SULFATE 0.1 MG/ML
1 INJECTION INTRAVENOUS ONCE
Status: COMPLETED | OUTPATIENT
Start: 2020-01-01 | End: 2020-01-01

## 2020-01-01 RX ORDER — POTASSIUM CHLORIDE 7.45 MG/ML
10 INJECTION INTRAVENOUS ONCE
Status: COMPLETED | OUTPATIENT
Start: 2020-01-01 | End: 2020-01-01

## 2020-01-01 RX ORDER — TORSEMIDE 20 MG/1
40 TABLET ORAL DAILY
Qty: 14 TABLET | Refills: 0 | Status: ON HOLD | OUTPATIENT
Start: 2020-01-01 | End: 2021-01-01 | Stop reason: HOSPADM

## 2020-01-01 RX ORDER — ETOMIDATE 2 MG/ML
50 INJECTION INTRAVENOUS ONCE
Status: COMPLETED | OUTPATIENT
Start: 2020-01-01 | End: 2020-01-01

## 2020-01-01 RX ORDER — FEXOFENADINE HCL 180 MG/1
180 TABLET ORAL DAILY
Status: DISCONTINUED | OUTPATIENT
Start: 2020-01-01 | End: 2020-01-01 | Stop reason: CLARIF

## 2020-01-01 RX ORDER — ASCORBIC ACID 500 MG
500 TABLET ORAL DAILY
Status: DISCONTINUED | OUTPATIENT
Start: 2020-01-01 | End: 2020-01-01 | Stop reason: HOSPADM

## 2020-01-01 RX ORDER — HALOPERIDOL 5 MG/ML
2 INJECTION INTRAMUSCULAR ONCE
Status: DISCONTINUED | OUTPATIENT
Start: 2020-01-01 | End: 2020-01-01 | Stop reason: HOSPADM

## 2020-01-01 RX ORDER — SPIRONOLACTONE 25 MG/1
12.5 TABLET ORAL DAILY
Status: DISCONTINUED | OUTPATIENT
Start: 2020-01-01 | End: 2020-01-01 | Stop reason: HOSPADM

## 2020-01-01 RX ORDER — CHLORHEXIDINE GLUCONATE 0.12 MG/ML
15 RINSE ORAL 2 TIMES DAILY
Status: DISCONTINUED | OUTPATIENT
Start: 2020-01-01 | End: 2020-01-01 | Stop reason: HOSPADM

## 2020-01-01 RX ORDER — HYDROCODONE BITARTRATE AND ACETAMINOPHEN 5; 325 MG/1; MG/1
1 TABLET ORAL ONCE
Status: DISCONTINUED | OUTPATIENT
Start: 2020-01-01 | End: 2020-01-01 | Stop reason: HOSPADM

## 2020-01-01 RX ORDER — IPRATROPIUM BROMIDE AND ALBUTEROL SULFATE 2.5; .5 MG/3ML; MG/3ML
3 SOLUTION RESPIRATORY (INHALATION) EVERY 6 HOURS PRN
Qty: 360 ML | Refills: 0 | Status: SHIPPED | OUTPATIENT
Start: 2020-01-01

## 2020-01-01 RX ORDER — ROPINIROLE 1 MG/1
2 TABLET, FILM COATED ORAL 3 TIMES DAILY
Status: DISCONTINUED | OUTPATIENT
Start: 2020-01-01 | End: 2020-01-01

## 2020-01-01 RX ORDER — SULFAMETHOXAZOLE AND TRIMETHOPRIM 800; 160 MG/1; MG/1
1 TABLET ORAL 2 TIMES DAILY
Qty: 20 TABLET | Refills: 0 | Status: SHIPPED | OUTPATIENT
Start: 2020-01-01 | End: 2020-01-01

## 2020-01-01 RX ORDER — ACETAMINOPHEN 325 MG/1
650 TABLET ORAL EVERY 6 HOURS PRN
Status: CANCELLED | OUTPATIENT
Start: 2020-01-01

## 2020-01-01 RX ORDER — OXYBUTYNIN CHLORIDE 10 MG/1
10 TABLET, EXTENDED RELEASE ORAL DAILY
Status: ON HOLD | COMMUNITY
End: 2021-01-01 | Stop reason: ALTCHOICE

## 2020-01-01 RX ORDER — DOPAMINE HYDROCHLORIDE 160 MG/100ML
5 INJECTION, SOLUTION INTRAVENOUS CONTINUOUS
Status: DISCONTINUED | OUTPATIENT
Start: 2020-01-01 | End: 2020-01-01 | Stop reason: HOSPADM

## 2020-01-01 RX ORDER — IPRATROPIUM BROMIDE AND ALBUTEROL SULFATE 2.5; .5 MG/3ML; MG/3ML
1 SOLUTION RESPIRATORY (INHALATION) EVERY 4 HOURS
Status: DISCONTINUED | OUTPATIENT
Start: 2020-01-01 | End: 2020-01-01

## 2020-01-01 RX ORDER — LORAZEPAM 2 MG/ML
0.5 INJECTION INTRAMUSCULAR ONCE
Status: COMPLETED | OUTPATIENT
Start: 2020-01-01 | End: 2020-01-01

## 2020-01-01 RX ORDER — LACTULOSE 10 G/15ML
30 SOLUTION ORAL ONCE
Status: DISCONTINUED | OUTPATIENT
Start: 2020-01-01 | End: 2020-01-01 | Stop reason: HOSPADM

## 2020-01-01 RX ORDER — DEXTROSE MONOHYDRATE 25 G/50ML
25 INJECTION, SOLUTION INTRAVENOUS ONCE
Status: COMPLETED | OUTPATIENT
Start: 2020-01-01 | End: 2020-01-01

## 2020-01-01 RX ORDER — PRAMIPEXOLE DIHYDROCHLORIDE 0.25 MG/1
1 TABLET ORAL 3 TIMES DAILY
Status: DISCONTINUED | OUTPATIENT
Start: 2020-01-01 | End: 2020-01-01 | Stop reason: HOSPADM

## 2020-01-01 RX ORDER — FUROSEMIDE 10 MG/ML
20 INJECTION INTRAMUSCULAR; INTRAVENOUS 3 TIMES DAILY
Status: DISCONTINUED | OUTPATIENT
Start: 2020-01-01 | End: 2020-01-01

## 2020-01-01 RX ORDER — LISINOPRIL 10 MG/1
10 TABLET ORAL DAILY
COMMUNITY
End: 2020-01-01

## 2020-01-01 RX ORDER — HALOPERIDOL 5 MG/ML
5 INJECTION INTRAMUSCULAR ONCE
Status: COMPLETED | OUTPATIENT
Start: 2020-01-01 | End: 2020-01-01

## 2020-01-01 RX ORDER — POTASSIUM CHLORIDE 7.45 MG/ML
20 INJECTION INTRAVENOUS ONCE
Status: DISCONTINUED | OUTPATIENT
Start: 2020-01-01 | End: 2020-01-01

## 2020-01-01 RX ORDER — SERTRALINE HYDROCHLORIDE 25 MG/1
25 TABLET, FILM COATED ORAL NIGHTLY
Qty: 30 TABLET | Refills: 3 | Status: ON HOLD | DISCHARGE
Start: 2020-01-01 | End: 2021-01-01 | Stop reason: ALTCHOICE

## 2020-01-01 RX ORDER — NICOTINE POLACRILEX 4 MG
15 LOZENGE BUCCAL PRN
Status: DISCONTINUED | OUTPATIENT
Start: 2020-01-01 | End: 2020-01-01 | Stop reason: SDUPTHER

## 2020-01-01 RX ORDER — DEXTROSE MONOHYDRATE 50 MG/ML
100 INJECTION, SOLUTION INTRAVENOUS PRN
Status: DISCONTINUED | OUTPATIENT
Start: 2020-01-01 | End: 2020-01-01 | Stop reason: SDUPTHER

## 2020-01-01 RX ORDER — BUSPIRONE HYDROCHLORIDE 10 MG/1
10 TABLET ORAL 3 TIMES DAILY
Status: DISCONTINUED | OUTPATIENT
Start: 2020-01-01 | End: 2020-01-01 | Stop reason: HOSPADM

## 2020-01-01 RX ORDER — ONDANSETRON 2 MG/ML
4 INJECTION INTRAMUSCULAR; INTRAVENOUS EVERY 6 HOURS PRN
Status: DISCONTINUED | OUTPATIENT
Start: 2020-01-01 | End: 2020-01-01 | Stop reason: HOSPADM

## 2020-01-01 RX ORDER — PRAMIPEXOLE DIHYDROCHLORIDE 1 MG/1
1 TABLET ORAL 2 TIMES DAILY
Qty: 90 TABLET | Refills: 3 | DISCHARGE
Start: 2020-01-01

## 2020-01-01 RX ORDER — ROPINIROLE 1 MG/1
2 TABLET, FILM COATED ORAL 3 TIMES DAILY
Status: DISCONTINUED | OUTPATIENT
Start: 2020-01-01 | End: 2020-01-01 | Stop reason: HOSPADM

## 2020-01-01 RX ORDER — TORSEMIDE 100 MG/1
25 TABLET ORAL 2 TIMES DAILY
Qty: 30 TABLET | Refills: 3 | Status: ON HOLD | OUTPATIENT
Start: 2020-01-01 | End: 2020-01-01 | Stop reason: HOSPADM

## 2020-01-01 RX ORDER — SPIRONOLACTONE 25 MG/1
50 TABLET ORAL DAILY
Qty: 30 TABLET | Refills: 0 | Status: ON HOLD | OUTPATIENT
Start: 2020-01-01 | End: 2020-01-01 | Stop reason: SDUPTHER

## 2020-01-01 RX ORDER — ACETAMINOPHEN 325 MG/1
650 TABLET ORAL EVERY 6 HOURS PRN
Status: DISCONTINUED | OUTPATIENT
Start: 2020-01-01 | End: 2020-01-01 | Stop reason: HOSPADM

## 2020-01-01 RX ORDER — NALOXONE HYDROCHLORIDE 0.4 MG/ML
0.4 INJECTION, SOLUTION INTRAMUSCULAR; INTRAVENOUS; SUBCUTANEOUS ONCE
Status: COMPLETED | OUTPATIENT
Start: 2020-01-01 | End: 2020-01-01

## 2020-01-01 RX ORDER — MINOCYCLINE HYDROCHLORIDE 100 MG/1
100 CAPSULE ORAL 2 TIMES DAILY
Status: DISCONTINUED | OUTPATIENT
Start: 2020-01-01 | End: 2020-01-01

## 2020-01-01 RX ORDER — MAGNESIUM SULFATE 1 G/100ML
1 INJECTION INTRAVENOUS PRN
Status: DISCONTINUED | OUTPATIENT
Start: 2020-01-01 | End: 2020-01-01 | Stop reason: HOSPADM

## 2020-01-01 RX ORDER — OMEGA-3S/DHA/EPA/FISH OIL/D3 300MG-1000
400 CAPSULE ORAL DAILY
Status: ON HOLD | COMMUNITY
End: 2021-01-01 | Stop reason: ALTCHOICE

## 2020-01-01 RX ORDER — DOXEPIN HYDROCHLORIDE 10 MG/1
10 CAPSULE ORAL NIGHTLY
Status: ON HOLD | COMMUNITY
End: 2021-01-01 | Stop reason: ALTCHOICE

## 2020-01-01 RX ORDER — POTASSIUM CHLORIDE 20 MEQ/1
40 TABLET, EXTENDED RELEASE ORAL 2 TIMES DAILY WITH MEALS
Status: COMPLETED | OUTPATIENT
Start: 2020-01-01 | End: 2020-01-01

## 2020-01-01 RX ORDER — ACETAMINOPHEN 650 MG/1
650 SUPPOSITORY RECTAL EVERY 6 HOURS PRN
Status: CANCELLED | OUTPATIENT
Start: 2020-01-01

## 2020-01-01 RX ORDER — ACETAMINOPHEN 650 MG/1
650 SUPPOSITORY RECTAL EVERY 6 HOURS PRN
Status: DISCONTINUED | OUTPATIENT
Start: 2020-01-01 | End: 2020-01-01 | Stop reason: HOSPADM

## 2020-01-01 RX ORDER — FUROSEMIDE 10 MG/ML
40 INJECTION INTRAMUSCULAR; INTRAVENOUS 2 TIMES DAILY
Status: DISCONTINUED | OUTPATIENT
Start: 2020-01-01 | End: 2020-01-01

## 2020-01-01 RX ORDER — LISINOPRIL 10 MG/1
10 TABLET ORAL DAILY
Status: DISCONTINUED | OUTPATIENT
Start: 2020-01-01 | End: 2020-01-01

## 2020-01-01 RX ORDER — HALOPERIDOL 5 MG/ML
INJECTION INTRAMUSCULAR
Status: DISPENSED
Start: 2020-01-01 | End: 2020-01-01

## 2020-01-01 RX ORDER — BUMETANIDE 1 MG/1
2 TABLET ORAL 2 TIMES DAILY
Status: DISCONTINUED | OUTPATIENT
Start: 2020-01-01 | End: 2020-01-01

## 2020-01-01 RX ORDER — HEPARIN SODIUM 5000 [USP'U]/ML
5000 INJECTION, SOLUTION INTRAVENOUS; SUBCUTANEOUS EVERY 8 HOURS SCHEDULED
Status: DISCONTINUED | OUTPATIENT
Start: 2020-01-01 | End: 2020-01-01 | Stop reason: HOSPADM

## 2020-01-01 RX ORDER — ATROPINE SULFATE 0.4 MG/ML
0.4 AMPUL (ML) INJECTION ONCE
Status: COMPLETED | OUTPATIENT
Start: 2020-01-01 | End: 2020-01-01

## 2020-01-01 RX ORDER — FERROUS SULFATE 325(65) MG
325 TABLET ORAL
Status: DISCONTINUED | OUTPATIENT
Start: 2020-01-01 | End: 2020-01-01 | Stop reason: HOSPADM

## 2020-01-01 RX ORDER — SPIRONOLACTONE 25 MG/1
25 TABLET ORAL DAILY
Status: DISCONTINUED | OUTPATIENT
Start: 2020-01-01 | End: 2020-01-01

## 2020-01-01 RX ORDER — PROPOFOL 10 MG/ML
INJECTION, EMULSION INTRAVENOUS
Status: COMPLETED
Start: 2020-01-01 | End: 2020-01-01

## 2020-01-01 RX ORDER — PREDNISONE 20 MG/1
60 TABLET ORAL ONCE
Status: COMPLETED | OUTPATIENT
Start: 2020-01-01 | End: 2020-01-01

## 2020-01-01 RX ORDER — METHYLPREDNISOLONE SODIUM SUCCINATE 125 MG/2ML
40 INJECTION, POWDER, LYOPHILIZED, FOR SOLUTION INTRAMUSCULAR; INTRAVENOUS ONCE
Status: COMPLETED | OUTPATIENT
Start: 2020-01-01 | End: 2020-01-01

## 2020-01-01 RX ORDER — FUROSEMIDE 10 MG/ML
20 INJECTION INTRAMUSCULAR; INTRAVENOUS 2 TIMES DAILY
Status: DISCONTINUED | OUTPATIENT
Start: 2020-01-01 | End: 2020-01-01 | Stop reason: HOSPADM

## 2020-01-01 RX ORDER — POTASSIUM CHLORIDE 20 MEQ/1
40 TABLET, EXTENDED RELEASE ORAL
Status: COMPLETED | OUTPATIENT
Start: 2020-01-01 | End: 2020-01-01

## 2020-01-01 RX ORDER — GABAPENTIN 400 MG/1
400 CAPSULE ORAL 3 TIMES DAILY
Status: DISCONTINUED | OUTPATIENT
Start: 2020-01-01 | End: 2020-01-01

## 2020-01-01 RX ORDER — LIDOCAINE HYDROCHLORIDE 10 MG/ML
5 INJECTION, SOLUTION EPIDURAL; INFILTRATION; INTRACAUDAL; PERINEURAL ONCE
Status: DISCONTINUED | OUTPATIENT
Start: 2020-01-01 | End: 2020-01-01 | Stop reason: HOSPADM

## 2020-01-01 RX ORDER — POLYETHYLENE GLYCOL 3350 17 G/17G
17 POWDER, FOR SOLUTION ORAL DAILY PRN
Status: DISCONTINUED | OUTPATIENT
Start: 2020-01-01 | End: 2020-01-01 | Stop reason: HOSPADM

## 2020-01-01 RX ORDER — FUROSEMIDE 10 MG/ML
40 INJECTION INTRAMUSCULAR; INTRAVENOUS 3 TIMES DAILY
Status: DISCONTINUED | OUTPATIENT
Start: 2020-01-01 | End: 2020-01-01

## 2020-01-01 RX ORDER — FUROSEMIDE 10 MG/ML
40 INJECTION INTRAMUSCULAR; INTRAVENOUS ONCE
Status: COMPLETED | OUTPATIENT
Start: 2020-01-01 | End: 2020-01-01

## 2020-01-01 RX ORDER — LIDOCAINE HYDROCHLORIDE 10 MG/ML
5 INJECTION, SOLUTION EPIDURAL; INFILTRATION; INTRACAUDAL; PERINEURAL ONCE
Status: COMPLETED | OUTPATIENT
Start: 2020-01-01 | End: 2020-01-01

## 2020-01-01 RX ORDER — PRAMIPEXOLE DIHYDROCHLORIDE 0.25 MG/1
0.12 TABLET ORAL ONCE
Status: COMPLETED | OUTPATIENT
Start: 2020-01-01 | End: 2020-01-01

## 2020-01-01 RX ORDER — PROPOFOL 10 MG/ML
10 INJECTION, EMULSION INTRAVENOUS CONTINUOUS
Status: DISCONTINUED | OUTPATIENT
Start: 2020-01-01 | End: 2020-01-01

## 2020-01-01 RX ORDER — SORBITOL SOLUTION 70 %
45 SOLUTION, ORAL MISCELLANEOUS ONCE
Status: DISCONTINUED | OUTPATIENT
Start: 2020-01-01 | End: 2020-01-01 | Stop reason: HOSPADM

## 2020-01-01 RX ORDER — OXYBUTYNIN CHLORIDE 10 MG/1
10 TABLET, EXTENDED RELEASE ORAL DAILY
Status: DISCONTINUED | OUTPATIENT
Start: 2020-01-01 | End: 2020-01-01 | Stop reason: HOSPADM

## 2020-01-01 RX ORDER — POLYETHYLENE GLYCOL 3350 17 G/17G
17 POWDER, FOR SOLUTION ORAL DAILY
Status: DISCONTINUED | OUTPATIENT
Start: 2020-01-01 | End: 2020-01-01 | Stop reason: HOSPADM

## 2020-01-01 RX ORDER — CYANOCOBALAMIN 1000 UG/ML
1000 INJECTION INTRAMUSCULAR; SUBCUTANEOUS ONCE
Status: ON HOLD | COMMUNITY
End: 2020-01-01 | Stop reason: HOSPADM

## 2020-01-01 RX ORDER — LORAZEPAM 2 MG/ML
1 INJECTION INTRAMUSCULAR ONCE
Status: COMPLETED | OUTPATIENT
Start: 2020-01-01 | End: 2020-01-01

## 2020-01-01 RX ORDER — FERROUS SULFATE 325(65) MG
325 TABLET ORAL
Status: DISCONTINUED | OUTPATIENT
Start: 2020-01-01 | End: 2020-01-01

## 2020-01-01 RX ORDER — CETIRIZINE HYDROCHLORIDE 10 MG/1
10 TABLET ORAL DAILY
Status: DISCONTINUED | OUTPATIENT
Start: 2020-01-01 | End: 2020-01-01 | Stop reason: HOSPADM

## 2020-01-01 RX ORDER — IPRATROPIUM BROMIDE AND ALBUTEROL SULFATE 2.5; .5 MG/3ML; MG/3ML
3 SOLUTION RESPIRATORY (INHALATION) EVERY 6 HOURS PRN
Status: DISCONTINUED | OUTPATIENT
Start: 2020-01-01 | End: 2020-01-01 | Stop reason: HOSPADM

## 2020-01-01 RX ORDER — KETOROLAC TROMETHAMINE 30 MG/ML
15 INJECTION, SOLUTION INTRAMUSCULAR; INTRAVENOUS ONCE
Status: DISCONTINUED | OUTPATIENT
Start: 2020-01-01 | End: 2020-01-01

## 2020-01-01 RX ORDER — PROMETHAZINE HYDROCHLORIDE 25 MG/1
12.5 TABLET ORAL EVERY 6 HOURS PRN
Status: DISCONTINUED | OUTPATIENT
Start: 2020-01-01 | End: 2020-01-01 | Stop reason: HOSPADM

## 2020-01-01 RX ORDER — LISINOPRIL 5 MG/1
5 TABLET ORAL DAILY
Qty: 30 TABLET | Refills: 0 | Status: ON HOLD | DISCHARGE
Start: 2020-01-01 | End: 2021-01-01 | Stop reason: HOSPADM

## 2020-01-01 RX ORDER — 0.9 % SODIUM CHLORIDE 0.9 %
500 INTRAVENOUS SOLUTION INTRAVENOUS ONCE
Status: DISCONTINUED | OUTPATIENT
Start: 2020-01-01 | End: 2020-01-01

## 2020-01-01 RX ORDER — PROPOFOL 10 MG/ML
10 INJECTION, EMULSION INTRAVENOUS
Status: DISCONTINUED | OUTPATIENT
Start: 2020-01-01 | End: 2020-01-01

## 2020-01-01 RX ORDER — HALOPERIDOL 5 MG/ML
2 INJECTION INTRAMUSCULAR ONCE
Status: DISCONTINUED | OUTPATIENT
Start: 2020-01-01 | End: 2020-01-01

## 2020-01-01 RX ORDER — FUROSEMIDE 10 MG/ML
60 INJECTION INTRAMUSCULAR; INTRAVENOUS 3 TIMES DAILY
Status: DISCONTINUED | OUTPATIENT
Start: 2020-01-01 | End: 2020-01-01

## 2020-01-01 RX ORDER — PRAMIPEXOLE DIHYDROCHLORIDE 0.25 MG/1
1 TABLET ORAL 2 TIMES DAILY
Status: DISCONTINUED | OUTPATIENT
Start: 2020-01-01 | End: 2020-01-01 | Stop reason: HOSPADM

## 2020-01-01 RX ORDER — CALCIUM CARBONATE 200(500)MG
1 TABLET,CHEWABLE ORAL DAILY
Status: ON HOLD | COMMUNITY
End: 2020-01-01 | Stop reason: HOSPADM

## 2020-01-01 RX ORDER — FONDAPARINUX SODIUM 2.5 MG/.5ML
2.5 INJECTION SUBCUTANEOUS DAILY
Status: DISCONTINUED | OUTPATIENT
Start: 2020-01-01 | End: 2020-01-01 | Stop reason: HOSPADM

## 2020-01-01 RX ORDER — PRAMIPEXOLE DIHYDROCHLORIDE 0.25 MG/1
1 TABLET ORAL 3 TIMES DAILY
Status: DISCONTINUED | OUTPATIENT
Start: 2020-01-01 | End: 2020-01-01

## 2020-01-01 RX ORDER — SUCCINYLCHOLINE CHLORIDE 20 MG/ML
INJECTION INTRAMUSCULAR; INTRAVENOUS DAILY PRN
Status: COMPLETED | OUTPATIENT
Start: 2020-01-01 | End: 2020-01-01

## 2020-01-01 RX ORDER — OMEGA-3S/DHA/EPA/FISH OIL/D3 300MG-1000
400 CAPSULE ORAL DAILY
Status: DISCONTINUED | OUTPATIENT
Start: 2020-01-01 | End: 2020-01-01 | Stop reason: HOSPADM

## 2020-01-01 RX ORDER — SPIRONOLACTONE 50 MG/1
50 TABLET, FILM COATED ORAL DAILY
Status: DISCONTINUED | OUTPATIENT
Start: 2020-01-01 | End: 2020-01-01

## 2020-01-01 RX ORDER — GABAPENTIN 400 MG/1
800 CAPSULE ORAL 3 TIMES DAILY
Status: DISCONTINUED | OUTPATIENT
Start: 2020-01-01 | End: 2020-01-01 | Stop reason: HOSPADM

## 2020-01-01 RX ORDER — FUROSEMIDE 40 MG/1
40 TABLET ORAL 2 TIMES DAILY
Status: ON HOLD | COMMUNITY
End: 2020-01-01 | Stop reason: HOSPADM

## 2020-01-01 RX ORDER — 0.9 % SODIUM CHLORIDE 0.9 %
20 INTRAVENOUS SOLUTION INTRAVENOUS ONCE
Status: DISCONTINUED | OUTPATIENT
Start: 2020-01-01 | End: 2020-01-01 | Stop reason: HOSPADM

## 2020-01-01 RX ORDER — FUROSEMIDE 10 MG/ML
20 INJECTION INTRAMUSCULAR; INTRAVENOUS ONCE
Status: COMPLETED | OUTPATIENT
Start: 2020-01-01 | End: 2020-01-01

## 2020-01-01 RX ORDER — CIPROFLOXACIN 500 MG/1
500 TABLET, FILM COATED ORAL 2 TIMES DAILY
Qty: 10 TABLET | Refills: 0 | Status: SHIPPED | OUTPATIENT
Start: 2020-01-01 | End: 2020-01-01

## 2020-01-01 RX ORDER — TORSEMIDE 20 MG/1
100 TABLET ORAL 2 TIMES DAILY
Status: DISCONTINUED | OUTPATIENT
Start: 2020-01-01 | End: 2020-01-01

## 2020-01-01 RX ORDER — METHYLPREDNISOLONE SODIUM SUCCINATE 125 MG/2ML
60 INJECTION, POWDER, LYOPHILIZED, FOR SOLUTION INTRAMUSCULAR; INTRAVENOUS DAILY
Status: DISCONTINUED | OUTPATIENT
Start: 2020-01-01 | End: 2020-01-01

## 2020-01-01 RX ORDER — CALCIUM GLUCONATE 94 MG/ML
INJECTION, SOLUTION INTRAVENOUS
Status: COMPLETED
Start: 2020-01-01 | End: 2020-01-01

## 2020-01-01 RX ORDER — POTASSIUM CHLORIDE 7.45 MG/ML
10 INJECTION INTRAVENOUS PRN
Status: DISCONTINUED | OUTPATIENT
Start: 2020-01-01 | End: 2020-01-01 | Stop reason: HOSPADM

## 2020-01-01 RX ORDER — SULFAMETHOXAZOLE AND TRIMETHOPRIM 800; 160 MG/1; MG/1
1 TABLET ORAL ONCE
Status: COMPLETED | OUTPATIENT
Start: 2020-01-01 | End: 2020-01-01

## 2020-01-01 RX ADMIN — BUSPIRONE HYDROCHLORIDE 10 MG: 10 TABLET ORAL at 23:36

## 2020-01-01 RX ADMIN — MAGNESIUM SULFATE HEPTAHYDRATE 1 G: 1 INJECTION, SOLUTION INTRAVENOUS at 03:34

## 2020-01-01 RX ADMIN — PRAMIPEXOLE DIHYDROCHLORIDE 1 MG: 0.25 TABLET ORAL at 10:57

## 2020-01-01 RX ADMIN — ROPINIROLE HYDROCHLORIDE 2 MG: 1 TABLET, FILM COATED ORAL at 09:46

## 2020-01-01 RX ADMIN — PANTOPRAZOLE SODIUM 40 MG: 40 TABLET, DELAYED RELEASE ORAL at 09:39

## 2020-01-01 RX ADMIN — ROPINIROLE HYDROCHLORIDE 2 MG: 1 TABLET, FILM COATED ORAL at 09:39

## 2020-01-01 RX ADMIN — FERROUS SULFATE TAB 325 MG (65 MG ELEMENTAL FE) 325 MG: 325 (65 FE) TAB at 08:32

## 2020-01-01 RX ADMIN — SODIUM CHLORIDE, PRESERVATIVE FREE 10 ML: 5 INJECTION INTRAVENOUS at 21:52

## 2020-01-01 RX ADMIN — PRAMIPEXOLE DIHYDROCHLORIDE 1 MG: 0.25 TABLET ORAL at 15:23

## 2020-01-01 RX ADMIN — ASPIRIN 81 MG CHEWABLE TABLET 81 MG: 81 TABLET CHEWABLE at 10:56

## 2020-01-01 RX ADMIN — SODIUM CHLORIDE, PRESERVATIVE FREE 10 ML: 5 INJECTION INTRAVENOUS at 21:00

## 2020-01-01 RX ADMIN — PRAMIPEXOLE DIHYDROCHLORIDE 1 MG: 0.25 TABLET ORAL at 08:17

## 2020-01-01 RX ADMIN — GABAPENTIN 800 MG: 400 CAPSULE ORAL at 14:47

## 2020-01-01 RX ADMIN — IPRATROPIUM BROMIDE 2 PUFF: 17 AEROSOL, METERED RESPIRATORY (INHALATION) at 07:58

## 2020-01-01 RX ADMIN — IPRATROPIUM BROMIDE 2 PUFF: 17 AEROSOL, METERED RESPIRATORY (INHALATION) at 16:28

## 2020-01-01 RX ADMIN — ALBUTEROL SULFATE 2 PUFF: 108 AEROSOL, METERED RESPIRATORY (INHALATION) at 07:57

## 2020-01-01 RX ADMIN — HEPARIN SODIUM 5000 UNITS: 5000 INJECTION, SOLUTION INTRAVENOUS; SUBCUTANEOUS at 13:54

## 2020-01-01 RX ADMIN — CETIRIZINE HYDROCHLORIDE 10 MG: 10 TABLET, FILM COATED ORAL at 08:16

## 2020-01-01 RX ADMIN — PANTOPRAZOLE SODIUM 40 MG: 40 INJECTION, POWDER, FOR SOLUTION INTRAVENOUS at 08:31

## 2020-01-01 RX ADMIN — PROPOFOL 10 MCG/KG/MIN: 10 INJECTION, EMULSION INTRAVENOUS at 13:47

## 2020-01-01 RX ADMIN — IPRATROPIUM BROMIDE 2 PUFF: 17 AEROSOL, METERED RESPIRATORY (INHALATION) at 09:11

## 2020-01-01 RX ADMIN — IPRATROPIUM BROMIDE 2 PUFF: 17 AEROSOL, METERED RESPIRATORY (INHALATION) at 07:48

## 2020-01-01 RX ADMIN — OXYBUTYNIN CHLORIDE 10 MG: 10 TABLET, EXTENDED RELEASE ORAL at 08:17

## 2020-01-01 RX ADMIN — INSULIN LISPRO 2 UNITS: 100 INJECTION, SOLUTION INTRAVENOUS; SUBCUTANEOUS at 16:50

## 2020-01-01 RX ADMIN — IPRATROPIUM BROMIDE 2 PUFF: 17 AEROSOL, METERED RESPIRATORY (INHALATION) at 14:35

## 2020-01-01 RX ADMIN — ALBUTEROL SULFATE 2 PUFF: 108 AEROSOL, METERED RESPIRATORY (INHALATION) at 12:06

## 2020-01-01 RX ADMIN — SODIUM CHLORIDE 1000 ML: 9 INJECTION, SOLUTION INTRAVENOUS at 08:35

## 2020-01-01 RX ADMIN — SODIUM CHLORIDE 125 MG: 9 INJECTION, SOLUTION INTRAVENOUS at 15:23

## 2020-01-01 RX ADMIN — ATORVASTATIN CALCIUM 40 MG: 40 TABLET, FILM COATED ORAL at 20:25

## 2020-01-01 RX ADMIN — PRAMIPEXOLE DIHYDROCHLORIDE 1 MG: 0.25 TABLET ORAL at 14:36

## 2020-01-01 RX ADMIN — AMIODARONE HYDROCHLORIDE 200 MG: 200 TABLET ORAL at 11:00

## 2020-01-01 RX ADMIN — PRAMIPEXOLE DIHYDROCHLORIDE 1 MG: 0.25 TABLET ORAL at 21:38

## 2020-01-01 RX ADMIN — MEROPENEM 500 MG: 500 INJECTION, POWDER, FOR SOLUTION INTRAVENOUS at 11:52

## 2020-01-01 RX ADMIN — Medication 25 MCG/HR: at 21:43

## 2020-01-01 RX ADMIN — GABAPENTIN 800 MG: 400 CAPSULE ORAL at 14:26

## 2020-01-01 RX ADMIN — FUROSEMIDE 40 MG: 10 INJECTION, SOLUTION INTRAMUSCULAR; INTRAVENOUS at 13:54

## 2020-01-01 RX ADMIN — PANTOPRAZOLE SODIUM 40 MG: 40 TABLET, DELAYED RELEASE ORAL at 11:28

## 2020-01-01 RX ADMIN — ALBUTEROL SULFATE 2 PUFF: 108 AEROSOL, METERED RESPIRATORY (INHALATION) at 15:41

## 2020-01-01 RX ADMIN — SODIUM CHLORIDE, PRESERVATIVE FREE 10 ML: 5 INJECTION INTRAVENOUS at 10:57

## 2020-01-01 RX ADMIN — CETIRIZINE HYDROCHLORIDE 10 MG: 10 TABLET, FILM COATED ORAL at 11:55

## 2020-01-01 RX ADMIN — SODIUM CHLORIDE, PRESERVATIVE FREE 10 ML: 5 INJECTION INTRAVENOUS at 20:33

## 2020-01-01 RX ADMIN — PRAMIPEXOLE DIHYDROCHLORIDE 1 MG: 0.25 TABLET ORAL at 08:31

## 2020-01-01 RX ADMIN — IPRATROPIUM BROMIDE 2 PUFF: 17 AEROSOL, METERED RESPIRATORY (INHALATION) at 19:22

## 2020-01-01 RX ADMIN — CHOLECALCIFEROL (VITAMIN D3) 10 MCG (400 UNIT) TABLET 400 UNITS: at 10:56

## 2020-01-01 RX ADMIN — FAMOTIDINE 20 MG: 10 INJECTION, SOLUTION INTRAVENOUS at 09:37

## 2020-01-01 RX ADMIN — SODIUM CHLORIDE, PRESERVATIVE FREE 10 ML: 5 INJECTION INTRAVENOUS at 09:59

## 2020-01-01 RX ADMIN — MAGNESIUM SULFATE 2 G: 2 INJECTION INTRAVENOUS at 11:05

## 2020-01-01 RX ADMIN — CALCIUM GLUCONATE 1 G: 98 INJECTION, SOLUTION INTRAVENOUS at 09:56

## 2020-01-01 RX ADMIN — ALBUTEROL SULFATE 2 PUFF: 108 AEROSOL, METERED RESPIRATORY (INHALATION) at 07:53

## 2020-01-01 RX ADMIN — SULFAMETHOXAZOLE AND TRIMETHOPRIM 1 TABLET: 800; 160 TABLET ORAL at 14:55

## 2020-01-01 RX ADMIN — HEPARIN SODIUM 5000 UNITS: 5000 INJECTION, SOLUTION INTRAVENOUS; SUBCUTANEOUS at 05:27

## 2020-01-01 RX ADMIN — FUROSEMIDE 20 MG: 10 INJECTION, SOLUTION INTRAVENOUS at 23:36

## 2020-01-01 RX ADMIN — DOPAMINE HYDROCHLORIDE IN DEXTROSE 8 MCG/KG/MIN: 1.6 INJECTION, SOLUTION INTRAVENOUS at 06:44

## 2020-01-01 RX ADMIN — ALBUTEROL SULFATE 2 PUFF: 90 AEROSOL, METERED RESPIRATORY (INHALATION) at 12:48

## 2020-01-01 RX ADMIN — ASPIRIN 81 MG CHEWABLE TABLET 81 MG: 81 TABLET CHEWABLE at 11:13

## 2020-01-01 RX ADMIN — ROPINIROLE HYDROCHLORIDE 2 MG: 1 TABLET, FILM COATED ORAL at 14:13

## 2020-01-01 RX ADMIN — SODIUM CHLORIDE, PRESERVATIVE FREE 10 ML: 5 INJECTION INTRAVENOUS at 08:34

## 2020-01-01 RX ADMIN — SODIUM CHLORIDE, PRESERVATIVE FREE 10 ML: 5 INJECTION INTRAVENOUS at 08:18

## 2020-01-01 RX ADMIN — MICONAZOLE NITRATE: 20 POWDER TOPICAL at 08:56

## 2020-01-01 RX ADMIN — GABAPENTIN 400 MG: 400 CAPSULE ORAL at 21:21

## 2020-01-01 RX ADMIN — ATROPINE SULFATE 1 MG: 0.1 INJECTION PARENTERAL at 08:28

## 2020-01-01 RX ADMIN — IPRATROPIUM BROMIDE 2 PUFF: 17 AEROSOL, METERED RESPIRATORY (INHALATION) at 15:45

## 2020-01-01 RX ADMIN — DOPAMINE HYDROCHLORIDE IN DEXTROSE 4 MCG/KG/MIN: 1.6 INJECTION, SOLUTION INTRAVENOUS at 07:27

## 2020-01-01 RX ADMIN — CEFTAZIDIME, AVIBACTAM 2.5 G: 2; .5 POWDER, FOR SOLUTION INTRAVENOUS at 20:36

## 2020-01-01 RX ADMIN — PANTOPRAZOLE SODIUM 40 MG: 40 INJECTION, POWDER, FOR SOLUTION INTRAVENOUS at 17:51

## 2020-01-01 RX ADMIN — INSULIN HUMAN 10 UNITS: 100 INJECTION, SOLUTION PARENTERAL at 10:41

## 2020-01-01 RX ADMIN — DOPAMINE HYDROCHLORIDE IN DEXTROSE 2.5 MCG/KG/MIN: 1.6 INJECTION, SOLUTION INTRAVENOUS at 20:36

## 2020-01-01 RX ADMIN — CETIRIZINE HYDROCHLORIDE 10 MG: 10 TABLET, FILM COATED ORAL at 08:18

## 2020-01-01 RX ADMIN — ALBUTEROL SULFATE 2 PUFF: 108 AEROSOL, METERED RESPIRATORY (INHALATION) at 21:41

## 2020-01-01 RX ADMIN — BUSPIRONE HYDROCHLORIDE 10 MG: 10 TABLET ORAL at 15:14

## 2020-01-01 RX ADMIN — ALBUTEROL SULFATE 2 PUFF: 108 AEROSOL, METERED RESPIRATORY (INHALATION) at 07:28

## 2020-01-01 RX ADMIN — SODIUM CHLORIDE, PRESERVATIVE FREE 10 ML: 5 INJECTION INTRAVENOUS at 22:34

## 2020-01-01 RX ADMIN — IPRATROPIUM BROMIDE 2 PUFF: 17 AEROSOL, METERED RESPIRATORY (INHALATION) at 11:41

## 2020-01-01 RX ADMIN — CHOLECALCIFEROL (VITAMIN D3) 10 MCG (400 UNIT) TABLET 400 UNITS: at 09:39

## 2020-01-01 RX ADMIN — HALOPERIDOL LACTATE 5 MG: 5 INJECTION, SOLUTION INTRAMUSCULAR at 21:22

## 2020-01-01 RX ADMIN — GABAPENTIN 800 MG: 400 CAPSULE ORAL at 14:14

## 2020-01-01 RX ADMIN — TORSEMIDE 100 MG: 20 TABLET ORAL at 10:53

## 2020-01-01 RX ADMIN — BUSPIRONE HYDROCHLORIDE 10 MG: 10 TABLET ORAL at 14:26

## 2020-01-01 RX ADMIN — ALBUTEROL SULFATE 2 PUFF: 108 AEROSOL, METERED RESPIRATORY (INHALATION) at 16:06

## 2020-01-01 RX ADMIN — IPRATROPIUM BROMIDE 2 PUFF: 17 AEROSOL, METERED RESPIRATORY (INHALATION) at 16:06

## 2020-01-01 RX ADMIN — GABAPENTIN 800 MG: 400 CAPSULE ORAL at 15:14

## 2020-01-01 RX ADMIN — Medication 400 UNITS: at 09:46

## 2020-01-01 RX ADMIN — HEPARIN SODIUM 5000 UNITS: 5000 INJECTION, SOLUTION INTRAVENOUS; SUBCUTANEOUS at 06:04

## 2020-01-01 RX ADMIN — FERROUS SULFATE TAB 325 MG (65 MG ELEMENTAL FE) 325 MG: 325 (65 FE) TAB at 14:36

## 2020-01-01 RX ADMIN — SODIUM CHLORIDE 1000 ML: 9 INJECTION, SOLUTION INTRAVENOUS at 09:53

## 2020-01-01 RX ADMIN — ALBUTEROL SULFATE 2 PUFF: 108 AEROSOL, METERED RESPIRATORY (INHALATION) at 07:41

## 2020-01-01 RX ADMIN — DOXEPIN HYDROCHLORIDE 10 MG: 10 CAPSULE ORAL at 22:34

## 2020-01-01 RX ADMIN — MICONAZOLE NITRATE: 20 CREAM TOPICAL at 23:16

## 2020-01-01 RX ADMIN — IPRATROPIUM BROMIDE 2 PUFF: 17 AEROSOL, METERED RESPIRATORY (INHALATION) at 11:26

## 2020-01-01 RX ADMIN — IPRATROPIUM BROMIDE 2 PUFF: 17 AEROSOL, METERED RESPIRATORY (INHALATION) at 15:32

## 2020-01-01 RX ADMIN — CHLORHEXIDINE GLUCONATE 0.12% ORAL RINSE 15 ML: 1.2 LIQUID ORAL at 08:36

## 2020-01-01 RX ADMIN — VANCOMYCIN HYDROCHLORIDE 1500 MG: 5 INJECTION, POWDER, LYOPHILIZED, FOR SOLUTION INTRAVENOUS at 10:08

## 2020-01-01 RX ADMIN — POTASSIUM CHLORIDE 40 MEQ: 7.46 INJECTION, SOLUTION INTRAVENOUS at 14:51

## 2020-01-01 RX ADMIN — PRAMIPEXOLE DIHYDROCHLORIDE 1 MG: 0.25 TABLET ORAL at 10:53

## 2020-01-01 RX ADMIN — SODIUM BICARBONATE: 84 INJECTION, SOLUTION INTRAVENOUS at 16:30

## 2020-01-01 RX ADMIN — AMILORIDE HYDROCLORIDE 5 MG: 5 TABLET ORAL at 14:35

## 2020-01-01 RX ADMIN — LORAZEPAM 0.5 MG: 2 INJECTION INTRAMUSCULAR; INTRAVENOUS at 08:17

## 2020-01-01 RX ADMIN — HEPARIN SODIUM 5000 UNITS: 5000 INJECTION, SOLUTION INTRAVENOUS; SUBCUTANEOUS at 14:35

## 2020-01-01 RX ADMIN — BUSPIRONE HYDROCHLORIDE 10 MG: 10 TABLET ORAL at 14:47

## 2020-01-01 RX ADMIN — ALBUTEROL SULFATE 2 PUFF: 108 AEROSOL, METERED RESPIRATORY (INHALATION) at 08:08

## 2020-01-01 RX ADMIN — ATROPINE SULFATE 1 MG: 0.1 INJECTION PARENTERAL at 08:33

## 2020-01-01 RX ADMIN — MICONAZOLE NITRATE: 20 POWDER TOPICAL at 09:44

## 2020-01-01 RX ADMIN — HEPARIN SODIUM 5000 UNITS: 5000 INJECTION, SOLUTION INTRAVENOUS; SUBCUTANEOUS at 05:34

## 2020-01-01 RX ADMIN — CEFTAZIDIME, AVIBACTAM 1.25 G: 2; .5 POWDER, FOR SOLUTION INTRAVENOUS at 20:37

## 2020-01-01 RX ADMIN — PRAMIPEXOLE DIHYDROCHLORIDE 1 MG: 0.25 TABLET ORAL at 10:06

## 2020-01-01 RX ADMIN — IPRATROPIUM BROMIDE 2 PUFF: 17 AEROSOL, METERED RESPIRATORY (INHALATION) at 08:21

## 2020-01-01 RX ADMIN — SODIUM CHLORIDE, PRESERVATIVE FREE 10 ML: 5 INJECTION INTRAVENOUS at 11:29

## 2020-01-01 RX ADMIN — SODIUM CHLORIDE, PRESERVATIVE FREE 10 ML: 5 INJECTION INTRAVENOUS at 20:57

## 2020-01-01 RX ADMIN — CLOPIDOGREL BISULFATE 75 MG: 75 TABLET ORAL at 09:05

## 2020-01-01 RX ADMIN — CETIRIZINE HYDROCHLORIDE 10 MG: 10 TABLET, FILM COATED ORAL at 11:13

## 2020-01-01 RX ADMIN — POLYETHYLENE GLYCOL (3350) 17 G: 17 POWDER, FOR SOLUTION ORAL at 11:27

## 2020-01-01 RX ADMIN — PRAMIPEXOLE DIHYDROCHLORIDE 1 MG: 0.25 TABLET ORAL at 23:14

## 2020-01-01 RX ADMIN — ALBUTEROL SULFATE 2 PUFF: 108 AEROSOL, METERED RESPIRATORY (INHALATION) at 12:29

## 2020-01-01 RX ADMIN — CEFTAZIDIME, AVIBACTAM 2.5 G: 2; .5 POWDER, FOR SOLUTION INTRAVENOUS at 12:31

## 2020-01-01 RX ADMIN — SODIUM CHLORIDE, PRESERVATIVE FREE 10 ML: 5 INJECTION INTRAVENOUS at 09:58

## 2020-01-01 RX ADMIN — FUROSEMIDE 20 MG: 10 INJECTION, SOLUTION INTRAVENOUS at 10:07

## 2020-01-01 RX ADMIN — BISACODYL 10 MG: 10 SUPPOSITORY RECTAL at 11:28

## 2020-01-01 RX ADMIN — DOXEPIN HYDROCHLORIDE 10 MG: 10 CAPSULE ORAL at 05:47

## 2020-01-01 RX ADMIN — INSULIN LISPRO 1 UNITS: 100 INJECTION, SOLUTION INTRAVENOUS; SUBCUTANEOUS at 12:03

## 2020-01-01 RX ADMIN — AMIODARONE HYDROCHLORIDE 200 MG: 200 TABLET ORAL at 08:16

## 2020-01-01 RX ADMIN — Medication 500 MG: at 09:39

## 2020-01-01 RX ADMIN — MICONAZOLE NITRATE: 20 POWDER TOPICAL at 08:34

## 2020-01-01 RX ADMIN — PRAMIPEXOLE DIHYDROCHLORIDE 1 MG: 0.25 TABLET ORAL at 13:59

## 2020-01-01 RX ADMIN — IPRATROPIUM BROMIDE 2 PUFF: 17 AEROSOL, METERED RESPIRATORY (INHALATION) at 07:53

## 2020-01-01 RX ADMIN — ASPIRIN 81 MG CHEWABLE TABLET 81 MG: 81 TABLET CHEWABLE at 11:00

## 2020-01-01 RX ADMIN — IPRATROPIUM BROMIDE 2 PUFF: 17 AEROSOL, METERED RESPIRATORY (INHALATION) at 20:18

## 2020-01-01 RX ADMIN — DOXEPIN HYDROCHLORIDE 10 MG: 10 CAPSULE ORAL at 21:30

## 2020-01-01 RX ADMIN — ROPINIROLE HYDROCHLORIDE 2 MG: 1 TABLET, FILM COATED ORAL at 15:00

## 2020-01-01 RX ADMIN — GABAPENTIN 800 MG: 400 CAPSULE ORAL at 08:36

## 2020-01-01 RX ADMIN — DOPAMINE HYDROCHLORIDE IN DEXTROSE 15 MCG/KG/MIN: 1.6 INJECTION, SOLUTION INTRAVENOUS at 08:00

## 2020-01-01 RX ADMIN — CLOPIDOGREL BISULFATE 75 MG: 75 TABLET ORAL at 09:58

## 2020-01-01 RX ADMIN — ROPINIROLE HYDROCHLORIDE 2 MG: 1 TABLET, FILM COATED ORAL at 08:31

## 2020-01-01 RX ADMIN — MICONAZOLE NITRATE: 20 POWDER TOPICAL at 08:32

## 2020-01-01 RX ADMIN — FONDAPARINUX SODIUM 2.5 MG: 2.5 INJECTION, SOLUTION SUBCUTANEOUS at 08:33

## 2020-01-01 RX ADMIN — CLOPIDOGREL BISULFATE 75 MG: 75 TABLET ORAL at 08:18

## 2020-01-01 RX ADMIN — CEFTAZIDIME, AVIBACTAM 1.25 G: 2; .5 POWDER, FOR SOLUTION INTRAVENOUS at 04:27

## 2020-01-01 RX ADMIN — Medication 400 UNITS: at 11:53

## 2020-01-01 RX ADMIN — PANTOPRAZOLE SODIUM 40 MG: 40 TABLET, DELAYED RELEASE ORAL at 10:56

## 2020-01-01 RX ADMIN — ATORVASTATIN CALCIUM 80 MG: 40 TABLET, FILM COATED ORAL at 21:30

## 2020-01-01 RX ADMIN — ROPINIROLE HYDROCHLORIDE 2 MG: 1 TABLET, FILM COATED ORAL at 11:27

## 2020-01-01 RX ADMIN — ATORVASTATIN CALCIUM 40 MG: 40 TABLET, FILM COATED ORAL at 21:40

## 2020-01-01 RX ADMIN — CHLORHEXIDINE GLUCONATE 0.12% ORAL RINSE 15 ML: 1.2 LIQUID ORAL at 23:37

## 2020-01-01 RX ADMIN — CLOPIDOGREL BISULFATE 75 MG: 75 TABLET ORAL at 11:54

## 2020-01-01 RX ADMIN — FUROSEMIDE 40 MG: 10 INJECTION, SOLUTION INTRAMUSCULAR; INTRAVENOUS at 09:38

## 2020-01-01 RX ADMIN — ALBUTEROL SULFATE 2 PUFF: 108 AEROSOL, METERED RESPIRATORY (INHALATION) at 11:31

## 2020-01-01 RX ADMIN — PROPOFOL 10 MCG/KG/MIN: 10 INJECTION, EMULSION INTRAVENOUS at 05:36

## 2020-01-01 RX ADMIN — IPRATROPIUM BROMIDE 2 PUFF: 17 AEROSOL, METERED RESPIRATORY (INHALATION) at 11:00

## 2020-01-01 RX ADMIN — CLOPIDOGREL BISULFATE 75 MG: 75 TABLET ORAL at 09:38

## 2020-01-01 RX ADMIN — PRAMIPEXOLE DIHYDROCHLORIDE 1 MG: 0.25 TABLET ORAL at 20:56

## 2020-01-01 RX ADMIN — CEFTAZIDIME, AVIBACTAM 2.5 G: 2; .5 POWDER, FOR SOLUTION INTRAVENOUS at 05:46

## 2020-01-01 RX ADMIN — CLOPIDOGREL BISULFATE 75 MG: 75 TABLET ORAL at 08:31

## 2020-01-01 RX ADMIN — ALBUTEROL SULFATE 2 PUFF: 108 AEROSOL, METERED RESPIRATORY (INHALATION) at 12:00

## 2020-01-01 RX ADMIN — IPRATROPIUM BROMIDE 2 PUFF: 17 AEROSOL, METERED RESPIRATORY (INHALATION) at 08:10

## 2020-01-01 RX ADMIN — DOPAMINE HYDROCHLORIDE 2.5 MCG/KG/MIN: 160 INJECTION, SOLUTION INTRAVENOUS at 13:50

## 2020-01-01 RX ADMIN — PRAMIPEXOLE DIHYDROCHLORIDE 1 MG: 0.25 TABLET ORAL at 09:46

## 2020-01-01 RX ADMIN — CEFTAZIDIME, AVIBACTAM 1.25 G: 2; .5 POWDER, FOR SOLUTION INTRAVENOUS at 05:02

## 2020-01-01 RX ADMIN — ALBUTEROL SULFATE 2 PUFF: 108 AEROSOL, METERED RESPIRATORY (INHALATION) at 12:02

## 2020-01-01 RX ADMIN — DOPAMINE HYDROCHLORIDE IN DEXTROSE 7 MCG/KG/MIN: 1.6 INJECTION, SOLUTION INTRAVENOUS at 16:49

## 2020-01-01 RX ADMIN — AMIODARONE HYDROCHLORIDE 200 MG: 200 TABLET ORAL at 10:56

## 2020-01-01 RX ADMIN — ALBUTEROL SULFATE 2 PUFF: 108 AEROSOL, METERED RESPIRATORY (INHALATION) at 07:45

## 2020-01-01 RX ADMIN — ALBUTEROL SULFATE 2 PUFF: 108 AEROSOL, METERED RESPIRATORY (INHALATION) at 11:57

## 2020-01-01 RX ADMIN — EPOETIN ALFA-EPBX 10000 UNITS: 10000 INJECTION, SOLUTION INTRAVENOUS; SUBCUTANEOUS at 10:06

## 2020-01-01 RX ADMIN — HEPARIN SODIUM 5000 UNITS: 5000 INJECTION, SOLUTION INTRAVENOUS; SUBCUTANEOUS at 17:51

## 2020-01-01 RX ADMIN — CEFTRIAXONE SODIUM 1 G: 1 INJECTION, POWDER, FOR SOLUTION INTRAMUSCULAR; INTRAVENOUS at 17:41

## 2020-01-01 RX ADMIN — CALCIUM GLUCONATE 1 G: 98 INJECTION, SOLUTION INTRAVENOUS at 08:00

## 2020-01-01 RX ADMIN — PANTOPRAZOLE SODIUM 40 MG: 40 INJECTION, POWDER, FOR SOLUTION INTRAVENOUS at 09:57

## 2020-01-01 RX ADMIN — IPRATROPIUM BROMIDE 2 PUFF: 17 AEROSOL, METERED RESPIRATORY (INHALATION) at 20:17

## 2020-01-01 RX ADMIN — BUSPIRONE HYDROCHLORIDE 10 MG: 10 TABLET ORAL at 20:47

## 2020-01-01 RX ADMIN — PROPOFOL 10 MCG/KG/MIN: 10 INJECTION, EMULSION INTRAVENOUS at 21:43

## 2020-01-01 RX ADMIN — ACETAMINOPHEN 650 MG: 325 TABLET ORAL at 08:48

## 2020-01-01 RX ADMIN — ATORVASTATIN CALCIUM 80 MG: 40 TABLET, FILM COATED ORAL at 21:31

## 2020-01-01 RX ADMIN — SODIUM CHLORIDE, PRESERVATIVE FREE 10 ML: 5 INJECTION INTRAVENOUS at 21:23

## 2020-01-01 RX ADMIN — CEFEPIME HYDROCHLORIDE 2 G: 2 INJECTION, POWDER, FOR SOLUTION INTRAVENOUS at 10:19

## 2020-01-01 RX ADMIN — SPIRONOLACTONE 25 MG: 25 TABLET ORAL at 09:58

## 2020-01-01 RX ADMIN — SODIUM CHLORIDE, PRESERVATIVE FREE 10 ML: 5 INJECTION INTRAVENOUS at 10:56

## 2020-01-01 RX ADMIN — SODIUM CHLORIDE, PRESERVATIVE FREE 10 ML: 5 INJECTION INTRAVENOUS at 08:17

## 2020-01-01 RX ADMIN — MICONAZOLE NITRATE: 20 POWDER TOPICAL at 20:58

## 2020-01-01 RX ADMIN — MEROPENEM 500 MG: 500 INJECTION, POWDER, FOR SOLUTION INTRAVENOUS at 11:53

## 2020-01-01 RX ADMIN — CHOLECALCIFEROL (VITAMIN D3) 10 MCG (400 UNIT) TABLET 400 UNITS: at 08:17

## 2020-01-01 RX ADMIN — SODIUM CHLORIDE, PRESERVATIVE FREE 10 ML: 5 INJECTION INTRAVENOUS at 23:16

## 2020-01-01 RX ADMIN — TORSEMIDE 50 MG: 20 TABLET ORAL at 21:33

## 2020-01-01 RX ADMIN — ATORVASTATIN CALCIUM 80 MG: 40 TABLET, FILM COATED ORAL at 23:14

## 2020-01-01 RX ADMIN — FUROSEMIDE 60 MG: 10 INJECTION, SOLUTION INTRAMUSCULAR; INTRAVENOUS at 21:23

## 2020-01-01 RX ADMIN — GABAPENTIN 800 MG: 400 CAPSULE ORAL at 20:36

## 2020-01-01 RX ADMIN — PRAMIPEXOLE DIHYDROCHLORIDE 1 MG: 0.25 TABLET ORAL at 08:36

## 2020-01-01 RX ADMIN — SERTRALINE HYDROCHLORIDE 25 MG: 50 TABLET ORAL at 20:47

## 2020-01-01 RX ADMIN — ROPINIROLE HYDROCHLORIDE 2 MG: 1 TABLET, FILM COATED ORAL at 13:54

## 2020-01-01 RX ADMIN — DOXEPIN HYDROCHLORIDE 10 MG: 10 CAPSULE ORAL at 22:09

## 2020-01-01 RX ADMIN — FONDAPARINUX SODIUM 2.5 MG: 2.5 INJECTION, SOLUTION SUBCUTANEOUS at 08:19

## 2020-01-01 RX ADMIN — DOPAMINE HYDROCHLORIDE IN DEXTROSE 15 MCG/KG/MIN: 1.6 INJECTION, SOLUTION INTRAVENOUS at 13:18

## 2020-01-01 RX ADMIN — IPRATROPIUM BROMIDE 2 PUFF: 17 AEROSOL, METERED RESPIRATORY (INHALATION) at 08:08

## 2020-01-01 RX ADMIN — IPRATROPIUM BROMIDE 2 PUFF: 17 AEROSOL, METERED RESPIRATORY (INHALATION) at 15:41

## 2020-01-01 RX ADMIN — MAGNESIUM SULFATE HEPTAHYDRATE 1 G: 1 INJECTION, SOLUTION INTRAVENOUS at 12:13

## 2020-01-01 RX ADMIN — IPRATROPIUM BROMIDE 2 PUFF: 17 AEROSOL, METERED RESPIRATORY (INHALATION) at 21:45

## 2020-01-01 RX ADMIN — IPRATROPIUM BROMIDE 2 PUFF: 17 AEROSOL, METERED RESPIRATORY (INHALATION) at 21:18

## 2020-01-01 RX ADMIN — ATROPINE SULFATE 0.4 MG: 0.4 INJECTION, SOLUTION INTRAMUSCULAR; INTRAVENOUS; SUBCUTANEOUS at 09:52

## 2020-01-01 RX ADMIN — MICONAZOLE NITRATE: 20 POWDER TOPICAL at 23:45

## 2020-01-01 RX ADMIN — IPRATROPIUM BROMIDE 2 PUFF: 17 AEROSOL, METERED RESPIRATORY (INHALATION) at 08:27

## 2020-01-01 RX ADMIN — METHYLPREDNISOLONE SODIUM SUCCINATE 60 MG: 125 INJECTION, POWDER, FOR SOLUTION INTRAMUSCULAR; INTRAVENOUS at 10:27

## 2020-01-01 RX ADMIN — PRAMIPEXOLE DIHYDROCHLORIDE 1 MG: 0.25 TABLET ORAL at 08:18

## 2020-01-01 RX ADMIN — AMIODARONE HYDROCHLORIDE 200 MG: 200 TABLET ORAL at 10:55

## 2020-01-01 RX ADMIN — IPRATROPIUM BROMIDE 2 PUFF: 17 AEROSOL, METERED RESPIRATORY (INHALATION) at 20:38

## 2020-01-01 RX ADMIN — CALCIUM GLUCONATE 1 G: 94 INJECTION, SOLUTION INTRAVENOUS at 00:30

## 2020-01-01 RX ADMIN — BUSPIRONE HYDROCHLORIDE 10 MG: 10 TABLET ORAL at 14:44

## 2020-01-01 RX ADMIN — VANCOMYCIN HYDROCHLORIDE 2000 MG: 1 INJECTION, POWDER, LYOPHILIZED, FOR SOLUTION INTRAVENOUS at 13:32

## 2020-01-01 RX ADMIN — FUROSEMIDE 40 MG: 10 INJECTION, SOLUTION INTRAMUSCULAR; INTRAVENOUS at 08:32

## 2020-01-01 RX ADMIN — GABAPENTIN 800 MG: 400 CAPSULE ORAL at 20:56

## 2020-01-01 RX ADMIN — HEPARIN SODIUM 2600 UNITS: 1000 INJECTION, SOLUTION INTRAVENOUS; SUBCUTANEOUS at 10:55

## 2020-01-01 RX ADMIN — FUROSEMIDE 20 MG: 10 INJECTION, SOLUTION INTRAVENOUS at 20:13

## 2020-01-01 RX ADMIN — PRAMIPEXOLE DIHYDROCHLORIDE 1 MG: 0.25 TABLET ORAL at 20:58

## 2020-01-01 RX ADMIN — ROPINIROLE HYDROCHLORIDE 2 MG: 1 TABLET, FILM COATED ORAL at 11:53

## 2020-01-01 RX ADMIN — PROPOFOL 10 MCG/KG/MIN: 10 INJECTION, EMULSION INTRAVENOUS at 04:36

## 2020-01-01 RX ADMIN — Medication 500 MG: at 09:46

## 2020-01-01 RX ADMIN — FERROUS SULFATE TAB 325 MG (65 MG ELEMENTAL FE) 325 MG: 325 (65 FE) TAB at 10:56

## 2020-01-01 RX ADMIN — MAGNESIUM SULFATE HEPTAHYDRATE 1 G: 1 INJECTION, SOLUTION INTRAVENOUS at 02:17

## 2020-01-01 RX ADMIN — OXYBUTYNIN CHLORIDE 10 MG: 10 TABLET, EXTENDED RELEASE ORAL at 09:39

## 2020-01-01 RX ADMIN — IPRATROPIUM BROMIDE 2 PUFF: 17 AEROSOL, METERED RESPIRATORY (INHALATION) at 23:34

## 2020-01-01 RX ADMIN — GABAPENTIN 400 MG: 400 CAPSULE ORAL at 09:38

## 2020-01-01 RX ADMIN — DOPAMINE HYDROCHLORIDE IN DEXTROSE 9 MCG/KG/MIN: 1.6 INJECTION, SOLUTION INTRAVENOUS at 17:25

## 2020-01-01 RX ADMIN — CEFTAZIDIME, AVIBACTAM 1.25 G: 2; .5 POWDER, FOR SOLUTION INTRAVENOUS at 12:07

## 2020-01-01 RX ADMIN — MICONAZOLE NITRATE: 20 POWDER TOPICAL at 21:11

## 2020-01-01 RX ADMIN — AMIODARONE HYDROCHLORIDE 200 MG: 200 TABLET ORAL at 09:46

## 2020-01-01 RX ADMIN — PRAMIPEXOLE DIHYDROCHLORIDE 1 MG: 0.25 TABLET ORAL at 08:33

## 2020-01-01 RX ADMIN — DOPAMINE HYDROCHLORIDE IN DEXTROSE 2.5 MCG/KG/MIN: 1.6 INJECTION, SOLUTION INTRAVENOUS at 12:12

## 2020-01-01 RX ADMIN — BUSPIRONE HYDROCHLORIDE 10 MG: 10 TABLET ORAL at 08:33

## 2020-01-01 RX ADMIN — DOPAMINE HYDROCHLORIDE IN DEXTROSE 15 MCG/KG/MIN: 1.6 INJECTION, SOLUTION INTRAVENOUS at 05:15

## 2020-01-01 RX ADMIN — SODIUM CHLORIDE: 9 INJECTION, SOLUTION INTRAVENOUS at 06:00

## 2020-01-01 RX ADMIN — ROPINIROLE HYDROCHLORIDE 2 MG: 1 TABLET, FILM COATED ORAL at 14:35

## 2020-01-01 RX ADMIN — SODIUM CHLORIDE, PRESERVATIVE FREE 10 ML: 5 INJECTION INTRAVENOUS at 21:38

## 2020-01-01 RX ADMIN — ROPINIROLE HYDROCHLORIDE 2 MG: 1 TABLET, FILM COATED ORAL at 14:42

## 2020-01-01 RX ADMIN — INSULIN LISPRO 1 UNITS: 100 INJECTION, SOLUTION INTRAVENOUS; SUBCUTANEOUS at 12:07

## 2020-01-01 RX ADMIN — POTASSIUM CHLORIDE 40 MEQ: 1500 TABLET, EXTENDED RELEASE ORAL at 10:54

## 2020-01-01 RX ADMIN — ALTEPLASE 2 MG: 2.2 INJECTION, POWDER, LYOPHILIZED, FOR SOLUTION INTRAVENOUS at 15:07

## 2020-01-01 RX ADMIN — CEFEPIME HYDROCHLORIDE 2 G: 2 INJECTION, POWDER, FOR SOLUTION INTRAVENOUS at 10:18

## 2020-01-01 RX ADMIN — FUROSEMIDE 40 MG: 10 INJECTION, SOLUTION INTRAMUSCULAR; INTRAVENOUS at 14:21

## 2020-01-01 RX ADMIN — DOPAMINE HYDROCHLORIDE IN DEXTROSE 2.5 MCG/KG/MIN: 1.6 INJECTION, SOLUTION INTRAVENOUS at 10:20

## 2020-01-01 RX ADMIN — MINOCYCLINE HYDROCHLORIDE 100 MG: 100 CAPSULE ORAL at 15:39

## 2020-01-01 RX ADMIN — DOPAMINE HYDROCHLORIDE IN DEXTROSE 7 MCG/KG/MIN: 1.6 INJECTION, SOLUTION INTRAVENOUS at 21:43

## 2020-01-01 RX ADMIN — DOXEPIN HYDROCHLORIDE 10 MG: 10 CAPSULE ORAL at 23:14

## 2020-01-01 RX ADMIN — POTASSIUM CHLORIDE 40 MEQ: 1500 TABLET, EXTENDED RELEASE ORAL at 16:37

## 2020-01-01 RX ADMIN — MICONAZOLE NITRATE: 20 POWDER TOPICAL at 20:38

## 2020-01-01 RX ADMIN — CLOPIDOGREL BISULFATE 75 MG: 75 TABLET ORAL at 10:57

## 2020-01-01 RX ADMIN — PRAMIPEXOLE DIHYDROCHLORIDE 1 MG: 0.25 TABLET ORAL at 11:26

## 2020-01-01 RX ADMIN — ROPINIROLE HYDROCHLORIDE 2 MG: 1 TABLET, FILM COATED ORAL at 23:14

## 2020-01-01 RX ADMIN — ROPINIROLE HYDROCHLORIDE 2 MG: 1 TABLET, FILM COATED ORAL at 21:33

## 2020-01-01 RX ADMIN — SODIUM CHLORIDE, PRESERVATIVE FREE 10 ML: 5 INJECTION INTRAVENOUS at 21:42

## 2020-01-01 RX ADMIN — CETIRIZINE HYDROCHLORIDE 10 MG: 10 TABLET, FILM COATED ORAL at 08:42

## 2020-01-01 RX ADMIN — ALBUTEROL SULFATE 2 PUFF: 108 AEROSOL, METERED RESPIRATORY (INHALATION) at 21:17

## 2020-01-01 RX ADMIN — FUROSEMIDE 20 MG: 10 INJECTION, SOLUTION INTRAVENOUS at 09:57

## 2020-01-01 RX ADMIN — HEPARIN SODIUM 5000 UNITS: 5000 INJECTION, SOLUTION INTRAVENOUS; SUBCUTANEOUS at 01:23

## 2020-01-01 RX ADMIN — FAMOTIDINE 20 MG: 10 INJECTION, SOLUTION INTRAVENOUS at 09:47

## 2020-01-01 RX ADMIN — ROPINIROLE HYDROCHLORIDE 2 MG: 1 TABLET, FILM COATED ORAL at 21:21

## 2020-01-01 RX ADMIN — BUSPIRONE HYDROCHLORIDE 10 MG: 10 TABLET ORAL at 14:14

## 2020-01-01 RX ADMIN — PROPOFOL 10 MCG/KG/MIN: 10 INJECTION, EMULSION INTRAVENOUS at 00:25

## 2020-01-01 RX ADMIN — ALBUTEROL SULFATE 2 PUFF: 108 AEROSOL, METERED RESPIRATORY (INHALATION) at 15:22

## 2020-01-01 RX ADMIN — MICONAZOLE NITRATE: 20 POWDER TOPICAL at 20:34

## 2020-01-01 RX ADMIN — SODIUM CHLORIDE, PRESERVATIVE FREE 10 ML: 5 INJECTION INTRAVENOUS at 08:33

## 2020-01-01 RX ADMIN — IPRATROPIUM BROMIDE 2 PUFF: 17 AEROSOL, METERED RESPIRATORY (INHALATION) at 21:34

## 2020-01-01 RX ADMIN — PANTOPRAZOLE SODIUM 40 MG: 40 INJECTION, POWDER, FOR SOLUTION INTRAVENOUS at 09:43

## 2020-01-01 RX ADMIN — CLOPIDOGREL BISULFATE 75 MG: 75 TABLET ORAL at 08:36

## 2020-01-01 RX ADMIN — SODIUM CHLORIDE, PRESERVATIVE FREE 10 ML: 5 INJECTION INTRAVENOUS at 09:00

## 2020-01-01 RX ADMIN — SODIUM CHLORIDE, PRESERVATIVE FREE 10 ML: 5 INJECTION INTRAVENOUS at 10:07

## 2020-01-01 RX ADMIN — IPRATROPIUM BROMIDE 2 PUFF: 17 AEROSOL, METERED RESPIRATORY (INHALATION) at 15:35

## 2020-01-01 RX ADMIN — MICONAZOLE NITRATE: 20 POWDER TOPICAL at 09:47

## 2020-01-01 RX ADMIN — SODIUM BICARBONATE: 84 INJECTION, SOLUTION INTRAVENOUS at 17:00

## 2020-01-01 RX ADMIN — SODIUM CHLORIDE, PRESERVATIVE FREE 10 ML: 5 INJECTION INTRAVENOUS at 20:42

## 2020-01-01 RX ADMIN — HEPARIN SODIUM 5000 UNITS: 5000 INJECTION, SOLUTION INTRAVENOUS; SUBCUTANEOUS at 05:51

## 2020-01-01 RX ADMIN — SODIUM CHLORIDE, PRESERVATIVE FREE 10 ML: 5 INJECTION INTRAVENOUS at 09:39

## 2020-01-01 RX ADMIN — SODIUM CHLORIDE, PRESERVATIVE FREE 10 ML: 5 INJECTION INTRAVENOUS at 09:36

## 2020-01-01 RX ADMIN — FUROSEMIDE 20 MG: 10 INJECTION, SOLUTION INTRAVENOUS at 14:24

## 2020-01-01 RX ADMIN — TORSEMIDE 100 MG: 20 TABLET ORAL at 18:06

## 2020-01-01 RX ADMIN — SODIUM CHLORIDE, PRESERVATIVE FREE 10 ML: 5 INJECTION INTRAVENOUS at 09:06

## 2020-01-01 RX ADMIN — IPRATROPIUM BROMIDE 2 PUFF: 17 AEROSOL, METERED RESPIRATORY (INHALATION) at 19:55

## 2020-01-01 RX ADMIN — ATORVASTATIN CALCIUM 40 MG: 40 TABLET, FILM COATED ORAL at 20:23

## 2020-01-01 RX ADMIN — PROMETHAZINE HYDROCHLORIDE 12.5 MG: 25 TABLET ORAL at 20:58

## 2020-01-01 RX ADMIN — GABAPENTIN 800 MG: 400 CAPSULE ORAL at 09:05

## 2020-01-01 RX ADMIN — OXYBUTYNIN CHLORIDE 10 MG: 10 TABLET, EXTENDED RELEASE ORAL at 08:31

## 2020-01-01 RX ADMIN — CETIRIZINE HYDROCHLORIDE 10 MG: 10 TABLET, FILM COATED ORAL at 10:57

## 2020-01-01 RX ADMIN — LIDOCAINE HYDROCHLORIDE ANHYDROUS 5 ML: 10 INJECTION, SOLUTION INFILTRATION at 09:09

## 2020-01-01 RX ADMIN — PRAMIPEXOLE DIHYDROCHLORIDE 1 MG: 0.25 TABLET ORAL at 20:40

## 2020-01-01 RX ADMIN — ATROPINE SULFATE 0.4 MG: 0.4 INJECTION, SOLUTION INTRAMUSCULAR; INTRAVENOUS; SUBCUTANEOUS at 10:01

## 2020-01-01 RX ADMIN — PROPOFOL 10 MCG/KG/MIN: 10 INJECTION, EMULSION INTRAVENOUS at 13:55

## 2020-01-01 RX ADMIN — PRAMIPEXOLE DIHYDROCHLORIDE 1 MG: 0.25 TABLET ORAL at 21:30

## 2020-01-01 RX ADMIN — DOXEPIN HYDROCHLORIDE 10 MG: 10 CAPSULE ORAL at 21:21

## 2020-01-01 RX ADMIN — ALBUTEROL SULFATE 2 PUFF: 108 AEROSOL, METERED RESPIRATORY (INHALATION) at 11:26

## 2020-01-01 RX ADMIN — DOXEPIN HYDROCHLORIDE 10 MG: 10 CAPSULE ORAL at 21:43

## 2020-01-01 RX ADMIN — FUROSEMIDE 40 MG: 10 INJECTION, SOLUTION INTRAMUSCULAR; INTRAVENOUS at 20:43

## 2020-01-01 RX ADMIN — PROPOFOL 10 MCG/KG/MIN: 10 INJECTION, EMULSION INTRAVENOUS at 23:40

## 2020-01-01 RX ADMIN — ALBUTEROL SULFATE 2 PUFF: 108 AEROSOL, METERED RESPIRATORY (INHALATION) at 16:11

## 2020-01-01 RX ADMIN — OXYBUTYNIN CHLORIDE 10 MG: 10 TABLET, EXTENDED RELEASE ORAL at 11:00

## 2020-01-01 RX ADMIN — IPRATROPIUM BROMIDE 2 PUFF: 17 AEROSOL, METERED RESPIRATORY (INHALATION) at 12:30

## 2020-01-01 RX ADMIN — AMILORIDE HYDROCLORIDE 5 MG: 5 TABLET ORAL at 10:54

## 2020-01-01 RX ADMIN — DOXEPIN HYDROCHLORIDE 10 MG: 10 CAPSULE ORAL at 22:06

## 2020-01-01 RX ADMIN — CHOLECALCIFEROL (VITAMIN D3) 10 MCG (400 UNIT) TABLET 400 UNITS: at 09:38

## 2020-01-01 RX ADMIN — FERROUS SULFATE TAB 325 MG (65 MG ELEMENTAL FE) 325 MG: 325 (65 FE) TAB at 11:53

## 2020-01-01 RX ADMIN — BUSPIRONE HYDROCHLORIDE 10 MG: 10 TABLET ORAL at 20:36

## 2020-01-01 RX ADMIN — Medication 500 MG: at 11:27

## 2020-01-01 RX ADMIN — GABAPENTIN 800 MG: 400 CAPSULE ORAL at 23:35

## 2020-01-01 RX ADMIN — ROPINIROLE HYDROCHLORIDE 2 MG: 1 TABLET, FILM COATED ORAL at 20:58

## 2020-01-01 RX ADMIN — OXYBUTYNIN CHLORIDE 10 MG: 10 TABLET, EXTENDED RELEASE ORAL at 11:28

## 2020-01-01 RX ADMIN — IPRATROPIUM BROMIDE 2 PUFF: 17 AEROSOL, METERED RESPIRATORY (INHALATION) at 15:38

## 2020-01-01 RX ADMIN — ROPINIROLE HYDROCHLORIDE 2 MG: 1 TABLET, FILM COATED ORAL at 08:43

## 2020-01-01 RX ADMIN — SODIUM CHLORIDE 25 MG: 9 INJECTION, SOLUTION INTRAVENOUS at 18:17

## 2020-01-01 RX ADMIN — Medication 500 MG: at 08:16

## 2020-01-01 RX ADMIN — FUROSEMIDE 20 MG: 10 INJECTION, SOLUTION INTRAVENOUS at 14:51

## 2020-01-01 RX ADMIN — IPRATROPIUM BROMIDE 2 PUFF: 17 AEROSOL, METERED RESPIRATORY (INHALATION) at 08:00

## 2020-01-01 RX ADMIN — SPIRONOLACTONE 12.5 MG: 25 TABLET ORAL at 10:06

## 2020-01-01 RX ADMIN — PRAMIPEXOLE DIHYDROCHLORIDE 1 MG: 0.25 TABLET ORAL at 10:55

## 2020-01-01 RX ADMIN — IPRATROPIUM BROMIDE 2 PUFF: 17 AEROSOL, METERED RESPIRATORY (INHALATION) at 11:19

## 2020-01-01 RX ADMIN — SODIUM CHLORIDE, PRESERVATIVE FREE 10 ML: 5 INJECTION INTRAVENOUS at 09:48

## 2020-01-01 RX ADMIN — HEPARIN SODIUM 5000 UNITS: 5000 INJECTION, SOLUTION INTRAVENOUS; SUBCUTANEOUS at 21:43

## 2020-01-01 RX ADMIN — HEPARIN SODIUM 5000 UNITS: 5000 INJECTION, SOLUTION INTRAVENOUS; SUBCUTANEOUS at 15:46

## 2020-01-01 RX ADMIN — PRAMIPEXOLE DIHYDROCHLORIDE 1 MG: 0.25 TABLET ORAL at 20:23

## 2020-01-01 RX ADMIN — BUSPIRONE HYDROCHLORIDE 10 MG: 10 TABLET ORAL at 20:56

## 2020-01-01 RX ADMIN — PRAMIPEXOLE DIHYDROCHLORIDE 1 MG: 0.25 TABLET ORAL at 23:35

## 2020-01-01 RX ADMIN — Medication 2 MCG/MIN: at 08:44

## 2020-01-01 RX ADMIN — CEFTRIAXONE SODIUM 1 G: 1 INJECTION, POWDER, FOR SOLUTION INTRAMUSCULAR; INTRAVENOUS at 15:58

## 2020-01-01 RX ADMIN — AMIODARONE HYDROCHLORIDE 200 MG: 200 TABLET ORAL at 08:40

## 2020-01-01 RX ADMIN — SODIUM CHLORIDE, PRESERVATIVE FREE 10 ML: 5 INJECTION INTRAVENOUS at 20:14

## 2020-01-01 RX ADMIN — INSULIN HUMAN 10 UNITS: 100 INJECTION, SOLUTION PARENTERAL at 00:31

## 2020-01-01 RX ADMIN — POTASSIUM CHLORIDE 40 MEQ: 1500 TABLET, EXTENDED RELEASE ORAL at 11:13

## 2020-01-01 RX ADMIN — AMIODARONE HYDROCHLORIDE 200 MG: 200 TABLET ORAL at 11:25

## 2020-01-01 RX ADMIN — ALBUTEROL SULFATE 2 PUFF: 108 AEROSOL, METERED RESPIRATORY (INHALATION) at 08:47

## 2020-01-01 RX ADMIN — HEPARIN SODIUM 5000 UNITS: 5000 INJECTION, SOLUTION INTRAVENOUS; SUBCUTANEOUS at 09:28

## 2020-01-01 RX ADMIN — HEPARIN SODIUM 5000 UNITS: 5000 INJECTION, SOLUTION INTRAVENOUS; SUBCUTANEOUS at 05:18

## 2020-01-01 RX ADMIN — MICONAZOLE NITRATE: 20 POWDER TOPICAL at 08:27

## 2020-01-01 RX ADMIN — CHOLECALCIFEROL (VITAMIN D3) 10 MCG (400 UNIT) TABLET 400 UNITS: at 09:45

## 2020-01-01 RX ADMIN — ROPINIROLE HYDROCHLORIDE 2 MG: 1 TABLET, FILM COATED ORAL at 10:28

## 2020-01-01 RX ADMIN — MAGNESIUM SULFATE IN WATER 2 G: 40 INJECTION, SOLUTION INTRAVENOUS at 16:04

## 2020-01-01 RX ADMIN — DOPAMINE HYDROCHLORIDE IN DEXTROSE 7 MCG/KG/MIN: 1.6 INJECTION, SOLUTION INTRAVENOUS at 12:07

## 2020-01-01 RX ADMIN — FERROUS SULFATE TAB 325 MG (65 MG ELEMENTAL FE) 325 MG: 325 (65 FE) TAB at 08:16

## 2020-01-01 RX ADMIN — CHLORHEXIDINE GLUCONATE 0.12% ORAL RINSE 15 ML: 1.2 LIQUID ORAL at 09:38

## 2020-01-01 RX ADMIN — SODIUM CHLORIDE, PRESERVATIVE FREE 10 ML: 5 INJECTION INTRAVENOUS at 09:40

## 2020-01-01 RX ADMIN — NALOXONE HYDROCHLORIDE 0.4 MG: 0.4 INJECTION, SOLUTION INTRAMUSCULAR; INTRAVENOUS; SUBCUTANEOUS at 08:25

## 2020-01-01 RX ADMIN — MAGNESIUM SULFATE HEPTAHYDRATE 1 G: 1 INJECTION, SOLUTION INTRAVENOUS at 11:09

## 2020-01-01 RX ADMIN — GABAPENTIN 800 MG: 400 CAPSULE ORAL at 08:33

## 2020-01-01 RX ADMIN — DOPAMINE HYDROCHLORIDE 5 MCG/KG/MIN: 160 INJECTION, SOLUTION INTRAVENOUS at 08:38

## 2020-01-01 RX ADMIN — ALBUTEROL SULFATE 2 PUFF: 108 AEROSOL, METERED RESPIRATORY (INHALATION) at 11:39

## 2020-01-01 RX ADMIN — DEXMEDETOMIDINE HYDROCHLORIDE 0.2 MCG/KG/HR: 100 INJECTION, SOLUTION INTRAVENOUS at 03:49

## 2020-01-01 RX ADMIN — PRAMIPEXOLE DIHYDROCHLORIDE 1 MG: 0.25 TABLET ORAL at 21:52

## 2020-01-01 RX ADMIN — FUROSEMIDE 20 MG: 10 INJECTION, SOLUTION INTRAVENOUS at 20:36

## 2020-01-01 RX ADMIN — SODIUM CHLORIDE, PRESERVATIVE FREE 10 ML: 5 INJECTION INTRAVENOUS at 21:31

## 2020-01-01 RX ADMIN — POTASSIUM CHLORIDE 40 MEQ: 1500 TABLET, EXTENDED RELEASE ORAL at 10:57

## 2020-01-01 RX ADMIN — FAMOTIDINE 20 MG: 10 INJECTION, SOLUTION INTRAVENOUS at 08:18

## 2020-01-01 RX ADMIN — CLOPIDOGREL BISULFATE 75 MG: 75 TABLET ORAL at 11:13

## 2020-01-01 RX ADMIN — CHLORHEXIDINE GLUCONATE 0.12% ORAL RINSE 15 ML: 1.2 LIQUID ORAL at 21:10

## 2020-01-01 RX ADMIN — ROPINIROLE HYDROCHLORIDE 2 MG: 1 TABLET, FILM COATED ORAL at 15:23

## 2020-01-01 RX ADMIN — SODIUM CHLORIDE, PRESERVATIVE FREE 10 ML: 5 INJECTION INTRAVENOUS at 20:47

## 2020-01-01 RX ADMIN — ATORVASTATIN CALCIUM 80 MG: 40 TABLET, FILM COATED ORAL at 21:33

## 2020-01-01 RX ADMIN — PRAMIPEXOLE DIHYDROCHLORIDE 1 MG: 0.25 TABLET ORAL at 15:00

## 2020-01-01 RX ADMIN — DOPAMINE HYDROCHLORIDE IN DEXTROSE 3.5 MCG/KG/MIN: 1.6 INJECTION, SOLUTION INTRAVENOUS at 03:59

## 2020-01-01 RX ADMIN — Medication 2 MCG/MIN: at 00:10

## 2020-01-01 RX ADMIN — HEPARIN SODIUM 5000 UNITS: 5000 INJECTION, SOLUTION INTRAVENOUS; SUBCUTANEOUS at 10:59

## 2020-01-01 RX ADMIN — CETIRIZINE HYDROCHLORIDE 10 MG: 10 TABLET, FILM COATED ORAL at 11:26

## 2020-01-01 RX ADMIN — ATORVASTATIN CALCIUM 40 MG: 40 TABLET, FILM COATED ORAL at 21:10

## 2020-01-01 RX ADMIN — SODIUM BICARBONATE 50 MEQ: 84 INJECTION, SOLUTION INTRAVENOUS at 10:34

## 2020-01-01 RX ADMIN — Medication 500 MG: at 11:13

## 2020-01-01 RX ADMIN — FONDAPARINUX SODIUM 2.5 MG: 2.5 INJECTION, SOLUTION SUBCUTANEOUS at 10:56

## 2020-01-01 RX ADMIN — SODIUM CHLORIDE 1000 ML: 9 INJECTION, SOLUTION INTRAVENOUS at 09:03

## 2020-01-01 RX ADMIN — ACETAMINOPHEN 650 MG: 325 TABLET ORAL at 09:45

## 2020-01-01 RX ADMIN — ATORVASTATIN CALCIUM 40 MG: 40 TABLET, FILM COATED ORAL at 20:36

## 2020-01-01 RX ADMIN — FUROSEMIDE 20 MG: 10 INJECTION, SOLUTION INTRAVENOUS at 14:35

## 2020-01-01 RX ADMIN — INSULIN LISPRO 1 UNITS: 100 INJECTION, SOLUTION INTRAVENOUS; SUBCUTANEOUS at 09:29

## 2020-01-01 RX ADMIN — PRAMIPEXOLE DIHYDROCHLORIDE 0.12 MG: 0.25 TABLET ORAL at 14:55

## 2020-01-01 RX ADMIN — HEPARIN SODIUM 5000 UNITS: 5000 INJECTION, SOLUTION INTRAVENOUS; SUBCUTANEOUS at 21:05

## 2020-01-01 RX ADMIN — FUROSEMIDE 60 MG: 10 INJECTION, SOLUTION INTRAMUSCULAR; INTRAVENOUS at 08:17

## 2020-01-01 RX ADMIN — ATORVASTATIN CALCIUM 80 MG: 40 TABLET, FILM COATED ORAL at 20:58

## 2020-01-01 RX ADMIN — SODIUM BICARBONATE 50 MEQ: 84 INJECTION INTRAVENOUS at 09:52

## 2020-01-01 RX ADMIN — PRAMIPEXOLE DIHYDROCHLORIDE 1 MG: 0.25 TABLET ORAL at 08:40

## 2020-01-01 RX ADMIN — INSULIN LISPRO 1 UNITS: 100 INJECTION, SOLUTION INTRAVENOUS; SUBCUTANEOUS at 11:54

## 2020-01-01 RX ADMIN — HEPARIN SODIUM 5000 UNITS: 5000 INJECTION, SOLUTION INTRAVENOUS; SUBCUTANEOUS at 22:50

## 2020-01-01 RX ADMIN — ROPINIROLE HYDROCHLORIDE 2 MG: 1 TABLET, FILM COATED ORAL at 21:30

## 2020-01-01 RX ADMIN — MICONAZOLE NITRATE: 20 POWDER TOPICAL at 21:23

## 2020-01-01 RX ADMIN — MINOCYCLINE HYDROCHLORIDE 100 MG: 100 CAPSULE ORAL at 21:10

## 2020-01-01 RX ADMIN — MICONAZOLE NITRATE: 20 CREAM TOPICAL at 22:27

## 2020-01-01 RX ADMIN — CALCIUM GLUCONATE 1 G: 94 INJECTION, SOLUTION INTRAVENOUS at 09:56

## 2020-01-01 RX ADMIN — SERTRALINE HYDROCHLORIDE 25 MG: 50 TABLET ORAL at 20:36

## 2020-01-01 RX ADMIN — IPRATROPIUM BROMIDE 2 PUFF: 17 AEROSOL, METERED RESPIRATORY (INHALATION) at 20:56

## 2020-01-01 RX ADMIN — PROPOFOL 10 MCG/KG/MIN: 10 INJECTION, EMULSION INTRAVENOUS at 15:20

## 2020-01-01 RX ADMIN — DOPAMINE HYDROCHLORIDE 1.25 MCG/KG/MIN: 160 INJECTION, SOLUTION INTRAVENOUS at 16:38

## 2020-01-01 RX ADMIN — ALBUTEROL SULFATE 2 PUFF: 108 AEROSOL, METERED RESPIRATORY (INHALATION) at 19:22

## 2020-01-01 RX ADMIN — FONDAPARINUX SODIUM 2.5 MG: 2.5 INJECTION, SOLUTION SUBCUTANEOUS at 12:40

## 2020-01-01 RX ADMIN — HEPARIN SODIUM 5000 UNITS: 5000 INJECTION, SOLUTION INTRAVENOUS; SUBCUTANEOUS at 14:04

## 2020-01-01 RX ADMIN — SERTRALINE HYDROCHLORIDE 25 MG: 50 TABLET ORAL at 20:25

## 2020-01-01 RX ADMIN — HEPARIN SODIUM 5000 UNITS: 5000 INJECTION, SOLUTION INTRAVENOUS; SUBCUTANEOUS at 21:23

## 2020-01-01 RX ADMIN — POTASSIUM CHLORIDE 40 MEQ: 1500 TABLET, EXTENDED RELEASE ORAL at 18:06

## 2020-01-01 RX ADMIN — PRAMIPEXOLE DIHYDROCHLORIDE 1 MG: 0.25 TABLET ORAL at 09:39

## 2020-01-01 RX ADMIN — IPRATROPIUM BROMIDE 2 PUFF: 17 AEROSOL, METERED RESPIRATORY (INHALATION) at 16:40

## 2020-01-01 RX ADMIN — SODIUM CHLORIDE, PRESERVATIVE FREE 10 ML: 5 INJECTION INTRAVENOUS at 10:53

## 2020-01-01 RX ADMIN — DOPAMINE HYDROCHLORIDE IN DEXTROSE 15 MCG/KG/MIN: 1.6 INJECTION, SOLUTION INTRAVENOUS at 03:16

## 2020-01-01 RX ADMIN — FUROSEMIDE 40 MG: 10 INJECTION, SOLUTION INTRAMUSCULAR; INTRAVENOUS at 23:50

## 2020-01-01 RX ADMIN — IPRATROPIUM BROMIDE 2 PUFF: 17 AEROSOL, METERED RESPIRATORY (INHALATION) at 11:39

## 2020-01-01 RX ADMIN — ALBUTEROL SULFATE 2 PUFF: 108 AEROSOL, METERED RESPIRATORY (INHALATION) at 18:54

## 2020-01-01 RX ADMIN — DOPAMINE HYDROCHLORIDE IN DEXTROSE 15 MCG/KG/MIN: 1.6 INJECTION, SOLUTION INTRAVENOUS at 00:24

## 2020-01-01 RX ADMIN — TORSEMIDE 100 MG: 20 TABLET ORAL at 22:34

## 2020-01-01 RX ADMIN — IPRATROPIUM BROMIDE 2 PUFF: 17 AEROSOL, METERED RESPIRATORY (INHALATION) at 12:29

## 2020-01-01 RX ADMIN — ATORVASTATIN CALCIUM 80 MG: 40 TABLET, FILM COATED ORAL at 22:34

## 2020-01-01 RX ADMIN — SODIUM CHLORIDE: 9 INJECTION, SOLUTION INTRAVENOUS at 08:54

## 2020-01-01 RX ADMIN — PRAMIPEXOLE DIHYDROCHLORIDE 1 MG: 0.25 TABLET ORAL at 14:42

## 2020-01-01 RX ADMIN — SUCCINYLCHOLINE CHLORIDE 200 MG: 20 INJECTION, SOLUTION INTRAMUSCULAR; INTRAVENOUS at 12:00

## 2020-01-01 RX ADMIN — MAGNESIUM SULFATE 2 G: 2 INJECTION INTRAVENOUS at 13:59

## 2020-01-01 RX ADMIN — IPRATROPIUM BROMIDE 2 PUFF: 17 AEROSOL, METERED RESPIRATORY (INHALATION) at 12:06

## 2020-01-01 RX ADMIN — VANCOMYCIN HYDROCHLORIDE 1500 MG: 5 INJECTION, POWDER, LYOPHILIZED, FOR SOLUTION INTRAVENOUS at 14:45

## 2020-01-01 RX ADMIN — FUROSEMIDE 20 MG: 10 INJECTION, SOLUTION INTRAVENOUS at 14:02

## 2020-01-01 RX ADMIN — ATORVASTATIN CALCIUM 40 MG: 40 TABLET, FILM COATED ORAL at 21:21

## 2020-01-01 RX ADMIN — IPRATROPIUM BROMIDE 2 PUFF: 17 AEROSOL, METERED RESPIRATORY (INHALATION) at 11:38

## 2020-01-01 RX ADMIN — GABAPENTIN 800 MG: 400 CAPSULE ORAL at 17:27

## 2020-01-01 RX ADMIN — CHOLECALCIFEROL (VITAMIN D3) 10 MCG (400 UNIT) TABLET 400 UNITS: at 11:12

## 2020-01-01 RX ADMIN — IPRATROPIUM BROMIDE 2 PUFF: 17 AEROSOL, METERED RESPIRATORY (INHALATION) at 18:51

## 2020-01-01 RX ADMIN — SODIUM CHLORIDE, PRESERVATIVE FREE 10 ML: 5 INJECTION INTRAVENOUS at 19:57

## 2020-01-01 RX ADMIN — PRAMIPEXOLE DIHYDROCHLORIDE 1 MG: 0.25 TABLET ORAL at 21:33

## 2020-01-01 RX ADMIN — PRAMIPEXOLE DIHYDROCHLORIDE 1 MG: 0.25 TABLET ORAL at 17:36

## 2020-01-01 RX ADMIN — FONDAPARINUX SODIUM 2.5 MG: 2.5 INJECTION, SOLUTION SUBCUTANEOUS at 10:05

## 2020-01-01 RX ADMIN — PANTOPRAZOLE SODIUM 40 MG: 40 TABLET, DELAYED RELEASE ORAL at 08:42

## 2020-01-01 RX ADMIN — PRAMIPEXOLE DIHYDROCHLORIDE 1 MG: 0.25 TABLET ORAL at 14:18

## 2020-01-01 RX ADMIN — HEPARIN SODIUM 5000 UNITS: 5000 INJECTION, SOLUTION INTRAVENOUS; SUBCUTANEOUS at 14:42

## 2020-01-01 RX ADMIN — CEFTAZIDIME, AVIBACTAM 2.5 G: 2; .5 POWDER, FOR SOLUTION INTRAVENOUS at 05:51

## 2020-01-01 RX ADMIN — ALBUTEROL SULFATE 2 PUFF: 108 AEROSOL, METERED RESPIRATORY (INHALATION) at 07:58

## 2020-01-01 RX ADMIN — SPIRONOLACTONE 25 MG: 25 TABLET ORAL at 15:23

## 2020-01-01 RX ADMIN — ATORVASTATIN CALCIUM 80 MG: 40 TABLET, FILM COATED ORAL at 22:05

## 2020-01-01 RX ADMIN — SODIUM CHLORIDE, PRESERVATIVE FREE 10 ML: 5 INJECTION INTRAVENOUS at 21:36

## 2020-01-01 RX ADMIN — DOXEPIN HYDROCHLORIDE 10 MG: 10 CAPSULE ORAL at 20:36

## 2020-01-01 RX ADMIN — SODIUM CHLORIDE, PRESERVATIVE FREE 10 ML: 5 INJECTION INTRAVENOUS at 20:24

## 2020-01-01 RX ADMIN — IPRATROPIUM BROMIDE 2 PUFF: 17 AEROSOL, METERED RESPIRATORY (INHALATION) at 20:59

## 2020-01-01 RX ADMIN — ATORVASTATIN CALCIUM 80 MG: 40 TABLET, FILM COATED ORAL at 21:52

## 2020-01-01 RX ADMIN — HEPARIN SODIUM 5000 UNITS: 5000 INJECTION, SOLUTION INTRAVENOUS; SUBCUTANEOUS at 13:43

## 2020-01-01 RX ADMIN — IPRATROPIUM BROMIDE 2 PUFF: 17 AEROSOL, METERED RESPIRATORY (INHALATION) at 08:47

## 2020-01-01 RX ADMIN — FUROSEMIDE 60 MG: 10 INJECTION, SOLUTION INTRAMUSCULAR; INTRAVENOUS at 15:59

## 2020-01-01 RX ADMIN — IPRATROPIUM BROMIDE 2 PUFF: 17 AEROSOL, METERED RESPIRATORY (INHALATION) at 08:06

## 2020-01-01 RX ADMIN — FERROUS SULFATE TAB 325 MG (65 MG ELEMENTAL FE) 325 MG: 325 (65 FE) TAB at 09:41

## 2020-01-01 RX ADMIN — IPRATROPIUM BROMIDE 2 PUFF: 17 AEROSOL, METERED RESPIRATORY (INHALATION) at 11:51

## 2020-01-01 RX ADMIN — ROPINIROLE HYDROCHLORIDE 2 MG: 1 TABLET, FILM COATED ORAL at 21:52

## 2020-01-01 RX ADMIN — PREDNISONE 60 MG: 20 TABLET ORAL at 12:25

## 2020-01-01 RX ADMIN — ACETAMINOPHEN 650 MG: 325 TABLET ORAL at 14:37

## 2020-01-01 RX ADMIN — MICONAZOLE NITRATE: 20 POWDER TOPICAL at 21:42

## 2020-01-01 RX ADMIN — ETOMIDATE 50 MG: 2 INJECTION, SOLUTION INTRAVENOUS at 12:15

## 2020-01-01 RX ADMIN — CEFEPIME 2 G: 2 INJECTION, POWDER, FOR SOLUTION INTRAVENOUS at 09:07

## 2020-01-01 RX ADMIN — MINOCYCLINE HYDROCHLORIDE 100 MG: 100 CAPSULE ORAL at 08:07

## 2020-01-01 RX ADMIN — POTASSIUM CHLORIDE 10 MEQ: 7.46 INJECTION, SOLUTION INTRAVENOUS at 15:53

## 2020-01-01 RX ADMIN — SODIUM CHLORIDE 1000 ML: 9 INJECTION, SOLUTION INTRAVENOUS at 12:25

## 2020-01-01 RX ADMIN — FERROUS SULFATE TAB 325 MG (65 MG ELEMENTAL FE) 325 MG: 325 (65 FE) TAB at 09:46

## 2020-01-01 RX ADMIN — ALBUTEROL SULFATE 2 PUFF: 108 AEROSOL, METERED RESPIRATORY (INHALATION) at 15:34

## 2020-01-01 RX ADMIN — FERROUS SULFATE TAB 325 MG (65 MG ELEMENTAL FE) 325 MG: 325 (65 FE) TAB at 08:43

## 2020-01-01 RX ADMIN — IPRATROPIUM BROMIDE 2 PUFF: 17 AEROSOL, METERED RESPIRATORY (INHALATION) at 20:15

## 2020-01-01 RX ADMIN — CETIRIZINE HYDROCHLORIDE 10 MG: 10 TABLET, FILM COATED ORAL at 10:56

## 2020-01-01 RX ADMIN — POTASSIUM CHLORIDE 10 MEQ: 7.46 INJECTION, SOLUTION INTRAVENOUS at 13:10

## 2020-01-01 RX ADMIN — BUSPIRONE HYDROCHLORIDE 10 MG: 10 TABLET ORAL at 08:36

## 2020-01-01 RX ADMIN — MEROPENEM 500 MG: 500 INJECTION, POWDER, FOR SOLUTION INTRAVENOUS at 22:47

## 2020-01-01 RX ADMIN — ALBUTEROL SULFATE 2 PUFF: 108 AEROSOL, METERED RESPIRATORY (INHALATION) at 15:06

## 2020-01-01 RX ADMIN — SODIUM CHLORIDE, PRESERVATIVE FREE 10 ML: 5 INJECTION INTRAVENOUS at 20:37

## 2020-01-01 RX ADMIN — CETIRIZINE HYDROCHLORIDE 10 MG: 10 TABLET, FILM COATED ORAL at 11:00

## 2020-01-01 RX ADMIN — FONDAPARINUX SODIUM 2.5 MG: 2.5 INJECTION, SOLUTION SUBCUTANEOUS at 08:36

## 2020-01-01 RX ADMIN — CLOPIDOGREL BISULFATE 75 MG: 75 TABLET ORAL at 09:39

## 2020-01-01 RX ADMIN — FERROUS SULFATE TAB 325 MG (65 MG ELEMENTAL FE) 325 MG: 325 (65 FE) TAB at 09:38

## 2020-01-01 RX ADMIN — CHOLECALCIFEROL (VITAMIN D3) 10 MCG (400 UNIT) TABLET 400 UNITS: at 08:31

## 2020-01-01 RX ADMIN — SERTRALINE HYDROCHLORIDE 25 MG: 50 TABLET ORAL at 23:35

## 2020-01-01 RX ADMIN — SODIUM CHLORIDE, PRESERVATIVE FREE 10 ML: 5 INJECTION INTRAVENOUS at 21:22

## 2020-01-01 RX ADMIN — SODIUM CHLORIDE, PRESERVATIVE FREE 10 ML: 5 INJECTION INTRAVENOUS at 22:07

## 2020-01-01 RX ADMIN — GABAPENTIN 800 MG: 400 CAPSULE ORAL at 14:34

## 2020-01-01 RX ADMIN — HEPARIN SODIUM 5000 UNITS: 5000 INJECTION, SOLUTION INTRAVENOUS; SUBCUTANEOUS at 20:43

## 2020-01-01 RX ADMIN — PROPOFOL 20 MCG/KG/MIN: 10 INJECTION, EMULSION INTRAVENOUS at 01:38

## 2020-01-01 RX ADMIN — HEPARIN SODIUM 5000 UNITS: 5000 INJECTION, SOLUTION INTRAVENOUS; SUBCUTANEOUS at 18:39

## 2020-01-01 RX ADMIN — CHLORHEXIDINE GLUCONATE 0.12% ORAL RINSE 15 ML: 1.2 LIQUID ORAL at 21:43

## 2020-01-01 RX ADMIN — GABAPENTIN 800 MG: 400 CAPSULE ORAL at 21:39

## 2020-01-01 RX ADMIN — LORAZEPAM 1 MG: 2 INJECTION INTRAMUSCULAR; INTRAVENOUS at 19:46

## 2020-01-01 RX ADMIN — CETIRIZINE HYDROCHLORIDE 10 MG: 10 TABLET, FILM COATED ORAL at 09:06

## 2020-01-01 RX ADMIN — IPRATROPIUM BROMIDE 2 PUFF: 17 AEROSOL, METERED RESPIRATORY (INHALATION) at 21:14

## 2020-01-01 RX ADMIN — CHLORHEXIDINE GLUCONATE 0.12% ORAL RINSE 15 ML: 1.2 LIQUID ORAL at 08:06

## 2020-01-01 RX ADMIN — HEPARIN SODIUM 5000 UNITS: 5000 INJECTION, SOLUTION INTRAVENOUS; SUBCUTANEOUS at 11:27

## 2020-01-01 RX ADMIN — PRAMIPEXOLE DIHYDROCHLORIDE 1 MG: 0.25 TABLET ORAL at 11:12

## 2020-01-01 RX ADMIN — SODIUM CHLORIDE, PRESERVATIVE FREE 10 ML: 5 INJECTION INTRAVENOUS at 20:32

## 2020-01-01 RX ADMIN — OXYBUTYNIN CHLORIDE 10 MG: 10 TABLET, EXTENDED RELEASE ORAL at 08:42

## 2020-01-01 RX ADMIN — SODIUM CHLORIDE, PRESERVATIVE FREE 10 ML: 5 INJECTION INTRAVENOUS at 08:31

## 2020-01-01 RX ADMIN — GABAPENTIN 400 MG: 400 CAPSULE ORAL at 15:08

## 2020-01-01 RX ADMIN — AMIODARONE HYDROCHLORIDE 200 MG: 200 TABLET ORAL at 11:12

## 2020-01-01 RX ADMIN — ROPINIROLE HYDROCHLORIDE 2 MG: 1 TABLET, FILM COATED ORAL at 22:35

## 2020-01-01 RX ADMIN — IPRATROPIUM BROMIDE 2 PUFF: 17 AEROSOL, METERED RESPIRATORY (INHALATION) at 07:49

## 2020-01-01 RX ADMIN — DOPAMINE HYDROCHLORIDE IN DEXTROSE 9 MCG/KG/MIN: 1.6 INJECTION, SOLUTION INTRAVENOUS at 21:23

## 2020-01-01 RX ADMIN — HEPARIN SODIUM 5000 UNITS: 5000 INJECTION, SOLUTION INTRAVENOUS; SUBCUTANEOUS at 05:43

## 2020-01-01 RX ADMIN — ROPINIROLE HYDROCHLORIDE 2 MG: 1 TABLET, FILM COATED ORAL at 08:16

## 2020-01-01 RX ADMIN — HEPARIN SODIUM 5000 UNITS: 5000 INJECTION, SOLUTION INTRAVENOUS; SUBCUTANEOUS at 14:22

## 2020-01-01 RX ADMIN — SODIUM CHLORIDE, PRESERVATIVE FREE 10 ML: 5 INJECTION INTRAVENOUS at 08:19

## 2020-01-01 RX ADMIN — ALBUTEROL SULFATE 2 PUFF: 108 AEROSOL, METERED RESPIRATORY (INHALATION) at 15:32

## 2020-01-01 RX ADMIN — BUSPIRONE HYDROCHLORIDE 10 MG: 10 TABLET ORAL at 10:57

## 2020-01-01 RX ADMIN — CLOPIDOGREL BISULFATE 75 MG: 75 TABLET ORAL at 08:33

## 2020-01-01 RX ADMIN — ATORVASTATIN CALCIUM 80 MG: 40 TABLET, FILM COATED ORAL at 20:44

## 2020-01-01 RX ADMIN — MICONAZOLE NITRATE: 20 POWDER TOPICAL at 20:46

## 2020-01-01 RX ADMIN — SODIUM CHLORIDE, PRESERVATIVE FREE 10 ML: 5 INJECTION INTRAVENOUS at 14:41

## 2020-01-01 RX ADMIN — FUROSEMIDE 40 MG: 10 INJECTION, SOLUTION INTRAMUSCULAR; INTRAVENOUS at 21:45

## 2020-01-01 RX ADMIN — MICONAZOLE NITRATE: 20 POWDER TOPICAL at 22:26

## 2020-01-01 RX ADMIN — ALBUTEROL SULFATE 2 PUFF: 108 AEROSOL, METERED RESPIRATORY (INHALATION) at 20:39

## 2020-01-01 RX ADMIN — HEPARIN SODIUM 5000 UNITS: 5000 INJECTION, SOLUTION INTRAVENOUS; SUBCUTANEOUS at 15:08

## 2020-01-01 RX ADMIN — MAGNESIUM SULFATE IN WATER 2 G: 40 INJECTION, SOLUTION INTRAVENOUS at 14:36

## 2020-01-01 RX ADMIN — SPIRONOLACTONE 25 MG: 25 TABLET ORAL at 09:46

## 2020-01-01 RX ADMIN — MICONAZOLE NITRATE: 20 POWDER TOPICAL at 08:23

## 2020-01-01 RX ADMIN — IPRATROPIUM BROMIDE 2 PUFF: 17 AEROSOL, METERED RESPIRATORY (INHALATION) at 11:57

## 2020-01-01 RX ADMIN — METHYLPREDNISOLONE SODIUM SUCCINATE 60 MG: 125 INJECTION, POWDER, FOR SOLUTION INTRAMUSCULAR; INTRAVENOUS at 08:17

## 2020-01-01 RX ADMIN — MICONAZOLE NITRATE: 20 CREAM TOPICAL at 22:09

## 2020-01-01 RX ADMIN — INSULIN LISPRO 1 UNITS: 100 INJECTION, SOLUTION INTRAVENOUS; SUBCUTANEOUS at 12:27

## 2020-01-01 RX ADMIN — PRAMIPEXOLE DIHYDROCHLORIDE 1 MG: 0.25 TABLET ORAL at 22:34

## 2020-01-01 RX ADMIN — SODIUM CHLORIDE, PRESERVATIVE FREE 10 ML: 5 INJECTION INTRAVENOUS at 22:27

## 2020-01-01 RX ADMIN — CHLORHEXIDINE GLUCONATE 0.12% ORAL RINSE 15 ML: 1.2 LIQUID ORAL at 10:07

## 2020-01-01 RX ADMIN — PANTOPRAZOLE SODIUM 40 MG: 40 TABLET, DELAYED RELEASE ORAL at 10:55

## 2020-01-01 RX ADMIN — CEFTAZIDIME, AVIBACTAM 2.5 G: 2; .5 POWDER, FOR SOLUTION INTRAVENOUS at 23:45

## 2020-01-01 RX ADMIN — FERROUS SULFATE TAB 325 MG (65 MG ELEMENTAL FE) 325 MG: 325 (65 FE) TAB at 11:26

## 2020-01-01 RX ADMIN — MICONAZOLE NITRATE: 20 POWDER TOPICAL at 10:08

## 2020-01-01 RX ADMIN — GABAPENTIN 800 MG: 400 CAPSULE ORAL at 14:44

## 2020-01-01 RX ADMIN — BUSPIRONE HYDROCHLORIDE 10 MG: 10 TABLET ORAL at 10:06

## 2020-01-01 RX ADMIN — IPRATROPIUM BROMIDE 2 PUFF: 17 AEROSOL, METERED RESPIRATORY (INHALATION) at 16:03

## 2020-01-01 RX ADMIN — PRAMIPEXOLE DIHYDROCHLORIDE 1 MG: 0.25 TABLET ORAL at 20:47

## 2020-01-01 RX ADMIN — ASPIRIN 81 MG CHEWABLE TABLET 81 MG: 81 TABLET CHEWABLE at 10:55

## 2020-01-01 RX ADMIN — SODIUM BICARBONATE: 84 INJECTION, SOLUTION INTRAVENOUS at 00:27

## 2020-01-01 RX ADMIN — FUROSEMIDE 40 MG: 10 INJECTION, SOLUTION INTRAVENOUS at 10:53

## 2020-01-01 RX ADMIN — GABAPENTIN 800 MG: 400 CAPSULE ORAL at 10:57

## 2020-01-01 RX ADMIN — CEFTAZIDIME, AVIBACTAM 2.5 G: 2; .5 POWDER, FOR SOLUTION INTRAVENOUS at 20:55

## 2020-01-01 RX ADMIN — PANTOPRAZOLE SODIUM 40 MG: 40 INJECTION, POWDER, FOR SOLUTION INTRAVENOUS at 10:56

## 2020-01-01 RX ADMIN — SERTRALINE HYDROCHLORIDE 25 MG: 50 TABLET ORAL at 20:56

## 2020-01-01 RX ADMIN — SPIRONOLACTONE 25 MG: 25 TABLET ORAL at 10:55

## 2020-01-01 RX ADMIN — BUSPIRONE HYDROCHLORIDE 10 MG: 10 TABLET ORAL at 20:26

## 2020-01-01 RX ADMIN — CLOPIDOGREL BISULFATE 75 MG: 75 TABLET ORAL at 11:00

## 2020-01-01 RX ADMIN — ALBUTEROL SULFATE 2 PUFF: 108 AEROSOL, METERED RESPIRATORY (INHALATION) at 11:09

## 2020-01-01 RX ADMIN — FERROUS SULFATE TAB 325 MG (65 MG ELEMENTAL FE) 325 MG: 325 (65 FE) TAB at 10:55

## 2020-01-01 RX ADMIN — IPRATROPIUM BROMIDE 2 PUFF: 17 AEROSOL, METERED RESPIRATORY (INHALATION) at 11:09

## 2020-01-01 RX ADMIN — IPRATROPIUM BROMIDE 2 PUFF: 17 AEROSOL, METERED RESPIRATORY (INHALATION) at 15:46

## 2020-01-01 RX ADMIN — CETIRIZINE HYDROCHLORIDE 10 MG: 10 TABLET, FILM COATED ORAL at 10:55

## 2020-01-01 RX ADMIN — CETIRIZINE HYDROCHLORIDE 10 MG: 10 TABLET, FILM COATED ORAL at 09:46

## 2020-01-01 RX ADMIN — FUROSEMIDE 40 MG: 10 INJECTION, SOLUTION INTRAMUSCULAR; INTRAVENOUS at 15:00

## 2020-01-01 RX ADMIN — CEFTRIAXONE SODIUM 1 G: 1 INJECTION, POWDER, FOR SOLUTION INTRAMUSCULAR; INTRAVENOUS at 13:54

## 2020-01-01 RX ADMIN — VANCOMYCIN HYDROCHLORIDE 2000 MG: 1 INJECTION, POWDER, LYOPHILIZED, FOR SOLUTION INTRAVENOUS at 09:38

## 2020-01-01 RX ADMIN — PRAMIPEXOLE DIHYDROCHLORIDE 1 MG: 0.25 TABLET ORAL at 11:00

## 2020-01-01 RX ADMIN — FUROSEMIDE 40 MG: 10 INJECTION, SOLUTION INTRAMUSCULAR; INTRAVENOUS at 08:43

## 2020-01-01 RX ADMIN — PANTOPRAZOLE SODIUM 40 MG: 40 INJECTION, POWDER, FOR SOLUTION INTRAVENOUS at 09:05

## 2020-01-01 RX ADMIN — SODIUM BICARBONATE 100 MEQ: 84 INJECTION, SOLUTION INTRAVENOUS at 15:11

## 2020-01-01 RX ADMIN — SERTRALINE HYDROCHLORIDE 25 MG: 50 TABLET ORAL at 21:39

## 2020-01-01 RX ADMIN — CETIRIZINE HYDROCHLORIDE 10 MG: 10 TABLET, FILM COATED ORAL at 09:57

## 2020-01-01 RX ADMIN — SODIUM CHLORIDE, PRESERVATIVE FREE 10 ML: 5 INJECTION INTRAVENOUS at 04:32

## 2020-01-01 RX ADMIN — SODIUM CHLORIDE 250 MG: 9 INJECTION, SOLUTION INTRAVENOUS at 12:11

## 2020-01-01 RX ADMIN — Medication 25 MCG/HR: at 01:25

## 2020-01-01 RX ADMIN — CHLORHEXIDINE GLUCONATE 0.12% ORAL RINSE 15 ML: 1.2 LIQUID ORAL at 21:21

## 2020-01-01 RX ADMIN — Medication 500 MG: at 10:55

## 2020-01-01 RX ADMIN — SODIUM CHLORIDE 150 ML/HR: 9 INJECTION, SOLUTION INTRAVENOUS at 10:17

## 2020-01-01 RX ADMIN — IPRATROPIUM BROMIDE 2 PUFF: 17 AEROSOL, METERED RESPIRATORY (INHALATION) at 15:22

## 2020-01-01 RX ADMIN — GABAPENTIN 400 MG: 400 CAPSULE ORAL at 11:53

## 2020-01-01 RX ADMIN — GABAPENTIN 800 MG: 400 CAPSULE ORAL at 10:06

## 2020-01-01 RX ADMIN — POLYETHYLENE GLYCOL (3350) 17 G: 17 POWDER, FOR SOLUTION ORAL at 11:00

## 2020-01-01 RX ADMIN — CLOPIDOGREL BISULFATE 75 MG: 75 TABLET ORAL at 10:55

## 2020-01-01 RX ADMIN — IPRATROPIUM BROMIDE 2 PUFF: 17 AEROSOL, METERED RESPIRATORY (INHALATION) at 12:25

## 2020-01-01 RX ADMIN — ASPIRIN 81 MG CHEWABLE TABLET 81 MG: 81 TABLET CHEWABLE at 09:46

## 2020-01-01 RX ADMIN — CETIRIZINE HYDROCHLORIDE 10 MG: 10 TABLET, FILM COATED ORAL at 08:07

## 2020-01-01 RX ADMIN — SODIUM CHLORIDE, PRESERVATIVE FREE 10 ML: 5 INJECTION INTRAVENOUS at 09:38

## 2020-01-01 RX ADMIN — BUSPIRONE HYDROCHLORIDE 10 MG: 10 TABLET ORAL at 09:58

## 2020-01-01 RX ADMIN — CLOPIDOGREL BISULFATE 75 MG: 75 TABLET ORAL at 08:16

## 2020-01-01 RX ADMIN — PRAMIPEXOLE DIHYDROCHLORIDE 1 MG: 0.25 TABLET ORAL at 20:25

## 2020-01-01 RX ADMIN — SODIUM BICARBONATE: 84 INJECTION, SOLUTION INTRAVENOUS at 09:33

## 2020-01-01 RX ADMIN — FUROSEMIDE 20 MG: 10 INJECTION, SOLUTION INTRAVENOUS at 08:31

## 2020-01-01 RX ADMIN — PRAMIPEXOLE DIHYDROCHLORIDE 1 MG: 0.25 TABLET ORAL at 09:06

## 2020-01-01 RX ADMIN — FUROSEMIDE 40 MG: 10 INJECTION, SOLUTION INTRAMUSCULAR; INTRAVENOUS at 13:43

## 2020-01-01 RX ADMIN — IPRATROPIUM BROMIDE 2 PUFF: 17 AEROSOL, METERED RESPIRATORY (INHALATION) at 07:46

## 2020-01-01 RX ADMIN — ATORVASTATIN CALCIUM 40 MG: 40 TABLET, FILM COATED ORAL at 23:35

## 2020-01-01 RX ADMIN — DOPAMINE HYDROCHLORIDE IN DEXTROSE 15 MCG/KG/MIN: 1.6 INJECTION, SOLUTION INTRAVENOUS at 10:18

## 2020-01-01 RX ADMIN — SODIUM BICARBONATE: 84 INJECTION, SOLUTION INTRAVENOUS at 01:34

## 2020-01-01 RX ADMIN — DOXEPIN HYDROCHLORIDE 10 MG: 10 CAPSULE ORAL at 20:25

## 2020-01-01 RX ADMIN — SODIUM CHLORIDE 1000 ML: 9 INJECTION, SOLUTION INTRAVENOUS at 11:35

## 2020-01-01 RX ADMIN — PANTOPRAZOLE SODIUM 40 MG: 40 TABLET, DELAYED RELEASE ORAL at 09:46

## 2020-01-01 RX ADMIN — SODIUM CHLORIDE: 9 INJECTION, SOLUTION INTRAVENOUS at 10:04

## 2020-01-01 RX ADMIN — BUSPIRONE HYDROCHLORIDE 10 MG: 10 TABLET ORAL at 20:23

## 2020-01-01 RX ADMIN — IPRATROPIUM BROMIDE 2 PUFF: 17 AEROSOL, METERED RESPIRATORY (INHALATION) at 15:04

## 2020-01-01 RX ADMIN — TORSEMIDE 50 MG: 20 TABLET ORAL at 11:25

## 2020-01-01 RX ADMIN — ALBUTEROL SULFATE 2 PUFF: 108 AEROSOL, METERED RESPIRATORY (INHALATION) at 20:56

## 2020-01-01 RX ADMIN — PANTOPRAZOLE SODIUM 40 MG: 40 INJECTION, POWDER, FOR SOLUTION INTRAVENOUS at 09:47

## 2020-01-01 RX ADMIN — PRAMIPEXOLE DIHYDROCHLORIDE 1 MG: 0.25 TABLET ORAL at 20:32

## 2020-01-01 RX ADMIN — SERTRALINE HYDROCHLORIDE 25 MG: 50 TABLET ORAL at 20:23

## 2020-01-01 RX ADMIN — CETIRIZINE HYDROCHLORIDE 10 MG: 10 TABLET, FILM COATED ORAL at 09:47

## 2020-01-01 RX ADMIN — PANTOPRAZOLE SODIUM 40 MG: 40 INJECTION, POWDER, FOR SOLUTION INTRAVENOUS at 10:11

## 2020-01-01 RX ADMIN — SODIUM CHLORIDE 1000 ML: 9 INJECTION, SOLUTION INTRAVENOUS at 11:18

## 2020-01-01 RX ADMIN — ATORVASTATIN CALCIUM 40 MG: 40 TABLET, FILM COATED ORAL at 21:43

## 2020-01-01 RX ADMIN — AMILORIDE HYDROCLORIDE 5 MG: 5 TABLET ORAL at 09:57

## 2020-01-01 RX ADMIN — SODIUM CHLORIDE, PRESERVATIVE FREE 10 ML: 5 INJECTION INTRAVENOUS at 23:13

## 2020-01-01 RX ADMIN — BUSPIRONE HYDROCHLORIDE 10 MG: 10 TABLET ORAL at 09:06

## 2020-01-01 RX ADMIN — ALBUTEROL SULFATE 2 PUFF: 108 AEROSOL, METERED RESPIRATORY (INHALATION) at 08:00

## 2020-01-01 RX ADMIN — PANTOPRAZOLE SODIUM 40 MG: 40 TABLET, DELAYED RELEASE ORAL at 08:16

## 2020-01-01 RX ADMIN — GABAPENTIN 800 MG: 400 CAPSULE ORAL at 08:18

## 2020-01-01 RX ADMIN — HEPARIN SODIUM 5000 UNITS: 5000 INJECTION, SOLUTION INTRAVENOUS; SUBCUTANEOUS at 15:59

## 2020-01-01 RX ADMIN — IPRATROPIUM BROMIDE 2 PUFF: 17 AEROSOL, METERED RESPIRATORY (INHALATION) at 07:34

## 2020-01-01 RX ADMIN — HEPARIN SODIUM 5000 UNITS: 5000 INJECTION, SOLUTION INTRAVENOUS; SUBCUTANEOUS at 17:32

## 2020-01-01 RX ADMIN — PRAMIPEXOLE DIHYDROCHLORIDE 1 MG: 0.25 TABLET ORAL at 13:43

## 2020-01-01 RX ADMIN — CETIRIZINE HYDROCHLORIDE 10 MG: 10 TABLET, FILM COATED ORAL at 08:36

## 2020-01-01 RX ADMIN — SODIUM CHLORIDE, PRESERVATIVE FREE 10 ML: 5 INJECTION INTRAVENOUS at 08:36

## 2020-01-01 RX ADMIN — ASPIRIN 81 MG CHEWABLE TABLET 81 MG: 81 TABLET CHEWABLE at 08:32

## 2020-01-01 RX ADMIN — FONDAPARINUX SODIUM 2.5 MG: 2.5 INJECTION, SOLUTION SUBCUTANEOUS at 12:07

## 2020-01-01 RX ADMIN — CHLORHEXIDINE GLUCONATE 0.12% ORAL RINSE 15 ML: 1.2 LIQUID ORAL at 20:56

## 2020-01-01 RX ADMIN — MICONAZOLE NITRATE: 20 POWDER TOPICAL at 08:37

## 2020-01-01 RX ADMIN — CEFTAZIDIME, AVIBACTAM 1.25 G: 2; .5 POWDER, FOR SOLUTION INTRAVENOUS at 15:39

## 2020-01-01 RX ADMIN — DOXEPIN HYDROCHLORIDE 10 MG: 10 CAPSULE ORAL at 21:58

## 2020-01-01 RX ADMIN — CETIRIZINE HYDROCHLORIDE 10 MG: 10 TABLET, FILM COATED ORAL at 09:38

## 2020-01-01 RX ADMIN — OXYBUTYNIN CHLORIDE 10 MG: 10 TABLET, EXTENDED RELEASE ORAL at 10:53

## 2020-01-01 RX ADMIN — HEPARIN SODIUM 5000 UNITS: 5000 INJECTION, SOLUTION INTRAVENOUS; SUBCUTANEOUS at 06:06

## 2020-01-01 RX ADMIN — AMIODARONE HYDROCHLORIDE 200 MG: 200 TABLET ORAL at 09:39

## 2020-01-01 RX ADMIN — CLOPIDOGREL BISULFATE 75 MG: 75 TABLET ORAL at 09:46

## 2020-01-01 RX ADMIN — ROPINIROLE HYDROCHLORIDE 2 MG: 1 TABLET, FILM COATED ORAL at 13:59

## 2020-01-01 RX ADMIN — IOPAMIDOL 75 ML: 755 INJECTION, SOLUTION INTRAVENOUS at 16:53

## 2020-01-01 RX ADMIN — SODIUM CHLORIDE, PRESERVATIVE FREE 10 ML: 5 INJECTION INTRAVENOUS at 11:02

## 2020-01-01 RX ADMIN — CHLORHEXIDINE GLUCONATE 0.12% ORAL RINSE 15 ML: 1.2 LIQUID ORAL at 00:54

## 2020-01-01 RX ADMIN — SODIUM CHLORIDE: 9 INJECTION, SOLUTION INTRAVENOUS at 20:08

## 2020-01-01 RX ADMIN — METHYLPREDNISOLONE SODIUM SUCCINATE 60 MG: 125 INJECTION, POWDER, FOR SOLUTION INTRAMUSCULAR; INTRAVENOUS at 08:44

## 2020-01-01 RX ADMIN — IPRATROPIUM BROMIDE AND ALBUTEROL SULFATE 1 AMPULE: .5; 3 SOLUTION RESPIRATORY (INHALATION) at 19:55

## 2020-01-01 RX ADMIN — MICONAZOLE NITRATE: 20 POWDER TOPICAL at 20:14

## 2020-01-01 RX ADMIN — FUROSEMIDE 40 MG: 10 INJECTION, SOLUTION INTRAMUSCULAR; INTRAVENOUS at 21:31

## 2020-01-01 RX ADMIN — SODIUM CHLORIDE, PRESERVATIVE FREE 10 ML: 5 INJECTION INTRAVENOUS at 08:38

## 2020-01-01 RX ADMIN — IPRATROPIUM BROMIDE 2 PUFF: 17 AEROSOL, METERED RESPIRATORY (INHALATION) at 08:12

## 2020-01-01 RX ADMIN — DOXEPIN HYDROCHLORIDE 10 MG: 10 CAPSULE ORAL at 20:47

## 2020-01-01 RX ADMIN — SODIUM CHLORIDE, PRESERVATIVE FREE 10 ML: 5 INJECTION INTRAVENOUS at 10:09

## 2020-01-01 RX ADMIN — ALBUTEROL SULFATE 2 PUFF: 108 AEROSOL, METERED RESPIRATORY (INHALATION) at 16:16

## 2020-01-01 RX ADMIN — PANTOPRAZOLE SODIUM 40 MG: 40 INJECTION, POWDER, FOR SOLUTION INTRAVENOUS at 09:37

## 2020-01-01 RX ADMIN — SODIUM CHLORIDE, PRESERVATIVE FREE 10 ML: 5 INJECTION INTRAVENOUS at 22:13

## 2020-01-01 RX ADMIN — ALBUTEROL SULFATE 2 PUFF: 108 AEROSOL, METERED RESPIRATORY (INHALATION) at 09:11

## 2020-01-01 RX ADMIN — ROPINIROLE HYDROCHLORIDE 2 MG: 1 TABLET, FILM COATED ORAL at 11:00

## 2020-01-01 RX ADMIN — DEXTROSE MONOHYDRATE 25 G: 500 INJECTION PARENTERAL at 00:31

## 2020-01-01 RX ADMIN — CHOLECALCIFEROL (VITAMIN D3) 10 MCG (400 UNIT) TABLET 400 UNITS: at 10:55

## 2020-01-01 RX ADMIN — PROPOFOL 10 MCG/KG/MIN: 10 INJECTION, EMULSION INTRAVENOUS at 07:00

## 2020-01-01 RX ADMIN — ROPINIROLE HYDROCHLORIDE 2 MG: 1 TABLET, FILM COATED ORAL at 22:05

## 2020-01-01 RX ADMIN — IPRATROPIUM BROMIDE 2 PUFF: 17 AEROSOL, METERED RESPIRATORY (INHALATION) at 15:34

## 2020-01-01 RX ADMIN — FERROUS SULFATE TAB 325 MG (65 MG ELEMENTAL FE) 325 MG: 325 (65 FE) TAB at 11:00

## 2020-01-01 RX ADMIN — HEPARIN SODIUM 5000 UNITS: 5000 INJECTION, SOLUTION INTRAVENOUS; SUBCUTANEOUS at 21:26

## 2020-01-01 RX ADMIN — PANTOPRAZOLE SODIUM 40 MG: 40 TABLET, DELAYED RELEASE ORAL at 08:32

## 2020-01-01 RX ADMIN — VANCOMYCIN HYDROCHLORIDE 2000 MG: 1 INJECTION, POWDER, LYOPHILIZED, FOR SOLUTION INTRAVENOUS at 10:53

## 2020-01-01 RX ADMIN — CHOLECALCIFEROL (VITAMIN D3) 10 MCG (400 UNIT) TABLET 400 UNITS: at 11:28

## 2020-01-01 RX ADMIN — CHLORHEXIDINE GLUCONATE 0.12% ORAL RINSE 15 ML: 1.2 LIQUID ORAL at 09:47

## 2020-01-01 RX ADMIN — BUSPIRONE HYDROCHLORIDE 10 MG: 10 TABLET ORAL at 08:18

## 2020-01-01 RX ADMIN — CLOPIDOGREL BISULFATE 75 MG: 75 TABLET ORAL at 08:42

## 2020-01-01 RX ADMIN — CLOPIDOGREL BISULFATE 75 MG: 75 TABLET ORAL at 09:49

## 2020-01-01 RX ADMIN — ALBUTEROL SULFATE 2 PUFF: 108 AEROSOL, METERED RESPIRATORY (INHALATION) at 11:19

## 2020-01-01 RX ADMIN — HEPARIN SODIUM 5000 UNITS: 5000 INJECTION, SOLUTION INTRAVENOUS; SUBCUTANEOUS at 15:22

## 2020-01-01 RX ADMIN — SODIUM CHLORIDE, PRESERVATIVE FREE 10 ML: 5 INJECTION INTRAVENOUS at 09:46

## 2020-01-01 RX ADMIN — FONDAPARINUX SODIUM 10 MG: 10 INJECTION, SOLUTION SUBCUTANEOUS at 10:07

## 2020-01-01 RX ADMIN — DOXEPIN HYDROCHLORIDE 10 MG: 10 CAPSULE ORAL at 21:33

## 2020-01-01 RX ADMIN — INSULIN LISPRO 1 UNITS: 100 INJECTION, SOLUTION INTRAVENOUS; SUBCUTANEOUS at 21:03

## 2020-01-01 RX ADMIN — MICONAZOLE NITRATE: 20 POWDER TOPICAL at 09:48

## 2020-01-01 RX ADMIN — CLOPIDOGREL BISULFATE 75 MG: 75 TABLET ORAL at 10:05

## 2020-01-01 RX ADMIN — PROPOFOL 20 MCG/KG/MIN: 10 INJECTION, EMULSION INTRAVENOUS at 22:32

## 2020-01-01 RX ADMIN — IPRATROPIUM BROMIDE 2 PUFF: 17 AEROSOL, METERED RESPIRATORY (INHALATION) at 08:30

## 2020-01-01 RX ADMIN — AMIODARONE HYDROCHLORIDE 200 MG: 200 TABLET ORAL at 08:32

## 2020-01-01 RX ADMIN — DOPAMINE HYDROCHLORIDE IN DEXTROSE 15 MCG/KG/MIN: 1.6 INJECTION, SOLUTION INTRAVENOUS at 22:26

## 2020-01-01 RX ADMIN — PANTOPRAZOLE SODIUM 40 MG: 40 INJECTION, POWDER, FOR SOLUTION INTRAVENOUS at 08:18

## 2020-01-01 RX ADMIN — PANTOPRAZOLE SODIUM 40 MG: 40 TABLET, DELAYED RELEASE ORAL at 11:13

## 2020-01-01 RX ADMIN — CEFTAZIDIME, AVIBACTAM 1.25 G: 2; .5 POWDER, FOR SOLUTION INTRAVENOUS at 20:41

## 2020-01-01 RX ADMIN — IPRATROPIUM BROMIDE 2 PUFF: 17 AEROSOL, METERED RESPIRATORY (INHALATION) at 15:05

## 2020-01-01 RX ADMIN — IPRATROPIUM BROMIDE 2 PUFF: 17 AEROSOL, METERED RESPIRATORY (INHALATION) at 07:28

## 2020-01-01 RX ADMIN — PANTOPRAZOLE SODIUM 40 MG: 40 INJECTION, POWDER, FOR SOLUTION INTRAVENOUS at 08:36

## 2020-01-01 RX ADMIN — IPRATROPIUM BROMIDE 2 PUFF: 17 AEROSOL, METERED RESPIRATORY (INHALATION) at 16:16

## 2020-01-01 RX ADMIN — GABAPENTIN 800 MG: 400 CAPSULE ORAL at 09:57

## 2020-01-01 RX ADMIN — ASPIRIN 81 MG CHEWABLE TABLET 81 MG: 81 TABLET CHEWABLE at 08:16

## 2020-01-01 RX ADMIN — IPRATROPIUM BROMIDE 2 PUFF: 17 AEROSOL, METERED RESPIRATORY (INHALATION) at 12:03

## 2020-01-01 RX ADMIN — PRAMIPEXOLE DIHYDROCHLORIDE 1 MG: 0.25 TABLET ORAL at 11:52

## 2020-01-01 RX ADMIN — PRAMIPEXOLE DIHYDROCHLORIDE 1 MG: 0.25 TABLET ORAL at 22:05

## 2020-01-01 RX ADMIN — CHOLECALCIFEROL (VITAMIN D3) 10 MCG (400 UNIT) TABLET 400 UNITS: at 10:59

## 2020-01-01 RX ADMIN — Medication 500 MG: at 08:31

## 2020-01-01 RX ADMIN — VANCOMYCIN HYDROCHLORIDE 2000 MG: 1 INJECTION, POWDER, LYOPHILIZED, FOR SOLUTION INTRAVENOUS at 10:41

## 2020-01-01 RX ADMIN — Medication 500 MG: at 11:00

## 2020-01-01 RX ADMIN — ROPINIROLE HYDROCHLORIDE 2 MG: 1 TABLET, FILM COATED ORAL at 11:12

## 2020-01-01 RX ADMIN — ROPINIROLE HYDROCHLORIDE 2 MG: 1 TABLET, FILM COATED ORAL at 17:36

## 2020-01-01 RX ADMIN — CETIRIZINE HYDROCHLORIDE 10 MG: 10 TABLET, FILM COATED ORAL at 08:32

## 2020-01-01 RX ADMIN — FUROSEMIDE 20 MG: 10 INJECTION, SOLUTION INTRAVENOUS at 10:11

## 2020-01-01 RX ADMIN — MICONAZOLE NITRATE: 20 CREAM TOPICAL at 11:28

## 2020-01-01 RX ADMIN — HEPARIN SODIUM 5000 UNITS: 5000 INJECTION, SOLUTION INTRAVENOUS; SUBCUTANEOUS at 22:06

## 2020-01-01 RX ADMIN — ROPINIROLE HYDROCHLORIDE 2 MG: 1 TABLET, FILM COATED ORAL at 10:55

## 2020-01-01 RX ADMIN — DOXEPIN HYDROCHLORIDE 10 MG: 10 CAPSULE ORAL at 21:10

## 2020-01-01 RX ADMIN — CETIRIZINE HYDROCHLORIDE 10 MG: 10 TABLET, FILM COATED ORAL at 09:39

## 2020-01-01 RX ADMIN — ATORVASTATIN CALCIUM 40 MG: 40 TABLET, FILM COATED ORAL at 20:56

## 2020-01-01 RX ADMIN — OXYBUTYNIN CHLORIDE 10 MG: 10 TABLET, EXTENDED RELEASE ORAL at 09:46

## 2020-01-01 RX ADMIN — PANTOPRAZOLE SODIUM 40 MG: 40 INJECTION, POWDER, FOR SOLUTION INTRAVENOUS at 08:07

## 2020-01-01 RX ADMIN — MICONAZOLE NITRATE: 20 POWDER TOPICAL at 20:25

## 2020-01-01 RX ADMIN — CHLORHEXIDINE GLUCONATE 0.12% ORAL RINSE 15 ML: 1.2 LIQUID ORAL at 09:57

## 2020-01-01 RX ADMIN — PANTOPRAZOLE SODIUM 40 MG: 40 TABLET, DELAYED RELEASE ORAL at 11:00

## 2020-01-01 RX ADMIN — BUSPIRONE HYDROCHLORIDE 10 MG: 10 TABLET ORAL at 14:34

## 2020-01-01 RX ADMIN — IPRATROPIUM BROMIDE 2 PUFF: 17 AEROSOL, METERED RESPIRATORY (INHALATION) at 12:00

## 2020-01-01 RX ADMIN — ROPINIROLE HYDROCHLORIDE 2 MG: 1 TABLET, FILM COATED ORAL at 20:44

## 2020-01-01 RX ADMIN — Medication 25 MCG/HR: at 09:37

## 2020-01-01 RX ADMIN — METHYLPREDNISOLONE SODIUM SUCCINATE 40 MG: 125 INJECTION, POWDER, FOR SOLUTION INTRAMUSCULAR; INTRAVENOUS at 08:25

## 2020-01-01 RX ADMIN — FUROSEMIDE 40 MG: 10 INJECTION, SOLUTION INTRAMUSCULAR; INTRAVENOUS at 20:57

## 2020-01-01 RX ADMIN — PRAMIPEXOLE DIHYDROCHLORIDE 1 MG: 0.25 TABLET ORAL at 09:57

## 2020-01-01 RX ADMIN — DOPAMINE HYDROCHLORIDE 2.5 MCG/KG/MIN: 160 INJECTION, SOLUTION INTRAVENOUS at 04:32

## 2020-01-01 RX ADMIN — POTASSIUM CHLORIDE 40 MEQ: 1500 TABLET, EXTENDED RELEASE ORAL at 08:38

## 2020-01-01 RX ADMIN — DOPAMINE HYDROCHLORIDE IN DEXTROSE 10 MCG/KG/MIN: 1.6 INJECTION, SOLUTION INTRAVENOUS at 18:31

## 2020-01-01 RX ADMIN — PRAMIPEXOLE DIHYDROCHLORIDE 1 MG: 0.25 TABLET ORAL at 20:44

## 2020-01-01 RX ADMIN — VANCOMYCIN HYDROCHLORIDE 1500 MG: 5 INJECTION, POWDER, LYOPHILIZED, FOR SOLUTION INTRAVENOUS at 14:26

## 2020-01-01 RX ADMIN — HEPARIN SODIUM 5000 UNITS: 5000 INJECTION, SOLUTION INTRAVENOUS; SUBCUTANEOUS at 05:35

## 2020-01-01 RX ADMIN — ROPINIROLE HYDROCHLORIDE 2 MG: 1 TABLET, FILM COATED ORAL at 10:54

## 2020-01-01 RX ADMIN — ALBUTEROL SULFATE 2 PUFF: 108 AEROSOL, METERED RESPIRATORY (INHALATION) at 11:37

## 2020-01-01 RX ADMIN — ALBUTEROL SULFATE 2 PUFF: 108 AEROSOL, METERED RESPIRATORY (INHALATION) at 08:20

## 2020-01-01 RX ADMIN — CEFTAZIDIME, AVIBACTAM 1.25 G: 2; .5 POWDER, FOR SOLUTION INTRAVENOUS at 05:45

## 2020-01-01 RX ADMIN — ASPIRIN 81 MG CHEWABLE TABLET 81 MG: 81 TABLET CHEWABLE at 08:42

## 2020-01-01 RX ADMIN — CEFTAZIDIME, AVIBACTAM 1.25 G: 2; .5 POWDER, FOR SOLUTION INTRAVENOUS at 12:40

## 2020-01-01 RX ADMIN — ROPINIROLE HYDROCHLORIDE 2 MG: 1 TABLET, FILM COATED ORAL at 13:43

## 2020-01-01 RX ADMIN — GABAPENTIN 800 MG: 400 CAPSULE ORAL at 20:23

## 2020-01-01 RX ADMIN — SPIRONOLACTONE 25 MG: 25 TABLET ORAL at 14:51

## 2020-01-01 RX ADMIN — TORSEMIDE 100 MG: 20 TABLET ORAL at 09:45

## 2020-01-01 RX ADMIN — PANTOPRAZOLE SODIUM 40 MG: 40 INJECTION, POWDER, FOR SOLUTION INTRAVENOUS at 10:07

## 2020-01-01 RX ADMIN — SODIUM CHLORIDE, PRESERVATIVE FREE 10 ML: 5 INJECTION INTRAVENOUS at 20:36

## 2020-01-01 RX ADMIN — INSULIN LISPRO 1 UNITS: 100 INJECTION, SOLUTION INTRAVENOUS; SUBCUTANEOUS at 17:55

## 2020-01-01 RX ADMIN — IPRATROPIUM BROMIDE 2 PUFF: 17 AEROSOL, METERED RESPIRATORY (INHALATION) at 20:40

## 2020-01-01 RX ADMIN — SODIUM CHLORIDE, PRESERVATIVE FREE 10 ML: 5 INJECTION INTRAVENOUS at 10:58

## 2020-01-01 RX ADMIN — FONDAPARINUX SODIUM 2.5 MG: 2.5 INJECTION, SOLUTION SUBCUTANEOUS at 09:05

## 2020-01-01 RX ADMIN — AMILORIDE HYDROCLORIDE 5 MG: 5 TABLET ORAL at 15:01

## 2020-01-01 RX ADMIN — ALBUTEROL SULFATE 2 PUFF: 108 AEROSOL, METERED RESPIRATORY (INHALATION) at 18:50

## 2020-01-01 RX ADMIN — CLOPIDOGREL BISULFATE 75 MG: 75 TABLET ORAL at 08:07

## 2020-01-01 RX ADMIN — ALBUTEROL SULFATE 2 PUFF: 108 AEROSOL, METERED RESPIRATORY (INHALATION) at 23:34

## 2020-01-01 RX ADMIN — Medication 500 MG: at 08:43

## 2020-01-01 RX ADMIN — MICONAZOLE NITRATE: 20 CREAM TOPICAL at 13:42

## 2020-01-01 RX ADMIN — ASPIRIN 81 MG CHEWABLE TABLET 81 MG: 81 TABLET CHEWABLE at 09:39

## 2020-01-01 RX ADMIN — AMILORIDE HYDROCLORIDE 5 MG: 5 TABLET ORAL at 09:46

## 2020-01-01 RX ADMIN — TORSEMIDE 50 MG: 20 TABLET ORAL at 10:56

## 2020-01-01 RX ADMIN — BUSPIRONE HYDROCHLORIDE 10 MG: 10 TABLET ORAL at 17:27

## 2020-01-01 RX ADMIN — IPRATROPIUM BROMIDE 2 PUFF: 17 AEROSOL, METERED RESPIRATORY (INHALATION) at 07:42

## 2020-01-01 RX ADMIN — IPRATROPIUM BROMIDE 2 PUFF: 17 AEROSOL, METERED RESPIRATORY (INHALATION) at 08:14

## 2020-01-01 RX ADMIN — CETIRIZINE HYDROCHLORIDE 10 MG: 10 TABLET, FILM COATED ORAL at 10:07

## 2020-01-01 RX ADMIN — SODIUM CHLORIDE, PRESERVATIVE FREE 10 ML: 5 INJECTION INTRAVENOUS at 23:36

## 2020-01-01 RX ADMIN — HEPARIN SODIUM 5000 UNITS: 5000 INJECTION, SOLUTION INTRAVENOUS; SUBCUTANEOUS at 21:46

## 2020-01-01 RX ADMIN — CEFTAZIDIME, AVIBACTAM 2.5 G: 2; .5 POWDER, FOR SOLUTION INTRAVENOUS at 12:00

## 2020-01-01 RX ADMIN — HEPARIN SODIUM 5000 UNITS: 5000 INJECTION, SOLUTION INTRAVENOUS; SUBCUTANEOUS at 21:30

## 2020-01-01 RX ADMIN — SODIUM CHLORIDE 100 MG: 9 INJECTION, SOLUTION INTRAVENOUS at 22:04

## 2020-01-01 RX ADMIN — HEPARIN SODIUM 2600 UNITS: 1000 INJECTION, SOLUTION INTRAVENOUS; SUBCUTANEOUS at 16:06

## 2020-01-01 RX ADMIN — ACETAMINOPHEN 650 MG: 325 TABLET ORAL at 18:18

## 2020-01-01 RX ADMIN — HEPARIN SODIUM 5000 UNITS: 5000 INJECTION, SOLUTION INTRAVENOUS; SUBCUTANEOUS at 17:36

## 2020-01-01 RX ADMIN — DOXEPIN HYDROCHLORIDE 10 MG: 10 CAPSULE ORAL at 21:40

## 2020-01-01 RX ADMIN — OXYBUTYNIN CHLORIDE 10 MG: 10 TABLET, EXTENDED RELEASE ORAL at 11:13

## 2020-01-01 RX ADMIN — SODIUM CHLORIDE, PRESERVATIVE FREE 10 ML: 5 INJECTION INTRAVENOUS at 22:08

## 2020-01-01 RX ADMIN — SPIRONOLACTONE 25 MG: 25 TABLET ORAL at 11:26

## 2020-01-01 RX ADMIN — BUSPIRONE HYDROCHLORIDE 10 MG: 10 TABLET ORAL at 21:40

## 2020-01-01 RX ADMIN — SODIUM CHLORIDE 1000 ML: 9 INJECTION, SOLUTION INTRAVENOUS at 08:25

## 2020-01-01 RX ADMIN — PROPOFOL 120 MG: 10 INJECTION, EMULSION INTRAVENOUS at 23:44

## 2020-01-01 RX ADMIN — SODIUM CHLORIDE, PRESERVATIVE FREE 10 ML: 5 INJECTION INTRAVENOUS at 22:10

## 2020-01-01 RX ADMIN — GABAPENTIN 800 MG: 400 CAPSULE ORAL at 20:26

## 2020-01-01 RX ADMIN — CEFTAZIDIME, AVIBACTAM 2.5 G: 2; .5 POWDER, FOR SOLUTION INTRAVENOUS at 12:40

## 2020-01-01 RX ADMIN — Medication 500 MG: at 10:57

## 2020-01-01 RX ADMIN — IPRATROPIUM BROMIDE 2 PUFF: 17 AEROSOL, METERED RESPIRATORY (INHALATION) at 18:55

## 2020-01-01 RX ADMIN — CEFTAZIDIME, AVIBACTAM 2.5 G: 2; .5 POWDER, FOR SOLUTION INTRAVENOUS at 14:09

## 2020-01-01 RX ADMIN — IPRATROPIUM BROMIDE 2 PUFF: 17 AEROSOL, METERED RESPIRATORY (INHALATION) at 16:12

## 2020-01-01 RX ADMIN — ASPIRIN 81 MG CHEWABLE TABLET 81 MG: 81 TABLET CHEWABLE at 11:28

## 2020-01-01 RX ADMIN — DEXTROSE MONOHYDRATE 25 G: 500 INJECTION PARENTERAL at 10:40

## 2020-01-01 RX ADMIN — DOPAMINE HYDROCHLORIDE IN DEXTROSE 2.5 MCG/KG/MIN: 1.6 INJECTION, SOLUTION INTRAVENOUS at 08:56

## 2020-01-01 RX ADMIN — MICONAZOLE NITRATE: 20 CREAM TOPICAL at 21:31

## 2020-01-01 RX ADMIN — HEPARIN SODIUM 5000 UNITS: 5000 INJECTION, SOLUTION INTRAVENOUS; SUBCUTANEOUS at 05:56

## 2020-01-01 RX ADMIN — CEFTAZIDIME, AVIBACTAM 2.5 G: 2; .5 POWDER, FOR SOLUTION INTRAVENOUS at 21:16

## 2020-01-01 RX ADMIN — ALBUTEROL SULFATE 2 PUFF: 108 AEROSOL, METERED RESPIRATORY (INHALATION) at 07:49

## 2020-01-01 RX ADMIN — SODIUM CHLORIDE, PRESERVATIVE FREE 10 ML: 5 INJECTION INTRAVENOUS at 21:40

## 2020-01-01 RX ADMIN — CHLORHEXIDINE GLUCONATE 0.12% ORAL RINSE 15 ML: 1.2 LIQUID ORAL at 08:17

## 2020-01-01 RX ADMIN — IPRATROPIUM BROMIDE 2 PUFF: 17 AEROSOL, METERED RESPIRATORY (INHALATION) at 11:30

## 2020-01-01 RX ADMIN — FUROSEMIDE 20 MG: 10 INJECTION, SOLUTION INTRAVENOUS at 14:26

## 2020-01-01 RX ADMIN — DOPAMINE HYDROCHLORIDE IN DEXTROSE 9 MCG/KG/MIN: 1.6 INJECTION, SOLUTION INTRAVENOUS at 01:55

## 2020-01-01 RX ADMIN — HEPARIN SODIUM 5000 UNITS: 5000 INJECTION, SOLUTION INTRAVENOUS; SUBCUTANEOUS at 22:32

## 2020-01-01 RX ADMIN — CLOPIDOGREL BISULFATE 75 MG: 75 TABLET ORAL at 11:27

## 2020-01-01 RX ADMIN — CETIRIZINE HYDROCHLORIDE 10 MG: 10 TABLET, FILM COATED ORAL at 08:33

## 2020-01-01 RX ADMIN — DOXEPIN HYDROCHLORIDE 10 MG: 10 CAPSULE ORAL at 20:55

## 2020-01-01 RX ADMIN — ATORVASTATIN CALCIUM 40 MG: 40 TABLET, FILM COATED ORAL at 20:47

## 2020-01-01 RX ADMIN — ALBUTEROL SULFATE 2 PUFF: 108 AEROSOL, METERED RESPIRATORY (INHALATION) at 15:38

## 2020-01-01 RX ADMIN — FONDAPARINUX SODIUM 2.5 MG: 2.5 INJECTION, SOLUTION SUBCUTANEOUS at 11:21

## 2020-01-01 RX ADMIN — OXYBUTYNIN CHLORIDE 10 MG: 10 TABLET, EXTENDED RELEASE ORAL at 10:56

## 2020-01-01 RX ADMIN — IPRATROPIUM BROMIDE 2 PUFF: 17 AEROSOL, METERED RESPIRATORY (INHALATION) at 13:13

## 2020-01-01 RX ADMIN — MICONAZOLE NITRATE: 20 CREAM TOPICAL at 11:01

## 2020-01-01 RX ADMIN — CALCIUM GLUCONATE 1 G: 98 INJECTION, SOLUTION INTRAVENOUS at 16:58

## 2020-01-01 RX ADMIN — CEFTAZIDIME, AVIBACTAM 1.25 G: 2; .5 POWDER, FOR SOLUTION INTRAVENOUS at 21:10

## 2020-01-01 RX ADMIN — CEFTAZIDIME, AVIBACTAM 2.5 G: 2; .5 POWDER, FOR SOLUTION INTRAVENOUS at 06:27

## 2020-01-01 RX ADMIN — SODIUM CHLORIDE 1000 ML: 9 INJECTION, SOLUTION INTRAVENOUS at 10:05

## 2020-01-01 RX ADMIN — MICONAZOLE NITRATE: 20 POWDER TOPICAL at 22:10

## 2020-01-01 RX ADMIN — MICONAZOLE NITRATE: 20 CREAM TOPICAL at 10:57

## 2020-01-01 RX ADMIN — ALBUTEROL SULFATE 2 PUFF: 108 AEROSOL, METERED RESPIRATORY (INHALATION) at 20:59

## 2020-01-01 RX ADMIN — PANTOPRAZOLE SODIUM 40 MG: 40 INJECTION, POWDER, FOR SOLUTION INTRAVENOUS at 08:19

## 2020-01-01 ASSESSMENT — PULMONARY FUNCTION TESTS
PIF_VALUE: 16
PIF_VALUE: 23
PIF_VALUE: 0
PIF_VALUE: 0
PIF_VALUE: 30
PIF_VALUE: 26
PIF_VALUE: 21
PIF_VALUE: 21
PIF_VALUE: 20
PIF_VALUE: 29
PIF_VALUE: 27
PIF_VALUE: 0
PIF_VALUE: 29
PIF_VALUE: 15
PIF_VALUE: 30
PIF_VALUE: 30
PIF_VALUE: 28
PIF_VALUE: 23
PIF_VALUE: 25
PIF_VALUE: 23
PIF_VALUE: 33
PIF_VALUE: 27
PIF_VALUE: 31
PIF_VALUE: 27
PIF_VALUE: 20
PIF_VALUE: 24
PIF_VALUE: 20
PIF_VALUE: 20
PIF_VALUE: 0
PIF_VALUE: 26
PIF_VALUE: 26
PIF_VALUE: 21
PIF_VALUE: 28
PIF_VALUE: 31
PIF_VALUE: 28
PIF_VALUE: 29
PIF_VALUE: 22
PIF_VALUE: 30
PIF_VALUE: 30
PIF_VALUE: 16
PIF_VALUE: 26
PIF_VALUE: 18
PIF_VALUE: 31
PIF_VALUE: 27
PIF_VALUE: 29
PIF_VALUE: 26
PIF_VALUE: 34
PIF_VALUE: 29
PIF_VALUE: 16
PIF_VALUE: 32
PIF_VALUE: 23
PIF_VALUE: 17
PIF_VALUE: 21
PIF_VALUE: 29
PIF_VALUE: 30
PIF_VALUE: 26
PIF_VALUE: 29
PIF_VALUE: 33
PIF_VALUE: 21
PIF_VALUE: 16
PIF_VALUE: 23
PIF_VALUE: 25
PIF_VALUE: 20
PIF_VALUE: 29
PIF_VALUE: 26
PIF_VALUE: 26
PIF_VALUE: 23
PIF_VALUE: 31
PIF_VALUE: 27
PIF_VALUE: 27
PIF_VALUE: 16
PIF_VALUE: 24
PIF_VALUE: 25
PIF_VALUE: 22
PIF_VALUE: 35
PIF_VALUE: 29
PIF_VALUE: 32
PIF_VALUE: 29
PIF_VALUE: 40
PIF_VALUE: 29
PIF_VALUE: 26
PIF_VALUE: 20
PIF_VALUE: 26
PIF_VALUE: 27
PIF_VALUE: 26
PIF_VALUE: 28
PIF_VALUE: 20
PIF_VALUE: 21
PIF_VALUE: 26
PIF_VALUE: 20
PIF_VALUE: 21
PIF_VALUE: 26
PIF_VALUE: 0
PIF_VALUE: 16
PIF_VALUE: 26
PIF_VALUE: 16
PIF_VALUE: 37
PIF_VALUE: 32
PIF_VALUE: 31
PIF_VALUE: 20
PIF_VALUE: 32
PIF_VALUE: 20
PIF_VALUE: 25
PIF_VALUE: 29
PIF_VALUE: 24
PIF_VALUE: 29
PIF_VALUE: 26
PIF_VALUE: 23
PIF_VALUE: 27
PIF_VALUE: 26
PIF_VALUE: 27
PIF_VALUE: 30
PIF_VALUE: 22
PIF_VALUE: 27
PIF_VALUE: 26
PIF_VALUE: 35
PIF_VALUE: 16
PIF_VALUE: 31
PIF_VALUE: 16
PIF_VALUE: 28
PIF_VALUE: 31
PIF_VALUE: 26
PIF_VALUE: 27
PIF_VALUE: 21
PIF_VALUE: 31
PIF_VALUE: 20
PIF_VALUE: 23
PIF_VALUE: 25
PIF_VALUE: 22
PIF_VALUE: 26
PIF_VALUE: 33
PIF_VALUE: 31
PIF_VALUE: 32
PIF_VALUE: 30
PIF_VALUE: 27
PIF_VALUE: 23
PIF_VALUE: 20
PIF_VALUE: 30
PIF_VALUE: 34
PIF_VALUE: 29
PIF_VALUE: 32
PIF_VALUE: 24
PIF_VALUE: 30
PIF_VALUE: 27
PIF_VALUE: 24
PIF_VALUE: 23
PIF_VALUE: 36
PIF_VALUE: 29
PIF_VALUE: 16
PIF_VALUE: 30
PIF_VALUE: 16
PIF_VALUE: 30
PIF_VALUE: 16
PIF_VALUE: 30
PIF_VALUE: 27
PIF_VALUE: 25
PIF_VALUE: 22
PIF_VALUE: 32
PIF_VALUE: 30
PIF_VALUE: 16
PIF_VALUE: 32
PIF_VALUE: 25
PIF_VALUE: 32
PIF_VALUE: 29
PIF_VALUE: 29
PIF_VALUE: 27
PIF_VALUE: 19
PIF_VALUE: 23
PIF_VALUE: 30
PIF_VALUE: 27
PIF_VALUE: 27
PIF_VALUE: 32
PIF_VALUE: 31
PIF_VALUE: 0
PIF_VALUE: 30
PIF_VALUE: 26
PIF_VALUE: 30
PIF_VALUE: 31
PIF_VALUE: 24
PIF_VALUE: 29
PIF_VALUE: 28
PIF_VALUE: 33
PIF_VALUE: 29
PIF_VALUE: 26
PIF_VALUE: 27
PIF_VALUE: 36
PIF_VALUE: 31
PIF_VALUE: 30
PIF_VALUE: 30
PIF_VALUE: 20
PIF_VALUE: 25
PIF_VALUE: 16
PIF_VALUE: 23
PIF_VALUE: 27
PIF_VALUE: 28
PIF_VALUE: 27
PIF_VALUE: 23
PIF_VALUE: 30
PIF_VALUE: 31
PIF_VALUE: 24
PIF_VALUE: 23
PIF_VALUE: 35

## 2020-01-01 ASSESSMENT — PAIN SCALES - PAIN ASSESSMENT IN ADVANCED DEMENTIA (PAINAD)
TOTALSCORE: 4
CONSOLABILITY: 0
CONSOLABILITY: 1
FACIALEXPRESSION: 1
BREATHING: 1
CONSOLABILITY: 1
FACIALEXPRESSION: 1
CONSOLABILITY: 1
NEGVOCALIZATION: 0
TOTALSCORE: 4
BREATHING: 1
NEGVOCALIZATION: 0
BREATHING: 1
NEGVOCALIZATION: 0
BODYLANGUAGE: 0
CONSOLABILITY: 1
CONSOLABILITY: 1
BREATHING: 1
FACIALEXPRESSION: 1
FACIALEXPRESSION: 1
BODYLANGUAGE: 1
BREATHING: 1
FACIALEXPRESSION: 1
BODYLANGUAGE: 1
CONSOLABILITY: 1
BODYLANGUAGE: 1
TOTALSCORE: 4
FACIALEXPRESSION: 1
BREATHING: 1
CONSOLABILITY: 1
TOTALSCORE: 4
CONSOLABILITY: 1
TOTALSCORE: 4
NEGVOCALIZATION: 0
TOTALSCORE: 4
BODYLANGUAGE: 1
FACIALEXPRESSION: 1
BODYLANGUAGE: 1
NEGVOCALIZATION: 0
NEGVOCALIZATION: 0
TOTALSCORE: 4
TOTALSCORE: 4
NEGVOCALIZATION: 0
NEGVOCALIZATION: 0
BREATHING: 0
NEGVOCALIZATION: 0
FACIALEXPRESSION: 1
BREATHING: 1
NEGVOCALIZATION: 0
BREATHING: 1
TOTALSCORE: 4
NEGVOCALIZATION: 0
BODYLANGUAGE: 1
BODYLANGUAGE: 1
TOTALSCORE: 4
BODYLANGUAGE: 1
CONSOLABILITY: 1
BODYLANGUAGE: 1
FACIALEXPRESSION: 1
CONSOLABILITY: 1
CONSOLABILITY: 1
FACIALEXPRESSION: 1
TOTALSCORE: 4
BREATHING: 1
BODYLANGUAGE: 1
BREATHING: 1
NEGVOCALIZATION: 0
CONSOLABILITY: 1
CONSOLABILITY: 1
TOTALSCORE: 0
BODYLANGUAGE: 1
TOTALSCORE: 4
TOTALSCORE: 4
FACIALEXPRESSION: 1
CONSOLABILITY: 1
BREATHING: 1
CONSOLABILITY: 1
BODYLANGUAGE: 1
FACIALEXPRESSION: 0
FACIALEXPRESSION: 1
BODYLANGUAGE: 1
BREATHING: 1
TOTALSCORE: 4
TOTALSCORE: 4
BREATHING: 1
NEGVOCALIZATION: 0
BREATHING: 1
NEGVOCALIZATION: 0
BREATHING: 1
FACIALEXPRESSION: 1

## 2020-01-01 ASSESSMENT — PAIN SCALES - GENERAL
PAINLEVEL_OUTOF10: 0
PAINLEVEL_OUTOF10: 2
PAINLEVEL_OUTOF10: 0
PAINLEVEL_OUTOF10: 5
PAINLEVEL_OUTOF10: 0
PAINLEVEL_OUTOF10: 9
PAINLEVEL_OUTOF10: 6
PAINLEVEL_OUTOF10: 5
PAINLEVEL_OUTOF10: 0
PAINLEVEL_OUTOF10: 0
PAINLEVEL_OUTOF10: 2
PAINLEVEL_OUTOF10: 0
PAINLEVEL_OUTOF10: 0
PAINLEVEL_OUTOF10: 4
PAINLEVEL_OUTOF10: 4
PAINLEVEL_OUTOF10: 0
PAINLEVEL_OUTOF10: 3
PAINLEVEL_OUTOF10: 0
PAINLEVEL_OUTOF10: 6
PAINLEVEL_OUTOF10: 0
PAINLEVEL_OUTOF10: 6
PAINLEVEL_OUTOF10: 0
PAINLEVEL_OUTOF10: 4
PAINLEVEL_OUTOF10: 0
PAINLEVEL_OUTOF10: 4
PAINLEVEL_OUTOF10: 0
PAINLEVEL_OUTOF10: 7
PAINLEVEL_OUTOF10: 0
PAINLEVEL_OUTOF10: 6
PAINLEVEL_OUTOF10: 4
PAINLEVEL_OUTOF10: 0
PAINLEVEL_OUTOF10: 5
PAINLEVEL_OUTOF10: 3
PAINLEVEL_OUTOF10: 0

## 2020-01-01 ASSESSMENT — ENCOUNTER SYMPTOMS
EYES NEGATIVE: 1
EYES NEGATIVE: 1
GASTROINTESTINAL NEGATIVE: 1
GASTROINTESTINAL NEGATIVE: 1
PHOTOPHOBIA: 0
SHORTNESS OF BREATH: 1
ALLERGIC/IMMUNOLOGIC NEGATIVE: 1
EYES NEGATIVE: 1
ALLERGIC/IMMUNOLOGIC NEGATIVE: 1
RESPIRATORY NEGATIVE: 1
GASTROINTESTINAL NEGATIVE: 1
RESPIRATORY NEGATIVE: 1
ALLERGIC/IMMUNOLOGIC NEGATIVE: 1
RESPIRATORY NEGATIVE: 1

## 2020-01-01 ASSESSMENT — PAIN DESCRIPTION - DESCRIPTORS
DESCRIPTORS: ACHING;THROBBING;SHARP
DESCRIPTORS: BURNING
DESCRIPTORS: BURNING;CONSTANT
DESCRIPTORS: ACHING
DESCRIPTORS: ACHING
DESCRIPTORS: BURNING

## 2020-01-01 ASSESSMENT — PAIN DESCRIPTION - LOCATION
LOCATION: LEG
LOCATION: ARM;SHOULDER
LOCATION: FOOT
LOCATION: BACK
LOCATION: BACK
LOCATION: CHEST
LOCATION: COCCYX;SACRUM
LOCATION: FOOT;LEG
LOCATION: BACK
LOCATION: COCCYX;SACRUM
LOCATION: COCCYX

## 2020-01-01 ASSESSMENT — PAIN DESCRIPTION - PROGRESSION
CLINICAL_PROGRESSION: NOT CHANGED

## 2020-01-01 ASSESSMENT — PAIN DESCRIPTION - FREQUENCY
FREQUENCY: CONTINUOUS

## 2020-01-01 ASSESSMENT — PAIN DESCRIPTION - PAIN TYPE
TYPE: CHRONIC PAIN
TYPE: ACUTE PAIN
TYPE: ACUTE PAIN
TYPE: CHRONIC PAIN
TYPE: ACUTE PAIN
TYPE: ACUTE PAIN
TYPE: CHRONIC PAIN
TYPE: ACUTE PAIN
TYPE: ACUTE PAIN;CHRONIC PAIN
TYPE: CHRONIC PAIN
TYPE: ACUTE PAIN
TYPE: CHRONIC PAIN

## 2020-01-01 ASSESSMENT — PAIN DESCRIPTION - ORIENTATION
ORIENTATION: MID
ORIENTATION: MID
ORIENTATION: RIGHT;LEFT
ORIENTATION: RIGHT
ORIENTATION: LEFT
ORIENTATION: MID
ORIENTATION: MID

## 2020-01-01 ASSESSMENT — PAIN - FUNCTIONAL ASSESSMENT
PAIN_FUNCTIONAL_ASSESSMENT: PREVENTS OR INTERFERES SOME ACTIVE ACTIVITIES AND ADLS

## 2020-01-01 ASSESSMENT — PAIN DESCRIPTION - ONSET
ONSET: ON-GOING

## 2020-01-10 ENCOUNTER — APPOINTMENT (OUTPATIENT)
Dept: CT IMAGING | Age: 65
DRG: 720 | End: 2020-01-10
Payer: COMMERCIAL

## 2020-01-10 ENCOUNTER — APPOINTMENT (OUTPATIENT)
Dept: GENERAL RADIOLOGY | Age: 65
DRG: 720 | End: 2020-01-10
Payer: COMMERCIAL

## 2020-01-10 ENCOUNTER — HOSPITAL ENCOUNTER (INPATIENT)
Age: 65
LOS: 4 days | Discharge: HOME HEALTH | DRG: 720 | End: 2020-01-14
Attending: EMERGENCY MEDICINE | Admitting: INTERNAL MEDICINE
Payer: COMMERCIAL

## 2020-01-10 LAB
ALBUMIN SERPL-MCNC: 3.3 GM/DL (ref 3.4–5)
ALP BLD-CCNC: 112 IU/L (ref 40–129)
ALT SERPL-CCNC: 15 U/L (ref 10–40)
ANION GAP SERPL CALCULATED.3IONS-SCNC: 13 MMOL/L (ref 4–16)
AST SERPL-CCNC: 22 IU/L (ref 15–37)
BACTERIA: ABNORMAL /HPF
BASOPHILS ABSOLUTE: 0 K/CU MM
BASOPHILS RELATIVE PERCENT: 0.3 % (ref 0–1)
BILIRUB SERPL-MCNC: 0.4 MG/DL (ref 0–1)
BILIRUBIN URINE: NEGATIVE MG/DL
BLOOD, URINE: NEGATIVE
BUN BLDV-MCNC: 19 MG/DL (ref 6–23)
CALCIUM SERPL-MCNC: 9.2 MG/DL (ref 8.3–10.6)
CHLORIDE BLD-SCNC: 89 MMOL/L (ref 99–110)
CLARITY: ABNORMAL
CO2: 28 MMOL/L (ref 21–32)
COLOR: YELLOW
CREAT SERPL-MCNC: 0.7 MG/DL (ref 0.6–1.1)
DIFFERENTIAL TYPE: ABNORMAL
EOSINOPHILS ABSOLUTE: 0.1 K/CU MM
EOSINOPHILS RELATIVE PERCENT: 0.6 % (ref 0–3)
GFR AFRICAN AMERICAN: >60 ML/MIN/1.73M2
GFR NON-AFRICAN AMERICAN: >60 ML/MIN/1.73M2
GLUCOSE BLD-MCNC: 189 MG/DL (ref 70–99)
GLUCOSE, URINE: NEGATIVE MG/DL
HCT VFR BLD CALC: 37.7 % (ref 37–47)
HEMOGLOBIN: 11.5 GM/DL (ref 12.5–16)
IMMATURE NEUTROPHIL %: 0.4 % (ref 0–0.43)
KETONES, URINE: NEGATIVE MG/DL
LACTATE: 3.4 MMOL/L (ref 0.4–2)
LACTATE: ABNORMAL MMOL/L (ref 0.4–2)
LEUKOCYTE ESTERASE, URINE: ABNORMAL
LYMPHOCYTES ABSOLUTE: 0.5 K/CU MM
LYMPHOCYTES RELATIVE PERCENT: 3.9 % (ref 24–44)
MCH RBC QN AUTO: 25.7 PG (ref 27–31)
MCHC RBC AUTO-ENTMCNC: 30.5 % (ref 32–36)
MCV RBC AUTO: 84.3 FL (ref 78–100)
MONOCYTES ABSOLUTE: 0.6 K/CU MM
MONOCYTES RELATIVE PERCENT: 4.6 % (ref 0–4)
NITRITE URINE, QUANTITATIVE: NEGATIVE
NUCLEATED RBC %: 0 %
PDW BLD-RTO: 15.7 % (ref 11.7–14.9)
PH, URINE: 9 (ref 5–8)
PLATELET # BLD: 244 K/CU MM (ref 140–440)
PMV BLD AUTO: 9.9 FL (ref 7.5–11.1)
POTASSIUM SERPL-SCNC: 3.8 MMOL/L (ref 3.5–5.1)
PROTEIN UA: 100 MG/DL
RBC # BLD: 4.47 M/CU MM (ref 4.2–5.4)
RBC URINE: ABNORMAL /HPF (ref 0–6)
SEGMENTED NEUTROPHILS ABSOLUTE COUNT: 11 K/CU MM
SEGMENTED NEUTROPHILS RELATIVE PERCENT: 90.2 % (ref 36–66)
SODIUM BLD-SCNC: 130 MMOL/L (ref 135–145)
SPECIFIC GRAVITY UA: 1.01 (ref 1–1.03)
SQUAMOUS EPITHELIAL: 1 /HPF
TOTAL IMMATURE NEUTOROPHIL: 0.05 K/CU MM
TOTAL NUCLEATED RBC: 0 K/CU MM
TOTAL PROTEIN: 8 GM/DL (ref 6.4–8.2)
TRICHOMONAS: ABNORMAL /HPF
UROBILINOGEN, URINE: NORMAL MG/DL (ref 0.2–1)
WBC # BLD: 12.2 K/CU MM (ref 4–10.5)
WBC CLUMP: ABNORMAL /HPF
WBC UA: 116 /HPF (ref 0–5)

## 2020-01-10 PROCEDURE — 2000000000 HC ICU R&B

## 2020-01-10 PROCEDURE — 85025 COMPLETE CBC W/AUTO DIFF WBC: CPT

## 2020-01-10 PROCEDURE — 81001 URINALYSIS AUTO W/SCOPE: CPT

## 2020-01-10 PROCEDURE — 99285 EMERGENCY DEPT VISIT HI MDM: CPT

## 2020-01-10 PROCEDURE — 87449 NOS EACH ORGANISM AG IA: CPT

## 2020-01-10 PROCEDURE — 6370000000 HC RX 637 (ALT 250 FOR IP): Performed by: EMERGENCY MEDICINE

## 2020-01-10 PROCEDURE — 96367 TX/PROPH/DG ADDL SEQ IV INF: CPT

## 2020-01-10 PROCEDURE — 2580000003 HC RX 258: Performed by: EMERGENCY MEDICINE

## 2020-01-10 PROCEDURE — 96375 TX/PRO/DX INJ NEW DRUG ADDON: CPT

## 2020-01-10 PROCEDURE — 87186 SC STD MICRODIL/AGAR DIL: CPT

## 2020-01-10 PROCEDURE — 87040 BLOOD CULTURE FOR BACTERIA: CPT

## 2020-01-10 PROCEDURE — 83605 ASSAY OF LACTIC ACID: CPT

## 2020-01-10 PROCEDURE — 96366 THER/PROPH/DIAG IV INF ADDON: CPT

## 2020-01-10 PROCEDURE — 96361 HYDRATE IV INFUSION ADD-ON: CPT

## 2020-01-10 PROCEDURE — 87899 AGENT NOS ASSAY W/OPTIC: CPT

## 2020-01-10 PROCEDURE — 80053 COMPREHEN METABOLIC PANEL: CPT

## 2020-01-10 PROCEDURE — 96365 THER/PROPH/DIAG IV INF INIT: CPT

## 2020-01-10 PROCEDURE — 71045 X-RAY EXAM CHEST 1 VIEW: CPT

## 2020-01-10 PROCEDURE — 36415 COLL VENOUS BLD VENIPUNCTURE: CPT

## 2020-01-10 PROCEDURE — 6360000002 HC RX W HCPCS: Performed by: EMERGENCY MEDICINE

## 2020-01-10 PROCEDURE — 71250 CT THORAX DX C-: CPT

## 2020-01-10 PROCEDURE — 87086 URINE CULTURE/COLONY COUNT: CPT

## 2020-01-10 PROCEDURE — 87077 CULTURE AEROBIC IDENTIFY: CPT

## 2020-01-10 RX ORDER — CLOPIDOGREL BISULFATE 75 MG/1
75 TABLET ORAL DAILY
Status: DISCONTINUED | OUTPATIENT
Start: 2020-01-11 | End: 2020-01-14 | Stop reason: HOSPADM

## 2020-01-10 RX ORDER — ASPIRIN 81 MG/1
81 TABLET, CHEWABLE ORAL DAILY
Status: DISCONTINUED | OUTPATIENT
Start: 2020-01-11 | End: 2020-01-14 | Stop reason: HOSPADM

## 2020-01-10 RX ORDER — CETIRIZINE HYDROCHLORIDE 5 MG/1
5 TABLET ORAL DAILY
Status: DISCONTINUED | OUTPATIENT
Start: 2020-01-11 | End: 2020-01-14 | Stop reason: HOSPADM

## 2020-01-10 RX ORDER — LORAZEPAM 2 MG/ML
0.5 INJECTION INTRAMUSCULAR ONCE
Status: COMPLETED | OUTPATIENT
Start: 2020-01-10 | End: 2020-01-10

## 2020-01-10 RX ORDER — METOPROLOL TARTRATE 50 MG/1
50 TABLET, FILM COATED ORAL 2 TIMES DAILY
Status: DISCONTINUED | OUTPATIENT
Start: 2020-01-10 | End: 2020-01-10

## 2020-01-10 RX ORDER — 0.9 % SODIUM CHLORIDE 0.9 %
500 INTRAVENOUS SOLUTION INTRAVENOUS ONCE
Status: COMPLETED | OUTPATIENT
Start: 2020-01-10 | End: 2020-01-10

## 2020-01-10 RX ORDER — TORSEMIDE 20 MG/1
20 TABLET ORAL 2 TIMES DAILY
Status: DISCONTINUED | OUTPATIENT
Start: 2020-01-11 | End: 2020-01-14 | Stop reason: HOSPADM

## 2020-01-10 RX ORDER — 0.9 % SODIUM CHLORIDE 0.9 %
30 INTRAVENOUS SOLUTION INTRAVENOUS ONCE
Status: COMPLETED | OUTPATIENT
Start: 2020-01-10 | End: 2020-01-10

## 2020-01-10 RX ORDER — ASCORBIC ACID 500 MG
500 TABLET ORAL DAILY
Status: DISCONTINUED | OUTPATIENT
Start: 2020-01-11 | End: 2020-01-14 | Stop reason: HOSPADM

## 2020-01-10 RX ORDER — ACETAMINOPHEN 325 MG/1
650 TABLET ORAL EVERY 4 HOURS PRN
Status: DISCONTINUED | OUTPATIENT
Start: 2020-01-10 | End: 2020-01-14 | Stop reason: HOSPADM

## 2020-01-10 RX ORDER — SODIUM CHLORIDE 0.9 % (FLUSH) 0.9 %
10 SYRINGE (ML) INJECTION PRN
Status: DISCONTINUED | OUTPATIENT
Start: 2020-01-10 | End: 2020-01-14 | Stop reason: HOSPADM

## 2020-01-10 RX ORDER — ATORVASTATIN CALCIUM 40 MG/1
80 TABLET, FILM COATED ORAL NIGHTLY
Status: DISCONTINUED | OUTPATIENT
Start: 2020-01-11 | End: 2020-01-14 | Stop reason: HOSPADM

## 2020-01-10 RX ORDER — KETOROLAC TROMETHAMINE 30 MG/ML
30 INJECTION, SOLUTION INTRAMUSCULAR; INTRAVENOUS ONCE
Status: COMPLETED | OUTPATIENT
Start: 2020-01-10 | End: 2020-01-10

## 2020-01-10 RX ORDER — PANTOPRAZOLE SODIUM 40 MG/1
40 TABLET, DELAYED RELEASE ORAL DAILY
Status: DISCONTINUED | OUTPATIENT
Start: 2020-01-11 | End: 2020-01-14 | Stop reason: HOSPADM

## 2020-01-10 RX ORDER — CALCITRIOL 0.25 UG/1
0.25 CAPSULE, LIQUID FILLED ORAL DAILY
Status: DISCONTINUED | OUTPATIENT
Start: 2020-01-11 | End: 2020-01-14 | Stop reason: HOSPADM

## 2020-01-10 RX ORDER — SPIRONOLACTONE 25 MG/1
25 TABLET ORAL DAILY
Status: DISCONTINUED | OUTPATIENT
Start: 2020-01-11 | End: 2020-01-14 | Stop reason: HOSPADM

## 2020-01-10 RX ORDER — SODIUM CHLORIDE 0.9 % (FLUSH) 0.9 %
10 SYRINGE (ML) INJECTION EVERY 12 HOURS SCHEDULED
Status: DISCONTINUED | OUTPATIENT
Start: 2020-01-11 | End: 2020-01-14 | Stop reason: HOSPADM

## 2020-01-10 RX ORDER — AMIODARONE HYDROCHLORIDE 200 MG/1
200 TABLET ORAL DAILY
Status: DISCONTINUED | OUTPATIENT
Start: 2020-01-11 | End: 2020-01-14 | Stop reason: HOSPADM

## 2020-01-10 RX ORDER — GABAPENTIN 300 MG/1
300 CAPSULE ORAL ONCE
Status: COMPLETED | OUTPATIENT
Start: 2020-01-10 | End: 2020-01-10

## 2020-01-10 RX ORDER — LACTOBACILLUS RHAMNOSUS GG 10B CELL
1 CAPSULE ORAL DAILY
Status: DISCONTINUED | OUTPATIENT
Start: 2020-01-11 | End: 2020-01-14 | Stop reason: HOSPADM

## 2020-01-10 RX ORDER — IPRATROPIUM BROMIDE AND ALBUTEROL SULFATE 2.5; .5 MG/3ML; MG/3ML
3 SOLUTION RESPIRATORY (INHALATION) 4 TIMES DAILY
Status: DISCONTINUED | OUTPATIENT
Start: 2020-01-11 | End: 2020-01-14 | Stop reason: HOSPADM

## 2020-01-10 RX ORDER — ACETAMINOPHEN 325 MG/1
650 TABLET ORAL ONCE
Status: COMPLETED | OUTPATIENT
Start: 2020-01-10 | End: 2020-01-10

## 2020-01-10 RX ORDER — PRAMIPEXOLE DIHYDROCHLORIDE 1 MG/1
1 TABLET ORAL 3 TIMES DAILY
Status: DISCONTINUED | OUTPATIENT
Start: 2020-01-11 | End: 2020-01-14 | Stop reason: HOSPADM

## 2020-01-10 RX ADMIN — SODIUM CHLORIDE 1917 ML: 9 INJECTION, SOLUTION INTRAVENOUS at 19:08

## 2020-01-10 RX ADMIN — VANCOMYCIN HYDROCHLORIDE 2000 MG: 1 INJECTION, POWDER, LYOPHILIZED, FOR SOLUTION INTRAVENOUS at 22:49

## 2020-01-10 RX ADMIN — LORAZEPAM 0.5 MG: 2 INJECTION, SOLUTION INTRAMUSCULAR; INTRAVENOUS at 17:24

## 2020-01-10 RX ADMIN — SODIUM CHLORIDE 500 ML: 9 INJECTION, SOLUTION INTRAVENOUS at 17:26

## 2020-01-10 RX ADMIN — CEFEPIME HYDROCHLORIDE 2 G: 2 INJECTION, POWDER, FOR SOLUTION INTRAVENOUS at 19:07

## 2020-01-10 RX ADMIN — KETOROLAC TROMETHAMINE 30 MG: 30 INJECTION, SOLUTION INTRAMUSCULAR; INTRAVENOUS at 17:23

## 2020-01-10 RX ADMIN — ACETAMINOPHEN 650 MG: 325 TABLET ORAL at 20:49

## 2020-01-10 RX ADMIN — GABAPENTIN 300 MG: 300 CAPSULE ORAL at 16:56

## 2020-01-10 ASSESSMENT — PAIN SCALES - GENERAL
PAINLEVEL_OUTOF10: 4
PAINLEVEL_OUTOF10: 3
PAINLEVEL_OUTOF10: 7
PAINLEVEL_OUTOF10: 4

## 2020-01-10 ASSESSMENT — PAIN DESCRIPTION - ORIENTATION: ORIENTATION: RIGHT;LEFT

## 2020-01-10 ASSESSMENT — PAIN DESCRIPTION - PAIN TYPE
TYPE: CHRONIC PAIN

## 2020-01-10 ASSESSMENT — PAIN DESCRIPTION - LOCATION
LOCATION: GENERALIZED;FOOT
LOCATION: ABDOMEN;OTHER (COMMENT)
LOCATION: FOOT

## 2020-01-10 ASSESSMENT — PAIN DESCRIPTION - FREQUENCY: FREQUENCY: CONTINUOUS

## 2020-01-10 ASSESSMENT — PAIN DESCRIPTION - DESCRIPTORS: DESCRIPTORS: ACHING;CONSTANT

## 2020-01-10 NOTE — ED PROVIDER NOTES
I independently examined and evaluated Suzanna Purvis. In brief, *64yof with tremors, has them at baseline, has not seen neurology yet. Having increased tremors, sleepier than normal, having urinary symptoms. having anxiety. Has not been taking her medications including anti-hypertensives. Is on 2L NC at home at baseline. No cough. No SOB. No CP. She is confused, febrile. Focused exam revealed patient mildly confused, tachycardic, hypertensive, febrile, mild tachypnea, no crackles on exam.  Morbidly obese, soft and nondistended abdomen. No rashes. ED course: Patient meet sirs criteria, labs were ordered prior to my evaluation of her. Lactic acid had come back at 3.9 and so more fluids been ordered, cefepime for presumed likely UTI as source of infection, I did add a chest x-ray, questionable opacities, with tachypnea and her symptoms I did add vancomycin in addition to the cefepime. Urine has been sent, we will leave Mata in place as she is confused, having difficulty getting around. Plan for admission. Total critical care time today provided was 36 minutes. This excludes seperately billable procedure. Critical care time provided for sepsis that required close evaluation and/or intervention with concern for patient decompensation. All diagnostic, treatment, and disposition decisions were made by myself in conjunction with the advanced practice provider. For all further details of the patient's emergency department visit, please see the advanced practice provider's documentation. Comment: Please note this report has been produced using speech recognition software and may contain errors related to that system including errors in grammar, punctuation, and spelling, as well as words and phrases that may be inappropriate. If there are any questions or concerns please feel free to contact the dictating provider for clarification.        Monica Mcnally MD  01/10/20 2050          I was called by nursing, patient's blood pressure was recording at 60 systolic, she had never been this low, she is alert, oriented not lightheaded, not having any new symptoms, she had been in the 200s and in the 140 range but never dropped below that. We did a manual pressure and she is in the 416I systolic. I suspect that was an erroneous miss read on the mechanical cuff. She is still appropriate for stepdown unit, does not appear to be in shock. Is in no distress on my exam, with  at bedside.   She is having no difficulty breathing, no new symptoms     Zuly Smith MD  01/10/20 6570

## 2020-01-10 NOTE — ED PROVIDER NOTES
Blood Pressure Father     Obesity Father     Heart Disease Brother     High Blood Pressure Brother     Obesity Brother      Social History     Socioeconomic History    Marital status:      Spouse name: Not on file    Number of children: Not on file    Years of education: Not on file    Highest education level: Not on file   Occupational History    Not on file   Social Needs    Financial resource strain: Not on file    Food insecurity:     Worry: Not on file     Inability: Not on file    Transportation needs:     Medical: Not on file     Non-medical: Not on file   Tobacco Use    Smoking status: Former Smoker     Types: Cigarettes     Last attempt to quit: 3/19/2003     Years since quittin.8    Smokeless tobacco: Never Used    Tobacco comment: quit 15 years ago   Substance and Sexual Activity    Alcohol use: No    Drug use: No    Sexual activity: Never   Lifestyle    Physical activity:     Days per week: Not on file     Minutes per session: Not on file    Stress: Not on file   Relationships    Social connections:     Talks on phone: Not on file     Gets together: Not on file     Attends [employer] service: Not on file     Active member of club or organization: Not on file     Attends meetings of clubs or organizations: Not on file     Relationship status: Not on file    Intimate partner violence:     Fear of current or ex partner: Not on file     Emotionally abused: Not on file     Physically abused: Not on file     Forced sexual activity: Not on file   Other Topics Concern    Not on file   Social History Narrative    ** Merged History Encounter **          Current Facility-Administered Medications   Medication Dose Route Frequency Provider Last Rate Last Dose    metoprolol tartrate (LOPRESSOR) tablet 50 mg  50 mg Oral BID Rafa Blase, DO        0.9 % sodium chloride IV bolus 1,917 mL  30 mL/kg (Ideal) Intravenous Once Rafa Blase, DO        cefepime (MAXIPIME) 2 g IVPB minibag 2 g Intravenous Once Eri Buckley, DO         Current Outpatient Medications   Medication Sig Dispense Refill    amiodarone (CORDARONE) 200 MG tablet Take 1 tablet by mouth daily 30 tablet 3    torsemide (DEMADEX) 20 MG tablet Take 1 tablet by mouth 2 times daily 60 tablet 1    calcitRIOL (ROCALTROL) 0.25 MCG capsule Take 1 capsule by mouth daily 30 capsule 3    vitamin D (ERGOCALCIFEROL) 1.25 MG (07987 UT) CAPS capsule Take 1 capsule by mouth once a week for 4 doses 4 capsule 0    atorvastatin (LIPITOR) 80 MG tablet Take 1 tablet by mouth nightly 30 tablet 3    metoprolol tartrate (LOPRESSOR) 25 MG tablet Take 1 tablet by mouth 2 times daily 60 tablet 3    Insulin Degludec (TRESIBA FLEXTOUCH) 100 UNIT/ML SOPN Inject 45 Units into the skin nightly 1 pen 0    lactobacillus (CULTURELLE) capsule Take 1 capsule by mouth daily 30 capsule 0    spironolactone (ALDACTONE) 25 MG tablet Take 1 tablet by mouth daily 30 tablet 3    phosphorus (K PHOS NEUTRAL) 155-852-130 MG tablet Take 1 tablet by mouth 2 times daily 60 tablet 3    HUMALOG KWIKPEN 200 UNIT/ML SOPN pen Inject 15 Units into the skin 3 times daily (before meals) (Patient taking differently: Inject 35 Units into the skin 3 times daily (before meals) ) 2 pen 3    clopidogrel (PLAVIX) 75 MG tablet Take 1 tablet by mouth daily Patient has appointment on 3/27/18 more refills with be given after she keeps her office visit 90 tablet 3    nystatin (MYCOSTATIN) POWD powder Apply topically 2 times daily      BiPAP Machine MISC by BiPAP route nightly      metFORMIN (GLUCOPHAGE) 500 MG tablet Take 1 tablet by mouth 2 times daily (with meals) 60 tablet 0    ipratropium-albuterol (DUONEB) 0.5-2.5 (3) MG/3ML SOLN nebulizer solution Inhale 3 mLs into the lungs 4 times daily 360 mL 0    Nebulizers (AIRIAL COMPACT MINI NEBULIZER) MISC 1 Device by Does not apply route 4 times daily 1 each 0    albuterol sulfate  (90 Base) MCG/ACT inhaler Inhale 2 puffs - 16.0 GM/DL    Hematocrit 37.7 37 - 47 %    MCV 84.3 78 - 100 FL    MCH 25.7 (L) 27 - 31 PG    MCHC 30.5 (L) 32.0 - 36.0 %    RDW 15.7 (H) 11.7 - 14.9 %    Platelets 590 840 - 086 K/CU MM    MPV 9.9 7.5 - 11.1 FL    Differential Type AUTOMATED DIFFERENTIAL     Segs Relative 90.2 (H) 36 - 66 %    Lymphocytes % 3.9 (L) 24 - 44 %    Monocytes % 4.6 (H) 0 - 4 %    Eosinophils % 0.6 0 - 3 %    Basophils % 0.3 0 - 1 %    Segs Absolute 11.0 K/CU MM    Lymphocytes Absolute 0.5 K/CU MM    Monocytes Absolute 0.6 K/CU MM    Eosinophils Absolute 0.1 K/CU MM    Basophils Absolute 0.0 K/CU MM    Nucleated RBC % 0.0 %    Total Nucleated RBC 0.0 K/CU MM    Total Immature Neutrophil 0.05 K/CU MM    Immature Neutrophil % 0.4 0 - 0.43 %   Comprehensive Metabolic Panel w/ Reflex to MG   Result Value Ref Range    Sodium 130 (L) 135 - 145 MMOL/L    Potassium 3.8 3.5 - 5.1 MMOL/L    Chloride 89 (L) 99 - 110 mMol/L    CO2 28 21 - 32 MMOL/L    BUN 19 6 - 23 MG/DL    CREATININE 0.7 0.6 - 1.1 MG/DL    Glucose 189 (H) 70 - 99 MG/DL    Calcium 9.2 8.3 - 10.6 MG/DL    Alb 3.3 (L) 3.4 - 5.0 GM/DL    Total Protein 8.0 6.4 - 8.2 GM/DL    Total Bilirubin 0.4 0.0 - 1.0 MG/DL    ALT 15 10 - 40 U/L    AST 22 15 - 37 IU/L    Alkaline Phosphatase 112 40 - 129 IU/L    GFR Non-African American >60 >60 mL/min/1.73m2    GFR African American >60 >60 mL/min/1.73m2    Anion Gap 13 4 - 16      Radiographs (if obtained):  Radiologist's Report Reviewed:  No results found. EKG (if obtained): (All EKG's are interpreted by myself in the absence of a cardiologist)      MDM:  Was evaluated for her tremor and concern for urinary tract infection. Vital signs did show her to be tachycardic, hypertensive, and tachypneic. Temperature was 99.6. Patient had not taken her blood pressure medication today so I did give her her home dose of Lopressor. Patient also did have sepsis work-up initiated on her.   She was given antibiotics for possible UTI. patient was also very anxious and was given Ativan. She also received her home dose of Neurontin. Currently her urine is pending as well as her lactic acid. On reevaluation patient was very somnolent but was arousable. Do feel that the patient does need admission to the hospital for further evaluation. At this time patient is at her home dose of oxygen and does not feel short of breath and has not had chest pain. I have low concern for pneumonia or PE. If her urine is not infected a chest x-ray will be ordered for possible occult pneumonia. Patient will likely be admitted. Clinical Impression:  1. Sepsis, due to unspecified organism, unspecified whether acute organ dysfunction present [facility])      Disposition referral (if applicable):  No follow-up provider specified. Disposition medications (if applicable):  New Prescriptions    No medications on file     ED Provider Disposition Time  DISPOSITION      Comment: Please note this report has been produced using speech recognition software and may contain errors related to that system including errors in grammar, punctuation, and spelling, as well as words and phrases that may be inappropriate. Efforts were made to edit the dictations.       Sandrita Walters DO  01/10/20 7985

## 2020-01-10 NOTE — ED NOTES
Bed: ED-31  Expected date:   Expected time:   Means of arrival:   Comments:  ems     Pinky Shelton  01/10/20 1528

## 2020-01-11 LAB
ANION GAP SERPL CALCULATED.3IONS-SCNC: 11 MMOL/L (ref 4–16)
BUN BLDV-MCNC: 24 MG/DL (ref 6–23)
CALCIUM SERPL-MCNC: 8.3 MG/DL (ref 8.3–10.6)
CHLORIDE BLD-SCNC: 95 MMOL/L (ref 99–110)
CO2: 25 MMOL/L (ref 21–32)
CREAT SERPL-MCNC: 1.2 MG/DL (ref 0.6–1.1)
GFR AFRICAN AMERICAN: 55 ML/MIN/1.73M2
GFR NON-AFRICAN AMERICAN: 46 ML/MIN/1.73M2
GLUCOSE BLD-MCNC: 108 MG/DL (ref 70–99)
GLUCOSE BLD-MCNC: 122 MG/DL (ref 70–99)
GLUCOSE BLD-MCNC: 142 MG/DL (ref 70–99)
GLUCOSE BLD-MCNC: 148 MG/DL (ref 70–99)
GLUCOSE BLD-MCNC: 154 MG/DL (ref 70–99)
GLUCOSE BLD-MCNC: 177 MG/DL (ref 70–99)
HCT VFR BLD CALC: 33.8 % (ref 37–47)
HEMOGLOBIN: 10.3 GM/DL (ref 12.5–16)
LACTATE: 1.2 MMOL/L (ref 0.4–2)
LACTIC ACID, SEPSIS: 2.1 MMOL/L (ref 0.5–1.9)
LACTIC ACID, SEPSIS: 2.1 MMOL/L (ref 0.5–1.9)
LEGIONELLA URINARY AG: NEGATIVE
MCH RBC QN AUTO: 25.8 PG (ref 27–31)
MCHC RBC AUTO-ENTMCNC: 30.5 % (ref 32–36)
MCV RBC AUTO: 84.7 FL (ref 78–100)
PDW BLD-RTO: 16.4 % (ref 11.7–14.9)
PLATELET # BLD: 222 K/CU MM (ref 140–440)
PMV BLD AUTO: 9.9 FL (ref 7.5–11.1)
POTASSIUM SERPL-SCNC: 4.4 MMOL/L (ref 3.5–5.1)
PRO-BNP: 2535 PG/ML
RBC # BLD: 3.99 M/CU MM (ref 4.2–5.4)
SODIUM BLD-SCNC: 131 MMOL/L (ref 135–145)
STREP PNEUMONIAE ANTIGEN: NORMAL
TROPONIN T: 0.04 NG/ML
WBC # BLD: 14.2 K/CU MM (ref 4–10.5)

## 2020-01-11 PROCEDURE — 80048 BASIC METABOLIC PNL TOTAL CA: CPT

## 2020-01-11 PROCEDURE — 2000000000 HC ICU R&B

## 2020-01-11 PROCEDURE — 2580000003 HC RX 258: Performed by: HOSPITALIST

## 2020-01-11 PROCEDURE — 94660 CPAP INITIATION&MGMT: CPT

## 2020-01-11 PROCEDURE — 82962 GLUCOSE BLOOD TEST: CPT

## 2020-01-11 PROCEDURE — 83605 ASSAY OF LACTIC ACID: CPT

## 2020-01-11 PROCEDURE — 2580000003 HC RX 258: Performed by: NURSE PRACTITIONER

## 2020-01-11 PROCEDURE — 84484 ASSAY OF TROPONIN QUANT: CPT

## 2020-01-11 PROCEDURE — 94640 AIRWAY INHALATION TREATMENT: CPT

## 2020-01-11 PROCEDURE — 6360000002 HC RX W HCPCS: Performed by: NURSE PRACTITIONER

## 2020-01-11 PROCEDURE — 83880 ASSAY OF NATRIURETIC PEPTIDE: CPT

## 2020-01-11 PROCEDURE — 6360000002 HC RX W HCPCS: Performed by: HOSPITALIST

## 2020-01-11 PROCEDURE — 2700000000 HC OXYGEN THERAPY PER DAY

## 2020-01-11 PROCEDURE — 85027 COMPLETE CBC AUTOMATED: CPT

## 2020-01-11 PROCEDURE — 36415 COLL VENOUS BLD VENIPUNCTURE: CPT

## 2020-01-11 PROCEDURE — 2500000003 HC RX 250 WO HCPCS: Performed by: NURSE PRACTITIONER

## 2020-01-11 PROCEDURE — 6370000000 HC RX 637 (ALT 250 FOR IP): Performed by: HOSPITALIST

## 2020-01-11 PROCEDURE — 94761 N-INVAS EAR/PLS OXIMETRY MLT: CPT

## 2020-01-11 PROCEDURE — 6370000000 HC RX 637 (ALT 250 FOR IP): Performed by: NURSE PRACTITIONER

## 2020-01-11 PROCEDURE — 51702 INSERT TEMP BLADDER CATH: CPT

## 2020-01-11 PROCEDURE — 94664 DEMO&/EVAL PT USE INHALER: CPT

## 2020-01-11 RX ORDER — INSULIN GLARGINE 100 [IU]/ML
45 INJECTION, SOLUTION SUBCUTANEOUS NIGHTLY
Status: DISCONTINUED | OUTPATIENT
Start: 2020-01-11 | End: 2020-01-14 | Stop reason: HOSPADM

## 2020-01-11 RX ORDER — CALCIUM CARBONATE 200(500)MG
500 TABLET,CHEWABLE ORAL 3 TIMES DAILY PRN
Status: DISCONTINUED | OUTPATIENT
Start: 2020-01-11 | End: 2020-01-14 | Stop reason: HOSPADM

## 2020-01-11 RX ORDER — ONDANSETRON 2 MG/ML
4 INJECTION INTRAMUSCULAR; INTRAVENOUS EVERY 6 HOURS PRN
Status: DISCONTINUED | OUTPATIENT
Start: 2020-01-11 | End: 2020-01-14 | Stop reason: HOSPADM

## 2020-01-11 RX ORDER — SODIUM CHLORIDE 9 MG/ML
INJECTION, SOLUTION INTRAVENOUS CONTINUOUS
Status: DISCONTINUED | OUTPATIENT
Start: 2020-01-11 | End: 2020-01-13

## 2020-01-11 RX ORDER — 0.9 % SODIUM CHLORIDE 0.9 %
500 INTRAVENOUS SOLUTION INTRAVENOUS ONCE
Status: COMPLETED | OUTPATIENT
Start: 2020-01-11 | End: 2020-01-11

## 2020-01-11 RX ORDER — SODIUM CHLORIDE 9 MG/ML
INJECTION, SOLUTION INTRAVENOUS CONTINUOUS
Status: DISCONTINUED | OUTPATIENT
Start: 2020-01-11 | End: 2020-01-11

## 2020-01-11 RX ORDER — 0.9 % SODIUM CHLORIDE 0.9 %
500 INTRAVENOUS SOLUTION INTRAVENOUS ONCE
Status: DISCONTINUED | OUTPATIENT
Start: 2020-01-11 | End: 2020-01-11 | Stop reason: CLARIF

## 2020-01-11 RX ORDER — GABAPENTIN 400 MG/1
400 CAPSULE ORAL 3 TIMES DAILY
Status: DISCONTINUED | OUTPATIENT
Start: 2020-01-11 | End: 2020-01-14 | Stop reason: HOSPADM

## 2020-01-11 RX ADMIN — PRAMIPEXOLE DIHYDROCHLORIDE 1 MG: 1 TABLET ORAL at 00:58

## 2020-01-11 RX ADMIN — PRAMIPEXOLE DIHYDROCHLORIDE 1 MG: 1 TABLET ORAL at 13:57

## 2020-01-11 RX ADMIN — IPRATROPIUM BROMIDE AND ALBUTEROL SULFATE 3 ML: .5; 3 SOLUTION RESPIRATORY (INHALATION) at 21:29

## 2020-01-11 RX ADMIN — PANTOPRAZOLE SODIUM 40 MG: 40 TABLET, DELAYED RELEASE ORAL at 05:43

## 2020-01-11 RX ADMIN — ONDANSETRON 4 MG: 2 INJECTION INTRAMUSCULAR; INTRAVENOUS at 13:17

## 2020-01-11 RX ADMIN — ATORVASTATIN CALCIUM 80 MG: 40 TABLET, FILM COATED ORAL at 21:26

## 2020-01-11 RX ADMIN — ACETAMINOPHEN 650 MG: 325 TABLET ORAL at 06:23

## 2020-01-11 RX ADMIN — GABAPENTIN 400 MG: 400 CAPSULE ORAL at 21:27

## 2020-01-11 RX ADMIN — GABAPENTIN 400 MG: 400 CAPSULE ORAL at 13:57

## 2020-01-11 RX ADMIN — GABAPENTIN 400 MG: 400 CAPSULE ORAL at 11:09

## 2020-01-11 RX ADMIN — CETIRIZINE HYDROCHLORIDE 5 MG: 5 TABLET, FILM COATED ORAL at 08:40

## 2020-01-11 RX ADMIN — ACETAMINOPHEN 650 MG: 325 TABLET ORAL at 00:57

## 2020-01-11 RX ADMIN — IPRATROPIUM BROMIDE AND ALBUTEROL SULFATE 3 ML: .5; 3 SOLUTION RESPIRATORY (INHALATION) at 15:58

## 2020-01-11 RX ADMIN — Medication 1 CAPSULE: at 08:40

## 2020-01-11 RX ADMIN — ENOXAPARIN SODIUM 40 MG: 40 INJECTION SUBCUTANEOUS at 11:27

## 2020-01-11 RX ADMIN — IPRATROPIUM BROMIDE AND ALBUTEROL SULFATE 3 ML: .5; 3 SOLUTION RESPIRATORY (INHALATION) at 07:56

## 2020-01-11 RX ADMIN — METOPROLOL TARTRATE 25 MG: 25 TABLET ORAL at 00:58

## 2020-01-11 RX ADMIN — SODIUM CHLORIDE, PRESERVATIVE FREE 10 ML: 5 INJECTION INTRAVENOUS at 21:26

## 2020-01-11 RX ADMIN — DIBASIC SODIUM PHOSPHATE, MONOBASIC POTASSIUM PHOSPHATE AND MONOBASIC SODIUM PHOSPHATE 1 TABLET: 852; 155; 130 TABLET ORAL at 21:26

## 2020-01-11 RX ADMIN — ASPIRIN 81 MG 81 MG: 81 TABLET ORAL at 08:40

## 2020-01-11 RX ADMIN — SODIUM CHLORIDE 500 ML: 9 INJECTION, SOLUTION INTRAVENOUS at 11:09

## 2020-01-11 RX ADMIN — MICONAZOLE NITRATE: 20 POWDER TOPICAL at 11:29

## 2020-01-11 RX ADMIN — SODIUM CHLORIDE 500 ML: 9 INJECTION, SOLUTION INTRAVENOUS at 17:37

## 2020-01-11 RX ADMIN — ATORVASTATIN CALCIUM 80 MG: 40 TABLET, FILM COATED ORAL at 00:58

## 2020-01-11 RX ADMIN — SODIUM CHLORIDE: 9 INJECTION, SOLUTION INTRAVENOUS at 14:01

## 2020-01-11 RX ADMIN — ACETAMINOPHEN 650 MG: 325 TABLET ORAL at 17:39

## 2020-01-11 RX ADMIN — SODIUM CHLORIDE, PRESERVATIVE FREE 10 ML: 5 INJECTION INTRAVENOUS at 14:06

## 2020-01-11 RX ADMIN — MEROPENEM 1 G: 1 INJECTION, POWDER, FOR SOLUTION INTRAVENOUS at 03:47

## 2020-01-11 RX ADMIN — PRAMIPEXOLE DIHYDROCHLORIDE 1 MG: 1 TABLET ORAL at 08:39

## 2020-01-11 RX ADMIN — MEROPENEM 1 G: 1 INJECTION, POWDER, FOR SOLUTION INTRAVENOUS at 13:59

## 2020-01-11 RX ADMIN — TORSEMIDE 20 MG: 20 TABLET ORAL at 00:58

## 2020-01-11 RX ADMIN — MEROPENEM 1 G: 1 INJECTION, POWDER, FOR SOLUTION INTRAVENOUS at 21:24

## 2020-01-11 RX ADMIN — DIBASIC SODIUM PHOSPHATE, MONOBASIC POTASSIUM PHOSPHATE AND MONOBASIC SODIUM PHOSPHATE 1 TABLET: 852; 155; 130 TABLET ORAL at 08:39

## 2020-01-11 RX ADMIN — MICONAZOLE NITRATE: 20 POWDER TOPICAL at 03:55

## 2020-01-11 RX ADMIN — PRAMIPEXOLE DIHYDROCHLORIDE 1 MG: 1 TABLET ORAL at 21:27

## 2020-01-11 RX ADMIN — INSULIN GLARGINE 45 UNITS: 100 INJECTION, SOLUTION SUBCUTANEOUS at 03:44

## 2020-01-11 RX ADMIN — DIBASIC SODIUM PHOSPHATE, MONOBASIC POTASSIUM PHOSPHATE AND MONOBASIC SODIUM PHOSPHATE 1 TABLET: 852; 155; 130 TABLET ORAL at 00:58

## 2020-01-11 RX ADMIN — OXYCODONE HYDROCHLORIDE AND ACETAMINOPHEN 500 MG: 500 TABLET ORAL at 08:40

## 2020-01-11 RX ADMIN — MICONAZOLE NITRATE: 20 POWDER TOPICAL at 21:28

## 2020-01-11 RX ADMIN — CALCITRIOL CAPSULES 0.25 MCG 0.25 MCG: 0.25 CAPSULE ORAL at 08:47

## 2020-01-11 RX ADMIN — AMIODARONE HYDROCHLORIDE 200 MG: 200 TABLET ORAL at 08:39

## 2020-01-11 RX ADMIN — CLOPIDOGREL BISULFATE 75 MG: 75 TABLET ORAL at 08:40

## 2020-01-11 RX ADMIN — IPRATROPIUM BROMIDE AND ALBUTEROL SULFATE 3 ML: .5; 3 SOLUTION RESPIRATORY (INHALATION) at 11:57

## 2020-01-11 ASSESSMENT — PAIN SCALES - GENERAL
PAINLEVEL_OUTOF10: 5
PAINLEVEL_OUTOF10: 4
PAINLEVEL_OUTOF10: 0
PAINLEVEL_OUTOF10: 2

## 2020-01-11 NOTE — H&P
(TRESIBA FLEXTOUCH) 100 UNIT/ML SOPN, Inject 45 Units into the skin nightly, Disp: 1 pen, Rfl: 0    lactobacillus (CULTURELLE) capsule, Take 1 capsule by mouth daily, Disp: 30 capsule, Rfl: 0    spironolactone (ALDACTONE) 25 MG tablet, Take 1 tablet by mouth daily, Disp: 30 tablet, Rfl: 3    phosphorus (K PHOS NEUTRAL) 155-852-130 MG tablet, Take 1 tablet by mouth 2 times daily, Disp: 60 tablet, Rfl: 3    HUMALOG KWIKPEN 200 UNIT/ML SOPN pen, Inject 15 Units into the skin 3 times daily (before meals) (Patient taking differently: Inject 35 Units into the skin 3 times daily (before meals) ), Disp: 2 pen, Rfl: 3    clopidogrel (PLAVIX) 75 MG tablet, Take 1 tablet by mouth daily Patient has appointment on 3/27/18 more refills with be given after she keeps her office visit, Disp: 90 tablet, Rfl: 3    nystatin (MYCOSTATIN) POWD powder, Apply topically 2 times daily, Disp: , Rfl:     BiPAP Machine MISC, by BiPAP route nightly, Disp: , Rfl:     metFORMIN (GLUCOPHAGE) 500 MG tablet, Take 1 tablet by mouth 2 times daily (with meals), Disp: 60 tablet, Rfl: 0    ipratropium-albuterol (DUONEB) 0.5-2.5 (3) MG/3ML SOLN nebulizer solution, Inhale 3 mLs into the lungs 4 times daily, Disp: 360 mL, Rfl: 0    Nebulizers (AIRIAL COMPACT MINI NEBULIZER) MISC, 1 Device by Does not apply route 4 times daily, Disp: 1 each, Rfl: 0    albuterol sulfate  (90 Base) MCG/ACT inhaler, Inhale 2 puffs into the lungs, Disp: , Rfl:     pantoprazole (PROTONIX) 40 MG tablet, Take 40 mg by mouth daily, Disp: , Rfl:     pramipexole (MIRAPEX) 1 MG tablet, Take 1 mg by mouth 3 times daily , Disp: , Rfl:     fexofenadine (ALLEGRA) 180 MG tablet, Take 180 mg by mouth daily , Disp: , Rfl:     Ascorbic Acid (VITAMIN C) 500 MG tablet, Take 500 mg by mouth daily , Disp: , Rfl:     aspirin 81 MG tablet, Take 81 mg by mouth daily, Disp: , Rfl:     History of present illness     Chief Complaint: Tremors      Camilla Owens is a 60 y.o. female  who presents with increased tremors with fatigue, dysuria and urgency. Patient BP was high on arrival and states he has not been taking her medications. She is on chronic oxygen at 2 liters at home. Patient states she had tremors for 15 years and was planning to follow up with her neurologist and was unable to do PT today. Denies fever, chills, cough, chest pain, shortness of breath, headache, abd. Pain, N/V/D, or focal deficit. Hx of ANMOL, CHF, HTN, HLD, Obesity, and arrhythmia. Review of Systems   Ten point ROS reviewed and negative, unless as noted below. GENERAL:  Denies fever, chills, night sweats, or changes in weight. Fatigue  EYES:  Denies recent visual changes. ENT:  Denies ear pain, hearing loss or tinnitus  RESP:  Denies any cough, dyspnea, or wheezing. CV:  Denies any chest pain with exertion or at rest, palpitations, syncope, or edema. GI:  Denies any dysphagia, nausea, vomiting, abdominal pain, heartburn, changes in bowel habit, melena or rectal bleeding  : Dysuria and urgency  MUSCULOSKELETAL:  Denies any joint swelling, joint pain, or loss of range of motion. NEURO:  Denies any headaches, tremors, dizziness, vertigo, memory loss, confusion, weakness, numbness or tingling. PSYCH:  Denies any sleeping problems, history of abuse, marital discord. HEME/LYMPHATIC/IMMUNO:  Denies , bruising, bleeding abnormalities   ENDO:  Denies any heat or cold intolerance, panemiaolyuria or polydipsia. Objective:   No intake or output data in the 24 hours ending 01/10/20 2221   Vitals:   Vitals:    01/10/20 2022   BP: (!) 144/102   Pulse: 108   Resp: 22   Temp: 103 °F (39.4 °C)   SpO2: 94%     Physical Exam:   Gen:  awake, alert, cooperative, no apparent distress.  Morbid obesity  EYES:Lids and lashes normal, pupils equal, round ,extra ocular muscles intact, sclera clear, conjunctiva normal  ENT:  Normocephalic, oral pharynx with moist mucus membranes  NECK:  Supple, symmetrical, trachea midline, no adenopathy,  LUNGS:  Clear to auscultate bilaterally, no rales ronchi or wheezing noted. Tachypneic  CARDIOVASCULAR:  Tachycardic, normal S1 and S2,no murmur noted, peripheral pulses 2+, no pitting edema  ABDOMEN: Normal BS, mild generalized tenderness, non distended, no HSM noted. obese  MUSCULOSKELETAL:  ROM of all extremities grossly wnl  NEUROLOGIC: AOx 3,  Cranial nerves II-XII are grossly intact. Motor is 5 out of 5 bilaterally. Sensory is intact, no lateralizing findings. SKIN:  no bruising or bleeding, normal skin color, turgor, no redness, warmth, or swelling      Past Medical History:      Past Medical History:   Diagnosis Date    Acute kidney [facility] [address] 2019    Aortic stenosis     Asthma     Bacteremia due to group B Streptococcus     [facility] in [facility] - felt to be due to cellulitis    [facility])     Cervical    Carotid artery stenosis     Chest pain     [facility]     Chronic back pain     COPD exacerbation ([facility] [address]     Depression     Diabetes mellitus ([facility] [address]     Family history of coronary artery disease     Fatty liver 10/27/2016    GI bleed 2017    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 - [facility] H/O aortic valve stenosis     H/O echocardiogram 2016    EF 55%, Aortic valve area is 0.84 cm2 with a mean gradient of 36 suggestive of severe aortic stenosis, mild to mos LVH    Headache     Heart murmur     History of nuclear stress test 2016    lexiscan-normal,EF70%    Morbid obesity ([facility])     BMI=73.72 kg/m2    Neuropathy     NSTEMI (non-ST elevated myocardial infarction) ([facility] [address] 2018    Obesity     Osteoarthritis     Palpitations     Peripheral edema     Restless legs syndrome     Sleep apnea     Has a CPAP    Sleep apnea     SOB (shortness of breath)      PSHX:  has a past surgical history that includes Cholecystectomy;  section; Hysterectomy; Tubal ligation;  Neck

## 2020-01-11 NOTE — PROGRESS NOTES
HISTORY: ORDERING SYSTEM PROVIDED HISTORY: sepsis TECHNOLOGIST PROVIDED HISTORY: Reason for exam:->sepsis Reason for Exam: sepsis Acuity: Unknown Type of Exam: Initial Additional signs and symptoms: none Relevant Medical/Surgical History: morbid obesity, SOB FINDINGS: Cardiomegaly. Pulmonary vascular congestion. No focal pulmonary consolidation. No pleural effusion. Bilateral pulmonary opacity could represent pulmonary edema or infectious process. Cardiomegaly with pulmonary vascular congestion and bilateral pulmonary opacity could represent edema or an infectious process       Telemetry EKG strip was personally reviewed. NSR. Assessment/Plan:     · Severe sepsis (tachycardia, fever, leukocytosis, mild hypotension) with UTI from MDR-GNR. Continue meropenem, IV fluid. Follow blood and urine cultures. Vitals are improving. Check resp PCR. · Chronic hypoxic respiratory failure on home BiPAP and O2. Continue nocturnal BiPAP and home O2. CXR showed mild edema. Watch respiratory status. · Chronic diastolic CHF. Hold outpatient diuretics for sepsis. Watch signs of worsening, while we give iv fluids. · H/o wide complex tachycardia on amiodarone. Continue to monitor. Continue amiodarone. · Type 2 DM on insulin. Continue current insulin regimens. BS fair. On carb control diet. · Morbid obesity with BMI 66. Needs long term life style modifications. · Left foot ulcer without infection. Wound team eval.   · ANMOL on BiPAP. · RLS on mirapex. DVT prophylaxis on lovenox. The above assessment/plan has been explained to the patient/family, who indicated understanding.     Benji Huizar MD  1/11/2020 2:31 PM

## 2020-01-12 LAB
ADENOVIRUS DETECTION BY PCR: NOT DETECTED
ANION GAP SERPL CALCULATED.3IONS-SCNC: 11 MMOL/L (ref 4–16)
BORDETELLA PERTUSSIS PCR: NOT DETECTED
BUN BLDV-MCNC: 36 MG/DL (ref 6–23)
CALCIUM SERPL-MCNC: 7.7 MG/DL (ref 8.3–10.6)
CHLAMYDOPHILA PNEUMONIA PCR: NOT DETECTED
CHLORIDE BLD-SCNC: 96 MMOL/L (ref 99–110)
CO2: 29 MMOL/L (ref 21–32)
CORONAVIRUS 229E PCR: NOT DETECTED
CORONAVIRUS HKU1 PCR: NOT DETECTED
CORONAVIRUS NL63 PCR: NOT DETECTED
CORONAVIRUS OC43 PCR: NOT DETECTED
CREAT SERPL-MCNC: 1.4 MG/DL (ref 0.6–1.1)
GFR AFRICAN AMERICAN: 46 ML/MIN/1.73M2
GFR NON-AFRICAN AMERICAN: 38 ML/MIN/1.73M2
GLUCOSE BLD-MCNC: 106 MG/DL (ref 70–99)
GLUCOSE BLD-MCNC: 124 MG/DL (ref 70–99)
GLUCOSE BLD-MCNC: 131 MG/DL (ref 70–99)
GLUCOSE BLD-MCNC: 136 MG/DL (ref 70–99)
GLUCOSE BLD-MCNC: 139 MG/DL (ref 70–99)
HCT VFR BLD CALC: 29.5 % (ref 37–47)
HEMOGLOBIN: 8.9 GM/DL (ref 12.5–16)
HUMAN METAPNEUMOVIRUS PCR: NOT DETECTED
INFLUENZA A BY PCR: NOT DETECTED
INFLUENZA A H1 (2009) PCR: NOT DETECTED
INFLUENZA A H1 PANDEMIC PCR: NOT DETECTED
INFLUENZA A H3 PCR: NOT DETECTED
INFLUENZA B BY PCR: NOT DETECTED
LACTATE: 0.8 MMOL/L (ref 0.4–2)
MCH RBC QN AUTO: 25.8 PG (ref 27–31)
MCHC RBC AUTO-ENTMCNC: 30.2 % (ref 32–36)
MCV RBC AUTO: 85.5 FL (ref 78–100)
MYCOPLASMA PNEUMONIAE PCR: NOT DETECTED
PARAINFLUENZA 1 PCR: NOT DETECTED
PARAINFLUENZA 2 PCR: NOT DETECTED
PARAINFLUENZA 3 PCR: NOT DETECTED
PARAINFLUENZA 4 PCR: NOT DETECTED
PDW BLD-RTO: 16.8 % (ref 11.7–14.9)
PLATELET # BLD: 197 K/CU MM (ref 140–440)
PMV BLD AUTO: 10.1 FL (ref 7.5–11.1)
POTASSIUM SERPL-SCNC: 3.8 MMOL/L (ref 3.5–5.1)
RBC # BLD: 3.45 M/CU MM (ref 4.2–5.4)
RHINOVIRUS ENTEROVIRUS PCR: NOT DETECTED
RSV PCR: NOT DETECTED
SODIUM BLD-SCNC: 136 MMOL/L (ref 135–145)
WBC # BLD: 6 K/CU MM (ref 4–10.5)

## 2020-01-12 PROCEDURE — 36415 COLL VENOUS BLD VENIPUNCTURE: CPT

## 2020-01-12 PROCEDURE — 94660 CPAP INITIATION&MGMT: CPT

## 2020-01-12 PROCEDURE — 2580000003 HC RX 258: Performed by: HOSPITALIST

## 2020-01-12 PROCEDURE — 87581 M.PNEUMON DNA AMP PROBE: CPT

## 2020-01-12 PROCEDURE — 87798 DETECT AGENT NOS DNA AMP: CPT

## 2020-01-12 PROCEDURE — 87486 CHLMYD PNEUM DNA AMP PROBE: CPT

## 2020-01-12 PROCEDURE — 6370000000 HC RX 637 (ALT 250 FOR IP): Performed by: NURSE PRACTITIONER

## 2020-01-12 PROCEDURE — 2580000003 HC RX 258: Performed by: NURSE PRACTITIONER

## 2020-01-12 PROCEDURE — 87633 RESP VIRUS 12-25 TARGETS: CPT

## 2020-01-12 PROCEDURE — 94761 N-INVAS EAR/PLS OXIMETRY MLT: CPT

## 2020-01-12 PROCEDURE — 6360000002 HC RX W HCPCS: Performed by: NURSE PRACTITIONER

## 2020-01-12 PROCEDURE — 94640 AIRWAY INHALATION TREATMENT: CPT

## 2020-01-12 PROCEDURE — 2700000000 HC OXYGEN THERAPY PER DAY

## 2020-01-12 PROCEDURE — 83605 ASSAY OF LACTIC ACID: CPT

## 2020-01-12 PROCEDURE — 6370000000 HC RX 637 (ALT 250 FOR IP): Performed by: HOSPITALIST

## 2020-01-12 PROCEDURE — 1200000000 HC SEMI PRIVATE

## 2020-01-12 PROCEDURE — 85027 COMPLETE CBC AUTOMATED: CPT

## 2020-01-12 PROCEDURE — 82962 GLUCOSE BLOOD TEST: CPT

## 2020-01-12 PROCEDURE — 80048 BASIC METABOLIC PNL TOTAL CA: CPT

## 2020-01-12 RX ORDER — INSULIN GLARGINE 100 [IU]/ML
22.5 INJECTION, SOLUTION SUBCUTANEOUS ONCE
Status: COMPLETED | OUTPATIENT
Start: 2020-01-12 | End: 2020-01-13

## 2020-01-12 RX ADMIN — PANTOPRAZOLE SODIUM 40 MG: 40 TABLET, DELAYED RELEASE ORAL at 07:22

## 2020-01-12 RX ADMIN — MICONAZOLE NITRATE: 20 POWDER TOPICAL at 10:24

## 2020-01-12 RX ADMIN — SODIUM CHLORIDE, PRESERVATIVE FREE 10 ML: 5 INJECTION INTRAVENOUS at 21:24

## 2020-01-12 RX ADMIN — SODIUM CHLORIDE: 9 INJECTION, SOLUTION INTRAVENOUS at 03:21

## 2020-01-12 RX ADMIN — IPRATROPIUM BROMIDE AND ALBUTEROL SULFATE 3 ML: .5; 3 SOLUTION RESPIRATORY (INHALATION) at 15:00

## 2020-01-12 RX ADMIN — GABAPENTIN 400 MG: 400 CAPSULE ORAL at 21:24

## 2020-01-12 RX ADMIN — MEROPENEM 1 G: 1 INJECTION, POWDER, FOR SOLUTION INTRAVENOUS at 21:25

## 2020-01-12 RX ADMIN — SODIUM CHLORIDE, PRESERVATIVE FREE 10 ML: 5 INJECTION INTRAVENOUS at 08:35

## 2020-01-12 RX ADMIN — CALCITRIOL CAPSULES 0.25 MCG 0.25 MCG: 0.25 CAPSULE ORAL at 08:34

## 2020-01-12 RX ADMIN — GABAPENTIN 400 MG: 400 CAPSULE ORAL at 14:31

## 2020-01-12 RX ADMIN — OXYCODONE HYDROCHLORIDE AND ACETAMINOPHEN 500 MG: 500 TABLET ORAL at 08:34

## 2020-01-12 RX ADMIN — PRAMIPEXOLE DIHYDROCHLORIDE 1 MG: 1 TABLET ORAL at 23:05

## 2020-01-12 RX ADMIN — IPRATROPIUM BROMIDE AND ALBUTEROL SULFATE 3 ML: .5; 3 SOLUTION RESPIRATORY (INHALATION) at 12:24

## 2020-01-12 RX ADMIN — CETIRIZINE HYDROCHLORIDE 5 MG: 5 TABLET, FILM COATED ORAL at 08:36

## 2020-01-12 RX ADMIN — MEROPENEM 1 G: 1 INJECTION, POWDER, FOR SOLUTION INTRAVENOUS at 10:24

## 2020-01-12 RX ADMIN — AMIODARONE HYDROCHLORIDE 200 MG: 200 TABLET ORAL at 08:34

## 2020-01-12 RX ADMIN — ENOXAPARIN SODIUM 40 MG: 40 INJECTION SUBCUTANEOUS at 08:41

## 2020-01-12 RX ADMIN — ATORVASTATIN CALCIUM 80 MG: 40 TABLET, FILM COATED ORAL at 21:25

## 2020-01-12 RX ADMIN — CLOPIDOGREL BISULFATE 75 MG: 75 TABLET ORAL at 08:35

## 2020-01-12 RX ADMIN — MICONAZOLE NITRATE: 20 POWDER TOPICAL at 21:34

## 2020-01-12 RX ADMIN — GABAPENTIN 400 MG: 400 CAPSULE ORAL at 08:34

## 2020-01-12 RX ADMIN — DIBASIC SODIUM PHOSPHATE, MONOBASIC POTASSIUM PHOSPHATE AND MONOBASIC SODIUM PHOSPHATE 1 TABLET: 852; 155; 130 TABLET ORAL at 21:24

## 2020-01-12 RX ADMIN — IPRATROPIUM BROMIDE AND ALBUTEROL SULFATE 3 ML: .5; 3 SOLUTION RESPIRATORY (INHALATION) at 08:14

## 2020-01-12 RX ADMIN — MEROPENEM 1 G: 1 INJECTION, POWDER, FOR SOLUTION INTRAVENOUS at 03:22

## 2020-01-12 RX ADMIN — IPRATROPIUM BROMIDE AND ALBUTEROL SULFATE 3 ML: .5; 3 SOLUTION RESPIRATORY (INHALATION) at 21:51

## 2020-01-12 RX ADMIN — Medication 1 CAPSULE: at 08:34

## 2020-01-12 RX ADMIN — DIBASIC SODIUM PHOSPHATE, MONOBASIC POTASSIUM PHOSPHATE AND MONOBASIC SODIUM PHOSPHATE 1 TABLET: 852; 155; 130 TABLET ORAL at 08:34

## 2020-01-12 RX ADMIN — PRAMIPEXOLE DIHYDROCHLORIDE 1 MG: 1 TABLET ORAL at 08:34

## 2020-01-12 RX ADMIN — ASPIRIN 81 MG 81 MG: 81 TABLET ORAL at 08:34

## 2020-01-12 ASSESSMENT — PAIN SCALES - GENERAL
PAINLEVEL_OUTOF10: 0

## 2020-01-12 NOTE — PROGRESS NOTES
[facility] Hospitalist Progress Note     Admit Date: 1/10/2020      Hospital Day: 3      Subjective:   Pt seen and examined. She presented with increasing tremors, and was found to have fever and tachycardia. Later her BP dropped and she was admitted to ICU. She was found to have UTI. She denies urinary symptoms. She feels better, less fatigued, less tremors. General ROS: + fever, chills. Cardiovascular ROS: no chest pain. Respiratory ROS: no cough, shortness of breath. Gastrointestinal ROS: no abdominal pain, diarrhea, N/V. Objective:   BP (!) 113/46   Pulse 75   Temp 99.7 °F (37.6 °C) (Rectal)   Resp 18   Ht 5' 6\" (1.676 m)   Wt (!) 408 lb 15.3 oz (185.5 kg)   SpO2 99%   BMI 66.01 kg/m²    General: The patient appears as stated age, fatigued but not in distress. Mental status: awake, oriented, coherent. No agitation or anxiety. Eyes: ALVINA. Normal conjunctiva. ENT/Mouth: normal appearing jaw and neck, no neck nodes or sinus tenderness. Clear oropharynx with moist mucous membrane. Cardiovascular:  normal rate, regular rhythm, normal S1, S2, no murmurs, rubs, clicks or gallops. No peripheral edema. Dorsal pedis pulses 2+ bilaterally. Respiratory: clear to auscultation, no wheezes, rales or rhonchi, symmetric air entry. Gastrointestinal: soft, nontender, nondistended, no masses or organomegaly. Genitourinary:  No CVA tenderness. Musculoskletal:  no clubbing or cyanosis. No joint swelling, warmth, or tenderness. Skin:  normal coloration and turgor, no rashes, no suspicious skin lesions noted. Neurologic: Normal speech, no focal findings or movement disorder noted. Data Review  Labs were reviewed.     Lab Results   Component Value Date    WBC 6.0 01/12/2020    HGB 8.9 (L) 01/12/2020    HCT 29.5 (L) 01/12/2020    MCV 85.5 01/12/2020     01/12/2020     Lab Results   Component Value Date     01/12/2020    K 3.8 01/12/2020    CL 96 01/12/2020    CO2 29 01/12/2020    BUN 36 01/12/2020    CREATININE 1.4 01/12/2020    GLUCOSE 106 01/12/2020    CALCIUM 7.7 01/12/2020      Lactic acid 3.4-->2.1-->2.1-->1. 2    Telemetry EKG strip was personally reviewed. NSR. Assessment/Plan:     · Severe sepsis (tachycardia, fever, leukocytosis, mild hypotension) with UTI from suspected MDR-GNR. Blood culture negative x48h. Urine cultures shows heavy GNRs. Vitals are improving. Continue meropenem. Reduce iv fluid rate. May transfer to med/surg with normal HR, BP. · Chronic hypoxic respiratory failure on home BiPAP and O2. Continue nocturnal BiPAP and home O2. CXR showed mild edema. Watch respiratory status. Reduce iv fluid rate. · Chronic diastolic CHF. Hold outpatient diuretics for sepsis. Watch signs of worsening, while we continue iv fluids. · H/o wide complex tachycardia on amiodarone. Continue to monitor. Continue amiodarone. · Type 2 DM on insulin. Continue current insulin regimens. BS fair. On carb control diet. · Morbid obesity with BMI 66. Needs long term life style modifications. · Left foot ulcer without infection. Wound team eval.   · ANMOL on BiPAP. · RLS on mirapex. DVT prophylaxis on lovenox. The above assessment/plan has been explained to the patient/family, who indicated understanding.     Ce Brothers MD  1/12/2020 12:53 PM

## 2020-01-13 LAB
ANION GAP SERPL CALCULATED.3IONS-SCNC: 9 MMOL/L (ref 4–16)
BUN BLDV-MCNC: 20 MG/DL (ref 6–23)
CALCIUM SERPL-MCNC: 8.1 MG/DL (ref 8.3–10.6)
CHLORIDE BLD-SCNC: 98 MMOL/L (ref 99–110)
CO2: 29 MMOL/L (ref 21–32)
CREAT SERPL-MCNC: 0.8 MG/DL (ref 0.6–1.1)
CULTURE: ABNORMAL
CULTURE: ABNORMAL
GFR AFRICAN AMERICAN: >60 ML/MIN/1.73M2
GFR NON-AFRICAN AMERICAN: >60 ML/MIN/1.73M2
GLUCOSE BLD-MCNC: 113 MG/DL (ref 70–99)
GLUCOSE BLD-MCNC: 115 MG/DL (ref 70–99)
GLUCOSE BLD-MCNC: 121 MG/DL (ref 70–99)
GLUCOSE BLD-MCNC: 148 MG/DL (ref 70–99)
HCT VFR BLD CALC: 30.1 % (ref 37–47)
HEMOGLOBIN: 9.1 GM/DL (ref 12.5–16)
LACTATE: 0.9 MMOL/L (ref 0.4–2)
Lab: ABNORMAL
MCH RBC QN AUTO: 25.6 PG (ref 27–31)
MCHC RBC AUTO-ENTMCNC: 30.2 % (ref 32–36)
MCV RBC AUTO: 84.8 FL (ref 78–100)
PDW BLD-RTO: 16.6 % (ref 11.7–14.9)
PLATELET # BLD: 189 K/CU MM (ref 140–440)
PMV BLD AUTO: 9.9 FL (ref 7.5–11.1)
POTASSIUM SERPL-SCNC: 3.7 MMOL/L (ref 3.5–5.1)
RBC # BLD: 3.55 M/CU MM (ref 4.2–5.4)
SODIUM BLD-SCNC: 136 MMOL/L (ref 135–145)
SPECIMEN: ABNORMAL
TOTAL COLONY COUNT: ABNORMAL
WBC # BLD: 3.6 K/CU MM (ref 4–10.5)

## 2020-01-13 PROCEDURE — 94640 AIRWAY INHALATION TREATMENT: CPT

## 2020-01-13 PROCEDURE — 6370000000 HC RX 637 (ALT 250 FOR IP): Performed by: NURSE PRACTITIONER

## 2020-01-13 PROCEDURE — 94761 N-INVAS EAR/PLS OXIMETRY MLT: CPT

## 2020-01-13 PROCEDURE — 6370000000 HC RX 637 (ALT 250 FOR IP): Performed by: HOSPITALIST

## 2020-01-13 PROCEDURE — 94660 CPAP INITIATION&MGMT: CPT

## 2020-01-13 PROCEDURE — 36415 COLL VENOUS BLD VENIPUNCTURE: CPT

## 2020-01-13 PROCEDURE — 2580000003 HC RX 258: Performed by: NURSE PRACTITIONER

## 2020-01-13 PROCEDURE — 85027 COMPLETE CBC AUTOMATED: CPT

## 2020-01-13 PROCEDURE — 6370000000 HC RX 637 (ALT 250 FOR IP): Performed by: PHYSICIAN ASSISTANT

## 2020-01-13 PROCEDURE — 2580000003 HC RX 258: Performed by: HOSPITALIST

## 2020-01-13 PROCEDURE — 2700000000 HC OXYGEN THERAPY PER DAY

## 2020-01-13 PROCEDURE — 6360000002 HC RX W HCPCS: Performed by: NURSE PRACTITIONER

## 2020-01-13 PROCEDURE — 82962 GLUCOSE BLOOD TEST: CPT

## 2020-01-13 PROCEDURE — 6360000002 HC RX W HCPCS: Performed by: HOSPITALIST

## 2020-01-13 PROCEDURE — 83605 ASSAY OF LACTIC ACID: CPT

## 2020-01-13 PROCEDURE — 1200000000 HC SEMI PRIVATE

## 2020-01-13 PROCEDURE — 80048 BASIC METABOLIC PNL TOTAL CA: CPT

## 2020-01-13 RX ADMIN — ENOXAPARIN SODIUM 40 MG: 40 INJECTION SUBCUTANEOUS at 09:31

## 2020-01-13 RX ADMIN — Medication 1 CAPSULE: at 09:31

## 2020-01-13 RX ADMIN — MEROPENEM 1 G: 1 INJECTION, POWDER, FOR SOLUTION INTRAVENOUS at 11:29

## 2020-01-13 RX ADMIN — SODIUM CHLORIDE, PRESERVATIVE FREE 10 ML: 5 INJECTION INTRAVENOUS at 21:10

## 2020-01-13 RX ADMIN — OXYCODONE HYDROCHLORIDE AND ACETAMINOPHEN 500 MG: 500 TABLET ORAL at 09:31

## 2020-01-13 RX ADMIN — MEROPENEM 1 G: 1 INJECTION, POWDER, FOR SOLUTION INTRAVENOUS at 02:55

## 2020-01-13 RX ADMIN — PANTOPRAZOLE SODIUM 40 MG: 40 TABLET, DELAYED RELEASE ORAL at 05:39

## 2020-01-13 RX ADMIN — CETIRIZINE HYDROCHLORIDE 5 MG: 5 TABLET, FILM COATED ORAL at 09:30

## 2020-01-13 RX ADMIN — PRAMIPEXOLE DIHYDROCHLORIDE 1 MG: 1 TABLET ORAL at 21:11

## 2020-01-13 RX ADMIN — IPRATROPIUM BROMIDE AND ALBUTEROL SULFATE 3 ML: .5; 3 SOLUTION RESPIRATORY (INHALATION) at 15:05

## 2020-01-13 RX ADMIN — ATORVASTATIN CALCIUM 80 MG: 40 TABLET, FILM COATED ORAL at 21:09

## 2020-01-13 RX ADMIN — DIBASIC SODIUM PHOSPHATE, MONOBASIC POTASSIUM PHOSPHATE AND MONOBASIC SODIUM PHOSPHATE 1 TABLET: 852; 155; 130 TABLET ORAL at 09:31

## 2020-01-13 RX ADMIN — INSULIN GLARGINE 23 UNITS: 100 INJECTION, SOLUTION SUBCUTANEOUS at 00:14

## 2020-01-13 RX ADMIN — AMPICILLIN SODIUM 1 G: 1 INJECTION, POWDER, FOR SOLUTION INTRAMUSCULAR; INTRAVENOUS at 14:29

## 2020-01-13 RX ADMIN — AMIODARONE HYDROCHLORIDE 200 MG: 200 TABLET ORAL at 09:31

## 2020-01-13 RX ADMIN — AMPICILLIN SODIUM 1 G: 1 INJECTION, POWDER, FOR SOLUTION INTRAMUSCULAR; INTRAVENOUS at 18:30

## 2020-01-13 RX ADMIN — PRAMIPEXOLE DIHYDROCHLORIDE 1 MG: 1 TABLET ORAL at 09:31

## 2020-01-13 RX ADMIN — ACETAMINOPHEN 650 MG: 325 TABLET ORAL at 21:13

## 2020-01-13 RX ADMIN — IPRATROPIUM BROMIDE AND ALBUTEROL SULFATE 3 ML: .5; 3 SOLUTION RESPIRATORY (INHALATION) at 11:20

## 2020-01-13 RX ADMIN — AMPICILLIN SODIUM 1 G: 1 INJECTION, POWDER, FOR SOLUTION INTRAMUSCULAR; INTRAVENOUS at 23:55

## 2020-01-13 RX ADMIN — ASPIRIN 81 MG 81 MG: 81 TABLET ORAL at 09:30

## 2020-01-13 RX ADMIN — SODIUM CHLORIDE, PRESERVATIVE FREE 10 ML: 5 INJECTION INTRAVENOUS at 09:31

## 2020-01-13 RX ADMIN — IPRATROPIUM BROMIDE AND ALBUTEROL SULFATE 3 ML: .5; 3 SOLUTION RESPIRATORY (INHALATION) at 08:00

## 2020-01-13 RX ADMIN — MICONAZOLE NITRATE: 20 POWDER TOPICAL at 21:23

## 2020-01-13 RX ADMIN — DIBASIC SODIUM PHOSPHATE, MONOBASIC POTASSIUM PHOSPHATE AND MONOBASIC SODIUM PHOSPHATE 1 TABLET: 852; 155; 130 TABLET ORAL at 21:09

## 2020-01-13 RX ADMIN — CLOPIDOGREL BISULFATE 75 MG: 75 TABLET ORAL at 09:30

## 2020-01-13 RX ADMIN — GABAPENTIN 400 MG: 400 CAPSULE ORAL at 14:34

## 2020-01-13 RX ADMIN — CALCITRIOL CAPSULES 0.25 MCG 0.25 MCG: 0.25 CAPSULE ORAL at 09:30

## 2020-01-13 RX ADMIN — IPRATROPIUM BROMIDE AND ALBUTEROL SULFATE 3 ML: .5; 3 SOLUTION RESPIRATORY (INHALATION) at 19:33

## 2020-01-13 RX ADMIN — PRAMIPEXOLE DIHYDROCHLORIDE 1 MG: 1 TABLET ORAL at 14:34

## 2020-01-13 RX ADMIN — GABAPENTIN 400 MG: 400 CAPSULE ORAL at 09:31

## 2020-01-13 RX ADMIN — GABAPENTIN 400 MG: 400 CAPSULE ORAL at 21:09

## 2020-01-13 ASSESSMENT — PAIN DESCRIPTION - ORIENTATION
ORIENTATION: LEFT;RIGHT
ORIENTATION: LEFT;RIGHT
ORIENTATION: RIGHT;LEFT

## 2020-01-13 ASSESSMENT — PAIN SCALES - GENERAL
PAINLEVEL_OUTOF10: 0
PAINLEVEL_OUTOF10: 9

## 2020-01-13 ASSESSMENT — PAIN - FUNCTIONAL ASSESSMENT
PAIN_FUNCTIONAL_ASSESSMENT: PREVENTS OR INTERFERES SOME ACTIVE ACTIVITIES AND ADLS
PAIN_FUNCTIONAL_ASSESSMENT: PREVENTS OR INTERFERES SOME ACTIVE ACTIVITIES AND ADLS

## 2020-01-13 ASSESSMENT — PAIN DESCRIPTION - FREQUENCY
FREQUENCY: CONTINUOUS
FREQUENCY: CONTINUOUS

## 2020-01-13 ASSESSMENT — PAIN DESCRIPTION - LOCATION
LOCATION: FOOT
LOCATION: FOOT
LOCATION: GENERALIZED;FOOT

## 2020-01-13 ASSESSMENT — PAIN DESCRIPTION - PAIN TYPE
TYPE: CHRONIC PAIN

## 2020-01-13 ASSESSMENT — PAIN DESCRIPTION - DESCRIPTORS
DESCRIPTORS: ACHING
DESCRIPTORS: ACHING

## 2020-01-13 ASSESSMENT — PAIN DESCRIPTION - ONSET
ONSET: ON-GOING
ONSET: ON-GOING

## 2020-01-14 VITALS
SYSTOLIC BLOOD PRESSURE: 126 MMHG | TEMPERATURE: 98 F | HEART RATE: 69 BPM | WEIGHT: 293 LBS | DIASTOLIC BLOOD PRESSURE: 63 MMHG | RESPIRATION RATE: 24 BRPM | HEIGHT: 66 IN | OXYGEN SATURATION: 98 % | BODY MASS INDEX: 47.09 KG/M2

## 2020-01-14 LAB
ANION GAP SERPL CALCULATED.3IONS-SCNC: 9 MMOL/L (ref 4–16)
BUN BLDV-MCNC: 14 MG/DL (ref 6–23)
CALCIUM SERPL-MCNC: 8.3 MG/DL (ref 8.3–10.6)
CHLORIDE BLD-SCNC: 100 MMOL/L (ref 99–110)
CO2: 29 MMOL/L (ref 21–32)
CREAT SERPL-MCNC: 0.7 MG/DL (ref 0.6–1.1)
GFR AFRICAN AMERICAN: >60 ML/MIN/1.73M2
GFR NON-AFRICAN AMERICAN: >60 ML/MIN/1.73M2
GLUCOSE BLD-MCNC: 104 MG/DL (ref 70–99)
GLUCOSE BLD-MCNC: 108 MG/DL (ref 70–99)
HCT VFR BLD CALC: 29.5 % (ref 37–47)
HEMOGLOBIN: 8.9 GM/DL (ref 12.5–16)
LACTATE: 0.7 MMOL/L (ref 0.4–2)
MCH RBC QN AUTO: 25.8 PG (ref 27–31)
MCHC RBC AUTO-ENTMCNC: 30.2 % (ref 32–36)
MCV RBC AUTO: 85.5 FL (ref 78–100)
PDW BLD-RTO: 16.6 % (ref 11.7–14.9)
PLATELET # BLD: 203 K/CU MM (ref 140–440)
PMV BLD AUTO: 10.1 FL (ref 7.5–11.1)
POTASSIUM SERPL-SCNC: 4.1 MMOL/L (ref 3.5–5.1)
RBC # BLD: 3.45 M/CU MM (ref 4.2–5.4)
SODIUM BLD-SCNC: 138 MMOL/L (ref 135–145)
WBC # BLD: 4.6 K/CU MM (ref 4–10.5)

## 2020-01-14 PROCEDURE — 36415 COLL VENOUS BLD VENIPUNCTURE: CPT

## 2020-01-14 PROCEDURE — 82962 GLUCOSE BLOOD TEST: CPT

## 2020-01-14 PROCEDURE — 2580000003 HC RX 258: Performed by: HOSPITALIST

## 2020-01-14 PROCEDURE — 6370000000 HC RX 637 (ALT 250 FOR IP): Performed by: NURSE PRACTITIONER

## 2020-01-14 PROCEDURE — 6370000000 HC RX 637 (ALT 250 FOR IP): Performed by: HOSPITALIST

## 2020-01-14 PROCEDURE — 6360000002 HC RX W HCPCS: Performed by: NURSE PRACTITIONER

## 2020-01-14 PROCEDURE — 94640 AIRWAY INHALATION TREATMENT: CPT

## 2020-01-14 PROCEDURE — 2700000000 HC OXYGEN THERAPY PER DAY

## 2020-01-14 PROCEDURE — 83605 ASSAY OF LACTIC ACID: CPT

## 2020-01-14 PROCEDURE — 85027 COMPLETE CBC AUTOMATED: CPT

## 2020-01-14 PROCEDURE — 80048 BASIC METABOLIC PNL TOTAL CA: CPT

## 2020-01-14 PROCEDURE — 6360000002 HC RX W HCPCS: Performed by: HOSPITALIST

## 2020-01-14 PROCEDURE — 2580000003 HC RX 258: Performed by: NURSE PRACTITIONER

## 2020-01-14 PROCEDURE — 94761 N-INVAS EAR/PLS OXIMETRY MLT: CPT

## 2020-01-14 RX ORDER — SULFAMETHOXAZOLE AND TRIMETHOPRIM 800; 160 MG/1; MG/1
1 TABLET ORAL 2 TIMES DAILY
Qty: 14 TABLET | Refills: 0 | Status: SHIPPED | OUTPATIENT
Start: 2020-01-14 | End: 2020-01-21

## 2020-01-14 RX ORDER — GABAPENTIN 400 MG/1
400 CAPSULE ORAL 3 TIMES DAILY
Qty: 21 CAPSULE | Refills: 0 | Status: ON HOLD | OUTPATIENT
Start: 2020-01-14 | End: 2021-01-01 | Stop reason: HOSPADM

## 2020-01-14 RX ADMIN — IPRATROPIUM BROMIDE AND ALBUTEROL SULFATE 3 ML: .5; 3 SOLUTION RESPIRATORY (INHALATION) at 11:09

## 2020-01-14 RX ADMIN — ENOXAPARIN SODIUM 40 MG: 40 INJECTION SUBCUTANEOUS at 08:42

## 2020-01-14 RX ADMIN — CALCITRIOL CAPSULES 0.25 MCG 0.25 MCG: 0.25 CAPSULE ORAL at 08:40

## 2020-01-14 RX ADMIN — CLOPIDOGREL BISULFATE 75 MG: 75 TABLET ORAL at 08:39

## 2020-01-14 RX ADMIN — CETIRIZINE HYDROCHLORIDE 5 MG: 5 TABLET, FILM COATED ORAL at 08:42

## 2020-01-14 RX ADMIN — DIBASIC SODIUM PHOSPHATE, MONOBASIC POTASSIUM PHOSPHATE AND MONOBASIC SODIUM PHOSPHATE 1 TABLET: 852; 155; 130 TABLET ORAL at 08:39

## 2020-01-14 RX ADMIN — Medication 1 CAPSULE: at 08:39

## 2020-01-14 RX ADMIN — GABAPENTIN 400 MG: 400 CAPSULE ORAL at 08:39

## 2020-01-14 RX ADMIN — ASPIRIN 81 MG 81 MG: 81 TABLET ORAL at 08:40

## 2020-01-14 RX ADMIN — OXYCODONE HYDROCHLORIDE AND ACETAMINOPHEN 500 MG: 500 TABLET ORAL at 08:39

## 2020-01-14 RX ADMIN — AMPICILLIN SODIUM 1 G: 1 INJECTION, POWDER, FOR SOLUTION INTRAMUSCULAR; INTRAVENOUS at 06:16

## 2020-01-14 RX ADMIN — MICONAZOLE NITRATE: 20 POWDER TOPICAL at 08:40

## 2020-01-14 RX ADMIN — PRAMIPEXOLE DIHYDROCHLORIDE 1 MG: 1 TABLET ORAL at 08:41

## 2020-01-14 RX ADMIN — SODIUM CHLORIDE, PRESERVATIVE FREE 10 ML: 5 INJECTION INTRAVENOUS at 08:40

## 2020-01-14 RX ADMIN — PANTOPRAZOLE SODIUM 40 MG: 40 TABLET, DELAYED RELEASE ORAL at 06:16

## 2020-01-14 RX ADMIN — AMIODARONE HYDROCHLORIDE 200 MG: 200 TABLET ORAL at 08:39

## 2020-01-14 NOTE — PROGRESS NOTES
Called to schedule follow up.  states PCP is booked out until mid February and will send a request to office nurse to schedule patient and call patient directly.

## 2020-01-14 NOTE — FLOWSHEET NOTE
01/13/20 7696   Patient Observation   Observations pt feet washed with soap and water. pt cries with pain with her feet. moisture barrier applied to her feet and then wrapped in incont pads and warm blankets applied. pt given complete bath and hernandez care. micostatin powder applied to under her breasts, abd folds. pt back of left thigh purplish with dry skin. moinsture barrier applied. bed linens changed.

## 2020-01-14 NOTE — DISCHARGE INSTR - DIET

## 2020-01-14 NOTE — CARE COORDINATION
JORI noted Pt has a discharge for today. Called [facility]. to notify of the discharge. Faxed discharge packet to number provided. Pt is ready for discharge from CM standpoint.      Electronically signed by JORI Thomason on 1/14/2020 at 11:10 AM

## 2020-01-15 LAB
CULTURE: NORMAL
CULTURE: NORMAL
Lab: NORMAL
Lab: NORMAL
SPECIMEN: NORMAL
SPECIMEN: NORMAL

## 2020-04-08 NOTE — ED PROVIDER NOTES
I independently examined and evaluated Dina Dubon. In brief, 62-year-old female who presents with 2 to 3 weeks of shortness of breath, that feels similar to her previous CHF. Also admits to increased urgency and frequency with urination. Patient states that she wears 2 L nasal cannula at home constantly, and has been doing well with his home O2. She states that she was sent in by her home health nurse. She states that she would not of come in if it was not for her home health nurse. Focused exam revealed well-appearing female, nontoxic appearing, no acute distress. Heart is regular rate and rhythm, lungs are clear. Patient is speaking in full sentences, answering all questions and following all commands appropriate. She is in no respiratory distress. ED course: Patient seen and examined. Labs and imaging obtained. Lab significant for elevated d-dimer. CT PE negative. Patient does feel safe going home. She lives with multiple family members that can take care of her. Patient follow-up with primary care provider in 1 to 2 days, return to ED if symptoms worsen. EKG #1 - sinus bradycardia, heart rate 56, , QRS 82, QTc 441, no acute ST elevation, there is T wave inversion in V2, this is seen in previous EKG from October 2019. EKG #2 -sinus bradycardia, heart rate 54, , QRS 86, QTc 440, first-degree AV block, no acute ST elevation. There is T wave inversion in V2, this is seen previously, no change from previous EKG done today. Throughout evaluation and ED course, while I encountered this patient, I did wear eye protection in the form of protective eyewear, as well as a procedural mask, gloves, hair covering. No aerosolized and procedure was performed during this patient's encounter. All diagnostic, treatment, and disposition decisions were made by myself in conjunction with the advanced practice provider.     For all further details of the patient's emergency
PORTABLE   Final Result   1. Central congestion without overt pulmonary edema. 2. Stable mild enlargement of the cardiac silhouette. ED COURSE & MEDICAL DECISION MAKING       Patient presents as above. Patient stable vital signs and nonlabored breathing throughout ED course. Serial rechecks reveals patient seated comfortable and without distress. Patient's evaluation today reveals elevated BNP as well as elevated d-dimer. CTA chest does not reveal evidence of pulmonary edema or PE. Given this and current pandemic environment, I feel risk of hospitalization outweighs risk of outpatient treatment. This was discussed in detail at bedside with patient states would like to try outpatient treatment. I am agreeable to this. I discussed patient case with patient's cardiologist, Dr. Norah Guerin who agrees outpatient therapy is appropriate. Patient to call cardiologist, PCP for rechecks in the near future. Patient agrees to return emergency department if symptoms worsen or any new symptoms develop. Clinical  IMPRESSION    1. Dyspnea, unspecified type          Comment: Please note this report has been produced using speech recognition software and may contain errors related to that system including errors in grammar, punctuation, and spelling, as well as words and phrases that may be inappropriate. If there are any questions or concerns please feel free to contact the dictating provider for clarification.          Ivania Santiagoma  04/08/20 0533

## 2020-04-09 NOTE — CARE COORDINATION
Call to check on pt status after recent ER visit for sob, dyspnea. Screening completed as follows:  COVID-19 Screening Initial Follow-up Note    Patient contacted regarding Dariusz Chavez. Care Transition Nurse/ Ambulatory Care Manager contacted the patient by telephone to perform post discharge assessment. Verified name and  with patient as identifiers. Provided introduction to self, and explanation of the CTN/ACM role, and reason for call due to risk factors for infection and/or exposure to COVID-19. Symptoms reviewed with patient who verbalized the following symptoms: cough, shortness of breath and no new/worsening symptoms       Due to no new or worsening symptoms encounter was not routed to provider for escalation. Patient has following risk factors of: COPD and asthma. CTN/ACM reviewed discharge instructions, medical action plan and red flags such as increased shortness of breath, increasing fever and signs of decompensation with patient who verbalized understanding. Discussed exposure protocols and quarantine with CDC Guidelines What to do if you are sick with coronavirus disease  Patient was given an opportunity for questions and concerns. The patient agrees to contact the [facility] exposure line [phone], [facility]: ([phone]) and PCP office for questions related to their healthcare. CTN/ACM provided contact information for future reference. Reviewed and educated patient on any new and changed medications related to discharge diagnosis     Patient/family/caregiver given information for [employer] Loop and agrees to enroll yes  Patient's preferred e-mail:  [email or web address]  Patient's preferred phone number:   [phone]  Based on Loop alert triggers, patient will be contacted by nurse care manager for worsening symptoms. Spoke with pt, pt is awaiting call back from PCP to follow up from ER as directed.   Pt with no new or worsening

## 2020-04-14 NOTE — ED PROVIDER NOTES
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 - [facility] at [facility] H/O aortic valve stenosis     H/O echocardiogram 2016    EF 55%, Aortic valve area is 0.84 cm2 with a mean gradient of 36 suggestive of severe aortic stenosis, mild to mos LVH    Headache     Heart murmur     History of nuclear stress test 2016    lexiscan-normal,EF70%    Morbid obesity ([facility])     BMI=73.72 kg/m2    Neuropathy     NSTEMI (non-ST elevated myocardial infarction) ([facility] [address] 2018    Obesity     Osteoarthritis     Palpitations     Peripheral edema     Restless legs syndrome     Sleep apnea     Has a CPAP    Sleep apnea     SOB (shortness of breath)      Past Surgical History:   Procedure Laterality Date    AORTIC VALVE REPLACEMENT  2017    29mm EvolutR TAVR     SECTION      CHOLECYSTECTOMY      GALLBLADDER SURGERY      HYSTERECTOMY      NECK SURGERY      Tumor    TUBAL LIGATION         CURRENT MEDICATIONS    Current Outpatient Rx   Medication Sig Dispense Refill    spironolactone (ALDACTONE) 25 MG tablet Take 2 tablets by mouth daily 30 tablet 0    gabapentin (NEURONTIN) 400 MG capsule Take 1 capsule by mouth 3 times daily for 7 days.  21 capsule 0    amiodarone (CORDARONE) 200 MG tablet Take 1 tablet by mouth daily 30 tablet 3    torsemide (DEMADEX) 20 MG tablet Take 1 tablet by mouth 2 times daily 60 tablet 1    calcitRIOL (ROCALTROL) 0.25 MCG capsule Take 1 capsule by mouth daily 30 capsule 3    vitamin D (ERGOCALCIFEROL) 1.25 MG (29876 UT) CAPS capsule Take 1 capsule by mouth once a week for 4 doses 4 capsule 0    atorvastatin (LIPITOR) 80 MG tablet Take 1 tablet by mouth nightly 30 tablet 3    metoprolol tartrate (LOPRESSOR) 25 MG tablet Take 1 tablet by mouth 2 times daily 60 tablet 3    Insulin Degludec (TRESIBA FLEXTOUCH) 100 UNIT/ML SOPN Inject 45 Units into the skin nightly 1 pen 0    lactobacillus (CULTURELLE) capsule Take 1 capsule by mouth daily 30 capsule 0    phosphorus (K PHOS NEUTRAL) 155-852-130 MG tablet Take 1 tablet by mouth 2 times daily 60 tablet 3    HUMALOG KWIKPEN 200 UNIT/ML SOPN pen Inject 15 Units into the skin 3 times daily (before meals) (Patient taking differently: Inject 35 Units into the skin 3 times daily (before meals) ) 2 pen 3    clopidogrel (PLAVIX) 75 MG tablet Take 1 tablet by mouth daily Patient has appointment on 3/27/18 more refills with be given after she keeps her office visit 90 tablet 3    nystatin (MYCOSTATIN) POWD powder Apply topically 2 times daily      BiPAP Machine MISC by BiPAP route nightly      metFORMIN (GLUCOPHAGE) 500 MG tablet Take 1 tablet by mouth 2 times daily (with meals) 60 tablet 0    ipratropium-albuterol (DUONEB) 0.5-2.5 (3) MG/3ML SOLN nebulizer solution Inhale 3 mLs into the lungs 4 times daily 360 mL 0    Nebulizers (AIRIAL COMPACT MINI NEBULIZER) MISC 1 Device by Does not apply route 4 times daily 1 each 0    albuterol sulfate  (90 Base) MCG/ACT inhaler Inhale 2 puffs into the lungs      pantoprazole (PROTONIX) 40 MG tablet Take 40 mg by mouth daily      pramipexole (MIRAPEX) 1 MG tablet Take 1 mg by mouth 3 times daily       fexofenadine (ALLEGRA) 180 MG tablet Take 180 mg by mouth daily       Ascorbic Acid (VITAMIN C) 500 MG tablet Take 500 mg by mouth daily       aspirin 81 MG tablet Take 81 mg by mouth daily         ALLERGIES    No Known Allergies    FAMILY AND SOCIAL HISTORY    Family History   Problem Relation Age of Onset    Heart Failure Mother     Heart Attack Mother     Hypertension Mother     Coronary Art Dis Mother         Had PTCA w/stenting.     Heart Failure Father     Heart Failure Brother     Heart Disease Mother     High Blood information-test approved by [facility] ([facility]-RH) and test performed in the ED. CDC form filled out. Nursing staff to call local health department. I have explained to the patient that their condition may change rapidly and have given them strict return precautions. In addition, they are given instructions regarding appropriate symptomatic care at home and instructions on how to minimize spread of the infection. Out of an abundance of caution I told patient and household members to self quarantine, and patient is to self-isolate until patient is asymptomatic for 72 hrs, without fever for 72 hours without use of antipyretics, and is greater than 7 days from onset of illness. Patient voices understanding and CDC recommendations provided to patient. I prescribed patient Spironolactone - we discussed medication. I have low clinical suspicion for ACS, PE, dissection. Patient is comfortable with discharge at this time. Patient is nontoxic appearing. Vital signs stable. Patient is stable for outpatient management. The patient and/or the family were informed of the results of any tests/labs/imaging, the treatment plan, and time was allotted to answer questions. Diagnosis, disposition, and plan discussed in detail with patient who understands and agrees. Patient understands and agrees to follow up with cardiologist for recheck in 2-4 days. Patient understands and agrees to return to emergency department if symptoms worsen or any new symptoms develop- strict return precautions given. Clinical  IMPRESSION    1. Shortness of breath    2. Elevated brain natriuretic peptide (BNP) level           Disposition referral (if applicable): Merced Gillette MD  [address]  [phone]    Call today  Recheck in 2-4 days for repeat labs to check electrolytes    [facility] Emergency Department  [address] [phone]  Go to Return to ED if symptoms worsen or new symptoms      Disposition medications (if applicable):  Current Discharge Medication List              Comment: Please note this report has been produced using speech recognition software and may contain errors related to that system including errors in grammar, punctuation, and spelling, as well as words and phrases that may be inappropriate. If there are any questions or concerns please feel free to contact the dictating provider for clarification.         Campos Ravi PA-C  04/14/20 0502

## 2020-04-22 NOTE — CARE COORDINATION
Patient contacted regarding COVID-19 risk and screening. Care Transition Nurse/ Ambulatory Care Manager contacted the patient by telephone to perform follow-up assessment. Verified name and  with patient as identifiers. Patient has following risk factors of: heart failure, COPD, immunocompromised, diabetes and obesity. Symptoms reviewed with patient who verbalized the following symptoms: shortness of breath and no new symptoms. Due to no improvement in symptoms,  encounter was routed to provider for escalation. Education provided regarding infection prevention, and signs and symptoms of COVID-19 and when to seek medical attention with patient who verbalized understanding. Discussed exposure protocols and quarantine from [address] you at higher risk for severe illness  and given an opportunity for questions and concerns. The patient agrees to contact the COVID-19 hotline [phone] or PCP office for questions related to their healthcare. CTN/ACM provided contact information for future reference. From CDC: Are you at higher risk for severe illness?  Wash your hands often.  Avoid close contact (6 feet, which is about two arm lengths) with people who are sick.  Put distance between yourself and other people if COVID-19 is spreading in your community.  Clean and disinfect frequently touched surfaces.  Avoid all cruise travel and non-essential air travel.  Call your healthcare professional if you have concerns about COVID-19 and your underlying condition or if you are sick. For more information on steps you can take to protect yourself, see CDC's How to [id number] [place] [address] for follow-up call in 1-2 days based on severity of symptoms and risk factors. Spoke with pt, she is still reporting worsening SOB, difficulty with urination and memory changes.   While on the phone with pt, ACM called PCP office with pt permission to schedule for follow up as pt has not completed a follow up encounter as previously stated. The [address] is to have a triage nurse call the pt back today to set up an appt. ACM will continue to follow. Elena Delgado RN  Ambulatory Care Manager  [phone] office/cell  [phone] fax  [email or web address]

## 2020-04-24 NOTE — CARE COORDINATION
Call to follow up on pt status, spoke with spouse. Spouse states SOB has improved some. Confirmed that pt does have appt with PCP on Monday. No other needs identified at this time. ACM will continue to follow. Tegan Mejia RN  Ambulatory Care Manager  [phone] office/cell  [phone] fax  [email or web address]

## 2020-04-28 NOTE — CARE COORDINATION
Outreach for 14 day follow up from ER visits. LM with ACM contact information for additional needs or concerns. No further outreach is planned, ACM signing off. Zachariah Connolly RN  Ambulatory Care Manager  [phone] office/cell  [phone] fax  [email or web address]

## 2020-05-24 NOTE — ED NOTES
Repeat atropine 1 mg given per verbal order Chris [address] Bayron Wade [facility]  05/24/20 1693

## 2020-05-24 NOTE — ED NOTES
Pacing up to 140 mA and intermittent capture and frequent pauses, Jesse Block PA at bedside.  Dr Km Deras at bedside,      Eduardo Silva RN  05/24/20 2575

## 2020-05-24 NOTE — ED PROVIDER NOTES
Headache, Heart murmur, History of nuclear stress test (2016), Morbid obesity ([facility] [address] Neuropathy, NSTEMI (non-ST elevated myocardial infarction) ([facility] [address] (2018), Obesity, Osteoarthritis, Palpitations, Peripheral edema, Restless legs syndrome, Sleep apnea, Sleep apnea, and SOB (shortness of breath). Past surgical history:  has a past surgical history that includes Cholecystectomy;  section; Hysterectomy; Tubal ligation; Neck surgery; Gallbladder surgery; and Aortic valve replacement (2017). Home medications:   Prior to Admission medications    Medication Sig Start Date End Date Taking? Authorizing Provider   spironolactone (ALDACTONE) 25 MG tablet Take 2 tablets by mouth daily 20   Cristopher Tuttle PA-C   gabapentin (NEURONTIN) 400 MG capsule Take 1 capsule by mouth 3 times daily for 7 days.  20  Carline Cristina MD   amiodarone (CORDARONE) 200 MG tablet Take 1 tablet by mouth daily 19   Carline Cristina MD   torsemide [facility]) 20 MG tablet Take 1 tablet by mouth 2 times daily 19   Carline Cristina MD   calcitRIOL (ROCALTROL) 0.25 MCG capsule Take 1 capsule by mouth daily 19   Carline Cristina MD   vitamin D (ERGOCALCIFEROL) 1.25 MG (70522 UT) CAPS capsule Take 1 capsule by mouth once a week for 4 doses 19  Carline Cristina MD   atorvastatin (LIPITOR) 80 MG tablet Take 1 tablet by mouth nightly 19   China Grace MD   metoprolol tartrate (LOPRESSOR) 25 MG tablet Take 1 tablet by mouth 2 times daily 19   China Grace MD   Insulin Degludec (TRESIBA FLEXTOUCH) 100 UNIT/ML SOPN Inject 45 Units into the skin nightly 19   Sterling Mckenna MD   lactobacillus (CULTURELLE) capsule Take 1 capsule by mouth daily 19   Sterling Mckenna MD   phosphorus (K PHOS NEUTRAL) [phone] MG tablet Take 1 tablet by mouth 2 times daily 19   Sterling Mckenna MD   HUMALOG KWIKPEN 200 UNIT/ML SOPN pen Inject 15 Units into the skin 3 times daily (before meals)  Patient taking differently: Inject 35 Units into the skin 3 times daily (before meals)  11/30/18   Christina Esquivel MD   clopidogrel (PLAVIX) 75 MG tablet Take 1 tablet by mouth daily Patient has appointment on 3/27/18 more refills with be given after she keeps her office visit 8/20/18   Toma Buerger, MD   nystatin (MYCOSTATIN) POWD powder Apply topically 2 times daily    Historical Provider, MD   BiPAP Machine MISC by BiPAP route nightly    Historical Provider, MD   metFORMIN (GLUCOPHAGE) 500 MG tablet Take 1 tablet by mouth 2 times daily (with meals) 2/16/18   C Sabrina Browne MD   ipratropium-albuterol (DUONEB) 0.5-2.5 (3) MG/3ML SOLN nebulizer solution Inhale 3 mLs into the lungs 4 times daily 2/16/18   C Sabrina Browne MD   Nebulizers (AIRIAL COMPACT MINI NEBULIZER) MISC 1 Device by Does not apply route 4 times daily 2/16/18   C Sabrina Browne MD   albuterol sulfate  (90 Base) MCG/ACT inhaler Inhale 2 puffs into the lungs    Historical Provider, MD   pantoprazole (PROTONIX) 40 MG tablet Take 40 mg by mouth daily    Historical Provider, MD   pramipexole (MIRAPEX) 1 MG tablet Take 1 mg by mouth 3 times daily  10/21/16   Historical Provider, MD   fexofenadine (ALLEGRA) 180 MG tablet Take 180 mg by mouth daily  10/21/16   Historical Provider, MD   Ascorbic Acid (VITAMIN C) 500 MG tablet Take 500 mg by mouth daily  10/17/16   Historical Provider, MD   aspirin 81 MG tablet Take 81 mg by mouth daily    Historical Provider, MD       Social history:  reports that she quit smoking about 17 years ago. Her smoking use included cigarettes. She has never used smokeless tobacco. She reports that she does not drink alcohol or use drugs. Family history:    Family History   Problem Relation Age of Onset    Heart Failure Mother     Heart Attack Mother     Hypertension Mother     Coronary Art Dis Mother         Had PTCA w/stenting.     Heart Failure Father     Heart Failure K/CU MM    RBC 3.01 (L) 4.2 - 5.4 M/CU MM    Hemoglobin 6.9 (LL) 12.5 - 16.0 GM/DL    Hematocrit 26.1 (L) 37 - 47 %    MCV 86.7 78 - 100 FL    MCH 22.9 (L) 27 - 31 PG    MCHC 26.4 (L) 32.0 - 36.0 %    RDW 17.2 (H) 11.7 - 14.9 %    Platelets 562 031 - 274 K/CU MM    MPV 9.4 7.5 - 11.1 FL    Differential Type AUTOMATED DIFFERENTIAL     Segs Relative 70.6 (H) 36 - 66 %    Lymphocytes % 13.0 (L) 24 - 44 %    Monocytes % 13.0 (H) 0 - 4 %    Eosinophils % 1.8 0 - 3 %    Basophils % 0.9 0 - 1 %    Segs Absolute 4.0 K/CU MM    Lymphocytes Absolute 0.7 K/CU MM    Monocytes Absolute 0.7 K/CU MM    Eosinophils Absolute 0.1 K/CU MM    Basophils Absolute 0.1 K/CU MM    Nucleated RBC % 0.7 %    Total Nucleated RBC 0.0 K/CU MM    Total Immature Neutrophil 0.04 K/CU MM    Immature Neutrophil % 0.7 (H) 0 - 0.43 %   CMP   Result Value Ref Range    Sodium 139 135 - 145 MMOL/L    Potassium 4.7 3.5 - 5.1 MMOL/L    Chloride 97 (L) 99 - 110 mMol/L    CO2 29 21 - 32 MMOL/L    BUN 43 (H) 6 - 23 MG/DL    CREATININE 3.0 (H) 0.6 - 1.1 MG/DL    Glucose 109 (H) 70 - 99 MG/DL    Calcium 7.9 (L) 8.3 - 10.6 MG/DL    Alb 3.3 (L) 3.4 - 5.0 GM/DL    Total Protein 7.2 6.4 - 8.2 GM/DL    Total Bilirubin 0.3 0.0 - 1.0 MG/DL    ALT 7 (L) 10 - 40 U/L    AST 11 (L) 15 - 37 IU/L    Alkaline Phosphatase 118 40 - 129 IU/L    GFR Non- 16 (L) >60 mL/min/1.73m2    GFR  19 (L) >60 mL/min/1.73m2    Anion Gap 13 4 - 16   Troponin   Result Value Ref Range    Troponin T 0.021 (H) <0.01 NG/ML   Brain Natriuretic Peptide   Result Value Ref Range    Pro-BNP 9,499 (H) <300 PG/ML   Urine Drug Screen   Result Value Ref Range    Cannabinoid Scrn, Ur NEGATIVE NEGATIVE    Amphetamines NEGATIVE NEGATIVE    Cocaine Metabolite NEGATIVE NEGATIVE    Benzodiazepine Screen, Urine NEGATIVE NEGATIVE    Barbiturate Screen, Ur NEGATIVE NEGATIVE    Opiates, Urine NEGATIVE NEGATIVE    Phencyclidine, Urine NEGATIVE NEGATIVE    Oxycodone NEGATIVE NEGATIVE Ethanol   Result Value Ref Range    Alcohol Scrn <0.01 <0.01 %WT/VOL   Ammonia   Result Value Ref Range    Ammonia 36 11 - 51 UMOL/L   CK   Result Value Ref Range    Total  (H) 26 - 140 IU/L   Urinalysis   Result Value Ref Range    Color, UA RAMIRO (A) YELLOW    Clarity, UA CLOUDY (A) CLEAR    Glucose, Urine NEGATIVE NEGATIVE MG/DL    Bilirubin Urine NEGATIVE NEGATIVE MG/DL    Ketones, Urine NEGATIVE NEGATIVE MG/DL    Specific Gravity, UA 1.025 1.001 - 1.035    Blood, Urine LARGE (A) NEGATIVE    pH, Urine 5.0 5.0 - 8.0    Protein,  (A) NEGATIVE MG/DL    Urobilinogen, Urine 1 0.2 - 1.0 MG/DL    Nitrite Urine, Quantitative NEGATIVE NEGATIVE    Leukocyte Esterase, Urine MODERATE (A) NEGATIVE    RBC, UA 1,699 (H) 0 - 6 /HPF    WBC,  (H) 0 - 5 /HPF    Bacteria, UA MANY (A) NEGATIVE /HPF    Mucus, UA MANY (A) NEGATIVE HPF    Trichomonas, UA NONE SEEN NONE SEEN /HPF   Salicylate   Result Value Ref Range    Salicylate Lvl <6.0 (L) 15 - 30 MG/DL    DOSE AMOUNT DOSE AMT. GIVEN - UNKNOWN     DOSE TIME DOSE TIME GIVEN - UNKNOWN    Acetaminophen level   Result Value Ref Range    Acetaminophen Level <5.0 (L) 15 - 30 ug/ml    DOSE AMOUNT DOSE AMT.  GIVEN - UNKNOWN     DOSE TIME DOSE TIME GIVEN - UNKNOWN    Blood Gas, Venous   Result Value Ref Range    pH, Maikel 7.31 (L) 7.32 - 7.42    pCO2, Maikel 61 (H) 38 - 52 mmHG    pO2, Maikel 148 (H) 28 - 48 mmHG    Base Exc, Mixed 2.8 (H) 0 - 2.3    HCO3, Venous 30.7 (H) 19 - 25 MMOL/L    O2 Sat, Maikel 90.6 (H) 50 - 70 %    Comment VBG    Lactic Acid, Plasma   Result Value Ref Range    Lactate 2.8 (HH) 0.4 - 2.0 mMOL/L   Protime/INR & PTT   Result Value Ref Range    Protime 12.5 11.7 - 14.5 SECONDS    INR 1.03 INDEX    aPTT 35.2 25.1 - 37.1 SECONDS   Lipase   Result Value Ref Range    Lipase 40 13 - 60 IU/L   Magnesium   Result Value Ref Range    Magnesium 2.3 1.8 - 2.4 mg/dl   TSH without Reflex   Result Value Ref Range    TSH, High Sensitivity 5.340 (H) 0.270 - 4.20 uIu/ml Phosphorus   Result Value Ref Range    Phosphorus 5.8 (H) 2.5 - 4.9 MG/DL   Lactic Acid, Plasma   Result Value Ref Range    Lactate 1.3 0.4 - 2.0 mMOL/L   COVID-19   Result Value Ref Range    Source NASOPHARYNGEAL SWAB     SARS-CoV-2, NAAT NOT DETECTED    POCT Glucose   Result Value Ref Range    QC OK? 124    POC Blood Glucose   Result Value Ref Range    Glucose 124 mg/dL    QC OK? ok    POCT Glucose   Result Value Ref Range    POC Glucose 124 (H) 70 - 99 MG/DL   EKG 12 Lead   Result Value Ref Range    Ventricular Rate 73 BPM    Atrial Rate 101 BPM    QRS Duration 94 ms    Q-T Interval 510 ms    QTc Calculation (Bazett) 561 ms    R Axis 53 degrees    T Axis 66 degrees    Diagnosis       Undetermined rhythm  Low voltage QRS  Nonspecific ST abnormality  Prolonged QT  Abnormal ECG  When compared with ECG of 14-APR-2020 12:01,  Current undetermined rhythm precludes rhythm comparison, needs review  QT has lengthened     TYPE AND SCREEN   Result Value Ref Range    ABO/Rh A POSITIVE     Antibody Screen NEGATIVE     Unit Number [id number]     Component LEUKO-POOR RED CELLS     Unit Divison 00     Status ISSUED     Transfusion Status OK TO TRANSFUSE     Crossmatch Result COMPATIBLE          [facility]  Patient presents for altered mental status, she is noncontributory to history, family reports she has been confused since yesterday and has had shortness of breath and increased swelling in legs. On arrival she is bradycardic and hypotensive, started on cutaneous pacing which failed to capture likely due to patient's body habitus. Her hypoxia improved with nonrebreather, she remained altered, laboratory work-up revealed UTI and anemia and JOSE. BNP is elevated, lactic acid elevated. VBG was fairly reassuring given her altered mental status. Dr. Kathia Soto spoke with cardiology who recommended dopamine drip which did improve patient's heart rate and blood pressure. She was started on cefepime and vancomycin, IV fluids.   She

## 2020-05-24 NOTE — ED NOTES
Report called to [facility]. Spoke with nurse Flaquito Harper and gave report on pt.       Ariel Hawley RN  05/24/20 3897

## 2020-05-24 NOTE — ED NOTES
Pacing verbal order Dr Rutledge Rummage rate 60, output increased to 120 mA until rate of 60 with qrs after pacing spike and confirmed by radial pulse     Bobby Mitchell RN  05/24/20 5150

## 2020-05-24 NOTE — ED PROVIDER NOTES
Covid test is negative. Patient's respiratory status did decline I did have to intubate her with MICU at bedside. Patient given 200 mg of succinylcholine and 50 mg of etomidate for sedation, rapid sequence intubation patient tolerated without difficulty did order chest x-ray. Pharmacy in room at bedside as well as mobile ICU and respiratory therapy. 12 lead EKG per my interpretation:  Sinus Bradycardia 73? Axis is   Normal  QTc is  561 (poor baseline)  There is no specific T wave changes appreciated. There is no specific ST wave changes appreciated. Prior EKG to compare with was not available       Procedure Note - Intubation:  Patient intubated for GCS less than 8. Pre-oxygenation was administered and the appropriate equipment and staff were made available at the bedside. Nonah Kill was sedated/paralyzed; please see the chart for the drugs and dosages administered. A Glory 3 laryngoscope blade was used. Video laryngoscopy WAS/WAS NOT: was used. A 7.0mm endotracheal tube was viewed to pass through the cords on the first attempt. The tube was secured at 25cm to the lip. Tracheal position was confirmed using a colorimetric end-tidal CO2 detector, tube condensation and chest auscultation/visualization. Respiratory therapy is at the bedside and is assisting with ventilatory management. Post procedure chest xray was ordered. Total critical care time today provided was at least 90 minutes. This excludes seperately billable procedure. Critical care time provided for reviewing labs, images, [place] [facility] [place], giving oxygen, antibiotics, fluids, dopamine, blood, consulting cardiology, that required close evaluation and/or intervention with concern for patient decompensation. All diagnostic, treatment, and disposition decisions were made by myself in conjunction with the Advanced Practice Provider.     For all further details of the patient's emergency department

## 2020-05-24 NOTE — ED NOTES
atroping 1 mg verbal order dr Kathia Soto and [facility]  05/24/20 0705

## 2020-05-24 NOTE — ED NOTES
waiting in car, spoke with Dr Maty Munoz and Tania Daly. Pt is full code, has had increasing confusion and would only state yesterday \"apples, apples, apples\". Pt also has had increased swelling and shortness of breath per . Pt weighed on bed scales and per chart pt has gained at least 50 lb in a month.       Bobby Mitchell RN  05/24/20 0107

## 2020-05-24 NOTE — ED NOTES
Pt intubated and care flight departed with pt at 1225. Updated report called to [facility] at [facility].       Carlos Alberto Swanson RN  05/24/20 1173

## 2020-06-04 NOTE — ED NOTES
Care and report to Aravind Awad, EMT-P with [facility].        Charles Gillette RN  06/04/20 7298 Problem: Pain  Goal: #Acceptable pain/comfort level is achieved/maintained at rest (based on self report using Numeric Rating Scales/Faces  Pt verbalizes a pain score of 3/10 with a goal of 3/10. Pt encouraged to press PCA button when able.

## 2020-06-04 NOTE — ED NOTES
Bed bath provided to pt at this time. Pt noted to be covered in stool up to mid back. Multiple areas of skin breakdown noted to skin folds including BLE, BUE, under axillary area, and labia. Mata catheter placed per PA Korinne. Pt tolerates well.       Varun Castellon RN  06/04/20 6218

## 2020-06-04 NOTE — ED NOTES
Pt BP reading 74/33 on monitor. BP cuff noted to not be in place.  BP Cuff repositioned and BP reading now [address] [facility] Bishop Jesus RN  06/04/20 1312

## 2020-06-04 NOTE — ED NOTES
Please Contact Transfer Center @ [phone] called [facility] spoke to [facility] transfer to [address] [place] [address] consult completed     Niurka Hightower  06/04/20 2663

## 2020-06-04 NOTE — ED PROVIDER NOTES
palpitations  Respiratory:  No shortness of breath, no cough, no wheezing  Gastrointestinal: See HPI  Musculoskeletal:  No muscle pain, no joint pain  Skin:  No rash, no pruritis, no easy bruising  Neurologic:  No speech problems, no headache  Psychiatric:  No anxiety  Genitourinary: See HPI   Extremities:  No edema    Past Medical History:   Diagnosis Date    Acute kidney injury ([facility] 2019    Aortic stenosis     Asthma     Bacteremia due to group B Streptococcus     Treated at [facility] in 2017 - felt to be due to cellulitis    Cancer [facility])     Cervical    Carotid artery stenosis     Chest pain     CHF (congestive heart failure) ([facility])     Chronic back pain     COPD exacerbation ([facility]     Depression     [facility]     Family history of coronary artery disease     Fatty liver 10/27/2016    GI bleed 2017    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 - [facility] H/O aortic valve stenosis     H/O echocardiogram 2016    EF 55%, Aortic valve area is 0.84 cm2 with a mean gradient of 36 suggestive of severe aortic stenosis, mild to mos LVH    Headache     Heart murmur     History of nuclear stress test 2016    lexiscan-normal,EF70%    Morbid obesity ([facility])     BMI=73.72 kg/m2    Neuropathy     NSTEMI (non-ST elevated myocardial infarction) ([facility] 2018    Obesity     Osteoarthritis     Palpitations     Peripheral edema     Restless legs syndrome     Sleep apnea     Has a CPAP    Sleep apnea     SOB (shortness of breath)      Past Surgical History:   Procedure Laterality Date    AORTIC VALVE REPLACEMENT  2017    29mm EvolutR TAVR     SECTION      CHOLECYSTECTOMY      GALLBLADDER SURGERY      HYSTERECTOMY      NECK SURGERY      Tumor    TUBAL LIGATION       Family History   Problem Relation Age of Onset    Heart Failure Mother     Heart Attack Mother     Hypertension Mother     Coronary Art Dis Mother         Had PTCA w/stenting.     Heart Failure Father     Heart Failure Brother     Heart Disease Mother     High Blood Pressure Mother     Obesity Mother     Diabetes Mother     Heart Disease Father     High Blood Pressure Father     Obesity Father     Heart Disease Brother     High Blood Pressure Brother     Obesity Brother      Social History     Socioeconomic History    Marital status:      Spouse name: Not on file    Number of children: Not on file    Years of education: Not on file    Highest education level: Not on file   Occupational History    Not on file   Social Needs    Financial resource strain: Not on file    Food insecurity     Worry: Not on file     Inability: Not on file    Transportation needs     Medical: Not on file     Non-medical: Not on file   Tobacco Use    Smoking status: Former Smoker     Types: Cigarettes     Last attempt to quit: 3/19/2003     Years since quittin.2    Smokeless tobacco: Never Used    Tobacco comment: quit 15 years ago   Substance and Sexual Activity    Alcohol use: No    Drug use: No    Sexual activity: Never   Lifestyle    Physical activity     Days per week: Not on file     Minutes per session: Not on file    Stress: Not on file   Relationships    Social connections     Talks on phone: Not on file     Gets together: Not on file     Attends [employer] service: Not on file     Active member of club or organization: Not on file     Attends meetings of clubs or organizations: Not on file     Relationship status: Not on file    Intimate partner violence     Fear of current or ex partner: Not on file     Emotionally abused: Not on file     Physically abused: Not on file     Forced sexual activity: Not on file   Other Topics Concern    Not on file   Social History Narrative    ** Merged History Encounter **          Current Facility-Administered Medications   Medication Dose Route Frequency Provider Last Rate Last Dose    potassium chloride 10 mEq/100 mL IVPB (Peripheral Line)  10 mEq Intravenous Once Melyssa Hickey PA-C         Current Outpatient Medications   Medication Sig Dispense Refill    spironolactone (ALDACTONE) 25 MG tablet Take 2 tablets by mouth daily 30 tablet 0    gabapentin (NEURONTIN) 400 MG capsule Take 1 capsule by mouth 3 times daily for 7 days.  21 capsule 0    amiodarone (CORDARONE) 200 MG tablet Take 1 tablet by mouth daily 30 tablet 3    torsemide (DEMADEX) 20 MG tablet Take 1 tablet by mouth 2 times daily 60 tablet 1    calcitRIOL (ROCALTROL) 0.25 MCG capsule Take 1 capsule by mouth daily 30 capsule 3    vitamin D (ERGOCALCIFEROL) 1.25 MG (47673 UT) CAPS capsule Take 1 capsule by mouth once a week for 4 doses 4 capsule 0    atorvastatin (LIPITOR) 80 MG tablet Take 1 tablet by mouth nightly 30 tablet 3    metoprolol tartrate (LOPRESSOR) 25 MG tablet Take 1 tablet by mouth 2 times daily 60 tablet 3    Insulin Degludec (TRESIBA FLEXTOUCH) 100 UNIT/ML SOPN Inject 45 Units into the skin nightly 1 pen 0    lactobacillus (CULTURELLE) capsule Take 1 capsule by mouth daily 30 capsule 0    phosphorus (K PHOS NEUTRAL) 155-852-130 MG tablet Take 1 tablet by mouth 2 times daily 60 tablet 3    HUMALOG KWIKPEN 200 UNIT/ML SOPN pen Inject 15 Units into the skin 3 times daily (before meals) (Patient taking differently: Inject 35 Units into the skin 3 times daily (before meals) ) 2 pen 3    clopidogrel (PLAVIX) 75 MG tablet Take 1 tablet by mouth daily Patient has appointment on 3/27/18 more refills with be given after she keeps her office visit 90 tablet 3    nystatin (MYCOSTATIN) POWD powder Apply topically 2 times daily      BiPAP Machine MISC by BiPAP route nightly      metFORMIN (GLUCOPHAGE) 500 MG tablet Take 1 tablet by mouth 2 times daily (with meals) 60 tablet 0    ipratropium-albuterol (DUONEB) 0.5-2.5 (3) MG/3ML SOLN nebulizer solution Inhale 3 mLs into the lungs 4 times daily 360 mL 0    Nebulizers (AIRIAL COMPACT MINI NEBULIZER) MISC 1 Device by Does not apply route 4 times daily 1 each 0    albuterol sulfate  (90 Base) MCG/ACT inhaler Inhale 2 puffs into the lungs      pantoprazole (PROTONIX) 40 MG tablet Take 40 mg by mouth daily      pramipexole (MIRAPEX) 1 MG tablet Take 1 mg by mouth 3 times daily       fexofenadine (ALLEGRA) 180 MG tablet Take 180 mg by mouth daily       Ascorbic Acid (VITAMIN C) 500 MG tablet Take 500 mg by mouth daily       aspirin 81 MG tablet Take 81 mg by mouth daily       No Known Allergies    Nursing Notes Reviewed  PHYSICAL EXAM    VITAL SIGNS: BP (!) 107/44   Pulse 50   Temp 98.4 °F (36.9 °C) (Oral)   Resp 10   Ht 5' 6\" (1.676 m)   Wt (!) 494 lb (224.1 kg)   SpO2 100%   BMI 79.73 kg/m²    Constitutional:  Well developed, morbidly obese female, appears older than stated age, appears overall weak and deconditioned, nontoxic  Head:  Normocephalic, Atraumatic  Eyes: EOMI. Sclera clear. Conjunctiva normal, No discharge. Neck/Lymphatics: Supple, no JVD, No lymphadenopathy  Cardiovascular:  RRR, Normal S1 & S2    Peripheral Vascular: Distal pulses 2+, Capillary refill <2seconds  Respiratory:  Respirations nonnlabored, 100% on home 2 L. Speaking full sentences. Coarse air exchange throughout, no rales or retractions. Abdomen: Bowel sounds normal in all quadrants, Soft, Non tender/Nondistended, No palpable abdominal masses. Very limited abdominal exam secondary to large body habitus. No CVA tenderness. Musculoskeletal: BUE/BLE symmetrical without atrophy or deformities. Chronic venous stasis skin changes to the lower legs. Lower legs are symmetrical with 2+ pitting edema. No midline step-offs or crepitus of the bony lumbar spine. Integument:  Warm, Dry, Intact  Neurologic:  Alert & oriented x3 , No focal deficits noted. Cranial nerves II through XII grossly intact. No slurred speech. No facial droop. Overall, patient is very deconditioned. 4/5  strength.   No wrist drop.  3/5 dorsi/plantar flexion against resistance bilaterally. Able to actively raise great toe against resistance with 3/5 strength. Difficult to elicit DTRs in the lower legs secondary to body habitus. Sensation intact to sharp touch throughout the lower legs. No saddle paresthesia. Obvious dropfoot.   Psychiatric:  Affect appropriate      I have reviewed and interpreted all of the currently available lab results from this visit (if applicable):  Results for orders placed or performed during the hospital encounter of 06/04/20   CBC auto diff   Result Value Ref Range    WBC 14.6 (H) 4.0 - 10.5 K/CU MM    RBC 3.77 (L) 4.2 - 5.4 M/CU MM    Hemoglobin 8.8 (L) 12.5 - 16.0 GM/DL    Hematocrit 30.9 (L) 37 - 47 %    MCV 82.0 78 - 100 FL    MCH 23.3 (L) 27 - 31 PG    MCHC 28.5 (L) 32.0 - 36.0 %    RDW 20.4 (H) 11.7 - 14.9 %    Platelets 881 341 - 393 K/CU MM    MPV 10.1 7.5 - 11.1 FL    Differential Type AUTOMATED DIFFERENTIAL     Segs Relative 84.3 (H) 36 - 66 %    Lymphocytes % 6.3 (L) 24 - 44 %    Monocytes % 7.7 (H) 0 - 4 %    Eosinophils % 0.3 0 - 3 %    Basophils % 0.9 0 - 1 %    Segs Absolute 12.3 K/CU MM    Lymphocytes Absolute 0.9 K/CU MM    Monocytes Absolute 1.1 K/CU MM    Eosinophils Absolute 0.0 K/CU MM    Basophils Absolute 0.1 K/CU MM    Nucleated RBC % 0.0 %    Total Nucleated RBC 0.0 K/CU MM    Total Immature Neutrophil 0.08 K/CU MM    Immature Neutrophil % 0.5 (H) 0 - 0.43 %   CMP   Result Value Ref Range    Sodium 133 (L) 135 - 145 MMOL/L    Potassium 3.0 (LL) 3.5 - 5.1 MMOL/L    Chloride 90 (L) 99 - 110 mMol/L    CO2 29 21 - 32 MMOL/L    BUN 25 (H) 6 - 23 MG/DL    CREATININE 1.0 0.6 - 1.1 MG/DL    Glucose 120 (H) 70 - 99 MG/DL    Calcium 8.0 (L) 8.3 - 10.6 MG/DL    Alb 2.9 (L) 3.4 - 5.0 GM/DL    Total Protein 7.3 6.4 - 8.2 GM/DL    Total Bilirubin 0.9 0.0 - 1.0 MG/DL    ALT 15 10 - 40 U/L    AST 81 (H) 15 - 37 IU/L    Alkaline Phosphatase 78 40 - 129 IU/L    GFR Non- 56 (L) >60 showed return of over 500 cc clear urine. Rellan Fallow ABC with leukocytosis of 14.6 with left shift. Hemoglobin is 8.8 which has slightly trended upward compared to previous. CMP with a hypokalemia of 3.0. Normal creatinine but BUN is mildly elevated 25. Lipase and lactic acid are normal.  CK is significantly elevated at 5614, last comparative labs was normal range at 100. UA shows small ketones with negative bacteria leukocytes and nitrites. Pending urine cultures. EKG shows no evidence of acute ischemic changes. Chest x-ray does show cardiomegaly with improved pulmonary edema pattern compared to previous. Did initiate IV re-supplementation of potassium. Given patient's report of new onset bowel and bladder incontinence, I do feel that she warrants advanced imaging including MRI or CT to rule out an acute central cord process including cauda equina or other abscess/hematoma or other abdominal process. I did call and speak with our MRI tech who states that weight limit for our machine is 450 pounds. At this time, will plan to consult with [facility] where patient was transferred previously for imaging studies. Patient is comfortable and agreeable this plan. I also spoke with patient's  on the phone to discuss patient's work-up and need for transfer to [facility] for further evaluation. He was agreeable with plan. [facility] and discussed patient's history, ED presentation/course including any pertinent laboratory findings and imaging study findings. He/she agrees to accept patient for direct hospital admission for further evaluation and care. Patient will be transferred via ground transport in stable condition. In light of current events, I did utilize appropriate PPE (including N95 face mask, safety glasses, gloves, as recommended by the health facility/national standard best practice, during my bedside interactions with the patient.       In

## 2020-06-04 NOTE — ED NOTES
Bed: 02TR-02  Expected date:   Expected time:   Means of arrival:   Comments:  [facility] [address]  06/04/20 1389

## 2020-06-24 NOTE — PROGRESS NOTES
3350 (MIRALAX PO) Take by mouth as needed Yes Historical Provider, MD   lisinopril (PRINIVIL;ZESTRIL) 5 MG tablet Take 2 tablets by mouth daily Yes THEODORE Wolf - CNP   gabapentin (NEURONTIN) 400 MG capsule Take 1 capsule by mouth 3 times daily for 7 days. Patient taking differently: Take 400 mg by mouth 3 times daily.  2 tabs 3 times a day Yes Dedrick Carrel, MD   amiodarone (CORDARONE) 200 MG tablet Take 1 tablet by mouth daily Yes Dedrick Carrel, MD   calcitRIOL (ROCALTROL) 0.25 MCG capsule Take 1 capsule by mouth daily Yes Dedrick Carrel, MD   atorvastatin (LIPITOR) 80 MG tablet Take 1 tablet by mouth nightly Yes Zoë Pearl MD   metoprolol tartrate (LOPRESSOR) 25 MG tablet Take 1 tablet by mouth 2 times daily  Patient taking differently: Take 25 mg by mouth 2 times daily Half tab one time a day Yes Zoë Pearl MD   Insulin Degludec (TRESIBA FLEXTOUCH) 100 UNIT/ML SOPN Inject 45 Units into the skin nightly Yes Lauren Betts MD   HUMALOG KWIKPEN 200 UNIT/ML SOPN pen Inject 15 Units into the skin 3 times daily (before meals)  Patient taking differently: Inject 35 Units into the skin 3 times daily (before meals)  Yes Lauren Betts MD   clopidogrel (PLAVIX) 75 MG tablet Take 1 tablet by mouth daily Patient has appointment on 3/27/18 more refills with be given after she keeps her office visit Yes Donna Srinivasan MD   BiPAP Machine MISC by BiPAP route nightly Yes Historical Provider, MD   metFORMIN (GLUCOPHAGE) 500 MG tablet Take 1 tablet by mouth 2 times daily (with meals) Yes MONI Sawyer MD   ipratropium-albuterol (DUONEB) 0.5-2.5 (3) MG/3ML SOLN nebulizer solution Inhale 3 mLs into the lungs 4 times daily Yes MONI Sawyer MD   Nebulizers (AIRIAL COMPACT MINI NEBULIZER) MISC 1 Device by Does not apply route 4 times daily Yes Jovanna Shine MD   albuterol sulfate  (90 Base) MCG/ACT inhaler Inhale 2 puffs into the lungs Yes Historical Provider, MD   pantoprazole PHYSICAL EXAMINATION:  [ INSTRUCTIONS:  \"[x]\" Indicates a positive item  \"[]\" Indicates a negative item  -- DELETE ALL ITEMS NOT EXAMINED]  Vital Signs: (As obtained by patient/caregiver or practitioner observation)    Blood pressure- 110/56 Heart rate- 56       Constitutional: [x] Appears well-developed and well-nourished [x] No apparent distress      [] Abnormal-   Mental status  [x] Alert and awake  [x] Oriented to person/place/time [x]Able to follow commands      Eyes:  EOM    [x]  Normal  [] Abnormal-  Sclera  [x]  Normal  [] Abnormal -         Discharge [x]  None visible  [] Abnormal -    HENT:   [x] Normocephalic, atraumatic. [] Abnormal   [x] Mouth/Throat: Mucous membranes are moist.     External Ears [x] Normal  [] Abnormal-     Neck: [x] No visualized mass     Pulmonary/Chest: [x] Respiratory effort normal.  [x] No visualized signs of difficulty breathing or respiratory distress        [] Abnormal-      Musculoskeletal:   [] Normal gait with no signs of ataxia         [] Normal range of motion of neck        [x] Abnormal- general weakness    Neurological:        [x] No Facial Asymmetry (Cranial nerve 7 motor function) (limited exam to video visit)          [x] No gaze palsy        [] Abnormal-         Skin:        [x] No significant exanthematous lesions or discoloration noted on facial skin         [] Abnormal-            Psychiatric:       [x] Normal Affect [] No Hallucinations        [] Abnormal-     Other pertinent observable physical exam findings-     Due to this being a TeleHealth encounter, evaluation of the following organ systems is limited: Vitals/Constitutional/EENT/Resp/CV/GI//MS/Neuro/Skin/Heme-Lymph-Imm. Echo: 9/19  This is a limited echo. Technically difficult examination due to body habitus. Definity given during procedure. Left ventricular function is normal, EF is estimated at 55%. Right ventricular systolic pressure of 43 mm Hg.    S/P TAVR with the mean gradient of 10 mHg. Trace to mild mitral regurgitation is present. Mild tricuspid regurgitation is present. No evidence of any pericardial effusion. All current labs and testing reviewed       ASSESSMENT/PLAN:    1. Wide-complex tachycardia ([address]  -Patient continues to be on amiodorone Qtc <500, liver enzymes acceptable range. Patient is unsure if she has had her eyes checked this year due to use still having some confusion issues. Discussed with nurse but she is unable to find any documentation of this. 2. VHD (valvular heart disease)  -S/P TAVR in 2017, no shortness of breath, echo in 2019 shows stable     3. Essential hypertension  -Controlled, recent hypotension lisinopril was recently decreased to 5 mg. Current blood pressure is stable. 4. Dyslipidemia  -At or near goal Yes    -She is to continue current medications (lipitor 80 mg) Hepatic function panel WNL. No abdominal pain. No myalgias.     -The nature of cardiac risk has been fully discussed with this patient. I have made her aware of her LDL target goal given her cardiovascular risk analysis. I have discussed the appropriate diet. The need for lifelong compliance in order to reduce risk is stressed. A regular exercise program is recommended to help achieve and maintain normal body weight, fitness and improve lipid balance. A written copy of a low fat, low cholesterol diet has been given to the patient. 5. Type 2 diabetes mellitus with other circulatory complication, with long-term current use of insulin ([address]  -primary care managing. Return in about 3 months (around 9/24/2020). An  electronic signature was used to authenticate this note. --THEODORE Peoples - CNP on 6/25/2020 at 9:41 AM        I confirm that this visit was completed in a telehealth setting ,using synchronous audiovisual technology for real time patient interaction . The patient identity with name and date of birth was confirmed .  This evaluation of patient

## 2020-06-25 PROBLEM — I50.33 ACUTE ON CHRONIC DIASTOLIC HEART FAILURE (HCC): Status: ACTIVE | Noted: 2020-01-01

## 2020-06-25 NOTE — ED PROVIDER NOTES
History of nuclear stress test 2016    lexiscan-normal,EF70%    Morbid obesity ([facility])     BMI=73.72 kg/m2    Neuropathy     NSTEMI (non-ST elevated myocardial infarction) ([facility]) 2018    Obesity     Osteoarthritis     Palpitations     Peripheral edema     Restless legs syndrome     Sleep apnea     Has a CPAP    Sleep apnea     SOB (shortness of breath)      Past Surgical History:   Procedure Laterality Date    AORTIC VALVE REPLACEMENT  2017    29mm EvolutR TAVR     SECTION      CHOLECYSTECTOMY      GALLBLADDER SURGERY      HYSTERECTOMY      NECK SURGERY      Tumor    TUBAL LIGATION       Family History   Problem Relation Age of Onset    Heart Failure Mother     Heart Attack Mother     Hypertension Mother     Coronary Art Dis Mother         Had PTCA w/stenting.     Heart Failure Father     Heart Failure Brother     Heart Disease Mother     High Blood Pressure Mother     Obesity Mother     Diabetes Mother     Heart Disease Father     High Blood Pressure Father     Obesity Father     Heart Disease Brother     High Blood Pressure Brother     Obesity Brother      Social History     Socioeconomic History    Marital status:      Spouse name: Not on file    Number of children: Not on file    Years of education: Not on file    Highest education level: Not on file   Occupational History    Not on file   Social Needs    Financial resource strain: Not on file    Food insecurity     Worry: Not on file     Inability: Not on file   OffScale needs     Medical: Not on file     Non-medical: Not on file   Tobacco Use    Smoking status: Former Smoker     Types: Cigarettes     Last attempt to quit: 3/19/2003     Years since quittin.2    Smokeless tobacco: Never Used    Tobacco comment: quit 15 years ago   Substance and Sexual Activity    Alcohol use: No    Drug use: No    Sexual activity: Never   Lifestyle    Physical activity     Days per week: Not on file     Minutes per session: Not on file    Stress: Not on file   Relationships    Social connections     Talks on phone: Not on file     Gets together: Not on file     Attends [employer] service: Not on file     Active member of club or organization: Not on file     Attends meetings of clubs or organizations: Not on file     Relationship status: Not on file    Intimate partner violence     Fear of current or ex partner: Not on file     Emotionally abused: Not on file     Physically abused: Not on file     Forced sexual activity: Not on file   Other Topics Concern    Not on file   Social History Narrative    ** Merged History Encounter **          Current Facility-Administered Medications   Medication Dose Route Frequency Provider Last Rate Last Dose    vancomycin (VANCOCIN) 2,000 mg in dextrose 5 % 500 mL IVPB  2,000 mg Intravenous Once Baldo Dumont MD         Current Outpatient Medications   Medication Sig Dispense Refill    calcium carbonate (TUMS) 500 MG chewable tablet Take 1 tablet by mouth daily      cyanocobalamin 1000 MCG/ML injection Inject 1,000 mcg into the muscle once      doxepin (SINEQUAN) 10 MG capsule Take 10 mg by mouth nightly      ferrous sulfate (IRON 325) 325 (65 Fe) MG tablet Take 325 mg by mouth daily (with breakfast)      furosemide (LASIX) 40 MG tablet Take 40 mg by mouth 2 times daily      rOPINIRole (REQUIP) 2 MG tablet Take 2 mg by mouth 3 times daily After the mirapex      oxybutynin (DITROPAN-XL) 10 MG extended release tablet Take 10 mg by mouth daily      vitamin D3 (CHOLECALCIFEROL) 10 MCG (400 UNIT) TABS tablet Take 400 Units by mouth daily      Polyethylene Glycol 3350 (MIRALAX PO) Take by mouth as needed      lisinopril (PRINIVIL;ZESTRIL) 5 MG tablet Take 2 tablets by mouth daily 30 tablet 0    spironolactone (ALDACTONE) 25 MG tablet Take 2 tablets by mouth daily (Patient not taking: Reported on 6/24/2020) 30 tablet 0    gabapentin (NEURONTIN) 400 MG capsule Take 1 capsule by mouth 3 times daily for 7 days. (Patient taking differently: Take 400 mg by mouth 3 times daily.  2 tabs 3 times a day) 21 capsule 0    amiodarone (CORDARONE) 200 MG tablet Take 1 tablet by mouth daily 30 tablet 3    calcitRIOL (ROCALTROL) 0.25 MCG capsule Take 1 capsule by mouth daily 30 capsule 3    atorvastatin (LIPITOR) 80 MG tablet Take 1 tablet by mouth nightly 30 tablet 3    metoprolol tartrate (LOPRESSOR) 25 MG tablet Take 1 tablet by mouth 2 times daily (Patient taking differently: Take 25 mg by mouth 2 times daily Half tab one time a day) 60 tablet 3    Insulin Degludec (TRESIBA FLEXTOUCH) 100 UNIT/ML SOPN Inject 45 Units into the skin nightly 1 pen 0    phosphorus (K PHOS NEUTRAL) 155-852-130 MG tablet Take 1 tablet by mouth 2 times daily (Patient not taking: Reported on 6/24/2020) 60 tablet 3    HUMALOG KWIKPEN 200 UNIT/ML SOPN pen Inject 15 Units into the skin 3 times daily (before meals) (Patient taking differently: Inject 35 Units into the skin 3 times daily (before meals) ) 2 pen 3    clopidogrel (PLAVIX) 75 MG tablet Take 1 tablet by mouth daily Patient has appointment on 3/27/18 more refills with be given after she keeps her office visit 90 tablet 3    BiPAP Machine MISC by BiPAP route nightly      metFORMIN (GLUCOPHAGE) 500 MG tablet Take 1 tablet by mouth 2 times daily (with meals) 60 tablet 0    ipratropium-albuterol (DUONEB) 0.5-2.5 (3) MG/3ML SOLN nebulizer solution Inhale 3 mLs into the lungs 4 times daily 360 mL 0    Nebulizers (AIRIAL COMPACT MINI NEBULIZER) MISC 1 Device by Does not apply route 4 times daily 1 each 0    albuterol sulfate  (90 Base) MCG/ACT inhaler Inhale 2 puffs into the lungs      pantoprazole (PROTONIX) 40 MG tablet Take 40 mg by mouth daily      pramipexole (MIRAPEX) 1 MG tablet Take 1 mg by mouth 3 times daily       fexofenadine (ALLEGRA) 180 MG tablet Take 180 mg by mouth daily       Ascorbic Acid (VITAMIN C) 500 MG tablet Take 500 mg by mouth daily       aspirin 81 MG chewable tablet Take 81 mg by mouth daily        No Known Allergies    Nursing Notes Reviewed    Physical Exam:  Triage VS:    ED Triage Vitals [06/25/20 0925]   Enc Vitals Group      BP (!) 121/106      Pulse 70      Resp 24      Temp       Temp src       SpO2 100 %      Weight (!) 494 lb (224.1 kg)      Height 5' 6\" (1.676 m)      Head Circumference       Peak Flow       Pain Score       Pain Loc       Pain Edu? Excl. in [address]? My pulse ox interpretation is - abnormal    General appearance:  No acute distress. Skin:  Warm. Dry. Eye:  Extraocular movements intact. Ears, nose, mouth and throat:  Oral mucosa moist   Neck:  Trachea midline. Extremity:  No swelling. Normal ROM     Heart:  Regular rate and rhythm, normal S1 & S2, no extra heart sounds. Perfusion:  intact  Respiratory: some limitation related to patient's body habitus, no wheezing, no tachypnea, is requiring nonrebreather. Speaks in full sentences. Abdominal:  Soft. Nontender. Non distended.    Neurological:  Alert and oriented             Psychiatric:  Appropriate    I have reviewed and interpreted all of the currently available lab results from this visit (if applicable):  Results for orders placed or performed during the hospital encounter of 06/25/20   CBC auto differential   Result Value Ref Range    WBC 5.6 4.0 - 10.5 K/CU MM    RBC 3.67 (L) 4.2 - 5.4 M/CU MM    Hemoglobin 8.4 (L) 12.5 - 16.0 GM/DL    Hematocrit 29.9 (L) 37 - 47 %    MCV 81.5 78 - 100 FL    MCH 22.9 (L) 27 - 31 PG    MCHC 28.1 (L) 32.0 - 36.0 %    RDW 20.4 (H) 11.7 - 14.9 %    Platelets 597 093 - 148 K/CU MM    MPV 9.7 7.5 - 11.1 FL    Differential Type AUTOMATED DIFFERENTIAL     Segs Relative 70.0 (H) 36 - 66 %    Lymphocytes % 19.4 (L) 24 - 44 %    Monocytes % 7.1 (H) 0 - 4 %    Eosinophils % 1.6 0 - 3 %    Basophils % 1.2 (H) 0 - 1 %    Segs Absolute 3.9 K/CU MM    Lymphocytes Absolute 1.1 K/CU MM Monocytes Absolute 0.4 K/CU MM    Eosinophils Absolute 0.1 K/CU MM    Basophils Absolute 0.1 K/CU MM    Nucleated RBC % 0.0 %    Total Nucleated RBC 0.0 K/CU MM    Total Immature Neutrophil 0.04 K/CU MM    Immature Neutrophil % 0.7 (H) 0 - 0.43 %   Comprehensive Metabolic Panel w/ Reflex to MG   Result Value Ref Range    Sodium 132 (L) 135 - 145 MMOL/L    Potassium 4.9 3.5 - 5.1 MMOL/L    Chloride 85 (L) 99 - 110 mMol/L    CO2 39 (H) 21 - 32 MMOL/L    BUN 41 (H) 6 - 23 MG/DL    CREATININE 2.4 (H) 0.6 - 1.1 MG/DL    Glucose 78 70 - 99 MG/DL    Calcium 8.6 8.3 - 10.6 MG/DL    Alb 3.3 (L) 3.4 - 5.0 GM/DL    Total Protein 7.8 6.4 - 8.2 GM/DL    Total Bilirubin 0.4 0.0 - 1.0 MG/DL    ALT 5 (L) 10 - 40 U/L    AST 17 15 - 37 IU/L    Alkaline Phosphatase 79 40 - 129 IU/L    GFR Non-African American 20 (L) >60 mL/min/1.73m2    GFR  25 (L) >60 mL/min/1.73m2    Anion Gap 8 4 - 16   Lactate, Sepsis   Result Value Ref Range    Lactic Acid, Sepsis 0.9 0.5 - 1.9 mMOL/L   Troponin   Result Value Ref Range    Troponin T 0.096 (H) <0.01 NG/ML   Brain Natriuretic Peptide   Result Value Ref Range    Pro-BNP 5,053 (H) <300 PG/ML   Blood Gas, Venous   Result Value Ref Range    pH, Maikel 7.36 7.32 - 7.42    pCO2, Maikel 82 (H) 38 - 52 mmHG    pO2, Maikel 70 (H) 28 - 48 mmHG    Base Exc, Mixed 16.5 (H) 0 - 2.3    HCO3, Venous 46.3 (H) 19 - 25 MMOL/L    O2 Sat, Maikel 90.3 (H) 50 - 70 %    Comment VBG    EKG 12 lead   Result Value Ref Range    Ventricular Rate 69 BPM    Atrial Rate 69 BPM    P-R Interval 234 ms    QRS Duration 88 ms    Q-T Interval 406 ms    QTc Calculation (Bazett) 435 ms    P Axis 52 degrees    R Axis 42 degrees    T Axis 73 degrees    Diagnosis       Sinus rhythm with 1st degree AV block  Low voltage QRS  Cannot rule out Anterior infarct , age undetermined  Abnormal ECG  When compared with ECG of 04-JUN-2020 10:45,  Nonspecific T wave abnormality has replaced inverted T waves in Anterior leads        Radiographs (if obtained):  Radiologist's Report Reviewed:  No results found. EKG (if obtained): (All EKG's are interpreted by myself in the absence of a cardiologist)  Sinus rhythm with first-degree AV block, rate of 69 bpm, MD interval of 234 ms. No ST elevation. Otherwise normal intervals. Low voltage QRS. No previous to compare. MDM:  53-year-old female with history as above presents with concern for shortness of breath, was hypoxic for EMS, nonrebreather here and is in no acute distress but is requiring oxygen which is not her baseline. Labs, chest x-ray ordered. We will keep her on oxygen. I did give her a dose of broad-spectrum antibiotics and ordered the sepsis order set given recent pneumonia, hypoxia, will also get COVID testing. 1020- CXR consistent with heart failure, WBC normal, Hgb stable, lactic normal. Lasix has been ordered. Does appear to have an JOSE, BNP is elevated, troponin is detectable, no chest pain no signs of ischemia on EKG. Plan for admission    1055- Spoke with Baljit Schultz NP from hospitalist team for admission. I did don/doff appropriate PPE (including N95 face mask, safety glasses), as recommended by the health facility/national standard best practice, during my bedside interactions with the patient. Total critical care time today provided was  30 minutes. This excludes seperately billable procedure. Critical care time provided for hypoxia, heart failure, JOSE that required close evaluation and/or intervention with concern for patient decompensation. Clinical Impression:  1. Acute on chronic congestive heart failure, unspecified heart failure type (HCC)    2. Elevated troponin    3. Hypoxia    4. JOSE (acute kidney injury) ([facility]      Disposition referral (if applicable):  No follow-up provider specified.   Disposition medications (if applicable):  New Prescriptions    No medications on file     ED Provider Disposition Time  DISPOSITION Decision To Admit 06/25/2020

## 2020-06-25 NOTE — H&P
History and Physical      Name:  Anabelle Farley /Age/Sex: 1955  (63 y.o. female)   MRN & CSN:  [id number] & [id number] Admission Date/Time: 2020  9:24 AM   Location:  ED25/ED-25 PCP: THEODORE Catherine - NP       Admitting Physicians : Dr. Emy Mcneil is a 63 y.o.  female  who presents with Shortness of Breath    Assessment and Plan:   Acute on chronic diastolic heart failure  CXR: Changes consistent with congestive heart failure. Last echo with EF of 55 S/P TAVR (2019).   Chronic elevated BNP, slightly higher  -Lasix IV  -Echocardiogram in AM  -Daily weights  -Strict intake and output  -Cardiology consult    JOSE likely cardiorenal  # Elevated troponin likely secondary to JOSE  Creat 2.4  -Diuresis  -Dose meds renally  -Monitor BMP  -Consult nephrology    Acute cystitis   UA positive for leukocytes, bacteria, and pyuria  -Rocephin IV  -Urine culture    Hypoxia concerning for possible Covid-19  Could be due to CHF  -Nonrebreather at 15 L  -COVID-19 pending  -Symptomatic management      DMII with chronic insulin use  -Hold oral medications while inpatient  -Continue SSI      Arrhytmia  -Continue Amiodarone     Obesity class 3 with BMI 79.9  -Educated about lifestyle modifications     HLD  -Continue Lipitor      ANMOL  -Continue BIPAP     RLS  -Continue Mirapex          DVT-PPX: Lovenox  Diet: Low-sodium      Medications:   Medications:    vancomycin  2,000 mg Intravenous Once      Infusions:   PRN Meds:      Current Facility-Administered Medications:     vancomycin (VANCOCIN) 2,000 mg in dextrose 5 % 500 mL IVPB, 2,000 mg, Intravenous, Once, Esa Fermin MD    Current Outpatient Medications:     calcium carbonate (TUMS) 500 MG chewable tablet, Take 1 tablet by mouth daily, Disp: , Rfl:     cyanocobalamin 1000 MCG/ML injection, Inject 1,000 mcg into the muscle once, Disp: , Rfl:     doxepin (SINEQUAN) 10 MG capsule, Take 10 mg by mouth nightly, Disp: , Rfl:     ferrous sulfate (IRON 325) 325 (65 Fe) MG tablet, Take 325 mg by mouth daily (with breakfast), Disp: , Rfl:     furosemide (LASIX) 40 MG tablet, Take 40 mg by mouth 2 times daily, Disp: , Rfl:     rOPINIRole (REQUIP) 2 MG tablet, Take 2 mg by mouth 3 times daily After the mirapex, Disp: , Rfl:     oxybutynin (DITROPAN-XL) 10 MG extended release tablet, Take 10 mg by mouth daily, Disp: , Rfl:     vitamin D3 (CHOLECALCIFEROL) 10 MCG (400 UNIT) TABS tablet, Take 400 Units by mouth daily, Disp: , Rfl:     Polyethylene Glycol 3350 (MIRALAX PO), Take by mouth as needed, Disp: , Rfl:     lisinopril (PRINIVIL;ZESTRIL) 5 MG tablet, Take 2 tablets by mouth daily, Disp: 30 tablet, Rfl: 0    spironolactone (ALDACTONE) 25 MG tablet, Take 2 tablets by mouth daily (Patient not taking: Reported on 6/24/2020), Disp: 30 tablet, Rfl: 0    gabapentin (NEURONTIN) 400 MG capsule, Take 1 capsule by mouth 3 times daily for 7 days. (Patient taking differently: Take 400 mg by mouth 3 times daily.  2 tabs 3 times a day), Disp: 21 capsule, Rfl: 0    amiodarone (CORDARONE) 200 MG tablet, Take 1 tablet by mouth daily, Disp: 30 tablet, Rfl: 3    calcitRIOL (ROCALTROL) 0.25 MCG capsule, Take 1 capsule by mouth daily, Disp: 30 capsule, Rfl: 3    atorvastatin (LIPITOR) 80 MG tablet, Take 1 tablet by mouth nightly, Disp: 30 tablet, Rfl: 3    metoprolol tartrate (LOPRESSOR) 25 MG tablet, Take 1 tablet by mouth 2 times daily (Patient taking differently: Take 25 mg by mouth 2 times daily Half tab one time a day), Disp: 60 tablet, Rfl: 3    Insulin Degludec (TRESIBA FLEXTOUCH) 100 UNIT/ML SOPN, Inject 45 Units into the skin nightly, Disp: 1 pen, Rfl: 0    phosphorus (K PHOS NEUTRAL) 155-852-130 MG tablet, Take 1 tablet by mouth 2 times daily (Patient not taking: Reported on 6/24/2020), Disp: 60 tablet, Rfl: 3    HUMALOG KWIKPEN 200 UNIT/ML SOPN pen, Inject 15 Units into the skin 3 times daily (before meals) (Patient taking differently: Inject 35 Units into the skin 3 times daily (before meals) ), Disp: 2 pen, Rfl: 3    clopidogrel (PLAVIX) 75 MG tablet, Take 1 tablet by mouth daily Patient has appointment on 3/27/18 more refills with be given after she keeps her office visit, Disp: 90 tablet, Rfl: 3    BiPAP Machine MISC, by BiPAP route nightly, Disp: , Rfl:     metFORMIN (GLUCOPHAGE) 500 MG tablet, Take 1 tablet by mouth 2 times daily (with meals), Disp: 60 tablet, Rfl: 0    ipratropium-albuterol (DUONEB) 0.5-2.5 (3) MG/3ML SOLN nebulizer solution, Inhale 3 mLs into the lungs 4 times daily, Disp: 360 mL, Rfl: 0    Nebulizers (AIRIAL COMPACT MINI NEBULIZER) MISC, 1 Device by Does not apply route 4 times daily, Disp: 1 each, Rfl: 0    albuterol sulfate  (90 Base) MCG/ACT inhaler, Inhale 2 puffs into the lungs, Disp: , Rfl:     pantoprazole (PROTONIX) 40 MG tablet, Take 40 mg by mouth daily, Disp: , Rfl:     pramipexole (MIRAPEX) 1 MG tablet, Take 1 mg by mouth 3 times daily , Disp: , Rfl:     fexofenadine (ALLEGRA) 180 MG tablet, Take 180 mg by mouth daily , Disp: , Rfl:     Ascorbic Acid (VITAMIN C) 500 MG tablet, Take 500 mg by mouth daily , Disp: , Rfl:     aspirin 81 MG chewable tablet, Take 81 mg by mouth daily , Disp: , Rfl:     History of present illness     Chief Complaint: Shortness of Breath      Miguel Franklin is a 63 y.o.  female  who presents with worsening shortness of breath from the nursing home. EMS reported oxygen saturation in the 70s on arrival to the nursing home. They did put facemask with increased saturation. Patient does not use oxygen  at baseline. She was recently admitted at [facility] May for pneumonia and was intubated at that time. And was admitted here last month for bowel and bladder issues and was treated for UTI. Denies fever, chills, chest pain, leg swelling, history of DVT/PE, hemoptysis, or lightheaded. History of morbid obesity, CHF, HTN, HLD,ANMOL, COPD, and RLS.       Review of Systems normal skin color, turgor, no redness, warmth, or swelling      Past Medical History:      Past Medical History:   Diagnosis Date    Acute kidney injury ([facility] 2019    Aortic stenosis     Asthma     Bacteremia due to group B Streptococcus     Treated at [facility] in 2017 - felt to be due to cellulitis    Cancer [facility])     Cervical    Carotid artery stenosis     Chest pain     CHF [facility]congestive heart failure) ([facility])     Chronic back pain     COPD exacerbation ([facility]     Depression     [facility]     Family history of coronary artery disease     Fatty liver 10/27/2016    GI bleed 2017    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 - [facility] H/O aortic valve stenosis     H/O echocardiogram 2016    EF 55%, Aortic valve area is 0.84 cm2 with a mean gradient of 36 suggestive of severe aortic stenosis, mild to mos LVH    Headache     Heart murmur     History of nuclear stress test 2016    lexiscan-normal,EF70%    Morbid obesity ([facility])     BMI=73.72 kg/m2    Neuropathy     NSTEMI (non-ST elevated [facility] infarction) ([facility] 2018    Obesity     Osteoarthritis     Palpitations     Peripheral edema     Restless legs syndrome     Sleep apnea     Has a CPAP    Sleep apnea     SOB (shortness of breath)      PSHX:  has a past surgical history that includes Cholecystectomy;  section; Hysterectomy; Tubal ligation; Neck surgery; Gallbladder surgery; and Aortic valve replacement (2017). Allergies: No Known Allergies    FAM HX: family history includes Coronary Art Dis in her mother; Diabetes in her mother; Heart Attack in her mother; Heart Disease in her brother, father, and mother; Heart Failure in her brother, father, and mother; High Blood Pressure in her brother, father, and mother; Hypertension in her mother; Obesity in her brother, father, and mother. Family history reviewed and is essentially negative unless as stated above.      Soc

## 2020-06-25 NOTE — ED NOTES
Upon entering patients room found patient had removed her oxygen, bp cuff, pulse ox, and some monitor leads. Patient showing s/s of increased confusion. Patient place back on monitor and NR. Mirela Aly.  FARIHA Liu  06/25/20 [id number]

## 2020-06-25 NOTE — CARE COORDINATION
CM met with patient to begin discharge planning and ACP discussion. CM introduced self and CM role. Patient states she presents to ER with c/o shortness of breath. Patient states she is a resident at [facility] for the past 4.5 years. Patient has insurance which assists with medication affordability. Patient alert and oriented to person and place. When CM discussed ACP with patient she began talking about paying her bills. Patient rambling. CM reviewed previous documentation. Prior to hospitalization on 5/24 patient was independent and able to ambulate with a walker. Patient was admitted to [facility] 5/24-5/31 shock. Patient was intubated and received a blood transfusion of 2 units. At that time patient was discharged home with spouse Jose Hopkins and [facility]. Patient returned to ER on 6/4 for generalized fatigue, bowel and bladder incontinence. Patient was again transferred to [facility] due to need for advanced imaging including MRI or CT. Patient weight 494 pounds, [facility] MRI table capacity 450 pounds. From most recent hospitalization at [facility] patient was transferred to [facility] on 6/11.     1220 CM contacted [facility] to determine if patient is LTC or rehab. [facility] patient was brought to [facility] for rehabilitation approximately 2 weeks ago. 1244 CM attempted to contact Genaro Germain at [phone],  left.

## 2020-06-25 NOTE — PROGRESS NOTES
Pt transferred to room. PT educated of surroundings and states understanding. Pt placed in an appropriate bed per pt weight. Pt has lift sheet under her and working lift in room. Pt skin checked with Jessica Sharma supervisor, [address] [place] [address], and this nurse. Pt found to be unstageable within fold of buttocks and [place] area. Pt has lots of redness in surrounding area of those unstageable areas. Pt also has red spot to fold of right elbow, and fold of left knee. Barrier cream applied to red spots. Feet found to be very dry and flaky. Pt feet propped up on pillows. Pt found to have no valuables with her at this time.

## 2020-06-25 NOTE — ED TRIAGE NOTES
Patient presents to ED by EMS from [facility] with complaint shortness of breath. Patient was saturating in the 70's at the facility on a simple face mask.

## 2020-06-26 PROBLEM — Z95.2 S/P TAVR (TRANSCATHETER AORTIC VALVE REPLACEMENT): Status: ACTIVE | Noted: 2020-01-01

## 2020-06-26 NOTE — CONSULTS
CARDIOLOGY CONSULT NOTE   Reason for consultation:  SOB/CHF    Referring physician:  Harvinder Baumann MD     Primary care physician: THEODORE Delgado - NP      Dear  Dr. Harvinder Baumann MD   Thanks for the consult. Chief Complaints :  Chief Complaint   Patient presents with    Shortness of Breath        History of present illness:Olivia is a 63 y. o.year old who is well-known to cardiology service due to previous multiple previous admissions because of chronic respiratory failure recurrent admissions for decompensated heart failure with history of TAVR in 2017 at [facility].  Unfortunately she is morbidly obese almost BiPAP dependent. She was brought in from nursing home due to hypoxia with saturations 70s. She is quite lethargic and somnolent today I cannot get much information from her she is on a BiPAP. Currently she was recently discharged from [facility] after being intubated for respiratory distress. Her creatinine is significantly higher at 2.4 from earlier in the month. This x-ray is difficult to interpret due to body habitus but is concerning for possible volume overload. Past medical history:    has a past medical history of Acute kidney injury ([address] Aortic stenosis, Asthma, Bacteremia due to group B Streptococcus, Cancer ([address] Carotid artery stenosis, Chest pain, CHF (congestive heart failure) ([address] Chronic back pain, COPD exacerbation ([address] Depression, Diabetes mellitus ([address] Family history of coronary artery disease, Fatty liver, GI bleed, H/O aortic valve stenosis, H/O echocardiogram, Headache, Heart murmur, History of nuclear stress test, Morbid obesity ([address] Neuropathy, NSTEMI (non-ST elevated myocardial infarction) ([address] Obesity, Osteoarthritis, Palpitations, Peripheral edema, Restless legs syndrome, Sleep apnea, Sleep apnea, and SOB (shortness of breath). Past surgical history:   has a past surgical history that includes Cholecystectomy;   injection 4 mg, Q6H PRN  glucose (GLUTOSE) 40 % oral gel 15 g, PRN  dextrose 50 % IV solution, PRN  glucagon (rDNA) injection 1 mg, PRN  dextrose 5 % solution, PRN  heparin (porcine) injection 5,000 Units, 3 times per day  ipratropium (ATROVENT HFA) 17 MCG/ACT inhaler 2 puff, 4x daily  cefTRIAXone (ROCEPHIN) 1 g IVPB in 50 mL D5W minibag, Q24H      Current Facility-Administered Medications   Medication Dose Route Frequency Provider Last Rate Last Dose    DOPamine (INTROPIN) 400 mg in dextrose 5 % 250 mL infusion  2.5 mcg/kg/min Intravenous Continuous Candice Rivera PA-C 21 mL/hr at 06/26/20 0432 2.5 mcg/kg/min at 06/26/20 0432    lidocaine PF 1 % injection 5 mL  5 mL Intradermal Once Zamzam Crockett MD        sodium chloride flush 0.9 % injection 10 mL  10 mL Intravenous 2 times per day Zamzam Crockett MD        sodium chloride flush 0.9 % injection 10 mL  10 mL Intravenous PRN Zamzam Crockett MD        amiodarone (CORDARONE) tablet 200 mg  200 mg Oral Daily June Stagger, THEODORE Melendez CNP        vitamin C (ASCORBIC ACID) tablet 500 mg  500 mg Oral Daily June StaggerTHEODORE CNP        aspirin chewable tablet 81 mg  81 mg Oral Daily June StaggerTHEODORE CNP        atorvastatin (LIPITOR) tablet 80 mg  80 mg Oral Nightly June StaggerTHEODORE CNP        calcitRIOL (ROCALTROL) capsule 0.25 mcg  0.25 mcg Oral Daily June StaggerTHEODORE CNP        vitamin D3 (CHOLECALCIFEROL) tablet 400 Units  400 Units Oral Daily June StaggerTHEODORE CNP        spironolactone (ALDACTONE) tablet 50 mg  50 mg Oral Daily THEODORE Pickett CNP        rOPINIRole (REQUIP) tablet 2 mg  2 mg Oral TID June StaggerTHEODORE CNP        pramipexole (MIRAPEX) tablet 1 mg  1 mg Oral TID June StaggerTHEODORE CNP        polyethylene glycol (GLYCOLAX) packet 17 g  17 g Oral Daily PRN June StaggerTHEODORE CNP        pantoprazole (PROTONIX) tablet 40 mg  40 mg Oral Daily June Stagger, APRN - CNP        oxybutynin (DITROPAN-XL) extended release tablet 10 mg  10 mg Oral Daily Silver Evener, APRN - CNP        metoprolol tartrate (LOPRESSOR) tablet 25 mg  25 mg Oral BID Silver Evener, APRN - CNP        lisinopril (PRINIVIL;ZESTRIL) tablet 10 mg  10 mg Oral Daily Silver Evener, APRN - CNP        insulin lispro (HUMALOG) injection vial 35 Units  35 Units Subcutaneous TID WC Silver Evener, APRN - CNP        furosemide (LASIX) injection 40 mg  40 mg Intravenous BID Silver Evener, APRN - CNP   40 mg at 06/25/20 2350    cetirizine (ZYRTEC) tablet 10 mg  10 mg Oral Daily Silver Evener, APRN - CNP        ferrous sulfate (IRON 325) tablet 325 mg  325 mg Oral Daily with breakfast Silver Evener, APRN - CNP        doxepin (SINEQUAN) capsule 10 mg  10 mg Oral Nightly Silver Evener, APRN - CNP        clopidogrel (PLAVIX) tablet 75 mg  75 mg Oral Daily Silver Evener, APRN - CNP        calcium carbonate (TUMS) chewable tablet 500 mg  1 tablet Oral Daily Silver Evener, APRN - CNP        sodium chloride flush 0.9 % injection 10 mL  10 mL Intravenous 2 times per day Silver Evener, APRN - CNP   10 mL at 06/1957    sodium chloride flush 0.9 % injection 10 mL  10 mL Intravenous PRN Silver Evener, APRN - CNP   10 mL at 06/26/20 0432    acetaminophen (TYLENOL) tablet 650 mg  650 mg Oral Q6H PRN Silver Evener, APRN - CNP        Or    acetaminophen (TYLENOL) suppository 650 mg  650 mg Rectal Q6H PRN Silver Evener, APRN - CNP        promethazine (PHENERGAN) tablet 12.5 mg  12.5 mg Oral Q6H PRN Silver Evener, APRN - CNP        Or    ondansetron (ZOFRAN) injection 4 mg  4 mg Intravenous Q6H PRN Silver Evener, APRN - CNP        glucose (GLUTOSE) 40 % oral gel 15 g  15 g Oral PRN Silver Evener, APRN - CNP        dextrose 50 % IV solution  12.5 g Intravenous PRN Silver Evener, APRN - CNP        glucagon (rDNA) injection 1 mg  1 mg Intramuscular PRN Silver Evener, APRN - CNP       [facility] dextrose 5 % solution  100 mL/hr Intravenous PRN Rondel Inoue, APRN - CNP        heparin (porcine) injection 5,000 Units  5,000 Units Subcutaneous 3 times per day Rondel Inoue, APRN - CNP   5,000 Units at 06/26/20 0534    ipratropium (ATROVENT HFA) 17 MCG/ACT inhaler 2 puff  2 puff Inhalation 4x daily Rondel Inoue, APRN - CNP   2 puff at 06/26/20 0812    cefTRIAXone (ROCEPHIN) 1 g IVPB in 50 mL D5W minibag  1 g Intravenous Q24H Rondel Inoue, APRN - CNP   Stopped at 06/25/20 3559     Review of Systems: Unable to obtain patient is quite lethargic and somnolent         Physical Examination:    Vitals:    06/26/20 0453 06/26/20 0646 06/26/20 0809 06/26/20 0830   BP: 137/64 (!) 110/94 135/62 (!) 142/59   Pulse: 76 84 86 88   Resp: 21 20 25 19   Temp: 97.8 °F (36.6 °C)  97.9 °F (36.6 °C)    TempSrc: Axillary  Axillary    SpO2: 98% 96% 96% 94%   Weight: (!) 403 lb 14.4 oz (183.2 kg)  (!) 495 lb 9.6 oz (224.8 kg)    Height:           General Appearance: Currently on BiPAP somnolent lethargic    Constitutional:  Well developed, Well nourished, No acute distress, Non-toxic appearance. HENT:  Normocephalic, Atraumatic, Bilateral external ears normal, Oropharynx moist, No oral exudates, Nose normal. Neck- Normal range of motion, No tenderness, Supple, No stridor,no apical-carotid delay  Lymphatics : no palpable lymph nodes  Eyes:  PERRL, EOMI, Conjunctiva normal, No discharge. Respiratory:  Normal breath sounds, No respiratory distress, No wheezing, No chest tenderness. ,no use of accessory muscles, crackles Present  Cardiovascular: (PMI) apex non displaced,no lifts no thrills, ankle swelling Absent  , 1+, s1 and s2 audible,Murmur. Present, JVD not noted    Abdomen /GI:  Bowel sounds normal, Soft, No tenderness, No masses, No gross visceromegaly   :  No costovertebral angle tenderness   Musculoskeletal:  No edema, no tenderness, no deformities.  Back- no tenderness  Integument:  Well hydrated, no rash improved pulmonary edema pattern compared to the previous exam.       All labs, medications and tests reviewed by myself including data  from outside source , patient and available family . Continue all other medications of all above medical condition listed as is. Impression:  Active Problems:    Hypervolemia    VHD (valvular heart disease)    Acute on chronic diastolic heart failure (HCC)    S/P TAVR (transcatheter aortic valve replacement)  Resolved Problems:    * No resolved hospital problems. *      Assessment: 63 y. o.year old with PMH of  has a past medical history of Acute kidney injury ([facility] [address] Aortic stenosis, Asthma, Bacteremia due to group B Streptococcus, Cancer ([facility] [address] Carotid artery stenosis, Chest pain, CHF (congestive heart failure) ([facility] [facility] [address] Chronic back pain, COPD exacerbation ([facility] [facility] [address] Depression, Diabetes mellitus ([facility] [address] Family history of coronary artery disease, Fatty liver, GI bleed, H/O aortic valve stenosis, H/O echocardiogram, Headache, Heart murmur, History of nuclear stress test, Morbid obesity ([facility] [address] Neuropathy, NSTEMI (non-ST elevated myocardial infarction) ([facility] [address] Obesity, Osteoarthritis, Palpitations, Peripheral edema, Restless legs syndrome, Sleep apnea, Sleep apnea, and SOB (shortness of breath). Plan and Recommendations:    Elevated Troponin :  in setting of renal failure ,  Doubt ACS  No further work up at this time add aspirin 81 mg ,   Decompensated heart failure volume overload in the setting of renal failure   CHF: Acute Systolic/ Diastolic decompensated heart failure. Appears to be volume overloaded . Agree with diuresis. We can try IV Lasix 40 mg BID. Depending on response we can titrate diuretics accordingly. Chronic respiratory failure on BiPAP pulmonology to see patient soon   HTN: stable, continue present medications   Nephrology evaluation for possible CRRT?  DVT prophylaxis if no contraindication  6.    Dyslipidemia: continue statins           Thank you  much for consult and giving us the opportunity in contributing in the care of this patient. Please feel free to call me for any questions.        Nupur Bains MD, 6/26/2020 9:14 AM

## 2020-06-26 NOTE — PROGRESS NOTES
[address] [place] [address]  HOSPITALIST PROGRESS NOTE                       Name:  Gail Sanderson /Age/Sex:   (63 y.o. female)   MRN & CSN:  [id number] & [id number] Admission Date/Time: 2020  9:24 AM   Location:   Attending:  Shari Lynch MD                                                  HPI  Gail Sanderson is a 63 y.o. female who is admitted with acute on chronic respiratory failure    SUBJECTIVE  -on bipap, awake, interactive- denies shortness of breath/chest pain, on dopamine. 10 point review of systems reviewed and negative unless noted above. ALLERGIES: No Known Allergies    PCP: THEODORE Herrera NP    PAST MEDICAL HISTORY, SURGICAL HISTORY, SOCIAL HISTORY and  HOME MEDICATIONS all reviewed. OBJECTIVE  Vitals:    20 0441 20 0453 20 0646 20 0809   BP: (!) 116/32 137/64 (!) 110/94 135/62   Pulse: 55 76 84 86   Resp:    Temp:  97.8 °F (36.6 °C)  97.9 °F (36.6 °C)   TempSrc:  Axillary  Axillary   SpO2: 99% 98% 96% 96%   Weight:  (!) 403 lb 14.4 oz (183.2 kg)  (!) 495 lb 9.6 oz (224.8 kg)   Height:           PHYSICAL EXAM   GEN Awake female, sitting upright in bed in no apparent distress. EYES Pupils are equally round. No scleral erythema, discharge, or conjunctivitis. HENT Mucous membranes are dry. Oral pharynx without exudates, no evidence of thrush. NECK Supple, no apparent thyromegaly or masses. RESP unlabored respiration, decreased breath sounds bilaterally  CARDIO/VASC S1/S2 auscultated. Regular rate without appreciable murmurs, rubs, or gallops. No JVD or carotid bruits. Peripheral pulses equal bilaterally and palpable. GI Abdomen is soft without significant tenderness, masses, or guarding. Bowel sounds are normoactive. Rectal exam deferred.  No costovertebral angle tenderness. Normal appearing external genitalia. HEME/LYMPH No palpable cervical lymphadenopathy and no hepatosplenomegaly.  No petechiae or ecchymoses. MSK Spontaneous movement of all extremities. No gross joint deformities. SKIN Normal coloration, warm, dry. NEURO Cranial nerves appear grossly intact, normal speech, no lateralizing weakness. PSYCH Awake, alert, oriented x 4. Affect appropriate. INTAKE: In: 10 [I.V.:10]  Out: 1325   OUTPUT: In: 10   Out: 1325 [Urine:1325]    LABS  Recent Labs     06/25/20  0930   WBC 5.6   HGB 8.4*   HCT 29.9*         Recent Labs     06/25/20  0930   *   K 4.9   CL 85*   CO2 39*   BUN 41*   CREATININE 2.4*     Recent Labs     06/25/20  0930   AST 17   ALT 5*   BILITOT 0.4   ALKPHOS 79     No results for input(s): INR in the last 72 hours.   Recent Labs     06/25/20  0930   TROPONINT 0.096*          Abnormal labs for today noted      Imaging:     ECHO:    Microbiology:  Blood culture:    Urine culture:    Sputum culture:    Procedures done this admission:    MEDS  Scheduled Meds:   lidocaine PF  5 mL Intradermal Once    sodium chloride flush  10 mL Intravenous 2 times per day    amiodarone  200 mg Oral Daily    vitamin C  500 mg Oral Daily    aspirin  81 mg Oral Daily    atorvastatin  80 mg Oral Nightly    calcitRIOL  0.25 mcg Oral Daily    vitamin D3  400 Units Oral Daily    spironolactone  50 mg Oral Daily    rOPINIRole  2 mg Oral TID    pramipexole  1 mg Oral TID    pantoprazole  40 mg Oral Daily    oxybutynin  10 mg Oral Daily    metoprolol tartrate  25 mg Oral BID    lisinopril  10 mg Oral Daily    insulin lispro  35 Units Subcutaneous TID     furosemide  40 mg Intravenous BID    cetirizine  10 mg Oral Daily    ferrous sulfate  325 mg Oral Daily with breakfast    doxepin  10 mg Oral Nightly    clopidogrel  75 mg Oral Daily    calcium carbonate  1 tablet Oral Daily    sodium chloride flush  10 mL Intravenous 2 times per day    heparin (porcine)  5,000 Units Subcutaneous 3 times per day    ipratropium  2 puff Inhalation 4x daily    cefTRIAXone (ROCEPHIN) IV  1 g Intravenous Q24H    chlorhexidine  15 mL Mouth/Throat BID    famotidine (PEPCID) injection  20 mg Intravenous Daily     Continuous Infusions:   DOPamine 2.5 mcg/kg/min (06/26/20 0432)    dextrose      propofol       PRN Meds:sodium chloride flush, polyethylene glycol, sodium chloride flush, acetaminophen **OR** acetaminophen, promethazine **OR** ondansetron, glucose, dextrose, glucagon (rDNA), dextrose, fentanNYL        ASSESSMENT and PLAN  Hospital Day: 2    1-Acute on chronic hypercapnic/hypoxic respiratory failure on home bipap/home oxygen- needing bipap continous- due to diastolic HF exacerbation, less likely pneumonia. covid pending  -serial ABG to monitor hypercapnia- consult pulm  -appears doing okay on bipap- less hypercapnic and more awake, however, remains at high risk of needing intubation.   2-Acute on chronic diastolic HF with venous stasis dermatitis- on IV lasix  3-JOSE on CKD stage 2- ?cardio-renal- expect to improve with diuresis- nephrology following  4-Hypotension- likely medication related, r/o sepsis- blood culture collected in ED  5-Abnormal UA- doubt UTI- get urine cutlure.       Other issues  -type 2 DM- on SS  -arrythmia- on amiodarone  -HLD  -ANMOL- on bipap  -RLS  -HTN  -Morbid obesity with KOH>10 complicating above    If COVID negative, transfer out of unit        Labs pending      Disp:     Diet DIET LOW SODIUM 2 GM;   DVT Prophylaxis [] Lovenox, []  Heparin, [] SCDs, [] Ambulation   GI Prophylaxis [] PPI,  [] H2 Blocker,  [] Carafate,  [] Diet/Tube Feeds   Code Status Full Code   Disposition Patient requires continued admission due to acute on chronic respiratory failure   CMS Level of Risk [] Low, [] Moderate,[x]  High  Patient's risk as above due to acute on chronic respiratory failure     SHAGGY BREEN MD 6/26/2020 8:32 AM

## 2020-06-26 NOTE — CONSULTS
Subjective:   CHIEF COMPLAINT / HPI:  63year old female with morbid obesity is admitted with sob and hypercapnea. she was recently intubated in may at [facility]. she has multiple medical issues.       Past Medical History:  Past Medical History:   Diagnosis Date    Acute kidney injury ([facility] 2019    Aortic stenosis     Asthma     Bacteremia due to group B Streptococcus     Treated at [facility] in 2017 - felt to be due to cellulitis    Cancer [facility])     Cervical    Carotid artery stenosis     Chest pain     CHF (congestive heart failure) ([facility])     Chronic back pain     COPD exacerbation ([facility]     Depression     Diabetes mellitus ([facility]     Family history of coronary artery disease     Fatty liver 10/27/2016    GI bleed 2017    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 - treated at [facility] H/O aortic valve stenosis     H/O echocardiogram 2016    EF 55%, Aortic valve area is 0.84 cm2 with a mean gradient of 36 suggestive of severe aortic stenosis, mild to mos LVH    Headache     Heart murmur     History of nuclear stress test 2016    lexiscan-normal,EF70%    Morbid obesity ([facility])     BMI=73.72 kg/m2    Neuropathy     NSTEMI (non-ST elevated myocardial infarction) ([facility] 2018    Obesity     Osteoarthritis     Palpitations     Peripheral edema     Restless legs syndrome     Sleep apnea     Has a CPAP    Sleep apnea     SOB (shortness of breath)        Past Surgical History:        Procedure Laterality Date    AORTIC VALVE REPLACEMENT  2017    29mm EvolutR TAVR     SECTION      CHOLECYSTECTOMY      GALLBLADDER SURGERY      HYSTERECTOMY      NECK SURGERY      Tumor    TUBAL LIGATION         Current Medications:    Current Facility-Administered Medications: DOPamine (INTROPIN) 400 mg in dextrose 5 % 250 mL infusion, 2.5 mcg/kg/min, Intravenous, Continuous  lidocaine PF 1 % injection 5 mL, 5 mL, Intradermal, Once  sodium chloride flush 0.9 % injection 10 mL, 10 mL, Intravenous, 2 times per day  sodium chloride flush 0.9 % injection 10 mL, 10 mL, Intravenous, PRN  furosemide (LASIX) injection 60 mg, 60 mg, Intravenous, TID  methylPREDNISolone sodium (SOLU-MEDROL) injection 60 mg, 60 mg, Intravenous, Daily  amiodarone (CORDARONE) tablet 200 mg, 200 mg, Oral, Daily  vitamin C (ASCORBIC ACID) tablet 500 mg, 500 mg, Oral, Daily  aspirin chewable tablet 81 mg, 81 mg, Oral, Daily  atorvastatin (LIPITOR) tablet 80 mg, 80 mg, Oral, Nightly  vitamin D3 (CHOLECALCIFEROL) tablet 400 Units, 400 Units, Oral, Daily  rOPINIRole (REQUIP) tablet 2 mg, 2 mg, Oral, TID  pramipexole (MIRAPEX) tablet 1 mg, 1 mg, Oral, TID  polyethylene glycol (GLYCOLAX) packet 17 g, 17 g, Oral, Daily PRN  pantoprazole (PROTONIX) tablet 40 mg, 40 mg, Oral, Daily  oxybutynin (DITROPAN-XL) extended release tablet 10 mg, 10 mg, Oral, Daily  insulin lispro (HUMALOG) injection vial 35 Units, 35 Units, Subcutaneous, TID WC  cetirizine (ZYRTEC) tablet 10 mg, 10 mg, Oral, Daily  ferrous sulfate (IRON 325) tablet 325 mg, 325 mg, Oral, Daily with breakfast  doxepin (SINEQUAN) capsule 10 mg, 10 mg, Oral, Nightly  clopidogrel (PLAVIX) tablet 75 mg, 75 mg, Oral, Daily  sodium chloride flush 0.9 % injection 10 mL, 10 mL, Intravenous, 2 times per day  sodium chloride flush 0.9 % injection 10 mL, 10 mL, Intravenous, PRN  acetaminophen (TYLENOL) tablet 650 mg, 650 mg, Oral, Q6H PRN **OR** acetaminophen (TYLENOL) suppository 650 mg, 650 mg, Rectal, Q6H PRN  promethazine (PHENERGAN) tablet 12.5 mg, 12.5 mg, Oral, Q6H PRN **OR** ondansetron (ZOFRAN) injection 4 mg, 4 mg, Intravenous, Q6H PRN  glucose (GLUTOSE) 40 % oral gel 15 g, 15 g, Oral, PRN  dextrose 50 % IV solution, 12.5 g, Intravenous, PRN  glucagon (rDNA) injection 1 mg, 1 mg, Intramuscular, PRN  dextrose 5 % solution, 100 mL/hr, Intravenous, PRN  heparin (porcine) injection 5,000 Units, 5,000 Units, Subcutaneous, 3 times per day  ipratropium (ATROVENT HFA) 17 MCG/ACT inhaler 2 puff, 2 puff, Inhalation, 4x daily  cefTRIAXone (ROCEPHIN) 1 g IVPB in 50 mL D5W minibag, 1 g, Intravenous, Q24H    No Known Allergies    Social History:    Social History     Socioeconomic History    Marital status:      Spouse name: None    Number of children: None    Years of education: None    Highest education level: None   Occupational History    None   Social Needs    Financial resource strain: None    Food insecurity     Worry: None     Inability: None    Transportation needs     Medical: None     Non-medical: None   Tobacco Use    Smoking status: Former Smoker     Types: Cigarettes     Last attempt to quit: 3/19/2003     Years since quittin.2    Smokeless tobacco: Never Used    Tobacco comment: quit 15 years ago   Substance and Sexual Activity    Alcohol use: No    Drug use: No    Sexual activity: Never   Lifestyle    Physical activity     Days per week: None     Minutes per session: None    Stress: None   Relationships    Social connections     Talks on phone: None     Gets together: None     Attends Denominational service: None     Active member of club or organization: None     Attends meetings of clubs or organizations: None     Relationship status: None    Intimate partner violence     Fear of current or ex partner: None     Emotionally abused: None     Physically abused: None     Forced sexual activity: None   Other Topics Concern    None   Social History Narrative    ** Merged History Encounter **            Family History:   Family History   Problem Relation Age of Onset    Heart Failure Mother     Heart Attack Mother     Hypertension Mother     Coronary Art Dis Mother         Had PTCA w/stenting.     Heart Failure Father     Heart Failure Brother     Heart Disease Mother     High Blood Pressure Mother     Obesity Mother     Diabetes Mother     Heart Disease Father     High Blood Pressure Father     Obesity Father     Heart Disease Brother  High Blood Pressure Brother     Obesity Brother        Immunization:  Immunization History   Administered Date(s) Administered    Influenza Vaccine, unspecified formulation 10/02/2017    Influenza, Quadv, 6 mo and older, IM, PF (Flulaval, Fluarix) 11/25/2018    Influenza, Quadv, IM, PF (6 mo and older Fluzone, Flulaval, Fluarix, and 3 yrs and older Afluria) 09/25/2019           Objective:   PHYSICAL EXAM:      VITALS:    Vitals:    06/26/20 0930 06/26/20 1000 06/26/20 1030 06/26/20 1100   BP:  139/70  (!) 160/127   Pulse: 105 92 92 92   Resp:  18  24   Temp:       TempSrc:       SpO2:  97%  97%   Weight:       Height:               NECK:  Supple, symmetrical, trachea midline, no adenopathy, thyroid symmetric, not enlarged and no tenderness  CHEST: Chest expansion equal and symmetrical, no intercostal retraction. LUNGS:  no increased work of breathing, has expiratory wheezes both lungs, no crackles. CARDIOVASCULAR: S1 and S2, no edema and no JVD  ABDOMEN:  normal bowel sounds, non-distended and no masses palpated, and no tenderness to palpation. No hepatospleenomegaly  LYMPHADENOPATHY:  no axillary or supraclavicular adenopathy. No cervical adnenopathy  . MUSCULOSKELETAL: No obvious misalignment or effusion of the joints. No clubbing, cyanosis of the digits. RIGHT AND LEFT LOWER EXTREMITIES: pos edema, pos inflammation, no tenderness. SKIN:  normal skin color, texture, turgor and no redness, warmth, or swelling.  No palpable nodules    DATA:                       EXAMINATION:   ONE XRAY VIEW OF THE CHEST       6/25/2020 9:34 am       COMPARISON:   June 4, 2020       HISTORY:   ORDERING SYSTEM PROVIDED HISTORY: SOB   TECHNOLOGIST PROVIDED HISTORY:   Reason for exam:->SOB   Reason for Exam: SOB       FINDINGS:   The heart is enlarged.  There is cephalization the pulmonary vascularity with   bilateral pleural effusions.  There is no alveolar consolidation.  There is   no pneumothorax.  There are no acute bony abnormalities.           Impression   Changes consistent with congestive heart failure.           Order History     Open Order Details     abg 7.34/99/17    Assessment:     1. Ac on ch hypoxemic and hypercapneic resp failure  2. Copd with ac exac  3. decomp chf  4. Ac on ch kidney disease  5. R/o covid 2 infection          Plan:     1. Adequate o2 adm  2. Continuous bipap  3. Aggressive diuresis  4. Bd rx  5. Iv steroids  6. Empiric rocephin  7. covid 2 test  8.  Thanks will follow

## 2020-06-26 NOTE — PROGRESS NOTES
Arrived to  2008 for CVC placement. Pt alert, verbalizes understanding of procedure. Dr Rhina Mendez places CVC in right IJ. Port chest xray ordered to verify placement. Pt tolerated procedure well. Report given bedside to MAHAMED SCHAFFER [facility].

## 2020-06-26 NOTE — PROGRESS NOTES
Called lab and spoke to Martha Ulloa to make sure lab staff know that patient needs drawn for AM labs. Nurse unable to obtain labs. Martha Ulloa states she will send someone.

## 2020-06-26 NOTE — PROGRESS NOTES
[address] [facility]  Writer notified lab that phlebotomy is still needed for AM labs. Respose, \"Ok, I will let them know. \"  Will continue to monitor.

## 2020-06-26 NOTE — PROGRESS NOTES
Nutrition Assessment (Low Risk)    Type and Reason for Visit: Initial, Positive Nutrition Screen, Consult    Nutrition Assessment: RD consulted for diet education. Pt is currently on the COVID unit, unable to educate pt at this time. Will continue to follow for ability to educate as pt hospital stay progresses. Pt also fell out due to a wound. Pt wound is an excoriation on her buttocks. This does not require nutritional intervention at this time.  Will continue to follow per protocol    Malnutrition Assessment:  · Malnutrition Status: No malnutrition    Nutrition Risk Level   Risk Level: Low    Nutrition Diagnosis:   · Problem: No nutrition diagnosis at this time    Nutrition Intervention:  Food and/or Delivery: Continue current diet  Nutrition Education/Counseling/Coordination of Care:  Continued Inpatient Monitoring, Education Not Indicated, Coordination of Care      Electronically signed by Elsa Sotomayor RD, LD on 6/73/01 at 9:42 AM EDT    Contact Number: [phone]

## 2020-06-26 NOTE — CONSULTS
Pt seen ,examined,interviewed and chart reviewed. Please see the dictated consult for details     Imp :   1. JOSE- non oliguric- available data suggest sec to sepsis/  CRS type 1 /  but AIN is in the d/d with wbc in urine and recent in pt / abx etx   2. ADHF- fluid overload- recurrence T event with pLVED but valvular dz S/p TAVR/ / co pulmonale   3. Under;lying DM/ANMOL/ morbid obesity/ etc     Plan:  1. She is with loop   2. Her cr was 0.8 mg/dl on 6/17/20 at [facility]  So all acute   3. Look for I precise source  Of infection  - but  keep in  Mind wbc in urine  could be from AIN- /./ inflammation  4. Emp IV steroid for few days   5. Her BS may go up  6. See  How   She  Does  7. She is ruling out for COVID-19   8.  Follow clinically and biochameically       Thanks for the consult    #[id number]

## 2020-06-26 NOTE — PROGRESS NOTES
1222 S[address] Harper notified Dr Ashleigh Cortes regarding yesterday's lab values and need for venous access. Permission given to place a PICC line. PICC nurse notified. Will continue to monitor.

## 2020-06-26 NOTE — PROGRESS NOTES
[address] spoke with Dr Cyrus Siddiqui regarding medications ordered, need for parameters for lopressor, changing any available po meds to IV route. New orders received. Will continue to monitor.

## 2020-06-26 NOTE — PROGRESS NOTES
[address] asked pt if she would like to try to take her morning meds. She said yes. Writer stated that she would give pt water to sip first to see how swallowing is going, then take meds. Writer prepared cup of water and all po meds. Water with straw given to pt; straw in mouth; pt did not drink. Pt reminded to drink water to prepare for meds. Pt again did not drink. Writer asked pt if she was going to drink water. Pt stated \"no. \"  Writer asked pt if she was going to take her meds. Pt stated \"no. \"  Writer asked pt again if she was going to take her meds. Pt stated again [facility]! \"  Will continue to monitor. [address]  Dr Luca Bee here. Pt states she will take her meds for him. Reynaldo Deng again gave pt water to drink. Pt took one sip, swallowed, and stated, \"no more. \"  Pt again refused to take meds. Writer asked pt again to please take meds to help legs feel better. Pt continues to refuse. Pt refused all food and more water. Will continue to monitor.

## 2020-06-26 NOTE — CARE COORDINATION
Patient currently on COVID unit. Phoned patient's spouse Deepika Michel to discuss discharge planning and had to leave a VM.  Case Management to follow

## 2020-06-26 NOTE — PROGRESS NOTES
2014 - Attempted to draw labs x3 times. Unable to obtain labs. Called lab, states they will call phlebotomist to come to floor to draw lab.     [id number] hrs - Lab called to notify this nurse of outstanding labs. This nurse notified lab that this nurse called lab a few hours prior and they stated they were going to send the phlebotomist. Lab voiced understanding and stated they are going to retime the labs and the phlebotomist will be up sometime.

## 2020-06-26 NOTE — PROGRESS NOTES
Notified lab that the labs still need drawn. Amber Tapia in lab states \"we know about it, the phlebotomist should be there soon\".

## 2020-06-26 NOTE — PROGRESS NOTES
1520  Dr Margret Smith updated with ABG results and pt status. No new orders at this time. Will continue to monitor.

## 2020-06-26 NOTE — PROGRESS NOTES
Patient was less responsive on BIPAP for RN. ABGs were obtained:    pH, Bld 7.34     pCO2, Arterial 99. 0High  MMHG    pO2, Arterial 17Low  MMHG    Base Exc, Mixed 21.8High     Comment: PLUS       HCO3, Arterial 53. 4High  MMOL/L    CO2 Content 56. 4High  MMOL/L    O2 Sat 32.9Low  %    Carbon Monoxide, Blood 1.7 %    Methemoglobin, Arterial 1.2 %    Comment BIPAP 12/6 45%     Respiratory stated that they thought this draw was likely venous and suggested increasing BIPAP settings and redrawing. ABG redraw is in. If pO2 and pCO2 are worsening and patient is having worsening lethargy, plan is to intubate. Discussed with Dr. Colleen Terrazas who is also in agreement. Notified Shelbi RN and charge RN.     Virginia Smith [facility]  Hospitalist

## 2020-06-26 NOTE — CONSULTS
acute  decompensated heart failure and subsequent acute kidney injury, etc.  I  did review briefly the [facility] and looks like she was there,  her course was complicated by respiratory failure, but her creatinine  was 0.8 mg/dL as of 2020. She apparently was sent to ECF. She  has had several acute kidney injury mainly by creatinine criteria and  does have several risk factors for acute kidney injury too. PAST MEDICAL HISTORY:  1. History of recurrent acute kidney injury. 2.  Diabetes mellitus diagnosed in . She had been on insulin  therapy since , did have some proteinuria although hard to tell  whether it is from glomerular or tubular that was done at the face of  acute decompensated heart failure and acute tubular injury. 3.  Several acute decompensated heart failure, mainly diastolic  dysfunction. She does have relatively preserved left ventricular  ejection fraction, but does have valvular disease and perhaps cor  pulmonale. She underwent TAVR in the past.  4.  Aortic valvular disease. She underwent TAVR. 5.  Obstructive sleep apnea and COPD. She is, I think, on home BiPAP  therapy and oxygen. 6.  Morbid obesity with chronic hypercapnia. 7.  Probable peripheral neuropathy. 8.  Hypertension. 9.  Hyperlipidemia. PAST SURGICAL HISTORY:  1. Aortic valve replacement mainly TAVR. 2.  . 3.  Hysterectomy. 4.  Gallbladder surgery. 5.  Recent intubation for respiratory failure at [place].   She was also intubated here back in 10/2019. OB/GYN HISTORY:   1, para 1. She had  emergently  before. It does not look like has any eclampsia or preeclampsia. Apparently did not have gestational diabetes or hypertension. FAMILY MEDICAL HISTORY:  Heart disease runs in the family. SOCIAL HISTORY:  The patient is  for about 28 years or so. She  has one daughter who is 36plus years old. She is otherwise retired.    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 By  the way, this is all data from my prior record when she was able to talk  and give me detailed history. She is obviously at nursing home now  since her discharge from [address]:  No known drug allergies. CURRENT MEDICATIONS:  Here in hospital include, she is on amiodarone 200  mg daily, aspirin, atorvastatin, calcitriol, calcium carbonate,  cefepime, and ceftriaxone. I think the one dose of cefepime was given  and ceftriaxone is a daily dose. She is also on Zyrtec, Plavix,  doxepin, furosemide 40 mg IV b.i.d., she is on lisinopril 10 mg daily,  oxybutynin, Requip, spironolactone, and one dose of vancomycin was  given. She is also on vitamin C, D3, and dopamine infusion apparently  for hypertension. REVIEW OF SYSTEMS:  Mainly shortness of breath unsure whether there is a  fever, chills, or rigor. She was really hypoxic. She is on BiPAP  therapy now. Rest of the review of systems is negative and unobtainable  or as in previous paragraph. PHYSICAL EXAMINATION:  VITAL SIGNS:  At the time of examination reveal, temperature her T-max  here in the hospital was _____. Blood pressure 140s/50s. She is on  dopamine therapy. It is a valuable reading though it is hard to believe  which one is accurate. Pulse 88, respiratory rate 19, saturating 94%. GENERAL:  The patient is not responding verbally. She is in distress. She uses the BiPAP therapy. HEAD AND NECK:  Normocephalic and atraumatic. EYES:  Perhaps mild conjunctival pallor. It is difficult exam.  EARS, MOUTH, AND THROAT:  Unable to examine. CARDIOVASCULAR:  Again limited exam only anterior. Few adventitious  breath sounds. ABDOMEN:  Obese. EXTREMITIES:  There are some edema. LABORATORY VALUES AND ANCILLARY SERVICES:  As mentioned earlier. IMPRESSION:  A 82-year-old female with acute kidney injury.   1.  Acute kidney injury, recurrent event (nonoliguric) available data  suggests mainly either from sepsis or cardiorenal syndrome type 1, but  AIN is in the differential especially pyuria in the urine and recent  hospitalization, antibiotic, etc.  2.  Acute decompensated heart failure with fluid overload, recurrent  event. She does have relatively preserved left ventricular ejection  fraction and has diastolic dysfunction, has underlying diabetes, morbid  obesity, obstructive sleep apnea, debility, nursing home resident  status, etc.    PLAN:  She is with loop, I will continue with that. We will look for  the precise source of infection, but we have to keep in mind that WBC in  the urine could be from inflammation and acute interstitial nephritis. If she does not improve, probably okay to do empirical steroid for now. Control the blood sugar. Adjust antibiotic as more data become  available. Otherwise follow clinically and biochemically.         Leticia Urias MD    D: 06/26/2020 9:13:40       T: 06/26/2020 11:20:06     [email or web address]  Job#: [id number]     Doc#: [id number]    CC:

## 2020-06-27 NOTE — PROGRESS NOTES
pulmonary      SUBJECTIVE:  More awake and alert     OBJECTIVE    VITALS:  BP (!) 156/82   Pulse 73   Temp 98.6 °F (37 °C) (Axillary)   Resp 15   Ht 5' 6\" (1.676 m)   Wt (!) 475 lb 12.8 oz (215.8 kg)   SpO2 (!) 89%   BMI 76.80 kg/m²   HEAD AND FACE EXAM:  No throat injection, no active exudate,no thrush  NECK EXAM;No JVD, no masses, symmetrical  CHEST EXAM; Expansion equal and symmetrical, no masses  LUNG EXAM; Good breath sounds bilaterally. There are expiratory wheezes both lungs, there are crackles at both lung bases  CARDIOVASCULAR EXAM: Positive S1 and S2, no S3 or S4, no clicks ,no murmurs  RIGHT AND LEFT LOWER EXTRIMITY EXAM: No edema, no swelling, no inflamation  CNS EXAM: Alert and oriented X3          LABS   Lab Results   Component Value Date    WBC 5.9 06/27/2020    HGB 8.8 (L) 06/27/2020    HCT 30.9 (L) 06/27/2020    MCV 81.5 06/27/2020     06/27/2020     Lab Results   Component Value Date    CREATININE 1.3 (H) 06/27/2020    BUN 35 (H) 06/27/2020     06/27/2020    K 4.9 06/27/2020    CL 88 (L) 06/27/2020    CO2 44 (H) 06/27/2020     Lab Results   Component Value Date    INR 1.07 06/27/2020    PROTIME 13.0 06/27/2020          Lab Results   Component Value Date    PHOS 5.8 05/24/2020    PHOS 2.6 11/04/2019    PHOS 2.9 11/03/2019        Recent Labs     06/26/20  0000 06/26/20  1415 06/27/20  0930   PH 7.47* 7.42 7.43   PO2ART 97 100 29*   CFO2NJI 61.0* 66.0* 81.0*   O2SAT 95.7* 95.9* 56.1*         Wt Readings from Last 3 Encounters:   06/27/20 (!) 475 lb 12.8 oz (215.8 kg)   06/04/20 (!) 494 lb (224.1 kg)   05/24/20 (!) 494 lb 4.8 oz (224.2 kg)               ASSESMENT  Ac on ch resp failure  Ac copd  Ac chf        PLAN  1. cpm  2.  Pap rx except for meals  3. diuresis    6/27/2020  Andrew Canada M.D.

## 2020-06-27 NOTE — PROGRESS NOTES
Today's plan: Covid ruled out, patient is transferred out of Covid unit      Admit Date:  2020    Subjective:ok      Chief complaints on admission  Chief Complaint   Patient presents with    Shortness of Breath         History of present illness:Olivia is a 63 y. o.year old who  presents with had concerns including Shortness of Breath. Past medical history:    has a past medical history of Acute kidney injury ([facility] [address] Aortic stenosis, Asthma, Bacteremia due to group B Streptococcus, Cancer ([facility] [address] Carotid artery stenosis, Chest pain, CHF (congestive heart failure) ([address] Chronic back pain, COPD exacerbation ([facility] [address] Depression, Diabetes mellitus ([facility] [address] Family history of coronary artery disease, Fatty liver, GI bleed, H/O aortic valve stenosis, H/O echocardiogram, Headache, Heart murmur, History of nuclear stress test, Morbid obesity ([facility] [address] Neuropathy, NSTEMI (non-ST elevated myocardial infarction) ([facility] [address] Obesity, Osteoarthritis, Palpitations, Peripheral edema, Restless legs syndrome, Sleep apnea, Sleep apnea, and SOB (shortness of breath). Past surgical history:   has a past surgical history that includes Cholecystectomy;  section; Hysterectomy; Tubal ligation; Neck surgery; Gallbladder surgery; and Aortic valve replacement (2017). Social History:   reports that she quit smoking about 17 years ago. Her smoking use included cigarettes. She has never used smokeless tobacco. She reports that she does not drink alcohol or use drugs. Family history:  family history includes Coronary Art Dis in her mother; Diabetes in her mother; Heart Attack in her mother; Heart Disease in her brother, father, and mother; Heart Failure in her brother, father, and mother; High Blood Pressure in her brother, father, and mother; Hypertension in her mother; Obesity in her brother, father, and mother.     No Known Allergies      Objective:   BP (!) 156/82   Pulse 73   Temp 98.6 °F (37 °C) (Axillary)   Resp 15   Ht 5' 6\" (1.676 m)   Wt (!) 475 lb 12.8 oz (215.8 kg)   SpO2 (!) 89%   BMI 76.80 kg/m²       Intake/Output Summary (Last 24 hours) at 6/27/2020 1818  Last data filed at 6/27/2020 1442  Gross per 24 hour   Intake 397 ml   Output 5350 ml   Net -4953 ml       TELEMETRY    Physical Exam:  Constitutional:  Well developed, Well nourished, No acute distress, Non-toxic appearance. HENT:  Normocephalic, Atraumatic, Bilateral external ears normal, Oropharynx moist, No oral exudates, Nose normal. Neck- Normal range of motion, No tenderness, Supple, No stridor. Eyes:  PERRL, EOMI, Conjunctiva normal, No discharge. Respiratory:  Normal breath sounds, No respiratory distress, No wheezing, No chest tenderness. ,no use of accessory muscles, diaphragm movement is normal  Cardiovascular: (PMI) apex non displaced,no lifts no thrills, no s3,no s4, Normal heart rate, Normal rhythm, No murmurs, No rubs, No gallops. Carotid arteries pulse and amplitude are normal no bruit, no abdominal bruit noted ( normal abdominal aorta ausculation), femoral arteries pulse and amplitude are normal no bruit, pedal pulses are normal  GI:  Bowel sounds normal, Soft, No tenderness, No masses, No pulsatile masses. : External genitalia appear normal, No masses or lesions. No discharge. No CVA tenderness. Musculoskeletal:  Intact distal pulses,no edema, No tenderness, No cyanosis, No clubbing. Good range of motion in all major joints. No tenderness to palpation or major deformities noted. Back- No tenderness. Integument:  Warm, Dry, No erythema, No rash. Lymphatic:  No lymphadenopathy noted. Neurologic:  Alert & oriented x 3, Normal motor function, Normal sensory function, No focal deficits noted.    Psychiatric:  Affect normal, Judgment normal, Mood normal.     Medications:    furosemide  40 mg Intravenous TID    lidocaine PF  5 mL Intradermal Once    sodium chloride flush  10 mL Intravenous 2 times per day    methylPREDNISolone  60 mg Intravenous Daily    insulin lispro  15 Units Subcutaneous TID     insulin lispro  0-6 Units Subcutaneous TID     insulin lispro  0-3 Units Subcutaneous Nightly    amiodarone  200 mg Oral Daily    vitamin C  500 mg Oral Daily    aspirin  81 mg Oral Daily    atorvastatin  80 mg Oral Nightly    vitamin D3  400 Units Oral Daily    rOPINIRole  2 mg Oral TID    pramipexole  1 mg Oral TID    pantoprazole  40 mg Oral Daily    oxybutynin  10 mg Oral Daily    cetirizine  10 mg Oral Daily    ferrous sulfate  325 mg Oral Daily with breakfast    doxepin  10 mg Oral Nightly    clopidogrel  75 mg Oral Daily    sodium chloride flush  10 mL Intravenous 2 times per day    heparin (porcine)  5,000 Units Subcutaneous 3 times per day    ipratropium  2 puff Inhalation 4x daily    cefTRIAXone (ROCEPHIN) IV  1 g Intravenous Q24H      dextrose      dextrose       sodium chloride flush, glucose, dextrose, glucagon (rDNA), dextrose, polyethylene glycol, sodium chloride flush, acetaminophen **OR** acetaminophen, promethazine **OR** ondansetron, glucose, dextrose, glucagon (rDNA), dextrose    Lab Data:  CBC:   Recent Labs     06/25/20 0930 06/26/20 0925 06/27/20 0314   WBC 5.6 6.0 5.9   HGB 8.4* 9.4* 8.8*   HCT 29.9* 33.6* 30.9*   MCV 81.5 82.0 81.5    267 284     BMP:   Recent Labs     06/25/20 0930 06/26/20 0925 06/27/20 0314   * 138 139   K 4.9 4.8 4.9   CL 85* 88* 88*   CO2 39* 38* 44*   BUN 41* 39* 35*   CREATININE 2.4* 1.7* 1.3*     LIVER PROFILE:   Recent Labs     06/25/20 0930 06/26/20 0925 06/27/20 0314   AST 17 15 14*   ALT 5* 7* 7*   BILITOT 0.4 0.6 0.4   ALKPHOS 79 94 86     PT/INR:   Recent Labs     06/26/20 0925 06/27/20 0314   PROTIME 11.7 13.0   INR 0.97 1.07     APTT:   Recent Labs     06/26/20 0925 06/27/20 0314   APTT 26.2 33.7     BNP:  No results for input(s): BNP in the last 72 hours. TROPONIN: @TROPONINI:3@      Assessment:  63 y. o.year old who is admitted for          Plan:  Elevated trop: not an ACS  CHF: stable, lasix TID as per nephrology  TAVR  RENAL failure  Anemia: stable  Health maintenance: exerise and diet  All labs, medications and tests reviewed, continue all other medications of all above medical condition listed as is.       Daphne Barkley MD 6/27/2020 6:18 PM

## 2020-06-27 NOTE — PLAN OF CARE
Problem: Falls - Risk of:  Goal: Will remain free from falls  Description: Will remain free from falls  6/26/2020 2156 by Kalli Bland RN  Outcome: Ongoing  6/26/2020 1454 by Roland Moreno RN  Outcome: Ongoing  Goal: Absence of physical injury  Description: Absence of physical injury  6/26/2020 2156 by Kalli Bland RN  Outcome: Ongoing  6/26/2020 1454 by Roland Moreno RN  Outcome: Ongoing     Problem: Skin Integrity:  Goal: Will show no infection signs and symptoms  Description: Will show no infection signs and symptoms  6/26/2020 2156 by Kalli Bland RN  Outcome: Ongoing  6/26/2020 1454 by Roland Moreno RN  Outcome: Ongoing  Goal: Absence of new skin breakdown  Description: Absence of new skin breakdown  6/26/2020 2156 by Kalli Bland RN  Outcome: Ongoing  6/26/2020 1454 by Roland Moreno RN  Outcome: Ongoing

## 2020-06-27 NOTE — PROGRESS NOTES
Patient was found by PCT attempting to climb out of jhonathan-bed, Had BM on floor, pulling off tele leads, pulse ox, and o2 sensor. Bed alarm did not alarm.  initiated.

## 2020-06-27 NOTE — PROGRESS NOTES
[address] [place] [address]  HOSPITALIST PROGRESS NOTE                       Name:  Anabelle Farley /Age/Sex:   (63 y.o. female)   MRN & CSN:  [id number] & [id number] Admission Date/Time: 2020  9:24 AM   Location:   Attending:  MD JENNIFER Gunter  Anabelle Farley is a 63 y.o. female who is admitted with acute on chronic respiratory failure    SUBJECTIVE  -awake, answered orientation questions but per nurse intermittently confused. No fever. 10 point review of systems reviewed and negative unless noted above. ALLERGIES: No Known Allergies    PCP: THEODORE Catherine NP    PAST MEDICAL HISTORY, SURGICAL HISTORY, SOCIAL HISTORY and  HOME MEDICATIONS all reviewed. OBJECTIVE  Vitals:    20 0508 20 0602 20 0713 20 0754   BP: 117/60 106/84 106/75    Pulse: 74 78 77    Resp: 16 19 17 18   Temp:       TempSrc:       SpO2: 99% 100% 100%    Weight:  (!) 475 lb 12.8 oz (215.8 kg)     Height:           PHYSICAL EXAM   GEN Awake female, sitting upright in bed in no apparent distress. EYES Pupils are equally round. No scleral erythema, discharge, or conjunctivitis. HENT Mucous membranes are dry. Oral pharynx without exudates, no evidence of thrush. NECK Supple, no apparent thyromegaly or masses. RESP unlabored respiration, decreased breath sounds bilaterally  CARDIO/VASC S1/S2 auscultated. Regular rate without appreciable murmurs, rubs, or gallops. No JVD or carotid bruits. Peripheral pulses equal bilaterally and palpable. GI Abdomen is soft without significant tenderness, masses, or guarding. Bowel sounds are normoactive. Rectal exam deferred.  No costovertebral angle tenderness. Normal appearing external genitalia. HEME/LYMPH No palpable cervical lymphadenopathy and no hepatosplenomegaly. No petechiae or ecchymoses. MSK Spontaneous movement of all extremities. No gross joint deformities.   SKIN Normal coloration, warm, dry. NEURO Cranial nerves appear grossly intact, normal speech, no lateralizing weakness. PSYCH Awake, alert, oriented x 4. Affect appropriate.     INTAKE: In: 397 [I.V.:397]  Out: 5750   OUTPUT: In: 397   Out: 5750 [Urine:5750]    LABS  Recent Labs     06/25/20 0930 06/26/20  0925 06/27/20  0314   WBC 5.6 6.0 5.9   HGB 8.4* 9.4* 8.8*   HCT 29.9* 33.6* 30.9*    267 284      Recent Labs     06/25/20 0930 06/26/20  0925 06/27/20  0314   * 138 139   K 4.9 4.8 4.9   CL 85* 88* 88*   CO2 39* 38* 44*   BUN 41* 39* 35*   CREATININE 2.4* 1.7* 1.3*     Recent Labs     06/25/20 0930 06/26/20  0925 06/27/20  0314   AST 17 15 14*   ALT 5* 7* 7*   BILITOT 0.4 0.6 0.4   ALKPHOS 79 94 86     Recent Labs     06/26/20  0925 06/27/20  0314   INR 0.97 1.07     Recent Labs     06/25/20 0930 06/26/20  0924   TROPONINT 0.096* 0.057*          Abnormal labs for today noted      Imaging:     ECHO:    Microbiology:  Blood culture:    Urine culture:    Sputum culture:    Procedures done this admission:    MEDS  Scheduled Meds:   lidocaine PF  5 mL Intradermal Once    sodium chloride flush  10 mL Intravenous 2 times per day    furosemide  60 mg Intravenous TID    methylPREDNISolone  60 mg Intravenous Daily    insulin lispro  15 Units Subcutaneous TID WC    insulin lispro  0-6 Units Subcutaneous TID WC    insulin lispro  0-3 Units Subcutaneous Nightly    amiodarone  200 mg Oral Daily    vitamin C  500 mg Oral Daily    aspirin  81 mg Oral Daily    atorvastatin  80 mg Oral Nightly    vitamin D3  400 Units Oral Daily    rOPINIRole  2 mg Oral TID    pramipexole  1 mg Oral TID    pantoprazole  40 mg Oral Daily    oxybutynin  10 mg Oral Daily    cetirizine  10 mg Oral Daily    ferrous sulfate  325 mg Oral Daily with breakfast    doxepin  10 mg Oral Nightly    clopidogrel  75 mg Oral Daily    sodium chloride flush  10 mL Intravenous 2 times per day    heparin (porcine)  5,000 Units Subcutaneous 3 times per day    ipratropium  2 puff Inhalation 4x daily    cefTRIAXone (ROCEPHIN) IV  1 g Intravenous Q24H     Continuous Infusions:   dextrose      dextrose       PRN Meds:sodium chloride flush, glucose, dextrose, glucagon (rDNA), dextrose, polyethylene glycol, sodium chloride flush, acetaminophen **OR** acetaminophen, promethazine **OR** ondansetron, glucose, dextrose, glucagon (rDNA), dextrose        ASSESSMENT and PLAN  Hospital Day: 3    1-Acute on chronic hypercapnic/hypoxic respiratory failure on home bipap/home oxygen- needing bipap continous on presentation- due to diastolic HF exacerbation, less likely pneumonia. covid negative. -pulm following.  -appears doing okay on bipap- less hypercapnic and more awake, however, remains at high risk of needing intubation but has improved, will transfer to step down. 2-Acute on chronic diastolic HF with venous stasis dermatitis- on IV lasix  3-JOSE on CKD stage 2- ?cardio-renal- expect to improve with diuresis- nephrology following  4-Hypotension- likely medication related, r/o sepsis- blood culture collected in ED  5-Abnormal UA- doubt UTI- get urine cutlure.   6-Probable metabolic encephalopathy- ?superimposed hospital acquired delirium- check ABG again.       Other issues  -type 2 DM- on SS  -arrythmia- on amiodarone  -HLD  -ANMOL- on bipap  -RLS  -HTN  -Morbid obesity with KIT>97 complicating above      Transfer to step down      Disp:     Diet DIET LOW SODIUM 2 GM;   DVT Prophylaxis [] Lovenox, []  Heparin, [] SCDs, [] Ambulation   GI Prophylaxis [] PPI,  [] H2 Blocker,  [] Carafate,  [] Diet/Tube Feeds   Code Status Full Code   Disposition Patient requires continued admission due to acute on chronic respiratory failure   CMS Level of Risk [] Low, [] Moderate,[x]  High  Patient's risk as above due to acute on chronic respiratory failure     SHAGGY BREEN MD 6/27/2020 9:14 AM

## 2020-06-27 NOTE — PROGRESS NOTES
Nephrology Progress Note  6/27/2020 12:29 PM  Subjective:   Admit Date: 6/25/2020  PCP: THEODORE Merritt NP  Interval History: pt weak and less distress    Diet: DIET LOW SODIUM 2 GM;  Pain is:Mild      Data:   Scheduled Meds:   lidocaine PF  5 mL Intradermal Once    sodium chloride flush  10 mL Intravenous 2 times per day    furosemide  60 mg Intravenous TID    methylPREDNISolone  60 mg Intravenous Daily    insulin lispro  15 Units Subcutaneous TID WC    insulin lispro  0-6 Units Subcutaneous TID WC    insulin lispro  0-3 Units Subcutaneous Nightly    amiodarone  200 mg Oral Daily    vitamin C  500 mg Oral Daily    aspirin  81 mg Oral Daily    atorvastatin  80 mg Oral Nightly    vitamin D3  400 Units Oral Daily    rOPINIRole  2 mg Oral TID    pramipexole  1 mg Oral TID    pantoprazole  40 mg Oral Daily    oxybutynin  10 mg Oral Daily    cetirizine  10 mg Oral Daily    ferrous sulfate  325 mg Oral Daily with breakfast    doxepin  10 mg Oral Nightly    clopidogrel  75 mg Oral Daily    sodium chloride flush  10 mL Intravenous 2 times per day    heparin (porcine)  5,000 Units Subcutaneous 3 times per day    ipratropium  2 puff Inhalation 4x daily    cefTRIAXone (ROCEPHIN) IV  1 g Intravenous Q24H     Continuous Infusions:   dextrose      dextrose       PRN Meds:sodium chloride flush, glucose, dextrose, glucagon (rDNA), dextrose, polyethylene glycol, sodium chloride flush, acetaminophen **OR** acetaminophen, promethazine **OR** ondansetron, glucose, dextrose, glucagon (rDNA), dextrose  I/O last 3 completed shifts:   In: 427 [P.O.:50; I.V.:377]  Out: 9050 [Urine:9050]  I/O this shift:  In: 20 [I.V.:20]  Out: -     Intake/Output Summary (Last 24 hours) at 6/27/2020 1229  Last data filed at 6/27/2020 0818  Gross per 24 hour   Intake 397 ml   Output 5750 ml   Net -5353 ml     CBC:   Recent Labs     06/25/20  0930 06/26/20  0925 06/27/20  0314   WBC 5.6 6.0 5.9   HGB 8.4* 9.4* 8.8*    267 284     BMP:    Recent Labs     06/25/20  0930 06/26/20  0925 06/27/20  0314   * 138 139   K 4.9 4.8 4.9   CL 85* 88* 88*   CO2 39* 38* 44*   BUN 41* 39* 35*   CREATININE 2.4* 1.7* 1.3*   GLUCOSE 78 88 119*     Hepatic:   Recent Labs     06/25/20  0930 06/26/20  0925 06/27/20  0314   AST 17 15 14*   ALT 5* 7* 7*   BILITOT 0.4 0.6 0.4   ALKPHOS 79 94 86     Troponin: No results for input(s): TROPONINI in the last 72 hours. BNP: No results for input(s): BNP in the last 72 hours.   Lipids:   Recent Labs     06/26/20 0925   CHOL 117   HDL 36*     ABGs:   Lab Results   Component Value Date    PO2ART 29 06/27/2020    NAW2HZK 81.0 06/27/2020     INR:   Recent Labs     06/26/20  0925 06/27/20 0314   INR 0.97 1.07     Renal Labs  Albumin:    Lab Results   Component Value Date    LABALBU 3.5 06/27/2020     Calcium:    Lab Results   Component Value Date    CALCIUM 8.8 06/27/2020     Phosphorus:    Lab Results   Component Value Date    PHOS 5.8 05/24/2020     U/A:    Lab Results   Component Value Date    NITRU NEGATIVE 06/25/2020    COLORU RAMIRO 06/25/2020    WBCUA 128 06/25/2020    RBCUA 5 06/25/2020    MUCUS FEW 06/25/2020    TRICHOMONAS NONE SEEN 06/25/2020    YEAST OCCASIONAL 08/23/2019    BACTERIA FEW 06/25/2020    CLARITYU SLIGHTLY CLOUDY 06/25/2020    SPECGRAV 1.016 06/25/2020    UROBILINOGEN NORMAL 06/25/2020    BILIRUBINUR NEGATIVE 06/25/2020    BLOODU NEGATIVE 06/25/2020    KETUA NEGATIVE 06/25/2020     ABG:    Lab Results   Component Value Date    UCD3PRA 81.0 06/27/2020    PO2ART 29 06/27/2020    IIN2MBZ 53.8 06/27/2020     HgBA1c:    Lab Results   Component Value Date    LABA1C 6.0 06/26/2020     Microalbumen/Creatinine ratio:  No components found for: RUCREAT          Objective:   Vitals: BP (!) 156/82   Pulse 73   Temp 98.6 °F (37 °C) (Axillary)   Resp 15   Ht 5' 6\" (1.676 m)   Wt (!) 475 lb 12.8 oz (215.8 kg)   SpO2 (!) 89%   BMI 76.80 kg/m²   General appearance: awake weak  HEENT: Head: Normal, normocephalic, atraumatic. Neck: supple, symmetrical, trachea midline  Lungs: diminished breath sounds bilaterally  Heart: S1, S2 normal  Abdomen: abnormal findings:  soft nt  Extremities: edema trace  Neurologic: Mental status: alertness: awake      Patient Active Problem List:     Morbid obesity with BMI of 70 and over, adult [facility])     Essential hypertension     Dyslipidemia     Fecal incontinence     Mild intermittent asthma without complication     S/P laparoscopic cholecystectomy     Type 2 diabetes mellitus without complication, with long-term current use of insulin (HCC)     Fatty liver     Arthritis     H/O parotidectomy     Venous stasis dermatitis of both lower extremities     Aortic stenosis     Morbid obesity (HCC)     Asthma     Diabetes mellitus (HCC)     Hypertension     Sleep apnea     Family history of coronary artery disease     Chest pain     SOB (shortness of breath)     Palpitations     Peripheral edema     Hypervolemia     Chronic respiratory failure with hypoxia and hypercapnia (HCC)     Acute exacerbation of chronic obstructive pulmonary disease (COPD) (HCC)     Chronic obstructive pulmonary disease (HCC)     Gait disturbance     Acute on chronic respiratory failure with hypoxia and hypercapnia (HCC)     Cellulitis     Skin ulcer of left foot with fat layer exposed ([address]     Pneumonia     VHD (valvular heart disease)     Class 3 severe obesity due to excess calories with body mass index (BMI) greater than or equal to 70 in adult (HCC)     SIRS (systemic inflammatory response syndrome) (HCC)     Acute kidney injury ([address]     Acute metabolic encephalopathy     Shock, unspecified (HCC)     Uncontrolled diabetes mellitus (HCC)     Sepsis (HCC)     Altered mental status     Wide-complex tachycardia (HCC)     Acute on chronic diastolic heart failure (HCC)     S/P TAVR (transcatheter aortic valve replacement)    Assessment and Plan:      IMP:  arf on ckd from CRS ?  AIN  CHF with TAVR  Dm2  sob    Plan     Renal imrpove and decrease steroid  o2 monitor and diuresis  ssi  o2 improve             Luiz Cruz MD

## 2020-06-28 NOTE — PROGRESS NOTES
paient very agitated, and very confused, hitting kicking atempting to climb OOB, pulling off moniter leads,S tachy 110, BP elevated with agitation and freq movement in bed, [facility] NP called, orders received, !  MG IVP Ativan given, with some calming effect, refused all [po meds, and any respcare tx as ordered, o2 reapplied repeatedly, 6 l NC, NO rsp distress, sats 95 or above

## 2020-06-28 NOTE — PROGRESS NOTES
Hospitalist Progress Note      Name:  Alexandra Dunn /Age/Sex: [birth year]  (66 y.o. female)   MRN & CSN:  [id number] & [id number] Admission Date/Time: 2020  9:24 AM   Location:   PCP: Erich Harper Day: 4    History of Present Illness:     Chief Complaint: SOB  Alexandra Dunn is a 63 y.o.  female  who presents with SOB     The patient seen and examined at bed side. She says feeling better today. Denies having any chest pain. Ten point ROS reviewed negative, unless as noted above    Objective:        Intake/Output Summary (Last 24 hours) at 2020 1334  Last data filed at 2020 0502  Gross per 24 hour   Intake 400 ml   Output 3800 ml   Net -3400 ml      Vitals:   Vitals:    20 1300   BP: (!) 88/49   Pulse: 66   Resp: 16   Temp:    SpO2: 94%     Physical Exam:   General Appearance: alert and oriented to person, place and time, in no acute distress  Cardiovascular: normal rate, regular rhythm, normal S1 and S2  Pulmonary/Chest: Decreased breath sounds bilaterally  Abdomen: soft, non-tender, non-distended, normal bowel sounds, no masses   Extremities: no cyanosis, clubbing or edema, pulse   Skin: warm and dry, no rash or erythema  Head: normocephalic and atraumatic  Eyes: pupils equal, round, and reactive to light  Neck: supple and non-tender without mass, no thyromegaly   Musculoskeletal: normal range of motion, no joint swelling, deformity or tenderness  Neurological: alert, oriented, normal speech, no focal findings or movement disorder noted    Medications:   Medications:    furosemide  40 mg Intravenous TID    LORazepam  1 mg Intravenous Once    LORazepam  1 mg Intravenous Once    lidocaine PF  5 mL Intradermal Once    sodium chloride flush  10 mL Intravenous 2 times per day    methylPREDNISolone  60 mg Intravenous Daily    insulin lispro  15 Units Subcutaneous TID WC    insulin lispro  0-6 Units Subcutaneous TID     insulin lispro  0-3 Units Subcutaneous Nightly    amiodarone  200 mg Oral Daily    vitamin C  500 mg Oral Daily    aspirin  81 mg Oral Daily    atorvastatin  80 mg Oral Nightly    vitamin D3  400 Units Oral Daily    rOPINIRole  2 mg Oral TID    pramipexole  1 mg Oral TID    pantoprazole  40 mg Oral Daily    oxybutynin  10 mg Oral Daily    cetirizine  10 mg Oral Daily    ferrous sulfate  325 mg Oral Daily with breakfast    doxepin  10 mg Oral Nightly    clopidogrel  75 mg Oral Daily    sodium chloride flush  10 mL Intravenous 2 times per day    heparin (porcine)  5,000 Units Subcutaneous 3 times per day    ipratropium  2 puff Inhalation 4x daily    cefTRIAXone (ROCEPHIN) IV  1 g Intravenous Q24H      Infusions:    dextrose      dextrose       PRN Meds: sodium chloride flush, 10 mL, PRN  glucose, 15 g, PRN  dextrose, 12.5 g, PRN  glucagon (rDNA), 1 mg, PRN  dextrose, 100 mL/hr, PRN  polyethylene glycol, 17 g, Daily PRN  sodium chloride flush, 10 mL, PRN  acetaminophen, 650 mg, Q6H PRN    Or  acetaminophen, 650 mg, Q6H PRN  promethazine, 12.5 mg, Q6H PRN    Or  ondansetron, 4 mg, Q6H PRN  glucose, 15 g, PRN  dextrose, 12.5 g, PRN  glucagon (rDNA), 1 mg, PRN  dextrose, 100 mL/hr, PRN            Pertinent New Labs & Imaging Studies     CBC with Differential:    Lab Results   Component Value Date    WBC 4.6 06/28/2020    RBC 3.59 06/28/2020    HGB 8.3 06/28/2020    HCT 29.6 06/28/2020     06/28/2020    MCV 82.5 06/28/2020    MCH 23.1 06/28/2020    MCHC 28.0 06/28/2020    RDW 19.9 06/28/2020    NRBC 1 06/24/2019    SEGSPCT 79.8 06/28/2020    BANDSPCT 7 10/30/2019    LYMPHOPCT 11.0 06/28/2020    PROMYELOPCT 3 06/23/2019    MONOPCT 8.6 06/28/2020    MYELOPCT 1 06/27/2019    BASOPCT 0.0 06/28/2020    MONOSABS 0.4 06/28/2020    LYMPHSABS 0.5 06/28/2020    EOSABS 0.0 06/28/2020    BASOSABS 0.0 06/28/2020    DIFFTYPE AUTOMATED DIFFERENTIAL 06/28/2020     CMP:    Lab Results   Component Value Date     TECHNIQUE: After obtaining informed consent, the patient was placed in the supine position on the examination table. The right neck was then cleansed and draped in the usual sterile fashion. 1% Lidocaine was used for local anesthesia. Next, under ultrasound guidance the right neck area was cleansed and draped in the usual sterile fashion. Next, the right internal jugular vein was visualized with ultrasonography and cannulated using a micropuncture needle. A transitional 4 French micropuncture dilator was then introduced. This was then exchanged for an .035 guidewire. Using standard Seldinger technique, a triple lumen central line catheter was then advanced. The tip of the catheter was positioned at the junction of the superior vena cava and right atrium. The proximal end was then stabilized to the skin using Prolene sutures. All three ports flushed without any problems. HISTORY: No peripheral access, request central access. Successful triple lumen ultrasound guided central catheter placement as described above. Assessment and Plan:   Janet Valentine is a 63 y.o.  female  who presents with SOB    Acute on chronic hypercapnic/hypoxic respiratory failure   Acute exacerbation of diastolic HF    Possible COPD exacerbation  Covid negative. -pulm following, appreciated recommendations  -As needed BIPAP and oxygen  -Diuretics as managed by cardiology  -Cardiology recommendations appreciated  -Sterids, will wean off  -DC Abx    JOSE on CKD stage 2-Improving  ?cardio-renal- expect to improve with diuresis  - nephrology following, appreciated recommendations    Hypotension- Resolved  likely medication related, r/o sepsis- blood culture collected in ED  .   Probable metabolic encephalopathy- Resolved  ?superimposed hospital acquired delirium    DM type 2  Lantus and sliding scale  Hypoglycemia precautions     Agitation/Anxiety  As needed Haldol    RLS  Continue Requip    OAB  Continue Ditropan    CAD  ASA, Plavix and Statin     PT/OT evalv needed. Can be transferred to step down unit.   Diet DIET LOW SODIUM 2 GM;   DVT Prophylaxis [] Lovenox, [x]  Heparin, [] SCDs, [] Ambulation   GI Prophylaxis [x] PPI,  [] H2 Blocker,  [] Carafate,  [] Diet/Tube Feeds   Code Status Full Code   Disposition Patient requires continued admission due to CHF exacerbation   MDM [] Low, [x] Moderate,[]  High     Electronically signed by Delbert Mills MD on 6/28/2020 at 1:34 PM

## 2020-06-28 NOTE — PROGRESS NOTES
Today's plan: Covid ruled out, patient is transferred out of Covid unit      Admit Date:  2020    Subjective:ok      Chief complaints on admission  Chief Complaint   Patient presents with    Shortness of Breath         History of present illness:Olivia is a 63 y. o.year old who  presents with had concerns including Shortness of Breath. Past medical history:    has a past medical history of Acute kidney injury ([facility] [address] Aortic stenosis, Asthma, Bacteremia due to group B Streptococcus, Cancer ([facility] [address] Carotid artery stenosis, Chest pain, CHF (congestive heart failure) ([address] Chronic back pain, COPD exacerbation ([facility] [address] Depression, Diabetes mellitus ([facility] [address] Family history of coronary artery disease, Fatty liver, GI bleed, H/O aortic valve stenosis, H/O echocardiogram, Headache, Heart murmur, History of nuclear stress test, Morbid obesity ([facility] [address] Neuropathy, NSTEMI (non-ST elevated myocardial infarction) ([facility] [address] Obesity, Osteoarthritis, Palpitations, Peripheral edema, Restless legs syndrome, Sleep apnea, Sleep apnea, and SOB (shortness of breath). Past surgical history:   has a past surgical history that includes Cholecystectomy;  section; Hysterectomy; Tubal ligation; Neck surgery; Gallbladder surgery; and Aortic valve replacement (2017). Social History:   reports that she quit smoking about 17 years ago. Her smoking use included cigarettes. She has never used smokeless tobacco. She reports that she does not drink alcohol or use drugs. Family history:  family history includes Coronary Art Dis in her mother; Diabetes in her mother; Heart Attack in her mother; Heart Disease in her brother, father, and mother; Heart Failure in her brother, father, and mother; High Blood Pressure in her brother, father, and mother; Hypertension in her mother; Obesity in her brother, father, and mother.     No Known Allergies      Objective:   BP (!) 107/41   Pulse 66   Temp 98 °F (36.7 °C) (Oral)   Resp 17   Ht 5' 10 mL Intravenous 2 times per day    methylPREDNISolone  60 mg Intravenous Daily    insulin lispro  15 Units Subcutaneous TID WC    insulin lispro  0-6 Units Subcutaneous TID WC    insulin lispro  0-3 Units Subcutaneous Nightly    amiodarone  200 mg Oral Daily    vitamin C  500 mg Oral Daily    aspirin  81 mg Oral Daily    atorvastatin  80 mg Oral Nightly    vitamin D3  400 Units Oral Daily    rOPINIRole  2 mg Oral TID    pramipexole  1 mg Oral TID    pantoprazole  40 mg Oral Daily    oxybutynin  10 mg Oral Daily    cetirizine  10 mg Oral Daily    ferrous sulfate  325 mg Oral Daily with breakfast    doxepin  10 mg Oral Nightly    clopidogrel  75 mg Oral Daily    sodium chloride flush  10 mL Intravenous 2 times per day    heparin (porcine)  5,000 Units Subcutaneous 3 times per day    ipratropium  2 puff Inhalation 4x daily      dextrose      dextrose       sodium chloride flush, glucose, dextrose, glucagon (rDNA), dextrose, polyethylene glycol, sodium chloride flush, acetaminophen **OR** acetaminophen, promethazine **OR** ondansetron, glucose, dextrose, glucagon (rDNA), dextrose    Lab Data:  CBC:   Recent Labs     06/26/20 0925 06/27/20 0314 06/28/20 0420   WBC 6.0 5.9 4.6   HGB 9.4* 8.8* 8.3*   HCT 33.6* 30.9* 29.6*   MCV 82.0 81.5 82.5    284 258     BMP:   Recent Labs     06/26/20 0925 06/27/20 0314 06/28/20 0420    139 142   K 4.8 4.9 4.2   CL 88* 88* 89*   CO2 38* 44* 48*   BUN 39* 35* 35*   CREATININE 1.7* 1.3* 1.1     LIVER PROFILE:   Recent Labs     06/26/20 0925 06/27/20 0314 06/28/20 0420   AST 15 14* 12*   ALT 7* 7* 6*   BILITOT 0.6 0.4 0.4   ALKPHOS 94 86 71     PT/INR:   Recent Labs     06/26/20 0925 06/27/20 0314   PROTIME 11.7 13.0   INR 0.97 1.07     APTT:   Recent Labs     06/26/20 0925 06/27/20 0314 06/28/20 0420   APTT 26.2 33.7 32.0     BNP:  No results for input(s): BNP in the last 72 hours. TROPONIN: @TROPONINI:3@      Assessment:  63 y. o.year old who is admitted for          Plan:  Elevated trop: not an ACS  CHF: stable, lasix TID as per nephrology  TAVR  RENAL failure  Anemia: stable  Health maintenance: exerise and diet  All labs, medications and tests reviewed, continue all other medications of all above medical condition listed as is.       Angélica London MD 6/28/2020 4:47 PM

## 2020-06-28 NOTE — PROGRESS NOTES
patient now appears to sleep peacefully when not disturbed,refuses to be positioned on sides, sips water taken, RSR/VVS, no resp distress, remains confused but fairly cooperatuve at this time

## 2020-06-28 NOTE — PROGRESS NOTES
pulmonary      SUBJECTIVE: she is more alert and feels less sob     OBJECTIVE    VITALS:  BP (!) 135/96   Pulse 69   Temp 98 °F (36.7 °C) (Oral)   Resp 14   Ht 5' 6\" (1.676 m)   Wt (!) 467 lb 1.6 oz (211.9 kg)   SpO2 98%   BMI 75.39 kg/m²   HEAD AND FACE EXAM:  No throat injection, no active exudate,no thrush  NECK EXAM;No JVD, no masses, symmetrical  CHEST EXAM; Expansion equal and symmetrical, no masses  LUNG EXAM; Good breath sounds bilaterally. There are expiratory wheezes both lungs, there are crackles at both lung bases  CARDIOVASCULAR EXAM: Positive S1 and S2, no S3 or S4, no clicks ,no murmurs  RIGHT AND LEFT LOWER EXTRIMITY EXAM: No edema, no swelling, no inflamation  CNS EXAM: Alert and oriented X3          LABS   Lab Results   Component Value Date    WBC 4.6 06/28/2020    HGB 8.3 (L) 06/28/2020    HCT 29.6 (L) 06/28/2020    MCV 82.5 06/28/2020     06/28/2020     Lab Results   Component Value Date    CREATININE 1.1 06/28/2020    BUN 35 (H) 06/28/2020     06/28/2020    K 4.2 06/28/2020    CL 89 (L) 06/28/2020    CO2 48 (H) 06/28/2020     Lab Results   Component Value Date    INR 1.07 06/27/2020    PROTIME 13.0 06/27/2020          Lab Results   Component Value Date    PHOS 5.8 05/24/2020    PHOS 2.6 11/04/2019    PHOS 2.9 11/03/2019        Recent Labs     06/26/20  1415 06/27/20  0930 06/28/20  0830   PH 7.42 7.43 7.45   PO2ART 100 29* 82   [id number] 66.0* 81.0* 80.0*   O2SAT 95.9* 56.1* 96.0         Wt Readings from Last 3 Encounters:   06/28/20 (!) 467 lb 1.6 oz (211.9 kg)   06/04/20 (!) 494 lb (224.1 kg)   05/24/20 (!) 494 lb 4.8 oz (224.2 kg)               ASSESMENT  Ac on ch resp failure  Ac copd  Ac chf        PLAN  1. cpm  2.  Cont pap rx    6/28/2020  Edwige Zaragoza M.D.

## 2020-06-28 NOTE — PROGRESS NOTES
Nephrology Progress Note  6/28/2020 10:51 AM  Subjective:   Admit Date: 6/25/2020  PCP: THEODORE [facility] - [facility]  Interval History: pt doing better less confused    Diet: DIET LOW SODIUM 2 GM;  Pain is:Mild      Data:   Scheduled Meds:   furosemide  40 mg Intravenous TID    LORazepam  1 mg Intravenous Once    LORazepam  1 mg Intravenous Once    lidocaine PF  5 mL Intradermal Once    sodium chloride flush  10 mL Intravenous 2 times per day    methylPREDNISolone  60 mg Intravenous Daily    insulin lispro  15 Units Subcutaneous TID WC    insulin lispro  0-6 Units Subcutaneous TID WC    insulin lispro  0-3 Units Subcutaneous Nightly    amiodarone  200 mg Oral Daily    vitamin C  500 mg Oral Daily    aspirin  81 mg Oral Daily    atorvastatin  80 mg Oral Nightly    vitamin D3  400 Units Oral Daily    rOPINIRole  2 mg Oral TID    pramipexole  1 mg Oral TID    pantoprazole  40 mg Oral Daily    oxybutynin  10 mg Oral Daily    cetirizine  10 mg Oral Daily    ferrous sulfate  325 mg Oral Daily with breakfast    doxepin  10 mg Oral Nightly    clopidogrel  75 mg Oral Daily    sodium chloride flush  10 mL Intravenous 2 times per day    heparin (porcine)  5,000 Units Subcutaneous 3 times per day    ipratropium  2 puff Inhalation 4x daily    cefTRIAXone (ROCEPHIN) IV  1 g Intravenous Q24H     Continuous Infusions:   dextrose      dextrose       PRN Meds:sodium chloride flush, glucose, dextrose, glucagon (rDNA), dextrose, polyethylene glycol, sodium chloride flush, acetaminophen **OR** acetaminophen, promethazine **OR** ondansetron, glucose, dextrose, glucagon (rDNA), dextrose  I/O last 3 completed shifts: In: 5 [P.O.:400; I.V.:20]  Out: 3800 [Urine:3800]  No intake/output data recorded.     Intake/Output Summary (Last 24 hours) at 6/28/2020 1051  Last data filed at 6/28/2020 0502  Gross per 24 hour   Intake 400 ml   Output 3800 ml   Net -3400 ml     CBC:   Recent Labs     06/26/20  5870 06/27/20 0314 06/28/20 0420   WBC 6.0 5.9 4.6   HGB 9.4* 8.8* 8.3*    284 258     BMP:    Recent Labs     06/26/20  0925 06/27/20 0314 06/28/20 0420    139 142   K 4.8 4.9 4.2   CL 88* 88* 89*   CO2 38* 44* 48*   BUN 39* 35* 35*   CREATININE 1.7* 1.3* 1.1   GLUCOSE 88 119* 122*     Hepatic:   Recent Labs     06/26/20  0925 06/27/20 0314 06/28/20 0420   AST 15 14* 12*   ALT 7* 7* 6*   BILITOT 0.6 0.4 0.4   ALKPHOS 94 86 71     Troponin: No results for input(s): TROPONINI in the last 72 hours. BNP: No results for input(s): BNP in the last 72 hours.   Lipids:   Recent Labs     06/26/20 0925   CHOL 117   HDL 36*     ABGs:   Lab Results   Component Value Date    PO2ART 82 06/28/2020    HZO5JCW 80.0 06/28/2020     INR:   Recent Labs     06/26/20 0925 06/27/20 0314   INR 0.97 1.07     Renal Labs  Albumin:    Lab Results   Component Value Date    LABALBU 3.4 06/28/2020     Calcium:    Lab Results   Component Value Date    CALCIUM 8.9 06/28/2020     Phosphorus:    Lab Results   Component Value Date    PHOS 5.8 05/24/2020     U/A:    Lab Results   Component Value Date    NITRU NEGATIVE 06/25/2020    COLORU RAMIRO 06/25/2020    WBCUA 128 06/25/2020    RBCUA 5 06/25/2020    MUCUS FEW 06/25/2020    TRICHOMONAS NONE SEEN 06/25/2020    YEAST OCCASIONAL 08/23/2019    BACTERIA FEW 06/25/2020    CLARITYU SLIGHTLY CLOUDY 06/25/2020    SPECGRAV 1.016 06/25/2020    UROBILINOGEN NORMAL 06/25/2020    BILIRUBINUR NEGATIVE 06/25/2020    BLOODU NEGATIVE 06/25/2020    KETUA NEGATIVE 06/25/2020     ABG:    Lab Results   Component Value Date    GYX6WDE 80.0 06/28/2020    PO2ART 82 06/28/2020    SYP7LRE 55.6 06/28/2020     HgBA1c:    Lab Results   Component Value Date    LABA1C 6.0 06/26/2020     Microalbumen/Creatinine ratio:  No components found for: RUCREAT          Objective:   Vitals: BP (!) 135/96   Pulse 69   Temp 98 °F (36.7 °C) (Oral)   Resp 13   Ht 5' 6\" (1.676 m)   Wt (!) 467 lb 1.6 oz (211.9 kg)   SpO2 100% BMI 75.39 kg/m²   General appearance: awake weak  HEENT: Head: Normal, normocephalic, atraumatic.   Neck: supple, symmetrical, trachea midline  Lungs: diminished breath sounds bilaterally  Heart: S1, S2 normal  Abdomen: abnormal findings:  soft nt  Extremities: edema trace  Neurologic: Mental status: alertness: awake      Patient Active Problem List:     Morbid obesity with BMI of 70 and over, adult [facility])     Essential hypertension     Dyslipidemia     Fecal incontinence     Mild intermittent asthma without complication     S/P laparoscopic cholecystectomy     Type 2 diabetes mellitus without complication, with long-term current use of insulin (HCC)     Fatty liver     Arthritis     H/O parotidectomy     Venous stasis dermatitis of both lower extremities     Aortic stenosis     Morbid obesity (HCC)     Asthma     Diabetes mellitus (HCC)     Hypertension     Sleep apnea     Family history of coronary artery disease     Chest pain     SOB (shortness of breath)     Palpitations     Peripheral edema     Hypervolemia     Chronic respiratory failure with hypoxia and hypercapnia (HCC)     Acute exacerbation of chronic obstructive pulmonary disease (COPD) (HCC)     Chronic obstructive pulmonary disease (HCC)     Gait disturbance     Acute on chronic respiratory failure with hypoxia and hypercapnia (HCC)     Cellulitis     Skin ulcer of left foot with fat layer exposed ([address]     Pneumonia     VHD (valvular heart disease)     Class 3 severe obesity due to excess calories with body mass index (BMI) greater than or equal to 70 in adult (HCC)     SIRS (systemic inflammatory response syndrome) (HCC)     Acute kidney injury ([address]     Acute metabolic encephalopathy     Shock, unspecified (HCC)     Uncontrolled diabetes mellitus (HCC)     Sepsis (HCC)     Altered mental status     Wide-complex tachycardia (HCC)     Acute on chronic diastolic heart failure (HCC)     S/P TAVR (transcatheter aortic valve replacement)    Assessment

## 2020-06-29 NOTE — PLAN OF CARE
Problem: Falls - Risk of:  Goal: Will remain free from falls  Description: Will remain free from falls  Outcome: Ongoing  Goal: Absence of physical injury  Description: Absence of physical injury  Outcome: Ongoing     Problem: Skin Integrity:  Goal: Will show no infection signs and symptoms  Description: Will show no infection signs and symptoms  Outcome: Ongoing  Goal: Absence of new skin breakdown  Description: Absence of new skin breakdown  Outcome: Ongoing     Problem: Pain:  Goal: Pain level will decrease  Description: Pain level will decrease  Outcome: Ongoing  Goal: Control of acute pain  Description: Control of acute pain  Outcome: Ongoing  Goal: Control of chronic pain  Description: Control of chronic pain  Outcome: Ongoing     Problem: Coping:  Goal: Ability to remain calm will improve  Description: Ability to remain calm will improve  Outcome: Ongoing     Problem: Safety:  Goal: Ability to remain free from injury will improve  Description: Ability to remain free from injury will improve  Outcome: Ongoing     Problem: Self-Care:  Goal: Ability to participate in self-care as condition permits will improve  Description: Ability to participate in self-care as condition permits will improve  Outcome: Ongoing     Problem: OXYGENATION/RESPIRATORY FUNCTION  Goal: Patient will maintain patent airway  Outcome: Ongoing  Goal: Patient will achieve/maintain normal respiratory rate/effort  Description: Respiratory rate and effort will be within normal limits for the patient  Outcome: Ongoing

## 2020-06-29 NOTE — CONSULTS
[address] [address] Continence Nurse  Consult Note       rBice Issa  AGE: 63 y.o.    GENDER: female  : 1955  TODAY'S DATE:  2020    Subjective:     Reason for  Evaluation and Assessment:  Wound care eval sacral/buttocks and left posterior thigh pressure and moisture associated       Brice Issa is a 63 y.o. female referred by:   [x] Physician  [] Nursing  [] Other:     Wound Identification:  Wound Type: pressure and moisture associated  Contributing Factors: chronic pressure, decreased mobility and shear force  obsesity diabetes         PAST MEDICAL HISTORY        Diagnosis Date    Acute kidney injury ([facility] 2019    Aortic stenosis     Asthma     Bacteremia due to group B Streptococcus     Treated at [facility] in 2017 - felt to be due to cellulitis    [facility])     Cervical    Carotid artery stenosis     Chest pain     CH[facility]congestive heart failure) ([facility])     Chronic back pain     COPD exacerbation ([facility]     Depression     Diabetes mellitus ([facility]     Family history of coronary artery disease     Fatty liver 10/27/2016    GI bleed 2017    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 H/O aortic valve stenosis     H/O echocardiogram 2016    EF 55%, Aortic valve area is 0.84 cm2 with a mean gradient of 36 suggestive of severe aortic stenosis, mild to mos LVH    Headache     Heart murmur     History of nuclear stress test 2016    lexiscan-normal,EF70%    Morbid obesity ([facility])     BMI=73.72 kg/m2    Neuropathy     NSTEMI (non-ST elevated myocardial infarction) ([facility] 2018    Obesity     Osteoarthritis     Palpitations     Peripheral edema     Restless legs syndrome     Sleep apnea     Has a CPAP    Sleep apnea     SOB (shortness of breath)        PAST SURGICAL HISTORY    Past Surgical History:   Procedure Laterality Date    AORTIC VALVE REPLACEMENT  2017    29mm EvolutR TAVR     SECTION      CHOLECYSTECTOMY      GALLBLADDER SURGERY      HYSTERECTOMY      NECK SURGERY      Tumor    TUBAL LIGATION         FAMILY HISTORY    Family History   Problem Relation Age of Onset    Heart Failure Mother     Heart Attack Mother     Hypertension Mother     Coronary Art Dis Mother         Had PTCA w/stenting.  Heart Failure Father     Heart Failure Brother     Heart Disease Mother     High Blood Pressure Mother     Obesity Mother     Diabetes Mother     Heart Disease Father     High Blood Pressure Father     Obesity Father     Heart Disease Brother     High Blood Pressure Brother     Obesity Brother        SOCIAL HISTORY    Social History     Tobacco Use    Smoking status: Former Smoker     Types: Cigarettes     Last attempt to quit: 3/19/2003     Years since quittin.2    Smokeless tobacco: Never Used    Tobacco comment: quit 15 years ago   Substance Use Topics    Alcohol use: No    Drug use: No       ALLERGIES    No Known Allergies    MEDICATIONS    No current facility-administered medications on file prior to encounter.       Current Outpatient Medications on File Prior to Encounter   Medication Sig Dispense Refill    calcium carbonate (TUMS) 500 MG chewable tablet Take 1 tablet by mouth daily      cyanocobalamin 1000 MCG/ML injection Inject 1,000 mcg into the muscle once      doxepin (SINEQUAN) 10 MG capsule Take 10 mg by mouth nightly      ferrous sulfate (IRON 325) 325 (65 Fe) MG tablet Take 325 mg by mouth daily (with breakfast)      furosemide (LASIX) 40 MG tablet Take 40 mg by mouth 2 times daily      rOPINIRole (REQUIP) 2 MG tablet Take 2 mg by mouth 3 times daily After the mirapex      oxybutynin (DITROPAN-XL) 10 MG extended release tablet Take 10 mg by mouth daily      vitamin D3 (CHOLECALCIFEROL) 10 MCG (400 UNIT) TABS tablet Take 400 Units by mouth daily      Polyethylene Glycol 3350 (MIRALAX PO) Take by mouth as needed      lisinopril (PRINIVIL;ZESTRIL) 5 MG tablet Take 2 tablets by mouth daily 30 tablet 0    spironolactone (ALDACTONE) 25 MG tablet Take 2 tablets by mouth daily (Patient not taking: Reported on 6/24/2020) 30 tablet 0    gabapentin (NEURONTIN) 400 MG capsule Take 1 capsule by mouth 3 times daily for 7 days. (Patient taking differently: Take 400 mg by mouth 3 times daily.  2 tabs 3 times a day) 21 capsule 0    amiodarone (CORDARONE) 200 MG tablet Take 1 tablet by mouth daily 30 tablet 3    calcitRIOL (ROCALTROL) 0.25 MCG capsule Take 1 capsule by mouth daily 30 capsule 3    atorvastatin (LIPITOR) 80 MG tablet Take 1 tablet by mouth nightly 30 tablet 3    metoprolol tartrate (LOPRESSOR) 25 MG tablet Take 1 tablet by mouth 2 times daily (Patient taking differently: Take 25 mg by mouth 2 times daily Half tab one time a day) 60 tablet 3    Insulin Degludec (TRESIBA FLEXTOUCH) 100 UNIT/ML SOPN Inject 45 Units into the skin nightly 1 pen 0    phosphorus (K PHOS NEUTRAL) 155-852-130 MG tablet Take 1 tablet by mouth 2 times daily (Patient not taking: Reported on 6/24/2020) 60 tablet 3    HUMALOG KWIKPEN 200 UNIT/ML SOPN pen Inject 15 Units into the skin 3 times daily (before meals) (Patient taking differently: Inject 35 Units into the skin 3 times daily (before meals) ) 2 pen 3    clopidogrel (PLAVIX) 75 MG tablet Take 1 tablet by mouth daily Patient has appointment on 3/27/18 more refills with be given after she keeps her office visit 90 tablet 3    BiPAP Machine MISC by BiPAP route nightly      metFORMIN (GLUCOPHAGE) 500 MG tablet Take 1 tablet by mouth 2 times daily (with meals) 60 tablet 0    ipratropium-albuterol (DUONEB) 0.5-2.5 (3) MG/3ML SOLN nebulizer solution Inhale 3 mLs into the lungs 4 times daily 360 mL 0    Nebulizers (AIRIAL COMPACT MINI NEBULIZER) MISC 1 Device by Does not apply route 4 times daily 1 each 0    albuterol sulfate  (90 Base) MCG/ACT inhaler Inhale 2 puffs into the lungs      pantoprazole (PROTONIX) 40 MG tablet Take 40 mg by mouth daily      pramipexole (MIRAPEX) 1 MG tablet Take 1 mg by mouth 3 times daily       fexofenadine (ALLEGRA) 180 MG tablet Take 180 mg by mouth daily       Ascorbic Acid (VITAMIN C) 500 MG tablet Take 500 mg by mouth daily            Objective:      /61   Pulse 75   Temp 97.8 °F (36.6 °C) (Oral)   Resp 16   Ht 5' 6\" (1.676 m)   Wt (!) 468 lb 11.2 oz (212.6 kg)   SpO2 92%   BMI 75.65 kg/m²   Yaya Risk Score: Yaya Scale Score: 14    LABS    CBC:   Lab Results   Component Value Date    WBC 4.3 06/29/2020    RBC 3.61 06/29/2020    HGB 8.3 06/29/2020    HCT 29.1 06/29/2020    MCV 80.6 06/29/2020    MCH 23.0 06/29/2020    MCHC 28.5 06/29/2020    RDW 20.2 06/29/2020     06/29/2020    MPV 8.9 06/29/2020     CMP:    Lab Results   Component Value Date     06/29/2020    K 4.2 06/29/2020    CL 90 06/29/2020    CO2 49 06/29/2020    BUN 38 06/29/2020    CREATININE 1.1 06/29/2020    GFRAA >60 06/29/2020    LABGLOM 50 06/29/2020    GLUCOSE 107 06/29/2020    PROT 7.1 06/29/2020    LABALBU 3.4 06/29/2020    CALCIUM 9.2 06/29/2020    BILITOT 0.4 06/29/2020    ALKPHOS 65 06/29/2020    AST 10 06/29/2020    ALT 5 06/29/2020     Albumin:    Lab Results   Component Value Date    LABALBU 3.4 06/29/2020     PT/INR:    Lab Results   Component Value Date    PROTIME 13.0 06/27/2020    INR 1.07 06/27/2020     HgBA1c:    Lab Results   Component Value Date    LABA1C 6.0 06/26/2020         Assessment:     Patient Active Problem List   Diagnosis    Morbid obesity with BMI of 70 and over, adult ([facility]    Essential hypertension    Dyslipidemia    Fecal incontinence    Mild intermittent asthma without complication    S/P laparoscopic cholecystectomy    Type 2 diabetes mellitus without complication, with long-term current use of insulin ([facility]    Fatty liver    Arthritis    H/O parotidectomy    Venous stasis dermatitis of both lower extremities    Aortic stenosis    [facility]bid [facility]    Asthma  Diabetes mellitus ([facility]    Hypertension    Sleep apnea    Family history of coronary artery disease    Chest pain    SOB (shortness of breath)    Palpitations    Peripheral edema    Hypervolemia    Chronic respiratory failure with hypoxia and hypercapnia ([facility])    Acute exacerbation of chronic obstructive pulmonary disease (COPD) (HCC)    Chronic obstructive pulmonary disease ([facility])    Gait disturbance    Acute on chronic respiratory failure with hypoxia and hypercapnia ([facility])    Cellulitis    Skin ulcer of left foot with fat layer exposed ([address]    Pneumonia    VHD (valvular heart disease)    Class 3 severe obesity due to excess calories with body mass index (BMI) greater than or equal to 70 in adult ([address]    SIRS (systemic inflammatory response syndrome) ([facility])    Acute kidney injury ([address]    Acute metabolic encephalopathy    Shock, unspecified ([facility]    Uncontrolled diabetes mellitus ([facility]    Sepsis ([facility]    Altered mental status    Wide-complex tachycardia ([facility]    Acute on chronic diastolic heart failure ([facility])    S/P TAVR (transcatheter aortic valve replacement)       Measurements:  Wound 02/06/19 Other (Comment) Foot Plantar Left plantar DFU (Active)   Number of days: 509       Wound 09/22/19 sacrum deep tissue injury/stage 2 (Active)   Number of days: 281       Wound 10/29/19 Thigh Right;Posterior (Active)   Number of days: 244       Wound 10/29/19 Heel Left;Medial cluster (Active)   Number of days: 243       Wound 10/29/19 Thigh Left;Posterior (Active)   Number of days: 244       Wound 10/29/19 Abdomen Anterior;Right cluster under abdominal fold (Active)   Number of days: 244       Wound 10/31/19 Hip Anterior; Left under left abdomen cluster (Active)   Number of days: 242       Wound 10/31/19 Coccyx Right (Active)   Number of days: 242       Wound 10/31/19 Toe (Comment  which one) Left;Plantar left great toe fissure  (Active)   Number of days: 242       Wound 10/31/19 Foot Left;Plantar (Active)   Number of days: 242       Wound 10/31/19 Heel Right;Medial right medial heel fissure cluster (Active)   Number of days: 242       Wound 09/22/19 sacral/buttock cluster (Active)   Wound Pressure Unstageable 6/29/2020 10:00 AM   Dressing Status Other (Comment) 6/29/2020  9:47 AM   Dressing Changed Other (Comment) 6/29/2020  9:47 AM   Wound Cleansed Rinsed/Irrigated with saline; Soap and water 6/29/2020 10:00 AM   Wound Length (cm) 16 cm 6/29/2020 10:00 AM   Wound Width (cm) 9.5 cm 6/29/2020 10:00 AM   Wound Depth (cm) 0.1 cm 6/29/2020 10:00 AM   Wound Surface Area (cm^2) 152 cm^2 6/29/2020 10:00 AM   Wound Volume (cm^3) 15.2 cm^3 6/29/2020 10:00 AM   Distance Tunneling (cm) 0 cm 6/29/2020 10:00 AM   Tunneling Position ___ O'Clock 0 6/29/2020 10:00 AM   Undermining Starts ___ O'Clock 0 6/29/2020 10:00 AM   Undermining Ends___ O'Clock 0 6/29/2020 10:00 AM   Undermining Maxium Distance (cm) 0 6/29/2020 10:00 AM   Wound Assessment Red;Yellow 6/29/2020 10:00 AM   Drainage Amount Moderate 6/29/2020 10:00 AM   Drainage Description Serosanguinous; Yellow 6/29/2020 10:00 AM   Odor None 6/29/2020 10:00 AM   Margins Undefined edges 6/29/2020 10:00 AM   Sierra-wound Assessment Maceration; Red 6/29/2020 10:00 AM   Non-staged Wound Description Full thickness 6/29/2020 10:00 AM   [place]%Wound Bed 0 6/29/2020 10:00 AM   Red%Wound Bed 40 6/29/2020 10:00 AM   Yellow%Wound Bed 60 6/29/2020 10:00 AM   Black%Wound Bed 0 6/29/2020 10:00 AM   Purple%Wound Bed 0 6/29/2020 10:00 AM   Other%Wound Bed 0 6/29/2020 10:00 AM   Number of days: 281       Wound 09/22/19 Foot Left;Plantar DFU (Active)   Wound Diabetic 6/28/2020  9:12 PM   Dressing Status Other (Comment) 6/29/2020  9:47 AM   Wound Length (cm) 0 cm 6/29/2020 10:00 AM   Wound Width (cm) 0 cm 6/29/2020 10:00 AM   Wound Depth (cm) 0 cm 6/29/2020 10:00 AM   Wound Surface Area (cm^2) 0 cm^2 6/29/2020 10:00 AM   Wound Volume (cm^3) 0 cm^3 6/29/2020 10:00 AM   Wound Assessment Dry;Intact; Black; Ricardoe Michelle 6/29/2020  9:47 AM   Drainage Amount None 6/29/2020  9:47 AM   Odor None 6/29/2020  9:47 AM   Margins Attached edges 6/29/2020  9:47 AM   Sierra-wound Assessment Dry; Intact 6/29/2020  9:47 AM   Number of days: 281       Wound 09/22/19 Foot Right;Plantar DFU (Active)   Wound Diabetic 6/28/2020  9:12 PM   Dressing Status Other (Comment) 6/29/2020  9:47 AM   Wound Length (cm) 0 cm 6/29/2020 10:00 AM   Wound Width (cm) 0 cm 6/29/2020 10:00 AM   Wound Depth (cm) 0 cm 6/29/2020 10:00 AM   Wound Surface Area (cm^2) 0 cm^2 6/29/2020 10:00 AM   Wound Volume (cm^3) 0 cm^3 6/29/2020 10:00 AM   Wound Assessment Dry; Intact; Black; Kaylynlene Michelle 6/29/2020  9:47 AM   Drainage Amount None 6/29/2020  9:47 AM   Odor None 6/29/2020  9:47 AM   Margins Attached edges 6/29/2020  9:47 AM   Sierra-wound Assessment Dry; Intact 6/29/2020  9:47 AM   Number of days: 281       Wound Thigh Left;Posterior (Active)   Wound Pressure Unstageable 6/29/2020 10:00 AM   Wound Cleansed Rinsed/Irrigated with saline; Soap and water 6/29/2020 10:00 AM   Wound Length (cm) 0.7 cm 6/29/2020 10:00 AM   Wound Width (cm) 3 cm 6/29/2020 10:00 AM   Wound Depth (cm) 0.1 cm 6/29/2020 10:00 AM   Wound Surface Area (cm^2) 2.1 cm^2 6/29/2020 10:00 AM   Wound Volume (cm^3) 0.21 cm^3 6/29/2020 10:00 AM   Distance Tunneling (cm) 0 cm 6/29/2020 10:00 AM   Tunneling Position ___ O'Clock 0 6/29/2020 10:00 AM   Undermining Starts ___ O'Clock 0 6/29/2020 10:00 AM   Undermining Ends___ O'Clock 0 6/29/2020 10:00 AM   Undermining [place] Distance (cm) 0 6/29/2020 10:00 AM   Wound Assessment Red;Yellow 6/29/2020 10:00 AM   Drainage Amount Moderate 6/29/2020 10:00 AM   Drainage Description Serosanguinous; Yellow 6/29/2020 10:00 AM   Odor None 6/29/2020 10:00 AM   Margins Defined edges 6/29/2020 10:00 AM   Sierra-wound Assessment Excoriated 6/29/2020 10:00 AM   Non-staged Wound Description Full thickness 6/29/2020 10:00 AM   Red%Wound Bed 10 6/29/2020 10:00 AM   Yellow%Wound Bed 90 6/29/2020 10:00 AM   Black%Wound Bed 0 6/29/2020 10:00 AM   Purple%Wound Bed 0 6/29/2020 10:00 AM   Other%Wound Bed 0 6/29/2020 10:00 AM   Number of days:        Response to treatment:  Well tolerated by patient. Pain Assessment:  Severity:  0  Quality of pain: none  Wound Pain Timing/Severity:    Premedicated: no    Plan:     Plan of Care: Wound 09/22/19 sacral/buttock cluster-Dressing/Treatment: (stoma powder and calazime )  Wound Thigh Left;Posterior-Dressing/Treatment: (stoma powder and calazime)     Wound care eval sacral/buttocks and left posterior thigh/ischial pressure unstageable and moisture associated from diarrhea. Measured and pictured cleansed with soap and water applied stoma powder then calazime cream. Heels intact. Pt has a lot of calloused areas to feet previous wound eschar covered to left plantar. Heels floated. Pt is at mild risk for skin breakdown AEB agapito score. Observe agapito orders. Specialty Bed Required : yes  [] Low Air Loss   [] Pressure Redistribution  [] Fluid Immersion  [x] Bariatric  [] Total Pressure Relief  [] Other:     Discharge Plan:  Placement for patient upon discharge: tbd  Hospice Care: no  Patient appropriate for Outpatient [address]:  [facility]    Patient/Caregiver Teaching:  Level of patient/caregiver understanding able to: pt voiced understanding of wound care. Electronically signed by Carlyn Gamble.  FARIHA Washington,  on 6/29/2020 at 12:22 PM

## 2020-06-29 NOTE — PROGRESS NOTES
Today's plan: Covid ruled out, patient is transferred out of Covid unit, CONTINUE PRESENT CARE      Admit Date:  2020    Subjective:ok      Chief complaints on admission  Chief Complaint   Patient presents with    Shortness of Breath         History of present illness:Olivia is a 63 y. o.year old who  presents with had concerns including Shortness of Breath. Past medical history:    has a past medical history of Acute kidney injury ([address] Aortic stenosis, Asthma, Bacteremia due to group B Streptococcus, Cancer ([address] Carotid artery stenosis, Chest pain, CHF (congestive heart failure) ([address] Chronic back pain, COPD exacerbation ([address] Depression, Diabetes mellitus ([address] Family history of coronary artery disease, Fatty liver, GI bleed, H/O aortic valve stenosis, H/O echocardiogram, Headache, Heart murmur, History of nuclear stress test, Morbid obesity ([facility] Neuropathy, NSTEMI (non-ST elevated myocardial infarction) ([facility] Obesity, Osteoarthritis, Palpitations, Peripheral edema, Restless legs syndrome, Sleep apnea, Sleep apnea, and SOB (shortness of breath). Past surgical history:   has a past surgical history that includes Cholecystectomy;  section; Hysterectomy; Tubal ligation; Neck surgery; Gallbladder surgery; and Aortic valve replacement (2017). Social History:   reports that she quit smoking about 17 years ago. Her smoking use included cigarettes. She has never used smokeless tobacco. She reports that she does not drink alcohol or use drugs. Family history:  family history includes Coronary Art Dis in her mother; Diabetes in her mother; Heart Attack in her mother; Heart Disease in her brother, father, and mother; Heart Failure in her brother, father, and mother; High Blood Pressure in her brother, father, and mother; Hypertension in her mother; Obesity in her brother, father, and mother.     No Known Allergies      Objective:   BP (!) 118/57   Pulse 69   Temp 98.4 °F (36.9 °C) (Oral)   Resp 19   Ht 5' 6\" (1.676 m)   Wt (!) 468 lb 11.2 oz (212.6 kg)   SpO2 96%   BMI 75.65 kg/m²       Intake/Output Summary (Last 24 hours) at 6/29/2020 1916  Last data filed at 6/29/2020 1543  Gross per 24 hour   Intake --   Output 2600 ml   Net -2600 ml       TELEMETRY    Physical Exam:  Constitutional:  Well developed, Well nourished, No acute distress, Non-toxic appearance. HENT:  Normocephalic, Atraumatic, Bilateral external ears normal, Oropharynx moist, No oral exudates, Nose normal. Neck- Normal range of motion, No tenderness, Supple, No stridor. Eyes:  PERRL, EOMI, Conjunctiva normal, No discharge. Respiratory:  Normal breath sounds, No respiratory distress, No wheezing, No chest tenderness. ,no use of accessory muscles, diaphragm movement is normal  Cardiovascular: (PMI) apex non displaced,no lifts no thrills, no s3,no s4, Normal heart rate, Normal rhythm, No murmurs, No rubs, No gallops. Carotid arteries pulse and amplitude are normal no bruit, no abdominal bruit noted ( normal abdominal aorta ausculation), femoral arteries pulse and amplitude are normal no bruit, pedal pulses are normal  GI:  Bowel sounds normal, Soft, No tenderness, No masses, No pulsatile masses. : External genitalia appear normal, No masses or lesions. No discharge. No CVA tenderness. Musculoskeletal:  Intact distal pulses,no edema, No tenderness, No cyanosis, No clubbing. Good range of motion in all major joints. No tenderness to palpation or major deformities noted. Back- No tenderness. Integument:  Warm, Dry, No erythema, No rash. Lymphatic:  No lymphadenopathy noted. Neurologic:  Alert & oriented x 3, Normal motor function, Normal sensory function, No focal deficits noted.    Psychiatric:  Affect normal, Judgment normal, Mood normal.     Medications:    furosemide  40 mg Intravenous TID    LORazepam  1 mg Intravenous Once    LORazepam  1 mg Intravenous Once    lidocaine PF  5 mL Intradermal Once    trop: not an ACS  CHF: stable, lasix TID as per nephrology  TAVR  RENAL failure  Anemia: stable  Health maintenance: exerise and diet  All labs, medications and tests reviewed, continue all other medications of all above medical condition listed as is.       Shyann Oscar MD 6/29/2020 7:16 PM

## 2020-06-29 NOTE — PROGRESS NOTES
Hospitalist Progress Note      Name:  Yu Boone /Age/Sex:   (66 y.o. female)   MRN & CSN:  [id number] & [id number] Admission Date/Time: 2020  9:24 AM   Location:   PCP: Erich Harper Day: 5    History of Present Illness:     Chief Complaint: SOB  Yu Boone is a 63 y.o.  female  who presents with SOB     The patient seen and examined at bed side. She says feeling better today. Denies having any chest pain. Ten point ROS reviewed negative, unless as noted above    Objective:        Intake/Output Summary (Last 24 hours) at 2020 1226  Last data filed at 2020 0544  Gross per 24 hour   Intake 970 ml   Output 3250 ml   Net -2280 ml      Vitals:   Vitals:    20 1142   BP:    Pulse:    Resp: 16   Temp:    SpO2: 92%     Physical Exam:   General Appearance: alert and oriented to person, place and time, in no acute distress  Cardiovascular: normal rate, regular rhythm, normal S1 and S2  Pulmonary/Chest: Decreased breath sounds bilaterally  Abdomen: soft, non-tender, non-distended, normal bowel sounds, no masses   Extremities: no cyanosis, clubbing or edema, pulse   Skin: warm and dry, no rash or erythema  Head: normocephalic and atraumatic  Eyes: pupils equal, round, and reactive to light  Neck: supple and non-tender without mass, no thyromegaly   Musculoskeletal: normal range of motion, no joint swelling, deformity or tenderness  Neurological: alert, oriented, normal speech, no focal findings or movement disorder noted    Medications:   Medications:    furosemide  40 mg Intravenous TID    LORazepam  1 mg Intravenous Once    LORazepam  1 mg Intravenous Once    lidocaine PF  5 mL Intradermal Once    sodium chloride flush  10 mL Intravenous 2 times per day    insulin lispro  15 Units Subcutaneous TID WC    insulin lispro  0-6 Units Subcutaneous TID WC    insulin lispro  0-3 Units Subcutaneous Nightly    amiodarone  200 mg 06/29/2020    GFRAA >60 06/29/2020    LABGLOM 50 06/29/2020    GLUCOSE 107 06/29/2020    PROT 7.1 06/29/2020    LABALBU 3.4 06/29/2020    CALCIUM 9.2 06/29/2020    BILITOT 0.4 06/29/2020    ALKPHOS 65 06/29/2020    AST 10 06/29/2020    ALT 5 06/29/2020     Xr Chest Portable    Result Date: 6/26/2020  EXAMINATION: ONE XRAY VIEW OF THE CHEST 6/26/2020 1:28 pm COMPARISON: 06/25/2020 HISTORY: ORDERING SYSTEM PROVIDED HISTORY: Check placement of CVC TECHNOLOGIST PROVIDED HISTORY: Reason for exam:->Check placement of CVC Reason for Exam: Check placement of CVC Follow-up exam FINDINGS: Right internal jugular central venous catheter with the tip projecting over the cavoatrial junction versus right atrium. The patient is rotated. Worsening bilateral airspace and interstitial opacities, right worse than left. Stable cardiomegaly. Status post TAVR. Surgical clips in left axilla. No pneumothorax. Right internal jugular central venous catheter tip projecting over the cavoatrial junction versus right atrium given patient rotation. No pneumothorax. Worsening bilateral airspace and interstitial opacities, right worse than left. Xr Chest Portable    Result Date: 6/25/2020  EXAMINATION: ONE XRAY VIEW OF THE CHEST 6/25/2020 9:34 am COMPARISON: June 4, 2020 HISTORY: ORDERING SYSTEM PROVIDED HISTORY: SOB TECHNOLOGIST PROVIDED HISTORY: Reason for exam:->SOB Reason for Exam: SOB FINDINGS: The heart is enlarged. There is cephalization the pulmonary vascularity with bilateral pleural effusions. There is no alveolar consolidation. There is no pneumothorax. There are no acute bony abnormalities. Changes consistent with congestive heart failure. Ir Nontunneled Vascular Catheter > 5 Years    Result Date: 6/27/2020  EXAMINATION: Central line catheter placement with ultrasound guidance. 6/26/2020 1:01 pm TECHNIQUE: After obtaining informed consent, the patient was placed in the supine position on the examination table. Lovenox, [x]  Heparin, [] SCDs, [] Ambulation   GI Prophylaxis [x] PPI,  [] H2 Blocker,  [] Carafate,  [] Diet/Tube Feeds   Code Status Full Code   Disposition Patient requires continued admission due to CHF exacerbation   MDM [] Low, [x] Moderate,[]  High     Electronically signed by Jodi Jones MD on 6/29/2020 at 12:26 PM

## 2020-06-29 NOTE — PROGRESS NOTES
Nephrology Progress Note  6/29/2020 7:33 AM        Subjective:   Admit Date: 6/25/2020  PCP: THEDOORE Millan - NP    Interval History: events during the weekend - briefly reviewed     Diet: / some     ROS:  With BIPAP- had some / confusion- seems better now -  she seems , alert, awake and oriented     Data:     Current meds:    furosemide  40 mg Intravenous TID    LORazepam  1 mg Intravenous Once    LORazepam  1 mg Intravenous Once    lidocaine PF  5 mL Intradermal Once    sodium chloride flush  10 mL Intravenous 2 times per day    methylPREDNISolone  60 mg Intravenous Daily    insulin lispro  15 Units Subcutaneous TID WC    insulin lispro  0-6 Units Subcutaneous TID WC    insulin lispro  0-3 Units Subcutaneous Nightly    amiodarone  200 mg Oral Daily    vitamin C  500 mg Oral Daily    aspirin  81 mg Oral Daily    atorvastatin  80 mg Oral Nightly    vitamin D3  400 Units Oral Daily    rOPINIRole  2 mg Oral TID    pramipexole  1 mg Oral TID    pantoprazole  40 mg Oral Daily    oxybutynin  10 mg Oral Daily    cetirizine  10 mg Oral Daily    ferrous sulfate  325 mg Oral Daily with breakfast    doxepin  10 mg Oral Nightly    clopidogrel  75 mg Oral Daily    sodium chloride flush  10 mL Intravenous 2 times per day    heparin (porcine)  5,000 Units Subcutaneous 3 times per day    ipratropium  2 puff Inhalation 4x daily      dextrose      dextrose           I/O last 3 completed shifts:   In: 1 [P.O.:970]  Out: 3250 [Urine:3250]    CBC:   Recent Labs     06/27/20  0314 06/28/20  0420 06/29/20  0405   WBC 5.9 4.6 4.3   HGB 8.8* 8.3* 8.3*    258 251          Recent Labs     06/27/20  0314 06/28/20  0420 06/29/20  0405    142 144   K 4.9 4.2 4.2   CL 88* 89* 90*   CO2 44* 48* 49*   BUN 35* 35* 38*   CREATININE 1.3* 1.1 1.1   GLUCOSE 119* 122* 107*       Lab Results   Component Value Date    CALCIUM 9.2 06/29/2020    PHOS 5.8 (H) 05/24/2020       Objective:     Vitals: /76

## 2020-06-29 NOTE — PROGRESS NOTES
pulmonary      SUBJECTIVE: she feels better     OBJECTIVE    VITALS:  BP 86/61   Pulse 75   Temp 97.8 °F (36.6 °C) (Oral)   Resp 15   Ht 5' 6\" (1.676 m)   Wt (!) 468 lb 11.2 oz (212.6 kg)   SpO2 96%   BMI 75.65 kg/m²   HEAD AND FACE EXAM:  No throat injection, no active exudate,no thrush  NECK EXAM;No JVD, no masses, symmetrical  CHEST EXAM; Expansion equal and symmetrical, no masses  LUNG EXAM; Good breath sounds bilaterally. There are expiratory wheezes both lungs, there are crackles at both lung bases  CARDIOVASCULAR EXAM: Positive S1 and S2, no S3 or S4, no clicks ,no murmurs  RIGHT AND LEFT LOWER EXTRIMITY EXAM: No edema, no swelling, no inflamation  CNS EXAM: Alert and oriented X3          LABS   Lab Results   Component Value Date    WBC 4.3 06/29/2020    HGB 8.3 (L) 06/29/2020    HCT 29.1 (L) 06/29/2020    MCV 80.6 06/29/2020     06/29/2020     Lab Results   Component Value Date    CREATININE 1.1 06/29/2020    BUN 38 (H) 06/29/2020     06/29/2020    K 4.2 06/29/2020    CL 90 (L) 06/29/2020    CO2 49 (H) 06/29/2020     Lab Results   Component Value Date    INR 1.07 06/27/2020    PROTIME 13.0 06/27/2020          Lab Results   Component Value Date    PHOS 5.8 05/24/2020    PHOS 2.6 11/04/2019    PHOS 2.9 11/03/2019        Recent Labs     06/26/20  1415 06/27/20  0930 06/28/20  0830   PH 7.42 7.43 7.45   PO2ART 100 29* 82   [id number] 66.0* 81.0* 80.0*   O2SAT 95.9* 56.1* 96.0         Wt Readings from Last 3 Encounters:   06/29/20 (!) 468 lb 11.2 oz (212.6 kg)   06/04/20 (!) 494 lb (224.1 kg)   05/24/20 (!) 494 lb 4.8 oz (224.2 kg)               ASSESMENT  Ac on ch resp failure  Ac copd  chf        PLAN  1. cpm  2.  Cont pap rx at nite and o2 otherwise    6/29/2020  Loren Branch M.D.

## 2020-06-30 NOTE — CARE COORDINATION
CM reviewed chart and spoke w/ administration at [facility]. Pt is on their LTC side. She has caresource and will require a precert, 24 hour notification prior to returning. Pt at baseline wears 2L O2 via NC and bipap at night. [facility] will con't PT/OT with pt. PT/OT has been ordered.

## 2020-06-30 NOTE — PROGRESS NOTES
used smokeless tobacco. She reports that she does not drink alcohol or use drugs. Family history:  family history includes Coronary Art Dis in her mother; Diabetes in her mother; Heart Attack in her mother; Heart Disease in her brother, father, and mother; Heart Failure in her brother, father, and mother; High Blood Pressure in her brother, father, and mother; Hypertension in her mother; Obesity in her brother, father, and mother. No Known Allergies    Review of Systems:  Review of Systems   Unable to perform ROS: Mental status change        /66   Pulse 69   Temp 98.3 °F (36.8 °C) (Oral)   Resp 18   Ht 5' 6\" (1.676 m)   Wt (!) 388 lb 11.2 oz (176.3 kg)   SpO2 95%   BMI 62.74 kg/m²       Intake/Output Summary (Last 24 hours) at 6/30/2020 1448  Last data filed at 6/30/2020 1035  Gross per 24 hour   Intake 240 ml   Output 5750 ml   Net -5510 ml       Physical Exam:  Constitutional:  Well developed, Well nourished, No acute distress  HENT:  Normocephalic, Atraumatic, Bilateral external ears normal,  Nose normal.   Neck- trachea is midline  Eyes:  PERRL, Conjunctiva normal  Respiratory:  Normal breath sounds, No respiratory distress, No wheezing, No chest tenderness. Cardiovascular:  Normal heart rate, Normal rhythm, yes murmurs appreciated, No rubs appreciated, No gallops appreciated, JVP not elevated  Abdomen/GI:  Soft, No tenderness  Musculoskeletal:  Intact distal pulses, no edema, No tenderness, No cyanosis, No clubbing. Integument:  Warm, Dry  Lymphatic:  No lymphadenopathy noted.    Neurologic:  Alert & oriented periods of confusion   Psychiatric:  Affect and Mood :pleasant     Medications:    furosemide  40 mg Intravenous TID    LORazepam  1 mg Intravenous Once    LORazepam  1 mg Intravenous Once    lidocaine PF  5 mL Intradermal Once    sodium chloride flush  10 mL Intravenous 2 times per day    insulin lispro  15 Units Subcutaneous TID     insulin lispro  0-6 Units Subcutaneous TID WC    insulin lispro  0-3 Units Subcutaneous Nightly    amiodarone  200 mg Oral Daily    vitamin C  500 mg Oral Daily    aspirin  81 mg Oral Daily    atorvastatin  80 mg Oral Nightly    vitamin D3  400 Units Oral Daily    rOPINIRole  2 mg Oral TID    pramipexole  1 mg Oral TID    pantoprazole  40 mg Oral Daily    oxybutynin  10 mg Oral Daily    cetirizine  10 mg Oral Daily    ferrous sulfate  325 mg Oral Daily with breakfast    doxepin  10 mg Oral Nightly    clopidogrel  75 mg Oral Daily    sodium chloride flush  10 mL Intravenous 2 times per day    heparin (porcine)  5,000 Units Subcutaneous 3 times per day    ipratropium  2 puff Inhalation 4x daily      dextrose      dextrose       sodium chloride flush, glucose, dextrose, glucagon (rDNA), dextrose, polyethylene glycol, sodium chloride flush, acetaminophen **OR** acetaminophen, promethazine **OR** ondansetron, glucose, dextrose, glucagon (rDNA), dextrose    Lab Data:  CBC:   Recent Labs     20  0405 20  0600   WBC 4.6 4.3 5.1   HGB 8.3* 8.3* 8.9*   HCT 29.6* 29.1* 31.8*   MCV 82.5 80.6 82.2    251 256     BMP:   Recent Labs     20  0405 20  0600    144 143   K 4.2 4.2 3.9   CL 89* 90* 87*   CO2 48* 49* 50*   PHOS  --   --  2.7   BUN 35* 38* 36*   CREATININE 1.1 1.1 1.0     PT/INR: No results for input(s): PROTIME, INR in the last 72 hours. BNP:    Recent Labs     200   PROBNP 8,767*     TROPONIN: No results for input(s): TROPONINT in the last 72 hours. ECHO : 2020  Left ventricular systolic function is normal.   Ejection fraction is visually estimated at 55%. Mild to moderate left ventricular hypertrophy. Grade I diastolic dysfunction. TAVR (29 mm Evolut R) inserted on 17. Mean P, FIDEL: 1.14 cm sq,   DVI: 0.4. No evidence of perivalvular leak. No evidence of any pericardial effusion.       Impression:  Active Problems:    Hypervolemia    VHD (valvular heart disease)    Acute on chronic diastolic heart failure (HCC)    S/P TAVR (transcatheter aortic valve replacement)  Resolved Problems:    * No resolved hospital problems. *       All labs, medications and tests reviewed by myself, continue all other medications of all above medical condition listed as is except for changes mentioned above. Thank you   Please call with questions. Electronically signed by Shar Medina. THEODORE Moreno CNP on 6/30/2020 at 2:48 PM     I have seen ,spoken to  and examined this patient personally, independently of the nurse practitioner. I have reviewed the hospital care given to date and reviewed all pertinent labs and imaging. The plan was developed mutually at the time of the visit with the patient,  NP  and myself. I have spoken with patient, nursing staff and provided written and verbal instructions . The above note has been reviewed and I agree with the assessment, diagnosis, and treatment plan with changes made by me as follows     CARDIOLOGY ATTENDING ADDENDUM    HPI:  I have reviewed the above HPI  And agree with above   Ewa Bello is a 63 y. o.year old who and presents with had concerns including Shortness of Breath.   Chief Complaint   Patient presents with    Shortness of Breath     Interval history: patient has some confusion, could be metabolic encephalopathy    Physical Exam:  General:  Awake, alert, NAD  Head:normal  Eye:normal  Neck:  No JVD   Chest:  Clear to auscultation, respiration easy  Cardiovascular:  RRR S1S2  Abdomen:   nontender  Extremities:  no edema  Pulses; palpable  Neuro: grossly normal      MEDICAL DECISION MAKING;    I agree with the above plan, which was planned by myself and discussed with NP.

## 2020-06-30 NOTE — PROGRESS NOTES
Pt up to chair with slight confusion. Personal alarm is on the Pt and fall mats have been placed in front of her.

## 2020-06-30 NOTE — PROGRESS NOTES
pulmonary      SUBJECTIVE: on bipap and sleeping     OBJECTIVE    VITALS:  BP (!) 143/58   Pulse 69   Temp 97.4 °F (36.3 °C) (Axillary)   Resp 17   Ht 5' 6\" (1.676 m)   Wt (!) 388 lb 11.2 oz (176.3 kg)   SpO2 93%   BMI 62.74 kg/m²   HEAD AND FACE EXAM:  No throat injection, no active exudate,no thrush  NECK EXAM;No JVD, no masses, symmetrical  CHEST EXAM; Expansion equal and symmetrical, no masses  LUNG EXAM; Good breath sounds bilaterally. There are expiratory wheezes both lungs, there are crackles at both lung bases  CARDIOVASCULAR EXAM: Positive S1 and S2, no S3 or S4, no clicks ,no murmurs  RIGHT AND LEFT LOWER EXTRIMITY EXAM: No edema, no swelling, no inflamation            LABS   Lab Results   Component Value Date    WBC 4.3 06/29/2020    HGB 8.3 (L) 06/29/2020    HCT 29.1 (L) 06/29/2020    MCV 80.6 06/29/2020     06/29/2020     Lab Results   Component Value Date    CREATININE 1.1 06/29/2020    BUN 38 (H) 06/29/2020     06/29/2020    K 4.2 06/29/2020    CL 90 (L) 06/29/2020    CO2 49 (H) 06/29/2020     Lab Results   Component Value Date    INR 1.07 06/27/2020    PROTIME 13.0 06/27/2020          Lab Results   Component Value Date    PHOS 5.8 05/24/2020    PHOS 2.6 11/04/2019    PHOS 2.9 11/03/2019        Recent Labs     06/27/20  0930 06/28/20  0830   PH 7.43 7.45   PO2ART 29* 82   [id number] 81.0* 80.0*   O2SAT 56.1* 96.0         Wt Readings from Last 3 Encounters:   06/30/20 (!) 388 lb 11.2 oz (176.3 kg)   06/04/20 (!) 494 lb (224.1 kg)   05/24/20 (!) 494 lb 4.8 oz (224.2 kg)               ASSESMENT  Ac on ch resp failure  Ac copd  Ac chf        PLAN  1. cpm  2.  She is minus 16 litres since admission    6/30/2020  Roseann Lomax M.D.

## 2020-06-30 NOTE — PROGRESS NOTES
Blood gases: Pt pH is up and O2 is down. Dr. Chi Zepeda wants the Pt on continuous Bipap as much as the Pt can tolerate.   If she does not tolerated this, he wants her on high flow NC.

## 2020-06-30 NOTE — PROGRESS NOTES
[facility] [place] [facility] ACUTE CARE OCCUPATIONAL THERAPY EVALUATION  Rambo Quevedo, 1955, [id number], 2020    History  [place]:  The primary encounter diagnosis was Acute on chronic congestive heart failure, unspecified heart failure type ([address] Diagnoses of Elevated troponin, Hypoxia, JOSE (acute kidney injury) ([address] and Poor venous access were also pertinent to this visit. Patient  has a past medical history of Acute kidney injury ([address] Aortic stenosis, Asthma, Bacteremia due to group B Streptococcus, Cancer ([address] Carotid artery stenosis, Chest pain, CHF (congestive heart failure) ([address] Chronic back pain, COPD exacerbation ([address] Depression, Diabetes mellitus ([address] Family history of coronary artery disease, Fatty liver, GI bleed, H/O aortic valve stenosis, H/O echocardiogram, Headache, Heart murmur, History of nuclear stress test, Morbid obesity ([address] Neuropathy, NSTEMI (non-ST elevated myocardial infarction) ([address] Obesity, Osteoarthritis, Palpitations, Peripheral edema, Restless legs syndrome, Sleep apnea, Sleep apnea, and SOB (shortness of breath). Patient  has a past surgical history that includes Cholecystectomy;  section; Hysterectomy; Tubal ligation; Neck surgery; Gallbladder surgery; and Aortic valve replacement (2017). Subjective:  Patient states:  \"I was asking for PT to come in here\". Pain:  Denies. Communication with other providers:  Handoff to RN, co-eval with PT.    Restrictions: Contact Precautions, General Precautions, Fall Risk    Home Setup/Prior level of function    Social/Functional History  Lives With: Family( and daughter)  Type of Home: House  Home Layout: Two level, Able to Live on Main level with bedroom/bathroom, Performs ADL's on one level  Home Access: Stairs to enter with rails  Entrance Stairs - Number of Steps: 3  Entrance Stairs - Rails: Left  Bathroom Shower/Tub: Walk-in shower, Shower chair without back  Bathroom Accessibility: Accessible  Home Equipment: Oxygen, Rolling walker, Reacher(2L of 02 at home)  Receives Help From: Family  ADL Assistance: Needs assistance(for LE ADLs only)  Homemaking Assistance: Needs assistance(can do chores sitting upright)  Ambulation Assistance: Independent  Transfer Assistance: Independent  Active : No  Patient's  Info: family   Occupation: Retired  Additional Comments: Pt reports about a dozen falls at night from bed to floor w/o significant injury    Examination of body systems (includes body structures/functions, activity/participation limitations):  · Observation:  Supine in bed upon arrival, agreeable and eager for therapy  · Vision:  Glasses  · Hearing:  [facility]  · Cardiopulmonary:  2L of 02      Body Systems and functions:  · ROM R/L:  WFL. · Strength R/L:  4/5,   · Sensation: Increased pain w/ light touch bilateral toes pt stated due to sensitivity in toenails  · Tone: Normal  · Coordination: WFL  · Perception: WNL    Activities of Daily Living (ADLs):  · Feeding: Jackie  · Grooming: SBA  · UB bathing: SBA  · LB bathing: maxA  · UB dressing: SBA  · LB dressing: dependent/total (donning socks supine.   at baseline assists w/ LB dressing)  · Toileting: maxA    Cognitive and Psychosocial Functioning:  · Overall cognitive status: WNL  · Affect: Motivated and anxious       Mobility:  · Supine to sit:  maxA (pt limited by bariatric bed)  · Transfers: Chase from EOB up to RW  · Sitting balance:  SBA  · Standing balance:  CGA w/ RW.  · Functional Mobility: CGA w/ RW (stand step transfer from EOB to reclining chair ~8 feet)  · Toilet/Shower Transfers: DNT             AM-PAC Daily Activity Inpatient   How much help for putting on and taking off regular lower body clothing?: Total  How much help for Bathing?: A Lot  How much help for Toileting?: Total  How much help for putting on and taking off regular upper body clothing?: A Little  How much help for taking care of personal grooming?: A Little  How much help for eating meals?: None  AM-PAC Inpatient Daily Activity Raw Score: 14  AM-PAC Inpatient ADL T-Scale Score : 33.39  ADL Inpatient CMS 0-100% Score: 59.67  ADL Inpatient CMS G-Code Modifier : CK    Treatment:  Self Care Training:   Cues were given for safety, sequence, UE/LE placement, visual cues, and balance. Activities performed today included LB dressing    Therapeutic Activity Training:   Therapeutic activity training was instructed today. Cues were given for safety, sequence, UE/LE placement, awareness, and balance. Activities performed today included bed mobility training, sup-sit, sit-stand, sitting balance functional mobility (stand step transfer), stand to sit    Safety: patient left in chair with chair alarm, call light within reach, RN notified, gait belt used. Assessment:  Pt is a 62 yo female admitted from SNF for hypervolemia. Pt at baseline is Independent for UE ADLs, needs assistance for LE ADLs from , needs assistance for high level IADLs and is Independent for functional transfers/mobility w/ AD. Pt currently presents w/ deficits in ADL and high level IADL independence, functional activity tolerance, dynamic sitting and standing balance and tolerance and functional transfers and BUE strength. Pt would benefit from continued acute care OT services w/ discharge to ARU  Complexity: Moderate  Prognosis: Good, no significant barriers to participation at this time.    Plan  Times per week: 3x+  Times per day: Daily  Current Treatment Recommendations: Strengthening, Endurance Training, Patient/Caregiver Education & Training, Equipment Evaluation, Education, & procurement, Balance Training, Pain Management, Self-Care / ADL, Functional Mobility Training, Safety Education & Training, Home Management Training, Positioning     Equipment: defer    Goals:  Pt goal: go home  Time Frame for STGs: discharge  Goal 1: Pt will perform UE ADLs Independent  Goal 2: Pt will perform LE ADLs Chase w/ AD  Goal 3: Pt will perform toileting Chase w/ AD  Goal 4: Pt will perform functional transfer w/ AD Jackie  Goal 5: Pt will perform functional mobility w/ AD Jackie  Goal 6: Pt will perform therex/theract in order to increase functional activity tolerance and dynamic standing balance    Treatment plan:  Pt will perform functional task in stand reaching in all 3 planes in order to increase dynamic standing balance and functional activity tolerance.  Pt will be instructed in use of LE AD to increase LE ADL independence at home    Recommendations for NURSING activity: Up to chair for all 3 meals and up to [facility] for all toileting needs    Time:   Time in: 0912  Time out: 1012  Timed treatment minutes: 45 minutes  Total time: 60 minutes    Electronically signed by:    Fern Schirmer OTR/L [id number]  10:52 AM,6/30/2020

## 2020-06-30 NOTE — PROGRESS NOTES
Lalo Smith MD, [address]                Internal Medicine Hospitalist             Daily Progress  Note   Subjective:     Chief Complaint   Patient presents with    Shortness of Breath     Ms. Chapman Complains of nil, wants to go home. Seems a bit confused talks about UTI's and needing intubation for them. Objective:    /66   Pulse 69   Temp 98.3 °F (36.8 °C) (Oral)   Resp 18   Ht 5' 6\" (1.676 m)   Wt (!) 388 lb 11.2 oz (176.3 kg)   SpO2 95%   BMI 62.74 kg/m²      Intake/Output Summary (Last 24 hours) at 6/30/2020 1141  Last data filed at 6/30/2020 1035  Gross per 24 hour   Intake 240 ml   Output 5750 ml   Net -5510 ml      Physical Exam:  Heart:  Distant heart sounds. Lungs: no breath sounds appreciable due to body habitus. Abdomen: Soft, non distended. Bowel sounds appreciated. No obvious liver or spleen enlargement. Non tender, no rebound noted. Difficult exam due to body habitus. Extremities: Non tender, no swelling noted, strength 5/5 both legs. CNS: Grossly intact.     Labs:  CBC with Differential:    Lab Results   Component Value Date    WBC 5.1 06/30/2020    RBC 3.87 06/30/2020    HGB 8.9 06/30/2020    HCT 31.8 06/30/2020     06/30/2020    MCV 82.2 06/30/2020    MCH 23.0 06/30/2020    MCHC 28.0 06/30/2020    RDW 20.2 06/30/2020    NRBC 1 06/24/2019    SEGSPCT 46.3 06/30/2020    BANDSPCT 7 10/30/2019    LYMPHOPCT 38.5 06/30/2020    PROMYELOPCT 3 06/23/2019    MONOPCT 11.4 06/30/2020    MYELOPCT 1 06/27/2019    BASOPCT 0.8 06/30/2020    MONOSABS 0.6 06/30/2020    LYMPHSABS 2.0 06/30/2020    EOSABS 0.1 06/30/2020    BASOSABS 0.0 06/30/2020    DIFFTYPE AUTOMATED DIFFERENTIAL 06/30/2020     CMP:    Lab Results   Component Value Date     06/30/2020    K 3.9 06/30/2020    CL 87 06/30/2020    CO2 50 06/30/2020    BUN 36 06/30/2020    CREATININE 1.0 06/30/2020    GFRAA >60 06/30/2020    LABGLOM 56 06/30/2020    GLUCOSE 71 06/30/2020    PROT 7.0 06/30/2020    LABALBU 3.4 06/30/2020    CALCIUM 9.1 06/30/2020    BILITOT 0.5 06/30/2020    ALKPHOS 64 06/30/2020    AST 19 06/30/2020    ALT 8 06/30/2020     No results for input(s): TROPONINT in the last 72 hours.   Lab Results   Component Value Date    [facility] 4.510 06/25/2020         dextrose      dextrose        furosemide  40 mg Intravenous TID    LORazepam  1 mg Intravenous Once    LORazepam  1 mg Intravenous Once    lidocaine PF  5 mL Intradermal Once    sodium chloride flush  10 mL Intravenous 2 times per day    insulin lispro  15 Units Subcutaneous TID WC    insulin lispro  0-6 Units Subcutaneous TID WC    insulin lispro  0-3 Units Subcutaneous Nightly    amiodarone  200 mg Oral Daily    vitamin C  500 mg Oral Daily    aspirin  81 mg Oral Daily    atorvastatin  80 mg Oral Nightly    vitamin D3  400 Units Oral Daily    rOPINIRole  2 mg Oral TID    pramipexole  1 mg Oral TID    pantoprazole  40 mg Oral Daily    oxybutynin  10 mg Oral Daily    cetirizine  10 mg Oral Daily    ferrous sulfate  325 mg Oral Daily with breakfast    doxepin  10 mg Oral Nightly    clopidogrel  75 mg Oral Daily    sodium chloride flush  10 mL Intravenous 2 times per day    heparin (porcine)  5,000 Units Subcutaneous 3 times per day    ipratropium  2 puff Inhalation 4x daily         Assessment:       Patient Active Problem List    Diagnosis Date Noted    Acute exacerbation of chronic obstructive pulmonary disease (COPD) ([facility])      Priority: High    Hypervolemia      Priority: High    S/P TAVR (transcatheter aortic valve replacement) 06/26/2020    Acute on chronic diastolic heart failure (HCC) 06/25/2020    Wide-complex tachycardia ([facility])     Altered mental status     Sepsis ([facility] 09/21/2019    Acute kidney injury ([facility] 08/22/2019    Acute metabolic encephalopathy 52/74/7242    Shock, unspecified ([facility] 08/22/2019    Uncontrolled diabetes mellitus ([facility] 08/22/2019    SIRS (systemic inflammatory response syndrome) ([facility]) 08/12/2019    Class 3 severe obesity due to excess calories with body mass index (BMI) greater than or equal to 70 in adult [facility]) 06/16/2019    VHD (valvular heart disease) 04/02/2019    Pneumonia 02/06/2019    Skin ulcer of left foot with fat layer exposed ([address]     Cellulitis 11/24/2018    Acute on chronic respiratory failure with hypoxia and hypercapnia (HCC)     Gait disturbance 02/10/2018    Chronic obstructive pulmonary disease ([address] 02/07/2018    Chronic respiratory failure with hypoxia and hypercapnia ([address] 01/30/2018    Morbid obesity ([address]     Asthma     Diabetes mellitus ([address]     Hypertension     Sleep apnea     Family history of coronary artery disease     Chest pain     SOB (shortness of breath)     Palpitations     Peripheral edema     Aortic stenosis     Morbid obesity with BMI of 70 and over, adult ([address] 10/27/2016    Essential hypertension 10/27/2016    Dyslipidemia 10/27/2016    Fecal incontinence 10/27/2016    Mild intermittent asthma without complication 31/62/5820    S/P laparoscopic cholecystectomy 10/27/2016    Type 2 diabetes mellitus without complication, with long-term current use of insulin ([address] 10/27/2016    Fatty liver 10/27/2016    Arthritis 10/27/2016    H/O parotidectomy 10/27/2016    Venous stasis dermatitis of both lower extremities 10/27/2016       Plan:     Problems being addressed this admission:   Acute on chronic hypercapnic hypoxic respiratory failure  Acute encephalopathy  COPD  JOSE on CKD 2  DM 2  CAD  Morbid Obesity    I have been told that she is a bit confused by RN today. I will get an ABG on her to make sure that she is not hypercapnic. Her COPD seems stable but a difficult exam due to her body habitus. Her kidney functions have improved. Sugars are stable continue to monitor. Disposition is for her to be going to skilled care upon discharge.      Consultants:  Cardiology  Nephrology  Pulmonary    General Orders:  Repeat basic labs again in am.  I have explained to the patient and discussed with him/her the treatment plan. D/W RN as well.    Sadie Lucia MD, [address]

## 2020-06-30 NOTE — PROGRESS NOTES
Nephrology Progress Note  6/30/2020 8:41 AM        Subjective:   Admit Date: 6/25/2020  PCP: THEODORE Gaffney NP    Interval History: doing much better    Diet: /?  some     ROS:  With BIPAP    Data:     Current meds:    furosemide  40 mg Intravenous TID    LORazepam  1 mg Intravenous Once    LORazepam  1 mg Intravenous Once    lidocaine PF  5 mL Intradermal Once    sodium chloride flush  10 mL Intravenous 2 times per day    insulin lispro  15 Units Subcutaneous TID WC    insulin lispro  0-6 Units Subcutaneous TID WC    insulin lispro  0-3 Units Subcutaneous Nightly    amiodarone  200 mg Oral Daily    vitamin C  500 mg Oral Daily    aspirin  81 mg Oral Daily    atorvastatin  80 mg Oral Nightly    vitamin D3  400 Units Oral Daily    rOPINIRole  2 mg Oral TID    pramipexole  1 mg Oral TID    pantoprazole  40 mg Oral Daily    oxybutynin  10 mg Oral Daily    cetirizine  10 mg Oral Daily    ferrous sulfate  325 mg Oral Daily with breakfast    doxepin  10 mg Oral Nightly    clopidogrel  75 mg Oral Daily    sodium chloride flush  10 mL Intravenous 2 times per day    heparin (porcine)  5,000 Units Subcutaneous 3 times per day    ipratropium  2 puff Inhalation 4x daily      dextrose      dextrose           I/O last 3 completed shifts:   In: 240 [P.O.:240]  Out: 4100 [Urine:4100]    CBC:   Recent Labs     06/28/20  0420 06/29/20  0405   WBC 4.6 4.3   HGB 8.3* 8.3*    251          Recent Labs     06/28/20  0420 06/29/20  0405    144   K 4.2 4.2   CL 89* 90*   CO2 48* 49*   BUN 35* 38*   CREATININE 1.1 1.1   GLUCOSE 122* 107*       Lab Results   Component Value Date    CALCIUM 9.2 06/29/2020    PHOS 5.8 (H) 05/24/2020       Objective:     Vitals: /66   Pulse 69   Temp 98.3 °F (36.8 °C) (Oral)   Resp 18   Ht 5' 6\" (1.676 m)   Wt (!) 388 lb 11.2 oz (176.3 kg)   SpO2 95%   BMI 62.74 kg/m²     General appearance:  No distress- she seems A & O  With BIPAP- 20/12/50 %  HEENT: ++ conj pallor  Neck:  supple  Lungs:  Limited exam , +_ adv Bs  Heart:  Seems RRR  Abdomen: soft  Extremities:  No gross edema       Problem List :         Impression :     1. JOSE- good UOP > 4 l/d - recovering   2. ADHF better   3. multiple Ch dz- morbid obesity / DM/ S/p TAVR/ ANMOL and likely cor pulmonale etc   4. Met alkalosis- acceptable     Recommendation/Plan  :     1. Review lab   2. May d/C from renal standpoint   3. D/C with torsemide 100 BID X 5 days than 50 BID  4. CMP , mg in 1 wk  5. F/U with t me in 2 wk's   6.  Good BS control       Linda Aviles MD

## 2020-07-01 NOTE — PROGRESS NOTES
BUN 38* 36* 28*   CREATININE 1.1 1.0 0.9   GLUCOSE 107* 71 84       Lab Results   Component Value Date    CALCIUM 9.1 07/01/2020    PHOS 2.7 06/30/2020       Objective:     Vitals: BP (!) 151/62   Pulse 68   Temp 97.8 °F (36.6 °C) (Oral)   Resp 15   Ht 5' 6\" (1.676 m)   Wt (!) 388 lb 11.2 oz (176.3 kg)   SpO2 96%   BMI 62.74 kg/m²     General appearance:  No distress  HEENT:  + conj  Pallor   Neck:  Supple- RT IJ CVC   Lungs:  Limited exam , + adv Bs  Heart:  Seems RRRoves  Abdomen: obese , soft   Extremities:  Trace edema . Stasis dermatitis- and has rest tremor of UE       Problem List :         Impression :     1. AKI_ recovering - cr still not at baseline ( 0.7 mg/dl - besides she likely lost muscle mass ) sec to mainly CRS type 1   2. Sever fluid overload   3. Ch met alkalosis   4. underlying morbid obesity/ DM/ ANMOL/S/p TAVR   5. Low K/ mg sec to loop     Recommendation/Plan  :     1. replete Mg/K  2. temporarily add amiloride as aldactone takes  2-3 days to conserve K renally  3. May switch to po torsemide 100 BID and aldactone 25 BID  from tomorrow   4. Low salt  5. Follow clinically  6.  Watch UOP and BMP       Josias Wong MD

## 2020-07-01 NOTE — DISCHARGE INSTR - COC
Continuity of Care Form    Patient Name: Alexandra Dunn   :  1955  MRN:  [id number]    Admit date:  2020  Discharge date:  ***    Code Status Order: Full Code   Advance Directives:   Advance Care Flowsheet Documentation     Date/Time Healthcare Directive Type of Healthcare Directive Copy in [address] Agent's Name Healthcare Agent's Phone Number    [phone]  No, patient does not have an advance directive for healthcare treatment -- -- -- -- --          Admitting Physician:  Ritu Lane MD  PCP: THEODORE Gaffney NP    Discharging Nurse: [facility] Unit/Room#: 4007/4007-A  Discharging Unit Phone Number: ***    Emergency Contact:   Extended Emergency Contact Information  Primary Emergency Contact: [address] of [address] [place] [address] Phone: [phone]  Mobile Phone: [phone]  Relation: Spouse  Secondary Emergency Contact: Tonja [address] [place] [address] Phone: [phone]  Mobile Phone: [phone]  Relation: Child    Past Surgical History:  Past Surgical History:   Procedure Laterality Date    AORTIC VALVE REPLACEMENT  2017    29mm EvolutR TAVR     SECTION      CHOLECYSTECTOMY      GALLBLADDER SURGERY      HYSTERECTOMY      NECK SURGERY      Tumor    TUBAL LIGATION         Immunization History:   Immunization History   Administered Date(s) Administered    Influenza Vaccine, unspecified formulation 10/02/2017    Influenza, Quadv, 6 mo and older, IM, PF (Flulaval, Fluarix) 2018    Influenza, Quadv, IM, PF (6 mo and older Fluzone, Flulaval, Fluarix, and 3 yrs and older Afluria) 2019       Active Problems:  Patient Active Problem List   Diagnosis Code    Morbid obesity with BMI of 70 and over, adult ([facility] E66.01, Z68.45    Essential hypertension I10    Dyslipidemia E78.5    Fecal incontinence R15.9    Mild intermittent asthma without complication J45.20    S/P laparoscopic cholecystectomy Z90.49    Type 2 diabetes mellitus without complication, with long-term current use of insulin ([facility]) E11.9, Z79.4    Fatty liver K76.0    Arthritis M19.90    H/O parotidectomy Z90.49    Venous stasis dermatitis of both lower extremities I87.2    Aortic stenosis I35.0    Morbid obesity ([facility]) E66.01    Asthma J45.909    Diabetes mellitus ([facility] E11.9    Hypertension I10    Sleep apnea G47.30    Family history of coronary artery disease Z82.49    Chest pain R07.9    SOB (shortness of breath) R06.02    Palpitations R00.2    Peripheral edema R60.9    Hypervolemia E87.70    Chronic respiratory failure with hypoxia and hypercapnia ([facility]) J96.11, J96.12    Acute exacerbation of chronic obstructive pulmonary disease (COPD) ([facility]) J44.1    Chronic obstructive pulmonary disease ([facility]) J44.9    Gait disturbance R26.9    Acute on chronic respiratory failure with hypoxia and hypercapnia ([facility]) J96.21, J96.22    Cellulitis L03.90    Skin ulcer of left foot with fat layer exposed ([facility] L97.522    Pneumonia J18.9    VHD (valvular heart disease) I38    Class 3 severe obesity due to excess calories with body mass index (BMI) greater than or equal to 70 in adult ([facility] E66.01, Z68.45    SIRS (systemic inflammatory response syndrome) ([facility]) R65.10    Acute kidney injury ([facility] N17.9    Acute metabolic encephalopathy Z65.17    Shock, unspecified ([facility] R57.9    Uncontrolled diabetes mellitus ([facility]) E11.65    Sepsis ([facility]) A41.9    Altered mental status R41.82    Wide-complex tachycardia ([facility]) I47.2    Acute on chronic diastolic heart failure ([facility]) I50.33    S/P TAVR (transcatheter aortic valve replacement) Z95.2       Isolation/Infection:   Isolation          Contact  C Diff Contact        Patient Infection Status     Infection Onset Added Last Indicated Last Indicated By Review Planned Expiration Resolved Resolved By    C-diff Rule Out 05/24/20 05/24/20 05/24/20 MONI Number of days: 243       Wound 10/31/19 Coccyx Right (Active)   Number of days: 243       Wound 10/31/19 Toe (Comment  which one) Left;Plantar left great toe fissure  (Active)   Number of days: 243       Wound 10/31/19 Foot Left;Plantar (Active)   Number of days: 243       Wound 10/31/19 Heel Right;Medial right medial heel fissure cluster (Active)   Number of days: 243       Wound 09/22/19 sacral/buttock cluster (Active)   Wound Pressure Unstageable 6/29/2020  4:02 PM   Dressing Status Other (Comment) 6/30/2020  8:10 PM   Dressing Changed Other (Comment) 6/29/2020  2:03 PM   Dressing/Treatment Calmoseptine 6/30/2020  8:10 PM   Wound Cleansed Rinsed/Irrigated with saline; Soap and water 6/29/2020 10:00 AM   Wound Length (cm) 16 cm 6/29/2020 10:00 AM   Wound Width (cm) 9.5 cm 6/29/2020 10:00 AM   Wound Depth (cm) 0.1 cm 6/29/2020 10:00 AM   Wound Surface Area (cm^2) 152 cm^2 6/29/2020 10:00 AM   Wound Volume (cm^3) 15.2 cm^3 6/29/2020 10:00 AM   Distance Tunneling (cm) 0 cm 6/29/2020 10:00 AM   Tunneling Position ___ O'Clock 0 6/29/2020 10:00 AM   Undermining Starts ___ O'Clock 0 6/29/2020 10:00 AM   Undermining Ends___ O'Clock 0 6/29/2020 10:00 AM   Undermining Maxium Distance (cm) 0 6/29/2020 10:00 AM   Wound Assessment Drainage;Edema;Fragile;Granulation tissue;Maroon;Non-blanchable erythema;Painful;Pink;Purple;Red;Slough; Tan 6/30/2020  8:10 PM   Drainage Amount Moderate 6/30/2020  8:10 PM   Drainage Description Purulent 6/30/2020  8:10 PM   Odor Strong 6/30/2020  8:10 PM   Margins Undefined edges 6/30/2020  8:10 PM   Sierra-wound Assessment Fragile;Denuded;Painful;Maceration; Red 6/30/2020  8:10 PM   Non-staged Wound Description Partial thickness 6/30/2020  8:10 PM   [employer]%Wound Bed 0 6/30/2020  8:10 PM   Red%Wound Bed 40 6/30/2020  8:10 PM   Yellow%Wound Bed 60 6/30/2020  8:10 PM   Black%Wound Bed 0 6/30/2020  8:10 PM   Purple%Wound Bed 0 6/30/2020  8:10 PM   Other%Wound Bed 0 6/29/2020 10:00 AM   Number of days: 283       Wound 09/22/19 Foot Left;Plantar DFU (Active)   Wound Diabetic 6/28/2020  9:12 PM   Dressing Status Other (Comment) 6/30/2020  8:10 PM   Wound Length (cm) 0 cm 6/29/2020 10:00 AM   Wound Width (cm) 0 cm 6/29/2020 10:00 AM   Wound Depth (cm) 0 cm 6/29/2020 10:00 AM   Wound Surface Area (cm^2) 0 cm^2 6/29/2020 10:00 AM   Wound Volume (cm^3) 0 cm^3 6/29/2020 10:00 AM   Wound Assessment Dry; Intact; Black; Ruma Christiano 6/30/2020  8:10 PM   Drainage Amount None 6/30/2020  8:10 PM   Odor None 6/30/2020  8:10 PM   Margins Attached edges 6/30/2020  8:10 PM   Sierra-wound Assessment Dry; Intact 6/30/2020  8:10 PM   Number of days: 283       Wound 09/22/19 Foot Right;Plantar DFU (Active)   Wound Diabetic 6/28/2020  9:12 PM   Dressing Status Other (Comment) 6/30/2020  8:10 PM   Wound Length (cm) 0 cm 6/29/2020 10:00 AM   Wound Width (cm) 0 cm 6/29/2020 10:00 AM   Wound Depth (cm) 0 cm 6/29/2020 10:00 AM   Wound Surface Area (cm^2) 0 cm^2 6/29/2020 10:00 AM   Wound Volume (cm^3) 0 cm^3 6/29/2020 10:00 AM   Wound Assessment Dry; Intact; Black; Ruma Christiano 6/30/2020  8:10 PM   Drainage Amount None 6/30/2020  8:10 PM   Odor None 6/30/2020  8:10 PM   Margins Attached edges 6/30/2020  8:10 PM   Sierra-wound Assessment Dry; Intact 6/30/2020  8:10 PM   Number of days: 283       Wound Thigh Left;Posterior (Active)   Wound Pressure Unstageable 6/29/2020  4:02 PM   Wound Cleansed Rinsed/Irrigated with saline; Soap and water 6/29/2020 10:00 AM   Wound Length (cm) 0.7 cm 6/29/2020 10:00 AM   Wound Width (cm) 3 cm 6/29/2020 10:00 AM   Wound Depth (cm) 0.1 cm 6/29/2020 10:00 AM   Wound Surface Area (cm^2) 2.1 cm^2 6/29/2020 10:00 AM   Wound Volume (cm^3) 0.21 cm^3 6/29/2020 10:00 AM   Distance Tunneling (cm) 0 cm 6/29/2020 10:00 AM   Tunneling Position ___ O'Clock 0 6/29/2020 10:00 AM   Undermining Starts ___ O'Clock 0 6/29/2020 10:00 AM   Undermining Ends___ O'Clock 0 6/29/2020 10:00 AM   Undermining Maxium Distance (cm) 0 6/29/2020 10:00 AM   Wound CP:19402}  · Bladder: {YES / KX:35124}  Urinary Catheter: {Urinary Catheter:400473474}   Colostomy/Ileostomy/Ileal Conduit: {YES / LEAH:83987}       Date of Last BM: ***    Intake/Output Summary (Last 24 hours) at 2020 0913  Last data filed at 2020 0100  Gross per 24 hour   Intake --   Output 4850 ml   Net -4850 ml     I/O last 3 completed shifts:  In: -   Out: 4850 [Urine:4850]    Safety Concerns:     [address] [employer] Safety Concerns:066797453}    Impairments/Disabilities:      [address] RUBI Impairments/Disabilities:411043589}      Patient's personal belongings (please select all that are sent with patient):  {CHP DME Belongings:152052784}    RN SIGNATURE:  {Esignature:594708123}    CASE MANAGEMENT/SOCIAL WORK SECTION    Inpatient Status Date: ***    Readmission Risk Assessment Score:  Readmission Risk              Risk of Unplanned Readmission:        52           Discharging to Facility/ Agency   · Name:   · Address:  · Phone:  · Fax:    Dialysis Facility (if applicable)   · Name:  · Address:  · Dialysis Schedule:  · Phone:  · Fax:    / signature: {Esignature:079611009}    PHYSICIAN SECTION    Prognosis: {Prognosis:4390582111}    Condition at Discharge: [address] Patient Condition:557153035}    Rehab Potential (if transferring to Rehab): {Prognosis:8057728245}    Recommended Labs or Other Treatments After Discharge: ***    Physician Certification: I certify the above information and transfer of Anthony Mauricio  is necessary for the continuing treatment of the diagnosis listed and that she requires {Admit to Appropriate Level of Care:82157} for {GREATER/LESS:906886524} 30 days.      Update Admission H&P: {CHP DME Changes in SENB}  Nutrition Therapy:  Current Nutrition Therapy:   [address] [employer] Diet List:103997020}    Routes of Feeding: {CHP DME Other Feedings:623671385}  Liquids: {Slp liquid thickness:05097}  Daily Fluid Restriction: {CHP DME Yes amt example:966025060}  Last Modified Barium Swallow with Video (Video Swallowing Test): {Done Not Done LFFR:338372648}    Treatments at the Time of Hospital Discharge:   Respiratory Treatments: ***  Oxygen Therapy:  {Therapy; copd oxygen:15693}  Ventilator:    {DEMI SORTO Vent OTJH:198153301}    Rehab Therapies: {THERAPEUTIC INTERVENTION:9211682323}  Weight Bearing Status/Restrictions: {DEMI SORTO Weight Bearin}  Other Medical Equipment (for information only, NOT a DME order):  {EQUIPMENT:180329618}  Other Treatments: ***    PHYSICIAN SIGNATURE:  {Esignature:120767809}

## 2020-07-01 NOTE — CARE COORDINATION
Call to [facility] and spoke with admissions. Pt will require a precert to return to the facility, which will require PT/OT. HUMAw reviewed chart. OT has seen the Pt yesterday. Whiteboard placed asking for PT to see the Pt. Once therapy notes are in, LSW will fax info to [facility]. Call to Pt rafal [facility] and left a voicemail.      Electronically signed by JORI Kilgore on 7/1/2020 at 9:11 AM

## 2020-07-01 NOTE — PROGRESS NOTES
pulmonary      SUBJECTIVE: sitting in chair and doing better     OBJECTIVE    VITALS:  BP (!) 112/48   Pulse 63   Temp 97.6 °F (36.4 °C) (Oral)   Resp 18   Ht 5' 6\" (1.676 m)   Wt (!) 388 lb 11.2 oz (176.3 kg)   SpO2 96%   BMI 62.74 kg/m²   HEAD AND FACE EXAM:  No throat injection, no active exudate,no thrush  NECK EXAM;No JVD, no masses, symmetrical  CHEST EXAM; Expansion equal and symmetrical, no masses  LUNG EXAM; Good breath sounds bilaterally. There are expiratory wheezes both lungs, there are crackles at both lung bases  CARDIOVASCULAR EXAM: Positive S1 and S2, no S3 or S4, no clicks ,no murmurs  RIGHT AND LEFT LOWER EXTRIMITY EXAM: No edema, no swelling, no inflamation  CNS EXAM: Alert and oriented X3          LABS   Lab Results   Component Value Date    WBC 4.2 07/01/2020    HGB 9.2 (L) 07/01/2020    HCT 32.5 (L) 07/01/2020    MCV 80.6 07/01/2020     07/01/2020     Lab Results   Component Value Date    CREATININE 0.9 07/01/2020    BUN 28 (H) 07/01/2020     07/01/2020    K 3.2 (L) 07/01/2020    CL 86 (L) 07/01/2020    CO2 50 (H) 07/01/2020     Lab Results   Component Value Date    INR 1.07 06/27/2020    PROTIME 13.0 06/27/2020          Lab Results   Component Value Date    PHOS 2.7 06/30/2020    PHOS 5.8 05/24/2020    PHOS 2.6 11/04/2019        Recent Labs     06/30/20  1200   PH 7.51*   PO2ART 68*   XHT0JQP 67.0*   O2SAT 91.9*         Wt Readings from Last 3 Encounters:   06/30/20 (!) 388 lb 11.2 oz (176.3 kg)   06/04/20 (!) 494 lb (224.1 kg)   05/24/20 (!) 494 lb 4.8 oz (224.2 kg)               ASSESMENT  Ac on ch resp failure  Ac copd  Ac chf        PLAN  1. She is minus 24 litres of fluid  2. cpm  3.  Cont pap at night    7/1/2020  Alla Carrillo M.D.

## 2020-07-01 NOTE — ADT AUTH CERT
Heart Failure - Care Day 6 (6/30/2020) by Odilia Durant, RN         Review Status Review Entered   Completed 7/1/2020 14:58       Criteria Review      Care Day: 6 Care Date: 6/30/2020 Level of Care:    Guideline Day 3    Level Of Care    (X) Floor to discharge    7/1/2020 2:58 PM EDT by Cassie Huitron      progressive    Clinical Status    (X) * Hemodynamic stability    7/1/2020 2:58 PM EDT by Cassie Huitron      rr 18-21    (X) * Tachypnea absent    ( ) * Oxygenation at baseline or acceptable for next level of care    (X) * Dyspnea at baseline or acceptable for next level of care    ( ) * Cardiac rate and rhythm acceptable    ( ) * Pulmonary edema absent or acceptable for next level of care    ( ) * Peripheral or sacral edema absent, at baseline, or improved    ( ) * Mental status at baseline    ( ) * Volume status acceptable on oral medication    (X) * Renal function at baseline or acceptable for next level of care    7/1/2020 2:58 PM EDT by Karma Mess [facility]    (X) * Electrolyte levels normal or acceptable for next level of care    7/1/2020 2:58 PM EDT by Karma Mess [facility]    ( ) * Immediate precipitating factors absent or controlled    ( ) * Discharge plans and education understood    Activity    ( ) * Ambulatory    Routes    ( ) * Oral hydration, medications, and diet    Interventions    ( ) * Oxygen absent    Medications    (X) Diuretics    7/1/2020 2:58 PM EDT by Cassie Huitron      lasix 40mg iv tid    * Milestone   Additional Notes   6/30  Day 6      Pt remains on progressive care unit      Vitals: t: 36.8 p: 71 rr: 18 bp: 143/58 spo2: 92% 4L via nc      Orders:   Lasix 40mg iv tid   Heparin 5000 units sq tid   Accu checks ac/hs with ssi coverage      Per pulmonary PN:  ASSESMENT   Ac on ch resp failure   Ac copd   Ac chf               PLAN   1. cpm   2. She is minus 16 litres since admission       Per Nephrology PN:  Impression :       1.  JOSE- good UOP > 4 l/d - recovering 2. ADHF better    3. multiple Ch dz- morbid obesity / DM/ S/p TAVR/ ANMOL and likely cor pulmonale etc    4. Met alkalosis- acceptable        Recommendation/Plan  :       1. Review lab    2. May d/C from renal standpoint    3. D/C with torsemide 100 BID X 5 days than 50 BID   4. CMP , mg in 1 wk   5. F/U with t me in 2 wk's    6. Good BS control        Per IM PN:      Plan:       Problems being addressed this admission:    Acute on chronic hypercapnic hypoxic respiratory failure   Acute encephalopathy   COPD   JOSE on CKD 2   DM 2   CAD   Morbid Obesity       I have been told that she is a bit confused by RN today. I will get an ABG on her to make sure that she is not hypercapnic. Her COPD seems stable but a difficult exam due to her body habitus. Her kidney functions have improved. Sugars are stable continue to monitor. Disposition is for her to be going to skilled care upon discharge.        Consultants:   Cardiology   Nephrology   Pulmonary      Per Cardiolgoy PN:  Impression:   Active Problems:     Hypervolemia     VHD (valvular heart disease)     Acute on chronic diastolic heart failure (HCC)     S/P TAVR (transcatheter aortic valve replacement)   Resolved Problems:     * No resolved hospital problems. *            All labs, medications and tests reviewed by myself, continue all other medications of all above medical condition listed as is except for changes mentioned above.       Thank you    Please call with questions.       Electronically signed by Luís Sun. THEODORE Aguirre - CNP on 6/30/2020 at 2:48 PM        I have seen ,spoken to  and examined this patient personally, independently of the nurse practitioner. I have reviewed the hospital care given to date and reviewed all pertinent labs and imaging. The plan was developed mutually at the time of the visit with the patient,  NP  and myself. I have spoken with patient, nursing staff and provided written and verbal instructions . The above note has been reviewed and I agree with the assessment, diagnosis, and treatment plan with changes made by me as follows        CARDIOLOGY ATTENDING ADDENDUM       HPI:   I have reviewed the above HPI  And agree with above    Avery Maria is a 63 y. o.year old who and presents with had concerns including Shortness of Breath.    Chief Complaint   Patient presents with   · Shortness of Breath                   Heart Failure - Care Day 5 (6/29/2020) by Quinn Webber RN         Review Status Review Entered   Completed 7/1/2020 14:58       Criteria Review      Care Day: 5 Care Date: 6/29/2020 Level of Care:    Guideline Day 3    Level Of Care    (X) Floor to discharge    Clinical Status    (X) * Hemodynamic stability    7/1/2020 2:58 PM EDT by Britney Miranda      hr 65-70  bp: 118/57    (X) * Tachypnea absent    7/1/2020 2:58 PM EDT by Britney Miranda      16-20    ( ) * Oxygenation at baseline or acceptable for next level of care    7/1/2020 2:58 PM EDT by Britney Miranda      92-94% 4L via nc    (X) * Dyspnea at baseline or acceptable for next level of care    ( ) * Cardiac rate and rhythm acceptable    ( ) * Pulmonary edema absent or acceptable for next level of care    ( ) * Peripheral or sacral edema absent, at baseline, or improved    ( ) * Mental status at baseline    ( ) * Volume status acceptable on oral medication    (X) * Renal function at baseline or acceptable for next level of care    7/1/2020 2:58 PM EDT by Alireza rascon    (X) * Electrolyte levels normal or acceptable for next level of care    7/1/2020 2:58 PM EDT by Alireza rascon    ( ) * Immediate precipitating factors absent or controlled    ( ) * Discharge plans and education understood    Activity    ( ) * Ambulatory    Routes    ( ) * Oral hydration, medications, and diet    Interventions    ( ) * Oxygen absent    7/1/2020 2:58 PM EDT by Britney Miranda      4L via nc    Medications    (X) Diuretics    7/1/2020 2:58 PM EDT by Nguyen Justice      lasix 40mg iv tid    * Milestone   Additional Notes   6/29  Day 5      Pt remains on progressive care unit      Vitals; t: 36.6 p: 77 rr: 20 bp: 86/61 spo2: 92% 4L via nc      Orders:   Lasix 40mg iv tid   Heparin 5000 units sq tid   Accu checks ac/hs with ssi coverage      Per Nephrology PN:  Impression :       1. JOSE- good UOP- recovering - mainly from ischemic and toxic ATI - renal vein congestion ( CRS) hypoxia and ? Infection - unlikely AIN so d/C steroid    2. ADHF- much better - with IV loop    3. Underlying morbid obesity /DM valvular dz 9 has TAVR) /ANMOL   4. Met alkaosis from  ch high PCO2 and diuretics        Recommendation/Plan  :       1. D/C steroid    2. Also transition to po loop    3. Daily  wt   4. Low salt   5. Good BS control   6. follow clinically       Per Pulmonary PN:  ASSESMENT   Ac on ch resp failure   Ac copd   chf               PLAN   1. cpm   2. Cont pap rx at nite and o2 otherwise      Per IM PN:       Assessment and Plan:   Leidy Fernandez is a 63 y.o. Becca Pham presents with SOB       Acute on chronic hypercapnic/hypoxic respiratory failure    Acute exacerbation of diastolic HF     Possible COPD exacerbation   Covid negative. -pulm following, appreciated recommendations   -As needed BIPAP and oxygen   -Diuretics as managed by cardiology   -Cardiology recommendations appreciated   -Steroids, discontinued   -DC Abx       JOSE on CKD stage 2-Improving   ?cardio-renal- expect to improve with diuresis   - nephrology following, appreciated recommendations       Hypotension- Resolved   likely medication related, r/o sepsis- blood culture collected in ED   .    Probable metabolic encephalopathy- Resolved   ?superimposed hospital acquired delirium       DM type 2   Lantus and sliding scale   Hypoglycemia precautions        Agitation/Anxiety   As needed Haldol       RLS   Continue Requip       OAB   Continue Ditropan       CAD   ASA, Plavix and Statin      Per other medications of all above medical condition listed as is.       Heart Failure - Care Day 3 (6/27/2020) by Bartolome Perkins RN         Review Status Review Entered   Completed 7/1/2020 14:58       Criteria Review      Care Day: 3 Care Date: 6/27/2020 Level of Care:    Guideline Day 3    Level Of Care    (X) Floor to discharge    7/1/2020 2:58 PM EDT by Emy Escoto      progressive    Clinical Status    (X) * Hemodynamic stability    7/1/2020 2:58 PM EDT by Emy Escoto      hr 66-82  bp: 122/80    ( ) * Tachypnea absent    7/1/2020 2:58 PM EDT by Emy Escoto      rr12-30    (X) * Oxygenation at baseline or acceptable for next level of care    7/1/2020 2:58 PM EDT by Emy Escoto      98%    (X) * Dyspnea at baseline or acceptable for next level of care    7/1/2020 2:58 PM EDT by Emy Escoto      baseline    ( ) * Cardiac rate and rhythm acceptable    ( ) * Pulmonary edema absent or acceptable for next level of care    ( ) * Peripheral or sacral edema absent, at baseline, or improved    ( ) * Mental status at baseline    ( ) * Volume status acceptable on oral medication    (X) * Renal function at baseline or acceptable for next level of care    7/1/2020 2:58 PM EDT by Taty Rosenthal wddorothea    (X) * Electrolyte levels normal or acceptable for next level of care    7/1/2020 2:58 PM EDT by aTty Pour wdl    ( ) * Immediate precipitating factors absent or controlled    ( ) * Discharge plans and education understood    Activity    ( ) * Ambulatory    Routes    ( ) * Oral hydration, medications, and diet    Interventions    ( ) * Oxygen absent    7/1/2020 2:58 PM EDT by Emy Escoto      6L nc    Medications    (X) Diuretics    7/1/2020 2:58 PM EDT by Emy Escoto      lasix 40mg iv tid    * Milestone   Additional Notes   6/27  Day 3      Pt remains on progressive care unit      Vitals:  t: 99 p: 84 rr: 26 bp: 122/80 spo2: 100% ra      Orders:   Lasix 40mg iv tid   Heparin 5000 units sq tid   Accu checks ac/hs with ssi coverage      Per IM PN:      ASSESSMENT and [facility] Day: 3       1-Acute on chronic hypercapnic/hypoxic respiratory failure on home bipap/home oxygen- needing bipap continous on presentation- due to diastolic HF exacerbation, less likely pneumonia. covid negative. -pulm following.   -appears doing okay on bipap- less hypercapnic and more awake, however, remains at high risk of needing intubation but has improved, will transfer to step down. 2-Acute on chronic diastolic HF with venous stasis dermatitis- on IV lasix   3-JOSE on CKD stage 2- ?cardio-renal- expect to improve with diuresis- nephrology following   4-Hypotension- likely medication related, r/o sepsis- blood culture collected in ED   5-Abnormal UA- doubt UTI- get urine cutlure. 6-Probable metabolic encephalopathy- ?superimposed hospital acquired delirium- check ABG again.           Other issues   -type 2 DM- on SS   -arrythmia- on amiodarone   -HLD   -ANMOL- on bipap   -RLS   -HTN   -Morbid obesity with ZWA>70 complicating above       Per Pulmonary PN:  ASSESMENT   Ac on ch resp failure   Ac copd   Ac chf               PLAN   1. cpm   2. Pap rx except for meals   3. Diuresis      Per Nephrology PN:  Assessment and Plan:           IMP:   arf on ckd from CRS ?  AIN   CHF with TAVR   Dm2   sob       Plan      Renal imrpove and decrease steroid   o2 monitor and diuresis   ssi   o2 improve

## 2020-07-01 NOTE — CONSULTS
Casandra, 1955, 4007/[id number]-A, 2020    History  [place]:  The primary encounter diagnosis was Acute on chronic congestive heart failure, unspecified heart failure type ([facility], [address] Diagnoses of Elevated troponin, Hypoxia, JOSE (acute kidney injury) ([address] and Poor venous access were also pertinent to this visit. Patient  has a past medical history of Acute kidney injury ([facility], [address] Aortic stenosis, Asthma, Bacteremia due to group B Streptococcus, Cancer ([address] Carotid artery stenosis, Chest pain, CHF (congestive heart failure) ([address] Chronic back pain, COPD exacerbation ([facility] [address] Depression, Diabetes mellitus ([facility] [address] Family history of coronary artery disease, Fatty liver, GI bleed, H/O aortic valve stenosis, H/O echocardiogram, Headache, Heart murmur, History of nuclear stress test, Morbid obesity ([facility] [address] Neuropathy, NSTEMI (non-ST elevated myocardial infarction) ([facility] [address] Obesity, Osteoarthritis, Palpitations, Peripheral edema, Restless legs syndrome, Sleep apnea, Sleep apnea, and SOB (shortness of breath). Patient  has a past surgical history that includes Cholecystectomy;  section; Hysterectomy; Tubal ligation; Neck surgery; Gallbladder surgery; and Aortic valve replacement (2017). Subjective:  Patient states:  \"I think I made a mess. \"    Pain:  Did not rate, demonstrated pain behaviors d/t wounds. Communication with other providers:  Handoff to RN.   Restrictions: Fall Risk    Home Setup/Prior level of function    Social/Functional History  Lives With: Family( and daughter)  Type of Home: House  Home Layout: Two level, Able to Live on Main level with bedroom/bathroom, Performs ADL's on one level  Home Access: Stairs to enter with rails  Entrance Stairs - Number of Steps: 3  Entrance Stairs - Rails: Left  Bathroom Shower/Tub: Walk-in shower, Shower chair without back  Bathroom Accessibility: Accessible  Home Equipment: Oxygen, Rolling walker, Reacher(2L of 02 at home)  Receives Help From: Family  ADL Assistance: Needs assistance(for LE ADLs only)  Homemaking Assistance: Needs assistance(can do chores sitting upright)  Ambulation Assistance: Independent  Transfer Assistance: Independent  Active : No  Patient's  Info: family   Occupation: Retired  Additional Comments: Pt reports about a dozen falls at night from bed to floor w/o significant injury  *from OT eval    Examination of body systems (includes body structures/functions, activity/participation limitations):  · Observation:  Supine in bed upon arrival.  · [facility]  · Hearing:  [facility]  · Cardiopulmonary:  On 2-4L of O2  · Cognition: WFL, see OT/SLP note for further evaluation. Musculoskeletal  · ROM R/L:  WFL. · Strength R/L:  4/5, weakness in function and endurance. · [facility]      Mobility:  · Rolling L/R:  Mod A  · Supine to sit:  Max x2  · Transfers: Min A  · Sitting balance:  SBA. · Standing balance:  CGA. · Gait: CGA    [facility] 6 Clicks Inpatient Mobility:  AM-PAC Inpatient Mobility Raw Score : 13    Treatment:    Bed Mobility: R/L Mod A x1 with bedrail assist, effortful. Cues for UE assist and LE positioning. Able to maintain for extended time for pericare. Able to assist with managing LEs to assist with pericare, performed full linen change and gown change. Sup-Sit: Max A x2 at shoulders, trunk, and hips, effortful transfer, increased difficulty d/t air mattress. Cues for UE assist, LE positioning, and scooting, therapist use of bed feature and elevated HOB to assist with transfer. STS: Min A x1 from bariatric bed to RW, slow transfer with increased time. Cues for LE position, UE placement, anterior weight shift and use of momentum. Gait: CGA with RW x6ft to chair with side steps. Cues for AD negotiation, lateral weight shifting, and UE assist with eccentric control to sit.      Safety: patient left in chair with antonia pad underneath, chair alarm, call light within reach, RN notified, gait belt used. Assessment:  Patient is a 62 yo female who presents with SOB and hypervolemia. Patient demonstrates generalized weakness, decreased endurance, and balance deficits. Patient would benefit from skilled therapy services at this time, is highly motivated and willing to participate. Unsure of long term plan, per charting patient from [address] [place] [address] and returning to [facility], per patient it appears she plans to return home after rehab. If planning for return to LTC rec SNF however if plan is to return home would recommend D/C to ARU d/t to the need for a higher level of mobility to return home safely. Complexity: Moderate  Prognosis: Good, no significant barriers to participation at this time. Plan Times per week: 3+/week, 1 week,      Equipment: TBD at next level of care    Goals:  Short term goals  Time Frame for Short term goals: 1 week   Short term goal 1: Patient will complete bed mobility Min A. Short term goal 2: Patient will perform sup-sit Mod A x1. Short term goal 3: Patient will perform STS CGA with RW . Short term goal 4: Patient will ambulate x50ft SBA with RW. Treatment plan:  Bed mobility, transfers, balance, gait, TA, TX. Recommendations for NURSING mobility: Stand step transfer to chair with RW and gait belt, if limited by fatigue, can use antonia.     Time:   Time in: 0947  Time out: 1035  Timed treatment minutes: 38  Total time: 48    Electronically signed by:    Walt Gr  7/1/2020, 1:57 PM

## 2020-07-01 NOTE — PROGRESS NOTES
Hospitalist Progress Note      Name:  Kelly Law /Age/Sex: 1955  (63 y.o. female)   MRN & CSN:  [id number] & [id number] Admission Date/Time: 2020  9:24 AM   Location:  [address] [place] [address]-A PCP: Erich Harper Day: 7    Assessment and Plan:   Kelly Law is a 63 y.o.  female  who presents with shortness of breath    -Acute on chronic diastolic congestive heart failure, tolerating diuresis. Clinically improving.  -Hypokalemia due to diuretics  -Hypomagnesemia due to diuresis  -Chronic respiratory acidosis with chronic compensatory metabolic alkalosis  -Metabolic alkalosis primary and secondary due to respiratory failure and diuresis  -Anemia  -Severe obesity  -Status post TAVR  -Obstructive sleep apnea on BiPAP  -Acute kidney injury present on admission, resolved    Plan  PT OT  Case management consult for placement  Replete potassium  Replete magnesium  Check anemia panel  Discontinue IV Lasix. Start p.o. Bumex  Discontinue Mata    Diet DIET LOW SODIUM 2 GM;   DVT Prophylaxis [] Lovenox, []  Heparin, [] SCDs, [] Ambulation   GI Prophylaxis [] PPI,  [] H2 Blocker,  [] Carafate,  [] Diet/Tube Feeds   Code Status Full Code   Disposition Patient requires continued admission due to    MDM [] Low, [] Moderate,[]  High  Patient's risk as above due to      History of Present Illness:     Chief Complaint: Shortness of breath  Kelly Law is a 63 y.o.  female  who presents with shortness of breath. Shortness of breath is better. No chest pain, fever, chills, nausea, vomiting or abdominal pain. Ten point ROS reviewed negative, unless as noted above    Objective:        Intake/Output Summary (Last 24 hours) at 2020 0943  Last data filed at 2020 0100  Gross per 24 hour   Intake --   Output 4850 ml   Net -4850 ml      Vitals:   Vitals:    20 0828   BP:    Pulse:    Resp: 15   Temp:    SpO2:      Physical Exam:   GEN Awake female, sitting upright in bed in no apparent distress. Appears given age. EYES Pupils are equally round. No scleral erythema, discharge, or conjunctivitis. HENT Mucous membranes are moist. Oral pharynx without exudates, no evidence of thrush. NECK Supple, no apparent thyromegaly or masses. RESP Clear to auscultation, no wheezes, rales or rhonchi. Symmetric chest movement while on room air. CARDIO/VASC S1/S2 auscultated. Regular rate without appreciable murmurs, rubs, or gallops. No JVD or carotid bruits. Peripheral pulses equal bilaterally and palpable. No peripheral edema. GI Abdomen is soft without significant tenderness, masses, or guarding. Bowel sounds are normoactive. Rectal exam deferred.  No costovertebral angle tenderness. Mata catheter is present. HEME/LYMPH No palpable cervical lymphadenopathy and no hepatosplenomegaly. No petechiae or ecchymoses. MSK No gross joint deformities. SKIN Normal coloration, warm, dry. NEURO Cranial nerves appear grossly intact, normal speech, no lateralizing weakness. PSYCH Awake, alert, oriented x 4. Affect appropriate.     Medications:   Medications:    magnesium sulfate  6 g Intravenous Once    aMILoride  5 mg Oral Daily    potassium chloride  40 mEq Oral TID WC    bumetanide  2 mg Oral BID    LORazepam  1 mg Intravenous Once    LORazepam  1 mg Intravenous Once    lidocaine PF  5 mL Intradermal Once    sodium chloride flush  10 mL Intravenous 2 times per day    insulin lispro  15 Units Subcutaneous TID WC    insulin lispro  0-6 Units Subcutaneous TID WC    insulin lispro  0-3 Units Subcutaneous Nightly    amiodarone  200 mg Oral Daily    vitamin C  500 mg Oral Daily    aspirin  81 mg Oral Daily    atorvastatin  80 mg Oral Nightly    vitamin D3  400 Units Oral Daily    rOPINIRole  2 mg Oral TID    pramipexole  1 mg Oral TID    pantoprazole  40 mg Oral Daily    oxybutynin  10 mg Oral Daily    cetirizine  10 mg Oral Daily    ferrous sulfate  325 mg Oral Daily with breakfast    doxepin  10 mg Oral Nightly    clopidogrel  75 mg Oral Daily    sodium chloride flush  10 mL Intravenous 2 times per day    heparin (porcine)  5,000 Units Subcutaneous 3 times per day    ipratropium  2 puff Inhalation 4x daily      Infusions:    dextrose      dextrose       PRN Meds: potassium chloride, 10 mEq, PRN  sodium chloride flush, 10 mL, PRN  glucose, 15 g, PRN  dextrose, 12.5 g, PRN  glucagon (rDNA), 1 mg, PRN  dextrose, 100 mL/hr, PRN  polyethylene glycol, 17 g, Daily PRN  sodium chloride flush, 10 mL, PRN  acetaminophen, 650 mg, Q6H PRN    Or  acetaminophen, 650 mg, Q6H PRN  promethazine, 12.5 mg, Q6H PRN    Or  ondansetron, 4 mg, Q6H PRN  glucose, 15 g, PRN  dextrose, 12.5 g, PRN  glucagon (rDNA), 1 mg, PRN  dextrose, 100 mL/hr, PRN          Electronically signed by Stanislaw Hernandez MD on 7/1/2020 at 9:43 AM

## 2020-07-01 NOTE — PROGRESS NOTES
07/01/20 0006   NIV Type   NIV Started/Stopped On   Equipment Type v60   Mode Bilevel   Mask Type Full face mask   Mask Size Medium   Bonnet size Medium   Settings/Measurements   IPAP 20 cmH20   CPAP/EPAP 7 cmH2O   Resp 16   FiO2  50 %   I Time/ I Time % 1.25 s   Vt Exhaled 491 mL   Mask Leak (lpm) 7.8 lpm   Comfort Level Good   Using Accessory Muscles No   SpO2 99   Patient Observation   Observations Pt sleeping   Alarm Settings   Alarms On Y   Press Low Alarm 5 cmH2O   High Pressure Alarm 25 cmH2O   Delay Alarm 20 sec(s)   Apnea (secs) 20 secs   Resp Rate Low Alarm 13   High Respiratory Rate 40 br/min

## 2020-07-02 NOTE — PROGRESS NOTES
distress. Appears given age. EYES Pupils are equally round. No scleral erythema, discharge, or conjunctivitis. HENT Mucous membranes are moist. Oral pharynx without exudates, no evidence of thrush. NECK Supple, no apparent thyromegaly or masses. RESP Clear to auscultation, no wheezes, rales or rhonchi. Symmetric chest movement while on room air. CARDIO/VASC S1/S2 auscultated. Regular rate without appreciable murmurs, rubs, or gallops. No JVD or carotid bruits. Peripheral pulses equal bilaterally and palpable. No peripheral edema. GI Abdomen is soft without significant tenderness, masses, or guarding. Bowel sounds are normoactive. Rectal exam deferred.  No costovertebral angle tenderness. Mata catheter is present. HEME/LYMPH No palpable cervical lymphadenopathy and no hepatosplenomegaly. No petechiae or ecchymoses. MSK No gross joint deformities. SKIN Normal coloration, warm, dry. NEURO Cranial nerves appear grossly intact, normal speech, no lateralizing weakness. PSYCH Awake, alert, oriented x 4. Affect appropriate.     Medications:   Medications:    spironolactone  25 mg Oral Daily    magnesium sulfate  6 g Intravenous Once    aMILoride  5 mg Oral Daily    potassium chloride  40 mEq Oral TID WC    torsemide  100 mg Oral BID    iron sucrose  125 mg Intravenous Once    LORazepam  1 mg Intravenous Once    LORazepam  1 mg Intravenous Once    lidocaine PF  5 mL Intradermal Once    sodium chloride flush  10 mL Intravenous 2 times per day    insulin lispro  15 Units Subcutaneous TID     insulin lispro  0-6 Units Subcutaneous TID     insulin lispro  0-3 Units Subcutaneous Nightly    amiodarone  200 mg Oral Daily    vitamin C  500 mg Oral Daily    aspirin  81 mg Oral Daily    atorvastatin  80 mg Oral Nightly    vitamin D3  400 Units Oral Daily    rOPINIRole  2 mg Oral TID    pramipexole  1 mg Oral TID    pantoprazole  40 mg Oral Daily    oxybutynin  10 mg Oral Daily    cetirizine  10 mg Oral Daily    ferrous sulfate  325 mg Oral Daily with breakfast    doxepin  10 mg Oral Nightly    clopidogrel  75 mg Oral Daily    sodium chloride flush  10 mL Intravenous 2 times per day    heparin (porcine)  5,000 Units Subcutaneous 3 times per day    ipratropium  2 puff Inhalation 4x daily      Infusions:    dextrose      dextrose       PRN Meds: potassium chloride, 10 mEq, PRN  sodium chloride flush, 10 mL, PRN  glucose, 15 g, PRN  dextrose, 12.5 g, PRN  glucagon (rDNA), 1 mg, PRN  dextrose, 100 mL/hr, PRN  polyethylene glycol, 17 g, Daily PRN  sodium chloride flush, 10 mL, PRN  acetaminophen, 650 mg, Q6H PRN    Or  acetaminophen, 650 mg, Q6H PRN  promethazine, 12.5 mg, Q6H PRN    Or  ondansetron, 4 mg, Q6H PRN  glucose, 15 g, PRN  dextrose, 12.5 g, PRN  glucagon (rDNA), 1 mg, PRN  dextrose, 100 mL/hr, PRN          Electronically signed by Amena Foley MD on 7/2/2020 at 10:33 AM

## 2020-07-02 NOTE — PLAN OF CARE
self-care as condition permits will improve  Description: Ability to participate in self-care as condition permits will improve  7/2/2020 0239 by Irma Mccauley RN  Outcome: Ongoing  7/1/2020 1449 by Tristin Hallman RN  Outcome: Ongoing     Problem: OXYGENATION/RESPIRATORY FUNCTION  Goal: Patient will maintain patent airway  7/2/2020 0239 by Irma Mccauley RN  Outcome: Ongoing  7/1/2020 1449 by Tristin Hallman RN  Outcome: Ongoing  Goal: Patient will achieve/maintain normal respiratory rate/effort  Description: Respiratory rate and effort will be within normal limits for the patient  7/2/2020 0239 by Irma Mccauley RN  Outcome: Ongoing  7/1/2020 1449 by Tristin Hallman RN  Outcome: Ongoing

## 2020-07-02 NOTE — CARE COORDINATION
Call to Jeannette/Mayda. HUMAW faxed the clinicial information to Mayda for precert back to [facility].      Electronically signed by JORI Perez on 7/2/2020 at 11:02 AM

## 2020-07-02 NOTE — PROGRESS NOTES
Occupational Therapy  . Occupational Therapy Treatment Note  Name: Steven Tapia MRN: [id number] :   1955   Date:  2020   Admission Date: 2020 Room:  [address] [place] [address]-A   Restrictions/Precautions:    Contact Precautions, General Precautions, Fall Risk    Communication with other providers:  Per chart review and FARIHA Spence, pt appropriate for therapeutic intervention. Nurse [facility] present for 2nd person assist during transfer. Recommended staff assist pt c back to bed transfer in <2 hours to reduce patient fatigue while up, educated for up to chair multiple times per day for 1-2 hours duration to facilitate increased strength c transfer training. Subjective:  Patient states:  \"I really can do more than people think, I just get tired if my legs are down too long. \"   Pain:   Location, Type, Intensity (0/10 to 10/10):  5/10, buttocks    Objective:    Observation:  Pt received in bariatric bed having soiled chux. Nurse tech assisted for Dep hygiene, barrier creme applied to buttocks, nursing aware. Objective Measures:  Telemetry, O2 2L NC, Mata catheter. Treatment, including education:  Therapeutic Activity Training:   Therapeutic activity training was instructed today. Cues were given for safety, sequence, UE/LE placement, awareness, and balance. Activities performed today included bed mobility training, sup-sit, sit-stand, SPT. While pt was Dep for hygiene and barrier creme application by nurse tech, this therapist provided cues to pt to facilitate rolling R<>L c use of bed rail + cues for sequencing at [address] [place] [address] / Mod A x1. Supine to sit: Mod A x1 for trunk + LE touching assist at very edge of bed for assist over metal frame of bed (pt advanced BLE to EOB) c air mattress deflated. Sit to supine: Not performed. Sit to stand: Failed first try, and stood  Min/Mod A x2 c RW + increased time + cues for safe body positioning.    Stand to sit: Min A x2 c RW  Stand Step Transfer: Min A x2 + mod cues for safe body positioning, keeping walker close throughout transfer to step to Bedside Chair. All therapeutic intervention performed c emphasis on dynamic balance / standing tolerance to inc strength, endurance and act tolerance for inc Indep c ADL tasks, func transfers / mobility. Safety  Patient safely in bedside chair + alarm at end of session, with call light/phone in reach, and nursing aware. Gait belt was used for func transfers / mobility. Assessment / Impression:        Patient's tolerance of treatment:  Well   Adverse Reaction: None  Significant change in status and impact:  Improving mobility  Barriers to improvement:  Pain, Decreased strength, decreased endurance, body habitus    Plan for Next Session:    Continue per OT POC c plan to address functional transfers during ADLs.     Time in:  1145  Time out:  1210  Timed treatment minutes:  25  Total treatment time:  25    Electronically signed by:    MARLENI Wilkinson  7/2/2020, 10:50 AM    Previously filed values:    Goals:  Pt goal: go home  Time Frame for STGs: discharge  Goal 1: Pt will perform UE ADLs Independent  Goal 2: Pt will perform LE ADLs Chase w/ AD  Goal 3: Pt will perform toileting Chase w/ AD  Goal 4: Pt will perform functional transfer w/ AD Jackie  Goal 5: Pt will perform functional mobility w/ AD Jackie  Goal 6: Pt will perform therex/theract in order to increase functional activity tolerance and dynamic standing balance

## 2020-07-02 NOTE — PROGRESS NOTES
Nephrology Progress Note  7/2/2020 8:51 AM        Subjective:   Admit Date: 6/25/2020  PCP: THEODORE Melgar NP    Interval History: no major event     Diet: better    ROS:  With BIPAP- just woke up- doing well, excellent UOP with  Po long acting  loop now- had IV iron looks like -     Data:     Current meds:    magnesium sulfate  6 g Intravenous Once    aMILoride  5 mg Oral Daily    potassium chloride  40 mEq Oral TID WC    torsemide  100 mg Oral BID    iron sucrose  125 mg Intravenous Once    LORazepam  1 mg Intravenous Once    LORazepam  1 mg Intravenous Once    lidocaine PF  5 mL Intradermal Once    sodium chloride flush  10 mL Intravenous 2 times per day    insulin lispro  15 Units Subcutaneous TID WC    insulin lispro  0-6 Units Subcutaneous TID WC    insulin lispro  0-3 Units Subcutaneous Nightly    amiodarone  200 mg Oral Daily    vitamin C  500 mg Oral Daily    aspirin  81 mg Oral Daily    atorvastatin  80 mg Oral Nightly    vitamin D3  400 Units Oral Daily    rOPINIRole  2 mg Oral TID    pramipexole  1 mg Oral TID    pantoprazole  40 mg Oral Daily    oxybutynin  10 mg Oral Daily    cetirizine  10 mg Oral Daily    ferrous sulfate  325 mg Oral Daily with breakfast    doxepin  10 mg Oral Nightly    clopidogrel  75 mg Oral Daily    sodium chloride flush  10 mL Intravenous 2 times per day    heparin (porcine)  5,000 Units Subcutaneous 3 times per day    ipratropium  2 puff Inhalation 4x daily      dextrose      dextrose           I/O last 3 completed shifts:  In: -   Out: 4200 [Urine:4200]    CBC:   Recent Labs     06/30/20  0600 07/01/20  0400 07/02/20  0600   WBC 5.1 4.2 5.5   HGB 8.9* 9.2* 10.2*    220 237          Recent Labs     06/30/20  0600 07/01/20  0400 07/02/20  0600    142 139   K 3.9 3.2* 3.7   CL 87* 86* 88*   CO2 50* 50* 45*   BUN 36* 28* 21   CREATININE 1.0 0.9 0.8   GLUCOSE 71 84 101*       Lab Results   Component Value Date    CALCIUM 9.3

## 2020-07-02 NOTE — PROGRESS NOTES
pulmonary      SUBJECTIVE: sleeping on bipap     OBJECTIVE    VITALS:  BP (!) 138/57   Pulse 68   Temp 98.4 °F (36.9 °C) (Oral)   Resp 16   Ht 5' 6\" (1.676 m)   Wt (!) 388 lb 11.2 oz (176.3 kg)   SpO2 95%   BMI 62.74 kg/m²   HEAD AND FACE EXAM:  No throat injection, no active exudate,no thrush  NECK EXAM;No JVD, no masses, symmetrical  CHEST EXAM; Expansion equal and symmetrical, no masses  LUNG EXAM; Good breath sounds bilaterally. There are expiratory wheezes both lungs, there are crackles at both lung bases  CARDIOVASCULAR EXAM: Positive S1 and S2, no S3 or S4, no clicks ,no murmurs  RIGHT AND LEFT LOWER EXTRIMITY EXAM: No edema, no swelling, no inflamation            LABS   Lab Results   Component Value Date    WBC 5.5 07/02/2020    HGB 10.2 (L) 07/02/2020    HCT 35.6 (L) 07/02/2020    MCV 80.0 07/02/2020     07/02/2020     Lab Results   Component Value Date    CREATININE 0.8 07/02/2020    BUN 21 07/02/2020     07/02/2020    K 3.7 07/02/2020    CL 88 (L) 07/02/2020    CO2 45 (H) 07/02/2020     Lab Results   Component Value Date    INR 1.07 06/27/2020    PROTIME 13.0 06/27/2020          Lab Results   Component Value Date    PHOS 3.2 07/02/2020    PHOS 2.7 06/30/2020    PHOS 5.8 05/24/2020        Recent Labs     06/30/20  1200   PH 7.51*   PO2ART 68*   [id number] 67.0*   O2SAT 91.9*         Wt Readings from Last 3 Encounters:   06/30/20 (!) 388 lb 11.2 oz (176.3 kg)   06/04/20 (!) 494 lb (224.1 kg)   05/24/20 (!) 494 lb 4.8 oz (224.2 kg)               ASSESMENT  Ac on ch resp failure  Ac copd  Ac chf        PLAN  1. cpm  2.  Cont pap rx    7/2/2020  Sondra Butt M.D.

## 2020-07-03 NOTE — PROGRESS NOTES
Occupational Therapy  . Occupational Therapy Treatment Note  Name: Anthony Mauricio MRN: [id number] :   1955   Date:  7/3/2020   Admission Date: 2020 Room:  [facility]-A   Restrictions/Precautions:    General precautions; Fall Risk    Communication with other providers:  Per chart review and FARIHA Cervantes, patient is appropriate for therapeutic intervention. Co-Tx c PTA for toileting and functional mobility. Per PTA Tonio Doshi, pt's need for physical assist increased during Tx session. Subjective:  Patient states: \"If I sit too long, I can't get up again. \"  Pain:   Location, Type, Intensity (0/10 to 10/10):  Unrated, no complaint or s/s of pain. Objective:    Observation:  Pt received seated on toilet. Objective Measures:  O2 2L NC. A&O throughout Tx session. Treatment, including education:  Self Care Training:   Cues were given for safety, sequence, UE/LE placement, visual cues, and balance. Activities performed today included dressing, toileting, hand hygiene, and grooming. UB Bathing: Mod A for thorough wash of B axillae s/p pt performed intial wash. Partial bathing only. UB Dressing: Max A for gown  Toilet Transfer: Dependent for Moderate Assistance x2 to stand from toilet on 3rd trial (2 failed attempts) c use of B  on grab bars, then use of RW for Min A x2 to step to wheelchair. Toileting: Dep for hygiene and clothing mgmt of gown. Grooming: Min A for hair care for tangles at back of head, SBA seated to wash face. Therapeutic Activity Training:   Therapeutic activity training was instructed today. Cues were given for safety, sequence, UE/LE placement, awareness, and balance. Activities performed today included functional transfer / mobility training. Sit to stands: From Wheelchair, Min A x1-2  Stand to sit: Min A x1  + SBA and tactile cues from this therapist for safe body positioning.    Functional Mobility: Min A x1 RW + SBA for chair follow for 12 ft before pt

## 2020-07-03 NOTE — PROGRESS NOTES
Nephrology Progress Note  7/3/2020 1:56 PM        Subjective:   Admit Date: 6/25/2020  PCP: Jordan Diggs, APRN - NP    Interval History: waiting for ECF     Diet: better    ROS:  No overt sob, good UOP  Dropped a bit     Data:     Current meds:    lactulose  30 g Oral Once    polyethylene glycol  17 g Oral Daily    miconazole   Topical BID    spironolactone  25 mg Oral Daily    magnesium sulfate  6 g Intravenous Once    torsemide  100 mg Oral BID    LORazepam  1 mg Intravenous Once    LORazepam  1 mg Intravenous Once    lidocaine PF  5 mL Intradermal Once    sodium chloride flush  10 mL Intravenous 2 times per day    insulin lispro  15 Units Subcutaneous TID WC    insulin lispro  0-6 Units Subcutaneous TID WC    insulin lispro  0-3 Units Subcutaneous Nightly    amiodarone  200 mg Oral Daily    vitamin C  500 mg Oral Daily    aspirin  81 mg Oral Daily    atorvastatin  80 mg Oral Nightly    vitamin D3  400 Units Oral Daily    rOPINIRole  2 mg Oral TID    pramipexole  1 mg Oral TID    pantoprazole  40 mg Oral Daily    oxybutynin  10 mg Oral Daily    cetirizine  10 mg Oral Daily    ferrous sulfate  325 mg Oral Daily with breakfast    doxepin  10 mg Oral Nightly    clopidogrel  75 mg Oral Daily    sodium chloride flush  10 mL Intravenous 2 times per day    heparin (porcine)  5,000 Units Subcutaneous 3 times per day    ipratropium  2 puff Inhalation 4x daily      dextrose      dextrose           I/O last 3 completed shifts:  In: -   Out: 2400 [Urine:2400]    CBC:   Recent Labs     07/01/20  0400 07/02/20  0600 07/03/20  0600   WBC 4.2 5.5 5.5   HGB 9.2* 10.2* 10.0*    237 234          Recent Labs     07/01/20  0400 07/02/20  0600 07/03/20  0600    139 134*   K 3.2* 3.7 3.9   CL 86* 88* 86*   CO2 50* 45* 44*   BUN 28* 21 19   CREATININE 0.9 0.8 0.9   GLUCOSE 84 101* 89       Lab Results   Component Value Date    CALCIUM 9.5 07/03/2020    PHOS 3.2 07/02/2020       Objective: Vitals: BP (!) 103/90   Pulse 62   Temp 97.9 °F (36.6 °C) (Oral)   Resp 13   Ht 5' 6\" (1.676 m)   Wt (!) 365 lb (165.6 kg)   SpO2 93%   BMI 58.91 kg/m²     General appearance:  No ac distress  HEENT:   No conj pallor  Neck:  supple  Lungs:  No gross crackles  Heart:  Seems RRR  Abdomen: soft, obese  Extremities:  No gross edema       Problem List :         Impression :     1. JOSE- recovering   2. ADHF - better compensated - lower loop   3. Low na - some likely from amiloride - K ok   4. Ch met alkalosis/ valvular dz - morbid obesity    Recommendation/Plan  :     1. lower loop  2. D/C amiloride aldactone   3. She is waiting for ECF  4.  Follow clinically       Areli Almanza MD

## 2020-07-03 NOTE — PLAN OF CARE
improve  Description: Ability to participate in self-care as condition permits will improve  7/3/2020 1209 by Laura Wolf LPN  Outcome: Ongoing  7/3/2020 0322 by Adriel Motley RN  Outcome: Ongoing     Problem: OXYGENATION/RESPIRATORY FUNCTION  Goal: Patient will maintain patent airway  7/3/2020 1209 by Laura Wolf LPN  Outcome: Ongoing  7/3/2020 0322 by Ardiel Motley RN  Outcome: Ongoing  Goal: Patient will achieve/maintain normal respiratory rate/effort  Description: Respiratory rate and effort will be within normal limits for the patient  7/3/2020 1209 by Laura Wolf LPN  Outcome: Ongoing  7/3/2020 0322 by Adriel Motley RN  Outcome: Ongoing

## 2020-07-03 NOTE — PROGRESS NOTES
Hospitalist Progress Note      Name:  Ella Ritchie /Age/Sex: [birth year]  (66 y.o. female)   MRN & CSN:  [id number] & [id number] Admission Date/Time: 2020  9:24 AM   Location:  [address] [place] [address] PCP: Erich Harper Day: 9    Assessment and Plan:   Ella Ritchie is a 63 y.o.  female  who presents with shortness of breath    -Acute on chronic diastolic congestive heart failure, tolerating diuresis. Clinically improving.  -Hypokalemia due to diuretics; resolved potassium 3.9  -Hypomagnesemia due to diuresis resolved  -Chronic respiratory acidosis with chronic compensatory metabolic alkalosis  -Metabolic alkalosis primary and secondary due to respiratory failure and diuresis  -Anemia; iron deficient getting IV iron  -Severe obesity  -Status post TAVR  -Obstructive sleep apnea on BiPAP  -Acute kidney injury present on admission, resolved  -Decubitus ulcers was seen by wound care    Plan  PT OT  Awaiting placement  Discontinue Mata  Laxatives for constipation ordered    Diet DIET LOW SODIUM 2 GM;   DVT Prophylaxis [] Lovenox, []  Heparin, [] SCDs, [] Ambulation   GI Prophylaxis [] PPI,  [] H2 Blocker,  [] Carafate,  [] Diet/Tube Feeds   Code Status Full Code   Disposition Patient requires continued admission due to    MDM [] Low, [] Moderate,[]  High  Patient's risk as above due to      History of Present Illness:     Chief Complaint: Shortness of breath  Ella Ritchie is a 63 y.o.  female  who presents with shortness of breath. Shortness of breath is better. No chest pain, fever, chills, nausea, vomiting or abdominal pain. Ten point ROS reviewed negative, unless as noted above    Objective:        Intake/Output Summary (Last 24 hours) at 7/3/2020 09  Last data filed at 7/3/2020 09  Gross per 24 hour   Intake 120 ml   Output 2400 ml   Net -2280 ml      Vitals:   Vitals:    20 0855   BP: (!) 103/90   Pulse: 62   Resp: 13   Temp: 97.9 °F (36.6 °C)   SpO2: rOPINIRole  2 mg Oral TID    pramipexole  1 mg Oral TID    pantoprazole  40 mg Oral Daily    oxybutynin  10 mg Oral Daily    cetirizine  10 mg Oral Daily    ferrous sulfate  325 mg Oral Daily with breakfast    doxepin  10 mg Oral Nightly    clopidogrel  75 mg Oral Daily    sodium chloride flush  10 mL Intravenous 2 times per day    heparin (porcine)  5,000 Units Subcutaneous 3 times per day    ipratropium  2 puff Inhalation 4x daily      Infusions:    dextrose      dextrose       PRN Meds: potassium chloride, 10 mEq, PRN  sodium chloride flush, 10 mL, PRN  glucose, 15 g, PRN  dextrose, 12.5 g, PRN  glucagon (rDNA), 1 mg, PRN  dextrose, 100 mL/hr, PRN  polyethylene glycol, 17 g, Daily PRN  sodium chloride flush, 10 mL, PRN  acetaminophen, 650 mg, Q6H PRN    Or  acetaminophen, 650 mg, Q6H PRN  promethazine, 12.5 mg, Q6H PRN    Or  ondansetron, 4 mg, Q6H PRN  glucose, 15 g, PRN  dextrose, 12.5 g, PRN  glucagon (rDNA), 1 mg, PRN  dextrose, 100 mL/hr, PRN          Electronically signed by Stacy Peralta MD on 7/3/2020 at 9:22 AM

## 2020-07-03 NOTE — PROGRESS NOTES
Physical Therapy    Physical Therapy Treatment Note  Name: Sebastián Menon MRN: [id number] :   1955   Date:  7/3/2020   Admission Date: 2020 Room:  [facility]   Restrictions/Precautions:          Communication with other providers:  Per nurse ok to tx. HANEY joined tx during toileting activity  Subjective:  Patient states:  Pt motivated and agreeable to tx. Once up pt requesting to use restroom before amb in [place]. Pain:   Location, Type, Intensity (0/10 to 10/10):  No c/o pain during tx   Objective:    Observation:  Alert and oriented on 2 liters O2  Treatment, including education/measures:  Pt in bariatric bed and mattress deflated for safety with transfer. Pt requested to be allowed to try to get up without assist but was needing increased time and effort and was able to get legs over EOB but  unable to complete transfer needing max assist to finally get up into sitting. Scooting to EOB with increased time and effort to get feet on floor. Sit<=>stand from bed mod assist and was able to take 4 steps with rw bed to wc. Pt taken by wc to bathroom and was able to take 4-5 steps wc<=>commode. Pt was able to transfer sit<=>stand x 1 with min assist to place commode hat due nurse wanting a stool sample. Pt attempted to stand after being on commode for 10 minutes and needing max assist of 2 and several attempt before able to get to standing. Pt reports her legs fall asleep while sitting on commode. pt was dependent for [place] care refer to OT note. Pt taken into [place] by wc and pt was able to transfer sit<=>stand with min assist of 2 from wc.  amb with rw 12' with min assist of one and followed by wc for safety. pt then returned to room by wc and stood using bed rail for support while wc was switched for chair. Safety  Patient left safely in the chair, with call light/phone in reach with alarm applied. Gait belt was used for transfers and gait.   Assessment / Impression:       Patient's tolerance of treatment:  good   Adverse Reaction: na  Significant change in status and impact:  na  Barriers to improvement:  Strength and safety  Plan for Next Session:    Cont. POC  Time in:  1030  Time out:  1155  Timed treatment minutes:  85  Total treatment time:  80    Previously filed items:     Short term goals  Time Frame for Short term goals: 1 week   Short term goal 1: Patient will complete bed mobility Min A. Short term goal 2: Patient will perform sup-sit Mod A x1. Short term goal 3: Patient will perform STS CGA with RW . Short term goal 4: Patient will ambulate x50ft SBA with RW.        Electronically signed by:    Elizabeth Wang PTA  7/3/2020, 8:38 AM

## 2020-07-03 NOTE — PLAN OF CARE
Problem: Falls - Risk of:  Goal: Will remain free from falls  Description: Will remain free from falls  Outcome: Ongoing  Goal: Absence of physical injury  Description: Absence of physical injury  Outcome: Ongoing     Problem: Skin Integrity:  Goal: Will show no infection signs and symptoms  Description: Will show no infection signs and symptoms  Outcome: Ongoing  Goal: Absence of new skin breakdown  Description: Absence of new skin breakdown  Outcome: Ongoing     Problem: Pain:  Description: Pain management should include both nonpharmacologic and pharmacologic interventions.   Goal: Pain level will decrease  Description: Pain level will decrease  Outcome: Ongoing  Goal: Control of acute pain  Description: Control of acute pain  Outcome: Ongoing  Goal: Control of chronic pain  Description: Control of chronic pain  Outcome: Ongoing     Problem: Coping:  Goal: Ability to remain calm will improve  Description: Ability to remain calm will improve  Outcome: Ongoing     Problem: Safety:  Goal: Ability to remain free from injury will improve  Description: Ability to remain free from injury will improve  Outcome: Ongoing     Problem: Self-Care:  Goal: Ability to participate in self-care as condition permits will improve  Description: Ability to participate in self-care as condition permits will improve  Outcome: Ongoing     Problem: OXYGENATION/RESPIRATORY FUNCTION  Goal: Patient will maintain patent airway  Outcome: Ongoing  Goal: Patient will achieve/maintain normal respiratory rate/effort  Description: Respiratory rate and effort will be within normal limits for the patient  Outcome: Ongoing

## 2020-07-03 NOTE — PROGRESS NOTES
pulmonary      SUBJECTIVE: improving clinical status     OBJECTIVE    VITALS:  BP (!) 103/90   Pulse 62   Temp 97.9 °F (36.6 °C) (Oral)   Resp 13   Ht 5' 6\" (1.676 m)   Wt (!) 365 lb (165.6 kg)   SpO2 93%   BMI 58.91 kg/m²   HEAD AND FACE EXAM:  No throat injection, no active exudate,no thrush  NECK EXAM;No JVD, no masses, symmetrical  CHEST EXAM; Expansion equal and symmetrical, no masses  LUNG EXAM; Good breath sounds bilaterally. There are expiratory wheezes both lungs, there are crackles at both lung bases  CARDIOVASCULAR EXAM: Positive S1 and S2, no S3 or S4, no clicks ,no murmurs  RIGHT AND LEFT LOWER EXTRIMITY EXAM: No edema, no swelling, no inflamation  CNS EXAM: Alert and oriented X3          LABS   Lab Results   Component Value Date    WBC 5.5 07/03/2020    HGB 10.0 (L) 07/03/2020    HCT 34.3 (L) 07/03/2020    MCV 79.4 07/03/2020     07/03/2020     Lab Results   Component Value Date    CREATININE 0.9 07/03/2020    BUN 19 07/03/2020     (L) 07/03/2020    K 3.9 07/03/2020    CL 86 (L) 07/03/2020    CO2 44 (H) 07/03/2020     Lab Results   Component Value Date    INR 1.07 06/27/2020    PROTIME 13.0 06/27/2020          Lab Results   Component Value Date    PHOS 3.2 07/02/2020    PHOS 2.7 06/30/2020    PHOS 5.8 05/24/2020        Recent Labs     06/30/20  1200   PH 7.51*   PO2ART 68*   [id number] 67.0*   O2SAT 91.9*         Wt Readings from Last 3 Encounters:   07/03/20 (!) 365 lb (165.6 kg)   06/04/20 (!) 494 lb (224.1 kg)   05/24/20 (!) 494 lb 4.8 oz (224.2 kg)               ASSESMENT  Ac on ch resp failure  Ac copd  Ac chf        PLAN  1. cpm  2. Increase activity  3.  Dc plans    7/3/2020  Lexi Guevara M.D.

## 2020-07-03 NOTE — PROGRESS NOTES
Nutrition Assessment (Low Risk)    Type and Reason for Visit: Reassess    Nutrition Recommendations:   Continue Low 2 gm Sodium Diet  Begin wound healing oral nutrition supplement bid     Nutrition Assessment:  Adequate intake on Low 2 gm Sodium Diet, feeding self and consuming 76% to all of meals. No reported decreased intake nor wt loss PTA. Redness, excoriation noted. Will send Heart Healthy Eating Nutrition Therapy to pt's room with meal tray d/t isolation. Patient assessed for nutritional risk. Deemed to be at low risk at this time. Will continue to monitor for changes in status.     Malnutrition Assessment:  · Malnutrition Status: No malnutrition    Nutrition Risk Level   Risk Level: Low    Nutrition Diagnosis:   · Problem: No nutrition diagnosis at this time    Nutrition Intervention:  Food and/or Delivery: Continue current diet  Nutrition Education/Counseling/Coordination of Care:  Continued Inpatient Monitoring, Coordination of Care, Education Initiated      Electronically signed by Cristian Henson RD, SAGAR on 7/3/20 at 10:10 AM EDT    Contact Number: [phone]

## 2020-07-04 NOTE — PROGRESS NOTES
Patient was able to ambulate, from the bed to the bathroom and back with no complications. I also weighed Ms. Chapman on the standing scale. 364.4lbs   She also tolerated this well. She said herself she felt better getting up and moving around.

## 2020-07-04 NOTE — PROGRESS NOTES
Nephrology Progress Note  7/4/2020 10:09 AM        Subjective:   Admit Date: 6/25/2020  PCP: THEODORE Muniz NP    Interval History: doing well- planning on going home - hernandez out    Diet: reasonable     ROS:  No sob, good UOP - ch UE tremor - ( rest )     Data:     Current meds:    torsemide  50 mg Oral BID    spironolactone  25 mg Oral Daily    bisacodyl  10 mg Rectal Once    sorbitol  45 mL Oral Once    lactulose  30 g Oral Once    polyethylene glycol  17 g Oral Daily    miconazole   Topical BID    magnesium sulfate  6 g Intravenous Once    LORazepam  1 mg Intravenous Once    LORazepam  1 mg Intravenous Once    lidocaine PF  5 mL Intradermal Once    sodium chloride flush  10 mL Intravenous 2 times per day    insulin lispro  15 Units Subcutaneous TID WC    insulin lispro  0-6 Units Subcutaneous TID WC    insulin lispro  0-3 Units Subcutaneous Nightly    amiodarone  200 mg Oral Daily    vitamin C  500 mg Oral Daily    aspirin  81 mg Oral Daily    atorvastatin  80 mg Oral Nightly    vitamin D3  400 Units Oral Daily    rOPINIRole  2 mg Oral TID    pramipexole  1 mg Oral TID    pantoprazole  40 mg Oral Daily    oxybutynin  10 mg Oral Daily    cetirizine  10 mg Oral Daily    ferrous sulfate  325 mg Oral Daily with breakfast    doxepin  10 mg Oral Nightly    clopidogrel  75 mg Oral Daily    sodium chloride flush  10 mL Intravenous 2 times per day    heparin (porcine)  5,000 Units Subcutaneous 3 times per day    ipratropium  2 puff Inhalation 4x daily      dextrose      dextrose           I/O last 3 completed shifts: In: 130 [P.O.:120;  I.V.:10]  Out: 2675 [Urine:2675]    CBC:   Recent Labs     07/02/20  0600 07/03/20  0600 07/04/20  0515   WBC 5.5 5.5 7.2   HGB 10.2* 10.0* 10.6*    234 240          Recent Labs     07/02/20  0600 07/03/20  0600 07/04/20  0515    134* 138   K 3.7 3.9 4.5   CL 88* 86* 89*   CO2 45* 44* 42*   BUN 21 19 22   CREATININE 0.8 0.9 1.1 GLUCOSE 101* 89 99       Lab Results   Component Value Date    CALCIUM 9.9 07/04/2020    PHOS 3.2 07/02/2020       Objective:     Vitals: BP (!) 143/55   Pulse 66   Temp 98.1 °F (36.7 °C) (Axillary)   Resp 18   Ht 5' 6\" (1.676 m)   Wt (!) 365 lb (165.6 kg)   SpO2 98%   BMI 58.91 kg/m²     General appearance:  No confusion , had good conversation with me   HEENT:  + conj pallor  Neck:  supple  Lungs:  Limited exam , no gross crackles  Heart:  Seems irregular  Abdomen: soft  Extremities:  No overt edema   Mata out     She has BIPAP    Problem List :         Impression :     1. JOSE- recovering - cr still not at baseline byt5 will vary as she is with diuretics   2. ADHF with sever fluid overload- better   3. K/ ok has Ch met alkalosis     Recommendation/Plan  :     1. Ok to d/C from my standpoint   2. Suggest torsemide 50 BID/ aldactone 25/d  3. CMP , mg in 1 wk  4. F/U with me in 2 wk's  5. She will have [facility]  6. Low salt  7. Call me with wt gain > 3 lb/ sob/ edema   8.        Byron Buenrostro MD

## 2020-07-04 NOTE — PROGRESS NOTES
pulmonary      SUBJECTIVE: seen early am,sleeping and no sob     OBJECTIVE    VITALS:  BP (!) 143/55   Pulse 66   Temp 98.1 °F (36.7 °C) (Axillary)   Resp 18   Ht 5' 6\" (1.676 m)   Wt (!) 365 lb (165.6 kg)   SpO2 98%   BMI 58.91 kg/m²   HEAD AND FACE EXAM:  No throat injection, no active exudate,no thrush  NECK EXAM;No JVD, no masses, symmetrical  CHEST EXAM; Expansion equal and symmetrical, no masses  LUNG EXAM; Good breath sounds bilaterally. There are expiratory wheezes both lungs, there are crackles at both lung bases  CARDIOVASCULAR EXAM: Positive S1 and S2, no S3 or S4, no clicks ,no murmurs  RIGHT AND LEFT LOWER EXTRIMITY EXAM: No edema, no swelling, no inflamation  . LABS   Lab Results   Component Value Date    WBC 7.2 07/04/2020    HGB 10.6 (L) 07/04/2020    HCT 37.1 07/04/2020    MCV 79.6 07/04/2020     07/04/2020     Lab Results   Component Value Date    CREATININE 1.1 07/04/2020    BUN 22 07/04/2020     07/04/2020    K 4.5 07/04/2020    CL 89 (L) 07/04/2020    CO2 42 (H) 07/04/2020     Lab Results   Component Value Date    INR 1.07 06/27/2020    PROTIME 13.0 06/27/2020          Lab Results   Component Value Date    PHOS 3.2 07/02/2020    PHOS 2.7 06/30/2020    PHOS 5.8 05/24/2020      No results for input(s): PH, PO2ART, SZT9ASA, HCO3, BEART, O2SAT in the last 72 hours. Wt Readings from Last 3 Encounters:   07/03/20 (!) 365 lb (165.6 kg)   06/04/20 (!) 494 lb (224.1 kg)   05/24/20 (!) 494 lb 4.8 oz (224.2 kg)               ASSESMENT  Ac on ch resp fail;ure  Ac copd  Ac chf        PLAN  1. cpm  2.  Pt is increasingly active    7/4/2020  Reema Wong M.D.

## 2020-07-04 NOTE — PROGRESS NOTES
Hospitalist Progress Note      Name:  Maria De Jesus Melendez /Age/Sex:   (66 y.o. female)   MRN & CSN:  [id number] & [id number] Admission Date/Time: 2020  9:24 AM   Location:  0029/5968I PCP: Erich Harper Day: 10    Assessment and Plan:   Maria De Jesus Melendez is a 63 y.o.  female  who presents with shortness of breath    -Acute on chronic diastolic congestive heart failure, tolerating diuresis. Clinically improving.  -Hypokalemia due to diuretics; resolved potassium 3.9  -Hypomagnesemia due to diuresis resolved  -Chronic respiratory acidosis with chronic compensatory metabolic alkalosis  -Metabolic alkalosis primary and secondary due to respiratory failure and diuresis  -Anemia; iron deficient getting IV iron  -Severe obesity  -Status post TAVR  -Obstructive sleep apnea on BiPAP  -Acute kidney injury present on admission, resolved  -Decubitus ulcers was seen by wound care    Plan  PT OT  Diuretics adjusted  Standing weight  Placement SNF versus home with PT/OT      Diet DIET LOW SODIUM 2 GM;   DVT Prophylaxis [] Lovenox, []  Heparin, [] SCDs, [] Ambulation   GI Prophylaxis [] PPI,  [] H2 Blocker,  [] Carafate,  [] Diet/Tube Feeds   Code Status Full Code   Disposition Patient requires continued admission due to    MDM [] Low, [] Moderate,[]  High  Patient's risk as above due to      History of Present Illness:     Chief Complaint: Shortness of breath  Maria De Jesus Melendez is a 63 y.o.  female  who presents with shortness of breath. Shortness of breath is better. No chest pain, fever, chills, nausea, vomiting or abdominal pain. Ten point ROS reviewed negative, unless as noted above    Objective:        Intake/Output Summary (Last 24 hours) at 2020 0905  Last data filed at 2020 0554  Gross per 24 hour   Intake 10 ml   Output 2675 ml   Net -2665 ml      Vitals:   Vitals:    20 0410   BP:    Pulse:    Resp: 18   Temp:    SpO2:      Physical Exam: oxybutynin  10 mg Oral Daily    cetirizine  10 mg Oral Daily    ferrous sulfate  325 mg Oral Daily with breakfast    doxepin  10 mg Oral Nightly    clopidogrel  75 mg Oral Daily    sodium chloride flush  10 mL Intravenous 2 times per day    heparin (porcine)  5,000 Units Subcutaneous 3 times per day    ipratropium  2 puff Inhalation 4x daily      Infusions:    dextrose      dextrose       PRN Meds: potassium chloride, 10 mEq, PRN  sodium chloride flush, 10 mL, PRN  glucose, 15 g, PRN  dextrose, 12.5 g, PRN  glucagon (rDNA), 1 mg, PRN  dextrose, 100 mL/hr, PRN  polyethylene glycol, 17 g, Daily PRN  sodium chloride flush, 10 mL, PRN  acetaminophen, 650 mg, Q6H PRN    Or  acetaminophen, 650 mg, Q6H PRN  promethazine, 12.5 mg, Q6H PRN    Or  ondansetron, 4 mg, Q6H PRN  glucose, 15 g, PRN  dextrose, 12.5 g, PRN  glucagon (rDNA), 1 mg, PRN  dextrose, 100 mL/hr, PRN          Electronically signed by Yemi Huitron MD on 7/4/2020 at 9:05 AM

## 2020-07-04 NOTE — PLAN OF CARE
Problem: Falls - Risk of:  Goal: Will remain free from falls  Description: Will remain free from falls  7/3/2020 2318 by Irwin Hernandez LPN  Outcome: Ongoing  7/3/2020 1209 by Marcus Chamorro LPN  Outcome: Ongoing  Goal: Absence of physical injury  Description: Absence of physical injury  7/3/2020 2318 by Irwin Hernandez LPN  Outcome: Ongoing  7/3/2020 1209 by Marcus Chamorro LPN  Outcome: Ongoing     Problem: Skin Integrity:  Goal: Will show no infection signs and symptoms  Description: Will show no infection signs and symptoms  7/3/2020 2318 by Irwin Hernandez LPN  Outcome: Ongoing  7/3/2020 1209 by Marcus Chamorro LPN  Outcome: Ongoing  Goal: Absence of new skin breakdown  Description: Absence of new skin breakdown  7/3/2020 2318 by Irwin Hernandez LPN  Outcome: Ongoing  7/3/2020 1209 by Marcus Chamorro LPN  Outcome: Ongoing     Problem: Pain:  Goal: Pain level will decrease  Description: Pain level will decrease  7/3/2020 2318 by Irwin Hernandez LPN  Outcome: Ongoing  7/3/2020 1209 by Marcus Chamorro LPN  Outcome: Ongoing  Goal: Control of acute pain  Description: Control of acute pain  7/3/2020 2318 by Irwin Hernandez LPN  Outcome: Ongoing  7/3/2020 1209 by Marcus Chamorro LPN  Outcome: Ongoing  Goal: Control of chronic pain  Description: Control of chronic pain  7/3/2020 2318 by Irwin Hernandez LPN  Outcome: Ongoing  7/3/2020 1209 by Marcus Chamorro LPN  Outcome: Ongoing     Problem: Coping:  Goal: Ability to remain calm will improve  Description: Ability to remain calm will improve  7/3/2020 2318 by Irwin Hernandez LPN  Outcome: Ongoing  7/3/2020 1209 by Marcus Chamorro LPN  Outcome: Ongoing     Problem: Safety:  Goal: Ability to remain free from injury will improve  Description: Ability to remain free from injury will improve  7/3/2020 2318 by Irwin Hernandez LPN  Outcome: Ongoing  7/3/2020 1209 by Marcus Chamorro LPN  Outcome: Ongoing     Problem: Self-Care:  Goal: Ability to participate in self-care as condition permits will improve  Description: Ability to participate in self-care as condition permits will improve  7/3/2020 2318 by Konrad Wei LPN  Outcome: Ongoing  7/3/2020 1209 by Kiya Brothers LPN  Outcome: Ongoing     Problem: OXYGENATION/RESPIRATORY FUNCTION  Goal: Patient will maintain patent airway  7/3/2020 2318 by Konrad Wei LPN  Outcome: Ongoing  7/3/2020 1209 by Kiya Brothers LPN  Outcome: Ongoing  Goal: Patient will achieve/maintain normal respiratory rate/effort  Description: Respiratory rate and effort will be within normal limits for the patient  7/3/2020 2318 by Konrad Wei LPN  Outcome: Ongoing  7/3/2020 1209 by Kiya Brothers LPN  Outcome: Ongoing

## 2020-07-05 NOTE — PROGRESS NOTES
Nephrology Progress Note  7/5/2020 11:53 AM        Subjective:   Admit Date: 6/25/2020  PCP: THEODORE Herrera NP    Interval History: waiting for placement     Diet: reasonable     ROS:  Mata out - per pt good UOP but dropped and cr up     Data:     Current meds:    sorbitol  45 mL Oral Once    lactulose  30 g Oral Once    polyethylene glycol  17 g Oral Daily    miconazole   Topical BID    magnesium sulfate  6 g Intravenous Once    LORazepam  1 mg Intravenous Once    LORazepam  1 mg Intravenous Once    lidocaine PF  5 mL Intradermal Once    sodium chloride flush  10 mL Intravenous 2 times per day    insulin lispro  15 Units Subcutaneous TID WC    insulin lispro  0-6 Units Subcutaneous TID WC    insulin lispro  0-3 Units Subcutaneous Nightly    amiodarone  200 mg Oral Daily    vitamin C  500 mg Oral Daily    aspirin  81 mg Oral Daily    atorvastatin  80 mg Oral Nightly    vitamin D3  400 Units Oral Daily    rOPINIRole  2 mg Oral TID    pramipexole  1 mg Oral TID    pantoprazole  40 mg Oral Daily    oxybutynin  10 mg Oral Daily    cetirizine  10 mg Oral Daily    ferrous sulfate  325 mg Oral Daily with breakfast    doxepin  10 mg Oral Nightly    clopidogrel  75 mg Oral Daily    sodium chloride flush  10 mL Intravenous 2 times per day    heparin (porcine)  5,000 Units Subcutaneous 3 times per day    ipratropium  2 puff Inhalation 4x daily      dextrose      dextrose           I/O last 3 completed shifts:   In: 10 [I.V.:10]  Out: 675 [Urine:675]    CBC:   Recent Labs     07/03/20  0600 07/04/20  0515 07/05/20  0550   WBC 5.5 7.2 7.0   HGB 10.0* 10.6* 10.9*    240 249          Recent Labs     07/03/20  0600 07/04/20  0515 07/05/20  0550   * 138 135   K 3.9 4.5 4.1   CL 86* 89* 87*   CO2 44* 42* 38*   BUN 19 22 25*   CREATININE 0.9 1.1 1.2*   GLUCOSE 89 99 101*       Lab Results   Component Value Date    CALCIUM 10.0 07/05/2020    PHOS 3.2 07/02/2020       Objective: Vitals: BP (!) 129/59   Pulse 72   Temp 98.6 °F (37 °C) (Oral)   Resp 19   Ht 5' 6\" (1.676 m)   Wt (!) 364 lb 6.4 oz (165.3 kg)   SpO2 95%   BMI 58.82 kg/m²     General appearance:  No distress  lost wt  HEENT:  + conj pallor  Neck:  supple  Lungs: Few rhonchi  Heart:  Seems irregualr  Abdomen: soft  Extremities:  No edema       Problem List :         Impression :     1. JOSE- recovering BUN/CR up- unlikely bladder obstruction but make sure -also may have inadequate \"plasma refill\"  2. ADHf much improved  3. Coch met alkalosis - acceptable  4. Low mg from loop- replete IV     Recommendation/Plan  :     1. Hold loop  2. B;adder scan   3. Plain UA   4. If she is ready  to d/C   5. Hold diuretics for 2 days than start torsemide  low dose 20 BID   6. No aldactone   7. CMP , mg , phos ion 1 wk  8. Call me with wt gain > 4 lbs / sob/ edema   9.  F/U with me in 2 wks       Linda Aviles MD

## 2020-07-05 NOTE — PROGRESS NOTES
Physical Exam:   GEN Awake female, sitting upright in bed in no apparent distress. Appears given age. EYES Pupils are equally round. No scleral erythema, discharge, or conjunctivitis. HENT Mucous membranes are moist. Oral pharynx without exudates, no evidence of thrush. NECK Supple, no apparent thyromegaly or masses. RESP Clear to auscultation, no wheezes, rales or rhonchi. Symmetric chest movement while on room air. CARDIO/VASC S1/S2 auscultated. Regular rate without appreciable murmurs, rubs, or gallops. No JVD or carotid bruits. Peripheral pulses equal bilaterally and palpable. No peripheral edema. GI Abdomen is soft without significant tenderness, masses, or guarding. Bowel sounds are normoactive. Rectal exam deferred.  No costovertebral angle tenderness. Mata catheter is not present. HEME/LYMPH No palpable cervical lymphadenopathy and no hepatosplenomegaly. No petechiae or ecchymoses. MSK No gross joint deformities. SKIN Normal coloration, warm, dry. NEURO Cranial nerves appear grossly intact, normal speech, no lateralizing weakness. PSYCH Awake, alert, oriented x 4. Affect appropriate.     Medications:   Medications:    sorbitol  45 mL Oral Once    lactulose  30 g Oral Once    polyethylene glycol  17 g Oral Daily    miconazole   Topical BID    magnesium sulfate  6 g Intravenous Once    LORazepam  1 mg Intravenous Once    LORazepam  1 mg Intravenous Once    lidocaine PF  5 mL Intradermal Once    sodium chloride flush  10 mL Intravenous 2 times per day    insulin lispro  15 Units Subcutaneous TID WC    insulin lispro  0-6 Units Subcutaneous TID WC    insulin lispro  0-3 Units Subcutaneous Nightly    amiodarone  200 mg Oral Daily    vitamin C  500 mg Oral Daily    aspirin  81 mg Oral Daily    atorvastatin  80 mg Oral Nightly    vitamin D3  400 Units Oral Daily    rOPINIRole  2 mg Oral TID    pramipexole  1 mg Oral TID    pantoprazole  40 mg Oral Daily    oxybutynin  10 mg Oral Daily    cetirizine  10 mg Oral Daily    ferrous sulfate  325 mg Oral Daily with breakfast    doxepin  10 mg Oral Nightly    clopidogrel  75 mg Oral Daily    sodium chloride flush  10 mL Intravenous 2 times per day    heparin (porcine)  5,000 Units Subcutaneous 3 times per day    ipratropium  2 puff Inhalation 4x daily      Infusions:    dextrose      dextrose       PRN Meds: potassium chloride, 10 mEq, PRN  sodium chloride flush, 10 mL, PRN  glucose, 15 g, PRN  dextrose, 12.5 g, PRN  glucagon (rDNA), 1 mg, PRN  dextrose, 100 mL/hr, PRN  polyethylene glycol, 17 g, Daily PRN  sodium chloride flush, 10 mL, PRN  acetaminophen, 650 mg, Q6H PRN    Or  acetaminophen, 650 mg, Q6H PRN  promethazine, 12.5 mg, Q6H PRN    Or  ondansetron, 4 mg, Q6H PRN  glucose, 15 g, PRN  dextrose, 12.5 g, PRN  glucagon (rDNA), 1 mg, PRN  dextrose, 100 mL/hr, PRN          Electronically signed by Yemi Huitron MD on 7/5/2020 at 12:01 PM

## 2020-07-05 NOTE — PROGRESS NOTES
pulmonary      SUBJECTIVE: doing well     OBJECTIVE    VITALS:  /61   Pulse 72   Temp 98.4 °F (36.9 °C) (Axillary)   Resp 16   Ht 5' 6\" (1.676 m)   Wt (!) 364 lb 6.4 oz (165.3 kg)   SpO2 94%   BMI 58.82 kg/m²   HEAD AND FACE EXAM:  No throat injection, no active exudate,no thrush  NECK EXAM;No JVD, no masses, symmetrical  CHEST EXAM; Expansion equal and symmetrical, no masses  LUNG EXAM; Good breath sounds bilaterally. There are expiratory wheezes both lungs, there are crackles at both lung bases  CARDIOVASCULAR EXAM: Positive S1 and S2, no S3 or S4, no clicks ,no murmurs  RIGHT AND LEFT LOWER EXTRIMITY EXAM: No edema, no swelling, no inflamation  CNS EXAM: Alert and oriented X3          LABS   Lab Results   Component Value Date    WBC 7.0 07/05/2020    HGB 10.9 (L) 07/05/2020    HCT 37.0 07/05/2020    MCV 78.2 07/05/2020     07/05/2020     Lab Results   Component Value Date    CREATININE 1.2 (H) 07/05/2020    BUN 25 (H) 07/05/2020     07/05/2020    K 4.1 07/05/2020    CL 87 (L) 07/05/2020    CO2 38 (H) 07/05/2020     Lab Results   Component Value Date    INR 1.07 06/27/2020    PROTIME 13.0 06/27/2020          Lab Results   Component Value Date    PHOS 3.2 07/02/2020    PHOS 2.7 06/30/2020    PHOS 5.8 05/24/2020      No results for input(s): PH, PO2ART, TMV3TTL, HCO3, BEART, O2SAT in the last 72 hours. Wt Readings from Last 3 Encounters:   07/04/20 (!) 364 lb 6.4 oz (165.3 kg)   06/04/20 (!) 494 lb (224.1 kg)   05/24/20 (!) 494 lb 4.8 oz (224.2 kg)               ASSESMENT  Ac on ch resp failure  Ac copd  Ac chf        PLAN  1. She is almost minus 34 litres since admission  2. cpm  3.  Cont pap while asleep    7/5/2020  Edis Rivas M.D.

## 2020-07-06 NOTE — PLAN OF CARE
Problem: Falls - Risk of:  Goal: Will remain free from falls  Description: Will remain free from falls  7/6/2020 0142 by Shruti Porter RN  Outcome: Ongoing  7/5/2020 2223 by [facility]. Raman Carvajal RN  Outcome: Ongoing  Goal: Absence of physical injury  Description: Absence of physical injury  7/6/2020 0142 by Shruti Porter RN  Outcome: Ongoing  7/5/2020 2223 by [facility]. Raman Carvajal RN  Outcome: Ongoing     Problem: Skin Integrity:  Goal: Will show no infection signs and symptoms  Description: Will show no infection signs and symptoms  7/6/2020 0142 by Shruti Porter RN  Outcome: Ongoing  7/5/2020 2223 by [facility]. Raman Carvajal RN  Outcome: Ongoing  Goal: Absence of new skin breakdown  Description: Absence of new skin breakdown  7/6/2020 0142 by Shruti Porter RN  Outcome: Ongoing  7/5/2020 2223 by [facility]. Raman Carvajal RN  Outcome: Ongoing     Problem: Pain:  Goal: Pain level will decrease  Description: Pain level will decrease  7/6/2020 0142 by Shruti Porter RN  Outcome: Ongoing  7/5/2020 2223 by [facility]. Raman Carvajal RN  Outcome: Ongoing  Goal: Control of acute pain  Description: Control of acute pain  7/6/2020 0142 by Shruti Porter RN  Outcome: Ongoing  7/5/2020 2223 by [facility]. Raman Carvajal RN  Outcome: Ongoing  Goal: Control of chronic pain  Description: Control of chronic pain  7/6/2020 0142 by Shruti Porter RN  Outcome: Ongoing  7/5/2020 2223 by [facility]. Raman Carvajal RN  Outcome: Ongoing     Problem: Coping:  Goal: Ability to remain calm will improve  Description: Ability to remain calm will improve  7/6/2020 0142 by Shruti Porter RN  Outcome: Ongoing  7/5/2020 2223 by [facility]. Raman Carvajal RN  Outcome: Ongoing     Problem: Safety:  Goal: Ability to remain free from injury will improve  Description: Ability to remain free from injury will improve  7/5/2020 2223 by [facility].  Raman Carvajal RN  Outcome: Ongoing     Problem: Self-Care:  Goal: Ability to participate in self-care as condition permits will

## 2020-07-06 NOTE — CONSULTS
[address] [id number] Continence Nurse  Consult Note       Lilia Craft  AGE: 63 y.o.    GENDER: female  : 1955  TODAY'S DATE:  2020    Subjective:     Reason for Evaluation and Assessment:  Wound care eval  F/u for buttocks and thigh area       Lilia Craft is a 63 y.o. female referred by:   [x] Physician  [] Nursing  [] Other:     Wound Identification:  Wound Type: pressure  Contributing Factors: diabetes, chronic pressure, decreased mobility and obesity        PAST MEDICAL HISTORY        Diagnosis Date    Acute kidney injury ([facility] 2019    Aortic stenosis     Asthma     Bacteremia due to group B Streptococcus     Treated at [facility] in 2017 - felt to be due to cellulitis    Cancer [facility])     Cervical    Carotid artery stenosis     Chest pain     CHF [facility]congestive heart failure) ([facility])     Chronic back pain     COPD exacerbation ([facility]     Depression     Diabetes mellitus [facility]     Family history of coronary artery disease     Fatty liver 10/27/2016    GI bleed 2017    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 - treated at [facility] H/O aortic valve stenosis     H/O echocardiogram 2016    EF 55%, Aortic valve area is 0.84 cm2 with a mean gradient of 36 suggestive of severe aortic stenosis, mild to mos LVH    Headache     Heart murmur     History of nuclear stress test 2016    lexiscan-normal,EF70%    Morbid obesity ([facility])     BMI=73.72 kg/m2    Neuropathy     NSTEMI (non-ST elevated myocardial infarction) ([facility] 2018    Obesity     Osteoarthritis     Palpitations     Peripheral edema     Restless legs syndrome     Sleep apnea     Has a CPAP    Sleep apnea     SOB (shortness of breath)        PAST SURGICAL HISTORY    Past Surgical History:   Procedure Laterality Date    AORTIC VALVE REPLACEMENT  2017    29mm EvolutR TAVR     SECTION      CHOLECYSTECTOMY      GALLBLADDER SURGERY      HYSTERECTOMY      NECK SURGERY Tumor    TUBAL LIGATION         FAMILY HISTORY    Family History   Problem Relation Age of Onset    Heart Failure Mother     Heart Attack Mother     Hypertension Mother     Coronary Art Dis Mother         Had PTCA w/stenting.  Heart Failure Father     Heart Failure Brother     Heart Disease Mother     High Blood Pressure Mother     Obesity Mother     Diabetes Mother     Heart Disease Father     High Blood Pressure Father     Obesity Father     Heart Disease Brother     High Blood Pressure Brother     Obesity Brother        SOCIAL HISTORY    Social History     Tobacco Use    Smoking status: Former Smoker     Types: Cigarettes     Last attempt to quit: 3/19/2003     Years since quittin.3    Smokeless tobacco: Never Used    Tobacco comment: quit 15 years ago   Substance Use Topics    Alcohol use: No    Drug use: No       ALLERGIES    No Known Allergies    MEDICATIONS    No current facility-administered medications on file prior to encounter. Current Outpatient Medications on File Prior to Encounter   Medication Sig Dispense Refill    doxepin (SINEQUAN) 10 MG capsule Take 10 mg by mouth nightly      ferrous sulfate (IRON 325) 325 (65 Fe) MG tablet Take 325 mg by mouth daily (with breakfast)      rOPINIRole (REQUIP) 2 MG tablet Take 2 mg by mouth 3 times daily After the mirapex      oxybutynin (DITROPAN-XL) 10 MG extended release tablet Take 10 mg by mouth daily      vitamin D3 (CHOLECALCIFEROL) 10 MCG (400 UNIT) TABS tablet Take 400 Units by mouth daily      Polyethylene Glycol 3350 (MIRALAX PO) Take by mouth as needed      lisinopril (PRINIVIL;ZESTRIL) 5 MG tablet Take 2 tablets by mouth daily 30 tablet 0    gabapentin (NEURONTIN) 400 MG capsule Take 1 capsule by mouth 3 times daily for 7 days. (Patient taking differently: Take 400 mg by mouth 3 times daily.  2 tabs 3 times a day) 21 capsule 0    amiodarone (CORDARONE) 200 MG tablet Take 1 tablet by mouth daily 30 tablet 3    metoprolol tartrate (LOPRESSOR) 25 MG tablet Take 1 tablet by mouth 2 times daily (Patient taking differently: Take 25 mg by mouth 2 times daily Half tab one time a day) 60 tablet 3    clopidogrel (PLAVIX) 75 MG tablet Take 1 tablet by mouth daily Patient has appointment on 3/27/18 more refills with be given after she keeps her office visit 90 tablet 3    BiPAP Machine MISC by BiPAP route nightly      metFORMIN (GLUCOPHAGE) 500 MG tablet Take 1 tablet by mouth 2 times daily (with meals) 60 tablet 0    Nebulizers (AIRIAL COMPACT MINI NEBULIZER) MISC 1 Device by Does not apply route 4 times daily 1 each 0    albuterol sulfate  (90 Base) MCG/ACT inhaler Inhale 2 puffs into the lungs      pantoprazole (PROTONIX) 40 MG tablet Take 40 mg by mouth daily      pramipexole (MIRAPEX) 1 MG tablet Take 1 mg by mouth 3 times daily       fexofenadine (ALLEGRA) 180 MG tablet Take 180 mg by mouth daily            Objective:      /82   Pulse 66   Temp 97.8 °F (36.6 °C) (Oral)   Resp 16   Ht 5' 6\" (1.676 m)   Wt (!) 364 lb 6.4 oz (165.3 kg)   SpO2 92%   BMI 58.82 kg/m²   Yaya Risk Score: Yaya Scale Score: 17    LABS    CBC:   Lab Results   Component Value Date    WBC 6.4 07/06/2020    RBC 4.62 07/06/2020    HGB 10.6 07/06/2020    HCT 36.7 07/06/2020    MCV 79.4 07/06/2020    MCH 22.9 07/06/2020    MCHC 28.9 07/06/2020    RDW 20.6 07/06/2020     07/06/2020    MPV 10.3 07/06/2020     CMP:    Lab Results   Component Value Date     07/06/2020    K 4.1 07/06/2020    CL 86 07/06/2020    CO2 39 07/06/2020    BUN 28 07/06/2020    CREATININE 1.2 07/06/2020    GFRAA 55 07/06/2020    LABGLOM 45 07/06/2020    GLUCOSE 106 07/06/2020    PROT 7.7 07/06/2020    LABALBU 4.0 07/06/2020    CALCIUM 9.8 07/06/2020    BILITOT 0.7 07/06/2020    ALKPHOS 81 07/06/2020    AST 24 07/06/2020    ALT 13 07/06/2020     Albumin:    Lab Results   Component Value Date    LABALBU 4.0 07/06/2020     PT/INR:    Lab Results Component Value Date    PROTIME 13.0 06/27/2020    INR 1.07 06/27/2020     HgBA1c:    Lab Results   Component Value Date    LABA1C 6.0 06/26/2020         Assessment:     Patient Active Problem List   Diagnosis    Morbid obesity with BMI of 70 and over, adult ([address]    Essential hypertension    Dyslipidemia    Fecal incontinence    Mild intermittent asthma without complication    S/P laparoscopic cholecystectomy    Type 2 diabetes mellitus without complication, with long-term current use of insulin ([address]    Fatty liver    Arthritis    H/O parotidectomy    Venous stasis dermatitis of both lower extremities    Aortic stenosis    Morbid obesity ([address]    Asthma    Diabetes mellitus ([address]    Hypertension    Sleep apnea    Family history of coronary artery disease    Chest pain    SOB (shortness of breath)    Palpitations    Peripheral edema    Hypervolemia    Chronic respiratory failure with hypoxia and hypercapnia ([facility])    Acute exacerbation of chronic obstructive pulmonary disease (COPD) ([facility])    Chronic obstructive pulmonary disease ([facility])    Gait disturbance    Acute on chronic respiratory failure with hypoxia and hypercapnia ([facility])    Cellulitis    Skin ulcer of left foot with fat layer exposed ([address]    Pneumonia    VHD (valvular heart disease)    Class 3 severe obesity due to excess calories with body mass index (BMI) greater than or equal to 70 in adult ([facility])    SIRS (systemic inflammatory response syndrome) ([facility])    Acute kidney injury ([address]    Acute metabolic encephalopathy    Shock, unspecified ([address]    Uncontrolled diabetes mellitus ([address]    Sepsis ([address]    Altered mental status    Wide-complex tachycardia ([address]    Acute on chronic diastolic heart failure (HCC)    S/P TAVR (transcatheter aortic valve replacement)       Measurements:  Wound 02/06/19 Other (Comment) Foot Plantar Left plantar DFU (Active)   Number of days: 516       Wound 09/22/19 sacrum deep tissue injury/stage 2 (Active)   Number of days: 288       Wound 10/29/19 Thigh Right;Posterior (Active)   Number of days: 251       Wound 10/29/19 Heel Left;Medial cluster (Active)   Number of days: 250       Wound 10/29/19 Thigh Left;Posterior (Active)   Number of days: 251       Wound 10/29/19 Abdomen Anterior;Right cluster under abdominal fold (Active)   Number of days: 251       Wound 10/31/19 Hip Anterior; Left under left abdomen cluster (Active)   Number of days: 249       Wound 10/31/19 Coccyx Right (Active)   Number of days: 249       Wound 10/31/19 Toe (Comment  which one) Left;Plantar left great toe fissure  (Active)   Number of days: 249       Wound 10/31/19 Foot Left;Plantar (Active)   Number of days: 249       Wound 10/31/19 Heel Right;Medial right medial heel fissure cluster (Active)   Number of days: 249       Wound 09/22/19 sacral/buttock cluster (Active)   Wound Image   7/4/2020 10:08 PM   Wound Pressure Stage  2 7/6/2020  9:30 AM   Dressing Status Changed 7/6/2020  9:30 AM   Dressing Changed Changed/New 7/6/2020  9:30 AM   Wound Cleansed Rinsed/Irrigated with saline 7/6/2020  9:30 AM   Dressing Change Due 07/04/20 7/3/2020  1:15 PM   Wound Length (cm) 14.8 cm 7/6/2020  9:30 AM   Wound Width (cm) 5.5 cm 7/6/2020  9:30 AM   Wound Depth (cm) 0.1 cm 7/6/2020  9:30 AM   Wound Surface Area (cm^2) 81.4 cm^2 7/6/2020  9:30 AM   Change in Wound Size % (l*w) 46.45 7/6/2020  9:30 AM   Wound Volume (cm^3) 8.14 cm^3 7/6/2020  9:30 AM   Wound Healing % 46 7/6/2020  9:30 AM   Distance Tunneling (cm) 0 cm 7/6/2020  9:30 AM   Tunneling Position ___ O'Clock 0 7/6/2020  9:30 AM   Undermining Starts ___ O'Clock 0 7/6/2020  9:30 AM   Undermining Ends___ O'Clock 0 7/6/2020  9:30 AM   Undermining Maxium Distance (cm) 0 7/6/2020  9:30 AM   Wound Assessment Red;Yellow 7/6/2020  9:30 AM   Drainage Amount Moderate 7/6/2020  9:30 AM   Drainage Description Serosanguinous 7/6/2020  9:30 AM   Odor None 7/6/2020  9:30 AM   Margins Defined edges 7/6/2020  9:30 AM   Sierra-wound Assessment Red 7/6/2020  9:30 AM   Non-staged Wound Description Full thickness 7/6/2020  9:30 AM   [place]%Wound Bed 0 7/6/2020  9:30 AM   Red%Wound Bed 60 7/6/2020  9:30 AM   Yellow%Wound Bed 40 7/6/2020  9:30 AM   Black%Wound Bed 0 7/6/2020  9:30 AM   Purple%Wound Bed 0 7/6/2020  9:30 AM   Other%Wound Bed 0 7/6/2020  9:30 AM   Culture Taken No 7/1/2020 10:02 PM   Number of days: 288       Wound 09/22/19 Foot Left;Plantar DFU (Active)   Wound Diabetic 7/1/2020 10:02 PM   Dressing Status Other (Comment) 7/2/2020 10:52 AM   Wound Length (cm) 0 cm 7/6/2020  9:30 AM   Wound Width (cm) 0 cm 7/6/2020  9:30 AM   Wound Depth (cm) 0 cm 7/6/2020  9:30 AM   Wound Surface Area (cm^2) 0 cm^2 7/6/2020  9:30 AM   Wound Volume (cm^3) 0 cm^3 7/6/2020  9:30 AM   Wound Assessment Dry; Intact; Black; [place] 7/2/2020 10:52 AM   Drainage Amount None 7/2/2020 10:52 AM   Odor None 7/2/2020 10:52 AM   Margins Attached edges 7/2/2020 10:52 AM   Sierra-wound Assessment Dry; Intact 7/2/2020 10:52 AM   Culture Taken No 7/1/2020 10:02 PM   Number of days: 288       Wound 09/22/19 Foot Right;Plantar DFU (Active)   Wound Diabetic 7/1/2020 10:02 PM   Dressing Status Other (Comment) 7/2/2020 10:52 AM   Wound Length (cm) 0 cm 7/6/2020  9:30 AM   Wound Width (cm) 0 cm 7/6/2020  9:30 AM   Wound Depth (cm) 0 cm 7/6/2020  9:30 AM   Wound Surface Area (cm^2) 0 cm^2 7/6/2020  9:30 AM   Wound Volume (cm^3) 0 cm^3 7/6/2020  9:30 AM   Wound Assessment Dry; Intact; Black; [place] 7/2/2020 10:52 AM   Drainage Amount None 7/2/2020 10:52 AM   Odor None 7/2/2020 10:52 AM   Margins Attached edges 7/2/2020 10:52 AM   Sierra-wound Assessment Dry; Intact 7/2/2020 10:52 AM   Culture Taken No 7/1/2020 10:02 PM   Number of days: 288       Wound Thigh Left;Posterior (Active)   Wound Pressure Stage  2 7/6/2020  9:30 AM   Dressing Status Changed 7/6/2020  9:30 AM   Dressing Changed Changed/New 7/6/2020  9:30 AM   Wound Cleansed Rinsed/Irrigated with saline 7/6/2020  9:30 AM   Wound Length (cm) 0.5 cm 7/6/2020  9:30 AM   Wound Width (cm) 1.5 cm 7/6/2020  9:30 AM   Wound Depth (cm) 0.1 cm 7/6/2020  9:30 AM   Wound Surface Area (cm^2) 0.75 cm^2 7/6/2020  9:30 AM   Change in Wound Size % (l*w) 64.29 7/6/2020  9:30 AM   Wound Volume (cm^3) 0.08 cm^3 7/6/2020  9:30 AM   Wound Healing % 62 7/6/2020  9:30 AM   Distance Tunneling (cm) 0 cm 7/6/2020  9:30 AM   Tunneling Position ___ O'Clock 0 7/6/2020  9:30 AM   Undermining Starts ___ O'Clock 0 7/6/2020  9:30 AM   Undermining Ends___ O'Clock 0 7/6/2020  9:30 AM   Undermining Maxium Distance (cm) 0 7/6/2020  9:30 AM   Wound Assessment Pink;Red 7/6/2020  9:30 AM   Drainage Amount Moderate 7/6/2020  9:30 AM   Drainage Description Serosanguinous 7/6/2020  9:30 AM   Odor None 7/6/2020  9:30 AM   Margins Defined edges 7/6/2020  9:30 AM   Sierra-wound Assessment Dry 7/6/2020  9:30 AM   Non-staged Wound Description Full thickness 7/6/2020  9:30 AM   [place]ford%Wound Bed 50 7/6/2020  9:30 AM   Red%Wound Bed 50 7/6/2020  9:30 AM   Yellow%Wound Bed 0 7/6/2020  9:30 AM   Black%Wound Bed 0 7/6/2020  9:30 AM   Purple%Wound Bed 0 7/6/2020  9:30 AM   Other%Wound Bed 0 7/6/2020  9:30 AM   Culture Taken No 7/1/2020 10:02 PM   Number of days:        Wound 06/29/20 Nose (Active)   Wound Image   7/4/2020 10:08 PM   Wound Pressure Stage  2 7/6/2020  9:30 AM   Dressing Status Changed 6/29/2020 10:00 AM   Dressing Changed Changed/New 6/29/2020 10:00 AM   Wound Cleansed Not Cleansed 7/6/2020  9:30 AM   Wound Length (cm) 1 cm 7/6/2020  9:30 AM   Wound Width (cm) 0.5 cm 7/6/2020  9:30 AM   Wound Depth (cm) 0 cm 7/6/2020  9:30 AM   Wound Surface Area (cm^2) 0.5 cm^2 7/6/2020  9:30 AM   Change in Wound Size % (l*w) -42.86 7/6/2020  9:30 AM   Wound Volume (cm^3) 0 cm^3 7/6/2020  9:30 AM   Wound Healing % 100 7/6/2020  9:30 AM   Distance Tunneling (cm) 0 cm 7/6/2020  9:30 AM   Tunneling Position ___ O'Clock 0 7/6/2020  9:30 AM   Undermining Starts ___ O'Clock 0 7/6/2020  9:30 AM   Undermining Ends___ O'Clock 0 7/6/2020  9:30 AM   Undermining Maxium Distance (cm) 0 7/6/2020  9:30 AM   Wound Assessment Red 7/6/2020  9:30 AM   Drainage Amount None 7/6/2020  9:30 AM   Drainage Description Serosanguinous 7/2/2020 10:52 AM   Odor None 7/6/2020  9:30 AM   Margins Attached edges 7/6/2020  9:30 AM   Sierra-wound Assessment Intact 7/6/2020  9:30 AM   Non-staged Wound Description Full thickness 7/6/2020  9:30 AM   [place]%Wound Bed 0 7/6/2020  9:30 AM   Red%Wound Bed 100 7/6/2020  9:30 AM   Yellow%Wound Bed 0 7/6/2020  9:30 AM   Black%Wound Bed 0 7/6/2020  9:30 AM   Purple%Wound Bed 0 7/6/2020  9:30 AM   Other%Wound Bed 0 7/6/2020  9:30 AM   Number of days: 7       Response to treatment:  Well tolerated by patient. Pain Assessment:  Severity:  0  Quality of pain: none  Wound Pain Timing/Severity:   Premedicated: no    Plan:     Plan of Care: Wound 09/22/19 sacral/buttock cluster-Dressing/Treatment: (puracol optifoam border. calazime to rest of area.)  Wound Thigh Left;Posterior-Dressing/Treatment: (puracol optifoam border)  Wound 06/29/20 Nose-Dressing/Treatment: (open to air )     Wound care eval buttocks and posterior thigh wounds from pressure and moisture. Cleansed with NS applied puracol and optifoam border. To area near rectum use calazime cream. Heels intact. Feet with dry calloused areas. Nose wound is dry covered with eschar and open to air. Pt is at mild risk for skin breakdown AEB agapito score. Observe agapito orders. Specialty Bed Required : yes  [] Low Air Loss   [] Pressure Redistribution  [] Fluid Immersion  [x] Bariatric  [] Total Pressure Relief  [] Other:     Discharge Plan:  Placement for patient upon discharge: tbd  Hospice Care:no  Patient appropriate for Outpatient [address] [place] [address] Street:  [facility]    Patient/Caregiver Teaching:  Level of patient/caregiver understanding able to: cares explained as given       Electronically signed by Kp Laureano.  FARIHA Washington, on 7/6/2020 at 12:53 PM

## 2020-07-06 NOTE — CARE COORDINATION
HUMAW received a call from pt's  who verified that pt will be coming home with [facility].  stated he will  pt once ready for . HUMAW called [facility] and set up [facility]. HUMAW faxed pt HC order, face sheet, H&P, PT/OT notes and AVS to [facility]. Jeannette at [employer] has been told that pt will not be returning and will be going home.

## 2020-07-06 NOTE — PROGRESS NOTES
Hospitalist Progress Note      Name:  Alexandra Dunn /Age/Sex: 1854  (66 y.o. female)   MRN & CSN:  [id number] & [id number] Admission Date/Time: 2020  9:24 AM   Location:  [address] [place] [address] PCP: Erich Harper Day: 12    Assessment and Plan:   Alexandra Dunn is a 63 y.o.  female  who presents with shortness of breath    -Acute on chronic diastolic congestive heart failure, tolerating diuresis. Clinically improving.  -Hypokalemia due to diuretics; resolved potassium 4.1  -Hypomagnesemia due to diuresis resolved  -Chronic respiratory acidosis with chronic compensatory metabolic alkalosis  -Metabolic alkalosis primary and secondary due to respiratory failure and diuresis  -Anemia; iron deficient s/p IV iron  -Severe obesity  -Status post TAVR  -Obstructive sleep apnea on BiPAP  -Acute kidney injury present on admission, resolved  -Pyuria; no urinary symptoms however she had Mata placed earlier this admission  -Decubitus ulcers was seen by wound care    Plan  PT OT  Standing weight  Discharge home today with home PT OT  Urine culture  Plan to treat empirically with ciprofloxacin    Diet DIET LOW SODIUM 2 GM;   DVT Prophylaxis [] Lovenox, []  Heparin, [] SCDs, [] Ambulation   GI Prophylaxis [] PPI,  [] H2 Blocker,  [] Carafate,  [] Diet/Tube Feeds   Code Status Full Code   Disposition Patient requires continued admission due to    MDM [] Low, [] Moderate,[]  High  Patient's risk as above due to      History of Present Illness:     Chief Complaint: Shortness of breath  Alexandra Dunn is a 63 y.o.  female  who presents with shortness of breath. Shortness of breath is better. No chest pain, fever, chills, nausea, vomiting or abdominal pain. Ten point ROS reviewed negative, unless as noted above    Objective:        Intake/Output Summary (Last 24 hours) at 2020 0716  Last data filed at 2020 2316  Gross per 24 hour   Intake 20 ml   Output 200 ml   Net -180 ml      Vitals:   Vitals:    07/06/20 0419   BP: (!) 126/51   Pulse:    Resp: 18   Temp: 97.8 °F (36.6 °C)   SpO2:      Physical Exam:   GEN Awake female, sitting upright in bed in no apparent distress. Appears given age. EYES Pupils are equally round. No scleral erythema, discharge, or conjunctivitis. HENT Mucous membranes are moist. Oral pharynx without exudates, no evidence of thrush. NECK Supple, no apparent thyromegaly or masses. RESP Clear to auscultation, no wheezes, rales or rhonchi. Symmetric chest movement while on room air. CARDIO/VASC S1/S2 auscultated. Regular rate without appreciable murmurs, rubs, or gallops. No JVD or carotid bruits. Peripheral pulses equal bilaterally and palpable. No peripheral edema. GI Abdomen is soft without significant tenderness, masses, or guarding. Bowel sounds are normoactive. Rectal exam deferred.  No costovertebral angle tenderness. Mata catheter is not present. HEME/LYMPH No palpable cervical lymphadenopathy and no hepatosplenomegaly. No petechiae or ecchymoses. MSK No gross joint deformities. SKIN Normal coloration, warm, dry. NEURO Cranial nerves appear grossly intact, normal speech, no lateralizing weakness. PSYCH Awake, alert, oriented x 4. Affect appropriate.     Medications:   Medications:    sorbitol  45 mL Oral Once    lactulose  30 g Oral Once    polyethylene glycol  17 g Oral Daily    miconazole   Topical BID    LORazepam  1 mg Intravenous Once    LORazepam  1 mg Intravenous Once    lidocaine PF  5 mL Intradermal Once    sodium chloride flush  10 mL Intravenous 2 times per day    insulin lispro  15 Units Subcutaneous TID     insulin lispro  0-6 Units Subcutaneous TID     insulin lispro  0-3 Units Subcutaneous Nightly    amiodarone  200 mg Oral Daily    vitamin C  500 mg Oral Daily    aspirin  81 mg Oral Daily    atorvastatin  80 mg Oral Nightly    vitamin D3  400 Units Oral Daily    rOPINIRole  2 mg Oral TID   [place] Can pramipexole  1 mg Oral TID    pantoprazole  40 mg Oral Daily    oxybutynin  10 mg Oral Daily    cetirizine  10 mg Oral Daily    ferrous sulfate  325 mg Oral Daily with breakfast    doxepin  10 mg Oral Nightly    clopidogrel  75 mg Oral Daily    sodium chloride flush  10 mL Intravenous 2 times per day    heparin (porcine)  5,000 Units Subcutaneous 3 times per day    ipratropium  2 puff Inhalation 4x daily      Infusions:    dextrose      dextrose       PRN Meds: potassium chloride, 10 mEq, PRN  sodium chloride flush, 10 mL, PRN  glucose, 15 g, PRN  dextrose, 12.5 g, PRN  glucagon (rDNA), 1 mg, PRN  dextrose, 100 mL/hr, PRN  polyethylene glycol, 17 g, Daily PRN  sodium chloride flush, 10 mL, PRN  acetaminophen, 650 mg, Q6H PRN    Or  acetaminophen, 650 mg, Q6H PRN  promethazine, 12.5 mg, Q6H PRN    Or  ondansetron, 4 mg, Q6H PRN  glucose, 15 g, PRN  dextrose, 12.5 g, PRN  glucagon (rDNA), 1 mg, PRN  dextrose, 100 mL/hr, PRN          Electronically signed by Matthew Mercado MD on 7/6/2020 at 7:16 AM

## 2020-07-06 NOTE — ADT AUTH CERT
Heart Failure - Care Day 8 (7/2/2020) by Inessa Isbell RN         Review Status Review Entered   Completed 7/3/2020 13:50       Criteria Review      Care Day: 8 Care Date: 7/2/2020 Level of Care:    Guideline Day 3    Level Of Care    (X) Floor to discharge    Clinical Status    (X) * Hemodynamic stability    (X) * Tachypnea absent    ( ) * Oxygenation at baseline or acceptable for next level of care    7/3/2020 1:50 PM EDT by Pranav Littlejohn      2-3l/nc    (X) * Dyspnea at baseline or acceptable for next level of care    (X) * Cardiac rate and rhythm acceptable    (X) * Pulmonary edema absent or acceptable for next level of care    (X) * Peripheral or sacral edema absent, at baseline, or improved    (X) * Mental status at baseline    (X) * Volume status acceptable on oral medication    (X) * Renal function at baseline or acceptable for next level of care    (X) * Electrolyte levels normal or acceptable for next level of care    (X) * Immediate precipitating factors absent or controlled    (X) * Discharge plans and education understood    Activity    (X) * Ambulatory    Routes    (X) * Oral hydration, medications, and diet    Interventions    ( ) * Oxygen absent    (X) Weigh    7/3/2020 1:50 PM EDT by Pranav Littlejohn      [id number]    Medications    (X) Diuretics    (X) Possible aldosterone antagonist    (X) Possible nitrates, digoxin, hydralazine    * Milestone   Additional Notes   7/2    97.5 (36.4)  15  72  106/79  -  -  -  3  99  - 3l/nc      Scheduled Meds:lactulose, 30 g, Oral, Once   spironolactone, 25 mg, Oral, Daily   magnesium sulfate, 6 g, Intravenous, Once   aMILoride, 5 mg, Oral, Daily   torsemide, 100 mg, Oral, BID   LORazepam, 1 mg, Intravenous, Once   LORazepam, 1 mg, Intravenous, Once   lidocaine PF, 5 mL, Intradermal, Once   sodium chloride flush, 10 mL, Intravenous, 2 times per day   insulin lispro, 15 Units, Subcutaneous, TID WC   insulin lispro, 0-6 Units, Subcutaneous, TID WC   insulin lispro, 0-3 0.0 %      Total Nucleated RBC 0.0 K/CU MM      Total Immature Neutrophil 0.04 K/CU MM      Immature Neutrophil % 0.7 %       Nephro note   ROS:  With BIPAP- just woke up- doing well, excellent UOP with  Po long acting  loop now- had IV iron looks like      Impression :       1. JOSE- recovering    2. ADHF/ fluid overload - cor pulmonale - etc - well compensated now    3. ch met alkalosis- k is ok    4. S/p TAVR- morbid obesity  to name a few        Recommendation/Plan  :       1. But so far so ood   2. She is waiting for ECF   3. Goal is to keep her as close to normovolemic as  possible   4. Her diuretics will need to be adjusted from time t0 time    5. Main goal is to prevent readmission- ER visit   and optimize her tx baldo cardio protective / volume status etc    6. Low salt etc   7. reasonable to keep torsemide  100 BID for 5 days than 50 BID - also switch  to aldactone instead of amiloride as an  out pt for  dual action    8. Will add small dose of MRA now as it will take 3 dsys to kick in    9. Follow clinically and biochemic ally       Attending note   Assessment and Plan:   Angelica Justin is a 63 y.o. Seward Lust presents with shortness of breath       -Acute on chronic diastolic congestive heart failure, tolerating diuresis.  Clinically improving.   -Hypokalemia due to diuretics; resolved potassium 3.7   -Hypomagnesemia due to diuresis resolved   -Chronic respiratory acidosis with chronic compensatory metabolic alkalosis   -Metabolic alkalosis primary and secondary due to respiratory failure and diuresis   -Anemia; iron deficient getting IV iron   -Severe obesity   -Status post TAVR   -Obstructive sleep apnea on BiPAP   -Acute kidney injury present on admission, resolved   -Decubitus ulcers awaiting wound care evaluation       Plan   PT OT   Awaiting placement       Pulmonology note   ASSESMENT   Ac on ch resp failure   Ac copd   Ac chf               PLAN   1. cpm   2.  Cont pap rx           Heart Failure - Care Day 7 (7/1/2020) by Robe Morel RN         Review Status Review Entered   Completed 7/3/2020 13:46       Criteria Review      Care Day: 7 Care Date: 7/1/2020 Level of Care:    Guideline Day 3    Level Of Care    (X) Floor to discharge    Clinical Status    (X) * Hemodynamic stability    (X) * Tachypnea absent    ( ) * Oxygenation at baseline or acceptable for next level of care    7/3/2020 1:46 PM EDT by Bob Madsen remains on O2 2l/nc    (X) * Dyspnea at baseline or acceptable for next level of care    (X) * Cardiac rate and rhythm acceptable    (X) * Pulmonary edema absent or acceptable for next level of care    (X) * Peripheral or sacral edema absent, at baseline, or improved    (X) * Mental status at baseline    (X) * Volume status acceptable on oral medication    (X) * Renal function at baseline or acceptable for next level of care    (X) * Electrolyte levels normal or acceptable for next level of care    (X) * Immediate precipitating factors absent or controlled    (X) * Discharge plans and education understood    Activity    (X) * Ambulatory    Routes    (X) * Oral hydration, medications, and diet    Interventions    ( ) * Oxygen absent    7/3/2020 1:46 PM EDT by Dmitry Blackwell      2 l and bipap    Medications    (X) Diuretics    (X) Possible aldosterone antagonist    (X) Possible nitrates, digoxin, hydralazine    * Milestone   Additional Notes   7/1    98.1 (36.7)  18  71  137/63  -  Supine  -  2  95  Nasal cannula       Scheduled Meds:lactulose, 30 g, Oral, Once   magnesium sulfate, 6 g, Intravenous, Once   aMILoride, 5 mg, Oral, Daily   torsemide, 100 mg, Oral, BID   LORazepam, 1 mg, Intravenous, Once   LORazepam, 1 mg, Intravenous, Once   lidocaine PF, 5 mL, Intradermal, Once   sodium chloride flush, 10 mL, Intravenous, 2 times per day   insulin lispro, 15 Units, Subcutaneous, TID WC   insulin lispro, 0-6 Units, Subcutaneous, TID WC   insulin lispro, 0-3 Units, Subcutaneous, Nightly amiodarone, 200 mg, Oral, Daily   vitamin C, 500 mg, Oral, Daily   aspirin, 81 mg, Oral, Daily   atorvastatin, 80 mg, Oral, Nightly   vitamin D3, 400 Units, Oral, Daily   rOPINIRole, 2 mg, Oral, TID   pramipexole, 1 mg, Oral, TID   pantoprazole, 40 mg, Oral, Daily   oxybutynin, 10 mg, Oral, Daily   cetirizine, 10 mg, Oral, Daily   ferrous sulfate, 325 mg, Oral, Daily with breakfast   doxepin, 10 mg, Oral, Nightly   clopidogrel, 75 mg, Oral, Daily   sodium chloride flush, 10 mL, Intravenous, 2 times per day   heparin (porcine), 5,000 Units, Subcutaneous, 3 times per day   ipratropium, 2 puff, Inhalation, 4x daily   Venofer 100mg iv x1 and 25mg iv x1   Mag sulfate 2g iv x3      POCT Glucose [[id number]] (Abnormal) Collected: 07/01/20 2210      Updated: 07/01/20 2217     POC Glucose 111 MG/DL      POCT Glucose [[id number]] (Abnormal) Collected: 07/01/20 1632     Updated: 07/01/20 1639     POC Glucose 135 MG/DL      Vitamin B12 & Folate [[id number]] (Abnormal) Collected: 07/01/20 0400     Updated: 07/01/20 1612     Vitamin B-12 651.1 pg/ml      Folate >20.0 NG/ML      Ferritin [[id number]] Collected: 07/01/20 0400     Updated: 07/01/20 1612     Ferritin 65 NG/ML      Iron and TIBC [[id number]] (Abnormal) Collected: 07/01/20 0400     Updated: 07/01/20 1600     Iron 31 ug/dL      UIBC 238 ug/dL      TIBC 269 ug/dL      Transferrin % 12 %       Attending note   Assessment and Plan:   Anabelle Farley is a 63 y.o. Cleatis Edel presents with shortness of breath       -Acute on chronic diastolic congestive heart failure, tolerating diuresis.  Clinically improving.   -Hypokalemia due to diuretics   -Hypomagnesemia due to diuresis   -Chronic respiratory acidosis with chronic compensatory metabolic alkalosis   -Metabolic alkalosis primary and secondary due to respiratory failure and diuresis   -Anemia   -Severe obesity   -Status post TAVR   -Obstructive sleep apnea on BiPAP   -Acute kidney injury present on admission, resolved       Plan   PT OT   Case management consult for placement   Replete potassium   Replete magnesium   Check anemia panel   Discontinue IV Lasix.  Start p.o. Bumex   Discontinue Mata       Nephro note       Impression :       1. AKI_ recovering - cr still not at baseline ( 0.7 mg/dl - besides she likely lost muscle mass ) sec to mainly CRS type 1    2. Sever fluid overload    3. Ch met alkalosis    4. underlying morbid obesity/ DM/ ANMOL/S/p TAVR    5. Low K/ mg sec to loop        Recommendation/Plan  :       1. replete Mg/K   2. temporarily add amiloride as aldactone takes  2-3 days to conserve K renally   3. May switch to po torsemide 100 BID and aldactone 25 BID  from tomorrow    4. Low salt   5. Follow clinically   6. Watch UOP and BMP        Pulmonology note   ASSESMENT   Ac on ch resp failure   Ac copd   Ac chf               PLAN   1. She is minus 24 litres of fluid   2. cpm   3.  Cont pap at night

## 2020-07-06 NOTE — DISCHARGE SUMMARY
Discharge Summary    Name:  Dhiraj Obrien /Age/Sex:   [de-identified]63 y.o. female)   MRN & CSN:  [id number] & [id number] Admission Date/Time: 2020  9:24 AM   Attending:  Shabnam Manriquez MD Discharging Physician: Shabnam Manriquez MD     Hospital Course:   Dhiraj Obrien is a 63 y.o.  female  who presents with shortness of breath    -Acute on chronic diastolic congestive heart failure. Patient had significant volume overload was diuresed with IV diuretics lost about 100 pounds. Patient is close to being euvolemic. Weight on discharge 365 pounds. proBNP on admission was 5000. proBNP on  was 411. Patient was discharged on torsemide 50 mg twice daily and spironolactone 25 mg once daily. Patient to follow-up with Sade Schultz in 2 weeks. Patient to get home health nurse. Patient will need daily weight.  -Hypokalemia due to diuretics; resolved potassium 4.1  -Chronic respiratory acidosis with chronic compensatory metabolic alkalosis; continue home BiPAP at night  -Metabolic alkalosis primary and secondary due to respiratory failure and diuresis  -Anemia; iron deficient s/p IV iron; hemoglobin on discharge was 10.6 g/dL  -Severe obesity  -Status post TAVR  -Obstructive sleep apnea on BiPAP  -Acute kidney injury present on admission, resolved  -Pyuria; no urinary symptoms however she had Mata placed earlier this admission. Urine culture  pending. Discharged on ciprofloxacin 500 mg twice daily for 5 days.  -Decubitus ulcers was seen by wound care     The patient expressed appropriate understanding of and agreement with the discharge recommendations, medications, and plan.      Consults this admission:  IP CONSULT TO HOSPITALIST  IP CONSULT TO HEART FAILURE NURSE/COORDINATOR  IP CONSULT TO DIETITIAN  IP CONSULT TO CARDIOLOGY  IP CONSULT TO NEPHROLOGY  IP CONSULT TO INTERVENTIONAL RADIOLOGY  IP CONSULT TO PULMONOLOGY  IP CONSULT TO Tamara Garcia Dr    Discharge Instruction:   Follow up daily             oxybutynin (DITROPAN-XL) 10 MG extended release tablet  Take 10 mg by mouth daily             pantoprazole (PROTONIX) 40 MG tablet  Take 40 mg by mouth daily             Polyethylene Glycol 3350 (MIRALAX PO)  Take by mouth as needed             pramipexole (MIRAPEX) 1 MG tablet  Take 1 mg by mouth 3 times daily              rOPINIRole (REQUIP) 2 MG tablet  Take 2 mg by mouth 3 times daily After the mirapex             spironolactone (ALDACTONE) 25 MG tablet  Take 1 tablet by mouth daily             torsemide (DEMADEX) 100 MG tablet  Take 0.5 tablets by mouth 2 times daily             vitamin D3 (CHOLECALCIFEROL) 10 MCG (400 UNIT) TABS tablet  Take 400 Units by mouth daily                 Objective Findings at Discharge:   /82   Pulse 66   Temp 97.8 °F (36.6 °C) (Oral)   Resp 16   Ht 5' 6\" (1.676 m)   Wt (!) 364 lb 6.4 oz (165.3 kg)   SpO2 92%   BMI 58.82 kg/m²            PHYSICAL EXAM   GEN Awake female, sitting upright in bed in no apparent distress. Appears given age. EYES Pupils are equally round. No scleral erythema, discharge, or conjunctivitis. HENT Mucous membranes are moist. Oral pharynx without exudates, no evidence of thrush. NECK Supple, no apparent thyromegaly or masses. RESP Clear to auscultation, no wheezes, rales or rhonchi. Symmetric chest movement while on room air. CARDIO/VASC S1/S2 auscultated. Regular rate without appreciable murmurs, rubs, or gallops. No JVD or carotid bruits. Peripheral pulses equal bilaterally and palpable. No peripheral edema. GI Abdomen is soft without significant tenderness, masses, or guarding. Bowel sounds are normoactive. Rectal exam deferred.  No costovertebral angle tenderness. Mata catheter is not present. HEME/LYMPH No palpable cervical lymphadenopathy and no hepatosplenomegaly. No petechiae or ecchymoses. MSK No gross joint deformities. SKIN Normal coloration, warm, dry.   NEURO Cranial nerves appear grossly intact, normal speech, no lateralizing weakness. PSYCH Awake, alert, oriented x 4. Affect appropriate.     BMP/CBC  Recent Labs     07/04/20  0515 07/05/20  0550 07/06/20  0804    135 134*   K 4.5 4.1 4.1   CL 89* 87* 86*   CO2 42* 38* 39*   BUN 22 25* 28*   CREATININE 1.1 1.2* 1.2*   WBC 7.2 7.0 6.4   HCT 37.1 37.0 36.7*    249 256       IMAGING:      Discharge Time of 30 minutes    Electronically signed by London Hoskins MD on 7/6/2020 at 2:10 PM

## 2020-07-06 NOTE — CARE COORDINATION
LSW attempted to talk with pt about her wanting to go home instead of returning to [facility] however pt was being seen by wound care team at time of visit. LSW will try again later. LSW read note form RN stating that pt's  wants [facility]. LSW called  to verify this info and had to leave a message asking for a return call.

## 2020-07-06 NOTE — PROGRESS NOTES
pulmonary      SUBJECTIVE:  Sleeping and on pap     OBJECTIVE    VITALS:  BP (!) 126/51   Pulse 68   Temp 97.8 °F (36.6 °C) (Oral)   Resp 18   Ht 5' 6\" (1.676 m)   Wt (!) 364 lb 6.4 oz (165.3 kg)   SpO2 95%   BMI 58.82 kg/m²   HEAD AND FACE EXAM:  No throat injection, no active exudate,no thrush  NECK EXAM;No JVD, no masses, symmetrical  CHEST EXAM; Expansion equal and symmetrical, no masses  LUNG EXAM; Good breath sounds bilaterally. There are expiratory wheezes both lungs, there are crackles at both lung bases  CARDIOVASCULAR EXAM: Positive S1 and S2, no S3 or S4, no clicks ,no murmurs  RIGHT AND LEFT LOWER EXTRIMITY EXAM: No edema, no swelling, no inflamation            LABS   Lab Results   Component Value Date    WBC 7.0 07/05/2020    HGB 10.9 (L) 07/05/2020    HCT 37.0 07/05/2020    MCV 78.2 07/05/2020     07/05/2020     Lab Results   Component Value Date    CREATININE 1.2 (H) 07/05/2020    BUN 25 (H) 07/05/2020     07/05/2020    K 4.1 07/05/2020    CL 87 (L) 07/05/2020    CO2 38 (H) 07/05/2020     Lab Results   Component Value Date    INR 1.07 06/27/2020    PROTIME 13.0 06/27/2020          Lab Results   Component Value Date    PHOS 3.2 07/02/2020    PHOS 2.7 06/30/2020    PHOS 5.8 05/24/2020      No results for input(s): PH, PO2ART, HYW3RUB, HCO3, BEART, O2SAT in the last 72 hours. Wt Readings from Last 3 Encounters:   07/04/20 (!) 364 lb 6.4 oz (165.3 kg)   06/04/20 (!) 494 lb (224.1 kg)   05/24/20 (!) 494 lb 4.8 oz (224.2 kg)               ASSESMENT  Ac on ch resp failure  Ac copd  ac chf        PLAN  1. cpm  2. Increase activity  3.  Dc plans in progress    7/6/2020  Julieth Doyle M.D.

## 2020-07-06 NOTE — PROGRESS NOTES
Nephrology Progress Note  7/6/2020 4:08 PM        Subjective:   Admit Date: 6/25/2020  PCP: THEODORE Garcias NP    Interval History: pt seen in earlier am     Diet: good    ROS:  No overt sob, , no confusion    Data:     Current meds:    sorbitol  45 mL Oral Once    lactulose  30 g Oral Once    polyethylene glycol  17 g Oral Daily    miconazole   Topical BID    LORazepam  1 mg Intravenous Once    LORazepam  1 mg Intravenous Once    lidocaine PF  5 mL Intradermal Once    sodium chloride flush  10 mL Intravenous 2 times per day    insulin lispro  15 Units Subcutaneous TID WC    insulin lispro  0-6 Units Subcutaneous TID WC    insulin lispro  0-3 Units Subcutaneous Nightly    amiodarone  200 mg Oral Daily    vitamin C  500 mg Oral Daily    aspirin  81 mg Oral Daily    atorvastatin  80 mg Oral Nightly    vitamin D3  400 Units Oral Daily    rOPINIRole  2 mg Oral TID    pramipexole  1 mg Oral TID    pantoprazole  40 mg Oral Daily    oxybutynin  10 mg Oral Daily    cetirizine  10 mg Oral Daily    ferrous sulfate  325 mg Oral Daily with breakfast    doxepin  10 mg Oral Nightly    clopidogrel  75 mg Oral Daily    sodium chloride flush  10 mL Intravenous 2 times per day    heparin (porcine)  5,000 Units Subcutaneous 3 times per day    ipratropium  2 puff Inhalation 4x daily      dextrose      dextrose           I/O last 3 completed shifts:   In: 20 [I.V.:20]  Out: 200 [Urine:200]    CBC:   Recent Labs     07/04/20  0515 07/05/20  0550 07/06/20  0804   WBC 7.2 7.0 6.4   HGB 10.6* 10.9* 10.6*    249 256          Recent Labs     07/04/20  0515 07/05/20  0550 07/06/20  0804    135 134*   K 4.5 4.1 4.1   CL 89* 87* 86*   CO2 42* 38* 39*   BUN 22 25* 28*   CREATININE 1.1 1.2* 1.2*   GLUCOSE 99 101* 106*       Lab Results   Component Value Date    CALCIUM 9.8 07/06/2020    PHOS 3.2 07/02/2020       Objective:     Vitals: /82   Pulse 66   Temp 97.8 °F (36.6 °C) (Oral)   Resp 16 Ht 5' 6\" (1.676 m)   Wt (!) 364 lb 6.4 oz (165.3 kg)   SpO2 92%   BMI 58.82 kg/m²     General appearance:  No distress- no confusion  HEENT:  No conj pallor  Neck:  supple  Lungs:  Limited exam , no gross crackles  Heart:  Seems RRR  Abdomen: soft  Extremities:  No overt edema       Problem List :         Impression :     1. JOSE- cr stable - will vary die to CRS - bladder scan presumably was ok  urine is not helpful- has rbc/ wbc - she has morbid obesity and difficult to cath clean urine   2. Sever RHF/ ? cor pulmonale and LHF - better off loop for 2 days   3. Ch met alkalosis / from ch CO2 elevation and diuretics     Recommendation/Plan  :     1. May restart torsemide 20 BID from tomorrow  2. CMP , mg in 1 wk  3. F/U with me in 2 wk's  4. Low salt  5. Daily wt  6.  Call me  with wt gain > 4 lbs , edema / [place] [facility] ULLOA

## 2020-07-07 NOTE — ADT AUTH CERT
Utilization Reviews         Heart Failure - Care Day 11 (7/5/2020) by Alecia Landers RN         Review Status Review Entered   Completed 7/7/2020 10:43       Criteria Review      Care Day: 11 Care Date: 7/5/2020 Level of Care:    Guideline Day 3    Level Of Care    (X) Floor to discharge    Clinical Status    (X) * Hemodynamic stability    (X) * Tachypnea absent    ( ) * Oxygenation at baseline or acceptable for next level of care    (X) * Dyspnea at baseline or acceptable for next level of care    (X) * Cardiac rate and rhythm acceptable    (X) * Pulmonary edema absent or acceptable for next level of care    (X) * Peripheral or sacral edema absent, at baseline, or improved    (X) * Mental status at baseline    (X) * Volume status acceptable on oral medication    (X) * Renal function at baseline or acceptable for next level of care    (X) * Electrolyte levels normal or acceptable for next level of care    (X) * Immediate precipitating factors absent or controlled    (X) * Discharge plans and education understood    Activity    (X) * Ambulatory    Routes    (X) * Oral hydration, medications, and diet    7/7/2020 10:43 AM EDT by Demetria cassidyoum 2 gm IV  x 1    Interventions    ( ) * Oxygen absent    7/7/2020 10:43 AM EDT by Demetria Kearney      3 L/m per n/c    (X) Weigh    Medications    (X) Diuretics    7/7/2020 10:43 AM EDT by Demetria Kearney      demadex 50 mg po bid    (X) Possible aldosterone antagonist    7/7/2020 10:43 AM EDT by Demetria Kearney      ALDACTONE  25 mg po qd    (X) Possible nitrates, digoxin, hydralazine    * Milestone   Additional Notes   07/05/20  care day #  11      VS    T-97.7    P-  72     R-  21    B/P-  129/59    Spo2-  92 % on 3 L/m per n/c      Labs:   CBC Auto Differential [[id number]] (Abnormal) Collected: 07/05/20 0550      Specimen: Blood Updated: 07/05/20 0626     WBC 7.0 K/CU MM      RBC 4.73 M/CU MM      Hemoglobin 10.9 GM/DL      Hematocrit 37.0 %      MCV 78.2 FL      MCH 23.0 PG      MCHC 29.5 %      RDW 20.4 %      Platelets 192 K/CU MM      MPV 9.7 FL      Differential Type AUTOMATED DIFFERENTIAL     Segs Relative 58.7 %      Lymphocytes % 25.1 %      Monocytes % 9.7 %      Eosinophils % 4.4 %      Basophils % 1.0 %       Comprehensive Metabolic Panel w/ Reflex to MG [[id number]] (Abnormal) Collected: 07/05/20 0550     Specimen: Blood Updated: 07/05/20 0802     Sodium 135 MMOL/L      Potassium 4.1 MMOL/L      Chloride 87 mMol/L      CO2 38 MMOL/L      BUN 25 MG/DL      CREATININE 1.2 MG/DL      Glucose 101 MG/DL      Calcium 10.0 MG/DL      Alb 3.9 GM/DL      Total Protein 7.9 GM/DL      Total Bilirubin 0.7 MG/DL      ALT 11 U/L      AST 23 IU/L      Alkaline Phosphatase 79 IU/L      GFR Non-African American 45 mL/min/1.73m2      GFR African American 55 mL/min/1.73m2          Magnesium [[id number]] (Abnormal) Collected: 07/05/20 0550      Specimen: Blood Updated: 07/05/20 0802     Magnesium 1.7 mg/dl          Urinalysis [[id number]] (Abnormal) Collected: 07/05/20 1420      Specimen: Urine, clean catch Updated: 07/05/20 1521     Color, UA YELLOW     Clarity, UA HAZY     Glucose, Urine NEGATIVE MG/DL      Bilirubin Urine NEGATIVE MG/DL      Ketones, Urine NEGATIVE MG/DL      Specific Gravity, UA 1.010     Blood, Urine MODERATE     pH, Urine 8.0     Protein,  MG/DL      Urobilinogen, Urine NORMAL MG/DL      Nitrite Urine, Quantitative NEGATIVE     Leukocyte Esterase, Urine SMALL     RBC,  /HPF      WBC, UA 29 /HPF      Bacteria, UA RARE /HPF      Squam Epithel, UA 3 /HPF      Mucus, UA RARE HPF      Trichomonas, UA NONE SEEN /HPF       POCT Glucose [[id number]] (Abnormal) Collected: 07/05/20 2311     Updated: 07/05/20 2318     POC Glucose 123 MG/DL             Pulmonary Note:      ASSESMENT   Ac on ch resp failure   Ac copd   Ac chf       PLAN   1. She is almost minus 34 litres since admission   2. cpm   3.  Cont pap while asleep                Nephrology Progress Note Interval History: waiting for placement       ROS:  Mata out - per pt good UOP but dropped and cr up       Impression :       1. JOSE- recovering BUN/CR up- unlikely bladder obstruction but make sure -also may have inadequate \"plasma refill\"   2. ADHf much improved   3. Coch met alkalosis - acceptable   4. Low mg from loop- replete IV        Recommendation/Plan  :       1. Hold loop   2. B;adder scan    3. Plain UA    4. If she is ready  to d/C    5. Hold diuretics for 2 days than start torsemide  low dose 20 BID    6. No aldactone    7. CMP , mg , phos ion 1 wk   8. Call me with wt gain > 4 lbs / sob/ edema    9.  F/U with me in 2 wks                  Hospitalist Progress Note      Hospital Day: 11       Assessment and Plan:   Xiomara Mckay a 63 y.o.  female  who presents with shortness of breath       -Acute on chronic diastolic congestive heart failure, tolerating diuresis.  Clinically improving.   -Hypokalemia due to diuretics; resolved potassium 4.1   -Hypomagnesemia due to diuresis resolved   -Chronic respiratory acidosis with chronic compensatory metabolic alkalosis   -Metabolic alkalosis primary and secondary due to respiratory failure and diuresis   -Anemia; iron deficient s/p IV iron   -Severe obesity   -Status post TAVR   -Obstructive sleep apnea on BiPAP   -Acute kidney injury present on admission, resolved   -Decubitus ulcers was seen by wound care       Plan   PT OT   Diuretics adjusted   Standing weight   Probable DC Home with Home PT/OT                                             Heart Failure - Care Day 10 (7/4/2020) by Shayan Hillman RN         Review Status Review Entered   Completed 7/7/2020 10:34       Criteria Review      Care Day: 10 Care Date: 7/4/2020 Level of Care:    Guideline Day 3    Level Of Care    (X) Floor to discharge    Clinical Status    (X) * Hemodynamic stability    (X) * Tachypnea absent    ( ) * Oxygenation at baseline or acceptable for next level of care    (X) * Dyspnea at baseline or acceptable for next level of care    (X) * Cardiac rate and rhythm acceptable    (X) * Pulmonary edema absent or acceptable for next level of care    (X) * Peripheral or sacral edema absent, at baseline, or improved    (X) * Mental status at baseline    (X) * Volume status acceptable on oral medication    (X) * Renal function at baseline or acceptable for next level of care    (X) * Electrolyte levels normal or acceptable for next level of care    (X) * Immediate precipitating factors absent or controlled    (X) * Discharge plans and education understood    Activity    (X) * Ambulatory    Routes    (X) * Oral hydration, medications, and diet    Interventions    ( ) * Oxygen absent    7/7/2020 10:34 AM EDT by Kory Kidney      2 L/m per n/c    (X) Weigh    Medications    (X) Diuretics    7/7/2020 10:34 AM EDT by Kory Kidney      demadex 50 mg po bid    (X) Possible aldosterone antagonist    7/7/2020 10:34 AM EDT by Kory Kidney      ALDACTONE 25 mg  PO QD    (X) Possible nitrates, digoxin, hydralazine    * Milestone   Additional Notes   07/04/20  care day  #  10      VS    T- 98.1    P-  73    R-  18    B/P-  169/68     Spo2-  95 % on 2 L/m per n/c      Labs:   POCT Glucose [[id number]] (Abnormal) Collected: 07/04/20 1624     Updated: 07/04/20 1630     POC Glucose 120 MG/DL          Lab Results   Component Value Date     WBC 7.2 07/04/2020     HGB 10.6 (L) 07/04/2020     HCT 37.1 07/04/2020     MCV 79.6 07/04/2020      07/04/2020       Lab Results   Component Value Date     CREATININE 1.1 07/04/2020     BUN 22 07/04/2020      07/04/2020     K 4.5 07/04/2020     CL 89 (L) 07/04/2020     CO2 42 (H) 07/04/2020            Hospitalist Progress Note       Hospital Day: 10       Assessment and Plan:   Remy Salaste a 63 y.o.  female  who presents with shortness of breath       -Acute on chronic diastolic congestive heart failure, tolerating diuresis.  Clinically improving. -Hypokalemia due to diuretics; resolved potassium 3.9   -Hypomagnesemia due to diuresis resolved   -Chronic respiratory acidosis with chronic compensatory metabolic alkalosis   -Metabolic alkalosis primary and secondary due to respiratory failure and diuresis   -Anemia; iron deficient getting IV iron   -Severe obesity   -Status post TAVR   -Obstructive sleep apnea on BiPAP   -Acute kidney injury present on admission, resolved   -Decubitus ulcers was seen by wound care       Plan   PT OT   Diuretics adjusted   Standing weight   Placement SNF versus home with PT/OT         Pulmonary  Note:   ASSESMENT   Ac on ch resp fail;ure   Ac copd   Ac chf               PLAN   1. cpm   2. Pt is increasingly active            Nephrology Progress Note       Interval History: doing well- planning on going home - hernandez out      Impression :       1. JOSE- recovering - cr still not at baseline byt5 will vary as she is with diuretics    2. ADHF with sever fluid overload- better    3. K/ ok has Ch met alkalosis        Recommendation/Plan  :       1. Ok to d/C from my standpoint    2. Suggest torsemide 50 BID/ aldactone 25/d   3. CMP , mg in 1 wk   4. F/U with me in 2 wk's   5. She will have [address]   6. Low salt   7.  Call me with wt gain > 3 lb/ sob/ edema                                    Heart Failure - Care Day 9 (7/3/2020) by Stevie Diez RN         Review Status Review Entered   Completed 7/7/2020 10:26       Criteria Review      Care Day: 9 Care Date: 7/3/2020 Level of Care:    Guideline Day 3    Level Of Care    (X) Floor to discharge    Clinical Status    (X) * Hemodynamic stability    (X) * Tachypnea absent    ( ) * Oxygenation at baseline or acceptable for next level of care    (X) * Dyspnea at baseline or acceptable for next level of care    (X) * Cardiac rate and rhythm acceptable    (X) * Pulmonary edema absent or acceptable for next level of care    (X) * Peripheral or sacral edema absent, at baseline, or improved    (X) * Mental status at baseline    (X) * Volume status acceptable on oral medication    (X) * Renal function at baseline or acceptable for next level of care    (X) * Electrolyte levels normal or acceptable for next level of care    (X) * Immediate precipitating factors absent or controlled    (X) * Discharge plans and education understood    Activity    (X) * Ambulatory    Routes    (X) * Oral hydration, medications, and diet    7/7/2020 10:26 AM EDT by [facility]      heparin 5000 units sc tid  iron sucrose (VENOFER) 250 mg IVPB IV x 1    Interventions    ( ) * Oxygen absent    7/7/2020 10:26 AM EDT by [facility]      2 L/m per n/c    (X) Weigh    Medications    (X) Diuretics    7/7/2020 10:26 AM EDT by [facility]      demadex 100 mg po bid    (X) Possible aldosterone antagonist    7/7/2020 10:26 AM EDT by [facility]      ALDACTONE 25 mg po qd    (X) Possible nitrates, digoxin, hydralazine    * Milestone   Additional Notes   07/03/20  care day #  9      VS    T-97.9    P-  74    R-  25    B/P-  103/90    Spo2-  92 % on 2 L/m per n/c      Labs:   Lab Results   Component Value Date     WBC 5.5 07/03/2020     HGB 10.0 (L) 07/03/2020     HCT 34.3 (L) 07/03/2020     MCV 79.4 07/03/2020      07/03/2020       Lab Results   Component Value Date     CREATININE 0.9 07/03/2020     BUN 19 07/03/2020      (L) 07/03/2020     K 3.9 07/03/2020     CL 86 (L) 07/03/2020     CO2 44 (H) 07/03/2020          Hospitalist Progress Note       Hospital Day: 9       Assessment and Plan:   Julia Ulloa a 63 y.o.  female  who presents with shortness of breath       -Acute on chronic diastolic congestive heart failure, tolerating diuresis.  Clinically improving.   -Hypokalemia due to diuretics; resolved potassium 3.9   -Hypomagnesemia due to diuresis resolved   -Chronic respiratory acidosis with chronic compensatory metabolic alkalosis   -Metabolic alkalosis primary and secondary due to respiratory failure and diuresis -Anemia; iron deficient getting IV iron   -Severe obesity   -Status post TAVR   -Obstructive sleep apnea on BiPAP   -Acute kidney injury present on admission, resolved   -Decubitus ulcers was seen by wound care       Plan   PT OT   Awaiting placement   Discontinue Mata   Laxatives for constipation ordered          Pulmonary Note:      ASSESMENT   Ac on ch resp failure   Ac copd   Ac chf               PLAN   1. cpm   2. Increase activity   3. Dc plans             Nephrology Progress Note       Interval History: waiting for ECF       Impression :       1. JOSE- recovering    2. ADHF - better compensated - lower loop    3. Low na - some likely from amiloride - K ok    4. Ch met alkalosis/ valvular dz - morbid obesity       Recommendation/Plan  :       1. lower loop   2. D/C amiloride aldactone    3. She is waiting for ECF   4.  Follow clinically

## 2020-07-07 NOTE — CARE COORDINATION
[facility] Transitions Initial Follow Up Call    Call within 2 business days of discharge: Yes    Patient: Alexandra Said Patient : 1955   MRN: <[id number]>  Reason for Admission:   Discharge Date: 20 RARS: Readmission Risk Score: 50      Last Discharge [facility]       Complaint Diagnosis Description Type Department Provider    20 Shortness of Breath Acute on chronic congestive heart failure, unspecified heart failure type ([address] . .. ED to Hosp-Admission (Discharged) (ADMITTED) Martin Dasilva MD; Gadiel Mir. .. Spoke with: [address]: [place]    Patient contacted regarding recent discharge and COVID-19 risk. Discussed COVID-19 related testing which was available at this time. Test results were negative. Patient informed of results, if available? Yes     Care Transition Nurse contacted the patient by telephone to perform post discharge assessment. Patient has following risk factors of: heart failure, COPD and diabetes. CTN reviewed discharge instructions, medical action plan and red flags related to discharge diagnosis. Reviewed and educated them on any new and changed medications related to discharge diagnosis. Advised obtaining a 90-day supply of all daily and as-needed medications. Education provided regarding infection prevention, and signs and symptoms of COVID-19 and when to seek medical attention with patient who verbalized understanding. Discussed exposure protocols and quarantine from [address] you at higher risk for severe illness  and given an opportunity for questions and concerns. The patient agrees to contact the COVID-19 hotline [phone] or PCP office for questions related to their healthcare. CTN provided contact information for future reference. From CDC: Are you at higher risk for severe illness?  Wash your hands often.    Avoid close contact (6 feet, which is about two arm lengths) with

## 2020-07-20 NOTE — CARE COORDINATION
[facility] Transitions Follow Up Call    2020    Patient: Jennifer Reza  Patient : 1955   MRN: [id number]  Reason for Admission: A/C CHF  Discharge Date: 20 RARS: Readmission Risk Score: 50    Care Transitions Subsequent and Final Call    Schedule Follow Up Appointment with PCP:  Declined  Subsequent and Final Calls  Do you have any ongoing symptoms?:  No  Have your medications changed?:  No  Do you have any questions related to your medications?:  No  Do you currently have any active services?:  Yes  Are you currently active with any services?:  Home Health  Do you have any needs or concerns that I can assist you with?:  No  Identified Barriers: Other  Care Transitions Interventions   Home Care Waiver:  Completed        DME Assistance:  Declined   Other Interventions: Follow Up  Future Appointments   Date Time Provider Jena Figueroa   2020 11:15 AM Suresh Rivera MD [id number] [facility]   2020  1:00 PM Roby Cohen, APRN - CNP [facility]     COVID19 RISK MONITORING  COVID19 SCREEN: 2020 Not detected  PATIENT RISK FACTORS: CHF, DM, COPD, Asthma    Spoke w/ Patient for follow up [address] call. Patient reports doing well. Reports weight 365#. Denies sob, cough, orthopnea, weight gain, edema, chest pain.  Reviewed CHF Zone Mgt:    *daily weights in am before breakfast  *keep written log of weights  *take rx as prescribed  *check for edema in feet, ankles, hands and abdomen  *low salt diet, fluid restriction per MD  *notify md of weight gain/loss 3# or more in 1-7days  *notify of increased sob, edema, feeling of uneasiness, chest pain, increased cough, confusion, wheezing or chest tightness    Patient has been provided the following resources and education related to COVID-19:                         Signs, symptoms and red flags related to COVID-19            CDC exposure and quarantine guidelines            [facility] exposure contact - [phone]            Contact for their local Department of Health               Patient denies any COVID19 related symptoms. Active / [facility] and denies need for additional in home support or dme. Instructed to contact HHC/PCP 24/7 re: any health concerns. Discussed benefits of Telehealth, [facility], Urgent Care, Flu Clinic. Patient denies need for ongoing follow up as well supported by Savanah Bella. No further outreach scheduled with this CTN. Episode of Care resolved. Patient has this CTN contact information if future needs arise.     Karan Mahajan, RN

## 2020-08-25 PROBLEM — N18.9 ACUTE KIDNEY INJURY SUPERIMPOSED ON CKD (HCC): Status: ACTIVE | Noted: 2020-01-01

## 2020-08-25 PROBLEM — R65.21 SEPTIC SHOCK (HCC): Status: ACTIVE | Noted: 2020-01-01

## 2020-08-25 PROBLEM — A41.9 SEPTIC SHOCK (HCC): Status: ACTIVE | Noted: 2020-01-01

## 2020-08-25 PROBLEM — N17.9 ACUTE KIDNEY INJURY SUPERIMPOSED ON CKD (HCC): Status: ACTIVE | Noted: 2020-01-01

## 2020-08-25 NOTE — H&P
weights/monitor I/O         - trend trop           Chronic respiratory failure - known h/o COPD/Asthma/ANMOL. - stable on 3 L O2 via NC          - on 4 L oxygen at home via NC            - on BiPAP at night, consult Pulmo, Dr. Alvin Bailon          - continue home breathing treatment          - pulse ox monitoring    Type 2 diabetes - continue insulin sliding scale.           Morbid obesity - current BMI 66.14         - Health maintenance: exercise, lifestyle modification, and reduced caloric intake     h/o Left foot ulcer - known h/o neuropathy         - consult wound care daily         - on gabapentin     Chronic Illnesses:              - Hyperlipidemia - on Lipitor.         - Restless legs syndrome - on Mirapex. - Anemia - Hgb 8.0 on admission, c/w Ferrous sulfate.         - ESBL, MDRO - start conctact isolation. Current diagnosis and plan of management discussed with the patient at the time of admission in lay language who agree to the above plan and disposition of admission for further care. All concerns and questions addressed. Patient assessment and plan in conjunction with supervising physician - Dr. Milly Whitehead Renal, Carb control, fluid restriction 1800 mL    DVT Prophylaxis [] Lovenox, [x]  Heparin, [] SCDs, [] Ambulation  [] Long term AC   GI Prophylaxis [x] PPI,  [] H2 Blocker,  [] Carafate,  [] Diet,  [] No GI prophylaxis, N/A: patient is not under significant medical stress, non-ICU or is receiving a diet/tube feeds   Code Status  Full CODE   Disposition Patient requires continued admission due to Septic shock ([facility] [address]    Discharge Plan: Patient plans to return home upon discharge after seen by case management team.   MDM [] Low, [] Moderate,[x]  High  Patient's risk as above due to:      [x] One or more chronic illnesses with mild exacerbation progression      [x] Two or more stable chronic illnesses      [] Undiagnosed new problem with uncertain prognosis      [] Elective major surgery      []Prescription drug management     History of Present Illness:     Principal Problem: Septic shock ([facility] [address]  Leidy Fernandez is a 63 y.o. female with past medical history of severely morbid obesity, COPD/Asthma, chronic respiratory failure on 4 L of oxygen, diastolic CHF, DM type 2, hyperlipidemia, hypertension, restless leg syndrome, aortic stenosis, s/p TAVR, chronic diabetic foot ulcer, chronic anemia, ANMOL, and chronic bilateral lower extremity edema presented to the ED with complaints of bradycardia and fatigue that started 3-4 days ago. Reports that she was unable to get out of bed, having diarrhea and not urinating for over a week. Chart review revealed that patient has multiple admission for sepsis/septic shock, last admission to hospital was 6/35/20 and discharged last 7/6/20. Patient denies chest pain, fever, chills or diaphoresis, cough, and SOB or difficulty breathing. Denies any other symptoms including headache, paresthesias, abdominal pain, nausea, vomiting, dysuria, hematuria, frequency or urgency, and B/L weakness. Upon interview, the patient provided the history as above. ED Course: Discussed case with ED physician prior to admission. ROS: Ten point ROS reviewed and negative, unless as noted above per HPI. Objective:   No intake or output data in the 24 hours ending 08/25/20 1219     Vitals:   Vitals:    08/25/20 1012 08/25/20 1103 08/25/20 1125 08/25/20 1205   BP:  95/63 (!) 58/30 (!) 103/93   Pulse:  (!) 44 63 59   Resp:  17 16 17   Temp:  (!) 90.7 °F (32.6 °C) (!) 91.1 °F (32.8 °C) (!) 91.9 °F (33.3 °C)   TempSrc:  Bladder Bladder Bladder   SpO2:  97% 97% 97%   Weight: (!) 435 lb (197.3 kg)      Height: 5' 8\" (1.727 m)        Physical Exam: 08/25/20    GEN  -Drowsy but arousable and alert, appearing morbidly obese female, lying in bed, cooperative, able to give adequate history. Appears given age. EYES -Pupils are equally round. No vision changes.  No scleral erythema, discharge, or conjunctivitis. HENT -MM are moist. Oral pharynx without exudates, no evidence of thrush. NECK -Supple, no apparent thyromegaly or masses. RESP -LS diminishedbilat, exp wheezing throughtout, no rale, no  rhonchi. Symmetric chest movement. No respiratory distress noted. C/V  -S1/S2 auscultated. Bradycardia without appreciable M/R/G. No JVD or carotid bruits. Peripheral pulses equal bilaterally and palpable. Peripheral pulses equal bilaterally and palpable. No peripheral edema. No reproducible chest wall tenderness. GI  -Abdomen is soft, round, non-distended, no significant tenderness. No masses or guarding. + BS in all quadrants. Rectal exam deferred.   -Mata catheter is not present. LYMPH  -No palpable cervical lymphadenopathy and no hepatosplenomegaly. No petechiae or ecchymoses. MS  -B/L extremities strong muscles strength. Full movements. No gross joint deformities. No swelling, intact sensation symmetrical.   SKIN  -Normal coloration, warm, dry. No open wounds or ulcers. NEURO  -CN 2-12 appear grossly intact, normal speech, no lateralizing weakness. [address] but arousable and alert, oriented x 4.      Past Medical History:      Past Medical History:   Diagnosis Date    Acute kidney injury ([address] 8/22/2019    Aortic stenosis     Asthma     Bacteremia due to group B Streptococcus     Treated at [facility] in 2017 - felt to be due to cellulitis    Cancer 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 H/O aortic valve stenosis     H/O echocardiogram 11/22/2016    EF 55%, Aortic valve area is 0.84 cm2 with a mean gradient of 36 suggestive of severe aortic stenosis, mild to mos LVH    Headache     Heart murmur     History of nuclear stress test 2016    lexiscan-normal,EF70%    Morbid obesity ([facility])     BMI=73.72 kg/m2    Neuropathy     NSTEMI (non-ST elevated myocardial infarction) ([facility]) 2018    Obesity     Osteoarthritis     Palpitations     Peripheral edema     Restless legs syndrome     Sleep apnea     Has a CPAP    Sleep apnea     SOB (shortness of breath)      Past Surgery History:  Patient  has a past surgical history that includes Cholecystectomy;  section; Hysterectomy; Tubal ligation; Neck surgery; Gallbladder surgery; and Aortic valve replacement (2017). Social History:    FAM HX: Assessed: family history includes Coronary Art Dis in her mother; Diabetes in her mother; Heart Attack in her mother; Heart Disease in her brother, father, and mother; Heart Failure in her brother, father, and mother; High Blood Pressure in her brother, father, and mother; Hypertension in her mother; Obesity in her brother, father, and mother.   Soc HX:   Social History     Socioeconomic History    Marital status:      Spouse name: None    Number of children: None    Years of education: None    Highest education level: None   Occupational History    None   Social Needs    Financial resource strain: None    Food insecurity     Worry: None     Inability: None    Transportation needs     Medical: None     Non-medical: None   Tobacco Use    Smoking status: Former Smoker     Types: Cigarettes     Last attempt to quit: 3/19/2003     Years since quittin.4    Smokeless tobacco: Never Used    Tobacco comment: quit 15 years ago   Substance and Sexual Activity    Alcohol use: No    Drug use: No    Sexual activity: Never   Lifestyle    Physical activity     Days per week: None     Minutes per session: None    Stress: None   Relationships    Social connections     Talks on phone: None     Gets together: None Attends Rastafari service: None     Active member of club or organization: None     Attends meetings of clubs or organizations: None     Relationship status: None    Intimate partner violence     Fear of current or ex partner: None     Emotionally abused: None     Physically abused: None     Forced sexual activity: None   Other Topics Concern    None   Social History Narrative    ** Merged History Encounter **          TOBACCO:   reports that she quit smoking about 17 years ago. Her smoking use included cigarettes. She has never used smokeless tobacco.  ETOH:   reports no history of alcohol use. Drugs:  reports no history of drug use. Allergies: No Known Allergies  Medications:   Medications:    sodium bicarbonate  100 mEq Intravenous Once    miconazole   Topical BID    ipratropium-albuterol  1 ampule Inhalation Q4H      Infusions:    DOPamine 10 mcg/kg/min (08/25/20 1129)    dextrose      norepinephrine      IV infusion builder       PRN Meds: glucose, 15 g, PRN  dextrose, 12.5 g, PRN  glucagon (rDNA), 1 mg, PRN  dextrose, 100 mL/hr, PRN      Prior to Admission Meds:  Prior to Admission medications    Medication Sig Start Date End Date Taking? Authorizing Provider   ipratropium-albuterol (DUONEB) 0.5-2.5 (3) MG/3ML SOLN nebulizer solution Inhale 3 mLs into the lungs every 6 hours as needed for Shortness of Breath 7/6/20   Kia Trotter MD   atorvastatin (LIPITOR) 80 MG tablet Take 0.5 tablets by mouth nightly 7/6/20   Kia Trotter MD   spironolactone (ALDACTONE) 25 MG tablet Take 1 tablet by mouth daily 7/6/20   Kia Trotter MD   miconazole (MICOTIN) 2 % cream Apply topically 2 times daily.  7/6/20   Kia Trotter MD   torsemide (DEMADEX) 100 MG tablet Take 0.5 tablets by mouth 2 times daily 7/6/20   Kia Trotter MD   doxepin (SINEQUAN) 10 MG capsule Take 10 mg by mouth nightly    Historical Provider, MD   ferrous sulfate (IRON 325) 325 (65 Fe) MG tablet Take 325 mg by mouth daily (with breakfast)    Historical Provider, MD   rOPINIRole (REQUIP) 2 MG tablet Take 2 mg by mouth 3 times daily After the mirapex    Historical Provider, MD   oxybutynin (DITROPAN-XL) 10 MG extended release tablet Take 10 mg by mouth daily    Historical Provider, MD   vitamin D3 (CHOLECALCIFEROL) 10 MCG (400 UNIT) TABS tablet Take 400 Units by mouth daily    Historical Provider, MD   Polyethylene Glycol 3350 (MIRALAX PO) Take by mouth as needed    Historical Provider, MD   lisinopril (PRINIVIL;ZESTRIL) 5 MG tablet Take 2 tablets by mouth daily 6/24/20   Roby Cohen, APRN - CNP   gabapentin (NEURONTIN) 400 MG capsule Take 1 capsule by mouth 3 times daily for 7 days. Patient taking differently: Take 400 mg by mouth 3 times daily.  2 tabs 3 times a day 1/14/20 6/24/20  Penny Jenkins MD   amiodarone (CORDARONE) 200 MG tablet Take 1 tablet by mouth daily 11/4/19   Penny Jenkins MD   metoprolol tartrate (LOPRESSOR) 25 MG tablet Take 1 tablet by mouth 2 times daily  Patient taking differently: Take 25 mg by mouth 2 times daily Half tab one time a day 9/27/19   Inessa Carlin MD   clopidogrel (PLAVIX) 75 MG tablet Take 1 tablet by mouth daily Patient has appointment on 3/27/18 more refills with be given after she keeps her office visit 8/20/18   Humberto Lambert MD   BiPAP Machine MISC by BiPAP route nightly    Historical Provider, MD   metFORMIN (GLUCOPHAGE) 500 MG tablet Take 1 tablet by mouth 2 times daily (with meals) 2/16/18   MONI Garcia MD   Nebulizers (AIRIAL COMPACT MINI NEBULIZER) MISC 1 Device by Does not apply route 4 times daily 2/16/18   MONI Garcia MD   albuterol sulfate  (90 Base) MCG/ACT inhaler Inhale 2 puffs into the lungs    Historical Provider, MD   pantoprazole (PROTONIX) 40 MG tablet Take 40 mg by mouth daily    Historical Provider, MD   pramipexole (MIRAPEX) 1 MG tablet Take 1 mg by mouth 3 times daily  10/21/16   Historical Provider, MD   fexofenadine (ALLEGRA) 180 MG tablet Take 180 mg by mouth daily  10/21/16   Historical Provider, MD     Data:     Laboratory this visit:  Reviewed  Recent Labs     08/25/20  0950   WBC 5.2   HGB 8.0*   HCT 27.3*         Recent Labs     08/25/20  0950   *   K 6.9*   CL 94*   CO2 19*   *   CREATININE 5.2*     Recent Labs     08/25/20  0950   AST 16   ALT 15   BILITOT 0.2   ALKPHOS 122     No results for input(s): INR in the last 72 hours. No results for input(s): CKTOTAL, CKMB, CKMBINDEX in the last 72 hours. Invalid input(s): Robertito Guevara input(s): PRO-BNP    Radiology this visit:  Reviewed. No results found. EKG this visit:   EKG: Junctional bradycardia, rate 37  Low voltage QRS   Cannot rule out Anterior infarct (cited on or before 25-JUN-2020)   Abnormal ECG   When compared with ECG of 25-JUN-2020 09:58,   Junctional rhythm has replaced Sinus rhythm   Vent. rate has decreased BY  32 BPM   Questionable change in initial forces of Anterior leads   T wave amplitude has increased in Inferior leads   T wave amplitude has increased in Lateral leads    Current Treatment Team:  Treatment Team: Attending Provider: Darryl Reynaga MD; Registered Nurse: Esteban Devi RN; Consulting Physician: Samara Reaves MD; Consulting Physician: Haley Ahn MD; Consulting Physician: Irving Bojorquez; Advanced Practice Nurse:  THEODORE Fuller CNP; Consulting Physician: Roberto Verdin MD; Consulting Physician: Reema Wong MD; Consulting Physician: MD Jai Pleitez APRN-BC   [facility]  8/25/2020 12:29 PM      Electronically signed by THEODORE Fuller CNP on 8/25/2020 at 12:29 PM

## 2020-08-25 NOTE — PROGRESS NOTES
HOSPITALIST    Patient seen in ICU - just transferred from ED [facility] to bed in ICU. Temp 31.5 degrees Centigrade. On dopamine 10 mcg. HR now 65. Wheezing audible without stethoscope. She is able to answer questions, but is lethargic. Septic shock - meropenem, noted gram neg ESBL organism in the past. Urine culture ordered. Will consult ID. Hx of C diff noted. Acute on chronic respiratory failure. Discussed with respiratory. She is on BiPAP at home she said. Will order BiPAP now. Discussed with Dr. Rosana Baeza, appreciate nephrology consult.

## 2020-08-25 NOTE — PROGRESS NOTES
Pt seen in er and admit to icu and start pacer pads as HR to 40s/ on dopamine and ivf with possible urosepsis. Pt arousable, place temp hd line. Do urgent hd 2 hr and if not better then will start on crrt after concepcion but priority is to correct K  Full note to follow  dw icu and er and Ir and all aware.  Pt gave consent to me for line and dialysis

## 2020-08-25 NOTE — ED NOTES
IR here placing dialysis catheter in right IJ. OK to use at this time.      Suzie Kline RN  08/25/20 [id number]

## 2020-08-25 NOTE — PROGRESS NOTES
The patient arrived to IR for a temporary dialysis catheter placement. The patient is A&OX3, verbalizes understanding of the procedure, prepped and draped on the table. Verbal consent was confirmed and placed in the soft chart.     1125  Timeout complete  1126  Procedure started  5907  Procedure finished    Sterile dressing applied by IR tech  Report given to [facility]

## 2020-08-25 NOTE — ED NOTES
Full report given to [facility]. No further questions at this time.      Shelli Astorga RN  08/25/20 [id number]

## 2020-08-25 NOTE — ED NOTES
[phone] paged Dr Rosa Dial  08/25/20 [phone] Dr Kyle Nicole returned call      Lex Castillo  08/25/20 [phone]

## 2020-08-25 NOTE — ED NOTES
[phone] paged Dr Vicki Mcguire  08/25/20 [id number] [id number] Dr Harmony Garces returned call     George Simon  08/25/20 3635

## 2020-08-25 NOTE — ED PROVIDER NOTES
Emergency Department Encounter    Patient: Orlin Schultz  MRN: [id number]  : 1955  Date of Evaluation: 2020  ED Provider:  Fabian Jackson    Triage Chief Complaint:   Bradycardia and Fatigue (x3-4 days)    [place]:  Orlin Schultz is a 63 y.o. female that presents with complaint of fatigue x3-4 days, unable to get out of bed. Having diarrhea x1 week. Not urinating much over the last week. Denies pain anywhere. No SOB. No fevers. No CP. No abdominal pain. No blood in diarrhea. States family called because she is so weak, could not stand. Typically uses a walker to get around. Was admitted late [place] for fluid overload/CHF/JOSE and diuresed >100 pounds per notes, is on torsemide and spironolactone at home. Has not followed up with nephrology. Has not seen her cardiologist \"in awhile\". Is on 2L NC at home for COPD. ROS - see HPI, below listed is current ROS at time of my eval:  10 systems reviewed and negative except as above.      Past Medical History:   Diagnosis Date    Acute kidney injury ([facility] 2019    Aortic stenosis     Asthma     Bacteremia due to group B Streptococcus     Treated at [facility] in 2017 - felt to be due to cellulitis    Cancer [facility])     Cervical    Carotid artery stenosis     Chest pain     CHF (congestive heart failure) (HCC)     Chronic back pain     COPD exacerbation ([facility]     Depression     Diabetes mellitus ([facility]     Family history of coronary artery disease     Fatty liver 10/27/2016    GI bleed 2017    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 - [facility] H/O aortic valve stenosis     H/O echocardiogram 2016    EF 55%, Aortic valve area is 0.84 cm2 with a mean gradient of 36 suggestive of severe aortic stenosis, mild to mos LVH    Headache     Heart murmur     History of nuclear stress test 2016    lexiscan-normal,EF70%    Morbid obesity (HCC)     BMI=73.72 kg/m2    Neuropathy     NSTEMI (non-ST elevated myocardial infarction) ([facility] 2018    Obesity     Osteoarthritis     Palpitations     Peripheral edema     Restless legs syndrome     Sleep apnea     Has a CPAP    Sleep apnea     SOB (shortness of breath)      Past Surgical History:   Procedure Laterality Date    AORTIC VALVE REPLACEMENT  2017    29mm EvolutR TAVR     SECTION      CHOLECYSTECTOMY      GALLBLADDER SURGERY      HYSTERECTOMY      NECK SURGERY      Tumor    TUBAL LIGATION       Family History   Problem Relation Age of Onset    Heart Failure Mother     Heart Attack Mother     Hypertension Mother     Coronary Art Dis Mother         Had PTCA w/stenting.     Heart Failure Father     Heart Failure Brother     Heart Disease Mother     High Blood Pressure Mother     Obesity Mother     Diabetes Mother     Heart Disease Father     High Blood Pressure Father     Obesity Father     Heart Disease Brother     High Blood Pressure Brother     Obesity Brother      Social History     Socioeconomic History    Marital status:      Spouse name: Not on file    Number of children: Not on file    Years of education: Not on file    Highest education level: Not on file   Occupational History    Not on file   Social Needs    Financial resource strain: Not on file    Food insecurity     Worry: Not on file     Inability: Not on file   [employer] needs     Medical: Not on file     Non-medical: Not on file   Tobacco Use    Smoking status: Former Smoker     Types: Cigarettes     Last attempt to quit: 3/19/2003     Years since quittin.4    Smokeless tobacco: Never Used    Tobacco comment: quit 15 years ago   Substance and Sexual Activity    Alcohol use: No    Drug use: No    Sexual activity: Never   Lifestyle    Physical activity     Days per week: Not on file     Minutes per session: Not on file    Stress: Not on file   Relationships    Social connections     Talks on phone: Not on file Gets together: Not on file     Attends Latter day service: Not on file     Active member of club or organization: Not on file     Attends meetings of clubs or organizations: Not on file     Relationship status: Not on file    Intimate partner violence     Fear of current or ex partner: Not on file     Emotionally abused: Not on file     Physically abused: Not on file     Forced sexual activity: Not on file   Other Topics Concern    Not on file   Social History Narrative    ** Merged History Encounter **          Current Facility-Administered Medications   Medication Dose Route Frequency Provider Last Rate Last Dose    DOPamine (INTROPIN) 400 mg in dextrose 5 % 250 mL infusion  2.5 mcg/kg/min Intravenous Continuous Luigi Burnett MD 74 mL/hr at 08/25/20 1129 10 mcg/kg/min at 08/25/20 1129    vancomycin (VANCOCIN) 2,000 mg in dextrose 5 % 500 mL IVPB  2,000 mg Intravenous Once Luigi Burnett  mL/hr at 08/25/20 1053 2,000 mg at 08/25/20 1053    glucose (GLUTOSE) 40 % oral gel 15 g  15 g Oral PRN Luigi Burnett MD        dextrose 50 % IV solution  12.5 g Intravenous PRN Luigi Burnett MD        glucagon (rDNA) injection 1 mg  1 mg Intramuscular PRN Luigi Burnett MD        dextrose 5 % solution  100 mL/hr Intravenous PRN Luigi Burnett MD        norepinephrine (LEVOPHED) 16 mg in dextrose 5% 250 mL infusion  2 mcg/min Intravenous Continuous Joe Mckenna MD        sodium bicarbonate 8.4 % injection 100 mEq  100 mEq Intravenous Once Joe Mckenna MD        sodium bicarbonate 100 mEq in sodium chloride 0.45 % 1,000 mL infusion   Intravenous Continuous Joe Mckenna MD         Current Outpatient Medications   Medication Sig Dispense Refill    ipratropium-albuterol (DUONEB) 0.5-2.5 (3) MG/3ML SOLN nebulizer solution Inhale 3 mLs into the lungs every 6 hours as needed for Shortness of Breath 360 mL 0    atorvastatin (LIPITOR) 80 MG tablet Take 0.5 tablets by mouth nightly 30 tablet 3    times daily       fexofenadine (ALLEGRA) 180 MG tablet Take 180 mg by mouth daily        No Known Allergies    Nursing Notes Reviewed    Physical Exam:  Triage VS:    ED Triage Vitals [08/25/20 0941]   Enc Vitals Group      BP (!) 72/29      Pulse (!) 39      Resp 19      Temp       Temp src       SpO2 100 %      Weight       Height       Head Circumference       Peak Flow       Pain Score       Pain Loc       Pain Edu? Excl. in [address]? My pulse ox interpretation is - normal    General appearance:  Ill appearing, awake and alert  Skin:  Warm. Dry. Eye:  Extraocular movements intact. Ears, nose, mouth and throat:  Oral mucosa moist   Neck:  Trachea midline. Extremity:  No swelling. Normal ROM     Heart:  Bradycardic with rate 30s normal S1 & S2, no extra heart sounds. Perfusion:  intact  Respiratory:  Lungs clear to auscultation bilaterally. Respirations nonlabored. Abdominal:  Morbidly obese. Soft. Nontender. Non distended. Neurological:  Alert and oriented, follows commands, normal speech, not confused. Generally weak but no focal deficits.            Psychiatric:  Appropriate    I have reviewed and interpreted all of the currently available lab results from this visit (if applicable):  Results for orders placed or performed during the hospital encounter of 08/25/20   CBC auto differential   Result Value Ref Range    WBC 5.2 4.0 - 10.5 K/CU MM    RBC 3.26 (L) 4.2 - 5.4 M/CU MM    Hemoglobin 8.0 (L) 12.5 - 16.0 GM/DL    Hematocrit 27.3 (L) 37 - 47 %    MCV 83.7 78 - 100 FL    MCH 24.5 (L) 27 - 31 PG    MCHC 29.3 (L) 32.0 - 36.0 %    RDW 22.3 (H) 11.7 - 14.9 %    Platelets 713 069 - 138 K/CU MM    MPV 10.5 7.5 - 11.1 FL    Differential Type AUTOMATED DIFFERENTIAL     Segs Relative 75.6 (H) 36 - 66 %    Lymphocytes % 16.6 (L) 24 - 44 %    Monocytes % 5.2 (H) 0 - 4 %    Eosinophils % 1.4 0 - 3 %    Basophils % 0.6 0 - 1 %    Segs Absolute 3.9 K/CU MM    Lymphocytes Absolute 0.9 K/CU MM surface antibody   Result Value Ref Range    Hep B S Ab <3.5    Hepatitis B surface antigen   Result Value Ref Range    Hepatitis B Surface Ag NON REACTIVE NON REACTIVE   EKG 12 lead   Result Value Ref Range    Ventricular Rate 37 BPM    Atrial Rate 258 BPM    QRS Duration 104 ms    Q-T Interval 580 ms    QTc Calculation (Bazett) 455 ms    R Axis 14 degrees    T Axis 60 degrees    Diagnosis       Junctional bradycardia  Low voltage QRS  Cannot rule out Anterior infarct (cited on or before 25-JUN-2020)  Abnormal ECG  When compared with ECG of 25-JUN-2020 09:58,  Junctional rhythm has replaced Sinus rhythm  Vent. rate has decreased BY  32 BPM  Questionable change in initial forces of Anterior leads  T wave amplitude has increased in Inferior leads  T wave amplitude has increased in Lateral leads        Radiographs (if obtained):  Radiologist's Report Reviewed:  No results found. EKG (if obtained): (All EKG's are interpreted by myself in the absence of a cardiologist)  Junctional bradycardia with rate of 37 beats minute, low voltage QRS. No ST elevation. Very prolonged QT interval with peaked T waves      MDM:  29-year-old female with history as above presents critically ill, severe hypotension, bradycardia, hypothermia. She is oxygenating on her 2 L nasal cannula and is alert and talking to me. She does follow commands. With the prolonged QT interval, junctional bradycardia, peak T waves, I suspect this is likely metabolic acidosis, hyperkalemia, causing most of this. Started fluids, 2 IVs, bicarb and calcium ordered, given a dose of atropine without any change. I did consult cardiology, she is established with the [facility]. Labs were sent. Sepsis work-up initiated in addition to cardiac labs. Spoke with Dr. Ana Cowan at [phone]- does think that there are P waves, agrees that this looks like electrolyte abnormalities and agrees with treating for possibly hyperK, suspect she is acidotic.  As urine looks like pus coming out in the hernandez I suspect this is her source- I have added vancomycin and cefepime. 21 - Dr. Ana Cowan came to see patient. Agrees with current plan and agrees with plan for PICC line for central access as suspect CVC will be extremely difficult to place 2/2 body habitus and current status. 1025- called by lab that lactic acid is 3.1, K is 6.8. Will be given another bicarb, also insulin/D50 orders from hyperkalemia orderset. Lactic acid- she is receiving fluids and antibiotics. Dopamine for hypotension. Has h/o CHF, recent hospitalization with significant fluid overload, will see how respiratory status is maintained with initial liter, 2nd liter has been ordered if continues to tolerate well. Nephrology consult has been placed for Cr 5.2. suspect intravascularly she is depleted related to her sepsis, diarrhea x1 week and still on dual water pills at home. 1045- spoke with Dr. Sam Daley, he is in the office, will have Dr. Starr Allen come in to see her to determine if needs dialysis immediately. HR improving slightly with meds, BP remains low. Dopamine increased. 46- Dr. Starr Allen to bedside and plan for IR for tunneled cath, they will decide in ICU if needs CRRT or dialysis, can use that catheter for pressors if needed per Dr. Starr Allen, no PICC at this time. 1105- HR dropped again to 30s, we will transcutaneously pace. 1124- IR at bedside for catheter placement given how critical patient is. US team finished echo. 1140- IR team finished, ready for dialysis whenever they can take her, BP improving with pacing- 700T sytolic, HR 60 paced. third liter of fluids being given. Dopamine at 10. Will add levophed if needed but suspect more of the hypotension was related to her bradycardia and severe acidosis. Temperature up to 92. Urine + for infection, already receiving antibiotics. Patient transferred to ICU, discussed with SAVANNAH Henderson for admission.      Total critical care time today provided was 80minutes. This excludes seperately billable procedure. Critical care time provided for bradycardia, shock, renal failure that required close evaluation and/or intervention with concern for patient decompensation. Clinical Impression:  1. Bradycardia    2. Renal insufficiency    3. Metabolic acidosis    4. Sepsis, due to unspecified organism, unspecified whether acute organ dysfunction present ([facility] [address]    5. Hyperkalemia    6. Shock ([address]    7. Hypothermia, initial encounter    8. Acute renal failure, unspecified acute renal failure type ([address]    9. Acute cystitis without hematuria      Disposition referral (if applicable):  THEODORE Melgar - SAVANNAH  [facility]  [phone]          Disposition medications (if applicable):  New Prescriptions    No medications on file     ED Provider Disposition Time  DISPOSITION Admitted 08/25/2020 11:34:00 AM      Comment: Please note this report has been produced using speech recognition software and may contain errors related to that system including errors in grammar, punctuation, and spelling, as well as words and phrases that may be inappropriate. Efforts were made to edit the dictations.         Bishop Muñoz MD  08/25/20 9409

## 2020-08-25 NOTE — CONSULTS
Nephrology Service Consultation    Patient:  Ester Gould  MRN: [id number]  Consulting physician:  Sandie Meza MD  Reason for Consult:  JOSE / hyperkalemia     History Obtained From:  patient  PCP: THEODORE Frank NP    HISTORY OF PRESENT ILLNESS:   The patient is a 63 y.o. female who was transported to [facility] ED on 8/25. Patient had presented to the ED with bradycardia and hypotension  As well as an  initial temperature of 89.4. Mata placement demonstrated  Pyuria with high suspicion for Urosepsis. Medical History: Aortic Valve disease / DM (2002)  COPD / ANMOL / chronic hypercapnia and morbid obesity   Diastolic Heart Failure / peripheral neuropathy / history of GIB (2017)    Surgical History: TAVR / Hysterectomy / gallbladder surgery     Renal History: estimated baseline creatinine: 0.9 - 1.1  -history of recurrent hospitalizations for JOSE            SOCIAL HISTORY:   Tobacco: previous smoker    Alcohol: denies use     Demographic History; prior to admission:  Residing at home with     Medications:   Scheduled Meds:   vancomycin  2,000 mg Intravenous Once    sodium chloride  1,000 mL Intravenous Once     Continuous Infusions:   DOPamine 5.001 mcg/kg/min (08/25/20 1103)    dextrose      sodium chloride       PRN Meds:.glucose, dextrose, glucagon (rDNA), dextrose    Allergies:  Patient has no known allergies. Family History:       Problem Relation Age of Onset    Heart Failure Mother     Heart Attack Mother     Hypertension Mother     Coronary Art Dis Mother         Had PTCA w/stenting.     Heart Failure Father     Heart Failure Brother     Heart Disease Mother     High Blood Pressure Mother     Obesity Mother     Diabetes Mother     Heart Disease Father     High Blood Pressure Father     Obesity Father     Heart Disease Brother     High Blood Pressure Brother     Obesity Brother          Physical Exam:    Vitals: BP 95/63   Pulse (!) 44   Temp (!) 90.7 °F (32.6 °C) (Bladder)   Resp 17   Ht 5' 8\" (1.727 m)   Wt (!) 435 lb (197.3 kg)   SpO2 97%   BMI 66.14 kg/m²     General appearance:  awake and verbally interactive   HEENT: Head: Normal, normocephalic, atraumatic. Neck: supple, symmetrical, trachea midline  Cardiovascular: S1 and S2: normal / no rub  Pulmonary: diminished lung sounds bilaterally  Abdomen:  soft / non-tender / large body habitus   Extremities: large body habitus  + edema to the bilateral lower legs     CBC:   Recent Labs     08/25/20  0950   WBC 5.2   HGB 8.0*        BMP:    Recent Labs     08/25/20  0950   *   K 6.9*   CL 94*   CO2 19*   *   CREATININE 5.2*   GLUCOSE 77     Hepatic:   Recent Labs     08/25/20  0950   AST 16   ALT 15   BILITOT 0.2   ALKPHOS 122     Troponin: No results for input(s): TROPONINI in the last 72 hours. Mg, Phos: No results for input(s): MG, PHOS in the last 72 hours. ABGs:   Lab Results   Component Value Date    PO2ART 68 06/30/2020    LJW4VOT 67.0 06/30/2020     INR: No results for input(s): INR in the last 72 hours.   -----------------------------------------------------------------  Patient Active Problem List   Diagnosis Code    Morbid obesity with BMI of 70 and over, adult ([facility] E66.01, Z68.45    Essential hypertension I10    Dyslipidemia E78.5    Fecal incontinence R15.9    Mild intermittent asthma without complication D29.11    S/P laparoscopic cholecystectomy Z90.49    Type 2 diabetes mellitus without complication, with long-term current use of insulin ([facility]) E11.9, Z79.4    Fatty liver K76.0    Arthritis M19.90    H/O parotidectomy Z90.49    Venous stasis dermatitis of both lower extremities I87.2    Aortic stenosis I35.0    Morbid obesity ([facility]) E66.01    Asthma J45.909    Diabetes mellitus ([address] E11.9    Hypertension I10    Sleep apnea G47.30    Family history of coronary artery disease Z82.49    Chest pain R07.9    SOB (shortness of breath) R06.02    Palpitations R00.2    Peripheral edema R60.9    Hypervolemia E87.70    Chronic respiratory failure with hypoxia and hypercapnia ([facility]) J96.11, J96.12    Acute exacerbation of chronic obstructive pulmonary disease (COPD) ([facility]) J44.1    Chronic obstructive pulmonary disease ([facility]) J44.9    Gait disturbance R26.9    Acute on chronic respiratory failure with hypoxia and hypercapnia ([facility]) J96.21, J96.22    Cellulitis L03.90    Skin ulcer of left foot with fat layer exposed ([facility] L97.522    Pneumonia J18.9    VHD (valvular heart disease) I38    Class 3 severe obesity due to excess calories with body mass index (BMI) greater than or equal to 70 in adult ([facility] E66.01, Z68.45    SIRS (systemic inflammatory response syndrome) ([facility]) R65.10    Acute kidney injury ([facility] N17.9    Acute metabolic encephalopathy M09.05    Shock, unspecified ([facility]) R57.9    Uncontrolled diabetes mellitus ([facility]) E11.65    Sepsis ([facility]) A41.9    Altered mental status R41.82    Wide-complex tachycardia ([facility]) I47.2    Acute on chronic diastolic heart failure ([facility]) I50.33    S/P TAVR (transcatheter aortic valve replacement) Z95.2       Assessment and Recommendations     Impression   1. JOSE (ATN vs. Pre-renal azotemia)  -likely multi-factorial etiology for JOSE including:  Diminished renal perfusion secondary to significant hypotension  Also consider sepsis as likely contributor to JOSE     2. Sepsis (likely Urosepsis) / lactic acidosis   3. Hyperkalemia   4. Bradycardia   5. Anemia     PLAN  1.   -referral to IR for temp HD line placement  -anticipate RRT today for significant hyperkalemia   -patient to be admitted to ICU after going to IR for catheter   -plan for 2 hours of intermittent HD and than recheck serum K;    And will consider CRRT if serum K is not adequately improved   2.    -hernandez in place with pyuria   -currently on Dopamine drip   -BP trend: systolic BP's have recently been 62 - 72  -blood culture results pending  -single dose of cefepime and Vanco given   -hernandez irrigation ordered q4H  3.   -latest serum potassium: 6.9; following trend  -completed in ED: regular insulin / D50 / Prabhjot Manners / Calcium   4.   -patient has external pacemaker in place   -followed by cardiology   5.   -latest Hb: 8.0 and latest platelet count: 406 : follow trend    Electronically signed by THEODORE Haney - [place]        Nephrology Attending Progress Note  8/25/2020 12:20 PM  Subjective:   Admit Date: 8/25/2020  PCP: THEODORE Gaffney - NP    Interval History: I have personally performed face to face diagnostic evaluation on this patient. I have personally reviewed pertinent labs and imaging and agree with the care plan above. My additional findings are as follows:  63year old white female with ckd 3, tavr, overweight with copd and chf present as weak at home and diarrhea with no sig uop. With hernandez pus came out and was bradycardiac with low bp,. Concern for sepsis and temp very low. Noted arf with high K and admit icu and urgent dialysis. Pt from home with spouse and arousable for consent. Objective:   Vitals: BP (!) 103/93   Pulse 59   Temp (!) 91.9 °F (33.3 °C) (Bladder)   Resp 17   Ht 5' 8\" (1.727 m)   Wt (!) 435 lb (197.3 kg)   SpO2 97%   BMI 66.14 kg/m²   Weak tired  Decrease bs  Obese  Thick legs  Hernandez with pus    Assessment and Plan:  IMP:  As stated above    Plan     1 bp low and on dopamine and add levophed as need , aggressive ivf and pan cutlure with abx coverage  2 change ivf to bicarb gtt  3 place urgent hd catheter and do hd 2 hr low k bath.  If fu K not better will need start on crrt  4 admit icu and monitor o2  5 hb low stable and monitor  6 ext pm and cardi monitor as try correct K  7 acute ill and dw pt and icu, dw dr Kenney Clear and will admit and urgent dialysis             Electronically signed by Paola Ravi MD on 8/25/2020 at 12:20 PM

## 2020-08-25 NOTE — FLOWSHEET NOTE
Patient admitted to room 2112. Skin assesments completed. Yeast noted in all abdominal folds, Right and left buttocks have a stage 2 ulcers, and tear in gluteal cleft. Wound care consulted.

## 2020-08-25 NOTE — CONSULTS
[id number]  consulted by Dr. Cameron Blanc for monitoring and adjustment. Indication for treatment: Septic shock, possible Urosepsis  Goal trough: 15 mcg/mL  Other antimicrobials:  meropenem       Pertinent Laboratory Values:   Temp Readings from Last 3 Encounters:   08/25/20 (!) 91.9 °F (33.3 °C) (Bladder)   07/06/20 97.8 °F (36.6 °C) (Oral)   06/04/20 98.4 °F (36.9 °C) (Oral)     Recent Labs     08/25/20  0950 08/25/20  1410   WBC 5.2 6.7     Recent Labs     08/25/20  0950   *   CREATININE 5.2*     Estimated Creatinine Clearance: 21 mL/min (A) (based on SCr of 5.2 mg/dL (H)). No intake or output data in the 24 hours ending 08/25/20 1606    Pertinent Cultures:  Date    Source    Results  8/25   Blood    Ordered  8/25   Urine    Ordered  8/25                            Resp Panel                              Ordered    Vancomycin level:   TROUGH:  No results for input(s): VANCOTROUGH in the last 72 hours. RANDOM:  No results for input(s): VANCORANDOM in the last 72 hours. Assessment:  WBC and temperature: WBC normal, Pt afebrile  SCr, BUN, and urine output: JOSE on CKD, having HD tonight for JOSE/Hyperkalemia, CRRT possible in the future  Day(s) of therapy:  1  Vancomycin level: Random level scheduled for 8/26 @06:00    Plan:  Dosing comments: The patient received vancomycin 2gm ivpb x1 dose earlier today  Future vancomycin doses will be ordered intermittently based on vanco levels, renal trends, and possible dialysis  Check the vanco random level tomorrow 8/26 am  Pharmacy will continue to monitor patient and adjust therapy as indicated    [id number] [employer] 8/26 @06:00    Thank you for the consult. Lucy Cueva  8/25/2020 4:06 PM

## 2020-08-25 NOTE — ED NOTES
Bed: 04TR-04  Expected date:   Expected time:   Means of arrival:   Comments:  bradycardia     Caryl Viera RN  08/25/20 3108

## 2020-08-26 NOTE — CARE COORDINATION
Pt admitted 8/25/20 with septic shock. On 8/25/20 pt was intubated and placed on vent. Pt remains on vent at present. Pt also had a temp dialysis cath placed 8/25/20 and received HD. Pt known to this Cm from previous hospitalizaitons. Chart reviewed. Pt was discharged home from [address] in very early July 2020 with [facility]. Pt has also been to [facility] in the past. Pt contacted [facility] and confirmed that pt remains active with them at this time. Cm contacted pt's  and pt has the following DME; Bipap, 02, nebulizer, trapeze for her bed, walker, o2, nebulizer, walk in shower, electric w/c, BSC.  [facility] a bath aide twice a week.  states that pt's dgt lives with pt and spouse. Pt has approx 4 steps to enter the home. Pt's  works outside the home with his own [employer].  is [facility] for [facility]. CM will monitor pt progress and will discuss further discharge planning with pt and spouse once pt is off the ventilator. Obinna Crawford

## 2020-08-26 NOTE — CONSULTS
NONTUNNELED VASCULAR CATHETER  2020    IR NONTUNNELED VASCULAR CATHETER 2020 [facility] SPECIAL PROCEDURES    NECK SURGERY      Tumor    TUBAL LIGATION         FAMILY HISTORY    Family History   Problem Relation Age of Onset    Heart Failure Mother     Heart Attack Mother     Hypertension Mother     Coronary Art Dis Mother         Had PTCA w/stenting.  Heart Failure Father     Heart Failure Brother     Heart Disease Mother     High Blood Pressure Mother     Obesity Mother     Diabetes Mother     Heart Disease Father     High Blood Pressure Father     Obesity Father     Heart Disease Brother     High Blood Pressure Brother     Obesity Brother        SOCIAL HISTORY    Social History     Tobacco Use    Smoking status: Former Smoker     Types: Cigarettes     Last attempt to quit: 3/19/2003     Years since quittin.4    Smokeless tobacco: Never Used    Tobacco comment: quit 15 years ago   Substance Use Topics    Alcohol use: No    Drug use: No       ALLERGIES    No Known Allergies    MEDICATIONS    No current facility-administered medications on file prior to encounter. Current Outpatient Medications on File Prior to Encounter   Medication Sig Dispense Refill    ipratropium-albuterol (DUONEB) 0.5-2.5 (3) MG/3ML SOLN nebulizer solution Inhale 3 mLs into the lungs every 6 hours as needed for Shortness of Breath 360 mL 0    atorvastatin (LIPITOR) 80 MG tablet Take 0.5 tablets by mouth nightly 30 tablet 3    spironolactone (ALDACTONE) 25 MG tablet Take 1 tablet by mouth daily 30 tablet 0    miconazole (MICOTIN) 2 % cream Apply topically 2 times daily.  3 Tube 2    torsemide (DEMADEX) 100 MG tablet Take 0.5 tablets by mouth 2 times daily 30 tablet 3    doxepin (SINEQUAN) 10 MG capsule Take 10 mg by mouth nightly      ferrous sulfate (IRON 325) 325 (65 Fe) MG tablet Take 325 mg by mouth daily (with breakfast)      rOPINIRole (REQUIP) 2 MG tablet Take 2 mg by mouth 3 times daily After the mirapex      oxybutynin (DITROPAN-XL) 10 MG extended release tablet Take 10 mg by mouth daily      vitamin D3 (CHOLECALCIFEROL) 10 MCG (400 UNIT) TABS tablet Take 400 Units by mouth daily      Polyethylene Glycol 3350 (MIRALAX PO) Take by mouth as needed      lisinopril (PRINIVIL;ZESTRIL) 5 MG tablet Take 2 tablets by mouth daily 30 tablet 0    gabapentin (NEURONTIN) 400 MG capsule Take 1 capsule by mouth 3 times daily for 7 days. (Patient taking differently: Take 400 mg by mouth 3 times daily.  2 tabs 3 times a day) 21 capsule 0    amiodarone (CORDARONE) 200 MG tablet Take 1 tablet by mouth daily 30 tablet 3    metoprolol tartrate (LOPRESSOR) 25 MG tablet Take 1 tablet by mouth 2 times daily (Patient taking differently: Take 25 mg by mouth 2 times daily Half tab one time a day) 60 tablet 3    clopidogrel (PLAVIX) 75 MG tablet Take 1 tablet by mouth daily Patient has appointment on 3/27/18 more refills with be given after she keeps her office visit 90 tablet 3    BiPAP Machine MISC by BiPAP route nightly      metFORMIN (GLUCOPHAGE) 500 MG tablet Take 1 tablet by mouth 2 times daily (with meals) 60 tablet 0    Nebulizers (AIRIAL COMPACT MINI NEBULIZER) MISC 1 Device by Does not apply route 4 times daily 1 each 0    albuterol sulfate  (90 Base) MCG/ACT inhaler Inhale 2 puffs into the lungs      pantoprazole (PROTONIX) 40 MG tablet Take 40 mg by mouth daily      pramipexole (MIRAPEX) 1 MG tablet Take 1 mg by mouth 3 times daily       fexofenadine (ALLEGRA) 180 MG tablet Take 180 mg by mouth daily            Objective:      BP (!) 113/47   Pulse 82   Temp 99 °F (37.2 °C) (Core)   Resp 14   Ht 5' 7.99\" (1.727 m)   Wt (!) 419 lb 1.5 oz (190.1 kg)   SpO2 100%   BMI 63.74 kg/m²   Yaya Risk Score: Yaya Scale Score: 10    LABS    CBC:   Lab Results   Component Value Date    WBC 8.8 08/26/2020    RBC 3.98 08/26/2020    HGB 9.4 08/26/2020    HCT 31.2 08/26/2020    MCV 79.9 08/26/2020 MCH 23.9 08/26/2020    MCHC 29.9 08/26/2020    RDW 22.6 08/26/2020     08/26/2020    MPV 10.4 08/26/2020     CMP:    Lab Results   Component Value Date     08/26/2020    K 3.9 08/26/2020    CL 95 08/26/2020    CO2 30 08/26/2020    BUN 47 08/26/2020    CREATININE 2.0 08/26/2020    GFRAA 30 08/26/2020    LABGLOM 25 08/26/2020    GLUCOSE 191 08/26/2020    PROT 7.0 08/26/2020    LABALBU 3.7 08/26/2020    CALCIUM 7.8 08/26/2020    BILITOT 0.4 08/26/2020    ALKPHOS 165 08/26/2020    AST 16 08/26/2020    ALT 16 08/26/2020     Albumin:    Lab Results   Component Value Date    LABALBU 3.7 08/26/2020     PT/INR:    Lab Results   Component Value Date    PROTIME 13.0 06/27/2020    INR 1.07 06/27/2020     HgBA1c:    Lab Results   Component Value Date    LABA1C 6.1 08/25/2020         Assessment:     Patient Active Problem List   Diagnosis    Morbid obesity with BMI of 70 and over, adult ([address]    Essential hypertension    Dyslipidemia    Fecal incontinence    Mild intermittent asthma without complication    S/P laparoscopic cholecystectomy    Type 2 diabetes mellitus without complication, with long-term current use of insulin ([address]    Fatty liver    Arthritis    H/O parotidectomy    Venous stasis dermatitis of both lower extremities    Aortic stenosis    Morbid obesity ([address]    Asthma    Diabetes mellitus ([address]    Hypertension    Sleep apnea    Family history of coronary artery disease    Chest pain    SOB (shortness of breath)    Palpitations    Peripheral edema    Hypervolemia    Chronic respiratory failure with hypoxia and hypercapnia (HCC)    Acute exacerbation of chronic obstructive pulmonary disease (COPD) (HCC)    Chronic obstructive pulmonary disease (HCC)    Gait disturbance    Acute on chronic respiratory failure with hypoxia and hypercapnia (HCC)    Cellulitis    Skin ulcer of left foot with fat layer exposed ([address]    Pneumonia    VHD (valvular heart disease)    Class 3 08/26/20 1430   Odor None 08/26/20 1430   Margins Defined edges 08/26/20 1430   [place]ierra-wound Assessment Intact 08/26/20 1430   Non-staged Wound Description Full thickness 08/26/20 1430   [place]%Wound Bed 50 08/26/20 1430   Red%Wound Bed 50 08/26/20 1430   Yellow%Wound Bed 0 08/26/20 1430   Black%Wound Bed 0 08/26/20 1430   Purple%Wound Bed 0 08/26/20 1430   Other%Wound Bed 0 08/26/20 1430   Number of days: 0       Wound 08/26/20 Buttocks Right (Active)   Wound Pressure Stage  2 08/26/20 1430   Dressing Status Changed 08/26/20 1430   Dressing Changed Changed/New 08/26/20 1430   Wound Cleansed Rinsed/Irrigated with saline 08/26/20 1430   Wound Length (cm) 0.5 cm 08/26/20 1430   Wound Width (cm) 0.5 cm 08/26/20 1430   Wound Depth (cm) 0.1 cm 08/26/20 1430   Wound Surface Area (cm^2) 0.25 cm^2 08/26/20 1430   Wound Volume (cm^3) 0.02 cm^3 08/26/20 1430   Distance Tunneling (cm) 0 cm 08/26/20 1430   Tunneling Position ___ O'Clock 0 08/26/20 1430   Undermining Starts ___ O'Clock 0 08/26/20 1430   Undermining Ends___ O'Clock 0 08/26/20 1430   Undermining Maxium Distance (cm) 0 08/26/20 1430   Wound Assessment Red;Yellow 08/26/20 1430   Drainage Amount Moderate 08/26/20 1430   Drainage Description Serosanguinous 08/26/20 1430   Odor None 08/26/20 1430   Margins Defined edges 08/26/20 1430   [place]ierra-wound Assessment Intact 08/26/20 1430   Non-staged Wound Description Full thickness 08/26/20 1430   [place]%Wound Bed 0 08/26/20 1430   Red%Wound Bed 50 08/26/20 1430   Yellow%Wound Bed 50 08/26/20 1430   Black%Wound Bed 0 08/26/20 1430   Purple%Wound Bed 0 08/26/20 1430   Other%Wound Bed 0 08/26/20 1430   Number of days: 0       Wound 08/26/20 Buttocks Left (Active)   Wound Pressure Stage  2 08/26/20 1430   Dressing Status Changed 08/26/20 1430   Dressing Changed Changed/New 08/26/20 1430   Wound Cleansed Rinsed/Irrigated with saline 08/26/20 1430   Wound Length (cm) 0.5 cm 08/26/20 1430   Wound Width (cm) 0.8 cm 08/26/20 1430   Wound orders. Specialty Bed Required : yes  [x] Low Air Loss   [] Pressure Redistribution  [] Fluid Immersion  [] Bariatric  [] Total Pressure Relief  [] Other:     Discharge Plan:  Placement for patient upon discharge: tbd  Hospice Care: no  Patient appropriate for Outpatient [address]: [place]     Patient/Caregiver Teaching:  Level of patient/caregiver understanding able to:  Cares explained as given. Electronically signed by Ann Washington RN,  on 8/26/2020 at 3:57 PM

## 2020-08-26 NOTE — PROGRESS NOTES
Today's plan: patient was intubated last night due to worsening respiratory function, started on CRRT, iv Calcium and kayexalte given      Admit Date:  2020    Subjective: intuated      Chief complaints on admission  Chief Complaint   Patient presents with    Bradycardia    Fatigue     x3-4 days         History of present illness:Olivia is a 63 y. o.year old who  presents with had concerns including Bradycardia and Fatigue (x3-4 days). Past medical history:    has a past medical history of Acute kidney injury ([address] Aortic stenosis, Asthma, Bacteremia due to group B Streptococcus, Cancer ([facility] Carotid artery stenosis, Chest pain, CHF (congestive heart failure) ([facility] Chronic back pain, COPD exacerbation ([address] Depression, Diabetes mellitus ([facility] Family history of coronary artery disease, Fatty liver, GI bleed, H/O aortic valve stenosis, H/O echocardiogram, Headache, Heart murmur, History of nuclear stress test, Morbid obesity ([facility] Neuropathy, NSTEMI (non-ST elevated myocardial infarction) ([facility] Obesity, Osteoarthritis, Palpitations, Peripheral edema, Restless legs syndrome, Sleep apnea, Sleep apnea, and SOB (shortness of breath). Past surgical history:   has a past surgical history that includes Cholecystectomy;  section; Hysterectomy; Tubal ligation; Neck surgery; Gallbladder surgery; Aortic valve replacement (2017); and IR NONTUNNELED VASCULAR CATHETER > 5 YEARS (2020). Social History:   reports that she quit smoking about 17 years ago. Her smoking use included cigarettes. She has never used smokeless tobacco. She reports that she does not drink alcohol or use drugs.   Family history:  family history includes Coronary Art Dis in her mother; Diabetes in her mother; Heart Attack in her mother; Heart Disease in her brother, father, and mother; Heart Failure in her brother, father, and mother; High Blood Pressure in her brother, father, and mother; Hypertension in her mother; Intravenous Q24H    sodium chloride flush  10 mL Intravenous 2 times per day    pantoprazole  40 mg Intravenous Daily    heparin (porcine)  5,000 Units Subcutaneous 3 times per day    insulin lispro  0-6 Units Subcutaneous TID     insulin lispro  0-3 Units Subcutaneous Nightly    atorvastatin  40 mg Oral Nightly    clopidogrel  75 mg Oral Daily    doxepin  10 mg Oral Nightly    ferrous sulfate  325 mg Oral Daily with breakfast    gabapentin  400 mg Oral TID    pramipexole  1 mg Oral TID    rOPINIRole  2 mg Oral TID    vitamin D3  400 Units Oral Daily    miconazole   Topical BID    vancomycin (VANCOCIN) intermittent dosing (placeholder)   Other RX Placeholder    cetirizine  10 mg Oral Daily    chlorhexidine  15 mL Mouth/Throat BID    famotidine (PEPCID) injection  20 mg Intravenous Daily      fentanyl 25 mcg/hr (08/26/20 0125)    DOPamine 10 mcg/kg/min (08/26/20 1320)    norepinephrine 3 mcg/min (08/26/20 1228)    IV infusion builder 150 mL/hr at 08/26/20 0933    dextrose      propofol 10 mcg/kg/min (08/26/20 0700)     sodium chloride flush, acetaminophen **OR** acetaminophen, ipratropium-albuterol, glucose, dextrose, glucagon (rDNA), dextrose    Lab Data:  CBC:   Recent Labs     08/25/20  0950 08/25/20  1410 08/26/20  0143 08/26/20  0455 08/26/20  1200   WBC 5.2 6.7  --  8.8  --    HGB 8.0* 9.5* 10.0* 9.5* 9.4*   HCT 27.3* 32.2* 33.2* 31.8* 31.2*   MCV 83.7 82.6  --  79.9  --     219  --  187  --      BMP:   Recent Labs     08/26/20  0115 08/26/20  0455 08/26/20  1200    136 137   K 6.2* 6.8* 3.9   CL 95* 94* 95*   CO2 27 27 30   BUN 88* 88* 47*   CREATININE 3.6* 3.4* 2.0*     LIVER PROFILE:   Recent Labs     08/25/20  0950 08/26/20  0455   AST 16 16   ALT 15 16   BILITOT 0.2 0.4   ALKPHOS 122 165*     PT/INR: No results for input(s): PROTIME, INR in the last 72 hours. APTT: No results for input(s): APTT in the last 72 hours.   BNP:  No results for input(s): BNP in the last 72 hours.  TROPONIN: @TROPONINI:3@      Assessment:  63 y. o.year old who is admitted for          Plan:  Respiratory failure: intubated, as per pulmonary  ARF: worsening, start CRRT  Bradycardia\" from severe renal failure and hyperkalemia, s/p CRRT  All labs, medications and tests reviewed, continue all other medications of all above medical condition listed as is.       Verona Fishman MD 8/26/2020 3:03 PM

## 2020-08-26 NOTE — PROGRESS NOTES
HOSPITALIST    Rapid COVID-19 specimen in lab, results pending. Even if negative, COVID not completely ruled out. Will transfer to [facility] ICU.

## 2020-08-26 NOTE — PROGRESS NOTES
Secure message sent to [facility], NP regarding pt presentation. Selin at bedside, assessed pt. New orders obtained.

## 2020-08-26 NOTE — CONSULTS
Infectious Disease Consult Note  2020   Patient Name: Jim Barbosa : 1955   Impression   Septic Shock Secondary to Unclear Source  · Fever max 101.1 F  · No leukocytosis  · Blood cultures -pending  · Pct 0.164  · Hypotension, vasopressors x 2 onboard  · CT Chest WO Contrast: Areas of consolidation and nonrounded GGO to the lungs bilaterally. Differential considerations include multifocal pneumonia, pulmonary edema, and/or atelectasis.  Possible UTI:  · Urine culture -pending  · UA -WBC 12,367,   · Past Urine culture 1/10/20: Proteus mirabilis  · Past Urine culture 10/29/19: Klebsiella pneumoniae (ESBL)  · Morbid Obesity  · Hypotension and Bradycardia: Cardiology onboard, 2 vasopressors onboard  · DMII  · JOSE: (ATN vs Pre-renal azotemia):  Dr. Constance Asencio onboard, hyperkalemia, HD started, will consider CRRT if no improvement  · Cervical Cancer  · HFpEF  · Acute Hypoxic Respiratory Failure:  Intubated, mechanically ventilated   · Dr. Briseida Soler onboard  ·    Multi-morbidity: per PMHx:  Morbid obesity, asthma, cervical cancer, CHF, COPD, DMII, NSTEMI, OA, ANMOL on CPAP, choley, hyster, TAVR  Plan:   Continue meropenem and vancomycin   Pct q48h x 3  · CRP today and x 3  · Sputum culture  · Await blood cultures, urine culture  · Recommend COVID-19 rapid test, DW Dr. Morgan Ch    Thank you for allowing me to consult in the care of this patient.  ------------------------  REASON FOR CONSULT: Infective syndrome     Requested by: THEODORE Lackey    HPI:Patient is a 63 y.o.  female who was admitted 2020 for further evaluation and management of bradycardia and fatigue x 4 days prior to arrival. HPI is obtained from the EMR due to the patient is sedated, mechanically ventilated and no family or HCPOA is at bedside. She presented with c/o fatigue, unable to get out of bed, diarrhea x 1 week. Also not urinating much x 1 week. Baseline uses a walker to ambulate.   She was recently admitted to [address] [place] [address] in June/July 2020 for fluid overload/CHF/JOSE and was diuresed >100 pounds, is currently on torsemide and spironolactone at home. Also is on home oxygen at 2L/min/NC. Has not followed up with nephrology/cardiology. ? Infectious diseases service was consulted to evaluate the pt, and recommend further investigative and therapeutic measures.     Review of Systems   Unable to perform ROS: Intubated       Patient Active Problem List    Diagnosis Date Noted    Acute exacerbation of chronic obstructive pulmonary disease (COPD) ([address]      Priority: High    Hypervolemia      Priority: High    Septic shock ([address] 08/25/2020    Acute kidney injury superimposed on CKD ([address] 08/25/2020    S/P TAVR (transcatheter aortic valve replacement) 06/26/2020    Acute on chronic diastolic heart failure ([address] 06/25/2020    Wide-complex tachycardia ([address]     Altered mental status     Sepsis ([address] 09/21/2019    Acute kidney injury ([address] 08/22/2019    Acute metabolic encephalopathy 27/01/1950    Shock, unspecified ([address] 08/22/2019    Uncontrolled diabetes mellitus ([address] 08/22/2019    SIRS (systemic inflammatory response syndrome) ([address] 08/12/2019    Class 3 severe obesity due to excess calories with body mass index (BMI) greater than or equal to 70 in adult [facility]) 06/16/2019    VHD (valvular heart disease) 04/02/2019    Pneumonia 02/06/2019    Skin ulcer of left foot with fat layer exposed ([address]     Cellulitis 11/24/2018    Acute on chronic respiratory failure with hypoxia and hypercapnia (HCC)     Gait disturbance 02/10/2018    Chronic obstructive pulmonary disease ([address] 02/07/2018    Chronic respiratory failure with hypoxia and hypercapnia ([address] 01/30/2018    Morbid obesity ([address]     Asthma     Diabetes mellitus ([address]     Hypertension     Sleep apnea     Family history of coronary artery disease     Chest pain     SOB (shortness of breath)     Palpitations     Peripheral edema     Aortic stenosis     Morbid obesity with BMI of 70 and over, adult ([address] 10/27/2016    Essential hypertension 10/27/2016    Dyslipidemia 10/27/2016    Fecal incontinence 10/27/2016    Mild intermittent asthma without complication     S/P laparoscopic cholecystectomy 10/27/2016    Type 2 diabetes mellitus without complication, with long-term current use of insulin ([address] 10/27/2016    Fatty liver 10/27/2016    Arthritis 10/27/2016    H/O parotidectomy 10/27/2016    Venous stasis dermatitis of both lower extremities 10/27/2016     Past Medical History:   Diagnosis Date    Acute kidney injury ([address] 2019    Aortic stenosis     Asthma     Bacteremia due to group B Streptococcus     Treated at [facility] in 2017 - felt to be due to cellulitis    Cancer [facility])     Cervical    Carotid artery stenosis     Chest pain     CHF (congestive heart failure) (HCC)     Chronic back pain     COPD exacerbation ([address]     Depression     Diabetes mellitus ([address]     Family history of coronary artery disease     Fatty liver 10/27/2016    GI bleed 2017    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 - [facility] H/O aortic valve stenosis     H/O echocardiogram 2016    EF 55%, Aortic valve area is 0.84 cm2 with a mean gradient of 36 suggestive of severe aortic stenosis, mild to mos LVH    Headache     Heart murmur     History of nuclear stress test 2016    lexiscan-normal,EF70%    Morbid obesity (HCC)     BMI=73.72 kg/m2    Neuropathy     NSTEMI (non-ST elevated myocardial infarction) ([address] 2018    Obesity     Osteoarthritis     Palpitations     Peripheral edema     Restless legs syndrome     Sleep apnea     Has a CPAP    Sleep apnea     SOB (shortness of breath)       Past Surgical History:   Procedure Laterality Date    AORTIC VALVE REPLACEMENT  2017    29mm EvolutR TAVR     SECTION      CHOLECYSTECTOMY      GALLBLADDER SURGERY      HYSTERECTOMY      IR NONTUNNELED VASCULAR CATHETER  2020    IR NONTUNNELED VASCULAR CATHETER 2020 [facility]ED[facility]    NECK SURGERY      Tumor    TUBAL LIGATION        Family History   Problem Relation Age of Onset    Heart Failure Mother     Heart Attack Mother     Hypertension Mother     Coronary Art Dis Mother         Had PTCA w/stenting.  Heart Failure Father     Heart Failure Brother     Heart Disease Mother     High Blood Pressure Mother     Obesity Mother     Diabetes Mother     Heart Disease Father     High Blood Pressure Father     Obesity Father     Heart Disease Brother     High Blood Pressure Brother     Obesity Brother       Infectious disease related family history - not contibutory. SOCIAL HISTORY  Social History     Tobacco Use    Smoking status: Former Smoker     Types: Cigarettes     Last attempt to quit: 3/19/2003     Years since quittin.4    Smokeless tobacco: Never Used    Tobacco comment: quit 15 years ago   Substance Use Topics    Alcohol use: No        ?  ALLERGIES  No Known Allergies   MEDICATIONS  Reviewed and are per the chart/EMR. ? Antibiotics:   Present:  Vancomycin -  Meropenem -    Past:  Cefepime   ?  -------------------------------------------------------------------------------------------------------------------    Vital Signs:  Vitals:    20 0901   BP: (!) 138/56   Pulse: 84   Resp: 15   Temp:    SpO2: 98%         Exam:    VS: noted; wt 419 lb (190.1 kg) 5'8\" Height  Gen: Sedated and ventilated, no distress  Skin: no stigmata of endocarditis  HEMT: AT/NC Oropharynx pink, moist, and without lesions or exudates; dentition in good state of repair. Oral ETT and OG intact. Eyes: PERRLA, EOMI, conjunctiva pink, sclera anicteric. Neck: Supple. Trachea midline. No LAD. Chest: no distress and CTA. Good air movement. Heart: RRR and no MRG. Abd: large, pendulous, soft, non-distended, no tenderness, no hepatomegaly. Normoactive bowel sounds. Yeast rash under all abdominal folds with malodor noted. Ext: no clubbing, cyanosis, non-pitting BLE edema  Catheter Site: without erythema or tenderness draining clear yellow urine. Vascath: Intact right IJ without erythema or edema at site  Neuro: Sedated and ventilated. CN 2-12 intact and no focal sensory or motor deficits    ? Diagnostic Studies: reviewed  8/25/20 IR nontunneled vascular catheter:  Impression    Successful ultrasound guided non-tunneled right internal jugular vein    triple-lumen dialysis catheter placement. 8/25/20 XR Chest Portable:  Impression    Perihilar airspace disease suggesting moderate pulmonary edema although    pneumonia is in the differential.  Along with cardiomegaly and pleural    effusions, this may represent CHF.  Findings have increased since prior exam.         Central line with tip in the SVC.  No pneumothorax. 8/25/20 US Retroperitoneal Limited:  Impression    Unremarkable ultrasound of the kidneys. 8/26/20 XR Abdomen:  Impression    Side hole of the enteric tube is noted within the distal body of the stomach    however the tip is not included. 8/26/20 XR Chest Portable:  Impression    Persistent congestive heart failure. 8/26/20 CT Chest WO Contrast:  Impression    Limited exam for reasons described above.         Areas of consolidation and nonrounded ground-glass opacity to the lungs    bilaterally.  Differential considerations include multifocal pneumonia,    pulmonary edema, and/or atelectasis or some combination thereof.         Small bilateral pleural effusions.         Stable cardiomegaly.           8/26/20 CT Head WO Contrast:  Impression    Partially limited study as above with no evident acute intracranial    abnormality. ??  I have examined this patient and available medical records on this date and have made the above observations, conclusions and recommendations.     Electronically signed by: Electronically signed by Lashawn Peterson.  THEODORE Shah CNP on 8/26/2020 at 9:25 AM

## 2020-08-26 NOTE — ANESTHESIA PROCEDURE NOTES
Airway  Date/Time: 8/25/2020 11:45 AM  Urgency: emergent    Difficult airway    General Information and Staff    Patient location during procedure: ICU  Resident/CRNA: THEODORE Galindo CRNA  Performed: resident/CRNA     Consent for Airway (if performed for an anesthetic, see related documentation for consents)  Patient identity confirmed: per hospital policy  Consent: The procedure was performed in an emergent situation. Verbal consent not obtained. Written consent not obtained. Risks and benefits: risks, benefits and alternatives were not discussed  Consent given by: by hospitalist at bedside.       Code status verified:yes  Indications and Patient Condition  Indications for airway management: respiratory distress  Spontaneous ventilation: present  Sedation level: moderate (conscious sedation)  Preoxygenated: yes  Patient position: sniffing  MILS not maintained throughout  Mask difficulty assessment: vent by bag mask    Final Airway Details  Final airway type: endotracheal airway      Successful airway: ETT  Cuffed: yes   Successful intubation technique: video laryngoscopy  Facilitating devices/methods: intubating stylet  Endotracheal tube insertion site: oral  Blade: Glory  Blade size: #4  ETT size (mm): 7.5  Cormack-Lehane Classification: grade IIa - partial view of glottis  Placement verified by: chest auscultation and capnometry   Measured from: lips  ETT to lips (cm): 22  Number of attempts at approach: 1  Ventilation between attempts: bag mask  Number of other approaches attempted: 0    no

## 2020-08-26 NOTE — PROGRESS NOTES
Tolerated 3 hour dialysis treatment well, removed 500 ml with treatment. Right non tunneled catheter used for access.

## 2020-08-26 NOTE — PROGRESS NOTES
Call initiated by: Nursing staff:  Havelock Laci  Call addressed around: 8/25/2020 11:14 PM      Reason for call: tachypnea with rr in high 20s, use of accessary muscles for breathing. K of 6.1 post HD, responsive to pain only. Orders placed: ABG improved however patient still responsive to pain only, using accessary respiratory muscles. Will intubate for airway protection and impending respiratory collapse. Obtain ct head to r/o stroke, ct chest w/o to evaluate pulmonary edema. BG of 117. Good urine output so far. Dr. Andrea Ramirez updated  Roe Horne.         Clau Collins, APRN - CNP

## 2020-08-26 NOTE — CONSULTS
CAD, STROKE of DM    No Known Allergies    fentanyl (SUBLIMAZE) 1,000 mcg in sodium chloride 0.9% 100 mL infusion, Continuous  albuterol sulfate  (90 Base) MCG/ACT inhaler 2 puff, 4x daily  ipratropium (ATROVENT HFA) 17 MCG/ACT inhaler 2 puff, 4x daily  [START ON 8/27/2020] meropenem (MERREM) 500 mg IVPB (mini-bag), Q24H  DOPamine (INTROPIN) 400 mg in dextrose 5 % 250 mL infusion, Continuous  sodium chloride flush 0.9 % injection 10 mL, 2 times per day  sodium chloride flush 0.9 % injection 10 mL, PRN  acetaminophen (TYLENOL) tablet 650 mg, Q6H PRN    Or  acetaminophen (TYLENOL) suppository 650 mg, Q6H PRN  pantoprazole (PROTONIX) injection 40 mg, Daily  heparin (porcine) injection 5,000 Units, 3 times per day  insulin lispro (HUMALOG) injection vial 0-6 Units, TID WC  insulin lispro (HUMALOG) injection vial 0-3 Units, Nightly  atorvastatin (LIPITOR) tablet 40 mg, Nightly  clopidogrel (PLAVIX) tablet 75 mg, Daily  doxepin (SINEQUAN) capsule 10 mg, Nightly  ferrous sulfate (IRON 325) tablet 325 mg, Daily with breakfast  gabapentin (NEURONTIN) capsule 400 mg, TID  ipratropium-albuterol (DUONEB) nebulizer solution 3 mL, Q6H PRN  pramipexole (MIRAPEX) tablet 1 mg, TID  rOPINIRole (REQUIP) tablet 2 mg, TID  vitamin D3 (CHOLECALCIFEROL) tablet 400 Units, Daily  norepinephrine (LEVOPHED) 16 mg in dextrose 5% 250 mL infusion, Continuous  sodium bicarbonate 75 mEq in sodium chloride 0.45 % 1,000 mL infusion, Continuous  miconazole (MICOTIN) 2 % powder, BID  vancomycin (VANCOCIN) intermittent dosing (placeholder), RX Placeholder  cetirizine (ZYRTEC) tablet 10 mg, Daily  glucose (GLUTOSE) 40 % oral gel 15 g, PRN  dextrose 50 % IV solution, PRN  glucagon (rDNA) injection 1 mg, PRN  dextrose 5 % solution, PRN  chlorhexidine (PERIDEX) 0.12 % solution 15 mL, BID  famotidine (PEPCID) injection 20 mg, Daily  propofol injection, Titrated      Current Facility-Administered Medications   Medication Dose Route Frequency Provider Last Rate Last Dose    fentanyl (SUBLIMAZE) 1,000 mcg in sodium chloride 0.9% 100 mL infusion  25 mcg/hr Intravenous Continuous Selin THEODORE Lyman CNP 2.5 mL/hr at 08/26/20 0125 25 mcg/hr at 08/26/20 0125    albuterol sulfate  (90 Base) MCG/ACT inhaler 2 puff  2 puff Inhalation 4x daily Iqra Conti MD   2 puff at 08/26/20 1131    ipratropium (ATROVENT HFA) 17 MCG/ACT inhaler 2 puff  2 puff Inhalation 4x daily Iqra Conti MD   2 puff at 08/26/20 1130    [START ON 8/27/2020] meropenem (MERREM) 500 mg IVPB (mini-bag)  500 mg Intravenous Q24H Kavita Stephens MD        DOPamine (INTROPIN) 400 mg in dextrose 5 % 250 mL infusion  2.5 mcg/kg/min Intravenous Continuous Ayesha Duke MD 74 mL/hr at 08/26/20 1320 10 mcg/kg/min at 08/26/20 1320    sodium chloride flush 0.9 % injection 10 mL  10 mL Intravenous 2 times per day THEODORE Pineda CNP   10 mL at 08/26/20 0819    sodium chloride flush 0.9 % injection 10 mL  10 mL Intravenous PRN THEODORE Pineda CNP        acetaminophen (TYLENOL) tablet 650 mg  650 mg Oral Q6H PRN THEODORE Pineda CNP        Or    acetaminophen (TYLENOL) suppository 650 mg  650 mg Rectal Q6H PRN THEODORE Pineda CNP        pantoprazole (PROTONIX) injection 40 mg  40 mg Intravenous Daily THEODORE Llnaos CNP   40 mg at 08/26/20 0819    heparin (porcine) injection 5,000 Units  5,000 Units Subcutaneous 3 times per day THEODORE Pineda CNP   5,000 Units at 08/26/20 0518    insulin lispro (HUMALOG) injection vial 0-6 Units  0-6 Units Subcutaneous TID WC THEODORE Llanos CNP   1 Units at 08/26/20 1154    insulin lispro (HUMALOG) injection vial 0-3 Units  0-3 Units Subcutaneous Nightly THEODORE Llanos CNP        atorvastatin (LIPITOR) tablet 40 mg  40 mg Oral Nightly THEODORE Llanos CNP        clopidogrel (PLAVIX) tablet 75 mg  75 mg Oral Daily THEODORE Llanos CNP   75 mg at 08/26/20 1154    doxepin (SINEQUAN) capsule 10 mg  10 mg Oral Nightly THEODORE Patino CNP        ferrous sulfate (IRON 325) tablet 325 mg  325 mg Oral Daily with breakfast THEODORE Patino CNP   325 mg at 08/26/20 1153    gabapentin (NEURONTIN) capsule 400 mg  400 mg Oral TID THEODORE Patino - CNP   400 mg at 08/26/20 1153    ipratropium-albuterol (DUONEB) nebulizer solution 3 mL  3 mL Inhalation Q6H PRN THEODORE Patino - LUNA        pramipexole (MIRAPEX) tablet 1 mg  1 mg Oral TID THEODORE Patino - CNP   1 mg at 08/26/20 1152    rOPINIRole (REQUIP) tablet 2 mg  2 mg Oral TID THEODORE Patino - CNP   2 mg at 08/26/20 1153    vitamin D3 (CHOLECALCIFEROL) tablet 400 Units  400 Units Oral Daily THEODORE Patino CNP   400 Units at 08/26/20 1153    norepinephrine (LEVOPHED) 16 mg in dextrose 5% 250 mL infusion  2 mcg/min Intravenous Continuous Joe EDUARDO Servin MD 2.8 mL/hr at 08/26/20 1228 3 mcg/min at 08/26/20 1228    sodium bicarbonate 75 mEq in sodium chloride 0.45 % 1,000 mL infusion   Intravenous Continuous Joe Peacock  mL/hr at 08/26/20 0933      miconazole (MICOTIN) 2 % powder   Topical BID Wade Bright MD        [facility]) intermittent dosing (placeholder)   Other RX Placeholder Wade Bright MD        cetirizine (ZYRTEC) tablet 10 mg  10 mg Oral Daily THEODORE Patino CNP   10 mg at 08/26/20 1155    glucose (GLUTOSE) 40 % oral gel 15 g  15 g Oral PRN Selin ALEXIS LymanN - CNP        dextrose 50 % IV solution  12.5 g Intravenous PRN Selin Esmer, APRN - CNP        glucagon (rDNA) injection 1 mg  1 mg Intramuscular PRN Selin Hennalabuffy, APRN - CNP        dextrose 5 % solution  100 mL/hr Intravenous PRN Selin Esmer, APRN - CNP        chlorhexidine (PERIDEX) 0.12 % solution 15 mL  15 mL Mouth/Throat BID Selin Dapilah, APRN - CNP   15 mL at 08/26/20 0817    famotidine (PEPCID) injection 20 mg  20 mg Intravenous Daily Selin Lyman normal  Cardiovascular: (PMI) apex non displaced,no lifts no thrills, no s3,no s4, Normal heart rate, Normal rhythm, No murmurs, No rubs, No gallops. Carotid arteries pulse and amplitude are normal no bruit, no abdominal bruit noted ( normal abdominal aorta ausculation), femoral arteries pulse and amplitude are normal no bruit, pedal pulses are normal  GI:  Bowel sounds normal, Soft, No tenderness, No masses, No pulsatile masses, no hepatosplenomegally, no bruits  : External genitalia appear normal, No masses or lesions. No discharge. No CVA tenderness. Musculoskeletal:  Intact distal pulses, No edema, No tenderness, No cyanosis, No clubbing. Good range of motion in all major joints. No tenderness to palpation or major deformities noted. Back- No tenderness. Integument:  Warm, Dry, No erythema, No rash. Skin: no rash, no ulcers  Lymphatic:  No lymphadenopathy noted. Neurologic:  Alert & oriented x 3, Normal motor function, Normal sensory function, No focal deficits noted. Psychiatric:  Affect normal, Judgment normal, Mood normal.   Lab Review   Recent Labs     08/26/20  0455 08/26/20  1200   WBC 8.8  --    HGB 9.5* 9.4*   HCT 31.8* 31.2*     --       Recent Labs     08/26/20  1200      K 3.9   CL 95*   CO2 30   BUN 47*   CREATININE 2.0*     Recent Labs     08/26/20  0455   AST 16   ALT 16   BILITOT 0.4   ALKPHOS 165*     No results for input(s): TROPONINI in the last 72 hours. No results found for: BNP  Lab Results   Component Value Date    INR 1.07 06/27/2020    PROTIME 13.0 06/27/2020         EKG:sinus bradycadia    Chest Xray: CHF    ECHO:normal LVEF  Labs, echo, meds reviewed  Assessment: 63 y. o.year old with PMH of  has a past medical history of Acute kidney injury ([facility] Aortic stenosis, Asthma, Bacteremia due to group B Streptococcus, Cancer ([facility] Carotid artery stenosis, Chest pain, CHF (congestive heart failure) ([facility] Chronic back pain, COPD exacerbation ([facility] Depression, Diabetes mellitus ([facility] Family history of coronary artery disease, Fatty liver, GI bleed, H/O aortic valve stenosis, H/O echocardiogram, Headache, Heart murmur, History of nuclear stress test, Morbid obesity ([facility] Neuropathy, NSTEMI (non-ST elevated myocardial infarction) ([facility] Obesity, Osteoarthritis, Palpitations, Peripheral edema, Restless legs syndrome, Sleep apnea, Sleep apnea, and SOB (shortness of breath). Recommendations:    1. BRADYCARDIA:PATIENT HAS BRADYCARDIA due to hyperkalemia,. ARF and metabolic derangement, her heart is in 50s, does not need pacer at this time, her telemetery gain was adjusted  2. H/O SEVERE OBESITYl; uncontrolled  3. H/O TAVR: stable  4. ARf\" RECOMMEDN TO DIALYZE  5. ANEMIA:NOT CONTROLLED,   6. Health maintenance: exerise and diet  All labs, medications and tests reviewed, continue all other medications of all above medical condition listed as is. Richard Guerra MD, 8/26/2020 2:53 PM         PATIENT HAS BRADYCARDIA due to hyperkalemia and metabolic derangement, her heart is in 50s, does not need pacer at this time, her telemetery gain was adjusted.  Full note to follow up

## 2020-08-26 NOTE — PROGRESS NOTES
[address] [facility]  consulted by Dr. Cristela Andrade for monitoring and adjustment. Indication for treatment: Sepsis, possible pneumonia  Goal trough: 15 mcg/mL     Pertinent Laboratory Values:   Temp Readings from Last 3 Encounters:   08/26/20 99 °F (37.2 °C) (Core)   07/06/20 97.8 °F (36.6 °C) (Oral)   06/04/20 98.4 °F (36.9 °C) (Oral)     Recent Labs     08/25/20  0950 08/25/20  1410 08/26/20  0455   WBC 5.2 6.7 8.8   LACTATE  --   --  1.2     Recent Labs     08/25/20  2015 08/26/20  0115 08/26/20  0455   BUN 89* 88* 88*   CREATININE 3.7* 3.6* 3.4*     Estimated Creatinine Clearance: 31 mL/min (A) (based on SCr of 3.4 mg/dL (H)). Intake/Output Summary (Last 24 hours) at 8/26/2020 1257  Last data filed at 8/26/2020 1101  Gross per 24 hour   Intake 4033.22 ml   Output 5975 ml   Net -1941.78 ml       Pertinent Cultures:  Date    Source    Results  8/25   Blood    Ordered  8/25   Urine    Ordered  8/25                            Resp Panel                              Ordered    Vancomycin level:   TROUGH:  No results for input(s): VANCOTROUGH in the last 72 hours. RANDOM:    Recent Labs     08/26/20  0455   VANCORANDOM 10.8       Assessment:  · WBC and temperature: WBC WNL, temperature improved  · SCr, BUN, and urine output: JOSE on CKD, HD today  · Day(s) of therapy: 2  · Vancomycin level: 10.8, ok to redose     Plan:  · Pulse dosing based on levels 2/2 RRT   · Received vancomycin 2000 mg x 1 yesterday   · Redose with 2000 mg x 1 and repeat level tomorrow morning   · Pharmacy will continue to monitor patient and adjust therapy as indicated    [address] 8/27 @06:00    Thank you for the consult.   Zenda Merlin, [address]  8/26/2020 12:57 PM

## 2020-08-26 NOTE — PLAN OF CARE
Ongoing     Problem: Aspiration:  Goal: Absence of aspiration  Description: Absence of aspiration  8/26/2020 1718 by Ewa Ballard RN  Outcome: Ongoing  8/26/2020 0338 by Yung Cintron RN  Outcome: Ongoing     Problem:  Bowel Function - Altered:  Goal: Bowel elimination is within specified parameters  Description: Bowel elimination is within specified parameters  8/26/2020 1718 by Ewa Ballard RN  Outcome: Ongoing  8/26/2020 0338 by Yung Cintron RN  Outcome: Ongoing     Problem: Cardiac Output - Decreased:  Goal: Hemodynamic stability will improve  Description: Hemodynamic stability will improve  8/26/2020 1718 by Ewa Ballard RN  Outcome: Ongoing  8/26/2020 0338 by Yung Cintron RN  Outcome: Ongoing     Problem: Fluid Volume - Imbalance:  Goal: Absence of imbalanced fluid volume signs and symptoms  Description: Absence of imbalanced fluid volume signs and symptoms  8/26/2020 1718 by Ewa Ballard RN  Outcome: Ongoing  8/26/2020 0338 by Yung Cintron RN  Outcome: Ongoing     Problem: Gas Exchange - Impaired:  Goal: Levels of oxygenation will improve  Description: Levels of oxygenation will improve  8/26/2020 1718 by Ewa Ballard RN  Outcome: Ongoing  8/26/2020 0338 by Yung Cintron RN  Outcome: Ongoing     Problem: Mental Status - Impaired:  Goal: Mental status will be restored to baseline  Description: Mental status will be restored to baseline  8/26/2020 1718 by Ewa Ballard RN  Outcome: Ongoing  8/26/2020 0338 by Yung Cintron RN  Outcome: Ongoing     Problem: Nutrition Deficit:  Goal: Ability to achieve adequate nutritional intake will improve  Description: Ability to achieve adequate nutritional intake will improve  8/26/2020 1718 by Ewa Ballard RN  Outcome: Ongoing  8/26/2020 0338 by Yung Cintron RN  Outcome: Ongoing     Problem: Pain:  Goal: Pain level will decrease  Description: Pain level will decrease  8/26/2020 1718 by Ewa Ballard RN  Outcome: Ongoing  8/26/2020 0338 by Sina Villatoro RN  Outcome: Ongoing  Goal: Recognizes and communicates pain  Description: Recognizes and communicates pain  8/26/2020 1718 by Abel Bone RN  Outcome: Ongoing  8/26/2020 0338 by Sina Villatoro RN  Outcome: Ongoing  Goal: Control of acute pain  Description: Control of acute pain  8/26/2020 1718 by Abel Bone RN  Outcome: Ongoing  8/26/2020 0338 by Sina Villatoro RN  Outcome: Ongoing  Goal: Control of chronic pain  Description: Control of chronic pain  8/26/2020 1718 by Abel Bone RN  Outcome: Ongoing  8/26/2020 0338 by Sina Villatoro RN  Outcome: Ongoing     Problem: Serum Glucose Level - Abnormal:  Goal: Ability to maintain appropriate glucose levels will improve to within specified parameters  Description: Ability to maintain appropriate glucose levels will improve to within specified parameters  8/26/2020 1718 by Abel Bone RN  Outcome: Ongoing  8/26/2020 0338 by Sina Villatoro RN  Outcome: Ongoing     Problem: Skin Integrity - Impaired:  Goal: Will show no infection signs and symptoms  Description: Will show no infection signs and symptoms  8/26/2020 1718 by Abel Bone RN  Outcome: Ongoing  8/26/2020 0338 by Sina Villatoro RN  Outcome: Ongoing  Goal: Absence of new skin breakdown  Description: Absence of new skin breakdown  8/26/2020 1718 by Abel Bone RN  Outcome: Ongoing  8/26/2020 0338 by Sina Villatoro RN  Outcome: Ongoing     Problem: Sleep Pattern Disturbance:  Goal: Appears well-rested  Description: Appears well-rested  8/26/2020 1718 by Abel Bnoe RN  Outcome: Ongoing  8/26/2020 0338 by Sina Villatoro RN  Outcome: Ongoing     Problem: Tissue Perfusion, Altered:  Goal: Circulatory function within specified parameters  Description: Circulatory function within specified parameters  8/26/2020 1718 by Abel Bone RN  Outcome: Ongoing  8/26/2020 0338 by Sina Villatoro RN  Outcome: Ongoing     Problem: Tissue Perfusion - Cardiopulmonary, Altered:  Goal: Absence of angina  Description: Absence of angina  8/26/2020 1718 by Abril Quinteros RN  Outcome: Ongoing  8/26/2020 0338 by Ciara Galicia RN  Outcome: Ongoing  Goal: Hemodynamic stability will improve  Description: Hemodynamic stability will improve  8/26/2020 1718 by Abril Quinteros RN  Outcome: Ongoing  8/26/2020 0338 by Ciara Galicia RN  Outcome: Ongoing     Problem: Confusion - Acute:  Goal: Mental status will be restored to baseline  Description: Mental status will be restored to baseline  8/26/2020 1718 by Abril Quinteros RN  Outcome: Ongoing  8/26/2020 0338 by Ciara Galicia RN  Outcome: Ongoing  Goal: Absence of continued neurological deterioration signs and symptoms  Description: Absence of continued neurological deterioration signs and symptoms  Outcome: Ongoing     Problem: Injury - Risk of, Physical Injury:  Goal: Will remain free from falls  Description: Will remain free from falls  8/26/2020 1718 by Abril Quinteros RN  Outcome: Ongoing  8/26/2020 0338 by Ciara Galicia RN  Outcome: Ongoing  Goal: Absence of physical injury  Description: Absence of physical injury  8/26/2020 1718 by Abril Quinteros RN  Outcome: Ongoing  8/26/2020 0338 by Ciara Galicia RN  Outcome: Ongoing     Problem: Mood - Altered:  Goal: Mood stable  Description: Mood stable  Outcome: Ongoing  Goal: Absence of abusive behavior  Description: Absence of abusive behavior  Outcome: Ongoing  Goal: Verbalizations of feeling emotionally comfortable while being cared for will increase  Description: Verbalizations of feeling emotionally comfortable while being cared for will increase  Outcome: Ongoing     Problem: Psychomotor Activity - Altered:  Goal: Absence of psychomotor disturbance signs and symptoms  Description: Absence of psychomotor disturbance signs and symptoms  Outcome: Ongoing     Problem: Sensory Perception - Impaired:  Goal: Demonstrations of improved sensory functioning will increase  Description: Demonstrations of improved sensory functioning will increase  Outcome: Ongoing  Goal: Decrease in sensory misperception frequency  Description: Decrease in sensory misperception frequency  Outcome: Ongoing  Goal: Able to refrain from responding to false sensory perceptions  Description: Able to refrain from responding to false sensory perceptions  Outcome: Ongoing  Goal: Demonstrates accurate environmental perceptions  Description: Demonstrates accurate environmental perceptions  Outcome: Ongoing  Goal: Able to distinguish between reality-based and nonreality-based thinking  Description: Able to distinguish between reality-based and nonreality-based thinking  Outcome: Ongoing  Goal: Able to interrupt nonreality-based thinking  Description: Able to interrupt nonreality-based thinking  Outcome: Ongoing     Problem: Restraint Use - Nonviolent/Non-Self-Destructive Behavior:  Goal: Absence of restraint indications  Description: Absence of restraint indications  Outcome: Ongoing  Goal: Absence of restraint-related injury  Description: Absence of restraint-related injury  Outcome: Ongoing

## 2020-08-26 NOTE — CONSULTS
Subjective:   CHIEF COMPLAINT / HPI:  63year old female admitted with weakness and sob. Which is mild. She denies any cough or hemoptysis.       Past Medical History:  Past Medical History:   Diagnosis Date    Acute kidney injury ([facility] 2019    Aortic stenosis     Asthma     Bacteremia due to group B Streptococcus     Treated at [facility] in 2017 - felt to be due to cellulitis    Cancer [facility])     Cervical    Carotid artery stenosis     Chest pain     CHF [facility]congestive heart failure) [facility])     Chronic back pain     [facility]     Depression     Diabetes mellitus ([facility]     Family history of coronary artery disease     Fatty liver 10/27/2016    GI bleed 2017    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 - [facility] at [facility] H/O aortic valve stenosis     H/O echocardiogram 2016    EF 55%, Aortic valve area is 0.84 cm2 with a mean gradient of 36 suggestive of severe aortic stenosis, mild to mos LVH    Headache     Heart murmur     History of nuclear stress test 2016    lexiscan-normal,EF70%    Morbid obesity ([facility])     BMI=73.72 kg/m2    Neuropathy     NSTEMI (non-ST elevated myocardial infarction) ([facility] 2018    Obesity     Osteoarthritis     Palpitations     Peripheral edema     Restless legs syndrome     Sleep apnea     Has a CPAP    Sleep apnea     SOB (shortness of breath)        Past Surgical History:        Procedure Laterality Date    AORTIC VALVE REPLACEMENT  2017    29mm EvolutR TAVR     SECTION      CHOLECYSTECTOMY      GALLBLADDER SURGERY      HYSTERECTOMY      NECK SURGERY      Tumor    TUBAL LIGATION         Current Medications:    Current Facility-Administered Medications: fentanyl (SUBLIMAZE) 1,000 mcg in sodium chloride 0.9% 100 mL infusion, 25 mcg/hr, Intravenous, Continuous  albuterol sulfate  (90 Base) MCG/ACT inhaler 2 puff, 2 puff, Inhalation, 4x daily  ipratropium (ATROVENT HFA) 17 MCG/ACT inhaler 2 mg, Oral, Daily  glucose (GLUTOSE) 40 % oral gel 15 g, 15 g, Oral, PRN  dextrose 50 % IV solution, 12.5 g, Intravenous, PRN  glucagon (rDNA) injection 1 mg, 1 mg, Intramuscular, PRN  dextrose 5 % solution, 100 mL/hr, Intravenous, PRN  chlorhexidine (PERIDEX) 0.12 % solution 15 mL, 15 mL, Mouth/Throat, BID  famotidine (PEPCID) injection 20 mg, 20 mg, Intravenous, Daily  propofol injection, 10 mcg/kg/min, Intravenous, Titrated    No Known Allergies    Social History:    Social History     Socioeconomic History    Marital status:      Spouse name: None    Number of children: None    Years of education: None    Highest education level: None   Occupational History    None   Social Needs    Financial resource strain: None    Food insecurity     Worry: None     Inability: None    Transportation needs     Medical: None     Non-medical: None   Tobacco Use    Smoking status: Former Smoker     Types: Cigarettes     Last attempt to quit: 3/19/2003     Years since quittin.4    Smokeless tobacco: Never Used    Tobacco comment: quit 15 years ago   Substance and Sexual Activity    Alcohol use: No    Drug use: No    Sexual activity: Never   Lifestyle    Physical activity     Days per week: None     Minutes per session: None    Stress: None   Relationships    Social connections     Talks on phone: None     Gets together: None     Attends [employer] service: None     Active member of club or organization: None     Attends meetings of clubs or organizations: None     Relationship status: None    Intimate partner violence     Fear of current or ex partner: None     Emotionally abused: None     Physically abused: None     Forced sexual activity: None   Other Topics Concern    None   Social History Narrative    ** Merged History Encounter **            Family History:   Family History   Problem Relation Age of Onset    Heart Failure Mother     Heart Attack Mother     Hypertension Mother     Coronary Art Dis Mother         Had PTCA w/stenting.  Heart Failure Father     Heart Failure Brother     Heart Disease Mother     High Blood Pressure Mother     Obesity Mother     Diabetes Mother     Heart Disease Father     High Blood Pressure Father     Obesity Father     Heart Disease Brother     High Blood Pressure Brother     Obesity Brother        Immunization:  Immunization History   Administered Date(s) Administered    Influenza Vaccine, unspecified formulation 10/02/2017    Influenza, Quadv, 6 mo and older, IM, PF (Flulaval, Fluarix) 11/25/2018    Influenza, Quadv, IM, PF (6 mo and older Fluzone, Flulaval, Fluarix, and 3 yrs and older Afluria) 09/25/2019         REVIEW OF SYSTEMS:    CONSTITUTIONAL:  negative for fevers, chills, diaphoresis, activity change, appetite change, fatigue, night sweats and unexpected weight change. EYES:  negative for blurred vision, eye discharge, visual disturbance and icterus  HEENT:  negative for hearing loss, tinnitus, ear drainage, sinus pressure, nasal congestion, epistaxis and snoring  RESPIRATORY:  See HPI  CARDIOVASCULAR:  negative for chest pain, palpitations, exertional chest pressure/discomfort, edema, syncope  GASTROINTESTINAL:  negative for nausea, vomiting, diarrhea, constipation, blood in stool and abdominal pain  GENITOURINARY:  negative for frequency, dysuria and hematuria  HEMATOLOGIC/LYMPHATIC:  negative for easy bruising, bleeding and lymphadenopathy  ALLERGIC/IMMUNOLOGIC:  negative for recurrent infections, angioedema, anaphylaxis and drug reactions  ENDOCRINE:  negative for weight changes and diabetic symptoms including polyuria, polydipsia and polyphagia    MUSCULOSKELETAL:  negative for  pain, joint swelling, decreased range of motion and muscle weakness  NEUROLOGICAL:  negative for headaches, slurred speech, unilateral weakness  PSYCHIATRIC/BEHAVIORAL: negative for hallucinations, behavioral problems, confusion and agitation.      Objective: PHYSICAL EXAM:      VITALS:    Vitals:    08/26/20 0400 08/26/20 0415 08/26/20 0500 08/26/20 0600   BP: (!) 113/44  (!) 90/49 (!) 120/48   Pulse: 73 73 77 64   Resp: 22 23 21 20   Temp: 100.9 °F (38.3 °C)      TempSrc: Bladder      SpO2: 96% 96% 96% 96%   Weight:       Height:             CONSTITUTIONAL:  awake, alert, cooperative, no apparent distress, and appears stated age  NECK:  Supple, symmetrical, trachea midline, no adenopathy, thyroid symmetric, not enlarged and no tenderness  CHEST: Chest expansion equal and symmetrical, no intercostal retraction. LUNGS:  no increased work of breathing, has expiratory wheezes both lungs, no crackles. CARDIOVASCULAR: S1 and S2, no edema and no JVD  ABDOMEN:  normal bowel sounds, non-distended and no masses palpated, and no tenderness to palpation. No hepatospleenomegaly  LYMPHADENOPATHY:  no axillary or supraclavicular adenopathy. No cervical adnenopathy  PSYCHIATRIC: Oriented to person place and time. No obvious depression or anxiety. MUSCULOSKELETAL: No obvious misalignment or effusion of the joints. No clubbing, cyanosis of the digits. RIGHT AND LEFT LOWER EXTREMITIES: No edema, no inflammation, no tenderness. SKIN:  normal skin color, texture, turgor and no redness, warmth, or swelling.  No palpable nodules    DATA:       EXAMINATION:    ONE XRAY VIEW OF THE CHEST         8/25/2020 10:15 am         COMPARISON:    6/26/2020         HISTORY:    ORDERING SYSTEM PROVIDED HISTORY: bradycardia    TECHNOLOGIST PROVIDED HISTORY:    Reason for exam:->bradycardia    Reason for Exam: steve   sepsis    fatigue    Acuity: Unknown    Type of Exam: Unknown    Relevant Medical/Surgical History: chf  copd         FINDINGS:    There is cardiomegaly.  Stent graft is present likely from prior TAVR    procedure.  There is extensive perihilar airspace disease likely representing    moderate pulmonary edema.  Small bilateral pleural effusion with left basilar    atelectasis.  No pneumothorax.  Central line is present with tip in the SVC. No pneumothorax.              Impression    Perihilar airspace disease suggesting moderate pulmonary edema although    pneumonia is in the differential.  Along with cardiomegaly and pleural    effusions, this may represent CHF.  Findings have increased since prior exam.         Central line with tip in the SVC.  No pneumothorax.              Order History     Open Order Details                    7.32/55/68      Assessment:     1. Ac on ch resp failure  2. Septic shock  3. chf  4. Ac copd  5. ronni  6. Ac renal failure          Plan:     1. Adequate o2 adm  2. Pap rx while asleep  3. Bd rx  4. antibx  5. Pressors as needed  6. Nephrology eval  7. Correct electrolytes  8.  Thanks will follow

## 2020-08-26 NOTE — PROGRESS NOTES
Nephrology Progress Note  8/26/2020 7:29 AM  Subjective:   Admit Date: 8/25/2020    PCP: THEODORE Alejandro - NP    Interval History: patient heading to CT for head and chest CT.   -IV calcium and kayexalate ordered for this am; anticipate RRT today     Diet: DIET RENAL; Carb Control: 4 carb choices (60 gms)/meal; Daily Fluid Restriction: 1800 ml      Data:   Scheduled Meds:   albuterol sulfate HFA  2 puff Inhalation 4x daily    ipratropium  2 puff Inhalation 4x daily    calcium gluconate IVPB  1 g Intravenous Once    sodium polystyrene  15 g Oral Once    sodium chloride flush  10 mL Intravenous 2 times per day    pantoprazole  40 mg Intravenous Daily    heparin (porcine)  5,000 Units Subcutaneous 3 times per day    insulin lispro  0-6 Units Subcutaneous TID WC    insulin lispro  0-3 Units Subcutaneous Nightly    atorvastatin  40 mg Oral Nightly    clopidogrel  75 mg Oral Daily    doxepin  10 mg Oral Nightly    ferrous sulfate  325 mg Oral Daily with breakfast    gabapentin  400 mg Oral TID    miconazole   Topical BID    pramipexole  1 mg Oral TID    rOPINIRole  2 mg Oral TID    vitamin D3  400 Units Oral Daily    miconazole   Topical BID    meropenem  500 mg Intravenous Q12H    vancomycin (VANCOCIN) intermittent dosing (placeholder)   Other RX Placeholder    cetirizine  10 mg Oral Daily    chlorhexidine  15 mL Mouth/Throat BID    famotidine (PEPCID) injection  20 mg Intravenous Daily     Continuous Infusions:   fentanyl 25 mcg/hr (08/26/20 0125)    DOPamine 15 mcg/kg/min (08/26/20 0515)    norepinephrine 6 mcg/min (08/26/20 0050)    IV infusion builder 150 mL/hr at 08/26/20 0134    dextrose      propofol 10 mcg/kg/min (08/26/20 0700)     PRN Meds:sodium chloride flush, acetaminophen **OR** acetaminophen, ipratropium-albuterol, glucose, dextrose, glucagon (rDNA), dextrose  I/O last 3 completed shifts:   In: 3933.2 [I.V.:3223.1; IV Piggyback:710.1]  Out: 0436 [Urine:2625; Emesis/NG output:250]  No intake/output data recorded. Intake/Output Summary (Last 24 hours) at 8/26/2020 0729  Last data filed at 8/26/2020 0550  Gross per 24 hour   Intake 3933.22 ml   Output 2875 ml   Net 1058.22 ml     CBC:   Recent Labs     08/25/20  0950 08/25/20  1410 08/26/20  0143 08/26/20  0455   WBC 5.2 6.7  --  8.8   HGB 8.0* 9.5* 10.0* 9.5*    219  --  187     BMP:    Recent Labs     08/25/20 2015 08/26/20  0115 08/26/20  0455   * 136 136   K 6.1* 6.2* 6.8*   CL 94* 95* 94*   CO2 28 27 27   BUN 89* 88* 88*   CREATININE 3.7* 3.6* 3.4*   GLUCOSE 94 121* 126*     Hepatic:   Recent Labs     08/25/20  0950 08/26/20  0455   AST 16 16   ALT 15 16   BILITOT 0.2 0.4   ALKPHOS 122 165*     Troponin: No results for input(s): TROPONINI in the last 72 hours. BNP: No results for input(s): BNP in the last 72 hours. Lipids: No results for input(s): CHOL, HDL in the last 72 hours. Invalid input(s): LDLCALCU  ABGs:   Lab Results   Component Value Date    PO2ART 68 08/26/2020    VAV2PCB 55.0 08/26/2020     INR: No results for input(s): INR in the last 72 hours.   Renal Labs  Albumin:    Lab Results   Component Value Date    LABALBU 3.7 08/26/2020     Calcium:    Lab Results   Component Value Date    CALCIUM 7.8 08/26/2020     Phosphorus:    Lab Results   Component Value Date    PHOS 3.2 07/02/2020     U/A:    Lab Results   Component Value Date    NITRU NEGATIVE 08/25/2020    COLORU YELLOW 08/25/2020    WBCUA 52044 08/25/2020    RBCUA 156 08/25/2020    MUCUS FEW 08/25/2020    TRICHOMONAS NONE SEEN 08/25/2020    YEAST OCCASIONAL 08/23/2019    BACTERIA MANY 08/25/2020    CLARITYU CLOUDY 08/25/2020    SPECGRAV 1.012 08/25/2020    UROBILINOGEN NORMAL 08/25/2020    BILIRUBINUR NEGATIVE 08/25/2020    BLOODU MODERATE 08/25/2020    KETUA NEGATIVE 08/25/2020     ABG:    Lab Results   Component Value Date    DOP8YRB 55.0 08/26/2020    PO2ART 68 08/26/2020    VRC8YTX 28.3 08/26/2020     HgBA1c:    Lab Results   Component Altered mental status     Wide-complex tachycardia (HCC)     Acute on chronic diastolic heart failure (HCC)     S/P TAVR (transcatheter aortic valve replacement)     Septic shock (HCC)     Acute kidney injury superimposed on CKD (HCC)    BP (!) 126/55   Pulse 84   Temp 100.9 °F (38.3 °C) (Bladder)   Resp 22   Ht 5' 8\" (1.727 m)   Wt (!) 419 lb 1.5 oz (190.1 kg)   SpO2 97%   BMI 63.72 kg/m²      General appearance:  Patient is intubated; somnolent   HEENT: Head: Normal, normocephalic, atraumatic. Neck: supple, symmetrical, trachea midline  Cardiovascular: S1 and S2: normal / no rub  Pulmonary: diminished lung sounds bilaterally  Abdomen:  soft / non-tender / large body habitus   Extremities: large body habitus  + edema to the bilateral lower legs     Impression and Plan:    Impression   1. JOSE (ATN vs. Pre-renal azotemia)  -likely multi-factorial etiology for JOSE including:  Diminished renal perfusion secondary to significant hypotension  Also consider sepsis as likely contributor to JOSE      2. Sepsis (likely Urosepsis) / lactic acidosis   3. Hyperkalemia   4. Bradycardia   5. Anemia      PLAN  1.   -uop: 2.6 liters in the last 24 hours via hernandez   -anticipate RRT today to treat hyperkalemia  -currently on bicarb drip at 150 / hour  2.     -BP trend: systolic BP's have recently been 90 - 120  -currently on dopamine and norepinephrine  -on meropenem and vanco   3.   -latest serum potassium: 6.8; following trend  -kayexalate and IV calcium ordered this am   -anticipate RRT to treat hyperkalemia   -BMP Q8H to follow serum potassium trend  4.   -recent pulse rate trend: 64 - 77 bpm   -followed by cardiology   5   -latest Hb: 9.5 and latest platelet count: 954 : follow trend    Electronically signed by THEODORE Patino - CNP                   Nephrology Attending Progress Note  8/26/2020 2:38 PM  Subjective:   Admit Date: 8/25/2020  PCP: THEODORE Garcias - NP    Interval History: I have personally performed face to face diagnostic evaluation on this patient. I have personally reviewed pertinent labs and imaging and agree with the care plan above. My additional findings are as follows:  Pt sedated on vent as intubated overnight and K increase again.  Doing urgent hd today am    Objective:   Vitals: BP (!) 105/51   Pulse 82   Temp 99 °F (37.2 °C) (Core)   Resp 15   Ht 5' 8\" (1.727 m)   Wt (!) 419 lb 1.5 oz (190.1 kg)   SpO2 99%   BMI 63.72 kg/m²   Sedated ett  Obese  Coarse bs    Assessment and Plan:  IMP:  As stated above    Plan     1 on vent and monitor  2 pressor support with septic shock and fu culture and on abx therapy  3 K increase today and correct with acute hd, post K stable and trend, if > 5.5 will start on crrt today  4 uop stable and felt atn and can recovre  5 monitor hb  Will follow and try wean vent             Electronically signed by Marichuy Pelayo MD on 8/26/2020 at 2:38 PM

## 2020-08-27 NOTE — CONSULTS
Comprehensive Nutrition Assessment    Type and Reason for Visit:  Reassess, Consult(TF order/mgt)    Nutrition Recommendations/Plan:   · Begin trophic EN with immune-enhancing formula (Pivot 1.5 Joseph) at 10 ml/hr  · Progress slowly as tolerated to goal rate of 55 ml/hr to provide 1980 kcal (2292 total with propofol) and 124 gm protein  · Will d/c diet order at this time    Nutrition Assessment:  Pt remains intubated on some propofol, not ready for weaning, ready for TF noted. MAP improving today without pressor. +UOP. Needs more aggressive removal of fluid noted per Pulmonology. Will order EN to begin trophically (10 ml/hr) and increase to goal slowly as tolerated. Stage II pressure injury noted.     Malnutrition Assessment:  Malnutrition Status:  Insufficient data    Context:  Acute Illness(unable to preform NFPE at this time d/t contact isolation)       Estimated Daily Nutrient Needs:  Energy (kcal):  6345-9867([facility] 2010)     Protein (g):  127-140(2.0-2.2 g/kg)     Fluid (ml/day):  (fluid management per nephrology)       Nutrition Related Findings:  droplet plus isolation      Wounds:  Stage II, Pressure Ulcer       Current Nutrition Therapies:    DIET RENAL; Carb Control: 4 carb choices (60 gms)/meal; Daily Fluid Restriction: 1800 ml    Anthropometric Measures:  · Height: 5' 8\" (172.7 cm)  · Current Body Weight: 415 lb 9.1 oz (188.5 kg)   · Admission Body Weight: 419 lb 1.5 oz (190.1 kg)    · Usual Body Weight: 465 lb 6.4 oz (211.1 kg)(8/22/19)     · Ideal Body Weight: 140 lbs; % Ideal Body Weight 296.8 %   · BMI: 63.2  · BMI Categories: Obese Class 3 (BMI 40.0 or greater)       Nutrition Diagnosis:   · Inadequate oral intake related to impaired respiratory function as evidenced by NPO or clear liquid status due to medical condition, intubation    Nutrition Interventions:   Food and/or Nutrient Delivery:  Start Tube Feeding  Nutrition Education/Counseling:  No recommendation at this time   Coordination of

## 2020-08-27 NOTE — PLAN OF CARE
Problem: Falls - Risk of:  Goal: Will remain free from falls  Description: Will remain free from falls  8/27/2020 1240 by Shelby Yip RN  Outcome: Ongoing  8/26/2020 2321 by Juan José Gore RN  Outcome: Ongoing  Goal: Absence of physical injury  Description: Absence of physical injury  8/27/2020 1240 by Shelby Yip RN  Outcome: Ongoing  8/26/2020 2321 by Juan José Gore RN  Outcome: Ongoing     Problem: Skin Integrity:  Goal: Will show no infection signs and symptoms  Description: Will show no infection signs and symptoms  8/27/2020 1240 by Shelby Yip RN  Outcome: Ongoing  8/26/2020 2321 by Juan José Gore RN  Outcome: Ongoing  Goal: Absence of new skin breakdown  Description: Absence of new skin breakdown  8/27/2020 1240 by Shelby Yip RN  Outcome: Ongoing  8/26/2020 2321 by Juan José Gore RN  Outcome: Ongoing     Problem: Discharge Planning:  Goal: Participates in care planning  Description: Participates in care planning  8/27/2020 1240 by Shelby Yip RN  Outcome: Ongoing  8/26/2020 2321 by Juan Jsoé Gore RN  Outcome: Ongoing  Goal: Discharged to appropriate level of care  Description: Discharged to appropriate level of care  8/27/2020 1240 by Shelby Yip RN  Outcome: Ongoing  8/26/2020 2321 by Juan José Gore RN  Outcome: Ongoing  Goal: Ability to perform activities of daily living will improve  Description: Ability to perform activities of daily living will improve  8/27/2020 1240 by Shelby Yip RN  Outcome: Ongoing  8/26/2020 2321 by Juan José Gore RN  Outcome: Ongoing     Problem: Airway Clearance - Ineffective:  Goal: Ability to maintain a clear airway will improve  Description: Ability to maintain a clear airway will improve  8/27/2020 1240 by Shelby Yip RN  Outcome: Ongoing  8/26/2020 2321 by Juan José Gore RN  Outcome: Ongoing     Problem: Anxiety/Stress:  Goal: Level of anxiety will decrease  Description: Level of anxiety will decrease  8/27/2020 1240 by Shelby Yip RN  Outcome: Ongoing  8/26/2020 2321 by Rosa Murphy RN  Outcome: Ongoing     Problem: Aspiration:  Goal: Absence of aspiration  Description: Absence of aspiration  8/27/2020 1240 by Letty Lam RN  Outcome: Ongoing  8/26/2020 2321 by Rosa Murphy RN  Outcome: Ongoing     Problem:  Bowel Function - Altered:  Goal: Bowel elimination is within specified parameters  Description: Bowel elimination is within specified parameters  8/27/2020 1240 by Letty Lam RN  Outcome: Ongoing  8/26/2020 2321 by Rosa Murphy RN  Outcome: Ongoing     Problem: Cardiac Output - Decreased:  Goal: Hemodynamic stability will improve  Description: Hemodynamic stability will improve  8/27/2020 1240 by Letty Lam RN  Outcome: Ongoing  8/26/2020 2321 by Rosa Murphy RN  Outcome: Ongoing     Problem: Fluid Volume - Imbalance:  Goal: Absence of imbalanced fluid volume signs and symptoms  Description: Absence of imbalanced fluid volume signs and symptoms  8/27/2020 1240 by Letty Lam RN  Outcome: Ongoing  8/26/2020 2321 by Rosa Murphy RN  Outcome: Ongoing     Problem: Gas Exchange - Impaired:  Goal: Levels of oxygenation will improve  Description: Levels of oxygenation will improve  8/27/2020 1240 by Letty Lam RN  Outcome: Ongoing  8/26/2020 2321 by Rosa Murphy RN  Outcome: Ongoing     Problem: Mental Status - Impaired:  Goal: Mental status will be restored to baseline  Description: Mental status will be restored to baseline  8/27/2020 1240 by Letty Lam RN  Outcome: Ongoing  8/26/2020 2321 by Rosa Murphy RN  Outcome: Ongoing     Problem: Nutrition Deficit:  Goal: Ability to achieve adequate nutritional intake will improve  Description: Ability to achieve adequate nutritional intake will improve  8/27/2020 1240 by Letty Lam RN  Outcome: Ongoing  8/26/2020 2321 by Rosa Murphy RN  Outcome: Ongoing     Problem: Pain:  Goal: Pain level will decrease  Description: Pain level will decrease  8/27/2020 1240 by Neel Butt Fred Craft RN  Outcome: Ongoing  8/26/2020 2321 by Dona Amaya RN  Outcome: Ongoing  Goal: Recognizes and communicates pain  Description: Recognizes and communicates pain  8/27/2020 1240 by Carmenza Yang RN  Outcome: Ongoing  8/26/2020 2321 by Dona Amaya RN  Outcome: Ongoing  Goal: Control of acute pain  Description: Control of acute pain  8/27/2020 1240 by Carmenza Yang RN  Outcome: Ongoing  8/26/2020 2321 by Dona Amaya RN  Outcome: Ongoing  Goal: Control of chronic pain  Description: Control of chronic pain  8/27/2020 1240 by Carmenza Yang RN  Outcome: Ongoing  8/26/2020 2321 by Dona Amaya RN  Outcome: Ongoing     Problem: Serum Glucose Level - Abnormal:  Goal: Ability to maintain appropriate glucose levels will improve to within specified parameters  Description: Ability to maintain appropriate glucose levels will improve to within specified parameters  8/27/2020 1240 by Carmenza Yang RN  Outcome: Ongoing  8/26/2020 2321 by Dona Amaya RN  Outcome: Ongoing     Problem: Skin Integrity - Impaired:  Goal: Will show no infection signs and symptoms  Description: Will show no infection signs and symptoms  8/27/2020 1240 by Carmenza Yang RN  Outcome: Ongoing  8/26/2020 2321 by Dona Amaya RN  Outcome: Ongoing  Goal: Absence of new skin breakdown  Description: Absence of new skin breakdown  8/27/2020 1240 by Carmenza Yang RN  Outcome: Ongoing  8/26/2020 2321 by Dona Amaya RN  Outcome: Ongoing     Problem: Sleep Pattern Disturbance:  Goal: Appears well-rested  Description: Appears well-rested  8/27/2020 1240 by Carmenza Yang RN  Outcome: Ongoing  8/26/2020 2321 by Dona Amaya RN  Outcome: Ongoing     Problem: Tissue Perfusion, Altered:  Goal: Circulatory function within specified parameters  Description: Circulatory function within specified parameters  8/27/2020 1240 by Carmenza Yang RN  Outcome: Ongoing  8/26/2020 2321 by Dona Amaya RN  Outcome: Ongoing     Problem: Tissue Perfusion Ongoing  8/26/2020 2321 by Robert Hines RN  Outcome: Ongoing     Problem: Psychomotor Activity - Altered:  Goal: Absence of psychomotor disturbance signs and symptoms  Description: Absence of psychomotor disturbance signs and symptoms  8/27/2020 1240 by Sweta Camacho RN  Outcome: Ongoing  8/26/2020 2321 by Robert Hines RN  Outcome: Ongoing     Problem: Sensory Perception - Impaired:  Goal: Demonstrations of improved sensory functioning will increase  Description: Demonstrations of improved sensory functioning will increase  8/27/2020 1240 by Sweta Camacho RN  Outcome: Ongoing  8/26/2020 2321 by Robert Hines RN  Outcome: Ongoing  Goal: Decrease in sensory misperception frequency  Description: Decrease in sensory misperception frequency  8/27/2020 1240 by Sweta Camacho RN  Outcome: Ongoing  8/26/2020 2321 by Robert Hines RN  Outcome: Ongoing  Goal: Able to refrain from responding to false sensory perceptions  Description: Able to refrain from responding to false sensory perceptions  8/27/2020 1240 by Sweta Camacho RN  Outcome: Ongoing  8/26/2020 2321 by Robert Hines RN  Outcome: Ongoing  Goal: Demonstrates accurate environmental perceptions  Description: Demonstrates accurate environmental perceptions  8/27/2020 1240 by Sweta Camacho RN  Outcome: Ongoing  8/26/2020 2321 by Robert Hines RN  Outcome: Ongoing  Goal: Able to distinguish between reality-based and nonreality-based thinking  Description: Able to distinguish between reality-based and nonreality-based thinking  8/27/2020 1240 by Sweta Camacho RN  Outcome: Ongoing  8/26/2020 2321 by Robert Hines RN  Outcome: Ongoing  Goal: Able to interrupt nonreality-based thinking  Description: Able to interrupt nonreality-based thinking  8/27/2020 1240 by Sweta Camacho RN  Outcome: Ongoing  8/26/2020 2321 by Robert Hines RN  Outcome: Ongoing     Problem: Restraint Use - Nonviolent/Non-Self-Destructive Behavior:  Goal: Absence of restraint indications  Description: Absence of restraint indications  8/27/2020 1240 by Mikaela Avalos RN  Outcome: Ongoing  8/26/2020 2321 by Fadi Dubose RN  Outcome: Ongoing  Goal: Absence of restraint-related injury  Description: Absence of restraint-related injury  8/27/2020 1240 by Mikaela Avalos RN  Outcome: Ongoing  8/26/2020 2321 by Fadi Dubose RN  Outcome: Ongoing

## 2020-08-27 NOTE — PROGRESS NOTES
Infectious Disease Progress Note  2020   Patient Name: Lisa Hamlin : 1955   Impression  · Septic Shock Secondary to Unclear Source  § Fever max 101.1 F  § No leukocytosis  § Blood cultures -0/2-NGTD  § Hypotension, vasopressors x 2 onboard, weaned off   § CT Chest WO Contrast: Areas of consolidation and nonrounded GGO to the lungs bilaterally. Differential considerations include multifocal pneumonia, pulmonary edema, and/or atelectasis. · Possible UTI:  § Urine culture -GNR <10,000  § UA -WBC 12,367,   § Past Urine culture 1/10/20: Proteus mirabilis  § Past Urine culture 10/29/19: Klebsiella pneumoniae (ESBL)  ? Morbid Obesity  ? Hypotension and Bradycardia: Cardiology onboard, 2 vasopressors onboard  ? DMII  ? JOSE: (ATN vs Pre-renal azotemia):  Dr. John Baires onboard, hyperkalemia, HD started, will consider CRRT if no improvement  ? Cervical Cancer  ? HFpEF  ? Acute Hypoxic Respiratory Failure:  Intubated, mechanically ventilated   § Dr. Socrates Damian onboard  § Rapid COVID-19 Negative   ?    · Multi-morbidity: per PMHx:  Morbid obesity, asthma, cervical cancer, CHF, COPD, DMII, NSTEMI, OA, ANMOL on CPAP, choley, hyster, TAVR  Plan:  · Continue meropenem and vancomycin  · Pct q48h x 3  ? CRP today and x 3  ? Sputum culture    Ongoing Antimicrobial Therapy  Vancomycin -  Meropenem -? Completed Antimicrobial Therapy  Cefepime   ? Review of Systems   Unable to perform ROS: Intubated     Physical Exam:  Vital Signs: BP (!) 102/50   Pulse 90   Temp 98.1 °F (36.7 °C) (Core)   Resp 16   Ht 5' 7.99\" (1.727 m)   Wt (!) 419 lb 1.5 oz (190.1 kg)   SpO2 100%   BMI 63.74 kg/m²     Gen: Sedated and ventilated, no distress  Skin: no stigmata of endocarditis  HEMT: AT/NC Oropharynx pink, moist, and without lesions or exudates; dentition in good state of repair. Oral ETT and OG intact. Eyes: PERRLA, EOMI, conjunctiva pink, sclera anicteric. Neck: Supple.  Trachea midline. No LAD. Chest: no distress and CTA. Good air movement. Heart: RRR and no MRG. Abd: large, pendulous, soft, non-distended, no tenderness, no hepatomegaly. Normoactive bowel sounds. Yeast rash under all abdominal folds with malodor noted. Ext: no clubbing, cyanosis, non-pitting BLE edema  Catheter Site: without erythema or tenderness draining clear yellow urine. Vascath: Intact right IJ without erythema or edema at site  Neuro: Sedated and ventilated. CN 2-12 intact and no focal sensory or motor deficits     Radiologic / Imaging / TESTING  8/25/20 IR nontunneled vascular catheter:  Impression    Successful ultrasound guided non-tunneled right internal jugular vein    triple-lumen dialysis catheter placement.       8/25/20 XR Chest Portable:  Impression    Perihilar airspace disease suggesting moderate pulmonary edema although    pneumonia is in the differential.  Along with cardiomegaly and pleural    effusions, this may represent CHF.  Findings have increased since prior exam.         Central line with tip in the SVC.  No pneumothorax.      8/25/20 US Retroperitoneal Limited:  Impression    Unremarkable ultrasound of the kidneys.       8/26/20 XR Abdomen:  Impression    Side hole of the enteric tube is noted within the distal body of the stomach    however the tip is not included.       8/26/20 XR Chest Portable:  Impression    Persistent congestive heart failure.       8/26/20 CT Chest WO Contrast:  Impression    Limited exam for reasons described above.         Areas of consolidation and nonrounded ground-glass opacity to the lungs    bilaterally.  Differential considerations include multifocal pneumonia,    pulmonary edema, and/or atelectasis or some combination thereof.         Small bilateral pleural effusions.         Stable cardiomegaly.            8/26/20 CT Head WO Contrast:  Impression    Partially limited study as above with no evident acute intracranial    abnormality.       8/27/20 XR 2.2 (H) 0.6 - 1.1 MG/DL    Glucose 154 (H) 70 - 99 MG/DL    Calcium 8.2 (L) 8.3 - 10.6 MG/DL    GFR Non- 23 (L) >60 mL/min/1.73m2    GFR  27 (L) >60 mL/min/1.73m2   POCT Glucose    Collection Time: 08/26/20 10:19 PM   Result Value Ref Range    POC Glucose 143 (H) 70 - 99 MG/DL   CBC auto differential    Collection Time: 08/27/20  5:00 AM   Result Value Ref Range    WBC 5.5 4.0 - 10.5 K/CU MM    RBC 3.45 (L) 4.2 - 5.4 M/CU MM    Hemoglobin 8.7 (L) 12.5 - 16.0 GM/DL    Hematocrit 28.0 (L) 37 - 47 %    MCV 81.2 78 - 100 FL    MCH 25.2 (L) 27 - 31 PG    MCHC 31.1 (L) 32.0 - 36.0 %    RDW 21.9 (H) 11.7 - 14.9 %    Platelets 446 (L) 164 - 440 K/CU MM    MPV 9.9 7.5 - 11.1 FL    Differential Type AUTOMATED DIFFERENTIAL     Segs Relative 71.9 (H) 36 - 66 %    Lymphocytes % 15.3 (L) 24 - 44 %    Monocytes % 10.6 (H) 0 - 4 %    Eosinophils % 0.9 0 - 3 %    Basophils % 0.4 0 - 1 %    Segs Absolute 3.9 K/CU MM    Lymphocytes Absolute 0.8 K/CU MM    Monocytes Absolute 0.6 K/CU MM    Eosinophils Absolute 0.1 K/CU MM    Basophils Absolute 0.0 K/CU MM    Nucleated RBC % 0.0 %    Total Nucleated RBC 0.0 K/CU MM    Total Immature Neutrophil 0.05 K/CU MM    Immature Neutrophil % 0.9 (H) 0 - 0.43 %   Comprehensive Metabolic Panel w/ Reflex to MG    Collection Time: 08/27/20  5:00 AM   Result Value Ref Range    Sodium 141 135 - 145 MMOL/L    Potassium 4.1 3.5 - 5.1 MMOL/L    Chloride 99 99 - 110 mMol/L    CO2 33 (H) 21 - 32 MMOL/L    BUN 41 (H) 6 - 23 MG/DL    CREATININE 2.1 (H) 0.6 - 1.1 MG/DL    Glucose 134 (H) 70 - 99 MG/DL    Calcium 8.6 8.3 - 10.6 MG/DL    Alb 3.4 3.4 - 5.0 GM/DL    Total Protein 6.4 6.4 - 8.2 GM/DL    Total Bilirubin 0.7 0.0 - 1.0 MG/DL    ALT 12 10 - 40 U/L    AST 12 (L) 15 - 37 IU/L    Alkaline Phosphatase 140 (H) 40 - 128 IU/L    GFR Non- 24 (L) >60 mL/min/1.73m2    GFR  29 (L) >60 mL/min/1.73m2    Anion Gap 9 4 - 16   C-Reactive Protein Collection Time: 08/27/20  5:00 AM   Result Value Ref Range    CRP, High Sensitivity 273.3 mg/L   Procalcitonin    Collection Time: 08/27/20  5:00 AM   Result Value Ref Range    Procalcitonin 1.47    Vancomycin, random    Collection Time: 08/27/20  5:00 AM   Result Value Ref Range    Vancomycin Rm 16.7 UG/ML    DOSE AMOUNT DOSE AMT.  GIVEN - =     DOSE TIME DOSE TIME GIVEN - =    Lactic acid, plasma    Collection Time: 08/27/20  5:10 AM   Result Value Ref Range    Lactate 0.9 0.4 - 2.0 mMOL/L   Blood gas, arterial    Collection Time: 08/27/20  6:00 AM   Result Value Ref Range    pH, Bld 7.37 7.34 - 7.45    pCO2, Arterial 62.0 (H) 32 - 45 MMHG    pO2, Arterial 71 (L) 75 - 100 MMHG    Base Exc, Mixed 8.2 (H) 0 - 2.3    HCO3, Arterial 35.8 (H) 18 - 23 MMOL/L    CO2 Content 37.7 (H) 19 - 24 MMOL/L    O2 Sat 92.7 (L) 96 - 97 %    Carbon Monoxide, Blood 2.0 0 - 5 %    Methemoglobin, Arterial 1.3 <1.5 %    Comment AC 16 450 55% +5    POCT Glucose    Collection Time: 08/27/20  8:58 AM   Result Value Ref Range    POC Glucose 130 (H) 70 - 99 MG/DL     CULTURE results: Invalid input(s): BLOOD CULTURE,  URINE CULTURE, SURGICAL CULTURE    Diagnosis:  Patient Active Problem List   Diagnosis    Morbid obesity with BMI of 70 and over, adult ([facility]    Essential hypertension    Dyslipidemia    Fecal incontinence    Mild intermittent asthma without complication    S/P laparoscopic cholecystectomy    Type 2 diabetes mellitus without complication, with long-term current use of insulin (HCC)    Fatty liver    Arthritis    H/O parotidectomy    Venous stasis dermatitis of both lower extremities    Aortic stenosis    Morbid obesity (HCC)    Asthma    Diabetes mellitus (HCC)    Hypertension    Sleep apnea    Family history of coronary artery disease    Chest pain    SOB (shortness of breath)    Palpitations    Peripheral edema    Hypervolemia    Chronic respiratory failure with hypoxia and hypercapnia (HCC)    Acute exacerbation of chronic obstructive pulmonary disease (COPD) (HCC)    Chronic obstructive pulmonary disease (HCC)    Gait disturbance    Acute on chronic respiratory failure with hypoxia and hypercapnia ([facility])    Cellulitis    Skin ulcer of left foot with fat layer exposed ([address]    Pneumonia    VHD (valvular heart disease)    Class 3 severe obesity due to excess calories with body mass index (BMI) greater than or equal to 70 in adult ([address]    SIRS (systemic inflammatory response syndrome) (HCC)    Acute kidney injury ([address]    Acute metabolic encephalopathy    Shock, unspecified ([address]    Uncontrolled diabetes mellitus ([address]    Sepsis ([address]    Altered mental status    Wide-complex tachycardia ([address]    Acute on chronic diastolic heart failure (HCC)    S/P TAVR (transcatheter aortic valve replacement)    Septic shock (HCC)    Acute kidney injury superimposed on CKD (HCC)    Bradycardia       Active Problems  Principal Problem:    Septic shock (HCC)  Active Problems:    Acute kidney injury superimposed on CKD (HCC)    Bradycardia  Resolved Problems:    * No resolved hospital problems. *    Electronically signed by: Electronically signed by Jorge Luis Bautista.  THEODORE Shah CNP on 8/27/2020 at 9:17 AM

## 2020-08-27 NOTE — CONSULTS
Consult completed. #5Fr Triple Lumen PowerPICC HF initiated to LUE Basilic Vein using sterile, UltraSound-guided technique without difficulty/complications. Positioning verified via TPS & 3CG with appropriate P-wave changes noted. Sterile dressing with SkinPrep, StatLock Securing Device, BioPatch, SwabCaps, and Limb Precautions band applied. Pt tolerated well without s/s of pain/anxiety/distress or requiring additional sedation. No other c/o or needs noted or reported. Andrew, Primary RN notified.

## 2020-08-27 NOTE — PROGRESS NOTES
No negative pressure rooms available at this time on unit. No ICU beds available in COVID at this time. Per supervisor will move as soon as able to.

## 2020-08-27 NOTE — PROGRESS NOTES
Patient failed SAT. Fi02 70%. Respiratory attempting to turn down at this time to 60%. Will continue to monitor.

## 2020-08-27 NOTE — PROGRESS NOTES
[address] [facility]  consulted by Dr. Oscar Smith for monitoring and adjustment. Indication for treatment: Sepsis, possible pneumonia  Goal trough: 15 mcg/mL     Pertinent Laboratory Values:   Temp Readings from Last 3 Encounters:   08/27/20 98.1 °F (36.7 °C) (Core)   07/06/20 97.8 °F (36.6 °C) (Oral)   06/04/20 98.4 °F (36.9 °C) (Oral)     Recent Labs     08/25/20  1410 08/26/20  0455 08/27/20  0500 08/27/20  0510   WBC 6.7 8.8 5.5  --    LACTATE  --  1.2  --  0.9     Recent Labs     08/26/20  1200 08/26/20  2130 08/27/20  0500   BUN 47* 45* 41*   CREATININE 2.0* 2.2* 2.1*     Estimated Creatinine Clearance: 50 mL/min (A) (based on SCr of 2.1 mg/dL (H)). Intake/Output Summary (Last 24 hours) at 8/27/2020 1130  Last data filed at 8/27/2020 1001  Gross per 24 hour   Intake 6165.52 ml   Output 7345 ml   Net -1179.48 ml       Pertinent Cultures:  Date    Source    Results  8/25   Blood    NGTD  8/25   Urine    GNR  8/25                            Resp Panel                              Negative    Vancomycin level:   TROUGH:  No results for input(s): VANCOTROUGH in the last 72 hours. RANDOM:    Recent Labs     08/26/20  0455 08/27/20  0500   VANCORANDOM 10.8 16.7       Assessment:  · WBC and temperature: WBC WNL, temperature improved  · SCr, BUN, and urine output: JOSE on CKD, no HD today  · Day(s) of therapy: 3  · Vancomycin level: 16.7, ok to redose     Plan:  · Pulse dosing based on levels 2/2 RRT   · Received vancomycin 2000 mg x 1 yesterday   · Redose with 2000 mg x 1 and repeat level tomorrow morning   · Pharmacy will continue to monitor patient and adjust therapy as indicated    [address] 8/28 @06:00    Thank you for the consult.   Bradford Peguero, [address]  8/27/2020 11:30 AM

## 2020-08-27 NOTE — PLAN OF CARE
RN  Outcome: Ongoing  8/26/2020 1718 by Neftali Wall RN  Outcome: Ongoing     Problem: Aspiration:  Goal: Absence of aspiration  Description: Absence of aspiration  8/26/2020 2321 by Reyna Nazario RN  Outcome: Ongoing  8/26/2020 1718 by Neftali Wall RN  Outcome: Ongoing     Problem:  Bowel Function - Altered:  Goal: Bowel elimination is within specified parameters  Description: Bowel elimination is within specified parameters  8/26/2020 2321 by Reyna Nazario RN  Outcome: Ongoing  8/26/2020 1718 by Neftali Wall RN  Outcome: Ongoing     Problem: Cardiac Output - Decreased:  Goal: Hemodynamic stability will improve  Description: Hemodynamic stability will improve  8/26/2020 2321 by Reyna Nazario RN  Outcome: Ongoing  8/26/2020 1718 by Neftali Wall RN  Outcome: Ongoing     Problem: Fluid Volume - Imbalance:  Goal: Absence of imbalanced fluid volume signs and symptoms  Description: Absence of imbalanced fluid volume signs and symptoms  8/26/2020 2321 by Reyna Nazario RN  Outcome: Ongoing  8/26/2020 1718 by Neftali Wall RN  Outcome: Ongoing     Problem: Gas Exchange - Impaired:  Goal: Levels of oxygenation will improve  Description: Levels of oxygenation will improve  8/26/2020 2321 by Reyna Nazario RN  Outcome: Ongoing  8/26/2020 1718 by Neftali Wall RN  Outcome: Ongoing     Problem: Mental Status - Impaired:  Goal: Mental status will be restored to baseline  Description: Mental status will be restored to baseline  8/26/2020 2321 by Reyna Nazario RN  Outcome: Ongoing  8/26/2020 1718 by Neftali Wall RN  Outcome: Ongoing     Problem: Nutrition Deficit:  Goal: Ability to achieve adequate nutritional intake will improve  Description: Ability to achieve adequate nutritional intake will improve  8/26/2020 2321 by Reyna Nazario RN  Outcome: Ongoing  8/26/2020 1718 by Neftali Wall RN  Outcome: Ongoing     Problem: Pain:  Goal: Pain level will decrease  Description: Pain level will decrease  8/26/2020 2321 by Ramon Joseph RN  Outcome: Ongoing  8/26/2020 1718 by Maria Teresa Bourgeois RN  Outcome: Ongoing  Goal: Recognizes and communicates pain  Description: Recognizes and communicates pain  8/26/2020 2321 by Ramon Joseph RN  Outcome: Ongoing  8/26/2020 1718 by Maria Teresa Bourgeois RN  Outcome: Ongoing  Goal: Control of acute pain  Description: Control of acute pain  8/26/2020 2321 by Ramon Joseph RN  Outcome: Ongoing  8/26/2020 1718 by Maria Teresa Bourgeois RN  Outcome: Ongoing  Goal: Control of chronic pain  Description: Control of chronic pain  8/26/2020 2321 by Ramon Joseph RN  Outcome: Ongoing  8/26/2020 1718 by Maria Teresa Bourgeois RN  Outcome: Ongoing     Problem: Serum Glucose Level - Abnormal:  Goal: Ability to maintain appropriate glucose levels will improve to within specified parameters  Description: Ability to maintain appropriate glucose levels will improve to within specified parameters  8/26/2020 2321 by Ramon Joseph RN  Outcome: Ongoing  8/26/2020 1718 by Maria Teresa Bourgeois RN  Outcome: Ongoing     Problem: Skin Integrity - Impaired:  Goal: Will show no infection signs and symptoms  Description: Will show no infection signs and symptoms  8/26/2020 2321 by Ramon Joseph RN  Outcome: Ongoing  8/26/2020 1718 by Maria Teresa Bourgeois RN  Outcome: Ongoing  Goal: Absence of new skin breakdown  Description: Absence of new skin breakdown  8/26/2020 2321 by Ramon Joseph RN  Outcome: Ongoing  8/26/2020 1718 by Maria Teresa Bourgeois RN  Outcome: Ongoing     Problem: Sleep Pattern Disturbance:  Goal: Appears well-rested  Description: Appears well-rested  8/26/2020 2321 by Ramon Joseph RN  Outcome: Ongoing  8/26/2020 1718 by Maria Teresa Bourgeois RN  Outcome: Ongoing     Problem: Tissue Perfusion, Altered:  Goal: Circulatory function within specified parameters  Description: Circulatory function within specified parameters  8/26/2020 2321 by Ramon Joseph RN  Outcome: Ongoing  8/26/2020 1718 by Maria Teresa Bourgeois RN  Outcome: Ongoing     Problem: Tissue Perfusion - Cardiopulmonary, Altered:  Goal: Absence of angina  Description: Absence of angina  8/26/2020 2321 by David Chang RN  Outcome: Ongoing  8/26/2020 1718 by Benton Kayser, RN  Outcome: Ongoing  Goal: Hemodynamic stability will improve  Description: Hemodynamic stability will improve  8/26/2020 2321 by David Chang RN  Outcome: Ongoing  8/26/2020 1718 by Benton Kayser, RN  Outcome: Ongoing     Problem: Confusion - Acute:  Goal: Mental status will be restored to baseline  Description: Mental status will be restored to baseline  8/26/2020 2321 by David Chang RN  Outcome: Ongoing  8/26/2020 1718 by Benton Kayser, RN  Outcome: Ongoing  Goal: Absence of continued neurological deterioration signs and symptoms  Description: Absence of continued neurological deterioration signs and symptoms  8/26/2020 2321 by David Chang RN  Outcome: Ongoing  8/26/2020 1718 by Benton Kayser, RN  Outcome: Ongoing     Problem: Injury - Risk of, Physical Injury:  Goal: Will remain free from falls  Description: Will remain free from falls  8/26/2020 2321 by David Chang RN  Outcome: Ongoing  8/26/2020 1718 by Benton Kayser, RN  Outcome: Ongoing  Goal: Absence of physical injury  Description: Absence of physical injury  8/26/2020 2321 by David Chang RN  Outcome: Ongoing  8/26/2020 1718 by Benton Kayser, RN  Outcome: Ongoing     Problem: Mood - Altered:  Goal: Mood stable  Description: Mood stable  8/26/2020 2321 by David Chang RN  Outcome: Ongoing  8/26/2020 1718 by Benton Kayser, RN  Outcome: Ongoing  Goal: Absence of abusive behavior  Description: Absence of abusive behavior  8/26/2020 2321 by David Chang RN  Outcome: Ongoing  8/26/2020 1718 by Benton Kayser, RN  Outcome: Ongoing  Goal: Verbalizations of feeling emotionally comfortable while being cared for will increase  Description: Verbalizations of feeling emotionally comfortable while being cared for will increase  8/26/2020 2321 by David Chang indications  Description: Absence of restraint indications  8/26/2020 2321 by Carolina Cooper RN  Outcome: Ongoing  8/26/2020 1718 by Mary Ellen Guillermo RN  Outcome: Ongoing  Goal: Absence of restraint-related injury  Description: Absence of restraint-related injury  8/26/2020 2321 by Carolina Cooper RN  Outcome: Ongoing  8/26/2020 1718 by MaryE llen Guillermo RN  Outcome: Ongoing

## 2020-08-27 NOTE — PROGRESS NOTES
pulmonary      SUBJECTIVE: intubated on vent     OBJECTIVE    VITALS:  BP (!) 106/50   Pulse 89   Temp 97.5 °F (36.4 °C) (Core)   Resp 20   Ht 5' 7.99\" (1.727 m)   Wt (!) 419 lb 1.5 oz (190.1 kg)   SpO2 96%   BMI 63.74 kg/m²   HEAD AND FACE EXAM:  No throat injection, no active exudate,no thrush  NECK EXAM;No JVD, no masses, symmetrical  CHEST EXAM; Expansion equal and symmetrical, no masses  LUNG EXAM; Good breath sounds bilaterally. There are expiratory wheezes both lungs, there are crackles at both lung bases  CARDIOVASCULAR EXAM: Positive S1 and S2, no S3 or S4, no clicks ,no murmurs  RIGHT AND LEFT LOWER EXTRIMITY EXAM: No edema, no swelling, no inflamation            LABS   Lab Results   Component Value Date    WBC 5.5 08/27/2020    HGB 8.7 (L) 08/27/2020    HCT 28.0 (L) 08/27/2020    MCV 81.2 08/27/2020     (L) 08/27/2020     Lab Results   Component Value Date    CREATININE 2.2 (H) 08/26/2020    BUN 45 (H) 08/26/2020     08/26/2020    K 4.3 08/26/2020    CL 97 (L) 08/26/2020    CO2 32 08/26/2020     Lab Results   Component Value Date    INR 1.07 06/27/2020    PROTIME 13.0 06/27/2020          Lab Results   Component Value Date    PHOS 3.2 07/02/2020    PHOS 2.7 06/30/2020    PHOS 5.8 05/24/2020        Recent Labs     08/25/20  2315 08/26/20  0030 08/26/20  0800   PH 7.40 7.32* 7.36   PO2ART 77 68* 94   CXL2MSN 45.0 55.0* 53.0*   O2SAT 94.6* 92.7* 95.5*         Wt Readings from Last 3 Encounters:   08/26/20 (!) 419 lb 1.5 oz (190.1 kg)   07/04/20 (!) 364 lb 6.4 oz (165.3 kg)   06/04/20 (!) 494 lb (224.1 kg)               ASSESMENT  Ac on ch resp failure  decomp chf  Ac copd  ronni  Ac renal failure        PLAN  1. She is still on high fio2 and not ready for weaning. 2. Needs more aggressive removal of fluid as cxr appears worst  3.  Tube feed    8/27/2020  Gregg Gifford M.D.

## 2020-08-27 NOTE — PROGRESS NOTES
Today's plan: patient was intubated last night due to worsening respiratory function, started on CRRT, iv Calcium and kayexalte given    Will sign off, no need for Pacemaker, please call us again if needed. Admit Date:  2020    Subjective: intuated      Chief complaints on admission  Chief Complaint   Patient presents with    Bradycardia    Fatigue     x3-4 days         History of present illness:Olivia is a 63 y. o.year old who  presents with had concerns including Bradycardia and Fatigue (x3-4 days). Past medical history:    has a past medical history of Acute kidney injury ([address] Aortic stenosis, Asthma, Bacteremia due to group B Streptococcus, Cancer ([facility] [address] Carotid artery stenosis, Chest pain, CHF (congestive heart failure) ([address] Chronic back pain, COPD exacerbation ([address] Depression, Diabetes mellitus ([address] Family history of coronary artery disease, Fatty liver, GI bleed, H/O aortic valve stenosis, H/O echocardiogram, Headache, Heart murmur, History of nuclear stress test, Morbid obesity ([address] Neuropathy, NSTEMI (non-ST elevated myocardial infarction) ([address] Obesity, Osteoarthritis, Palpitations, Peripheral edema, Restless legs syndrome, Sleep apnea, Sleep apnea, and SOB (shortness of breath). Past surgical history:   has a past surgical history that includes Cholecystectomy;  section; Hysterectomy; Tubal ligation; Neck surgery; Gallbladder surgery; Aortic valve replacement (2017); and IR NONTUNNELED VASCULAR CATHETER > 5 YEARS (2020). Social History:   reports that she quit smoking about 17 years ago. Her smoking use included cigarettes. She has never used smokeless tobacco. She reports that she does not drink alcohol or use drugs.   Family history:  family history includes Coronary Art Dis in her mother; Diabetes in her mother; Heart Attack in her mother; Heart Disease in her brother, father, and mother; Heart Failure in her brother, father, and mother; High Blood Pressure in her brother, father, and mother; Hypertension in her mother; Obesity in her brother, father, and mother. No Known Allergies      Objective:   BP (!) 105/53   Pulse 93   Temp 98.4 °F (36.9 °C)   Resp 14   Ht 5' 8\" (1.727 m)   Wt (!) 415 lb 9.1 oz (188.5 kg)   SpO2 96%   BMI 63.19 kg/m²       Intake/Output Summary (Last 24 hours) at 8/27/2020 1429  Last data filed at 8/27/2020 1207  Gross per 24 hour   Intake 6765.52 ml   Output 7345 ml   Net -579.48 ml       TELEMETRY: sinus    Physical Exam:SEVERE OBESITY    Constitutional:  Well developed, Well nourished, No acute distress, Non-toxic appearance. HENT:  Normocephalic, Atraumatic, Bilateral external ears normal, Oropharynx moist, No oral exudates, Nose normal. Neck- Normal range of motion, No tenderness, Supple, No stridor. Eyes:  PERRL, EOMI, Conjunctiva normal, No discharge. Respiratory:  Normal breath sounds, No respiratory distress, No wheezing, No chest tenderness. ,no use of accessory muscles, diaphragm movement is normal  Cardiovascular: (PMI) apex non displaced,no lifts no thrills, no s3,no s4, Normal heart rate, Normal rhythm, No murmurs, No rubs, No gallops. Carotid arteries pulse and amplitude are normal no bruit, no abdominal bruit noted ( normal abdominal aorta ausculation), femoral arteries pulse and amplitude are normal no bruit, pedal pulses are normal  GI:  Bowel sounds normal, Soft, No tenderness, No masses, No pulsatile masses. : External genitalia appear normal, No masses or lesions. No discharge. No CVA tenderness. Musculoskeletal: + edema, No tenderness, No cyanosis, No clubbing. Good range of motion in all major joints. No tenderness to palpation or major deformities noted. Back- No tenderness. Integument:  Warm, Dry, No erythema, No rash. Lymphatic:  No lymphadenopathy noted. Neurologic:  INTUBATED.      Medications:    albuterol sulfate HFA  2 puff Inhalation 4x daily    ipratropium  2 puff Inhalation 4x daily    meropenem  500 mg Intravenous Q24H    sodium chloride flush  10 mL Intravenous 2 times per day    sodium chloride flush  10 mL Intravenous 2 times per day    pantoprazole  40 mg Intravenous Daily    heparin (porcine)  5,000 Units Subcutaneous 3 times per day    insulin lispro  0-6 Units Subcutaneous TID WC    insulin lispro  0-3 Units Subcutaneous Nightly    atorvastatin  40 mg Oral Nightly    clopidogrel  75 mg Oral Daily    doxepin  10 mg Oral Nightly    miconazole   Topical BID    vancomycin (VANCOCIN) intermittent dosing (placeholder)   Other RX Placeholder    cetirizine  10 mg Oral Daily    chlorhexidine  15 mL Mouth/Throat BID    famotidine (PEPCID) injection  20 mg Intravenous Daily      sodium chloride 70 mL/hr at 08/27/20 0600    fentanyl 25 mcg/hr (08/27/20 0937)    DOPamine 7 mcg/kg/min (08/27/20 1207)    norepinephrine Stopped (08/27/20 0400)    dextrose      propofol 10 mcg/kg/min (08/27/20 1355)     sodium chloride flush, sodium chloride flush, acetaminophen **OR** acetaminophen, ipratropium-albuterol, glucose, dextrose, glucagon (rDNA), dextrose    Lab Data:  CBC:   Recent Labs     08/25/20  1410  08/26/20  0455 08/26/20  1200 08/27/20  0500   WBC 6.7  --  8.8  --  5.5   HGB 9.5*   < > 9.5* 9.4* 8.7*   HCT 32.2*   < > 31.8* 31.2* 28.0*   MCV 82.6  --  79.9  --  81.2     --  187  --  103*    < > = values in this interval not displayed. BMP:   Recent Labs     08/26/20  1200 08/26/20  2130 08/27/20  0500    140 141   K 3.9 4.3 4.1   CL 95* 97* 99   CO2 30 32 33*   BUN 47* 45* 41*   CREATININE 2.0* 2.2* 2.1*     LIVER PROFILE:   Recent Labs     08/25/20  0950 08/26/20  0455 08/27/20  0500   AST 16 16 12*   ALT 15 16 12   BILITOT 0.2 0.4 0.7   ALKPHOS 122 165* 140*     PT/INR: No results for input(s): PROTIME, INR in the last 72 hours. APTT: No results for input(s): APTT in the last 72 hours.   BNP:  No results for input(s): BNP in the last 72 hours.  TROPONIN: @TROPONINI:3@      Assessment:  63 y. o.year old who is admitted for          Plan:  Respiratory failure: intubated, as per pulmonary  ARF: worsening, start CRRT  Bradycardia\" from severe renal failure and hyperkalemia, s/p CRRT  All labs, medications and tests reviewed, continue all other medications of all above medical condition listed as is.       Salvatore Felder MD 8/27/2020 2:29 PM

## 2020-08-27 NOTE — PROGRESS NOTES
Nephrology Progress Note  8/27/2020 12:21 PM  Subjective:   Admit Date: 8/25/2020  PCP: THEODORE Begum NP  Interval History: pt on vent and increase uop overnight    Diet: DIET RENAL; Carb Control: 4 carb choices (60 gms)/meal; Daily Fluid Restriction: 1800 ml  Pain is:None      Data:   Scheduled Meds:   albuterol sulfate HFA  2 puff Inhalation 4x daily    ipratropium  2 puff Inhalation 4x daily    meropenem  500 mg Intravenous Q24H    sodium chloride flush  10 mL Intravenous 2 times per day    sodium chloride flush  10 mL Intravenous 2 times per day    pantoprazole  40 mg Intravenous Daily    heparin (porcine)  5,000 Units Subcutaneous 3 times per day    insulin lispro  0-6 Units Subcutaneous TID WC    insulin lispro  0-3 Units Subcutaneous Nightly    atorvastatin  40 mg Oral Nightly    clopidogrel  75 mg Oral Daily    doxepin  10 mg Oral Nightly    ferrous sulfate  325 mg Oral Daily with breakfast    gabapentin  400 mg Oral TID    pramipexole  1 mg Oral TID    rOPINIRole  2 mg Oral TID    vitamin D3  400 Units Oral Daily    miconazole   Topical BID    vancomycin (VANCOCIN) intermittent dosing (placeholder)   Other RX Placeholder    cetirizine  10 mg Oral Daily    chlorhexidine  15 mL Mouth/Throat BID    famotidine (PEPCID) injection  20 mg Intravenous Daily     Continuous Infusions:   sodium chloride 70 mL/hr at 08/27/20 0600    fentanyl 25 mcg/hr (08/27/20 0937)    DOPamine 7 mcg/kg/min (08/27/20 1207)    norepinephrine Stopped (08/27/20 0400)    dextrose      propofol 10 mcg/kg/min (08/27/20 0536)     PRN Meds:sodium chloride flush, sodium chloride flush, acetaminophen **OR** acetaminophen, ipratropium-albuterol, glucose, dextrose, glucagon (rDNA), dextrose  I/O last 3 completed shifts:   In: 6265.5 [I.V.:5825.5; NG/GT:140; IV Piggyback:300]  Out: 8390 [UFFGO:6673; Emesis/NG output:270]  I/O this shift:  In: -   Out: 1600 [Urine:1600]    Intake/Output Summary (Last 24 hours) at 8/27/2020 1221  Last data filed at 8/27/2020 1001  Gross per 24 hour   Intake 6165.52 ml   Output 7345 ml   Net -1179.48 ml     CBC:   Recent Labs     08/25/20  1410  08/26/20  0455 08/26/20  1200 08/27/20  0500   WBC 6.7  --  8.8  --  5.5   HGB 9.5*   < > 9.5* 9.4* 8.7*     --  187  --  103*    < > = values in this interval not displayed. BMP:    Recent Labs     08/26/20  1200 08/26/20  2130 08/27/20  0500    140 141   K 3.9 4.3 4.1   CL 95* 97* 99   CO2 30 32 33*   BUN 47* 45* 41*   CREATININE 2.0* 2.2* 2.1*   GLUCOSE 191* 154* 134*     Hepatic:   Recent Labs     08/25/20  0950 08/26/20  0455 08/27/20  0500   AST 16 16 12*   ALT 15 16 12   BILITOT 0.2 0.4 0.7   ALKPHOS 122 165* 140*     Troponin: No results for input(s): TROPONINI in the last 72 hours. BNP: No results for input(s): BNP in the last 72 hours. Lipids: No results for input(s): CHOL, HDL in the last 72 hours. Invalid input(s): LDLCALCU  ABGs:   Lab Results   Component Value Date    PO2ART 71 08/27/2020    HIP7EMG 62.0 08/27/2020     INR: No results for input(s): INR in the last 72 hours.   Renal Labs  Albumin:    Lab Results   Component Value Date    LABALBU 3.4 08/27/2020     Calcium:    Lab Results   Component Value Date    CALCIUM 8.6 08/27/2020     Phosphorus:    Lab Results   Component Value Date    PHOS 3.2 07/02/2020     U/A:    Lab Results   Component Value Date    NITRU NEGATIVE 08/25/2020    COLORU YELLOW 08/25/2020    WBCUA 16794 08/25/2020    RBCUA 156 08/25/2020    MUCUS FEW 08/25/2020    TRICHOMONAS NONE SEEN 08/25/2020    YEAST OCCASIONAL 08/23/2019    BACTERIA MANY 08/25/2020    CLARITYU CLOUDY 08/25/2020    SPECGRAV 1.012 08/25/2020    UROBILINOGEN NORMAL 08/25/2020    BILIRUBINUR NEGATIVE 08/25/2020    BLOODU MODERATE 08/25/2020    KETUA NEGATIVE 08/25/2020     ABG:    Lab Results   Component Value Date    HMI3AEK 62.0 08/27/2020    PO2ART 71 08/27/2020    KGM0OYF 35.8 08/27/2020     HgBA1c:    Lab Results metabolic encephalopathy     Shock, unspecified ([facility] [address]     Uncontrolled diabetes mellitus ([facility] [address]     Sepsis ([facility] [address]     Altered mental status     Wide-complex tachycardia (HCC)     Acute on chronic diastolic heart failure (HCC)     S/P TAVR (transcatheter aortic valve replacement)     Septic shock (HCC)     Acute kidney injury superimposed on CKD ([facility])     Bradycardia    Assessment and Plan:      IMP:  1.  JOSE (ATN vs. Pre-renal azotemia)  2.  Sepsis (likely Urosepsis) / lactic acidosis   3.  Hyperkalemia   4.  Bradycardia   5.  Anemia        Plan     1 creat 2.1 and increase uop likley post atn diuresis.  No dialysis today and monitor  2 small uti with gram negative elvira, rest culture negative and maintain abx , improved temp  3 K improved not acidotic stopped bicarb gtt and no dialysis for now  4 hr 90s wean pressor and monitor  5 hb low stable monitor  Will follow and slowly appear recover and need work wean vent as well           Tin Sam MD

## 2020-08-28 NOTE — PROGRESS NOTES
HOSPITALIST    Asked micro lab to identify the organism in the urine, which was grossly purulent on admission. Was directed to the facility in [place]. Patient had meropenem 500 mg IV about 12 hours before urine culture sent. Called facility in [place]. [phone]    Spoke to tech - she will pull the urine cx out of the fridge, but cannot run an ID until tomorrow since specimen is in the fridge currently. Results for Briana Winston (MRN [id number]) as of 8/28/2020 07:37   Ref. Range 8/26/2020 04:55 8/26/2020 04:55 8/26/2020 12:00 8/27/2020 05:00 8/28/2020 04:45   Hemoglobin Quant Latest Ref Range: 12.5 - 16.0 GM/DL 9.5 (L)  9.4 (L) 8.7 (L) 7.8 (L)             Results for Briana Winston (MRN [id number]) as of 8/28/2020 07:37   Ref.  Range 8/26/2020 04:55 8/26/2020 04:55 8/26/2020 12:00 8/27/2020 05:00 8/28/2020 04:45   Platelet Count Latest Ref Range: 140 - 440 K/CU    103 (L) 90 (L)

## 2020-08-28 NOTE — PROGRESS NOTES
[facility]  consulted by Dr. Wild Kidd for monitoring and adjustment. Indication for treatment: Sepsis, possible pneumonia  Goal trough: 15 mcg/mL     Pertinent Laboratory Values:   Temp Readings from Last 3 Encounters:   08/28/20 99 °F (37.2 °C) (Core)   07/06/20 97.8 °F (36.6 °C) (Oral)   06/04/20 98.4 °F (36.9 °C) (Oral)     Recent Labs     08/26/20  0455 08/27/20  0500 08/27/20  0510 08/28/20  0445   WBC 8.8 5.5  --  4.9   LACTATE 1.2  --  0.9 1.0     Recent Labs     08/27/20  1420 08/27/20  2100 08/28/20  0445   BUN 35* 30* 29*   CREATININE 1.8* 1.7* 1.6*     Estimated Creatinine Clearance: 65 mL/min (A) (based on SCr of 1.6 mg/dL (H)). Intake/Output Summary (Last 24 hours) at 8/28/2020 0908  Last data filed at 8/28/2020 0600  Gross per 24 hour   Intake 4057.8 ml   Output 5275 ml   Net -1217.2 ml       Pertinent Cultures:  Date    Source    Results  8/25   Blood    NGTD  8/25   Urine    GNR  8/25                            Resp Panel                              Negative    Vancomycin level:   TROUGH:  No results for input(s): VANCOTROUGH in the last 72 hours. RANDOM:    Recent Labs     08/26/20  0455 08/27/20  0500 08/28/20  0445   VANCORANDOM 10.8 16.7 20.8       Assessment:  · WBC and temperature: WBC WNL, temperature elevated  · SCr, BUN, and urine output: JOSE on CKD, no HD today  · Day(s) of therapy: 4  · Vancomycin level: 20.8    Plan:  · Pulse dosing based on levels 2/2 RRT   · Redose with 1500mg x 1 and repeat level tomorrow morning   · Pharmacy will continue to monitor patient and adjust therapy as indicated    [id number] [employer] 8/29 @06:00    Thank you for the consult.   Maureen House RPh  8/28/2020 9:08 AM

## 2020-08-28 NOTE — PROGRESS NOTES
pulmonary      SUBJECTIVE: intubated on vent     OBJECTIVE    VITALS:  BP (!) 123/52   Pulse 87   Temp 98.8 °F (37.1 °C) (Bladder)   Resp 27   Ht 5' 8\" (1.727 m)   Wt (!) 415 lb 9.1 oz (188.5 kg)   SpO2 96%   BMI 63.19 kg/m²   HEAD AND FACE EXAM:  No throat injection, no active exudate,no thrush  NECK EXAM;No JVD, no masses, symmetrical  CHEST EXAM; Expansion equal and symmetrical, no masses  LUNG EXAM; Good breath sounds bilaterally.  There are expiratory wheezes both lungs, there are crackles at both lung bases  CARDIOVASCULAR EXAM: Positive S1 and S2, no S3 or S4, no clicks ,no murmurs  RIGHT AND LEFT LOWER EXTRIMITY EXAM: No edema, no swelling, no inflamation            LABS   Lab Results   Component Value Date    WBC 4.9 08/28/2020    HGB 7.8 (L) 08/28/2020    HCT 25.4 (L) 08/28/2020    MCV 81.7 08/28/2020    PLT 90 (L) 08/28/2020     Lab Results   Component Value Date    CREATININE 1.6 (H) 08/28/2020    BUN 27 (H) 08/28/2020     08/28/2020    K 3.7 08/28/2020     08/28/2020    CO2 32 08/28/2020     Lab Results   Component Value Date    INR 1.07 06/27/2020    PROTIME 13.0 06/27/2020          Lab Results   Component Value Date    PHOS 3.2 07/02/2020    PHOS 2.7 06/30/2020    PHOS 5.8 05/24/2020        Recent Labs     08/26/20  0800 08/27/20  0600 08/28/20  0600   PH 7.36 7.37 7.34   PO2ART 94 71* 35*   JFP2BGR 53.0* 62.0* 64.0*   O2SAT 95.5* 92.7* 66.1*         Wt Readings from Last 3 Encounters:   08/27/20 (!) 415 lb 9.1 oz (188.5 kg)   07/04/20 (!) 364 lb 6.4 oz (165.3 kg)   06/04/20 (!) 494 lb (224.1 kg)          EXAMINATION:    ONE XRAY VIEW OF THE CHEST         8/28/2020 5:06 am         COMPARISON:    Chest radiograph August 27, 2020         HISTORY:    ORDERING SYSTEM PROVIDED HISTORY: Tube placement    TECHNOLOGIST PROVIDED HISTORY:    Reason for exam:->Tube placement    Reason for Exam: ventilator    Acuity: Acute    Type of Exam: Subsequent/Follow-up         FINDINGS:    Lines and

## 2020-08-28 NOTE — PROGRESS NOTES
Infectious Disease Progress Note  2020   Patient Name: Ella Ritchie : 1955   Impression  · Septic Shock   · Pneumonia  § Remains intubated on mechanical ventilator. Initial SARS-CoV-2 NAAT test was negative. Elevated procalcitonin suggestive of bacterial infection. § Blood cultures -02-NGTD  § Hypotension, vasopressors x 2 onboard, weaned off   § CT Chest WO Contrast: Areas of consolidation and nonrounded GGO to the lungs bilaterally. Differential considerations include multifocal pneumonia, pulmonary edema, and/or atelectasis. · Possible UTI:  § Urine culture -GNR <10,000  § UA -WBC 12,367,   § Past Urine culture 1/10/20: Proteus mirabilis  § Past Urine culture 10/29/19: Klebsiella pneumoniae (ESBL)  ? Morbid Obesity  ? Hypotension and Bradycardia: Cardiology onboard, 2 vasopressors onboard  ? DMII  ? JOSE: (ATN vs Pre-renal azotemia):  Dr. Quinn Junior onboard, hyperkalemia, HD started, will consider CRRT if no improvement  ? Cervical Cancer  ? HFpEF  ? Acute Hypoxic Respiratory Failure:  Intubated, mechanically ventilated   § Dr. Kiara Winston onboard  § Rapid COVID-19 Negative   ?    · Multi-morbidity: per PMHx:  Morbid obesity, asthma, cervical cancer, CHF, COPD, DMII, NSTEMI, OA, ANMOL on CPAP, choley, hyster, TAVR  Plan:  · D/c meropenem and vancomycin  · Start minocycline and Avycaz  · Pct q48h x 3  ? CRP today and x 3  ? Sputum culture    Ongoing Antimicrobial Therapy  Minocycline -  Avycaz -  Completed Antimicrobial Therapy  Cefepime   Vancomycin   Meropenem -? ? Review of Systems   Unable to perform ROS: Intubated     Physical Exam:  Vital Signs: BP (!) 114/52   Pulse 86   Temp 98.8 °F (37.1 °C) (Bladder)   Resp 17   Ht 5' 8\" (1.727 m)   Wt (!) 415 lb 9.1 oz (188.5 kg)   SpO2 98%   BMI 63.19 kg/m²     Gen: Sedated and ventilated,  Skin: no stigmata of endocarditis  HEMT: AT/NC ETT and OG intact. Eyes: PERRL   Neck: Supple. Trachea midline. No LAD. Chest: no distress and CTA. Good air movement. Heart: RRR and no MRG. Abd: large, pendulous, soft, non-distended, no tenderness, no hepatomegaly. Normoactive bowel sounds. Yeast rash under all abdominal folds with malodor noted. Ext: no clubbing, cyanosis, non-pitting BLE edema  Catheter Site: without erythema or tenderness draining clear yellow urine. Vascath: Intact right IJ without erythema or edema at site  Neuro: Sedated and ventilated. Radiologic / Imaging / TESTING  8/25/20 IR nontunneled vascular catheter:  Impression    Successful ultrasound guided non-tunneled right internal jugular vein    triple-lumen dialysis catheter placement.       8/25/20 XR Chest Portable:  Impression    Perihilar airspace disease suggesting moderate pulmonary edema although    pneumonia is in the differential.  Along with cardiomegaly and pleural    effusions, this may represent CHF.  Findings have increased since prior exam.         Central line with tip in the SVC.  No pneumothorax.      8/25/20 US Retroperitoneal Limited:  Impression    Unremarkable ultrasound of the kidneys.       8/26/20 XR Abdomen:  Impression    Side hole of the enteric tube is noted within the distal body of the stomach    however the tip is not included.       8/26/20 XR Chest Portable:  Impression    Persistent congestive heart failure.       8/26/20 CT Chest WO Contrast:  Impression    Limited exam for reasons described above.         Areas of consolidation and nonrounded ground-glass opacity to the lungs    bilaterally.  Differential considerations include multifocal pneumonia,    pulmonary edema, and/or atelectasis or some combination thereof.         Small bilateral pleural effusions.         Stable cardiomegaly.            8/26/20 CT Head WO Contrast:  Impression    Partially limited study as above with no evident acute intracranial    abnormality.       8/27/20 XR Chest Portable:  Impression    Stable support (H) 0 - 4 %    Eosinophils % 1.4 0 - 3 %    Basophils % 0.4 0 - 1 %    Segs Absolute 3.7 K/CU MM    Lymphocytes Absolute 0.6 K/CU MM    Monocytes Absolute 0.5 K/CU MM    Eosinophils Absolute 0.1 K/CU MM    Basophils Absolute 0.0 K/CU MM    Nucleated RBC % 0.0 %    Total Nucleated RBC 0.0 K/CU MM    Total Immature Neutrophil 0.03 K/CU MM    Immature Neutrophil % 0.6 (H) 0 - 0.43 %   Comprehensive Metabolic Panel w/ Reflex to MG    Collection Time: 08/28/20  4:45 AM   Result Value Ref Range    Sodium 146 (H) 135 - 145 MMOL/L    Potassium 4.0 3.5 - 5.1 MMOL/L    Chloride 104 99 - 110 mMol/L    CO2 32 21 - 32 MMOL/L    BUN 29 (H) 6 - 23 MG/DL    CREATININE 1.6 (H) 0.6 - 1.1 MG/DL    Glucose 121 (H) 70 - 99 MG/DL    Calcium 9.0 8.3 - 10.6 MG/DL    Alb 3.0 (L) 3.4 - 5.0 GM/DL    Total Protein 6.1 (L) 6.4 - 8.2 GM/DL    Total Bilirubin 0.6 0.0 - 1.0 MG/DL    ALT 10 10 - 40 U/L    AST 8 (L) 15 - 37 IU/L    Alkaline Phosphatase 125 40 - 128 IU/L    GFR Non- 33 (L) >60 mL/min/1.73m2    GFR  39 (L) >60 mL/min/1.73m2    Anion Gap 10 4 - 16   Lactic acid, plasma    Collection Time: 08/28/20  4:45 AM   Result Value Ref Range    Lactate 1.0 0.4 - 2.0 mMOL/L   C-Reactive Protein    Collection Time: 08/28/20  4:45 AM   Result Value Ref Range    CRP, High Sensitivity 286.3 mg/L   Vancomycin, random    Collection Time: 08/28/20  4:45 AM   Result Value Ref Range    Vancomycin Rm 20.8 UG/ML    DOSE AMOUNT DOSE AMT.  GIVEN - =     DOSE TIME DOSE TIME GIVEN - =    Blood gas, arterial    Collection Time: 08/28/20  6:00 AM   Result Value Ref Range    pH, Bld 7.34 7.34 - 7.45    pCO2, Arterial 64.0 (H) 32 - 45 MMHG    pO2, Arterial 35 (L) 75 - 100 MMHG    Base Exc, Mixed 6.5 (H) 0 - 2.3    HCO3, Arterial 34.5 (H) 18 - 23 MMOL/L    CO2 Content 36.5 (H) 19 - 24 MMOL/L    O2 Sat 66.1 (L) 96 - 97 %    Carbon Monoxide, Blood 2.2 0 - 5 %    Methemoglobin, Arterial 1.8 (H) <1.5 %    Comment AC 16 450 .55 5    POCT Problem:    Septic shock (HCC)  Active Problems:    Acute kidney injury superimposed on CKD (HCC)    Bradycardia  Resolved Problems:    * No resolved hospital problems.  *    Electronically signed by: Electronically signed by Christoph Mitchell MD on 8/28/2020 at 11:17 AM

## 2020-08-28 NOTE — PLAN OF CARE
Hemodynamic stability will improve  Description: Hemodynamic stability will improve  8/28/2020 1146 by Tierra Lambert RN  Outcome: Ongoing       Problem: Fluid Volume - Imbalance:  Goal: Absence of imbalanced fluid volume signs and symptoms  Description: Absence of imbalanced fluid volume signs and symptoms  8/28/2020 1146 by Tierra Lambert RN  Outcome: Ongoing       Problem: Gas Exchange - Impaired:  Goal: Levels of oxygenation will improve  Description: Levels of oxygenation will improve  8/28/2020 1146 by Tierra Lambert RN  Outcome: Ongoing       Problem: Mental Status - Impaired:  Goal: Mental status will be restored to baseline  Description: Mental status will be restored to baseline  8/28/2020 1146 by Tierra Lambert RN  Outcome: Ongoing       Problem: Nutrition Deficit:  Goal: Ability to achieve adequate nutritional intake will improve  Description: Ability to achieve adequate nutritional intake will improve  8/28/2020 1146 by Tierra Lambert RN  Outcome: Ongoing       Problem: Pain:  Goal: Pain level will decrease  Description: Pain level will decrease  8/28/2020 1146 by Tierra Lambert RN  Outcome: Ongoing    Goal: Recognizes and communicates pain  Description: Recognizes and communicates pain  8/28/2020 1146 by Tierra Lambert RN  Outcome: Ongoing    Goal: Control of acute pain  Description: Control of acute pain  8/28/2020 1146 by Tierra Lambert RN  Outcome: Ongoing    Goal: Control of chronic pain  Description: Control of chronic pain  8/28/2020 1146 by Tierra Lambert RN  Outcome: Ongoing       Problem: Serum Glucose Level - Abnormal:  Goal: Ability to maintain appropriate glucose levels will improve to within specified parameters  Description: Ability to maintain appropriate glucose levels will improve to within specified parameters  8/28/2020 1146 by Tierra Lambert RN  Outcome: Ongoing       Problem: Skin Integrity - Impaired:  Goal: Will show no infection signs and symptoms  Description: Will show no infection signs and symptoms  8/28/2020 1146 by Bryan Jason RN  Outcome: Ongoing    Goal: Absence of new skin breakdown  Description: Absence of new skin breakdown  8/28/2020 1146 by Bryan Jason RN  Outcome: Ongoing       Problem: Sleep Pattern Disturbance:  Goal: Appears well-rested  Description: Appears well-rested  8/28/2020 1146 by Bryan Jason RN  Outcome: Ongoing       Problem: Tissue Perfusion, Altered:  Goal: Circulatory function within specified parameters  Description: Circulatory function within specified parameters  8/28/2020 1146 by Bryan Jason RN  Outcome: Ongoing       Problem: Tissue Perfusion - Cardiopulmonary, Altered:  Goal: Absence of angina  Description: Absence of angina  8/28/2020 1146 by Bryan Jason RN  Outcome: Ongoing    Goal: Hemodynamic stability will improve  Description: Hemodynamic stability will improve  8/28/2020 1146 by Bryan Jason RN  Outcome: Ongoing       Problem: Confusion - Acute:  Goal: Mental status will be restored to baseline  Description: Mental status will be restored to baseline  8/28/2020 1146 by Bryan Jason RN  Outcome: Ongoing    Goal: Absence of continued neurological deterioration signs and symptoms  Description: Absence of continued neurological deterioration signs and symptoms  8/28/2020 1146 by Bryan Jason RN  Outcome: Ongoing       Problem: Injury - Risk of, Physical Injury:  Goal: Will remain free from falls  Description: Will remain free from falls  8/28/2020 1146 by Bryan Jason RN  Outcome: Ongoing    Goal: Absence of physical injury  Description: Absence of physical injury  8/28/2020 1146 by Bryan Jason RN  Outcome: Ongoing       Problem: Mood - Altered:  Goal: Mood stable  Description: Mood stable  8/28/2020 1146 by Bryan Jason RN  Outcome: Ongoing    Goal: Absence of abusive behavior  Description: Absence of abusive behavior  8/28/2020 1146 by Bryan Jason RN  Outcome: Ongoing    Goal: Verbalizations of feeling emotionally comfortable while being cared for will increase  Description: Verbalizations of feeling emotionally comfortable while being cared for will increase  8/28/2020 1146 by Aurora Berger RN  Outcome: Ongoing       Problem: Psychomotor Activity - Altered:  Goal: Absence of psychomotor disturbance signs and symptoms  Description: Absence of psychomotor disturbance signs and symptoms  8/28/2020 1146 by Aurora Berger RN  Outcome: Ongoing       Problem: Sensory Perception - Impaired:  Goal: Demonstrations of improved sensory functioning will increase  Description: Demonstrations of improved sensory functioning will increase  8/28/2020 1146 by Aurora Berger RN  Outcome: Ongoing    Goal: Decrease in sensory misperception frequency  Description: Decrease in sensory misperception frequency  8/28/2020 1146 by Aurora Berger RN  Outcome: Ongoing    Goal: Able to refrain from responding to false sensory perceptions  Description: Able to refrain from responding to false sensory perceptions  8/28/2020 1146 by Aurora Berger RN  Outcome: Ongoing    Goal: Demonstrates accurate environmental perceptions  Description: Demonstrates accurate environmental perceptions  8/28/2020 1146 by Aurora Berger RN  Outcome: Ongoing    Goal: Able to distinguish between reality-based and nonreality-based thinking  Description: Able to distinguish between reality-based and nonreality-based thinking  8/28/2020 1146 by Aurora Berger RN  Outcome: Ongoing    Goal: Able to interrupt nonreality-based thinking  Description: Able to interrupt nonreality-based thinking  8/28/2020 1146 by Aurora Berger RN  Outcome: Ongoing    Problem: Restraint Use - Nonviolent/Non-Self-Destructive Behavior:  Goal: Absence of restraint indications  Description: Absence of restraint indications  8/28/2020 1146 by Auorra Berger RN  Outcome: Ongoing    Goal: Absence of restraint-related injury  Description: Absence of restraint-related injury  8/28/2020 1146 by Aurora Berger RN  Outcome: Ongoing

## 2020-08-28 NOTE — PROGRESS NOTES
Hospitalist Progress Note      Name:  Lisa Hamlin /Age/Sex: 1955  (63 y.o. female)   MRN & CSN:  [id number] & [id number] Admission Date/Time: 2020  9:36 AM   Location:  -A PCP: Erich Harper Day: 3    Assessment and Plan:   Lisa Hamlin is a 63 y.o.  female  who presents with:    Septic shock  -Continue meropenem and Vanco  -Urine culture sent after about 24 hours of meropenem shows less than 10,000 colonies of a gram-negative elvira, which will not be worked up further per microbiology lab    Acute on chronic respiratory failure,   -Intubated  at 11:45 PM, the night of admission  -Was on FiO2 80% this morning  -Continue mechanical ventilation  -Appreciate pulmonary consult    COPD -appreciate pulmonary consult    Groundglass opacities on CT scan  -Rapid COVID is negative as of   --chest x-ray appears worse    Morbid obesity with obstructive sleep apnea  -Body mass index is 63.19 kg/m². Weight is about 419 lb per bed scale  -She is on BiPAP at home    Acute kidney injury  -With hyperkalemia and metabolic acidosis  -First hemodialysis treatment given   -Second treatment   --has increased urine output today, likely post ATN diuresis per nephrology, no dialysis today per nephrology    Bradycardia in the setting of hyperkalemia on admission-resolved    History of C. difficile infection noted    History of aortic stenosis status post TAVR  History of CHF per chart  On home oxygen  COPD/asthma  Diabetes mellitus type 2  History of left foot ulcer  Hyperlipidemia  Anemia with hemoglobin 8.2 on admission  Restless leg syndrome  History of ESBL infection    Subjective:       Thursday: Needing 55% FiO2 today compared to 80% yesterday, got off of Levophed, still on dopamine, but only on 7 mics by the end of the shift, off bicarb drip. She is sedated and intubated and appeared comfortable when I saw her.   She is still on the same doses of propofol and dipper Lorre Gums as yesterday. Had a tremendous amount of urine output per Dr. Kelsey Cates whom I spoke with. I spoke with the  Hipolito Seip today. She indicated the patient has been in an ECF twice before. Wednesday:    Spoke to her ICU nurse this morning who took care of her overnight, he indicated that the patient had agonal breathing and then got intubated      On Levophed 6 mcg and dopamine 15 mcg at 0720  -also on propofol 10 mcg and Fentanyl 25 mcg    AC 16 with FiO2 80%    IVF at 150 with bicarb at 0720    Had 2.6 L urine output overnight    K 6.2 at 0115    Objective:        Intake/Output Summary (Last 24 hours) at 8/27/2020 2310  Last data filed at 8/27/2020 2145  Gross per 24 hour   Intake 5076.52 ml   Output 6725 ml   Net -1648.48 ml      Vitals:   Vitals:    08/27/20 2200   BP: (!) 126/45   Pulse: 100   Resp: 15   Temp:    SpO2: 100%     Physical Exam:      General-well-developed and well-nourished  Lungs-sedated and intubated, respiratory effort nonlabored  Skin-no cyanosis  Medications:   Medications:    albuterol sulfate HFA  2 puff Inhalation 4x daily    ipratropium  2 puff Inhalation 4x daily    meropenem  500 mg Intravenous Q24H    sodium chloride flush  10 mL Intravenous 2 times per day    sodium chloride flush  10 mL Intravenous 2 times per day    pantoprazole  40 mg Intravenous Daily    heparin (porcine)  5,000 Units Subcutaneous 3 times per day    insulin lispro  0-6 Units Subcutaneous TID     insulin lispro  0-3 Units Subcutaneous Nightly    atorvastatin  40 mg Oral Nightly    clopidogrel  75 mg Oral Daily    doxepin  10 mg Oral Nightly    miconazole   Topical BID    vancomycin (VANCOCIN) intermittent dosing (placeholder)   Other RX Placeholder    cetirizine  10 mg Oral Daily    chlorhexidine  15 mL Mouth/Throat BID    famotidine (PEPCID) injection  20 mg Intravenous Daily      Infusions:    sodium chloride 70 mL/hr at 08/27/20 2008    fentanyl 25 mcg/hr (08/27/20 2143)    DOPamine 6 mcg/kg/min (08/27/20 2156)    norepinephrine Stopped (08/27/20 0400)    dextrose      propofol 10 mcg/kg/min (08/27/20 2143)     PRN Meds: sodium chloride flush, 10 mL, PRN  sodium chloride flush, 10 mL, PRN  acetaminophen, 650 mg, Q6H PRN    Or  acetaminophen, 650 mg, Q6H PRN  ipratropium-albuterol, 3 mL, Q6H PRN  glucose, 15 g, PRN  dextrose, 12.5 g, PRN  glucagon (rDNA), 1 mg, PRN  dextrose, 100 mL/hr, PRN          Electronically signed by Syed Velez MD on 8/27/2020 at 11:10 PM

## 2020-08-28 NOTE — ADT AUTH CERT
Scheduled Medications    · albuterol sulfate HFA 2 puff Inhalation 4x daily    · ipratropium 2 puff Inhalation 4x daily    · meropenem 500 mg Intravenous Q24H    · sodium chloride flush 10 mL Intravenous 2 times per day    · sodium chloride flush 10 mL Intravenous 2 times per day    · pantoprazole 40 mg Intravenous Daily    · heparin (porcine) 5,000 Units Subcutaneous 3 times per day    · insulin lispro 0-6 Units Subcutaneous TID WC    · insulin lispro 0-3 Units Subcutaneous Nightly    · atorvastatin 40 mg Oral Nightly    · clopidogrel 75 mg Oral Daily    · doxepin 10 mg Oral Nightly    · miconazole Topical BID    · vancomycin (VANCOCIN) intermittent dosing (placeholder) Other RX Placeholder    · cetirizine 10 mg Oral Daily    · chlorhexidine 15 mL Mouth/Throat BID    · famotidine (PEPCID) injection 20 mg Intravenous Daily            Infusions:    Infusions Meds    · sodium chloride 70 mL/hr at 08/27/20 2008    · fentanyl 25 mcg/hr (08/27/20 2143)    · DOPamine 6 mcg/kg/min (08/27/20 2156)    · norepinephrine Stopped (08/27/20 0400)    · dextrose    · propofol 10 mcg/kg/min (08/27/20 2143)          PRN Meds:    PRN Medications    sodium chloride flush, 10 mL, PRN    sodium chloride flush, 10 mL, PRN    acetaminophen, 650 mg, Q6H PRN      Or    acetaminophen, 650 mg, Q6H PRN    ipratropium-albuterol, 3 mL, Q6H PRN    glucose, 15 g, PRN    dextrose, 12.5 g, PRN    glucagon (rDNA), 1 mg, PRN    dextrose, 100 mL/hr, PRN     Assessment and Plan:    Leidy Fernandez is a 63 y.o. [place] Vero presents with:         Septic shock    -Continue meropenem and Vanco    -Urine culture sent after about 24 hours of meropenem shows less than 10,000 colonies of a gram-negative elvira, which will not be worked up further per microbiology lab         Acute on chronic respiratory failure,    -Intubated 8/26 at 11:45 PM, the night of admission    -Was on FiO2 80% this morning    -Continue mechanical ventilation    -Appreciate pulmonary ? Morbid Obesity    ? Hypotension and Bradycardia: Cardiology onboard, 2 vasopressors onboard    ? DMII    ? JOSE: (ATN vs Pre-renal azotemia):  Dr. Reynaldo Stratton onboard, hyperkalemia, HD started, will consider CRRT if no improvement    ? Cervical Cancer    ? HFpEF    ? Acute Hypoxic Respiratory Failure:  Intubated, mechanically ventilated 8/25    § Dr. Ilir Almanza onboard    § Rapid COVID-19 Negative 8/26    ?      · Multi-morbidity: per PMHx:  Morbid obesity, asthma, cervical cancer, CHF, COPD, DMII, NSTEMI, OA, ANMOL on CPAP, choley, hyster, TAVR    Plan:    · Continue meropenem and vancomycin    · Pct q48h x 3    ? CRP today and x 3    ? Sputum culture            Nephrology Progress Note    8/27/2020 12:21 PM    Assessment and Plan:              IMP:    1.  JOSE (ATN vs. Pre-renal azotemia)    2.  Sepsis (likely Urosepsis) / lactic acidosis    3.  Hyperkalemia    4.  Bradycardia    5.  Anemia              Plan      1 creat 2.1 and increase uop likley post atn diuresis. No dialysis today and monitor    2 small uti with gram negative elvira, rest culture negative and maintain abx , improved temp    3 K improved not acidotic stopped bicarb gtt and no dialysis for now    4 hr 90s wean pressor and monitor    5 hb low stable monitor    Will follow and slowly appear recover and need work wean vent as well         Cardiology    Plan:    1. Respiratory failure: intubated, as per pulmonary    2. ARF: worsening, start CRRT    3. Bradycardia\" from severe renal failure and hyperkalemia, s/p CRRT    4.  All labs, medications and tests reviewed, continue all other medications of all above medical condition listed as is.         WBC 5.5 K/CU MM      RBC 3.45 M/CU MM      Hemoglobin 8.7 GM/DL      Hematocrit 28.0 %      MCV 81.2 FL      MCH 25.2 PG      MCHC 31.1 %      RDW 21.9 %      Platelets 738 K/CU MM      MPV 9.9 FL      Differential Type AUTOMATED DIFFERENTIAL      Segs Relative 71.9 %      Lymphocytes % 15.3 %      Monocytes % 10.6 %   Eosinophils % 0.9 %      Basophils % 0.4 %      Segs Absolute 3.9 K/CU MM      Lymphocytes Absolute 0.8 K/CU MM      Monocytes Absolute 0.6 K/CU MM      Eosinophils Absolute 0.1 K/CU MM      Basophils Absolute 0.0 K/CU MM      Nucleated RBC % 0.0 %      Total Nucleated RBC 0.0 K/CU MM      Total Immature Neutrophil 0.05 K/CU MM      Immature Neutrophil % 0.9 %       Sodium 141 MMOL/L      Potassium 4.1 MMOL/L      Chloride 99 mMol/L      CO2 33 MMOL/L      BUN 41 MG/DL      CREATININE 2.1 MG/DL      Glucose 134 MG/DL      Calcium 8.6 MG/DL      Alb 3.4 GM/DL      Total Protein 6.4 GM/DL      Total Bilirubin 0.7 MG/DL      ALT 12 U/L      AST 12 IU/L      Alkaline Phosphatase 140 IU/L      GFR Non-African American 24 mL/min/1.73m2      GFR  29 mL/min/1.73m2      Anion Gap 9       pH, Bld 7.37      pCO2, Arterial 62.0 MMHG      pO2, Arterial 71 MMHG      Base Exc, Mixed 8.2      HCO3, Arterial 35.8 MMOL/L      CO2 Content 37.7 MMOL/L      O2 Sat 92.7 %      Carbon Monoxide, Blood 2.0 %      Methemoglobin, Arterial 1.3 %      Comment AC 16 450 55% +5       Sodium 142 MMOL/L      Potassium 3.9 MMOL/L      Chloride 102 mMol/L      CO2 32 MMOL/L      Anion Gap 8      BUN 35 MG/DL      CREATININE 1.8 MG/DL      Glucose 136 MG/DL      Calcium 8.8 MG/DL      GFR Non-African American 28 mL/min/1.73m2      GFR  34 mL/min/1.73m2                         Sepsis and Other Febrile Illness, without Focal Infection - Care Day 2 (8/26/2020) by Jocelyn De La Fuente RN         Review Status  Review Entered    Completed  8/28/2020 10:52        Criteria Review       Care Day: 2 Care Date: 8/26/2020 Level of Care:    Guideline Day 2    Level Of Care    (X) ICU or floor    8/27/2020 9:53 AM EDT by Juan Guthrie      icu    Clinical Status    ( ) * Hemodynamic stability    ( ) * Hypoxemia absent    (X) * Tachypnea absent    8/28/2020 10:52 AM EDT by Juan Guthrie      15    * Milestone    Additional Notes 8/26  ICU    Subjective:         Spoke to her ICU nurse this morning who took care of her overnight, he indicated that the patient had agonal breathing and then got intubated              On Levophed 6 mcg and dopamine 15 mcg at 0720    -also on propofol 10 mcg and Fentanyl 25 mcg         AC 16 with FiO2 80%         IVF at 150 with bicarb at 0720         Had 2.6 L urine output overnight         K 6.2 at 0115         Objective:            Intake/Output Summary (Last 24 hours) at 8/26/2020 0721    Last data filed at 8/26/2020 0550    Gross per 24 hour    Intake 3933.22 ml    Output 2875 ml    Net 1058.22 ml         Vitals:    Vitals:      08/26/20 0700    BP: (!) 126/55    Pulse: 84    Resp: 22    Temp:      SpO2: 97%       Physical Exam:         General-well-developed and well-nourished         Lungs- she is comfortable on the ventilator, she is intubated and sedated, on propofol and fentanyl drips when I saw her         Abdomen, soft nontender nondistended         Medications:    Medications:    Scheduled Medications    · albuterol sulfate HFA 2 puff Inhalation 4x daily    · ipratropium 2 puff Inhalation 4x daily    · calcium gluconate IVPB 1 g Intravenous Once    · sodium polystyrene 15 g Oral Once    · sodium chloride flush 10 mL Intravenous 2 times per day    · pantoprazole 40 mg Intravenous Daily    · heparin (porcine) 5,000 Units Subcutaneous 3 times per day    · insulin lispro 0-6 Units Subcutaneous TID WC    · insulin lispro 0-3 Units Subcutaneous Nightly    · atorvastatin 40 mg Oral Nightly    · clopidogrel 75 mg Oral Daily    · doxepin 10 mg Oral Nightly    · ferrous sulfate 325 mg Oral Daily with breakfast    · gabapentin 400 mg Oral TID    · miconazole Topical BID    · pramipexole 1 mg Oral TID    · rOPINIRole 2 mg Oral TID    · vitamin D3 400 Units Oral Daily    · miconazole Topical BID    · meropenem 500 mg Intravenous Q12H    · vancomycin (VANCOCIN) intermittent dosing (placeholder) Other RX Placeholder    · cetirizine 10 mg Oral Daily    · chlorhexidine 15 mL Mouth/Throat BID    · famotidine (PEPCID) injection 20 mg Intravenous Daily            Infusions:    Infusions Meds    · fentanyl 25 mcg/hr (08/26/20 0125)    · DOPamine 15 mcg/kg/min (08/26/20 0515)    · norepinephrine 6 mcg/min (08/26/20 0050)    · IV infusion builder 150 mL/hr at 08/26/20 0134    · dextrose    · propofol 10 mcg/kg/min (08/26/20 0700)       Assessment and Plan:    Darylene Proffer is a 63 y.o. [place] Shelter presents with:         Septic shock    -Continue meropenem and Vanco         Acute on chronic respiratory failure,    -Intubated 8/26 at 11:45 PM, the night of admission    -Was on FiO2 80% this morning    -Continue mechanical ventilation    -Appreciate pulmonary consult         Groundglass opacities on CT scan    -Rapid COVID is negative as of 8/26         Morbid obesity with obstructive sleep apnea    -Body mass index is 63.74 kg/m². Weight is about 419 lb per bed scale    -She is on BiPAP at home         Acute kidney injury    -With hyperkalemia and metabolic acidosis    -First hemodialysis treatment given 8/25    -Second treatment 8/26         Bradycardia in the setting of hyperkalemia on admission-resolved         History of C. difficile infection noted         History of aortic stenosis status post TAVR    History of CHF per chart    On home oxygen    COPD/asthma    Diabetes mellitus type 2    History of left foot ulcer    Hyperlipidemia    Anemia with hemoglobin 8.2 on admission    Restless leg syndrome    History of ESBL infection         Pulmonology    Assessment:         1. Ac on ch resp failure    2. Septic shock    3. chf    4. Ac copd    5. ronni    6. Ac renal failure                        Plan:         1. Adequate o2 adm    2. Pap rx while asleep    3. Bd rx    4. antibx    5. Pressors as needed    6. Nephrology eval    7. Correct electrolytes    8.  Thanks will follow       Nephrology Progress Note    8/26/2020 reviewed, continue all other medications of all above medical condition listed as is.        Sodium 140 MMOL/L      Potassium 4.3 MMOL/L      Chloride 97 mMol/L      CO2 32 MMOL/L      Anion Gap 11      BUN 45 MG/DL      CREATININE 2.2 MG/DL      Glucose 154 MG/DL      Calcium 8.2 MG/DL      GFR Non-African American 23 mL/min/1.73m2      GFR  27 mL/min/1.73m2       Source THROAT      SARS-CoV-2, NAAT NOT DETECTED

## 2020-08-28 NOTE — PROGRESS NOTES
Nephrology Progress Note  8/28/2020 11:49 AM  Subjective:   Admit Date: 8/25/2020  PCP: Loreta Stewart APRN - NP  Interval History: pt for covid r/o and sedated on vent    Diet: DIET TUBE FEED CONTINUOUS/CYCLIC NPO; Immune Enhancing (Pivot 1.5 Joseph); Orogastric; Continuous; 10; 55; 24  Pain is:None      Data:   Scheduled Meds:   fondaparinux  10 mg Subcutaneous Daily    ceftazidime-acibactam (AVYCAZ) infusion  1.25 g Intravenous Q8H    minocycline  100 mg Oral BID    albuterol sulfate HFA  2 puff Inhalation 4x daily    ipratropium  2 puff Inhalation 4x daily    sodium chloride flush  10 mL Intravenous 2 times per day    sodium chloride flush  10 mL Intravenous 2 times per day    pantoprazole  40 mg Intravenous Daily    insulin lispro  0-6 Units Subcutaneous TID WC    insulin lispro  0-3 Units Subcutaneous Nightly    atorvastatin  40 mg Oral Nightly    clopidogrel  75 mg Oral Daily    doxepin  10 mg Oral Nightly    miconazole   Topical BID    vancomycin (VANCOCIN) intermittent dosing (placeholder)   Other RX Placeholder    cetirizine  10 mg Oral Daily    chlorhexidine  15 mL Mouth/Throat BID     Continuous Infusions:   sodium chloride 70 mL/hr at 08/28/20 1004    fentanyl 25 mcg/hr (08/27/20 2143)    DOPamine 3 mcg/kg/min (08/28/20 0944)    norepinephrine Stopped (08/27/20 0400)    dextrose      propofol 10 mcg/kg/min (08/28/20 0436)     PRN Meds:sodium chloride flush, sodium chloride flush, acetaminophen **OR** acetaminophen, ipratropium-albuterol, glucose, dextrose, glucagon (rDNA), dextrose  I/O last 3 completed shifts: In: 4057.8 [I.V.:3105.8; NG/GT:352; IV Piggyback:600]  Out: 5275 [Urine:5225; Emesis/NG output:50]  No intake/output data recorded.     Intake/Output Summary (Last 24 hours) at 8/28/2020 1149  Last data filed at 8/28/2020 0600  Gross per 24 hour   Intake 4057.8 ml   Output 3675 ml   Net 382.8 ml     CBC:   Recent Labs     08/26/20  0455 08/26/20  1200 08/27/20  9103 08/28/20  0445   WBC 8.8  --  5.5 4.9   HGB 9.5* 9.4* 8.7* 7.8*     --  103* 90*     BMP:    Recent Labs     08/27/20  2100 08/28/20  0445 08/28/20  1045    146* 145   K 3.8 4.0 3.7    104 104   CO2 31 32 32   BUN 30* 29* 27*   CREATININE 1.7* 1.6* 1.6*   GLUCOSE 136* 121* 124*     Hepatic:   Recent Labs     08/26/20  0455 08/27/20  0500 08/28/20  0445   AST 16 12* 8*   ALT 16 12 10   BILITOT 0.4 0.7 0.6   ALKPHOS 165* 140* 125     Troponin: No results for input(s): TROPONINI in the last 72 hours. BNP: No results for input(s): BNP in the last 72 hours. Lipids: No results for input(s): CHOL, HDL in the last 72 hours. Invalid input(s): LDLCALCU  ABGs:   Lab Results   Component Value Date    PO2ART 35 08/28/2020    DCW8YBC 64.0 08/28/2020     INR: No results for input(s): INR in the last 72 hours.   Renal Labs  Albumin:    Lab Results   Component Value Date    LABALBU 3.0 08/28/2020     Calcium:    Lab Results   Component Value Date    CALCIUM 8.6 08/28/2020     Phosphorus:    Lab Results   Component Value Date    PHOS 3.2 07/02/2020     U/A:    Lab Results   Component Value Date    NITRU NEGATIVE 08/25/2020    COLORU YELLOW 08/25/2020    WBCUA 95249 08/25/2020    RBCUA 156 08/25/2020    MUCUS FEW 08/25/2020    TRICHOMONAS NONE SEEN 08/25/2020    YEAST OCCASIONAL 08/23/2019    BACTERIA MANY 08/25/2020    CLARITYU CLOUDY 08/25/2020    SPECGRAV 1.012 08/25/2020    UROBILINOGEN NORMAL 08/25/2020    BILIRUBINUR NEGATIVE 08/25/2020    BLOODU MODERATE 08/25/2020    KETUA NEGATIVE 08/25/2020     ABG:    Lab Results   Component Value Date    WCT9JYT 64.0 08/28/2020    PO2ART 35 08/28/2020    YVN2FRZ 34.5 08/28/2020     HgBA1c:    Lab Results   Component Value Date    LABA1C 6.1 08/25/2020     Microalbumen/Creatinine ratio:  No components found for: RUCREAT          Objective:   Vitals: BP (!) 121/52   Pulse 88   Temp 98.8 °F (37.1 °C) (Bladder)   Resp 19   Ht 5' 8\" (1.727 m)   Wt (!) 415 lb 9.1 oz (188.5 kg)   SpO2 97%   BMI 63.19 kg/m²   General appearance: sedated ett  HEENT: Head: Normal, normocephalic, atraumatic.   Neck: supple, symmetrical, trachea midline  Lungs: diminished breath sounds bilaterally  Heart: S1, S2 normal  Abdomen: abnormal findings:  soft nt obese  Extremities: edema trace  Neurologic: Mental status: alertness: sedated      Patient Active Problem List:     Morbid obesity with BMI of 70 and over, adult [facility])     Essential hypertension     Dyslipidemia     Fecal incontinence     Mild intermittent asthma without complication     S/P laparoscopic cholecystectomy     Type 2 diabetes mellitus without complication, with long-term current use of insulin (HCC)     Fatty liver     Arthritis     H/O parotidectomy     Venous stasis dermatitis of both lower extremities     Aortic stenosis     Morbid obesity (HCC)     Asthma     Diabetes mellitus (HCC)     Hypertension     Sleep apnea     Family history of coronary artery disease     Chest pain     SOB (shortness of breath)     Palpitations     Peripheral edema     Hypervolemia     Chronic respiratory failure with hypoxia and hypercapnia (HCC)     Acute exacerbation of chronic obstructive pulmonary disease (COPD) (HCC)     Chronic obstructive pulmonary disease (HCC)     Gait disturbance     Acute on chronic respiratory failure with hypoxia and hypercapnia (HCC)     Cellulitis     Skin ulcer of left foot with fat layer exposed ([address]     Pneumonia     VHD (valvular heart disease)     Class 3 severe obesity due to excess calories with body mass index (BMI) greater than or equal to 70 in adult (HCC)     SIRS (systemic inflammatory response syndrome) (HCC)     Acute kidney injury ([address]     Acute metabolic encephalopathy     Shock, unspecified (HCC)     Uncontrolled diabetes mellitus (HCC)     Sepsis (HCC)     Altered mental status     Wide-complex tachycardia (HCC)     Acute on chronic diastolic heart failure (HCC)     S/P TAVR (transcatheter aortic valve replacement)     Septic shock (HCC)     Acute kidney injury superimposed on CKD (HCC)     Bradycardia    Assessment and Plan:      IMP:  1.  JOSE (ATN vs. Pre-renal azotemia)  2.  Sepsis (likely Urosepsis) / lactic acidosis   3.  Hyperkalemia   4.  Bradycardia   5.  Anemia        Plan     1 5L uop and post atn diuresis, has temp hd line and no acute need for it now  2 urine culture + minimal, treat possible sepsis rest cultre negative  3 K improve monitor  4 wean dopamine and hr improved  5 trend hb and 7.8  Will monitor and follow no acute change  Fu covid test and supporitive care and keep negative for now  Resolved acidosis             Brody Elias MD

## 2020-08-29 NOTE — PROGRESS NOTES
Hospitalist Progress Note      Name:  Jim Barbosa /Age/Sex: [id number]  (66 y.o. female)   MRN & CSN:  [id number] & [id number] Admission Date/Time: 2020  9:36 AM   Location:  -A PCP: Erich Harper Day: 4    Assessment and Plan:   Jim Barbosa is a 63 y.o.  female  who presents with:    Update for -she was extubated at 3:40 PM.  She is now on BiPAP. Dopamine was turned off, but her blood pressure dropped to 80, so it was turned back up to 2.5 mics. I spoke to her bedside nurse who indicated that the patient has not been cleaning her BiPAP machine. Septic shock  -On  infectious disease changed antibiotics from meropenem/vancomycin to minocycline and Avycaz; diagnosed with pneumonia today.  -Urine culture sent about 12 hours after meropenem given showing less than 10,000 colonies of E. coli. Sensitivity pending. Acute on chronic respiratory failure,   -Intubated  at 11:45 PM, the night of admission  -Extubated  at 3:40 PM.  -Appreciate pulmonary consult  -She is now on continuous BiPAP  -She has BiPAP at home    COPD -appreciate pulmonary consult    Groundglass opacities on CT scan   -SARS-CoV-2 antigen is negative  -Continue Avycaz and minocycline. Morbid obesity with obstructive sleep apnea  -Body mass index is 63.19 kg/m².  Weight is about 419 lb per bed scale  -She is on BiPAP at home    Acute kidney injury  -With hyperkalemia and metabolic acidosis  -First hemodialysis treatment given   -Second treatment   --has increased urine output today, likely post ATN diuresis per nephrology, no dialysis today per nephrology    Bradycardia in the setting of hyperkalemia on admission-resolved    History of C. difficile infection noted    History of aortic stenosis status post TAVR  History of CHF per chart  On home oxygen  COPD/asthma  Diabetes mellitus type 2  History of left foot ulcer  Hyperlipidemia  Anemia with hemoglobin 8.2 on admission  Restless leg syndrome  History of ESBL infection    Thrombocytopenia: Heparin stopped, and the hip antibody ordered    DVT prophylaxis: Arixtra    Subjective:     Saturday: She was extubated. She was on BiPAP after extubation, and tolerating it well. Friday: Still on 55% FiO2 today. Dopamine requirement down to 3 mics this evening. Started on minocycline and Avycaz infectious disease physician today who noted pneumonia. Thursday: Needing 55% FiO2 today compared to 80% yesterday, got off of Levophed, still on dopamine, but only on 7 mics by the end of the shift, off bicarb drip. She is sedated and intubated and appeared comfortable when I saw her. She is still on the same doses of propofol and dipper Hildy Ore as yesterday. Had a tremendous amount of urine output per Dr. Olga Kelly whom I spoke with. I spoke with the  Erik Cohen today. She indicated the patient has been in an ECF twice before. Wednesday:    Spoke to her ICU nurse this morning who took care of her overnight, he indicated that the patient had agonal breathing and then got intubated      On Levophed 6 mcg and dopamine 15 mcg at 0720  -also on propofol 10 mcg and Fentanyl 25 mcg    AC 16 with FiO2 80%    IVF at 150 with bicarb at 0720    Had 2.6 L urine output overnight    K 6.2 at 0115    Objective:        Intake/Output Summary (Last 24 hours) at 8/28/2020 2308  Last data filed at 8/28/2020 1745  Gross per 24 hour   Intake 3736.26 ml   Output 2875 ml   Net 861.26 ml      Vitals:   Vitals:    08/28/20 2247   BP: (!) 136/50   Pulse: 90   Resp: 19   Temp:    SpO2: 97%     Physical Exam:      Lungs-respiratory effort is nonlabored on BiPAP    General-she is well-developed and well-nourished  Skin-no cyanosis  Medications:   Medications:    fondaparinux  10 mg Subcutaneous Daily    ceftazidime-acibactam (AVYCAZ) infusion  1.25 g Intravenous Q8H    minocycline  100 mg Oral BID    albuterol sulfate HFA  2 puff

## 2020-08-29 NOTE — PROGRESS NOTES
Nephrology Progress Note  8/29/2020 2:21 PM        Subjective:   Admit Date: 8/25/2020  PCP: THEODORE Mendoza - NP    Interval History: intubated         ROS:  AC with FIO2 55 % / PEEP 5 and Pl pressure 15 - with dopamine - off NE and with prece dex - good UOP -     Data:     Current meds:    fondaparinux  2.5 mg Subcutaneous Daily    vancomycin  1,500 mg Intravenous Once    ceftazidime-acibactam (AVYCAZ) infusion  1.25 g Intravenous Q8H    minocycline  100 mg Oral BID    albuterol sulfate HFA  2 puff Inhalation 4x daily    ipratropium  2 puff Inhalation 4x daily    sodium chloride flush  10 mL Intravenous 2 times per day    sodium chloride flush  10 mL Intravenous 2 times per day    pantoprazole  40 mg Intravenous Daily    insulin lispro  0-6 Units Subcutaneous TID WC    insulin lispro  0-3 Units Subcutaneous Nightly    atorvastatin  40 mg Oral Nightly    clopidogrel  75 mg Oral Daily    doxepin  10 mg Oral Nightly    miconazole   Topical BID    vancomycin (VANCOCIN) intermittent dosing (placeholder)   Other RX Placeholder    cetirizine  10 mg Oral Daily    chlorhexidine  15 mL Mouth/Throat BID      dexmedetomidine (PRECEDEX) IV infusion Stopped (08/29/20 0810)    sodium chloride 70 mL/hr at 08/29/20 0854    fentanyl Stopped (08/29/20 0450)    DOPamine 2.5 mcg/kg/min (08/29/20 0856)    norepinephrine Stopped (08/27/20 0400)    dextrose      propofol Stopped (08/29/20 0450)         I/O last 3 completed shifts:   In: 3805.5 [I.V.:2780.5; NG/GT:925; IV Piggyback:100]  Out: 3500 [Urine:3500]    CBC:   Recent Labs     08/27/20  0500 08/28/20  0445 08/29/20  0600   WBC 5.5 4.9 3.9*   HGB 8.7* 7.8* 7.6*   * 90* 93*          Recent Labs     08/28/20  0445 08/28/20  1045 08/29/20  0600   * 145 142   K 4.0 3.7 3.4*    104 105   CO2 32 32 30   BUN 29* 27* 20   CREATININE 1.6* 1.6* 0.4*   GLUCOSE 121* 124* 182*       Lab Results   Component Value Date    CALCIUM 8.5 08/29/2020

## 2020-08-29 NOTE — PROGRESS NOTES
patient and adjust therapy as indicated    [facility] FOR 8/30 @12:00    Thank you for the consult. Sana Delacruz  8/29/2020 12:56 PM

## 2020-08-29 NOTE — PROGRESS NOTES
Pt extubated to 1540 @ bipap 15/5 35%. ETT and subglottic suctioned prior to extubation. NIF -36, RSBI 70 Pt tolerating well.

## 2020-08-30 NOTE — PLAN OF CARE
Problem: Falls - Risk of:  Goal: Will remain free from falls  Description: Will remain free from falls  8/30/2020 0309 by Delbert Crespo RN  Outcome: Ongoing  Goal: Absence of physical injury  Description: Absence of physical injury  8/30/2020 0309 by Delbert Crespo RN  Outcome: Ongoing     Problem: Skin Integrity:  Goal: Will show no infection signs and symptoms  Description: Will show no infection signs and symptoms  8/30/2020 0309 by Delbert Crespo RN  Outcome: Ongoing  Goal: Absence of new skin breakdown  Description: Absence of new skin breakdown  8/30/2020 0309 by Delbert Crespo RN  Outcome: Ongoing     Problem: Discharge Planning:  Goal: Participates in care planning  Description: Participates in care planning  8/30/2020 0309 by Delbert Crespo RN  Outcome: Ongoing  Goal: Discharged to appropriate level of care  Description: Discharged to appropriate level of care  8/30/2020 0309 by Delbert Crespo RN  Outcome: Ongoing  Goal: Ability to perform activities of daily living will improve  Description: Ability to perform activities of daily living will improve  8/30/2020 0309 by Delbert Crespo RN  Outcome: Ongoing     Problem: Airway Clearance - Ineffective:  Goal: Ability to maintain a clear airway will improve  Description: Ability to maintain a clear airway will improve  8/30/2020 0309 by Delbert Crespo RN  Outcome: Ongoing     Problem: Anxiety/Stress:  Goal: Level of anxiety will decrease  Description: Level of anxiety will decrease  8/30/2020 0309 by Delbert Crespo RN  Outcome: Ongoing     Problem: Aspiration:  Goal: Absence of aspiration  Description: Absence of aspiration  8/30/2020 0309 by Delbert Crespo RN  Outcome: Ongoing     Problem:  Bowel Function - Altered:  Goal: Bowel elimination is within specified parameters  Description: Bowel elimination is within specified parameters  8/30/2020 0309 by Delbert Crespo RN  Outcome: Ongoing     Problem: Restraint Use - Nonviolent/Non-Self-Destructive Behavior:  Goal: Absence of restraint indications  Description: Absence of restraint indications  8/30/2020 0309 by Trae Gonzalez RN  Outcome: Completed  Goal: Absence of restraint-related injury  Description: Absence of restraint-related injury  8/30/2020 0309 by Trae Gonzalez RN  Outcome: Completed

## 2020-08-30 NOTE — PROGRESS NOTES
level remains therapeutic, will consider scheduled doses. · Pharmacy will continue to monitor patient and adjust therapy as indicated    [id number] Heredia 8/31 @12:00    Thank you for the consult. Lord Ky Landry  8/30/2020 12:59 PM

## 2020-08-30 NOTE — PROGRESS NOTES
Patient is awake and pulling at [facility]. This nurse is trying to get patient to do a bedside swallow evaluation with 30 ml water as she is wide awake today. Patient refuses to take anything by mouth. She was informed that she has medication she needs to take to help her get better, she then screamed, \" I don't want this!, I don't want anything you have! \". Patient also became combative when this nurse attempted to do to a sterile dressing change on her HD catheter and PICC line.

## 2020-08-30 NOTE — PROGRESS NOTES
pulmonary      SUBJECTIVE:  So far has tolerated wxtubation well     OBJECTIVE    VITALS:  BP (!) 153/56   Pulse 102   Temp 98.6 °F (37 °C) (Rectal)   Resp 18   Ht 5' 8\" (1.727 m)   Wt (!) 416 lb 7.2 oz (188.9 kg)   SpO2 94%   BMI 63.32 kg/m²   HEAD AND FACE EXAM:  No throat injection, no active exudate,no thrush  NECK EXAM;No JVD, no masses, symmetrical  CHEST EXAM; Expansion equal and symmetrical, no masses  LUNG EXAM; Good breath sounds bilaterally. There are expiratory wheezes both lungs, there are crackles at both lung bases  CARDIOVASCULAR EXAM: Positive S1 and S2, no S3 or S4, no clicks ,no murmurs  RIGHT AND LEFT LOWER EXTRIMITY EXAM: No edema, no swelling, no inflamation            LABS   Lab Results   Component Value Date    WBC 5.5 08/30/2020    HGB 9.0 (L) 08/30/2020    HCT 29.6 (L) 08/30/2020    MCV 81.8 08/30/2020     (L) 08/30/2020     Lab Results   Component Value Date    CREATININE 1.1 08/30/2020    BUN 16 08/30/2020     08/30/2020    K 3.6 08/30/2020     08/30/2020    CO2 32 08/30/2020     Lab Results   Component Value Date    INR 1.07 06/27/2020    PROTIME 13.0 06/27/2020          Lab Results   Component Value Date    PHOS 3.2 07/02/2020    PHOS 2.7 06/30/2020    PHOS 5.8 05/24/2020        Recent Labs     08/28/20  0600 08/29/20  0600   PH 7.34 7.37   PO2ART 35* 69*   [id number] 64.0* 59.0*   O2SAT 66.1* 93.4*         Wt Readings from Last 3 Encounters:   08/30/20 (!) 416 lb 7.2 oz (188.9 kg)   07/04/20 (!) 364 lb 6.4 oz (165.3 kg)   06/04/20 (!) 494 lb (224.1 kg)               ASSESMENT  Ac on ch resp failure  chf  Ac copd  ronni        PLAN  1. Bd rx  2.  Pap rx  3. cpm    8/30/2020  Fiona Soto M.D.

## 2020-08-30 NOTE — PROGRESS NOTES
Nephrology Progress Note  8/30/2020 2:51 PM        Subjective:   Admit Date: 8/25/2020  PCP: THEODORE Dye - NP    Interval History: extubated yesterday     Diet: some    ROS:  refusing po meds- alert  , awake  At least partially oriented - with small dose of dopamine - with BIPAP- 15/5 - good UOP 8.1 l/d     Data:     Current meds:    gabapentin  800 mg Oral TID    pramipexole  1 mg Oral Nightly    pramipexole  1 mg Oral BID    vancomycin  1,500 mg Intravenous Once    fondaparinux  2.5 mg Subcutaneous Daily    furosemide  20 mg Intravenous TID    spironolactone  25 mg Oral Daily    aMILoride  5 mg Oral Daily    ceftazidime-acibactam (AVYCAZ) infusion  1.25 g Intravenous Q8H    minocycline  100 mg Oral BID    albuterol sulfate HFA  2 puff Inhalation 4x daily    ipratropium  2 puff Inhalation 4x daily    sodium chloride flush  10 mL Intravenous 2 times per day    sodium chloride flush  10 mL Intravenous 2 times per day    pantoprazole  40 mg Intravenous Daily    insulin lispro  0-6 Units Subcutaneous TID WC    insulin lispro  0-3 Units Subcutaneous Nightly    atorvastatin  40 mg Oral Nightly    clopidogrel  75 mg Oral Daily    doxepin  10 mg Oral Nightly    miconazole   Topical BID    vancomycin (VANCOCIN) intermittent dosing (placeholder)   Other RX Placeholder    cetirizine  10 mg Oral Daily    chlorhexidine  15 mL Mouth/Throat BID      DOPamine 2.5 mcg/kg/min (08/30/20 1212)    dextrose           I/O last 3 completed shifts:   In: 2672.9 [I.V.:1672.9; IV Piggyback:1000]  Out: 0421 [Urine:8150]    CBC:   Recent Labs     08/28/20  0445 08/29/20  0600 08/30/20  0515   WBC 4.9 3.9* 5.5   HGB 7.8* 7.6* 9.0*   PLT 90* 93* 122*          Recent Labs     08/28/20  1045 08/29/20  0600 08/30/20  0515    142 142   K 3.7 3.4* 3.6    105 100   CO2 32 30 32   BUN 27* 20 16   CREATININE 1.6* 0.4* 1.1   GLUCOSE 124* 182* 133*       Lab Results   Component Value Date    CALCIUM 9.0 08/30/2020    PHOS 3.2 07/02/2020       Objective:     Vitals: BP (!) 152/66   Pulse 107   Temp 98.6 °F (37 °C) (Rectal)   Resp 15   Ht 5' 8\" (1.727 m)   Wt (!) 416 lb 7.2 oz (188.9 kg)   SpO2 96%   BMI 63.32 kg/m²     General appearance:  Obese - Awake - did talk to me   HEENT:  +pallor   Neck:  Supple- Rt Ij temp HD cath  Lungs:  Limited exam , + adv BS  Heart:  tachycardia   Abdomen: soft  Extremities:  No edema       Problem List :         Impression :     1. JOSE - recovering - had RRT x 2 days - may d.c HD cath  2. resp failure I think mainly from pulm edema and ? Infection  - extubated   3. Pancytopenia better wbc/ PLT - likely from infection  4. Obesity- cor pulmonale/ DM / S/p TAVR / ANMOL etc     Recommendation/Plan  :     1. Keep loop  2. Redo CXR in am   3. If infiltrate better would suggest  pulm edema   4. Adjust loop as she does   5. May d/C HD catrj  6.  Follow  clinically  And  bio chemically   7. periodically pro BNP level       Damian Hall MD

## 2020-08-31 NOTE — PROGRESS NOTES
08/31/20 0313   NIV Type   NIV Started/Stopped On   Equipment Type v60   Mode Bilevel   Mask Size Medium   Bonnet size Medium   Settings/Measurements   IPAP 15 cmH20   CPAP/EPAP 5 cmH2O   Resp 14   FiO2  35 %   Vt Exhaled 882 mL   Minute Volume 12.7 Liters   Mask Leak (lpm) 28 lpm   Comfort Level Good   Using Accessory Muscles No   SpO2 100   Patient Observation   Observations Pt sleeping   Alarm Settings   Alarms On Y   Press Low Alarm 5 cmH2O   High Pressure Alarm 25 cmH2O   Delay Alarm 20 sec(s)   Apnea (secs) 20 secs   Resp Rate Low Alarm 12   High Respiratory Rate 40 br/min

## 2020-08-31 NOTE — PROGRESS NOTES
Call initiated by: Nursing staff:  Charity Kelley  Call addressed around: 8/30/2020 11:27 PM      Reason for call: Severe agitation, un-redirectable. No pain.       Orders placed: -Haldol 2mg iv x1        THEODORE Winter - CNP

## 2020-08-31 NOTE — PROGRESS NOTES
Nephrology Progress Note  8/31/2020 9:21 AM        Subjective:   Admit Date: 8/25/2020  PCP: Yulia Vazquez, THEODORE - NP    Interval History: wearing Bipap this am.   -stable serum creatinine with excellent urine output. Data:     Current meds:    gabapentin  800 mg Oral TID    pramipexole  1 mg Oral Nightly    pramipexole  1 mg Oral BID    haloperidol lactate  2 mg Intravenous Once    fondaparinux  2.5 mg Subcutaneous Daily    furosemide  20 mg Intravenous TID    spironolactone  25 mg Oral Daily    aMILoride  5 mg Oral Daily    ceftazidime-acibactam (AVYCAZ) infusion  1.25 g Intravenous Q8H    minocycline  100 mg Oral BID    albuterol sulfate HFA  2 puff Inhalation 4x daily    ipratropium  2 puff Inhalation 4x daily    sodium chloride flush  10 mL Intravenous 2 times per day    sodium chloride flush  10 mL Intravenous 2 times per day    pantoprazole  40 mg Intravenous Daily    insulin lispro  0-6 Units Subcutaneous TID WC    insulin lispro  0-3 Units Subcutaneous Nightly    atorvastatin  40 mg Oral Nightly    clopidogrel  75 mg Oral Daily    doxepin  10 mg Oral Nightly    miconazole   Topical BID    vancomycin (VANCOCIN) intermittent dosing (placeholder)   Other RX Placeholder    cetirizine  10 mg Oral Daily    chlorhexidine  15 mL Mouth/Throat BID      DOPamine 2.5 mcg/kg/min (08/30/20 2036)    dextrose           I/O last 3 completed shifts:   In: 134 [I.V.:777]  Out: 7400 [Urine:7400]    CBC:   Recent Labs     08/29/20  0600 08/30/20  0515 08/31/20  0430   WBC 3.9* 5.5 4.9   HGB 7.6* 9.0* 9.1*   PLT 93* 122* 128*          Recent Labs     08/29/20  0600 08/30/20  0515 08/31/20  0430    142 142   K 3.4* 3.6 3.5    100 98*   CO2 30 32 33*   BUN 20 16 14   CREATININE 0.4* 1.1 1.1   GLUCOSE 182* 133* 112*       Lab Results   Component Value Date    CALCIUM 9.0 08/31/2020    PHOS 3.2 07/02/2020       Objective:     Vitals: BP (!) 146/47   Pulse 86   Temp 97.9 °F (36.6 °C) (Core) monitor o2 off vent  6 ssui  7 fu affect             Electronically signed by Enma Webster MD on 8/31/2020 at 11:28 AM

## 2020-08-31 NOTE — PROGRESS NOTES
LAD.  Chest: no distress and CTA. Good air movement. Heart: RRR and no MRG. Abd: large, pendulous, soft, non-distended, no tenderness, no hepatomegaly. Normoactive bowel sounds. Yeast rash under all abdominal folds with malodor noted. Ext: no clubbing, cyanosis, non-pitting BLE edema  Catheter Site: without erythema or tenderness draining clear yellow urine. Vascath: Intact right IJ without erythema or edema at site  Neuro: Sedated and ventilated. Radiologic / Imaging / TESTING  8/25/20 IR nontunneled vascular catheter:  Impression    Successful ultrasound guided non-tunneled right internal jugular vein    triple-lumen dialysis catheter placement.       8/25/20 XR Chest Portable:  Impression    Perihilar airspace disease suggesting moderate pulmonary edema although    pneumonia is in the differential.  Along with cardiomegaly and pleural    effusions, this may represent CHF.  Findings have increased since prior exam.         Central line with tip in the SVC.  No pneumothorax.      8/25/20 US Retroperitoneal Limited:  Impression    Unremarkable ultrasound of the kidneys.       8/26/20 XR Abdomen:  Impression    Side hole of the enteric tube is noted within the distal body of the stomach    however the tip is not included.       8/26/20 XR Chest Portable:  Impression    Persistent congestive heart failure.       8/26/20 CT Chest WO Contrast:  Impression    Limited exam for reasons described above.         Areas of consolidation and nonrounded ground-glass opacity to the lungs    bilaterally.  Differential considerations include multifocal pneumonia,    pulmonary edema, and/or atelectasis or some combination thereof.         Small bilateral pleural effusions.         Stable cardiomegaly.            8/26/20 CT Head WO Contrast:  Impression    Partially limited study as above with no evident acute intracranial    abnormality.       8/27/20 XR Chest Portable:  Impression    Stable support apparatus and stable Alb 3.2 (L) 3.4 - 5.0 GM/DL    Total Protein 6.6 6.4 - 8.2 GM/DL    Total Bilirubin 0.6 0.0 - 1.0 MG/DL    ALT 6 (L) 10 - 40 U/L    AST 9 (L) 15 - 37 IU/L    Alkaline Phosphatase 122 40 - 128 IU/L    GFR Non- 50 (L) >60 mL/min/1.73m2    GFR African American >60 >60 mL/min/1.73m2    Anion Gap 11 4 - 16   Procalcitonin    Collection Time: 08/31/20  4:30 AM   Result Value Ref Range    Procalcitonin 0.228    Magnesium    Collection Time: 08/31/20  4:30 AM   Result Value Ref Range    Magnesium 1.6 (L) 1.8 - 2.4 mg/dl   Lactic acid, plasma    Collection Time: 08/31/20  4:40 AM   Result Value Ref Range    Lactate 0.9 0.4 - 2.0 mMOL/L     CULTURE results: Invalid input(s): BLOOD CULTURE,  URINE CULTURE, SURGICAL CULTURE    Diagnosis:  Patient Active Problem List   Diagnosis    Morbid obesity with BMI of 70 and over, adult ([address]    Essential hypertension    Dyslipidemia    Fecal incontinence    Mild intermittent asthma without complication    S/P laparoscopic cholecystectomy    Type 2 diabetes mellitus without complication, with long-term current use of insulin ([address]    Fatty liver    Arthritis    H/O parotidectomy    Venous stasis dermatitis of both lower extremities    Aortic stenosis    Morbid obesity ([address]    Asthma    Diabetes mellitus ([address]    Hypertension    Sleep apnea    Family history of coronary artery disease    Chest pain    SOB (shortness of breath)    Palpitations    Peripheral edema    Hypervolemia    Chronic respiratory failure with hypoxia and hypercapnia (HCC)    Acute exacerbation of chronic obstructive pulmonary disease (COPD) (HCC)    Chronic obstructive pulmonary disease (HCC)    Gait disturbance    Acute on chronic respiratory failure with hypoxia and hypercapnia (HCC)    Cellulitis    Skin ulcer of left foot with fat layer exposed ([address]    Pneumonia    VHD (valvular heart disease)    Class 3 severe obesity due to excess calories with body mass index (BMI) greater than or equal to 70 in adult (HCC)    SIRS (systemic inflammatory response syndrome) (HCC)    Acute kidney injury ([address]    Acute metabolic encephalopathy    Shock, unspecified ([facility]    Uncontrolled diabetes mellitus ([address]    Sepsis ([facility]    Altered mental status    Wide-complex tachycardia (HCC)    Acute on chronic diastolic heart failure (HCC)    S/P TAVR (transcatheter aortic valve replacement)    Septic shock (HCC)    Acute kidney injury superimposed on CKD (HCC)    Bradycardia       Active Problems  Principal Problem:    Septic shock (HCC)  Active Problems:    Acute kidney injury superimposed on CKD (HCC)    Bradycardia  Resolved Problems:    * No resolved hospital problems. *    Electronically signed by: Electronically signed by THEODORE Petersen CNP on 8/31/2020 at 7:53 AM

## 2020-08-31 NOTE — PLAN OF CARE
Problem: Falls - Risk of:  Goal: Will remain free from falls  Description: Will remain free from falls  8/31/2020 0903 by Norah Condon RN  Outcome: Ongoing  8/30/2020 1942 by Rupali Crocker RN  Outcome: Ongoing  Goal: Absence of physical injury  Description: Absence of physical injury  8/31/2020 0903 by Norah Condon RN  Outcome: Ongoing  8/30/2020 1942 by Rupali Crocker RN  Outcome: Ongoing     Problem: Skin Integrity:  Goal: Will show no infection signs and symptoms  Description: Will show no infection signs and symptoms  8/31/2020 0903 by Norah Condon RN  Outcome: Ongoing  8/30/2020 1942 by Rupali Crocker RN  Outcome: Ongoing  Goal: Absence of new skin breakdown  Description: Absence of new skin breakdown  8/31/2020 0903 by Norah Condon RN  Outcome: Ongoing  8/30/2020 1942 by Rupali Crocker RN  Outcome: Ongoing     Problem: Discharge Planning:  Goal: Participates in care planning  Description: Participates in care planning  8/31/2020 0903 by Norah Condon RN  Outcome: Ongoing  8/30/2020 1942 by Rupali Crocker RN  Outcome: Ongoing  Goal: Discharged to appropriate level of care  Description: Discharged to appropriate level of care  8/31/2020 0903 by Norah Condon RN  Outcome: Ongoing  8/30/2020 1942 by Rupali Crocker RN  Outcome: Ongoing  Goal: Ability to perform activities of daily living will improve  Description: Ability to perform activities of daily living will improve  8/31/2020 0903 by Norah Condon RN  Outcome: Ongoing  8/30/2020 1942 by Rupali Crocker RN  Outcome: Ongoing     Problem: Airway Clearance - Ineffective:  Goal: Ability to maintain a clear airway will improve  Description: Ability to maintain a clear airway will improve  8/31/2020 0903 by Norah Condon RN  Outcome: Ongoing  8/30/2020 1942 by Rupali Crocker RN  Outcome: Ongoing     Problem: Anxiety/Stress:  Goal: Level of anxiety will decrease  Description: Level of anxiety will decrease  8/31/2020 0903 by Norah Condon RN  Outcome: Ongoing  8/30/2020 1942 by Ned Cox RN  Outcome: Ongoing     Problem: Aspiration:  Goal: Absence of aspiration  Description: Absence of aspiration  8/31/2020 2872 by Zofia Enciso RN  Outcome: Ongoing  8/30/2020 1942 by Ned Cox RN  Outcome: Ongoing     Problem:  Bowel Function - Altered:  Goal: Bowel elimination is within specified parameters  Description: Bowel elimination is within specified parameters  8/31/2020 0903 by Zofia Enciso RN  Outcome: Ongoing  8/30/2020 1942 by Ned Cox RN  Outcome: Ongoing     Problem: Cardiac Output - Decreased:  Goal: Hemodynamic stability will improve  Description: Hemodynamic stability will improve  8/31/2020 0903 by Zofia Enciso RN  Outcome: Ongoing  8/30/2020 1942 by Ned Cox RN  Outcome: Ongoing     Problem: Fluid Volume - Imbalance:  Goal: Absence of imbalanced fluid volume signs and symptoms  Description: Absence of imbalanced fluid volume signs and symptoms  8/31/2020 0903 by Zofia Enciso RN  Outcome: Ongoing  8/30/2020 1942 by Ned Cox RN  Outcome: Ongoing     Problem: Gas Exchange - Impaired:  Goal: Levels of oxygenation will improve  Description: Levels of oxygenation will improve  8/31/2020 0903 by Zofia Enciso RN  Outcome: Ongoing  8/30/2020 1942 by Ned Cox RN  Outcome: Ongoing     Problem: Pain:  Goal: Pain level will decrease  Description: Pain level will decrease  8/31/2020 0903 by Zofia Enciso RN  Outcome: Ongoing  8/30/2020 1942 by Ned Cox RN  Outcome: Ongoing  Goal: Recognizes and communicates pain  Description: Recognizes and communicates pain  8/31/2020 0903 by Zofia Enciso RN  Outcome: Ongoing  8/30/2020 1942 by Ned Cox RN  Outcome: Ongoing  Goal: Control of acute pain  Description: Control of acute pain  8/31/2020 0903 by Zofia Enciso RN  Outcome: Ongoing  8/30/2020 1942 by Ned Cox RN  Outcome: Ongoing  Goal: Control of chronic pain  Description: Control of chronic pain  8/31/2020 0903 by Milind Goins RN  Outcome: Ongoing  8/30/2020 1942 by Aminta Moon RN  Outcome: Ongoing     Problem: Serum Glucose Level - Abnormal:  Goal: Ability to maintain appropriate glucose levels will improve to within specified parameters  Description: Ability to maintain appropriate glucose levels will improve to within specified parameters  8/31/2020 0903 by Milind Goins RN  Outcome: Ongoing  8/30/2020 1942 by Aminta Moon RN  Outcome: Ongoing     Problem: Skin Integrity - Impaired:  Goal: Will show no infection signs and symptoms  Description: Will show no infection signs and symptoms  8/31/2020 0903 by Milind Goins RN  Outcome: Ongoing  8/30/2020 1942 by Aminta Moon RN  Outcome: Ongoing  Goal: Absence of new skin breakdown  Description: Absence of new skin breakdown  8/31/2020 0903 by Milind Goins RN  Outcome: Ongoing  8/30/2020 1942 by Aminta Moon RN  Outcome: Ongoing     Problem: Sleep Pattern Disturbance:  Goal: Appears well-rested  Description: Appears well-rested  8/31/2020 0903 by Milind Goins RN  Outcome: Ongoing  8/30/2020 1942 by Aminta Moon RN  Outcome: Ongoing     Problem: Tissue Perfusion, Altered:  Goal: Circulatory function within specified parameters  Description: Circulatory function within specified parameters  8/31/2020 0903 by Milind Goins RN  Outcome: Ongoing  8/30/2020 1942 by Aminta Moon RN  Outcome: Ongoing     Problem: Tissue Perfusion - Cardiopulmonary, Altered:  Goal: Absence of angina  Description: Absence of angina  8/31/2020 0903 by Milind Goins RN  Outcome: Ongoing  8/30/2020 1942 by Aminta Moon RN  Outcome: Ongoing  Goal: Hemodynamic stability will improve  Description: Hemodynamic stability will improve  8/31/2020 0903 by Milind Goins RN  Outcome: Ongoing  8/30/2020 1942 by Aminta Moon RN  Outcome: Ongoing     Problem: Confusion - Acute:  Goal: Absence of continued neurological deterioration signs and symptoms  Description: Absence of continued neurological deterioration signs and symptoms  8/31/2020 0903 by Krupa Hwang RN  Outcome: Ongoing  8/30/2020 1942 by Minor Choudhury RN  Outcome: Ongoing     Problem: Injury - Risk of, Physical Injury:  Goal: Will remain free from falls  Description: Will remain free from falls  8/31/2020 0903 by Krupa Hwang RN  Outcome: Ongoing  8/30/2020 1942 by Minor Choudhury RN  Outcome: Ongoing  Goal: Absence of physical injury  Description: Absence of physical injury  8/31/2020 0903 by Krupa Hwang RN  Outcome: Ongoing  8/30/2020 1942 by Minor Choudhury RN  Outcome: Ongoing     Problem: Mood - Altered:  Goal: Mood stable  Description: Mood stable  8/31/2020 0903 by Krupa Hwang RN  Outcome: Ongoing  8/30/2020 1942 by Minor Choudhury RN  Outcome: Ongoing  Goal: Absence of abusive behavior  Description: Absence of abusive behavior  8/31/2020 0903 by Krupa Hwang RN  Outcome: Ongoing  8/30/2020 1942 by Minor Choudhury RN  Outcome: Ongoing  Goal: Verbalizations of feeling emotionally comfortable while being cared for will increase  Description: Verbalizations of feeling emotionally comfortable while being cared for will increase  8/31/2020 0903 by Krupa Hwang RN  Outcome: Ongoing  8/30/2020 1942 by Minor Choudhury RN  Outcome: Ongoing     Problem: Psychomotor Activity - Altered:  Goal: Absence of psychomotor disturbance signs and symptoms  Description: Absence of psychomotor disturbance signs and symptoms  8/31/2020 0903 by Krupa Hwang RN  Outcome: Ongoing  8/30/2020 1942 by Minor Choudhury RN  Outcome: Ongoing     Problem: Sensory Perception - Impaired:  Goal: Demonstrations of improved sensory functioning will increase  Description: Demonstrations of improved sensory functioning will increase  8/31/2020 0903 by Krupa Hwang RN  Outcome: Ongoing  8/30/2020 1942 by Minor Choudhury RN  Outcome: Ongoing  Goal: Decrease in sensory misperception frequency  Description: Decrease in sensory misperception frequency  8/31/2020 0903 by Maxwell Sanon RN  Outcome: Ongoing  8/30/2020 1942 by Ramakrishna Agudelo RN  Outcome: Ongoing  Goal: Able to refrain from responding to false sensory perceptions  Description: Able to refrain from responding to false sensory perceptions  8/31/2020 0903 by Maxwell Sanon RN  Outcome: Ongoing  8/30/2020 1942 by Ramakrishna Agudelo RN  Outcome: Ongoing  Goal: Demonstrates accurate environmental perceptions  Description: Demonstrates accurate environmental perceptions  8/31/2020 0884 by Maxwell Sanon RN  Outcome: Ongoing  8/30/2020 1942 by Ramakrishna Agudelo RN  Outcome: Ongoing  Goal: Able to distinguish between reality-based and nonreality-based thinking  Description: Able to distinguish between reality-based and nonreality-based thinking  8/31/2020 0903 by Maxwell Sanon RN  Outcome: Ongoing  8/30/2020 1942 by Ramakrishna Agudelo RN  Outcome: Ongoing  Goal: Able to interrupt nonreality-based thinking  Description: Able to interrupt nonreality-based thinking  8/31/2020 0903 by Maxwell Sanon RN  Outcome: Ongoing  8/30/2020 1942 by Ramakrishna Agudelo RN  Outcome: Ongoing

## 2020-08-31 NOTE — CARE COORDINATION
CM in to see pt who appears to sleeping with bipap in place. Cm spoke with pt's nurse who states that pt has been confused today. CM anticipates need for PT/OT evals  And probable need for SNF placement. CM to follow.

## 2020-08-31 NOTE — PROGRESS NOTES
[address]  8/31/2020  [id number]    Attempted to see Anabelle Farley for clinical swallow evaluation. Spoke with RN, who reports pt is not appropriately responding at this time for participation. SLP will reattempt when pt is more appropriate.     Merle Frances MA CCC-SLP  8/31/2020  10:10 AM

## 2020-08-31 NOTE — PROGRESS NOTES
Arrived to patients ICU bedside to remove a temporary dialysis catheter.  The patient is prepped and draped in her bed.     0945  Timeout complete  0946  Procedure started  1003  Procedure finished    Sterile dressing applied by [facility] RN  Report given to RN

## 2020-08-31 NOTE — PROGRESS NOTES
Hospitalist Progress Note      Name:  Toney Curtis /Age/Sex:   (66 y.o. female)   MRN & CSN:  [id number] & [id number] Admission Date/Time: 2020  9:36 AM   Location:  -A PCP: Erich Harper Day: 6    Assessment and Plan:   Toney Curtis is a 63 y.o.  female  who presents with:    Update for  - . She remains stable on BiPAP today. She had evidence of delirium. She is still on dopamine 2.5 mics. As soon as it is turned off, blood pressure drops quite low. Septic shock  -On  infectious disease changed antibiotics from meropenem/vancomycin to minocycline and Avycaz; diagnosed with pneumonia at that time  -Urine culture sent about 12 hours after meropenem given showing less than 10,000 colonies of E. coli. Sensitive to cefepime    Acute on chronic respiratory failure,   -Intubated  at 11:45 PM, the night of admission  -Extubated  at 3:40 PM.  -Appreciate pulmonary consult  -She is now on continuous BiPAP  -She has BiPAP at home    Metabolic encephalopathy/delirium  -On  the patient told her nurse that she was going to call the police on her, and that she thought that she was kidnapped. Restarted home medication Neurontin 800 mg 3 times a day on . COPD -appreciate pulmonary consult    Groundglass opacities on CT scan   -SARS-CoV-2 antigen is negative  -Continue Avycaz and minocycline. Morbid obesity with obstructive sleep apnea  -Body mass index is 63.32 kg/m².  Weight is about 419 lb per bed scale  -She is on BiPAP at home    Acute kidney injury  -With hyperkalemia and metabolic acidosis  -First hemodialysis treatment given   -Second treatment   --has increased urine output today, likely post ATN diuresis per nephrology, no dialysis today per nephrology    Bradycardia in the setting of hyperkalemia on admission-resolved    History of C. difficile infection noted    History of aortic stenosis status post TAVR  History of CHF per chart  On home oxygen  COPD/asthma  Diabetes mellitus type 2  History of left foot ulcer  Hyperlipidemia  Anemia with hemoglobin 8.2 on admission  Restless leg syndrome  History of ESBL infection    Thrombocytopenia: Heparin stopped, and the heparin clear antibody ordered    DVT prophylaxis: Arixtra    Subjective:     Sunday: Today is day 1 extubation, and she remains on BiPAP. Saturday: She was extubated. She was on BiPAP after extubation, and tolerating it well. Friday: Still on 55% FiO2 today. Dopamine requirement down to 3 mics this evening. Started on minocycline and Avycaz infectious disease physician today who noted pneumonia. Thursday: Needing 55% FiO2 today compared to 80% yesterday, got off of Levophed, still on dopamine, but only on 7 mics by the end of the shift, off bicarb drip. She is sedated and intubated and appeared comfortable when I saw her. She is still on the same doses of propofol and dipper Andrena Louder as yesterday. Had a tremendous amount of urine output per Dr. Marci Servin whom I spoke with. I spoke with the  Luciano Castro today. She indicated the patient has been in an ECF twice before. Wednesday:    Spoke to her ICU nurse this morning who took care of her overnight, he indicated that the patient had agonal breathing and then got intubated      On Levophed 6 mcg and dopamine 15 mcg at 0720  -also on propofol 10 mcg and Fentanyl 25 mcg    AC 16 with FiO2 80%    IVF at 150 with bicarb at 0720    Had 2.6 L urine output overnight    K 6.2 at 0115    Objective:        Intake/Output Summary (Last 24 hours) at 8/30/2020 2245  Last data filed at 8/30/2020 2122  Gross per 24 hour   Intake 1402.58 ml   Output 14512 ml   Net -9772.42 ml      Vitals:   Vitals:    08/30/20 1930   BP: 108/84   Pulse: 83   Resp: 14   Temp: 98.8 °F (37.1 °C)   SpO2:      Physical Exam:      Lungs-respiratory effort nonlabored while on BiPAP  Skin-no cyanosis  Well-developed well-nourished  Body mass index is 63.32 kg/m².     Medications:   Medications:    gabapentin  800 mg Oral TID    pramipexole  1 mg Oral Nightly    pramipexole  1 mg Oral BID    fondaparinux  2.5 mg Subcutaneous Daily    furosemide  20 mg Intravenous TID    spironolactone  25 mg Oral Daily    aMILoride  5 mg Oral Daily    ceftazidime-acibactam (AVYCAZ) infusion  1.25 g Intravenous Q8H    minocycline  100 mg Oral BID    albuterol sulfate HFA  2 puff Inhalation 4x daily    ipratropium  2 puff Inhalation 4x daily    sodium chloride flush  10 mL Intravenous 2 times per day    sodium chloride flush  10 mL Intravenous 2 times per day    pantoprazole  40 mg Intravenous Daily    insulin lispro  0-6 Units Subcutaneous TID WC    insulin lispro  0-3 Units Subcutaneous Nightly    atorvastatin  40 mg Oral Nightly    clopidogrel  75 mg Oral Daily    doxepin  10 mg Oral Nightly    miconazole   Topical BID    vancomycin (VANCOCIN) intermittent dosing (placeholder)   Other RX Placeholder    cetirizine  10 mg Oral Daily    chlorhexidine  15 mL Mouth/Throat BID      Infusions:    DOPamine 2.5 mcg/kg/min (08/30/20 2036)    dextrose       PRN Meds: magnesium sulfate, 1 g, PRN  sodium chloride flush, 10 mL, PRN  sodium chloride flush, 10 mL, PRN  acetaminophen, 650 mg, Q6H PRN    Or  acetaminophen, 650 mg, Q6H PRN  ipratropium-albuterol, 3 mL, Q6H PRN  glucose, 15 g, PRN  dextrose, 12.5 g, PRN  glucagon (rDNA), 1 mg, PRN  dextrose, 100 mL/hr, PRN          Electronically signed by Clarice Victoria MD on 8/30/2020 at 10:45 PM

## 2020-08-31 NOTE — PROGRESS NOTES
pulmonary      SUBJECTIVE:  Sleeping on pap rx     OBJECTIVE    VITALS:  /83   Pulse 85   Temp 97.9 °F (36.6 °C) (Core)   Resp 17   Ht 5' 8\" (1.727 m)   Wt (!) 416 lb 7.2 oz (188.9 kg)   SpO2 99%   BMI 63.32 kg/m²   HEAD AND FACE EXAM:  No throat injection, no active exudate,no thrush  NECK EXAM;No JVD, no masses, symmetrical  CHEST EXAM; Expansion equal and symmetrical, no masses  LUNG EXAM; Good breath sounds bilaterally. There are expiratory wheezes both lungs, there are crackles at both lung bases  CARDIOVASCULAR EXAM: Positive S1 and S2, no S3 or S4, no clicks ,no murmurs  RIGHT AND LEFT LOWER EXTRIMITY EXAM: No edema, no swelling, no inflamation            LABS   Lab Results   Component Value Date    WBC 4.9 08/31/2020    HGB 9.1 (L) 08/31/2020    HCT 29.9 (L) 08/31/2020    MCV 81.3 08/31/2020     (L) 08/31/2020     Lab Results   Component Value Date    CREATININE 1.1 08/31/2020    BUN 14 08/31/2020     08/31/2020    K 3.5 08/31/2020    CL 98 (L) 08/31/2020    CO2 33 (H) 08/31/2020     Lab Results   Component Value Date    INR 1.07 06/27/2020    PROTIME 13.0 06/27/2020          Lab Results   Component Value Date    PHOS 3.2 07/02/2020    PHOS 2.7 06/30/2020    PHOS 5.8 05/24/2020        Recent Labs     08/29/20  0600   PH 7.37   PO2ART 69*   HKC1VGX 59.0*   O2SAT 93.4*         Wt Readings from Last 3 Encounters:   08/30/20 (!) 416 lb 7.2 oz (188.9 kg)   07/04/20 (!) 364 lb 6.4 oz (165.3 kg)   06/04/20 (!) 494 lb (224.1 kg)               ASSESMENT  Ac on ch resp failure  chf  Ac copd  ronni        PLAN  1. cpm  2. Cont pap rx  3.  Bd rx    8/31/2020  Dank Conroy M.D.

## 2020-08-31 NOTE — PROGRESS NOTES
Comprehensive Nutrition Assessment    Type and Reason for Visit:  Reassess    Nutrition Recommendations/Plan:   Will closely monitor patient's ability to safely tolerate  Will monitor for need for nutrition support PRN  Will closely monitor nutrition status, poc    Nutrition Assessment:  pt extubated on 8/29, +bipap, noted SLP unable to complete swallow eval d/t pt unable to participate, noted temporary dialysis cath removed today, +UOP, pt remains at high nutrition risk at this time    Malnutrition Assessment:  Malnutrition Status:  Insufficient data      Estimated Daily Nutrient Needs:  Energy (kcal):  2492; Weight Used for Energy Requirements:  Current     Protein (g):  76(1.2 g/kg); Weight Used for Protein Requirements:  Ideal        Fluid (ml/day):  (fluid management per nephrology);      Nutrition Related Findings:  +2 edema noted      Wounds:  Stage II, Pressure Ulcer(stage II pressure ulcer x 4 per chart)     Current Nutrition Therapies:    Diet NPO Effective Now    Anthropometric Measures:  · Height: 5' 7.99\" (172.7 cm)  · Current Body Weight: 416 lb 7.2 oz (188.9 kg)   · Admission Body Weight: 419 lb 1.5 oz (190.1 kg)    · Usual Body Weight: 465 lb 6.4 oz (211.1 kg)(8/22/19)     · Ideal Body Weight: 140 lbs; % Ideal Body Weight 297.5 %   · BMI: 63.3  · Adjusted Body Weight:  ; No Adjustment   · Adjusted BMI:      · BMI Categories: Obese Class 3 (BMI 40.0 or greater)       Nutrition Diagnosis:   · Inadequate oral intake related to cognitive or neurological impairment as evidenced by NPO or clear liquid status due to medical condition    Nutrition Interventions:   Food and/or Nutrient Delivery:  Start Oral Diet(when medically appropriate per SLP evaluation)  Nutrition Education/Counseling:  No recommendation at this time   Coordination of Nutrition Care:  Continued Inpatient Monitoring    Goals:  pt will meet greater than 75% of estimated nutrient needs from nutrition source within the next 24-48 hr Nutrition Monitoring and Evaluation:   Food/Nutrient Intake Outcomes:  Diet Advancement/Tolerance  Physical Signs/Symptoms Outcomes:  Biochemical Data, Chewing or Swallowing, Weight, Hemodynamic Status, GI Status     Discharge Planning:     Too soon to determine     Electronically signed by Praneeth Griffin MS, RD, LD on 8/31/20 at 12:47 PM EDT    Contact: [phone]

## 2020-09-01 PROBLEM — F41.9 ANXIETY: Status: ACTIVE | Noted: 2020-01-01

## 2020-09-01 NOTE — CONSULTS
Initial Psychiatric History and Physical    Torrey Etienne  [id number]  8/25/2020 09/01/20    ID: Patient is a 63 yrs y.o. female    CC: \"I don't know. \"    HPI: Torrey Etienne is a 63 y.o.  female who presented to the ER on 8/25/2020 with complaint of fatigue x 3-4 days, unable to get out of bed. Having diarrhea x1 week. Not urinating much over the last week. Denies pain anywhere. No SOB. No fevers. No CP. No abdominal pain. No blood in diarrhea. States family called because she is so weak, could not stand. Typically uses a walker to get around. Was admitted late [place] July for fluid overload/CHF/JOSE and diuresed >100 pounds per notes, is on torsemide and spironolactone at home. Has not followed up with nephrology. Has not seen her cardiologist \"in awhile\". Is on 2L NC at home for COPD. Unfortunately, she was found to have sepsis requiring vasopressors and BiPAP. Psychiatry was consulted because the patient expressed concern that the staff was trying to poison her by administering medications. She was refusing medications and care. Today's interview was conducted via [employer] with the interviewer located at [facility] and the interviewee located at [facility]. The patient was alert and oriented to self only. She denies SI/HI and AV hallucinations. She denies depression, but endorses anxiety as \"10\" on a scale of 0 to 10 with 0 being none and 10 being horrible. States she is sleeping \"OK\" and that her appetite is \"good. \" She does not remember making any remarks about the staff trying to poison her. She denies any psychiatric history, but does note that depression and anxiety run in her family. She is open to taking medication to help alleviate her anxiety. Pt was polite and cordial during the interview process.      Past Psychiatric History: none    Family Psychiatric History:   Family History   Problem Relation Age of Onset    Heart Failure Mother    [place] Heart Attack Mother     Hypertension Mother     Coronary Art Dis Mother         Had PTCA w/stenting.     Heart Disease Mother     High Blood Pressure Mother     Obesity Mother     Diabetes Mother     Depression Mother     Heart Failure Father     Heart Disease Father     High Blood Pressure Father     Obesity Father     Heart Failure Brother     Heart Disease Brother     High Blood Pressure Brother     Obesity Brother     Depression Brother     Depression Sister     Depression Daughter     Anxiety Disorder Daughter     Depression Niece         suicide attempt        Allergies:  No Known Allergies     OBJECTIVE  Vital Signs:  Vitals:    09/01/20 1415   BP: (!) 125/46   Pulse: 86   Resp: 13   Temp:    SpO2:        Labs:  Recent Results (from the past 48 hour(s))   POCT Glucose    Collection Time: 08/30/20  5:30 PM   Result Value Ref Range    POC Glucose 134 (H) 70 - 99 MG/DL   POCT Glucose    Collection Time: 08/30/20  8:37 PM   Result Value Ref Range    POC Glucose 108 (H) 70 - 99 MG/DL   CBC auto differential    Collection Time: 08/31/20  4:30 AM   Result Value Ref Range    WBC 4.9 4.0 - 10.5 K/CU MM    RBC 3.68 (L) 4.2 - 5.4 M/CU MM    Hemoglobin 9.1 (L) 12.5 - 16.0 GM/DL    Hematocrit 29.9 (L) 37 - 47 %    MCV 81.3 78 - 100 FL    MCH 24.7 (L) 27 - 31 PG    MCHC 30.4 (L) 32.0 - 36.0 %    RDW 20.5 (H) 11.7 - 14.9 %    Platelets 604 (L) 555 - 440 K/CU MM    MPV 9.0 7.5 - 11.1 FL    Differential Type AUTOMATED DIFFERENTIAL     Segs Relative 60.2 36 - 66 %    Lymphocytes % 21.5 (L) 24 - 44 %    Monocytes % 10.2 (H) 0 - 4 %    Eosinophils % 4.7 (H) 0 - 3 %    Basophils % 0.8 0 - 1 %    Segs Absolute 3.0 K/CU MM    Lymphocytes Absolute 1.1 K/CU MM    Monocytes Absolute 0.5 K/CU MM    Eosinophils Absolute 0.2 K/CU MM    Basophils Absolute 0.0 K/CU MM    Nucleated RBC % 0.0 %    Total Nucleated RBC 0.0 K/CU MM    Total Immature Neutrophil 0.13 K/CU MM    Immature Neutrophil % 2.6 (H) 0 - 0.43 % Comprehensive Metabolic Panel w/ Reflex to MG    Collection Time: 08/31/20  4:30 AM   Result Value Ref Range    Sodium 142 135 - 145 MMOL/L    Potassium 3.5 3.5 - 5.1 MMOL/L    Chloride 98 (L) 99 - 110 mMol/L    CO2 33 (H) 21 - 32 MMOL/L    BUN 14 6 - 23 MG/DL    CREATININE 1.1 0.6 - 1.1 MG/DL    Glucose 112 (H) 70 - 99 MG/DL    Calcium 9.0 8.3 - 10.6 MG/DL    Alb 3.2 (L) 3.4 - 5.0 GM/DL    Total Protein 6.6 6.4 - 8.2 GM/DL    Total Bilirubin 0.6 0.0 - 1.0 MG/DL    ALT 6 (L) 10 - 40 U/L    AST 9 (L) 15 - 37 IU/L    Alkaline Phosphatase 122 40 - 128 IU/L    GFR Non- 50 (L) >60 mL/min/1.73m2    GFR African American >60 >60 mL/min/1.73m2    Anion Gap 11 4 - 16   Procalcitonin    Collection Time: 08/31/20  4:30 AM   Result Value Ref Range    Procalcitonin 0.228    Magnesium    Collection Time: 08/31/20  4:30 AM   Result Value Ref Range    Magnesium 1.6 (L) 1.8 - 2.4 mg/dl   Lactic acid, plasma    Collection Time: 08/31/20  4:40 AM   Result Value Ref Range    Lactate 0.9 0.4 - 2.0 mMOL/L   POCT Glucose    Collection Time: 08/31/20  8:04 AM   Result Value Ref Range    POC Glucose 109 (H) 70 - 99 MG/DL   POCT Glucose    Collection Time: 08/31/20 11:47 AM   Result Value Ref Range    POC Glucose 96 70 - 99 MG/DL   POCT Glucose    Collection Time: 08/31/20 10:10 PM   Result Value Ref Range    POC Glucose 93 70 - 99 MG/DL   CBC auto differential    Collection Time: 09/01/20  6:30 AM   Result Value Ref Range    WBC 5.6 4.0 - 10.5 K/CU MM    RBC 3.79 (L) 4.2 - 5.4 M/CU MM    Hemoglobin 9.3 (L) 12.5 - 16.0 GM/DL    Hematocrit 31.2 (L) 37 - 47 %    MCV 82.3 78 - 100 FL    MCH 24.5 (L) 27 - 31 PG    MCHC 29.8 (L) 32.0 - 36.0 %    RDW 20.3 (H) 11.7 - 14.9 %    Platelets 015 834 - 076 K/CU MM    MPV 9.4 7.5 - 11.1 FL    Differential Type AUTOMATED DIFFERENTIAL     Segs Relative 61.8 36 - 66 %    Lymphocytes % 20.4 (L) 24 - 44 %    Monocytes % 9.9 (H) 0 - 4 %    Eosinophils % 5.5 (H) 0 - 3 %    Basophils % 0.4 0 - 1 %    Segs Absolute 3.5 K/CU MM    Lymphocytes Absolute 1.2 K/CU MM    Monocytes Absolute 0.6 K/CU MM    Eosinophils Absolute 0.3 K/CU MM    Basophils Absolute 0.0 K/CU MM    Nucleated RBC % 0.0 %    Total Nucleated RBC 0.0 K/CU MM    Total Immature Neutrophil 0.11 K/CU MM    Immature Neutrophil % 2.0 (H) 0 - 0.43 %   Comprehensive Metabolic Panel w/ Reflex to MG    Collection Time: 09/01/20  6:30 AM   Result Value Ref Range    Sodium 142 135 - 145 MMOL/L    Potassium 3.5 3.5 - 5.1 MMOL/L    Chloride 97 (L) 99 - 110 mMol/L    CO2 33 (H) 21 - 32 MMOL/L    BUN 17 6 - 23 MG/DL    CREATININE 1.0 0.6 - 1.1 MG/DL    Glucose 99 70 - 99 MG/DL    Calcium 9.1 8.3 - 10.6 MG/DL    Alb 3.4 3.4 - 5.0 GM/DL    Total Protein 6.9 6.4 - 8.2 GM/DL    Total Bilirubin 0.5 0.0 - 1.0 MG/DL    ALT 6 (L) 10 - 40 U/L    AST 12 (L) 15 - 37 IU/L    Alkaline Phosphatase 120 40 - 129 IU/L    GFR Non- 56 (L) >60 mL/min/1.73m2    GFR African American >60 >60 mL/min/1.73m2    Anion Gap 12 4 - 16   Lactic acid, plasma    Collection Time: 09/01/20  6:30 AM   Result Value Ref Range    Lactate 0.9 0.4 - 2.0 mMOL/L   Magnesium    Collection Time: 09/01/20  6:30 AM   Result Value Ref Range    Magnesium 1.8 1.8 - 2.4 mg/dl   POCT Glucose    Collection Time: 09/01/20  1:13 PM   Result Value Ref Range    POC Glucose 103 (H) 70 - 99 MG/DL       Review of Systems:  Reports of no current cardiovascular, respiratory, gastrointestinal, genitourinary, integumentary, neurological, musculoskeletal, or immunological symptoms today. PSYCHIATRIC: See HPI above.     PSYCHIATRIC EXAMINATION / MENTAL STATUS EXAM    CONSTITUTIONAL:    Vitals:   Vitals:    09/01/20 1415   BP: (!) 125/46   Pulse: 86   Resp: 13   Temp:    SpO2:       General appearance: [x] appears age, []  appears older than stated age,               [x]  adequately dressed and groomed, [] disheveled,               [x]  in no acute distress, [] appears mildly distressed, [] other MUSCULOSKELETAL:   Gait:   [] normal, [] antalgic, [] unsteady, [x] gait not evaluated   Station:             [] erect, [] sitting, [x] recumbent, [] other        Strength/tone:  [x] muscle strength and tone appear consistent with age and condition     [] atrophy      [] abnormal movements  PSYCHIATRIC:    Appearance: Appears stated age. Alert and oriented to person only. No acute distress. Adequate grooming and hygiene. Good eye contact. No prominent physical abnormalities. Attitude: Manner is cooperative and pleasant  Motor: No psychomotor agitation, retardation or abnormal movements noted  Speech: Clearly articulated; normal rate, volume, tone & amount. Language: intact understanding and production  Mood: anxious  Affect: anxious, full range, non-labile, congruent with mood and content of speech  Thought Production: Spontaneous. Thought Form: Coherent, linear, logical & goal-directed. No tangentiality or circumstantiality. No flight of ideas or loosening of associations. Thought Content/Perceptions: No SYLVIE, no AVH, no delusion  Insight: limited  Judgment: limited  Memory: Immediate, recent, and remote do not appear intact, though not formally tested. Attention: maintained throughout interview  Fund of knowledge: Average  Gait/Balance: not assessed    Impression:   Anxiety    Problem List:   Septic shock ([facility] [address]    Plan:  1. Start Zoloft 25 mg HS  2. Start buspirone 10 mg TID  3. Will follow loosely    Thank you very much for the consult.     Electronically signed by THEODORE Rodriguez CNP on 9/1/2020 at 2:22 PM

## 2020-09-01 NOTE — PROGRESS NOTES
Nephrology Progress Note  9/1/2020 7:06 AM        Subjective:   Admit Date: 8/25/2020  PCP: THEODORE Muniz NP    Interval History: wearing Bipap this am.     Overnight developments: patient demonstrating confusion and   Believing she is currently at the gas station and yelling out. Later on, patient stated that she had pooped and wanted to leave. Patient declined assistance from nurse to clean her up. Data:     Current meds:    ceftazidime-acibactam (AVYCAZ) infusion  2.5 g Intravenous Q8H    gabapentin  800 mg Oral TID    pramipexole  1 mg Oral Nightly    pramipexole  1 mg Oral BID    haloperidol lactate  2 mg Intravenous Once    fondaparinux  2.5 mg Subcutaneous Daily    furosemide  20 mg Intravenous TID    spironolactone  25 mg Oral Daily    aMILoride  5 mg Oral Daily    minocycline  100 mg Oral BID    albuterol sulfate HFA  2 puff Inhalation 4x daily    ipratropium  2 puff Inhalation 4x daily    sodium chloride flush  10 mL Intravenous 2 times per day    sodium chloride flush  10 mL Intravenous 2 times per day    pantoprazole  40 mg Intravenous Daily    insulin lispro  0-6 Units Subcutaneous TID WC    insulin lispro  0-3 Units Subcutaneous Nightly    atorvastatin  40 mg Oral Nightly    clopidogrel  75 mg Oral Daily    doxepin  10 mg Oral Nightly    miconazole   Topical BID    cetirizine  10 mg Oral Daily    chlorhexidine  15 mL Mouth/Throat BID      DOPamine 2.5 mcg/kg/min (08/30/20 2036)    dextrose           I/O last 3 completed shifts:   In: 300 [I.V.:200; IV Piggyback:100]  Out: 1200 [Urine:1200]    CBC:   Recent Labs     08/30/20  0515 08/31/20  0430   WBC 5.5 4.9   HGB 9.0* 9.1*   * 128*          Recent Labs     08/30/20  0515 08/31/20  0430    142   K 3.6 3.5    98*   CO2 32 33*   BUN 16 14   CREATININE 1.1 1.1   GLUCOSE 133* 112*       Lab Results   Component Value Date    CALCIUM 9.0 08/31/2020    PHOS 3.2 07/02/2020       Objective:     Vitals: BP (!) 117/58   Pulse 75   Temp 98.5 °F (36.9 °C) (Axillary)   Resp 16   Ht 5' 7.99\" (1.727 m)   Wt (!) 416 lb 7.2 oz (188.9 kg)   SpO2 100%   BMI 63.34 kg/m²     General appearance: Patient is awake with confusion   HEENT: Head: normocephalic, atraumatic. Neck: supple, symmetrical, trachea midline  Cardiovascular: normal S1 and S2  Pulmonary: coarse lung sounds through out  Abdomen:  soft / non-tender   Extremities:+2 edema to the bilateral thighs    Impression :     1.  JOSE: demonstrating renal recovery: RRT x 2 treatments   2. Sepsis   3. Pancytopenia   4. Heart Failure   5. Respiratory failure   6. DM  7. Confusion     Recommendation/Plan  :     1.   -uop: 1.2 liters in the last 24 hours via hernandez   -chemistry results from this am are pending   2.   -on Avycaz / minocycline   3   -CBC from this am: in-process  4.   -monitor: O2 saturations / urine output and volume status   -on lasix / amiloride / spironolactone  5   -utilizing BiPAP  6.   -on SSI for diabetes management   7.   -monitor affect and sensorium    Electronically signed by THEODORE Olmstead - LUNA         Nephrology Attending Progress Note  9/1/2020 11:12 AM  Subjective:   Admit Date: 8/25/2020  PCP: THEODORE Alford - NP    Interval History: I have personally performed face to face diagnostic evaluation on this patient. I have personally reviewed pertinent labs and imaging and agree with the care plan above. My additional findings are as follows:   Pt weak and confused, resting in bed and not cooperative    Objective:   Vitals: BP (!) 106/91   Pulse 82   Temp 98.9 °F (37.2 °C) (Axillary)   Resp 12   Ht 5' 7.99\" (1.727 m)   Wt (!) 379 lb (171.9 kg)   SpO2 100%   BMI 57.64 kg/m²   Weak awake  Soft obese  Thick legs    Assessment and Plan:  IMP:  As stated above    Plan     1 resolved arf and dc hd line and no dialysis need for now  2 monitor uop and > 1 liter  3 maintain abx therapy and no fever  4 on bipap and monitor o2, not willing work with therapy plan and monitor  5 ssi  For now supportive care and will need proceed to rehab  Monitor neuro/affect             Electronically signed by Comfort Richardson MD on 9/1/2020 at 11:12 AM

## 2020-09-01 NOTE — PROGRESS NOTES
8/31/20 1930 patient refuses to allow care at this time. Will not allow to be repositioned. Believes she is at the gas station and not in a hospital Derrick Frosts out continually for \"help\". When asked what do you need help with she states nothing. When any staff member walks down the hallway she yells for help. 2100 This RN again approached the patient and asked if she would take her PO medications she stated no and what good would they do anyway. This RN educated the patient on the need for medicine compliance in order for her to DC to home. She again stated what for - there is no need for medications. This RN asked if could check her vital signs and she allowed a BP to be taken. Asked again if she would like her medications and she stated \"no\". 9/1/20 -0030  Pt continues to yell out and refuses care. Still believes she is at the gas station. 0245 pt continues to yell out remains confused to place, year/month and why she is in the hospital.  Still claims to be at a gas station and that she is scheduled to work. She has remained compliant with wearing the BIPAP.

## 2020-09-01 NOTE — PROGRESS NOTES
RT Nancy Escoto placed patient to nasal cannula on 3L and off bipap. Pt tolerating it very well. Will continue to monitor closely.

## 2020-09-01 NOTE — PROGRESS NOTES
Hospitalist Progress Note      Name:  Angelica Anderson /Age/Sex: [birth year]  (63 y.o. female)   MRN & CSN:  [id number] & [id number] Admission Date/Time: 2020  9:36 AM   Location:  -A PCP: THEODORE Merritt NP       Angelica Anderson is a 63 y.o.  female  who presents with Bradycardia and Fatigue (x3-4 days)      Assessment and Plan:   Septic shock  - shock resolved  -On  infectious disease changed antibiotics from meropenem/vancomycin to minocycline and Avycaz; diagnosed with pneumonia at that time  -Sputum culture is negative as of   -Urine culture sent about 12 hours after meropenem given showing less than 10,000 colonies of E. coli. Sensitive to cefepime     Acute on chronic respiratory failure,   -Intubated  at 11:45 PM, the night of admission  -Extubated  at 3:40 PM.  -Appreciate pulmonary consult  -She is now on continuous BiPAP  -She has BiPAP at home (she wears only during sleep she states)     Metabolic encephalopathy/hyperactive delirium  - improved some on today's exam  -On  the patient told her nurse that she was going to call the police on her, and that she thought that she was kidnapped. Restarted home medication Neurontin 800 mg 3 times a day on  -but as of  she has not had any doses because she refuses her oral meds. Withdrawal from gabapentin is well described in the literature  -Ceftazidime may potentially cause delirium as well  -Psychiatry consulted on         COPD -appreciate pulmonary consult     Groundglass opacities on CT scan   -SARS-CoV-2 antigen is negative  -Noted CHF per pulmonary and pneumonia per ID, appreciate consults     Morbid obesity with obstructive sleep apnea  -Body mass index is 63.34 kg/m².  Weight is about 419 lb per bed scale  -She is on BiPAP at home     Acute kidney injury, -With hyperkalemia and metabolic acidosis  - resolved  -First hemodialysis treatment given   -Second treatment 8/26  -8/27-has increased urine output, likely post ATN diuresis per nephrology, no dialysis today per nephrology     Bradycardia in the setting of hyperkalemia on admission-resolved     History of C. difficile infection noted     pt/ot for rehab needs     History of aortic stenosis status post TAVR  History of CHF per chart  On home oxygen  COPD/asthma  Diabetes mellitus type 2  History of left foot ulcer  Hyperlipidemia  Anemia with hemoglobin 8.2 on admission  Restless leg syndrome  History of ESBL infection     Thrombocytopenia: Heparin stopped, and the heparin clear antibody ordered     DVT prophylaxis: Arixtra            Diet Diet NPO Effective Now   Code Status Full Code     Medications:   Medications:    ceftazidime-acibactam (AVYCAZ) infusion  2.5 g Intravenous Q8H    gabapentin  800 mg Oral TID    pramipexole  1 mg Oral Nightly    pramipexole  1 mg Oral BID    haloperidol lactate  2 mg Intravenous Once    fondaparinux  2.5 mg Subcutaneous Daily    furosemide  20 mg Intravenous TID    spironolactone  25 mg Oral Daily    aMILoride  5 mg Oral Daily    minocycline  100 mg Oral BID    albuterol sulfate HFA  2 puff Inhalation 4x daily    ipratropium  2 puff Inhalation 4x daily    sodium chloride flush  10 mL Intravenous 2 times per day    sodium chloride flush  10 mL Intravenous 2 times per day    pantoprazole  40 mg Intravenous Daily    insulin lispro  0-6 Units Subcutaneous TID WC    insulin lispro  0-3 Units Subcutaneous Nightly    atorvastatin  40 mg Oral Nightly    clopidogrel  75 mg Oral Daily    doxepin  10 mg Oral Nightly    miconazole   Topical BID    cetirizine  10 mg Oral Daily    chlorhexidine  15 mL Mouth/Throat BID      Infusions:    dextrose       PRN Meds: magnesium sulfate, 1 g, PRN  sodium chloride flush, 10 mL, PRN  sodium chloride flush, 10 mL, PRN  acetaminophen, 650 mg, Q6H PRN    Or  acetaminophen, 650 mg, Q6H PRN  ipratropium-albuterol, 3 mL, Q6H PRN  glucose, 15 g, PRN  dextrose, 12.5 g, PRN  glucagon (rDNA), 1 mg, PRN  dextrose, 100 mL/hr, PRN      Subjective:     Doing ok, no SOB  Objective: Intake/Output Summary (Last 24 hours) at 9/1/2020 1140  Last data filed at 9/1/2020 1000  Gross per 24 hour   Intake 300 ml   Output 1650 ml   Net -1350 ml      Vitals:   Vitals:    09/01/20 1130   BP:    Pulse:    Resp:    Temp:    SpO2: 99%     Physical Exam:   Gen:  awake, alert, no apparent distress  Head/Eyes:  Normocephalic atraumatic, EOMI   NECK:   symmetrical, trachea midline  LUNGS: Normal Effort   CARDIOVASCULAR:  Normal rate  ABDOMEN:  non distended  MUSCULOSKELETAL:  ROM limited  NEUROLOGIC: Alert and Oriented,  Cranial nerves II-XII are grossly intact.    SKIN:  no bruising or bleeding, normal skin color,  no redness      Data:       CBC   Recent Labs     08/30/20 0515 08/31/20  0430 09/01/20  0630   WBC 5.5 4.9 5.6   HGB 9.0* 9.1* 9.3*   HCT 29.6* 29.9* 31.2*   * 128* 175      BMP   Recent Labs     08/30/20  0515 08/31/20  0430 09/01/20  0630    142 142   K 3.6 3.5 3.5    98* 97*   CO2 32 33* 33*   BUN 16 14 17   CREATININE 1.1 1.1 1.0         Electronically signed by Merlinda Atta, MD on 9/1/2020 at 11:40 AM

## 2020-09-01 NOTE — PROGRESS NOTES
[address]  9/1/2020  [id number]    Attempted to see Rambo Quevedo for clinical swallow evaluation. Spoke with RN, who reports pt remains on bipap at this time. SLP will reattempt when pt is tolerating removal from bipap and able to participate in full assessment.     Kulwant Mcpherson MA CCC-SLP  9/1/2020  10:06 AM

## 2020-09-01 NOTE — PROGRESS NOTES
[id number] pt screaming help. This RN went in to assist patient. Pt stated she had pooped and that she wanted to get up and leave. This RN asked the patient to please turn over so I could clean her up and she stated she did not want cleaned. Pt stated that this RN was being childish. This RN attempted to educate the patient on the need to take her medication so she could go home. She again stated why should she take the medication. Pt continued to refuse personal care. Pt is alert to self only! Pt has yelled out since this RN came onto shift at 1900. This patient continues to be non compliant with any instruction to help her in her recovery efforts and ability to discharge to home.

## 2020-09-01 NOTE — PROGRESS NOTES
Speech Language Pathology  Facility/Department: [facility] ICU   CLINICAL BEDSIDE SWALLOW EVALUATION    NAME: Chasity Reynolds  : 1955  MRN: [id number]    Impression  Dysphagia Diagnosis: Swallow function appears grossly intact  Dysphagia Outcome Severity Scale: Level 6: Within functional limits/Modified independence     Chasity Reynolds was seen for a clinical bedside swallow evaluation following admission for acute on chronic respiratory failure requiring mechaical ventilation x 3 days (extubated ) and septic shock. H/o obesity, COPD, CHF, and ANMOL. Pt was seen on the ICU, on 2L O2,  alert and confused, calling out for help repeatedly. She was able to follow complex commands for oral motor exam which was without focal or generalized weakness. Vocal quality was strong and mildly hoarse, volitional cough was strong and mildly productive. PO trials of ice-chips, thins by spoon, cup, and straw, pureed and regular solids completed. Normal oral swallow with mildly reduced mastication for regular dry solids and normal clearance. Suspect mildly delayed swallow initiation with no s/s aspiration. Laryngeal elevation appeared WNL. Recommend:  Regular/Thins. Assist feed. No needs identified at this time. ADMISSION DATE: 2020  ADMITTING DIAGNOSIS: has Morbid obesity with BMI of 70 and over, adult ([address] Essential hypertension; Dyslipidemia; Fecal incontinence; Mild intermittent asthma without complication; S/P laparoscopic cholecystectomy; Type 2 diabetes mellitus without complication, with long-term current use of insulin ([address] Fatty liver; Arthritis; H/O parotidectomy; Venous stasis dermatitis of both lower extremities; Aortic stenosis; Morbid obesity ([address] Asthma; Diabetes mellitus ([address] Hypertension; Sleep apnea; Family history of coronary artery disease; Chest pain; SOB (shortness of breath); Palpitations; Peripheral edema; Hypervolemia;  Chronic respiratory failure with hypoxia and hypercapnia ([address] Acute exacerbation of chronic obstructive pulmonary disease (COPD) ([address] Chronic obstructive pulmonary disease ([address] Gait disturbance; Acute on chronic respiratory failure with hypoxia and hypercapnia ([address] Cellulitis; Skin ulcer of left foot with fat layer exposed ([address] Pneumonia; VHD (valvular heart disease); Class 3 severe obesity due to excess calories with body mass index (BMI) greater than or equal to 70 in [facility]); SIRS (systemic inflammatory response syndrome) ([address] Acute kidney injury ([address] Acute metabolic encephalopathy; Shock, unspecified ([address] Uncontrolled diabetes mellitus ([address] Sepsis ([address] Altered mental status; Wide-complex tachycardia ([address] Acute on chronic diastolic heart failure (HCC); S/P TAVR (transcatheter aortic valve replacement); Septic shock ([address] Acute kidney injury superimposed on CKD ([address] and Bradycardia on their problem list.  ONSET DATE: 8/25/2020     Recent Chest Xray/CT of Chest:    Narrative    EXAMINATION:    ONE XRAY VIEW OF THE CHEST         8/29/2020 3:54 am         COMPARISON:    08/28/2020         HISTORY:    ORDERING SYSTEM PROVIDED HISTORY: Tube placement    TECHNOLOGIST PROVIDED HISTORY:    Reason for exam:->Tube placement    Reason for Exam: Tube placement    Acuity: Acute    Type of Exam: Ongoing         FINDINGS:    The endotracheal tube, nasogastric tube and right IJ dialysis catheter    remain.  The patient is status post transcatheter aortic valve replacement.     Diffuse right-sided airspace disease is unchanged.  Left basilar airspace    disease and effusion are noted.  A suspected layering right-sided pleural    effusion is noted.  A left PICC remains.  Clips are noted in the left axilla.              Impression    No change in life support.  Right greater than left airspace disease is    re-identified.  Suspected bilateral pleural effusions.           Date of Eval: 9/1/2020  Evaluating Therapist: Zack Green    Current Diet level:  Current Diet : NPO  Current Liquid Diet : NPO    Primary Complaint  Patient Complaint: pt is confused, calling out \"help us! \"  repeatedly    Pain:  Pain Assessment  Pain Assessment: 0-10  Pain Level: 9  Patient's Stated Pain Goal: 2  Pain Type: Acute pain  Pain Location: Leg  Pain Orientation: Right, Left  RASS Score: Light Sedation - Patient awakens with eye opening and eye contact, but not sustained  Pain Assessment/FLACC  Pain Rating: FLACC (rest) - Face: no particular expression or smile  Pain Rating: FLACC (rest) - Legs: normal position or relaxed  Pain Rating: FLACC (rest) - Activity: lying quietly, normal position, moves easily  Pain Rating: FLACC (rest) - Cry: no cry (awake or asleep)  Pain Rating: FLACC (rest) - Consolability: content, relaxed  Score: FLACC (rest): 0    Reason for Referral  Ester Gould was referred for a bedside swallow evaluation to assess the efficiency of her swallow function, identify signs and symptoms of aspiration and make recommendations regarding safe dietary consistencies, effective compensatory strategies, and safe eating environment. Treatment Plan  Requires SLP Intervention: No  Duration/Frequency of Treatment: n/a  D/C Recommendations: To be determined     Recommended Diet and Intervention  Diet Solids Recommendation: Regular  Liquid Consistency Recommendation: Thin  Recommended Form of Meds: PO  Recommendations: Assist feed     Compensatory Swallowing Strategies  Compensatory Swallowing Strategies: Upright as possible for all oral intake    Treatment/Goals  Short-term Goals  Timeframe for Short-term Goals: n/a  Long-term Goals  Timeframe for Long-term Goals: n/a    General  Chart Reviewed: Yes  Behavior/Cognition: Alert; Cooperative;Pleasant mood  Respiratory Status: O2 via nasual cannula  O2 Device: Nasal cannula  Liters of Oxygen: 2 L  Communication Observation: Functional  Follows Directions: Complex  Dentition: Adequate  Patient Positioning: Upright in bed  Baseline Vocal Quality: Normal  Volitional Cough: Strong  Volitional Swallow: (intact)  Prior Dysphagia History: denies  Consistencies Administered: Reg solid; Dysphagia Pureed (Dysphagia I); Thin - straw; Ice Chips; Thin - cup; Thin - teaspoon    Vision/Hearing  Hearing  Hearing: Within functional limits    Oral Motor Deficits  Oral/Motor  Oral Motor:  Within functional limits    Oral Phase Dysfunction  Oral Phase  Oral Phase: WFL     Indicators of Pharyngeal Phase Dysfunction   Pharyngeal Phase  Pharyngeal Phase: WFL    Prognosis  Prognosis  Prognosis for safe diet advancement: good  Individuals consulted  Consulted and agree with results and recommendations: Patient;RN    Education  Patient Education: results WFL  Patient Education Response: Verbalizes understanding  Safety Devices in place: Yes  Type of devices: Left in bed       Therapy Time  SLP Individual Minutes  Time In: 1310  Time Out: Milton Mendez  Minutes: CORDELIA Ferris  9/1/2020 1:34 PM

## 2020-09-01 NOTE — PROGRESS NOTES
movement. Heart: RRR and no MRG. Abd: large, pendulous, soft, non-distended, no tenderness, no hepatomegaly. Normoactive bowel sounds. Yeast rash under all abdominal folds with malodor noted. Ext: no clubbing, cyanosis, non-pitting BLE edema  Catheter Site: without erythema or tenderness draining clear yellow urine. Vascath: Intact right IJ without erythema or edema at site  Neuro: Alert but confused. Radiologic / Imaging / TESTING  8/25/20 IR nontunneled vascular catheter:  Impression    Successful ultrasound guided non-tunneled right internal jugular vein    triple-lumen dialysis catheter placement.       8/25/20 XR Chest Portable:  Impression    Perihilar airspace disease suggesting moderate pulmonary edema although    pneumonia is in the differential.  Along with cardiomegaly and pleural    effusions, this may represent CHF.  Findings have increased since prior exam.         Central line with tip in the SVC.  No pneumothorax.      8/25/20 US Retroperitoneal Limited:  Impression    Unremarkable ultrasound of the kidneys.       8/26/20 XR Abdomen:  Impression    Side hole of the enteric tube is noted within the distal body of the stomach    however the tip is not included.       8/26/20 XR Chest Portable:  Impression    Persistent congestive heart failure.       8/26/20 CT Chest WO Contrast:  Impression    Limited exam for reasons described above.         Areas of consolidation and nonrounded ground-glass opacity to the lungs    bilaterally.  Differential considerations include multifocal pneumonia,    pulmonary edema, and/or atelectasis or some combination thereof.         Small bilateral pleural effusions.         Stable cardiomegaly.            8/26/20 CT Head WO Contrast:  Impression    Partially limited study as above with no evident acute intracranial    abnormality.       8/27/20 XR Chest Portable:  Impression    Stable support apparatus and stable bilateral airspace opacities and pleural effusions.           8/29/20 XR Chest Portable:  Impression    No change in life support.  Right greater than left airspace disease is    re-identified.  Suspected bilateral pleural effusions.            Labs:    Recent Results (from the past 24 hour(s))   POCT Glucose    Collection Time: 08/31/20 11:47 AM   Result Value Ref Range    POC Glucose 96 70 - 99 MG/DL   POCT Glucose    Collection Time: 08/31/20 10:10 PM   Result Value Ref Range    POC Glucose 93 70 - 99 MG/DL   CBC auto differential    Collection Time: 09/01/20  6:30 AM   Result Value Ref Range    WBC 5.6 4.0 - 10.5 K/CU MM    RBC 3.79 (L) 4.2 - 5.4 M/CU MM    Hemoglobin 9.3 (L) 12.5 - 16.0 GM/DL    Hematocrit 31.2 (L) 37 - 47 %    MCV 82.3 78 - 100 FL    MCH 24.5 (L) 27 - 31 PG    MCHC 29.8 (L) 32.0 - 36.0 %    RDW 20.3 (H) 11.7 - 14.9 %    Platelets 103 807 - 256 K/CU MM    MPV 9.4 7.5 - 11.1 FL    Differential Type AUTOMATED DIFFERENTIAL     Segs Relative 61.8 36 - 66 %    Lymphocytes % 20.4 (L) 24 - 44 %    Monocytes % 9.9 (H) 0 - 4 %    Eosinophils % 5.5 (H) 0 - 3 %    Basophils % 0.4 0 - 1 %    Segs Absolute 3.5 K/CU MM    Lymphocytes Absolute 1.2 K/CU MM    Monocytes Absolute 0.6 K/CU MM    Eosinophils Absolute 0.3 K/CU MM    Basophils Absolute 0.0 K/CU MM    Nucleated RBC % 0.0 %    Total Nucleated RBC 0.0 K/CU MM    Total Immature Neutrophil 0.11 K/CU MM    Immature Neutrophil % 2.0 (H) 0 - 0.43 %   Comprehensive Metabolic Panel w/ Reflex to MG    Collection Time: 09/01/20  6:30 AM   Result Value Ref Range    Sodium 142 135 - 145 MMOL/L    Potassium 3.5 3.5 - 5.1 MMOL/L    Chloride 97 (L) 99 - 110 mMol/L    CO2 33 (H) 21 - 32 MMOL/L    BUN 17 6 - 23 MG/DL    CREATININE 1.0 0.6 - 1.1 MG/DL    Glucose 99 70 - 99 MG/DL    Calcium 9.1 8.3 - 10.6 MG/DL    Alb 3.4 3.4 - 5.0 GM/DL    Total Protein 6.9 6.4 - 8.2 GM/DL    Total Bilirubin 0.5 0.0 - 1.0 MG/DL    ALT 6 (L) 10 - 40 U/L    AST 12 (L) 15 - 37 IU/L    Alkaline Phosphatase 120 40 - 129 IU/L GFR Non- 56 (L) >60 mL/min/1.73m2    GFR African American >60 >60 mL/min/1.73m2    Anion Gap 12 4 - 16   Lactic acid, plasma    Collection Time: 09/01/20  6:30 AM   Result Value Ref Range    Lactate 0.9 0.4 - 2.0 mMOL/L   Magnesium    Collection Time: 09/01/20  6:30 AM   Result Value Ref Range    Magnesium 1.8 1.8 - 2.4 mg/dl     CULTURE results: Invalid input(s): BLOOD CULTURE,  URINE CULTURE, SURGICAL CULTURE    Diagnosis:  Patient Active Problem List   Diagnosis    Morbid obesity with BMI of 70 and over, adult ([address]    Essential hypertension    Dyslipidemia    Fecal incontinence    Mild intermittent asthma without complication    S/P laparoscopic cholecystectomy    Type 2 diabetes mellitus without complication, with long-term current use of insulin (HCC)    Fatty liver    Arthritis    H/O parotidectomy    Venous stasis dermatitis of both lower extremities    Aortic stenosis    Morbid obesity ([address]    Asthma    Diabetes mellitus ([address]    Hypertension    Sleep apnea    Family history of coronary artery disease    Chest pain    SOB (shortness of breath)    Palpitations    Peripheral edema    Hypervolemia    Chronic respiratory failure with hypoxia and hypercapnia (HCC)    Acute exacerbation of chronic obstructive pulmonary disease (COPD) (HCC)    Chronic obstructive pulmonary disease (HCC)    Gait disturbance    Acute on chronic respiratory failure with hypoxia and hypercapnia ([facility])    Cellulitis    Skin ulcer of left foot with fat layer exposed ([address]    Pneumonia    VHD (valvular heart disease)    Class 3 severe obesity due to excess calories with body mass index (BMI) greater than or equal to 70 in adult ([address]    SIRS (systemic inflammatory response syndrome) (HCC)    Acute kidney injury ([address]    Acute metabolic encephalopathy    Shock, unspecified ([address]    Uncontrolled diabetes mellitus ([address]    Sepsis ([address]    Altered mental status    Wide-complex tachycardia ([address]    Acute on chronic diastolic heart failure (HCC)    S/P TAVR (transcatheter aortic valve replacement)    Septic shock (HCC)    Acute kidney injury superimposed on CKD (HCC)    Bradycardia       Active Problems  Principal Problem:    Septic shock (HCC)  Active Problems:    Acute kidney injury superimposed on CKD (HCC)    Bradycardia  Resolved Problems:    * No resolved hospital problems. *    Electronically signed by: Electronically signed by THEODORE Dobbins CNP on 9/1/2020 at 9:28 AM

## 2020-09-01 NOTE — PROGRESS NOTES
appreciate consults    Morbid obesity with obstructive sleep apnea  -Body mass index is 63.34 kg/m². Weight is about 419 lb per bed scale  -She is on BiPAP at home    Acute kidney injury  -With hyperkalemia and metabolic acidosis  -First hemodialysis treatment given 8/25  -Second treatment 8/26  -8/27-has increased urine output today, likely post ATN diuresis per nephrology, no dialysis today per nephrology    Bradycardia in the setting of hyperkalemia on admission-resolved    History of C. difficile infection noted    History of aortic stenosis status post TAVR  History of CHF per chart  On home oxygen  COPD/asthma  Diabetes mellitus type 2  History of left foot ulcer  Hyperlipidemia  Anemia with hemoglobin 8.2 on admission  Restless leg syndrome  History of ESBL infection    Thrombocytopenia: Heparin stopped, and the heparin clear antibody ordered    DVT prophylaxis: Arixtra    Subjective:     Monday: Today is day 2 status post extubation. She remains on BiPAP. The hyperactive delirium continues today. She is refusing her oral medications. Sunday: Today is day 1 s/p extubation, and she remains on BiPAP. Saturday: She was extubated. She was on BiPAP after extubation, and tolerating it well. Friday: Still on 55% FiO2 today. Dopamine requirement down to 3 mics this evening. Started on minocycline and Avyc[facility] infectious disease physician today who noted pneumonia. Thursday: Needing 55% FiO2 today compared to 80% yesterday, got off of Levophed, still on dopamine, but only on 7 mics by the end of the shift, off bicarb drip. She is sedated and intubated and appeared comfortable when I saw her. She is still on the same doses of propofol and dipper Christine Sanchezel as yesterday. Had a tremendous amount of urine output per Dr. Liliya Soriano whom I spoke with. I spoke with the  [facility] today. She indicated the patient has been in an ECF twice before.     Wednesday:    Spoke to her ICU nurse this morning who took care of her overnight, he indicated that the patient had agonal breathing and then got intubated      On Levophed 6 mcg and dopamine 15 mcg at 0720  -also on propofol 10 mcg and Fentanyl 25 mcg    AC 16 with FiO2 80%    IVF at 150 with bicarb at 0720    Had 2.6 L urine output overnight    K 6.2 at 0115    Objective:        Intake/Output Summary (Last 24 hours) at 8/31/2020 2308  Last data filed at 8/31/2020 1800  Gross per 24 hour   Intake 477 ml   Output 2625 ml   Net -2148 ml      Vitals:   Vitals:    08/31/20 1924   BP:    Pulse:    Resp: 14   Temp:    SpO2:      Physical Exam:      Lungs-respiratory effort nonlabored at rest  Neurologic-speech clear  Skin-no cyanosis    Medications:   Medications:    ceftazidime-acibactam (AVYCAZ) infusion  2.5 g Intravenous Q8H    gabapentin  800 mg Oral TID    pramipexole  1 mg Oral Nightly    pramipexole  1 mg Oral BID    haloperidol lactate  2 mg Intravenous Once    fondaparinux  2.5 mg Subcutaneous Daily    furosemide  20 mg Intravenous TID    spironolactone  25 mg Oral Daily    aMILoride  5 mg Oral Daily    minocycline  100 mg Oral BID    albuterol sulfate HFA  2 puff Inhalation 4x daily    ipratropium  2 puff Inhalation 4x daily    sodium chloride flush  10 mL Intravenous 2 times per day    sodium chloride flush  10 mL Intravenous 2 times per day    pantoprazole  40 mg Intravenous Daily    insulin lispro  0-6 Units Subcutaneous TID WC    insulin lispro  0-3 Units Subcutaneous Nightly    atorvastatin  40 mg Oral Nightly    clopidogrel  75 mg Oral Daily    doxepin  10 mg Oral Nightly    miconazole   Topical BID    cetirizine  10 mg Oral Daily    chlorhexidine  15 mL Mouth/Throat BID      Infusions:    DOPamine 2.5 mcg/kg/min (08/30/20 2036)    dextrose       PRN Meds: magnesium sulfate, 1 g, PRN  sodium chloride flush, 10 mL, PRN  sodium chloride flush, 10 mL, PRN  acetaminophen, 650 mg, Q6H PRN    Or  acetaminophen, 650 mg, Q6H PRN  ipratropium-albuterol, 3 mL, Q6H PRN  glucose, 15 g, PRN  dextrose, 12.5 g, PRN  glucagon (rDNA), 1 mg, PRN  dextrose, 100 mL/hr, PRN          Electronically signed by Enoc Lewis MD on 8/31/2020 at 11:08 PM

## 2020-09-01 NOTE — PROGRESS NOTES
pulmonary      SUBJECTIVE: has behavioral issues     OBJECTIVE    VITALS:  BP (!) 117/58   Pulse 86   Temp 98.5 °F (36.9 °C) (Axillary)   Resp 16   Ht 5' 7.99\" (1.727 m)   Wt (!) 416 lb 7.2 oz (188.9 kg)   SpO2 100%   BMI 63.34 kg/m²   HEAD AND FACE EXAM:  No throat injection, no active exudate,no thrush  NECK EXAM;No JVD, no masses, symmetrical  CHEST EXAM; Expansion equal and symmetrical, no masses  LUNG EXAM; Good breath sounds bilaterally. There are expiratory wheezes both lungs, there are crackles at both lung bases  CARDIOVASCULAR EXAM: Positive S1 and S2, no S3 or S4, no clicks ,no murmurs  RIGHT AND LEFT LOWER EXTRIMITY EXAM: No edema, no swelling, no inflamation            LABS   Lab Results   Component Value Date    WBC 5.6 09/01/2020    HGB 9.3 (L) 09/01/2020    HCT 31.2 (L) 09/01/2020    MCV 82.3 09/01/2020     09/01/2020     Lab Results   Component Value Date    CREATININE 1.1 08/31/2020    BUN 14 08/31/2020     08/31/2020    K 3.5 08/31/2020    CL 98 (L) 08/31/2020    CO2 33 (H) 08/31/2020     Lab Results   Component Value Date    INR 1.07 06/27/2020    PROTIME 13.0 06/27/2020          Lab Results   Component Value Date    PHOS 3.2 07/02/2020    PHOS 2.7 06/30/2020    PHOS 5.8 05/24/2020      No results for input(s): PH, PO2ART, CRG4YTA, HCO3, BEART, O2SAT in the last 72 hours. Wt Readings from Last 3 Encounters:   08/31/20 (!) 416 lb 7.2 oz (188.9 kg)   07/04/20 (!) 364 lb 6.4 oz (165.3 kg)   06/04/20 (!) 494 lb (224.1 kg)               ASSESMENT  Ac on ch resp failure  Ac copd  chf  ronni        PLAN  1. Bd rx  2.  Pap rx    9/1/2020  Ila Whitman M.D.

## 2020-09-01 NOTE — ADT AUTH CERT
Utilization Reviews         Sepsis and Other Febrile Illness, without Focal Infection - Care Day 7 (8/31/2020) by Unknown FARIHA Canales         Review Status  Review Entered    Completed  9/1/2020 15:48        Criteria Review       Care Day: 7 Care Date: 8/31/2020 Level of Care:    Guideline Day 4    Clinical Status    ( ) * Hemodynamic stability    (X) * Afebrile or temperature acceptable for next level of care    ( ) * Hypoxemia absent    ( ) * Tachypnea absent    ( ) * Cultures negative or infection identified and under adequate treatment    ( ) * Mental status at baseline    ( ) * Metabolic derangement (eg, dehydration, acidosis) absent    ( ) * End organ dysfunction (eg, myocardial ischemia, renal failure) absent    ( ) * Discharge plans and education understood    Activity    ( ) * Ambulatory or acceptable for next level of care    Routes    ( ) * Oral hydration, medications [L]    Interventions    ( ) * Isolation not indicated, or is performable at next level of care    Medications    ( ) * Antimicrobial treatment not necessary or treatment at next level of care arranged [F]    * Milestone    Additional Notes    8- REMAINS IN ICU ON BIPAP AND DOPAMINE GTT       Vital Signs: /83   Pulse 85   Temp 97.9 °F (36.6 °C) (Core)   Resp 17   Ht 5' 8\" (1.727 m)   Wt (!) 416 lb 7.2 oz (188.9 kg)   SpO2 99%  - Bi PAP @ 30% Fi02 BMI 63.32 kg/m²          8/31/2020 04:30    Sodium: 142    Potassium: 3.5    Chloride: 98 (L)    CO2: 33 (H)    BUN: 14    Creatinine: 1.1    Anion Gap: 11    GFR Non-: 50 (L)    GFR African American: >60    Magnesium: 1.6 (L)    Glucose: 112 (H)    Calcium: 9.0    Total Protein: 6.6    Procalcitonin: 0.228    Albumin: 3.2 (L)    Alk Phos: 122    ALT: 6 (L)    AST: 9 (L)    Bilirubin: 0.6    WBC: 4.9    RBC: 3.68 (L)    Hemoglobin Quant: 9.1 (L)    Hematocrit: 29.9 (L)    MCV: 81.3    MCH: 24.7 (L)    MCHC: 30.4 (L)    MPV: 9.0    RDW: 20.5 (H)    Platelet Count: 128 (L)    Lymphocyte %: 21.5 (L)    Monocytes %: 10.2 (H)    Eosinophils %: 4.7 (H)    Basophils %: 0.8    Lymphocytes Absolute: 1.1    Monocytes Absolute: 0.5    Eosinophils Absolute: 0.2    Basophils Absolute: 0.0    Differential Type: AUTOMATED DIFFERENTIAL    Segs Relative: 60.2    Segs Absolute: 3.0    Nucleated RBC %: 0.0    Immature Neutrophil %: 2.6 (H)    Total Immature Neutrophil: 0.13    Total Nucleated RBC: 0.0       ** ** ** ** INFECTIOUS DISEASE       Chest: no distress and CTA. Good air movement. Impression    Septic Shock    Pneumonia    Remains intubated on mechanical ventilator.  Initial SARS-CoV-2 NAAT test was negative.  Elevated procalcitonin suggestive of bacterial infection. Blood cultures 8/25-0/2-NGTD    Hypotension, vasopressors x 2 onboard, weaned off 8/27    CT Chest WO Contrast: Areas of consolidation and nonrounded GGO to the lungs bilaterally.  Differential considerations include multifocal pneumonia, pulmonary edema, and/or atelectasis.     Possible UTI:    Urine culture 8/26-GNR <10,000    UA 8/25-WBC 12,367,     Past Urine culture 1/10/20: Proteus mirabilis    Past Urine culture 10/29/19: Klebsiella pneumoniae (ESBL)    Morbid Obesity    Hypotension and Bradycardia: Cardiology onboard, 2 vasopressors onboard    DMII    JOSE: (ATN vs Pre-renal azotemia):  Dr. Starr Allen onboard, hyperkalemia, HD started, will consider CRRT if no improvement    Cervical Cancer    HFpEF    Acute Hypoxic Respiratory Failure:  Intubated, mechanically ventilated 8/25, extubated 8/29    Dr. Lencho Bustos onboard    Rapid COVID-19 Negative 8/26         Multi-morbidity: per PMHx:  Morbid obesity, asthma, cervical cancer, CHF, COPD, DMII, NSTEMI, OA, ANMOL on CPAP, choley, hyster, TAVR    Plan:    Start minocycline and Avycaz, plan on 7-10 days    Pct q48h x 3    CRP today and x 3         MEDS:    DOPamine  infusion    · gabapentin 800 mg Oral TID    · pramipexole 1 mg Oral Nightly    · pramipexole 1 mg Oral BID    · haloperidol lactate 2 mg Intravenous Once    · fondaparinux 2.5 mg Subcutaneous Daily    · furosemide 20 mg Intravenous TID    · spironolactone 25 mg Oral Daily    · aMILoride 5 mg Oral Daily    · ceftazidime-acibactam (AVYCAZ) infusion 1.25 g Intravenous Q8H    · minocycline 100 mg Oral BID    · albuterol sulfate HFA 2 puff Inhalation 4x daily    · ipratropium 2 puff Inhalation 4x daily    · pantoprazole 40 mg Intravenous Daily    · insulin lispro 0-6 Units Subcutaneous TID WC    · insulin lispro 0-3 Units Subcutaneous Nightly    · atorvastatin 40 mg Oral Nightly    · clopidogrel 75 mg Oral Daily    · doxepin 10 mg Oral Nightly    · miconazole Topical BID    · cetirizine 10 mg Oral Daily    · chlorhexidine 15 mL Mouth/Throat BID       PRN:    Mg Sulfate 1g IV X 2 doses                 Sepsis and Other Febrile Illness, without Focal Infection - Care Day 6 (8/30/2020) by Cheko Chirinos RN         Review Status  Review Entered    Completed  9/1/2020 15:37        Criteria Review       Care Day: 6 Care Date: 8/30/2020 Level of Care:    Guideline Day 4    Clinical Status    ( ) * Hemodynamic stability    (X) * Afebrile or temperature acceptable for next level of care    ( ) * Hypoxemia absent    ( ) * Tachypnea absent    ( ) * Cultures negative or infection identified and under adequate treatment    ( ) * Mental status at baseline    ( ) * Metabolic derangement (eg, dehydration, acidosis) absent    ( ) * End organ dysfunction (eg, myocardial ischemia, renal failure) absent    ( ) * Discharge plans and education understood    Activity    ( ) * Ambulatory or acceptable for next level of care    Routes    ( ) * Oral hydration, medications [L]    Interventions    ( ) * Isolation not indicated, or is performable at next level of care    Medications    ( ) * Antimicrobial treatment not necessary or treatment at next level of care arranged [F]    * Milestone    Additional Notes    8- REMAINS IN ICU ON BIPAP AND DOPAMINE       VITALS:  BP (!) 153/56   Pulse 102   Temp 98.6 °F (37 °C) (Rectal)   Resp 18   Ht 5' 8\" (1.727 m)   Wt (!) 416 lb 7.2 oz (188.9 kg)   SpO2 94%  BiPAP @ 35% Fi02 BMI 63.32 kg/m²       8/31/2020 04:30    Sodium: 142    Potassium: 3.5    Chloride: 98 (L)    CO2: 33 (H)    BUN: 14    Creatinine: 1.1    Anion Gap: 11    GFR Non-: 50 (L)    GFR African American: >60    Magnesium: 1.6 (L)    Glucose: 112 (H)    Calcium: 9.0    Total Protein: 6.6    Procalcitonin: 0.228    Albumin: 3.2 (L)    Alk Phos: 122    ALT: 6 (L)    AST: 9 (L)    Bilirubin: 0.6    WBC: 4.9    RBC: 3.68 (L)    Hemoglobin Quant: 9.1 (L)    Hematocrit: 29.9 (L)    MCV: 81.3    MCH: 24.7 (L)    MCHC: 30.4 (L)    MPV: 9.0    RDW: 20.5 (H)    Platelet Count: 544 (L)    Lymphocyte %: 21.5 (L)    Monocytes %: 10.2 (H)    Eosinophils %: 4.7 (H)    Basophils %: 0.8    Lymphocytes Absolute: 1.1    Monocytes Absolute: 0.5    Eosinophils Absolute: 0.2    Basophils Absolute: 0.0    Differential Type: AUTOMATED DIFFERENTIAL    Segs Relative: 60.2    Segs Absolute: 3.0    Nucleated RBC %: 0.0    Immature Neutrophil %: 2.6 (H)    Total Immature Neutrophil: 0.13    Total Nucleated RBC: 0.0       ** ** ** ** PULMONARY       SUBJECTIVE:  So far has tolerated wxtubation well       ASSESMENT    Ac on ch resp failure    chf    Ac copd    ronni                   PLAN    1. Bd rx    2. Pap rx    3. cpm            Impression : NEPHROLOGY         1. JOSE - recovering - had RRT x 2 days - may d.c HD cath    2. resp failure I think mainly from pulm edema and ? Infection  - extubated    3. Pancytopenia better wbc/ PLT - likely from infection    4. Obesity- cor pulmonale/ DM / S/p TAVR / RONNI etc         Recommendation/Plan  :         1. Keep loop    2. Redo CXR in am    3. If infiltrate better would suggest  pulm edema    4. Adjust loop as she does    5. May d/C HD catrj    6.  Follow  clinically  And  bio chemically    7. periodically pro BNP level          MEDS:    DOPamine Infusion    fondaparinux 10 mg Subcutaneous Daily    · ceftazidime-acibactam (AVYCAZ) infusion 1.25 g Intravenous Q8H    · minocycline 100 mg Oral BID    · albuterol sulfate HFA 2 puff Inhalation 4x daily    · ipratropium 2 puff Inhalation 4x daily    · pantoprazole 40 mg Intravenous Daily    · insulin lispro 0-6 Units Subcutaneous TID WC    · insulin lispro 0-3 Units Subcutaneous Nightly    · atorvastatin 40 mg Oral Nightly    · clopidogrel 75 mg Oral Daily    · doxepin 10 mg Oral Nightly    · miconazole Topical BID    · cetirizine 10 mg Oral Daily    · chlorhexidine 15 mL Mouth/Throat BID                 Vancomycin 1500mg IV once       PRN:    Mg Sulfate 1g IV X 2 doses              Sepsis and Other Febrile Illness, without Focal Infection - Care Day 5 (8/29/2020) by Sanchez Hayes RN         Review Status  Review Entered    Completed  9/1/2020 15:21        Criteria Review       Care Day: 5 Care Date: 8/29/2020 Level of Care:    Guideline Day 4    Clinical Status    ( ) * Hemodynamic stability    (X) * Afebrile or temperature acceptable for next level of care    ( ) * Hypoxemia absent    ( ) * Tachypnea absent    ( ) * Cultures negative or infection identified and under adequate treatment    ( ) * Mental status at baseline    ( ) * Metabolic derangement (eg, dehydration, acidosis) absent    ( ) * End organ dysfunction (eg, myocardial ischemia, renal failure) absent    ( ) * Discharge plans and education understood    Activity    ( ) * Ambulatory or acceptable for next level of care    Routes    ( ) * Oral hydration, medications [L]    Interventions    ( ) * Isolation not indicated, or is performable at next level of care    Medications    ( ) * Antimicrobial treatment not necessary or treatment at next level of care arranged [F]    * Milestone    Additional Notes    8- - REMAIN IN ICU       Update for 8/29-she was extubated at 3:40 PM. Christine Miles is now on BiPAP.  Dopamine was turned off, but her blood pressure dropped to 80, so it was turned back up to 2.5 mics          VITALS:  BP (!) 143/54   Pulse 86   Temp 97.3 °F (36.3 °C) (Rectal)   Resp 14   Ht 5' 8\" (1.727 m)   Wt (!) 415 lb 12.6 oz (188.6 kg)   SpO2 99%  BIPAP @ 35% Fi02   BMI 63.22 kg/m²          8/30/2020 05:15    Sodium: 142    Potassium: 3.6    Chloride: 100    CO2: 32    BUN: 16    Creatinine: 1.1    Anion Gap: 10    GFR Non-: 50 (L)    GFR : >60    Lactate, ser/plas: 0.8    Glucose: 133 (H)    Calcium: 9.0    Total Protein: 6.2 (L)    Pro-BNP: 9,520 (H)    Albumin: 3.2 (L)    Alk Phos: 130 (H)    ALT: 6 (L)    AST: 9 (L)    Bilirubin: 0.6    WBC: 5.5    RBC: 3.62 (L)    Hemoglobin Quant: 9.0 (L)    Hematocrit: 29.6 (L)    MCV: 81.8    MCH: 24.9 (L)    MCHC: 30.4 (L)    MPV: 9.2    RDW: 20.8 (H)    Platelet Count: 050 (L)    Lymphocyte %: 12.7 (L)    Monocytes %: 8.7 (H)    Eosinophils %: 3.6 (H)    Basophils %: 0.4    Lymphocytes Absolute: 0.7    Monocytes Absolute: 0.5    Eosinophils Absolute: 0.2    Basophils Absolute: 0.0    Differential Type: AUTOMATED DIFFERENTIAL    Segs Relative: 73.0 (H)    Segs Absolute: 4.0    Nucleated RBC %: 0.0    Immature Neutrophil %: 1.6 (H)    Total Immature Neutrophil: 0.09    Total Nucleated RBC: 0.0       CXR    Impression    No change in life support.  Right greater than left airspace disease is re-identified.  Suspected bilateral pleural effusions. ** ** ** ** PULMONARY       HEAD AND FACE EXAM:  No throat injection, no active exudate,no thrush    NECK EXAM;No JVD, no masses, symmetrical    CHEST EXAM; Expansion equal and symmetrical, no masses    LUNG EXAM; Good breath sounds bilaterally.  There are expiratory wheezes both lungs, there are crackles at both lung bases    CARDIOVASCULAR EXAM: Positive S1 and S2, no S3 or S4, no clicks ,no murmurs    RIGHT AND LEFT LOWER EXTRIMITY EXAM: No edema, no swelling, no inflamation          PLAN    1. cpm    2. extubate today    3. bipap continuous at night and prn during the day       MEDS:    DOPamine Infusion    fondaparinux 10 mg Subcutaneous Daily    · ceftazidime-acibactam (AVYCAZ) infusion 1.25 g Intravenous Q8H    · minocycline 100 mg Oral BID    · albuterol sulfate HFA 2 puff Inhalation 4x daily    · ipratropium 2 puff Inhalation 4x daily    · pantoprazole 40 mg Intravenous Daily    · insulin lispro 0-6 Units Subcutaneous TID WC    · insulin lispro 0-3 Units Subcutaneous Nightly    · atorvastatin 40 mg Oral Nightly    · clopidogrel 75 mg Oral Daily    · doxepin 10 mg Oral Nightly    · miconazole Topical BID    · cetirizine 10 mg Oral Daily    · chlorhexidine 15 mL Mouth/Throat BID

## 2020-09-02 NOTE — PROGRESS NOTES
[address] [facility] ACUTE CARE OCCUPATIONAL THERAPY EVALUATION  Wilfrid Hunter, 1955, 3120/3120-A, 2020    History  [place]:  The primary encounter diagnosis was Bradycardia. Diagnoses of Renal insufficiency, Metabolic acidosis, Sepsis, due to unspecified organism, unspecified whether acute organ dysfunction present ([address] Hyperkalemia, Shock ([address] Hypothermia, initial encounter, Acute renal failure, unspecified acute renal failure type ([address] and Acute cystitis without hematuria were also pertinent to this visit. Patient  has a past medical history of Acute kidney injury ([address] Anxiety, Aortic stenosis, Asthma, Bacteremia due to group B Streptococcus, Cancer ([address] Carotid artery stenosis, Chest pain, CHF (congestive heart failure) ([address] Chronic back pain, COPD exacerbation ([address] Depression, Diabetes mellitus ([address] Family history of coronary artery disease, Fatty liver, GI bleed, H/O aortic valve stenosis, H/O echocardiogram, Headache, Heart murmur, History of nuclear stress test, Morbid obesity ([address] Neuropathy, NSTEMI (non-ST elevated myocardial infarction) ([address] Obesity, Osteoarthritis, Palpitations, Peripheral edema, Restless legs syndrome, Sleep apnea, Sleep apnea, and SOB (shortness of breath). Patient  has a past surgical history that includes Cholecystectomy;  section; Hysterectomy; Tubal ligation; Neck surgery; Gallbladder surgery; Aortic valve replacement (2017); and IR NONTUNNELED VASCULAR CATHETER > 5 YEARS (2020). Subjective:  Patient states: \"Is someone standing behind me dripping on me?\". Pt also stated several times that she had been up and walking the other day without issue  Pain:  BLE legs/feet, R shoulder 9/10 pain, aching, constant  Pain Intervention: Increased movement, repositioned, RN notified.     Communication with other providers:  Handoff to RN, PT came in later in session to assist w/ last stand pivot  Restrictions: General Precautions, Fall Risk    Home Setup/Prior level of function      Social/Functional History  Lives With: Family( and daughter)  Type of Home: House  Home Layout: Two level, Able to Live on Main level with bedroom/bathroom, Performs ADL's on one level  Home Access: Stairs to enter with rails  Entrance Stairs - Number of Steps: 3  Entrance Stairs - Rails: Left  Bathroom Shower/Tub: Walk-in shower, Shower chair without back  Bathroom Accessibility: Accessible  Home Equipment: Oxygen, Rolling walker, Reacher(2L of 02 at home)  Receives Help From: Family  ADL Assistance: Needs assistance(for LE ADLs only)  Homemaking Assistance: Needs assistance(can do chores sitting upright)  Ambulation Assistance: Independent  Transfer Assistance: Independent  Active : No  Patient's  Info: family   Occupation: Retired  Pt is a poor historian. Above information taken from evaluation on 6/30/2020. Pt reports no recent falls but multiple falls throughout documentation history.      Examination of body systems (includes body structures/functions, activity/participation limitations):  · Observation:  Supine in bed upon arrival, agreeable to therapy, appeared pt had had edema in face/bilateral legs due to wrinkled appearance  · Vision:  [employer] [place]  · [facility]: [facility]  · Cardiopulmonary:  5L of 02      Body Systems and functions:  · ROM R/L:  LUE: 80 degrees shoulder flexion, elbow flexion ~35 degrees, finger flexion/extension limited by tremors, RUE: ~20 degrees shoulder flexion, elbow flexion ~15 degrees, finger flexion/extension limited by tremors    · Strength R/L:  2/5,   · Sensation: BUE/BLE numbness/tingling more pronounced in LLE  · Tone: Hypotonic  · Coordination: Pill rolling tremors, pt reported tremors have been dramatically worse in last few months, decreased gross/greatly decreased fine motor coordination  · Perception: Decreased initiation/sequencing min VCs to overcome    Activities of Daily Living STS      Safety: patient left in chair with chair alarm, call light within reach, RN notified, gait belt used. Assessment:  Pt is a 64 yo female admitted from home for septic shock. Pt at baseline is Independent for ADLs needs assisatnce for high level IADLs and is Independent for functional transfers/mobility w/ AD. Pt currently presents w/ deficits in ADL and high level IADL independence, functional activity tolerance, dynamic sitting and standing balance and tolerance and functional transfers, BUE strength/ROM, gross/fine motor coordination, initiation/sequencing and cognition. Pt would benefit from continued acute care OT services w/ discharge to SNF  Complexity: High  Prognosis: Good, no significant barriers to participation at this time.    Plan  Times per week: 2x+  Times per day: Daily  Current Treatment Recommendations: Strengthening, Functional Mobility Training, ROM, Cognitive Reorientation, Patient/Caregiver Education & Training, Positioning, Home Management Training, Pain Management, Equipment Evaluation, Education, & procurement, Endurance Training, Cognitive/Perceptual Training, Balance Training, Safety Education & Training, Self-Care / ADL     Equipment: defer    Goals:  Pt goal: go home  Time Frame for STGs: discharge  Goal 1: Pt will perform UE ADLs SBA  Goal 2: Pt will perform LE ADLs CGA w/ AD  Goal 3: Pt will perform toileting CGA  Goal 4: Pt will perform functional transfer w/ AD CGA  Goal 5: Pt will perform functional mobility w/ AD CGA  Goal 6: Pt will perform therex/theract in order to increase functional activity tolerance and dynamic standing balance    Treatment plan:  Pt will perform BUE strengthening exercises to increase BUE ROM/strength    Recommendations for NURSING activity: FARAZ to chair  Time:   Time in: 1020  Time out: 1118  Timed treatment minutes: 53 minutes  Total time: 58 minutes    Electronically signed by:    Courtney Hodgkins OTR/L [id number]  11:28 AM,9/2/2020

## 2020-09-02 NOTE — PROGRESS NOTES
Infectious Disease Progress Note  2020   Patient Name: Steven Tapia :    Impression  · Septic Shock   · Pneumonia  § Extubated 20. Initial SARS-CoV-2 NAAT test was negative. Elevated procalcitonin suggestive of bacterial infection. § Blood cultures -0/2-NGTD  § Hypotension, vasopressors x 2 onboard, weaned off   § CT Chest WO Contrast: Areas of consolidation and nonrounded GGO to the lungs bilaterally. Differential considerations include multifocal pneumonia, pulmonary edema, and/or atelectasis. · Possible UTI:  § Urine culture -GNR <10,000  § UA -WBC 12,367,   ? Morbid Obesity  ? Hypotension and Bradycardia: Cardiology onboard, 2 vasopressors onboard  ? DMII  ? JOSE: (ATN vs Pre-renal azotemia):  Dr. Simran Dexter onboard, hyperkalemia, HD started, will consider CRRT if no improvement  ? Cervical Cancer  ? HFpEF  ? Acute Hypoxic Respiratory Failure:  Intubated, mechanically ventilated , extubated   § Dr. Libia Fishman onboard  § Rapid COVID-19 Negative   ?    · Multi-morbidity: per PMHx:  Morbid obesity, asthma, cervical cancer, CHF, COPD, DMII, NSTEMI, OA, ANMOL on CPAP, choley, hyster, TAVR  Plan:  · Continue Avycaz 2.5 g IV q8h- (end date 9/3/20)  ? CRP trending down, repeat in am  ? Following, patient has improved, on oxygen per NC, CRP and Pct have trended down, now A&O x4  ? Planning SNF placement, COVID pending    Ongoing Antimicrobial Therapy  Avycaz -9/3  Completed Antimicrobial Therapy  Cefepime   Vancomycin   Meropenem ? Minocycline -  ? Review of Systems   Constitutional:        Generalized weakness  Denies any N/V/D/C or adverse effects from ABX use. HENT: Negative. Eyes: Negative. Respiratory: Negative. Cardiovascular: Negative. Gastrointestinal: Negative. Endocrine: Negative. Genitourinary: Negative. Musculoskeletal: Negative. Skin: Negative. Allergic/Immunologic: Negative.     Neurological: Negative. Hematological: Negative. Psychiatric/Behavioral: Negative. Physical Exam:  Vital Signs: BP 86/66   Pulse 82   Temp 97.6 °F (36.4 °C) (Axillary)   Resp 17   Ht 5' 7.99\" (1.727 m)   Wt (!) 379 lb (171.9 kg)   SpO2 97%   BMI 57.64 kg/m²     Gen: Sedated and ventilated,  Skin: no stigmata of endocarditis  HEMT: AT/NC   Eyes: PERRL   Neck: Supple. Trachea midline. No LAD. Chest: no distress and CTA. Good air movement. Heart: RRR and no MRG. Abd: large, pendulous, soft, non-distended, no tenderness, no hepatomegaly. Normoactive bowel sounds. Yeast rash under all abdominal folds with malodor noted. Ext: no clubbing, cyanosis, non-pitting BLE edema  Catheter Site: without erythema or tenderness draining clear yellow urine. Vascath: Intact right IJ without erythema or edema at site  Neuro: Alert but confused.      Radiologic / Imaging / TESTING  8/25/20 IR nontunneled vascular catheter:  Impression    Successful ultrasound guided non-tunneled right internal jugular vein    triple-lumen dialysis catheter placement.       8/25/20 XR Chest Portable:  Impression    Perihilar airspace disease suggesting moderate pulmonary edema although    pneumonia is in the differential.  Along with cardiomegaly and pleural    effusions, this may represent CHF.  Findings have increased since prior exam.         Central line with tip in the SVC.  No pneumothorax.      8/25/20 US Retroperitoneal Limited:  Impression    Unremarkable ultrasound of the kidneys.       8/26/20 XR Abdomen:  Impression    Side hole of the enteric tube is noted within the distal body of the stomach    however the tip is not included.       8/26/20 XR Chest Portable:  Impression    Persistent congestive heart failure.       8/26/20 CT Chest WO Contrast:  Impression    Limited exam for reasons described above.         Areas of consolidation and nonrounded ground-glass opacity to the lungs    bilaterally.  Differential considerations include multifocal pneumonia,    pulmonary edema, and/or atelectasis or some combination thereof.         Small bilateral pleural effusions.         Stable cardiomegaly.            8/26/20 CT Head WO Contrast:  Impression    Partially limited study as above with no evident acute intracranial    abnormality. 8/27/20 XR Chest Portable:  Impression    Stable support apparatus and stable bilateral airspace opacities and pleural    effusions.           8/29/20 XR Chest Portable:  Impression    No change in life support.  Right greater than left airspace disease is    re-identified.  Suspected bilateral pleural effusions.          Labs:    Recent Results (from the past 24 hour(s))   POCT Glucose    Collection Time: 09/01/20  1:13 PM   Result Value Ref Range    POC Glucose 103 (H) 70 - 99 MG/DL   POCT Glucose    Collection Time: 09/01/20  4:35 PM   Result Value Ref Range    POC Glucose 112 (H) 70 - 99 MG/DL   POCT Glucose    Collection Time: 09/01/20 10:52 PM   Result Value Ref Range    POC Glucose 99 70 - 99 MG/DL   POCT Glucose    Collection Time: 09/02/20  7:58 AM   Result Value Ref Range    POC Glucose 96 70 - 99 MG/DL     CULTURE results: Invalid input(s): BLOOD CULTURE,  URINE CULTURE, SURGICAL CULTURE    Diagnosis:  Patient Active Problem List   Diagnosis    Morbid obesity with BMI of 70 and over, adult ([address]    Essential hypertension    Dyslipidemia    Fecal incontinence    Mild intermittent asthma without complication    S/P laparoscopic cholecystectomy    Type 2 diabetes mellitus without complication, with long-term current use of insulin (HCC)    Fatty liver    Arthritis    H/O parotidectomy    Venous stasis dermatitis of both lower extremities    Aortic stenosis    Morbid obesity (HCC)    Asthma    Diabetes mellitus ([address]    Hypertension    Sleep apnea    Family history of coronary artery disease    Chest pain    SOB (shortness of breath)    Palpitations    Peripheral edema    Hypervolemia    Chronic respiratory failure with hypoxia and hypercapnia (HCC)    Acute exacerbation of chronic obstructive pulmonary disease (COPD) (HCC)    Chronic obstructive pulmonary disease (HCC)    Gait disturbance    Acute on chronic respiratory failure with hypoxia and hypercapnia (HCC)    Cellulitis    Skin ulcer of left foot with fat layer exposed ([address]    Pneumonia    VHD (valvular heart disease)    Class 3 severe obesity due to excess calories with body mass index (BMI) greater than or equal to 70 in adult ([address]    SIRS (systemic inflammatory response syndrome) (HCC)    Acute kidney injury ([address]    Acute metabolic encephalopathy    Shock, unspecified ([address]    Uncontrolled diabetes mellitus ([address]    Sepsis ([address]    Altered mental status    Wide-complex tachycardia ([address]    Acute on chronic diastolic heart failure (HCC)    S/P TAVR (transcatheter aortic valve replacement)    Septic shock (HCC)    Acute kidney injury superimposed on CKD (HCC)    Bradycardia    Anxiety    Acute respiratory failure with hypoxia (HCC)       Active Problems  Principal Problem:    Septic shock (HCC)  Active Problems:    Anxiety    Acute kidney injury superimposed on CKD (HCC)    Bradycardia    Acute respiratory failure with hypoxia (HCC)  Resolved Problems:    * No resolved hospital problems. *    Electronically signed by: Electronically signed by Fozia Jones.  THEODORE Shah CNP on 9/2/2020 at 9:36 AM

## 2020-09-02 NOTE — ADT AUTH CERT
Sepsis and Other Febrile Illness, without Focal Infection - Care Day 9 (9/2/2020) by Marc Acosta RN         Review Status  Review Entered    Completed  9/2/2020 10:58        Criteria Review       Care Day: 9 Care Date: 9/2/2020 Level of Care:    Guideline Day 4    Clinical Status    ( ) * Hemodynamic stability    (X) * Afebrile or temperature acceptable for next level of care    9/2/2020 10:58 AM EDT by Jane Horan      Temp 97.6 °F    ( ) * Hypoxemia absent    ( ) * Tachypnea absent    ( ) * Cultures negative or infection identified and under adequate treatment    ( ) * Mental status at baseline    ( ) * Metabolic derangement (eg, dehydration, acidosis) absent    ( ) * End organ dysfunction (eg, myocardial ischemia, renal failure) absent    ( ) * Discharge plans and education understood    Activity    ( ) * Ambulatory or acceptable for next level of care    Routes    ( ) * Oral hydration, medications [L]    Interventions    ( ) * Isolation not indicated, or is performable at next level of care    Medications    ( ) * Antimicrobial treatment not necessary or treatment at next level of care arranged [F]    * Milestone    Additional Notes    9/2  ICU    SUBJECTIVE:  Sleeping and on pap rx         OBJECTIVE         VITALS:  BP 86/66   Pulse 82   Temp 97.6 °F (36.4 °C) (Axillary)   Resp 25   Ht 5' 7.99\" (1.727 m)   Wt (!) 379 lb (171.9 kg)   SpO2 97%   BMI 57.64 kg/m²    HEAD AND FACE EXAM:  No throat injection, no active exudate,no thrush    NECK EXAM;No JVD, no masses, symmetrical    CHEST EXAM; Expansion equal and symmetrical, no masses    LUNG EXAM; Good breath sounds bilaterally.  There are expiratory wheezes both lungs, there are crackles at both lung bases    CARDIOVASCULAR EXAM: Positive S1 and S2, no S3 or S4, no clicks ,no murmurs    RIGHT AND LEFT LOWER EXTRIMITY EXAM: No edema, no swelling, no inflamation     Scheduled Medications    · sertraline 25 mg Oral Nightly    · busPIRone 10 mg Oral TID · ceftazidime-acibactam (AVYCAZ) infusion 2.5 g Intravenous Q8H    · gabapentin 800 mg Oral TID    · pramipexole 1 mg Oral Nightly    · pramipexole 1 mg Oral BID    · haloperidol lactate 2 mg Intravenous Once    · fondaparinux 2.5 mg Subcutaneous Daily    · furosemide 20 mg Intravenous TID    · spironolactone 25 mg Oral Daily    · aMILoride 5 mg Oral Daily    · albuterol sulfate HFA 2 puff Inhalation 4x daily    · ipratropium 2 puff Inhalation 4x daily    · sodium chloride flush 10 mL Intravenous 2 times per day    · sodium chloride flush 10 mL Intravenous 2 times per day    · pantoprazole 40 mg Intravenous Daily    · insulin lispro 0-6 Units Subcutaneous TID WC    · insulin lispro 0-3 Units Subcutaneous Nightly    · atorvastatin 40 mg Oral Nightly    · clopidogrel 75 mg Oral Daily    · doxepin 10 mg Oral Nightly    · miconazole Topical BID    · cetirizine 10 mg Oral Daily    · chlorhexidine 15 mL Mouth/Throat BID          Infusions Meds    · dextrose    ASSESMENT    Ac on ch resp failure    ronni    chf    Ac copd                   PLAN    1. Bd rx    2. Cont pap rx    3.  On antibx         Nephrology Progress Note    9/2/2020 9:43 AM     Impression :         1.  JOSE: demonstrating renal recovery: RRT x 2 treatments    2.  Sepsis    3.  Pancytopenia    4.  Heart Failure    5.  Respiratory failure    6.  DM    7.  Confusion / Anxiety         Recommendation/Plan  :         1.    -uop: 1.35 liters in the last 24 hours via hernandez    -chemistry results from this am are pending    2.    -on Avycaz TID    3.    -overall improving trend via CBC    4    -monitor: O2 saturations / urine output and volume status    -on lasix / amiloride / spironolactone    5    -wearing Bipap this am    6.    -on SSI for diabetes management    7    -monitor affect and sensorium    -seen by psychiatry: started Zoloft and buspar       WBC 5.5 K/CU MM      RBC 4.05 M/CU MM      Hemoglobin 10.1 GM/DL      Hematocrit 35.6 %      MCV 87.9 FL      MCH 24.9 PG      MCHC 28.4 %      RDW 20.7 %      Platelets 107 K/CU MM      MPV 8.8 FL      Differential Type AUTOMATED DIFFERENTIAL      Segs Relative 55.7 %      Lymphocytes % 28.4 %      Monocytes % 9.8 %      Eosinophils % 3.6 %      Basophils % 0.9 %      Segs Absolute 3.1 K/CU MM      Lymphocytes Absolute 1.6 K/CU MM      Monocytes Absolute 0.5 K/CU MM      Eosinophils Absolute 0.2 K/CU MM      Basophils Absolute 0.1 K/CU MM      Nucleated RBC % 0.4 %      Total Nucleated RBC 0.0 K/CU MM      Total Immature Neutrophil 0.09 K/CU MM      Immature Neutrophil % 1.6 %                         Sepsis and Other Febrile Illness, without Focal Infection - Care Day 8 (9/1/2020) by Mervat Carty RN         Review Status  Review Entered    Completed  9/2/2020 10:51        Criteria Review       Care Day: 8 Care Date: 9/1/2020 Level of Care:    Guideline Day 4    Clinical Status    ( ) * Hemodynamic stability    (X) * Afebrile or temperature acceptable for next level of care    9/2/2020 10:51 AM EDT by Sofiya Jason       Temp 98.5 °F    ( ) * Hypoxemia absent    ( ) * Tachypnea absent    ( ) * Cultures negative or infection identified and under adequate treatment    ( ) * Mental status at baseline    ( ) * Metabolic derangement (eg, dehydration, acidosis) absent    ( ) * End organ dysfunction (eg, myocardial ischemia, renal failure) absent    ( ) * Discharge plans and education understood    Activity    ( ) * Ambulatory or acceptable for next level of care    Routes    ( ) * Oral hydration, medications [L]    Interventions    ( ) * Isolation not indicated, or is performable at next level of care    Medications    ( ) * Antimicrobial treatment not necessary or treatment at next level of care arranged [F]    * Milestone    Additional Notes    9/1  ICU    Subjective:         Doing ok, no SOB    Objective:            Intake/Output Summary (Last 24 hours) at 9/1/2020 1140    Last data filed at 9/1/2020 1000    Gross per 24 hour    Intake 300 ml    Output 1650 ml    Net -1350 ml         Vitals:    Vitals:      09/01/20 1130    BP:      Pulse:      Resp:      Temp:      SpO2: 99%       Physical Exam:    Gen: Adriano Mingle, alert, no apparent distress    Head/Eyes:  Normocephalic atraumatic, EOMI    NECK:   symmetrical, trachea midline    LUNGS: Normal Effort    CARDIOVASCULAR:  Normal rate    ABDOMEN:  non distended    MUSCULOSKELETAL:  ROM limited    NEUROLOGIC: Alert and Oriented,  Cranial nerves II-XII are grossly intact.     SKIN:  no bruising or bleeding, normal skin color,  no redness         Scheduled Medications    · ceftazidime-acibactam (AVYCAZ) infusion 2.5 g Intravenous Q8H    · gabapentin 800 mg Oral TID    · pramipexole 1 mg Oral Nightly    · pramipexole 1 mg Oral BID    · haloperidol lactate 2 mg Intravenous Once    · fondaparinux 2.5 mg Subcutaneous Daily    · furosemide 20 mg Intravenous TID    · spironolactone 25 mg Oral Daily    · aMILoride 5 mg Oral Daily    · minocycline 100 mg Oral BID    · albuterol sulfate HFA 2 puff Inhalation 4x daily    · ipratropium 2 puff Inhalation 4x daily    · sodium chloride flush 10 mL Intravenous 2 times per day    · sodium chloride flush 10 mL Intravenous 2 times per day    · pantoprazole 40 mg Intravenous Daily    · insulin lispro 0-6 Units Subcutaneous TID WC    · insulin lispro 0-3 Units Subcutaneous Nightly    · atorvastatin 40 mg Oral Nightly    · clopidogrel 75 mg Oral Daily    · doxepin 10 mg Oral Nightly    · miconazole Topical BID    · cetirizine 10 mg Oral Daily    · chlorhexidine 15 mL Mouth/Throat BID            Infusions:    Infusions Meds    · dextrose    Assessment and Plan:    Septic shock    - shock resolved    -On August 28 infectious disease changed antibiotics from meropenem/vancomycin to minocycline and Avycaz; diagnosed with pneumonia at that time    -Sputum culture is negative as of August 31    -Urine culture sent about 12 hours after meropenem given showing less than 10,000 colonies of E. coli.  Sensitive to cefepime         Acute on chronic respiratory failure,    -Intubated 8/26 at 11:45 PM, the night of admission    -Extubated 8/29 at 3:40 PM.    -Appreciate pulmonary consult    -She is now on continuous BiPAP    -She has BiPAP at home (she wears only during sleep she states)         Metabolic encephalopathy/hyperactive delirium    - improved some on today's exam    -On 8/30 the patient told her nurse that she was going to call the police on her, and that she thought that she was kidnapped.  Restarted home medication Neurontin 800 mg 3 times a day on 8/30 -but as of August 31 she has not had any doses because she refuses her oral meds.  Withdrawal from gabapentin is well described in the literature    -Ceftazidime may potentially cause delirium as well    -Psychiatry consulted on August 31              COPD -appreciate pulmonary consult         Groundglass opacities on CT scan    -SARS-CoV-2 antigen is negative    -Noted CHF per pulmonary and pneumonia per ID, appreciate consults         Morbid obesity with obstructive sleep apnea    -Body mass index is 63.34 kg/m². Weight is about 419 lb per bed scale    -She is on BiPAP at home         Acute kidney injury, -With hyperkalemia and metabolic acidosis    - resolved    -First hemodialysis treatment given 8/25    -Second treatment 8/26    -8/27-has increased urine output, likely post ATN diuresis per nephrology, no dialysis today per nephrology         Bradycardia in the setting of hyperkalemia on admission-resolved         History of C. difficile infection noted          pt/ot for rehab needs         History of aortic stenosis status post TAVR    History of CHF per chart    On home oxygen    COPD/asthma    Diabetes mellitus type 2    History of left foot ulcer    Hyperlipidemia    Anemia with hemoglobin 8.2 on admission    Restless leg syndrome    History of ESBL infection         Thrombocytopenia: Heparin stopped, and the heparin clear antibody ordered         DVT prophylaxis: Arixtra         Nephrology Progress Note    2020 7:06 AM    Impression :         1.  JOSE: demonstrating renal recovery: RRT x 2 treatments    2.  Sepsis    3.  Pancytopenia    4.  Heart Failure    5.  Respiratory failure    6.  DM    7.  Confusion         Recommendation/Plan  :         1.    -uop: 1.2 liters in the last 24 hours via hernandez    -chemistry results from this am are pending    2.    -on Avycaz / minocycline    3    -CBC from this am: in-process    4.    -monitor: O2 saturations / urine output and volume status    -on lasix / amiloride / spironolactone    5    -utilizing BiPAP    6.    -on SSI for diabetes management    7.    -monitor affect and sensorium       pulmonary     ASSESMENT    Ac on ch resp failure    Ac copd    chf    ronni                   PLAN    1. Bd rx    2. Pap rx       Infectious Disease Progress Note    2020    Patient Name: Louis Grewal : 1955    Impression    · Septic Shock    · Pneumonia    § Extubated 20.  Initial SARS-CoV-2 NAAT test was negative.  Elevated procalcitonin suggestive of bacterial infection. § Blood cultures -0/2-NGTD    § Hypotension, vasopressors x 2 onboard, weaned off     § CT Chest WO Contrast: Areas of consolidation and nonrounded GGO to the lungs bilaterally.  Differential considerations include multifocal pneumonia, pulmonary edema, and/or atelectasis. · Possible UTI:    § Urine culture -GNR <10,000    § UA -WBC 12,367,     ? Morbid Obesity    ? Hypotension and Bradycardia: Cardiology onboard, 2 vasopressors onboard    ? DMII    ? JOSE: (ATN vs Pre-renal azotemia):  Dr. Deana Sierra onboard, hyperkalemia, HD started, will consider CRRT if no improvement    ? Cervical Cancer    ? HFpEF    ?  Acute Hypoxic Respiratory Failure:  Intubated, mechanically ventilated , extubated     § Dr. Edu Morrison onboard    § Rapid COVID-19 Negative     ?      · Multi-morbidity: per PMHx:  Morbid obesity, asthma, cervical cancer, CHF, COPD, DMII, NSTEMI, OA, ANMOL on CPAP, choley, hyster, TAVR    Plan:    · DC minocycline    · Continue Avycaz 2.5 g IV q8h- (end date 9/3/20)    ? CRP today and x 3    ?  Following, patient has improved, on oxygen per NC, CRP and Pct have trended down         WBC 5.6 K/CU MM      RBC 3.79 M/CU MM      Hemoglobin 9.3 GM/DL      Hematocrit 31.2 %      MCV 82.3 FL      MCH 24.5 PG      MCHC 29.8 %      RDW 20.3 %      Platelets 834 K/CU MM      MPV 9.4 FL      Differential Type AUTOMATED DIFFERENTIAL      Segs Relative 61.8 %      Lymphocytes % 20.4 %      Monocytes % 9.9 %      Eosinophils % 5.5 %      Basophils % 0.4 %      Segs Absolute 3.5 K/CU MM      Lymphocytes Absolute 1.2 K/CU MM      Monocytes Absolute 0.6 K/CU MM      Eosinophils Absolute 0.3 K/CU MM      Basophils Absolute 0.0 K/CU MM      Nucleated RBC % 0.0 %      Total Nucleated RBC 0.0 K/CU MM      Total Immature Neutrophil 0.11 K/CU MM      Immature Neutrophil % 2.0 %       Sodium 142 MMOL/L      Potassium 3.5 MMOL/L      Chloride 97 mMol/L      CO2 33 MMOL/L      BUN 17 MG/DL      CREATININE 1.0 MG/DL      Glucose 99 MG/DL      Calcium 9.1 MG/DL      Alb 3.4 GM/DL      Total Protein 6.9 GM/DL      Total Bilirubin 0.5 MG/DL      ALT 6 U/L      AST 12 IU/L      Alkaline Phosphatase 120 IU/L      GFR Non-African American 56 mL/min/1.73m2      GFR African American >60 mL/min/1.73m2      Anion Gap 12       Heparin Induced Plt Ab Negative      Heparin Unfractionated LD 0 %      Heparin Unfractionated HD 0 %      Interpretation See Note

## 2020-09-02 NOTE — DISCHARGE INSTR - COC
Continuity of Care Form    Patient Name: Rambo Quevedo   :    MRN:  [id number]    Admit date:  2020  Discharge date: 2020    Code Status Order: Full Code   Advance Directives:   Advance Care Flowsheet Documentation     Date/Time Healthcare Directive Type of Healthcare Directive Copy in [address] Agent's Name Healthcare Agent's Phone Number    20 [phone]  No, patient does not have an advance directive for healthcare treatment -- -- -- -- --          Admitting Physician:  Benita Montelongo MD  PCP: THEODORE Melgar NP    Discharging Nurse: Count includes the [facility] Unit/Room#: 3120/3120-A  Discharging Unit Phone Number: [phone]    Emergency Contact:   Extended Emergency Contact Information  Primary Emergency Contact: [address] of [facility] Phone: [phone]  Mobile Phone: [phone]  Relation: Spouse  Secondary Emergency Contact: Gwynne Chu, Romana Octave of [facility] Phone: [phone]  Mobile Phone: [phone]  Relation: Child    Past Surgical History:  Past Surgical History:   Procedure Laterality Date    AORTIC VALVE REPLACEMENT  2017    29mm EvolutR TAVR     SECTION      CHOLECYSTECTOMY      GALLBLADDER SURGERY      HYSTERECTOMY      IR NONTUNNELED VASCULAR CATHETER  2020    IR NONTUNNELED VASCULAR CATHETER 2020 [facility] SPECIAL PROCEDURES    NECK SURGERY      Tumor    TUBAL LIGATION         Immunization History:   Immunization History   Administered Date(s) Administered    Influenza Vaccine, unspecified formulation 10/02/2017    Influenza, Quadv, 6 mo and older, IM, PF (Flulaval, Fluarix) 2018    Influenza, Quadv, IM, PF (6 mo and older Fluzone, Flulaval, Fluarix, and 3 yrs and older Afluria) 2019       Active Problems:  Patient Active Problem List   Diagnosis Code    Morbid obesity with BMI of 70 and over, adult ([facility] E66.01, Z68.45    Essential hypertension I10    Dyslipidemia E78.5    Fecal incontinence R15.9    Mild intermittent asthma without complication B63.53    S/P laparoscopic cholecystectomy Z90.49    Type 2 diabetes mellitus without complication, with long-term current use of insulin ([facility]) E11.9, Z79.4    Fatty liver K76.0    Arthritis M19.90    H/O parotidectomy Z90.49    Venous stasis dermatitis of both lower extremities I87.2    Aortic stenosis I35.0    Morbid obesity ([facility]) E66.01    Asthma J45.909    Diabetes mellitus ([facility] E11.9    Hypertension I10    Sleep apnea G47.30    Family history of coronary artery disease Z82.49    Chest pain R07.9    SOB (shortness of breath) R06.02    Palpitations R00.2    Peripheral edema R60.9    Hypervolemia E87.70    Chronic respiratory failure with hypoxia and hypercapnia ([facility]) J96.11, J96.12    Acute exacerbation of chronic obstructive pulmonary disease (COPD) ([facility]) J44.1    Chronic obstructive pulmonary disease ([facility]) J44.9    Gait disturbance R26.9    Acute on chronic respiratory failure with hypoxia and hypercapnia ([facility]) J96.21, J96.22    Cellulitis L03.90    Skin ulcer of left foot with fat layer exposed ([facility] L97.522    Pneumonia J18.9    VHD (valvular heart disease) I38    Class 3 severe obesity due to excess calories with body mass index (BMI) greater than or equal to 70 in adult ([facility] E66.01, Z68.45    SIRS (systemic inflammatory response syndrome) ([facility]) R65.10    Acute kidney injury ([facility] N17.9    Acute metabolic encephalopathy J58.79    Shock, unspecified ([facility]) R57.9    Uncontrolled diabetes mellitus ([facility]) E11.65    Sepsis ([facility]) A41.9    Altered mental status R41.82    Wide-complex tachycardia ([facility]) I47.2    Acute on chronic diastolic heart failure ([facility]) I50.33    S/P TAVR (transcatheter aortic valve replacement) Z95.2    Septic shock ([facility]) A41.9, R65.21    Acute kidney injury superimposed on CKD ([facility]) N17.9, N18.9    Bradycardia R00.1 (Active)   Number of days: 580       Wound 09/22/19 sacrum deep tissue injury/stage 2 (Active)   Number of days: 352       Wound 10/29/19 Thigh Right;Posterior (Active)   Number of days: 315       Wound 10/29/19 Heel Left;Medial cluster (Active)   Number of days: 314       Wound 10/29/19 Thigh Left;Posterior (Active)   Number of days: 315       Wound 10/29/19 Abdomen Anterior;Right cluster under abdominal fold (Active)   Number of days: 315       Wound 10/31/19 Hip Anterior; Left under left abdomen cluster (Active)   Number of days: 313       Wound 10/31/19 Coccyx Right (Active)   Number of days: 313       Wound 10/31/19 Toe (Comment  which one) Left;Plantar left great toe fissure  (Active)   Number of days: 313       Wound 10/31/19 Foot Left;Plantar (Active)   Number of days: 313       Wound 10/31/19 Heel Right;Medial right medial heel fissure cluster (Active)   Number of days: 313       Wound 09/22/19 sacral/buttock cluster (Active)   Number of days: 352       Wound 09/22/19 Foot Left;Plantar DFU (Active)   Number of days: 352       Wound 09/22/19 Foot Right;Plantar DFU (Active)   Number of days: 352       Wound Thigh Left;Posterior (Active)   Number of days:        Wound 06/29/20 Nose (Active)   Number of days: 70       Wound 08/26/20 Other (Comment) gluteal fold (Active)   Wound Pressure Stage  2 09/03/20 2029   Dressing Status Clean;Dry; Intact 09/06/20 2030   Dressing Changed Changed/New 09/06/20 2030   Dressing/Treatment Other (comment) 09/01/20 1608   Wound Cleansed Rinsed/Irrigated with saline 08/29/20 2233   Dressing Change Due 09/08/20 09/06/20 2030   Wound Length (cm) 4.5 cm 08/26/20 1430   Wound Width (cm) 2 cm 08/26/20 1430   Wound Depth (cm) 0.1 cm 08/26/20 1430   Wound Surface Area (cm^2) 9 cm^2 08/26/20 1430   Wound Volume (cm^3) 0.9 cm^3 08/26/20 1430   Distance Tunneling (cm) 0 cm 08/26/20 1430   Tunneling Position ___ O'Clock 0 08/26/20 1430   Undermining Starts ___ O'Clock 0 08/26/20 1430 Undermining Ends___ O'Clock 0 08/26/20 1430   Undermining Maxium Distance (cm) 0 08/26/20 1430   Wound Assessment Red 09/06/20 2030   Drainage Amount Small 09/06/20 2030   Drainage Description Serosanguinous 09/06/20 2030   Odor None 09/06/20 2030   Margins Defined edges 09/06/20 2030   [place]ierra-wound Assessment Pink;Red 09/06/20 2030   Non-staged Wound Description Partial thickness 09/06/20 2030   [place]%Wound Bed 50 09/06/20 0430   Red%Wound Bed 50 09/06/20 0430   Yellow%Wound Bed 0 09/06/20 0430   Black%Wound Bed 0 09/06/20 0430   Purple%Wound Bed 0 09/06/20 0430   Other%Wound Bed 0 08/26/20 1430   Number of days: 12       Wound 09/02/20 Breast Lower;Right under breast (Active)   Wound Other 09/02/20 0945   Dressing Status Changed 09/06/20 2030   Dressing Changed Changed/New 09/06/20 2030   Wound Cleansed Soap and water 09/02/20 0945   Wound Assessment Red;Pink 09/06/20 2030   Drainage Amount None 09/06/20 2030   Drainage Description Serosanguinous 09/06/20 0430   Odor None 09/06/20 2030   Margins Defined edges 09/06/20 2030   Sierra-wound Assessment Red 09/06/20 2030   Non-staged Wound Description Partial thickness 09/06/20 0430   Number of days: 6       Wound 09/02/20 Back Lateral;Left (Active)   Wound Other 09/02/20 0945   Dressing Status Other (Comment) 09/08/20 0922   Dressing/Treatment Other (comment) 09/06/20 2030   Wound Cleansed Soap and water 09/02/20 0945   Wound Assessment Pink 09/06/20 2030   Drainage Amount None 09/06/20 2030   Drainage Description Serosanguinous 09/06/20 0430   Odor None 09/06/20 2030   Margins Defined edges 09/06/20 2030   Sierra-wound Assessment Red 09/06/20 2030   Non-staged Wound Description Partial thickness 09/06/20 0430   Number of days: 6        Elimination:  Continence:   · Bowel: No  · Bladder: No  Urinary Catheter: Removal Date 9/8/20   Colostomy/Ileostomy/Ileal Conduit: No       Date of Last BM: 9/8/20    Intake/Output Summary (Last 24 hours) at 9/8/2020 1021  Last data filed at 9/8/2020 0549  Gross per 24 hour   Intake 30 ml   Output 2650 ml   Net -2620 ml     I/O last 3 completed shifts: In: 30 [I.V.:30]  Out: [address] [Urine:3850]    Safety Concerns: At Risk for Falls    Impairments/Disabilities:      Vision    Patient's personal belongings (please select all that are sent with patient):  Tiffanie Barragan RN SIGNATURE:  Electronically signed by Bob Mcmahon LPN on 7/8/43 at 12:84 AM EDT    CASE MANAGEMENT/SOCIAL WORK SECTION    Inpatient Status Date: ***    Readmission Risk Assessment Score:  Readmission Risk              Risk of Unplanned Readmission:        48           Discharging to Facility/ Agency   · Name: [facility] Espinoza  · Address: [facility]  · Phone: [phone]  · Fax: [phone]      PHYSICIAN SECTION    Nutrition Therapy:  Current Nutrition Therapy:   - Oral Diet:  Carb Control 3 carbs/meal (1500kcals/day)    Routes of Feeding: Oral  Liquids: No Restrictions  Daily Fluid Restriction: no  Last Modified Barium Swallow with Video (Video Swallowing Test): not done    Treatments at the Time of Hospital Discharge:   Respiratory Treatments: Breathing treatment with duoneb Q6 PRN   Oxygen Therapy:  is on oxygen at 2 L/min per nasal cannula. Ventilator:    - BiPAP   IPAP: 15 cmH20, CPAP/EPAP: 5 cmH2O only when sleeping    Rehab Therapies: Physical Therapy and Occupational Therapy  Weight Bearing Status/Restrictions: No weight bearing restirctions  Other Medical Equipment (for information only, NOT a DME order):  wheelchair and walker  Other Treatments:       Prognosis: Good    Condition at Discharge: Stable    Rehab Potential (if transferring to Rehab): Good    Recommended Labs or Other Treatments After Discharge:     Physician Certification: I certify the above information and transfer of Alexandra Said  is necessary for the continuing treatment of the diagnosis listed and that she requires Markel Kenn for greater 30 days.      Update Admission H&P: No change in H&P    PHYSICIAN SIGNATURE:  Electronically signed by Martina Baron MD on 9/8/20 at 10:14 AM EDT

## 2020-09-02 NOTE — PROGRESS NOTES
Hospitalist Progress Note      Name:  Maria De Jesus Melendez /Age/Sex: 1955  (63 y.o. female)   MRN & CSN:  [id number] & [id number] Admission Date/Time: 2020  9:36 AM   Location:  [facility] PCP: THEODORE Alejandro - NP       Maria De Jesus Melendez is a 63 y.o.  female  who presents with Bradycardia and Fatigue (x3-4 days)      Assessment and Plan:   Septic shock  - shock resolved  -On  infectious disease changed antibiotics from meropenem/vancomycin to minocycline and Avycaz; diagnosed with pneumonia at that time  -Sputum culture is negative as of   -Urine culture sent about 12 hours after meropenem given showing less than 10,000 colonies of E. coli.  Sensitive to cefepime     Acute on chronic respiratory failure,   -Intubated  at 11:45 PM, the night of admission  -Extubated  at 3:40 PM.  -Appreciate pulmonary consult  -She has BiPAP at home (she wears only during sleep she states)     Metabolic encephalopathy/hyperactive delirium  - improved some on today's exam  -On  the patient told her nurse that she was going to call the police on her, and that she thought that she was kidnapped.  Restarted home medication Neurontin 800 mg 3 times a day on  -but as of  she has not had any doses because she refuses her oral meds.  Withdrawal from gabapentin is well described in the literature  -Ceftazidime may potentially cause delirium as well  -Psychiatry consulted: started on zoloft, buspar        COPD -appreciate pulmonary consult     Groundglass opacities on CT scan   -SARS-CoV-2 antigen is negative  -Noted CHF per pulmonary and pneumonia per ID, appreciate consults     Morbid obesity with obstructive sleep apnea  -Body mass index is 63.34 kg/m². Weight is about 419 lb per bed scale  -She is on BiPAP at home     Acute kidney injury, -With hyperkalemia and metabolic acidosis  - resolved  -First hemodialysis treatment given   -Second treatment   --has increased urine output, likely post ATN diuresis per nephrology, no dialysis today per nephrology     Bradycardia in the setting of hyperkalemia on admission-resolved     History of C. difficile infection noted      pt/ot for rehab needs     ambulate and increase activity    History of aortic stenosis status post TAVR  History of CHF per chart  On home oxygen  COPD/asthma  Diabetes mellitus type 2  History of left foot ulcer  Hyperlipidemia  Anemia with hemoglobin 8.2 on admission  Restless leg syndrome  History of ESBL infection     Thrombocytopenia: resolved     DVT prophylaxis: Arixtra            Diet DIET GENERAL; Carb Control: 4 carb choices (60 gms)/meal; Daily Fluid Restriction: 1800 ml   Code Status Full Code     Medications:   Medications:    sertraline  25 mg Oral Nightly    busPIRone  10 mg Oral TID    ceftazidime-acibactam (AVYCAZ) infusion  2.5 g Intravenous Q8H    gabapentin  800 mg Oral TID    pramipexole  1 mg Oral Nightly    pramipexole  1 mg Oral BID    haloperidol lactate  2 mg Intravenous Once    fondaparinux  2.5 mg Subcutaneous Daily    furosemide  20 mg Intravenous TID    spironolactone  25 mg Oral Daily    aMILoride  5 mg Oral Daily    albuterol sulfate HFA  2 puff Inhalation 4x daily    ipratropium  2 puff Inhalation 4x daily    sodium chloride flush  10 mL Intravenous 2 times per day    sodium chloride flush  10 mL Intravenous 2 times per day    pantoprazole  40 mg Intravenous Daily    insulin lispro  0-6 Units Subcutaneous TID WC    insulin lispro  0-3 Units Subcutaneous Nightly    atorvastatin  40 mg Oral Nightly    clopidogrel  75 mg Oral Daily    doxepin  10 mg Oral Nightly    miconazole   Topical BID    cetirizine  10 mg Oral Daily    chlorhexidine  15 mL Mouth/Throat BID      Infusions:    dextrose       PRN Meds: magnesium sulfate, 1 g, PRN  sodium chloride flush, 10 mL, PRN  sodium chloride flush, 10 mL, PRN  acetaminophen, 650 mg, Q6H PRN Or  acetaminophen, 650 mg, Q6H PRN  ipratropium-albuterol, 3 mL, Q6H PRN  glucose, 15 g, PRN  dextrose, 12.5 g, PRN  glucagon (rDNA), 1 mg, PRN  dextrose, 100 mL/hr, PRN      Subjective:     Doing ok, no distress  Objective: Intake/Output Summary (Last 24 hours) at 9/2/2020 1114  Last data filed at 9/2/2020 0845  Gross per 24 hour   Intake 1075 ml   Output 900 ml   Net 175 ml      Vitals:   Vitals:    09/02/20 0945   BP: 124/88   Pulse: 79   Resp: 20   Temp: 98.8 °F (37.1 °C)   SpO2: 96%     Physical Exam:   Gen:  awake, alert, no apparent distress  Head/Eyes:  Normocephalic atraumatic, EOMI   NECK:   symmetrical, trachea midline  LUNGS: Normal Effort   CARDIOVASCULAR:  Normal rate  ABDOMEN:  non distended  MUSCULOSKELETAL:  ROM limited  NEUROLOGIC: Alert and Oriented,  Cranial nerves II-XII are grossly intact.    SKIN:  no bruising or bleeding, normal skin color,  no redness      Data:       CBC   Recent Labs     08/31/20  0430 09/01/20  0630 09/02/20  0855   WBC 4.9 5.6 5.5   HGB 9.1* 9.3* 10.1*   HCT 29.9* 31.2* 35.6*   * 175 213      BMP   Recent Labs     08/31/20  0430 09/01/20  0630    142   K 3.5 3.5   CL 98* 97*   CO2 33* 33*   BUN 14 17   CREATININE 1.1 1.0         Electronically signed by Shelby Negrete MD on 9/2/2020 at 11:14 AM

## 2020-09-02 NOTE — CARE COORDINATION
Pt is confused. Called pt's spouse to discuss the PT/OT recommendation for SNF. Spouse is agreeable and would like for pt to go to  d/t she has been there before. Referral made to [facility]. Clinicals faxed. [facility] will review clinicals and will notify CM of their decision to accept pt or not. Pt will need a pre-cert. COVID SENT TODAY.   TE

## 2020-09-02 NOTE — CONSULTS
[place], 1955, 3120/3120-A, 2020    History  [place]:  The primary encounter diagnosis was Bradycardia. Diagnoses of Renal insufficiency, Metabolic acidosis, Sepsis, due to unspecified organism, unspecified whether acute organ dysfunction present ([address] Hyperkalemia, Shock ([address] Hypothermia, initial encounter, Acute renal failure, unspecified acute renal failure type ([address] and Acute cystitis without hematuria were also pertinent to this visit. Patient  has a past medical history of Acute kidney injury ([address] Anxiety, Aortic stenosis, Asthma, Bacteremia due to group B Streptococcus, Cancer ([address] Carotid artery stenosis, Chest pain, CHF (congestive heart failure) ([address] Chronic back pain, COPD exacerbation ([address] Depression, Diabetes mellitus ([address] Family history of coronary artery disease, Fatty liver, GI bleed, H/O aortic valve stenosis, H/O echocardiogram, Headache, Heart murmur, History of nuclear stress test, Morbid obesity ([address] Neuropathy, NSTEMI (non-ST elevated myocardial infarction) ([address] Obesity, Osteoarthritis, Palpitations, Peripheral edema, Restless legs syndrome, Sleep apnea, Sleep apnea, and SOB (shortness of breath). Patient  has a past surgical history that includes Cholecystectomy;  section; Hysterectomy; Tubal ligation; Neck surgery; Gallbladder surgery; Aortic valve replacement (2017); and IR NONTUNNELED VASCULAR CATHETER > 5 YEARS (2020). Subjective:  Patient states:  Amenable to therapy. Pain:  BLE/feet /10. Communication with other providers:  Handoff to RN, co-eval with OT.    Restrictions: Fall risk    Home Setup/Prior level of function  Lives With: Family( and daughter)  Type of Home: House  Home Layout: Two level, Able to Live on Main level with bedroom/bathroom, Performs ADL's on one level  Home Access: Stairs to enter with rails  Entrance Stairs - Number of Steps: 3  Entrance Stairs - Rails: Left  Bathroom Shower/Tub: Walk-in shower, Shower chair without back  Bathroom Accessibility: Accessible  Home Equipment: Oxygen, Rolling walker, Reacher(2L of 02 at home)  Receives Help From: Family  ADL Assistance: Needs assistance(for LE ADLs only)  Homemaking Assistance: Needs assistance(can do chores sitting upright)  Ambulation Assistance: Independent  Transfer Assistance: Independent  Active : No  Patient's  Info: family   Occupation: Retired  Pt is a poor historian. Above information taken from evaluation on 6/30/2020. Pt reports no recent falls but multiple falls throughout documentation history. *from OT eval    Examination of body systems (includes body structures/functions, activity/participation limitations):  · Observation:  Sitting EOB upon arrival with OT  · Vision:  [facility]  · Hearing:  [facility]  · Cardiopulmonary:  5L of O2  · Cognition: impaired, increased response time, decreased interaction noted, required repeated cueing, see OT/SLP note for further evaluation. Musculoskeletal  · ROM R/L:  WFL. · Strength R/L:  3+/5, weakness in function and endurance. · Neuro:  WFL, tremors noted during OT eval, no overt tremoring impacting transfers d/t PT eval.      Mobility:  · Transfers: Max A x2  · Sitting balance:  SBA. · Standing balance:  NT.    · Gait: NT    [facility]  AM-PAC Inpatient Mobility Raw Score : 10    Treatment:  Lateral scooting at EOB max A x2 with cues for patient positioning and set up, performed without AD, anterior positioning of therapist.  Attempted stand pivot transfer max A x2, multiple attempts to stand from chair for re-positioning, lateral leans with assist to scoot back in chair. All activities max A, difficulty with anterior weight shift, max A. Angelina Dale left in chair for nursing mobility.     Safety: patient left in chair with chair alarm, call light within reach, RN notified, gait belt used.    Assessment:  Patient is a 62 yo female who presents with respiratory failure, intubated 8/26-8/29, patient demonstrates significant weakness and impaired cognition. Would benefit from d/c to SNF. Complexity: Moderate  Prognosis: Good, no significant barriers to participation at this time. Plan Times per week: 3/week, 1 week,      Equipment: TBD    Goals:  Short term goals  Time Frame for Short term goals: 1 week  Short term goal 1: Patient will perform supine to sit with mod A. Short term goal 2: Patient will perform STS with mod A x2. Short term goal 3: Patient will perform stand pivot transfer with mod A x2. Treatment plan:  Bed mobility, transfers, balance, gait, TA, TX.     Recommendations for NURSING mobility: antonia    Time:   Time in: 1048  Time out: 1116  Timed treatment minutes: 15  Total time: 28    Electronically signed by:    Pastor Portillo, PT  9/2/2020, 1:44 PM

## 2020-09-02 NOTE — PLAN OF CARE
Problem: Falls - Risk of:  Goal: Will remain free from falls  Description: Will remain free from falls  9/2/2020 1222 by Layla Kapadia RN  Outcome: Ongoing  9/2/2020 0229 by Gm Nugent  Outcome: Ongoing  Goal: Absence of physical injury  Description: Absence of physical injury  9/2/2020 1222 by Layla Kapadia RN  Outcome: Ongoing  9/2/2020 0229 by Gm Nugent  Outcome: Ongoing     Problem: Skin Integrity:  Goal: Will show no infection signs and symptoms  Description: Will show no infection signs and symptoms  9/2/2020 1222 by Layla Kapadia RN  Outcome: Ongoing  9/2/2020 0229 by Gm Nugent  Outcome: Ongoing  Goal: Absence of new skin breakdown  Description: Absence of new skin breakdown  9/2/2020 1222 by Layla Kapadia RN  Outcome: Ongoing  9/2/2020 0229 by Gm Nugent  Outcome: Ongoing     Problem: Discharge Planning:  Goal: Participates in care planning  Description: Participates in care planning  9/2/2020 1222 by Layla Kapadia RN  Outcome: Ongoing  9/2/2020 0229 by Gm Nugent  Outcome: Ongoing  Goal: Discharged to appropriate level of care  Description: Discharged to appropriate level of care  9/2/2020 1222 by Layla Kapadia RN  Outcome: Ongoing  9/2/2020 0229 by Gm Nugent  Outcome: Ongoing  Goal: Ability to perform activities of daily living will improve  Description: Ability to perform activities of daily living will improve  9/2/2020 1222 by Layla Kapadia RN  Outcome: Ongoing  9/2/2020 0229 by Gm Nugent  Outcome: Ongoing     Problem: Anxiety/Stress:  Goal: Level of anxiety will decrease  Description: Level of anxiety will decrease  9/2/2020 1222 by Layla Kapadia RN  Outcome: Ongoing  9/2/2020 0229 by Gm Nugent  Outcome: Ongoing     Problem: Aspiration:  Goal: Absence of aspiration  Description: Absence of aspiration  9/2/2020 1222 by Layla Kapadia RN  Outcome: Ongoing  9/2/2020 0229 by Gm Nugent  Outcome: Ongoing     Problem:  Bowel Ongoing  9/2/2020 0229 by Juhi Marr  Outcome: Ongoing     Problem: Skin Integrity - Impaired:  Goal: Will show no infection signs and symptoms  Description: Will show no infection signs and symptoms  9/2/2020 1222 by Flakita Castano RN  Outcome: Ongoing  9/2/2020 0229 by Juhi Marr  Outcome: Ongoing  Goal: Absence of new skin breakdown  Description: Absence of new skin breakdown  9/2/2020 1222 by Flakita Castano RN  Outcome: Ongoing  9/2/2020 0229 by Juhi Marr  Outcome: Ongoing     Problem: Injury - Risk of, Physical Injury:  Goal: Will remain free from falls  Description: Will remain free from falls  9/2/2020 1222 by Flakita Castano RN  Outcome: Ongoing  9/2/2020 0229 by Juhi Marr  Outcome: Ongoing  Goal: Absence of physical injury  Description: Absence of physical injury  9/2/2020 1222 by Flakita Castano RN  Outcome: Ongoing  9/2/2020 0229 by Juhi Marr  Outcome: Ongoing     Problem: Mood - Altered:  Goal: Mood stable  Description: Mood stable  9/2/2020 1222 by Flakita Castano RN  Outcome: Ongoing  9/2/2020 0229 by Juhi Marr  Outcome: Ongoing  Goal: Absence of abusive behavior  Description: Absence of abusive behavior  9/2/2020 1222 by Flakita Castano RN  Outcome: Ongoing  9/2/2020 0229 by Juhi Marr  Outcome: Ongoing  Goal: Verbalizations of feeling emotionally comfortable while being cared for will increase  Description: Verbalizations of feeling emotionally comfortable while being cared for will increase  9/2/2020 1222 by Flakita Castano RN  Outcome: Ongoing  9/2/2020 0229 by Juhi Marr  Outcome: Ongoing

## 2020-09-02 NOTE — CARE COORDINATION
Pt transferred to room 3120 last night from ICU. Chart reviewed. PT/OT ordered. Requested PT/OT to see pt asap for SNF placement d/t pt will require a pre-cert. REQUIRED COVID TEST ORDERED. NOTIFIED CHARGE NURSE/DENNYS.   TE

## 2020-09-02 NOTE — PLAN OF CARE
Problem: Falls - Risk of:  Goal: Will remain free from falls  Description: Will remain free from falls  Outcome: Ongoing  Goal: Absence of physical injury  Description: Absence of physical injury  Outcome: Ongoing     Problem: Skin Integrity:  Goal: Will show no infection signs and symptoms  Description: Will show no infection signs and symptoms  Outcome: Ongoing  Goal: Absence of new skin breakdown  Description: Absence of new skin breakdown  Outcome: Ongoing     Problem: Discharge Planning:  Goal: Participates in care planning  Description: Participates in care planning  Outcome: Ongoing  Goal: Discharged to appropriate level of care  Description: Discharged to appropriate level of care  Outcome: Ongoing  Goal: Ability to perform activities of daily living will improve  Description: Ability to perform activities of daily living will improve  Outcome: Ongoing     Problem: Airway Clearance - Ineffective:  Goal: Ability to maintain a clear airway will improve  Description: Ability to maintain a clear airway will improve  Outcome: Ongoing     Problem: Anxiety/Stress:  Goal: Level of anxiety will decrease  Description: Level of anxiety will decrease  Outcome: Ongoing     Problem: Aspiration:  Goal: Absence of aspiration  Description: Absence of aspiration  Outcome: Ongoing     Problem:  Bowel Function - Altered:  Goal: Bowel elimination is within specified parameters  Description: Bowel elimination is within specified parameters  Outcome: Ongoing     Problem: Cardiac Output - Decreased:  Goal: Hemodynamic stability will improve  Description: Hemodynamic stability will improve  Outcome: Ongoing     Problem: Fluid Volume - Imbalance:  Goal: Absence of imbalanced fluid volume signs and symptoms  Description: Absence of imbalanced fluid volume signs and symptoms  Outcome: Ongoing     Problem: Gas Exchange - Impaired:  Goal: Levels of oxygenation will improve  Description: Levels of oxygenation will improve  Outcome: Ongoing     Problem: Mental Status - Impaired:  Goal: Mental status will be restored to baseline  Description: Mental status will be restored to baseline  Outcome: Ongoing     Problem: Nutrition Deficit:  Goal: Ability to achieve adequate nutritional intake will improve  Description: Ability to achieve adequate nutritional intake will improve  Outcome: Ongoing     Problem: Pain:  Goal: Pain level will decrease  Description: Pain level will decrease  Outcome: Ongoing  Goal: Recognizes and communicates pain  Description: Recognizes and communicates pain  Outcome: Ongoing  Goal: Control of acute pain  Description: Control of acute pain  Outcome: Ongoing  Goal: Control of chronic pain  Description: Control of chronic pain  Outcome: Ongoing     Problem: Serum Glucose Level - Abnormal:  Goal: Ability to maintain appropriate glucose levels will improve to within specified parameters  Description: Ability to maintain appropriate glucose levels will improve to within specified parameters  Outcome: Ongoing     Problem: Skin Integrity - Impaired:  Goal: Will show no infection signs and symptoms  Description: Will show no infection signs and symptoms  Outcome: Ongoing  Goal: Absence of new skin breakdown  Description: Absence of new skin breakdown  Outcome: Ongoing     Problem: Sleep Pattern Disturbance:  Goal: Appears well-rested  Description: Appears well-rested  Outcome: Ongoing     Problem: Tissue Perfusion, Altered:  Goal: Circulatory function within specified parameters  Description: Circulatory function within specified parameters  Outcome: Ongoing     Problem: Tissue Perfusion - Cardiopulmonary, Altered:  Goal: Absence of angina  Description: Absence of angina  Outcome: Ongoing  Goal: Hemodynamic stability will improve  Description: Hemodynamic stability will improve  Outcome: Ongoing     Problem: Confusion - Acute:  Goal: Mental status will be restored to baseline  Description: Mental status will be restored to baseline  Outcome: Ongoing  Goal: Absence of continued neurological deterioration signs and symptoms  Description: Absence of continued neurological deterioration signs and symptoms  Outcome: Ongoing     Problem: Injury - Risk of, Physical Injury:  Goal: Will remain free from falls  Description: Will remain free from falls  Outcome: Ongoing  Goal: Absence of physical injury  Description: Absence of physical injury  Outcome: Ongoing     Problem: Mood - Altered:  Goal: Mood stable  Description: Mood stable  Outcome: Ongoing  Goal: Absence of abusive behavior  Description: Absence of abusive behavior  Outcome: Ongoing  Goal: Verbalizations of feeling emotionally comfortable while being cared for will increase  Description: Verbalizations of feeling emotionally comfortable while being cared for will increase  Outcome: Ongoing     Problem: Psychomotor Activity - Altered:  Goal: Absence of psychomotor disturbance signs and symptoms  Description: Absence of psychomotor disturbance signs and symptoms  Outcome: Ongoing     Problem: Sensory Perception - Impaired:  Goal: Demonstrations of improved sensory functioning will increase  Description: Demonstrations of improved sensory functioning will increase  Outcome: Ongoing  Goal: Decrease in sensory misperception frequency  Description: Decrease in sensory misperception frequency  Outcome: Ongoing  Goal: Able to refrain from responding to false sensory perceptions  Description: Able to refrain from responding to false sensory perceptions  Outcome: Ongoing  Goal: Demonstrates accurate environmental perceptions  Description: Demonstrates accurate environmental perceptions  Outcome: Ongoing  Goal: Able to distinguish between reality-based and nonreality-based thinking  Description: Able to distinguish between reality-based and nonreality-based thinking  Outcome: Ongoing  Goal: Able to interrupt nonreality-based thinking  Description: Able to interrupt nonreality-based

## 2020-09-02 NOTE — CONSULTS
[address] [id number] Continence Nurse  Consult Note       Jim Barbosa  AGE: 63 y.o.    GENDER: female  : 1955  TODAY'S DATE:  2020    Subjective:     Reason for  Evaluation and Assessment: wound reassessment      Jim Barbosa is a 63 y.o. female referred by:   [x] Physician  [] Nursing  [] Other:     Wound Identification:  Wound Type: pressure and MASD-intertriginous and candidiasis  Contributing Factors: edema, diabetes, chronic pressure, decreased mobility, obesity and decreased tissue oxygenation        PAST MEDICAL HISTORY        Diagnosis Date    Acute kidney injury ([facility] 2019    Anxiety 2020    Aortic stenosis     Asthma     Bacteremia due to group B Streptococcus     [facility] in 2017 - felt to be due to cellulitis    Cancer [facility]     Cervical    Carotid artery stenosis     Chest pain     CHF (congestive heart failure) ([facility])     Chronic back pain     COPD exacerbation [facility]     Depression     Diabetes mellitus ([facility]     Family history of coronary artery disease     Fatty liver 10/27/2016    GI bleed 2017    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 - [facility] H/O aortic valve stenosis     H/O echocardiogram 2016    EF 55%, Aortic valve area is 0.84 cm2 with a mean gradient of 36 suggestive of severe aortic stenosis, mild to mos LVH    Headache     Heart murmur     History of nuclear stress test 2016    lexiscan-normal,EF70%    Morbid obesity ([facility])     BMI=73.72 kg/m2    Neuropathy     NSTEMI (non-ST elevated myocardial infarction) ([facility] 2018    Obesity     Osteoarthritis     Palpitations     Peripheral edema     Restless legs syndrome     Sleep apnea     Has a CPAP    Sleep apnea     SOB (shortness of breath)        PAST SURGICAL HISTORY    Past Surgical History:   Procedure Laterality Date    AORTIC VALVE REPLACEMENT  2017    29mm EvolutR TAVR     SECTION      CHOLECYSTECTOMY      GALLBLADDER SURGERY      HYSTERECTOMY      IR NONTUNNELED VASCULAR CATHETER  2020    IR NONTUNNELED VASCULAR CATHETER 2020 [facility]    NECK SURGERY      Tumor    TUBAL LIGATION         FAMILY HISTORY    Family History   Problem Relation Age of Onset    Heart Failure Mother     Heart Attack Mother     Hypertension Mother     Coronary Art Dis Mother         Had PTCA w/stenting.  Heart Disease Mother     High Blood Pressure Mother     Obesity Mother     Diabetes Mother     Depression Mother     Heart Failure Father     Heart Disease Father     High Blood Pressure Father     Obesity Father     Heart Failure Brother     Heart Disease Brother     High Blood Pressure Brother     Obesity Brother     Depression Brother     Depression Sister     Depression Daughter     Anxiety Disorder Daughter     Depression Niece         suicide attempt       SOCIAL HISTORY    Social History     Tobacco Use    Smoking status: Former Smoker     Types: Cigarettes     Last attempt to quit: 3/19/2003     Years since quittin.4    Smokeless tobacco: Never Used    Tobacco comment: quit 15 years ago   Substance Use Topics    Alcohol use: No    Drug use: No       ALLERGIES    No Known Allergies    MEDICATIONS    No current facility-administered medications on file prior to encounter. Current Outpatient Medications on File Prior to Encounter   Medication Sig Dispense Refill    ipratropium-albuterol (DUONEB) 0.5-2.5 (3) MG/3ML SOLN nebulizer solution Inhale 3 mLs into the lungs every 6 hours as needed for Shortness of Breath 360 mL 0    atorvastatin (LIPITOR) 80 MG tablet Take 0.5 tablets by mouth nightly 30 tablet 3    spironolactone (ALDACTONE) 25 MG tablet Take 1 tablet by mouth daily 30 tablet 0    miconazole (MICOTIN) 2 % cream Apply topically 2 times daily.  3 Tube 2    torsemide (DEMADEX) 100 MG tablet Take 0.5 tablets by mouth 2 times daily 30 tablet 3    doxepin disturbance    Acute on chronic respiratory failure with hypoxia and hypercapnia [facility]    Cellulitis    Skin ulcer of left foot with fat layer exposed ([address]    Pneumonia    VHD (valvular heart disease)    Class 3 severe obesity due to excess calories with body mass index (BMI) greater than or equal to 70 in adult ([facility])    SIRS (systemic inflammatory response syndrome) ([facility])    Acute kidney injury ([address]    Acute metabolic encephalopathy    Shock, unspecified ([address]    Uncontrolled diabetes mellitus ([address]    Sepsis ([address]    Altered mental status    Wide-complex tachycardia ([address]    Acute on chronic diastolic heart failure ([facility])    S/P TAVR (transcatheter aortic valve replacement)    Septic shock ([facility])    Acute kidney injury superimposed on CKD ([facility])    Bradycardia    Anxiety    Acute respiratory failure with hypoxia ([facility])       Measurements:  Wound 02/06/19 Other (Comment) Foot Plantar Left plantar DFU (Active)   Number of days: 574       Wound 09/22/19 sacrum deep tissue injury/stage 2 (Active)   Number of days: 346       Wound 10/29/19 Thigh Right;Posterior (Active)   Number of days: 309       Wound 10/29/19 Heel Left;Medial cluster (Active)   Number of days: 308       Wound 10/29/19 Thigh Left;Posterior (Active)   Number of days: 309       Wound 10/29/19 Abdomen Anterior;Right cluster under abdominal fold (Active)   Number of days: 309       Wound 10/31/19 Hip Anterior; Left under left abdomen cluster (Active)   Number of days: 307       Wound 10/31/19 Coccyx Right (Active)   Number of days: 307       Wound 10/31/19 Toe (Comment  which one) Left;Plantar left great toe fissure  (Active)   Number of days: 307       Wound 10/31/19 Foot Left;Plantar (Active)   Number of days: 307       Wound 10/31/19 Heel Right;Medial right medial heel fissure cluster (Active)   Number of days: 307       Wound 09/22/19 sacral/buttock cluster (Active)   Number of days: 346       Wound 09/22/19 Foot Left;Plantar DFU (Active)   Number of days: 346       Wound 09/22/19 Foot Right;Plantar DFU (Active)   Number of days: 346       Wound Thigh Left;Posterior (Active)   Number of days:        Wound 06/29/20 Nose (Active)   Number of days: 64       Wound 08/26/20 Other (Comment) gluteal fold (Active)   Wound Pressure Stage  2 09/02/20 0945   Dressing Status Changed 09/02/20 0945   Dressing Changed Changed/New 09/02/20 0945   Dressing/Treatment Other (comment) 09/01/20 1608   Wound Cleansed Rinsed/Irrigated with saline 09/02/20 0945   Dressing Change Due 08/31/20 09/01/20 1608   Wound Length (cm) 1.9 cm 09/02/20 0945   Wound Width (cm) 0.7 cm 09/02/20 0945   Wound Depth (cm) 0.1 cm 09/02/20 0945   Wound Surface Area (cm^2) 1.33 cm^2 09/02/20 0945   Change in Wound Size % (l*w) 85.22 09/02/20 0945   Wound Volume (cm^3) 0.13 cm^3 09/02/20 0945   Wound Healing % 86 09/02/20 0945   Distance Tunneling (cm) 0 cm 09/02/20 0945   Tunneling Position ___ O'Clock 0 09/02/20 0945   Undermining Starts ___ O'Clock 0 09/02/20 0945   Undermining Ends___ O'Clock 0 09/02/20 0945   Undermining Maxium Distance (cm) 0 09/02/20 0945   Wound Assessment Red 09/02/20 0945   Drainage Amount Scant 09/02/20 0945   Drainage Description Serosanguinous 09/02/20 0945   Odor None 09/02/20 0945   Margins Defined edges 09/02/20 0945   Sierra-wound Assessment Pink 09/02/20 0945   Non-staged Wound Description Partial thickness 09/02/20 0945   Garner%Wound Bed 50 08/26/20 1430   Red%Wound Bed 100 09/02/20 0945   Yellow%Wound Bed 0 08/26/20 1430   Black%Wound Bed 0 08/26/20 1430   Purple%Wound Bed 0 08/26/20 1430   Other%Wound Bed 0 08/26/20 1430   Number of days: 6       Wound 09/02/20 Breast Lower;Right under breast (Active)   Wound Other 09/02/20 0945   Dressing Status Other (Comment) 09/02/20 0945   Wound Cleansed Soap and water 09/02/20 0945   Wound Length (cm) 0.3 cm 09/02/20 0945   Wound Width (cm) 9 cm 09/02/20 0945   Wound Depth (cm) 0.1 cm 09/02/20 0945   Wound Surface Area (cm^2) 2.7 cm^2 09/02/20 0945   Wound Volume (cm^3) 0.27 cm^3 09/02/20 0945   Distance Tunneling (cm) 0 cm 09/02/20 0945   Tunneling Position ___ O'Clock 0 09/02/20 0945   Undermining Starts ___ O'Clock 0 09/02/20 0945   Undermining Ends___ O'Clock 0 09/02/20 0945   Undermining Maxium Distance (cm) 00 09/02/20 0945   Wound Assessment Red;Pink 09/02/20 0945   Drainage Amount Scant 09/02/20 0945   Drainage Description Serosanguinous 09/02/20 0945   Odor None 09/02/20 0945   Margins Defined edges 09/02/20 0945   Sierra-wound Assessment Red 09/02/20 0945   Non-staged Wound Description Partial thickness 09/02/20 0945   Pink%Wound Bed 50 09/02/20 0945   Red%Wound Bed 50 09/02/20 0945   Number of days: 0       Wound 09/02/20 Back Lateral;Left (Active)   Wound Other 09/02/20 0945   Dressing Status Other (Comment) 09/02/20 0945   Wound Cleansed Soap and water 09/02/20 0945   Wound Length (cm) 2 cm 09/02/20 0945   Wound Width (cm) 9.5 cm 09/02/20 0945   Wound Depth (cm) 0.1 cm 09/02/20 0945   Wound Surface Area (cm^2) 19 cm^2 09/02/20 0945   Wound Volume (cm^3) 1.9 cm^3 09/02/20 0945   Distance Tunneling (cm) 0 cm 09/02/20 0945   Tunneling Position ___ O'Clock 0 09/02/20 0945   Undermining Starts ___ O'Clock 0 09/02/20 0945   Undermining Ends___ O'Clock 0 09/02/20 0945   Undermining Maxium Distance (cm) 0 09/02/20 0945   Wound Assessment Red;Pink 09/02/20 0945   Drainage Amount Scant 09/02/20 0945   Drainage Description Serosanguinous 09/02/20 0945   Odor None 09/02/20 0945   Margins Defined edges 09/02/20 0945   Sierra-wound Assessment Red 09/02/20 0945   Non-staged Wound Description Partial thickness 09/02/20 0945   Pink%Wound Bed 50 09/02/20 0945   Red%Wound Bed 50 09/02/20 0945   Number of days: 0       Response to treatment:  Well tolerated by patient.      Pain Assessment:  Severity:  none  Quality of pain: na  Wound Pain Timing/Severity: na  Premedicated: no    Plan:     Plan of Care: [REMOVED] Wound 08/26/20

## 2020-09-02 NOTE — PROGRESS NOTES
Nephrology Progress Note  9/2/2020 9:43 AM        Subjective:   Admit Date: 8/25/2020  PCP: THEODORE Dover - NP    Interval History: reasonable urine output: continues on diuretics  -on Bipap this am.      Data:     Current meds:    sertraline  25 mg Oral Nightly    busPIRone  10 mg Oral TID    ceftazidime-acibactam (AVYCAZ) infusion  2.5 g Intravenous Q8H    gabapentin  800 mg Oral TID    pramipexole  1 mg Oral Nightly    pramipexole  1 mg Oral BID    haloperidol lactate  2 mg Intravenous Once    fondaparinux  2.5 mg Subcutaneous Daily    furosemide  20 mg Intravenous TID    spironolactone  25 mg Oral Daily    aMILoride  5 mg Oral Daily    albuterol sulfate HFA  2 puff Inhalation 4x daily    ipratropium  2 puff Inhalation 4x daily    sodium chloride flush  10 mL Intravenous 2 times per day    sodium chloride flush  10 mL Intravenous 2 times per day    pantoprazole  40 mg Intravenous Daily    insulin lispro  0-6 Units Subcutaneous TID WC    insulin lispro  0-3 Units Subcutaneous Nightly    atorvastatin  40 mg Oral Nightly    clopidogrel  75 mg Oral Daily    doxepin  10 mg Oral Nightly    miconazole   Topical BID    cetirizine  10 mg Oral Daily    chlorhexidine  15 mL Mouth/Throat BID      dextrose           I/O last 3 completed shifts:   In: 76 [P.O.:75]  Out: 1350 [Urine:1350]    CBC:   Recent Labs     08/31/20  0430 09/01/20  0630 09/02/20  0855   WBC 4.9 5.6 5.5   HGB 9.1* 9.3* 10.1*   * 175 213          Recent Labs     08/31/20  0430 09/01/20  0630    142   K 3.5 3.5   CL 98* 97*   CO2 33* 33*   BUN 14 17   CREATININE 1.1 1.0   GLUCOSE 112* 99       Lab Results   Component Value Date    CALCIUM 9.1 09/01/2020    PHOS 3.2 07/02/2020       Objective:     Vitals: BP 86/66   Pulse 82   Temp 97.6 °F (36.4 °C) (Axillary)   Resp 17   Ht 5' 7.99\" (1.727 m)   Wt (!) 379 lb (171.9 kg)   SpO2 97%   BMI 57.64 kg/m²     General appearance: Patient is awake with confusion HEENT: Head: normocephalic, atraumatic. Neck: supple, symmetrical, trachea midline  Cardiovascular: normal S1 and S2  Pulmonary: coarse lung sounds through out  Abdomen:  soft / non-tender   Extremities:+2 edema to the bilateral thighs    Impression :     1.  JOSE: demonstrating renal recovery: RRT x 2 treatments   2. Sepsis   3. Pancytopenia   4. Heart Failure   5. Respiratory failure   6. DM  7. Confusion / Anxiety     Recommendation/Plan  :     1.   -uop: 1.35 liters in the last 24 hours via hernandez   -chemistry results from this am are pending   2.   -on Avycaz TID   3.   -overall improving trend via CBC  4   -monitor: O2 saturations / urine output and volume status   -on lasix / amiloride / spironolactone  5   -wearing Bipap this am   6.   -on SSI for diabetes management   7   -monitor affect and sensorium  -seen by psychiatry: started Zoloft and buspar     Post-hospitalization planning: will need rehab / SNF placement     Electronically signed by THEODORE Gonzalez CNP         Nephrology Attending Progress Note  9/2/2020 2:55 PM  Subjective:   Admit Date: 8/25/2020  PCP: THEODORE Delacruz NP    Interval History: I have personally performed face to face diagnostic evaluation on this patient. I have personally reviewed pertinent labs and imaging and agree with the care plan above.  My additional findings are as follows:  Pt arousable weak    Objective:   Vitals: BP (!) 97/43   Pulse 76   Temp 98.8 °F (37.1 °C) (Axillary)   Resp 21   Ht 5' 7.99\" (1.727 m)   Wt (!) 379 lb (171.9 kg)   SpO2 98%   BMI 57.64 kg/m²   Weak  Decrease bs  Soft obese  thivk legs    Assessment and Plan:  IMP:  As stated above    Plan     1 monitor affect and still weak need rehab  2 bp low stable  3 . 1 liter uop and monitor renal function creat 1.5 today and K stable  4 wean o2 as able  5 ssi  6 appreiciate psych help  Decrease diuretic with renal increase and monitor K             Electronically signed by Ashwin Estrella Stoney Mcgovern MD on 9/2/2020 at 2:55 PM

## 2020-09-02 NOTE — PROGRESS NOTES
pulmonary      SUBJECTIVE:  Sleeping and on pap rx     OBJECTIVE    VITALS:  BP 86/66   Pulse 82   Temp 97.6 °F (36.4 °C) (Axillary)   Resp 25   Ht 5' 7.99\" (1.727 m)   Wt (!) 379 lb (171.9 kg)   SpO2 97%   BMI 57.64 kg/m²   HEAD AND FACE EXAM:  No throat injection, no active exudate,no thrush  NECK EXAM;No JVD, no masses, symmetrical  CHEST EXAM; Expansion equal and symmetrical, no masses  LUNG EXAM; Good breath sounds bilaterally. There are expiratory wheezes both lungs, there are crackles at both lung bases  CARDIOVASCULAR EXAM: Positive S1 and S2, no S3 or S4, no clicks ,no murmurs  RIGHT AND LEFT LOWER EXTRIMITY EXAM: No edema, no swelling, no inflamation            LABS   Lab Results   Component Value Date    WBC 5.6 09/01/2020    HGB 9.3 (L) 09/01/2020    HCT 31.2 (L) 09/01/2020    MCV 82.3 09/01/2020     09/01/2020     Lab Results   Component Value Date    CREATININE 1.0 09/01/2020    BUN 17 09/01/2020     09/01/2020    K 3.5 09/01/2020    CL 97 (L) 09/01/2020    CO2 33 (H) 09/01/2020     Lab Results   Component Value Date    INR 1.07 06/27/2020    PROTIME 13.0 06/27/2020          Lab Results   Component Value Date    PHOS 3.2 07/02/2020    PHOS 2.7 06/30/2020    PHOS 5.8 05/24/2020      No results for input(s): PH, PO2ART, OKW1HMY, HCO3, BEART, O2SAT in the last 72 hours. Wt Readings from Last 3 Encounters:   09/01/20 (!) 379 lb (171.9 kg)   07/04/20 (!) 364 lb 6.4 oz (165.3 kg)   06/04/20 (!) 494 lb (224.1 kg)               ASSESMENT  Ac on ch resp failure  ronni  chf  Ac copd        PLAN  1. Bd rx  2. Cont pap rx  3.  On antibx    9/2/2020  Chen Shannon M.D.

## 2020-09-03 NOTE — PLAN OF CARE
Problem: Falls - Risk of:  Goal: Will remain free from falls  Description: Will remain free from falls  9/3/2020 1205 by Pascual García RN  Outcome: Ongoing  9/3/2020 0059 by Sarah Keys  Outcome: Ongoing  Goal: Absence of physical injury  Description: Absence of physical injury  9/3/2020 1205 by Pascual García RN  Outcome: Ongoing  9/3/2020 0059 by Sarah Keys  Outcome: Ongoing     Problem: Skin Integrity:  Goal: Will show no infection signs and symptoms  Description: Will show no infection signs and symptoms  9/3/2020 1205 by Pascual García RN  Outcome: Ongoing  9/3/2020 0059 by Sarah Keys  Outcome: Ongoing  Goal: Absence of new skin breakdown  Description: Absence of new skin breakdown  9/3/2020 1205 by Pascual García RN  Outcome: Ongoing  9/3/2020 0059 by Sarah Keys  Outcome: Ongoing     Problem: Discharge Planning:  Goal: Participates in care planning  Description: Participates in care planning  9/3/2020 1205 by Pascual García RN  Outcome: Ongoing  9/3/2020 0059 by Sarah Keys  Outcome: Ongoing  Goal: Discharged to appropriate level of care  Description: Discharged to appropriate level of care  9/3/2020 1205 by Pascual García RN  Outcome: Ongoing  9/3/2020 0059 by Sarah Keys  Outcome: Ongoing  Goal: Ability to perform activities of daily living will improve  Description: Ability to perform activities of daily living will improve  9/3/2020 1205 by Pascual García RN  Outcome: Ongoing  9/3/2020 0059 by Sarah Keys  Outcome: Ongoing     Problem: Airway Clearance - Ineffective:  Goal: Ability to maintain a clear airway will improve  Description: Ability to maintain a clear airway will improve  9/3/2020 1205 by Pascual García RN  Outcome: Ongoing  9/3/2020 0059 by Sarah Keys  Outcome: Ongoing     Problem: Anxiety/Stress:  Goal: Level of anxiety will decrease  Description: Level of anxiety will decrease  9/3/2020 1205 by Pascual García RN  Outcome: Ongoing  9/3/2020 0059 by Rodney Baumgarten  Outcome: Ongoing     Problem: Aspiration:  Goal: Absence of aspiration  Description: Absence of aspiration  9/3/2020 1205 by Nas Arceo RN  Outcome: Ongoing  9/3/2020 0059 by Rodney Baumgarten  Outcome: Ongoing     Problem:  Bowel Function - Altered:  Goal: Bowel elimination is within specified parameters  Description: Bowel elimination is within specified parameters  9/3/2020 1205 by Nas Arceo RN  Outcome: Ongoing  9/3/2020 0059 by Rodney Baumgarten  Outcome: Ongoing     Problem: Cardiac Output - Decreased:  Goal: Hemodynamic stability will improve  Description: Hemodynamic stability will improve  9/3/2020 1205 by Nas Arceo RN  Outcome: Ongoing  9/3/2020 0059 by Rodney Baumgarten  Outcome: Ongoing     Problem: Fluid Volume - Imbalance:  Goal: Absence of imbalanced fluid volume signs and symptoms  Description: Absence of imbalanced fluid volume signs and symptoms  9/3/2020 1205 by Nas Arceo RN  Outcome: Ongoing  9/3/2020 0059 by Rodney Baumgarten  Outcome: Ongoing     Problem: Gas Exchange - Impaired:  Goal: Levels of oxygenation will improve  Description: Levels of oxygenation will improve  9/3/2020 1205 by Nas Arceo RN  Outcome: Ongoing  9/3/2020 0059 by Rodney Baumgarten  Outcome: Ongoing     Problem: Mental Status - Impaired:  Goal: Mental status will be restored to baseline  Description: Mental status will be restored to baseline  9/3/2020 1205 by Nas Arceo RN  Outcome: Ongoing  9/3/2020 0059 by Rodney Baumgarten  Outcome: Ongoing     Problem: Nutrition Deficit:  Goal: Ability to achieve adequate nutritional intake will improve  Description: Ability to achieve adequate nutritional intake will improve  9/3/2020 1205 by Nas Arceo RN  Outcome: Ongoing  9/3/2020 0059 by Rodney Baumgarten  Outcome: Ongoing     Problem: Pain:  Goal: Pain level will decrease  Description: Pain level will decrease  9/3/2020 1205 by Nas Arceo RN  Outcome: Ongoing  9/3/2020 0059 by Theresa Wolff  Outcome: Ongoing  Goal: Recognizes and communicates pain  Description: Recognizes and communicates pain  9/3/2020 1205 by Darion Fine RN  Outcome: Ongoing  9/3/2020 0059 by Theresa Wolff  Outcome: Ongoing  Goal: Control of acute pain  Description: Control of acute pain  9/3/2020 1205 by Darion Fine RN  Outcome: Ongoing  9/3/2020 0059 by Theresa Wolff  Outcome: Ongoing  Goal: Control of chronic pain  Description: Control of chronic pain  9/3/2020 1205 by Darion Fine RN  Outcome: Ongoing  9/3/2020 0059 by Theresa Wolff  Outcome: Ongoing     Problem: Serum Glucose Level - Abnormal:  Goal: Ability to maintain appropriate glucose levels will improve to within specified parameters  Description: Ability to maintain appropriate glucose levels will improve to within specified parameters  9/3/2020 1205 by Darion Fine RN  Outcome: Ongoing  9/3/2020 0059 by Theresa Wolff  Outcome: Ongoing     Problem: Skin Integrity - Impaired:  Goal: Will show no infection signs and symptoms  Description: Will show no infection signs and symptoms  9/3/2020 1205 by Darion Fine RN  Outcome: Ongoing  9/3/2020 0059 by Theresa Wolff  Outcome: Ongoing  Goal: Absence of new skin breakdown  Description: Absence of new skin breakdown  9/3/2020 1205 by Darion Fine RN  Outcome: Ongoing  9/3/2020 0059 by Theresa Wolff  Outcome: Ongoing     Problem: Sleep Pattern Disturbance:  Goal: Appears well-rested  Description: Appears well-rested  9/3/2020 1205 by Darion Fine RN  Outcome: Ongoing  9/3/2020 0059 by Theresa Wolff  Outcome: Ongoing     Problem: Tissue Perfusion, Altered:  Goal: Circulatory function within specified parameters  Description: Circulatory function within specified parameters  9/3/2020 1205 by Darion Fine RN  Outcome: Ongoing  9/3/2020 0059 by Theresa Wolff  Outcome: Ongoing     Problem: Tissue Perfusion - Cardiopulmonary, Altered:  Goal: Absence of angina  Description: Absence of angina  9/3/2020 1205 by Flakita Castano RN  Outcome: Ongoing  9/3/2020 0059 by Juhi Marr  Outcome: Ongoing  Goal: Hemodynamic stability will improve  Description: Hemodynamic stability will improve  9/3/2020 1205 by Flakita Castano RN  Outcome: Ongoing  9/3/2020 0059 by Juhi Marr  Outcome: Ongoing     Problem: Confusion - Acute:  Goal: Mental status will be restored to baseline  Description: Mental status will be restored to baseline  9/3/2020 1205 by Flakita Castano RN  Outcome: Ongoing  9/3/2020 0059 by Juhi Marr  Outcome: Ongoing  Goal: Absence of continued neurological deterioration signs and symptoms  Description: Absence of continued neurological deterioration signs and symptoms  9/3/2020 1205 by Flakita Castano RN  Outcome: Ongoing  9/3/2020 0059 by Juhi Marr  Outcome: Ongoing     Problem: Injury - Risk of, Physical Injury:  Goal: Will remain free from falls  Description: Will remain free from falls  9/3/2020 1205 by Flakita Castano RN  Outcome: Ongoing  9/3/2020 0059 by Juhi Marr  Outcome: Ongoing  Goal: Absence of physical injury  Description: Absence of physical injury  9/3/2020 1205 by Flakita Castano RN  Outcome: Ongoing  9/3/2020 0059 by Juhi Marr  Outcome: Ongoing     Problem: Mood - Altered:  Goal: Mood stable  Description: Mood stable  9/3/2020 1205 by Flakita Castano RN  Outcome: Ongoing  9/3/2020 0059 by Juhi Marr  Outcome: Ongoing  Goal: Absence of abusive behavior  Description: Absence of abusive behavior  9/3/2020 1205 by Flakita Castano RN  Outcome: Ongoing  9/3/2020 0059 by Juhi Marr  Outcome: Ongoing  Goal: Verbalizations of feeling emotionally comfortable while being cared for will increase  Description: Verbalizations of feeling emotionally comfortable while being cared for will increase  9/3/2020 1205 by Flakita Castano RN  Outcome: Ongoing  9/3/2020 0059 by Juhi Marr  Outcome: Ongoing     Problem: Psychomotor Activity - Altered:  Goal: Absence of psychomotor disturbance signs and symptoms  Description: Absence of psychomotor disturbance signs and symptoms  9/3/2020 1205 by Wm Samuels RN  Outcome: Ongoing  9/3/2020 0059 by Janus Tang  Outcome: Ongoing     Problem: Sensory Perception - Impaired:  Goal: Demonstrations of improved sensory functioning will increase  Description: Demonstrations of improved sensory functioning will increase  9/3/2020 1205 by Wm Samuels RN  Outcome: Ongoing  9/3/2020 0059 by Janus Tang  Outcome: Ongoing  Goal: Decrease in sensory misperception frequency  Description: Decrease in sensory misperception frequency  9/3/2020 1205 by Wm Samuels RN  Outcome: Ongoing  9/3/2020 0059 by Rafy Tang  Outcome: Ongoing  Goal: Able to refrain from responding to false sensory perceptions  Description: Able to refrain from responding to false sensory perceptions  9/3/2020 1205 by Wm Samuels RN  Outcome: Ongoing  9/3/2020 0059 by Rafy Tang  Outcome: Ongoing  Goal: Demonstrates accurate environmental perceptions  Description: Demonstrates accurate environmental perceptions  9/3/2020 1205 by Wm Samuels RN  Outcome: Ongoing  9/3/2020 0059 by Rafy Tang  Outcome: Ongoing  Goal: Able to distinguish between reality-based and nonreality-based thinking  Description: Able to distinguish between reality-based and nonreality-based thinking  9/3/2020 1205 by Wm Samuels RN  Outcome: Ongoing  9/3/2020 0059 by Rafy Tang  Outcome: Ongoing  Goal: Able to interrupt nonreality-based thinking  Description: Able to interrupt nonreality-based thinking  9/3/2020 1205 by Wm Samuels RN  Outcome: Ongoing  9/3/2020 0059 by Rafy Tang  Outcome: Ongoing     Problem: Pain:  Goal: Pain level will decrease  Description: Pain level will decrease  9/3/2020 1205 by Wm Samuels RN  Outcome: Ongoing  9/3/2020 0059 by Wagner Montelongo Gregory  Outcome: Ongoing  Goal: Control of acute pain  Description: Control of acute pain  9/3/2020 1205 by Angelito Rachel RN  Outcome: Ongoing  9/3/2020 0059 by Jyothi Martinez  Outcome: Ongoing  Goal: Control of chronic pain  Description: Control of chronic pain  9/3/2020 1205 by Angelito Rachle RN  Outcome: Ongoing  9/3/2020 0059 by Jyothi Martinez  Outcome: Ongoing

## 2020-09-03 NOTE — PLAN OF CARE
Problem: Falls - Risk of:  Goal: Will remain free from falls  Description: Will remain free from falls  9/3/2020 0059 by Prabhakar Mata  Outcome: Ongoing  9/2/2020 1222 by Maya Chau RN  Outcome: Ongoing  Goal: Absence of physical injury  Description: Absence of physical injury  9/3/2020 0059 by Prabhakar Mata  Outcome: Ongoing  9/2/2020 1222 by Maya Chau RN  Outcome: Ongoing     Problem: Skin Integrity:  Goal: Will show no infection signs and symptoms  Description: Will show no infection signs and symptoms  9/3/2020 0059 by Prabhakar Mata  Outcome: Ongoing  9/2/2020 1222 by Maya Chau RN  Outcome: Ongoing  Goal: Absence of new skin breakdown  Description: Absence of new skin breakdown  9/3/2020 0059 by Prabhakar Mata  Outcome: Ongoing  9/2/2020 1222 by Maya Chau RN  Outcome: Ongoing     Problem: Discharge Planning:  Goal: Participates in care planning  Description: Participates in care planning  9/3/2020 0059 by Prabhakar Mata  Outcome: Ongoing  9/2/2020 1222 by Maya Chau RN  Outcome: Ongoing  Goal: Discharged to appropriate level of care  Description: Discharged to appropriate level of care  9/3/2020 0059 by Prabhakar Mata  Outcome: Ongoing  9/2/2020 1222 by Maya Chau RN  Outcome: Ongoing  Goal: Ability to perform activities of daily living will improve  Description: Ability to perform activities of daily living will improve  9/3/2020 0059 by Prabhakar Mata  Outcome: Ongoing  9/2/2020 1222 by Maya Chau RN  Outcome: Ongoing     Problem: Airway Clearance - Ineffective:  Goal: Ability to maintain a clear airway will improve  Description: Ability to maintain a clear airway will improve  9/3/2020 0059 by Prabhakar Mata  Outcome: Ongoing  9/2/2020 1222 by Maya Chau RN  Outcome: Ongoing     Problem: Anxiety/Stress:  Goal: Level of anxiety will decrease  Description: Level of anxiety will decrease  9/3/2020 0059 by Prabhakar Mata  Outcome: Ongoing  9/2/[phone] by Darion Fine RN  Outcome: Ongoing     Problem: Aspiration:  Goal: Absence of aspiration  Description: Absence of aspiration  9/3/2020 0059 by Theresa Wolff  Outcome: Ongoing  9/2/2020 1222 by Darion Fine RN  Outcome: Ongoing     Problem:  Bowel Function - Altered:  Goal: Bowel elimination is within specified parameters  Description: Bowel elimination is within specified parameters  9/3/2020 0059 by Theresa Wolff  Outcome: Ongoing  9/2/2020 1222 by Darion Fine RN  Outcome: Ongoing     Problem: Cardiac Output - Decreased:  Goal: Hemodynamic stability will improve  Description: Hemodynamic stability will improve  9/3/2020 0059 by Theresa Wolff  Outcome: Ongoing  9/2/2020 1222 by Darion Fine RN  Outcome: Ongoing     Problem: Fluid Volume - Imbalance:  Goal: Absence of imbalanced fluid volume signs and symptoms  Description: Absence of imbalanced fluid volume signs and symptoms  9/3/2020 0059 by Theresa Wolff  Outcome: Ongoing  9/2/2020 1222 by Darion Fine RN  Outcome: Ongoing     Problem: Gas Exchange - Impaired:  Goal: Levels of oxygenation will improve  Description: Levels of oxygenation will improve  9/3/2020 0059 by Theresa Wolff  Outcome: Ongoing  9/2/2020 1222 by Darion Fine RN  Outcome: Ongoing     Problem: Mental Status - Impaired:  Goal: Mental status will be restored to baseline  Description: Mental status will be restored to baseline  9/3/2020 0059 by Theresa Wolff  Outcome: Ongoing  9/2/2020 1222 by Darion Fine RN  Outcome: Ongoing     Problem: Nutrition Deficit:  Goal: Ability to achieve adequate nutritional intake will improve  Description: Ability to achieve adequate nutritional intake will improve  9/3/2020 0059 by Theresa Wolff  Outcome: Ongoing  9/2/2020 1222 by Darion Fine RN  Outcome: Ongoing     Problem: Pain:  Goal: Pain level will decrease  Description: Pain level will decrease  9/3/2020 0059 by Theresa Wolff  Outcome: Ongoing  9/2/[phone] by Velasquez Lopez RN  Outcome: Ongoing  Goal: Recognizes and communicates pain  Description: Recognizes and communicates pain  9/3/2020 0059 by Moira Karely  Outcome: Ongoing  9/2/2020 1222 by Velasquez Lopez RN  Outcome: Ongoing  Goal: Control of acute pain  Description: Control of acute pain  9/3/2020 0059 by Moira Karely  Outcome: Ongoing  9/2/2020 1222 by Velasquez Lopez RN  Outcome: Ongoing  Goal: Control of chronic pain  Description: Control of chronic pain  9/3/2020 0059 by Moira Karely  Outcome: Ongoing  9/2/2020 1222 by Velasquez Lopez RN  Outcome: Ongoing     Problem: Serum Glucose Level - Abnormal:  Goal: Ability to maintain appropriate glucose levels will improve to within specified parameters  Description: Ability to maintain appropriate glucose levels will improve to within specified parameters  9/3/2020 0059 by Moira Karely  Outcome: Ongoing  9/2/2020 1222 by Velasquez Lopez RN  Outcome: Ongoing     Problem: Skin Integrity - Impaired:  Goal: Will show no infection signs and symptoms  Description: Will show no infection signs and symptoms  9/3/2020 0059 by Moira Karely  Outcome: Ongoing  9/2/2020 1222 by Velasquez Lopez RN  Outcome: Ongoing  Goal: Absence of new skin breakdown  Description: Absence of new skin breakdown  9/3/2020 0059 by Moira Karely  Outcome: Ongoing  9/2/2020 1222 by Velasquez Lopez RN  Outcome: Ongoing     Problem: Sleep Pattern Disturbance:  Goal: Appears well-rested  Description: Appears well-rested  9/3/2020 0059 by Moira Saa  Outcome: Ongoing  9/2/2020 1222 by Velasquez Lopez RN  Outcome: Ongoing     Problem: Tissue Perfusion, Altered:  Goal: Circulatory function within specified parameters  Description: Circulatory function within specified parameters  9/3/2020 0059 by Moira Karely  Outcome: Ongoing  9/2/2020 1222 by Velasquez Lopez RN  Outcome: Ongoing     Problem: Tissue Perfusion - Cardiopulmonary, Altered:  Goal: Absence of angina  Description: Absence of angina  9/3/2020 0059 by Saran Perry  Outcome: Ongoing  9/2/2020 1222 by Josh Meyers RN  Outcome: Ongoing  Goal: Hemodynamic stability will improve  Description: Hemodynamic stability will improve  9/3/2020 0059 by Saran Perry  Outcome: Ongoing  9/2/2020 1222 by Josh Meyers RN  Outcome: Ongoing     Problem: Confusion - Acute:  Goal: Mental status will be restored to baseline  Description: Mental status will be restored to baseline  9/3/2020 0059 by Saran Perry  Outcome: Ongoing  9/2/2020 1222 by Josh Meyers RN  Outcome: Ongoing  Goal: Absence of continued neurological deterioration signs and symptoms  Description: Absence of continued neurological deterioration signs and symptoms  9/3/2020 0059 by Saran Perry  Outcome: Ongoing  9/2/2020 1222 by Josh Meyers RN  Outcome: Ongoing     Problem: Injury - Risk of, Physical Injury:  Goal: Will remain free from falls  Description: Will remain free from falls  9/3/2020 0059 by Saran Perry  Outcome: Ongoing  9/2/2020 1222 by Josh Meyers RN  Outcome: Ongoing  Goal: Absence of physical injury  Description: Absence of physical injury  9/3/2020 0059 by Saran Perry  Outcome: Ongoing  9/2/2020 1222 by Josh Meyers RN  Outcome: Ongoing     Problem: Mood - Altered:  Goal: Mood stable  Description: Mood stable  9/3/2020 0059 by Saran Perry  Outcome: Ongoing  9/2/2020 1222 by Josh Meyers RN  Outcome: Ongoing  Goal: Absence of abusive behavior  Description: Absence of abusive behavior  9/3/2020 0059 by Saran Perry  Outcome: Ongoing  9/2/2020 1222 by Josh Meyers RN  Outcome: Ongoing  Goal: Verbalizations of feeling emotionally comfortable while being cared for will increase  Description: Verbalizations of feeling emotionally comfortable while being cared for will increase  9/3/2020 0059 by Saran Perry  Outcome: Ongoing  9/2/2020 1222 by Josh Meyers RN  Outcome: Ongoing Ongoing  Goal: Control of acute pain  Description: Control of acute pain  9/3/2020 0059 by Alma Part  Outcome: Ongoing  9/2/2020 1222 by Liliya Pineda RN  Outcome: Ongoing  Goal: Control of chronic pain  Description: Control of chronic pain  9/3/2020 0059 by Alma Part  Outcome: Ongoing  9/2/2020 1222 by Liliya Pineda RN  Outcome: Ongoing

## 2020-09-03 NOTE — PROGRESS NOTES
Hospitalist Progress Note      Name:  Noam Hawkins /Age/Sex: 1955  (63 y.o. female)   MRN & CSN:  [id number] & [id number] Admission Date/Time: 2020  9:36 AM   Location:  [facility]0/3120-A PCP: THEODORE Jones NP       Noam Hawkins is a 63 y.o.  female  who presents with Bradycardia and Fatigue (x3-4 days)      Assessment and Plan:   Septic shock  - shock resolved  -On  infectious disease changed antibiotics from meropenem/vancomycin to minocycline and Avycaz; diagnosed with pneumonia at that time  -Sputum culture is negative as of   -Urine culture sent about 12 hours after meropenem given showing less than 10,000 colonies of E. coli.  Sensitive to cefepime    Hypotension  - hold lasix and aldactone for now  - give bolus  - monitor     Acute on chronic respiratory failure,   -Intubated  at 11:45 PM, the night of admission  -Extubated  at 3:40 PM.  -Appreciate pulmonary consult  -She has BiPAP at home (she wears only during sleep she states)     Metabolic encephalopathy/hyperactive delirium  - improved and stable now  -On  the patient told her nurse that she was going to call the police on her, and that she thought that she was kidnapped.  Restarted home medication Neurontin 800 mg 3 times a day on  -but as of  she has not had any doses because she refuses her oral meds.  Withdrawal from gabapentin is well described in the literature  -Ceftazidime may potentially cause delirium as well  -Psychiatry consulted: started on zoloft, buspar        COPD -appreciate pulmonary consult     Groundglass opacities on CT scan   -SARS-CoV-2 antigen is negative  -Noted CHF per pulmonary and pneumonia per ID, appreciate consults     Morbid obesity with obstructive sleep apnea  -Body mass index is 63.34 kg/m². Weight is about 419 lb per bed scale  -She is on BiPAP at home     Acute kidney injury, -With hyperkalemia and metabolic acidosis  - hold lasix and dextrose       PRN Meds: magnesium sulfate, 1 g, PRN  sodium chloride flush, 10 mL, PRN  sodium chloride flush, 10 mL, PRN  acetaminophen, 650 mg, Q6H PRN    Or  acetaminophen, 650 mg, Q6H PRN  ipratropium-albuterol, 3 mL, Q6H PRN  glucose, 15 g, PRN  dextrose, 12.5 g, PRN  glucagon (rDNA), 1 mg, PRN  dextrose, 100 mL/hr, PRN      Subjective:   Doing ok, no distress    Objective: Intake/Output Summary (Last 24 hours) at 9/3/2020 1015  Last data filed at 9/3/2020 0130  Gross per 24 hour   Intake 175 ml   Output --   Net 175 ml      Vitals:   Vitals:    09/03/20 0930   BP: (!) 102/37   Pulse:    Resp: 16   Temp: 98.7 °F (37.1 °C)   SpO2: 98%     Physical Exam:   Gen:  awake, alert, no apparent distress  Head/Eyes:  Normocephalic atraumatic, EOMI   NECK:   symmetrical, trachea midline  LUNGS: Normal Effort   CARDIOVASCULAR:  Normal rate  ABDOMEN:  non distended  MUSCULOSKELETAL:  ROM limited  NEUROLOGIC: Alert and Oriented,  Cranial nerves II-XII are grossly intact.    SKIN:  no bruising or bleeding, normal skin color,  no redness      Data:       CBC   Recent Labs     09/01/20  0630 09/02/20  0855   WBC 5.6 5.5   HGB 9.3* 10.1*   HCT 31.2* 35.6*    213      BMP   Recent Labs     09/01/20  0630 09/02/20  0855    140   K 3.5 4.0   CL 97* 97*   CO2 33* 28   BUN 17 26*   CREATININE 1.0 1.5*         Electronically signed by Issa Wong MD on 9/3/2020 at 10:15 AM

## 2020-09-03 NOTE — PROGRESS NOTES
pulmonary      SUBJECTIVE:  sleeping     OBJECTIVE    VITALS:  BP (!) 102/37 Comment: Physician notified  Pulse 65   Temp 98.7 °F (37.1 °C) (Oral)   Resp 16   Ht 5' 7.99\" (1.727 m)   Wt (!) 379 lb (171.9 kg)   SpO2 98%   BMI 57.64 kg/m²   HEAD AND FACE EXAM:  No throat injection, no active exudate,no thrush  NECK EXAM;No JVD, no masses, symmetrical  CHEST EXAM; Expansion equal and symmetrical, no masses  LUNG EXAM; Good breath sounds bilaterally. There are expiratory wheezes both lungs, there are crackles at both lung bases  CARDIOVASCULAR EXAM: Positive S1 and S2, no S3 or S4, no clicks ,no murmurs  RIGHT AND LEFT LOWER EXTRIMITY EXAM: No edema, no swelling, no inflamation            LABS   Lab Results   Component Value Date    WBC 5.5 09/02/2020    HGB 10.1 (L) 09/02/2020    HCT 35.6 (L) 09/02/2020    MCV 87.9 09/02/2020     09/02/2020     Lab Results   Component Value Date    CREATININE 1.5 (H) 09/02/2020    BUN 26 (H) 09/02/2020     09/02/2020    K 4.0 09/02/2020    CL 97 (L) 09/02/2020    CO2 28 09/02/2020     Lab Results   Component Value Date    INR 1.07 06/27/2020    PROTIME 13.0 06/27/2020          Lab Results   Component Value Date    PHOS 3.2 07/02/2020    PHOS 2.7 06/30/2020    PHOS 5.8 05/24/2020      No results for input(s): PH, PO2ART, UOH8CUQ, HCO3, BEART, O2SAT in the last 72 hours. Wt Readings from Last 3 Encounters:   09/01/20 (!) 379 lb (171.9 kg)   07/04/20 (!) 364 lb 6.4 oz (165.3 kg)   06/04/20 (!) 494 lb (224.1 kg)               ASSESMENT  Ac on ch resp failure  Ac copd  chf  ronni        PLAN  1. cpm  2. Pap rx while asleep  3. antibx  4.  Bd rx    9/3/2020  Jennifer Smart M.D.

## 2020-09-03 NOTE — PROGRESS NOTES
Infectious Disease Progress Note  9/3/2020   Patient Name: Hodan Lino :    Impression  · Septic Shock   · Pneumonia  § Extubated 20. Initial SARS-CoV-2 NAAT test was negative. Elevated procalcitonin suggestive of bacterial infection. § Blood cultures -02-NGTD  § Hypotension, vasopressors x 2 onboard, weaned off   § CT Chest WO Contrast: Areas of consolidation and nonrounded GGO to the lungs bilaterally. Differential considerations include multifocal pneumonia, pulmonary edema, and/or atelectasis. · Possible UTI:  § Urine culture -GNR <10,000  § UA -WBC 12,367,   ? Morbid Obesity  ? Hypotension and Bradycardia: Cardiology onboard, 2 vasopressors onboard  ? DMII  ? JOSE: (ATN vs Pre-renal azotemia):  Dr. Livia Fay onboard, hyperkalemia, HD started, will consider CRRT if no improvement  ? Cervical Cancer  ? HFpEF  ? Acute Hypoxic Respiratory Failure:  Intubated, mechanically ventilated , extubated   § Dr. Divian Cunningham onboard  § Rapid COVID-19 Negative   ?    · Multi-morbidity: per PMHx:  Morbid obesity, asthma, cervical cancer, CHF, COPD, DMII, NSTEMI, OA, ANMOL on CPAP, choley, hyster, TAVR  Plan:  · Continue Avycaz 2.5 g IV q8h- (end date 9/3/20)  ? CRP consistently low  ? Planning SNF placement, COVID pending  ? No leukocytosis, remains to improve clinically, oxygen per NC, A&O    Ongoing Antimicrobial Therapy  Avycaz -9/3  Completed Antimicrobial Therapy  Cefepime   Vancomycin   Meropenem ? Minocycline -  ? Review of Systems   Constitutional:        Generalized weakness  Denies any N/V/D/C or adverse effects from ABX use. HENT: Negative. Eyes: Negative. Respiratory: Negative. Cardiovascular: Negative. Gastrointestinal: Negative. Endocrine: Negative. Genitourinary: Negative. Musculoskeletal: Negative. Skin: Negative. Allergic/Immunologic: Negative. Neurological: Negative.     Hematological: Negative. Psychiatric/Behavioral: Negative. Physical Exam:  Vital Signs: BP (!) 134/47   Pulse 65   Temp 98.6 °F (37 °C) (Axillary)   Resp 16   Ht 5' 7.99\" (1.727 m)   Wt (!) 379 lb (171.9 kg)   SpO2 97%   BMI 57.64 kg/m²     Gen: A&Ox4  Skin: no stigmata of endocarditis  HEMT: AT/NC   Eyes: PERRL   Neck: Supple. Trachea midline. No LAD. Chest: no distress and CTA. Good air movement. Heart: RRR and no MRG. Abd: large, pendulous, soft, non-distended, no tenderness, no hepatomegaly. Normoactive bowel sounds. Ext: no clubbing, cyanosis, non-pitting BLE edema  Catheter Site: without erythema or tenderness draining clear yellow urine.   Neuro: A&Ox4     Radiologic / Imaging / TESTING  8/25/20 IR nontunneled vascular catheter:  Impression    Successful ultrasound guided non-tunneled right internal jugular vein    triple-lumen dialysis catheter placement.       8/25/20 XR Chest Portable:  Impression    Perihilar airspace disease suggesting moderate pulmonary edema although    pneumonia is in the differential.  Along with cardiomegaly and pleural    effusions, this may represent CHF.  Findings have increased since prior exam.         Central line with tip in the SVC.  No pneumothorax.      8/25/20 US Retroperitoneal Limited:  Impression    Unremarkable ultrasound of the kidneys.       8/26/20 XR Abdomen:  Impression    Side hole of the enteric tube is noted within the distal body of the stomach    however the tip is not included.       8/26/20 XR Chest Portable:  Impression    Persistent congestive heart failure.       8/26/20 CT Chest WO Contrast:  Impression    Limited exam for reasons described above.         Areas of consolidation and nonrounded ground-glass opacity to the lungs    bilaterally.  Differential considerations include multifocal pneumonia,    pulmonary edema, and/or atelectasis or some combination thereof.         Small bilateral pleural effusions.         Stable cardiomegaly.         8/26/20 CT Head WO Contrast:  Impression    Partially limited study as above with no evident acute intracranial    abnormality. 8/27/20 XR Chest Portable:  Impression    Stable support apparatus and stable bilateral airspace opacities and pleural    effusions.           8/29/20 XR Chest Portable:  Impression    No change in life support.  Right greater than left airspace disease is    re-identified.  Suspected bilateral pleural effusions.          Labs:    Recent Results (from the past 24 hour(s))   POCT Glucose    Collection Time: 09/02/20 11:20 AM   Result Value Ref Range    POC Glucose 163 (H) 70 - 99 MG/DL   POCT Glucose    Collection Time: 09/02/20  4:56 PM   Result Value Ref Range    POC Glucose 98 70 - 99 MG/DL   POCT Glucose    Collection Time: 09/02/20  8:49 PM   Result Value Ref Range    POC Glucose 108 (H) 70 - 99 MG/DL   POCT Glucose    Collection Time: 09/03/20  7:51 AM   Result Value Ref Range    POC Glucose 88 70 - 99 MG/DL     CULTURE results: Invalid input(s): BLOOD CULTURE,  URINE CULTURE, SURGICAL CULTURE    Diagnosis:  Patient Active Problem List   Diagnosis    Morbid obesity with BMI of 70 and over, adult ([facility]    Essential hypertension    Dyslipidemia    Fecal incontinence    Mild intermittent asthma without complication    S/P laparoscopic cholecystectomy    Type 2 diabetes mellitus without complication, with long-term current use of insulin (HCC)    Fatty liver    Arthritis    H/O parotidectomy    Venous stasis dermatitis of both lower extremities    Aortic stenosis    Morbid obesity (HCC)    Asthma    Diabetes mellitus ([facility]    Hypertension    Sleep apnea    Family history of coronary artery disease    Chest pain    SOB (shortness of breath)    Palpitations    Peripheral edema    Hypervolemia    Chronic respiratory failure with hypoxia and hypercapnia (HCC)    Acute exacerbation of chronic obstructive pulmonary disease (COPD) (HCC)    Chronic obstructive pulmonary disease ([address]    Gait disturbance    Acute on chronic respiratory failure with hypoxia and hypercapnia ([facility])    Cellulitis    Skin ulcer of left foot with fat layer exposed ([address]    Pneumonia    VHD (valvular heart disease)    Class 3 severe obesity due to excess calories with body mass index (BMI) greater than or equal to 70 in adult (HCC)    SIRS (systemic inflammatory response syndrome) ([facility])    Acute kidney injury ([address]    Acute metabolic encephalopathy    Shock, unspecified ([address]    Uncontrolled diabetes mellitus ([address]    Sepsis ([address]    Altered mental status    Wide-complex tachycardia ([address]    Acute on chronic diastolic heart failure (HCC)    S/P TAVR (transcatheter aortic valve replacement)    Septic shock (HCC)    Acute kidney injury superimposed on CKD ([facility])    Bradycardia    Anxiety    Acute respiratory failure with hypoxia (HCC)       Active Problems  Principal Problem:    Septic shock ([facility])  Active Problems:    Anxiety    Acute kidney injury superimposed on CKD (HCC)    Bradycardia    Acute respiratory failure with hypoxia ([facility])  Resolved Problems:    * No resolved hospital problems. *    Electronically signed by: Electronically signed by Fozia Jones.  THEODORE Shah CNP on 9/3/2020 at 9:30 AM

## 2020-09-03 NOTE — PROGRESS NOTES
Nephrology Progress Note  9/3/2020 12:04 PM        Subjective:   Admit Date: 8/25/2020  PCP: THEODORE Thomason NP    Interval History: While working with PT: patient was lethargic and hypotensive    Data:     Current meds:    sodium chloride  1,000 mL Intravenous Once    [Held by provider] furosemide  20 mg Intravenous BID    [Held by provider] spironolactone  12.5 mg Oral Daily    sertraline  25 mg Oral Nightly    busPIRone  10 mg Oral TID    ceftazidime-acibactam (AVYCAZ) infusion  2.5 g Intravenous Q8H    gabapentin  800 mg Oral TID    pramipexole  1 mg Oral Nightly    pramipexole  1 mg Oral BID    haloperidol lactate  2 mg Intravenous Once    fondaparinux  2.5 mg Subcutaneous Daily    albuterol sulfate HFA  2 puff Inhalation 4x daily    ipratropium  2 puff Inhalation 4x daily    sodium chloride flush  10 mL Intravenous 2 times per day    sodium chloride flush  10 mL Intravenous 2 times per day    pantoprazole  40 mg Intravenous Daily    insulin lispro  0-6 Units Subcutaneous TID WC    insulin lispro  0-3 Units Subcutaneous Nightly    atorvastatin  40 mg Oral Nightly    clopidogrel  75 mg Oral Daily    doxepin  10 mg Oral Nightly    miconazole   Topical BID    cetirizine  10 mg Oral Daily    chlorhexidine  15 mL Mouth/Throat BID      dextrose           I/O last 3 completed shifts:   In: 6902 [P.O.:1075; IV Piggyback:100]  Out: -     CBC:   Recent Labs     09/01/20  0630 09/02/20  0855   WBC 5.6 5.5   HGB 9.3* 10.1*    213          Recent Labs     09/01/20  0630 09/02/20  0855    140   K 3.5 4.0   CL 97* 97*   CO2 33* 28   BUN 17 26*   CREATININE 1.0 1.5*   GLUCOSE 99 91       Lab Results   Component Value Date    CALCIUM 8.9 09/02/2020    PHOS 3.2 07/02/2020       Objective:     Vitals: BP (!) 103/42   Pulse 59   Temp 99.1 °F (37.3 °C) (Oral)   Resp 12   Ht 5' 7.99\" (1.727 m)   Wt (!) 379 lb (171.9 kg)   SpO2 93%   BMI 57.64 kg/m²     General appearance: Patient is awake with confusion   HEENT: Head: normocephalic, atraumatic. Neck: supple, symmetrical, trachea midline  Cardiovascular: normal S1 and S2  Pulmonary: coarse lung sounds through out  Abdomen:  soft / non-tender   Extremities:+2 edema to the bilateral thighs    Impression :     1.  JOSE: demonstrating renal recovery: RRT x 2 treatments   2. Sepsis   3. Pancytopenia   4. Heart Failure   5. Respiratory failure   6. DM  7. Confusion / Anxiety     Recommendation/Plan  :     1.   -no chemistry drawn this am: BMP ordered for this afternoon   -chemistry panel Qam   2   -on Avycaz  3. -CBC in am   4.   -diuretics currently on hold  -please measure and document all urine output  -monitor: O2 saturations / urine output and volume status   5.   -was wearing nasal cannula this am; transitioned from Bipap  -O2 sat: 93 - 98 % on 5 LPM via nasal cannula   6.   -on SSI for diabetes management   7.   -monitor affect and sensorium; on Doxepin / Buspar / Zoloft    Post-hospitalization planning: starting pre-cert for [facility]     Electronically signed by Modesta Landau, APRN - CNP       Nephrology Attending Progress Note  9/3/2020 2:20 PM  Subjective:   Admit Date: 8/25/2020  PCP: THEODORE Nagy - SAVANNAH    Interval History: I have personally performed face to face diagnostic evaluation on this patient. I have personally reviewed pertinent labs and imaging and agree with the care plan above.  My additional findings are as follows:  Pt is awake and weak today and low bp    Objective:   Vitals: BP (!) 103/42   Pulse 59   Temp 99.1 °F (37.3 °C) (Oral)   Resp 12   Ht 5' 7.99\" (1.727 m)   Wt (!) 379 lb (171.9 kg)   SpO2 93%   BMI 57.64 kg/m²   Weak awake  Soft obese  Thick legs    Assessment and Plan:  IMP:  As stated above    Plan     1 dw dr Butler Ser and hold diuretic and bp meds today  2 fu bmp today  3 monitor o2 and wean off bipap and trying po diet  4 ssi  5 affect improving  Will need rehab eval             Electronically signed by Leobardo Ramires MD on 9/3/2020 at 2:20 PM

## 2020-09-04 NOTE — PROGRESS NOTES
Physician Progress Note      Grady Soot  [facility] #:                  [id number]  :                       1955  ADMIT DATE:       2020 9:36 AM  DISCH DATE:  Eugenia Vanegas  PROVIDER #:        Rachelle Morgan MD          QUERY TEXT:    Dear Dr Ash Pantoja,    Patient admitted with Sepsis . Per nursing, noted to also have  Stage 2   pressure ulcer to gluteal fold . If possible, please document in progress   notes and discharge summary the type of ulcer, site of the ulcer and POA   status of the ulcer: The medical record reflects the following:  Risk Factors: obesity, sepsis  Clinical Indicators: Documentation per nursing on admission of Stage 2   pressure ulcer to gluteal fold. Treatment: turn and reposition q 2hrs, collagen, meriplex border dressing,   inspect skin per unit guidelines, wound care consult, low air loss , bariatric   bed    Thank you, Mike De La Cruz RN CDS CRCR  [email or web address]  [phone]  Options provided:  -- Stage 2  Pressure Ulcer to gluteal fold POA  -- Stage 2  Pressure Ulcer to gluteal fold not POA  -- Other - I will add my own diagnosis  -- Disagree - Not applicable / Not valid  -- Disagree - Clinically unable to determine / Unknown  -- Refer to Clinical Documentation Reviewer    PROVIDER RESPONSE TEXT:    This patient has a Stage 2 pressure ulcer to gluteal fold that was present on   admission. Query created by:  Matt De La Cruz on 2020 6:58 AM      Electronically signed by:  Rachelle Morgan MD 2020 8:27 AM

## 2020-09-04 NOTE — PLAN OF CARE
Problem: Falls - Risk of:  Goal: Will remain free from falls  Description: Will remain free from falls  Outcome: Ongoing  Goal: Absence of physical injury  Description: Absence of physical injury  Outcome: Ongoing     Problem: Skin Integrity:  Goal: Will show no infection signs and symptoms  Description: Will show no infection signs and symptoms  Outcome: Ongoing  Goal: Absence of new skin breakdown  Description: Absence of new skin breakdown  Outcome: Ongoing     Problem: Discharge Planning:  Goal: Participates in care planning  Description: Participates in care planning  Outcome: Ongoing  Goal: Discharged to appropriate level of care  Description: Discharged to appropriate level of care  Outcome: Ongoing  Goal: Ability to perform activities of daily living will improve  Description: Ability to perform activities of daily living will improve  Outcome: Ongoing     Problem: Mental Status - Impaired:  Goal: Mental status will be restored to baseline  Description: Mental status will be restored to baseline  Outcome: Ongoing     Problem: Pain:  Goal: Pain level will decrease  Description: Pain level will decrease  Outcome: Ongoing  Goal: Recognizes and communicates pain  Description: Recognizes and communicates pain  Outcome: Ongoing  Goal: Control of acute pain  Description: Control of acute pain  Outcome: Ongoing  Goal: Control of chronic pain  Description: Control of chronic pain  Outcome: Ongoing     Problem: Skin Integrity - Impaired:  Goal: Will show no infection signs and symptoms  Description: Will show no infection signs and symptoms  Outcome: Ongoing  Goal: Absence of new skin breakdown  Description: Absence of new skin breakdown  Outcome: Ongoing     Problem: Confusion - Acute:  Goal: Mental status will be restored to baseline  Description: Mental status will be restored to baseline  Outcome: Ongoing  Goal: Absence of continued neurological deterioration signs and symptoms  Description: Absence of continued neurological deterioration signs and symptoms  Outcome: Ongoing

## 2020-09-04 NOTE — PROGRESS NOTES
Comprehensive Nutrition Assessment    Type and Reason for Visit:  Reassess    Nutrition Recommendations/Plan:     Meals and Snacks: Continue CHO controlled diet with fluid restriction as per nephrology. Oral Nutrition Supplement: Ordered high protein, low calorie supplement TID. Ordered kefir once daily as pt is s/p atb. Nutrition Assessment:  Pt advanced to CHO controlled diet with fluid restriction. No PO intake recorded. Pt with weakness, shaking extremities and bi-pap all negatively affecting PO intake. Will continue to closely monitor. Will add high protein, low calorie nutrition supplement today and continue to monitor per nutrition protocol. Pt remains at high nutrition risk at this time. Malnutrition Assessment:  Malnutrition Status:  Insufficient data    Context:  Acute Illness  Poor oral intake with weight loss (some related to fluid) during los. Unable to complete nutrition focused physical exam at this time. Estimated Daily Nutrient Needs:  Energy (kcal):  2300; Weight Used for Energy Requirements:  Current     Protein (g):  (9.9-6);  Weight Used for Protein Requirements:  Ideal        Fluid (ml/day):  (fluid management per nephrology)     Nutrition Related Findings:  +1 pitting edema to UE       Wounds:  Stage II, Pressure Injury       Current Nutrition Therapies:    DIET GENERAL; Carb Control: 4 carb choices (60 gms)/meal; Daily Fluid Restriction: 1800 ml    Anthropometric Measures:  · Height: 5' 8\" (172.7 cm)  · Current Body Weight: 379 lb (171.9 kg)   · Admission Body Weight: 419 lb 1.5 oz (190.1 kg)    · Usual Body Weight: 432 lb 5.2 oz (196.1 kg)     · Ideal Body Weight: 140 lbs; % Ideal Body Weight 270.7 %   · BMI: 57.6  · BMI Categories: Obese Class 3 (BMI 40.0 or greater)       Nutrition Diagnosis:   · Inadequate oral intake related to cognitive or neurological impairment as evidenced by weight loss, other (comment)(difficulty eating )      Nutrition Interventions: Food and/or Nutrient Delivery:  Continue Current Diet, Start Oral Nutrition Supplement  Nutrition Education/Counseling:  Education not indicated   Coordination of Nutrition Care:  Continued Inpatient Monitoring, Speech Therapy    Goals:  Pt will tolerate greater than 75% intake of meals and supplements during los        Nutrition Monitoring and Evaluation:   Food/Nutrient Intake Outcomes:  Diet Advancement/Tolerance  Physical Signs/Symptoms Outcomes:  Biochemical Data, Chewing or Swallowing, Fluid Status or Edema     Discharge Planning:     Too soon to determine     Electronically signed by Costa Burns, RD, LD on 9/4/20 at 7:12 PM EDT    Contact: [phone]

## 2020-09-04 NOTE — PROGRESS NOTES
Infectious Disease Progress Note  2020   Patient Name: Orlin Schultz :    Impression  · Septic Shock   · Pneumonia  § Extubated 20. Initial SARS-CoV-2 NAAT test was negative. Elevated procalcitonin suggestive of bacterial infection. § Blood cultures -0-NGTD  § Hypotension, vasopressors x 2 onboard, weaned off   § CT Chest WO Contrast: Areas of consolidation and nonrounded GGO to the lungs bilaterally. Differential considerations include multifocal pneumonia, pulmonary edema, and/or atelectasis. · Possible UTI:  § Urine culture -GNR <10,000  § UA -WBC 12,367,   ? Morbid Obesity  ? Hypotension and Bradycardia: Cardiology onboard, 2 vasopressors onboard  ? DMII  ? JOSE: (ATN vs Pre-renal azotemia):  Dr. Maisha Sandoval onboard, hyperkalemia, HD started, will consider CRRT if no improvement  ? Cervical Cancer  ? HFpEF  ? Acute Hypoxic Respiratory Failure:  Intubated, mechanically ventilated , extubated   § Dr. Lexx Bates onboard  § Rapid COVID-19 Negative   ?    · Multi-morbidity: per PMHx:  Morbid obesity, asthma, cervical cancer, CHF, COPD, DMII, NSTEMI, OA, ANMOL on CPAP, choley, hyster, TAVR  Plan:  · ABX course finished 9/3/20  ? CRP consistently low  ? No leukocytosis, remains to improve clinically, oxygen per NC, A&O  ? Will sign off and not follow the patient actively, please reconsult as needed. Ongoing Antimicrobial Therapy  DCd 9/3  Completed Antimicrobial Therapy  Cefepime   Vancomycin -  Meropenem ? Minocycline -  Avycaz -9/3  ? Review of Systems   Constitutional:        Generalized weakness  Denies any N/V/D/C or adverse effects from ABX use. HENT: Negative. Eyes: Negative. Respiratory: Negative. Cardiovascular: Negative. Gastrointestinal: Negative. Endocrine: Negative. Genitourinary: Negative. Musculoskeletal: Negative. Skin: Negative. Allergic/Immunologic: Negative.     Neurological: Negative. Hematological: Negative. Psychiatric/Behavioral: Negative. Chief complaint:  Septic Shock, pneumonia    Physical Exam:  Vital Signs: BP (!) 120/44   Pulse 65   Temp 97.8 °F (36.6 °C) (Oral)   Resp 17   Ht 5' 7.99\" (1.727 m)   Wt (!) 379 lb (171.9 kg)   SpO2 97%   BMI 57.64 kg/m²     Gen: A&Ox4  Skin: no stigmata of endocarditis  HEMT: AT/NC   Eyes: PERRL   Neck: Supple. Trachea midline. No LAD. Chest: no distress and CTA. Good air movement. Heart: RRR and no MRG. Abd: large, pendulous, soft, non-distended, no tenderness, no hepatomegaly. Normoactive bowel sounds. Ext: no clubbing, cyanosis, non-pitting BLE edema  Catheter Site: without erythema or tenderness draining clear yellow urine.   Neuro: A&Ox4     Radiologic / Imaging / TESTING  8/25/20 IR nontunneled vascular catheter:  Impression    Successful ultrasound guided non-tunneled right internal jugular vein    triple-lumen dialysis catheter placement.       8/25/20 XR Chest Portable:  Impression    Perihilar airspace disease suggesting moderate pulmonary edema although    pneumonia is in the differential.  Along with cardiomegaly and pleural    effusions, this may represent CHF.  Findings have increased since prior exam.         Central line with tip in the SVC.  No pneumothorax.      8/25/20 US Retroperitoneal Limited:  Impression    Unremarkable ultrasound of the kidneys.       8/26/20 XR Abdomen:  Impression    Side hole of the enteric tube is noted within the distal body of the stomach    however the tip is not included.       8/26/20 XR Chest Portable:  Impression    Persistent congestive heart failure.       8/26/20 CT Chest WO Contrast:  Impression    Limited exam for reasons described above.         Areas of consolidation and nonrounded ground-glass opacity to the lungs    bilaterally.  Differential considerations include multifocal pneumonia,    pulmonary edema, and/or atelectasis or some combination thereof.      coronary artery disease    Chest pain    SOB (shortness of breath)    Palpitations    Peripheral edema    Hypervolemia    Chronic respiratory failure with hypoxia and hypercapnia (HCC)    Acute exacerbation of chronic obstructive pulmonary disease (COPD) (HCC)    Chronic obstructive pulmonary disease (HCC)    Gait disturbance    Acute on chronic respiratory failure with hypoxia and hypercapnia (HCC)    Cellulitis    Skin ulcer of left foot with fat layer exposed ([address]    Pneumonia    VHD (valvular heart disease)    Class 3 severe obesity due to excess calories with body mass index (BMI) greater than or equal to 70 in adult (HCC)    SIRS (systemic inflammatory response syndrome) ([facility])    Acute kidney injury ([address]    Acute metabolic encephalopathy    Shock, unspecified ([address]    Uncontrolled diabetes mellitus ([address]    Sepsis ([address]    Altered mental status    Wide-complex tachycardia ([address]    Acute on chronic diastolic heart failure (HCC)    S/P TAVR (transcatheter aortic valve replacement)    Septic shock (HCC)    Acute kidney injury superimposed on CKD (HCC)    Bradycardia    Anxiety    Acute respiratory failure with hypoxia (HCC)       Active Problems  Principal Problem:    Septic shock (HCC)  Active Problems:    Anxiety    Acute kidney injury superimposed on CKD (HCC)    Bradycardia    Acute respiratory failure with hypoxia (HCC)  Resolved Problems:    * No resolved hospital problems. *    Electronically signed by: Electronically signed by Jorge Luis Bautista.  THEODORE Shah CNP on 9/4/2020 at 9:16 AM

## 2020-09-04 NOTE — PROGRESS NOTES
Nephrology Progress Note  9/4/2020 8:11 AM        Subjective:   Admit Date: 8/25/2020  PCP: THEODORE Melgar NP    Interval History: wearing Bipap this am.      Data:     Current meds:    [Held by provider] furosemide  20 mg Intravenous BID    [Held by provider] spironolactone  12.5 mg Oral Daily    sertraline  25 mg Oral Nightly    busPIRone  10 mg Oral TID    gabapentin  800 mg Oral TID    pramipexole  1 mg Oral Nightly    pramipexole  1 mg Oral BID    haloperidol lactate  2 mg Intravenous Once    fondaparinux  2.5 mg Subcutaneous Daily    albuterol sulfate HFA  2 puff Inhalation 4x daily    ipratropium  2 puff Inhalation 4x daily    sodium chloride flush  10 mL Intravenous 2 times per day    sodium chloride flush  10 mL Intravenous 2 times per day    pantoprazole  40 mg Intravenous Daily    insulin lispro  0-6 Units Subcutaneous TID WC    insulin lispro  0-3 Units Subcutaneous Nightly    atorvastatin  40 mg Oral Nightly    clopidogrel  75 mg Oral Daily    doxepin  10 mg Oral Nightly    miconazole   Topical BID    cetirizine  10 mg Oral Daily    chlorhexidine  15 mL Mouth/Throat BID      dextrose           No intake/output data recorded. CBC:   Recent Labs     09/02/20  0855 09/04/20  0419   WBC 5.5 4.7   HGB 10.1* 8.1*    251          Recent Labs     09/02/20  0855 09/03/20  1342 09/04/20  0419    142 140   K 4.0 3.7 3.3*   CL 97* 98* 97*   CO2 28 30 31   BUN 26* 37* 39*   CREATININE 1.5* 2.2* 1.8*   GLUCOSE 91 96 97       Lab Results   Component Value Date    CALCIUM 8.5 09/04/2020    PHOS 3.2 07/02/2020       Objective:     Vitals: BP (!) 92/40   Pulse 65   Temp 98.6 °F (37 °C) (Oral)   Resp 17   Ht 5' 7.99\" (1.727 m)   Wt (!) 379 lb (171.9 kg)   SpO2 97%   BMI 57.64 kg/m²     General appearance: Patient is awake with confusion   HEENT: Head: normocephalic, atraumatic.   Neck: supple, symmetrical, trachea midline  Cardiovascular: normal S1 and S2  Pulmonary: coarse lung sounds through out  Abdomen:  soft / non-tender   Extremities:+2 edema to the bilateral thighs    Impression :     1.  JOSE: demonstrating renal recovery: RRT x 2 treatments   2. Sepsis   3. Pancytopenia   4. Heart Failure   5. Respiratory failure   6. DM  7. Confusion / Anxiety   8. Hypokalemia     Recommendation/Plan  :     1.   -latest serum creatinine 1.8 with normal Na / CO2  2.  -currently on Avycaz  3.   -latest Hb: 8.1; follow CBC trend  4.   -please measure and document all urine output  -lasix and jagdeep presently on hold  5.   -on bipap during our visit with SAT's: 95 - 97%  6   -on SSI for diabetes management   7.   -currently on Zoloft / Kely Counter / doxepin   -monitor affect and mood  8.   -latest serum potassium: 3.1; following trend  -oral KCL repletion ordered and mag in am     Post-hospitalization planning: starting pre-cert for [facility]     Electronically signed by THEODORE Alcaraz CNP       Nephrology Attending Progress Note  9/4/2020 1:12 PM  Subjective:   Admit Date: 8/25/2020  PCP: THEODORE Dominguez NP    Interval History: I have personally performed face to face diagnostic evaluation on this patient. I have personally reviewed pertinent labs and imaging and agree with the care plan above. My additional findings are as follows:   Pt awake weak    Objective:   Vitals: BP (!) 117/35   Pulse 58   Temp 98.1 °F (36.7 °C) (Oral)   Resp 13   Ht 5' 7.99\" (1.727 m)   Wt (!) 379 lb (171.9 kg)   SpO2 98%   BMI 57.64 kg/m²   Weak awake  Soft obese thick legs    Assessment and Plan:  IMP:  As stated above    Plan     1 bp stable  2 creat 1.8 and monitor as keep negative  3 hb low stable  4 try wean o2 and maintain diuresis  5 keep ssi  6 affect is better  7 replete K  Await rehab             Electronically signed by Beulah Langley MD on 9/4/2020 at 1:12 PM

## 2020-09-04 NOTE — CARE COORDINATION
Pre-cert still pending for [facility] per Jeannette. She will notify this CM or charge nurse if after CM hrs when she receives the pre-cert. Pt is medically ready for d/c per Dr Julieta Alejandre. Notified Charge nurse/Karin. D/c instructions are on front of packet located with the soft chart. PT WILL NEED BARIATRIC TRANSPORT.  CALL [facility] -[phone] OR [facility] -[phone] IF MED TRANS OR [facility] ARE UNABLE TO TRANSPORT PT.   TE

## 2020-09-04 NOTE — PROGRESS NOTES
pulmonary      SUBJECTIVE: sleeping and on pap rx     OBJECTIVE    VITALS:  BP (!) 92/40   Pulse 65   Temp 98.6 °F (37 °C) (Oral)   Resp 17   Ht 5' 7.99\" (1.727 m)   Wt (!) 379 lb (171.9 kg)   SpO2 95%   BMI 57.64 kg/m²   HEAD AND FACE EXAM:  No throat injection, no active exudate,no thrush  NECK EXAM;No JVD, no masses, symmetrical  CHEST EXAM; Expansion equal and symmetrical, no masses  LUNG EXAM; Good breath sounds bilaterally. There are expiratory wheezes both lungs, there are crackles at both lung bases  CARDIOVASCULAR EXAM: Positive S1 and S2, no S3 or S4, no clicks ,no murmurs  RIGHT AND LEFT LOWER EXTRIMITY EXAM: No edema, no swelling, no inflamation            LABS   Lab Results   Component Value Date    WBC 4.7 09/04/2020    HGB 8.1 (L) 09/04/2020    HCT 26.7 (L) 09/04/2020    MCV 83.7 09/04/2020     09/04/2020     Lab Results   Component Value Date    CREATININE 2.2 (H) 09/03/2020    BUN 37 (H) 09/03/2020     09/03/2020    K 3.7 09/03/2020    CL 98 (L) 09/03/2020    CO2 30 09/03/2020     Lab Results   Component Value Date    INR 1.07 06/27/2020    PROTIME 13.0 06/27/2020          Lab Results   Component Value Date    PHOS 3.2 07/02/2020    PHOS 2.7 06/30/2020    PHOS 5.8 05/24/2020      No results for input(s): PH, PO2ART, JWG1MGN, HCO3, BEART, O2SAT in the last 72 hours. Wt Readings from Last 3 Encounters:   09/01/20 (!) 379 lb (171.9 kg)   07/04/20 (!) 364 lb 6.4 oz (165.3 kg)   06/04/20 (!) 494 lb (224.1 kg)               ASSESMENT  Ac on ch resp failure  Ac cod  ronni  chf        PLAN  1. Bd rx  2.  Cont pap rx while sleeping    9/4/2020  Gregg Gifford M.D.

## 2020-09-04 NOTE — PROGRESS NOTES
Hospitalist Progress Note      Name:  Spring Mosquera /Age/Sex: 1955  (63 y.o. female)   MRN & CSN:  [id number] & [id number] Admission Date/Time: 2020  9:36 AM   Location:  [facility]0/3120-A PCP: THEODORE Yoder NP       Spring Mosquera is a 63 y.o.  female  who presents with Bradycardia and Fatigue (x3-4 days)      Assessment and Plan:   Septic shock  - shock resolved  -On  infectious disease changed antibiotics from meropenem/vancomycin to minocycline and Avycaz; diagnosed with pneumonia at that time  -Sputum culture is negative as of   -Urine culture sent about 12 hours after meropenem given showing less than 10,000 colonies of E. coli.  Sensitive to cefepime     Hypotension  - resolved  - hold lasix and aldactone for now  - gave bolus yesterday  - monitor    HypoK  - replace     Acute on chronic respiratory failure,   -Intubated  at 11:45 PM, the night of admission  -Extubated  at 3:40 PM.  -Appreciate pulmonary consult  -She has BiPAP at home (she wears only during sleep she states)     Metabolic encephalopathy/hyperactive delirium  - improved and stable now  -On  the patient told her nurse that she was going to call the police on her, and that she thought that she was kidnapped.  Restarted home medication Neurontin 800 mg 3 times a day on  -but as of  she has not had any doses because she refuses her oral meds.  Withdrawal from gabapentin is well described in the literature  -Ceftazidime may potentially cause delirium as well  -Psychiatry consulted: started on zoloft, buspar        COPD -appreciate pulmonary consult     Groundglass opacities on CT scan   -SARS-CoV-2 antigen is negative  -Noted CHF per pulmonary and pneumonia per ID, appreciate consults     Morbid obesity with obstructive sleep apnea  -Body mass index is 63.34 kg/m². Weight is about 419 lb per bed scale  -She is on BiPAP at home     Acute kidney injury  - hold lasix and aldactone now  -First hemodialysis treatment given 8/25  -Second treatment 8/26  -8/27-has increased urine output, likely post ATN diuresis per nephrology     Bradycardia in the setting of hyperkalemia on admission-resolved     History of C. difficile infection noted      pt/ot for rehab needs        ambulate and increase activity     Placement pending     History of aortic stenosis status post TAVR  History of CHF per chart  On home oxygen  COPD/asthma  Diabetes mellitus type 2  History of left foot ulcer  Hyperlipidemia  Anemia with hemoglobin 8.2 on admission  Restless leg syndrome  History of ESBL infection     Thrombocytopenia: resolved     DVT prophylaxis: Arixtra            Diet DIET GENERAL; Carb Control: 4 carb choices (60 gms)/meal; Daily Fluid Restriction: 1800 ml   Code Status Full Code     Medications:   Medications:    potassium chloride  40 mEq Oral BID WC    [Held by provider] furosemide  20 mg Intravenous BID    [Held by provider] spironolactone  12.5 mg Oral Daily    sertraline  25 mg Oral Nightly    busPIRone  10 mg Oral TID    gabapentin  800 mg Oral TID    pramipexole  1 mg Oral Nightly    pramipexole  1 mg Oral BID    haloperidol lactate  2 mg Intravenous Once    fondaparinux  2.5 mg Subcutaneous Daily    albuterol sulfate HFA  2 puff Inhalation 4x daily    ipratropium  2 puff Inhalation 4x daily    sodium chloride flush  10 mL Intravenous 2 times per day    sodium chloride flush  10 mL Intravenous 2 times per day    pantoprazole  40 mg Intravenous Daily    insulin lispro  0-6 Units Subcutaneous TID WC    insulin lispro  0-3 Units Subcutaneous Nightly    atorvastatin  40 mg Oral Nightly    clopidogrel  75 mg Oral Daily    doxepin  10 mg Oral Nightly    miconazole   Topical BID    cetirizine  10 mg Oral Daily    chlorhexidine  15 mL Mouth/Throat BID      Infusions:    dextrose       PRN Meds: magnesium sulfate, 1 g, PRN  sodium chloride flush, 10 mL, PRN  sodium chloride

## 2020-09-04 NOTE — PROGRESS NOTES
Occupational Therapy  . Occupational Therapy Treatment Note  Name: Ella Ritchie MRN: [id number] :   1955   Date:  2020   Admission Date: 2020 Room:  3120/3120-A   Restrictions/Precautions:    General precautions; Fall Risk    Communication with other providers:  Per chart review and Nurse Merari Hope, patient is appropriate for therapeutic intervention, more alert and less lethargic from yesterday. Notified Nurse Lety of pt's brief episode of BUE shaking during self-feeding. Subjective:  Patient states:  Pt agreeable to OT Tx session. At end of Tx session when pt began having BUE shaking and difficulty grasping beverage, pt reported this as chronic problem. \"I had this before I came to the hospital.\"   Pain:   Location, Type, Intensity (0/10 to 10/10):  5/10, B feet    Objective:    Observation:  A&O x3, unable to identify any part of date, required increased time to respond to all questions. Pt received in semi-fowlers in bariatric bed c trapeze. Pt c episode of BUE shaking and difficulty grasping beverage, lasted 60 seconds, resolved. Objective Measures:  Telemetry, HR 62, RR 13, O2 sat on 5L O2 95%, Mata catheter, LUE PICC linle    Treatment, including education:  Therapeutic Activity Training:   Therapeutic activity training was instructed today. Cues were given for safety, sequence, UE/LE placement, awareness, and balance. Activities performed today included bed mobility training, sup-sit, sit-stand, SPT. Chair position used briefly, however, pt experienced discomfort and decreased mobility in this position and bed was returned to semi-fowlers. Rolling: Dep for partial rolling L, Max A + cues to roll R  Scooting: Mod A for trunk and LLE + cues for bed rails to position midline. Max A x2 (assist of nurse tech) + trapeze for upward scoot in bed   Pt educated for role of OT and rationale for therapeutic intervention and for AROM for BUE and BLE.  Pt SBA + cues for 5 reps each for ankle pumps and for BLE ab/adduction c pt demonstrating meager movements, ability for greater AROM c RLE>LLE as well as RUE AROM >LUE AROM during functional tasks. Pt educated to performBUE/BLE outside of therapy times and provided visual demonstration c pt verbalizing understanding. .     Self Care Training:   Cues were given for safety, sequence, UE/LE placement, visual cues, and balance. Activities performed today included UB bathing/dressing, toileting, hand hygiene, and grooming. UB Bathing: Mod A (for thorough wash / dry under B breasts and lower abdomen)  UB Dressing: Mod A for final adjustment s/p pt threaded BUE during doffing / donning gown  Grooming: Mod A for hair care, Sup + cue to initiate face wash. Pt exhibited mild perseveration c this task, required cue to re-direct to next task. Pt required use of rest breaks PRN to manage activity tolerance. All therapeutic intervention performed c emphasis on active participation in ADL tasks and bed mobility to inc strength, endurance and act tolerance for inc Indep c ADL tasks, func transfers / mobility. Safety  Patient safely in bed at end of session, with call light/phone in reach, and nursing aware. Set up for self-feeding.       Assessment / Impression:        Patient's tolerance of treatment:  Well   Adverse Reaction: None  Significant change in status and impact:  Improving arousal, progressing c active participation in ADL and grooming tasks  Barriers to improvement:  Decreased cognition, decreased strength and endurance, body habitus    Plan for Next Session:    Continue per OT POC c plan to address sitting EOB, recommend co-Tx    Time in:  1115  Time out:  1215  Timed treatment minutes:  60  Total treatment time:  60    Electronically signed by:    MARLENI Castro  9/4/2020, 11:22 AM    Previously filed values:    Goals:  Pt goal: go home  Time Frame for STGs: discharge  Goal 1: Pt will perform UE ADLs SBA  Goal 2: Pt will perform LE ADLs CGA w/ AD  Goal 3: Pt will perform toileting CGA  Goal 4: Pt will perform functional transfer w/ AD CGA  Goal 5: Pt will perform functional mobility w/ AD CGA  Goal 6: Pt will perform therex/theract in order to increase functional activity tolerance and dynamic standing balance

## 2020-09-05 NOTE — PROGRESS NOTES
Hospitalist Progress Note      Name:  Ella Ritchie /Age/Sex: 1955  (63 y.o. female)   MRN & CSN:  [id number] & [id number] Admission Date/Time: 2020  9:36 AM   Location:  [facility]0/3120-A PCP: THEODORE Shepherd NP       Ella Ritchie is a 63 y.o.  female  who presents with Bradycardia and Fatigue (x3-4 days)      Assessment and Plan:   Septic shock  - shock resolved  -On  infectious disease changed antibiotics from meropenem/vancomycin to minocycline and Avycaz; diagnosed with pneumonia at that time  -Sputum culture is negative as of   -Urine culture sent about 12 hours after meropenem given showing less than 10,000 colonies of E. coli.  Sensitive to cefepime  - abx course completed, no need for further abx     Hypotension  - resolved  - hold lasix and aldactone for now  - monitor     HypoK  - replaced  - check labs today     Acute on chronic respiratory failure,   -Extubated  at 3:40 PM.  -Intubated  at 11:45 PM, the night of admission  -Appreciate pulmonary consult  -She has BiPAP at home (she wears only during sleep she states)     Metabolic encephalopathy/hyperactive delirium  - improved and stable now  -On  the patient told her nurse that she was going to call the police on her, and that she thought that she was kidnapped.  Restarted home medication Neurontin 800 mg 3 times a day on  -but as of  she has not had any doses because she refuses her oral meds.  Withdrawal from gabapentin is well described in the literature  -Ceftazidime may potentially cause delirium as well  -Psychiatry consulted: started on zoloft, buspar        COPD -appreciate pulmonary consult     Groundglass opacities on CT scan   -stable  -SARS-CoV-2 antigen is negative  -Noted CHF per pulmonary and pneumonia per ID, appreciate consults     Morbid obesity with obstructive sleep apnea  -Body mass index is 63.34 kg/m². Weight is about 419 lb per bed scale  -She is on BiPAP at home     Acute kidney injury  - hold lasix and aldactone now  -First hemodialysis treatment given 8/25  -Second treatment 8/26  -8/27-has increased urine output, likely post ATN diuresis per nephrology     Bradycardia in the setting of hyperkalemia on admission-resolved     History of C. difficile infection noted      pt/ot for rehab needs        ambulate and increase activity     Placement pending     History of aortic stenosis status post TAVR  History of CHF per chart  On home oxygen  COPD/asthma  Diabetes mellitus type 2  History of left foot ulcer  Hyperlipidemia  Anemia with hemoglobin 8.2 on admission  Restless leg syndrome  History of ESBL infection     Thrombocytopenia: resolved     DVT prophylaxis: Arixtra            Diet DIET GENERAL; Carb Control: 4 carb choices (60 gms)/meal; Daily Fluid Restriction: 1800 ml  Dietary Nutrition Supplements: Low Calorie High Protein Supplement  Dietary Nutrition Supplements: Snack (see comment)   Code Status Full Code     Medications:   Medications:    potassium chloride  40 mEq Oral BID WC    HYDROcodone 5 mg - acetaminophen  1 tablet Oral Once    [Held by provider] furosemide  20 mg Intravenous BID    [Held by provider] spironolactone  12.5 mg Oral Daily    sertraline  25 mg Oral Nightly    busPIRone  10 mg Oral TID    gabapentin  800 mg Oral TID    pramipexole  1 mg Oral Nightly    pramipexole  1 mg Oral BID    haloperidol lactate  2 mg Intravenous Once    fondaparinux  2.5 mg Subcutaneous Daily    albuterol sulfate HFA  2 puff Inhalation 4x daily    ipratropium  2 puff Inhalation 4x daily    sodium chloride flush  10 mL Intravenous 2 times per day    sodium chloride flush  10 mL Intravenous 2 times per day    pantoprazole  40 mg Intravenous Daily    insulin lispro  0-6 Units Subcutaneous TID WC    insulin lispro  0-3 Units Subcutaneous Nightly    atorvastatin  40 mg Oral Nightly    clopidogrel  75 mg Oral Daily    doxepin  10 mg Oral Nightly  miconazole   Topical BID    cetirizine  10 mg Oral Daily    chlorhexidine  15 mL Mouth/Throat BID      Infusions:    dextrose       PRN Meds: magnesium sulfate, 1 g, PRN  sodium chloride flush, 10 mL, PRN  sodium chloride flush, 10 mL, PRN  acetaminophen, 650 mg, Q6H PRN    Or  acetaminophen, 650 mg, Q6H PRN  ipratropium-albuterol, 3 mL, Q6H PRN  glucose, 15 g, PRN  dextrose, 12.5 g, PRN  glucagon (rDNA), 1 mg, PRN  dextrose, 100 mL/hr, PRN      Subjective:     Doing ok,. No distress  Objective: Intake/Output Summary (Last 24 hours) at 9/5/2020 0908  Last data filed at 9/4/2020 1126  Gross per 24 hour   Intake --   Output 2000 ml   Net -2000 ml      Vitals:   Vitals:    09/04/20 2000   BP:    Pulse:    Resp:    Temp: 97.8 °F (36.6 °C)   SpO2:      Physical Exam:   Gen:  awake, alert, no apparent distress  Head/Eyes:  Normocephalic atraumatic, EOMI   NECK:   symmetrical, trachea midline  LUNGS: Normal Effort   CARDIOVASCULAR:  Normal rate  ABDOMEN:  non distended  MUSCULOSKELETAL:  ROM limited  NEUROLOGIC: Alert and Oriented,  Cranial nerves II-XII are grossly intact.    SKIN:  no bruising or bleeding, normal skin color,  no redness      Data:       CBC   Recent Labs     09/04/20  0419 09/05/20  0400   WBC 4.7 5.1   HGB 8.1* 8.2*   HCT 26.7* 27.8*    314      BMP   Recent Labs     09/03/20  1342 09/04/20  0419    140   K 3.7 3.3*   CL 98* 97*   CO2 30 31   BUN 37* 39*   CREATININE 2.2* 1.8*         Electronically signed by Amy Vaca MD on 9/5/2020 at 9:08 AM

## 2020-09-05 NOTE — PROGRESS NOTES
Nephrology Progress Note  9/5/2020 11:04 AM  Subjective:   Admit Date: 8/25/2020  PCP: THEODORE Muniz NP  Interval History: pt weak and resting in bed    Diet: DIET GENERAL; Carb Control: 4 carb choices (60 gms)/meal; Daily Fluid Restriction: 1800 ml  Dietary Nutrition Supplements: Low Calorie High Protein Supplement  Dietary Nutrition Supplements: Snack (see comment)  Pain is: Moderate      Data:   Scheduled Meds:   potassium chloride  40 mEq Oral BID WC    HYDROcodone 5 mg - acetaminophen  1 tablet Oral Once    [Held by provider] furosemide  20 mg Intravenous BID    [Held by provider] spironolactone  12.5 mg Oral Daily    sertraline  25 mg Oral Nightly    busPIRone  10 mg Oral TID    gabapentin  800 mg Oral TID    pramipexole  1 mg Oral Nightly    pramipexole  1 mg Oral BID    haloperidol lactate  2 mg Intravenous Once    fondaparinux  2.5 mg Subcutaneous Daily    albuterol sulfate HFA  2 puff Inhalation 4x daily    ipratropium  2 puff Inhalation 4x daily    sodium chloride flush  10 mL Intravenous 2 times per day    sodium chloride flush  10 mL Intravenous 2 times per day    pantoprazole  40 mg Intravenous Daily    insulin lispro  0-6 Units Subcutaneous TID WC    insulin lispro  0-3 Units Subcutaneous Nightly    atorvastatin  40 mg Oral Nightly    clopidogrel  75 mg Oral Daily    doxepin  10 mg Oral Nightly    miconazole   Topical BID    cetirizine  10 mg Oral Daily    chlorhexidine  15 mL Mouth/Throat BID     Continuous Infusions:   dextrose       PRN Meds:magnesium sulfate, sodium chloride flush, sodium chloride flush, acetaminophen **OR** acetaminophen, ipratropium-albuterol, glucose, dextrose, glucagon (rDNA), dextrose  I/O last 3 completed shifts:  In: -   Out: 2000 [Urine:2000]  No intake/output data recorded.     Intake/Output Summary (Last 24 hours) at 9/5/2020 1104  Last data filed at 9/4/2020 1126  Gross per 24 hour   Intake --   Output 2000 ml   Net -2000 ml     CBC: atraumatic.   Neck: supple, symmetrical, trachea midline  Lungs: diminished breath sounds bilaterally  Heart: S1, S2 normal  Abdomen: abnormal findings:  soft nt obese  Extremities: edema +  Neurologic: Mental status: alertness: alert      Patient Active Problem List:     Morbid obesity with BMI of 70 and over, adult [facility])     Essential hypertension     Dyslipidemia     Fecal incontinence     Mild intermittent asthma without complication     S/P laparoscopic cholecystectomy     Type 2 diabetes mellitus without complication, with long-term current use of insulin (HCC)     Fatty liver     Arthritis     H/O parotidectomy     Venous stasis dermatitis of both lower extremities     Aortic stenosis     Morbid obesity (HCC)     Asthma     Diabetes mellitus (HCC)     Hypertension     Sleep apnea     Family history of coronary artery disease     Chest pain     SOB (shortness of breath)     Palpitations     Peripheral edema     Hypervolemia     Chronic respiratory failure with hypoxia and hypercapnia (HCC)     Acute exacerbation of chronic obstructive pulmonary disease (COPD) (HCC)     Chronic obstructive pulmonary disease (HCC)     Gait disturbance     Acute on chronic respiratory failure with hypoxia and hypercapnia (HCC)     Cellulitis     Skin ulcer of left foot with fat layer exposed ([address]     Pneumonia     VHD (valvular heart disease)     Class 3 severe obesity due to excess calories with body mass index (BMI) greater than or equal to 70 in adult (HCC)     SIRS (systemic inflammatory response syndrome) (HCC)     Acute kidney injury ([address]     Acute metabolic encephalopathy     Shock, unspecified (HCC)     Uncontrolled diabetes mellitus (HCC)     Sepsis (HCC)     Altered mental status     Wide-complex tachycardia (HCC)     Acute on chronic diastolic heart failure (HCC)     S/P TAVR (transcatheter aortic valve replacement)     Septic shock (HCC)     Acute kidney injury superimposed on CKD (HCC)     Bradycardia     Anxiety

## 2020-09-06 NOTE — PROGRESS NOTES
09/04/20 0419 09/05/20  0400 09/06/20  0550   WBC 4.7 5.1 5.6   HGB 8.1* 8.2* 8.1*    314 333     BMP:    Recent Labs     09/04/20  0419 09/05/20  0400 09/06/20  0550    143 141   K 3.3* 4.1 4.5   CL 97* 103 102   CO2 31 33* 36*   BUN 39* 32* 23   CREATININE 1.8* 1.2* 1.1   GLUCOSE 97 107* 111*     Hepatic:   Recent Labs     09/04/20 0419   AST 24   ALT 5*   BILITOT 0.3   ALKPHOS 90     Troponin: No results for input(s): TROPONINI in the last 72 hours. BNP: No results for input(s): BNP in the last 72 hours. Lipids: No results for input(s): CHOL, HDL in the last 72 hours. Invalid input(s): LDLCALCU  ABGs:   Lab Results   Component Value Date    PO2ART 69 08/29/2020    PEP3ZKZ 59.0 08/29/2020     INR: No results for input(s): INR in the last 72 hours.   Renal Labs  Albumin:    Lab Results   Component Value Date    LABALBU 3.3 09/04/2020     Calcium:    Lab Results   Component Value Date    CALCIUM 9.0 09/06/2020     Phosphorus:    Lab Results   Component Value Date    PHOS 3.2 07/02/2020     U/A:    Lab Results   Component Value Date    NITRU NEGATIVE 08/25/2020    COLORU YELLOW 08/25/2020    WBCUA 68331 08/25/2020    RBCUA 156 08/25/2020    MUCUS FEW 08/25/2020    TRICHOMONAS NONE SEEN 08/25/2020    YEAST OCCASIONAL 08/23/2019    BACTERIA MANY 08/25/2020    CLARITYU CLOUDY 08/25/2020    SPECGRAV 1.012 08/25/2020    UROBILINOGEN NORMAL 08/25/2020    BILIRUBINUR NEGATIVE 08/25/2020    BLOODU MODERATE 08/25/2020    KETUA NEGATIVE 08/25/2020     ABG:    Lab Results   Component Value Date    PJR3HJY 59.0 08/29/2020    PO2ART 69 08/29/2020    UXX7KWS 34.1 08/29/2020     HgBA1c:    Lab Results   Component Value Date    LABA1C 6.1 08/25/2020     Microalbumen/Creatinine ratio:  No components found for: RUCREAT          Objective:   Vitals: BP (!) 119/34   Pulse 53   Temp 97.6 °F (36.4 °C) (Oral)   Resp 13   Ht 5' 8\" (1.727 m)   Wt (!) 379 lb (171.9 kg)   SpO2 99%   BMI 57.63 kg/m²   General appearance: awake weak  HEENT: Head: Normal, normocephalic, atraumatic.   Neck: supple, symmetrical, trachea midline  Lungs: diminished breath sounds bilaterally  Heart: S1, S2 normal  Abdomen: abnormal findings:  soft nt obese  Extremities: edema +  Neurologic: Mental status: alertness: tired      Patient Active Problem List:     Morbid obesity with BMI of 70 and over, adult [facility])     Essential hypertension     Dyslipidemia     Fecal incontinence     Mild intermittent asthma without complication     S/P laparoscopic cholecystectomy     Type 2 diabetes mellitus without complication, with long-term current use of insulin (HCC)     Fatty liver     Arthritis     H/O parotidectomy     Venous stasis dermatitis of both lower extremities     Aortic stenosis     Morbid obesity (HCC)     Asthma     Diabetes mellitus (HCC)     Hypertension     Sleep apnea     Family history of coronary artery disease     Chest pain     SOB (shortness of breath)     Palpitations     Peripheral edema     Hypervolemia     Chronic respiratory failure with hypoxia and hypercapnia (HCC)     Acute exacerbation of chronic obstructive pulmonary disease (COPD) (HCC)     Chronic obstructive pulmonary disease (HCC)     Gait disturbance     Acute on chronic respiratory failure with hypoxia and hypercapnia (HCC)     Cellulitis     Skin ulcer of left foot with fat layer exposed ([address]     Pneumonia     VHD (valvular heart disease)     Class 3 severe obesity due to excess calories with body mass index (BMI) greater than or equal to 70 in adult (HCC)     SIRS (systemic inflammatory response syndrome) (HCC)     Acute kidney injury ([address]     Acute metabolic encephalopathy     Shock, unspecified (HCC)     Uncontrolled diabetes mellitus (HCC)     Sepsis (HCC)     Altered mental status     Wide-complex tachycardia (HCC)     Acute on chronic diastolic heart failure (HCC)     S/P TAVR (transcatheter aortic valve replacement)     Septic shock (HCC)     Acute kidney injury superimposed on CKD ([facility]     Bradycardia     Anxiety     Acute respiratory failure with hypoxia (HCC)    Assessment and Plan:      IMP:     1. JOSE: demonstrating renal recovery: RRT x 2 treatments   2. Sepsis   3. Pancytopenia   4. Heart Failure   5. Respiratory failure   6. DM  7. Confusion / Anxiety   8.   Hypokalemia     Plan     1 renal to baseline  2 on abx  3 monitor hb and plt  4 o2 stable  5 ssi  [facility]  7 K stable             Sin Koroma MD

## 2020-09-06 NOTE — PROGRESS NOTES
Hospitalist Progress Note      Name:  William Biswas /Age/Sex: 1955  (63 y.o. female)   MRN & CSN:  [id number] & [id number] Admission Date/Time: 2020  9:36 AM   Location:  [facility]0/3120-A PCP: Jordan Diggs, APRN - NP       William Biswas is a 63 y.o.  female  who presents with Bradycardia and Fatigue (x3-4 days)      Assessment and Plan:   Septic shock  - shock resolved  -On  infectious disease changed antibiotics from meropenem/vancomycin to minocycline and Avycaz; diagnosed with pneumonia at that time  -Sputum culture is negative as of   -Urine culture sent about 12 hours after meropenem given showing less than 10,000 colonies of E. coli.  Sensitive to cefepime  - abx course completed, no need for further abx     Hypotension  - resolved  - hold lasix and aldactone for now  - monitor     HypoK  - replaced     Acute on chronic respiratory failure,   -Extubated  at 3:40 PM.  -Intubated  at 11:45 PM, the night of admission  -Appreciate pulmonary consult  -She has BiPAP at home (she wears only during sleep she states)     Metabolic encephalopathy/hyperactive delirium  - improved and stable now  -On  the patient told her nurse that she was going to call the police on her, and that she thought that she was kidnapped.  Restarted home medication Neurontin 800 mg 3 times a day on  -but as of  she has not had any doses because she refuses her oral meds.  Withdrawal from gabapentin is well described in the literature  -Ceftazidime may potentially cause delirium as well  -Psychiatry consulted: started on zoloft, buspar        COPD -appreciate pulmonary consult     Groundglass opacities on CT scan   -stable  -SARS-CoV-2 antigen is negative  -Noted CHF per pulmonary and pneumonia per ID, appreciate consults     Morbid obesity with obstructive sleep apnea  -Body mass index is 63.34 kg/m². Weight is about 419 lb per bed scale  -She is on BiPAP at home     Acute kidney injury  - resolved  - hold lasix and aldactone now  -First hemodialysis treatment given 8/25  -Second treatment 8/26  -8/27-has increased urine output, likely post ATN diuresis per nephrology     Bradycardia in the setting of hyperkalemia on admission-resolved     History of C. difficile infection noted      pt/ot for rehab needs        ambulate and increase activity     Placement pending     History of aortic stenosis status post TAVR  History of CHF per chart  On home oxygen  COPD/asthma  Diabetes mellitus type 2  History of left foot ulcer  Hyperlipidemia  Anemia with hemoglobin 8.2 on admission  Restless leg syndrome  History of ESBL infection     Thrombocytopenia: resolved     DVT prophylaxis: Arixtra            Diet DIET GENERAL; Carb Control: 4 carb choices (60 gms)/meal; Daily Fluid Restriction: 1800 ml  Dietary Nutrition Supplements: Low Calorie High Protein Supplement  Dietary Nutrition Supplements: Snack (see comment)   Code Status Full Code     Medications:   Medications:    HYDROcodone 5 mg - acetaminophen  1 tablet Oral Once    [Held by provider] furosemide  20 mg Intravenous BID    [Held by provider] spironolactone  12.5 mg Oral Daily    sertraline  25 mg Oral Nightly    busPIRone  10 mg Oral TID    gabapentin  800 mg Oral TID    pramipexole  1 mg Oral Nightly    pramipexole  1 mg Oral BID    haloperidol lactate  2 mg Intravenous Once    fondaparinux  2.5 mg Subcutaneous Daily    albuterol sulfate HFA  2 puff Inhalation 4x daily    ipratropium  2 puff Inhalation 4x daily    sodium chloride flush  10 mL Intravenous 2 times per day    sodium chloride flush  10 mL Intravenous 2 times per day    pantoprazole  40 mg Intravenous Daily    insulin lispro  0-6 Units Subcutaneous TID WC    insulin lispro  0-3 Units Subcutaneous Nightly    atorvastatin  40 mg Oral Nightly    clopidogrel  75 mg Oral Daily    doxepin  10 mg Oral Nightly    miconazole   Topical BID    cetirizine  10 mg Oral Daily    chlorhexidine  15 mL Mouth/Throat BID      Infusions:    dextrose       PRN Meds: magnesium sulfate, 1 g, PRN  sodium chloride flush, 10 mL, PRN  sodium chloride flush, 10 mL, PRN  acetaminophen, 650 mg, Q6H PRN    Or  acetaminophen, 650 mg, Q6H PRN  ipratropium-albuterol, 3 mL, Q6H PRN  glucose, 15 g, PRN  dextrose, 12.5 g, PRN  glucagon (rDNA), 1 mg, PRN  dextrose, 100 mL/hr, PRN      Subjective:     No distress  Objective: Intake/Output Summary (Last 24 hours) at 9/6/2020 0947  Last data filed at 9/6/2020 0615  Gross per 24 hour   Intake 570 ml   Output 3050 ml   Net -2480 ml      Vitals:   Vitals:    09/06/20 0812   BP:    Pulse:    Resp: 13   Temp:    SpO2:      Physical Exam:   Gen:  awake, alert, no apparent distress  Head/Eyes:  Normocephalic atraumatic, EOMI   NECK:   symmetrical, trachea midline  LUNGS: Normal Effort   CARDIOVASCULAR:  Normal rate  ABDOMEN:  non distended  MUSCULOSKELETAL:  ROM limited  NEUROLOGIC: Alert and Oriented,  Cranial nerves II-XII are grossly intact.    SKIN:  no bruising or bleeding, normal skin color,  no redness      Data:       CBC   Recent Labs     09/04/20  0419 09/05/20  0400 09/06/20  0550   WBC 4.7 5.1 5.6   HGB 8.1* 8.2* 8.1*   HCT 26.7* 27.8* 28.1*    314 333      BMP   Recent Labs     09/04/20  0419 09/05/20  0400 09/06/20  0550    143 141   K 3.3* 4.1 4.5   CL 97* 103 102   CO2 31 33* 36*   BUN 39* 32* 23   CREATININE 1.8* 1.2* 1.1         Electronically signed by Issa Wong MD on 9/6/2020 at 9:47 AM

## 2020-09-06 NOTE — PROGRESS NOTES
09/05/20 2302   NIV Type   Equipment Type v60   Mode Bilevel   Mask Size Small   Settings/Measurements   IPAP 15 cmH20   CPAP/EPAP 5 cmH2O   Resp 17   FiO2  40 %   I Time/ I Time % 1 s   Vt Exhaled 447 mL   Mask Leak (lpm) 6 lpm   Using Accessory Muscles No   Alarm Settings   Alarms On Y   Press Low Alarm 5 cmH2O   High Pressure Alarm 30 cmH2O   Delay Alarm 20 sec(s)   Apnea (secs) 20 secs   Resp Rate Low Alarm 12   High Respiratory Rate 40 br/min

## 2020-09-06 NOTE — PROGRESS NOTES
09/06/20 0259   NIV Type   NIV Started/Stopped On   Equipment Type v60   Mode Bilevel   Mask Size Medium   Settings/Measurements   IPAP 15 cmH20   CPAP/EPAP 5 cmH2O   Resp 15   FiO2  40 %   I Time/ I Time % 1 s   Vt Exhaled 1040 mL   Mask Leak (lpm) 33 lpm   Alarm Settings   Alarms On Y   Press Low Alarm 5 cmH2O   High Pressure Alarm 30 cmH2O   Delay Alarm 20 sec(s)   Resp Rate Low Alarm 12   High Respiratory Rate 40 br/min

## 2020-09-07 NOTE — PROGRESS NOTES
Nephrology Progress Note  9/7/2020 10:20 AM  Subjective:   Admit Date: 8/25/2020  PCP: THEODORE Gaffney NP  Interval History: pt appear calm and monitor    Diet: DIET GENERAL; Carb Control: 4 carb choices (60 gms)/meal; Daily Fluid Restriction: 1800 ml  Dietary Nutrition Supplements: Low Calorie High Protein Supplement  Dietary Nutrition Supplements: Snack (see comment)  Pain is: Moderate      Data:   Scheduled Meds:   HYDROcodone 5 mg - acetaminophen  1 tablet Oral Once    [Held by provider] furosemide  20 mg Intravenous BID    [Held by provider] spironolactone  12.5 mg Oral Daily    sertraline  25 mg Oral Nightly    busPIRone  10 mg Oral TID    gabapentin  800 mg Oral TID    pramipexole  1 mg Oral BID    haloperidol lactate  2 mg Intravenous Once    fondaparinux  2.5 mg Subcutaneous Daily    albuterol sulfate HFA  2 puff Inhalation 4x daily    ipratropium  2 puff Inhalation 4x daily    sodium chloride flush  10 mL Intravenous 2 times per day    sodium chloride flush  10 mL Intravenous 2 times per day    pantoprazole  40 mg Intravenous Daily    insulin lispro  0-6 Units Subcutaneous TID WC    insulin lispro  0-3 Units Subcutaneous Nightly    atorvastatin  40 mg Oral Nightly    clopidogrel  75 mg Oral Daily    doxepin  10 mg Oral Nightly    miconazole   Topical BID    cetirizine  10 mg Oral Daily    chlorhexidine  15 mL Mouth/Throat BID     Continuous Infusions:   dextrose       PRN Meds:magnesium sulfate, sodium chloride flush, sodium chloride flush, acetaminophen **OR** acetaminophen, ipratropium-albuterol, glucose, dextrose, glucagon (rDNA), dextrose  I/O last 3 completed shifts:   In: 480 [P.O.:480]  Out: -   I/O this shift:  In: -   Out: 1200 [Urine:1200]    Intake/Output Summary (Last 24 hours) at 9/7/2020 1020  Last data filed at 9/7/2020 0704  Gross per 24 hour   Intake 480 ml   Output 1200 ml   Net -720 ml     CBC:   Recent Labs     09/05/20  0400 09/06/20  0550 09/07/20  0600 WBC 5.1 5.6 6.1   HGB 8.2* 8.1* 8.4*    333 327     BMP:    Recent Labs     09/05/20  0400 09/06/20  0550 09/07/20  0600    141 142   K 4.1 4.5 4.9    102 102   CO2 33* 36* 36*   BUN 32* 23 22   CREATININE 1.2* 1.1 0.9   GLUCOSE 107* 111* 115*     Hepatic:   No results for input(s): AST, ALT, ALB, BILITOT, ALKPHOS in the last 72 hours. Troponin: No results for input(s): TROPONINI in the last 72 hours. BNP: No results for input(s): BNP in the last 72 hours. Lipids: No results for input(s): CHOL, HDL in the last 72 hours. Invalid input(s): LDLCALCU  ABGs:   Lab Results   Component Value Date    PO2ART 69 08/29/2020    EFK7SHO 59.0 08/29/2020     INR: No results for input(s): INR in the last 72 hours.   Renal Labs  Albumin:    Lab Results   Component Value Date    LABALBU 3.3 09/04/2020     Calcium:    Lab Results   Component Value Date    CALCIUM 9.1 09/07/2020     Phosphorus:    Lab Results   Component Value Date    PHOS 3.2 07/02/2020     U/A:    Lab Results   Component Value Date    NITRU NEGATIVE 08/25/2020    COLORU YELLOW 08/25/2020    WBCUA 09055 08/25/2020    RBCUA 156 08/25/2020    MUCUS FEW 08/25/2020    TRICHOMONAS NONE SEEN 08/25/2020    YEAST OCCASIONAL 08/23/2019    BACTERIA MANY 08/25/2020    CLARITYU CLOUDY 08/25/2020    SPECGRAV 1.012 08/25/2020    UROBILINOGEN NORMAL 08/25/2020    BILIRUBINUR NEGATIVE 08/25/2020    BLOODU MODERATE 08/25/2020    KETUA NEGATIVE 08/25/2020     ABG:    Lab Results   Component Value Date    YZM2MCL 59.0 08/29/2020    PO2ART 69 08/29/2020    PRX0IHX 34.1 08/29/2020     HgBA1c:    Lab Results   Component Value Date    LABA1C 6.1 08/25/2020     Microalbumen/Creatinine ratio:  No components found for: RUCREAT          Objective:   Vitals: /69   Pulse 64   Temp 98.3 °F (36.8 °C) (Oral)   Resp 14   Ht 5' 8\" (1.727 m)   Wt (!) 379 lb (171.9 kg)   SpO2 99%   BMI 57.63 kg/m²   General appearance: awake weak  HEENT: Head: Normal, normocephalic, atraumatic.   Neck: supple, symmetrical, trachea midline  Lungs: diminished breath sounds bilaterally  Heart: S1, S2 normal  Abdomen: abnormal findings:  soft nt obese  Extremities: edema +  Neurologic: Mental status: alertness: awake      Patient Active Problem List:     Morbid obesity with BMI of 70 and over, adult [facility])     Essential hypertension     Dyslipidemia     Fecal incontinence     Mild intermittent asthma without complication     S/P laparoscopic cholecystectomy     Type 2 diabetes mellitus without complication, with long-term current use of insulin (HCC)     Fatty liver     Arthritis     H/O parotidectomy     Venous stasis dermatitis of both lower extremities     Aortic stenosis     Morbid obesity (HCC)     Asthma     Diabetes mellitus (HCC)     Hypertension     Sleep apnea     Family history of coronary artery disease     Chest pain     SOB (shortness of breath)     Palpitations     Peripheral edema     Hypervolemia     Chronic respiratory failure with hypoxia and hypercapnia (HCC)     Acute exacerbation of chronic obstructive pulmonary disease (COPD) (HCC)     Chronic obstructive pulmonary disease (HCC)     Gait disturbance     Acute on chronic respiratory failure with hypoxia and hypercapnia (HCC)     Cellulitis     Skin ulcer of left foot with fat layer exposed ([address]     Pneumonia     VHD (valvular heart disease)     Class 3 severe obesity due to excess calories with body mass index (BMI) greater than or equal to 70 in adult (HCC)     SIRS (systemic inflammatory response syndrome) (HCC)     Acute kidney injury ([address]     Acute metabolic encephalopathy     Shock, unspecified (HCC)     Uncontrolled diabetes mellitus (HCC)     Sepsis (HCC)     Altered mental status     Wide-complex tachycardia (HCC)     Acute on chronic diastolic heart failure (HCC)     S/P TAVR (transcatheter aortic valve replacement)     Septic shock (HCC)     Acute kidney injury superimposed on CKD ([address]

## 2020-09-07 NOTE — PLAN OF CARE
Problem: Falls - Risk of:  Goal: Will remain free from falls  Description: Will remain free from falls  Outcome: Ongoing  Goal: Absence of physical injury  Description: Absence of physical injury  Outcome: Ongoing     Problem: Skin Integrity:  Goal: Will show no infection signs and symptoms  Description: Will show no infection signs and symptoms  Outcome: Ongoing  Goal: Absence of new skin breakdown  Description: Absence of new skin breakdown  Outcome: Ongoing     Problem: Discharge Planning:  Goal: Participates in care planning  Description: Participates in care planning  Outcome: Ongoing  Goal: Discharged to appropriate level of care  Description: Discharged to appropriate level of care  Outcome: Ongoing  Goal: Ability to perform activities of daily living will improve  Description: Ability to perform activities of daily living will improve  Outcome: Ongoing     Problem: Airway Clearance - Ineffective:  Goal: Ability to maintain a clear airway will improve  Description: Ability to maintain a clear airway will improve  Outcome: Ongoing     Problem: Anxiety/Stress:  Goal: Level of anxiety will decrease  Description: Level of anxiety will decrease  Outcome: Ongoing     Problem: Aspiration:  Goal: Absence of aspiration  Description: Absence of aspiration  Outcome: Ongoing     Problem:  Bowel Function - Altered:  Goal: Bowel elimination is within specified parameters  Description: Bowel elimination is within specified parameters  Outcome: Ongoing     Problem: Cardiac Output - Decreased:  Goal: Hemodynamic stability will improve  Description: Hemodynamic stability will improve  Outcome: Ongoing     Problem: Fluid Volume - Imbalance:  Goal: Absence of imbalanced fluid volume signs and symptoms  Description: Absence of imbalanced fluid volume signs and symptoms  Outcome: Ongoing     Problem: Gas Exchange - Impaired:  Goal: Levels of oxygenation will improve  Description: Levels of oxygenation will improve  Outcome: Ongoing     Problem: Mental Status - Impaired:  Goal: Mental status will be restored to baseline  Description: Mental status will be restored to baseline  Outcome: Ongoing     Problem: Nutrition Deficit:  Goal: Ability to achieve adequate nutritional intake will improve  Description: Ability to achieve adequate nutritional intake will improve  Outcome: Ongoing     Problem: Pain:  Goal: Pain level will decrease  Description: Pain level will decrease  Outcome: Ongoing  Goal: Recognizes and communicates pain  Description: Recognizes and communicates pain  Outcome: Ongoing  Goal: Control of acute pain  Description: Control of acute pain  Outcome: Ongoing  Goal: Control of chronic pain  Description: Control of chronic pain  Outcome: Ongoing     Problem: Serum Glucose Level - Abnormal:  Goal: Ability to maintain appropriate glucose levels will improve to within specified parameters  Description: Ability to maintain appropriate glucose levels will improve to within specified parameters  Outcome: Ongoing     Problem: Skin Integrity - Impaired:  Goal: Will show no infection signs and symptoms  Description: Will show no infection signs and symptoms  Outcome: Ongoing  Goal: Absence of new skin breakdown  Description: Absence of new skin breakdown  Outcome: Ongoing     Problem: Sleep Pattern Disturbance:  Goal: Appears well-rested  Description: Appears well-rested  Outcome: Ongoing     Problem: Tissue Perfusion, Altered:  Goal: Circulatory function within specified parameters  Description: Circulatory function within specified parameters  Outcome: Ongoing     Problem: Tissue Perfusion - Cardiopulmonary, Altered:  Goal: Absence of angina  Description: Absence of angina  Outcome: Ongoing  Goal: Hemodynamic stability will improve  Description: Hemodynamic stability will improve  Outcome: Ongoing     Problem: Confusion - Acute:  Goal: Mental status will be restored to baseline  Description: Mental status will be restored to baseline  Outcome: Ongoing  Goal: Absence of continued neurological deterioration signs and symptoms  Description: Absence of continued neurological deterioration signs and symptoms  Outcome: Ongoing     Problem: Injury - Risk of, Physical Injury:  Goal: Will remain free from falls  Description: Will remain free from falls  Outcome: Ongoing  Goal: Absence of physical injury  Description: Absence of physical injury  Outcome: Ongoing     Problem: Mood - Altered:  Goal: Mood stable  Description: Mood stable  Outcome: Ongoing  Goal: Absence of abusive behavior  Description: Absence of abusive behavior  Outcome: Ongoing  Goal: Verbalizations of feeling emotionally comfortable while being cared for will increase  Description: Verbalizations of feeling emotionally comfortable while being cared for will increase  Outcome: Ongoing     Problem: Psychomotor Activity - Altered:  Goal: Absence of psychomotor disturbance signs and symptoms  Description: Absence of psychomotor disturbance signs and symptoms  Outcome: Ongoing     Problem: Sensory Perception - Impaired:  Goal: Demonstrations of improved sensory functioning will increase  Description: Demonstrations of improved sensory functioning will increase  Outcome: Ongoing  Goal: Decrease in sensory misperception frequency  Description: Decrease in sensory misperception frequency  Outcome: Ongoing  Goal: Able to refrain from responding to false sensory perceptions  Description: Able to refrain from responding to false sensory perceptions  Outcome: Ongoing  Goal: Demonstrates accurate environmental perceptions  Description: Demonstrates accurate environmental perceptions  Outcome: Ongoing  Goal: Able to distinguish between reality-based and nonreality-based thinking  Description: Able to distinguish between reality-based and nonreality-based thinking  Outcome: Ongoing  Goal: Able to interrupt nonreality-based thinking  Description: Able to interrupt nonreality-based thinking  Outcome: Ongoing     Problem: Pain:  Goal: Pain level will decrease  Description: Pain level will decrease  Outcome: Ongoing  Goal: Control of acute pain  Description: Control of acute pain  Outcome: Ongoing  Goal: Control of chronic pain  Description: Control of chronic pain  Outcome: Ongoing

## 2020-09-07 NOTE — PLAN OF CARE
Problem: Falls - Risk of:  Goal: Will remain free from falls  Description: Will remain free from falls  9/7/2020 1050 by Yelitza Nichole LPN  Outcome: Ongoing  9/6/2020 2351 by Emily Carlos LPN  Outcome: Ongoing  Goal: Absence of physical injury  Description: Absence of physical injury  9/7/2020 1050 by Yelitza Nichole LPN  Outcome: Ongoing  9/6/2020 2351 by Emily Carlos LPN  Outcome: Ongoing     Problem: Skin Integrity:  Goal: Will show no infection signs and symptoms  Description: Will show no infection signs and symptoms  9/7/2020 1050 by Yelitza Nichole LPN  Outcome: Ongoing  9/6/2020 2351 by Emily Carlos LPN  Outcome: Ongoing  Goal: Absence of new skin breakdown  Description: Absence of new skin breakdown  9/7/2020 1050 by Yelitza Nichole LPN  Outcome: Ongoing  9/6/2020 2351 by Emily Carlos LPN  Outcome: Ongoing     Problem: Discharge Planning:  Goal: Participates in care planning  Description: Participates in care planning  9/7/2020 1050 by Yelitza Nichole LPN  Outcome: Ongoing  9/6/2020 2351 by Emily Carlos LPN  Outcome: Ongoing  Goal: Discharged to appropriate level of care  Description: Discharged to appropriate level of care  9/7/2020 1050 by Yelitza Nichole LPN  Outcome: Ongoing  9/6/2020 2351 by Emily Carlos LPN  Outcome: Ongoing  Goal: Ability to perform activities of daily living will improve  Description: Ability to perform activities of daily living will improve  9/7/2020 1050 by Yelitza Nichole LPN  Outcome: Ongoing  9/6/2020 2351 by Emily Carlos LPN  Outcome: Ongoing     Problem: Airway Clearance - Ineffective:  Goal: Ability to maintain a clear airway will improve  Description: Ability to maintain a clear airway will improve  9/7/2020 1050 by Yelitza Nichole LPN  Outcome: Ongoing  9/6/2020 2351 by Emily Carlos LPN  Outcome: Ongoing     Problem: Anxiety/Stress:  Goal: Level of anxiety will decrease  Description: Level of anxiety will decrease  9/7/2020 1050 by [facility] David Villegas LPN  Outcome: Ongoing  9/6/2020 2351 by Que Alexander LPN  Outcome: Ongoing     Problem: Aspiration:  Goal: Absence of aspiration  Description: Absence of aspiration  9/7/2020 1050 by Chelle Virgen LPN  Outcome: Ongoing  9/6/2020 2351 by Que Alexander LPN  Outcome: Ongoing     Problem:  Bowel Function - Altered:  Goal: Bowel elimination is within specified parameters  Description: Bowel elimination is within specified parameters  9/7/2020 1050 by Chelle Virgen LPN  Outcome: Ongoing  9/6/2020 2351 by Que Alexander LPN  Outcome: Ongoing     Problem: Cardiac Output - Decreased:  Goal: Hemodynamic stability will improve  Description: Hemodynamic stability will improve  9/7/2020 1050 by Chelle Virgen LPN  Outcome: Ongoing  9/6/2020 2351 by Que Alexander LPN  Outcome: Ongoing     Problem: Fluid Volume - Imbalance:  Goal: Absence of imbalanced fluid volume signs and symptoms  Description: Absence of imbalanced fluid volume signs and symptoms  9/7/2020 1050 by Chelle Virgen LPN  Outcome: Ongoing  9/6/2020 2351 by Que Alexander LPN  Outcome: Ongoing     Problem: Gas Exchange - Impaired:  Goal: Levels of oxygenation will improve  Description: Levels of oxygenation will improve  9/7/2020 1050 by Chelle Virgen LPN  Outcome: Ongoing  9/6/2020 2351 by Que Alexander LPN  Outcome: Ongoing     Problem: Mental Status - Impaired:  Goal: Mental status will be restored to baseline  Description: Mental status will be restored to baseline  9/7/2020 1050 by Chelle Virgen LPN  Outcome: Ongoing  9/6/2020 2351 by Que Alexander LPN  Outcome: Ongoing     Problem: Nutrition Deficit:  Goal: Ability to achieve adequate nutritional intake will improve  Description: Ability to achieve adequate nutritional intake will improve  9/7/2020 1050 by Chelle Virgen LPN  Outcome: Ongoing  9/6/2020 2351 by Que Alexander LPN  Outcome: Ongoing     Problem: Pain:  Goal: Pain level will decrease  Description: Pain level will decrease  9/7/2020 1050 by Melba Stewart LPN  Outcome: Ongoing  9/6/2020 2351 by Chino Dinero LPN  Outcome: Ongoing  Goal: Recognizes and communicates pain  Description: Recognizes and communicates pain  9/7/2020 1050 by Melba Stewart LPN  Outcome: Ongoing  9/6/2020 2351 by Chino Dinero LPN  Outcome: Ongoing  Goal: Control of acute pain  Description: Control of acute pain  9/7/2020 1050 by Melba Stewart LPN  Outcome: Ongoing  9/6/2020 2351 by Chino Dinero LPN  Outcome: Ongoing  Goal: Control of chronic pain  Description: Control of chronic pain  9/7/2020 1050 by Melba Stewart LPN  Outcome: Ongoing  9/6/2020 2351 by Chino Dinero LPN  Outcome: Ongoing     Problem: Serum Glucose Level - Abnormal:  Goal: Ability to maintain appropriate glucose levels will improve to within specified parameters  Description: Ability to maintain appropriate glucose levels will improve to within specified parameters  9/7/2020 1050 by Melba Stewart LPN  Outcome: Ongoing  9/6/2020 2351 by Chino Dinero LPN  Outcome: Ongoing     Problem: Skin Integrity - Impaired:  Goal: Will show no infection signs and symptoms  Description: Will show no infection signs and symptoms  9/7/2020 1050 by Melba Stewart LPN  Outcome: Ongoing  9/6/2020 2351 by Chino Dinero LPN  Outcome: Ongoing  Goal: Absence of new skin breakdown  Description: Absence of new skin breakdown  9/7/2020 1050 by Melba Stewart LPN  Outcome: Ongoing  9/6/2020 2351 by Chino Dinero LPN  Outcome: Ongoing     Problem: Sleep Pattern Disturbance:  Goal: Appears well-rested  Description: Appears well-rested  9/7/2020 1050 by Melba Stewart LPN  Outcome: Ongoing  9/6/2020 2351 by Chino Dinero LPN  Outcome: Ongoing     Problem: Tissue Perfusion, Altered:  Goal: Circulatory function within specified parameters  Description: Circulatory function within specified parameters  9/7/2020 1050 by Melba Stewart LPN  Outcome: Ongoing  9/6/2020 2351 by Chino Dinero LPN  Outcome: Ongoing     Problem: Tissue Perfusion - Cardiopulmonary, Altered:  Goal: Absence of angina  Description: Absence of angina  9/7/2020 1050 by Seth Galvez LPN  Outcome: Ongoing  9/6/2020 2351 by Christin Goins LPN  Outcome: Ongoing  Goal: Hemodynamic stability will improve  Description: Hemodynamic stability will improve  9/7/2020 1050 by Seth Galvez LPN  Outcome: Ongoing  9/6/2020 2351 by Christin Goins LPN  Outcome: Ongoing     Problem: Confusion - Acute:  Goal: Mental status will be restored to baseline  Description: Mental status will be restored to baseline  9/7/2020 1050 by Seth Galvez LPN  Outcome: Ongoing  9/6/2020 2351 by Christin Goins LPN  Outcome: Ongoing  Goal: Absence of continued neurological deterioration signs and symptoms  Description: Absence of continued neurological deterioration signs and symptoms  9/7/2020 1050 by Seth Galvez LPN  Outcome: Ongoing  9/6/2020 2351 by Christin Goins LPN  Outcome: Ongoing     Problem: Injury - Risk of, Physical Injury:  Goal: Will remain free from falls  Description: Will remain free from falls  9/7/2020 1050 by Seth Galvez LPN  Outcome: Ongoing  9/6/2020 2351 by Christin Goins LPN  Outcome: Ongoing  Goal: Absence of physical injury  Description: Absence of physical injury  9/7/2020 1050 by Seth Galvez LPN  Outcome: Ongoing  9/6/2020 2351 by Christin Goins LPN  Outcome: Ongoing     Problem: Mood - Altered:  Goal: Mood stable  Description: Mood stable  9/7/2020 1050 by Seth Galvez LPN  Outcome: Ongoing  9/6/2020 2351 by Christin Goins LPN  Outcome: Ongoing  Goal: Absence of abusive behavior  Description: Absence of abusive behavior  9/7/2020 1050 by Seth Galvez LPN  Outcome: Ongoing  9/6/2020 2351 by Christin Goins LPN  Outcome: Ongoing  Goal: Verbalizations of feeling emotionally comfortable while being cared for will increase  Description: Verbalizations of feeling emotionally comfortable while being cared for will increase  9/7/2020 1050 by Mian Stockton LPN  Outcome: Ongoing  9/6/2020 2351 by Harsh Peterson LPN  Outcome: Ongoing     Problem: Psychomotor Activity - Altered:  Goal: Absence of psychomotor disturbance signs and symptoms  Description: Absence of psychomotor disturbance signs and symptoms  9/7/2020 1050 by Mian Stockton LPN  Outcome: Ongoing  9/6/2020 2351 by Harsh Peterson LPN  Outcome: Ongoing     Problem: Sensory Perception - Impaired:  Goal: Demonstrations of improved sensory functioning will increase  Description: Demonstrations of improved sensory functioning will increase  9/7/2020 1050 by Mian Stockton LPN  Outcome: Ongoing  9/6/2020 2351 by Harsh Peterson LPN  Outcome: Ongoing  Goal: Decrease in sensory misperception frequency  Description: Decrease in sensory misperception frequency  9/7/2020 1050 by Mian Stockton LPN  Outcome: Ongoing  9/6/2020 2351 by Harsh Peterson LPN  Outcome: Ongoing  Goal: Able to refrain from responding to false sensory perceptions  Description: Able to refrain from responding to false sensory perceptions  9/7/2020 1050 by Mian Stockton LPN  Outcome: Ongoing  9/6/2020 2351 by Harsh Peterson LPN  Outcome: Ongoing  Goal: Demonstrates accurate environmental perceptions  Description: Demonstrates accurate environmental perceptions  9/7/2020 1050 by Mian Stockton LPN  Outcome: Ongoing  9/6/2020 2351 by Harsh Peterson LPN  Outcome: Ongoing  Goal: Able to distinguish between reality-based and nonreality-based thinking  Description: Able to distinguish between reality-based and nonreality-based thinking  9/7/2020 1050 by Mian Stockton LPN  Outcome: Ongoing  9/6/2020 2351 by Harsh Peterson LPN  Outcome: Ongoing  Goal: Able to interrupt nonreality-based thinking  Description: Able to interrupt nonreality-based thinking  9/7/2020 1050 by Mian Stockton LPN  Outcome: Ongoing  9/6/2020 2351 by Harsh Peterson LPN  Outcome: Ongoing     Problem: Pain:  Goal: Pain level will decrease  Description: Pain level will decrease  9/7/2020 1050 by Jeffy Arevalo LPN  Outcome: Ongoing  9/6/2020 2351 by Martha Gaytan LPN  Outcome: Ongoing  Goal: Control of acute pain  Description: Control of acute pain  9/7/2020 1050 by Jeffy Arevalo LPN  Outcome: Ongoing  9/6/2020 2351 by Martha Gaytan LPN  Outcome: Ongoing  Goal: Control of chronic pain  Description: Control of chronic pain  9/7/2020 1050 by Jeffy Arevalo LPN  Outcome: Ongoing  9/6/2020 2351 by Martha Gaytan LPN  Outcome: Ongoing

## 2020-09-07 NOTE — PROGRESS NOTES
Hospitalist Progress Note      Name:  Rambo Quevedo /Age/Sex: 1955  (63 y.o. female)   MRN & CSN:  [id number] & [id number] Admission Date/Time: 2020  9:36 AM   [facility]  [facility]-A PCP: THEODORE Melgar NP       Rambo Quevedo is a 63 y.o.  female  who presents with Bradycardia and Fatigue (x3-4 days)      Assessment and Plan:   Septic shock  - shock resolved  -On  infectious disease changed antibiotics from meropenem/vancomycin to minocycline and Avycaz; diagnosed with pneumonia at that time  -Sputum culture is negative as of   -Urine culture sent about 12 hours after meropenem given showing less than 10,000 colonies of E. coli.  Sensitive to cefepime  - abx course completed, no need for further abx     Hypotension  - resolved  - hold lasix and aldactone for now  - monitor     HypoK  - replaced     Acute on chronic respiratory failure,   -Extubated  at 3:40 PM.  -Intubated  at 11:45 PM, the night of admission  -Appreciate pulmonary consult  -She has BiPAP at home (she wears only during sleep she states)     Metabolic encephalopathy/hyperactive delirium  - improved and stable now  -On  the patient told her nurse that she was going to call the police on her, and that she thought that she was kidnapped.  Restarted home medication Neurontin 800 mg 3 times a day on  -but as of  she has not had any doses because she refuses her oral meds.  Withdrawal from gabapentin is well described in the literature  -Ceftazidime may potentially cause delirium as well  -Psychiatry consulted: started on zoloft, buspar        COPD -appreciate pulmonary consult     Groundglass opacities on CT scan   -stable  -SARS-CoV-2 antigen is negative  -Noted CHF per pulmonary and pneumonia per ID, appreciate consults     Morbid obesity with obstructive sleep apnea  -Body mass index is 63.34 kg/m². Weight is about 419 lb per bed scale  -She is on BiPAP at home     Acute kidney injury  - resolved  - hold lasix and aldactone now  -First hemodialysis treatment given 8/25  -Second treatment 8/26  -8/27-has increased urine output, likely post ATN diuresis per nephrology     Bradycardia in the setting of hyperkalemia on admission-resolved     History of C. difficile infection noted      pt/ot for rehab needs        ambulate and increase activity     Placement pending, medically stable for d/c     History of aortic stenosis status post TAVR  History of CHF per chart  On home oxygen  COPD/asthma  Diabetes mellitus type 2  History of left foot ulcer  Hyperlipidemia  Anemia with hemoglobin 8.2 on admission  Restless leg syndrome  History of ESBL infection     Thrombocytopenia: resolved     DVT prophylaxis: Arixtra            Diet DIET GENERAL; Carb Control: 4 carb choices (60 gms)/meal; Daily Fluid Restriction: 1800 ml  Dietary Nutrition Supplements: Low Calorie High Protein Supplement  Dietary Nutrition Supplements: Snack (see comment)   Code Status Full Code     Medications:   Medications:    HYDROcodone 5 mg - acetaminophen  1 tablet Oral Once    [Held by provider] furosemide  20 mg Intravenous BID    [Held by provider] spironolactone  12.5 mg Oral Daily    sertraline  25 mg Oral Nightly    busPIRone  10 mg Oral TID    gabapentin  800 mg Oral TID    pramipexole  1 mg Oral BID    haloperidol lactate  2 mg Intravenous Once    fondaparinux  2.5 mg Subcutaneous Daily    albuterol sulfate HFA  2 puff Inhalation 4x daily    ipratropium  2 puff Inhalation 4x daily    sodium chloride flush  10 mL Intravenous 2 times per day    sodium chloride flush  10 mL Intravenous 2 times per day    pantoprazole  40 mg Intravenous Daily    insulin lispro  0-6 Units Subcutaneous TID WC    insulin lispro  0-3 Units Subcutaneous Nightly    atorvastatin  40 mg Oral Nightly    clopidogrel  75 mg Oral Daily    doxepin  10 mg Oral Nightly    miconazole   Topical BID    cetirizine  10 mg Oral Daily    chlorhexidine  15 mL Mouth/Throat BID      Infusions:    dextrose       PRN Meds: magnesium sulfate, 1 g, PRN  sodium chloride flush, 10 mL, PRN  sodium chloride flush, 10 mL, PRN  acetaminophen, 650 mg, Q6H PRN    Or  acetaminophen, 650 mg, Q6H PRN  ipratropium-albuterol, 3 mL, Q6H PRN  glucose, 15 g, PRN  dextrose, 12.5 g, PRN  glucagon (rDNA), 1 mg, PRN  dextrose, 100 mL/hr, PRN      Subjective:   No distress    Objective: Intake/Output Summary (Last 24 hours) at 9/7/2020 1001  Last data filed at 9/7/2020 0704  Gross per 24 hour   Intake 480 ml   Output 1200 ml   Net -720 ml      Vitals:   Vitals:    09/07/20 0800   BP: 129/69   Pulse: 64   Resp: 14   Temp: 98.3 °F (36.8 °C)   SpO2:      Physical Exam:   Gen:  awake, alert, no apparent distress  Head/Eyes:  Normocephalic atraumatic, EOMI   NECK:   symmetrical, trachea midline  LUNGS: Normal Effort   CARDIOVASCULAR:  Normal rate  ABDOMEN:  non distended  MUSCULOSKELETAL:  ROM limited  NEUROLOGIC: Alert and Oriented,  Cranial nerves II-XII are grossly intact.    SKIN:  no bruising or bleeding, normal skin color,  no redness      Data:       CBC   Recent Labs     09/05/20  0400 09/06/20  0550 09/07/20  0600   WBC 5.1 5.6 6.1   HGB 8.2* 8.1* 8.4*   HCT 27.8* 28.1* 28.3*    333 327      BMP   Recent Labs     09/05/20  0400 09/06/20  0550 09/07/20  0600    141 142   K 4.1 4.5 4.9    102 102   CO2 33* 36* 36*   BUN 32* 23 22   CREATININE 1.2* 1.1 0.9         Electronically signed by Omaira Franklin MD on 9/7/2020 at 10:01 AM

## 2020-09-08 NOTE — PROGRESS NOTES
5.6 6.1 5.6   HGB 8.1* 8.4* 8.5*    327 368     BMP:    Recent Labs     09/06/20  0550 09/07/20  0600    142   K 4.5 4.9    102   CO2 36* 36*   BUN 23 22   CREATININE 1.1 0.9   GLUCOSE 111* 115*     Hepatic:   No results for input(s): AST, ALT, ALB, BILITOT, ALKPHOS in the last 72 hours. Troponin: No results for input(s): TROPONINI in the last 72 hours. BNP: No results for input(s): BNP in the last 72 hours. Lipids: No results for input(s): CHOL, HDL in the last 72 hours. Invalid input(s): LDLCALCU  ABGs:   Lab Results   Component Value Date    PO2ART 69 08/29/2020    ERB1PYH 59.0 08/29/2020     INR: No results for input(s): INR in the last 72 hours. Renal Labs  Albumin:    Lab Results   Component Value Date    LABALBU 3.3 09/04/2020     Calcium:    Lab Results   Component Value Date    CALCIUM 9.1 09/07/2020     Phosphorus:    Lab Results   Component Value Date    PHOS 3.2 07/02/2020     U/A:    Lab Results   Component Value Date    NITRU NEGATIVE 08/25/2020    COLORU YELLOW 08/25/2020    WBCUA 51310 08/25/2020    RBCUA 156 08/25/2020    MUCUS FEW 08/25/2020    TRICHOMONAS NONE SEEN 08/25/2020    YEAST OCCASIONAL 08/23/2019    BACTERIA MANY 08/25/2020    CLARITYU CLOUDY 08/25/2020    SPECGRAV 1.012 08/25/2020    UROBILINOGEN NORMAL 08/25/2020    BILIRUBINUR NEGATIVE 08/25/2020    BLOODU MODERATE 08/25/2020    KETUA NEGATIVE 08/25/2020     ABG:    Lab Results   Component Value Date    JBM6YTQ 59.0 08/29/2020    PO2ART 69 08/29/2020    PXI1VQS 34.1 08/29/2020     HgBA1c:    Lab Results   Component Value Date    LABA1C 6.1 08/25/2020     Microalbumen/Creatinine ratio:  No components found for: RUCREAT    Objective:   Vitals: /64   Pulse 64   Temp 97.7 °F (36.5 °C) (Axillary)   Resp 14   Ht 5' 8\" (1.727 m)   Wt (!) 379 lb (171.9 kg)   SpO2 98%   BMI 57.63 kg/m²      General appearance:  awake and verbally interactive   HEENT: Head: normocephalic, atraumatic.   Neck: supple, symmetrical, trachea midline  Cardiovascular: normal S1 and S2  Pulmonary: diminished lung sounds bilaterally   Abdomen:  soft / non-tender   Extremities: ++ edema to bilateral lower legs and thighs    Patient Active Problem List:     Morbid obesity with BMI of 70 and over, adult [facility])     Essential hypertension     Dyslipidemia     Fecal incontinence     Mild intermittent asthma without complication     S/P laparoscopic cholecystectomy     Type 2 diabetes mellitus without complication, with long-term current use of insulin (HCC)     Fatty liver     Arthritis     H/O parotidectomy     Venous stasis dermatitis of both lower extremities     Aortic stenosis     Morbid obesity (HCC)     Asthma     Diabetes mellitus ([address]     Hypertension     Sleep apnea     Family history of coronary artery disease     Chest pain     SOB (shortness of breath)     Palpitations     Peripheral edema     Hypervolemia     Chronic respiratory failure with hypoxia and hypercapnia (HCC)     Acute exacerbation of chronic obstructive pulmonary disease (COPD) (HCC)     Chronic obstructive pulmonary disease (HCC)     Gait disturbance     Acute on chronic respiratory failure with hypoxia and hypercapnia (HCC)     Cellulitis     Skin ulcer of left foot with fat layer exposed ([address]     Pneumonia     VHD (valvular heart disease)     Class 3 severe obesity due to excess calories with body mass index (BMI) greater than or equal to 70 in adult (HCC)     SIRS (systemic inflammatory response syndrome) (HCC)     Acute kidney injury ([address]     Acute metabolic encephalopathy     Shock, unspecified (HCC)     Uncontrolled diabetes mellitus ([address]     Sepsis (HCC)     Altered mental status     Wide-complex tachycardia (HCC)     Acute on chronic diastolic heart failure (HCC)     S/P TAVR (transcatheter aortic valve replacement)     Septic shock (HCC)     Acute kidney injury superimposed on CKD (HCC)     Bradycardia     Anxiety     Acute respiratory failure with hypoxia (HCC)    Assessment and Plan:    IMP:     1. JOSE: demonstrating renal recovery: RRT x 2 treatments   2. Sepsis   3. Hypokalemia    4. Heart Failure   5. Respiratory failure   6. DM  7. Confusion / Anxiety   8. Pancytopenia     Plan  1.   -uop: 3.85 liters in the last 24 hours via hernandez   -chemistry results from this am are pending   2.   -patient recently afebrile with normal WBC trend  3.   -chemistry results from this am are pending   4.   -good uop with lasix and spironolactone on hold  5    -currently using BIPAP   6.  -on SSI for diabetes management   7   -monitor affect and sensorium  8.   -overall improving trend in CBC recently     Post-hospitalization planning: workup for rehab placement     Electronically signed by THEODORE Carrasquillo - LUNA                Nephrology Attending Progress Note  9/8/2020 12:35 PM  Subjective:   Admit Date: 8/25/2020  PCP: THEODORE Landa - NP    Interval History: I have personally performed face to face diagnostic evaluation on this patient. I have personally reviewed pertinent labs and imaging and agree with the care plan above.  My additional findings are as follows:  Pt appear more awake and less sob    Objective:   Vitals: BP (!) 124/56   Pulse 69   Temp 98 °F (36.7 °C) (Oral)   Resp 16   Ht 5' 7.99\" (1.727 m)   Wt (!) 379 lb (171.9 kg)   SpO2 97%   BMI 57.64 kg/m²   Weak awake  Soft obese  Thick legs    Assessment and Plan:  IMP:  As stated above    Plan     1 bp stable overall monitor  2 weaning off bipap  3 renal stable and keep negative  4 sig uop and monitor  5 ssi  6 await rehab  7 K 4.6 stable  8 monitor hb           Electronically signed by Tin Sam MD on 9/8/2020 at 12:35 PM

## 2020-09-08 NOTE — PROGRESS NOTES
Occupational Therapy  . Occupational Therapy Treatment Note  Name: Leidy Fernandez MRN: [id number] :   1955   Date:  2020   Admission Date: 2020 Room:  [facility]0/3120-A   Restrictions/Precautions:    General precautions; Fall Risk    Communication with other providers:  Per chart review, patient is appropriate for therapeutic intervention. Nurse Escobar Delaney reports pt's confusion is clearing and pt is much more aware and alert. Subjective:  Patient states:  Pt agreeable to OT Tx session. \"I fi I sit up, I fall back. \" Pt reports this is since being in the hospital and ill. Pain:   Location, Type, Intensity (0/10 to 10/10):  5/10, \"it feels like gas\". Objective:    Observation:  PtA&O x4, received in semi-fowlers in bariatric bed + trapeze. Pt has BUE tremors and pill rolling action c B fingers, identified as baseline, actively participated in bed mobility and BUE AROM exercises from bed level this date. Objective Measures: Mata catheter, Telemetry, HR 66, O2 97% on 2L O21 NC, RR 13, /44 (blood pressure cuff on forearm of RUE) Recheck at end of Tx session 116/61. Treatment, including education:  Therapeutic Activity Training:   Therapeutic activity training was instructed today. Cues were given for safety, sequence, UE/LE placement, awareness, and midline positioning in bed. Bed Mobility: Max A for partial roll to R side + use of bed features. , Mod A + cues for use of bed rail to roll L. Therapeutic Exercise:  Cues were given for technique, safety, recruitment, and rationale. Cues were verbal and/or tactile. Pt Sup + visual and vc's for technique to perform BUE AROM exercises to include: shoulder flex/extension, internal/external rotation, chest presses, bicep curls, rowing, and supination/pronation x10 reps. .  Pt educated for performing up to 3x daily outside of therapy to increase BUE strength and activity tolerance, verbalized understanding.      All therapeutic intervention performed c emphasis on bed mobility and BUE AROM therapeutic exercises to inc strength, endurance and act tolerance for inc Indep c ADL tasks, func transfers / mobility. Safety  Patient safely repositioned to midline in bed at end of session, with call light/phone in reach, and nursing aware. Assessment / Impression:        Patient's tolerance of treatment:  Well   Adverse Reaction: None  Significant change in status and impact:  Clearing confusion, increased participation from bed level. Barriers to improvement:  Decreased strength, decreased activity tolerance, body habitus    Plan for Next Session:    Continue per OT POC until discharge. Recommend co-Tx c plan to address sitting EOB at next Tx session.      Time in:  0935  Time out:  1014  Timed treatment minutes:  39  Total treatment time:  39    Electronically signed by:    MARLENI Denny  9/8/2020, 8:53 AM    Previously filed values:    Goals:  Pt goal: go home  Time Frame for STGs: discharge  Goal 1: Pt will perform UE ADLs SBA  Goal 2: Pt will perform LE ADLs CGA w/ AD  Goal 3: Pt will perform toileting CGA  Goal 4: Pt will perform functional transfer w/ AD CGA  Goal 5: Pt will perform functional mobility w/ AD CGA  Goal 6: Pt will perform therex/theract in order to increase functional activity tolerance and dynamic standing balance

## 2020-09-08 NOTE — PROGRESS NOTES
pulmonary      SUBJECTIVE:  On pap rx     OBJECTIVE    VITALS:  /64   Pulse 72   Temp 97.7 °F (36.5 °C) (Axillary)   Resp 14   Ht 5' 8\" (1.727 m)   Wt (!) 379 lb (171.9 kg)   SpO2 98%   BMI 57.63 kg/m²   HEAD AND FACE EXAM:  No throat injection, no active exudate,no thrush  NECK EXAM;No JVD, no masses, symmetrical  CHEST EXAM; Expansion equal and symmetrical, no masses  LUNG EXAM; Good breath sounds bilaterally. There are expiratory wheezes both lungs, there are crackles at both lung bases  CARDIOVASCULAR EXAM: Positive S1 and S2, no S3 or S4, no clicks ,no murmurs  RIGHT AND LEFT LOWER EXTRIMITY EXAM: No edema, no swelling, no inflamation            LABS   Lab Results   Component Value Date    WBC 6.1 09/07/2020    HGB 8.4 (L) 09/07/2020    HCT 28.3 (L) 09/07/2020    MCV 85.5 09/07/2020     09/07/2020     Lab Results   Component Value Date    CREATININE 0.9 09/07/2020    BUN 22 09/07/2020     09/07/2020    K 4.9 09/07/2020     09/07/2020    CO2 36 (H) 09/07/2020     Lab Results   Component Value Date    INR 1.07 06/27/2020    PROTIME 13.0 06/27/2020          Lab Results   Component Value Date    PHOS 3.2 07/02/2020    PHOS 2.7 06/30/2020    PHOS 5.8 05/24/2020      No results for input(s): PH, PO2ART, ZDC1MZQ, HCO3, BEART, O2SAT in the last 72 hours. Wt Readings from Last 3 Encounters:   09/01/20 (!) 379 lb (171.9 kg)   07/04/20 (!) 364 lb 6.4 oz (165.3 kg)   06/04/20 (!) 494 lb (224.1 kg)               ASSESMENT  Ac on ch resp failure  Ac copd  chf  ronni        PLAN  1. Cont bipap while sleeping,o2 otherwise  2.  Bd rx  3. placement    9/8/2020  Gregg Gifford M.D.

## 2020-09-08 NOTE — PROGRESS NOTES
Report called to DERRELL NEELY at [place], states that per Administration, unable to take pt d/t pt COVID test being done 9/2. Will need rapid covid test done. [facility] cancelled and unable to schedule pt transport at this time d/t pt being bariatric pt.  Dr. Gualberto Motley paged for rapid covid test.

## 2020-09-08 NOTE — DISCHARGE SUMMARY
Discharge Summary    Name:  Rambo Quevedo /Age/Sex:   [de-identified]63 y.o. female)   MRN & CSN:  [id number] & [id number] Admission Date/Time: 2020  9:36 AM   Attending:  Jarrod Carpenter MD Discharging Physician: Michele Galvez MD     Hospital Course:   Rambo Quevedo is a 63 y.o.  female  who presents with Septic shock [facility])    Patient was seen and examined  Denied any chest pain, dizziness  No fever, chills, N/V/D  No new complaint today    Assessment and Plan    1) Septic shock  - shock resolved  -On  infectious disease changed antibiotics from meropenem/vancomycin to minocycline and Avycaz; diagnosed with pneumonia at that time  -Sputum culture is negative as of   -Urine culture sent about 12 hours after meropenem given showing less than 10,000 colonies of E. coli.  Sensitive to cefepime  - Completed abx        2) Acute on chronic respiratory failure with hypoxia and hypercapnia due to COPD,   -Intubated  at 11:45 PM, the night of admission  -Extubated  at 3:40 PM.  -Appreciate pulmonary consult  -She has BiPAP at home (she wears only during sleep she states)  -Continue PRN breathing treatment     3) Acute Metabolic encephalopathy/hyperactive delirium  - improved and stable now  -On  the patient told her nurse that she was going to call the police on her, and that she thought that she was kidnapped.  -Continue Gabapentin  -Psychiatry consulted: started on zoloft, buspar             4) Groundglass opacities on CT scan   -stable  -SARS-CoV-2 antigen is negative     5) Morbid obesity with obstructive sleep apnea  -Body mass index is 63.34 kg/m²  -She is on BiPAP at home  -Dietary modification     6) Acute kidney injury  - resolved  -First hemodialysis treatment given   -Second treatment   --has increased urine output, likely post ATN diuresis per nephrology  -No further HD at this time     Other chronic conditions     History of C. difficile age.  Daniella Heads are equally round. No scleral erythema, discharge, or conjunctivitis. HENT Mucous membranes are moist. Oral pharynx without exudates, no evidence of thrush. NECK Supple, no apparent thyromegaly or masses. RESP Clear to auscultation, no wheezes, rales or rhonchi. Symmetric chest movement while on 2 L of O2.  CARDIO/VASC S1/S2 auscultated. Regular rate without appreciable murmurs, rubs, or gallops. No JVD or carotid bruits. Peripheral pulses equal bilaterally and palpable. No peripheral edema. GI Abdomen is soft without significant tenderness, masses, or guarding. Bowel sounds are normoactive. Rectal exam deferred.  No costovertebral angle tenderness. Normal appearing external genitalia. Mata catheter is not present. HEME/LYMPH No palpable cervical lymphadenopathy and no hepatosplenomegaly. No petechiae or ecchymoses. MSK No gross joint deformities. SKIN Normal coloration, warm, dry. NEURO Cranial nerves appear grossly intact, normal speech, no lateralizing weakness. PSYCH Awake, alert, oriented x 4. Affect appropriate.     BMP/CBC  Recent Labs     09/06/20  0550 09/07/20  0600 09/08/20  0640    142 141   K 4.5 4.9 4.6    102 101   CO2 36* 36* 35*   BUN 23 22 21   CREATININE 1.1 0.9 0.9   WBC 5.6 6.1 5.6   HCT 28.1* 28.3* 28.7*    327 368       IMAGING:  As above    Discharge Time of 35 minutes    Electronically signed by Daron Grace MD on 9/8/2020 at 12:40 PM

## 2020-09-08 NOTE — PLAN OF CARE
Problem: Falls - Risk of:  Goal: Will remain free from falls  Description: Will remain free from falls  9/8/2020 0924 by Donna Guzman LPN  Outcome: Ongoing  9/8/2020 0326 by Rhonda De La Fuente LPN  Outcome: Ongoing  Goal: Absence of physical injury  Description: Absence of physical injury  9/8/2020 0924 by Donna Guzman LPN  Outcome: Ongoing  9/8/2020 0326 by Rhonda De La Fuente LPN  Outcome: Ongoing     Problem: Skin Integrity:  Goal: Will show no infection signs and symptoms  Description: Will show no infection signs and symptoms  9/8/2020 0924 by Donna Guzman LPN  Outcome: Ongoing  9/8/2020 0326 by Rhonda De La Fuente LPN  Outcome: Ongoing  Goal: Absence of new skin breakdown  Description: Absence of new skin breakdown  9/8/2020 0924 by Donna Guzman LPN  Outcome: Ongoing  9/8/2020 0326 by Rhonda De La Fuente LPN  Outcome: Ongoing     Problem: Discharge Planning:  Goal: Participates in care planning  Description: Participates in care planning  9/8/2020 0924 by Donna Guzman LPN  Outcome: Ongoing  9/8/2020 0326 by Rhonda De La Fuente LPN  Outcome: Ongoing  Goal: Discharged to appropriate level of care  Description: Discharged to appropriate level of care  9/8/2020 0924 by Donna Guzman LPN  Outcome: Ongoing  9/8/2020 0326 by Rhonda De La Fuente LPN  Outcome: Ongoing  Goal: Ability to perform activities of daily living will improve  Description: Ability to perform activities of daily living will improve  9/8/2020 0924 by Donna Guzman LPN  Outcome: Ongoing  9/8/2020 0326 by Rhonda De LaF uente LPN  Outcome: Ongoing     Problem: Airway Clearance - Ineffective:  Goal: Ability to maintain a clear airway will improve  Description: Ability to maintain a clear airway will improve  9/8/2020 0924 by Donna Guzman LPN  Outcome: Ongoing  9/8/2020 0326 by Rhonda De La Fuente LPN  Outcome: Ongoing     Problem: Anxiety/Stress:  Goal: Level of anxiety will decrease  Description: Level of anxiety will decrease  9/8/2020 0924 by Barbie Doshi JACOBY Rivera  Outcome: Ongoing  9/8/2020 0326 by Remberto Smith LPN  Outcome: Ongoing

## 2020-09-08 NOTE — PLAN OF CARE
Problem: Falls - Risk of:  Goal: Will remain free from falls  Description: Will remain free from falls  Outcome: Ongoing  Goal: Absence of physical injury  Description: Absence of physical injury  Outcome: Ongoing     Problem: Skin Integrity:  Goal: Will show no infection signs and symptoms  Description: Will show no infection signs and symptoms  Outcome: Ongoing  Goal: Absence of new skin breakdown  Description: Absence of new skin breakdown  Outcome: Ongoing     Problem: Discharge Planning:  Goal: Participates in care planning  Description: Participates in care planning  Outcome: Ongoing  Goal: Discharged to appropriate level of care  Description: Discharged to appropriate level of care  Outcome: Ongoing  Goal: Ability to perform activities of daily living will improve  Description: Ability to perform activities of daily living will improve  Outcome: Ongoing     Problem: Airway Clearance - Ineffective:  Goal: Ability to maintain a clear airway will improve  Description: Ability to maintain a clear airway will improve  Outcome: Ongoing     Problem: Anxiety/Stress:  Goal: Level of anxiety will decrease  Description: Level of anxiety will decrease  Outcome: Ongoing     Problem: Aspiration:  Goal: Absence of aspiration  Description: Absence of aspiration  Outcome: Ongoing     Problem:  Bowel Function - Altered:  Goal: Bowel elimination is within specified parameters  Description: Bowel elimination is within specified parameters  Outcome: Ongoing     Problem: Cardiac Output - Decreased:  Goal: Hemodynamic stability will improve  Description: Hemodynamic stability will improve  Outcome: Ongoing     Problem: Fluid Volume - Imbalance:  Goal: Absence of imbalanced fluid volume signs and symptoms  Description: Absence of imbalanced fluid volume signs and symptoms  Outcome: Ongoing     Problem: Gas Exchange - Impaired:  Goal: Levels of oxygenation will improve  Description: Levels of oxygenation will improve  Outcome: thinking  Outcome: Ongoing     Problem: Pain:  Goal: Pain level will decrease  Description: Pain level will decrease  Outcome: Ongoing  Goal: Control of acute pain  Description: Control of acute pain  Outcome: Ongoing  Goal: Control of chronic pain  Description: Control of chronic pain  Outcome: Ongoing

## 2020-09-08 NOTE — CARE COORDINATION
Mayda informed pt's nurse that they need an updated COVID test. The charge nurse sent the rapid COVID. Pt had a neg COVID ON 09/02. Transport had to be cancelled for 1300. [facility] is unable to transport pt at any other time today. BARIATRIC TRANSPORT ARRANGED WITH [facility] TRANSPORT ETA IS 1830. Notified pt and pt's nurse.   NINA

## 2020-09-08 NOTE — ADT AUTH CERT
Utilization Reviews         Sepsis and Other Febrile Illness, without Focal Infection - Care Day 14 (9/7/2020) by Ina Keller RN         Review Status  Review Entered    Completed  9/8/2020 14:22        Criteria Review       Care Day: 14 Care Date: 9/7/2020 Level of Care:    Guideline Day 4    Clinical Status    ( ) * Hemodynamic stability    (X) * Afebrile or temperature acceptable for next level of care    ( ) * Hypoxemia absent    ( ) * Tachypnea absent    ( ) * Cultures negative or infection identified and under adequate treatment    ( ) * Mental status at baseline    ( ) * Metabolic derangement (eg, dehydration, acidosis) absent    ( ) * End organ dysfunction (eg, myocardial ischemia, renal failure) absent    ( ) * Discharge plans and education understood    Activity    ( ) * Ambulatory or acceptable for next level of care    Routes    ( ) * Oral hydration, medications [L]    Interventions    ( ) * Isolation not indicated, or is performable at next level of care    Medications    ( ) * Antimicrobial treatment not necessary or treatment at next level of care arranged [F]    * Milestone    Additional Notes    9/7    Subjective:    No distress         Objective:            Intake/Output Summary (Last 24 hours) at 9/7/2020 1001    Last data filed at 9/7/2020 0704    Gross per 24 hour    Intake 480 ml    Output 1200 ml    Net -720 ml         Vitals:    Vitals:      09/07/20 0800    BP: 129/69    Pulse: 64    Resp: 14    Temp: 98.3 °F (36.8 °C)    SpO2:         Physical Exam:    Gen: Akash Guitar, alert, no apparent distress    Head/Eyes:  Normocephalic atraumatic, EOMI    NECK:   symmetrical, trachea midline    LUNGS: Normal Effort    CARDIOVASCULAR:  Normal rate    ABDOMEN:  non distended    MUSCULOSKELETAL:  ROM limited    NEUROLOGIC: Alert and Oriented,  Cranial nerves II-XII are grossly intact.     SKIN:  no bruising or bleeding, normal skin color,  no redness         Scheduled Medications    · HYDROcodone 5 mg - acetaminophen 1 tablet Oral Once    · [Held by provider] furosemide 20 mg Intravenous BID    · [Held by provider] spironolactone 12.5 mg Oral Daily    · sertraline 25 mg Oral Nightly    · busPIRone 10 mg Oral TID    · gabapentin 800 mg Oral TID    · pramipexole 1 mg Oral BID    · haloperidol lactate 2 mg Intravenous Once    · fondaparinux 2.5 mg Subcutaneous Daily    · albuterol sulfate HFA 2 puff Inhalation 4x daily    · ipratropium 2 puff Inhalation 4x daily    · sodium chloride flush 10 mL Intravenous 2 times per day    · sodium chloride flush 10 mL Intravenous 2 times per day    · pantoprazole 40 mg Intravenous Daily    · insulin lispro 0-6 Units Subcutaneous TID WC    · insulin lispro 0-3 Units Subcutaneous Nightly    · atorvastatin 40 mg Oral Nightly    · clopidogrel 75 mg Oral Daily    · doxepin 10 mg Oral Nightly    · miconazole Topical BID    · cetirizine 10 mg Oral Daily    · chlorhexidine 15 mL Mouth/Throat BID        Assessment and Plan:    Septic shock    - shock resolved    -On August 28 infectious disease changed antibiotics from meropenem/vancomycin to minocycline and Avycaz; diagnosed with pneumonia at that time    -Sputum culture is negative as of August 31    -Urine culture sent about 12 hours after meropenem given showing less than 10,000 colonies of E. coli.  Sensitive to cefepime    - abx course completed, no need for further abx         Hypotension    - resolved    - hold lasix and aldactone for now    - monitor         HypoK    - replaced         Acute on chronic respiratory failure,    -Extubated 8/29 at 3:40 PM.    -Intubated 8/26 at 11:45 PM, the night of admission    -Appreciate pulmonary consult    -She has BiPAP at home (she wears only during sleep she states)         Metabolic encephalopathy/hyperactive delirium    - improved and stable now    -On 8/30 the patient told her nurse that she was going to call the police on her, and that she thought that she was kidnapped.  Restarted home medication Neurontin 800 mg 3 times a day on 8/30 -but as of August 31 she has not had any doses because she refuses her oral meds.  Withdrawal from gabapentin is well described in the literature    -Ceftazidime may potentially cause delirium as well    -Psychiatry consulted: started on zoloft, buspar              COPD -appreciate pulmonary consult         Groundglass opacities on CT scan     -stable    -SARS-CoV-2 antigen is negative    -Noted CHF per pulmonary and pneumonia per ID, appreciate consults         Morbid obesity with obstructive sleep apnea    -Body mass index is 63.34 kg/m². Weight is about 419 lb per bed scale    -She is on BiPAP at home         Acute kidney injury    - resolved    - hold lasix and aldactone now    -First hemodialysis treatment given 8/25    -Second treatment 8/26    -8/27-has increased urine output, likely post ATN diuresis per nephrology         Bradycardia in the setting of hyperkalemia on admission-resolved         History of C. difficile infection noted          pt/ot for rehab needs              ambulate and increase activity         Placement pending, medically stable for d/c         History of aortic stenosis status post TAVR    History of CHF per chart    On home oxygen    COPD/asthma    Diabetes mellitus type 2    History of left foot ulcer    Hyperlipidemia    Anemia with hemoglobin 8.2 on admission    Restless leg syndrome    History of ESBL infection         Thrombocytopenia: resolved         DVT prophylaxis: Arixtra       Nephrology Progress Note    9/7/2020 10:20 AM    Assessment and Plan:              IMP:         1.  JOSE: demonstrating renal recovery: RRT x 2 treatments    2.  Sepsis    3.  Pancytopenia    4.  Heart Failure    5.  Respiratory failure    6.  DM    7.  Confusion / Anxiety    8.  Hypokalemia         Plan      1 renal baseline    2 wbc stable    3 try maintain diuresis    4 o2 monitor    5 ssi    6 affect improved    7 K stable mointor    Work up rehab Pulmonary    ASSESMENT    Ac on ch resp failure    chf    Ac copd    ronni                   PLAN    1. cpm    2.  Pap rx while sleeping       Sodium 142 MMOL/L      Potassium 4.9 MMOL/L      Chloride 102 mMol/L      CO2 36 MMOL/L      Anion Gap 4      BUN 22 MG/DL      CREATININE 0.9 MG/DL      Glucose 115 MG/DL      Calcium 9.1 MG/DL      GFR Non-African American >60 mL/min/1.73m2      GFR African American >60 mL/min/1.73m2       WBC 6.1 K/CU MM      RBC 3.31 M/CU MM      Hemoglobin 8.4 GM/DL      Hematocrit 28.3 %      MCV 85.5 FL      MCH 25.4 PG      MCHC 29.7 %      RDW 19.9 %      Platelets 702 K/CU MM      MPV 9.3 FL      Differential Type AUTOMATED DIFFERENTIAL      Segs Relative 62.3 %      Lymphocytes % 24.0 %      Monocytes % 7.3 %      Eosinophils % 3.9 %      Basophils % 1.0 %      Segs Absolute 3.8 K/CU MM      Lymphocytes Absolute 1.5 K/CU MM      Monocytes Absolute 0.5 K/CU MM      Eosinophils Absolute 0.2 K/CU MM      Basophils Absolute 0.1 K/CU MM      Nucleated RBC % 0.0 %      Total Nucleated RBC 0.0 K/CU MM      Total Immature Neutrophil 0.09 K/CU MM      Immature Neutrophil % 1.5 %                 Sepsis and Other Febrile Illness, without Focal Infection - Care Day 13 (9/6/2020) by Karna Saint, RN         Review Status  Review Entered    Completed  9/8/2020 14:18        Criteria Review       Care Day: 13 Care Date: 9/6/2020 Level of Care:    Guideline Day 4    Clinical Status    ( ) * Hemodynamic stability    (X) * Afebrile or temperature acceptable for next level of care    9/8/2020 2:18 PM EDT by Padmaja Ham      Temp 97.6 °F    ( ) * Hypoxemia absent    ( ) * Tachypnea absent    ( ) * Cultures negative or infection identified and under adequate treatment    ( ) * Mental status at baseline    ( ) * Metabolic derangement (eg, dehydration, acidosis) absent    ( ) * End organ dysfunction (eg, myocardial ischemia, renal failure) absent    ( ) * Discharge plans and education understood Activity    ( ) * Ambulatory or acceptable for next level of care    Routes    ( ) * Oral hydration, medications [L]    Interventions    ( ) * Isolation not indicated, or is performable at next level of care    Medications    ( ) * Antimicrobial treatment not necessary or treatment at next level of care arranged [F]    * Milestone    Additional Notes    9/6  ICU    Assessment and Plan:    Septic shock    - shock resolved    -On August 28 infectious disease changed antibiotics from meropenem/vancomycin to minocycline and Avycaz; diagnosed with pneumonia at that time    -Sputum culture is negative as of August 31    -Urine culture sent about 12 hours after meropenem given showing less than 10,000 colonies of E. coli.  Sensitive to cefepime    - abx course completed, no need for further abx         Hypotension    - resolved    - hold lasix and aldactone for now    - monitor         HypoK    - replaced         Acute on chronic respiratory failure,    -Extubated 8/29 at 3:40 PM.    -Intubated 8/26 at 11:45 PM, the night of admission    -Appreciate pulmonary consult    -She has BiPAP at home (she wears only during sleep she states)         Metabolic encephalopathy/hyperactive delirium    - improved and stable now    -On 8/30 the patient told her nurse that she was going to call the police on her, and that she thought that she was kidnapped.  Restarted home medication Neurontin 800 mg 3 times a day on 8/30 -but as of August 31 she has not had any doses because she refuses her oral meds.  Withdrawal from gabapentin is well described in the literature    -Ceftazidime may potentially cause delirium as well    -Psychiatry consulted: started on zoloft, buspar              COPD -appreciate pulmonary consult         Groundglass opacities on CT scan     -stable    -SARS-CoV-2 antigen is negative    -Noted CHF per pulmonary and pneumonia per ID, appreciate consults         Morbid obesity with obstructive sleep apnea    -Body mass index is 63.34 kg/m². Weight is about 419 lb per bed scale    -She is on BiPAP at home         Acute kidney injury    - resolved    - hold lasix and aldactone now    -First hemodialysis treatment given 8/25    -Second treatment 8/26    -8/27-has increased urine output, likely post ATN diuresis per nephrology         Bradycardia in the setting of hyperkalemia on admission-resolved         History of C. difficile infection noted          pt/ot for rehab needs              ambulate and increase activity         Placement pending         History of aortic stenosis status post TAVR    History of CHF per chart    On home oxygen    COPD/asthma    Diabetes mellitus type 2    History of left foot ulcer    Hyperlipidemia    Anemia with hemoglobin 8.2 on admission    Restless leg syndrome    History of ESBL infection         Thrombocytopenia: resolved         DVT prophylaxis: Arixtra       Nephrology Progress Note    9/6/2020 12:23 PM    Objective:    Vitals: BP (!) 119/34   Pulse 53   Temp 97.6 °F (36.4 °C) (Oral)   Resp 13   Ht 5' 8\" (1.727 m)   Wt (!) 379 lb (171.9 kg)   SpO2 99%   BMI 57.63 kg/m²    General appearance: awake weak    HEENT: Head: Normal, normocephalic, atraumatic. Neck: supple, symmetrical, trachea midline    Lungs: diminished breath sounds bilaterally    Heart: S1, S2 normal    Abdomen: abnormal findings:  soft nt obese    Extremities: edema +    Neurologic: Mental status: alertness: tired    Interval History: pt doing ok and denies concern         Diet: DIET GENERAL; Carb Control: 4 carb choices (60 gms)/meal; Daily Fluid Restriction: 1800 ml    Dietary Nutrition Supplements: Low Calorie High Protein Supplement    Dietary Nutrition Supplements: Snack (see comment)    Pain is: Moderate              Data:    Scheduled Meds:    Scheduled Medications    · HYDROcodone 5 mg - acetaminophen 1 tablet Oral Once    · [Held by provider] furosemide 20 mg Intravenous BID    · [Held by provider] spironolactone   Total Nucleated RBC 0.0 K/CU MM      Total Immature Neutrophil 0.09 K/CU MM      Immature Neutrophil % 1.6 %       Sodium 141 MMOL/L      Potassium 4.5 MMOL/L      Chloride 102 mMol/L      CO2 36 MMOL/L      Anion Gap 3      BUN 23 MG/DL      CREATININE 1.1 MG/DL      Glucose 111 MG/DL      Calcium 9.0 MG/DL      GFR Non-African American 50 mL/min/1.73m2      GFR African American >60 mL/min/1.73m2                 Sepsis and Other Febrile Illness, without Focal Infection - Care Day 12 (9/5/2020) by Stanislaw Cali RN         Review Status  Review Entered    Completed  9/8/2020 14:12        Criteria Review       Care Day: 12 Care Date: 9/5/2020 Level of Care:    Guideline Day 4    Clinical Status    ( ) * Hemodynamic stability    (X) * Afebrile or temperature acceptable for next level of care    9/8/2020 2:12 PM EDT by Cathe Najjar      97.8 °F    ( ) * Hypoxemia absent    ( ) * Tachypnea absent    ( ) * Cultures negative or infection identified and under adequate treatment    ( ) * Mental status at baseline    ( ) * Metabolic derangement (eg, dehydration, acidosis) absent    ( ) * End organ dysfunction (eg, myocardial ischemia, renal failure) absent    ( ) * Discharge plans and education understood    Activity    ( ) * Ambulatory or acceptable for next level of care    Routes    ( ) * Oral hydration, medications [L]    Interventions    ( ) * Isolation not indicated, or is performable at next level of care    Medications    ( ) * Antimicrobial treatment not necessary or treatment at next level of care arranged [F]    * Milestone    Additional Notes    9/5  ICU    Subjective:         Doing ok,.  No distress    Scheduled Medications    · potassium chloride 40 mEq Oral BID WC    · HYDROcodone 5 mg - acetaminophen 1 tablet Oral Once    · [Held by provider] furosemide 20 mg Intravenous BID    · [Held by provider] spironolactone 12.5 mg Oral Daily    · sertraline 25 mg Oral Nightly    · busPIRone 10 mg Oral TID    · gabapentin 800 mg Oral TID    · pramipexole 1 mg Oral Nightly    · pramipexole 1 mg Oral BID    · haloperidol lactate 2 mg Intravenous Once    · fondaparinux 2.5 mg Subcutaneous Daily    · albuterol sulfate HFA 2 puff Inhalation 4x daily    · ipratropium 2 puff Inhalation 4x daily    · sodium chloride flush 10 mL Intravenous 2 times per day    · sodium chloride flush 10 mL Intravenous 2 times per day    · pantoprazole 40 mg Intravenous Daily    · insulin lispro 0-6 Units Subcutaneous TID WC    · insulin lispro 0-3 Units Subcutaneous Nightly    · atorvastatin 40 mg Oral Nightly    · clopidogrel 75 mg Oral Daily    · doxepin 10 mg Oral Nightly    · miconazole Topical BID    · cetirizine 10 mg Oral Daily    · chlorhexidine 15 mL Mouth/Throat BID            Infusions:    Infusions Meds    · dextrose    Physical Exam:    Gen: Vickye Freshwater, alert, no apparent distress    Head/Eyes:  Normocephalic atraumatic, EOMI    NECK:   symmetrical, trachea midline    LUNGS: Normal Effort    CARDIOVASCULAR:  Normal rate    ABDOMEN:  non distended    MUSCULOSKELETAL:  ROM limited    NEUROLOGIC: Alert and Oriented,  Cranial nerves II-XII are grossly intact.     SKIN:  no bruising or bleeding, normal skin color,  no redness         Vitals: BP (!) 126/48   Pulse 63   Temp 97.7 °F (36.5 °C) (Oral)   Resp 13   Ht 5' 8\" (1.727 m)   Wt (!) 379 lb (171.9 kg)   SpO2 97%   BMI 57.63 kg/m²    Assessment and Plan:    Septic shock    - shock resolved    -On August 28 infectious disease changed antibiotics from meropenem/vancomycin to minocycline and Avycaz; diagnosed with pneumonia at that time    -Sputum culture is negative as of August 31    -Urine culture sent about 12 hours after meropenem given showing less than 10,000 colonies of E. coli.  Sensitive to cefepime    - abx course completed, no need for further abx         Hypotension    - resolved    - hold lasix and aldactone for now    - monitor         HypoK    - replaced    - check labs today         Acute on chronic respiratory failure,    -Extubated 8/29 at 3:40 PM.    -Intubated 8/26 at 11:45 PM, the night of admission    -Appreciate pulmonary consult    -She has BiPAP at home (she wears only during sleep she states)         Metabolic encephalopathy/hyperactive delirium    - improved and stable now    -On 8/30 the patient told her nurse that she was going to call the police on her, and that she thought that she was kidnapped.  Restarted home medication Neurontin 800 mg 3 times a day on 8/30 -but as of August 31 she has not had any doses because she refuses her oral meds.  Withdrawal from gabapentin is well described in the literature    -Ceftazidime may potentially cause delirium as well    -Psychiatry consulted: started on zoloft, buspar              COPD -appreciate pulmonary consult         Groundglass opacities on CT scan    -stable    -SARS-CoV-2 antigen is negative    -Noted CHF per pulmonary and pneumonia per ID, appreciate consults         Morbid obesity with obstructive sleep apnea    -Body mass index is 63.34 kg/m². Weight is about 419 lb per bed scale    -She is on BiPAP at home         Acute kidney injury    - hold lasix and aldactone now    -First hemodialysis treatment given 8/25    -Second treatment 8/26    -8/27-has increased urine output, likely post ATN diuresis per nephrology         Bradycardia in the setting of hyperkalemia on admission-resolved         History of C. difficile infection noted          pt/ot for rehab needs              ambulate and increase activity         Placement pending         History of aortic stenosis status post TAVR    History of CHF per chart    On home oxygen    COPD/asthma    Diabetes mellitus type 2    History of left foot ulcer    Hyperlipidemia    Anemia with hemoglobin 8.2 on admission    Restless leg syndrome    History of ESBL infection         Thrombocytopenia: resolved         DVT prophylaxis: [facility]       Nephrology Progress Note 9/5/2020 11:04 AM    Assessment and Plan:              IMP:         1.  JOSE: demonstrating renal recovery: RRT x 2 treatments    2.  Sepsis    3.  Pancytopenia    4.  Heart Failure    5.  Respiratory failure    6.  DM    7.  Confusion / Anxiety    8.  Hypokalemia         Plan      1 renal holding and creat 1.8 fu bmp in am as not had today    2 no fever    3 plt stable    4 o2 appear improved and volume overall improved and now need rehab with weight loss    5 ssi    6 affect calm today    7 fu K       Pulmonology    ASSESMENT    Ac on ch resp failure    Ac copd    chf    ronni                   PLAN    1. cpm    2.  Cont pap rx       Sodium 143 MMOL/L      Potassium 4.1 MMOL/L      Chloride 103 mMol/L      CO2 33 MMOL/L      Anion Gap 7      BUN 32 MG/DL      CREATININE 1.2 MG/DL      Glucose 107 MG/DL      Calcium 8.9 MG/DL      GFR Non-African American 45 mL/min/1.73m2      GFR African American 55 mL/min/1.73m2

## 2020-09-08 NOTE — PROGRESS NOTES
Comprehensive Nutrition Assessment    Type and Reason for Visit:  Reassess    Nutrition Recommendations/Plan:   Continue current diet and ONS as ordered  Encourage po intake as able  Will continue to monitor po intake, nutrition status, poc    Nutrition Assessment:  +bipap, on on carb control, fluid restricted diet + ONS, pt consuming 25-75% of meals per minimal po intake documentation, +BM 9/8 per flowsheet, good UOP noted, +diuresis, pt is moderate risk at this time    Malnutrition Assessment:  Malnutrition Status:  Insufficient data      Estimated Daily Nutrient Needs:  Energy (kcal):  2300; Weight Used for Energy Requirements:  Current     Protein (g):  (5.3-1);  Weight Used for Protein Requirements:  Ideal          Nutrition Related Findings:  none      Wounds:  Multiple       Current Nutrition Therapies:    DIET GENERAL; Carb Control: 4 carb choices (60 gms)/meal; Daily Fluid Restriction: 1800 ml  Dietary Nutrition Supplements: Low Calorie High Protein Supplement  Dietary Nutrition Supplements: Snack (see comment)    Anthropometric Measures:  · Height: 5' 7.99\" (172.7 cm)  · Current Body Weight: 378 lb 15.5 oz (171.9 kg)   · Admission Body Weight: 419 lb 1.5 oz (190.1 kg)    · Usual Body Weight: 432 lb 5.2 oz (196.1 kg)     · Ideal Body Weight: 140 lbs; % Ideal Body Weight 270.7 %   · BMI: 57.6  · Adjusted Body Weight:  ; No Adjustment   · BMI Categories: Obese Class 3 (BMI 40.0 or greater)       Nutrition Diagnosis:   · Inadequate oral intake related to cognitive or neurological impairment as evidenced by weight loss, other (comment)(difficulty eating )    Nutrition Interventions:   Food and/or Nutrient Delivery:  Continue Current Diet, Continue Oral Nutrition Supplement  Nutrition Education/Counseling:  Education not indicated   Coordination of Nutrition Care:  Continued Inpatient Monitoring, Speech Therapy    Goals:  pt will consume greater than 75% of meals and supplements       Nutrition Monitoring and Evaluation:   Food/Nutrient Intake Outcomes:  Food and Nutrient Intake, Supplement Intake  Physical Signs/Symptoms Outcomes:  Biochemical Data, Weight, Fluid Status or Edema, GI Status     Discharge Planning:     Too soon to determine     Electronically signed by Yusuf Mario MS, RD, LD on 9/8/20 at 10:14 AM EDT    Contact: [phone]

## 2020-09-11 NOTE — ADT AUTH CERT
Patient Demographics     Name  Patient ID  SSN  Gender Identity  Birth Date    Aaliyah Curry  [id number]    Female  [birth year] (63 yrs)    Address  Phone  Email  Employer     [address] [address]   [facility] [address] [phone] (Q)   [phone] (H)  [email or web address]  NOT EMPLOYED   [place]   [address]  [place]  Occupation  Emp Status     Hemal Safer  --  Not Employed     Reg Status  PCP  Date Last Verified  Next Review Date     Verified  THEODORE Herrera NP  [phone]  20     Admission Date  Discharge Date  Admitting Provider      20  Kalin Jason MD      Marital Status  Oriental orthodox         [address]       Emergency Contact 1  Emergency Contact 2    Elvia Bradshaw (2)   [place]   [phone] Janeta Gary)   [phone] [facility] Sprinkles)  Al Lionel (Sheryn Daughters)   [place]   [phone] (V)   [phone] Peggy Sprinkles)    Cl Vera  [de-identified]   [address] [employer] Name/Sex/Relation  Subscriber   Subscriber Address/Phone  Subscriber Emp/Emp Phone    1.  Hayden Price   [phone]  [address] [facility] Female   (Self)  1955  [address], [address] [facility]   [phone](U)  NOT EMPLOYED   [phone]    Utilization Reviews         Sepsis and Other Febrile Illness, without Focal Infection - Care Day 14 (2020) by Keri Concepcion RN         Review Status  Review Entered    Completed  2020 14:22        Criteria Review       Care Day: 14 Care Date: 2020 Level of Care:    Guideline Day 4    Clinical Status    ( ) * Hemodynamic stability    (X) * Afebrile or temperature acceptable for next level of care    ( ) * Hypoxemia absent    ( ) * Tachypnea absent    ( ) * Cultures negative or infection identified and under adequate treatment    ( ) * Mental status at baseline    ( ) * Metabolic derangement (eg, dehydration, acidosis) absent    ( ) * End organ dysfunction (eg, myocardial ischemia, renal failure) absent    ( ) * Discharge plans and education understood    Activity    ( ) * Ambulatory or acceptable for next level of care    Routes    ( ) * Oral hydration, medications [L]    Interventions    ( ) * Isolation not indicated, or is performable at next level of care    Medications    ( ) * Antimicrobial treatment not necessary or treatment at next level of care arranged [F]    * Milestone    Additional Notes    9/7    Subjective:    No distress         Objective:            Intake/Output Summary (Last 24 hours) at 9/7/2020 1001    Last data filed at 9/7/2020 0704    Gross per 24 hour    Intake 480 ml    Output 1200 ml    Net -720 ml         Vitals:    Vitals:      09/07/20 0800    BP: 129/69    Pulse: 64    Resp: 14    Temp: 98.3 °F (36.8 °C)    SpO2:         Physical Exam:    Gen: Luis A Panda, alert, no apparent distress    Head/Eyes:  Normocephalic atraumatic, EOMI    NECK:   symmetrical, trachea midline    LUNGS: Normal Effort    CARDIOVASCULAR:  Normal rate    ABDOMEN:  non distended    MUSCULOSKELETAL:  ROM limited    NEUROLOGIC: Alert and Oriented,  Cranial nerves II-XII are grossly intact.     SKIN:  no bruising or bleeding, normal skin color,  no redness         Scheduled Medications    · HYDROcodone 5 mg - acetaminophen 1 tablet Oral Once    · [Held by provider] furosemide 20 mg Intravenous BID    · [Held by provider] spironolactone 12.5 mg Oral Daily    · sertraline 25 mg Oral Nightly    · busPIRone 10 mg Oral TID    · gabapentin 800 mg Oral TID    · pramipexole 1 mg Oral BID    · haloperidol lactate 2 mg Intravenous Once    · fondaparinux 2.5 mg Subcutaneous Daily    · albuterol sulfate HFA 2 puff Inhalation 4x daily    · ipratropium 2 puff Inhalation 4x daily    · sodium chloride flush 10 mL Intravenous 2 times per day    · sodium chloride flush 10 mL Intravenous 2 times per day    · pantoprazole 40 mg Intravenous Daily    · insulin lispro 0-6 Units Subcutaneous TID WC    · insulin lispro 0-3 Units Subcutaneous Nightly    · atorvastatin 40 mg Oral Nightly    · clopidogrel 75 mg Oral Daily    · doxepin 10 mg Oral Nightly    · miconazole Topical BID    · cetirizine 10 mg Oral Daily    · chlorhexidine 15 mL Mouth/Throat BID        Assessment and Plan:    Septic shock    - shock resolved    -On August 28 infectious disease changed antibiotics from meropenem/vancomycin to minocycline and Avycaz; diagnosed with pneumonia at that time    -Sputum culture is negative as of August 31    -Urine culture sent about 12 hours after meropenem given showing less than 10,000 colonies of E. coli.  Sensitive to cefepime    - abx course completed, no need for further abx         Hypotension    - resolved    - hold lasix and aldactone for now    - monitor         HypoK    - replaced         Acute on chronic respiratory failure,    -Extubated 8/29 at 3:40 PM.    -Intubated 8/26 at 11:45 PM, the night of admission    -Appreciate pulmonary consult    -She has BiPAP at home (she wears only during sleep she states)         Metabolic encephalopathy/hyperactive delirium    - improved and stable now    -On 8/30 the patient told her nurse that she was going to call the police on her, and that she thought that she was kidnapped.  Restarted home medication Neurontin 800 mg 3 times a day on 8/30 -but as of August 31 she has not had any doses because she refuses her oral meds.  Withdrawal from gabapentin is well described in the literature    -Ceftazidime may potentially cause delirium as well    -Psychiatry consulted: started on zoloft, buspar              COPD -appreciate pulmonary consult         Groundglass opacities on CT scan     -stable    -SARS-CoV-2 antigen is negative    -Noted CHF per pulmonary and pneumonia per ID, appreciate consults         Morbid obesity with obstructive sleep apnea    -Body mass index is 63.34 kg/m². Weight is about 419 lb per bed scale    -She is on BiPAP at home         Acute kidney injury    - resolved    - hold lasix and aldactone  Sensitive to cefepime    - abx course completed, no need for further abx         Hypotension    - resolved    - hold lasix and aldactone for now    - monitor         HypoK    - replaced         Acute on chronic respiratory failure,    -Extubated 8/29 at 3:40 PM.    -Intubated 8/26 at 11:45 PM, the night of admission    -Appreciate pulmonary consult    -She has BiPAP at home (she wears only during sleep she states)         Metabolic encephalopathy/hyperactive delirium    - improved and stable now    -On 8/30 the patient told her nurse that she was going to call the police on her, and that she thought that she was kidnapped.  Restarted home medication Neurontin 800 mg 3 times a day on 8/30 -but as of August 31 she has not had any doses because she refuses her oral meds.  Withdrawal from gabapentin is well described in the literature    -Ceftazidime may potentially cause delirium as well    -Psychiatry consulted: started on zoloft, buspar              COPD -appreciate pulmonary consult         Groundglass opacities on CT scan     -stable    -SARS-CoV-2 antigen is negative    -Noted CHF per pulmonary and pneumonia per ID, appreciate consults         Morbid obesity with obstructive sleep apnea    -Body mass index is 63.34 kg/m². Weight is about 419 lb per bed scale    -She is on BiPAP at home         Acute kidney injury    - resolved    - hold lasix and aldactone now    -First hemodialysis treatment given 8/25    -Second treatment 8/26    -8/27-has increased urine output, likely post ATN diuresis per nephrology         Bradycardia in the setting of hyperkalemia on admission-resolved         History of C. difficile infection noted          pt/ot for rehab needs              ambulate and increase activity         Placement pending         History of aortic stenosis status post TAVR    History of CHF per chart    On home oxygen    COPD/asthma    Diabetes mellitus type 2    History of left foot ulcer    Hyperlipidemia Anemia with hemoglobin 8.2 on admission    Restless leg syndrome    History of ESBL infection         Thrombocytopenia: resolved         DVT prophylaxis: Arixtra       Nephrology Progress Note    9/6/2020 12:23 PM    Objective:    Vitals: BP (!) 119/34   Pulse 53   Temp 97.6 °F (36.4 °C) (Oral)   Resp 13   Ht 5' 8\" (1.727 m)   Wt (!) 379 lb (171.9 kg)   SpO2 99%   BMI 57.63 kg/m²    General appearance: awake weak    HEENT: Head: Normal, normocephalic, atraumatic. Neck: supple, symmetrical, trachea midline    Lungs: diminished breath sounds bilaterally    Heart: S1, S2 normal    Abdomen: abnormal findings:  soft nt obese    Extremities: edema +    Neurologic: Mental status: alertness: tired    Interval History: pt doing ok and denies concern         Diet: DIET GENERAL; Carb Control: 4 carb choices (60 gms)/meal; Daily Fluid Restriction: 1800 ml    Dietary Nutrition Supplements: Low Calorie High Protein Supplement    Dietary Nutrition Supplements: Snack (see comment)    Pain is: Moderate              Data:    Scheduled Meds:    Scheduled Medications    · HYDROcodone 5 mg - acetaminophen 1 tablet Oral Once    · [Held by provider] furosemide 20 mg Intravenous BID    · [Held by provider] spironolactone 12.5 mg Oral Daily    · sertraline 25 mg Oral Nightly    · busPIRone 10 mg Oral TID    · gabapentin 800 mg Oral TID    · pramipexole 1 mg Oral Nightly    · pramipexole 1 mg Oral BID    · haloperidol lactate 2 mg Intravenous Once    · fondaparinux 2.5 mg Subcutaneous Daily    · albuterol sulfate HFA 2 puff Inhalation 4x daily    · ipratropium 2 puff Inhalation 4x daily    · sodium chloride flush 10 mL Intravenous 2 times per day    · sodium chloride flush 10 mL Intravenous 2 times per day    · pantoprazole 40 mg Intravenous Daily    · insulin lispro 0-6 Units Subcutaneous TID WC    · insulin lispro 0-3 Units Subcutaneous Nightly    · atorvastatin 40 mg Oral Nightly    · clopidogrel 75 mg Oral Daily    · doxepin 10 mg Oral Nightly    · miconazole Topical BID    · cetirizine 10 mg Oral Daily    · chlorhexidine 15 mL Mouth/Throat BID    Assessment and Plan:              IMP:         1.  JOSE: demonstrating renal recovery: RRT x 2 treatments    2.  Sepsis    3.  Pancytopenia    4.  Heart Failure    5.  Respiratory failure    6.  DM    7.  Confusion / Anxiety    8.  Hypokalemia         Plan      1 renal to baseline    2 on abx    3 monitor hb and plt    4 o2 stable    5 ssi    6 need rehab to [facility]    7 K stable       Pulmonology    ASSESMENT    Ac on ch resp failure    Ac copd    chf    ronni                   PLAN    1. Pap rx while sleeping    2.  Bd rx       WBC 5.6 K/CU MM      RBC 3.32 M/CU MM      Hemoglobin 8.1 GM/DL      Hematocrit 28.1 %      MCV 84.6 FL      MCH 24.4 PG      MCHC 28.8 %      RDW 19.9 %      Platelets 059 K/CU MM      MPV 9.1 FL      Differential Type AUTOMATED DIFFERENTIAL      Segs Relative 62.4 %      Lymphocytes % 22.7 %      Monocytes % 8.1 %      Eosinophils % 4.3 %      Basophils % 0.9 %      Segs Absolute 3.5 K/CU MM      Lymphocytes Absolute 1.3 K/CU MM      Monocytes Absolute 0.5 K/CU MM      Eosinophils Absolute 0.2 K/CU MM      Basophils Absolute 0.1 K/CU MM      Nucleated RBC % 0.0 %      Total Nucleated RBC 0.0 K/CU MM      Total Immature Neutrophil 0.09 K/CU MM      Immature Neutrophil % 1.6 %       Sodium 141 MMOL/L      Potassium 4.5 MMOL/L      Chloride 102 mMol/L      CO2 36 MMOL/L      Anion Gap 3      BUN 23 MG/DL      CREATININE 1.1 MG/DL      Glucose 111 MG/DL      Calcium 9.0 MG/DL      GFR Non-African American 50 mL/min/1.73m2      GFR African American >60 mL/min/1.73m2                 Sepsis and Other Febrile Illness, without Focal Infection - Care Day 12 (9/5/2020) by Viva Soulier, RN         Review Status  Review Entered    Completed  9/8/2020 14:12        Criteria Review       Care Day: 12 Care Date: 9/5/2020 Level of Care:    Guideline Day 4    Clinical Status    ( ) * Hemodynamic stability    (X) * Afebrile or temperature acceptable for next level of care    9/8/2020 2:12 PM EDT by Cathe Najjar      97.8 °F    ( ) * Hypoxemia absent    ( ) * Tachypnea absent    ( ) * Cultures negative or infection identified and under adequate treatment    ( ) * Mental status at baseline    ( ) * Metabolic derangement (eg, dehydration, acidosis) absent    ( ) * End organ dysfunction (eg, myocardial ischemia, renal failure) absent    ( ) * Discharge plans and education understood    Activity    ( ) * Ambulatory or acceptable for next level of care    Routes    ( ) * Oral hydration, medications [L]    Interventions    ( ) * Isolation not indicated, or is performable at next level of care    Medications    ( ) * Antimicrobial treatment not necessary or treatment at next level of care arranged [F]    * Milestone    Additional Notes    9/5  ICU    Subjective:         Doing ok,.  No distress    Scheduled Medications    · potassium chloride 40 mEq Oral BID WC    · HYDROcodone 5 mg - acetaminophen 1 tablet Oral Once    · [Held by provider] furosemide 20 mg Intravenous BID    · [Held by provider] spironolactone 12.5 mg Oral Daily    · sertraline 25 mg Oral Nightly    · busPIRone 10 mg Oral TID    · gabapentin 800 mg Oral TID    · pramipexole 1 mg Oral Nightly    · pramipexole 1 mg Oral BID    · haloperidol lactate 2 mg Intravenous Once    · fondaparinux 2.5 mg Subcutaneous Daily    · albuterol sulfate HFA 2 puff Inhalation 4x daily    · ipratropium 2 puff Inhalation 4x daily    · sodium chloride flush 10 mL Intravenous 2 times per day    · sodium chloride flush 10 mL Intravenous 2 times per day    · pantoprazole 40 mg Intravenous Daily    · insulin lispro 0-6 Units Subcutaneous TID WC    · insulin lispro 0-3 Units Subcutaneous Nightly    · atorvastatin 40 mg Oral Nightly    · clopidogrel 75 mg Oral Daily    · doxepin 10 mg Oral Nightly    · miconazole Topical BID    · cetirizine 10 mg Oral Daily    · chlorhexidine 15 mL Mouth/Throat BID            Infusions:    Infusions Meds    · dextrose    Physical Exam:    Gen: Thierno Alonso, alert, no apparent distress    Head/Eyes:  Normocephalic atraumatic, EOMI    NECK:   symmetrical, trachea midline    LUNGS: Normal Effort    CARDIOVASCULAR:  Normal rate    ABDOMEN:  non distended    MUSCULOSKELETAL:  ROM limited    NEUROLOGIC: Alert and Oriented,  Cranial nerves II-XII are grossly intact.     SKIN:  no bruising or bleeding, normal skin color,  no redness         Vitals: BP (!) 126/48   Pulse 63   Temp 97.7 °F (36.5 °C) (Oral)   Resp 13   Ht 5' 8\" (1.727 m)   Wt (!) 379 lb (171.9 kg)   SpO2 97%   BMI 57.63 kg/m²    Assessment and Plan:    Septic shock    - shock resolved    -On August 28 infectious disease changed antibiotics from meropenem/vancomycin to minocycline and Avycaz; diagnosed with pneumonia at that time    -Sputum culture is negative as of August 31    -Urine culture sent about 12 hours after meropenem given showing less than 10,000 colonies of E. coli.  Sensitive to cefepime    - abx course completed, no need for further abx         Hypotension    - resolved    - hold lasix and aldactone for now    - monitor         HypoK    - replaced    - check labs today         Acute on chronic respiratory failure,    -Extubated 8/29 at 3:40 PM.    -Intubated 8/26 at 11:45 PM, the night of admission    -Appreciate pulmonary consult    -She has BiPAP at home (she wears only during sleep she states)         Metabolic encephalopathy/hyperactive delirium    - improved and stable now    -On 8/30 the patient told her nurse that she was going to call the police on her, and that she thought that she was kidnapped.  Restarted home medication Neurontin 800 mg 3 times a day on 8/30 -but as of August 31 she has not had any doses because she refuses her oral meds.  Withdrawal from gabapentin is well described in the literature    -Ceftazidime may potentially cause delirium as well -Psychiatry consulted: started on zoloft, buspar              COPD -appreciate pulmonary consult         Groundglass opacities on CT scan    -stable    -SARS-CoV-2 antigen is negative    -Noted CHF per pulmonary and pneumonia per ID, appreciate consults         Morbid obesity with obstructive sleep apnea    -Body mass index is 63.34 kg/m². Weight is about 419 lb per bed scale    -She is on BiPAP at home         Acute kidney injury    - hold lasix and aldactone now    -First hemodialysis treatment given 8/25    -Second treatment 8/26    -8/27-has increased urine output, likely post ATN diuresis per nephrology         Bradycardia in the setting of hyperkalemia on admission-resolved         History of C. difficile infection noted          pt/ot for rehab needs              ambulate and increase activity         Placement pending         History of aortic stenosis status post TAVR    History of CHF per chart    On home oxygen    COPD/asthma    Diabetes mellitus type 2    History of left foot ulcer    Hyperlipidemia    Anemia with hemoglobin 8.2 on admission    Restless leg syndrome    History of ESBL infection         Thrombocytopenia: resolved         DVT prophylaxis: Arixtra       Nephrology Progress Note    9/5/2020 11:04 AM    Assessment and Plan:              IMP:         1.  JOSE: demonstrating renal recovery: RRT x 2 treatments    2.  Sepsis    3.  Pancytopenia    4.  Heart Failure    5.  Respiratory failure    6.  DM    7.  Confusion / Anxiety    8.  Hypokalemia         Plan      1 renal holding and creat 1.8 fu bmp in am as not had today    2 no fever    3 plt stable    4 o2 appear improved and volume overall improved and now need rehab with weight loss    5 ssi    6 affect calm today    7 fu K       Pulmonology    ASSESMENT    Ac on ch resp failure    Ac copd    chf    ronni                   PLAN    1. cpm    2.  Cont pap rx       Sodium 143 MMOL/L      Potassium 4.1 MMOL/L      Chloride 103 mMol/L      CO2 33 MMOL/L      Anion Gap 7      BUN 32 MG/DL      CREATININE 1.2 MG/DL      Glucose 107 MG/DL      Calcium 8.9 MG/DL      GFR Non-African American 45 mL/min/1.73m2      GFR African American 55 mL/min/1.73m2

## 2020-12-09 NOTE — ED PROVIDER NOTES
EMERGENCY DEPARTMENT ENCOUNTER      PCP: Candance Bossier, APRN - [place]    CHIEF COMPLAINT    Chief Complaint   Patient presents with    Shortness of Breath         Of note, this patient was also evaluated by the attending physician, Dr. Mayra Holland. HPI    Justus Hilario is a 63 y.o. female who presents to the emergency department today via EMS with a chief complaint of worsening shortness of breath over the last 2 weeks. Patient lives at home, she is on oxygen 2 L at her baseline. She states that she has felt more short of breath especially with minimal exertion. Patient does admit that she ambulates with a walker at home, and has noticed more pronounced dyspnea on exertion. No significant chest pain. Has had history of CHF and need for admission for diuresis. Patient does admit that she is frequently urinating. Has history of UTIs. Patient also has history of septic shock. No recent sick contacts, was admitted back in August, had negative Covid testing at that time. REVIEW OF SYSTEMS    Constitutional:  Denies fever, chills. HENT:  Denies sore throat or ear pain   Cardiovascular:  No chest pain. No palpitations, No syncope  Respiratory:  See HPI.    GI:  Denies abdominal pain, nausea, vomiting, or diarrhea  : See HPI  Musculoskeletal:  Denies back pain,   Skin: Rash irritation into the intertriginous fold/pannus  Neurologic:  Denies headache, focal weakness or sensory changes   Endocrine:  Denies polyuria or polydypsia   Lymphatic:  Denies swollen glands     All other review of systems are negative  See HPI and nursing notes for additional information       PAST MEDICAL & SURGICAL HISTORY    Past Medical History:   Diagnosis Date    Acute kidney injury ([facility] [address] 8/22/2019    Anxiety 9/1/2020    Aortic stenosis     Asthma     Bacteremia due to group B Streptococcus     Treated at [facility] in 2017 - felt to be due to cellulitis    Cancer [facility])     Cervical    Carotid artery stenosis     Chest pain     CHF (congestive heart failure) (HCC)     Chronic back pain     COPD exacerbation (HCC)     Depression     Diabetes mellitus ([facility] [address]     Family history of coronary artery disease     Fatty liver 10/27/2016    GI bleed 2017    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 - [facility] at [facility] H/O aortic valve stenosis     H/O echocardiogram 2016    EF 55%, Aortic valve area is 0.84 cm2 with a mean gradient of 36 suggestive of severe aortic stenosis, mild to mos LVH    Headache     Heart murmur     History of nuclear stress test 2016    lexiscan-normal,EF70%    Morbid obesity ([facility])     BMI=73.72 kg/m2    Neuropathy     NSTEMI (non-ST elevated myocardial infarction) ([facility] [address] 2018    Obesity     Osteoarthritis     Palpitations     Peripheral edema     Restless legs syndrome     Sleep apnea     Has a CPAP    Sleep apnea     SOB (shortness of breath)      Past Surgical History:   Procedure Laterality Date    AORTIC VALVE REPLACEMENT  2017    29mm EvolutR TAVR     SECTION      CHOLECYSTECTOMY      GALLBLADDER SURGERY      HYSTERECTOMY      IR NONTUNNELED VASCULAR CATHETER  2020    IR NONTUNNELED VASCULAR CATHETER 2020 [facility] SPECIAL PROCEDURES    NECK SURGERY      Tumor    TUBAL LIGATION       CURRENT MEDICATIONS    Current Outpatient Rx   Medication Sig Dispense Refill    busPIRone (BUSPAR) 10 MG tablet Take 1 tablet by mouth 3 times daily      sertraline (ZOLOFT) 25 MG tablet Take 1 tablet by mouth nightly 30 tablet 3    pramipexole (MIRAPEX) 1 MG tablet Take 1 tablet by mouth 2 times daily 90 tablet 3    lisinopril (PRINIVIL;ZESTRIL) 5 MG tablet Take 1 tablet by mouth daily 30 tablet 0    metoprolol tartrate (LOPRESSOR) 25 MG tablet Take 0.5 tablets by mouth 2 times daily 60 tablet 3    torsemide (DEMADEX) 20 MG tablet Take 1 tablet by mouth daily      ipratropium-albuterol (DUONEB) 0.5-2.5 (3) MG/3ML SOLN nebulizer solution Inhale 3 mLs into the lungs every 6 hours as needed for Shortness of Breath 360 mL 0    atorvastatin (LIPITOR) 80 MG tablet Take 0.5 tablets by mouth nightly 30 tablet 3    spironolactone (ALDACTONE) 25 MG tablet Take 1 tablet by mouth daily 30 tablet 0    miconazole (MICOTIN) 2 % cream Apply topically 2 times daily. 3 Tube 2    doxepin (SINEQUAN) 10 MG capsule Take 10 mg by mouth nightly      ferrous sulfate (IRON 325) 325 (65 Fe) MG tablet Take 325 mg by mouth daily (with breakfast)      oxybutynin (DITROPAN-XL) 10 MG extended release tablet Take 10 mg by mouth daily      vitamin D3 (CHOLECALCIFEROL) 10 MCG (400 UNIT) TABS tablet Take 400 Units by mouth daily      Polyethylene Glycol 3350 (MIRALAX PO) Take by mouth as needed      gabapentin (NEURONTIN) 400 MG capsule Take 1 capsule by mouth 3 times daily for 7 days. (Patient taking differently: Take 400 mg by mouth 3 times daily.  2 tabs 3 times a day) 21 capsule 0    amiodarone (CORDARONE) 200 MG tablet Take 1 tablet by mouth daily 30 tablet 3    clopidogrel (PLAVIX) 75 MG tablet Take 1 tablet by mouth daily Patient has appointment on 3/27/18 more refills with be given after she keeps her office visit 90 tablet 3    BiPAP Machine MISC by BiPAP route nightly      metFORMIN (GLUCOPHAGE) 500 MG tablet Take 1 tablet by mouth 2 times daily (with meals) 60 tablet 0    Nebulizers (AIRIAL COMPACT MINI NEBULIZER) MISC 1 Device by Does not apply route 4 times daily 1 each 0    albuterol sulfate  (90 Base) MCG/ACT inhaler Inhale 2 puffs into the lungs      pantoprazole (PROTONIX) 40 MG tablet Take 40 mg by mouth daily         ALLERGIES    No Known Allergies    SOCIAL & FAMILY HISTORY    Social History     Socioeconomic History    Marital status:      Spouse name: None    Number of children: None    Years of education: None    Highest education level: None   Occupational History    None   Social Needs    Financial resource strain: None   [place] no swollen nodes  Respiratory:   Nonlabored breathing. Rate 17. Lungs diminished lung sounds, no obvious rhonchi or rales. No signs of increased respirations. , no retractions   Cardiovascular:  Rate regular, normal Rhythm,  no murmurs/rubs/gallops. No carotid bruits or murmurs heard in carotids. No JVD  GI:  Soft, nontender, normal bowel sounds  Musculoskeletal:    Patient has generalized body habitus, peripheral edema, signs of venous stasis to the bilateral lower extremity. No cyanosis. No cool or pale-appearing limb. Distal cap refill and pulses intact bilateral upper and lower extremities  Bilateral upper and lower extremity ROM intact without pain or obvious deficit  Integument: Macular indurated erythema in the pannus fold with some white exudate, no significant breakdown of skin, no obvious crepitus.   Also has mild erythema to the lower portion of the bilateral lower extremities  Neurologic:  Alert & oriented, no slurred speech  Psych: Pleasant affect, no hallucinations      EKG    See supervising physician's note for EKG interpretation    LABS:  Results for orders placed or performed during the hospital encounter of 12/09/20   CBC auto diff   Result Value Ref Range    WBC 4.4 4.0 - 10.5 K/CU MM    RBC 2.85 (L) 4.2 - 5.4 M/CU MM    Hemoglobin 7.2 (L) 12.5 - 16.0 GM/DL    Hematocrit 25.7 (L) 37 - 47 %    MCV 90.2 78 - 100 FL    MCH 25.3 (L) 27 - 31 PG    MCHC 28.0 (L) 32.0 - 36.0 %    RDW 16.9 (H) 11.7 - 14.9 %    Platelets 648 123 - 196 K/CU MM    MPV 9.3 7.5 - 11.1 FL    Differential Type AUTOMATED DIFFERENTIAL     Segs Relative 66.0 36 - 66 %    Lymphocytes % 18.0 (L) 24 - 44 %    Monocytes % 10.7 (H) 0 - 4 %    Eosinophils % 2.1 0 - 3 %    Basophils % 0.7 0 - 1 %    Segs Absolute 2.9 K/CU MM    Lymphocytes Absolute 0.8 K/CU MM    Monocytes Absolute 0.5 K/CU MM    Eosinophils Absolute 0.1 K/CU MM    Basophils Absolute 0.0 K/CU MM    Nucleated RBC % 0.5 %    Total Nucleated RBC 0.0 K/CU MM Total Immature Neutrophil 0.11 K/CU MM    Immature Neutrophil % 2.5 (H) 0 - 0.43 %   CMP   Result Value Ref Range    Sodium 138 135 - 145 MMOL/L    Potassium 4.9 3.5 - 5.1 MMOL/L    Chloride 101 99 - 110 mMol/L    CO2 30 21 - 32 MMOL/L    BUN 21 6 - 23 MG/DL    CREATININE 0.9 0.6 - 1.1 MG/DL    Glucose 107 (H) 70 - 99 MG/DL    Calcium 9.0 8.3 - 10.6 MG/DL    Alb 3.3 (L) 3.4 - 5.0 GM/DL    Total Protein 7.2 6.4 - 8.2 GM/DL    ALT 7 (L) 10 - 40 U/L    AST 12 (L) 15 - 37 IU/L    Alkaline Phosphatase 122 40 - 129 IU/L    GFR Non-African American >60 >60 mL/min/1.73m2    GFR African American >60 >60 mL/min/1.73m2    Anion Gap 7 4 - 16   Troponin   Result Value Ref Range    Troponin T <0.010 <0.01 NG/ML   Brain Natriuretic Peptide   Result Value Ref Range    Pro-BNP 2,785 (H) <300 PG/ML   Lactic Acid, Plasma   Result Value Ref Range    Lactate 1.1 0.4 - 2.0 mMOL/L   Urinalysis (Lab)   Result Value Ref Range    Color, UA YELLOW YELLOW    Clarity, UA SLIGHTLY CLOUDY (A) CLEAR    Glucose, Urine NEGATIVE NEGATIVE MG/DL    Bilirubin Urine NEGATIVE NEGATIVE MG/DL    Ketones, Urine NEGATIVE NEGATIVE MG/DL    Specific Gravity, UA 1.014 1.001 - 1.035    Blood, Urine MODERATE (A) NEGATIVE    pH, Urine 5.0 5.0 - 8.0    Protein, UA 30 (A) NEGATIVE MG/DL    Urobilinogen, Urine NORMAL 0.2 - 1.0 MG/DL    Nitrite Urine, Quantitative POSITIVE (A) NEGATIVE    Leukocyte Esterase, Urine LARGE (A) NEGATIVE    RBC, UA 19 (H) 0 - 6 /HPF    WBC,  (H) 0 - 5 /HPF    Bacteria, UA MANY (A) NEGATIVE /HPF    WBC Clumps, UA MANY /HPF    Yeast, UA OCCASIONAL /HPF    Trans Epithel, UA 2 /HPF    Mucus, UA RARE (A) NEGATIVE HPF    Trichomonas, UA NONE SEEN NONE SEEN /HPF   Blood Gas, Venous   Result Value Ref Range    pH, Maikel 7.38 7.32 - 7.42    pCO2, Maikel 56 (H) 38 - 52 mmHG    pO2, Maikel 240 (H) 28 - 48 mmHG    Base Exc, Mixed 6.3 (H) 0 - 2.3    HCO3, Venous 33.1 (H) 19 - 25 MMOL/L    O2 Sat, Maikel 94.9 (H) 50 - 70 %    Comment VBG    Magnesium   Result Value Ref Range    Magnesium 2.6 (H) 1.8 - 2.4 mg/dl   TSH without Reflex   Result Value Ref Range    TSH, High Sensitivity 3.490 0.270 - 4.20 uIu/ml   EKG 12 Lead   Result Value Ref Range    Ventricular Rate 58 BPM    Atrial Rate 58 BPM    P-R Interval 248 ms    QRS Duration 78 ms    Q-T Interval 458 ms    QTc Calculation (Bazett) 449 ms    P Axis 63 degrees    R Axis 38 degrees    T Axis 67 degrees    Diagnosis       Sinus bradycardia with 1st degree AV block  Low voltage QRS  Cannot rule out Anterior infarct (cited on or before 25-JUN-2020)  Abnormal ECG  When compared with ECG of 25-AUG-2020 10:05,  Previous ECG has undetermined rhythm, needs review  QRS duration has decreased  Questionable change in initial forces of Anterior leads  QT has shortened       RADIOLOGY/PROCEDURES    Xr Chest Portable    Result Date: 12/9/2020  EXAMINATION: ONE XRAY VIEW OF THE CHEST 12/9/2020 11:52 am COMPARISON: 08/29/2020 HISTORY: ORDERING SYSTEM PROVIDED HISTORY: Shortness of breath TECHNOLOGIST PROVIDED HISTORY: Reason for exam:->Shortness of breath Reason for Exam: sob Acuity: Unknown Type of Exam: Unknown FINDINGS: The heart and mediastinal structures are stable. There is prominence of the central pulmonary vasculature with bilateral lung infiltrates. The findings are concerning for pulmonary edema. No significant pleural effusion is seen. Surgical clips are seen in the left axilla. Cardiomegaly with fullness of the central pulmonary vasculature and bilateral lung infiltrates questioning pulmonary edema. Pneumonia cannot be excluded. ED COURSE & MEDICAL DECISION MAKING      Patient presents as above. Emergent etiologies considered. Patient presenting with progressive 2 weeks of shortness of breath, dyspnea on exertion, urinary frequency. Patient has history of respiratory issues, CHF, the need for diuresis. Also has had demonstrated history of septic shock.   She denies any fevers or chills cough congestion, no wheezing. States that she has been eating great eating and drinking for the decreased oral intake, no significant abdominal pain, no change in bowel habits. Does admit to urinary frequency. Has evidence of erythema into the intertriginous folds of the pannus. Also shows signs of chronic venous stasis    Broad work-up initiated. Patient's work-up demonstrates some pulmonary vascular congestion on chest x-ray, she does demonstrate urinary tract infection. Otherwise her vital signs have stabilized. Her blood work is otherwise unrevealing. No elevation of troponin, BNP is around her baseline. EKG nonacute. Liver, kidney function within normal limits. Lactic within normal limits. At this point we did consult cardiology, they advised increasing the patient's furosemide for the next week and to follow-up as an outpatient. On review of patient's urinary cultures, we will treat with Bactrim, she is had no history of kidney disease. We will get case management involved to see if we can make sure patient is safe at home and is getting routine follow-up care and to be able to follow-up with cardiology as an outpatient.    ________________________________________________________________________    2:04 PM EST I have signed out [address] Emergency Department care to Dr. Grace Dejesus  Bedside hand-off performed. We discussed the history, physical exam, completed/pending test results (if obtained) and current treatment plan. At time of patient signout Case management evaluation and final disposition pending.  ________________________________________________________________________      Comment: Please note this report has been produced using speech recognition software and may contain errors related to that system including errors in grammar, punctuation, and spelling, as well as words and phrases that may be inappropriate.  If there are any questions or concerns please feel free to contact the dictating provider for clarification.                         Cl Payne [address], PA  12/09/20 8078

## 2020-12-09 NOTE — CONSULTS
PICC line discontinued at this time per Dr. Elizabeth Lozada, patient is a potential discharge pending Cardiology consult.

## 2020-12-09 NOTE — ED PROVIDER NOTES
I independently examined and evaluated Kimberli Kruger. In brief, 62-year-old female presents with complaint of worsening shortness of breath over 2 weeks, she lives at home, worse with exertion. Uses a walker at home. Is a history of CHF and does feel like she is having some fluid overload. She has been urinating frequently and does have a history of UTIs. No recent sick contacts, had negative Covid testing in August when she was admitted for diuresis. No fevers. No nausea or vomiting. No abdominal pain. No chest pain. No leg pain. Focused exam revealed Morbidly obese patient, no acute distress, regular rate and rhythm, nonlabored respiration. And soft and nondistended. Sheis in no distress whatsoever and stable on her home 2 L nasal cannula. + peripheral edema. No wounds. ED course: Labs are actually very reassuring, does appear to have a urinary tract infection but kidney function appears to be stable, BNP is not significantly elevated, mildly elevated in the 2000 range, some mild pulmonary edema, she is stable on her home 2 L, we have discussed with pharmacy and it reviewed her previous cultures and we will start her on Bactrim. We have discussed with her cardiologist and we will double her torsemide for the next 7 days, plan to do this and get case management to help ensure close follow-up with her cardiologist in 1 week and then her PCP as well to ensure improvement of the UTI. Given strict return precautions. We are also checking to ensure that she has all of her home health care that she will require. She is very happy to be going home and at least attempt to be managed as outpatient rather than be admitted at this time. She will be discharged in stable condition      EKG:  Sinus bradycardia with first-degree AV block, rate of 58 bpm.  LA normal of 248 ms. Otherwise normal intervals. Low voltage QRS. No ST elevation.   No previous to compare    All diagnostic, treatment, and disposition decisions were made by myself in conjunction with the advanced practice provider. For all further details of the patient's emergency department visit, please see the advanced practice provider's documentation. Comment: Please note this report has been produced using speech recognition software and may contain errors related to that system including errors in grammar, punctuation, and spelling, as well as words and phrases that may be inappropriate. If there are any questions or concerns please feel free to contact the dictating provider for clarification.         Hiro Goff MD  12/09/20 151        Impression:  LORE Goff MD  12/10/20 1770

## 2020-12-09 NOTE — CARE COORDINATION
CM received patient as consult. CM spoke to Dr. Deacon Urias. Dr. Deacon Urias reported that patient is a high risk and when patient has became sick; patient has had lengthy hospital stays. Dr. Deacon Urias would like CM to assist with patient ensuring that patient follows up with cardiology appointment and patients' PCP and get her antibiotic. Patient states that she has a walker and an electric wheelchair. Patient is able to ambulate very little since patient has been short of breath. Patient lives at home with spouse. Spouse works until after 3:30 pm. Patient reported that her daughter, Jewell Wilder @ [phone],\"   CM discussed needs with Dr. Deacon Urias. Dr. Deacon Urias would like patient to get [address] upon discharge with OT/PT. CM discussed [address] with patient. Patient has used [facility] in past, but stated that she would like to try [facility]. CM printed up referral, face sheet, orders and faxed to [address] [place] [address] @ [phone]. Phone is: [phone]. CM then called patients' spouse: Pablo Triana @ [phone]\" and patients' daughter: Jewell Wilder @ [phone],\" and reviewed doctors concerns with family. CM gave family a copy of AVS with [facility] information on it as well as paperwork from [employer] and all the services that are offered that can assist patient. CM arranged discharge pick-up times. Spouse and daughter arrived at 18:30 to  patient upon discharge. Family and patient reported that they will  prescriptions, follow with PCP for medication, and keep cardiologist appointment as well as keeping [address] appointments and will call [facility] to get more services in residence to assist patient.

## 2020-12-11 NOTE — CARE COORDINATION
ACM called pt w/o succes for f/u on ED visit. ACM left VM with request to return call. Contact info provided. Josefa Hardy RN  Ambulatory Care Manager  [phone]  [email or web address]

## 2020-12-11 NOTE — CARE COORDINATION
This is AC's final attemp to call the pt in hopes to f/u with ER visit and COVID 19 monitoring. AC did leave the pt a VM. No further attempts will be made. Deion Ernandez RN  Ambulatory Care Manager  [phone]  [email or web address]

## 2020-12-21 PROBLEM — I50.32 CHRONIC DIASTOLIC HEART FAILURE (HCC): Status: ACTIVE | Noted: 2020-01-01

## 2021-01-01 ENCOUNTER — APPOINTMENT (OUTPATIENT)
Dept: GENERAL RADIOLOGY | Age: 66
DRG: 133 | End: 2021-01-01
Payer: COMMERCIAL

## 2021-01-01 ENCOUNTER — APPOINTMENT (OUTPATIENT)
Dept: GENERAL RADIOLOGY | Age: 66
DRG: 720 | End: 2021-01-01
Payer: COMMERCIAL

## 2021-01-01 ENCOUNTER — HOSPITAL ENCOUNTER (INPATIENT)
Age: 66
LOS: 15 days | Discharge: NURSING, REHAB OR LONG-TERM CARE | DRG: 720 | End: 2021-01-28
Attending: EMERGENCY MEDICINE | Admitting: INTERNAL MEDICINE
Payer: COMMERCIAL

## 2021-01-01 ENCOUNTER — APPOINTMENT (OUTPATIENT)
Dept: CT IMAGING | Age: 66
DRG: 720 | End: 2021-01-01
Payer: COMMERCIAL

## 2021-01-01 ENCOUNTER — APPOINTMENT (OUTPATIENT)
Dept: CT IMAGING | Age: 66
DRG: 133 | End: 2021-01-01
Payer: COMMERCIAL

## 2021-01-01 ENCOUNTER — HOSPITAL ENCOUNTER (INPATIENT)
Age: 66
LOS: 4 days | DRG: 133 | End: 2021-02-25
Attending: EMERGENCY MEDICINE | Admitting: INTERNAL MEDICINE
Payer: COMMERCIAL

## 2021-01-01 ENCOUNTER — APPOINTMENT (OUTPATIENT)
Dept: ULTRASOUND IMAGING | Age: 66
DRG: 133 | End: 2021-01-01
Payer: COMMERCIAL

## 2021-01-01 ENCOUNTER — APPOINTMENT (OUTPATIENT)
Dept: INTERVENTIONAL RADIOLOGY/VASCULAR | Age: 66
DRG: 720 | End: 2021-01-01
Payer: COMMERCIAL

## 2021-01-01 VITALS
WEIGHT: 293 LBS | HEIGHT: 66 IN | DIASTOLIC BLOOD PRESSURE: 15 MMHG | OXYGEN SATURATION: 49 % | BODY MASS INDEX: 47.09 KG/M2 | TEMPERATURE: 100 F | SYSTOLIC BLOOD PRESSURE: 31 MMHG

## 2021-01-01 VITALS
TEMPERATURE: 98.8 F | BODY MASS INDEX: 0.33 KG/M2 | HEART RATE: 66 BPM | HEIGHT: 68 IN | DIASTOLIC BLOOD PRESSURE: 67 MMHG | RESPIRATION RATE: 13 BRPM | WEIGHT: 2.2 LBS | SYSTOLIC BLOOD PRESSURE: 120 MMHG | OXYGEN SATURATION: 95 %

## 2021-01-01 DIAGNOSIS — Z86.79 HISTORY OF HEART FAILURE: ICD-10-CM

## 2021-01-01 DIAGNOSIS — R06.00 DYSPNEA, UNSPECIFIED TYPE: Primary | ICD-10-CM

## 2021-01-01 DIAGNOSIS — R79.89 ELEVATED D-DIMER: ICD-10-CM

## 2021-01-01 DIAGNOSIS — I95.9 HYPOTENSION, UNSPECIFIED HYPOTENSION TYPE: ICD-10-CM

## 2021-01-01 DIAGNOSIS — I95.89 OTHER SPECIFIED HYPOTENSION: ICD-10-CM

## 2021-01-01 DIAGNOSIS — J96.21 ACUTE ON CHRONIC RESPIRATORY FAILURE WITH HYPOXIA (HCC): ICD-10-CM

## 2021-01-01 DIAGNOSIS — D64.9 SEVERE ANEMIA: ICD-10-CM

## 2021-01-01 DIAGNOSIS — I50.9 ACUTE CONGESTIVE HEART FAILURE, UNSPECIFIED HEART FAILURE TYPE (HCC): Primary | ICD-10-CM

## 2021-01-01 DIAGNOSIS — N17.9 ACUTE KIDNEY INJURY (HCC): ICD-10-CM

## 2021-01-01 DIAGNOSIS — R09.02 HYPOXIA: ICD-10-CM

## 2021-01-01 DIAGNOSIS — R65.10 SIRS (SYSTEMIC INFLAMMATORY RESPONSE SYNDROME) (HCC): ICD-10-CM

## 2021-01-01 DIAGNOSIS — R06.89 HYPERCARBIA: ICD-10-CM

## 2021-01-01 DIAGNOSIS — R60.9 PERIPHERAL EDEMA: ICD-10-CM

## 2021-01-01 DIAGNOSIS — R00.1 BRADYCARDIA: ICD-10-CM

## 2021-01-01 LAB
ABO/RH: NORMAL
ADENOVIRUS DETECTION BY PCR: NOT DETECTED
ALBUMIN SERPL-MCNC: 3 GM/DL (ref 3.4–5)
ALBUMIN SERPL-MCNC: 3.3 GM/DL (ref 3.4–5)
ALBUMIN SERPL-MCNC: 3.3 GM/DL (ref 3.4–5)
ALBUMIN SERPL-MCNC: 3.5 GM/DL
ALBUMIN SERPL-MCNC: 3.5 GM/DL (ref 3.4–5)
ALP BLD-CCNC: 135 IU/L (ref 40–129)
ALP BLD-CCNC: 70 IU/L (ref 40–129)
ALP BLD-CCNC: 74 IU/L (ref 40–129)
ALP BLD-CCNC: 81 IU/L (ref 40–129)
ALP BLD-CCNC: 85 IU/L
ALP BLD-CCNC: 86 IU/L (ref 40–129)
ALP BLD-CCNC: 89 IU/L (ref 40–129)
ALT SERPL-CCNC: 7 U/L (ref 10–40)
ALT SERPL-CCNC: 9 U/L (ref 10–40)
ALT SERPL-CCNC: <5 U/L (ref 10–40)
ANION GAP SERPL CALCULATED.3IONS-SCNC: 10 MMOL/L (ref 4–16)
ANION GAP SERPL CALCULATED.3IONS-SCNC: 10 MMOL/L (ref 4–16)
ANION GAP SERPL CALCULATED.3IONS-SCNC: 11 MMOL/L (ref 4–16)
ANION GAP SERPL CALCULATED.3IONS-SCNC: 12 MMOL/L (ref 4–16)
ANION GAP SERPL CALCULATED.3IONS-SCNC: 13 MMOL/L (ref 4–16)
ANION GAP SERPL CALCULATED.3IONS-SCNC: 14 MMOL/L (ref 4–16)
ANION GAP SERPL CALCULATED.3IONS-SCNC: 22 MMOL/L (ref 4–16)
ANION GAP SERPL CALCULATED.3IONS-SCNC: 4 MMOL/L (ref 4–16)
ANION GAP SERPL CALCULATED.3IONS-SCNC: 4 MMOL/L (ref 4–16)
ANION GAP SERPL CALCULATED.3IONS-SCNC: 5 MMOL/L (ref 4–16)
ANION GAP SERPL CALCULATED.3IONS-SCNC: 7 MMOL/L (ref 4–16)
ANION GAP SERPL CALCULATED.3IONS-SCNC: 8 MMOL/L (ref 4–16)
ANION GAP SERPL CALCULATED.3IONS-SCNC: 9 MMOL/L (ref 4–16)
ANION GAP SERPL CALCULATED.3IONS-SCNC: 9 MMOL/L (ref 4–16)
ANISOCYTOSIS: ABNORMAL
ANISOCYTOSIS: ABNORMAL
ANTIBODY SCREEN: NEGATIVE
APTT: 116.6 SECONDS (ref 25.1–37.1)
APTT: 181.4 SECONDS (ref 25.1–37.1)
APTT: 32.8 SECONDS (ref 25.1–37.1)
APTT: 35.7 SECONDS (ref 25.1–37.1)
APTT: 51.9 SECONDS (ref 25.1–37.1)
APTT: 58 SECONDS (ref 25.1–37.1)
APTT: 59.1 SECONDS (ref 25.1–37.1)
APTT: 75.8 SECONDS (ref 25.1–37.1)
APTT: 79.4 SECONDS (ref 25.1–37.1)
APTT: 83.9 SECONDS (ref 25.1–37.1)
APTT: 88.5 SECONDS (ref 25.1–37.1)
APTT: 93.9 SECONDS (ref 25.1–37.1)
AST SERPL-CCNC: 10 IU/L (ref 15–37)
AST SERPL-CCNC: 12 IU/L (ref 15–37)
AST SERPL-CCNC: 13 IU/L (ref 15–37)
AST SERPL-CCNC: 20 IU/L (ref 15–37)
AST SERPL-CCNC: 9 IU/L (ref 15–37)
BACTERIA: ABNORMAL /HPF
BANDED NEUTROPHILS ABSOLUTE COUNT: 0.2 K/CU MM
BANDED NEUTROPHILS ABSOLUTE COUNT: 1.44 K/CU MM
BANDED NEUTROPHILS RELATIVE PERCENT: 11 % (ref 5–11)
BANDED NEUTROPHILS RELATIVE PERCENT: 3 % (ref 5–11)
BASE EXCESS MIXED: 0.8 (ref 0–2.3)
BASE EXCESS MIXED: 1.8 (ref 0–2.3)
BASE EXCESS MIXED: 14.2 (ref 0–2.3)
BASE EXCESS MIXED: 18.8 (ref 0–2.3)
BASE EXCESS MIXED: 2.3 (ref 0–2.3)
BASE EXCESS MIXED: 2.8 (ref 0–2.3)
BASE EXCESS MIXED: 3.6 (ref 0–2.3)
BASE EXCESS MIXED: 5.5 (ref 0–2.3)
BASE EXCESS MIXED: 6.1 (ref 0–2.3)
BASE EXCESS MIXED: 7 (ref 0–2.3)
BASE EXCESS MIXED: 8.9 (ref 0–2.3)
BASE EXCESS: 16 (ref 0–2.4)
BASE EXCESS: 5 (ref 0–2.4)
BASOPHILS ABSOLUTE: 0 K/CU MM
BASOPHILS ABSOLUTE: 0 K/CU MM
BASOPHILS ABSOLUTE: 0.1 K/CU MM
BASOPHILS RELATIVE PERCENT: 0.1 % (ref 0–1)
BASOPHILS RELATIVE PERCENT: 0.7 % (ref 0–1)
BASOPHILS RELATIVE PERCENT: 1 % (ref 0–1)
BASOPHILS RELATIVE PERCENT: 1.1 % (ref 0–1)
BASOPHILS RELATIVE PERCENT: 1.1 % (ref 0–1)
BASOPHILS RELATIVE PERCENT: 1.2 % (ref 0–1)
BASOPHILS RELATIVE PERCENT: 1.2 % (ref 0–1)
BILIRUB SERPL-MCNC: 0.2 MG/DL (ref 0–1)
BILIRUB SERPL-MCNC: 0.4 MG/DL (ref 0–1)
BILIRUB SERPL-MCNC: 0.7 MG/DL (ref 0–1)
BILIRUB SERPL-MCNC: 0.9 MG/DL (ref 0–1)
BILIRUB SERPL-MCNC: 0.9 MG/DL (ref 0–1)
BILIRUBIN URINE: NEGATIVE MG/DL
BLOOD, URINE: ABNORMAL
BORDETELLA PARAPERTUSSIS BY PCR: NOT DETECTED
BORDETELLA PERTUSSIS PCR: NOT DETECTED
BUN BLDV-MCNC: 11 MG/DL (ref 6–23)
BUN BLDV-MCNC: 11 MG/DL (ref 6–23)
BUN BLDV-MCNC: 12 MG/DL (ref 6–23)
BUN BLDV-MCNC: 12 MG/DL (ref 6–23)
BUN BLDV-MCNC: 15 MG/DL (ref 6–23)
BUN BLDV-MCNC: 16 MG/DL (ref 6–23)
BUN BLDV-MCNC: 18 MG/DL (ref 6–23)
BUN BLDV-MCNC: 20 MG/DL (ref 6–23)
BUN BLDV-MCNC: 20 MG/DL (ref 6–23)
BUN BLDV-MCNC: 22 MG/DL (ref 6–23)
BUN BLDV-MCNC: 24 MG/DL (ref 6–23)
BUN BLDV-MCNC: 26 MG/DL (ref 6–23)
BUN BLDV-MCNC: 27 MG/DL (ref 6–23)
BUN BLDV-MCNC: 30 MG/DL (ref 6–23)
BUN BLDV-MCNC: 33 MG/DL (ref 6–23)
BUN BLDV-MCNC: 37 MG/DL (ref 6–23)
BUN BLDV-MCNC: 41 MG/DL (ref 6–23)
C-REACTIVE PROTEIN, HIGH SENSITIVITY: 27.3 MG/L
C-REACTIVE PROTEIN, HIGH SENSITIVITY: 29 MG/L
C-REACTIVE PROTEIN, HIGH SENSITIVITY: 35.8 MG/L
C-REACTIVE PROTEIN, HIGH SENSITIVITY: 41.6 MG/L
C-REACTIVE PROTEIN, HIGH SENSITIVITY: 49.9 MG/L
CALCIUM SERPL-MCNC: 7.8 MG/DL (ref 8.3–10.6)
CALCIUM SERPL-MCNC: 8 MG/DL (ref 8.3–10.6)
CALCIUM SERPL-MCNC: 8.1 MG/DL (ref 8.3–10.6)
CALCIUM SERPL-MCNC: 8.2 MG/DL (ref 8.3–10.6)
CALCIUM SERPL-MCNC: 8.3 MG/DL (ref 8.3–10.6)
CALCIUM SERPL-MCNC: 8.3 MG/DL (ref 8.3–10.6)
CALCIUM SERPL-MCNC: 8.4 MG/DL (ref 8.3–10.6)
CALCIUM SERPL-MCNC: 8.6 MG/DL (ref 8.3–10.6)
CALCIUM SERPL-MCNC: 8.7 MG/DL (ref 8.3–10.6)
CALCIUM SERPL-MCNC: 8.7 MG/DL (ref 8.3–10.6)
CALCIUM SERPL-MCNC: 8.8 MG/DL (ref 8.3–10.6)
CALCIUM SERPL-MCNC: 8.8 MG/DL (ref 8.3–10.6)
CALCIUM SERPL-MCNC: 8.9 MG/DL (ref 8.3–10.6)
CALCIUM SERPL-MCNC: 9 MG/DL (ref 8.3–10.6)
CALCIUM SERPL-MCNC: 9 MG/DL (ref 8.3–10.6)
CARBON MONOXIDE, BLOOD: 1.5 % (ref 0–5)
CARBON MONOXIDE, BLOOD: 1.9 % (ref 0–5)
CARBON MONOXIDE, BLOOD: 2 % (ref 0–5)
CARBON MONOXIDE, BLOOD: 2.1 % (ref 0–5)
CARBON MONOXIDE, BLOOD: 2.3 % (ref 0–5)
CARBON MONOXIDE, BLOOD: 2.4 % (ref 0–5)
CARBON MONOXIDE, BLOOD: 2.8 % (ref 0–5)
CHLAMYDOPHILA PNEUMONIA PCR: NOT DETECTED
CHLORIDE BLD-SCNC: 100 MMOL/L (ref 99–110)
CHLORIDE BLD-SCNC: 102 MMOL/L (ref 99–110)
CHLORIDE BLD-SCNC: 89 MMOL/L (ref 99–110)
CHLORIDE BLD-SCNC: 90 MMOL/L (ref 99–110)
CHLORIDE BLD-SCNC: 91 MMOL/L (ref 99–110)
CHLORIDE BLD-SCNC: 92 MMOL/L (ref 99–110)
CHLORIDE BLD-SCNC: 93 MMOL/L (ref 99–110)
CHLORIDE BLD-SCNC: 94 MMOL/L (ref 99–110)
CHLORIDE BLD-SCNC: 95 MMOL/L (ref 99–110)
CHLORIDE BLD-SCNC: 95 MMOL/L (ref 99–110)
CHLORIDE BLD-SCNC: 96 MMOL/L (ref 99–110)
CHLORIDE BLD-SCNC: 97 MMOL/L (ref 99–110)
CHLORIDE BLD-SCNC: 98 MMOL/L (ref 99–110)
CHOLESTEROL: 151 MG/DL
CLARITY: ABNORMAL
CO2 CONTENT: 23.5 MMOL/L (ref 19–24)
CO2 CONTENT: 31.3 MMOL/L (ref 19–24)
CO2 CONTENT: 31.6 MMOL/L (ref 19–24)
CO2 CONTENT: 32.2 MMOL/L (ref 19–24)
CO2 CONTENT: 32.2 MMOL/L (ref 19–24)
CO2 CONTENT: 32.6 MMOL/L (ref 19–24)
CO2 CONTENT: 32.6 MMOL/L (ref 19–24)
CO2 CONTENT: 33.3 MMOL/L (ref 19–24)
CO2 CONTENT: 34.6 MMOL/L (ref 19–24)
CO2 CONTENT: 47.5 MMOL/L (ref 19–24)
CO2 CONTENT: 52.4 MMOL/L (ref 19–24)
CO2: 20 MMOL/L (ref 21–32)
CO2: 27 MMOL/L (ref 21–32)
CO2: 27 MMOL/L (ref 21–32)
CO2: 28 MMOL/L (ref 21–32)
CO2: 29 MMOL/L (ref 21–32)
CO2: 30 MMOL/L (ref 21–32)
CO2: 31 MMOL/L (ref 21–32)
CO2: 31 MMOL/L (ref 21–32)
CO2: 33 MMOL/L (ref 21–32)
CO2: 35 MMOL/L (ref 21–32)
CO2: 38 MMOL/L (ref 21–32)
CO2: 41 MMOL/L (ref 21–32)
CO2: 42 MMOL/L (ref 21–32)
CO2: 44 MMOL/L (ref 21–32)
CO2: 44 MMOL/L (ref 21–32)
CO2: 46 MMOL/L (ref 21–32)
CO2: 46 MMOL/L (ref 21–32)
CO2: 47 MMOL/L (ref 21–32)
CO2: 47 MMOL/L (ref 21–32)
CO2: 48 MMOL/L (ref 21–32)
COLOR: YELLOW
COMMENT: ABNORMAL
COMPONENT: NORMAL
CORONAVIRUS 229E PCR: NOT DETECTED
CORONAVIRUS HKU1 PCR: NOT DETECTED
CORONAVIRUS NL63 PCR: NOT DETECTED
CORONAVIRUS OC43 PCR: NOT DETECTED
CREAT SERPL-MCNC: 1.1 MG/DL (ref 0.6–1.1)
CREAT SERPL-MCNC: 1.2 MG/DL (ref 0.6–1.1)
CREAT SERPL-MCNC: 1.2 MG/DL (ref 0.6–1.1)
CREAT SERPL-MCNC: 1.3 MG/DL (ref 0.6–1.1)
CREAT SERPL-MCNC: 1.4 MG/DL (ref 0.6–1.1)
CREAT SERPL-MCNC: 1.5 MG/DL (ref 0.6–1.1)
CREAT SERPL-MCNC: 1.6 MG/DL (ref 0.6–1.1)
CREAT SERPL-MCNC: 1.6 MG/DL (ref 0.6–1.1)
CREAT SERPL-MCNC: 1.7 MG/DL (ref 0.6–1.1)
CREAT SERPL-MCNC: 1.8 MG/DL (ref 0.6–1.1)
CREAT SERPL-MCNC: 1.9 MG/DL (ref 0.6–1.1)
CREAT SERPL-MCNC: 1.9 MG/DL (ref 0.6–1.1)
CREAT SERPL-MCNC: 2 MG/DL (ref 0.6–1.1)
CREAT SERPL-MCNC: 2.1 MG/DL (ref 0.6–1.1)
CREATININE URINE: 13.4 MG/DL (ref 28–217)
CROSSMATCH RESULT: NORMAL
CULTURE: ABNORMAL
CULTURE: ABNORMAL
CULTURE: NORMAL
D DIMER: 633 NG/ML(DDU)
DIFFERENTIAL TYPE: ABNORMAL
EKG ATRIAL RATE: 105 BPM
EKG ATRIAL RATE: 267 BPM
EKG ATRIAL RATE: 68 BPM
EKG ATRIAL RATE: 83 BPM
EKG DIAGNOSIS: NORMAL
EKG P AXIS: 69 DEGREES
EKG P AXIS: 79 DEGREES
EKG P-R INTERVAL: 218 MS
EKG P-R INTERVAL: 226 MS
EKG Q-T INTERVAL: 386 MS
EKG Q-T INTERVAL: 390 MS
EKG Q-T INTERVAL: 410 MS
EKG Q-T INTERVAL: 536 MS
EKG QRS DURATION: 100 MS
EKG QRS DURATION: 108 MS
EKG QRS DURATION: 136 MS
EKG QRS DURATION: 90 MS
EKG QTC CALCULATION (BAZETT): 435 MS
EKG QTC CALCULATION (BAZETT): 452 MS
EKG QTC CALCULATION (BAZETT): 453 MS
EKG QTC CALCULATION (BAZETT): 515 MS
EKG R AXIS: 256 DEGREES
EKG R AXIS: 33 DEGREES
EKG R AXIS: 36 DEGREES
EKG R AXIS: 99 DEGREES
EKG T AXIS: 43 DEGREES
EKG T AXIS: 66 DEGREES
EKG T AXIS: 70 DEGREES
EKG T AXIS: 74 DEGREES
EKG VENTRICULAR RATE: 105 BPM
EKG VENTRICULAR RATE: 43 BPM
EKG VENTRICULAR RATE: 68 BPM
EKG VENTRICULAR RATE: 83 BPM
EOSINOPHILS ABSOLUTE: 0 K/CU MM
EOSINOPHILS ABSOLUTE: 0.1 K/CU MM
EOSINOPHILS ABSOLUTE: 0.2 K/CU MM
EOSINOPHILS ABSOLUTE: 0.2 K/CU MM
EOSINOPHILS RELATIVE PERCENT: 0 % (ref 0–3)
EOSINOPHILS RELATIVE PERCENT: 1 % (ref 0–3)
EOSINOPHILS RELATIVE PERCENT: 1 % (ref 0–3)
EOSINOPHILS RELATIVE PERCENT: 1.2 % (ref 0–3)
EOSINOPHILS RELATIVE PERCENT: 2.4 % (ref 0–3)
EOSINOPHILS RELATIVE PERCENT: 2.7 % (ref 0–3)
EOSINOPHILS RELATIVE PERCENT: 3.2 % (ref 0–3)
EOSINOPHILS RELATIVE PERCENT: 3.4 % (ref 0–3)
ESTIMATED AVERAGE GLUCOSE: 105 MG/DL
ESTIMATED AVERAGE GLUCOSE: 128 MG/DL
FERRITIN: 43 NG/ML (ref 15–150)
FOLATE: 9.3 NG/ML (ref 3.1–17.5)
GFR AFRICAN AMERICAN: 29 ML/MIN/1.73M2
GFR AFRICAN AMERICAN: 30 ML/MIN/1.73M2
GFR AFRICAN AMERICAN: 32 ML/MIN/1.73M2
GFR AFRICAN AMERICAN: 32 ML/MIN/1.73M2
GFR AFRICAN AMERICAN: 34 ML/MIN/1.73M2
GFR AFRICAN AMERICAN: 37 ML/MIN/1.73M2
GFR AFRICAN AMERICAN: 39 ML/MIN/1.73M2
GFR AFRICAN AMERICAN: 39 ML/MIN/1.73M2
GFR AFRICAN AMERICAN: 42 ML/MIN/1.73M2
GFR AFRICAN AMERICAN: 46 ML/MIN/1.73M2
GFR AFRICAN AMERICAN: 50 ML/MIN/1.73M2
GFR AFRICAN AMERICAN: 55 ML/MIN/1.73M2
GFR AFRICAN AMERICAN: 55 ML/MIN/1.73M2
GFR AFRICAN AMERICAN: >60 ML/MIN/1.73M2
GFR NON-AFRICAN AMERICAN: 24 ML/MIN/1.73M2
GFR NON-AFRICAN AMERICAN: 25 ML/MIN/1.73M2
GFR NON-AFRICAN AMERICAN: 27 ML/MIN/1.73M2
GFR NON-AFRICAN AMERICAN: 27 ML/MIN/1.73M2
GFR NON-AFRICAN AMERICAN: 28 ML/MIN/1.73M2
GFR NON-AFRICAN AMERICAN: 30 ML/MIN/1.73M2
GFR NON-AFRICAN AMERICAN: 33 ML/MIN/1.73M2
GFR NON-AFRICAN AMERICAN: 33 ML/MIN/1.73M2
GFR NON-AFRICAN AMERICAN: 35 ML/MIN/1.73M2
GFR NON-AFRICAN AMERICAN: 38 ML/MIN/1.73M2
GFR NON-AFRICAN AMERICAN: 41 ML/MIN/1.73M2
GFR NON-AFRICAN AMERICAN: 45 ML/MIN/1.73M2
GFR NON-AFRICAN AMERICAN: 45 ML/MIN/1.73M2
GFR NON-AFRICAN AMERICAN: 50 ML/MIN/1.73M2
GLUCOSE BLD-MCNC: 100 MG/DL (ref 70–99)
GLUCOSE BLD-MCNC: 100 MG/DL (ref 70–99)
GLUCOSE BLD-MCNC: 101 MG/DL (ref 70–99)
GLUCOSE BLD-MCNC: 102 MG/DL (ref 70–99)
GLUCOSE BLD-MCNC: 107 MG/DL (ref 70–99)
GLUCOSE BLD-MCNC: 107 MG/DL (ref 70–99)
GLUCOSE BLD-MCNC: 108 MG/DL (ref 70–99)
GLUCOSE BLD-MCNC: 109 MG/DL (ref 70–99)
GLUCOSE BLD-MCNC: 111 MG/DL (ref 70–99)
GLUCOSE BLD-MCNC: 113 MG/DL (ref 70–99)
GLUCOSE BLD-MCNC: 114 MG/DL (ref 70–99)
GLUCOSE BLD-MCNC: 115 MG/DL (ref 70–99)
GLUCOSE BLD-MCNC: 116 MG/DL (ref 70–99)
GLUCOSE BLD-MCNC: 117 MG/DL (ref 70–99)
GLUCOSE BLD-MCNC: 118 MG/DL (ref 70–99)
GLUCOSE BLD-MCNC: 120 MG/DL (ref 70–99)
GLUCOSE BLD-MCNC: 121 MG/DL (ref 70–99)
GLUCOSE BLD-MCNC: 122 MG/DL (ref 70–99)
GLUCOSE BLD-MCNC: 122 MG/DL (ref 70–99)
GLUCOSE BLD-MCNC: 124 MG/DL (ref 70–99)
GLUCOSE BLD-MCNC: 126 MG/DL (ref 70–99)
GLUCOSE BLD-MCNC: 126 MG/DL (ref 70–99)
GLUCOSE BLD-MCNC: 127 MG/DL (ref 70–99)
GLUCOSE BLD-MCNC: 130 MG/DL (ref 70–99)
GLUCOSE BLD-MCNC: 130 MG/DL (ref 70–99)
GLUCOSE BLD-MCNC: 132 MG/DL (ref 70–99)
GLUCOSE BLD-MCNC: 132 MG/DL (ref 70–99)
GLUCOSE BLD-MCNC: 134 MG/DL (ref 70–99)
GLUCOSE BLD-MCNC: 136 MG/DL (ref 70–99)
GLUCOSE BLD-MCNC: 136 MG/DL (ref 70–99)
GLUCOSE BLD-MCNC: 138 MG/DL (ref 70–99)
GLUCOSE BLD-MCNC: 138 MG/DL (ref 70–99)
GLUCOSE BLD-MCNC: 140 MG/DL (ref 70–99)
GLUCOSE BLD-MCNC: 142 MG/DL (ref 70–99)
GLUCOSE BLD-MCNC: 143 MG/DL (ref 70–99)
GLUCOSE BLD-MCNC: 145 MG/DL (ref 70–99)
GLUCOSE BLD-MCNC: 146 MG/DL (ref 70–99)
GLUCOSE BLD-MCNC: 150 MG/DL (ref 70–99)
GLUCOSE BLD-MCNC: 151 MG/DL (ref 70–99)
GLUCOSE BLD-MCNC: 154 MG/DL (ref 70–99)
GLUCOSE BLD-MCNC: 155 MG/DL (ref 70–99)
GLUCOSE BLD-MCNC: 163 MG/DL (ref 70–99)
GLUCOSE BLD-MCNC: 175 MG/DL (ref 70–99)
GLUCOSE BLD-MCNC: 180 MG/DL (ref 70–99)
GLUCOSE BLD-MCNC: 185 MG/DL (ref 70–99)
GLUCOSE BLD-MCNC: 195 MG/DL (ref 70–99)
GLUCOSE BLD-MCNC: 197 MG/DL (ref 70–99)
GLUCOSE BLD-MCNC: 198 MG/DL (ref 70–99)
GLUCOSE BLD-MCNC: 199 MG/DL (ref 70–99)
GLUCOSE BLD-MCNC: 205 MG/DL (ref 70–99)
GLUCOSE BLD-MCNC: 206 MG/DL (ref 70–99)
GLUCOSE BLD-MCNC: 208 MG/DL (ref 70–99)
GLUCOSE BLD-MCNC: 215 MG/DL (ref 70–99)
GLUCOSE BLD-MCNC: 217 MG/DL (ref 70–99)
GLUCOSE BLD-MCNC: 223 MG/DL (ref 70–99)
GLUCOSE BLD-MCNC: 228 MG/DL (ref 70–99)
GLUCOSE BLD-MCNC: 229 MG/DL (ref 70–99)
GLUCOSE BLD-MCNC: 233 MG/DL (ref 70–99)
GLUCOSE BLD-MCNC: 78 MG/DL (ref 70–99)
GLUCOSE BLD-MCNC: 79 MG/DL (ref 70–99)
GLUCOSE BLD-MCNC: 80 MG/DL (ref 70–99)
GLUCOSE BLD-MCNC: 81 MG/DL (ref 70–99)
GLUCOSE BLD-MCNC: 81 MG/DL (ref 70–99)
GLUCOSE BLD-MCNC: 82 MG/DL (ref 70–99)
GLUCOSE BLD-MCNC: 83 MG/DL (ref 70–99)
GLUCOSE BLD-MCNC: 83 MG/DL (ref 70–99)
GLUCOSE BLD-MCNC: 84 MG/DL (ref 70–99)
GLUCOSE BLD-MCNC: 85 MG/DL (ref 70–99)
GLUCOSE BLD-MCNC: 86 MG/DL (ref 70–99)
GLUCOSE BLD-MCNC: 88 MG/DL (ref 70–99)
GLUCOSE BLD-MCNC: 89 MG/DL (ref 70–99)
GLUCOSE BLD-MCNC: 90 MG/DL (ref 70–99)
GLUCOSE BLD-MCNC: 91 MG/DL (ref 70–99)
GLUCOSE BLD-MCNC: 93 MG/DL (ref 70–99)
GLUCOSE BLD-MCNC: 94 MG/DL (ref 70–99)
GLUCOSE BLD-MCNC: 94 MG/DL (ref 70–99)
GLUCOSE BLD-MCNC: 96 MG/DL (ref 70–99)
GLUCOSE BLD-MCNC: 96 MG/DL (ref 70–99)
GLUCOSE BLD-MCNC: 97 MG/DL (ref 70–99)
GLUCOSE BLD-MCNC: 98 MG/DL (ref 70–99)
GLUCOSE BLD-MCNC: 99 MG/DL (ref 70–99)
GLUCOSE, URINE: NEGATIVE MG/DL
HBA1C MFR BLD: 5.3 % (ref 4.2–6.3)
HBA1C MFR BLD: 6.1 % (ref 4.2–6.3)
HCO3 ARTERIAL: 22 MMOL/L (ref 18–23)
HCO3 ARTERIAL: 29.6 MMOL/L (ref 18–23)
HCO3 ARTERIAL: 30.2 MMOL/L (ref 18–23)
HCO3 ARTERIAL: 30.4 MMOL/L (ref 18–23)
HCO3 ARTERIAL: 31.1 MMOL/L (ref 18–23)
HCO3 ARTERIAL: 31.1 MMOL/L (ref 18–23)
HCO3 ARTERIAL: 31.2 MMOL/L (ref 18–23)
HCO3 ARTERIAL: 31.2 MMOL/L (ref 18–23)
HCO3 ARTERIAL: 32.2 MMOL/L (ref 18–23)
HCO3 ARTERIAL: 44.8 MMOL/L (ref 18–23)
HCO3 ARTERIAL: 49.6 MMOL/L (ref 18–23)
HCO3 VENOUS: 14.8 MMOL/L (ref 19–25)
HCO3 VENOUS: 33.3 MMOL/L (ref 19–25)
HCT VFR BLD CALC: 25 % (ref 37–47)
HCT VFR BLD CALC: 27.6 % (ref 37–47)
HCT VFR BLD CALC: 27.9 % (ref 37–47)
HCT VFR BLD CALC: 28 % (ref 37–47)
HCT VFR BLD CALC: 28.1 % (ref 37–47)
HCT VFR BLD CALC: 28.3 % (ref 37–47)
HCT VFR BLD CALC: 28.9 % (ref 37–47)
HCT VFR BLD CALC: 29.1 % (ref 37–47)
HCT VFR BLD CALC: 29.1 % (ref 37–47)
HCT VFR BLD CALC: 29.2 % (ref 37–47)
HCT VFR BLD CALC: 29.7 % (ref 37–47)
HCT VFR BLD CALC: 29.7 % (ref 37–47)
HCT VFR BLD CALC: 29.8 % (ref 37–47)
HCT VFR BLD CALC: 31.3 % (ref 37–47)
HCT VFR BLD CALC: 32.5 % (ref 37–47)
HCT VFR BLD CALC: 32.7 % (ref 37–47)
HCT VFR BLD CALC: 33 % (ref 37–47)
HCT VFR BLD CALC: 37.3 % (ref 37–47)
HCT VFR BLD CALC: 41.7 % (ref 37–47)
HDLC SERPL-MCNC: 46 MG/DL
HEMOGLOBIN: 10.9 GM/DL (ref 12.5–16)
HEMOGLOBIN: 11 GM/DL (ref 12.5–16)
HEMOGLOBIN: 7 GM/DL (ref 12.5–16)
HEMOGLOBIN: 7.5 GM/DL (ref 12.5–16)
HEMOGLOBIN: 7.5 GM/DL (ref 12.5–16)
HEMOGLOBIN: 7.6 GM/DL (ref 12.5–16)
HEMOGLOBIN: 7.7 GM/DL (ref 12.5–16)
HEMOGLOBIN: 7.9 GM/DL (ref 12.5–16)
HEMOGLOBIN: 8 GM/DL (ref 12.5–16)
HEMOGLOBIN: 8 GM/DL (ref 12.5–16)
HEMOGLOBIN: 8.1 GM/DL (ref 12.5–16)
HEMOGLOBIN: 8.2 GM/DL (ref 12.5–16)
HEMOGLOBIN: 8.3 GM/DL (ref 12.5–16)
HEMOGLOBIN: 9.5 GM/DL (ref 12.5–16)
HEMOGLOBIN: 9.6 GM/DL (ref 12.5–16)
HEMOGLOBIN: 9.6 GM/DL (ref 12.5–16)
HEMOGLOBIN: 9.8 GM/DL (ref 12.5–16)
HIGH SENSITIVE C-REACTIVE PROTEIN: 66.8 MG/L
HUMAN METAPNEUMOVIRUS PCR: NOT DETECTED
IMMATURE NEUTROPHIL %: 0.7 % (ref 0–0.43)
IMMATURE NEUTROPHIL %: 0.9 % (ref 0–0.43)
IMMATURE NEUTROPHIL %: 0.9 % (ref 0–0.43)
IMMATURE NEUTROPHIL %: 1.4 % (ref 0–0.43)
IMMATURE NEUTROPHIL %: 1.6 % (ref 0–0.43)
IMMATURE NEUTROPHIL %: 2.6 % (ref 0–0.43)
IMMATURE NEUTROPHIL %: 2.9 % (ref 0–0.43)
INFLUENZA A BY PCR: NOT DETECTED
INFLUENZA A H1 (2009) PCR: NOT DETECTED
INFLUENZA A H1 PANDEMIC PCR: NOT DETECTED
INFLUENZA A H3 PCR: NOT DETECTED
INFLUENZA B BY PCR: NOT DETECTED
INR BLD: 1.37 INDEX
IRON: 20 UG/DL (ref 37–145)
IRON: 25 UG/DL (ref 37–145)
KETONES, URINE: NEGATIVE MG/DL
LACTATE: 1.2 MMOL/L (ref 0.4–2)
LACTATE: 1.5 MMOL/L (ref 0.4–2)
LACTATE: 2.2 MMOL/L (ref 0.4–2)
LACTIC ACID, SEPSIS: 1.1 MMOL/L (ref 0.5–1.9)
LDL CHOLESTEROL DIRECT: 90 MG/DL
LEGIONELLA URINARY AG: NEGATIVE
LEGIONELLA URINARY AG: NEGATIVE
LEUKOCYTE ESTERASE, URINE: ABNORMAL
LYMPHOCYTES ABSOLUTE: 0.6 K/CU MM
LYMPHOCYTES ABSOLUTE: 0.7 K/CU MM
LYMPHOCYTES ABSOLUTE: 0.9 K/CU MM
LYMPHOCYTES ABSOLUTE: 1 K/CU MM
LYMPHOCYTES ABSOLUTE: 1.1 K/CU MM
LYMPHOCYTES ABSOLUTE: 1.1 K/CU MM
LYMPHOCYTES ABSOLUTE: 1.4 K/CU MM
LYMPHOCYTES ABSOLUTE: 1.7 K/CU MM
LYMPHOCYTES ABSOLUTE: 1.8 K/CU MM
LYMPHOCYTES RELATIVE PERCENT: 14 % (ref 24–44)
LYMPHOCYTES RELATIVE PERCENT: 14.3 % (ref 24–44)
LYMPHOCYTES RELATIVE PERCENT: 15.7 % (ref 24–44)
LYMPHOCYTES RELATIVE PERCENT: 19.8 % (ref 24–44)
LYMPHOCYTES RELATIVE PERCENT: 21 % (ref 24–44)
LYMPHOCYTES RELATIVE PERCENT: 22.5 % (ref 24–44)
LYMPHOCYTES RELATIVE PERCENT: 23.6 % (ref 24–44)
LYMPHOCYTES RELATIVE PERCENT: 26 % (ref 24–44)
LYMPHOCYTES RELATIVE PERCENT: 4.9 % (ref 24–44)
Lab: ABNORMAL
Lab: NORMAL
MAGNESIUM: 1.3 MG/DL (ref 1.8–2.4)
MAGNESIUM: 1.5 MG/DL (ref 1.8–2.4)
MAGNESIUM: 1.6 MG/DL (ref 1.8–2.4)
MAGNESIUM: 1.6 MG/DL (ref 1.8–2.4)
MAGNESIUM: 1.9 MG/DL (ref 1.8–2.4)
MAGNESIUM: 1.9 MG/DL (ref 1.8–2.4)
MAGNESIUM: 2.1 MG/DL (ref 1.8–2.4)
MAGNESIUM: 2.2 MG/DL (ref 1.8–2.4)
MAGNESIUM: 2.3 MG/DL (ref 1.8–2.4)
MAGNESIUM: 2.4 MG/DL (ref 1.8–2.4)
MAGNESIUM: 2.7 MG/DL (ref 1.8–2.4)
MAGNESIUM: 2.8 MG/DL (ref 1.8–2.4)
MAGNESIUM: 2.9 MG/DL (ref 1.8–2.4)
MCH RBC QN AUTO: 24.4 PG (ref 27–31)
MCH RBC QN AUTO: 24.4 PG (ref 27–31)
MCH RBC QN AUTO: 24.6 PG (ref 27–31)
MCH RBC QN AUTO: 24.6 PG (ref 27–31)
MCH RBC QN AUTO: 24.7 PG (ref 27–31)
MCH RBC QN AUTO: 24.7 PG (ref 27–31)
MCH RBC QN AUTO: 24.8 PG (ref 27–31)
MCH RBC QN AUTO: 24.8 PG (ref 27–31)
MCH RBC QN AUTO: 24.9 PG (ref 27–31)
MCH RBC QN AUTO: 24.9 PG (ref 27–31)
MCH RBC QN AUTO: 25 PG (ref 27–31)
MCH RBC QN AUTO: 25 PG (ref 27–31)
MCH RBC QN AUTO: 25.1 PG (ref 27–31)
MCH RBC QN AUTO: 25.2 PG (ref 27–31)
MCH RBC QN AUTO: 25.3 PG (ref 27–31)
MCH RBC QN AUTO: 25.3 PG (ref 27–31)
MCH RBC QN AUTO: 25.4 PG (ref 27–31)
MCH RBC QN AUTO: 25.5 PG (ref 27–31)
MCH RBC QN AUTO: 25.6 PG (ref 27–31)
MCH RBC QN AUTO: 25.6 PG (ref 27–31)
MCH RBC QN AUTO: 26 PG (ref 27–31)
MCHC RBC AUTO-ENTMCNC: 26.4 % (ref 32–36)
MCHC RBC AUTO-ENTMCNC: 26.5 % (ref 32–36)
MCHC RBC AUTO-ENTMCNC: 26.9 % (ref 32–36)
MCHC RBC AUTO-ENTMCNC: 26.9 % (ref 32–36)
MCHC RBC AUTO-ENTMCNC: 27.1 % (ref 32–36)
MCHC RBC AUTO-ENTMCNC: 27.1 % (ref 32–36)
MCHC RBC AUTO-ENTMCNC: 27.2 % (ref 32–36)
MCHC RBC AUTO-ENTMCNC: 27.2 % (ref 32–36)
MCHC RBC AUTO-ENTMCNC: 27.3 % (ref 32–36)
MCHC RBC AUTO-ENTMCNC: 27.5 % (ref 32–36)
MCHC RBC AUTO-ENTMCNC: 27.5 % (ref 32–36)
MCHC RBC AUTO-ENTMCNC: 27.6 % (ref 32–36)
MCHC RBC AUTO-ENTMCNC: 27.8 % (ref 32–36)
MCHC RBC AUTO-ENTMCNC: 27.9 % (ref 32–36)
MCHC RBC AUTO-ENTMCNC: 28 % (ref 32–36)
MCHC RBC AUTO-ENTMCNC: 28.5 % (ref 32–36)
MCHC RBC AUTO-ENTMCNC: 29.2 % (ref 32–36)
MCHC RBC AUTO-ENTMCNC: 29.4 % (ref 32–36)
MCHC RBC AUTO-ENTMCNC: 29.5 % (ref 32–36)
MCHC RBC AUTO-ENTMCNC: 29.7 % (ref 32–36)
MCHC RBC AUTO-ENTMCNC: 30.4 % (ref 32–36)
MCV RBC AUTO: 81.9 FL (ref 78–100)
MCV RBC AUTO: 82.5 FL (ref 78–100)
MCV RBC AUTO: 83 FL (ref 78–100)
MCV RBC AUTO: 84.4 FL (ref 78–100)
MCV RBC AUTO: 85.6 FL (ref 78–100)
MCV RBC AUTO: 88.4 FL (ref 78–100)
MCV RBC AUTO: 90 FL (ref 78–100)
MCV RBC AUTO: 90.1 FL (ref 78–100)
MCV RBC AUTO: 90.3 FL (ref 78–100)
MCV RBC AUTO: 91 FL (ref 78–100)
MCV RBC AUTO: 91.5 FL (ref 78–100)
MCV RBC AUTO: 91.7 FL (ref 78–100)
MCV RBC AUTO: 91.8 FL (ref 78–100)
MCV RBC AUTO: 93.2 FL (ref 78–100)
MCV RBC AUTO: 93.3 FL (ref 78–100)
MCV RBC AUTO: 93.5 FL (ref 78–100)
MCV RBC AUTO: 94 FL (ref 78–100)
MCV RBC AUTO: 94.3 FL (ref 78–100)
MCV RBC AUTO: 94.5 FL (ref 78–100)
METAMYELOCYTES ABSOLUTE COUNT: 0.2 K/CU MM
METAMYELOCYTES ABSOLUTE COUNT: 0.39 K/CU MM
METAMYELOCYTES PERCENT: 3 %
METAMYELOCYTES PERCENT: 3 %
METHEMOGLOBIN ARTERIAL: 0.6 %
METHEMOGLOBIN ARTERIAL: 0.7 %
METHEMOGLOBIN ARTERIAL: 0.9 %
METHEMOGLOBIN ARTERIAL: 1 %
METHEMOGLOBIN ARTERIAL: 1 %
METHEMOGLOBIN ARTERIAL: 1.1 %
METHEMOGLOBIN ARTERIAL: 1.3 %
METHEMOGLOBIN ARTERIAL: 1.4 %
METHEMOGLOBIN ARTERIAL: 1.5 %
MONOCYTES ABSOLUTE: 0.1 K/CU MM
MONOCYTES ABSOLUTE: 0.2 K/CU MM
MONOCYTES ABSOLUTE: 0.6 K/CU MM
MONOCYTES ABSOLUTE: 0.6 K/CU MM
MONOCYTES ABSOLUTE: 0.7 K/CU MM
MONOCYTES ABSOLUTE: 0.9 K/CU MM
MONOCYTES ABSOLUTE: 1 K/CU MM
MONOCYTES RELATIVE PERCENT: 1 % (ref 0–4)
MONOCYTES RELATIVE PERCENT: 10 % (ref 0–4)
MONOCYTES RELATIVE PERCENT: 12.9 % (ref 0–4)
MONOCYTES RELATIVE PERCENT: 12.9 % (ref 0–4)
MONOCYTES RELATIVE PERCENT: 13.5 % (ref 0–4)
MONOCYTES RELATIVE PERCENT: 15.4 % (ref 0–4)
MONOCYTES RELATIVE PERCENT: 4.4 % (ref 0–4)
MONOCYTES RELATIVE PERCENT: 6.8 % (ref 0–4)
MONOCYTES RELATIVE PERCENT: 9.8 % (ref 0–4)
MYCOPLASMA PNEUMONIAE PCR: NOT DETECTED
MYELOCYTE PERCENT: 1 %
MYELOCYTES ABSOLUTE COUNT: 0.07 K/CU MM
NITRITE URINE, QUANTITATIVE: NEGATIVE
NUCLEATED RBC %: 0 %
NUCLEATED RBC %: 0.7 %
NUCLEATED RBC %: 1 %
NUCLEATED RBC %: 1.4 %
NUCLEATED RBC %: 1.4 %
NUCLEATED RED BLOOD CELLS: 2
NUCLEATED RED BLOOD CELLS: 3
O2 SAT, VEN: 91 % (ref 50–70)
O2 SAT, VEN: 93.1 % (ref 50–70)
O2 SATURATION: 53.1 % (ref 96–97)
O2 SATURATION: 67.5 % (ref 96–97)
O2 SATURATION: 92.4 % (ref 96–97)
O2 SATURATION: 92.5 % (ref 96–97)
O2 SATURATION: 93 % (ref 96–97)
O2 SATURATION: 93.6 % (ref 96–97)
O2 SATURATION: 93.7 % (ref 96–97)
O2 SATURATION: 94.1 % (ref 96–97)
O2 SATURATION: 94.8 % (ref 96–97)
O2 SATURATION: 95.6 % (ref 96–97)
O2 SATURATION: 96.9 % (ref 96–97)
PARAINFLUENZA 1 PCR: NOT DETECTED
PARAINFLUENZA 2 PCR: NOT DETECTED
PARAINFLUENZA 3 PCR: NOT DETECTED
PARAINFLUENZA 4 PCR: NOT DETECTED
PCO2 ARTERIAL: 34 MMHG (ref 32–45)
PCO2 ARTERIAL: 37 MMHG (ref 32–45)
PCO2 ARTERIAL: 46 MMHG (ref 32–45)
PCO2 ARTERIAL: 48 MMHG (ref 32–45)
PCO2 ARTERIAL: 48 MMHG (ref 32–45)
PCO2 ARTERIAL: 59 MMHG (ref 32–45)
PCO2 ARTERIAL: 66 MMHG (ref 32–45)
PCO2 ARTERIAL: 71 MMHG (ref 32–45)
PCO2 ARTERIAL: 77 MMHG (ref 32–45)
PCO2 ARTERIAL: 89 MMHG (ref 32–45)
PCO2 ARTERIAL: 92 MMHG (ref 32–45)
PCO2, VEN: 56 MMHG (ref 38–52)
PCO2, VEN: 76 MMHG (ref 38–52)
PCT TRANSFERRIN: 6 % (ref 10–44)
PCT TRANSFERRIN: 7 % (ref 10–44)
PDW BLD-RTO: 17.5 % (ref 11.7–14.9)
PDW BLD-RTO: 17.8 % (ref 11.7–14.9)
PDW BLD-RTO: 17.9 % (ref 11.7–14.9)
PDW BLD-RTO: 17.9 % (ref 11.7–14.9)
PDW BLD-RTO: 18.3 % (ref 11.7–14.9)
PDW BLD-RTO: 18.3 % (ref 11.7–14.9)
PDW BLD-RTO: 18.5 % (ref 11.7–14.9)
PDW BLD-RTO: 19 % (ref 11.7–14.9)
PDW BLD-RTO: 19.1 % (ref 11.7–14.9)
PDW BLD-RTO: 19.1 % (ref 11.7–14.9)
PDW BLD-RTO: 19.2 % (ref 11.7–14.9)
PDW BLD-RTO: 19.3 % (ref 11.7–14.9)
PDW BLD-RTO: 19.3 % (ref 11.7–14.9)
PDW BLD-RTO: 19.4 % (ref 11.7–14.9)
PDW BLD-RTO: 19.5 % (ref 11.7–14.9)
PDW BLD-RTO: 19.6 % (ref 11.7–14.9)
PDW BLD-RTO: 19.9 % (ref 11.7–14.9)
PH BLOOD: 7.23 (ref 7.34–7.45)
PH BLOOD: 7.25 (ref 7.34–7.45)
PH BLOOD: 7.26 (ref 7.34–7.45)
PH BLOOD: 7.27 (ref 7.34–7.45)
PH BLOOD: 7.31 (ref 7.34–7.45)
PH BLOOD: 7.32 (ref 7.34–7.45)
PH BLOOD: 7.34 (ref 7.34–7.45)
PH BLOOD: 7.42 (ref 7.34–7.45)
PH BLOOD: 7.44 (ref 7.34–7.45)
PH BLOOD: 7.52 (ref 7.34–7.45)
PH BLOOD: 7.57 (ref 7.34–7.45)
PH VENOUS: 7.03 (ref 7.32–7.42)
PH VENOUS: 7.25 (ref 7.32–7.42)
PH, URINE: 5 (ref 5–8)
PHOSPHORUS: 2.1 MG/DL (ref 2.5–4.9)
PHOSPHORUS: 2.4 MG/DL (ref 2.5–4.9)
PLATELET # BLD: 147 K/CU MM (ref 140–440)
PLATELET # BLD: 148 K/CU MM (ref 140–440)
PLATELET # BLD: 150 K/CU MM (ref 140–440)
PLATELET # BLD: 153 K/CU MM (ref 140–440)
PLATELET # BLD: 162 K/CU MM (ref 140–440)
PLATELET # BLD: 175 K/CU MM (ref 140–440)
PLATELET # BLD: 178 K/CU MM (ref 140–440)
PLATELET # BLD: 191 K/CU MM (ref 140–440)
PLATELET # BLD: 197 K/CU MM (ref 140–440)
PLATELET # BLD: 200 K/CU MM (ref 140–440)
PLATELET # BLD: 201 K/CU MM (ref 140–440)
PLATELET # BLD: 203 K/CU MM (ref 140–440)
PLATELET # BLD: 211 K/CU MM (ref 140–440)
PLATELET # BLD: 226 K/CU MM (ref 140–440)
PLATELET # BLD: 234 K/CU MM (ref 140–440)
PLATELET # BLD: 240 K/CU MM (ref 140–440)
PLATELET # BLD: 245 K/CU MM (ref 140–440)
PLATELET # BLD: 248 K/CU MM (ref 140–440)
PLATELET # BLD: 257 K/CU MM (ref 140–440)
PLATELET # BLD: 298 K/CU MM (ref 140–440)
PLATELET # BLD: 313 K/CU MM (ref 140–440)
PLT MORPHOLOGY: ABNORMAL
PMV BLD AUTO: 10 FL (ref 7.5–11.1)
PMV BLD AUTO: 10 FL (ref 7.5–11.1)
PMV BLD AUTO: 10.1 FL (ref 7.5–11.1)
PMV BLD AUTO: 10.1 FL (ref 7.5–11.1)
PMV BLD AUTO: 10.2 FL (ref 7.5–11.1)
PMV BLD AUTO: 10.4 FL (ref 7.5–11.1)
PMV BLD AUTO: 10.6 FL (ref 7.5–11.1)
PMV BLD AUTO: 9.3 FL (ref 7.5–11.1)
PMV BLD AUTO: 9.4 FL (ref 7.5–11.1)
PMV BLD AUTO: 9.6 FL (ref 7.5–11.1)
PMV BLD AUTO: 9.7 FL (ref 7.5–11.1)
PMV BLD AUTO: 9.8 FL (ref 7.5–11.1)
PMV BLD AUTO: 9.8 FL (ref 7.5–11.1)
PMV BLD AUTO: 9.9 FL (ref 7.5–11.1)
PO2 ARTERIAL: 109 MMHG (ref 75–100)
PO2 ARTERIAL: 120 MMHG (ref 75–100)
PO2 ARTERIAL: 33 MMHG (ref 75–100)
PO2 ARTERIAL: 36 MMHG (ref 75–100)
PO2 ARTERIAL: 73 MMHG (ref 75–100)
PO2 ARTERIAL: 75 MMHG (ref 75–100)
PO2 ARTERIAL: 78 MMHG (ref 75–100)
PO2 ARTERIAL: 78 MMHG (ref 75–100)
PO2 ARTERIAL: 81 MMHG (ref 75–100)
PO2 ARTERIAL: 84 MMHG (ref 75–100)
PO2 ARTERIAL: 94 MMHG (ref 75–100)
PO2, VEN: 112 MMHG (ref 28–48)
PO2, VEN: 138 MMHG (ref 28–48)
POLYCHROMASIA: ABNORMAL
POTASSIUM SERPL-SCNC: 3.1 MMOL/L (ref 3.5–5.1)
POTASSIUM SERPL-SCNC: 3.2 MMOL/L (ref 3.5–5.1)
POTASSIUM SERPL-SCNC: 3.2 MMOL/L (ref 3.5–5.1)
POTASSIUM SERPL-SCNC: 3.3 MMOL/L (ref 3.5–5.1)
POTASSIUM SERPL-SCNC: 3.5 MMOL/L (ref 3.5–5.1)
POTASSIUM SERPL-SCNC: 3.6 MMOL/L (ref 3.5–5.1)
POTASSIUM SERPL-SCNC: 3.7 MMOL/L (ref 3.5–5.1)
POTASSIUM SERPL-SCNC: 3.8 MMOL/L (ref 3.5–5.1)
POTASSIUM SERPL-SCNC: 3.8 MMOL/L (ref 3.5–5.1)
POTASSIUM SERPL-SCNC: 3.9 MMOL/L (ref 3.5–5.1)
POTASSIUM SERPL-SCNC: 3.9 MMOL/L (ref 3.5–5.1)
POTASSIUM SERPL-SCNC: 4.1 MMOL/L (ref 3.5–5.1)
POTASSIUM SERPL-SCNC: 4.3 MMOL/L (ref 3.5–5.1)
POTASSIUM SERPL-SCNC: 4.3 MMOL/L (ref 3.5–5.1)
POTASSIUM SERPL-SCNC: 4.5 MMOL/L (ref 3.5–5.1)
POTASSIUM SERPL-SCNC: 5 MMOL/L (ref 3.5–5.1)
POTASSIUM SERPL-SCNC: 5.3 MMOL/L (ref 3.5–5.1)
POTASSIUM SERPL-SCNC: 5.3 MMOL/L (ref 3.5–5.1)
PREALBUMIN: 8 MG/DL (ref 20–40)
PRO-BNP: 3436 PG/ML
PRO-BNP: 8071 PG/ML
PRO-BNP: 8699 PG/ML
PRO-BNP: ABNORMAL PG/ML
PROCALCITONIN: 0.11
PROCALCITONIN: 0.13
PROCALCITONIN: 0.15
PROCALCITONIN: 0.25
PROCALCITONIN: 4.81
PROT/CREAT RATIO, UR: 1.3
PROTEIN UA: 100 MG/DL
PROTHROMBIN TIME: 16.6 SECONDS (ref 11.7–14.5)
RBC # BLD: 2.77 M/CU MM (ref 4.2–5.4)
RBC # BLD: 2.92 M/CU MM (ref 4.2–5.4)
RBC # BLD: 2.96 M/CU MM (ref 4.2–5.4)
RBC # BLD: 2.99 M/CU MM (ref 4.2–5.4)
RBC # BLD: 3.01 M/CU MM (ref 4.2–5.4)
RBC # BLD: 3.04 M/CU MM (ref 4.2–5.4)
RBC # BLD: 3.05 M/CU MM (ref 4.2–5.4)
RBC # BLD: 3.1 M/CU MM (ref 4.2–5.4)
RBC # BLD: 3.12 M/CU MM (ref 4.2–5.4)
RBC # BLD: 3.17 M/CU MM (ref 4.2–5.4)
RBC # BLD: 3.18 M/CU MM (ref 4.2–5.4)
RBC # BLD: 3.21 M/CU MM (ref 4.2–5.4)
RBC # BLD: 3.24 M/CU MM (ref 4.2–5.4)
RBC # BLD: 3.3 M/CU MM (ref 4.2–5.4)
RBC # BLD: 3.37 M/CU MM (ref 4.2–5.4)
RBC # BLD: 3.82 M/CU MM (ref 4.2–5.4)
RBC # BLD: 3.91 M/CU MM (ref 4.2–5.4)
RBC # BLD: 3.94 M/CU MM (ref 4.2–5.4)
RBC # BLD: 3.94 M/CU MM (ref 4.2–5.4)
RBC # BLD: 4.36 M/CU MM (ref 4.2–5.4)
RBC # BLD: 4.46 M/CU MM (ref 4.2–5.4)
RBC URINE: 483 /HPF (ref 0–6)
RETICULOCYTE COUNT PCT: 3.4 % (ref 0.2–2.2)
RHINOVIRUS ENTEROVIRUS PCR: NOT DETECTED
RSV PCR: NOT DETECTED
SARS-COV-2, NAAT: NOT DETECTED
SARS-COV-2: NOT DETECTED
SEGMENTED NEUTROPHILS ABSOLUTE COUNT: 11.3 K/CU MM
SEGMENTED NEUTROPHILS ABSOLUTE COUNT: 2.3 K/CU MM
SEGMENTED NEUTROPHILS ABSOLUTE COUNT: 2.5 K/CU MM
SEGMENTED NEUTROPHILS ABSOLUTE COUNT: 3 K/CU MM
SEGMENTED NEUTROPHILS ABSOLUTE COUNT: 3.6 K/CU MM
SEGMENTED NEUTROPHILS ABSOLUTE COUNT: 3.9 K/CU MM
SEGMENTED NEUTROPHILS ABSOLUTE COUNT: 4.3 K/CU MM
SEGMENTED NEUTROPHILS ABSOLUTE COUNT: 5.1 K/CU MM
SEGMENTED NEUTROPHILS ABSOLUTE COUNT: 9.3 K/CU MM
SEGMENTED NEUTROPHILS RELATIVE PERCENT: 55 % (ref 36–66)
SEGMENTED NEUTROPHILS RELATIVE PERCENT: 57.7 % (ref 36–66)
SEGMENTED NEUTROPHILS RELATIVE PERCENT: 58.4 % (ref 36–66)
SEGMENTED NEUTROPHILS RELATIVE PERCENT: 58.6 % (ref 36–66)
SEGMENTED NEUTROPHILS RELATIVE PERCENT: 65.8 % (ref 36–66)
SEGMENTED NEUTROPHILS RELATIVE PERCENT: 68.9 % (ref 36–66)
SEGMENTED NEUTROPHILS RELATIVE PERCENT: 70 % (ref 36–66)
SEGMENTED NEUTROPHILS RELATIVE PERCENT: 72.1 % (ref 36–66)
SEGMENTED NEUTROPHILS RELATIVE PERCENT: 89.7 % (ref 36–66)
SODIUM BLD-SCNC: 133 MMOL/L (ref 135–145)
SODIUM BLD-SCNC: 133 MMOL/L (ref 135–145)
SODIUM BLD-SCNC: 134 MMOL/L (ref 135–145)
SODIUM BLD-SCNC: 134 MMOL/L (ref 135–145)
SODIUM BLD-SCNC: 136 MMOL/L (ref 135–145)
SODIUM BLD-SCNC: 137 MMOL/L (ref 135–145)
SODIUM BLD-SCNC: 137 MMOL/L (ref 135–145)
SODIUM BLD-SCNC: 139 MMOL/L (ref 135–145)
SODIUM BLD-SCNC: 140 MMOL/L (ref 135–145)
SODIUM BLD-SCNC: 140 MMOL/L (ref 135–145)
SODIUM BLD-SCNC: 142 MMOL/L (ref 135–145)
SODIUM BLD-SCNC: 143 MMOL/L (ref 135–145)
SODIUM BLD-SCNC: 144 MMOL/L (ref 135–145)
SODIUM BLD-SCNC: 145 MMOL/L
SODIUM BLD-SCNC: 145 MMOL/L (ref 135–145)
SODIUM BLD-SCNC: 147 MMOL/L (ref 135–145)
SODIUM BLD-SCNC: 148 MMOL/L (ref 135–145)
SODIUM BLD-SCNC: 149 MMOL/L (ref 135–145)
SOURCE: NORMAL
SOURCE: NORMAL
SPECIFIC GRAVITY UA: 1.02 (ref 1–1.03)
SPECIMEN: ABNORMAL
SPECIMEN: NORMAL
STATUS: NORMAL
STREP PNEUMONIAE ANTIGEN: NORMAL
STREP PNEUMONIAE ANTIGEN: NORMAL
TEAR DROP CELLS: ABNORMAL
TOTAL IMMATURE NEUTOROPHIL: 0.03 K/CU MM
TOTAL IMMATURE NEUTOROPHIL: 0.05 K/CU MM
TOTAL IMMATURE NEUTOROPHIL: 0.06 K/CU MM
TOTAL IMMATURE NEUTOROPHIL: 0.08 K/CU MM
TOTAL IMMATURE NEUTOROPHIL: 0.11 K/CU MM
TOTAL IMMATURE NEUTOROPHIL: 0.17 K/CU MM
TOTAL IMMATURE NEUTOROPHIL: 0.19 K/CU MM
TOTAL IRON BINDING CAPACITY: 334 UG/DL (ref 250–450)
TOTAL IRON BINDING CAPACITY: 347 UG/DL (ref 250–450)
TOTAL NUCLEATED RBC: 0 K/CU MM
TOTAL NUCLEATED RBC: 0.1 K/CU MM
TOTAL PROTEIN: 7.1 GM/DL (ref 6.4–8.2)
TOTAL PROTEIN: 7.3 GM/DL (ref 6.4–8.2)
TOTAL PROTEIN: 7.4 GM/DL (ref 6.4–8.2)
TOTAL PROTEIN: 7.5 GM/DL (ref 6.4–8.2)
TOTAL PROTEIN: 7.7 GM/DL (ref 6.4–8.2)
TOTAL PROTEIN: 8 GM/DL (ref 6.4–8.2)
TOTAL PROTEIN: 8.4 GM/DL (ref 6.4–8.2)
TRANSFUSION STATUS: NORMAL
TRICHOMONAS: ABNORMAL /HPF
TRIGL SERPL-MCNC: 94 MG/DL
TROPONIN T: <0.01 NG/ML
TSH HIGH SENSITIVITY: 0.69 UIU/ML (ref 0.27–4.2)
UNIT DIVISION: 0
UNIT NUMBER: NORMAL
UNSATURATED IRON BINDING CAPACITY: 309 UG/DL (ref 110–370)
UNSATURATED IRON BINDING CAPACITY: 327 UG/DL (ref 110–370)
URINE TOTAL PROTEIN: 17.8 MG/DL
UROBILINOGEN, URINE: NORMAL MG/DL (ref 0.2–1)
VITAMIN B-12: 1148 PG/ML (ref 211–911)
WBC # BLD: 12.6 K/CU MM (ref 4–10.5)
WBC # BLD: 13.1 K/CU MM (ref 4–10.5)
WBC # BLD: 3.4 K/CU MM (ref 4–10.5)
WBC # BLD: 4.3 K/CU MM (ref 4–10.5)
WBC # BLD: 4.7 K/CU MM (ref 4–10.5)
WBC # BLD: 4.8 K/CU MM (ref 4–10.5)
WBC # BLD: 5 K/CU MM (ref 4–10.5)
WBC # BLD: 5.1 K/CU MM (ref 4–10.5)
WBC # BLD: 5.1 K/CU MM (ref 4–10.5)
WBC # BLD: 5.6 K/CU MM (ref 4–10.5)
WBC # BLD: 5.7 K/CU MM (ref 4–10.5)
WBC # BLD: 6 K/CU MM (ref 4–10.5)
WBC # BLD: 6.3 K/CU MM (ref 4–10.5)
WBC # BLD: 6.6 K/CU MM (ref 4–10.5)
WBC # BLD: 6.7 K/CU MM (ref 4–10.5)
WBC # BLD: 6.9 K/CU MM (ref 4–10.5)
WBC # BLD: 7.1 K/CU MM (ref 4–10.5)
WBC # BLD: 7.1 K/CU MM (ref 4–10.5)
WBC # BLD: 7.2 K/CU MM (ref 4–10.5)
WBC CLUMP: ABNORMAL /HPF
WBC UA: 2979 /HPF (ref 0–5)

## 2021-01-01 PROCEDURE — 85027 COMPLETE CBC AUTOMATED: CPT

## 2021-01-01 PROCEDURE — 97166 OT EVAL MOD COMPLEX 45 MIN: CPT

## 2021-01-01 PROCEDURE — 2580000003 HC RX 258: Performed by: INTERNAL MEDICINE

## 2021-01-01 PROCEDURE — 6370000000 HC RX 637 (ALT 250 FOR IP): Performed by: INTERNAL MEDICINE

## 2021-01-01 PROCEDURE — 71045 X-RAY EXAM CHEST 1 VIEW: CPT

## 2021-01-01 PROCEDURE — 6360000002 HC RX W HCPCS: Performed by: INTERNAL MEDICINE

## 2021-01-01 PROCEDURE — 6360000002 HC RX W HCPCS: Performed by: FAMILY MEDICINE

## 2021-01-01 PROCEDURE — 6370000000 HC RX 637 (ALT 250 FOR IP): Performed by: NURSE PRACTITIONER

## 2021-01-01 PROCEDURE — 2000000000 HC ICU R&B

## 2021-01-01 PROCEDURE — 83880 ASSAY OF NATRIURETIC PEPTIDE: CPT

## 2021-01-01 PROCEDURE — 2500000003 HC RX 250 WO HCPCS: Performed by: NURSE PRACTITIONER

## 2021-01-01 PROCEDURE — 6360000002 HC RX W HCPCS: Performed by: NURSE PRACTITIONER

## 2021-01-01 PROCEDURE — 87899 AGENT NOS ASSAY W/OPTIC: CPT

## 2021-01-01 PROCEDURE — 85007 BL SMEAR W/DIFF WBC COUNT: CPT

## 2021-01-01 PROCEDURE — 83735 ASSAY OF MAGNESIUM: CPT

## 2021-01-01 PROCEDURE — 86141 C-REACTIVE PROTEIN HS: CPT

## 2021-01-01 PROCEDURE — 85730 THROMBOPLASTIN TIME PARTIAL: CPT

## 2021-01-01 PROCEDURE — 6370000000 HC RX 637 (ALT 250 FOR IP): Performed by: PHYSICIAN ASSISTANT

## 2021-01-01 PROCEDURE — 2580000003 HC RX 258: Performed by: NURSE PRACTITIONER

## 2021-01-01 PROCEDURE — 82728 ASSAY OF FERRITIN: CPT

## 2021-01-01 PROCEDURE — 87205 SMEAR GRAM STAIN: CPT

## 2021-01-01 PROCEDURE — 2140000000 HC CCU INTERMEDIATE R&B

## 2021-01-01 PROCEDURE — 82962 GLUCOSE BLOOD TEST: CPT

## 2021-01-01 PROCEDURE — 85025 COMPLETE CBC W/AUTO DIFF WBC: CPT

## 2021-01-01 PROCEDURE — 6370000000 HC RX 637 (ALT 250 FOR IP): Performed by: FAMILY MEDICINE

## 2021-01-01 PROCEDURE — 94640 AIRWAY INHALATION TREATMENT: CPT

## 2021-01-01 PROCEDURE — 99213 OFFICE O/P EST LOW 20 MIN: CPT

## 2021-01-01 PROCEDURE — 2700000000 HC OXYGEN THERAPY PER DAY

## 2021-01-01 PROCEDURE — 36415 COLL VENOUS BLD VENIPUNCTURE: CPT

## 2021-01-01 PROCEDURE — 86900 BLOOD TYPING SEROLOGIC ABO: CPT

## 2021-01-01 PROCEDURE — 94660 CPAP INITIATION&MGMT: CPT

## 2021-01-01 PROCEDURE — 2500000003 HC RX 250 WO HCPCS: Performed by: INTERNAL MEDICINE

## 2021-01-01 PROCEDURE — 80048 BASIC METABOLIC PNL TOTAL CA: CPT

## 2021-01-01 PROCEDURE — 93005 ELECTROCARDIOGRAM TRACING: CPT | Performed by: INTERNAL MEDICINE

## 2021-01-01 PROCEDURE — 93010 ELECTROCARDIOGRAM REPORT: CPT | Performed by: INTERNAL MEDICINE

## 2021-01-01 PROCEDURE — 94761 N-INVAS EAR/PLS OXIMETRY MLT: CPT

## 2021-01-01 PROCEDURE — 84484 ASSAY OF TROPONIN QUANT: CPT

## 2021-01-01 PROCEDURE — 2580000003 HC RX 258

## 2021-01-01 PROCEDURE — 82805 BLOOD GASES W/O2 SATURATION: CPT

## 2021-01-01 PROCEDURE — 87081 CULTURE SCREEN ONLY: CPT

## 2021-01-01 PROCEDURE — 2580000003 HC RX 258: Performed by: FAMILY MEDICINE

## 2021-01-01 PROCEDURE — 82803 BLOOD GASES ANY COMBINATION: CPT

## 2021-01-01 PROCEDURE — 96374 THER/PROPH/DIAG INJ IV PUSH: CPT

## 2021-01-01 PROCEDURE — 6360000002 HC RX W HCPCS: Performed by: PSYCHIATRY & NEUROLOGY

## 2021-01-01 PROCEDURE — 87040 BLOOD CULTURE FOR BACTERIA: CPT

## 2021-01-01 PROCEDURE — 99285 EMERGENCY DEPT VISIT HI MDM: CPT

## 2021-01-01 PROCEDURE — 85045 AUTOMATED RETICULOCYTE COUNT: CPT

## 2021-01-01 PROCEDURE — 2709999900 HC NON-CHARGEABLE SUPPLY

## 2021-01-01 PROCEDURE — 93005 ELECTROCARDIOGRAM TRACING: CPT | Performed by: FAMILY MEDICINE

## 2021-01-01 PROCEDURE — 97530 THERAPEUTIC ACTIVITIES: CPT

## 2021-01-01 PROCEDURE — C1751 CATH, INF, PER/CENT/MIDLINE: HCPCS

## 2021-01-01 PROCEDURE — 84145 PROCALCITONIN (PCT): CPT

## 2021-01-01 PROCEDURE — 99233 SBSQ HOSP IP/OBS HIGH 50: CPT | Performed by: INTERNAL MEDICINE

## 2021-01-01 PROCEDURE — 80053 COMPREHEN METABOLIC PANEL: CPT

## 2021-01-01 PROCEDURE — 83605 ASSAY OF LACTIC ACID: CPT

## 2021-01-01 PROCEDURE — 2580000003 HC RX 258: Performed by: PSYCHIATRY & NEUROLOGY

## 2021-01-01 PROCEDURE — 36600 WITHDRAWAL OF ARTERIAL BLOOD: CPT

## 2021-01-01 PROCEDURE — 99255 IP/OBS CONSLTJ NEW/EST HI 80: CPT | Performed by: INTERNAL MEDICINE

## 2021-01-01 PROCEDURE — 6360000002 HC RX W HCPCS: Performed by: PHYSICIAN ASSISTANT

## 2021-01-01 PROCEDURE — 6360000004 HC RX CONTRAST MEDICATION: Performed by: INTERNAL MEDICINE

## 2021-01-01 PROCEDURE — P9047 ALBUMIN (HUMAN), 25%, 50ML: HCPCS | Performed by: INTERNAL MEDICINE

## 2021-01-01 PROCEDURE — 94003 VENT MGMT INPAT SUBQ DAY: CPT

## 2021-01-01 PROCEDURE — 86140 C-REACTIVE PROTEIN: CPT

## 2021-01-01 PROCEDURE — 97535 SELF CARE MNGMENT TRAINING: CPT

## 2021-01-01 PROCEDURE — 89220 SPUTUM SPECIMEN COLLECTION: CPT

## 2021-01-01 PROCEDURE — 86922 COMPATIBILITY TEST ANTIGLOB: CPT

## 2021-01-01 PROCEDURE — 96365 THER/PROPH/DIAG IV INF INIT: CPT

## 2021-01-01 PROCEDURE — 36592 COLLECT BLOOD FROM PICC: CPT

## 2021-01-01 PROCEDURE — 2580000003 HC RX 258: Performed by: PHYSICIAN ASSISTANT

## 2021-01-01 PROCEDURE — C1894 INTRO/SHEATH, NON-LASER: HCPCS

## 2021-01-01 PROCEDURE — 5A1945Z RESPIRATORY VENTILATION, 24-96 CONSECUTIVE HOURS: ICD-10-PCS | Performed by: RADIOLOGY

## 2021-01-01 PROCEDURE — 80061 LIPID PANEL: CPT

## 2021-01-01 PROCEDURE — 72170 X-RAY EXAM OF PELVIS: CPT

## 2021-01-01 PROCEDURE — 99253 IP/OBS CNSLTJ NEW/EST LOW 45: CPT | Performed by: INTERNAL MEDICINE

## 2021-01-01 PROCEDURE — 84156 ASSAY OF PROTEIN URINE: CPT

## 2021-01-01 PROCEDURE — 6360000002 HC RX W HCPCS

## 2021-01-01 PROCEDURE — 99232 SBSQ HOSP IP/OBS MODERATE 35: CPT | Performed by: INTERNAL MEDICINE

## 2021-01-01 PROCEDURE — 2500000003 HC RX 250 WO HCPCS: Performed by: FAMILY MEDICINE

## 2021-01-01 PROCEDURE — 87077 CULTURE AEROBIC IDENTIFY: CPT

## 2021-01-01 PROCEDURE — 87186 SC STD MICRODIL/AGAR DIL: CPT

## 2021-01-01 PROCEDURE — 0BH17EZ INSERTION OF ENDOTRACHEAL AIRWAY INTO TRACHEA, VIA NATURAL OR ARTIFICIAL OPENING: ICD-10-PCS | Performed by: RADIOLOGY

## 2021-01-01 PROCEDURE — 87449 NOS EACH ORGANISM AG IA: CPT

## 2021-01-01 PROCEDURE — 82746 ASSAY OF FOLIC ACID SERUM: CPT

## 2021-01-01 PROCEDURE — 2500000003 HC RX 250 WO HCPCS: Performed by: PHYSICIAN ASSISTANT

## 2021-01-01 PROCEDURE — 2060000000 HC ICU INTERMEDIATE R&B

## 2021-01-01 PROCEDURE — 93970 EXTREMITY STUDY: CPT

## 2021-01-01 PROCEDURE — 81001 URINALYSIS AUTO W/SCOPE: CPT

## 2021-01-01 PROCEDURE — 94002 VENT MGMT INPAT INIT DAY: CPT

## 2021-01-01 PROCEDURE — 87635 SARS-COV-2 COVID-19 AMP PRB: CPT

## 2021-01-01 PROCEDURE — 83550 IRON BINDING TEST: CPT

## 2021-01-01 PROCEDURE — 93308 TTE F-UP OR LMTD: CPT

## 2021-01-01 PROCEDURE — 83540 ASSAY OF IRON: CPT

## 2021-01-01 PROCEDURE — 70450 CT HEAD/BRAIN W/O DYE: CPT

## 2021-01-01 PROCEDURE — 2500000003 HC RX 250 WO HCPCS

## 2021-01-01 PROCEDURE — 6360000002 HC RX W HCPCS: Performed by: EMERGENCY MEDICINE

## 2021-01-01 PROCEDURE — U0002 COVID-19 LAB TEST NON-CDC: HCPCS

## 2021-01-01 PROCEDURE — 99223 1ST HOSP IP/OBS HIGH 75: CPT | Performed by: INTERNAL MEDICINE

## 2021-01-01 PROCEDURE — 96366 THER/PROPH/DIAG IV INF ADDON: CPT

## 2021-01-01 PROCEDURE — 95819 EEG AWAKE AND ASLEEP: CPT

## 2021-01-01 PROCEDURE — 86850 RBC ANTIBODY SCREEN: CPT

## 2021-01-01 PROCEDURE — 76937 US GUIDE VASCULAR ACCESS: CPT

## 2021-01-01 PROCEDURE — 2500000003 HC RX 250 WO HCPCS: Performed by: EMERGENCY MEDICINE

## 2021-01-01 PROCEDURE — 83721 ASSAY OF BLOOD LIPOPROTEIN: CPT

## 2021-01-01 PROCEDURE — 02HV33Z INSERTION OF INFUSION DEVICE INTO SUPERIOR VENA CAVA, PERCUTANEOUS APPROACH: ICD-10-PCS | Performed by: RADIOLOGY

## 2021-01-01 PROCEDURE — 36556 INSERT NON-TUNNEL CV CATH: CPT

## 2021-01-01 PROCEDURE — 83036 HEMOGLOBIN GLYCOSYLATED A1C: CPT

## 2021-01-01 PROCEDURE — 36430 TRANSFUSION BLD/BLD COMPNT: CPT

## 2021-01-01 PROCEDURE — 87086 URINE CULTURE/COLONY COUNT: CPT

## 2021-01-01 PROCEDURE — 82570 ASSAY OF URINE CREATININE: CPT

## 2021-01-01 PROCEDURE — 97162 PT EVAL MOD COMPLEX 30 MIN: CPT

## 2021-01-01 PROCEDURE — 84443 ASSAY THYROID STIM HORMONE: CPT

## 2021-01-01 PROCEDURE — 99255 IP/OBS CONSLTJ NEW/EST HI 80: CPT | Performed by: NURSE PRACTITIONER

## 2021-01-01 PROCEDURE — 2580000003 HC RX 258: Performed by: EMERGENCY MEDICINE

## 2021-01-01 PROCEDURE — 96375 TX/PRO/DX INJ NEW DRUG ADDON: CPT

## 2021-01-01 PROCEDURE — 93005 ELECTROCARDIOGRAM TRACING: CPT | Performed by: PHYSICIAN ASSISTANT

## 2021-01-01 PROCEDURE — 84100 ASSAY OF PHOSPHORUS: CPT

## 2021-01-01 PROCEDURE — 6370000000 HC RX 637 (ALT 250 FOR IP): Performed by: HOSPITALIST

## 2021-01-01 PROCEDURE — 93005 ELECTROCARDIOGRAM TRACING: CPT | Performed by: EMERGENCY MEDICINE

## 2021-01-01 PROCEDURE — 87070 CULTURE OTHR SPECIMN AEROBIC: CPT

## 2021-01-01 PROCEDURE — 85610 PROTHROMBIN TIME: CPT

## 2021-01-01 PROCEDURE — 86901 BLOOD TYPING SEROLOGIC RH(D): CPT

## 2021-01-01 PROCEDURE — 02HV33Z INSERTION OF INFUSION DEVICE INTO SUPERIOR VENA CAVA, PERCUTANEOUS APPROACH: ICD-10-PCS | Performed by: HOSPITALIST

## 2021-01-01 PROCEDURE — 0202U NFCT DS 22 TRGT SARS-COV-2: CPT

## 2021-01-01 PROCEDURE — P9016 RBC LEUKOCYTES REDUCED: HCPCS

## 2021-01-01 PROCEDURE — 87150 DNA/RNA AMPLIFIED PROBE: CPT

## 2021-01-01 PROCEDURE — 84134 ASSAY OF PREALBUMIN: CPT

## 2021-01-01 PROCEDURE — 85379 FIBRIN DEGRADATION QUANT: CPT

## 2021-01-01 PROCEDURE — 99233 SBSQ HOSP IP/OBS HIGH 50: CPT | Performed by: NURSE PRACTITIONER

## 2021-01-01 PROCEDURE — 82607 VITAMIN B-12: CPT

## 2021-01-01 PROCEDURE — 97110 THERAPEUTIC EXERCISES: CPT

## 2021-01-01 PROCEDURE — 97112 NEUROMUSCULAR REEDUCATION: CPT

## 2021-01-01 RX ORDER — INSULIN GLARGINE 100 [IU]/ML
10 INJECTION, SOLUTION SUBCUTANEOUS NIGHTLY
Status: DISCONTINUED | OUTPATIENT
Start: 2021-01-01 | End: 2021-01-01

## 2021-01-01 RX ORDER — ALBUTEROL SULFATE 90 UG/1
2 AEROSOL, METERED RESPIRATORY (INHALATION) 4 TIMES DAILY
Status: DISCONTINUED | OUTPATIENT
Start: 2021-01-01 | End: 2021-01-01

## 2021-01-01 RX ORDER — POTASSIUM CHLORIDE 20 MEQ/1
20 TABLET, EXTENDED RELEASE ORAL ONCE
Status: COMPLETED | OUTPATIENT
Start: 2021-01-01 | End: 2021-01-01

## 2021-01-01 RX ORDER — HEPARIN SODIUM 1000 [USP'U]/ML
4000 INJECTION, SOLUTION INTRAVENOUS; SUBCUTANEOUS PRN
Status: DISCONTINUED | OUTPATIENT
Start: 2021-01-01 | End: 2021-01-01

## 2021-01-01 RX ORDER — SODIUM CHLORIDE 0.9 % (FLUSH) 0.9 %
10 SYRINGE (ML) INJECTION PRN
Status: DISCONTINUED | OUTPATIENT
Start: 2021-01-01 | End: 2021-01-01 | Stop reason: HOSPADM

## 2021-01-01 RX ORDER — LUBIPROSTONE 24 UG/1
24 CAPSULE, GELATIN COATED ORAL 2 TIMES DAILY WITH MEALS
Status: DISCONTINUED | OUTPATIENT
Start: 2021-01-01 | End: 2021-01-01 | Stop reason: HOSPADM

## 2021-01-01 RX ORDER — DEXTROSE MONOHYDRATE 25 G/50ML
12.5 INJECTION, SOLUTION INTRAVENOUS PRN
Status: DISCONTINUED | OUTPATIENT
Start: 2021-01-01 | End: 2021-01-01 | Stop reason: HOSPADM

## 2021-01-01 RX ORDER — FUROSEMIDE 10 MG/ML
40 INJECTION INTRAMUSCULAR; INTRAVENOUS ONCE
Status: COMPLETED | OUTPATIENT
Start: 2021-01-01 | End: 2021-01-01

## 2021-01-01 RX ORDER — MAGNESIUM SULFATE 4 G/50ML
4000 INJECTION INTRAVENOUS ONCE
Status: DISCONTINUED | OUTPATIENT
Start: 2021-01-01 | End: 2021-01-01 | Stop reason: CLARIF

## 2021-01-01 RX ORDER — EPINEPHRINE 0.1 MG/ML
SYRINGE (ML) INJECTION
Status: COMPLETED | OUTPATIENT
Start: 2021-01-01 | End: 2021-01-01

## 2021-01-01 RX ORDER — LEVOFLOXACIN 5 MG/ML
750 INJECTION, SOLUTION INTRAVENOUS EVERY 24 HOURS
Status: DISCONTINUED | OUTPATIENT
Start: 2021-01-01 | End: 2021-01-01 | Stop reason: DRUGHIGH

## 2021-01-01 RX ORDER — POTASSIUM CHLORIDE 20 MEQ/1
40 TABLET, EXTENDED RELEASE ORAL PRN
Status: DISCONTINUED | OUTPATIENT
Start: 2021-01-01 | End: 2021-01-01 | Stop reason: HOSPADM

## 2021-01-01 RX ORDER — LEVOFLOXACIN 5 MG/ML
750 INJECTION, SOLUTION INTRAVENOUS
Status: DISCONTINUED | OUTPATIENT
Start: 2021-01-01 | End: 2021-01-01

## 2021-01-01 RX ORDER — MAGNESIUM SULFATE 1 G/100ML
1000 INJECTION INTRAVENOUS PRN
Status: DISCONTINUED | OUTPATIENT
Start: 2021-01-01 | End: 2021-01-01

## 2021-01-01 RX ORDER — BUSPIRONE HYDROCHLORIDE 10 MG/1
10 TABLET ORAL 3 TIMES DAILY
Status: DISCONTINUED | OUTPATIENT
Start: 2021-01-01 | End: 2021-01-01 | Stop reason: ALTCHOICE

## 2021-01-01 RX ORDER — MINERAL OIL AND WHITE PETROLATUM 150; 830 MG/G; MG/G
OINTMENT OPHTHALMIC EVERY 4 HOURS
Status: DISCONTINUED | OUTPATIENT
Start: 2021-01-01 | End: 2021-01-01

## 2021-01-01 RX ORDER — MIDAZOLAM HYDROCHLORIDE 1 MG/ML
INJECTION INTRAMUSCULAR; INTRAVENOUS
Status: COMPLETED
Start: 2021-01-01 | End: 2021-01-01

## 2021-01-01 RX ORDER — PREDNISONE 10 MG/1
10 TABLET ORAL DAILY
Status: DISCONTINUED | OUTPATIENT
Start: 2021-03-02 | End: 2021-01-01

## 2021-01-01 RX ORDER — POLYETHYLENE GLYCOL 3350 17 G/17G
17 POWDER, FOR SOLUTION ORAL DAILY PRN
Status: DISCONTINUED | OUTPATIENT
Start: 2021-01-01 | End: 2021-01-01 | Stop reason: HOSPADM

## 2021-01-01 RX ORDER — OMEGA-3S/DHA/EPA/FISH OIL/D3 300MG-1000
400 CAPSULE ORAL DAILY
Status: DISCONTINUED | OUTPATIENT
Start: 2021-01-01 | End: 2021-01-01 | Stop reason: HOSPADM

## 2021-01-01 RX ORDER — MIDODRINE HYDROCHLORIDE 5 MG/1
10 TABLET ORAL
Status: DISCONTINUED | OUTPATIENT
Start: 2021-01-01 | End: 2021-01-01 | Stop reason: HOSPADM

## 2021-01-01 RX ORDER — MAGNESIUM SULFATE 4 G/50ML
4000 INJECTION INTRAVENOUS ONCE
Status: COMPLETED | OUTPATIENT
Start: 2021-01-01 | End: 2021-01-01

## 2021-01-01 RX ORDER — INSULIN GLARGINE 100 [IU]/ML
8 INJECTION, SOLUTION SUBCUTANEOUS ONCE
Status: COMPLETED | OUTPATIENT
Start: 2021-01-01 | End: 2021-01-01

## 2021-01-01 RX ORDER — PANTOPRAZOLE SODIUM 40 MG/1
40 TABLET, DELAYED RELEASE ORAL DAILY
Status: DISCONTINUED | OUTPATIENT
Start: 2021-01-01 | End: 2021-01-01 | Stop reason: HOSPADM

## 2021-01-01 RX ORDER — BUSPIRONE HYDROCHLORIDE 10 MG/1
10 TABLET ORAL 3 TIMES DAILY
Qty: 90 TABLET | Refills: 0
Start: 2021-01-01

## 2021-01-01 RX ORDER — INSULIN GLARGINE 100 [IU]/ML
15 INJECTION, SOLUTION SUBCUTANEOUS NIGHTLY
Status: DISCONTINUED | OUTPATIENT
Start: 2021-01-01 | End: 2021-01-01

## 2021-01-01 RX ORDER — GLYCOPYRROLATE 1 MG/5 ML
0.2 SYRINGE (ML) INTRAVENOUS EVERY 4 HOURS PRN
Status: DISCONTINUED | OUTPATIENT
Start: 2021-01-01 | End: 2021-01-01 | Stop reason: HOSPADM

## 2021-01-01 RX ORDER — INSULIN GLARGINE 100 [IU]/ML
10 INJECTION, SOLUTION SUBCUTANEOUS ONCE
Status: COMPLETED | OUTPATIENT
Start: 2021-01-01 | End: 2021-01-01

## 2021-01-01 RX ORDER — METOCLOPRAMIDE HYDROCHLORIDE 5 MG/ML
5 INJECTION INTRAMUSCULAR; INTRAVENOUS EVERY 6 HOURS
Status: DISCONTINUED | OUTPATIENT
Start: 2021-01-01 | End: 2021-01-01 | Stop reason: HOSPADM

## 2021-01-01 RX ORDER — ATORVASTATIN CALCIUM 40 MG/1
40 TABLET, FILM COATED ORAL NIGHTLY
Status: DISCONTINUED | OUTPATIENT
Start: 2021-01-01 | End: 2021-01-01

## 2021-01-01 RX ORDER — MIDODRINE HYDROCHLORIDE 5 MG/1
10 TABLET ORAL
Status: DISCONTINUED | OUTPATIENT
Start: 2021-01-01 | End: 2021-01-01

## 2021-01-01 RX ORDER — LINEZOLID 2 MG/ML
600 INJECTION, SOLUTION INTRAVENOUS EVERY 12 HOURS
Status: DISCONTINUED | OUTPATIENT
Start: 2021-01-01 | End: 2021-01-01

## 2021-01-01 RX ORDER — FERROUS SULFATE 325(65) MG
325 TABLET ORAL
Status: DISCONTINUED | OUTPATIENT
Start: 2021-01-01 | End: 2021-01-01

## 2021-01-01 RX ORDER — BISACODYL 10 MG
10 SUPPOSITORY, RECTAL RECTAL EVERY 6 HOURS
Status: DISPENSED | OUTPATIENT
Start: 2021-01-01 | End: 2021-01-01

## 2021-01-01 RX ORDER — SODIUM CHLORIDE 0.9 % (FLUSH) 0.9 %
10 SYRINGE (ML) INJECTION EVERY 12 HOURS SCHEDULED
Status: DISCONTINUED | OUTPATIENT
Start: 2021-01-01 | End: 2021-01-01 | Stop reason: HOSPADM

## 2021-01-01 RX ORDER — FENTANYL CITRATE 50 UG/ML
25 INJECTION, SOLUTION INTRAMUSCULAR; INTRAVENOUS
Status: DISCONTINUED | OUTPATIENT
Start: 2021-01-01 | End: 2021-01-01

## 2021-01-01 RX ORDER — HEPARIN SODIUM 10000 [USP'U]/100ML
5-30 INJECTION, SOLUTION INTRAVENOUS CONTINUOUS
Status: DISCONTINUED | OUTPATIENT
Start: 2021-01-01 | End: 2021-01-01

## 2021-01-01 RX ORDER — SODIUM CHLORIDE 9 MG/ML
INJECTION, SOLUTION INTRAVENOUS CONTINUOUS
Status: DISCONTINUED | OUTPATIENT
Start: 2021-01-01 | End: 2021-01-01

## 2021-01-01 RX ORDER — FUROSEMIDE 10 MG/ML
40 INJECTION INTRAMUSCULAR; INTRAVENOUS 3 TIMES DAILY
Status: COMPLETED | OUTPATIENT
Start: 2021-01-01 | End: 2021-01-01

## 2021-01-01 RX ORDER — PRAMIPEXOLE DIHYDROCHLORIDE 0.25 MG/1
1 TABLET ORAL 2 TIMES DAILY
Status: DISCONTINUED | OUTPATIENT
Start: 2021-01-01 | End: 2021-01-01

## 2021-01-01 RX ORDER — CHLORHEXIDINE GLUCONATE 0.12 MG/ML
15 RINSE ORAL 2 TIMES DAILY
Status: DISCONTINUED | OUTPATIENT
Start: 2021-01-01 | End: 2021-01-01 | Stop reason: HOSPADM

## 2021-01-01 RX ORDER — AMIODARONE HYDROCHLORIDE 200 MG/1
200 TABLET ORAL 2 TIMES DAILY
Status: DISCONTINUED | OUTPATIENT
Start: 2021-01-01 | End: 2021-01-01

## 2021-01-01 RX ORDER — ROPINIROLE 1 MG/1
1 TABLET, FILM COATED ORAL ONCE
Status: COMPLETED | OUTPATIENT
Start: 2021-01-01 | End: 2021-01-01

## 2021-01-01 RX ORDER — ALBUTEROL SULFATE 90 UG/1
2 AEROSOL, METERED RESPIRATORY (INHALATION) EVERY 4 HOURS PRN
Status: DISCONTINUED | OUTPATIENT
Start: 2021-01-01 | End: 2021-01-01 | Stop reason: HOSPADM

## 2021-01-01 RX ORDER — ACETAMINOPHEN 325 MG/1
650 TABLET ORAL EVERY 6 HOURS PRN
Status: DISCONTINUED | OUTPATIENT
Start: 2021-01-01 | End: 2021-01-01 | Stop reason: HOSPADM

## 2021-01-01 RX ORDER — ACETAZOLAMIDE 500 MG/5ML
250 INJECTION, POWDER, LYOPHILIZED, FOR SOLUTION INTRAVENOUS EVERY 6 HOURS
Status: DISCONTINUED | OUTPATIENT
Start: 2021-01-01 | End: 2021-01-01 | Stop reason: ALTCHOICE

## 2021-01-01 RX ORDER — MINERAL OIL AND WHITE PETROLATUM 150; 830 MG/G; MG/G
OINTMENT OPHTHALMIC PRN
Status: DISCONTINUED | OUTPATIENT
Start: 2021-01-01 | End: 2021-01-01 | Stop reason: HOSPADM

## 2021-01-01 RX ORDER — CHLORHEXIDINE GLUCONATE 0.12 MG/ML
15 RINSE ORAL 2 TIMES DAILY
Status: DISCONTINUED | OUTPATIENT
Start: 2021-01-01 | End: 2021-01-01

## 2021-01-01 RX ORDER — LISINOPRIL 5 MG/1
5 TABLET ORAL DAILY
Status: DISCONTINUED | OUTPATIENT
Start: 2021-01-01 | End: 2021-01-01

## 2021-01-01 RX ORDER — METOCLOPRAMIDE HYDROCHLORIDE 5 MG/ML
10 INJECTION INTRAMUSCULAR; INTRAVENOUS EVERY 6 HOURS
Status: DISCONTINUED | OUTPATIENT
Start: 2021-01-01 | End: 2021-01-01

## 2021-01-01 RX ORDER — FUROSEMIDE 10 MG/ML
20 INJECTION INTRAMUSCULAR; INTRAVENOUS 2 TIMES DAILY
Status: DISCONTINUED | OUTPATIENT
Start: 2021-01-01 | End: 2021-01-01

## 2021-01-01 RX ORDER — 0.9 % SODIUM CHLORIDE 0.9 %
1000 INTRAVENOUS SOLUTION INTRAVENOUS ONCE
Status: COMPLETED | OUTPATIENT
Start: 2021-01-01 | End: 2021-01-01

## 2021-01-01 RX ORDER — ATROPINE SULFATE 0.1 MG/ML
INJECTION INTRAVENOUS
Status: COMPLETED
Start: 2021-01-01 | End: 2021-01-01

## 2021-01-01 RX ORDER — LORAZEPAM 2 MG/ML
0.5 INJECTION INTRAMUSCULAR
Status: DISCONTINUED | OUTPATIENT
Start: 2021-01-01 | End: 2021-01-01 | Stop reason: HOSPADM

## 2021-01-01 RX ORDER — POTASSIUM CHLORIDE 20 MEQ/1
40 TABLET, EXTENDED RELEASE ORAL ONCE
Status: COMPLETED | OUTPATIENT
Start: 2021-01-01 | End: 2021-01-01

## 2021-01-01 RX ORDER — INSULIN GLARGINE 100 [IU]/ML
12 INJECTION, SOLUTION SUBCUTANEOUS NIGHTLY
Status: DISCONTINUED | OUTPATIENT
Start: 2021-01-01 | End: 2021-01-01

## 2021-01-01 RX ORDER — FUROSEMIDE 10 MG/ML
40 INJECTION INTRAMUSCULAR; INTRAVENOUS 2 TIMES DAILY
Status: DISCONTINUED | OUTPATIENT
Start: 2021-01-01 | End: 2021-01-01

## 2021-01-01 RX ORDER — METHYLPREDNISOLONE SODIUM SUCCINATE 40 MG/ML
40 INJECTION, POWDER, LYOPHILIZED, FOR SOLUTION INTRAMUSCULAR; INTRAVENOUS EVERY 8 HOURS
Status: DISCONTINUED | OUTPATIENT
Start: 2021-01-01 | End: 2021-01-01

## 2021-01-01 RX ORDER — AMIODARONE HYDROCHLORIDE 200 MG/1
200 TABLET ORAL DAILY
Status: DISCONTINUED | OUTPATIENT
Start: 2021-01-01 | End: 2021-01-01 | Stop reason: HOSPADM

## 2021-01-01 RX ORDER — FUROSEMIDE 40 MG/1
40 TABLET ORAL DAILY
Status: ON HOLD | COMMUNITY
End: 2021-01-01 | Stop reason: HOSPADM

## 2021-01-01 RX ORDER — PREDNISONE 1 MG/1
5 TABLET ORAL DAILY
Status: DISCONTINUED | OUTPATIENT
Start: 2021-03-05 | End: 2021-01-01

## 2021-01-01 RX ORDER — MODAFINIL 100 MG/1
200 TABLET ORAL DAILY
Status: DISPENSED | OUTPATIENT
Start: 2021-01-01 | End: 2021-01-01

## 2021-01-01 RX ORDER — PANTOPRAZOLE SODIUM 40 MG/1
40 TABLET, DELAYED RELEASE ORAL DAILY
Qty: 30 TABLET | Refills: 0
Start: 2021-01-01

## 2021-01-01 RX ORDER — ALBUMIN (HUMAN) 12.5 G/50ML
25 SOLUTION INTRAVENOUS EVERY 8 HOURS
Status: COMPLETED | OUTPATIENT
Start: 2021-01-01 | End: 2021-01-01

## 2021-01-01 RX ORDER — ROPINIROLE 0.25 MG/1
0.5 TABLET, FILM COATED ORAL ONCE
Status: DISCONTINUED | OUTPATIENT
Start: 2021-01-01 | End: 2021-01-01

## 2021-01-01 RX ORDER — IPRATROPIUM BROMIDE AND ALBUTEROL SULFATE 2.5; .5 MG/3ML; MG/3ML
3 SOLUTION RESPIRATORY (INHALATION) EVERY 6 HOURS PRN
Status: DISCONTINUED | OUTPATIENT
Start: 2021-01-01 | End: 2021-01-01 | Stop reason: HOSPADM

## 2021-01-01 RX ORDER — SODIUM CHLORIDE 9 MG/ML
INJECTION, SOLUTION INTRAVENOUS CONTINUOUS PRN
Status: COMPLETED | OUTPATIENT
Start: 2021-01-01 | End: 2021-01-01

## 2021-01-01 RX ORDER — FUROSEMIDE 10 MG/ML
20 INJECTION INTRAMUSCULAR; INTRAVENOUS SEE ADMIN INSTRUCTIONS
Status: COMPLETED | OUTPATIENT
Start: 2021-01-01 | End: 2021-01-01

## 2021-01-01 RX ORDER — ATROPINE SULFATE 0.1 MG/ML
INJECTION INTRAVENOUS
Status: DISPENSED
Start: 2021-01-01 | End: 2021-01-01

## 2021-01-01 RX ORDER — 0.9 % SODIUM CHLORIDE 0.9 %
250 INTRAVENOUS SOLUTION INTRAVENOUS ONCE
Status: COMPLETED | OUTPATIENT
Start: 2021-01-01 | End: 2021-01-01

## 2021-01-01 RX ORDER — ONDANSETRON 2 MG/ML
4 INJECTION INTRAMUSCULAR; INTRAVENOUS EVERY 6 HOURS PRN
Status: DISCONTINUED | OUTPATIENT
Start: 2021-01-01 | End: 2021-01-01 | Stop reason: HOSPADM

## 2021-01-01 RX ORDER — CARVEDILOL 3.12 MG/1
3.12 TABLET ORAL 2 TIMES DAILY WITH MEALS
Status: CANCELLED | OUTPATIENT
Start: 2021-01-01

## 2021-01-01 RX ORDER — SENNA PLUS 8.6 MG/1
2 TABLET ORAL NIGHTLY
Status: DISCONTINUED | OUTPATIENT
Start: 2021-01-01 | End: 2021-01-01 | Stop reason: HOSPADM

## 2021-01-01 RX ORDER — POTASSIUM CHLORIDE 20 MEQ/1
40 TABLET, EXTENDED RELEASE ORAL 2 TIMES DAILY WITH MEALS
Status: DISCONTINUED | OUTPATIENT
Start: 2021-01-01 | End: 2021-01-01 | Stop reason: CLARIF

## 2021-01-01 RX ORDER — PREDNISONE 20 MG/1
20 TABLET ORAL DAILY
Status: DISCONTINUED | OUTPATIENT
Start: 2021-01-01 | End: 2021-01-01

## 2021-01-01 RX ORDER — HEPARIN SODIUM 1000 [USP'U]/ML
2000 INJECTION, SOLUTION INTRAVENOUS; SUBCUTANEOUS PRN
Status: DISCONTINUED | OUTPATIENT
Start: 2021-01-01 | End: 2021-01-01

## 2021-01-01 RX ORDER — PROPOFOL 10 MG/ML
INJECTION, EMULSION INTRAVENOUS
Status: COMPLETED
Start: 2021-01-01 | End: 2021-01-01

## 2021-01-01 RX ORDER — DEXTROSE MONOHYDRATE 50 MG/ML
100 INJECTION, SOLUTION INTRAVENOUS PRN
Status: DISCONTINUED | OUTPATIENT
Start: 2021-01-01 | End: 2021-01-01 | Stop reason: HOSPADM

## 2021-01-01 RX ORDER — GABAPENTIN 400 MG/1
400 CAPSULE ORAL 3 TIMES DAILY
Status: DISCONTINUED | OUTPATIENT
Start: 2021-01-01 | End: 2021-01-01

## 2021-01-01 RX ORDER — PROPOFOL 10 MG/ML
80 INJECTION, EMULSION INTRAVENOUS CONTINUOUS
Status: DISCONTINUED | OUTPATIENT
Start: 2021-01-01 | End: 2021-01-01

## 2021-01-01 RX ORDER — MIDAZOLAM HYDROCHLORIDE 2 MG/2ML
1 INJECTION, SOLUTION INTRAMUSCULAR; INTRAVENOUS
Status: DISCONTINUED | OUTPATIENT
Start: 2021-01-01 | End: 2021-01-01

## 2021-01-01 RX ORDER — MIDODRINE HYDROCHLORIDE 10 MG/1
10 TABLET ORAL
Qty: 90 TABLET | Refills: 0
Start: 2021-01-01

## 2021-01-01 RX ORDER — ACETAMINOPHEN 650 MG/1
650 SUPPOSITORY RECTAL EVERY 6 HOURS PRN
Status: DISCONTINUED | OUTPATIENT
Start: 2021-01-01 | End: 2021-01-01 | Stop reason: HOSPADM

## 2021-01-01 RX ORDER — DOXEPIN HYDROCHLORIDE 10 MG/1
10 CAPSULE ORAL NIGHTLY
Status: DISCONTINUED | OUTPATIENT
Start: 2021-01-01 | End: 2021-01-01 | Stop reason: HOSPADM

## 2021-01-01 RX ORDER — BUSPIRONE HYDROCHLORIDE 10 MG/1
10 TABLET ORAL 3 TIMES DAILY
Status: DISCONTINUED | OUTPATIENT
Start: 2021-01-01 | End: 2021-01-01 | Stop reason: HOSPADM

## 2021-01-01 RX ORDER — METHYLPREDNISOLONE SODIUM SUCCINATE 125 MG/2ML
125 INJECTION, POWDER, LYOPHILIZED, FOR SOLUTION INTRAMUSCULAR; INTRAVENOUS ONCE
Status: COMPLETED | OUTPATIENT
Start: 2021-01-01 | End: 2021-01-01

## 2021-01-01 RX ORDER — CLOPIDOGREL BISULFATE 75 MG/1
75 TABLET ORAL DAILY
Status: DISCONTINUED | OUTPATIENT
Start: 2021-01-01 | End: 2021-01-01 | Stop reason: HOSPADM

## 2021-01-01 RX ORDER — MORPHINE SULFATE 2 MG/ML
2 INJECTION, SOLUTION INTRAMUSCULAR; INTRAVENOUS
Status: DISCONTINUED | OUTPATIENT
Start: 2021-01-01 | End: 2021-01-01 | Stop reason: HOSPADM

## 2021-01-01 RX ORDER — POLYVINYL ALCOHOL 14 MG/ML
1 SOLUTION/ DROPS OPHTHALMIC EVERY 4 HOURS
Status: DISCONTINUED | OUTPATIENT
Start: 2021-01-01 | End: 2021-01-01

## 2021-01-01 RX ORDER — PROMETHAZINE HYDROCHLORIDE 12.5 MG/1
12.5 TABLET ORAL EVERY 6 HOURS PRN
Status: DISCONTINUED | OUTPATIENT
Start: 2021-01-01 | End: 2021-01-01 | Stop reason: HOSPADM

## 2021-01-01 RX ORDER — CLOPIDOGREL BISULFATE 75 MG/1
75 TABLET ORAL DAILY
Status: DISCONTINUED | OUTPATIENT
Start: 2021-01-01 | End: 2021-01-01

## 2021-01-01 RX ORDER — POLYETHYLENE GLYCOL 3350 17 G/17G
17 POWDER, FOR SOLUTION ORAL DAILY
Status: DISCONTINUED | OUTPATIENT
Start: 2021-01-01 | End: 2021-01-01 | Stop reason: HOSPADM

## 2021-01-01 RX ORDER — PANTOPRAZOLE SODIUM 40 MG/1
40 TABLET, DELAYED RELEASE ORAL DAILY
Status: DISCONTINUED | OUTPATIENT
Start: 2021-01-01 | End: 2021-01-01

## 2021-01-01 RX ORDER — SPIRONOLACTONE 25 MG/1
25 TABLET ORAL DAILY
Status: DISCONTINUED | OUTPATIENT
Start: 2021-01-01 | End: 2021-01-01

## 2021-01-01 RX ORDER — NICOTINE POLACRILEX 4 MG
15 LOZENGE BUCCAL PRN
Status: DISCONTINUED | OUTPATIENT
Start: 2021-01-01 | End: 2021-01-01 | Stop reason: HOSPADM

## 2021-01-01 RX ORDER — ATORVASTATIN CALCIUM 40 MG/1
40 TABLET, FILM COATED ORAL NIGHTLY
Status: DISCONTINUED | OUTPATIENT
Start: 2021-01-01 | End: 2021-01-01 | Stop reason: HOSPADM

## 2021-01-01 RX ORDER — TRAMADOL HYDROCHLORIDE 50 MG/1
50 TABLET ORAL ONCE
Status: COMPLETED | OUTPATIENT
Start: 2021-01-01 | End: 2021-01-01

## 2021-01-01 RX ORDER — OMEGA-3S/DHA/EPA/FISH OIL/D3 300MG-1000
400 CAPSULE ORAL DAILY
Qty: 30 TABLET | Refills: 0
Start: 2021-01-01

## 2021-01-01 RX ORDER — PRAMIPEXOLE DIHYDROCHLORIDE 0.25 MG/1
1 TABLET ORAL 2 TIMES DAILY
Status: DISCONTINUED | OUTPATIENT
Start: 2021-01-01 | End: 2021-01-01 | Stop reason: HOSPADM

## 2021-01-01 RX ORDER — POTASSIUM CHLORIDE 7.45 MG/ML
10 INJECTION INTRAVENOUS PRN
Status: DISCONTINUED | OUTPATIENT
Start: 2021-01-01 | End: 2021-01-01 | Stop reason: HOSPADM

## 2021-01-01 RX ORDER — HEPARIN SODIUM 1000 [USP'U]/ML
4000 INJECTION, SOLUTION INTRAVENOUS; SUBCUTANEOUS ONCE
Status: COMPLETED | OUTPATIENT
Start: 2021-01-01 | End: 2021-01-01

## 2021-01-01 RX ORDER — ALBUTEROL SULFATE 90 UG/1
2 AEROSOL, METERED RESPIRATORY (INHALATION) ONCE
Status: COMPLETED | OUTPATIENT
Start: 2021-01-01 | End: 2021-01-01

## 2021-01-01 RX ADMIN — CEFTRIAXONE 1 G: 1 INJECTION, POWDER, FOR SOLUTION INTRAMUSCULAR; INTRAVENOUS at 18:14

## 2021-01-01 RX ADMIN — FUROSEMIDE 40 MG: 10 INJECTION, SOLUTION INTRAVENOUS at 19:56

## 2021-01-01 RX ADMIN — LUBIPROSTONE 24 MCG: 24 CAPSULE, GELATIN COATED ORAL at 18:15

## 2021-01-01 RX ADMIN — SODIUM CHLORIDE, PRESERVATIVE FREE 10 ML: 5 INJECTION INTRAVENOUS at 20:59

## 2021-01-01 RX ADMIN — AMIODARONE HYDROCHLORIDE 1 MG/MIN: 50 INJECTION, SOLUTION INTRAVENOUS at 23:27

## 2021-01-01 RX ADMIN — SERTRALINE HYDROCHLORIDE 25 MG: 50 TABLET ORAL at 21:00

## 2021-01-01 RX ADMIN — FAMOTIDINE 20 MG: 10 INJECTION, SOLUTION INTRAVENOUS at 21:53

## 2021-01-01 RX ADMIN — CLOPIDOGREL BISULFATE 75 MG: 75 TABLET ORAL at 09:30

## 2021-01-01 RX ADMIN — POTASSIUM CHLORIDE 10 MEQ: 7.46 INJECTION, SOLUTION INTRAVENOUS at 10:24

## 2021-01-01 RX ADMIN — BUSPIRONE HYDROCHLORIDE 10 MG: 10 TABLET ORAL at 11:05

## 2021-01-01 RX ADMIN — WHITE PETROLATUM: .15; .85 OINTMENT OPHTHALMIC at 03:52

## 2021-01-01 RX ADMIN — PRAMIPEXOLE DIHYDROCHLORIDE 1 MG: 0.25 TABLET ORAL at 08:50

## 2021-01-01 RX ADMIN — GABAPENTIN 400 MG: 400 CAPSULE ORAL at 08:48

## 2021-01-01 RX ADMIN — PRAMIPEXOLE DIHYDROCHLORIDE 1 MG: 0.25 TABLET ORAL at 20:56

## 2021-01-01 RX ADMIN — FAMOTIDINE 20 MG: 10 INJECTION, SOLUTION INTRAVENOUS at 02:03

## 2021-01-01 RX ADMIN — MIDODRINE HYDROCHLORIDE 10 MG: 5 TABLET ORAL at 12:36

## 2021-01-01 RX ADMIN — METHYLPREDNISOLONE SODIUM SUCCINATE 40 MG: 40 INJECTION, POWDER, FOR SOLUTION INTRAMUSCULAR; INTRAVENOUS at 17:49

## 2021-01-01 RX ADMIN — Medication 150 MCG/HR: at 09:34

## 2021-01-01 RX ADMIN — SODIUM CHLORIDE, PRESERVATIVE FREE 10 ML: 5 INJECTION INTRAVENOUS at 23:11

## 2021-01-01 RX ADMIN — PROPOFOL 70 MCG/KG/MIN: 10 INJECTION, EMULSION INTRAVENOUS at 00:02

## 2021-01-01 RX ADMIN — BUSPIRONE HYDROCHLORIDE 10 MG: 10 TABLET ORAL at 21:57

## 2021-01-01 RX ADMIN — PRAMIPEXOLE DIHYDROCHLORIDE 1 MG: 0.25 TABLET ORAL at 08:22

## 2021-01-01 RX ADMIN — MIDODRINE HYDROCHLORIDE 10 MG: 5 TABLET ORAL at 12:53

## 2021-01-01 RX ADMIN — CHOLECALCIFEROL (VITAMIN D3) 10 MCG (400 UNIT) TABLET 400 UNITS: at 09:49

## 2021-01-01 RX ADMIN — MIDODRINE HYDROCHLORIDE 10 MG: 5 TABLET ORAL at 17:25

## 2021-01-01 RX ADMIN — PRAMIPEXOLE DIHYDROCHLORIDE 1 MG: 0.25 TABLET ORAL at 09:27

## 2021-01-01 RX ADMIN — SENNOSIDES 17.2 MG: 8.6 TABLET, FILM COATED ORAL at 21:00

## 2021-01-01 RX ADMIN — MIDODRINE HYDROCHLORIDE 10 MG: 5 TABLET ORAL at 18:18

## 2021-01-01 RX ADMIN — GABAPENTIN 400 MG: 400 CAPSULE ORAL at 21:00

## 2021-01-01 RX ADMIN — Medication 25 MCG: at 20:14

## 2021-01-01 RX ADMIN — METOCLOPRAMIDE 5 MG: 5 INJECTION, SOLUTION INTRAMUSCULAR; INTRAVENOUS at 01:16

## 2021-01-01 RX ADMIN — ALBUTEROL SULFATE 2 PUFF: 90 AEROSOL, METERED RESPIRATORY (INHALATION) at 12:13

## 2021-01-01 RX ADMIN — ENOXAPARIN SODIUM 40 MG: 40 INJECTION SUBCUTANEOUS at 10:05

## 2021-01-01 RX ADMIN — SERTRALINE HYDROCHLORIDE 25 MG: 50 TABLET ORAL at 23:12

## 2021-01-01 RX ADMIN — FUROSEMIDE 40 MG: 10 INJECTION INTRAMUSCULAR; INTRAVENOUS at 09:52

## 2021-01-01 RX ADMIN — LUBIPROSTONE 24 MCG: 24 CAPSULE, GELATIN COATED ORAL at 11:05

## 2021-01-01 RX ADMIN — BUSPIRONE HYDROCHLORIDE 10 MG: 10 TABLET ORAL at 21:54

## 2021-01-01 RX ADMIN — SODIUM CHLORIDE, PRESERVATIVE FREE 10 ML: 5 INJECTION INTRAVENOUS at 09:10

## 2021-01-01 RX ADMIN — SODIUM CHLORIDE, PRESERVATIVE FREE 10 ML: 5 INJECTION INTRAVENOUS at 21:15

## 2021-01-01 RX ADMIN — MICONAZOLE NITRATE: 20 POWDER TOPICAL at 09:43

## 2021-01-01 RX ADMIN — ALBUMIN (HUMAN) 25 G: 0.25 INJECTION, SOLUTION INTRAVENOUS at 06:15

## 2021-01-01 RX ADMIN — POLYVINYL ALCOHOL 1 DROP: 14 SOLUTION/ DROPS OPHTHALMIC at 11:44

## 2021-01-01 RX ADMIN — PRAMIPEXOLE DIHYDROCHLORIDE 1 MG: 0.25 TABLET ORAL at 20:43

## 2021-01-01 RX ADMIN — FUROSEMIDE 40 MG: 10 INJECTION, SOLUTION INTRAMUSCULAR; INTRAVENOUS at 09:31

## 2021-01-01 RX ADMIN — FUROSEMIDE 20 MG: 10 INJECTION, SOLUTION INTRAVENOUS at 18:15

## 2021-01-01 RX ADMIN — Medication 2 MCG/MIN: at 21:06

## 2021-01-01 RX ADMIN — FUROSEMIDE 20 MG: 10 INJECTION, SOLUTION INTRAVENOUS at 09:21

## 2021-01-01 RX ADMIN — EPINEPHRINE 1 MG: 0.1 INJECTION, SOLUTION ENDOTRACHEAL; INTRACARDIAC; INTRAVENOUS at 19:31

## 2021-01-01 RX ADMIN — SERTRALINE HYDROCHLORIDE 25 MG: 50 TABLET ORAL at 21:10

## 2021-01-01 RX ADMIN — INSULIN LISPRO 4 UNITS: 100 INJECTION, SOLUTION INTRAVENOUS; SUBCUTANEOUS at 23:35

## 2021-01-01 RX ADMIN — HEPARIN SODIUM AND DEXTROSE 12 UNITS/KG/HR: 10000; 5 INJECTION INTRAVENOUS at 14:02

## 2021-01-01 RX ADMIN — METOCLOPRAMIDE 5 MG: 5 INJECTION, SOLUTION INTRAMUSCULAR; INTRAVENOUS at 18:22

## 2021-01-01 RX ADMIN — CEFTRIAXONE 1 G: 1 INJECTION, POWDER, FOR SOLUTION INTRAMUSCULAR; INTRAVENOUS at 18:36

## 2021-01-01 RX ADMIN — HEPARIN SODIUM AND DEXTROSE 12 UNITS/KG/HR: 10000; 5 INJECTION INTRAVENOUS at 06:08

## 2021-01-01 RX ADMIN — LEVETIRACETAM 1000 MG: 100 INJECTION, SOLUTION INTRAVENOUS at 09:06

## 2021-01-01 RX ADMIN — GABAPENTIN 400 MG: 400 CAPSULE ORAL at 14:50

## 2021-01-01 RX ADMIN — AZITHROMYCIN MONOHYDRATE 500 MG: 500 INJECTION, POWDER, LYOPHILIZED, FOR SOLUTION INTRAVENOUS at 22:17

## 2021-01-01 RX ADMIN — ATORVASTATIN CALCIUM 40 MG: 40 TABLET, FILM COATED ORAL at 21:58

## 2021-01-01 RX ADMIN — BUSPIRONE HYDROCHLORIDE 10 MG: 10 TABLET ORAL at 20:56

## 2021-01-01 RX ADMIN — PROPOFOL 80 MCG/KG/MIN: 10 INJECTION, EMULSION INTRAVENOUS at 11:29

## 2021-01-01 RX ADMIN — MIDODRINE HYDROCHLORIDE 10 MG: 5 TABLET ORAL at 17:34

## 2021-01-01 RX ADMIN — LUBIPROSTONE 24 MCG: 24 CAPSULE, GELATIN COATED ORAL at 16:41

## 2021-01-01 RX ADMIN — AZITHROMYCIN MONOHYDRATE 500 MG: 500 INJECTION, POWDER, LYOPHILIZED, FOR SOLUTION INTRAVENOUS at 22:28

## 2021-01-01 RX ADMIN — MORPHINE SULFATE 2 MG: 2 INJECTION, SOLUTION INTRAMUSCULAR; INTRAVENOUS at 15:50

## 2021-01-01 RX ADMIN — WHITE PETROLATUM: .15; .85 OINTMENT OPHTHALMIC at 10:40

## 2021-01-01 RX ADMIN — METOCLOPRAMIDE 10 MG: 5 INJECTION, SOLUTION INTRAMUSCULAR; INTRAVENOUS at 06:33

## 2021-01-01 RX ADMIN — CHLORHEXIDINE GLUCONATE 0.12% ORAL RINSE 15 ML: 1.2 LIQUID ORAL at 09:13

## 2021-01-01 RX ADMIN — FUROSEMIDE 40 MG: 10 INJECTION, SOLUTION INTRAMUSCULAR; INTRAVENOUS at 17:06

## 2021-01-01 RX ADMIN — SODIUM CHLORIDE, PRESERVATIVE FREE 10 ML: 5 INJECTION INTRAVENOUS at 09:49

## 2021-01-01 RX ADMIN — PROPOFOL 60 MCG/KG/MIN: 10 INJECTION, EMULSION INTRAVENOUS at 22:50

## 2021-01-01 RX ADMIN — PRAMIPEXOLE DIHYDROCHLORIDE 1 MG: 0.25 TABLET ORAL at 21:32

## 2021-01-01 RX ADMIN — CEFTRIAXONE 1000 MG: 1 INJECTION, POWDER, FOR SOLUTION INTRAMUSCULAR; INTRAVENOUS at 17:49

## 2021-01-01 RX ADMIN — PRAMIPEXOLE DIHYDROCHLORIDE 1 MG: 0.25 TABLET ORAL at 21:23

## 2021-01-01 RX ADMIN — SODIUM CHLORIDE, PRESERVATIVE FREE 10 ML: 5 INJECTION INTRAVENOUS at 22:11

## 2021-01-01 RX ADMIN — SENNOSIDES 17.2 MG: 8.6 TABLET, FILM COATED ORAL at 21:54

## 2021-01-01 RX ADMIN — CHLORHEXIDINE GLUCONATE 0.12% ORAL RINSE 15 ML: 1.2 LIQUID ORAL at 09:11

## 2021-01-01 RX ADMIN — METOCLOPRAMIDE 5 MG: 5 INJECTION, SOLUTION INTRAMUSCULAR; INTRAVENOUS at 12:08

## 2021-01-01 RX ADMIN — FUROSEMIDE 40 MG: 10 INJECTION, SOLUTION INTRAMUSCULAR; INTRAVENOUS at 21:32

## 2021-01-01 RX ADMIN — EPINEPHRINE 1 MG: 0.1 INJECTION, SOLUTION ENDOTRACHEAL; INTRACARDIAC; INTRAVENOUS at 19:42

## 2021-01-01 RX ADMIN — SODIUM CHLORIDE, PRESERVATIVE FREE 10 ML: 5 INJECTION INTRAVENOUS at 20:44

## 2021-01-01 RX ADMIN — PROPOFOL 80 MCG/KG/MIN: 10 INJECTION, EMULSION INTRAVENOUS at 19:11

## 2021-01-01 RX ADMIN — ALBUTEROL SULFATE 2 PUFF: 90 AEROSOL, METERED RESPIRATORY (INHALATION) at 12:08

## 2021-01-01 RX ADMIN — SODIUM CHLORIDE, PRESERVATIVE FREE 10 ML: 5 INJECTION INTRAVENOUS at 09:04

## 2021-01-01 RX ADMIN — MICONAZOLE NITRATE: 20 POWDER TOPICAL at 09:11

## 2021-01-01 RX ADMIN — SODIUM CHLORIDE, PRESERVATIVE FREE 10 ML: 5 INJECTION INTRAVENOUS at 08:21

## 2021-01-01 RX ADMIN — METOCLOPRAMIDE 10 MG: 5 INJECTION, SOLUTION INTRAMUSCULAR; INTRAVENOUS at 17:36

## 2021-01-01 RX ADMIN — POLYETHYLENE GLYCOL (3350) 17 G: 17 POWDER, FOR SOLUTION ORAL at 09:30

## 2021-01-01 RX ADMIN — WHITE PETROLATUM: .15; .85 OINTMENT OPHTHALMIC at 06:16

## 2021-01-01 RX ADMIN — LUBIPROSTONE 24 MCG: 24 CAPSULE, GELATIN COATED ORAL at 08:49

## 2021-01-01 RX ADMIN — METOCLOPRAMIDE 10 MG: 5 INJECTION, SOLUTION INTRAMUSCULAR; INTRAVENOUS at 05:44

## 2021-01-01 RX ADMIN — POTASSIUM CHLORIDE 40 MEQ: 1500 TABLET, EXTENDED RELEASE ORAL at 09:17

## 2021-01-01 RX ADMIN — FUROSEMIDE 40 MG: 10 INJECTION INTRAMUSCULAR; INTRAVENOUS at 09:11

## 2021-01-01 RX ADMIN — METOCLOPRAMIDE 5 MG: 5 INJECTION, SOLUTION INTRAMUSCULAR; INTRAVENOUS at 05:52

## 2021-01-01 RX ADMIN — BUSPIRONE HYDROCHLORIDE 10 MG: 10 TABLET ORAL at 08:37

## 2021-01-01 RX ADMIN — GABAPENTIN 400 MG: 400 CAPSULE ORAL at 08:37

## 2021-01-01 RX ADMIN — ALBUTEROL SULFATE 2 PUFF: 90 AEROSOL, METERED RESPIRATORY (INHALATION) at 15:24

## 2021-01-01 RX ADMIN — METOCLOPRAMIDE 10 MG: 5 INJECTION, SOLUTION INTRAMUSCULAR; INTRAVENOUS at 02:04

## 2021-01-01 RX ADMIN — CLOPIDOGREL BISULFATE 75 MG: 75 TABLET ORAL at 08:49

## 2021-01-01 RX ADMIN — ALBUMIN (HUMAN) 25 G: 0.25 INJECTION, SOLUTION INTRAVENOUS at 14:49

## 2021-01-01 RX ADMIN — ROPINIROLE HYDROCHLORIDE 1 MG: 1 TABLET, FILM COATED ORAL at 20:43

## 2021-01-01 RX ADMIN — FAMOTIDINE 20 MG: 10 INJECTION, SOLUTION INTRAVENOUS at 21:23

## 2021-01-01 RX ADMIN — MICONAZOLE NITRATE: 20 POWDER TOPICAL at 20:59

## 2021-01-01 RX ADMIN — METOCLOPRAMIDE 5 MG: 5 INJECTION, SOLUTION INTRAMUSCULAR; INTRAVENOUS at 17:10

## 2021-01-01 RX ADMIN — CHOLECALCIFEROL (VITAMIN D3) 10 MCG (400 UNIT) TABLET 400 UNITS: at 08:28

## 2021-01-01 RX ADMIN — METOCLOPRAMIDE 5 MG: 5 INJECTION, SOLUTION INTRAMUSCULAR; INTRAVENOUS at 00:52

## 2021-01-01 RX ADMIN — GABAPENTIN 400 MG: 400 CAPSULE ORAL at 08:43

## 2021-01-01 RX ADMIN — AMIODARONE HYDROCHLORIDE 200 MG: 200 TABLET ORAL at 08:37

## 2021-01-01 RX ADMIN — METOCLOPRAMIDE 5 MG: 5 INJECTION, SOLUTION INTRAMUSCULAR; INTRAVENOUS at 06:43

## 2021-01-01 RX ADMIN — FUROSEMIDE 40 MG: 10 INJECTION, SOLUTION INTRAMUSCULAR; INTRAVENOUS at 18:03

## 2021-01-01 RX ADMIN — ENOXAPARIN SODIUM 30 MG: 30 INJECTION SUBCUTANEOUS at 08:28

## 2021-01-01 RX ADMIN — MIDODRINE HYDROCHLORIDE 10 MG: 5 TABLET ORAL at 09:48

## 2021-01-01 RX ADMIN — CLOPIDOGREL BISULFATE 75 MG: 75 TABLET ORAL at 09:13

## 2021-01-01 RX ADMIN — BUSPIRONE HYDROCHLORIDE 10 MG: 10 TABLET ORAL at 22:13

## 2021-01-01 RX ADMIN — ENOXAPARIN SODIUM 30 MG: 30 INJECTION SUBCUTANEOUS at 09:21

## 2021-01-01 RX ADMIN — POTASSIUM BICARBONATE 40 MEQ: 782 TABLET, EFFERVESCENT ORAL at 09:51

## 2021-01-01 RX ADMIN — ATORVASTATIN CALCIUM 40 MG: 40 TABLET, FILM COATED ORAL at 21:32

## 2021-01-01 RX ADMIN — FUROSEMIDE 40 MG: 10 INJECTION, SOLUTION INTRAVENOUS at 14:00

## 2021-01-01 RX ADMIN — MIDODRINE HYDROCHLORIDE 10 MG: 5 TABLET ORAL at 16:23

## 2021-01-01 RX ADMIN — METOCLOPRAMIDE 5 MG: 5 INJECTION, SOLUTION INTRAMUSCULAR; INTRAVENOUS at 06:40

## 2021-01-01 RX ADMIN — SENNOSIDES 17.2 MG: 8.6 TABLET, FILM COATED ORAL at 20:42

## 2021-01-01 RX ADMIN — FUROSEMIDE 20 MG: 10 INJECTION, SOLUTION INTRAVENOUS at 09:13

## 2021-01-01 RX ADMIN — MICONAZOLE NITRATE: 20 POWDER TOPICAL at 01:10

## 2021-01-01 RX ADMIN — ATORVASTATIN CALCIUM 40 MG: 40 TABLET, FILM COATED ORAL at 21:10

## 2021-01-01 RX ADMIN — BUSPIRONE HYDROCHLORIDE 10 MG: 10 TABLET ORAL at 20:42

## 2021-01-01 RX ADMIN — POLYETHYLENE GLYCOL (3350) 17 G: 17 POWDER, FOR SOLUTION ORAL at 08:50

## 2021-01-01 RX ADMIN — GABAPENTIN 400 MG: 400 CAPSULE ORAL at 12:56

## 2021-01-01 RX ADMIN — MICONAZOLE NITRATE: 20 POWDER TOPICAL at 09:48

## 2021-01-01 RX ADMIN — Medication 30 MCG/MIN: at 02:45

## 2021-01-01 RX ADMIN — LUBIPROSTONE 24 MCG: 24 CAPSULE, GELATIN COATED ORAL at 09:17

## 2021-01-01 RX ADMIN — METOCLOPRAMIDE 10 MG: 5 INJECTION, SOLUTION INTRAMUSCULAR; INTRAVENOUS at 05:51

## 2021-01-01 RX ADMIN — SODIUM CHLORIDE, PRESERVATIVE FREE 10 ML: 5 INJECTION INTRAVENOUS at 21:00

## 2021-01-01 RX ADMIN — CLOPIDOGREL BISULFATE 75 MG: 75 TABLET ORAL at 09:11

## 2021-01-01 RX ADMIN — MICONAZOLE NITRATE: 20 POWDER TOPICAL at 21:38

## 2021-01-01 RX ADMIN — METHYLPREDNISOLONE SODIUM SUCCINATE 125 MG: 125 INJECTION, POWDER, FOR SOLUTION INTRAMUSCULAR; INTRAVENOUS at 16:44

## 2021-01-01 RX ADMIN — SERTRALINE HYDROCHLORIDE 25 MG: 50 TABLET ORAL at 20:58

## 2021-01-01 RX ADMIN — AMIODARONE HYDROCHLORIDE 200 MG: 200 TABLET ORAL at 11:04

## 2021-01-01 RX ADMIN — POLYETHYLENE GLYCOL (3350) 17 G: 17 POWDER, FOR SOLUTION ORAL at 08:40

## 2021-01-01 RX ADMIN — SODIUM CHLORIDE, PRESERVATIVE FREE 10 ML: 5 INJECTION INTRAVENOUS at 21:49

## 2021-01-01 RX ADMIN — SODIUM CHLORIDE, PRESERVATIVE FREE 10 ML: 5 INJECTION INTRAVENOUS at 09:22

## 2021-01-01 RX ADMIN — CEFTRIAXONE 1 G: 1 INJECTION, POWDER, FOR SOLUTION INTRAMUSCULAR; INTRAVENOUS at 16:53

## 2021-01-01 RX ADMIN — WHITE PETROLATUM: .15; .85 OINTMENT OPHTHALMIC at 13:19

## 2021-01-01 RX ADMIN — HEPARIN SODIUM AND DEXTROSE 12 UNITS/KG/HR: 10000; 5 INJECTION INTRAVENOUS at 01:07

## 2021-01-01 RX ADMIN — INSULIN LISPRO 4 UNITS: 100 INJECTION, SOLUTION INTRAVENOUS; SUBCUTANEOUS at 09:19

## 2021-01-01 RX ADMIN — GABAPENTIN 400 MG: 400 CAPSULE ORAL at 09:21

## 2021-01-01 RX ADMIN — SERTRALINE HYDROCHLORIDE 25 MG: 50 TABLET ORAL at 20:42

## 2021-01-01 RX ADMIN — METHYLPREDNISOLONE SODIUM SUCCINATE 40 MG: 40 INJECTION, POWDER, FOR SOLUTION INTRAMUSCULAR; INTRAVENOUS at 10:50

## 2021-01-01 RX ADMIN — SODIUM CHLORIDE, PRESERVATIVE FREE 10 ML: 5 INJECTION INTRAVENOUS at 09:53

## 2021-01-01 RX ADMIN — LUBIPROSTONE 24 MCG: 24 CAPSULE, GELATIN COATED ORAL at 17:48

## 2021-01-01 RX ADMIN — LEVETIRACETAM 1000 MG: 100 INJECTION, SOLUTION INTRAVENOUS at 21:51

## 2021-01-01 RX ADMIN — MIDODRINE HYDROCHLORIDE 10 MG: 5 TABLET ORAL at 12:56

## 2021-01-01 RX ADMIN — AMIODARONE HYDROCHLORIDE 200 MG: 200 TABLET ORAL at 09:21

## 2021-01-01 RX ADMIN — MIDODRINE HYDROCHLORIDE 10 MG: 5 TABLET ORAL at 09:41

## 2021-01-01 RX ADMIN — INSULIN GLARGINE 10 UNITS: 100 INJECTION, SOLUTION SUBCUTANEOUS at 21:05

## 2021-01-01 RX ADMIN — LORAZEPAM 0.5 MG: 2 INJECTION INTRAMUSCULAR; INTRAVENOUS at 15:12

## 2021-01-01 RX ADMIN — LEVOFLOXACIN 750 MG: 5 INJECTION, SOLUTION INTRAVENOUS at 12:29

## 2021-01-01 RX ADMIN — LUBIPROSTONE 24 MCG: 24 CAPSULE, GELATIN COATED ORAL at 17:18

## 2021-01-01 RX ADMIN — MAGNESIUM SULFATE HEPTAHYDRATE 1000 MG: 1 INJECTION, SOLUTION INTRAVENOUS at 07:04

## 2021-01-01 RX ADMIN — MIDODRINE HYDROCHLORIDE 10 MG: 5 TABLET ORAL at 12:29

## 2021-01-01 RX ADMIN — BUSPIRONE HYDROCHLORIDE 10 MG: 10 TABLET ORAL at 20:55

## 2021-01-01 RX ADMIN — MIDODRINE HYDROCHLORIDE 10 MG: 5 TABLET ORAL at 17:09

## 2021-01-01 RX ADMIN — ATORVASTATIN CALCIUM 40 MG: 40 TABLET, FILM COATED ORAL at 20:41

## 2021-01-01 RX ADMIN — SERTRALINE HYDROCHLORIDE 25 MG: 50 TABLET ORAL at 20:44

## 2021-01-01 RX ADMIN — AZITHROMYCIN MONOHYDRATE 500 MG: 500 INJECTION, POWDER, LYOPHILIZED, FOR SOLUTION INTRAVENOUS at 00:07

## 2021-01-01 RX ADMIN — INSULIN LISPRO 4 UNITS: 100 INJECTION, SOLUTION INTRAVENOUS; SUBCUTANEOUS at 21:31

## 2021-01-01 RX ADMIN — AZITHROMYCIN MONOHYDRATE 500 MG: 500 INJECTION, POWDER, LYOPHILIZED, FOR SOLUTION INTRAVENOUS at 22:48

## 2021-01-01 RX ADMIN — PANTOPRAZOLE SODIUM 40 MG: 40 TABLET, DELAYED RELEASE ORAL at 05:52

## 2021-01-01 RX ADMIN — THEOPHYLLINE ANHYDROUS 200 MG: 200 CAPSULE, EXTENDED RELEASE ORAL at 21:28

## 2021-01-01 RX ADMIN — FUROSEMIDE 40 MG: 10 INJECTION, SOLUTION INTRAMUSCULAR; INTRAVENOUS at 16:53

## 2021-01-01 RX ADMIN — BUSPIRONE HYDROCHLORIDE 10 MG: 10 TABLET ORAL at 13:50

## 2021-01-01 RX ADMIN — INSULIN LISPRO 2 UNITS: 100 INJECTION, SOLUTION INTRAVENOUS; SUBCUTANEOUS at 04:52

## 2021-01-01 RX ADMIN — AZITHROMYCIN MONOHYDRATE 500 MG: 500 INJECTION, POWDER, LYOPHILIZED, FOR SOLUTION INTRAVENOUS at 23:19

## 2021-01-01 RX ADMIN — MORPHINE SULFATE 2 MG: 2 INJECTION, SOLUTION INTRAMUSCULAR; INTRAVENOUS at 15:12

## 2021-01-01 RX ADMIN — SODIUM CHLORIDE: 9 INJECTION, SOLUTION INTRAVENOUS at 09:28

## 2021-01-01 RX ADMIN — GABAPENTIN 400 MG: 400 CAPSULE ORAL at 01:08

## 2021-01-01 RX ADMIN — GABAPENTIN 400 MG: 400 CAPSULE ORAL at 21:30

## 2021-01-01 RX ADMIN — POLYVINYL ALCOHOL 1 DROP: 14 SOLUTION/ DROPS OPHTHALMIC at 06:10

## 2021-01-01 RX ADMIN — BUSPIRONE HYDROCHLORIDE 10 MG: 10 TABLET ORAL at 22:36

## 2021-01-01 RX ADMIN — SODIUM CHLORIDE, PRESERVATIVE FREE 10 ML: 5 INJECTION INTRAVENOUS at 20:57

## 2021-01-01 RX ADMIN — GABAPENTIN 400 MG: 400 CAPSULE ORAL at 20:56

## 2021-01-01 RX ADMIN — CLOPIDOGREL BISULFATE 75 MG: 75 TABLET ORAL at 09:17

## 2021-01-01 RX ADMIN — WHITE PETROLATUM: .15; .85 OINTMENT OPHTHALMIC at 10:48

## 2021-01-01 RX ADMIN — MAGNESIUM SULFATE HEPTAHYDRATE 4000 MG: 80 INJECTION, SOLUTION INTRAVENOUS at 13:11

## 2021-01-01 RX ADMIN — INSULIN LISPRO 4 UNITS: 100 INJECTION, SOLUTION INTRAVENOUS; SUBCUTANEOUS at 16:03

## 2021-01-01 RX ADMIN — CHOLECALCIFEROL (VITAMIN D3) 10 MCG (400 UNIT) TABLET 400 UNITS: at 09:03

## 2021-01-01 RX ADMIN — SODIUM CHLORIDE 1000 ML/HR: 9 INJECTION, SOLUTION INTRAVENOUS at 19:41

## 2021-01-01 RX ADMIN — CLOPIDOGREL BISULFATE 75 MG: 75 TABLET ORAL at 09:41

## 2021-01-01 RX ADMIN — LEVETIRACETAM 1000 MG: 100 INJECTION, SOLUTION INTRAVENOUS at 09:50

## 2021-01-01 RX ADMIN — ENOXAPARIN SODIUM 30 MG: 30 INJECTION SUBCUTANEOUS at 08:50

## 2021-01-01 RX ADMIN — POLYVINYL ALCOHOL 1 DROP: 14 SOLUTION/ DROPS OPHTHALMIC at 09:13

## 2021-01-01 RX ADMIN — ALBUTEROL SULFATE 2 PUFF: 90 AEROSOL, METERED RESPIRATORY (INHALATION) at 21:01

## 2021-01-01 RX ADMIN — Medication 10 MCG/MIN: at 19:10

## 2021-01-01 RX ADMIN — BUSPIRONE HYDROCHLORIDE 10 MG: 10 TABLET ORAL at 14:41

## 2021-01-01 RX ADMIN — METOCLOPRAMIDE 5 MG: 5 INJECTION, SOLUTION INTRAMUSCULAR; INTRAVENOUS at 00:19

## 2021-01-01 RX ADMIN — CHOLECALCIFEROL (VITAMIN D3) 10 MCG (400 UNIT) TABLET 400 UNITS: at 08:22

## 2021-01-01 RX ADMIN — PRAMIPEXOLE DIHYDROCHLORIDE 1 MG: 0.25 TABLET ORAL at 21:00

## 2021-01-01 RX ADMIN — CLOPIDOGREL BISULFATE 75 MG: 75 TABLET ORAL at 08:28

## 2021-01-01 RX ADMIN — MICONAZOLE NITRATE: 20 POWDER TOPICAL at 21:00

## 2021-01-01 RX ADMIN — PRAMIPEXOLE DIHYDROCHLORIDE 1 MG: 0.25 TABLET ORAL at 09:13

## 2021-01-01 RX ADMIN — METOCLOPRAMIDE 10 MG: 5 INJECTION, SOLUTION INTRAMUSCULAR; INTRAVENOUS at 23:19

## 2021-01-01 RX ADMIN — METOCLOPRAMIDE 5 MG: 5 INJECTION, SOLUTION INTRAMUSCULAR; INTRAVENOUS at 18:18

## 2021-01-01 RX ADMIN — METOCLOPRAMIDE 10 MG: 5 INJECTION, SOLUTION INTRAMUSCULAR; INTRAVENOUS at 05:33

## 2021-01-01 RX ADMIN — MIDODRINE HYDROCHLORIDE 10 MG: 5 TABLET ORAL at 09:52

## 2021-01-01 RX ADMIN — Medication 28 MCG/MIN: at 11:07

## 2021-01-01 RX ADMIN — PRAMIPEXOLE DIHYDROCHLORIDE 1 MG: 0.25 TABLET ORAL at 21:54

## 2021-01-01 RX ADMIN — MIDODRINE HYDROCHLORIDE 10 MG: 5 TABLET ORAL at 08:28

## 2021-01-01 RX ADMIN — INSULIN GLARGINE 10 UNITS: 100 INJECTION, SOLUTION SUBCUTANEOUS at 22:04

## 2021-01-01 RX ADMIN — POTASSIUM CHLORIDE 40 MEQ: 1500 TABLET, EXTENDED RELEASE ORAL at 10:50

## 2021-01-01 RX ADMIN — PANTOPRAZOLE SODIUM 40 MG: 40 TABLET, DELAYED RELEASE ORAL at 06:16

## 2021-01-01 RX ADMIN — SODIUM CHLORIDE, PRESERVATIVE FREE 10 ML: 5 INJECTION INTRAVENOUS at 21:51

## 2021-01-01 RX ADMIN — BUSPIRONE HYDROCHLORIDE 10 MG: 10 TABLET ORAL at 09:13

## 2021-01-01 RX ADMIN — INSULIN LISPRO 2 UNITS: 100 INJECTION, SOLUTION INTRAVENOUS; SUBCUTANEOUS at 04:19

## 2021-01-01 RX ADMIN — FAMOTIDINE 20 MG: 10 INJECTION, SOLUTION INTRAVENOUS at 09:11

## 2021-01-01 RX ADMIN — SODIUM CHLORIDE, PRESERVATIVE FREE 10 ML: 5 INJECTION INTRAVENOUS at 09:18

## 2021-01-01 RX ADMIN — ATORVASTATIN CALCIUM 40 MG: 40 TABLET, FILM COATED ORAL at 21:53

## 2021-01-01 RX ADMIN — SODIUM CHLORIDE, PRESERVATIVE FREE 10 ML: 5 INJECTION INTRAVENOUS at 21:33

## 2021-01-01 RX ADMIN — LUBIPROSTONE 24 MCG: 24 CAPSULE, GELATIN COATED ORAL at 09:49

## 2021-01-01 RX ADMIN — CHOLECALCIFEROL (VITAMIN D3) 10 MCG (400 UNIT) TABLET 400 UNITS: at 08:55

## 2021-01-01 RX ADMIN — ATORVASTATIN CALCIUM 40 MG: 40 TABLET, FILM COATED ORAL at 22:36

## 2021-01-01 RX ADMIN — MICONAZOLE NITRATE: 20 POWDER TOPICAL at 11:07

## 2021-01-01 RX ADMIN — MODAFINIL 200 MG: 100 TABLET ORAL at 08:49

## 2021-01-01 RX ADMIN — PROPOFOL 30 MG: 10 INJECTION, EMULSION INTRAVENOUS at 20:15

## 2021-01-01 RX ADMIN — POLYVINYL ALCOHOL 1 DROP: 14 SOLUTION/ DROPS OPHTHALMIC at 01:12

## 2021-01-01 RX ADMIN — GABAPENTIN 400 MG: 400 CAPSULE ORAL at 20:44

## 2021-01-01 RX ADMIN — IPRATROPIUM BROMIDE AND ALBUTEROL SULFATE 3 ML: .5; 3 SOLUTION RESPIRATORY (INHALATION) at 21:57

## 2021-01-01 RX ADMIN — SODIUM CHLORIDE, PRESERVATIVE FREE 10 ML: 5 INJECTION INTRAVENOUS at 10:11

## 2021-01-01 RX ADMIN — WHITE PETROLATUM: .15; .85 OINTMENT OPHTHALMIC at 06:35

## 2021-01-01 RX ADMIN — PIPERACILLIN AND TAZOBACTAM 3375 MG: 3; .375 INJECTION, POWDER, LYOPHILIZED, FOR SOLUTION INTRAVENOUS at 14:13

## 2021-01-01 RX ADMIN — BUSPIRONE HYDROCHLORIDE 10 MG: 10 TABLET ORAL at 09:03

## 2021-01-01 RX ADMIN — ENOXAPARIN SODIUM 30 MG: 30 INJECTION SUBCUTANEOUS at 09:48

## 2021-01-01 RX ADMIN — METHYLPREDNISOLONE SODIUM SUCCINATE 125 MG: 125 INJECTION, POWDER, LYOPHILIZED, FOR SOLUTION INTRAMUSCULAR; INTRAVENOUS at 12:50

## 2021-01-01 RX ADMIN — LEVETIRACETAM 1000 MG: 100 INJECTION, SOLUTION INTRAVENOUS at 20:53

## 2021-01-01 RX ADMIN — METOCLOPRAMIDE 5 MG: 5 INJECTION, SOLUTION INTRAMUSCULAR; INTRAVENOUS at 17:53

## 2021-01-01 RX ADMIN — GABAPENTIN 400 MG: 400 CAPSULE ORAL at 20:41

## 2021-01-01 RX ADMIN — FUROSEMIDE 40 MG: 10 INJECTION INTRAMUSCULAR; INTRAVENOUS at 02:02

## 2021-01-01 RX ADMIN — FUROSEMIDE 40 MG: 10 INJECTION, SOLUTION INTRAMUSCULAR; INTRAVENOUS at 09:03

## 2021-01-01 RX ADMIN — CEFTRIAXONE 1000 MG: 1 INJECTION, POWDER, FOR SOLUTION INTRAMUSCULAR; INTRAVENOUS at 18:18

## 2021-01-01 RX ADMIN — MICONAZOLE NITRATE: 20 POWDER TOPICAL at 07:56

## 2021-01-01 RX ADMIN — SODIUM CHLORIDE, PRESERVATIVE FREE 10 ML: 5 INJECTION INTRAVENOUS at 09:00

## 2021-01-01 RX ADMIN — ATORVASTATIN CALCIUM 40 MG: 40 TABLET, FILM COATED ORAL at 20:44

## 2021-01-01 RX ADMIN — CLOPIDOGREL BISULFATE 75 MG: 75 TABLET ORAL at 11:05

## 2021-01-01 RX ADMIN — ENOXAPARIN SODIUM 40 MG: 40 INJECTION SUBCUTANEOUS at 08:00

## 2021-01-01 RX ADMIN — POTASSIUM CHLORIDE 20 MEQ: 1500 TABLET, EXTENDED RELEASE ORAL at 12:25

## 2021-01-01 RX ADMIN — HEPARIN SODIUM 4000 UNITS: 1000 INJECTION INTRAVENOUS; SUBCUTANEOUS at 01:02

## 2021-01-01 RX ADMIN — METOCLOPRAMIDE 5 MG: 5 INJECTION, SOLUTION INTRAMUSCULAR; INTRAVENOUS at 05:37

## 2021-01-01 RX ADMIN — BUSPIRONE HYDROCHLORIDE 10 MG: 10 TABLET ORAL at 08:28

## 2021-01-01 RX ADMIN — BUSPIRONE HYDROCHLORIDE 10 MG: 10 TABLET ORAL at 08:52

## 2021-01-01 RX ADMIN — SODIUM CHLORIDE, PRESERVATIVE FREE 10 ML: 5 INJECTION INTRAVENOUS at 08:53

## 2021-01-01 RX ADMIN — BUSPIRONE HYDROCHLORIDE 10 MG: 10 TABLET ORAL at 14:59

## 2021-01-01 RX ADMIN — LORAZEPAM 0.5 MG: 2 INJECTION INTRAMUSCULAR; INTRAVENOUS at 15:50

## 2021-01-01 RX ADMIN — LINEZOLID 600 MG: 600 INJECTION, SOLUTION INTRAVENOUS at 13:25

## 2021-01-01 RX ADMIN — BUSPIRONE HYDROCHLORIDE 10 MG: 10 TABLET ORAL at 21:33

## 2021-01-01 RX ADMIN — CHLORHEXIDINE GLUCONATE 0.12% ORAL RINSE 15 ML: 1.2 LIQUID ORAL at 09:10

## 2021-01-01 RX ADMIN — POTASSIUM PHOSPHATE, MONOBASIC AND POTASSIUM PHOSPHATE, DIBASIC 15 MMOL: 224; 236 INJECTION, SOLUTION, CONCENTRATE INTRAVENOUS at 12:36

## 2021-01-01 RX ADMIN — BUSPIRONE HYDROCHLORIDE 10 MG: 10 TABLET ORAL at 20:05

## 2021-01-01 RX ADMIN — HEPARIN SODIUM AND DEXTROSE 12 UNITS/KG/HR: 10000; 5 INJECTION INTRAVENOUS at 23:38

## 2021-01-01 RX ADMIN — ATORVASTATIN CALCIUM 40 MG: 40 TABLET, FILM COATED ORAL at 21:00

## 2021-01-01 RX ADMIN — AMIODARONE HYDROCHLORIDE 200 MG: 200 TABLET ORAL at 09:11

## 2021-01-01 RX ADMIN — MICONAZOLE NITRATE: 20 POWDER TOPICAL at 22:01

## 2021-01-01 RX ADMIN — FERROUS SULFATE TAB 325 MG (65 MG ELEMENTAL FE) 325 MG: 325 (65 FE) TAB at 08:37

## 2021-01-01 RX ADMIN — BUSPIRONE HYDROCHLORIDE 10 MG: 10 TABLET ORAL at 14:02

## 2021-01-01 RX ADMIN — PANTOPRAZOLE SODIUM 40 MG: 40 TABLET, DELAYED RELEASE ORAL at 06:33

## 2021-01-01 RX ADMIN — INSULIN LISPRO 2 UNITS: 100 INJECTION, SOLUTION INTRAVENOUS; SUBCUTANEOUS at 09:02

## 2021-01-01 RX ADMIN — PRAMIPEXOLE DIHYDROCHLORIDE 1 MG: 0.25 TABLET ORAL at 08:49

## 2021-01-01 RX ADMIN — Medication 15 MCG/MIN: at 02:28

## 2021-01-01 RX ADMIN — WHITE PETROLATUM: .15; .85 OINTMENT OPHTHALMIC at 02:30

## 2021-01-01 RX ADMIN — MIDODRINE HYDROCHLORIDE 10 MG: 5 TABLET ORAL at 17:49

## 2021-01-01 RX ADMIN — PANTOPRAZOLE SODIUM 40 MG: 40 TABLET, DELAYED RELEASE ORAL at 05:47

## 2021-01-01 RX ADMIN — CHLORHEXIDINE GLUCONATE 0.12% ORAL RINSE 15 ML: 1.2 LIQUID ORAL at 09:51

## 2021-01-01 RX ADMIN — WHITE PETROLATUM: .15; .85 OINTMENT OPHTHALMIC at 06:42

## 2021-01-01 RX ADMIN — EPINEPHRINE 1 MG: 0.1 INJECTION, SOLUTION ENDOTRACHEAL; INTRACARDIAC; INTRAVENOUS at 19:39

## 2021-01-01 RX ADMIN — PROPOFOL 80 MCG/KG/MIN: 10 INJECTION, EMULSION INTRAVENOUS at 17:47

## 2021-01-01 RX ADMIN — CHLORHEXIDINE GLUCONATE 0.12% ORAL RINSE 15 ML: 1.2 LIQUID ORAL at 21:53

## 2021-01-01 RX ADMIN — ENOXAPARIN SODIUM 40 MG: 40 INJECTION SUBCUTANEOUS at 09:48

## 2021-01-01 RX ADMIN — POLYVINYL ALCOHOL 1 DROP: 14 SOLUTION/ DROPS OPHTHALMIC at 04:47

## 2021-01-01 RX ADMIN — BUSPIRONE HYDROCHLORIDE 10 MG: 10 TABLET ORAL at 09:41

## 2021-01-01 RX ADMIN — POTASSIUM CHLORIDE 10 MEQ: 7.46 INJECTION, SOLUTION INTRAVENOUS at 09:12

## 2021-01-01 RX ADMIN — MICONAZOLE NITRATE: 20 POWDER TOPICAL at 10:05

## 2021-01-01 RX ADMIN — PIPERACILLIN AND TAZOBACTAM 3375 MG: 3; .375 INJECTION, POWDER, LYOPHILIZED, FOR SOLUTION INTRAVENOUS at 18:40

## 2021-01-01 RX ADMIN — BUSPIRONE HYDROCHLORIDE 10 MG: 10 TABLET ORAL at 09:21

## 2021-01-01 RX ADMIN — POTASSIUM BICARBONATE 40 MEQ: 782 TABLET, EFFERVESCENT ORAL at 01:08

## 2021-01-01 RX ADMIN — LUBIPROSTONE 24 MCG: 24 CAPSULE, GELATIN COATED ORAL at 08:50

## 2021-01-01 RX ADMIN — WHITE PETROLATUM: .15; .85 OINTMENT OPHTHALMIC at 07:27

## 2021-01-01 RX ADMIN — MICONAZOLE NITRATE: 20 POWDER TOPICAL at 21:53

## 2021-01-01 RX ADMIN — PANTOPRAZOLE SODIUM 40 MG: 40 TABLET, DELAYED RELEASE ORAL at 05:51

## 2021-01-01 RX ADMIN — MIDAZOLAM HYDROCHLORIDE 1 MG: 1 INJECTION, SOLUTION INTRAMUSCULAR; INTRAVENOUS at 21:25

## 2021-01-01 RX ADMIN — BUSPIRONE HYDROCHLORIDE 10 MG: 10 TABLET ORAL at 13:06

## 2021-01-01 RX ADMIN — MIDODRINE HYDROCHLORIDE 10 MG: 5 TABLET ORAL at 09:12

## 2021-01-01 RX ADMIN — BUSPIRONE HYDROCHLORIDE 10 MG: 10 TABLET ORAL at 09:52

## 2021-01-01 RX ADMIN — BUSPIRONE HYDROCHLORIDE 10 MG: 10 TABLET ORAL at 13:18

## 2021-01-01 RX ADMIN — POTASSIUM BICARBONATE 40 MEQ: 782 TABLET, EFFERVESCENT ORAL at 17:34

## 2021-01-01 RX ADMIN — ALBUTEROL SULFATE 2 PUFF: 90 AEROSOL, METERED RESPIRATORY (INHALATION) at 10:24

## 2021-01-01 RX ADMIN — BUSPIRONE HYDROCHLORIDE 10 MG: 10 TABLET ORAL at 08:22

## 2021-01-01 RX ADMIN — PRAMIPEXOLE DIHYDROCHLORIDE 1 MG: 0.25 TABLET ORAL at 08:42

## 2021-01-01 RX ADMIN — FUROSEMIDE 20 MG: 10 INJECTION, SOLUTION INTRAVENOUS at 20:56

## 2021-01-01 RX ADMIN — AZITHROMYCIN MONOHYDRATE 500 MG: 500 INJECTION, POWDER, LYOPHILIZED, FOR SOLUTION INTRAVENOUS at 01:08

## 2021-01-01 RX ADMIN — CHLORHEXIDINE GLUCONATE 0.12% ORAL RINSE 15 ML: 1.2 LIQUID ORAL at 21:23

## 2021-01-01 RX ADMIN — PRAMIPEXOLE DIHYDROCHLORIDE 1 MG: 0.25 TABLET ORAL at 09:41

## 2021-01-01 RX ADMIN — FUROSEMIDE 40 MG: 10 INJECTION, SOLUTION INTRAMUSCULAR; INTRAVENOUS at 09:49

## 2021-01-01 RX ADMIN — SODIUM CHLORIDE, PRESERVATIVE FREE 10 ML: 5 INJECTION INTRAVENOUS at 21:55

## 2021-01-01 RX ADMIN — MICONAZOLE NITRATE: 20 POWDER TOPICAL at 20:05

## 2021-01-01 RX ADMIN — HEPARIN SODIUM AND DEXTROSE 14 UNITS/KG/HR: 10000; 5 INJECTION INTRAVENOUS at 03:30

## 2021-01-01 RX ADMIN — MIDODRINE HYDROCHLORIDE 10 MG: 5 TABLET ORAL at 08:43

## 2021-01-01 RX ADMIN — WHITE PETROLATUM: .15; .85 OINTMENT OPHTHALMIC at 23:12

## 2021-01-01 RX ADMIN — PRAMIPEXOLE DIHYDROCHLORIDE 1 MG: 0.25 TABLET ORAL at 11:04

## 2021-01-01 RX ADMIN — BUSPIRONE HYDROCHLORIDE 10 MG: 10 TABLET ORAL at 14:13

## 2021-01-01 RX ADMIN — PRAMIPEXOLE DIHYDROCHLORIDE 1 MG: 0.25 TABLET ORAL at 09:20

## 2021-01-01 RX ADMIN — ALBUMIN (HUMAN) 25 G: 0.25 INJECTION, SOLUTION INTRAVENOUS at 22:07

## 2021-01-01 RX ADMIN — FUROSEMIDE 20 MG: 10 INJECTION, SOLUTION INTRAVENOUS at 17:36

## 2021-01-01 RX ADMIN — Medication 150 MCG/HR: at 18:36

## 2021-01-01 RX ADMIN — PROPOFOL 80 MCG/KG/MIN: 10 INJECTION, EMULSION INTRAVENOUS at 13:57

## 2021-01-01 RX ADMIN — CLOPIDOGREL BISULFATE 75 MG: 75 TABLET ORAL at 09:52

## 2021-01-01 RX ADMIN — GABAPENTIN 400 MG: 400 CAPSULE ORAL at 14:02

## 2021-01-01 RX ADMIN — METHYLPREDNISOLONE SODIUM SUCCINATE 40 MG: 40 INJECTION, POWDER, FOR SOLUTION INTRAMUSCULAR; INTRAVENOUS at 17:35

## 2021-01-01 RX ADMIN — MICONAZOLE NITRATE: 20 POWDER TOPICAL at 09:08

## 2021-01-01 RX ADMIN — LUBIPROSTONE 24 MCG: 24 CAPSULE, GELATIN COATED ORAL at 09:42

## 2021-01-01 RX ADMIN — GABAPENTIN 400 MG: 400 CAPSULE ORAL at 09:04

## 2021-01-01 RX ADMIN — CLOPIDOGREL BISULFATE 75 MG: 75 TABLET ORAL at 09:04

## 2021-01-01 RX ADMIN — CEFTRIAXONE 1000 MG: 1 INJECTION, POWDER, FOR SOLUTION INTRAMUSCULAR; INTRAVENOUS at 16:37

## 2021-01-01 RX ADMIN — SODIUM CHLORIDE, PRESERVATIVE FREE 10 ML: 5 INJECTION INTRAVENOUS at 08:44

## 2021-01-01 RX ADMIN — SODIUM CHLORIDE: 9 INJECTION, SOLUTION INTRAVENOUS at 04:50

## 2021-01-01 RX ADMIN — ACETAMINOPHEN 650 MG: 325 TABLET ORAL at 01:08

## 2021-01-01 RX ADMIN — MICONAZOLE NITRATE: 20 POWDER TOPICAL at 08:52

## 2021-01-01 RX ADMIN — METOCLOPRAMIDE 10 MG: 5 INJECTION, SOLUTION INTRAMUSCULAR; INTRAVENOUS at 00:09

## 2021-01-01 RX ADMIN — INSULIN LISPRO 2 UNITS: 100 INJECTION, SOLUTION INTRAVENOUS; SUBCUTANEOUS at 01:02

## 2021-01-01 RX ADMIN — CEFTRIAXONE 1000 MG: 1 INJECTION, POWDER, FOR SOLUTION INTRAMUSCULAR; INTRAVENOUS at 17:18

## 2021-01-01 RX ADMIN — DOXEPIN HYDROCHLORIDE 10 MG: 10 CAPSULE ORAL at 01:14

## 2021-01-01 RX ADMIN — LUBIPROSTONE 24 MCG: 24 CAPSULE, GELATIN COATED ORAL at 17:09

## 2021-01-01 RX ADMIN — METOCLOPRAMIDE 10 MG: 5 INJECTION, SOLUTION INTRAMUSCULAR; INTRAVENOUS at 12:57

## 2021-01-01 RX ADMIN — MAGNESIUM SULFATE HEPTAHYDRATE 1000 MG: 1 INJECTION, SOLUTION INTRAVENOUS at 04:07

## 2021-01-01 RX ADMIN — WHITE PETROLATUM: .15; .85 OINTMENT OPHTHALMIC at 03:10

## 2021-01-01 RX ADMIN — MICONAZOLE NITRATE: 20 POWDER TOPICAL at 09:58

## 2021-01-01 RX ADMIN — CEFTRIAXONE 1 G: 1 INJECTION, POWDER, FOR SOLUTION INTRAMUSCULAR; INTRAVENOUS at 17:06

## 2021-01-01 RX ADMIN — IRON SUCROSE 300 MG: 20 INJECTION, SOLUTION INTRAVENOUS at 14:49

## 2021-01-01 RX ADMIN — SODIUM CHLORIDE 1000 ML: 9 INJECTION, SOLUTION INTRAVENOUS at 16:37

## 2021-01-01 RX ADMIN — POLYETHYLENE GLYCOL (3350) 17 G: 17 POWDER, FOR SOLUTION ORAL at 09:48

## 2021-01-01 RX ADMIN — SODIUM CHLORIDE, PRESERVATIVE FREE 10 ML: 5 INJECTION INTRAVENOUS at 11:07

## 2021-01-01 RX ADMIN — ENOXAPARIN SODIUM 30 MG: 30 INJECTION SUBCUTANEOUS at 11:10

## 2021-01-01 RX ADMIN — ENOXAPARIN SODIUM 30 MG: 30 INJECTION SUBCUTANEOUS at 09:41

## 2021-01-01 RX ADMIN — WHITE PETROLATUM: .15; .85 OINTMENT OPHTHALMIC at 15:32

## 2021-01-01 RX ADMIN — PRAMIPEXOLE DIHYDROCHLORIDE 1 MG: 0.25 TABLET ORAL at 20:41

## 2021-01-01 RX ADMIN — LUBIPROSTONE 24 MCG: 24 CAPSULE, GELATIN COATED ORAL at 16:53

## 2021-01-01 RX ADMIN — POLYETHYLENE GLYCOL (3350) 17 G: 17 POWDER, FOR SOLUTION ORAL at 09:42

## 2021-01-01 RX ADMIN — POLYETHYLENE GLYCOL (3350) 17 G: 17 POWDER, FOR SOLUTION ORAL at 09:12

## 2021-01-01 RX ADMIN — METOCLOPRAMIDE 5 MG: 5 INJECTION, SOLUTION INTRAMUSCULAR; INTRAVENOUS at 12:01

## 2021-01-01 RX ADMIN — FUROSEMIDE 40 MG: 10 INJECTION, SOLUTION INTRAMUSCULAR; INTRAVENOUS at 18:36

## 2021-01-01 RX ADMIN — FUROSEMIDE 20 MG: 10 INJECTION, SOLUTION INTRAVENOUS at 08:44

## 2021-01-01 RX ADMIN — METOCLOPRAMIDE 10 MG: 5 INJECTION, SOLUTION INTRAMUSCULAR; INTRAVENOUS at 12:24

## 2021-01-01 RX ADMIN — PANTOPRAZOLE SODIUM 40 MG: 40 TABLET, DELAYED RELEASE ORAL at 06:23

## 2021-01-01 RX ADMIN — AMIODARONE HYDROCHLORIDE 200 MG: 200 TABLET ORAL at 08:48

## 2021-01-01 RX ADMIN — WHITE PETROLATUM: .15; .85 OINTMENT OPHTHALMIC at 02:04

## 2021-01-01 RX ADMIN — ENOXAPARIN SODIUM 40 MG: 40 INJECTION SUBCUTANEOUS at 08:52

## 2021-01-01 RX ADMIN — ACETAMINOPHEN 650 MG: 325 TABLET ORAL at 09:24

## 2021-01-01 RX ADMIN — METOCLOPRAMIDE 10 MG: 5 INJECTION, SOLUTION INTRAMUSCULAR; INTRAVENOUS at 18:14

## 2021-01-01 RX ADMIN — BUSPIRONE HYDROCHLORIDE 10 MG: 10 TABLET ORAL at 20:44

## 2021-01-01 RX ADMIN — MICONAZOLE NITRATE: 20 POWDER TOPICAL at 08:29

## 2021-01-01 RX ADMIN — THEOPHYLLINE ANHYDROUS 200 MG: 200 CAPSULE, EXTENDED RELEASE ORAL at 14:50

## 2021-01-01 RX ADMIN — ALBUTEROL SULFATE 2 PUFF: 90 AEROSOL, METERED RESPIRATORY (INHALATION) at 08:04

## 2021-01-01 RX ADMIN — FUROSEMIDE 40 MG: 10 INJECTION, SOLUTION INTRAMUSCULAR; INTRAVENOUS at 07:55

## 2021-01-01 RX ADMIN — BUSPIRONE HYDROCHLORIDE 10 MG: 10 TABLET ORAL at 12:57

## 2021-01-01 RX ADMIN — BUSPIRONE HYDROCHLORIDE 10 MG: 10 TABLET ORAL at 23:12

## 2021-01-01 RX ADMIN — AMIODARONE HYDROCHLORIDE 200 MG: 200 TABLET ORAL at 08:22

## 2021-01-01 RX ADMIN — MAGNESIUM SULFATE HEPTAHYDRATE 1000 MG: 1 INJECTION, SOLUTION INTRAVENOUS at 05:06

## 2021-01-01 RX ADMIN — AMIODARONE HYDROCHLORIDE 200 MG: 200 TABLET ORAL at 08:50

## 2021-01-01 RX ADMIN — LUBIPROSTONE 24 MCG: 24 CAPSULE, GELATIN COATED ORAL at 08:43

## 2021-01-01 RX ADMIN — SODIUM CHLORIDE, PRESERVATIVE FREE 10 ML: 5 INJECTION INTRAVENOUS at 22:37

## 2021-01-01 RX ADMIN — CHOLECALCIFEROL (VITAMIN D3) 10 MCG (400 UNIT) TABLET 400 UNITS: at 08:37

## 2021-01-01 RX ADMIN — FAMOTIDINE 20 MG: 10 INJECTION, SOLUTION INTRAVENOUS at 09:13

## 2021-01-01 RX ADMIN — POLYETHYLENE GLYCOL (3350) 17 G: 17 POWDER, FOR SOLUTION ORAL at 08:53

## 2021-01-01 RX ADMIN — SERTRALINE HYDROCHLORIDE 25 MG: 50 TABLET ORAL at 22:12

## 2021-01-01 RX ADMIN — INSULIN LISPRO 4 UNITS: 100 INJECTION, SOLUTION INTRAVENOUS; SUBCUTANEOUS at 12:29

## 2021-01-01 RX ADMIN — MIDODRINE HYDROCHLORIDE 10 MG: 5 TABLET ORAL at 12:24

## 2021-01-01 RX ADMIN — PROPOFOL 80 MCG/KG/MIN: 10 INJECTION, EMULSION INTRAVENOUS at 03:58

## 2021-01-01 RX ADMIN — PRAMIPEXOLE DIHYDROCHLORIDE 1 MG: 0.25 TABLET ORAL at 08:36

## 2021-01-01 RX ADMIN — CEFTRIAXONE 1000 MG: 1 INJECTION, POWDER, FOR SOLUTION INTRAMUSCULAR; INTRAVENOUS at 17:53

## 2021-01-01 RX ADMIN — METOCLOPRAMIDE 5 MG: 5 INJECTION, SOLUTION INTRAMUSCULAR; INTRAVENOUS at 23:27

## 2021-01-01 RX ADMIN — AMIODARONE HYDROCHLORIDE 200 MG: 200 TABLET ORAL at 09:03

## 2021-01-01 RX ADMIN — PROPOFOL 80 MCG/KG/MIN: 10 INJECTION, EMULSION INTRAVENOUS at 09:10

## 2021-01-01 RX ADMIN — CHOLECALCIFEROL (VITAMIN D3) 10 MCG (400 UNIT) TABLET 400 UNITS: at 11:05

## 2021-01-01 RX ADMIN — FUROSEMIDE 40 MG: 10 INJECTION INTRAMUSCULAR; INTRAVENOUS at 17:35

## 2021-01-01 RX ADMIN — SODIUM CHLORIDE, PRESERVATIVE FREE 10 ML: 5 INJECTION INTRAVENOUS at 21:53

## 2021-01-01 RX ADMIN — BUSPIRONE HYDROCHLORIDE 10 MG: 10 TABLET ORAL at 12:30

## 2021-01-01 RX ADMIN — PREDNISONE 20 MG: 20 TABLET ORAL at 13:25

## 2021-01-01 RX ADMIN — CEFTRIAXONE 1000 MG: 1 INJECTION, POWDER, FOR SOLUTION INTRAMUSCULAR; INTRAVENOUS at 16:23

## 2021-01-01 RX ADMIN — ALBUTEROL SULFATE 2 PUFF: 90 AEROSOL, METERED RESPIRATORY (INHALATION) at 11:32

## 2021-01-01 RX ADMIN — METOCLOPRAMIDE 5 MG: 5 INJECTION, SOLUTION INTRAMUSCULAR; INTRAVENOUS at 14:26

## 2021-01-01 RX ADMIN — BUSPIRONE HYDROCHLORIDE 10 MG: 10 TABLET ORAL at 21:00

## 2021-01-01 RX ADMIN — METOCLOPRAMIDE 10 MG: 5 INJECTION, SOLUTION INTRAMUSCULAR; INTRAVENOUS at 23:14

## 2021-01-01 RX ADMIN — CLOPIDOGREL BISULFATE 75 MG: 75 TABLET ORAL at 08:37

## 2021-01-01 RX ADMIN — INSULIN LISPRO 2 UNITS: 100 INJECTION, SOLUTION INTRAVENOUS; SUBCUTANEOUS at 12:29

## 2021-01-01 RX ADMIN — THEOPHYLLINE ANHYDROUS 200 MG: 200 CAPSULE, EXTENDED RELEASE ORAL at 20:56

## 2021-01-01 RX ADMIN — ATORVASTATIN CALCIUM 40 MG: 40 TABLET, FILM COATED ORAL at 23:11

## 2021-01-01 RX ADMIN — BUSPIRONE HYDROCHLORIDE 10 MG: 10 TABLET ORAL at 08:43

## 2021-01-01 RX ADMIN — POLYETHYLENE GLYCOL (3350) 17 G: 17 POWDER, FOR SOLUTION ORAL at 09:21

## 2021-01-01 RX ADMIN — POLYVINYL ALCOHOL 1 DROP: 14 SOLUTION/ DROPS OPHTHALMIC at 03:10

## 2021-01-01 RX ADMIN — LUBIPROSTONE 24 MCG: 24 CAPSULE, GELATIN COATED ORAL at 08:22

## 2021-01-01 RX ADMIN — CEFTRIAXONE 1 G: 1 INJECTION, POWDER, FOR SOLUTION INTRAMUSCULAR; INTRAVENOUS at 18:04

## 2021-01-01 RX ADMIN — MIDODRINE HYDROCHLORIDE 10 MG: 5 TABLET ORAL at 11:04

## 2021-01-01 RX ADMIN — Medication 2 MCG/MIN: at 16:43

## 2021-01-01 RX ADMIN — MIDODRINE HYDROCHLORIDE 10 MG: 5 TABLET ORAL at 09:21

## 2021-01-01 RX ADMIN — CEFTRIAXONE 1 G: 1 INJECTION, POWDER, FOR SOLUTION INTRAMUSCULAR; INTRAVENOUS at 16:54

## 2021-01-01 RX ADMIN — PRAMIPEXOLE DIHYDROCHLORIDE 1 MG: 0.25 TABLET ORAL at 21:09

## 2021-01-01 RX ADMIN — PROPOFOL 80 MCG/KG/MIN: 10 INJECTION, EMULSION INTRAVENOUS at 01:25

## 2021-01-01 RX ADMIN — PANTOPRAZOLE SODIUM 40 MG: 40 TABLET, DELAYED RELEASE ORAL at 06:40

## 2021-01-01 RX ADMIN — PRAMIPEXOLE DIHYDROCHLORIDE 1 MG: 0.25 TABLET ORAL at 22:11

## 2021-01-01 RX ADMIN — LEVETIRACETAM 1000 MG: 100 INJECTION, SOLUTION INTRAVENOUS at 09:12

## 2021-01-01 RX ADMIN — CLOPIDOGREL BISULFATE 75 MG: 75 TABLET ORAL at 08:44

## 2021-01-01 RX ADMIN — SODIUM CHLORIDE 250 ML: 9 INJECTION, SOLUTION INTRAVENOUS at 23:30

## 2021-01-01 RX ADMIN — METHYLPREDNISOLONE SODIUM SUCCINATE 40 MG: 40 INJECTION, POWDER, FOR SOLUTION INTRAMUSCULAR; INTRAVENOUS at 09:53

## 2021-01-01 RX ADMIN — METOCLOPRAMIDE 5 MG: 5 INJECTION, SOLUTION INTRAMUSCULAR; INTRAVENOUS at 22:11

## 2021-01-01 RX ADMIN — PREDNISONE 20 MG: 20 TABLET ORAL at 09:17

## 2021-01-01 RX ADMIN — MIDAZOLAM HYDROCHLORIDE 1 MG: 1 INJECTION, SOLUTION INTRAMUSCULAR; INTRAVENOUS at 20:25

## 2021-01-01 RX ADMIN — METHYLPREDNISOLONE SODIUM SUCCINATE 40 MG: 40 INJECTION, POWDER, FOR SOLUTION INTRAMUSCULAR; INTRAVENOUS at 03:14

## 2021-01-01 RX ADMIN — FAMOTIDINE 20 MG: 10 INJECTION, SOLUTION INTRAVENOUS at 09:52

## 2021-01-01 RX ADMIN — TRAMADOL HYDROCHLORIDE 50 MG: 50 TABLET, FILM COATED ORAL at 20:41

## 2021-01-01 RX ADMIN — GABAPENTIN 400 MG: 400 CAPSULE ORAL at 21:55

## 2021-01-01 RX ADMIN — MICONAZOLE NITRATE: 20 POWDER TOPICAL at 23:26

## 2021-01-01 RX ADMIN — SERTRALINE HYDROCHLORIDE 25 MG: 50 TABLET ORAL at 01:09

## 2021-01-01 RX ADMIN — CHLORHEXIDINE GLUCONATE 0.12% ORAL RINSE 15 ML: 1.2 LIQUID ORAL at 02:02

## 2021-01-01 RX ADMIN — CHOLECALCIFEROL (VITAMIN D3) 10 MCG (400 UNIT) TABLET 400 UNITS: at 08:53

## 2021-01-01 RX ADMIN — METOCLOPRAMIDE 5 MG: 5 INJECTION, SOLUTION INTRAMUSCULAR; INTRAVENOUS at 05:44

## 2021-01-01 RX ADMIN — PRAMIPEXOLE DIHYDROCHLORIDE 1 MG: 0.25 TABLET ORAL at 09:51

## 2021-01-01 RX ADMIN — CEFTRIAXONE 1000 MG: 1 INJECTION, POWDER, FOR SOLUTION INTRAMUSCULAR; INTRAVENOUS at 17:09

## 2021-01-01 RX ADMIN — MICONAZOLE NITRATE: 20 POWDER TOPICAL at 21:50

## 2021-01-01 RX ADMIN — PROPOFOL 80 MCG/KG/MIN: 10 INJECTION, EMULSION INTRAVENOUS at 05:11

## 2021-01-01 RX ADMIN — SODIUM CHLORIDE, PRESERVATIVE FREE 10 ML: 5 INJECTION INTRAVENOUS at 09:12

## 2021-01-01 RX ADMIN — Medication 0.2 MG: at 15:07

## 2021-01-01 RX ADMIN — FUROSEMIDE 20 MG: 10 INJECTION, SOLUTION INTRAVENOUS at 17:48

## 2021-01-01 RX ADMIN — AMIODARONE HYDROCHLORIDE 200 MG: 200 TABLET ORAL at 09:42

## 2021-01-01 RX ADMIN — CHLORHEXIDINE GLUCONATE 0.12% ORAL RINSE 15 ML: 1.2 LIQUID ORAL at 20:56

## 2021-01-01 RX ADMIN — MORPHINE SULFATE 2 MG: 2 INJECTION, SOLUTION INTRAMUSCULAR; INTRAVENOUS at 15:31

## 2021-01-01 RX ADMIN — MIDODRINE HYDROCHLORIDE 10 MG: 5 TABLET ORAL at 17:48

## 2021-01-01 RX ADMIN — MIDODRINE HYDROCHLORIDE 10 MG: 5 TABLET ORAL at 08:22

## 2021-01-01 RX ADMIN — ENOXAPARIN SODIUM 30 MG: 30 INJECTION SUBCUTANEOUS at 09:12

## 2021-01-01 RX ADMIN — INSULIN GLARGINE 8 UNITS: 100 INJECTION, SOLUTION SUBCUTANEOUS at 22:16

## 2021-01-01 RX ADMIN — METOCLOPRAMIDE 10 MG: 5 INJECTION, SOLUTION INTRAMUSCULAR; INTRAVENOUS at 17:49

## 2021-01-01 RX ADMIN — WHITE PETROLATUM: .15; .85 OINTMENT OPHTHALMIC at 23:00

## 2021-01-01 RX ADMIN — AMIODARONE HYDROCHLORIDE 200 MG: 200 TABLET ORAL at 09:30

## 2021-01-01 RX ADMIN — POLYETHYLENE GLYCOL (3350) 17 G: 17 POWDER, FOR SOLUTION ORAL at 09:03

## 2021-01-01 RX ADMIN — Medication 2 PUFF: at 12:13

## 2021-01-01 RX ADMIN — WHITE PETROLATUM: .15; .85 OINTMENT OPHTHALMIC at 21:49

## 2021-01-01 RX ADMIN — POLYETHYLENE GLYCOL (3350) 17 G: 17 POWDER, FOR SOLUTION ORAL at 08:55

## 2021-01-01 RX ADMIN — PRAMIPEXOLE DIHYDROCHLORIDE 1 MG: 0.25 TABLET ORAL at 22:36

## 2021-01-01 RX ADMIN — AZITHROMYCIN MONOHYDRATE 500 MG: 500 INJECTION, POWDER, LYOPHILIZED, FOR SOLUTION INTRAVENOUS at 10:45

## 2021-01-01 RX ADMIN — LUBIPROSTONE 24 MCG: 24 CAPSULE, GELATIN COATED ORAL at 09:21

## 2021-01-01 RX ADMIN — FUROSEMIDE 40 MG: 10 INJECTION, SOLUTION INTRAMUSCULAR; INTRAVENOUS at 08:52

## 2021-01-01 RX ADMIN — MIDODRINE HYDROCHLORIDE 10 MG: 5 TABLET ORAL at 11:44

## 2021-01-01 RX ADMIN — BUSPIRONE HYDROCHLORIDE 10 MG: 10 TABLET ORAL at 13:30

## 2021-01-01 RX ADMIN — METHYLPREDNISOLONE SODIUM SUCCINATE 40 MG: 40 INJECTION, POWDER, FOR SOLUTION INTRAMUSCULAR; INTRAVENOUS at 09:12

## 2021-01-01 RX ADMIN — CHOLECALCIFEROL (VITAMIN D3) 10 MCG (400 UNIT) TABLET 400 UNITS: at 09:41

## 2021-01-01 RX ADMIN — BUSPIRONE HYDROCHLORIDE 10 MG: 10 TABLET ORAL at 01:09

## 2021-01-01 RX ADMIN — ENOXAPARIN SODIUM 30 MG: 30 INJECTION SUBCUTANEOUS at 08:21

## 2021-01-01 RX ADMIN — CEFTRIAXONE 1 G: 1 INJECTION, POWDER, FOR SOLUTION INTRAMUSCULAR; INTRAVENOUS at 16:56

## 2021-01-01 RX ADMIN — SODIUM CHLORIDE, PRESERVATIVE FREE 10 ML: 5 INJECTION INTRAVENOUS at 10:04

## 2021-01-01 RX ADMIN — SERTRALINE HYDROCHLORIDE 25 MG: 50 TABLET ORAL at 22:36

## 2021-01-01 RX ADMIN — METOPROLOL TARTRATE 12.5 MG: 25 TABLET, FILM COATED ORAL at 20:55

## 2021-01-01 RX ADMIN — BUSPIRONE HYDROCHLORIDE 10 MG: 10 TABLET ORAL at 21:11

## 2021-01-01 RX ADMIN — METOCLOPRAMIDE 10 MG: 5 INJECTION, SOLUTION INTRAMUSCULAR; INTRAVENOUS at 12:26

## 2021-01-01 RX ADMIN — MICONAZOLE NITRATE: 20 POWDER TOPICAL at 22:13

## 2021-01-01 RX ADMIN — BUSPIRONE HYDROCHLORIDE 10 MG: 10 TABLET ORAL at 10:11

## 2021-01-01 RX ADMIN — PRAMIPEXOLE DIHYDROCHLORIDE 1 MG: 0.25 TABLET ORAL at 09:48

## 2021-01-01 RX ADMIN — ATORVASTATIN CALCIUM 40 MG: 40 TABLET, FILM COATED ORAL at 21:54

## 2021-01-01 RX ADMIN — GABAPENTIN 400 MG: 400 CAPSULE ORAL at 09:13

## 2021-01-01 RX ADMIN — BUSPIRONE HYDROCHLORIDE 10 MG: 10 TABLET ORAL at 14:50

## 2021-01-01 RX ADMIN — SODIUM CHLORIDE, PRESERVATIVE FREE 10 ML: 5 INJECTION INTRAVENOUS at 09:21

## 2021-01-01 RX ADMIN — SODIUM CHLORIDE: 9 INJECTION, SOLUTION INTRAVENOUS at 12:44

## 2021-01-01 RX ADMIN — PIPERACILLIN AND TAZOBACTAM 3375 MG: 3; .375 INJECTION, POWDER, LYOPHILIZED, FOR SOLUTION INTRAVENOUS at 03:30

## 2021-01-01 RX ADMIN — FERROUS SULFATE TAB 325 MG (65 MG ELEMENTAL FE) 325 MG: 325 (65 FE) TAB at 09:29

## 2021-01-01 RX ADMIN — METOCLOPRAMIDE 5 MG: 5 INJECTION, SOLUTION INTRAMUSCULAR; INTRAVENOUS at 17:19

## 2021-01-01 RX ADMIN — BUSPIRONE HYDROCHLORIDE 10 MG: 10 TABLET ORAL at 21:52

## 2021-01-01 RX ADMIN — MIDODRINE HYDROCHLORIDE 10 MG: 5 TABLET ORAL at 16:41

## 2021-01-01 RX ADMIN — ATORVASTATIN CALCIUM 40 MG: 40 TABLET, FILM COATED ORAL at 21:23

## 2021-01-01 RX ADMIN — IRON SUCROSE 300 MG: 20 INJECTION, SOLUTION INTRAVENOUS at 09:42

## 2021-01-01 RX ADMIN — INSULIN GLARGINE 10 UNITS: 100 INJECTION, SOLUTION SUBCUTANEOUS at 22:15

## 2021-01-01 RX ADMIN — Medication 24 MCG/MIN: at 22:42

## 2021-01-01 RX ADMIN — SODIUM CHLORIDE, PRESERVATIVE FREE 10 ML: 5 INJECTION INTRAVENOUS at 21:35

## 2021-01-01 RX ADMIN — CLOPIDOGREL BISULFATE 75 MG: 75 TABLET ORAL at 08:21

## 2021-01-01 RX ADMIN — METHYLPREDNISOLONE SODIUM SUCCINATE 40 MG: 40 INJECTION, POWDER, FOR SOLUTION INTRAMUSCULAR; INTRAVENOUS at 03:30

## 2021-01-01 RX ADMIN — FUROSEMIDE 40 MG: 10 INJECTION, SOLUTION INTRAMUSCULAR; INTRAVENOUS at 14:49

## 2021-01-01 RX ADMIN — BUSPIRONE HYDROCHLORIDE 10 MG: 10 TABLET ORAL at 12:36

## 2021-01-01 RX ADMIN — MICONAZOLE NITRATE: 20 POWDER TOPICAL at 21:49

## 2021-01-01 RX ADMIN — MICONAZOLE NITRATE: 20 POWDER TOPICAL at 10:11

## 2021-01-01 RX ADMIN — MICONAZOLE NITRATE: 20 POWDER TOPICAL at 12:24

## 2021-01-01 RX ADMIN — CHOLECALCIFEROL (VITAMIN D3) 10 MCG (400 UNIT) TABLET 400 UNITS: at 09:25

## 2021-01-01 RX ADMIN — LEVETIRACETAM 1000 MG: 100 INJECTION, SOLUTION INTRAVENOUS at 21:24

## 2021-01-01 RX ADMIN — SERTRALINE HYDROCHLORIDE 25 MG: 50 TABLET ORAL at 20:56

## 2021-01-01 RX ADMIN — CLOPIDOGREL BISULFATE 75 MG: 75 TABLET ORAL at 09:21

## 2021-01-01 RX ADMIN — PROPOFOL 80 MCG/KG/MIN: 10 INJECTION, EMULSION INTRAVENOUS at 02:37

## 2021-01-01 RX ADMIN — ACETAMINOPHEN 650 MG: 325 TABLET ORAL at 13:06

## 2021-01-01 RX ADMIN — PRAMIPEXOLE DIHYDROCHLORIDE 1 MG: 0.25 TABLET ORAL at 09:17

## 2021-01-01 RX ADMIN — MIDODRINE HYDROCHLORIDE 10 MG: 5 TABLET ORAL at 12:44

## 2021-01-01 RX ADMIN — ATORVASTATIN CALCIUM 40 MG: 40 TABLET, FILM COATED ORAL at 20:05

## 2021-01-01 RX ADMIN — GABAPENTIN 400 MG: 400 CAPSULE ORAL at 14:41

## 2021-01-01 RX ADMIN — MICONAZOLE NITRATE: 20 POWDER TOPICAL at 21:16

## 2021-01-01 RX ADMIN — PRAMIPEXOLE DIHYDROCHLORIDE 1 MG: 0.25 TABLET ORAL at 09:12

## 2021-01-01 RX ADMIN — MAGNESIUM SULFATE HEPTAHYDRATE 1000 MG: 1 INJECTION, SOLUTION INTRAVENOUS at 06:04

## 2021-01-01 RX ADMIN — CHOLECALCIFEROL (VITAMIN D3) 10 MCG (400 UNIT) TABLET 400 UNITS: at 09:12

## 2021-01-01 RX ADMIN — ATORVASTATIN CALCIUM 40 MG: 40 TABLET, FILM COATED ORAL at 20:55

## 2021-01-01 RX ADMIN — AMIODARONE HYDROCHLORIDE 200 MG: 200 TABLET ORAL at 09:49

## 2021-01-01 RX ADMIN — PANTOPRAZOLE SODIUM 40 MG: 40 TABLET, DELAYED RELEASE ORAL at 05:37

## 2021-01-01 RX ADMIN — GABAPENTIN 400 MG: 400 CAPSULE ORAL at 20:55

## 2021-01-01 RX ADMIN — PROPOFOL 80 MCG/KG/MIN: 10 INJECTION, EMULSION INTRAVENOUS at 10:22

## 2021-01-01 RX ADMIN — MICONAZOLE NITRATE: 20 POWDER TOPICAL at 09:05

## 2021-01-01 RX ADMIN — SERTRALINE HYDROCHLORIDE 25 MG: 50 TABLET ORAL at 20:05

## 2021-01-01 RX ADMIN — ENOXAPARIN SODIUM 40 MG: 40 INJECTION SUBCUTANEOUS at 09:30

## 2021-01-01 RX ADMIN — SODIUM CHLORIDE, PRESERVATIVE FREE 10 ML: 5 INJECTION INTRAVENOUS at 20:55

## 2021-01-01 RX ADMIN — PRAMIPEXOLE DIHYDROCHLORIDE 1 MG: 0.25 TABLET ORAL at 20:05

## 2021-01-01 RX ADMIN — Medication 2 MCG/MIN: at 22:49

## 2021-01-01 RX ADMIN — PANTOPRAZOLE SODIUM 40 MG: 40 TABLET, DELAYED RELEASE ORAL at 06:43

## 2021-01-01 RX ADMIN — LEVETIRACETAM 1000 MG: 100 INJECTION, SOLUTION INTRAVENOUS at 09:07

## 2021-01-01 RX ADMIN — CHOLECALCIFEROL (VITAMIN D3) 10 MCG (400 UNIT) TABLET 400 UNITS: at 09:22

## 2021-01-01 RX ADMIN — PERFLUTREN 1.2 MG: 6.52 INJECTION, SUSPENSION INTRAVENOUS at 14:23

## 2021-01-01 RX ADMIN — PRAMIPEXOLE DIHYDROCHLORIDE 1 MG: 0.25 TABLET ORAL at 08:28

## 2021-01-01 RX ADMIN — ALBUTEROL SULFATE 2 PUFF: 90 AEROSOL, METERED RESPIRATORY (INHALATION) at 07:48

## 2021-01-01 RX ADMIN — AZITHROMYCIN MONOHYDRATE 500 MG: 500 INJECTION, POWDER, LYOPHILIZED, FOR SOLUTION INTRAVENOUS at 22:42

## 2021-01-01 RX ADMIN — PANTOPRAZOLE SODIUM 40 MG: 40 TABLET, DELAYED RELEASE ORAL at 05:44

## 2021-01-01 RX ADMIN — MICONAZOLE NITRATE: 20 POWDER TOPICAL at 22:37

## 2021-01-01 RX ADMIN — SODIUM CHLORIDE, PRESERVATIVE FREE 10 ML: 5 INJECTION INTRAVENOUS at 21:24

## 2021-01-01 RX ADMIN — MICONAZOLE NITRATE: 20 POWDER TOPICAL at 20:53

## 2021-01-01 RX ADMIN — PROPOFOL 80 MCG/KG/MIN: 10 INJECTION, EMULSION INTRAVENOUS at 06:34

## 2021-01-01 RX ADMIN — GABAPENTIN 400 MG: 400 CAPSULE ORAL at 13:06

## 2021-01-01 RX ADMIN — SENNOSIDES 17.2 MG: 8.6 TABLET, FILM COATED ORAL at 20:55

## 2021-01-01 RX ADMIN — PRAMIPEXOLE DIHYDROCHLORIDE 1 MG: 0.25 TABLET ORAL at 21:52

## 2021-01-01 RX ADMIN — LINEZOLID 600 MG: 600 INJECTION, SOLUTION INTRAVENOUS at 02:00

## 2021-01-01 RX ADMIN — POTASSIUM PHOSPHATE, MONOBASIC AND POTASSIUM PHOSPHATE, DIBASIC 20 MMOL: 224; 236 INJECTION, SOLUTION, CONCENTRATE INTRAVENOUS at 14:13

## 2021-01-01 RX ADMIN — MIDAZOLAM 1 MG/HR: 5 INJECTION INTRAMUSCULAR; INTRAVENOUS at 01:32

## 2021-01-01 RX ADMIN — WHITE PETROLATUM: .15; .85 OINTMENT OPHTHALMIC at 12:31

## 2021-01-01 RX ADMIN — PANTOPRAZOLE SODIUM 40 MG: 40 TABLET, DELAYED RELEASE ORAL at 05:33

## 2021-01-01 RX ADMIN — METOCLOPRAMIDE 5 MG: 5 INJECTION, SOLUTION INTRAMUSCULAR; INTRAVENOUS at 00:31

## 2021-01-01 RX ADMIN — ENOXAPARIN SODIUM 40 MG: 40 INJECTION SUBCUTANEOUS at 09:04

## 2021-01-01 RX ADMIN — WHITE PETROLATUM: .15; .85 OINTMENT OPHTHALMIC at 18:55

## 2021-01-01 RX ADMIN — METOCLOPRAMIDE 10 MG: 5 INJECTION, SOLUTION INTRAMUSCULAR; INTRAVENOUS at 18:13

## 2021-01-01 RX ADMIN — PRAMIPEXOLE DIHYDROCHLORIDE 1 MG: 0.25 TABLET ORAL at 23:11

## 2021-01-01 RX ADMIN — PROPOFOL 80 MCG/KG/MIN: 10 INJECTION, EMULSION INTRAVENOUS at 16:26

## 2021-01-01 RX ADMIN — MIDODRINE HYDROCHLORIDE 10 MG: 5 TABLET ORAL at 12:01

## 2021-01-01 RX ADMIN — PANTOPRAZOLE SODIUM 40 MG: 40 TABLET, DELAYED RELEASE ORAL at 09:12

## 2021-01-01 RX ADMIN — PROPOFOL 80 MCG/KG/MIN: 10 INJECTION, EMULSION INTRAVENOUS at 15:09

## 2021-01-01 RX ADMIN — PANTOPRAZOLE SODIUM 40 MG: 40 TABLET, DELAYED RELEASE ORAL at 11:05

## 2021-01-01 RX ADMIN — METOCLOPRAMIDE 5 MG: 5 INJECTION, SOLUTION INTRAMUSCULAR; INTRAVENOUS at 17:25

## 2021-01-01 RX ADMIN — MIDAZOLAM 10 MG/HR: 5 INJECTION INTRAMUSCULAR; INTRAVENOUS at 18:39

## 2021-01-01 RX ADMIN — DEXTROSE MONOHYDRATE 150 MG: 50 INJECTION, SOLUTION INTRAVENOUS at 23:17

## 2021-01-01 RX ADMIN — HEPARIN SODIUM AND DEXTROSE 12 UNITS/KG/HR: 10000; 5 INJECTION INTRAVENOUS at 16:37

## 2021-01-01 RX ADMIN — WHITE PETROLATUM: .15; .85 OINTMENT OPHTHALMIC at 16:08

## 2021-01-01 RX ADMIN — CLOPIDOGREL BISULFATE 75 MG: 75 TABLET ORAL at 09:48

## 2021-01-01 RX ADMIN — INSULIN LISPRO 1 UNITS: 100 INJECTION, SOLUTION INTRAVENOUS; SUBCUTANEOUS at 02:20

## 2021-01-01 RX ADMIN — BUSPIRONE HYDROCHLORIDE 10 MG: 10 TABLET ORAL at 09:12

## 2021-01-01 RX ADMIN — PRAMIPEXOLE DIHYDROCHLORIDE 1 MG: 0.25 TABLET ORAL at 09:03

## 2021-01-01 RX ADMIN — ACETAMINOPHEN 650 MG: 650 SUPPOSITORY RECTAL at 16:15

## 2021-01-01 RX ADMIN — AZITHROMYCIN MONOHYDRATE 500 MG: 500 INJECTION, POWDER, LYOPHILIZED, FOR SOLUTION INTRAVENOUS at 10:47

## 2021-01-01 RX ADMIN — MIDODRINE HYDROCHLORIDE 10 MG: 5 TABLET ORAL at 17:19

## 2021-01-01 RX ADMIN — Medication 25 MCG/MIN: at 13:45

## 2021-01-01 RX ADMIN — FAMOTIDINE 20 MG: 10 INJECTION, SOLUTION INTRAVENOUS at 22:03

## 2021-01-01 RX ADMIN — ENOXAPARIN SODIUM 30 MG: 30 INJECTION SUBCUTANEOUS at 08:44

## 2021-01-01 RX ADMIN — IRON SUCROSE 300 MG: 20 INJECTION, SOLUTION INTRAVENOUS at 09:58

## 2021-01-01 RX ADMIN — Medication 150 MCG/HR: at 04:49

## 2021-01-01 RX ADMIN — LUBIPROSTONE 24 MCG: 24 CAPSULE, GELATIN COATED ORAL at 17:25

## 2021-01-01 RX ADMIN — SODIUM CHLORIDE, PRESERVATIVE FREE 10 ML: 5 INJECTION INTRAVENOUS at 09:41

## 2021-01-01 RX ADMIN — FUROSEMIDE 40 MG: 10 INJECTION, SOLUTION INTRAMUSCULAR; INTRAVENOUS at 10:03

## 2021-01-01 RX ADMIN — SODIUM CHLORIDE, PRESERVATIVE FREE 10 ML: 5 INJECTION INTRAVENOUS at 07:56

## 2021-01-01 RX ADMIN — METOCLOPRAMIDE 5 MG: 5 INJECTION, SOLUTION INTRAMUSCULAR; INTRAVENOUS at 05:14

## 2021-01-01 RX ADMIN — MICONAZOLE NITRATE: 20 POWDER TOPICAL at 09:33

## 2021-01-01 RX ADMIN — ATORVASTATIN CALCIUM 40 MG: 40 TABLET, FILM COATED ORAL at 22:13

## 2021-01-01 RX ADMIN — SODIUM CHLORIDE, PRESERVATIVE FREE 10 ML: 5 INJECTION INTRAVENOUS at 20:10

## 2021-01-01 RX ADMIN — EPINEPHRINE 1 MG: 0.1 INJECTION, SOLUTION ENDOTRACHEAL; INTRACARDIAC; INTRAVENOUS at 19:34

## 2021-01-01 RX ADMIN — LORAZEPAM 0.5 MG: 2 INJECTION INTRAMUSCULAR; INTRAVENOUS at 15:31

## 2021-01-01 RX ADMIN — ATORVASTATIN CALCIUM 40 MG: 40 TABLET, FILM COATED ORAL at 01:09

## 2021-01-01 RX ADMIN — ATROPINE SULFATE 1 MG: 0.1 INJECTION PARENTERAL at 00:58

## 2021-01-01 RX ADMIN — SERTRALINE HYDROCHLORIDE 25 MG: 50 TABLET ORAL at 21:56

## 2021-01-01 RX ADMIN — LUBIPROSTONE 24 MCG: 24 CAPSULE, GELATIN COATED ORAL at 16:23

## 2021-01-01 RX ADMIN — BUSPIRONE HYDROCHLORIDE 10 MG: 10 TABLET ORAL at 08:50

## 2021-01-01 RX ADMIN — SERTRALINE HYDROCHLORIDE 25 MG: 50 TABLET ORAL at 21:55

## 2021-01-01 RX ADMIN — PRAMIPEXOLE DIHYDROCHLORIDE 1 MG: 0.25 TABLET ORAL at 21:58

## 2021-01-01 RX ADMIN — METOCLOPRAMIDE 5 MG: 5 INJECTION, SOLUTION INTRAMUSCULAR; INTRAVENOUS at 12:36

## 2021-01-01 RX ADMIN — LUBIPROSTONE 24 MCG: 24 CAPSULE, GELATIN COATED ORAL at 17:53

## 2021-01-01 RX ADMIN — SENNOSIDES 17.2 MG: 8.6 TABLET, FILM COATED ORAL at 20:43

## 2021-01-01 RX ADMIN — AMIODARONE HYDROCHLORIDE 200 MG: 200 TABLET ORAL at 08:28

## 2021-01-01 RX ADMIN — BUSPIRONE HYDROCHLORIDE 10 MG: 10 TABLET ORAL at 09:49

## 2021-01-01 RX ADMIN — PANTOPRAZOLE SODIUM 40 MG: 40 TABLET, DELAYED RELEASE ORAL at 06:52

## 2021-01-01 RX ADMIN — MODAFINIL 200 MG: 100 TABLET ORAL at 09:13

## 2021-01-01 RX ADMIN — ALBUTEROL SULFATE 2 PUFF: 90 AEROSOL, METERED RESPIRATORY (INHALATION) at 12:35

## 2021-01-01 RX ADMIN — AMIODARONE HYDROCHLORIDE 0.5 MG/MIN: 50 INJECTION, SOLUTION INTRAVENOUS at 05:26

## 2021-01-01 RX ADMIN — SODIUM CHLORIDE, PRESERVATIVE FREE 10 ML: 5 INJECTION INTRAVENOUS at 08:29

## 2021-01-01 RX ADMIN — CHOLECALCIFEROL (VITAMIN D3) 10 MCG (400 UNIT) TABLET 400 UNITS: at 08:50

## 2021-01-01 RX ADMIN — MODAFINIL 200 MG: 100 TABLET ORAL at 09:03

## 2021-01-01 RX ADMIN — ATORVASTATIN CALCIUM 40 MG: 40 TABLET, FILM COATED ORAL at 20:56

## 2021-01-01 RX ADMIN — SERTRALINE HYDROCHLORIDE 25 MG: 50 TABLET ORAL at 21:31

## 2021-01-01 RX ADMIN — MODAFINIL 200 MG: 100 TABLET ORAL at 09:21

## 2021-01-01 RX ADMIN — METOCLOPRAMIDE 10 MG: 5 INJECTION, SOLUTION INTRAMUSCULAR; INTRAVENOUS at 12:53

## 2021-01-01 RX ADMIN — WHITE PETROLATUM: .15; .85 OINTMENT OPHTHALMIC at 18:40

## 2021-01-01 ASSESSMENT — PAIN SCALES - GENERAL
PAINLEVEL_OUTOF10: 0
PAINLEVEL_OUTOF10: 7
PAINLEVEL_OUTOF10: 0
PAINLEVEL_OUTOF10: 9
PAINLEVEL_OUTOF10: 0
PAINLEVEL_OUTOF10: 5
PAINLEVEL_OUTOF10: 0
PAINLEVEL_OUTOF10: 4
PAINLEVEL_OUTOF10: 0

## 2021-01-01 ASSESSMENT — PULMONARY FUNCTION TESTS
PIF_VALUE: 31
PIF_VALUE: 26
PIF_VALUE: 28
PIF_VALUE: 29
PIF_VALUE: 29
PIF_VALUE: 27
PIF_VALUE: 30
PIF_VALUE: 29
PIF_VALUE: 27
PIF_VALUE: 27
PIF_VALUE: 26
PIF_VALUE: 28
PIF_VALUE: 31
PIF_VALUE: 27
PIF_VALUE: 26
PIF_VALUE: 27
PIF_VALUE: 28
PIF_VALUE: 28
PIF_VALUE: 27
PIF_VALUE: 29
PIF_VALUE: 27
PIF_VALUE: 26
PIF_VALUE: 27
PIF_VALUE: 29
PIF_VALUE: 29
PIF_VALUE: 27
PIF_VALUE: 30
PIF_VALUE: 29
PIF_VALUE: 30
PIF_VALUE: 27
PIF_VALUE: 24
PIF_VALUE: 27
PIF_VALUE: 27
PIF_VALUE: 35
PIF_VALUE: 28
PIF_VALUE: 28
PIF_VALUE: 29
PIF_VALUE: 29
PIF_VALUE: 34
PIF_VALUE: 30
PIF_VALUE: 29
PIF_VALUE: 27
PIF_VALUE: 28
PIF_VALUE: 26
PIF_VALUE: 28
PIF_VALUE: 28
PIF_VALUE: 27
PIF_VALUE: 29
PIF_VALUE: 26
PIF_VALUE: 27
PIF_VALUE: 31
PIF_VALUE: 28
PIF_VALUE: 29
PIF_VALUE: 28
PIF_VALUE: 29
PIF_VALUE: 28
PIF_VALUE: 27
PIF_VALUE: 28
PIF_VALUE: 25
PIF_VALUE: 32
PIF_VALUE: 27
PIF_VALUE: 31
PIF_VALUE: 28
PIF_VALUE: 27
PIF_VALUE: 27
PIF_VALUE: 29
PIF_VALUE: 26
PIF_VALUE: 27
PIF_VALUE: 24
PIF_VALUE: 32
PIF_VALUE: 27
PIF_VALUE: 25
PIF_VALUE: 27
PIF_VALUE: 29

## 2021-01-01 ASSESSMENT — PAIN SCALES - WONG BAKER

## 2021-01-01 ASSESSMENT — PAIN DESCRIPTION - FREQUENCY
FREQUENCY: CONTINUOUS
FREQUENCY: INTERMITTENT

## 2021-01-01 ASSESSMENT — PAIN DESCRIPTION - LOCATION
LOCATION: GENERALIZED

## 2021-01-01 ASSESSMENT — PAIN DESCRIPTION - PAIN TYPE
TYPE: CHRONIC PAIN
TYPE: ACUTE PAIN
TYPE: CHRONIC PAIN

## 2021-01-01 ASSESSMENT — PAIN DESCRIPTION - ORIENTATION: ORIENTATION: RIGHT;LEFT

## 2021-01-13 NOTE — ED PROVIDER NOTES
_______________________________________________________________________      [facility]    Indication:  Anticipated clinical course with need for pressors  Consent:   Verbal consent obtained       Procedure:   -Time out for patient identification performed. - The patient was positioned appropriately and the skin over the right Internal jugular was prepped with betadine and draped in a sterile fashion  - Local anesthesia was obtained by infiltration using 2% Lidocaine without epinephrine  -  Using ultrasound assistance, a large bore needle was used to identify the vein. - A guide wire was then inserted into the vein through the needle. A triple lumen catheter was then inserted into the vessel over the guide wire using the Seldinger technique  However, I was unable to withdraw the wire without withdrawing the triple-lumen catheter. Thus entire catheter was removed revealing kinking and guidewire which would not allow it to freely be withdrawn from lumen. Gentle pressure was held for several minutes and hemostasis was achieved. Plan for repeat procedure for venous access at right femoral vein    - The patient was positioned appropriately and the skin over the right femoral vein was prepped with betadine and draped in a sterile fashion  - Local anesthesia was obtained by infiltration using 2% Lidocaine without epinephrine  -  Using ultrasound assistance, a large bore needle was used to identify the vein. - A guide wire was then inserted into the vein through the needle. A triple lumen catheter was then inserted into the vessel over the guide wire using the Seldinger technique  - All ports showed good, free flowing blood return and were flushed with saline solution.  - The catheter was then securely fastened to the skin with sutures and covered with a sterile dressing.    - A post procedure X-ray was ordered. - The patient tolerated the procedure well.     Complications: None      _______________________________________________________________________        My participation in patient encounter was procedure only. Please see physician note for complete patient encounter and disposition.         Jose Luis Monzon  01/13/21 8014

## 2021-01-13 NOTE — ED NOTES
Care and report to [facility], RN's.        Heidi , FARIHA  01/13/21 8828

## 2021-01-13 NOTE — ED TRIAGE NOTES
Patient presents to ED by EMS with compliant of worsening shortness of breath and difficulty with ambulation over the past few days.

## 2021-01-13 NOTE — ED NOTES
Spoke with Dr Valdo Rosario : RE bradycardia , states will monitor patient.       Tania Vallejo RN  01/13/21 5994

## 2021-01-13 NOTE — ED PROVIDER NOTES
morbid obesity FINDINGS: HEART/MEDIASTINUM: The cardiac silhouette is enlarged, but stable. PLEURA/LUNGS: There is again perihilar fullness and increased interstitial markings bilaterally consistent with interstitial/pulmonary edema. There are probable small bilateral pleural effusions. There is no appreciable pneumothorax. BONES/SOFT TISSUE: No acute abnormality. Interstitial/pulmonary edema. Cardiomegaly. Probable small bilateral pleural effusions. .    Twelve-lead EKG interpreted by me in the absence of a cardiologist.  There is no criteria ST elevation or reciprocal change. There are no hyperacute T wave changes. There is no sign of acute ischemia or infarction. This tracing shows a likely junctional bradycardia with low voltage QRS. Rate and intervals are 43 beats per minute, MI interval determinant milliseconds, QRS duration 100 milliseconds, QTc interval 452 milliseconds, and R axis normal at 36 degrees. There is no acute change compared with the most recent EKG dated September 9, 2020 except for sinus rhythm with first-degree AV block at that time. Emergency department course:  Patient was initially seen by the Advanced Practice Practitioner. Initial report is provided at [phone]. Patient is assessed shortly after. She does have mildly to moderately increased work of breathing with shallow respirations. Lung sounds are significantly diminished, though this may be in part due to body habitus. She is noted to have bradycardia by telemetry. EKG suggest a junctional rhythm. Blood pressure is mildly hypotensive at about 88-94 mmHg. During initial interview, blood pressure does remeasure at about 104 mmHg. Numerous medical screening studies are pending. Patient remains moderately bradycardic. Blood pressures remain somewhat hypotensive. In anticipation of needing pressor support, ultrasound-guided a central line will be placed by Ivy Schmid PA-C and JONN Rodriguez PA-C for their

## 2021-01-13 NOTE — ED PROVIDER NOTES
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 - treated at [facility] H/O aortic valve stenosis     H/O echocardiogram 2016    EF 55%, Aortic valve area is 0.84 cm2 with a mean gradient of 36 suggestive of severe aortic stenosis, mild to mos LVH    Headache     Heart murmur     History of nuclear stress test 2016    lexiscan-normal,EF70%    Morbid obesity (HCC)     BMI=73.72 kg/m2    Neuropathy     NSTEMI (non-ST elevated myocardial infarction) ([facility] 2018    Obesity     Osteoarthritis     Palpitations     Peripheral edema     Restless legs syndrome     Sleep apnea     Has a CPAP    Sleep apnea     SOB (shortness of breath)      Past Surgical History:   Procedure Laterality Date    AORTIC VALVE REPLACEMENT  2017    29mm EvolutR TAVR     SECTION      CHOLECYSTECTOMY      GALLBLADDER SURGERY      HYSTERECTOMY      IR NONTUNNELED VASCULAR CATHETER  2020    IR NONTUNNELED VASCULAR CATHETER 2020 [address] [facility] SPECIAL PROCEDURES    IR NONTUNNELED VASCULAR CATHETER  1/15/2021    IR NONTUNNELED VASCULAR CATHETER 1/15/2021 [facility] SPECIAL PROCEDURES    NECK SURGERY      Tumor    TUBAL LIGATION         CURRENT MEDICATIONS        ALLERGIES    No Known Allergies    SOCIAL & FAMILY HISTORY    Social History     Socioeconomic History    Marital status:      Spouse name: None    Number of children: None    Years of education: None    Highest education level: None   Occupational History    None   Social Needs    Financial resource strain: None    Food insecurity     Worry: None     Inability: None    Transportation needs     Medical: None     Non-medical: None   Tobacco Use    Smoking status: Former Smoker     Types: Cigarettes     Quit date: 3/19/2003     Years since quittin.8    Smokeless tobacco: Never Used    Tobacco comment: quit 15 years ago   Substance and Sexual Activity    Alcohol use: No    Drug use: No    Sexual activity: Never   Lifestyle    Physical activity     Days per week: None     Minutes per session: None    Stress: None   Relationships    Social connections     Talks on phone: None     Gets together: None     Attends Muslim service: None     Active member of club or organization: None     Attends meetings of clubs or organizations: None     Relationship status: None    Intimate partner violence     Fear of current or ex partner: None     Emotionally abused: None     Physically abused: None     Forced sexual activity: None   Other Topics Concern    None   Social History Narrative    ** Merged History Encounter **          Family History   Problem Relation Age of Onset    Heart Failure Mother     Heart Attack Mother     Hypertension Mother     Coronary Art Dis Mother         Had PTCA w/stenting.  Heart Disease Mother     High Blood Pressure Mother     Obesity Mother     Diabetes Mother     Depression Mother     Heart Failure Father     Heart Disease Father     High Blood Pressure Father     Obesity Father     Heart Failure Brother     Heart Disease Brother     High Blood Pressure Brother     Obesity Brother     Depression Brother     Depression Sister     Depression Daughter     Anxiety Disorder Daughter     Depression Niece         suicide attempt       PHYSICAL EXAM    VITAL SIGNS: BP (!) 113/49   Pulse 95   Temp 99.9 °F (37.7 °C)   Resp 27   Ht 5' 8\" (1.727 m)   Wt (!) 378 lb 15.5 oz (171.9 kg)   SpO2 90%   BMI 57.62 kg/m²    Constitutional:  Well developed, well nourished, no acute distress   HENT:  Atraumatic, moist mucus membranes  Neck/Lymphatics: supple, no JVD, no swollen nodes  Respiratory:   Nonlabored breathing. Rate 20.    Lungs clear to auscultation, difficulty auscultating due to patient habitus, no retractions   Cardiovascular:  Rate 42, reg Rhythm,  no murmurs/rubs/gallops. GI:  Soft, nontender, normal bowel sounds  Musculoskeletal:    There is significant lower extremity edema but it is symmetrical.  Patient's legs and feet are very tender to palpation,    No cyanosis. No cool or pale-appearing limb.   Distal cap refill  intact bilateral upper and lower extremities  Bilateral upper and lower extremity ROM without obvious deficit but limited by large patient habitus  Integument:  Skin is warm and dry, no petechiae   Neurologic:  Alert & oriented, no slurred speech  Psych: Pleasant affect, no hallucinations    Results for orders placed or performed during the hospital encounter of 01/13/21   Culture, Blood 1    Specimen: Blood gases   Result Value Ref Range    Specimen BLOOD     Special Requests NONE     Culture NO GROWTH AT 48 HOURS    Culture, Blood 2    Specimen: Blood gases   Result Value Ref Range    Specimen BLOOD     Special Requests NONE     Culture NO GROWTH AT 48 HOURS    Culture, Urine    Specimen: Urine   Result Value Ref Range    Specimen URINE     Special Requests NONE     Culture Final Report     Culture CITROBACTER FREUNDII >100,000 CFU/ml (A)        Susceptibility    Citrobacter freundii - BACTERIAL SUSCEPTIBILITY PANEL NEYDA     ampicillin >16 Resistant      ceFAZolin 16 Resistant      cefepime <=2 Sensitive      cefuroxime <=4 Resistant      ciprofloxacin <=1 Sensitive      ertapenem <=0.5 Sensitive      gentamicin <=4 Sensitive      meropenem <=1 Sensitive      piperacillin-tazobactam <=16 Sensitive      trimethoprim-sulfamethoxazole >2/38 Resistant      amoxicillin-clavulanate (f) 16/8 Resistant      nitrofurantoin <=32 Sensitive    Culture, Blood 1    Specimen: Blood gases   Result Value Ref Range    Specimen BLOOD     Special Requests       May discontinue per protocol if blood culture has already been completed    Culture NO GROWTH AT 48 HOURS    Culture, Blood 2    Specimen: Blood gases   Result Value Ref Range Specimen BLOOD     Special Requests       May discontinue per protocol if blood culture has already been completed    Culture NO GROWTH AT 48 HOURS    Legionella antigen, urine    Specimen: Urine   Result Value Ref Range    Legionella Urinary Ag NEGATIVE NEGATIVE   Strep Pneumoniae Antigen    Specimen: CSF   Result Value Ref Range    Strep pneumo Ag URINE NEGATIVE    CBC Auto Differential   Result Value Ref Range    WBC 3.4 (L) 4.0 - 10.5 K/CU MM    RBC 2.77 (L) 4.2 - 5.4 M/CU MM    Hemoglobin 7.0 (L) 12.5 - 16.0 GM/DL    Hematocrit 25.0 (L) 37 - 47 %    MCV 90.3 78 - 100 FL    MCH 25.3 (L) 27 - 31 PG    MCHC 28.0 (L) 32.0 - 36.0 %    RDW 17.5 (H) 11.7 - 14.9 %    Platelets 699 316 - 886 K/CU MM    MPV 10.0 7.5 - 11.1 FL    Differential Type AUTOMATED DIFFERENTIAL     Segs Relative 68.9 (H) 36 - 66 %    Lymphocytes % 19.8 (L) 24 - 44 %    Monocytes % 6.8 (H) 0 - 4 %    Eosinophils % 2.4 0 - 3 %    Basophils % 1.2 (H) 0 - 1 %    Segs Absolute 2.3 K/CU MM    Lymphocytes Absolute 0.7 K/CU MM    Monocytes Absolute 0.2 K/CU MM    Eosinophils Absolute 0.1 K/CU MM    Basophils Absolute 0.0 K/CU MM    Nucleated RBC % 0.0 %    Total Nucleated RBC 0.0 K/CU MM    Total Immature Neutrophil 0.03 K/CU MM    Immature Neutrophil % 0.9 (H) 0 - 0.43 %   Comprehensive Metabolic Panel   Result Value Ref Range    Sodium 137 135 - 145 MMOL/L    Potassium 3.9 3.5 - 5.1 MMOL/L    Chloride 100 99 - 110 mMol/L    CO2 29 21 - 32 MMOL/L    BUN 33 (H) 6 - 23 MG/DL    CREATININE 1.6 (H) 0.6 - 1.1 MG/DL    Glucose 107 (H) 70 - 99 MG/DL    Calcium 8.8 8.3 - 10.6 MG/DL    Alb 3.3 (L) 3.4 - 5.0 GM/DL    Total Protein 8.0 6.4 - 8.2 GM/DL    Total Bilirubin 0.2 0.0 - 1.0 MG/DL    ALT 9 (L) 10 - 40 U/L    AST 12 (L) 15 - 37 IU/L    Alkaline Phosphatase 135 (H) 40 - 129 IU/L    GFR Non- 33 (L) >60 mL/min/1.73m2    GFR  39 (L) >60 mL/min/1.73m2    Anion Gap 8 4 - 16   Troponin   Result Value Ref Range    Troponin T <0.010 <0.01 NG/ML   Brain Natriuretic Peptide   Result Value Ref Range    Pro-BNP 3,436 (H) <300 PG/ML   Lactic Acid, Plasma   Result Value Ref Range    Lactate 2.2 (HH) 0.4 - 2.0 mMOL/L   Blood Gas, Venous   Result Value Ref Range    pH, Maikel 7.25 (L) 7.32 - 7.42    pCO2, Maikel 76 (H) 38 - 52 mmHG    pO2, Maikel 112 (H) 28 - 48 mmHG    Base Exc, Mixed 3.6 (H) 0 - 2.3    HCO3, Venous 33.3 (H) 19 - 25 MMOL/L    O2 Sat, Maikel 93.1 (H) 50 - 70 %    Comment VG    Urinalysis Reflex to Culture    Specimen: Urine   Result Value Ref Range    Color, UA YELLOW YELLOW    Clarity, UA CLOUDY (A) CLEAR    Glucose, Urine NEGATIVE NEGATIVE MG/DL    Bilirubin Urine NEGATIVE NEGATIVE MG/DL    Ketones, Urine NEGATIVE NEGATIVE MG/DL    Specific Gravity, UA 1.018 1.001 - 1.035    Blood, Urine LARGE (A) NEGATIVE    pH, Urine 5.0 5.0 - 8.0    Protein,  (A) NEGATIVE MG/DL    Urobilinogen, Urine NORMAL 0.2 - 1.0 MG/DL    Nitrite Urine, Quantitative NEGATIVE NEGATIVE    Leukocyte Esterase, Urine MODERATE (A) NEGATIVE    RBC,  (H) 0 - 6 /HPF    WBC, UA 2979 (H) 0 - 5 /HPF    Bacteria, UA MANY (A) NEGATIVE /HPF    WBC Clumps, UA MANY /HPF    Trichomonas, UA NONE SEEN NONE SEEN /HPF   COVID-19    Specimen: Nasopharyngeal Swab   Result Value Ref Range    Source THROAT     SARS-CoV-2, NAAT NOT DETECTED    Reticulocytes   Result Value Ref Range    Retic Ct Pct 3.4 (H) 0.2 - 2.20 %   Lactic Acid, Plasma   Result Value Ref Range    Lactate 1.5 0.4 - 2.0 mMOL/L   Ferritin   Result Value Ref Range    Ferritin 43 15 - 150 NG/ML   Iron and TIBC   Result Value Ref Range    Iron 25 (L) 37 - 145 ug/dL    UIBC 309 110 - 370 ug/dL    TIBC 334 250 - 450 ug/dL    Transferrin % 7 (L) 10 - 44 %   Iron and TIBC   Result Value Ref Range    Iron 20 (L) 37 - 145 ug/dL    UIBC 327 110 - 370 ug/dL    TIBC 347 250 - 450 ug/dL    Transferrin % 6 (L) 10 - 44 %   Basic Metabolic Panel   Result Value Ref Range    Sodium 139 135 - 145 MMOL/L    Potassium 5.3 (H) 3.5 - 5.1 MMOL/L    Chloride 102 99 - 110 mMol/L    CO2 27 21 - 32 MMOL/L    Anion Gap 10 4 - 16    BUN 37 (H) 6 - 23 MG/DL    CREATININE 1.8 (H) 0.6 - 1.1 MG/DL    Glucose 136 (H) 70 - 99 MG/DL    Calcium 8.6 8.3 - 10.6 MG/DL    GFR Non-African American 28 (L) >60 mL/min/1.73m2    GFR  34 (L) >60 mL/min/1.73m2   Magnesium   Result Value Ref Range    Magnesium 2.7 (H) 1.8 - 2.4 mg/dl   Lactate, Sepsis   Result Value Ref Range    Lactic Acid, Sepsis 1.1 0.5 - 1.9 mMOL/L   Blood gas, arterial   Result Value Ref Range    pH, Bld 7.25 (L) 7.34 - 7.45    pCO2, Arterial 71.0 (H) 32 - 45 MMHG    pO2, Arterial 78 75 - 100 MMHG    Base Exc, Mixed 1.8 0 - 2.3    HCO3, Arterial 31.1 (H) 18 - 23 MMOL/L    CO2 Content 33.3 (H) 19 - 24 MMOL/L    O2 Sat 92.5 (L) 96 - 97 %    Carbon Monoxide, Blood 2.0 0 - 5 %    Methemoglobin, Arterial 0.9 <1.5 %    Comment BIPAP 15/5 35%    Lipid panel - fasting   Result Value Ref Range    Triglycerides 94 <150 MG/DL    Cholesterol 151 <200 MG/DL    HDL 46 >40 MG/DL    LDL Direct 90 <100 MG/DL   Hemoglobin A1c   Result Value Ref Range    Hemoglobin A1C 6.1 4.2 - 6.3 %    eAG 128 mg/dL   Procalcitonin   Result Value Ref Range    Procalcitonin 0.150    C-reactive protein   Result Value Ref Range    CRP, High Sensitivity 66.8 mg/L   Basic Metabolic Panel   Result Value Ref Range    Sodium 136 135 - 145 MMOL/L    Potassium 5.0 3.5 - 5.1 MMOL/L    Chloride 98 (L) 99 - 110 mMol/L    CO2 28 21 - 32 MMOL/L    Anion Gap 10 4 - 16    BUN 41 (H) 6 - 23 MG/DL    CREATININE 2.0 (H) 0.6 - 1.1 MG/DL    Glucose 134 (H) 70 - 99 MG/DL    Calcium 8.9 8.3 - 10.6 MG/DL    GFR Non- 25 (L) >60 mL/min/1.73m2    GFR  30 (L) >60 mL/min/1.73m2   Magnesium   Result Value Ref Range    Magnesium 2.8 (H) 1.8 - 2.4 mg/dl   Basic Metabolic Panel   Result Value Ref Range    Sodium 134 (L) 135 - 145 MMOL/L    Potassium 5.3 (H) 3.5 - 5.1 MMOL/L    Chloride 98 (L) 99 - 110 mMol/L    CO2 29 21 - 32 MMOL/L    Anion Gap 7 4 - 16    BUN 41 (H) 6 - 23 MG/DL    CREATININE 2.0 (H) 0.6 - 1.1 MG/DL    Glucose 142 (H) 70 - 99 MG/DL    Calcium 9.0 8.3 - 10.6 MG/DL    GFR Non- 25 (L) >60 mL/min/1.73m2    GFR  30 (L) >60 mL/min/1.73m2   Magnesium   Result Value Ref Range    Magnesium 2.9 (H) 1.8 - 2.4 mg/dl   Blood gas, arterial   Result Value Ref Range    pH, Bld 7.26 (L) 7.34 - 7.45    pCO2, Arterial 66.0 (H) 32 - 45 MMHG    pO2, Arterial 81 75 - 100 MMHG    Base Exc, Mixed . 8 0 - 2.3    HCO3, Arterial 29.6 (H) 18 - 23 MMOL/L    CO2 Content 31.6 (H) 19 - 24 MMOL/L    O2 Sat 93.6 (L) 96 - 97 %    Carbon Monoxide, Blood 2.0 0 - 5 %    Methemoglobin, Arterial 1.0 <1.5 %    Comment BIPAP 20 10.40    Blood gas, arterial   Result Value Ref Range    pH, Bld 7.32 (L) 7.34 - 7.45    pCO2, Arterial 59.0 (H) 32 - 45 MMHG    pO2, Arterial 84 75 - 100 MMHG    Base Exc, Mixed 2.8 (H) 0 - 2.3    HCO3, Arterial 30.4 (H) 18 - 23 MMOL/L    CO2 Content 32.2 (H) 19 - 24 MMOL/L    O2 Sat 94.1 (L) 96 - 97 %    Carbon Monoxide, Blood 2.4 0 - 5 %    Methemoglobin, Arterial 0.9 <1.5 %    Comment BIPAP 22/12 40%    Blood gas, arterial   Result Value Ref Range    pH, Bld 7.23 (L) 7.34 - 7.45    pCO2, Arterial 77.0 (H) 32 - 45 MMHG    pO2, Arterial 33 (L) 75 - 100 MMHG    Base Exc, Mixed 2.3 0 - 2.3    HCO3, Arterial 32.2 (H) 18 - 23 MMOL/L    CO2 Content 34.6 (H) 19 - 24 MMOL/L    O2 Sat 53.1 (L) 96 - 97 %    Carbon Monoxide, Blood 2.0 0 - 5 %    Methemoglobin, Arterial 1.1 <1.5 %    Comment BIPAP 22/12 40%    Protein / creatinine ratio, urine   Result Value Ref Range    Urine Total Protein 17.8 (H) <12 MG/DL    Creatinine, Ur 13.4 (L) 28 - 217 MG/DL    Prot/Creat Ratio, Ur 1.3 (H) <0.2   CBC   Result Value Ref Range    WBC 7.1 4.0 - 10.5 K/CU MM    RBC 3.37 (L) 4.2 - 5.4 M/CU MM    Hemoglobin 8.3 (L) 12.5 - 16.0 GM/DL    Hematocrit 29.8 (L) 37 - 47 %    MCV 88.4 78 - 100 FL    MCH 24.6 (L) 27 - 31 PG    MCHC 27.9 (L) 32.0 - 36.0 %    RDW 18.3 (H) 11.7 - 14.9 %    Platelets 286 833 - 818 K/CU MM    MPV 9.7 7.5 - 11.1 FL   Troponin   Result Value Ref Range    Troponin T <0.010 <0.01 NG/ML   Brain Natriuretic Peptide   Result Value Ref Range    Pro-BNP 12,170 (H) <300 PG/ML   Basic Metabolic Panel   Result Value Ref Range    Sodium 142 135 - 145 MMOL/L    Potassium 4.3 3.5 - 5.1 MMOL/L    Chloride 100 99 - 110 mMol/L    CO2 30 21 - 32 MMOL/L    Anion Gap 12 4 - 16    BUN 41 (H) 6 - 23 MG/DL    CREATININE 1.9 (H) 0.6 - 1.1 MG/DL    Glucose 124 (H) 70 - 99 MG/DL    Calcium 8.6 8.3 - 10.6 MG/DL    GFR Non- 27 (L) >60 mL/min/1.73m2    GFR  32 (L) >60 mL/min/1.73m2   Magnesium   Result Value Ref Range    Magnesium 2.4 1.8 - 2.4 mg/dl   CBC Auto Differential   Result Value Ref Range    WBC 6.7 4.0 - 10.5 K/CU MM    RBC 3.21 (L) 4.2 - 5.4 M/CU MM    Hemoglobin 7.9 (L) 12.5 - 16.0 GM/DL    Hematocrit 29.2 (L) 37 - 47 %    MCV 91.0 78 - 100 FL    MCH 24.6 (L) 27 - 31 PG    MCHC 27.1 (L) 32.0 - 36.0 %    RDW 18.3 (H) 11.7 - 14.9 %    Platelets 019 407 - 355 K/CU MM    MPV 9.9 7.5 - 11.1 FL   POCT Glucose   Result Value Ref Range    POC Glucose 130 (H) 70 - 99 MG/DL   POCT Glucose   Result Value Ref Range    POC Glucose 146 (H) 70 - 99 MG/DL   POCT Glucose   Result Value Ref Range    POC Glucose 155 (H) 70 - 99 MG/DL   POCT Glucose   Result Value Ref Range    POC Glucose 138 (H) 70 - 99 MG/DL   POCT Glucose   Result Value Ref Range    POC Glucose 132 (H) 70 - 99 MG/DL   POCT Glucose   Result Value Ref Range    POC Glucose 154 (H) 70 - 99 MG/DL   POCT Glucose   Result Value Ref Range    POC Glucose 136 (H) 70 - 99 MG/DL   POCT Glucose   Result Value Ref Range    POC Glucose 130 (H) 70 - 99 MG/DL   POCT Glucose   Result Value Ref Range    POC Glucose 134 (H) 70 - 99 MG/DL   POCT Glucose   Result Value Ref Range    POC Glucose 124 (H) 70 - 99 MG/DL   POCT Glucose   Result Value Ref Range    POC Glucose 126 (H) 70 - 99 MG/DL   EKG 12 Lead   Result Value Ref Range    Ventricular Rate 43 BPM    Atrial Rate 267 BPM    QRS Duration 100 ms    Q-T Interval 536 ms    QTc Calculation (Bazett) 452 ms    R Axis 36 degrees    T Axis 66 degrees    Diagnosis       sinus bradycardia  Low voltage QRS  Cannot rule out Anterior infarct (cited on or before 25-JUN-2020)  Abnormal ECG  When compared with ECG of 09-DEC-2020 13:43,  Junctional rhythm has replaced Sinus rhythm  QRS duration has increased  Nonspecific T wave abnormality has replaced inverted T waves in Anterior leads  Confirmed by Evelia Pryor MD, Jerzy Wang ([id number]) on 1/15/2021 7:39:14 AM     TYPE AND SCREEN   Result Value Ref Range    ABO/Rh A POSITIVE     Antibody Screen NEGATIVE     Unit Number [id number]     Component LEUKO-POOR RED CELLS     Unit Divison 00     Status ISSUED,FINAL     Transfusion Status OK TO TRANSFUSE     Crossmatch Result COMPATIBLE        XR CHEST PORTABLE   Final Result   Right-sided catheter as discussed. No postprocedure pneumothorax. IR NONTUNNELED VASCULAR CATHETER > 5 YEARS   Final Result   Successful ultrasound and fluoroscopy guided non-tunneled central venous   catheter placement. The catheter is ready to use. XR CHEST PORTABLE   Final Result   1. Stable cardiomegaly with pulmonary edema, bilateral pleural effusions, and   associated bibasilar atelectasis. XR PELVIS (1-2 VIEWS)   Final Result   Right femoral vascular catheter with the tip overlying the right superior   pubic ramus. XR CHEST PORTABLE   Final Result   Interstitial/pulmonary edema. Cardiomegaly. Probable small bilateral   pleural effusions. ED COURSE & MEDICAL DECISION MAKING        Patient presents as above with shortness of breath and worsening lower limb edema. She has been bradycardic and hypotensive in the emergency department. On exam she is obese, bilateral edema, tenderness palpation of the lower limbs.     Patient's white blood cell count is 3.4, hemoglobin of 7 BUN elevated at 33 creatinine 1.6 ALT and AST are low but alk phos is elevated 135 and her GFR is 33. Troponin within normal limits but BNP elevated to 3400, lactate slightly elevated at 2.2. VBG shows acidosis of 7.25, CO2 76 and bicarb of 33. Chest x-ray reveals interstitial pulmonary edema cardiomegaly probable small bilateral pleural effusions. Patient is requiring central line/pressors. EKG shows junctional bradycardia. Other results are pending at this time. See attending note for disposition and of the results. Clinical  IMPRESSION    1. Acute congestive heart failure, unspecified heart failure type ([address]    2. Acute kidney injury ([address]    3. SIRS (systemic inflammatory response syndrome) (HCC)    4. Hypercarbia    5. Hypotension, unspecified hypotension type    6. Peripheral edema    7. Bradycardia    8. Severe anemia               Comment: Please note this report has been produced using speech recognition software and may contain errors related to that system including errors in grammar, punctuation, and spelling, as well as words and phrases that may be inappropriate. If there are any questions or concerns please feel free to contact the dictating provider for clarification.                         [place]  01/16/21 0480

## 2021-01-13 NOTE — PROGRESS NOTES
Critical ABG results reported to Dr. Gaby Sevilla. PH 7.25  PCO2 76mmhg    Asked if he wanted to place pt on BIPAP due to ABG values. Dr. Gaby Sevilla stated \" Pt is getting a central line at the moment and her pressures have been low all day so I dont think she\"ll tolerate BIPAP. So lets hold off for now\". This RT voiced concerns that without intervention pt PCO2 would likely continue to increase. Dr. Arik Clark wait until he after the central line and see. \" No new orders received at this time

## 2021-01-13 NOTE — ED NOTES
Pt placed on non-rebreather. spo2 89% while laying flat. .    Levophed is bringing SBP up, ARINA Segovia notified central line is set up and waiting      Deysi Navarro RN  01/13/21 0565

## 2021-01-14 NOTE — CONSULTS
Pulmonary Consult Note      Reason for Consult:  ACUTE RESPIRATORY FAILURE  Requesting Physician: Dr. Jones Cheadle   Subjective:   CHIEF COMPLAINT :SOB    Patient Active Problem List    Diagnosis Date Noted    Acute exacerbation of chronic obstructive pulmonary disease (COPD) ([address]      Priority: High    Hypervolemia      Priority: High    Anxiety 09/01/2020     Priority: Medium     Class: Acute    Acute respiratory failure with hypoxia (HCC)     Bradycardia     Septic shock ([address] 08/25/2020    Acute kidney injury superimposed on CKD ([address] 08/25/2020    S/P TAVR (transcatheter aortic valve replacement) 06/26/2020    Chronic diastolic heart failure ([address] 06/25/2020    Wide-complex tachycardia ([address]     Altered mental status     Sepsis ([address] 09/21/2019    Acute kidney injury ([address] 08/22/2019    Acute metabolic encephalopathy 65/63/2034    Shock, unspecified ([address] 08/22/2019    Uncontrolled diabetes mellitus ([address] 08/22/2019    SIRS (systemic inflammatory response syndrome) ([address] 08/12/2019    Class 3 severe obesity due to excess calories with body mass index (BMI) greater than or equal to 70 in adult [facility]) 06/16/2019    VHD (valvular heart disease) 04/02/2019     Overview Note:     H/o TAVR      Pneumonia 02/06/2019    Skin ulcer of left foot with fat layer exposed ([address]     Cellulitis 11/24/2018    Acute on chronic respiratory failure with hypoxia and hypercapnia (HCC)     Gait disturbance 02/10/2018    Chronic obstructive pulmonary disease ([address] 02/07/2018    Chronic respiratory failure with hypoxia and hypercapnia ([address] 01/30/2018    Morbid obesity ([address]     Asthma     Diabetes mellitus ([address]     Hypertension     Sleep apnea     Family history of coronary artery disease     Chest pain     SOB (shortness of breath)     Palpitations     Peripheral edema     Aortic stenosis     Morbid obesity with BMI of 70 and over, adult ([address] 10/27/2016    Dyslipidemia 10/27/2016    Fecal incontinence 10/27/2016    Mild intermittent asthma without complication 74/27/9008    S/P laparoscopic cholecystectomy 10/27/2016    Type 2 diabetes mellitus without complication, with long-term current use of insulin ([facility] 10/27/2016    Fatty liver 10/27/2016    Arthritis 10/27/2016    H/O parotidectomy 10/27/2016    Venous stasis dermatitis of both lower extremities 10/27/2016        HPI:                The patient is a 63 y.o. female with significant past medical history of Morbid obesity, DM II, Chronic hypoxic hypercapneic resp failure on BIPAP, Diastolic CHF, s/p TAVR, Fatty liver  presents with complaints of SOB x 1 week getting worse. She has little cough,little phlegm, no hemoptysis, SOB,no exposure to sick person . She has no recent travel, no headaches, no n/v, no abd pain, no diarrhea, no dysuria. She was hypotensive when she came to the hospital. Lactate was 2.2. She has diastolic dysfunction. She has Pulmonary congestion on the CXR and her COVID is negative till now. She is sleepy but arousable.  She has good urine out put    Past Medical History:      Diagnosis Date    Acute kidney injury ([address] 8/22/2019    Anxiety 9/1/2020    Aortic stenosis     Asthma     Bacteremia due to group B Streptococcus     Treated at [facility] in 2017 - felt to be due to cellulitis    Cancer [facility])     Cervical    Carotid artery stenosis     Chest pain     CHF (congestive heart failure) (HCC)     Chronic back pain     COPD exacerbation ([address]     Depression     Diabetes mellitus ([address]     Family history of coronary artery disease     Fatty liver 10/27/2016    GI bleed 2017    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 - treated at [facility] H/O aortic valve stenosis     H/O echocardiogram 11/22/2016    EF 55%, Aortic valve area is 0.84 cm2 with a mean gradient of 36 suggestive of severe aortic stenosis, mild to mos LVH    Headache     Heart murmur     History of nuclear stress test 11/17/2016 lexiscan-normal,EF70%    Morbid obesity (HCC)     BMI=73.72 kg/m2    Neuropathy     NSTEMI (non-ST elevated myocardial infarction) ([facility] 2018    Obesity     Osteoarthritis     Palpitations     Peripheral edema     Restless legs syndrome     Sleep apnea     Has a CPAP    Sleep apnea     SOB (shortness of breath)       Past Surgical History:        Procedure Laterality Date    AORTIC VALVE REPLACEMENT  2017    29mm EvolutR TAVR     SECTION      CHOLECYSTECTOMY      GALLBLADDER SURGERY      HYSTERECTOMY      IR NONTUNNELED VASCULAR CATHETER  2020    IR NONTUNNELED VASCULAR CATHETER 2020 [facility] PROCEDURES    NECK SURGERY      Tumor    TUBAL LIGATION       Current Medications:     insulin glargine  15 Units Subcutaneous Nightly    insulin lispro  0-6 Units Subcutaneous 2 times per day    lidocaine  5 mL Intradermal Once    albuterol sulfate HFA  2 puff Inhalation 4x daily    amiodarone  200 mg Oral Daily    atorvastatin  40 mg Oral Nightly    busPIRone  10 mg Oral TID    clopidogrel  75 mg Oral Daily    doxepin  10 mg Oral Nightly    ferrous sulfate  325 mg Oral Daily with breakfast    gabapentin  400 mg Oral TID    metoprolol tartrate  12.5 mg Oral BID    pantoprazole  40 mg Oral Daily    polyethylene glycol  17 g Oral Daily    pramipexole  1 mg Oral BID    sertraline  25 mg Oral Nightly    [Held by provider] lisinopril  5 mg Oral Daily    [Held by provider] spironolactone  25 mg Oral Daily    vitamin D3  400 Units Oral Daily    sodium chloride flush  10 mL Intravenous 2 times per day    enoxaparin  40 mg Subcutaneous Daily    furosemide  40 mg Intravenous BID    cefTRIAXone (ROCEPHIN) IV  1 g Intravenous Q24H    azithromycin  500 mg Intravenous Q24H    insulin lispro  0-12 Units Subcutaneous TID WC    furosemide  20 mg Intravenous See Admin Instructions    miconazole   Topical BID     Allergies:    Social History:    TOBACCO:   reports that she quit smoking about 17 years ago. Her smoking use included cigarettes. She has never used smokeless tobacco.  ETOH:   reports no history of alcohol use. Patient currently lives independently    Family History:       Problem Relation Age of Onset    Heart Failure Mother     Heart Attack Mother     Hypertension Mother     Coronary Art Dis Mother         Had PTCA w/stenting.  Heart Disease Mother     High Blood Pressure Mother     Obesity Mother     Diabetes Mother     Depression Mother     Heart Failure Father     Heart Disease Father     High Blood Pressure Father     Obesity Father     Heart Failure Brother     Heart Disease Brother     High Blood Pressure Brother     Obesity Brother     Depression Brother     Depression Sister     Depression Daughter     Anxiety Disorder Daughter     Depression Niece         suicide attempt       REVIEW OF SYSTEMS:    CONSTITUTIONAL:  negative for fevers, chills, diaphoresis, activity change, appetite change, fatigue, night sweats and unexpected weight change.    EYES:  negative for blurred vision, eye discharge, visual disturbance and icterus  HEENT:  negative for hearing loss, tinnitus, ear drainage, sinus pressure, nasal congestion, epistaxis and snoring  RESPIRATORY:  See HPI  CARDIOVASCULAR:  negative for chest pain, palpitations, exertional chest pressure/discomfort, edema, syncope  GASTROINTESTINAL:  negative for nausea, vomiting, diarrhea, constipation, blood in stool and abdominal pain  GENITOURINARY:  negative for frequency, dysuria, urinary incontinence, decreased urine volume, and hematuria  HEMATOLOGIC/LYMPHATIC:  negative for easy bruising, bleeding and lymphadenopathy  ALLERGIC/IMMUNOLOGIC:  negative for recurrent infections, angioedema, anaphylaxis and drug reactions  ENDOCRINE:  negative for weight changes and diabetic symptoms including polyuria, polydipsia and polyphagia  MUSCULOSKELETAL:  negative for  pain, joint swelling, decreased range of motion and muscle weakness  NEUROLOGICAL:  negative for headaches, slurred speech, unilateral weakness  PSYCHIATRIC/BEHAVIORAL: negative for hallucinations, behavioral problems, confusion and agitation. Objective:   PHYSICAL EXAM:      VITALS:  BP (!) 56/47   Pulse 56   Temp 97.2 °F (36.2 °C) (Oral)   Resp 18   Ht 5' 8\" (1.727 m)   Wt (!) 378 lb 15.5 oz (171.9 kg)   SpO2 91%   BMI 57.62 kg/m²   24HR INTAKE/OUTPUT:      Intake/Output Summary (Last 24 hours) at 2021 1237  Last data filed at 2021 1011  Gross per 24 hour   Intake 641.33 ml   Output 450 ml   Net 191.33 ml     CURRENT PULSE OXIMETRY:  SpO2: 91 %  24HR PULSE OXIMETRY RANGE:  SpO2  Av %  Min: 91 %  Max: 100 %    CONSTITUTIONAL:  Sleepy but arousable, no apparent distress, and appears stated age  NECK:  Supple, symmetrical, trachea midline, no adenopathy, thyroid symmetric, not enlarged and no tenderness, skin normal  LUNGS:  Decreased air entry bilateral bases  CARDIOVASCULAR:  normal S1 and S2, no edema and no JVD  ABDOMEN:  normal bowel sounds, non-distended and no masses palpated, and no tenderness to palpation. No hepatospleenomegaly  LYMPHADENOPATHY:  no axillary or supraclavicular adenopathy. No cervical adnenopathy  PSYCHIATRIC: Oriented to person place and time. No obvious depression or anxiety. MUSCULOSKELETAL: No obvious misalignment or effusion of the joints. No clubbing, cyanosis of the digits. SKIN:  normal skin color, texture, turgor and no redness, warmth, or swelling.  No palpable nodules    DATA:    Old records have been reviewed  CBC with Differential:    Lab Results   Component Value Date    WBC 3.4 2021    RBC 2.77 2021    HGB 7.0 2021    HCT 25.0 2021     2021    MCV 90.3 2021    MCH 25.3 2021    MCHC 28.0 2021    RDW 17.5 2021    NRBC 1 2019    SEGSPCT 68.9 2021    BANDSPCT 7 10/30/2019    LYMPHOPCT 19.8 2021 PROMYELOPCT 3 06/23/2019    MONOPCT 6.8 01/13/2021    MYELOPCT 1 06/27/2019    BASOPCT 1.2 01/13/2021    MONOSABS 0.2 01/13/2021    LYMPHSABS 0.7 01/13/2021    EOSABS 0.1 01/13/2021    BASOSABS 0.0 01/13/2021    DIFFTYPE AUTOMATED DIFFERENTIAL 01/13/2021     BMP:    Lab Results   Component Value Date     01/14/2021    K 5.3 01/14/2021     01/14/2021    CO2 27 01/14/2021    BUN 37 01/14/2021    CREATININE 1.8 01/14/2021    CALCIUM 8.6 01/14/2021    GFRAA 34 01/14/2021    LABGLOM 28 01/14/2021    GLUCOSE 136 01/14/2021     Hepatic Function Panel:    Lab Results   Component Value Date    ALKPHOS 135 01/13/2021    ALT 9 01/13/2021    AST 12 01/13/2021    PROT 8.0 01/13/2021    BILITOT 0.2 01/13/2021     ABG:    Lab Results   Component Value Date    EFG6NBA 31.1 01/14/2021    XFV2RBS 71.0 01/14/2021    PO2ART 78 01/14/2021       Cultures:   Pending    Radiology Review:      Interstitial/pulmonary edema.  Cardiomegaly.  Probable small bilateral   pleural effusions           Assessment/Plan       Patient Active Problem List    Diagnosis Date Noted    Acute exacerbation of chronic obstructive pulmonary disease (COPD) [facility]      Priority: High    Hypervolemia      Priority: High    Anxiety 09/01/2020     Priority: Medium     Class: Acute    Acute respiratory failure with hypoxia (HCC)     Bradycardia     Septic shock ([address] 08/25/2020    Acute kidney injury superimposed on CKD ([address] 08/25/2020    S/P TAVR (transcatheter aortic valve replacement) 06/26/2020    Chronic diastolic heart failure ([address] 06/25/2020    Wide-complex tachycardia ([address]     Altered mental status     Sepsis ([address] 09/21/2019    Acute kidney injury ([address] 08/22/2019    Acute metabolic encephalopathy 82/46/8887    Shock, unspecified ([address] 08/22/2019    Uncontrolled diabetes mellitus ([facility] 08/22/2019    SIRS (systemic inflammatory response syndrome) ([facility] 08/12/2019    Class 3 severe obesity due to excess calories with body mass

## 2021-01-14 NOTE — CARE COORDINATION
Attempted to meet with patient; she is receiving personal care at this time. Elizabeth Dewitt RN    Chart reviewed. Patient is from home with family. She has equipment at home- walker, electric wheelchair. Patient was active with [facility] as of 12/9/20. She has been to SNF in the past- last being [facility] on 9/8/2020. She has a PCP and insurance that assist with Rx when needed. CM will follow for discharge needs.  Elizabeth Dewitt RN

## 2021-01-14 NOTE — PLAN OF CARE
Patient in frail condition with noted multiple admissions in past year, discussed she has several family and friends along with home health care that assist her but her unwilling to allow for proper care contributes to current skin issues as well as mobility decline.

## 2021-01-14 NOTE — PROGRESS NOTES
[facility] PROGRESS NOTE  Date: 1/14/2021   Name: Akiko Pierson   MRN: [id number]   YOB: 1955  Chief Complaint   Patient presents with    Shortness of Breath    Foot Pain     bilateral, no known injury        Subjective/Interval Hx:     She states she came in because she couldn't breath    On levophed  On 2LNC and bipap at night  HR into 30s  Making urine      ROS: negative unless mentioned above  Objective:   Physical Exam:   BP (!) 101/51   Pulse (!) 43   Temp 97.2 °F (36.2 °C) (Oral)   Resp 17   Ht 5' 8\" (1.727 m)   Wt (!) 378 lb 15.5 oz (171.9 kg)   SpO2 94%   BMI 57.62 kg/m²   CONSTITUTIONAL:  No acute distress  EYES:  No discharge  HENT:  NCAT  LUNGS:  Decreased breath sounds b/l, large body habitus  CARDIOVASCULAR:  s1s2 rrr  ABDOMEN:  Soft nt nd  GENITAL/URINARY:  Has hernandez  MUSCULOSKELETAL/Extremities:  Trace to 1+ pitting edema  NEUROLOGIC:  No gross deficits, aao  SKIN:  No gross lesions    Assessment/Plan:       1. Acute on chronic hypoxic and hypercarbic respiratory failure  -may be due to pneumonia and CHF. ABG: Respiratory acidosis. Was on BiPAP, now on 2 L nasal cannula. Pulmonology following. CXR: Interstitial/pulmonary edema. COVID-19 negative. 2. Shock  -Cardiogenic versus septic. Pulmonary edema is suggestive of cardiogenic. On Levophed. Check echo. On antibiotics. Checking urine, sputum, and blood cultures. 3. Acute on chronic diastolic CHF  -CXR: Pulmonary edema. BNP 3436. On IV Lasix to try to reduce congestion, while on pressors. Holding beta-blocker, ACE inhibitor, Aldactone due to low BP.  4. Possible pneumonia  -COVID-19 negative. On Rocephin and azithromycin. Blood culture and respiratory cultures pending. Procalcitonin 0.15. CRP 66.  5. Acute kidney injury  -Baseline creatinine 0.9. Creatinine 1.6 on admission. Holding ACE inhibitor and Aldactone.   Creatinine increased to 1.8.  UA: Pyuria, hematuria, moderate leuk esterase. 6. Anemia of chronic disease  -Hemoglobin 7. Iron 20. Transfuse 1 unit PRBC. 7. Bradycardia  -EKG: Junctional rhythm. Check serial troponins. Cardiology consult. 8. Diabetes mellitus type 2  -On sliding scale insulin and Lantus. Endocrinology following. Check A1c.  9. Hypermagnesemia  -Magnesium 2.7. Monitor.       Other comorbid conditions addressed include:     Hypertension  Morbid obesity BMI 57  Hyperlipidemia  COPD with no exacerbation    DVT Prophylaxis: lovenox  GI Prophylaxis: protonix  Diet: DIET CARB CONTROL;   Home O2: 2LNC and CPAP/Bipap  Code Status: Full Code      Dispo:  -need breathing and blood pressure to improve    Audrey Valencia MD  1/14/2021    Meds:   Meds:    insulin glargine  15 Units Subcutaneous Nightly    insulin lispro  0-6 Units Subcutaneous 2 times per day    albuterol sulfate HFA  2 puff Inhalation 4x daily    amiodarone  200 mg Oral Daily    atorvastatin  40 mg Oral Nightly    busPIRone  10 mg Oral TID    clopidogrel  75 mg Oral Daily    doxepin  10 mg Oral Nightly    ferrous sulfate  325 mg Oral Daily with breakfast    gabapentin  400 mg Oral TID    metoprolol tartrate  12.5 mg Oral BID    pantoprazole  40 mg Oral Daily    polyethylene glycol  17 g Oral Daily    pramipexole  1 mg Oral BID    sertraline  25 mg Oral Nightly    [Held by provider] lisinopril  5 mg Oral Daily    [Held by provider] spironolactone  25 mg Oral Daily    vitamin D3  400 Units Oral Daily    sodium chloride flush  10 mL Intravenous 2 times per day    enoxaparin  40 mg Subcutaneous Daily    furosemide  40 mg Intravenous BID    cefTRIAXone (ROCEPHIN) IV  1 g Intravenous Q24H    azithromycin  500 mg Intravenous Q24H    insulin lispro  0-12 Units Subcutaneous TID WC    furosemide  20 mg Intravenous See Admin Instructions    miconazole   Topical BID      Infusions:    norepinephrine 6 mcg/min (01/14/21 0615)    dextrose       PRN Meds:     sodium chloride flush, 10 mL, PRN      acetaminophen, 650 mg, Q6H PRN    Or      acetaminophen, 650 mg, Q6H PRN      potassium chloride, 40 mEq, PRN    Or      potassium alternative oral replacement, 40 mEq, PRN    Or      potassium chloride, 10 mEq, PRN      glucose, 15 g, PRN      dextrose, 12.5 g, PRN      glucagon (rDNA), 1 mg, PRN      dextrose, 100 mL/hr, PRN        Data/Labs:     Recent Labs     01/13/21  1445   WBC 3.4*   HGB 7.0*   HCT 25.0*         Recent Labs     01/13/21  1445 01/14/21  0500    139   K 3.9 5.3*    102   CO2 29 27   BUN 33* 37*   CREATININE 1.6* 1.8*     Recent Labs     01/13/21  1445   AST 12*   ALT 9*   BILITOT 0.2   ALKPHOS 135*     No results for input(s): INR in the last 72 hours. Recent Labs     01/13/21  1445   TROPONINT <0.010     HgBA1c:   Lab Results   Component Value Date    LABA1C 6.1 08/25/2020     CALCIUM:  8.6/27 (01/14 0500)    I/O last 3 completed shifts:   In: 611.3 [I.V.:48; Blood:313.3; IV Piggyback:250]  Out: 450 [Urine:450]    Intake/Output Summary (Last 24 hours) at 1/14/2021 0856  Last data filed at 1/14/2021 0616  Gross per 24 hour   Intake 611.33 ml   Output 450 ml   Net 161.33 ml

## 2021-01-14 NOTE — PROGRESS NOTES
PT right femoral CVC dressing will not stay dry and intact. Patient large pannis remains moist and warm keeping the dressing from sealing and dressing is unable to stick to skin. Notified Dr Walker Mccurdy of need to move CVC site. Picc ordered. Charles FRIED notified this RN that he is unable to place the picc due to the shortage of triple lumen piccs. Charles FRIED notified Rn of need to contact IR to remove CVC as it is a tunneled line and has internal sutures. IR consult placed to remove CVC from RT groin and place a new line per IR.

## 2021-01-14 NOTE — H&P
MAIKEL HOSPITALIST       History and Physical      Name:  Brady Bowen /Age/Sex: 1955  (63 y.o. female)   MRN & CSN:  [id number] & [id number] Admission Date/Time: 2021  2:15 PM   Location:  [facility] PCP: Cedric Last 15 Day: 1    Assessment and Plan:   Brady Bowen is a 63 y.o.  female with past medical history of DM type II, CHF, COPD, morbid obesity, hyperlipidemia, chronic respiratory failure who presents with shortness of breath     · Acute on chronic hypoxic and hypercarbic respiratory failure - likely a combination of sepsis and CHF exacerbation - pH 7.25, and Pco2 76 - on VBG -SARS-CoV-2 rapid test negative, will check respiratory disease panel, blood in respiratory cultures, patient now doing better on BIPAP (has home BiPAP as well), repeat ABG in a.m. consult pulmonary    · Septic shock versus cardiogenic shock- patient presented with SOB, bradycardia, Temp-97, HR -40, BP-88/39, WBC -3.4 lactate 2.2- Chest xray reported with pulmonary edema/interstitial edema-received no fluid due to CHF but started on Levophed in ED, wean off pressor as able, Started on broadspectrum IV antibiotics - monitor CBC, cultures, Legionella and strep antigen, procalcitonin and CRP levels in am     · Acute on chronic diastolic CHF exacerbation -presented with shortness of breath, admits to have missed her diuretics/torsemide, BNP 3436, last ECHO 2020 LVEF was 55%, continue with Levophed while using diuretic/Lasix IV twice daily, -holding off beta-blockers due to bradycardia, lisinopril and Aldactone due to JOSE, consult cardiology    · Possible Community acquired bacterial Pneumonia - SARS-COV2 undetected -IV Rocephin and azithromycin pending blood culture and respiratory cultures    · JOSE -baseline creatinine 0.9, admission creatinine 1.6, avoid nephrotoxins, holding lisinopril and Aldactone, monitor BMP, may consult nephrology if renal function worsens    · Anemia of chronic disease -Hb 7.0, baseline range 7.8-10.1, due to significant hypoxia and hypotension will transfuse with 1 unit of blood, monitor Hb closely -consult GI    · DM type II -insulin sliding scale, Lantus, consult endocrinology, check A1c in a.m. Other comorbid conditions addressed include:    Hypertension  Morbid obesity  Hyperlipidemia  COPD with no exacerbation    Diet No diet orders on file   DVT Prophylaxis [] Lovenox, []  Heparin, [] SCDs, [] No VTE prophylaxis, patient ambulating   GI Prophylaxis [] PPI, [] H2 Blocker, [] No GI prophylaxis, patient is receiving diet/Tube Feeds   Code Status Prior   Disposition Patient requires continued admission due to Sepsis/CHF/respir failure    MDM [] Low, [] Moderate,[x]  High  Patient's risk as above due to SIRS/sepsis      History of Present Illness:     Chief Complaint: Shortness of breath    Dallas Maldonado is a 63 y.o.  female with past medical history of DM type II, CHF, COPD, morbid obesity, hyperlipidemia, chronic respiratory failure who presents with shortness of breath. Patient states that her shortness of breath has been ongoing for 1 week and became worse today forcing her to come to the ED. She denies any fever. Cough is productive of scanty whitish phlegm. She has no chest pain. Admits to have been drinking plenty of fluids and missing her torsemide repeatedly. Denies any nausea or vomiting. She feels very weak and tired. Denies any urinary symptoms. Could not tell if she had noticed any change in the color of her stool. Denies any rectal bleeding or hematemesis. Denies any contact with suspected or confirmed COVID-19 patients. Ten point ROS reviewed negative, unless as noted above    Objective:   No intake or output data in the 24 hours ending 01/13/21 1935   Vitals:   Vitals:    01/13/21 1819   BP:    Pulse: (!) 40   Resp:    Temp:    SpO2:      Physical Exam:    GEN Drowsy female, resting in bed in moderate respiratory distress. Morbidly obese, Appears given age. EYES Pupils are equally round. No scleral erythema, discharge, or conjunctivitis. HENT Mucous membranes are moist.   NECK No apparent thyromegaly or masses. RESP diminished air entry bilaterally no wheezes, rales or rhonchi. Symmetric chest movement while on room air. CARDIO/VASC S1/S2 auscultated. Regular rate without appreciable murmurs, rubs, or gallops. Peripheral pulses equal bilaterally and palpable. No peripheral edema. GI Abdomen is soft without significant tenderness, masses, or guarding. Bowel sounds are normoactive. Rectal exam deferred.  Mata catheter is not present. Right femoral central line  HEME/LYMPH No petechiae or ecchymoses. MSK No gross joint deformities. Spontaneous movement of all extremities  SKIN Normal coloration, warm, dry. NEURO Cranial nerves appear grossly intact, normal speech, no lateralizing weakness. PSYCH Awake, alert, oriented x 4. Affect appropriate.     Past Medical History:      Past Medical History:   Diagnosis Date    Acute kidney injury ([facility] [address] 8/22/2019    Anxiety 9/1/2020    Aortic stenosis     Asthma     Bacteremia due to group B Streptococcus     T[facility] at [facility] in 2017 - felt to be due to cellulitis    Cancer [facility])     Cervical    Carotid artery stenosis     Chest pain     [facility]     Chronic back pain     COPD exacerbation ([facility] [address]     Depression     Diabetes mellitus ([facility] [address]     Family history of coronary artery disease     Fatty liver 10/27/2016    GI bleed 2017    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 - treated at [facility] H/O aortic valve stenosis     H/O echocardiogram 11/22/2016    EF 55%, Aortic valve area is 0.84 cm2 with a mean gradient of 36 suggestive of severe aortic stenosis, mild to mos LVH    Headache     Heart murmur     History of nuclear stress test 11/17/2016    lexiscan-normal,EF70%    Morbid obesity (HCC)     BMI=73.72 kg/m2    Neuropathy  NSTEMI (non-ST elevated myocardial infarction) ([facility] [address] 2018    Obesity     Osteoarthritis     Palpitations     Peripheral edema     Restless legs syndrome     Sleep apnea     Has a CPAP    Sleep apnea     SOB (shortness of breath)      PSHX:  has a past surgical history that includes Cholecystectomy;  section; Hysterectomy; Tubal ligation; Neck surgery; Gallbladder surgery; Aortic valve replacement (2017); and IR NONTUNNELED VASCULAR CATHETER > 5 YEARS (2020). Allergies: No Known Allergies    FAM HX: family history includes Anxiety Disorder in her daughter; Coronary Art Dis in her mother; Depression in her brother, daughter, mother, niece, and sister; Diabetes in her mother; Heart Attack in her mother; Heart Disease in her brother, father, and mother; Heart Failure in her brother, father, and mother; High Blood Pressure in her brother, father, and mother; Hypertension in her mother; Obesity in her brother, father, and mother.   Soc HX:   Social History     Socioeconomic History    Marital status:      Spouse name: None    Number of children: None    Years of education: None    Highest education level: None   Occupational History    None   Social Needs    Financial resource strain: None    Food insecurity     Worry: None     Inability: None    Transportation needs     Medical: None     Non-medical: None   Tobacco Use    Smoking status: Former Smoker     Types: Cigarettes     Quit date: 3/19/2003     Years since quittin.8    Smokeless tobacco: Never Used    Tobacco comment: quit 15 years ago   Substance and Sexual Activity    Alcohol use: No    Drug use: No    Sexual activity: Never   Lifestyle    Physical activity     Days per week: None     Minutes per session: None    Stress: None   Relationships    Social connections     Talks on phone: None     Gets together: None     Attends Orthodoxy service: None     Active member of club or organization: None Attends meetings of clubs or organizations: None     Relationship status: None    Intimate partner violence     Fear of current or ex partner: None     Emotionally abused: None     Physically abused: None     Forced sexual activity: None   Other Topics Concern    None   Social History Narrative    ** Merged History Encounter **            Medications:   Medications:    furosemide  40 mg Intravenous Once      Infusions:    norepinephrine 6 mcg/min (01/13/21 4575)     PRN Meds:    Home medications-   Prior to Admission medications    Medication Sig Start Date End Date Taking? Authorizing Provider   torsemide (DEMADEX) 20 MG tablet Take 2 tablets by mouth daily for 7 days 12/9/20 12/16/20  ARINA Ford   busPIRone (BUSPAR) 10 MG tablet Take 1 tablet by mouth 3 times daily 9/8/20   Erica Calhoun MD   sertraline (ZOLOFT) 25 MG tablet Take 1 tablet by mouth nightly 9/8/20   Erica Calhoun MD   pramipexole (MIRAPEX) 1 MG tablet Take 1 tablet by mouth 2 times daily 9/8/20   Erica Calhoun MD   lisinopril (PRINIVIL;ZESTRIL) 5 MG tablet Take 1 tablet by mouth daily 9/8/20   Erica Calhoun MD   metoprolol tartrate (LOPRESSOR) 25 MG tablet Take 0.5 tablets by mouth 2 times daily 9/8/20   Erica Calhoun MD   ipratropium-albuterol (DUONEB) 0.5-2.5 (3) MG/3ML SOLN nebulizer solution Inhale 3 mLs into the lungs every 6 hours as needed for Shortness of Breath 7/6/20   Maribell Villalobos MD   atorvastatin (LIPITOR) 80 MG tablet Take 0.5 tablets by mouth nightly 7/6/20   Maribell Villalobos MD   spironolactone (ALDACTONE) 25 MG tablet Take 1 tablet by mouth daily 7/6/20   Maribell Villalobos MD   miconazole (MICOTIN) 2 % cream Apply topically 2 times daily.  7/6/20   Maribell Villalobos MD   doxepin (SINEQUAN) 10 MG capsule Take 10 mg by mouth nightly    Historical Provider, MD   ferrous sulfate (IRON 325) 325 (65 Fe) MG tablet Take 325 mg by mouth daily (with breakfast)    Historical Provider, MD oxybutynin (DITROPAN-XL) 10 MG extended release tablet Take 10 mg by mouth daily    Historical Provider, MD   vitamin D3 (CHOLECALCIFEROL) 10 MCG (400 UNIT) TABS tablet Take 400 Units by mouth daily    Historical Provider, MD   Polyethylene Glycol 3350 (MIRALAX PO) Take by mouth as needed    Historical Provider, MD   gabapentin (NEURONTIN) 400 MG capsule Take 1 capsule by mouth 3 times daily for 7 days. Patient taking differently: Take 400 mg by mouth 3 times daily.  2 tabs 3 times a day 1/14/20 6/24/20  Haley Flores MD   amiodarone (CORDARONE) 200 MG tablet Take 1 tablet by mouth daily 11/4/19   Haley Flores MD   clopidogrel (PLAVIX) 75 MG tablet Take 1 tablet by mouth daily Patient has appointment on 3/27/18 more refills with be given after she keeps her office visit 8/20/18   Jose Machado MD   BiPAP Machine MISC by BiPAP route nightly    Historical Provider, MD   metFORMIN (GLUCOPHAGE) 500 MG tablet Take 1 tablet by mouth 2 times daily (with meals) 2/16/18   C María Elena Becerra MD   Nebulizers Reida Dunk COMPACT MINI NEBULIZER) MISC 1 Device by Does not apply route 4 times daily 2/16/18   C María Elena Becerra MD   albuterol sulfate  (90 Base) MCG/ACT inhaler Inhale 2 puffs into the lungs    Historical Provider, MD   pantoprazole (PROTONIX) 40 MG tablet Take 40 mg by mouth daily    Historical Provider, MD Ted Hawkins certify that Trini Meza is expected to be hospitalized for greater than 2 midnights based on the above assessment and plan:     Current diagnosis and plan of management discussed with the patient at the time of admission in lay language     Code status was discussed with patient  - Full code      Pain Assessment - no reported pain at this time     Electronically signed by Catarina Jenkins MD on 1/13/2021 at 7:35 PM

## 2021-01-14 NOTE — CONSULTS
CARDIOLOGY CONSULT NOTE   Reason for consultation:  Shortness of breath  Referring physician:  Cynthia Akhtar MD     Primary care physician: THEODORE Garcia - NP      Dear Dr. Avinash Carty  Thanks for the consult. History of present illness:Olivia is a 63 y. o.year old who  presents with for shortness of breath which is moderate, getting worse for few week,  intermittent, self limiting, not associated with cough or fever, gets worse with activity and better with rest,she is morbidly obese, had TAVR in 2017, had normal coronaries before TAVR, and had PCWP of 25mmHg AT that time, however, last yr her PCWP was 14MMhG, SHE has ARF, anemia and COPD also, her blood pressure was in 70-80s on admission, her creatinine is also elevated and she has acute COPD exacerbation also, cardiology consult is called for r/o CHF exacerbation    Chief Complaint   Patient presents with    Shortness of Breath    Foot Pain     bilateral, no known injury     Blood pressure, cholesterol, blood glucose and weight are well controlled. Past medical history:    has a past medical history of Acute kidney injury ([address] Anxiety, Aortic stenosis, Asthma, Bacteremia due to group B Streptococcus, Cancer ([address] Carotid artery stenosis, Chest pain, CHF (congestive heart failure) ([address] Chronic back pain, COPD exacerbation ([address] Depression, Diabetes mellitus ([address] Family history of coronary artery disease, Fatty liver, GI bleed, H/O aortic valve stenosis, H/O echocardiogram, Headache, Heart murmur, History of nuclear stress test, Morbid obesity ([address] Neuropathy, NSTEMI (non-ST elevated myocardial infarction) ([address] Obesity, Osteoarthritis, Palpitations, Peripheral edema, Restless legs syndrome, Sleep apnea, Sleep apnea, and SOB (shortness of breath). Past surgical history:   has a past surgical history that includes Cholecystectomy;  section; Hysterectomy; Tubal ligation; Neck surgery; Gallbladder surgery;  Aortic valve replacement (11/13/2017); and IR NONTUNNELED VASCULAR CATHETER > 5 YEARS (8/25/2020). Social History:   reports that she quit smoking about 17 years ago. Her smoking use included cigarettes. She has never used smokeless tobacco. She reports that she does not drink alcohol or use drugs.   Family history:   no family history of CAD, STROKE of DM    No Known Allergies        insulin glargine (LANTUS) injection vial 15 Units, Nightly      insulin lispro (HUMALOG) injection vial 0-6 Units, 2 times per day      lidocaine 1 % injection 5 mL, Once      norepinephrine (LEVOPHED) 16 mg in dextrose 5% 250 mL infusion, Continuous      albuterol sulfate  (90 Base) MCG/ACT inhaler 2 puff, 4x daily      amiodarone (CORDARONE) tablet 200 mg, Daily      atorvastatin (LIPITOR) tablet 40 mg, Nightly      busPIRone (BUSPAR) tablet 10 mg, TID      clopidogrel (PLAVIX) tablet 75 mg, Daily      doxepin (SINEQUAN) capsule 10 mg, Nightly      ferrous sulfate (IRON 325) tablet 325 mg, Daily with breakfast      gabapentin (NEURONTIN) capsule 400 mg, TID      metoprolol tartrate (LOPRESSOR) tablet 12.5 mg, BID      pantoprazole (PROTONIX) tablet 40 mg, Daily      polyethylene glycol (GLYCOLAX) packet 17 g, Daily      pramipexole (MIRAPEX) tablet 1 mg, BID      sertraline (ZOLOFT) tablet 25 mg, Nightly      [Held by provider] lisinopril (PRINIVIL;ZESTRIL) tablet 5 mg, Daily      [Held by provider] spironolactone (ALDACTONE) tablet 25 mg, Daily      vitamin D3 (CHOLECALCIFEROL) tablet 400 Units, Daily      sodium chloride flush 0.9 % injection 10 mL, 2 times per day      sodium chloride flush 0.9 % injection 10 mL, PRN      enoxaparin (LOVENOX) injection 40 mg, Daily      acetaminophen (TYLENOL) tablet 650 mg, Q6H PRN    Or      acetaminophen (TYLENOL) suppository 650 mg, Q6H PRN      potassium chloride (KLOR-CON M) extended release tablet 40 mEq, PRN    Or      potassium bicarb-citric acid (EFFER-K) effervescent tablet 40 mEq, PRN    Or      potassium chloride 10 mEq/100 mL IVPB (Peripheral Line), PRN      furosemide (LASIX) injection 40 mg, BID      cefTRIAXone (ROCEPHIN) 1 g IVPB in 50 mL D5W minibag, Q24H      azithromycin (ZITHROMAX) 500 mg in dextrose 5 % 250 mL IVPB, Q24H      glucose (GLUTOSE) 40 % oral gel 15 g, PRN      dextrose 50 % IV solution, PRN      glucagon (rDNA) injection 1 mg, PRN      dextrose 5 % solution, PRN      insulin lispro (HUMALOG) injection vial 0-12 Units, TID WC      furosemide (LASIX) injection 20 mg, See Admin Instructions      miconazole (MICOTIN) 2 % powder, BID      Current Facility-Administered Medications   Medication Dose Route Frequency Provider Last Rate Last Admin    insulin glargine (LANTUS) injection vial 15 Units  15 Units Subcutaneous Nightly M Conor Saldana MD        insulin lispro (HUMALOG) injection vial 0-6 Units  0-6 Units Subcutaneous 2 times per day Mortimer Dupont, MD        lidocaine 1 % injection 5 mL  5 mL Intradermal Once Rossi An MD        norepinephrine (LEVOPHED) 16 mg in dextrose 5% 250 mL infusion  2 mcg/min Intravenous Continuous Malachi Santizo MD 7.5 mL/hr at 01/14/21 1008 8 mcg/min at 01/14/21 1008    albuterol sulfate  (90 Base) MCG/ACT inhaler 2 puff  2 puff Inhalation 4x daily Shade Coburn MD   2 puff at 01/14/21 1132    amiodarone (CORDARONE) tablet 200 mg  200 mg Oral Daily Malachi Santizo MD   200 mg at 01/14/21 0930    atorvastatin (LIPITOR) tablet 40 mg  40 mg Oral Nightly Malachi Santizo MD   40 mg at 01/14/21 0109    busPIRone (BUSPAR) tablet 10 mg  10 mg Oral TID Shade Coburn MD   10 mg at 01/14/21 1011    clopidogrel (PLAVIX) tablet 75 mg  75 mg Oral Daily Malachi Santizo MD   75 mg at 01/14/21 0930    doxepin (SINEQUAN) capsule 10 mg  10 mg Oral Nightly Malachi Santizo MD   10 mg at 01/14/21 0114    ferrous sulfate (IRON 325) tablet 325 mg  325 mg Oral Daily with breakfast Shade Coburn MD   325 mg at 01/14/21 4734    gabapentin (NEURONTIN) capsule 400 mg  400 mg Oral TID Thedora Baumgarten, MD   400 mg at 01/14/21 0108    metoprolol tartrate (LOPRESSOR) tablet 12.5 mg  12.5 mg Oral BID Selin Lyman APRN - LUNA        pantoprazole (PROTONIX) tablet 40 mg  40 mg Oral Daily Malachi Santizo MD   40 mg at 01/14/21 0616    polyethylene glycol (GLYCOLAX) packet 17 g  17 g Oral Daily Thedora Baumgarten, MD   17 g at 01/14/21 0930    pramipexole (MIRAPEX) tablet 1 mg  1 mg Oral BID Thedora Baumgarten, MD   1 mg at 01/14/21 2959    sertraline (ZOLOFT) tablet 25 mg  25 mg Oral Nightly Thedora Baumgarten, MD   25 mg at 01/14/21 0109    [Held by provider] lisinopril (PRINIVIL;ZESTRIL) tablet 5 mg  5 mg Oral Daily Thedora Baumgarten, MD        [Held by provider] spironolactone (ALDACTONE) tablet 25 mg  25 mg Oral Daily Thedora Baumgarten, MD        vitamin D3 (CHOLECALCIFEROL) tablet 400 Units  400 Units Oral Daily Thedora Baumgarten, MD   400 Units at 01/14/21 0925    sodium chloride flush 0.9 % injection 10 mL  10 mL Intravenous 2 times per day Thedora Baumgarten, MD   10 mL at 01/14/21 1011    sodium chloride flush 0.9 % injection 10 mL  10 mL Intravenous PRN Thedora Baumgarten, MD        enoxaparin (LOVENOX) injection 40 mg  40 mg Subcutaneous Daily Malachi Santizo MD   40 mg at 01/14/21 0930    acetaminophen (TYLENOL) tablet 650 mg  650 mg Oral Q6H PRN Thedora Baumgarten, MD   650 mg at 01/14/21 0108    Or    acetaminophen (TYLENOL) suppository 650 mg  650 mg Rectal Q6H PRN Thedora Baumgarten, MD        potassium chloride (KLOR-CON M) extended release tablet 40 mEq  40 mEq Oral PRN Thedora Baumgarten, MD        Or    potassium bicarb-citric acid (EFFER-K) effervescent tablet 40 mEq  40 mEq Oral PRN Thedora Baumgarten, MD   40 mEq at 01/14/21 0108    Or    potassium chloride 10 mEq/100 mL IVPB (Peripheral Line)  10 mEq Intravenous PRN Thedora Baumgarten, MD        furosemide (LASIX) injection 40 mg  40 mg Intravenous BID Thedora Baumgarten, MD   40 mg at 01/14/21 8372  cefTRIAXone (ROCEPHIN) 1 g IVPB in 50 mL D5W minibag  1 g Intravenous Q24H Madison Khalil MD        azithromycin (ZITHROMAX) 500 mg in dextrose 5 % 250 mL IVPB  500 mg Intravenous Q24H Madison Khalil MD   Stopped at 01/14/21 0340    glucose (GLUTOSE) 40 % oral gel 15 g  15 g Oral PRN Madison Khalil MD        dextrose 50 % IV solution  12.5 g Intravenous PRN Madison Khalil MD        glucagon (rDNA) injection 1 mg  1 mg Intramuscular PRN Madison Khalil MD        dextrose 5 % solution  100 mL/hr Intravenous PRN Madison Khalil MD        insulin lispro (HUMALOG) injection vial 0-12 Units  0-12 Units Subcutaneous TID WC Madison Khalil MD   2 Units at 01/14/21 1223    furosemide (LASIX) injection 20 mg  20 mg Intravenous See Admin Instructions Madison Khalil MD        miconazole (MICOTIN) 2 % powder   Topical BID THEODORE Pereira - CNP   Given at 01/14/21 1011     Review of Systems:   · Constitutional: No Fever or Weight Loss   · Eyes: No Decreased Vision  · ENT: No Headaches, Hearing Loss or Vertigo  · Cardiovascular: No chest pain, dyspnea on exertion, palpitations or loss of consciousness  · Respiratory: No cough or wheezing    · Gastrointestinal: No abdominal pain, appetite loss, blood in stools, constipation, diarrhea or heartburn  · Genitourinary: No dysuria, trouble voiding, or hematuria  · Musculoskeletal:  No gait disturbance, weakness or joint complaints  · Integumentary: No rash or pruritis  · Neurological: No TIA or stroke symptoms  · Psychiatric: No anxiety or depression  · Endocrine: No malaise, fatigue or temperature intolerance  · Hematologic/Lymphatic: No bleeding problems, blood clots or swollen lymph nodes  · Allergic/Immunologic: No nasal congestion or hives  All systems negative except as marked.      ·   ·      Physical Examination:    Vitals:    01/14/21 1200   BP: (!) 107/55   Pulse: 55   Resp: 13   Temp:    SpO2:       Wt Readings from Last 3 Encounters:   01/13/21 (!) 378 Labs     01/13/21  1445   AST 12*   ALT 9*   BILITOT 0.2   ALKPHOS 135*     No results for input(s): TROPONINI in the last 72 hours. No results found for: BNP  Lab Results   Component Value Date    INR 1.07 06/27/2020    PROTIME 13.0 06/27/2020         EKG: sinus bradycardia @ 43 bpm    Chest Xray: mild congestion    ECHO:PENDING  Labs, echo, meds reviewed  Assessment: 63 y. o.year old with PMH of  has a past medical history of Acute kidney injury ([facility] [address] Anxiety, Aortic stenosis, Asthma, Bacteremia due to group B Streptococcus, Cancer ([facility] [address] Carotid artery stenosis, Chest pain, CHF (congestive heart failure) ([facility] [address] Chronic back pain, COPD exacerbation ([facility] [address] Depression, Diabetes mellitus ([facility] [address] Family history of coronary artery disease, Fatty liver, GI bleed, H/O aortic valve stenosis, H/O echocardiogram, Headache, Heart murmur, History of nuclear stress test, Morbid obesity ([facility] [address] Neuropathy, NSTEMI (non-ST elevated myocardial infarction) ([facility] [address] Obesity, Osteoarthritis, Palpitations, Peripheral edema, Restless legs syndrome, Sleep apnea, Sleep apnea, and SOB (shortness of breath). Recommendations:    1. Shortness of breath: multifactorial, most likley combination acute COPD exacerbation, diastolic anemia,CHF, renal failure and severe Obesity, continue nebulizers, may use steroids  2. Severe anemia: may require blood transfusion, recommend anemia workup  3. Severe morbid obesity: severe risk for cardiopulmonary failure  4. Renal failure:ok to hold lasix and follow up creaitine  5. Sepsis: recommend broad spectrum antibiotics/  6. Health maintenance: exerise and diet  All labs, medications and tests reviewed, continue all other medications of all above medical condition listed as is.          Yinka Mckoy MD, 1/14/2021 12:56 PM

## 2021-01-15 NOTE — PROGRESS NOTES
I called spoke with Dr Royer Delaney and gave him the most current ABG results and current bipap settings. He is wanting her bipap settings changed from 20/10 40%,  To 22/12 40%,  He said repeat blood gases 1 hour after this change is made, and if the [address] at this time is >7.3, he is fine with this and no need to contact him with results, just get a new ABG in the morning. Priyank respiratory therapist change the bipap to the new settings, will repeat blood gas at midnight.

## 2021-01-15 NOTE — PROGRESS NOTES
I called Dr. Florina Hollingsworth to inform him that this patients blood glucose tonight in 132, and she has not been eating much.  He said do not give the 15 units of Lantus, instead give a one time dose of 10 units

## 2021-01-15 NOTE — CONSULTS
Nutrition Education    · Attempted diet education x 2-pt receiving nursing care, returned later and pt resting on bipap and did not respond to name being called or knock on door, left NCM \"Low Sodium Nutrition Therapy\" handout at bedside  · Contact name and number provided. · Refer to Patient Education activity for more details.     Electronically signed by Chantel Ham MS, RD, LD on 1/15/21 at 1:18 PM EST    Contact: [phone]

## 2021-01-15 NOTE — PROGRESS NOTES
PROCEDURE PERFORMED: CVC        INFORMED CONSENT:  PT CONFUSED. Emergent consent between Dr Jessica Hanson and Dr Sachin Rivas. Consent placed in chart. TIME OUT COMPLETE: 0912    BARRIER PRECAUTIONS & STERILE TECHNIQUE  Chlorhexadine and draped in a sterile fashion. PAIN/LOCAL ANESTHESIA/SEDATION MANAGEMENT:  Lidocaine        STERILE DRESSINGS:  Applied    FOLLOW- UP X-RAY:  Ordered to confirm placement    COMPLICATIONS:  None    OUTCOME: Triple lumen catheter placed by Dr Juliann Chawla in the right EJ, sutured in place.     STAFF PRESENT DURING PROCEDURE:   Dr Alysha Jeffery RN    REPORT Bedside to Cris Ocasio

## 2021-01-15 NOTE — PROGRESS NOTES
Today's plan:  contineu BIPAP, will sign off, please call us again      Admit Date:  2021    [address]      Chief complaints on admission  Chief Complaint   Patient presents with    Shortness of Breath    Foot Pain     bilateral, no known injury         History of present illness:Olivia is a 63 y. o.year old who  presents with had concerns including Shortness of Breath and Foot Pain (bilateral, no known injury). Past medical history:    has a past medical history of Acute kidney injury ([address] Anxiety, Aortic stenosis, Asthma, Bacteremia due to group B Streptococcus, Cancer ([address] Carotid artery stenosis, Chest pain, CHF (congestive heart failure) ([address] Chronic back pain, COPD exacerbation ([address] Depression, Diabetes mellitus ([address] Family history of coronary artery disease, Fatty liver, GI bleed, H/O aortic valve stenosis, H/O echocardiogram, Headache, Heart murmur, History of nuclear stress test, Morbid obesity ([facility] Neuropathy, NSTEMI (non-ST elevated myocardial infarction) ([address] Obesity, Osteoarthritis, Palpitations, Peripheral edema, Restless legs syndrome, Sleep apnea, Sleep apnea, and SOB (shortness of breath). Past surgical history:   has a past surgical history that includes Cholecystectomy;  section; Hysterectomy; Tubal ligation; Neck surgery; Gallbladder surgery; Aortic valve replacement (2017); IR NONTUNNELED VASCULAR CATHETER > 5 YEARS (2020); and IR NONTUNNELED VASCULAR CATHETER > 5 YEARS (1/15/2021). Social History:   reports that she quit smoking about 17 years ago. Her smoking use included cigarettes. She has never used smokeless tobacco. She reports that she does not drink alcohol or use drugs.   Family history:  family history includes Anxiety Disorder in her daughter; Coronary Art Dis in her mother; Depression in her brother, daughter, mother, niece, and sister; Diabetes in her mother; Heart Attack in her mother; Heart Disease in her brother, father, and mother; Heart Failure in her brother, father, and mother; High Blood Pressure in her brother, father, and mother; Hypertension in her mother; Obesity in her brother, father, and mother. No Known Allergies      Objective:   BP (!) 106/57   Pulse 71   Temp 97.9 °F (36.6 °C) (Axillary)   Resp 20   Ht 5' 8\" (1.727 m)   Wt (!) 378 lb 15.5 oz (171.9 kg)   SpO2 92%   BMI 57.62 kg/m²       Intake/Output Summary (Last 24 hours) at 1/15/2021 1231  Last data filed at 1/15/2021 0603  Gross per 24 hour   Intake 551.42 ml   Output 2050 ml   Net -1498.58 ml           Physical Exam:  Constitutional:  Well developed, Well nourished, No acute distress, Non-toxic appearance. HENT:  Normocephalic, Atraumatic, Bilateral external ears normal, Oropharynx moist, No oral exudates, Nose normal. Neck- Normal range of motion, No tenderness, Supple, No stridor. Eyes:  PERRL, EOMI, Conjunctiva normal, No discharge. Respiratory:  Normal breath sounds, No respiratory distress, No wheezing, No chest tenderness. ,no use of accessory muscles, diaphragm movement is normal  Cardiovascular: (PMI) apex non displaced,no lifts no thrills, no s3,no s4, Normal heart rate, Normal rhythm, No murmurs, No rubs, No gallops. Carotid arteries pulse and amplitude are normal no bruit, no abdominal bruit noted ( normal abdominal aorta ausculation), femoral arteries pulse and amplitude are normal no bruit, pedal pulses are normal  GI:  Bowel sounds normal, Soft, No tenderness, No masses, No pulsatile masses. : External genitalia appear normal, No masses or lesions. No discharge. No CVA tenderness. Musculoskeletal:  Intact distal pulses, No edema, No tenderness, No cyanosis, No clubbing. Good range of motion in all major joints. No tenderness to palpation or major deformities noted. Back- No tenderness. Integument:  Warm, Dry, No erythema, No rash. Lymphatic:  No lymphadenopathy noted.    Neurologic:  Alert & oriented x 3, Normal motor function, Normal sensory function, No focal deficits noted.    Psychiatric:  Affect normal, Judgment normal, Mood normal.     Medications:    iron sucrose  300 mg Intravenous Daily    acetaZOLAMIDE  250 mg Intravenous Q6H    insulin glargine  15 Units Subcutaneous Nightly    insulin lispro  0-6 Units Subcutaneous 2 times per day    lidocaine  5 mL Intradermal Once    albuterol sulfate HFA  2 puff Inhalation 4x daily    amiodarone  200 mg Oral Daily    atorvastatin  40 mg Oral Nightly    busPIRone  10 mg Oral TID    clopidogrel  75 mg Oral Daily    [Held by provider] doxepin  10 mg Oral Nightly    gabapentin  400 mg Oral TID    metoprolol tartrate  12.5 mg Oral BID    pantoprazole  40 mg Oral Daily    polyethylene glycol  17 g Oral Daily    pramipexole  1 mg Oral BID    sertraline  25 mg Oral Nightly    [Held by provider] lisinopril  5 mg Oral Daily    [Held by provider] spironolactone  25 mg Oral Daily    vitamin D3  400 Units Oral Daily    sodium chloride flush  10 mL Intravenous 2 times per day    enoxaparin  40 mg Subcutaneous Daily    furosemide  40 mg Intravenous BID    cefTRIAXone (ROCEPHIN) IV  1 g Intravenous Q24H    azithromycin  500 mg Intravenous Q24H    insulin lispro  0-12 Units Subcutaneous TID WC    furosemide  20 mg Intravenous See Admin Instructions    miconazole   Topical BID      norepinephrine 25 mcg/min (01/15/21 1118)    dextrose       sodium chloride flush, acetaminophen **OR** acetaminophen, potassium chloride **OR** potassium alternative oral replacement **OR** potassium chloride, glucose, dextrose, glucagon (rDNA), dextrose    Lab Data:  CBC:   Recent Labs     01/13/21  1445   WBC 3.4*   HGB 7.0*   HCT 25.0*   MCV 90.3        BMP:   Recent Labs     01/14/21  0500 01/14/21  2030 01/15/21  0600    134* 136   K 5.3* 5.3* 5.0    98* 98*   CO2 27 29 28   BUN 37* 41* 41*   CREATININE 1.8* 2.0* 2.0*     LIVER PROFILE:   Recent Labs     01/13/21  1445

## 2021-01-15 NOTE — PROGRESS NOTES
I was told in handoff report that this patient has been in sinus steve with rate 30/40/50's all day long. When I took my shift she was in sinus steve with rate in 40-50's. Her blood pressure was running low, bp 98/41  Map 56, so I started titrating her Levophed up, as soon as I started titrating this drip this patient went into ventricular bigeminy with a rate in 50's. I sent a message to Dr. Lorena Gomez him that this. See orders to draw new labs.

## 2021-01-15 NOTE — PROGRESS NOTES
Dr. Jed Hoover said this patient was acidotic and wants me to contact pulmonology to manage current issues.

## 2021-01-15 NOTE — PROGRESS NOTES
When I went to give this patients 2100 meds, I noticed she has a lot of depression medications and psyc meds that tend to cause cardiac issues with rhythm or rates, so I sent the hospitalist on duty Monica Hernandez NP and I informed her about this patient's known sinus steve rate in 30/40/50's, as well informed her of this patient's new ventricular bigeminy, and I asked her if she would look over this patient's med list to see if there is any thing she would like to hold, because of these cardiac issues. Waiting for response.

## 2021-01-15 NOTE — CONSULTS
pantoprazole  40 mg Oral Daily    polyethylene glycol  17 g Oral Daily    pramipexole  1 mg Oral BID    sertraline  25 mg Oral Nightly    [Held by provider] lisinopril  5 mg Oral Daily    [Held by provider] spironolactone  25 mg Oral Daily    vitamin D3  400 Units Oral Daily    sodium chloride flush  10 mL Intravenous 2 times per day    enoxaparin  40 mg Subcutaneous Daily    furosemide  40 mg Intravenous BID    cefTRIAXone (ROCEPHIN) IV  1 g Intravenous Q24H    azithromycin  500 mg Intravenous Q24H    insulin lispro  0-12 Units Subcutaneous TID WC    furosemide  20 mg Intravenous See Admin Instructions    miconazole   Topical BID     Continuous Infusions:   norepinephrine 23 mcg/min (01/15/21 0845)    dextrose       PRN Meds:.sodium chloride flush, acetaminophen **OR** acetaminophen, potassium chloride **OR** potassium alternative oral replacement **OR** potassium chloride, glucose, dextrose, glucagon (rDNA), dextrose    Allergies:  Patient has no known allergies. Family History:       Problem Relation Age of Onset    Heart Failure Mother     Heart Attack Mother     Hypertension Mother     Coronary Art Dis Mother         Had PTCA w/stenting.     Heart Disease Mother     High Blood Pressure Mother     Obesity Mother     Diabetes Mother     Depression Mother     Heart Failure Father     Heart Disease Father     High Blood Pressure Father     Obesity Father     Heart Failure Brother     Heart Disease Brother     High Blood Pressure Brother     Obesity Brother     Depression Brother     Depression Sister     Depression Daughter     Anxiety Disorder Daughter     Depression Niece         suicide attempt         Physical Exam:    Vitals: BP (!) 124/52   Pulse 65   Temp 97.9 °F (36.6 °C) (Axillary)   Resp 19   Ht 5' 8\" (1.727 m)   Wt (!) 378 lb 15.5 oz (171.9 kg)   SpO2 92%   BMI 57.62 kg/m²     General appearance: Patient is drowsy; awakens easily and on bipap  HEENT: Head: Normal, normocephalic, atraumatic. Neck: supple, symmetrical, trachea midline  Cardiovascular: S1 and S2: normal / no rub  Pulmonary: diminished lung sounds bilaterally  Abdomen:  soft / non-tender   Extremities: ++ edema to bilateral thighs     CBC:   Recent Labs     01/13/21  1445   WBC 3.4*   HGB 7.0*        BMP:    Recent Labs     01/14/21  0500 01/14/21  2030 01/15/21  0600    134* 136   K 5.3* 5.3* 5.0    98* 98*   CO2 27 29 28   BUN 37* 41* 41*   CREATININE 1.8* 2.0* 2.0*   GLUCOSE 136* 142* 134*     Hepatic:   Recent Labs     01/13/21  1445   AST 12*   ALT 9*   BILITOT 0.2   ALKPHOS 135*     Troponin: No results for input(s): TROPONINI in the last 72 hours. Mg, Phos:   Recent Labs     01/14/21  0500 01/14/21  2030 01/15/21  0600   MG 2.7* 2.9* 2.8*       ABGs:   Lab Results   Component Value Date    PO2ART 33 01/15/2021    HUG9ZPO 77.0 01/15/2021     INR: No results for input(s): INR in the last 72 hours.   -----------------------------------------------------------------  Patient Active Problem List   Diagnosis Code    Morbid obesity with BMI of 70 and over, adult ([facility] E66.01, Z68.45    Dyslipidemia E78.5    Fecal incontinence R15.9    Mild intermittent asthma without complication Z13.69    S/P laparoscopic cholecystectomy Z90.49    Type 2 diabetes mellitus without complication, with long-term current use of insulin ([facility] E11.9, Z79.4    Fatty liver K76.0    Arthritis M19.90    H/O parotidectomy Z90.49    Venous stasis dermatitis of both lower extremities I87.2    Aortic stenosis I35.0    Morbid obesity (HCC) E66.01    Asthma J45.909    Diabetes mellitus ([address] E11.9    Hypertension I10    Sleep apnea G47.30    Family history of coronary artery disease Z82.49    Chest pain R07.9    SOB (shortness of breath) R06.02    Palpitations R00.2    Peripheral edema R60.9    Hypervolemia E87.70    Chronic respiratory failure with hypoxia and hypercapnia (HCC) J96.11, J96.12  Acute exacerbation of chronic obstructive pulmonary disease (COPD) ([facility]) J44.1    Chronic obstructive pulmonary disease ([facility]) J44.9    Gait disturbance R26.9    Acute on chronic respiratory failure with hypoxia and hypercapnia ([facility]) J96.21, J96.22    Cellulitis L03.90    Skin ulcer of left foot with fat layer exposed ([facility] L97.522    Pneumonia J18.9    VHD (valvular heart disease) I38    Class 3 severe obesity due to excess calories with body mass index (BMI) greater than or equal to 70 in adult ([address] E66.01, Z68.45    SIRS (systemic inflammatory response syndrome) ([facility]) R65.10    Acute kidney injury ([facility] N17.9    Acute metabolic encephalopathy C49.07    Shock, unspecified ([facility]) R57.9    Uncontrolled diabetes mellitus ([facility]) E11.65    Sepsis ([facility]) A41.9    Altered mental status R41.82    Wide-complex tachycardia ([facility]) I47.2    Chronic diastolic heart failure ([facility]) I50.32    S/P TAVR (transcatheter aortic valve replacement) Z95.2    Septic shock ([facility]) A41.9, R65.21    Acute kidney injury superimposed on CKD ([facility]) N17.9, N18.9    Bradycardia R00.1    Anxiety F41.9    Acute respiratory failure with hypoxia ([facility]) J96.01    Acute congestive heart failure ([facility]) I50.9     Assessment and Recommendations     Impression  1. JOSE (ATN vs. Pre-renal azotemia)  -likely multi-factorial etiology for JOSE including:   Diminished renal perfusion secondary to hypotension   As well as ADHF-JOSE and cannot exclude infection   As likely contributor to JOSE. 2. Acute on chronic respiratory failure   3. Acute on chronic diastolic heart failure   4. Sepsis / possible CAP   5. Hyperkalemia   6. DM   7.   Anemia     PLAN   1.  -uop: 2.05 liters in the last 24 hours via hernandez (light yellow)  -latest serum creatinine 2.0 with normal Na / K / CO2  -UA: large blood / albumin: 100 / moderate leukocytes   -additional labs: UPC and chemistry panel qam   2.   -presently on Bipap with O2 sats: 92 - 93%  3.   -monitor: O2 saturations / urine output and volume status   -presently on IV lasix 40 mg BID   4.   -urine culture: CITROBACTER FREUNDII >100,000 CFU/ml  -prelimin blood cultures: no growth at 48 hours  -presently on rocephin and zithromax   -BP trend: systolic BP's have recently been 120 - 137  -levophed was on hold at the time of my visit today   5.   -latest serum potassium: 5.0; following trend (previously 5.3)  6.   -on Lantus and SSI for diabetes management   7.   -latest Hb: 7.0 and latest platelet count: 465 : follow trend  -recommend transfusing with PRBC's if hemoglobin less than 7    Electronically signed by THEODORE Contreras - CNP         Nephrology Attending Progress Note  1/15/2021 1:20 PM  Subjective: Interval History: I have personally performed face to face diagnostic evaluation on this patient. I have personally reviewed pertinent labs and imaging and agree with the care plan above. My additional findings are as follows:   The patient is a 63 y.o. female who presents with Dm2, COPD, CHF present with morbid obesity and progressive worse o2 and needing bipap and in setting anasarca with hypotension wanted renal to evalute as risk arf with diuresis    Objective:   Vitals: BP (!) 106/57   Pulse 71   Temp 97.9 °F (36.6 °C) (Axillary)   Resp 20   Ht 5' 8\" (1.727 m)   Wt (!) 378 lb 15.5 oz (171.9 kg)   SpO2 92%   BMI 57.62 kg/m²   Weak arousable  On o2  Coarse bs  Thick legs    Assessment and Plan:  1 give iv albumin /lasix and maintain diuresis and mointor renal function with diuresis and risk worse renal function and monitor as arf from atn  2 bp low and on pressor monitor  3 wean bipap as try diuresis  4 treat uti  5 monitor K  6 prbc as need   Will mointor and start with gentle diuresis        Electronically signed by Bonnie Dinero MD on 1/15/2021 at 1:20 PM

## 2021-01-15 NOTE — PROGRESS NOTES
[facility] PROGRESS NOTE  Date: 1/15/2021   Name: Trini Meza   MRN: [id number]   YOB: 1955  Chief Complaint   Patient presents with    Shortness of Breath    Foot Pain     bilateral, no known injury        Subjective/Interval Hx:       Still on levophed  Currently on bipap, nurse states she has been on bipap      ROS: negative unless mentioned above  Objective:   Physical Exam:   BP (!) 123/45   Pulse 67   Temp 97.9 °F (36.6 °C) (Axillary)   Resp 14   Ht 5' 8\" (1.727 m)   Wt (!) 378 lb 15.5 oz (171.9 kg)   SpO2 92%   BMI 57.62 kg/m²   CONSTITUTIONAL:  No acute distress, sleeping in bed  EYES:  No discharge  HENT:  NCAT  LUNGS:  On bipap, may have some wheezing, large body habitus  CARDIOVASCULAR:  s1s2 rrr  ABDOMEN:  Soft nt nd  GENITAL/URINARY:  Has hernandez  MUSCULOSKELETAL/Extremities:  Trace pitting edema  NEUROLOGIC:  No gross deficits, aao  SKIN:  No gross lesions    Assessment/Plan:       1. Acute on chronic hypoxic and hypercarbic respiratory failure  -may be due to pneumonia and CHF. ABG: Respiratory acidosis. Pulmonology following. CXR: Interstitial/pulmonary edema. COVID-19 negative.  -Patient on BiPAP. CXR: Stable pulmonary edema, bilateral pleural effusions. 2. Shock  -Cardiogenic versus septic. Pulmonary edema is suggestive of cardiogenic. On antibiotics. Checking urine, sputum.   -On Levophed. Echo: EF 50-55%. Blood culture 1/13: NGTD. 3. Acute on chronic diastolic CHF  -CXR: Pulmonary edema. BNP 3436. On IV Lasix to try to reduce congestion, while on pressors. Holding beta-blocker, ACE inhibitor, Aldactone due to low BP.  4. Possible pneumonia  -COVID-19 negative. Respiratory cultures pending. Procalcitonin 0.15. CRP 66.  -Urine Legionella and strep negative. Blood culture 1/13: NGTD. 5. Acute kidney injury  -Baseline creatinine 0.9. Creatinine 1.6 on admission. Holding ACE inhibitor and Aldactone.  UA: Pyuria, hematuria, moderate leuk esterase.  -Worsening creatinine 2.0. Consult nephrology. 6. UTI  -urine culture: Citrobacter freundii. On rocephin. 7. Anemia of chronic disease  -Hemoglobin 7. Iron 20. Transfused 1 unit PRBC. -Recheck hemoglobin. GI recommending Venofer x3 doses, and outpatient EGD and colonoscopy if no gross bleeding. 8. Bradycardia  -EKG: Junctional rhythm. Check serial troponins.   -Cardiology has signed off. HR has improved. 9. Diabetes mellitus type 2  -On sliding scale insulin and Lantus. Endocrinology following. .  -A1c 6.1.  10. Hypermagnesemia  -Monitor.  -Increase magnesium 2.8.       Other comorbid conditions addressed include:     Hypertension  Morbid obesity BMI 57  Hyperlipidemia  COPD with no exacerbation    DVT Prophylaxis: lovenox  GI Prophylaxis: protonix  Diet: DIET CARB CONTROL;   Home O2: 2LNC and CPAP/Bipap  Code Status: Full Code      Dispo:  -need breathing and blood pressure to improve    Laurie Bustos MD  1/15/2021    Meds:   Meds:    insulin glargine  15 Units Subcutaneous Nightly    insulin lispro  0-6 Units Subcutaneous 2 times per day    lidocaine  5 mL Intradermal Once    albuterol sulfate HFA  2 puff Inhalation 4x daily    amiodarone  200 mg Oral Daily    atorvastatin  40 mg Oral Nightly    busPIRone  10 mg Oral TID    clopidogrel  75 mg Oral Daily    [Held by provider] doxepin  10 mg Oral Nightly    ferrous sulfate  325 mg Oral Daily with breakfast    gabapentin  400 mg Oral TID    metoprolol tartrate  12.5 mg Oral BID    pantoprazole  40 mg Oral Daily    polyethylene glycol  17 g Oral Daily    pramipexole  1 mg Oral BID    sertraline  25 mg Oral Nightly    [Held by provider] lisinopril  5 mg Oral Daily    [Held by provider] spironolactone  25 mg Oral Daily    vitamin D3  400 Units Oral Daily    sodium chloride flush  10 mL Intravenous 2 times per day    enoxaparin  40 mg Subcutaneous Daily    furosemide  40 mg Intravenous BID    cefTRIAXone (ROCEPHIN) IV  1 g Intravenous Q24H    azithromycin  500 mg Intravenous Q24H    insulin lispro  0-12 Units Subcutaneous TID WC    furosemide  20 mg Intravenous See Admin Instructions    miconazole   Topical BID      Infusions:    norepinephrine 25 mcg/min (01/15/21 0645)    dextrose       PRN Meds:     sodium chloride flush, 10 mL, PRN      acetaminophen, 650 mg, Q6H PRN    Or      acetaminophen, 650 mg, Q6H PRN      potassium chloride, 40 mEq, PRN    Or      potassium alternative oral replacement, 40 mEq, PRN    Or      potassium chloride, 10 mEq, PRN      glucose, 15 g, PRN      dextrose, 12.5 g, PRN      glucagon (rDNA), 1 mg, PRN      dextrose, 100 mL/hr, PRN        Data/Labs:     Recent Labs     01/13/21  1445   WBC 3.4*   HGB 7.0*   HCT 25.0*         Recent Labs     01/14/21  0500 01/14/21  2030 01/15/21  0600    134* 136   K 5.3* 5.3* 5.0    98* 98*   CO2 27 29 28   BUN 37* 41* 41*   CREATININE 1.8* 2.0* 2.0*     Recent Labs     01/13/21  1445   AST 12*   ALT 9*   BILITOT 0.2   ALKPHOS 135*     No results for input(s): INR in the last 72 hours. Recent Labs     01/13/21  1445   TROPONINT <0.010     HgBA1c:   Lab Results   Component Value Date    LABA1C 6.1 01/14/2021     CALCIUM:  8.9/28 (01/15 0600)    I/O last 3 completed shifts: In: 581.4 [P.O.:110;  I.V.:221.4; IV Piggyback:250]  Out: 2050 [Urine:2050]    Intake/Output Summary (Last 24 hours) at 1/15/2021 0845  Last data filed at 1/15/2021 0603  Gross per 24 hour   Intake 581.42 ml   Output 2050 ml   Net -1468.58 ml

## 2021-01-15 NOTE — CONSULTS
[facility]  Gastroenterology Consultation    1/15/2021  7:04 AM    Patient:    Lelia Mohan  : 1955   63 y.o.              MRN: [id number]  Admitted: 2021  2:15 PM ATT: Nolberto Castro MD   [id number]  AdmitDx: Bradycardia [R00.1]  Hypercarbia [R06.89]  Peripheral edema [R60.9]  SIRS (systemic inflammatory response syndrome) (HCC) [R65.10]  Acute kidney injury ([address] [N17.9]  Septic shock (HCC) [A41.9, R65.21]  Severe anemia [D64.9]  Hypotension, unspecified hypotension type [I95.9]  Acute congestive heart failure, unspecified heart failure type ([address] [I50.9]  PCP: THEODORE Garcia - NP    Reason for Consult: Acute on chronic anemia of possible GI origin    Requesting Physician:  Nolberto Castro MD    History Obtained From:  Patient and review of all records    HISTORY OF PRESENT ILLNESS:                The patient is a 63 y.o. female with significant   Past Medical History:   Diagnosis Date    Acute kidney injury ([address] 2019    Anxiety 2020    Aortic stenosis     Asthma     Bacteremia due to group B Streptococcus     Treated at [facility] in 2017 - felt to be due to cellulitis    Cancer [facility])     Cervical    Carotid artery stenosis     Chest pain     CHF (congestive heart failure) ([address]     Chronic back pain     COPD exacerbation ([address]     Depression     Diabetes mellitus ([address]     Family history of coronary artery disease     Fatty liver 10/27/2016    GI bleed 2017    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 - treated at [facility] H/O aortic valve stenosis     H/O echocardiogram 2016    EF 55%, Aortic valve area is 0.84 cm2 with a mean gradient of 36 suggestive of severe aortic stenosis, mild to mos LVH    Headache     Heart murmur     History of nuclear stress test 2016    lexiscan-normal,EF70%    Morbid obesity (HCC)     BMI=73.72 kg/m2    Neuropathy     NSTEMI (non-ST elevated myocardial infarction) ([address] 2018    Obesity  Osteoarthritis     Palpitations     Peripheral edema     Restless legs syndrome     Sleep apnea     Has a CPAP    Sleep apnea     SOB (shortness of breath)    who presented to [facility] ED with c/o SOB for the past 2 weeks, increased coughing frequency, bilateral lower extremity swelling. Bradycardic, hypotensive, placed on Levophed gtt, and bipap. States she has hx of melena occasionally when she has constipation. Last melanotic stool last week. She thinks she had a BM yesterday, last documented BM 2 days ago, no melena/hematochezia noted.      Denies Abdominal pain  Denies N/V, GERD, dyspepsia, dysphagia   Last BM    History of EGD 12/14/2017 Dieulafoy vessel distal esophagus requiring clip x3.      No history of colonoscopy     No ASA   NSAIDs  Plavix 75 mg daily     Quit smoking about 17 years ago  Denies alcohol intake    Past Medical History:        Diagnosis Date    Acute kidney injury ([facility] 8/22/2019    Anxiety 9/1/2020    Aortic stenosis     Asthma     Bacteremia due to group B Streptococcus     Treated at [facility] in 2017 - felt to be due to cellulitis    Cancer [facility])     Cervical    Carotid artery stenosis     Chest pain     CH[facility]congestive heart failure) ([facility])     Chronic back pain     COPD exacerbation [facility]     Depression     Diabetes mellitus ([facility]     Family history of coronary artery disease     Fatty liver 10/27/2016    GI bleed 2017    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 H/O aortic valve stenosis     H/O echocardiogram 11/22/2016    EF 55%, Aortic valve area is 0.84 cm2 with a mean gradient of 36 suggestive of severe aortic stenosis, mild to mos LVH    Headache     Heart murmur     History of nuclear stress test 11/17/2016    lexiscan-normal,EF70%    Morbid obesity ([facility])     BMI=73.72 kg/m2    Neuropathy     NSTEMI (non-ST elevated myocardial infarction) ([facility] 02/07/2018    Obesity     Osteoarthritis     Palpitations     Peripheral edema     Restless legs syndrome     Sleep apnea     Has a CPAP    Sleep apnea     SOB (shortness of breath)        Past Surgical History:        Procedure Laterality Date    AORTIC VALVE REPLACEMENT  2017    29mm EvolutR TAVR     SECTION      CHOLECYSTECTOMY      GALLBLADDER SURGERY      HYSTERECTOMY      IR NONTUNNELED VASCULAR CATHETER  2020    IR NONTUNNELED VASCULAR CATHETER 2020 [facility] SPECIAL PROCEDURES    NECK SURGERY      Tumor    TUBAL LIGATION           Current Medications:    Medications    Scheduled Medications:    insulin glargine  15 Units Subcutaneous Nightly    insulin lispro  0-6 Units Subcutaneous 2 times per day    lidocaine  5 mL Intradermal Once    albuterol sulfate HFA  2 puff Inhalation 4x daily    amiodarone  200 mg Oral Daily    atorvastatin  40 mg Oral Nightly    busPIRone  10 mg Oral TID    clopidogrel  75 mg Oral Daily    [Held by provider] doxepin  10 mg Oral Nightly    ferrous sulfate  325 mg Oral Daily with breakfast    gabapentin  400 mg Oral TID    metoprolol tartrate  12.5 mg Oral BID    pantoprazole  40 mg Oral Daily    polyethylene glycol  17 g Oral Daily    pramipexole  1 mg Oral BID    sertraline  25 mg Oral Nightly    [Held by provider] lisinopril  5 mg Oral Daily    [Held by provider] spironolactone  25 mg Oral Daily    vitamin D3  400 Units Oral Daily    sodium chloride flush  10 mL Intravenous 2 times per day    enoxaparin  40 mg Subcutaneous Daily    furosemide  40 mg Intravenous BID    cefTRIAXone (ROCEPHIN) IV  1 g Intravenous Q24H    azithromycin  500 mg Intravenous Q24H    insulin lispro  0-12 Units Subcutaneous TID WC    furosemide  20 mg Intravenous See Admin Instructions    miconazole   Topical BID     PRN Medications: sodium chloride flush, acetaminophen **OR** acetaminophen, potassium chloride **OR** potassium alternative oral replacement **OR** potassium chloride, glucose, dextrose, glucagon (rDNA), dextrose      Allergies:  Patient has no known allergies. Social History:   TOBACCO:   reports that she quit smoking about 17 years ago. Her smoking use included cigarettes. She has never used smokeless tobacco.  ETOH:   reports no history of alcohol use. Family History:       Problem Relation Age of Onset    Heart Failure Mother     Heart Attack Mother     Hypertension Mother     Coronary Art Dis Mother         Had PTCA w/stenting.  Heart Disease Mother     High Blood Pressure Mother     Obesity Mother     Diabetes Mother     Depression Mother     Heart Failure Father     Heart Disease Father     High Blood Pressure Father     Obesity Father     Heart Failure Brother     Heart Disease Brother     High Blood Pressure Brother     Obesity Brother     Depression Brother     Depression Sister     Depression Daughter     Anxiety Disorder Daughter     Depression Niece         suicide attempt     No family history of colon cancer, Crohn's disease, or ulcerative colitis. REVIEW OF SYSTEMS:    The positive ROS will be identified in bold, otherwise ROS are negative     CONSTITUTIONAL:  Neg   Recent weight changes, fatigue, fever, chills or night sweats  EYES:  Neg  Blurriness, earing, itching or acute change in vision  EARS:  Neg  hearing loss, tinnitus, vertigo, discharge or earache. NOSE:  Neg  Rhinorrhea, sneezing, itching, allergy or epistaxis  MOUTH/THROAT:  Neg  bleeding gums, hoarseness or sore throat. RESPIRATORY:   Neg SOB, wheeze, cough, sputum, hemoptysis or bronchitis  CARDIOVASCULAR:  Neg chest pain, palpitations, dyspnea on exertion, orthopnea, paroxysmal nocturnal dyspnea or edema  GASTROINTESTINAL:  SEE HPI  GENITOURINARY:  Neg  Urinary frequency, hesitancy, urgency, polyuria, dysuria, hematuria, nocturia, or incontinence.   HEMATOLOGIC/LYMPHATIC:  Neg  Anemia, bleeding tendency  MUSCULOSKELETAL:    New myalgias, bone pain, joint pain, swelling or stiffness and has had change in gait. NEUROLOGICAL:  Neg  Loss of Consciousness, memory loss, forgetfulness, periods of confusion, difficulty concentrating, seizures, decline in intellect, nervousness, insomina, aphasia or dysarthria. SKIN:  Neg  skin or hair changes, and has no itching, rashes, or sores. PSYCHIATRIC:  Neg depression, personality changes, anxiety. ENDOCRINE:  Neg polydipsia, polyuria, abnormal weight changes, heat /cold intolerance.   ALL/IMM:  Neg reactions to drugs other than listed    PHYSICAL EXAM:      Vitals:    BP (!) 117/42   Pulse 59   Temp 97.8 °F (36.6 °C)   Resp 15   Ht 5' 8\" (1.727 m)   Wt (!) 378 lb 15.5 oz (171.9 kg)   SpO2 93%   BMI 57.62 kg/m²     General Appearance:    Alert, cooperative, no distress, appears stated age   HEENT:    Normocephalic, atraumatic, Conjunctiva clear, Lips, mucosa, and tongue normal; teeth and gums normal   Neck:   Supple, symmetrical, trachea midline   Lungs:     Clear to auscultation bilaterally, respirations unlabored, bipap   Chest Wall:    No tenderness or deformity    Heart:    Regular rate and rhythm, S1 and S2 normal, no murmur, rub   or gallop   Abdomen:     Soft, non-tender, bowel sounds active all four quadrants,     no masses, no organomegaly, no ascites    Rectal:    Deferred   Extremities:   Extremities normal, atraumatic, no cyanosis or edema   Pulses:   2+ and symmetric all extremities   Skin:   Skin texture, turgor normal, BLE redness, cracked heels    Lymph nodes:   No abnormality   Neurologic:   No focal deficits, moving all four extremities      DATA:    ABGs:   Lab Results   Component Value Date    PO2ART 33 01/15/2021    PFM0AVV 77.0 01/15/2021     CBC:   Recent Labs     01/13/21  1445   WBC 3.4*   HGB 7.0*        BMP:    Recent Labs     01/13/21  1445 01/14/21  0500 01/14/21  2030    139 134*   K 3.9 5.3* 5.3*    102 98*   CO2 29 27 29   BUN 33* 37* 41*   CREATININE 1.6* 1.8* 2.0*   GLUCOSE 107* 136* 142*     Magnesium:   Lab Results   Component Value Date    MG 2.9 01/14/2021     Hepatic:   Recent Labs     01/13/21  1445   AST 12*   ALT 9*   BILITOT 0.2   ALKPHOS 135*     No results for input(s): LIPASE, AMYLASE in the last 72 hours. No results for input(s): PROTIME, INR in the last 72 hours. No results for input(s): PTT in the last 72 hours. Lipids:   Recent Labs     01/14/21  0500   CHOL 151   HDL 46     INR: No results for input(s): INR in the last 72 hours.   TSH: No results found for: TSH    Intake/Output Summary (Last 24 hours) at 1/15/2021 0704  Last data filed at 1/15/2021 2483  Gross per 24 hour   Intake 581.42 ml   Output 2050 ml   Net -1468.58 ml      norepinephrine 25 mcg/min (01/15/21 0645)    dextrose       Imaging Studies: Reviewed     IMPRESSION:      Patient Active Problem List   Diagnosis Code    Morbid obesity with BMI of 70 and over, adult ([facility] E66.01, Z68.45    Dyslipidemia E78.5    Fecal incontinence R15.9    Mild intermittent asthma without complication C43.49    S/P laparoscopic cholecystectomy Z90.49    Type 2 diabetes mellitus without complication, with long-term current use of insulin ([facility]) E11.9, Z79.4    Fatty liver K76.0    Arthritis M19.90    H/O parotidectomy Z90.49    Venous stasis dermatitis of both lower extremities I87.2    Aortic stenosis I35.0    Morbid obesity ([facility]) E66.01    Asthma J45.909    Diabetes mellitus ([facility] E11.9    Hypertension I10    Sleep apnea G47.30    Family history of coronary artery disease Z82.49    Chest pain R07.9    SOB (shortness of breath) R06.02    Palpitations R00.2    Peripheral edema R60.9    Hypervolemia E87.70    Chronic respiratory failure with hypoxia and hypercapnia ([facility]) J96.11, J96.12    Acute exacerbation of chronic obstructive pulmonary disease (COPD) (HCC) J44.1    Chronic obstructive pulmonary disease (HCC) J44.9    Gait disturbance R26.9    Acute on chronic respiratory failure with hypoxia and hypercapnia ([facility]) J96.21, J96.22  Cellulitis L03.90    Skin ulcer of left foot with fat layer exposed ([facility] L97.522    Pneumonia J18.9    VHD (valvular heart disease) I38    Class 3 severe obesity due to excess calories with body mass index (BMI) greater than or equal to 70 in adult ([facility]) E66.01, Z68.45    SIRS (systemic inflammatory response syndrome) ([facility]) R65.10    Acute kidney injury ([facility] N17.9    Acute metabolic encephalopathy C97.10    Shock, unspecified ([facility]) R57.9    Uncontrolled diabetes mellitus ([facility]) E11.65    Sepsis ([facility]) A41.9    Altered mental status R41.82    Wide-complex tachycardia ([facility]) I47.2    Chronic diastolic heart failure ([facility]) I50.32    S/P TAVR (transcatheter aortic valve replacement) Z95.2    Septic shock ([facility]) A41.9, R65.21    Acute kidney injury superimposed on CKD ([facility]) N17.9, N18.9    Bradycardia R00.1    Anxiety F41.9    Acute respiratory failure with hypoxia ([facility]) J96.01    Acute congestive heart failure ([facility]) I50.9       ASSESSMENT/RECOMMENDATIONS:    CHF,  Pneumonia, Levophed gtt,   HX of DM2, s/p TAVR, fatty liver    Anemia:  Acute on chronic  May be nutritional, chronic disease, bone marrow suppression d/t pneumonia, ? UTI  Hgb 7.0, s/p 1 unit PRBC  No gross blood loss noted since admission   Hx of Iron deficiency anemia  Iron 20, Transferrin 6%, recommend venofer 300 mg daily x 3 doses   Continue daily H&H, transfuse <7.0  Maintain Protonix 40 mg daily   Diet as toleraated   Maintain Miralax 17 g daily   Conservative GI management at this time    Melena: May be gastritis  Protonix 40 mg daily as above for gastric mucosal prtection     EGD and colonoscopy high risk, unless gross bleeding occurs. Discussed plan of care with patient Keri Fernandez RN    Patient clinical, biochemical, and radiological information discussed with Dr. Kellie Mejia. He agrees with the assessment and plan.      Nohemi Beck, CNP  1/15/2021  7:04 AM     MORBID OBESITY  NO OVERT BLEED  Multiple medical issues  Treat symptomatically  GI work up if emergency or only after clinically stable  GI procedure high risk    I have seen and examined this patient personally, and independently of the nurse practitioner. The plan was developed mutually at the time of the visit with the patient. Argenis Harris and myself have spoken with patient, nursing staff and provided written and verbal instructions .     The above note has been reviewed and I agree with the Assessment,  Diagnosis, and Treatment plan as suggested by [facility]

## 2021-01-15 NOTE — PLAN OF CARE
Problem: Falls - Risk of:  Goal: Will remain free from falls  Description: Will remain free from falls  Outcome: Ongoing  Goal: Absence of physical injury  Description: Absence of physical injury  Outcome: Ongoing     Problem: Skin Integrity:  Goal: Will show no infection signs and symptoms  Description: Will show no infection signs and symptoms  Outcome: Ongoing  Goal: Absence of new skin breakdown  Description: Absence of new skin breakdown  Outcome: Ongoing     Problem: HEMODYNAMIC STATUS  Goal: Patient has stable vital signs and fluid balance  Outcome: Ongoing     Problem: ACTIVITY INTOLERANCE/IMPAIRED MOBILITY  Goal: Mobility/activity is maintained at optimum level for patient  Outcome: Ongoing     Problem: Gas Exchange - Impaired:  Goal: Levels of oxygenation will improve  Description: Levels of oxygenation will improve  Outcome: Ongoing     Problem: Cardiac Output - Decreased:  Goal: Hemodynamic stability will improve  Description: Hemodynamic stability will improve  Outcome: Ongoing     Problem: Cardiac:  Goal: Ability to maintain vital signs within normal range will improve  Description: Ability to maintain vital signs within normal range will improve  Outcome: Ongoing  Goal: Cardiovascular alteration will improve  Description: Cardiovascular alteration will improve  Outcome: Ongoing     Problem: Health Behavior:  Goal: Will modify at least one risk factor affecting health status  Description: Will modify at least one risk factor affecting health status  Outcome: Ongoing  Goal: Identification of resources available to assist in meeting health care needs will improve  Description: Identification of resources available to assist in meeting health care needs will improve  Outcome: Ongoing     Problem: Physical Regulation:  Goal: Complications related to the disease process, condition or treatment will be avoided or minimized  Description: Complications related to the disease process, condition or treatment will be avoided or minimized  Outcome: Ongoing     Problem: Discharge Planning:  Goal: Discharged to appropriate level of care  Description: Discharged to appropriate level of care  Outcome: Ongoing     Problem: Serum Glucose Level - Abnormal:  Goal: Ability to maintain appropriate glucose levels will improve  Description: Ability to maintain appropriate glucose levels will improve  Outcome: Ongoing     Problem: Sensory Perception - Impaired:  Goal: Ability to maintain a stable neurologic state will improve  Description: Ability to maintain a stable neurologic state will improve  Outcome: Ongoing

## 2021-01-15 NOTE — CARE COORDINATION
Attempted to meet with patient; IR is preparing to place CVC for HD/CRRT. Will revisit.  Ken Martinez RN

## 2021-01-15 NOTE — PROGRESS NOTES
Pulmonary and Critical Care  Progress Note      VITALS:  BP (!) 116/47   Pulse 63   Temp 97.9 °F (36.6 °C) (Axillary)   Resp 20   Ht 5' 8\" (1.727 m)   Wt (!) 378 lb 15.5 oz (171.9 kg)   SpO2 92%   BMI 57.62 kg/m²     Subjective:   CHIEF COMPLAINT :SOB     HPI:                The patient is lying in the bed. She is on a BIPAP tolerating it well. She is following commands. Objective:   PHYSICAL EXAM:    LUNGS:Decreased air entry bilateral bases  Abd-distended,BS+,NT  Ext- Chronic LE venous stasis  CVS-s1s2, no murmurs      DATA:    CBC:  Recent Labs     01/13/21  1445   WBC 3.4*   RBC 2.77*   HGB 7.0*   HCT 25.0*      MCV 90.3   MCH 25.3*   MCHC 28.0*   RDW 17.5*   SEGSPCT 68.9*      BMP:  Recent Labs     01/14/21  0500 01/14/21  2030 01/15/21  0600    134* 136   K 5.3* 5.3* 5.0    98* 98*   CO2 27 29 28   BUN 37* 41* 41*   CREATININE 1.8* 2.0* 2.0*   CALCIUM 8.6 9.0 8.9   GLUCOSE 136* 142* 134*      ABG:  Recent Labs     01/14/21  2215 01/15/21  0000 01/15/21  0600   PH 7.26* 7.32* 7.23*   PO2ART 81 84 33*   [id number] 66.0* 59.0* 77.0*   O2SAT 93.6* 94.1* 53.1*     BNP  No results found for: BNP   D-Dimer:  Lab Results   Component Value Date    DDIMER 1729 (H) 06/27/2020      1.  Radiology: Right-sided catheter as discussed.  No postprocedure pneumothorax      Assessment/Plan     Patient Active Problem List    Diagnosis Date Noted    Acute exacerbation of chronic obstructive pulmonary disease (COPD) ([facility]      Priority: High    Hypervolemia      Priority: High    Anxiety 09/01/2020     Priority: Medium     Class: Acute    Acute congestive heart failure (HCC)     Acute respiratory failure with hypoxia (HCC)     Bradycardia     Septic shock ([facility] 08/25/2020    Acute kidney injury superimposed on CKD ([facility] 08/25/2020    S/P TAVR (transcatheter aortic valve replacement) 06/26/2020    Chronic diastolic heart failure ([facility] 06/25/2020    Wide-complex tachycardia (HCC)     Altered mental status     Sepsis ([address] 09/21/2019    Acute kidney injury ([address] 08/22/2019    Acute metabolic encephalopathy 00/04/2221    Shock, unspecified ([address] 08/22/2019    Uncontrolled diabetes mellitus ([address] 08/22/2019    SIRS (systemic inflammatory response syndrome) ([address] 08/12/2019    Class 3 severe obesity due to excess calories with body mass index (BMI) greater than or equal to 70 in adult [facility]) 06/16/2019    VHD (valvular heart disease) 04/02/2019     Overview Note:     H/o TAVR      Pneumonia 02/06/2019    Skin ulcer of left foot with fat layer exposed ([address]     Cellulitis 11/24/2018    Acute on chronic respiratory failure with hypoxia and hypercapnia (HCC)     Gait disturbance 02/10/2018    Chronic obstructive pulmonary disease ([address] 02/07/2018    Chronic respiratory failure with hypoxia and hypercapnia ([address] 01/30/2018    Morbid obesity ([address]     Asthma     Diabetes mellitus ([address]     Hypertension     Sleep apnea     Family history of coronary artery disease     Chest pain     SOB (shortness of breath)     Palpitations     Peripheral edema     Aortic stenosis     Morbid obesity with BMI of 70 and over, adult ([address] 10/27/2016    Dyslipidemia 10/27/2016    Fecal incontinence 10/27/2016    Mild intermittent asthma without complication 11/96/7499    S/P laparoscopic cholecystectomy 10/27/2016    Type 2 diabetes mellitus without complication, with long-term current use of insulin ([address] 10/27/2016    Fatty liver 10/27/2016    Arthritis 10/27/2016    H/O parotidectomy 10/27/2016    Venous stasis dermatitis of both lower extremities 10/27/2016     JOSE  Morbid Obesity  ANMOL  Acute on Chronic Hypoxic Hypercapneic resp failure  Chronic Metabolic alkalosis  Pulmonary congestion  Basal atelectasis  Chronic LE venous stasis  Suspected CAP     1. Diamox  2. BIPAP 1 hour qid, qhs and prn  3. Keep sats > 92%  4. Diuresis  5. CHF optimization  6. ICS  7. OOB  8. C/w present management  9.  BMP in am  No follow-ups on file.     Electronically signed by Trent Lam MD on 1/15/2021 at 12:22 PM

## 2021-01-15 NOTE — PLAN OF CARE
improve  1/15/2021 0739 by Tari Jung RN  Outcome: Ongoing  1/15/2021 0131 by Jeanna Wills RN  Outcome: Ongoing  Goal: Cardiovascular alteration will improve  Description: Cardiovascular alteration will improve  1/15/2021 0739 by Tari Jung RN  Outcome: Ongoing  1/15/2021 0131 by Jeanna Wills RN  Outcome: Ongoing     Problem: Health Behavior:  Goal: Will modify at least one risk factor affecting health status  Description: Will modify at least one risk factor affecting health status  1/15/2021 0739 by Tari Jung RN  Outcome: Ongoing  1/15/2021 0131 by Jeanna Wills RN  Outcome: Ongoing  Goal: Identification of resources available to assist in meeting health care needs will improve  Description: Identification of resources available to assist in meeting health care needs will improve  1/15/2021 0739 by Tari Jung RN  Outcome: Ongoing  1/15/2021 0131 by Jeanna Wills RN  Outcome: Ongoing     Problem: Physical Regulation:  Goal: Complications related to the disease process, condition or treatment will be avoided or minimized  Description: Complications related to the disease process, condition or treatment will be avoided or minimized  1/15/2021 0739 by Tari Jung RN  Outcome: Ongoing  1/15/2021 0131 by Jeanna Wills RN  Outcome: Ongoing     Problem: Discharge Planning:  Goal: Discharged to appropriate level of care  Description: Discharged to appropriate level of care  1/15/2021 0739 by Tari Jung RN  Outcome: Ongoing  1/15/2021 0131 by Jeanna Wills RN  Outcome: Ongoing     Problem: Serum Glucose Level - Abnormal:  Goal: Ability to maintain appropriate glucose levels will improve  Description: Ability to maintain appropriate glucose levels will improve  1/15/2021 0739 by Tari Jung RN  Outcome: Ongoing  1/15/2021 0131 by Jeanna Wills RN  Outcome: Ongoing     Problem: Sensory Perception - Impaired:  Goal: Ability to maintain a stable neurologic state will improve  Description: Ability to maintain a stable neurologic state will improve  1/15/2021 0739 by Summer Hall RN  Outcome: Ongoing  1/15/2021 0131 by Johanna Castillo RN  Outcome: Ongoing

## 2021-01-16 NOTE — PROGRESS NOTES
Progress Note( Dr. Jagruti Mathews)  1/16/2021  Subjective:   Admit Date: 1/13/2021  PCP: THEODORE Lee NP    Admitted For :Severe shortness of breath    Consulted For: Better control of blood glucose    Interval History: Patient is somewhat better still on BiPAP almost continuously    Denies any chest pains,   Yes SOB . On BiPAP  Denies nausea or vomiting. No new bowel or bladder symptoms.        Intake/Output Summary (Last 24 hours) at 1/16/2021 1538  Last data filed at 1/16/2021 1453  Gross per 24 hour   Intake 1416 ml   Output 9150 ml   Net -7734 ml       DATA    CBC:   Recent Labs     01/15/21  1635 01/16/21  0558   WBC 7.1 6.7   HGB 8.3* 7.9*    197    CMP:  Recent Labs     01/14/21  2030 01/15/21  0600 01/16/21  0558   * 136 142   K 5.3* 5.0 4.3   CL 98* 98* 100   CO2 29 28 30   BUN 41* 41* 41*   CREATININE 2.0* 2.0* 1.9*   CALCIUM 9.0 8.9 8.6     Lipids:   Lab Results   Component Value Date    CHOL 151 01/14/2021    HDL 46 01/14/2021    TRIG 94 01/14/2021     Glucose:  Recent Labs     01/16/21  0240 01/16/21  0937 01/16/21  1144   POCGLU 126* 122* 134*     [id number]:  Lab Results   Component Value Date    LABA1C 6.1 01/14/2021     High Sensitivity TSH:   Lab Results   Component Value Date    TSHHS 3.490 12/09/2020     Free T3: No results found for: FT3  Free T4:  Lab Results   Component Value Date    T4FREE 1.07 09/04/2018       Echocardiogram Limited    Result Date: 1/15/2021  Transthoracic Echocardiography Report (TTE)  Demographics   Patient Name       Kymberly ADAMS Date of Study       01/14/2021   Date of Birth      1955         Gender              Female   Age                63 year(s)         Race                   Patient Number     [id number]         Room Number         2105   Visit Number       [id number]   Corporate ID       [id number]   Accession Number   [id number]         Sonographer         Lorenzo Trevizo [facility]   Ordering Physician Danna Ward MD      Physician           Raisa Sinha MD  Procedure Type of Study   TTE procedure:ECHOCARDIOGRAM LIMITED. Procedure Date Date: 01/14/2021 Start: 02:11 PM Study Location: Portable Technical Quality: Limited visualization due to body habitus. Indications:Congestive heart failure. Patient Status: Routine Height: 68 inches Weight: 378 pounds BSA: 2.68 m2 BMI: 57.47 kg/m2 HR: 62 bpm BP: 107/55 mmHg  Conclusions   Summary  This is a limited echocardiogram. Technically difficult due to body habitus  Left ventricular systolic function is normal.  Ejection fraction is visually estimated at 50-55%. Dilated right ventricle with pressure/volume overload. S/p TAVR. Mild tricuspid regurgitation is present. RVSP is 43 mmHg. Mild pericardial effusion. Inferior vena cava is dilated, measuring at 3.4 cm, and does not collapse  with respiration. Signature   ------------------------------------------------------------------  Electronically signed by Guy Liang MD  (Interpreting physician) on 01/15/2021 at 08:06 AM  Xr Pelvis (1-2 Views)       Xr Chest Portable    Result Date: 1/15/2021  EXAMINATION: ONE XRAY VIEW OF THE CHEST 1/15/2021 5:49 am COMPARISON: 01/13/2021 HISTORY: ORDERING SYSTEM PROVIDED HISTORY: Hypoxia      1. Stable cardiomegaly with pulmonary edema, bilateral pleural effusions, and associated bibasilar atelectasis. Xr Chest Portable    Result Date: 1/13/2021  EXAMINATION: ONE XRAY VIEW OF THE CHEST 1/13/2021 2:24 pm COMPARISON: Chest x-ray dated 12/09/2020 HISTORY: ORDERING SYSTEM PROVIDED HISTORY: sob       Interstitial/pulmonary edema. Cardiomegaly. Probable small bilateral pleural effusions.         Scheduled Medicines   Medications:    modafinil  200 mg Oral Daily    iron sucrose  300 mg Intravenous Daily    furosemide  40 mg Intravenous BID    theophylline  200 mg Oral BID    insulin glargine  15 Units Subcutaneous Nightly    insulin lispro  0-6 Units Subcutaneous 2 times per day    lidocaine  5 mL Intradermal Once    albuterol sulfate HFA  2 puff Inhalation 4x daily    amiodarone  200 mg Oral Daily    atorvastatin  40 mg Oral Nightly    busPIRone  10 mg Oral TID    clopidogrel  75 mg Oral Daily    [Held by provider] doxepin  10 mg Oral Nightly    gabapentin  400 mg Oral TID    metoprolol tartrate  12.5 mg Oral BID    pantoprazole  40 mg Oral Daily    polyethylene glycol  17 g Oral Daily    pramipexole  1 mg Oral BID    sertraline  25 mg Oral Nightly    [Held by provider] lisinopril  5 mg Oral Daily    [Held by provider] spironolactone  25 mg Oral Daily    vitamin D3  400 Units Oral Daily    sodium chloride flush  10 mL Intravenous 2 times per day    enoxaparin  40 mg Subcutaneous Daily    cefTRIAXone (ROCEPHIN) IV  1 g Intravenous Q24H    azithromycin  500 mg Intravenous Q24H    insulin lispro  0-12 Units Subcutaneous TID WC    furosemide  20 mg Intravenous See Admin Instructions    miconazole   Topical BID      Infusions:    norepinephrine 25 mcg/min (01/16/21 1441)    dextrose           Objective:   Vitals: /63   Pulse 86   Temp 99.8 °F (37.7 °C) (Axillary)   Resp 22   Ht 5' 8\" (1.727 m)   Wt (!) 378 lb 15.5 oz (171.9 kg)   SpO2 96%   BMI 57.62 kg/m²   General appearance: alert and cooperative with exam  Neck: no JVD or bruit  Thyroid : Normal lobes   Lungs: Has Vesicular Breath sounds has wheezing and rales bilaterally  Heart:  regular rate and rhythm  Abdomen: soft, non-tender; bowel sounds normal; no masses,  no organomegaly  Musculoskeletal: Normal  Extremities: extremities normal, , no edema  Neurologic:  Awake, alert, oriented to name, place and time. Cranial nerves II-XII are grossly intact. Motor is  intact.   Sensory neuropathy t.,  and gait is abnormal.  Unstable    Assessment:     Patient Active Problem List: Morbid obesity with BMI of 70 and over, adult ([address]     Dyslipidemia     Fecal incontinence     Mild intermittent asthma without complication     S/P laparoscopic cholecystectomy     Type 2 diabetes mellitus without complication, with long-term current use of insulin ([facility])     Fatty liver     Arthritis     H/O parotidectomy     Venous stasis dermatitis of both lower extremities     Aortic stenosis     Morbid obesity (HCC)     Asthma     Diabetes mellitus ([address]     Hypertension     Sleep apnea     Family history of coronary artery disease     Chest pain     SOB (shortness of breath)     Palpitations     Peripheral edema     Hypervolemia     Chronic respiratory failure with hypoxia and hypercapnia (HCC)     Acute exacerbation of chronic obstructive pulmonary disease (COPD) (HCC)     Chronic obstructive pulmonary disease (HCC)     Gait disturbance     Acute on chronic respiratory failure with hypoxia and hypercapnia ([facility])     Cellulitis     Skin ulcer of left foot with fat layer exposed ([address]     Pneumonia     VHD (valvular heart disease)     Class 3 severe obesity due to excess calories with body mass index (BMI) greater than or equal to 70 in adult ([facility])     SIRS (systemic inflammatory response syndrome) ([facility])     Acute kidney injury ([address]     Acute metabolic encephalopathy     Shock, unspecified ([address]     Uncontrolled diabetes mellitus ([address]     Sepsis ([address]     Altered mental status     Wide-complex tachycardia ([facility])     Chronic diastolic heart failure ([facility])     S/P TAVR (transcatheter aortic valve replacement)     Septic shock ([facility])     Acute kidney injury superimposed on CKD ([facility])     Bradycardia     Anxiety     Acute respiratory failure with hypoxia ([facility])     Acute congestive heart failure ([address]      Plan:     1. Reviewed POC blood glucose . Labs and X ray results   2. Reviewed Current Medicines   3. On meal/ Correction bolus Humalog/ Basal Lantus Insulin regime   4.  Monitor Blood glucose frequently 5. Modified  the dose of Insulin/ other medicines as needed   6. Will follow     .      Robert Painting MD

## 2021-01-16 NOTE — PROGRESS NOTES
Progress Note( Dr. Marcial Valencia)    Subjective:   Admit Date: 1/13/2021  PCP: THEODORE Moran - NP    Admitted For :Severe shortness of breath    Consulted For: Better control of blood glucose    Interval History: Patient is somewhat better still on BiPAP almost continuously    Denies any chest pains,   Yes SOB . On BiPAP  Denies nausea or vomiting. No new bowel or bladder symptoms.        Intake/Output Summary (Last 24 hours) at 1/16/2021 1541  Last data filed at 1/16/2021 1453  Gross per 24 hour   Intake 1416 ml   Output 9150 ml   Net -7734 ml       DATA    CBC:   Recent Labs     01/15/21  1635 01/16/21  0558   WBC 7.1 6.7   HGB 8.3* 7.9*    197    CMP:  Recent Labs     01/14/21  2030 01/15/21  0600 01/16/21  0558   * 136 142   K 5.3* 5.0 4.3   CL 98* 98* 100   CO2 29 28 30   BUN 41* 41* 41*   CREATININE 2.0* 2.0* 1.9*   CALCIUM 9.0 8.9 8.6     Lipids:   Lab Results   Component Value Date    CHOL 151 01/14/2021    HDL 46 01/14/2021    TRIG 94 01/14/2021     Glucose:  Recent Labs     01/16/21  0240 01/16/21  0937 01/16/21  1144   POCGLU 126* 122* 134*     [id number]:  Lab Results   Component Value Date    LABA1C 6.1 01/14/2021     High Sensitivity TSH:   Lab Results   Component Value Date    TSHHS 3.490 12/09/2020     Free T3: No results found for: FT3  Free T4:  Lab Results   Component Value Date    T4FREE 1.07 09/04/2018       Echocardiogram Limited    Result Date: 1/15/2021  Transthoracic Echocardiography Report (TTE)  Demographics   Patient Name       Linda ADAMS Date of Study       01/14/2021   Date of Birth      1955         Gender              Female   Age                63 year(s)         Race                   Patient Number     [id number]         Room Number         2105   Visit Number       [id number]   Corporate ID       [id number]   Accession Number   [id number]         Sonographer         Lakeshia Quach [facility]   Ordering Physician Rosalinda Perry [facility] MD      Physician           Key Bland MD  Procedure Type of Study   TTE procedure:ECHOCARDIOGRAM LIMITED. Procedure Date Date: 01/14/2021 Start: 02:11 PM Study Location: Portable Technical Quality: Limited visualization due to body habitus. Indications:Congestive heart failure. Patient Status: Routine Height: 68 inches Weight: 378 pounds BSA: 2.68 m2 BMI: 57.47 kg/m2 HR: 62 bpm BP: 107/55 mmHg  Conclusions   Summary  This is a limited echocardiogram. Technically difficult due to body habitus  Left ventricular systolic function is normal.  Ejection fraction is visually estimated at 50-55%. Dilated right ventricle with pressure/volume overload. S/p TAVR. Mild tricuspid regurgitation is present. RVSP is 43 mmHg. Mild pericardial effusion. Inferior vena cava is dilated, measuring at 3.4 cm, and does not collapse  with respiration. Signature   ------------------------------------------------------------------  Electronically signed by Lacy Li MD  (Interpreting physician) on 01/15/2021 at 08:06 AM  Xr Pelvis (1-2 Views)       Xr Chest Portable    Result Date: 1/15/2021  EXAMINATION: ONE XRAY VIEW OF THE CHEST 1/15/2021 5:49 am COMPARISON: 01/13/2021 HISTORY: ORDERING SYSTEM PROVIDED HISTORY: Hypoxia      1. Stable cardiomegaly with pulmonary edema, bilateral pleural effusions, and associated bibasilar atelectasis. Xr Chest Portable    Result Date: 1/13/2021  EXAMINATION: ONE XRAY VIEW OF THE CHEST 1/13/2021 2:24 pm COMPARISON: Chest x-ray dated 12/09/2020 HISTORY: ORDERING SYSTEM PROVIDED HISTORY: sob       Interstitial/pulmonary edema. Cardiomegaly. Probable small bilateral pleural effusions.         Scheduled Medicines   Medications:    modafinil  200 mg Oral Daily    iron sucrose  300 mg Intravenous Daily    furosemide  40 mg Intravenous BID    theophylline  200 mg Oral BID    insulin glargine  15 Units Subcutaneous Nightly    insulin lispro  0-6 Units Subcutaneous 2 times per day    lidocaine  5 mL Intradermal Once    albuterol sulfate HFA  2 puff Inhalation 4x daily    amiodarone  200 mg Oral Daily    atorvastatin  40 mg Oral Nightly    busPIRone  10 mg Oral TID    clopidogrel  75 mg Oral Daily    [Held by provider] doxepin  10 mg Oral Nightly    gabapentin  400 mg Oral TID    metoprolol tartrate  12.5 mg Oral BID    pantoprazole  40 mg Oral Daily    polyethylene glycol  17 g Oral Daily    pramipexole  1 mg Oral BID    sertraline  25 mg Oral Nightly    [Held by provider] lisinopril  5 mg Oral Daily    [Held by provider] spironolactone  25 mg Oral Daily    vitamin D3  400 Units Oral Daily    sodium chloride flush  10 mL Intravenous 2 times per day    enoxaparin  40 mg Subcutaneous Daily    cefTRIAXone (ROCEPHIN) IV  1 g Intravenous Q24H    azithromycin  500 mg Intravenous Q24H    insulin lispro  0-12 Units Subcutaneous TID WC    furosemide  20 mg Intravenous See Admin Instructions    miconazole   Topical BID      Infusions:    norepinephrine 25 mcg/min (01/16/21 1441)    dextrose           Objective:   Vitals: /63   Pulse 86   Temp 99.8 °F (37.7 °C) (Axillary)   Resp 22   Ht 5' 8\" (1.727 m)   Wt (!) 378 lb 15.5 oz (171.9 kg)   SpO2 96%   BMI 57.62 kg/m²   General appearance: alert and cooperative with exam  Neck: no JVD or bruit  Thyroid : Normal lobes   Lungs: Has Vesicular Breath sounds has wheezing and rales bilaterally  Heart:  regular rate and rhythm  Abdomen: soft, non-tender; bowel sounds normal; no masses,  no organomegaly  Musculoskeletal: Normal  Extremities: extremities normal, , no edema  Neurologic:  Awake, alert, oriented to name, place and time. Cranial nerves II-XII are grossly intact. Motor is  intact.   Sensory neuropathy t.,  and gait is abnormal.  Unstable    Assessment:     Patient Active Problem List:     Morbid obesity with BMI of 70 and over, adult ([address]     Dyslipidemia     Fecal incontinence     Mild intermittent asthma without complication     S/P laparoscopic cholecystectomy     Type 2 diabetes mellitus without complication, with long-term current use of insulin (HCC)     Fatty liver     Arthritis     H/O parotidectomy     Venous stasis dermatitis of both lower extremities     Aortic stenosis     Morbid obesity (HCC)     Asthma     Diabetes mellitus ([address]     Hypertension     Sleep apnea     Family history of coronary artery disease     Chest pain     SOB (shortness of breath)     Palpitations     Peripheral edema     Hypervolemia     Chronic respiratory failure with hypoxia and hypercapnia (HCC)     Acute exacerbation of chronic obstructive pulmonary disease (COPD) (HCC)     Chronic obstructive pulmonary disease (HCC)     Gait disturbance     Acute on chronic respiratory failure with hypoxia and hypercapnia ([facility])     Cellulitis     Skin ulcer of left foot with fat layer exposed ([address]     Pneumonia     VHD (valvular heart disease)     Class 3 severe obesity due to excess calories with body mass index (BMI) greater than or equal to 70 in adult ([facility])     SIRS (systemic inflammatory response syndrome) ([facility])     Acute kidney injury ([address]     Acute metabolic encephalopathy     Shock, unspecified ([address]     Uncontrolled diabetes mellitus ([address]     Sepsis ([address]     Altered mental status     Wide-complex tachycardia (HCC)     Chronic diastolic heart failure ([facility])     S/P TAVR (transcatheter aortic valve replacement)     Septic shock ([facility])     Acute kidney injury superimposed on CKD ([facility])     Bradycardia     Anxiety     Acute respiratory failure with hypoxia (HCC)     Acute congestive heart failure ([address]      Plan:     1. Reviewed POC blood glucose . Labs and X ray results   2. Reviewed Current Medicines   3. On meal/ Correction bolus Humalog/ Basal Lantus Insulin regime   4. Monitor Blood glucose frequently   5.  Modified  the dose of Insulin/ other medicines as needed   6. Will follow     .      Princess Connors MD

## 2021-01-16 NOTE — PROGRESS NOTES
Nephrology Progress Note  1/16/2021 9:37 AM  Subjective:      Interval History: Betito Haynes is a 63 y.o. female with weakness and diuresis with diuretic still with sig o2 need        Data:   Scheduled Meds:   iron sucrose  300 mg Intravenous Daily    furosemide  40 mg Intravenous TID    furosemide  40 mg Intravenous BID    theophylline  200 mg Oral BID    insulin glargine  15 Units Subcutaneous Nightly    insulin lispro  0-6 Units Subcutaneous 2 times per day    lidocaine  5 mL Intradermal Once    albuterol sulfate HFA  2 puff Inhalation 4x daily    amiodarone  200 mg Oral Daily    atorvastatin  40 mg Oral Nightly    busPIRone  10 mg Oral TID    clopidogrel  75 mg Oral Daily    [Held by provider] doxepin  10 mg Oral Nightly    gabapentin  400 mg Oral TID    metoprolol tartrate  12.5 mg Oral BID    pantoprazole  40 mg Oral Daily    polyethylene glycol  17 g Oral Daily    pramipexole  1 mg Oral BID    sertraline  25 mg Oral Nightly    [Held by provider] lisinopril  5 mg Oral Daily    [Held by provider] spironolactone  25 mg Oral Daily    vitamin D3  400 Units Oral Daily    sodium chloride flush  10 mL Intravenous 2 times per day    enoxaparin  40 mg Subcutaneous Daily    cefTRIAXone (ROCEPHIN) IV  1 g Intravenous Q24H    azithromycin  500 mg Intravenous Q24H    insulin lispro  0-12 Units Subcutaneous TID WC    furosemide  20 mg Intravenous See Admin Instructions    miconazole   Topical BID     Continuous Infusions:   norepinephrine 30 mcg/min (01/16/21 0245)    dextrose             CBC:   Recent Labs     01/13/21  1445 01/15/21  1635 01/16/21  0558   WBC 3.4* 7.1 6.7   HGB 7.0* 8.3* 7.9*    211 197     BMP:    Recent Labs     01/14/21  2030 01/15/21  0600 01/16/21  0558   * 136 142   K 5.3* 5.0 4.3   CL 98* 98* 100   CO2 29 28 30   BUN 41* 41* 41*   CREATININE 2.0* 2.0* 1.9*   GLUCOSE 142* 134* 124*       Renal Labs  Albumin:    Lab Results   Component Value Date LABALBU 3.3 01/13/2021     Calcium:    Lab Results   Component Value Date    CALCIUM 8.6 01/16/2021     Phosphorus:    Lab Results   Component Value Date    PHOS 3.2 07/02/2020     U/A:    Lab Results   Component Value Date    NITRU NEGATIVE 01/13/2021    COLORU YELLOW 01/13/2021    WBCUA 2979 01/13/2021    RBCUA 483 01/13/2021    MUCUS RARE 12/09/2020    TRICHOMONAS NONE SEEN 01/13/2021    YEAST OCCASIONAL 12/09/2020    BACTERIA MANY 01/13/2021    CLARITYU CLOUDY 01/13/2021    SPECGRAV 1.018 01/13/2021    UROBILINOGEN NORMAL 01/13/2021    BILIRUBINUR NEGATIVE 01/13/2021    BLOODU LARGE 01/13/2021    KETUA NEGATIVE 01/13/2021           Objective:   I/O: 01/15 0701 - 01/16 0700  In: 1416 [I.V.:605]  Out: 7450 [Urine:7450]  Vitals: BP (!) 113/49   Pulse 95   Temp 99.9 °F (37.7 °C)   Resp 27   Ht 5' 8\" (1.727 m)   Wt (!) 378 lb 15.5 oz (171.9 kg)   SpO2 90%   BMI 57.62 kg/m²   General appearance: awake on bipap  HEENT: Head: Normal, normocephalic, atraumatic.   Neck: supple, symmetrical, trachea midline  Lungs: diminished breath sounds bilaterally  Heart: S1, S2 normal  Abdomen: abnormal findings:  soft nt  Extremities: edema +  Neurologic: Mental status: alertness: on bipap        Assessment and Plan:      IMP:  arf from atn/chf  resp failure  chf  Hyperkalemia  Dm2  anemia    Plan     1 creat 1.9 and slight improve mointor with diuresis  2 wean bipap and maintain with diuretic  3 K stable  4 ssi stable  5 hb low monitor and rec prbc if < 7  From renal maintain diuresis           Shirley Ochoa MD

## 2021-01-16 NOTE — PROGRESS NOTES
Pulmonary and Critical Care  Progress Note      VITALS:  BP (!) 113/51   Pulse 96   Temp 99.8 °F (37.7 °C) (Axillary)   Resp (!) 32   Ht 5' 8\" (1.727 m)   Wt (!) 378 lb 15.5 oz (171.9 kg)   SpO2 94%   BMI 57.62 kg/m²     Subjective:   CHIEF COMPLAINT :SOB     HPI:                The patient is lying in the bed. She is on the BIPAP and tolerating it well. She is not in acute resp distress. She is following commands. Objective:   PHYSICAL EXAM:    LUNGS:Decreased air entry bilateral bases  Abd-distended,BS+,NT  Ext- Chronic LE venous stasis  CVS-s1s2, no murmurs      DATA:    CBC:  Recent Labs     01/13/21  1445 01/15/21  1635 01/16/21  0558   WBC 3.4* 7.1 6.7   RBC 2.77* 3.37* 3.21*   HGB 7.0* 8.3* 7.9*   HCT 25.0* 29.8* 29.2*    211 197   MCV 90.3 88.4 91.0   MCH 25.3* 24.6* 24.6*   MCHC 28.0* 27.9* 27.1*   RDW 17.5* 18.3* 18.3*   NRBC  --   --  3   SEGSPCT 68.9*  --  55.0   BANDSPCT  --   --  3*      BMP:  Recent Labs     01/14/21  2030 01/15/21  0600 01/16/21  0558   * 136 142   K 5.3* 5.0 4.3   CL 98* 98* 100   CO2 29 28 30   BUN 41* 41* 41*   CREATININE 2.0* 2.0* 1.9*   CALCIUM 9.0 8.9 8.6   GLUCOSE 142* 134* 124*      ABG:  Recent Labs     01/14/21  2215 01/15/21  0000 01/15/21  0600   PH 7.26* 7.32* 7.23*   PO2ART 81 84 33*   IUU9XOO 66.0* 59.0* 77.0*   O2SAT 93.6* 94.1* 53.1*     BNP  No results found for: BNP   D-Dimer:  Lab Results   Component Value Date    DDIMER 1729 (H) 06/27/2020      1.  Radiology: None      Assessment/Plan     Patient Active Problem List    Diagnosis Date Noted    Acute exacerbation of chronic obstructive pulmonary disease (COPD) ([facility]      Priority: High    Hypervolemia      Priority: High    Anxiety 09/01/2020     Priority: Medium     Class: Acute    Acute congestive heart failure (HCC)     Acute respiratory failure with hypoxia (HCC)     Bradycardia     Septic shock ([facility] 08/25/2020    Acute kidney injury superimposed on CKD ([facility] 08/25/2020    S/P TAVR (transcatheter aortic valve replacement) 06/26/2020    Chronic diastolic heart failure ([address] 06/25/2020    Wide-complex tachycardia ([address]     Altered mental status     Sepsis ([address] 09/21/2019    Acute kidney injury ([address] 08/22/2019    Acute metabolic encephalopathy 01/07/9599    Shock, unspecified ([address] 08/22/2019    Uncontrolled diabetes mellitus ([address] 08/22/2019    SIRS (systemic inflammatory response syndrome) ([address] 08/12/2019    Class 3 severe obesity due to excess calories with body mass index (BMI) greater than or equal to 70 in adult [facility]) 06/16/2019    VHD (valvular heart disease) 04/02/2019     Overview Note:     H/o TAVR      Pneumonia 02/06/2019    Skin ulcer of left foot with fat layer exposed ([address]     Cellulitis 11/24/2018    Acute on chronic respiratory failure with hypoxia and hypercapnia (HCC)     Gait disturbance 02/10/2018    Chronic obstructive pulmonary disease ([address] 02/07/2018    Chronic respiratory failure with hypoxia and hypercapnia (HCC) 01/30/2018    Morbid obesity ([address]     Asthma     Diabetes mellitus ([address]     Hypertension     Sleep apnea     Family history of coronary artery disease     Chest pain     SOB (shortness of breath)     Palpitations     Peripheral edema     Aortic stenosis     Morbid obesity with BMI of 70 and over, adult ([address] 10/27/2016    Dyslipidemia 10/27/2016    Fecal incontinence 10/27/2016    Mild intermittent asthma without complication 96/33/7366    S/P laparoscopic cholecystectomy 10/27/2016    Type 2 diabetes mellitus without complication, with long-term current use of insulin ([address] 10/27/2016    Fatty liver 10/27/2016    Arthritis 10/27/2016    H/O parotidectomy 10/27/2016    Venous stasis dermatitis of both lower extremities 10/27/2016     JOSE  Morbid Obesity  ANMOL  OHS  Acute on Chronic Hypoxic Hypercapneic resp failure  Chronic Metabolic alkalosis  Pulmonary congestion  Basal atelectasis  Chronic LE venous stasis  Suspected CAP       1. Theophylline  2. BIPAP  3. Keep sats > 92%  4. CHF optimization  5. BMP in am  6. ICS  7. OOB  8. C/w present management  No follow-ups on file.     Electronically signed by Ezra Briones MD on 1/16/2021 at 11:26 AM

## 2021-01-16 NOTE — PROGRESS NOTES
[facility]PITAL[facility] PROGRESS NOTE  Date: 1/16/2021   Name: Wanda Julio   MRN: [id number]   YOB: 1955  Chief Complaint   Patient presents with    Shortness of Breath    Foot Pain     bilateral, no known injury        Subjective/Interval Hx: On bipap and levophed  Very lethargic today as per nurse    ROS: negative unless mentioned above  Objective:   Physical Exam:   BP (!) 113/49   Pulse 95   Temp 99.9 °F (37.7 °C)   Resp 27   Ht 5' 8\" (1.727 m)   Wt (!) 378 lb 15.5 oz (171.9 kg)   SpO2 90%   BMI 57.62 kg/m²   CONSTITUTIONAL:  No acute distress, sleeping in bed  EYES:  No discharge  HENT:  NCAT  LUNGS:  On bipap, may have some wheezing, large body habitus  CARDIOVASCULAR:  s1s2 rrr  ABDOMEN:  Soft nt nd  GENITAL/URINARY:  Has hernandez  MUSCULOSKELETAL/Extremities:  Trace pitting edema  NEUROLOGIC:  No gross deficits, asleep  SKIN:  No gross lesions    Assessment/Plan:       1. Acute on chronic hypoxic and hypercarbic respiratory failure  -may be due to pneumonia and CHF. ABG: Respiratory acidosis. Pulmonology following. CXR: Interstitial/pulmonary edema. COVID-19 negative.  -Patient on BiPAP. -FiO2 50%. 2. Shock  -Cardiogenic versus septic. Pulmonary edema is suggestive of cardiogenic. On antibiotics. Checking urine, sputum.   -Echo: EF 50-55%. Blood culture 1/13: NGTD. -Increase Levophed. 3. Acute on chronic diastolic CHF  -CXR: Pulmonary edema. On IV Lasix to try to reduce congestion, while on pressors. Holding beta-blocker, ACE inhibitor, Aldactone due to low BP.  -net -7.3 L output. proBNP 12,170.  4. Possible pneumonia  -COVID-19 negative. Respiratory cultures pending. Procalcitonin 0.15. CRP 66.  -Urine Legionella and strep negative. Blood culture 1/13: NGTD. 5. Acute kidney injury  -Baseline creatinine 0.9. Creatinine 1.6 on admission. Holding ACE inhibitor and Aldactone. UA: Pyuria, hematuria, moderate leuk esterase.  -Slightly improved creatinine 1.9.   Urine protein/creatinine: 1.3. Nephrology following. 6. UTI  -urine culture: Citrobacter freundii. On rocephin. 7. Anemia of chronic disease  -Iron 20. Transfused 1 unit PRBC. -GI ordered Venofer x3 doses, and outpatient EGD and colonoscopy if no gross bleeding.  -Hemoglobin appears stable around 7.  8. Bradycardia  -EKG: Junctional rhythm.    -Cardiology has signed off. HR has improved. -Troponins negative. 9. Diabetes mellitus type 2  -On sliding scale insulin and Lantus. Endocrinology following. .  -A1c 6.1.  10. Hypermagnesemia  -Monitor.  -Increase magnesium 2.8.       Other comorbid conditions addressed include:     Hypertension  Morbid obesity BMI 57  Hyperlipidemia  COPD with no exacerbation    DVT Prophylaxis: lovenox  GI Prophylaxis: protonix  Diet: DIET CARB CONTROL;   Home O2: 2LNC and CPAP/Bipap  Code Status: Full Code      Dispo:  -need breathing and blood pressure to improve    Matt Rodríguez MD  1/16/2021    Meds:   Meds:    iron sucrose  300 mg Intravenous Daily    furosemide  40 mg Intravenous TID    furosemide  40 mg Intravenous BID    theophylline  200 mg Oral BID    insulin glargine  15 Units Subcutaneous Nightly    insulin lispro  0-6 Units Subcutaneous 2 times per day    lidocaine  5 mL Intradermal Once    albuterol sulfate HFA  2 puff Inhalation 4x daily    amiodarone  200 mg Oral Daily    atorvastatin  40 mg Oral Nightly    busPIRone  10 mg Oral TID    clopidogrel  75 mg Oral Daily    [Held by provider] doxepin  10 mg Oral Nightly    gabapentin  400 mg Oral TID    metoprolol tartrate  12.5 mg Oral BID    pantoprazole  40 mg Oral Daily    polyethylene glycol  17 g Oral Daily    pramipexole  1 mg Oral BID    sertraline  25 mg Oral Nightly    [Held by provider] lisinopril  5 mg Oral Daily    [Held by provider] spironolactone  25 mg Oral Daily    vitamin D3  400 Units Oral Daily    sodium chloride flush  10 mL Intravenous 2 times per day    enoxaparin  40 mg Subcutaneous Daily    cefTRIAXone (ROCEPHIN) IV  1 g Intravenous Q24H    azithromycin  500 mg Intravenous Q24H    insulin lispro  0-12 Units Subcutaneous TID WC    furosemide  20 mg Intravenous See Admin Instructions    miconazole   Topical BID      Infusions:    norepinephrine 30 mcg/min (01/16/21 0245)    dextrose       PRN Meds:     sodium chloride flush, 10 mL, PRN      acetaminophen, 650 mg, Q6H PRN    Or      acetaminophen, 650 mg, Q6H PRN      potassium chloride, 40 mEq, PRN    Or      potassium alternative oral replacement, 40 mEq, PRN    Or      potassium chloride, 10 mEq, PRN      glucose, 15 g, PRN      dextrose, 12.5 g, PRN      glucagon (rDNA), 1 mg, PRN      dextrose, 100 mL/hr, PRN        Data/Labs:     Recent Labs     01/13/21  1445 01/15/21  1635 01/16/21  0558   WBC 3.4* 7.1 6.7   HGB 7.0* 8.3* 7.9*   HCT 25.0* 29.8* 29.2*    211 197      Recent Labs     01/14/21  2030 01/15/21  0600 01/16/21  0558   * 136 142   K 5.3* 5.0 4.3   CL 98* 98* 100   CO2 29 28 30   BUN 41* 41* 41*   CREATININE 2.0* 2.0* 1.9*     Recent Labs     01/13/21  1445   AST 12*   ALT 9*   BILITOT 0.2   ALKPHOS 135*     No results for input(s): INR in the last 72 hours. Recent Labs     01/13/21  1445 01/15/21  1635   TROPONINT <0.010 <0.010     HgBA1c:   Lab Results   Component Value Date    LABA1C 6.1 01/14/2021     CALCIUM:  8.6/30 (01/16 0558)    I/O last 3 completed shifts:   In: 7264 [I.V.:605; IV Piggyback:811]  Out: 5091 [Urine:7450]    Intake/Output Summary (Last 24 hours) at 1/16/2021 0837  Last data filed at 1/16/2021 0615  Gross per 24 hour   Intake 1416 ml   Output 7450 ml   Net -6034 ml

## 2021-01-16 NOTE — CONSULTS
Endocrinology   Consult Note       Dear Doctor Arabella Mead    Thank You for the Consult     Pt. Was Admitted for : Severe shortness of breath    Reason for Consult: Better control of blood glucose      History Obtained From:  Patient/ EMR       HISTORY OF PRESENT ILLNESS:                The patient is a 63 y.o. female with significant past medical history of COPD, asthma, carotid artery stenosis, congestive heart failure, patient is post TAVR, diabetes mellitus, morbid obesity, hyperlipidemia, was admitted to hospital for severe shortness of breath possibly due to acute exacerbation of COPD or congestive heart failure. Patient is being treated with BiPAP nearly continuously and also with end-stage congestive heart failure measures. Patient was negative for Covid but possible have pneumonia. She is being treated with antibiotics. I was  consulted for better control of blood glucose. ROS:   Pt's ROS done in detail. Abnormal ROS are noted in Medical and Surgical History Section below:      Other Medical History:        Diagnosis Date    Acute kidney injury ([address] 8/22/2019    Anxiety 9/1/2020    Aortic stenosis     Asthma     Bacteremia due to group B Streptococcus     T[facility] in [facility] - felt to be due to cellulitis    Cancer [facility])     Cervical    Carotid artery stenosis     Chest pain     CHF (congestive heart failure) (HCC)     Chronic back pain     COPD exacerbation ([address]     Depression     Diabetes mellitus ([address]     Family history of coronary artery disease     Fatty liver 10/27/2016    GI bleed 2017    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 - [facility] H/O aortic valve stenosis     H/O echocardiogram 11/22/2016    EF 55%, Aortic valve area is 0.84 cm2 with a mean gradient of 36 suggestive of severe aortic stenosis, mild to mos LVH    Headache     Heart murmur     History of nuclear stress test 11/17/2016    lexiscan-normal,EF70%    Morbid obesity ([address] BMI=73.72 kg/m2    Neuropathy     NSTEMI (non-ST elevated myocardial infarction) ([facility]) 2018    Obesity     Osteoarthritis     Palpitations     Peripheral edema     Restless legs syndrome     Sleep apnea     Has a CPAP    Sleep apnea     SOB (shortness of breath)      Surgical History:        Procedure Laterality Date    AORTIC VALVE REPLACEMENT  2017    29mm EvolutR TAVR     SECTION      CHOLECYSTECTOMY      GALLBLADDER SURGERY      HYSTERECTOMY      IR NONTUNNELED VASCULAR CATHETER  2020    IR NONTUNNELED VASCULAR CATHETER 2020 [facility] SPECIAL PROCEDURES    IR NONTUNNELED VASCULAR CATHETER  1/15/2021    IR NONTUNNELED VASCULAR CATHETER 1/15/2021 [facility] SPECIAL PROCEDURES    NECK SURGERY      Tumor    TUBAL LIGATION         Allergies:  Patient has no known allergies. Family History:       Problem Relation Age of Onset    Heart Failure Mother     Heart Attack Mother     Hypertension Mother     Coronary Art Dis Mother         Had PTCA w/stenting.  Heart Disease Mother     High Blood Pressure Mother     Obesity Mother     Diabetes Mother     Depression Mother     Heart Failure Father     Heart Disease Father     High Blood Pressure Father     Obesity Father     Heart Failure Brother     Heart Disease Brother     High Blood Pressure Brother     Obesity Brother     Depression Brother     Depression Sister     Depression Daughter     Anxiety Disorder Daughter     Depression Niece         suicide attempt     REVIEW OF SYSTEMS:  Review of System Done as noted above     PHYSICAL EXAM:      Vitals:    /63   Pulse 86   Temp 99.8 °F (37.7 °C) (Axillary)   Resp 22   Ht 5' 8\" (1.727 m)   Wt (!) 378 lb 15.5 oz (171.9 kg)   SpO2 96%   BMI 57.62 kg/m²     CONSTITUTIONAL:  awake, alert, drowsy cooperative, appears stated age   EYES:  vision intact Fundoscopic Exam not performed   ENT:Normal  NECK:  Supple, No JVD.    Thyroid Exam:Normal   LUNGS:  Has Vesicular Breath Sounds, on BiPAP  CARDIOVASCULAR:  Normal apical impulse, regular rate and rhythm, normal S1 and S2, no S3 or S4, and has no  murmur   ABDOMEN:  No scars, normal bowel sounds, soft, non-distended, non-tender, no masses palpated, no hepatolienomegaly  Musculoskeletal: Normal  Extremities: Normal, peripheral pulses normal, , has no edema   NEUROLOGIC:  Awake, alert, oriented to name, place and time. Cranial nerves II-XII are grossly intact. Motor is  intact.   Sensory neuropathy t. ,  and gait is abnormal.  Unstable    DATA:    CBC:   Recent Labs     01/15/21  1635 01/16/21  0558   WBC 7.1 6.7   HGB 8.3* 7.9*    197    CMP:  Recent Labs     01/14/21  2030 01/15/21  0600 01/16/21  0558   * 136 142   K 5.3* 5.0 4.3   CL 98* 98* 100   CO2 29 28 30   BUN 41* 41* 41*   CREATININE 2.0* 2.0* 1.9*   CALCIUM 9.0 8.9 8.6     Lipids:   Lab Results   Component Value Date    CHOL 151 01/14/2021    HDL 46 01/14/2021    TRIG 94 01/14/2021     Glucose:   Recent Labs     01/16/21  0240 01/16/21  0937 01/16/21  1144   POCGLU 126* 122* 134*     Hemoglobin A1C:   Lab Results   Component Value Date    LABA1C 6.1 01/14/2021     Free T4:   Lab Results   Component Value Date    T4FREE 1.07 09/04/2018     Free T3: No results found for: FT3  TSH High Sensitivity:   Lab Results   Component Value Date    TSHHS 3.490 12/09/2020       Echocardiogram Limited    Result Date: 1/15/2021  Transthoracic Echocardiography Report (TTE)  Demographics   Patient Name       Kelvin ADAMS Date of Study       01/14/2021   Date of Birth      1955         Gender              Female   Age                63 year(s)         Race                   Patient Number     [id number]         Room Number         2105   Visit Number       [id number]   Corporate ID       [id number]   Accession Number   [id number]         [address] [facility], [facility]   Ordering Physician Emmanuel Noble MD      Physician           Brayan Thornton MD  Procedure Type of Study   TTE procedure:ECHOCARDIOGRAM LIMITED. Procedure Date Date: 01/14/2021 Start: 02:11 PM Study Location: Portable Technical Quality: Limited visualization due to body habitus. Indications:Congestive heart failure. Patient Status: Routine Height: 68 inches Weight: 378 pounds BSA: 2.68 m2 BMI: 57.47 kg/m2 HR: 62 bpm BP: 107/55 mmHg  Conclusions   Summary  This is a limited echocardiogram. Technically difficult due to body habitus  Left ventricular systolic function is normal.  Ejection fraction is visually estimated at 50-55%. Dilated right ventricle with pressure/volume overload. S/p TAVR. Mild tricuspid regurgitation is present. RVSP is 43 mmHg. Mild pericardial effusion. Inferior vena cava is dilated, measuring at 3.4 cm, and does not collapse  with respiration. Signature   ------------------------------------------------------------------  Electronically signed by Jimbo Flores MD  (Interpreting physician) on 01/15/2021 at 08:06 AM      .     Xr Chest Portable    Result Date: 1/13/2021  EXAMINATION: ONE XRAY VIEW OF THE CHEST 1/13/2021 2:24 pm COMPARISON: Chest x-ray dated 12/09/2020 HISTORY: ORDERING SYSTEM PROVIDED HISTORY: sob TECHNOLOGIST PROVIDED HISTORY: Reason for exam:->sob      Interstitial/pulmonary edema. Cardiomegaly. Probable small bilateral pleural effusions.      Scheduled Medicines   Medications:    modafinil  200 mg Oral Daily    iron sucrose  300 mg Intravenous Daily    furosemide  40 mg Intravenous BID    theophylline  200 mg Oral BID    insulin glargine  15 Units Subcutaneous Nightly    insulin lispro  0-6 Units Subcutaneous 2 times per day    lidocaine  5 mL Intradermal Once    albuterol sulfate HFA  2 puff Inhalation 4x daily    amiodarone  200 mg Oral Daily    atorvastatin  40 mg Oral Nightly    busPIRone  10 mg Oral TID    clopidogrel  75 mg Oral Daily    [Held by provider] doxepin  10 mg Oral Nightly    gabapentin  400 mg Oral TID    metoprolol tartrate  12.5 mg Oral BID    pantoprazole  40 mg Oral Daily    polyethylene glycol  17 g Oral Daily    pramipexole  1 mg Oral BID    sertraline  25 mg Oral Nightly    [Held by provider] lisinopril  5 mg Oral Daily    [Held by provider] spironolactone  25 mg Oral Daily    vitamin D3  400 Units Oral Daily    sodium chloride flush  10 mL Intravenous 2 times per day    enoxaparin  40 mg Subcutaneous Daily    cefTRIAXone (ROCEPHIN) IV  1 g Intravenous Q24H    azithromycin  500 mg Intravenous Q24H    insulin lispro  0-12 Units Subcutaneous TID WC    furosemide  20 mg Intravenous See Admin Instructions    miconazole   Topical BID      Infusions:    norepinephrine 25 mcg/min (01/16/21 1441)    dextrose           IMPRESSION    Patient Active Problem List   Diagnosis    Morbid obesity with BMI of 70 and over, adult ([address]    Dyslipidemia    Fecal incontinence    Mild intermittent asthma without complication    S/P laparoscopic cholecystectomy    Type 2 diabetes mellitus without complication, with long-term current use of insulin ([address]    Fatty liver    Arthritis    H/O parotidectomy    Venous stasis dermatitis of both lower extremities    Aortic stenosis    Morbid obesity ([address]    Asthma    Diabetes mellitus ([address]    Hypertension    Sleep apnea    Family history of coronary artery disease    Chest pain    SOB (shortness of breath)    Palpitations    Peripheral edema    Hypervolemia    Chronic respiratory failure with hypoxia and hypercapnia (HCC)    Acute exacerbation of chronic obstructive pulmonary disease (COPD) (HCC)    Chronic obstructive pulmonary disease (HCC)    Gait disturbance    Acute on chronic respiratory failure with hypoxia and hypercapnia (HCC)    Cellulitis    Skin ulcer of left foot with fat layer exposed ([address]    Pneumonia    VHD (valvular heart disease)    Class 3 severe obesity due to excess calories with body mass index (BMI) greater than or equal to 70 in adult (HCC)    SIRS (systemic inflammatory response syndrome) ([facility]    Acute kidney injury ([address]    Acute metabolic encephalopathy    Shock, unspecified ([address]    Uncontrolled diabetes mellitus ([address]    Sepsis ([address]    Altered mental status    Wide-complex tachycardia ([facility]    Chronic diastolic heart failure ([facility])    S/P TAVR (transcatheter aortic valve replacement)    Septic shock ([facility]    Acute kidney injury superimposed on CKD ([facility])    Bradycardia    Anxiety    Acute respiratory failure with hypoxia ([facility])    Acute congestive heart failure ([address]         RECOMMENDATIONS:      1. Reviewed POC blood glucose . Labs and X ray results   2. Reviewed Home and Current Medicines   3. Will Start On meal/ Correction bolus Humalog/ Lantus Insulin regime    4. Monitor Blood glucose frequently   5. Modify  the dose of Insulin  as needed        Will follow with you  Again thank you for sharing pt's care with me.      Truly yours,       [place] Wen MD

## 2021-01-16 NOTE — PROGRESS NOTES
Pt too lethargic, does not remain alert, or able to clear secretions well enough to swallow am pills. Will attempt again later in shift.

## 2021-01-17 NOTE — PROGRESS NOTES
Nephrology Progress Note  1/17/2021 10:51 AM  Subjective:      Interval History: Aislinn Marie is a 63 y.o. female  With good uop with diuretic but still not improved affect        Data:   Scheduled Meds:   insulin glargine  10 Units Subcutaneous Nightly    modafinil  200 mg Oral Daily    iron sucrose  300 mg Intravenous Daily    furosemide  40 mg Intravenous BID    theophylline  200 mg Oral BID    insulin lispro  0-6 Units Subcutaneous 2 times per day    lidocaine  5 mL Intradermal Once    amiodarone  200 mg Oral Daily    atorvastatin  40 mg Oral Nightly    busPIRone  10 mg Oral TID    clopidogrel  75 mg Oral Daily    [Held by provider] doxepin  10 mg Oral Nightly    gabapentin  400 mg Oral TID    metoprolol tartrate  12.5 mg Oral BID    pantoprazole  40 mg Oral Daily    polyethylene glycol  17 g Oral Daily    pramipexole  1 mg Oral BID    sertraline  25 mg Oral Nightly    [Held by provider] lisinopril  5 mg Oral Daily    [Held by provider] spironolactone  25 mg Oral Daily    vitamin D3  400 Units Oral Daily    sodium chloride flush  10 mL Intravenous 2 times per day    enoxaparin  40 mg Subcutaneous Daily    cefTRIAXone (ROCEPHIN) IV  1 g Intravenous Q24H    azithromycin  500 mg Intravenous Q24H    insulin lispro  0-12 Units Subcutaneous TID WC    furosemide  20 mg Intravenous See Admin Instructions    miconazole   Topical BID     Continuous Infusions:   norepinephrine 22 mcg/min (01/17/21 0948)    dextrose             CBC:   Recent Labs     01/15/21  1635 01/16/21  0558 01/17/21  0530   WBC 7.1 6.7 6.6   HGB 8.3* 7.9* 8.0*    197 178     BMP:    Recent Labs     01/15/21  0600 01/16/21  0558 01/17/21  0530    142 147*   K 5.0 4.3 3.8   CL 98* 100 100   CO2 28 30 35*   BUN 41* 41* 33*   CREATININE 2.0* 1.9* 1.6*   GLUCOSE 134* 124* 150*       Renal Labs  Albumin:    Lab Results   Component Value Date    LABALBU 3.3 01/13/2021     Calcium:    Lab Results   Component

## 2021-01-17 NOTE — PROGRESS NOTES
[facility] PROGRESS NOTE  Date: 1/17/2021   Name: Francine Maxwell   MRN: [id number]   YOB: 1955  Chief Complaint   Patient presents with    Shortness of Breath    Foot Pain     bilateral, no known injury        Subjective/Interval Hx:     Has been lethargic  Making urine  Too lethargic to eat  Still on pressor, unable to wean off      ROS: negative unless mentioned above  Objective:   Physical Exam:   BP (!) 136/56   Pulse 78   Temp 98.2 °F (36.8 °C)   Resp 10   Ht 5' 8\" (1.727 m)   Wt (!) 378 lb 15.5 oz (171.9 kg)   SpO2 95%   BMI 57.62 kg/m²   CONSTITUTIONAL:  No acute distress, sleeping in bed  EYES:  No discharge  HENT:  NCAT  LUNGS:  On bipap, difficult to auscutlate large body habitus  CARDIOVASCULAR:  s1s2 rrr  ABDOMEN:  Soft nt nd  GENITAL/URINARY:  Has hernandez  MUSCULOSKELETAL/Extremities:  Trace pitting edema  NEUROLOGIC:  No gross deficits, lethargic, squeezes hands on command  SKIN:  No gross lesions    Assessment/Plan:       1. Acute on chronic hypoxic and hypercarbic respiratory failure  -may be due to pneumonia and CHF. ABG: Respiratory acidosis. Pulmonology following. CXR: Interstitial/pulmonary edema. COVID-19 negative.  -Patient on BiPAP. -Improved FiO2 40%. Pulmonology to start modafinil and theophylline to improve alertness. 2. Shock  -Cardiogenic versus septic. Pulmonary edema is suggestive of cardiogenic. On antibiotics. Checking urine, sputum.   -Echo: EF 50-55%. Blood culture 1/13: NGTD. -Levophed at 24 mcg. 3. Acute on chronic diastolic CHF  -CXR: Pulmonary edema. On IV Lasix to try to reduce congestion, while on pressors. Holding beta-blocker, ACE inhibitor, Aldactone due to low BP.  -proBNP 12,170.  -Net -14 L output. 4. Possible pneumonia  -COVID-19 negative. Respiratory cultures pending. CRP 66.  -Urine Legionella and strep negative. Blood culture 1/13: NGTD. -Procalcitonin 0.25. Respiratory PCR: Negative.   5. Acute kidney injury  -Baseline creatinine 0.9. Creatinine 1.6 on admission. Holding ACE inhibitor and Aldactone. UA: Pyuria, hematuria, moderate leuk esterase.  -Urine protein/creatinine: 1.3. Nephrology following.  -Improved creatinine 1.6.  6. UTI  -urine culture: Citrobacter freundii. On rocephin. 7. Anemia of chronic disease  -Iron 20. Transfused 1 unit PRBC. -GI ordered Venofer x3 doses, and outpatient EGD and colonoscopy if no gross bleeding.  -Hemoglobin 8.  8. Bradycardia  -EKG: Junctional rhythm.    -Cardiology has signed off. HR has improved. -Troponins negative. 9. Diabetes mellitus type 2  -On sliding scale insulin and Lantus. Endocrinology following. .  -A1c 6.1.  10. Hypermagnesemia  -Monitor.  -Increase magnesium 2.8.       Other comorbid conditions addressed include:     Hypertension  Morbid obesity BMI 57  Hyperlipidemia  COPD with no exacerbation    DVT Prophylaxis: lovenox  GI Prophylaxis: protonix  Diet: DIET CARB CONTROL;   Home O2: 2LNC and CPAP/Bipap  Code Status: Full Code      Dispo:  -need breathing and blood pressure to improve    Kendy Garcia MD  1/17/2021    Meds:   Meds:    insulin glargine  10 Units Subcutaneous Nightly    modafinil  200 mg Oral Daily    iron sucrose  300 mg Intravenous Daily    furosemide  40 mg Intravenous BID    theophylline  200 mg Oral BID    insulin lispro  0-6 Units Subcutaneous 2 times per day    lidocaine  5 mL Intradermal Once    amiodarone  200 mg Oral Daily    atorvastatin  40 mg Oral Nightly    busPIRone  10 mg Oral TID    clopidogrel  75 mg Oral Daily    [Held by provider] doxepin  10 mg Oral Nightly    gabapentin  400 mg Oral TID    metoprolol tartrate  12.5 mg Oral BID    pantoprazole  40 mg Oral Daily    polyethylene glycol  17 g Oral Daily    pramipexole  1 mg Oral BID    sertraline  25 mg Oral Nightly    [Held by provider] lisinopril  5 mg Oral Daily    [Held by provider] spironolactone  25 mg Oral Daily    vitamin D3  400 Units Oral Daily    sodium chloride flush  10 mL Intravenous 2 times per day    enoxaparin  40 mg Subcutaneous Daily    cefTRIAXone (ROCEPHIN) IV  1 g Intravenous Q24H    azithromycin  500 mg Intravenous Q24H    insulin lispro  0-12 Units Subcutaneous TID WC    furosemide  20 mg Intravenous See Admin Instructions    miconazole   Topical BID      Infusions:    norepinephrine 24 mcg/min (01/16/21 2242)    dextrose       PRN Meds:     albuterol sulfate HFA, 2 puff, Q4H PRN      sodium chloride flush, 10 mL, PRN      acetaminophen, 650 mg, Q6H PRN    Or      acetaminophen, 650 mg, Q6H PRN      potassium chloride, 40 mEq, PRN    Or      potassium alternative oral replacement, 40 mEq, PRN    Or      potassium chloride, 10 mEq, PRN      glucose, 15 g, PRN      dextrose, 12.5 g, PRN      glucagon (rDNA), 1 mg, PRN      dextrose, 100 mL/hr, PRN        Data/Labs:     Recent Labs     01/15/21  1635 01/16/21  0558 01/17/21  0530   WBC 7.1 6.7 6.6   HGB 8.3* 7.9* 8.0*   HCT 29.8* 29.2* 29.7*    197 178      Recent Labs     01/15/21  0600 01/16/21  0558 01/17/21  0530    142 147*   K 5.0 4.3 3.8   CL 98* 100 100   CO2 28 30 35*   BUN 41* 41* 33*   CREATININE 2.0* 1.9* 1.6*     No results for input(s): AST, ALT, ALB, BILIDIR, BILITOT, ALKPHOS in the last 72 hours. No results for input(s): INR in the last 72 hours. Recent Labs     01/15/21  1635   TROPONINT <0.010     HgBA1c:   Lab Results   Component Value Date    LABA1C 6.1 01/14/2021     CALCIUM:  8.9/35 (01/17 0530)    I/O last 3 completed shifts:   In: 1101.4 [I.V.:801.4; IV Piggyback:300]  Out: 8725 [Urine:8725]    Intake/Output Summary (Last 24 hours) at 1/17/2021 0756  Last data filed at 1/17/2021 0603  Gross per 24 hour   Intake 1101.38 ml   Output 8725 ml   Net -7623.62 ml

## 2021-01-17 NOTE — PROGRESS NOTES
Pulmonary and Critical Care  Progress Note      VITALS:  BP (!) 135/54   Pulse 78   Temp 97.7 °F (36.5 °C) (Axillary)   Resp 16   Ht 5' 8\" (1.727 m)   Wt (!) 378 lb 15.5 oz (171.9 kg)   SpO2 98%   BMI 57.62 kg/m²     Subjective:   CHIEF COMPLAINT :SOB     HPI:                The patient is sleepy but arousable. She is tolerating the BIPAP well. She is not ez cute reps distress. She is following commands. Objective:   PHYSICAL EXAM:    LUNGS:Decreased air entry bilateral bases  Abd-distended,BS+,NT  Ext- Chronic LE venous stasis  CVS-s1s2, no murmurs      DATA:    CBC:  Recent Labs     01/15/21  1635 01/16/21  0558 01/17/21  0530   WBC 7.1 6.7 6.6   RBC 3.37* 3.21* 3.24*   HGB 8.3* 7.9* 8.0*   HCT 29.8* 29.2* 29.7*    197 178   MCV 88.4 91.0 91.7   MCH 24.6* 24.6* 24.7*   MCHC 27.9* 27.1* 26.9*   RDW 18.3* 18.3* 17.9*   NRBC  --  3  --    SEGSPCT  --  55.0 58.4   BANDSPCT  --  3*  --       BMP:  Recent Labs     01/15/21  0600 01/16/21  0558 01/17/21  0530    142 147*   K 5.0 4.3 3.8   CL 98* 100 100   CO2 28 30 35*   BUN 41* 41* 33*   CREATININE 2.0* 1.9* 1.6*   CALCIUM 8.9 8.6 8.9   GLUCOSE 134* 124* 150*      ABG:  Recent Labs     01/15/21  0000 01/15/21  0600 01/17/21  0900   PH 7.32* 7.23* 7.31*   PO2ART 84 33* 75   YMA9IFG 59.0* 77.0* 89.0*   O2SAT 94.1* 53.1* 93.0*     BNP  No results found for: BNP   D-Dimer:  Lab Results   Component Value Date    DDIMER 1729 (H) 06/27/2020      Radiology:   Stable cardiomegaly with bilateral lung infiltrates and associated   bilateral pleural effusions.  Findings are likely related to pulmonary   vascular congestion.  Underlying pneumonia cannot entirely be excluded     1.        Assessment/Plan     Patient Active Problem List    Diagnosis Date Noted    Acute exacerbation of chronic obstructive pulmonary disease (COPD) ([facility] [address]      Priority: High    Hypervolemia      Priority: High    Anxiety 09/01/2020     Priority: Medium     Class: Acute    Acute congestive heart failure (HCC)     Acute respiratory failure with hypoxia (HCC)     Bradycardia     Septic shock (HCC) 08/25/2020    Acute kidney injury superimposed on CKD ([address] 08/25/2020    S/P TAVR (transcatheter aortic valve replacement) 06/26/2020    Chronic diastolic heart failure ([address] 06/25/2020    Wide-complex tachycardia (HCC)     Altered mental status     Sepsis ([address] 09/21/2019    Acute kidney injury ([address] 08/22/2019    Acute metabolic encephalopathy 66/86/1606    Shock, unspecified ([address] 08/22/2019    Uncontrolled diabetes mellitus ([address] 08/22/2019    SIRS (systemic inflammatory response syndrome) ([facility] [address] 08/12/2019    Class 3 severe obesity due to excess calories with body mass index (BMI) greater than or equal to 70 in adult [facility]) 06/16/2019    VHD (valvular heart disease) 04/02/2019     Overview Note:     H/o TAVR      Pneumonia 02/06/2019    Skin ulcer of left foot with fat layer exposed ([address]     Cellulitis 11/24/2018    Acute on chronic respiratory failure with hypoxia and hypercapnia (HCC)     Gait disturbance 02/10/2018    Chronic obstructive pulmonary disease ([address] 02/07/2018    Chronic respiratory failure with hypoxia and hypercapnia ([address] 01/30/2018    Morbid obesity ([address]     Asthma     Diabetes mellitus ([address]     Hypertension     Sleep apnea     Family history of coronary artery disease     Chest pain     SOB (shortness of breath)     Palpitations     Peripheral edema     Aortic stenosis     Morbid obesity with BMI of 70 and over, adult ([address] 10/27/2016    Dyslipidemia 10/27/2016    Fecal incontinence 10/27/2016    Mild intermittent asthma without complication 69/05/9518    S/P laparoscopic cholecystectomy 10/27/2016    Type 2 diabetes mellitus without complication, with long-term current use of insulin ([address] 10/27/2016    Fatty liver 10/27/2016    Arthritis 10/27/2016    H/O parotidectomy 10/27/2016    Venous stasis dermatitis of both lower extremities 10/27/2016   JOSE  Morbid Obesity  ANMOL  OHS  Acute on Chronic Hypoxic Hypercapneic resp failure  Chronic Metabolic alkalosis  Pulmonary congestion  Basal atelectasis  Chronic LE venous stasis  Suspected CAP       1. Theophylline  2. Modafinil  3. BIPAP  4. Diuresis  5. CHF optimization  6. BMP in am  7. ICS  8. OOB  9. Keep sats > 92%  10. C/w present management  No follow-ups on file.     Electronically signed by Raman Vargas MD on 1/17/2021 at 11:32 AM

## 2021-01-17 NOTE — PLAN OF CARE
Problem: Falls - Risk of:  Goal: Will remain free from falls  Description: Will remain free from falls  Outcome: Ongoing  Goal: Absence of physical injury  Description: Absence of physical injury  Outcome: Ongoing     Problem: Skin Integrity:  Goal: Will show no infection signs and symptoms  Description: Will show no infection signs and symptoms  Outcome: Ongoing  Goal: Absence of new skin breakdown  Description: Absence of new skin breakdown  Outcome: Ongoing     Problem: HEMODYNAMIC STATUS  Goal: Patient has stable vital signs and fluid balance  Outcome: Ongoing     Problem: ACTIVITY INTOLERANCE/IMPAIRED MOBILITY  Goal: Mobility/activity is maintained at optimum level for patient  Outcome: Ongoing     Problem: Gas Exchange - Impaired:  Goal: Levels of oxygenation will improve  Description: Levels of oxygenation will improve  Outcome: Ongoing     Problem: Cardiac Output - Decreased:  Goal: Hemodynamic stability will improve  Description: Hemodynamic stability will improve  Outcome: Ongoing     Problem: Cardiac:  Goal: Ability to maintain vital signs within normal range will improve  Description: Ability to maintain vital signs within normal range will improve  Outcome: Ongoing  Goal: Cardiovascular alteration will improve  Description: Cardiovascular alteration will improve  Outcome: Ongoing     Problem: Health Behavior:  Goal: Will modify at least one risk factor affecting health status  Description: Will modify at least one risk factor affecting health status  Outcome: Ongoing  Goal: Identification of resources available to assist in meeting health care needs will improve  Description: Identification of resources available to assist in meeting health care needs will improve  Outcome: Ongoing     Problem: Physical Regulation:  Goal: Complications related to the disease process, condition or treatment will be avoided or minimized  Description: Complications related to the disease process, condition or treatment

## 2021-01-17 NOTE — PROGRESS NOTES
Progress Note( Dr. Rosa Connell)  1/17/2021  Subjective:   Admit Date: 1/13/2021  PCP: THEODORE Clarke NP    Admitted For :Severe shortness of breath    Consulted For: Better control of blood glucose    Interval History: Patient is somewhat better still on BiPAP almost continuously    Denies any chest pains,   Yes SOB . On BiPAP  Denies nausea or vomiting. No new bowel or bladder symptoms.        Intake/Output Summary (Last 24 hours) at 1/17/2021 1517  Last data filed at 1/17/2021 1200  Gross per 24 hour   Intake 1366.38 ml   Output 7825 ml   Net -6458.62 ml       DATA    CBC:   Recent Labs     01/15/21  1635 01/16/21  0558 01/17/21  0530   WBC 7.1 6.7 6.6   HGB 8.3* 7.9* 8.0*    197 178    CMP:  Recent Labs     01/15/21  0600 01/16/21  0558 01/17/21  0530    142 147*   K 5.0 4.3 3.8   CL 98* 100 100   CO2 28 30 35*   BUN 41* 41* 33*   CREATININE 2.0* 1.9* 1.6*   CALCIUM 8.9 8.6 8.9     Lipids:   Lab Results   Component Value Date    CHOL 151 01/14/2021    HDL 46 01/14/2021    TRIG 94 01/14/2021     Glucose:  Recent Labs     01/17/21  0155 01/17/21  0950 01/17/21  1317   POCGLU 145* 143* 113*     [id number]:  Lab Results   Component Value Date    LABA1C 6.1 01/14/2021     High Sensitivity TSH:   Lab Results   Component Value Date    TSHHS 3.490 12/09/2020     Free T3: No results found for: FT3  Free T4:  Lab Results   Component Value Date    T4FREE 1.07 09/04/2018       Echocardiogram Limited    Result Date: 1/15/2021  Transthoracic Echocardiography Report (TTE)  Demographics   Patient Name       Izzy ADAMS Date of Study       01/14/2021   Date of Birth      1955         Gender              Female   Age                63 year(s)         Race                   Patient Number     [id number]         Room Number         2105   Visit Number       [id number]   Corporate ID       [id number]   Accession Number   [id number]         Sonographer         Kelle Love [facility]   Ordering Physician Rose Duverney, Laurey Gowda MD      Physician           Rebecca Almonte MD  Procedure Type of Study   TTE procedure:ECHOCARDIOGRAM LIMITED. Procedure Date Date: 01/14/2021 Start: 02:11 PM Study Location: Portable Technical Quality: Limited visualization due to body habitus. Indications:Congestive heart failure. Patient Status: Routine Height: 68 inches Weight: 378 pounds BSA: 2.68 m2 BMI: 57.47 kg/m2 HR: 62 bpm BP: 107/55 mmHg  Conclusions   Summary  This is a limited echocardiogram. Technically difficult due to body habitus  Left ventricular systolic function is normal.  Ejection fraction is visually estimated at 50-55%. Dilated right ventricle with pressure/volume overload. S/p TAVR. Mild tricuspid regurgitation is present. RVSP is 43 mmHg. Mild pericardial effusion. Inferior vena cava is dilated, measuring at 3.4 cm, and does not collapse  with respiration. Signature   ------------------------------------------------------------------  Electronically signed by Beryle Poling MD  (Interpreting physician) on 01/15/2021 at 08:06 AM  Xr Pelvis (1-2 Views)       Xr Chest Portable    Result Date: 1/15/2021  EXAMINATION: ONE XRAY VIEW OF THE CHEST 1/15/2021 5:49 am COMPARISON: 01/13/2021 HISTORY: ORDERING SYSTEM PROVIDED HISTORY: Hypoxia      1. Stable cardiomegaly with pulmonary edema, bilateral pleural effusions, and associated bibasilar atelectasis. Xr Chest Portable    Result Date: 1/13/2021  EXAMINATION: ONE XRAY VIEW OF THE CHEST 1/13/2021 2:24 pm COMPARISON: Chest x-ray dated 12/09/2020 HISTORY: ORDERING SYSTEM PROVIDED HISTORY: sob       Interstitial/pulmonary edema. Cardiomegaly. Probable small bilateral pleural effusions.         Scheduled Medicines   Medications:    insulin glargine  10 Units Subcutaneous Nightly    modafinil  200 mg Oral Daily    furosemide  40 mg Intravenous BID    theophylline  200 mg Oral BID    insulin lispro  0-6 Units Subcutaneous 2 times per day    lidocaine  5 mL Intradermal Once    amiodarone  200 mg Oral Daily    atorvastatin  40 mg Oral Nightly    busPIRone  10 mg Oral TID    clopidogrel  75 mg Oral Daily    [Held by provider] doxepin  10 mg Oral Nightly    gabapentin  400 mg Oral TID    metoprolol tartrate  12.5 mg Oral BID    pantoprazole  40 mg Oral Daily    polyethylene glycol  17 g Oral Daily    pramipexole  1 mg Oral BID    sertraline  25 mg Oral Nightly    [Held by provider] lisinopril  5 mg Oral Daily    [Held by provider] spironolactone  25 mg Oral Daily    vitamin D3  400 Units Oral Daily    sodium chloride flush  10 mL Intravenous 2 times per day    enoxaparin  40 mg Subcutaneous Daily    cefTRIAXone (ROCEPHIN) IV  1 g Intravenous Q24H    azithromycin  500 mg Intravenous Q24H    insulin lispro  0-12 Units Subcutaneous TID WC    furosemide  20 mg Intravenous See Admin Instructions    miconazole   Topical BID      Infusions:    norepinephrine 22 mcg/min (01/17/21 0948)    dextrose           Objective:   Vitals: BP (!) 146/59   Pulse 77   Temp 98.7 °F (37.1 °C) (Oral)   Resp 14   Ht 5' 8\" (1.727 m)   Wt (!) 378 lb 15.5 oz (171.9 kg)   SpO2 100%   BMI 57.62 kg/m²   General appearance: alert and cooperative with exam  Neck: no JVD or bruit  Thyroid : Normal lobes   Lungs: Has Vesicular Breath sounds has wheezing and rales bilaterally  Heart:  regular rate and rhythm  Abdomen: soft, non-tender; bowel sounds normal; no masses,  no organomegaly  Musculoskeletal: Normal  Extremities: extremities normal, , no edema  Neurologic:  Awake, alert, oriented to name, place and time. Cranial nerves II-XII are grossly intact. Motor is  intact.   Sensory neuropathy t.,  and gait is abnormal.  Unstable    Assessment:     Patient Active Problem List:     Morbid obesity with BMI of 70 and over, adult ([facility] [address] Dyslipidemia     Fecal incontinence     Mild intermittent asthma without complication     S/P laparoscopic cholecystectomy     Type 2 diabetes mellitus without complication, with long-term current use of insulin (HCC)     Fatty liver     Arthritis     H/O parotidectomy     Venous stasis dermatitis of both lower extremities     Aortic stenosis     Morbid obesity (HCC)     Asthma     Diabetes mellitus ([address]     Hypertension     Sleep apnea     Family history of coronary artery disease     Chest pain     SOB (shortness of breath)     Palpitations     Peripheral edema     Hypervolemia     Chronic respiratory failure with hypoxia and hypercapnia (HCC)     Acute exacerbation of chronic obstructive pulmonary disease (COPD) (HCC)     Chronic obstructive pulmonary disease (HCC)     Gait disturbance     Acute on chronic respiratory failure with hypoxia and hypercapnia (HCC)     Cellulitis     Skin ulcer of left foot with fat layer exposed ([address]     Pneumonia     VHD (valvular heart disease)     Class 3 severe obesity due to excess calories with body mass index (BMI) greater than or equal to 70 in adult (HCC)     SIRS (systemic inflammatory response syndrome) ([facility])     Acute kidney injury ([address]     Acute metabolic encephalopathy     Shock, unspecified ([address]     Uncontrolled diabetes mellitus ([address]     Sepsis ([address]     Altered mental status     Wide-complex tachycardia (HCC)     Chronic diastolic heart failure (HCC)     S/P TAVR (transcatheter aortic valve replacement)     Septic shock (HCC)     Acute kidney injury superimposed on CKD (HCC)     Bradycardia     Anxiety     Acute respiratory failure with hypoxia (HCC)     Acute congestive heart failure ([address]      Plan:     1. Reviewed POC blood glucose . Labs and X ray results   2. Reviewed Current Medicines   3. On meal/ Correction bolus Humalog/ Basal Lantus Insulin regime   4. Monitor Blood glucose frequently   5.  Modified  the dose of Insulin/ other medicines as needed 6. Will follow     .      Michelle Pendleton MD

## 2021-01-17 NOTE — PLAN OF CARE
Problem: Falls - Risk of:  Goal: Will remain free from falls  Description: Will remain free from falls  1/17/2021 1010 by Yennifer Appiah RN  Outcome: Ongoing  1/17/2021 0112 by Colen Olszewski, RN  Outcome: Ongoing  Goal: Absence of physical injury  Description: Absence of physical injury  1/17/2021 1010 by Yennifer Appiah RN  Outcome: Ongoing  1/17/2021 0112 by Colen Olszewski, RN  Outcome: Ongoing     Problem: Skin Integrity:  Goal: Will show no infection signs and symptoms  Description: Will show no infection signs and symptoms  1/17/2021 1010 by Yennifer Appiah RN  Outcome: Ongoing  1/17/2021 0112 by Colen Olszewski, RN  Outcome: Ongoing  Goal: Absence of new skin breakdown  Description: Absence of new skin breakdown  1/17/2021 1010 by Yennifer Appiah RN  Outcome: Ongoing  1/17/2021 0112 by Colen Olszewski, RN  Outcome: Ongoing     Problem: HEMODYNAMIC STATUS  Goal: Patient has stable vital signs and fluid balance  1/17/2021 1010 by Yennifer Appiah RN  Outcome: Ongoing  1/17/2021 0112 by Colen Olszewski, RN  Outcome: Ongoing     Problem: ACTIVITY INTOLERANCE/IMPAIRED MOBILITY  Goal: Mobility/activity is maintained at optimum level for patient  1/17/2021 1010 by Yennifer Appiah RN  Outcome: Ongoing  1/17/2021 0112 by Colen Olszewski, RN  Outcome: Ongoing     Problem: Gas Exchange - Impaired:  Goal: Levels of oxygenation will improve  Description: Levels of oxygenation will improve  1/17/2021 1010 by Yennifer Appiah RN  Outcome: Ongoing  1/17/2021 0112 by Colen Olszewski, RN  Outcome: Ongoing     Problem: Cardiac Output - Decreased:  Goal: Hemodynamic stability will improve  Description: Hemodynamic stability will improve  1/17/2021 1010 by Yennifer Appiah RN  Outcome: Ongoing  1/17/2021 0112 by Colen Olszewski, RN  Outcome: Ongoing     Problem: Cardiac:  Goal: Ability to maintain vital signs within normal range will improve  Description: Ability to maintain vital signs within normal range will improve  1/17/2021 1010 by Lizbeth Cowart RN  Outcome: Ongoing  1/17/2021 0112 by Alexis Melendez RN  Outcome: Ongoing  Goal: Cardiovascular alteration will improve  Description: Cardiovascular alteration will improve  1/17/2021 1010 by Lizbeth Cowart RN  Outcome: Ongoing  1/17/2021 0112 by Alexis Melendez RN  Outcome: Ongoing     Problem: Health Behavior:  Goal: Will modify at least one risk factor affecting health status  Description: Will modify at least one risk factor affecting health status  1/17/2021 1010 by Lizbeth Cowart RN  Outcome: Ongoing  1/17/2021 0112 by Alexis Melendez RN  Outcome: Ongoing  Goal: Identification of resources available to assist in meeting health care needs will improve  Description: Identification of resources available to assist in meeting health care needs will improve  1/17/2021 1010 by Lizbeth Cowart RN  Outcome: Ongoing  1/17/2021 0112 by Alexis Melendez RN  Outcome: Ongoing     Problem: Physical Regulation:  Goal: Complications related to the disease process, condition or treatment will be avoided or minimized  Description: Complications related to the disease process, condition or treatment will be avoided or minimized  1/17/2021 1010 by Lizbeth Cowart RN  Outcome: Ongoing  1/17/2021 0112 by Alexis Melendez RN  Outcome: Ongoing     Problem: Discharge Planning:  Goal: Discharged to appropriate level of care  Description: Discharged to appropriate level of care  1/17/2021 1010 by Lizbeth Cowart RN  Outcome: Ongoing  1/17/2021 0112 by Alexis Melendez RN  Outcome: Ongoing     Problem: Serum Glucose Level - Abnormal:  Goal: Ability to maintain appropriate glucose levels will improve  Description: Ability to maintain appropriate glucose levels will improve  1/17/2021 1010 by Lizbeth Cowart RN  Outcome: Ongoing  1/17/2021 0112 by Alexis Melendez RN  Outcome: Ongoing     Problem: Sensory Perception - Impaired:  Goal: Ability to maintain a stable neurologic state will improve  Description: Ability to maintain a stable neurologic state will improve  1/17/2021 1010 by Wagner Lundberg RN  Outcome: Ongoing  1/17/2021 0112 by Vinita Grier RN  Outcome: Ongoing

## 2021-01-18 NOTE — CONSULTS
[address] [id number] Continence Nurse  Consult Note       Aislinn Burn  AGE: 63 y.o.    GENDER: female  : 1955  TODAY'S DATE:  2021    Subjective:     Reason for  Evaluation and Assessment: wound care dorota Tao Burn is a 63 y.o. female referred by:   [x] Physician  [] Nursing  [] Other:     Wound Identification:  Wound Type: diabetic and pressure  Contributing Factors: edema, diabetes, chronic pressure, decreased mobility, obesity and incontinence of stool        PAST MEDICAL HISTORY        Diagnosis Date    Acute kidney injury ([address] 2019    Anxiety 2020    Aortic stenosis     Asthma     Bacteremia due to group B Streptococcus     Treated at [facility] in 2017 - felt to be due to cellulitis    Cancer ([address]     Cervical    Carotid artery stenosis     Chest pain     CHF (congestive heart failure) ([address]     Chronic back pain     COPD exacerbation ([address]     Depression     Diabetes mellitus [facility]     Family history of coronary artery disease     Fatty liver 10/27/2016    GI bleed 2017    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 - [facility] H/O aortic valve stenosis     H/O echocardiogram 2016    EF 55%, Aortic valve area is 0.84 cm2 with a mean gradient of 36 suggestive of severe aortic stenosis, mild to mos LVH    Headache     Heart murmur     History of nuclear stress test 2016    lexiscan-normal,EF70%    Morbid obesity (HCC)     BMI=73.72 kg/m2    Neuropathy     NSTEMI (non-ST elevated myocardial infarction) ([facility] 2018    Obesity     Osteoarthritis     Palpitations     Peripheral edema     Restless legs syndrome     Sleep apnea     Has a CPAP    Sleep apnea     SOB (shortness of breath)        PAST SURGICAL HISTORY    Past Surgical History:   Procedure Laterality Date    AORTIC VALVE REPLACEMENT  2017    29mm EvolutR TAVR     SECTION      CHOLECYSTECTOMY      GALLBLADDER SURGERY      HYSTERECTOMY      IR NONTUNNELED VASCULAR CATHETER  2020    [facility]    IR NONTUNNELED VASCULAR CATHETER  1/15/2021    IR NONTUNNELED VASCULAR CATHETER 1/15/2021 [facility] SPECIAL PROCEDURES    NECK SURGERY      Tumor    TUBAL LIGATION         FAMILY HISTORY    Family History   Problem Relation Age of Onset    Heart Failure Mother     Heart Attack Mother     Hypertension Mother     Coronary Art Dis Mother         Had PTCA w/stenting.  Heart Disease Mother     High Blood Pressure Mother     Obesity Mother     Diabetes Mother     Depression Mother     Heart Failure Father     Heart Disease Father     High Blood Pressure Father     Obesity Father     Heart Failure Brother     Heart Disease Brother     High Blood Pressure Brother     Obesity Brother     Depression Brother     Depression Sister     Depression Daughter     Anxiety Disorder Daughter     Depression Niece         suicide attempt       SOCIAL HISTORY    Social History     Tobacco Use    Smoking status: Former Smoker     Types: Cigarettes     Quit date: 3/19/2003     Years since quittin.8    Smokeless tobacco: Never Used    Tobacco comment: quit 15 years ago   Substance Use Topics    Alcohol use: No    Drug use: No       ALLERGIES    No Known Allergies    MEDICATIONS    No current facility-administered medications on file prior to encounter.       Current Outpatient Medications on File Prior to Encounter   Medication Sig Dispense Refill    furosemide (LASIX) 40 MG tablet Take 40 mg by mouth daily      torsemide (DEMADEX) 20 MG tablet Take 2 tablets by mouth daily for 7 days (Patient taking differently: Take 20 mg by mouth daily ) 14 tablet 0    pramipexole (MIRAPEX) 1 MG tablet Take 1 tablet by mouth 2 times daily 90 tablet 3    lisinopril (PRINIVIL;ZESTRIL) 5 MG tablet Take 1 tablet by mouth daily 30 tablet 0    metoprolol tartrate (LOPRESSOR) 25 MG tablet Take 0.5 tablets by mouth 2 times daily (Patient taking differently: Take 25 mg by mouth 2 times daily ) 60 tablet 3    ipratropium-albuterol (DUONEB) 0.5-2.5 (3) MG/3ML SOLN nebulizer solution Inhale 3 mLs into the lungs every 6 hours as needed for Shortness of Breath 360 mL 0    atorvastatin (LIPITOR) 80 MG tablet Take 0.5 tablets by mouth nightly 30 tablet 3    spironolactone (ALDACTONE) 25 MG tablet Take 1 tablet by mouth daily (Patient taking differently: Take 25 mg by mouth as needed ) 30 tablet 0    miconazole (MICOTIN) 2 % cream Apply topically 2 times daily. 3 Tube 2    Polyethylene Glycol 3350 (MIRALAX PO) Take by mouth as needed      gabapentin (NEURONTIN) 400 MG capsule Take 1 capsule by mouth 3 times daily for 7 days.  (Patient taking differently: Take 800 mg by mouth 3 times daily. ) 21 capsule 0    amiodarone (CORDARONE) 200 MG tablet Take 1 tablet by mouth daily (Patient taking differently: Take 200 mg by mouth 2 times daily ) 30 tablet 3    clopidogrel (PLAVIX) 75 MG tablet Take 1 tablet by mouth daily Patient has appointment on 3/27/18 more refills with be given after she keeps her office visit 90 tablet 3    BiPAP Machine MISC by BiPAP route nightly      metFORMIN (GLUCOPHAGE) 500 MG tablet Take 1 tablet by mouth 2 times daily (with meals) 60 tablet 0    Nebulizers (AIRIAL COMPACT MINI NEBULIZER) MISC 1 Device by Does not apply route 4 times daily 1 each 0    albuterol sulfate  (90 Base) MCG/ACT inhaler Inhale 2 puffs into the lungs           Objective:      BP (!) 104/47   Pulse 69   Temp 98.6 °F (37 °C) (Axillary)   Resp 9   Ht 5' 8\" (1.727 m)   Wt (!) 378 lb 15.5 oz (171.9 kg)   SpO2 93%   BMI 57.62 kg/m²   Yaya Risk Score: Yaya Scale Score: 12    LABS    CBC:   Lab Results   Component Value Date    WBC 6.6 01/18/2021    RBC 3.30 01/18/2021    HGB 8.2 01/18/2021    HCT 29.7 01/18/2021    MCV 90.0 01/18/2021    MCH 24.8 01/18/2021    MCHC 27.6 01/18/2021    RDW 17.8 01/18/2021     01/18/2021    MPV 9.3 01/18/2021     CMP:    Lab Results   Component Value Date     01/18/2021    K 3.3 01/18/2021    CL 94 01/18/2021    CO2 44 01/18/2021    BUN 22 01/18/2021    CREATININE 1.3 01/18/2021    GFRAA 50 01/18/2021    LABGLOM 41 01/18/2021    GLUCOSE 138 01/18/2021    PROT 8.0 01/13/2021    LABALBU 3.3 01/13/2021    CALCIUM 9.0 01/18/2021    BILITOT 0.2 01/13/2021    ALKPHOS 135 01/13/2021    AST 12 01/13/2021    ALT 9 01/13/2021     Albumin:    Lab Results   Component Value Date    LABALBU 3.3 01/13/2021     PT/INR:    Lab Results   Component Value Date    PROTIME 13.0 06/27/2020    INR 1.07 06/27/2020     HgBA1c:    Lab Results   Component Value Date    LABA1C 6.1 01/14/2021         Assessment:     Patient Active Problem List   Diagnosis    Morbid obesity with BMI of 70 and over, adult ([address]    Dyslipidemia    Fecal incontinence    Mild intermittent asthma without complication    S/P laparoscopic cholecystectomy    Type 2 diabetes mellitus without complication, with long-term current use of insulin ([address]    Fatty liver    Arthritis    H/O parotidectomy    Venous stasis dermatitis of both lower extremities    Aortic stenosis    Morbid obesity (HCC)    Asthma    Diabetes mellitus ([address]    Hypertension    Sleep apnea    Family history of coronary artery disease    Chest pain    SOB (shortness of breath)    Palpitations    Peripheral edema    Hypervolemia    Chronic respiratory failure with hypoxia and hypercapnia (HCC)    Acute exacerbation of chronic obstructive pulmonary disease (COPD) (HCC)    Chronic obstructive pulmonary disease (HCC)    Gait disturbance    Acute on chronic respiratory failure with hypoxia and hypercapnia (HCC)    Cellulitis    Skin ulcer of left foot with fat layer exposed ([address]    Pneumonia    VHD (valvular heart disease)    Class 3 severe obesity due to excess calories with body mass index (BMI) greater than or equal to 79 in adult [facility])    SIRS (systemic inflammatory response syndrome) (HCC)    [facility]    Acute metabolic encephalopathy    Shock, unspecified ([facility]    Uncontrolled diabetes mellitus ([facility]    Sepsis ([facility]    Altered mental status    Wide-complex tachycardia (HCC)    Chronic diastolic heart failure (HCC)    S/P TAVR (transcatheter aortic valve replacement)    Septic shock (HCC)    Acute kidney injury superimposed on CKD (HCC)    Bradycardia    Anxiety    Acute respiratory failure with hypoxia (HCC)    Acute congestive heart failure ([facility])       Measurements:  Wound 09/22/19 sacrum deep tissue injury/stage 2 (Active)   Number of days: 484       Wound 10/29/19 Thigh Right;Posterior (Active)   Wound Etiology Pressure Stage  2 01/16/21 0830   Dressing Status Clean;Dry; Intact 01/18/21 0900   Wound Cleansed Soap and water 01/17/21 1200   Dressing/Treatment Dry dressing 01/18/21 0900   Wound Assessment Erythema;Pink/red 01/18/21 0900   Drainage Amount Small 01/18/21 0900   Drainage Description Serosanguinous 01/18/21 0900   Odor None 01/15/21 1933   Number of days: 447       Wound 10/29/19 Heel Left;Medial cluster (Active)   Number of days: 446       Wound 10/29/19 Thigh Left;Posterior (Active)   Number of days: 447       Wound 10/31/19 Toe (Comment  which one) Left;Plantar left great toe fissure  (Active)   Number of days: 445       Wound 10/31/19 Foot Left;Plantar (Active)   Number of days: 445       Wound 10/31/19 Heel Right;Medial right medial heel fissure cluster (Active)   Number of days: 445       Wound 09/22/19 sacral/buttock cluster (Active)   Wound Etiology Pressure Stage  2 01/16/21 0830   Dressing Status Clean;Dry 01/16/21 1553   Wound Cleansed Soap and water 01/16/21 0830   Dressing/Treatment Moisture barrier;Protective barrier 01/18/21 0900   Wound Length (cm) 0 cm 01/18/21 0900   Wound Width (cm) 0 cm 01/18/21 0900   Wound Depth (cm) 0 cm 01/18/21 0900   Wound Surface Area (cm^2) 0 cm^2 01/18/21 0900   Change in Wound Size % (l*w) 100 01/18/21 0900   Wound Volume (cm^3) 0 cm^3 01/18/21 0900   Wound Healing % 100 01/18/21 0900   Wound Assessment Pink/red;Purple/maroon; Other (Comment) 01/18/21 0900   Drainage Amount None 01/18/21 0900   Odor None 01/18/21 0900   Sierra-wound Assessment Cool;Ecchymosis; Excoriated 01/15/21 1603   Number of days: 484       Wound 09/22/19 Foot Left;Plantar DFU (Active)   Number of days: 484       Wound 09/22/19 Foot Right;Plantar DFU (Active)   Number of days: 484       Wound 08/26/20 Other (Comment) gluteal fold (Active)   Number of days: 144       Wound 09/02/20 Back Lateral;Left (Active)   Number of days: 138       Wound 01/18/21 Thigh Left;Posterior (Active)   Wound Etiology Pressure Stage  2 01/18/21 0951   Dressing Status New dressing applied 01/18/21 0951   Wound Cleansed Cleansed with saline 01/18/21 0951   Dressing/Treatment Collagen 01/18/21 0951   Wound Length (cm) 1.5 cm 01/18/21 0951   Wound Width (cm) 2.5 cm 01/18/21 0951   Wound Depth (cm) 0.1 cm 01/18/21 0951   Wound Surface Area (cm^2) 3.75 cm^2 01/18/21 0951   Wound Volume (cm^3) 0.38 cm^3 01/18/21 0951   Distance Tunneling (cm) 0 cm 01/18/21 0951   Tunneling Position ___ O'Clock 0 01/18/21 0951   Undermining Starts ___ O'Clock 0 01/18/21 0951   Undermining Ends___ O'Clock 0 01/18/21 0951   Undermining Maxium Distance (cm) 0 01/18/21 0951   Drainage Amount Moderate 01/18/21 0951   Drainage Description Serosanguinous 01/18/21 0951   Odor None 01/18/21 0951   Sierra-wound Assessment Dry/flaky 01/18/21 0951   Margins Defined edges 01/18/21 0951   Wound Thickness Description not for Pressure Injury Full thickness 01/18/21 0951   Number of days: 0       Response to treatment:  Well tolerated by patient.      Pain Assessment:  Severity:  none  Quality of pain: none  Wound Pain Timing/Severity:   Premedicated: no    Plan:     Plan of Care: Wound 10/29/19 Thigh Right;Posterior-Dressing/Treatment: Dry dressing(Pt has a clean, dry, intact 2 x 2 Mepilex dressing over area)  Wound 01/18/21 Thigh Left;Posterior-Dressing/Treatment: Collagen(sacral border)  [REMOVED] Wound Thigh Left;Posterior-Dressing/Treatment: Dry dressing  [REMOVED] Wound 10/29/19 Abdomen Anterior;Right cluster under abdominal fold-Dressing/Treatment: Other (comment)(medicated powder and inner dry)  Wound 09/22/19 sacral/buttock cluster-Dressing/Treatment: Moisture barrier, Protective barrier     Pt in bed agreeable to wound care eval. Pt had dry cracked fissure areas to left heel/lt great toe/rt foot recommend alphabath and hydraguard lotion. Pt has chronic wound to left posterior thigh pressure/moisture etiology cleansed with NS measured and pictured dressing as above. Folds use micotin powder. Buttocks intact use calazime cream. Pt turned to rt side. Heels floated. Pt is at high risk for skin breakdown AEB agapito score. Observe agapito orders. Specialty Bed Required : yes  [] Low Air Loss   [] Pressure Redistribution  [] Fluid Immersion  [x] Bariatric  [] Total Pressure Relief  [] Other:     Discharge Plan:  Placement for patient upon discharge:  tbd  Hospice Care: no  Patient appropriate for Outpatient [address]: [facility]    Patient/Caregiver Teaching:  Level of patient/caregiver understanding able to: cares explained as given. Electronically signed by Susy Washington RN,  on 1/18/2021 at 12:07 PM

## 2021-01-18 NOTE — PROGRESS NOTES
[facility] PROGRESS NOTE  Date: 1/18/2021   Name: Arabella Kelley   MRN: [id number]   YOB: 1955  Chief Complaint   Patient presents with    Shortness of Breath    Foot Pain     bilateral, no known injury        Subjective/Interval Hx:     As per nurse has been more awake today  Making urine  Not eating solid food, but drinking a lot  Does come off bipap to take oral   Still on pressor      ROS: negative unless mentioned above  Objective:   Physical Exam:   BP (!) 154/64   Pulse 89   Temp 98 °F (36.7 °C)   Resp 21   Ht 5' 8\" (1.727 m)   Wt (!) 378 lb 15.5 oz (171.9 kg)   SpO2 97%   BMI 57.62 kg/m²   CONSTITUTIONAL:  No acute distress, awake in bed  EYES:  No discharge  HENT:  NCAT  LUNGS:  On nasal canula currently, difficult to auscutlate large body habitus  CARDIOVASCULAR:  s1s2 rrr  ABDOMEN:  Soft nt nd  GENITAL/URINARY:  Has hernandez  MUSCULOSKELETAL/Extremities:  Trace pitting edema  NEUROLOGIC:  No gross deficits, aao to person, place, year, month  SKIN:  No gross lesions    Assessment/Plan:       1. Acute on chronic hypoxic and hypercarbic respiratory failure  -may be due to pneumonia and CHF. ABG: Respiratory acidosis. Pulmonology following. CXR: Interstitial/pulmonary edema. COVID-19 negative.  -Patient on BiPAP. -Pulmonology to start modafinil and theophylline to improve alertness.  -On heated high flow 10 L. Improved FiO2 35%. Patient is more alert today. 2. Shock  -Cardiogenic versus septic. Pulmonary edema is suggestive of cardiogenic. On antibiotics. Checking urine, sputum.   -Echo: EF 50-55%. Blood culture 1/13: NGTD. -Improved Levophed at 15 mcg  3. Acute on chronic diastolic CHF  -CXR: Pulmonary edema. On IV Lasix to try to reduce congestion, while on pressors. Holding beta-blocker, ACE inhibitor, Aldactone due to low BP.  -proBNP 12,170.  -Net -22.9 L output. 4. Possible pneumonia  -COVID-19 negative. Respiratory cultures pending.  CRP 66.  -Urine Legionella and strep negative. Blood culture 1/13: NGTD. -Procalcitonin 0.25. Respiratory PCR: Negative. 5. Acute kidney injury  -Baseline creatinine 0.9. Creatinine 1.6 on admission. Holding ACE inhibitor and Aldactone. UA: Pyuria, hematuria, moderate leuk esterase.  -Urine protein/creatinine: 1.3. Nephrology following.  -Improved creatinine 1.6.  6. UTI  -urine culture: Citrobacter freundii. On rocephin. 7. Anemia of chronic disease  -Iron 20. Transfused 1 unit PRBC. -GI ordered Venofer x3 doses, and outpatient EGD and colonoscopy if no gross bleeding.  -Hemoglobin stable around 8  8. Bradycardia  -EKG: Junctional rhythm.    -Cardiology has signed off. HR has improved. -Troponins negative. 9. Diabetes mellitus type 2  -On sliding scale insulin and Lantus. Endocrinology following. .  -A1c 6.1.  10. Hypermagnesemia  -Monitor.       Other comorbid conditions addressed include:     Hypertension  Morbid obesity BMI 57  Hyperlipidemia  COPD with no exacerbation    DVT Prophylaxis: lovenox  GI Prophylaxis: protonix  Diet: DIET CARB CONTROL; Home O2: 2LNC and CPAP/Bipap  Code Status: Full Code      Dispo:  -need breathing and blood pressure to improve. PT/OT.     Tania Mason MD  1/18/2021    Meds:   Meds:    insulin glargine  10 Units Subcutaneous Nightly    modafinil  200 mg Oral Daily    furosemide  40 mg Intravenous BID    theophylline  200 mg Oral BID    insulin lispro  0-6 Units Subcutaneous 2 times per day    lidocaine  5 mL Intradermal Once    amiodarone  200 mg Oral Daily    atorvastatin  40 mg Oral Nightly    busPIRone  10 mg Oral TID    clopidogrel  75 mg Oral Daily    [Held by provider] doxepin  10 mg Oral Nightly    gabapentin  400 mg Oral TID    metoprolol tartrate  12.5 mg Oral BID    pantoprazole  40 mg Oral Daily    polyethylene glycol  17 g Oral Daily    pramipexole  1 mg Oral BID    sertraline  25 mg Oral Nightly    [Held by provider] lisinopril  5 mg Oral Daily    [Held by provider] spironolactone  25 mg Oral Daily    vitamin D3  400 Units Oral Daily    sodium chloride flush  10 mL Intravenous 2 times per day    enoxaparin  40 mg Subcutaneous Daily    cefTRIAXone (ROCEPHIN) IV  1 g Intravenous Q24H    azithromycin  500 mg Intravenous Q24H    insulin lispro  0-12 Units Subcutaneous TID WC    furosemide  20 mg Intravenous See Admin Instructions    miconazole   Topical BID      Infusions:    norepinephrine 15 mcg/min (01/18/21 0228)    dextrose       PRN Meds:     albuterol sulfate HFA, 2 puff, Q4H PRN      sodium chloride flush, 10 mL, PRN      acetaminophen, 650 mg, Q6H PRN    Or      acetaminophen, 650 mg, Q6H PRN      potassium chloride, 40 mEq, PRN    Or      potassium alternative oral replacement, 40 mEq, PRN    Or      potassium chloride, 10 mEq, PRN      glucose, 15 g, PRN      dextrose, 12.5 g, PRN      glucagon (rDNA), 1 mg, PRN      dextrose, 100 mL/hr, PRN        Data/Labs:     Recent Labs     01/16/21 0558 01/17/21  0530 01/18/21  0615   WBC 6.7 6.6 6.6   HGB 7.9* 8.0* 8.2*   HCT 29.2* 29.7* 29.7*    178 175      Recent Labs     01/16/21 0558 01/17/21  0530    147*   K 4.3 3.8    100   CO2 30 35*   BUN 41* 33*   CREATININE 1.9* 1.6*     No results for input(s): AST, ALT, ALB, BILIDIR, BILITOT, ALKPHOS in the last 72 hours. No results for input(s): INR in the last 72 hours. Recent Labs     01/15/21  1635   TROPONINT <0.010     HgBA1c:   Lab Results   Component Value Date    LABA1C 6.1 01/14/2021     CALCIUM:  8.9/35 (01/17 0530)    I/O last 3 completed shifts:   In: 0925 [P.O.:380; I.V.:177; IV Piggyback:565]  Out: 9100 [Urine:9100]    Intake/Output Summary (Last 24 hours) at 1/18/2021 0757  Last data filed at 1/18/2021 0657  Gross per 24 hour   Intake 1121.96 ml   Output 9100 ml   Net -7978.04 ml

## 2021-01-18 NOTE — PROGRESS NOTES
Pt awake in bed, on nasal cannula, alert to self, location, time but disoriented to situation, per night shift nurse this disorientation is not new. Pt more alert this morning according to night nurse. Pt turned, and repositioned in bed.

## 2021-01-18 NOTE — PLAN OF CARE
Problem: Falls - Risk of:  Goal: Will remain free from falls  Description: Will remain free from falls  1/18/2021 0944 by Sandra Peraza RN  Outcome: Ongoing  1/18/2021 0246 by Natacha Jeffrey RN  Outcome: Ongoing  Goal: Absence of physical injury  Description: Absence of physical injury  1/18/2021 0944 by Sandra Peraza RN  Outcome: Ongoing  1/18/2021 0246 by Natacha Jeffrey RN  Outcome: Ongoing     Problem: Skin Integrity:  Goal: Will show no infection signs and symptoms  Description: Will show no infection signs and symptoms  1/18/2021 0944 by Sandra Peraza RN  Outcome: Ongoing  1/18/2021 0246 by Natacha Jeffrey RN  Outcome: Ongoing  Goal: Absence of new skin breakdown  Description: Absence of new skin breakdown  1/18/2021 0944 by Sandra Peraza RN  Outcome: Ongoing  1/18/2021 0246 by Natacha Jeffrey RN  Outcome: Ongoing     Problem: HEMODYNAMIC STATUS  Goal: Patient has stable vital signs and fluid balance  1/18/2021 0944 by Sandra Peraza RN  Outcome: Ongoing  1/18/2021 0246 by Natacha Jeffrey RN  Outcome: Ongoing     Problem: ACTIVITY INTOLERANCE/IMPAIRED MOBILITY  Goal: Mobility/activity is maintained at optimum level for patient  1/18/2021 0944 by Sandra Peraza RN  Outcome: Ongoing  1/18/2021 0246 by Natacha Jeffrey RN  Outcome: Ongoing     Problem: Gas Exchange - Impaired:  Goal: Levels of oxygenation will improve  Description: Levels of oxygenation will improve  1/18/2021 0944 by Sandra Peraza RN  Outcome: Ongoing  1/18/2021 0246 by Natacha Jeffrey RN  Outcome: Ongoing     Problem: Cardiac Output - Decreased:  Goal: Hemodynamic stability will improve  Description: Hemodynamic stability will improve  1/18/2021 0944 by Sandra Peraza RN  Outcome: Ongoing  1/18/2021 0246 by Natacha Jeffrey RN  Outcome: Ongoing     Problem: Cardiac:  Goal: Ability to maintain vital signs within normal range will improve  Description: Ability to maintain vital signs within normal range will improve  1/18/2021 0944 by Christianne Sharif RN  Outcome: Ongoing  1/18/2021 0246 by Ac Sher RN  Outcome: Ongoing  Goal: Cardiovascular alteration will improve  Description: Cardiovascular alteration will improve  1/18/2021 0944 by Christianne Sharif RN  Outcome: Ongoing  1/18/2021 0246 by Ac Sher RN  Outcome: Ongoing     Problem: Health Behavior:  Goal: Will modify at least one risk factor affecting health status  Description: Will modify at least one risk factor affecting health status  1/18/2021 0944 by Christianne Sharif RN  Outcome: Ongoing  1/18/2021 0246 by Ac Sher RN  Outcome: Ongoing  Goal: Identification of resources available to assist in meeting health care needs will improve  Description: Identification of resources available to assist in meeting health care needs will improve  1/18/2021 0944 by Christianne Sharif RN  Outcome: Ongoing  1/18/2021 0246 by Ac Shre RN  Outcome: Ongoing     Problem: Physical Regulation:  Goal: Complications related to the disease process, condition or treatment will be avoided or minimized  Description: Complications related to the disease process, condition or treatment will be avoided or minimized  1/18/2021 0944 by Christianne Sharif RN  Outcome: Ongoing  1/18/2021 0246 by Ac Sher RN  Outcome: Ongoing     Problem: Discharge Planning:  Goal: Discharged to appropriate level of care  Description: Discharged to appropriate level of care  1/18/2021 0944 by Christianne Sharif RN  Outcome: Ongoing  1/18/2021 0246 by Ac Sher RN  Outcome: Ongoing     Problem: Serum Glucose Level - Abnormal:  Goal: Ability to maintain appropriate glucose levels will improve  Description: Ability to maintain appropriate glucose levels will improve  1/18/2021 0944 by Christianne Sharif RN  Outcome: Ongoing  1/18/2021 0246 by Ac Sher RN  Outcome: Ongoing     Problem: Sensory Perception - Impaired:  Goal: Ability to maintain a stable neurologic state will improve  Description: Ability to maintain a stable neurologic state will improve  1/18/2021 0944 by Keena Concepcion RN  Outcome: Ongoing  1/18/2021 0246 by Vinita Grier RN  Outcome: Ongoing

## 2021-01-18 NOTE — PROGRESS NOTES
Pulmonary and Critical Care  Progress Note      VITALS:  BP (!) 112/50   Pulse 68   Temp 98.6 °F (37 °C) (Oral)   Resp 19   Ht 5' 8\" (1.727 m)   Wt (!) 378 lb 15.5 oz (171.9 kg)   SpO2 99%   BMI 57.62 kg/m²     Subjective:   CHIEF COMPLAINT :SOB     HPI:                The patient is more [place]. She is following commands. She is not in acute resp distress. She is tolerating the BIPAP. She has good urine output. Objective:   PHYSICAL EXAM:    LUNGS:Decreased air entry bilateral bases  Abd-distended,BS+,NT  Ext- Chronic LE venous stasis  CVS-s1s2, no murmurs      DATA:    CBC:  Recent Labs     01/16/21  0558 01/17/21  0530 01/18/21  0615   WBC 6.7 6.6 6.6   RBC 3.21* 3.24* 3.30*   HGB 7.9* 8.0* 8.2*   HCT 29.2* 29.7* 29.7*    178 175   MCV 91.0 91.7 90.0   MCH 24.6* 24.7* 24.8*   MCHC 27.1* 26.9* 27.6*   RDW 18.3* 17.9* 17.8*   NRBC 3  --   --    SEGSPCT 55.0 58.4 65.8   BANDSPCT 3*  --   --       BMP:  Recent Labs     01/16/21  0558 01/17/21  0530 01/18/21  0615    147* 145   K 4.3 3.8 3.3*    100 94*   CO2 30 35* 44*   BUN 41* 33* 22   CREATININE 1.9* 1.6* 1.3*   CALCIUM 8.6 8.9 9.0   GLUCOSE 124* 150* 138*      ABG:  Recent Labs     01/17/21  0900 01/17/21  1500   PH 7.31* 7.34   PO2ART 75 36*   NQI9TJN 89.0* 92.0*   O2SAT 93.0* 67.5*     BNP  No results found for: BNP   D-Dimer:  Lab Results   Component Value Date    DDIMER 1729 (H) 06/27/2020      1.  Radiology: None      Assessment/Plan     Patient Active Problem List    Diagnosis Date Noted    Acute exacerbation of chronic obstructive pulmonary disease (COPD) ([facility]      Priority: High    Hypervolemia      Priority: High    Anxiety 09/01/2020     Priority: Medium     Class: Acute    Acute congestive heart failure (HCC)     Acute respiratory failure with hypoxia (HCC)     Bradycardia     Septic shock ([facility] 08/25/2020    Acute kidney injury superimposed on CKD ([facility] 08/25/2020    S/P TAVR (transcatheter aortic valve replacement) 06/26/2020    Chronic diastolic heart failure ([address] 06/25/2020    Wide-complex tachycardia (HCC)     Altered mental status     Sepsis ([address] 09/21/2019    Acute kidney injury ([address] 08/22/2019    Acute metabolic encephalopathy 19/70/9240    Shock, unspecified ([address] 08/22/2019    Uncontrolled diabetes mellitus ([address] 08/22/2019    SIRS (systemic inflammatory response syndrome) ([address] 08/12/2019    Class 3 severe obesity due to excess calories with body mass index (BMI) greater than or equal to 70 in adult [facility]) 06/16/2019    VHD (valvular heart disease) 04/02/2019     Overview Note:     H/o TAVR      Pneumonia 02/06/2019    Skin ulcer of left foot with fat layer exposed ([address]     Cellulitis 11/24/2018    Acute on chronic respiratory failure with hypoxia and hypercapnia (HCC)     Gait disturbance 02/10/2018    Chronic obstructive pulmonary disease ([address] 02/07/2018    Chronic respiratory failure with hypoxia and hypercapnia (HCC) 01/30/2018    Morbid obesity ([address]     Asthma     Diabetes mellitus ([address]     Hypertension     Sleep apnea     Family history of coronary artery disease     Chest pain     SOB (shortness of breath)     Palpitations     Peripheral edema     Aortic stenosis     Morbid obesity with BMI of 70 and over, adult ([address] 10/27/2016    Dyslipidemia 10/27/2016    Fecal incontinence 10/27/2016    Mild intermittent asthma without complication 09/84/0233    S/P laparoscopic cholecystectomy 10/27/2016    Type 2 diabetes mellitus without complication, with long-term current use of insulin ([address] 10/27/2016    Fatty liver 10/27/2016    Arthritis 10/27/2016    H/O parotidectomy 10/27/2016    Venous stasis dermatitis of both lower extremities 10/27/2016     Hypokalemia  Hypomagnesemia  JOSE- improved  Morbid Obesity  ANMOL  OHS  Acute on Chronic Hypoxic Hypercapneic resp failure  Chronic Metabolic alkalosis  Pulmonary congestion  Basal atelectasis  Chronic LE venous stasis  Suspected CAP       1. Modafinil  2. Theophylline  3. BIPAP  4. Diuresis  5. Wean off pressors for a MAP > 65 mmhg  6. ICS  7. OOB  8. PT/OT  9. CHF optimization  10. Await placement  11. C/w present management  12. CXR in am  No follow-ups on file.     Electronically signed by Geneva Mobley MD on 1/18/2021 at 10:59 AM

## 2021-01-18 NOTE — PROGRESS NOTES
Nephrology Progress Note  1/18/2021 10:38 AM  Subjective:      Interval History: Ever Males is a 63 y.o. female  Appear more awake and good diuresis      Data:   Scheduled Meds:   insulin glargine  10 Units Subcutaneous Nightly    modafinil  200 mg Oral Daily    furosemide  40 mg Intravenous BID    insulin lispro  0-6 Units Subcutaneous 2 times per day    lidocaine  5 mL Intradermal Once    amiodarone  200 mg Oral Daily    atorvastatin  40 mg Oral Nightly    busPIRone  10 mg Oral TID    clopidogrel  75 mg Oral Daily    [Held by provider] doxepin  10 mg Oral Nightly    gabapentin  400 mg Oral TID    metoprolol tartrate  12.5 mg Oral BID    pantoprazole  40 mg Oral Daily    polyethylene glycol  17 g Oral Daily    pramipexole  1 mg Oral BID    sertraline  25 mg Oral Nightly    [Held by provider] lisinopril  5 mg Oral Daily    [Held by provider] spironolactone  25 mg Oral Daily    vitamin D3  400 Units Oral Daily    sodium chloride flush  10 mL Intravenous 2 times per day    enoxaparin  40 mg Subcutaneous Daily    cefTRIAXone (ROCEPHIN) IV  1 g Intravenous Q24H    azithromycin  500 mg Intravenous Q24H    insulin lispro  0-12 Units Subcutaneous TID WC    furosemide  20 mg Intravenous See Admin Instructions    miconazole   Topical BID     Continuous Infusions:   norepinephrine 10 mcg/min (01/18/21 0850)    dextrose             CBC:   Recent Labs     01/16/21  0558 01/17/21  0530 01/18/21  0615   WBC 6.7 6.6 6.6   HGB 7.9* 8.0* 8.2*    178 175     BMP:    Recent Labs     01/16/21  0558 01/17/21  0530 01/18/21  0615    147* 145   K 4.3 3.8 3.3*    100 94*   CO2 30 35* 44*   BUN 41* 33* 22   CREATININE 1.9* 1.6* 1.3*   GLUCOSE 124* 150* 138*       Renal Labs  Albumin:    Lab Results   Component Value Date    LABALBU 3.3 01/13/2021     Calcium:    Lab Results   Component Value Date    CALCIUM 9.0 01/18/2021     Phosphorus:    Lab Results   Component Value Date    PHOS 3.2 07/02/2020     U/A:    Lab Results   Component Value Date    NITRU NEGATIVE 01/13/2021    COLORU YELLOW 01/13/2021    WBCUA 2979 01/13/2021    RBCUA 483 01/13/2021    MUCUS RARE 12/09/2020    TRICHOMONAS NONE SEEN 01/13/2021    YEAST OCCASIONAL 12/09/2020    BACTERIA MANY 01/13/2021    CLARITYU CLOUDY 01/13/2021    SPECGRAV 1.018 01/13/2021    UROBILINOGEN NORMAL 01/13/2021    BILIRUBINUR NEGATIVE 01/13/2021    BLOODU LARGE 01/13/2021    KETUA NEGATIVE 01/13/2021           Objective:   I/O: 01/17 0701 - 01/18 0700  In: 1122 [P.O.:380; I.V.:177]  Out: 9100 [Urine:9100]  Vitals: BP (!) 118/41   Pulse 102   Temp 98.6 °F (37 °C) (Oral)   Resp 13   Ht 5' 8\" (1.727 m)   Wt (!) 378 lb 15.5 oz (171.9 kg)   SpO2 99%   BMI 57.62 kg/m²   General appearance: sleepy on bipap  HEENT: Head: Normal, normocephalic, atraumatic.   Neck: supple, symmetrical, trachea midline  Lungs: diminished breath sounds bilaterally  Heart: S1, S2 normal  Abdomen: abnormal findings:  soft nt  Extremities: edema +  Neurologic: Mental status: alertness: tired on bipap        Assessment and Plan:      IMP:  arf from atn/chf  resp failure  chf  Hypokalemia  Dm2  anemia    Plan     1 renal stable overall and sig uop keeo negative  2 o2 starting to improve  3 maintain with diuresis  4 ssi  5 hb low stable  6 replete K  Will follow             Kerwin Britt MD

## 2021-01-18 NOTE — PROGRESS NOTES
[facility]        Progress Note       2021  5:44 PM    Patient:    Lelia Mohan  :    63 y.o. MRN: [id number]  Admitted: 2021  2:15 PM ATT: Nolberto Castro MD   [id number]  AdmitDx: Bradycardia [R00.1]  Hypercarbia [R06.89]  Peripheral edema [R60.9]  SIRS (systemic inflammatory response syndrome) (HCC) [R65.10]  Acute kidney injury ([address] [N17.9]  Septic shock ([address] [A41.9, R65.21]  Severe anemia [D64.9]  Hypotension, unspecified hypotension type [I95.9]  Acute congestive heart failure, unspecified heart failure type ([address] [I50.9]  PCP: THEODORE Garcia NP    SUBJECTIVE:  Chart reviewed, events noted  Patient nauseated today. No BM, yet. Tolerating PO diet, poor intake. Denies abdominal pain.     ROS:  The positive ROS will be identified in bold     CONSTITUTIONAL:  Neg  Weight loss, fatigue, fever  MOUTH/THROAT:  Neg  Bleeding gums, hoarseness or sore throat  RESPIRATORY:   Neg SOB, wheeze, cough, hemoptysis or bronchitis  CARDIOVASCULAR:  Neg Chest pain, palpitations, dyspnea on exertion, edema  GASTROINTESTINAL:  SEE HPI  HEMATOLOGIC/LYMPHATIC:  Anemia, bleeding tendency  MUSCULOSKELETAL: Neg  New myalgias, joint pain, swelling or stiffness  NEUROLOGICAL:  Neg  Loss of Consciousness, memory loss, forgetfulness, periods of confusion, difficulty concentrating, seizures, insomnia, aphasia    SKIN:  Neg No itching, rashes, or sores  PSYCHIATRIC:  Neg Depression, personality changes, anxiety    OBJECTIVE:      BP (!) 110/52   Pulse 71   Temp 98.1 °F (36.7 °C) (Oral)   Resp 13   Ht 5' 8\" (1.727 m)   Wt (!) 378 lb 15.5 oz (171.9 kg)   SpO2 99%   BMI 57.62 kg/m²     NAD, appears comfortable, lying in bed on bipap  Lips and mucous membranes pink and moist  RRR, Nl s1s2  Lungs CTA bilaterally, respirations even and unlabored, on bipap   Abdomen soft, ND, NT, no HSM, bowel sounds normal  Skin pink, warm, dry and CR brisk   2+ edema bilateral lower extremities, cracked heels  AAOx3     CBC:   Recent Labs     01/16/21  0558 01/17/21  0530 01/18/21  0615   WBC 6.7 6.6 6.6   HGB 7.9* 8.0* 8.2*    178 175     BMP:    Recent Labs     01/16/21  0558 01/17/21  0530 01/18/21  0615    147* 145   K 4.3 3.8 3.3*    100 94*   CO2 30 35* 44*   BUN 41* 33* 22   CREATININE 1.9* 1.6* 1.3*   GLUCOSE 124* 150* 138*     Magnesium:   Lab Results   Component Value Date    MG 1.6 01/18/2021     Hepatic: No results for input(s): AST, ALT, ALB, BILITOT, ALKPHOS in the last 72 hours. INR: No results for input(s): INR in the last 72 hours.       Intake/Output Summary (Last 24 hours) at 1/18/2021 1744  Last data filed at 1/18/2021 1707  Gross per 24 hour   Intake 1141.96 ml   Output 7000 ml   Net -5858.04 ml         Medications    Scheduled Medications:    lubiprostone  24 mcg Oral BID WC    bisacodyl  10 mg Rectal Q6H    metoclopramide  10 mg Intravenous Q6H    senna  2 tablet Oral Nightly    insulin glargine  10 Units Subcutaneous Nightly    modafinil  200 mg Oral Daily    furosemide  40 mg Intravenous BID    insulin lispro  0-6 Units Subcutaneous 2 times per day    lidocaine  5 mL Intradermal Once    amiodarone  200 mg Oral Daily    atorvastatin  40 mg Oral Nightly    busPIRone  10 mg Oral TID    clopidogrel  75 mg Oral Daily    [Held by provider] doxepin  10 mg Oral Nightly    gabapentin  400 mg Oral TID    metoprolol tartrate  12.5 mg Oral BID    pantoprazole  40 mg Oral Daily    polyethylene glycol  17 g Oral Daily    pramipexole  1 mg Oral BID    sertraline  25 mg Oral Nightly    [Held by provider] lisinopril  5 mg Oral Daily    [Held by provider] spironolactone  25 mg Oral Daily    vitamin D3  400 Units Oral Daily    sodium chloride flush  10 mL Intravenous 2 times per day    enoxaparin  40 mg Subcutaneous Daily    cefTRIAXone (ROCEPHIN) IV  1 g Intravenous Q24H    azithromycin  500 mg Intravenous Q24H    insulin lispro  0-12 Units Subcutaneous TID WC    furosemide  20 mg Intravenous See Admin Instructions    miconazole   Topical BID     PRN Medications: albuterol sulfate HFA, sodium chloride flush, acetaminophen **OR** acetaminophen, potassium chloride **OR** potassium alternative oral replacement **OR** potassium chloride, glucose, dextrose, glucagon (rDNA), dextrose  Infusions:    norepinephrine 6 mcg/min (01/18/21 1651)    dextrose           Patient Active Problem List   Diagnosis Code    Morbid obesity with BMI of 70 and over, adult ([facility] E66.01, Z68.45    Dyslipidemia E78.5    Fecal incontinence R15.9    Mild intermittent asthma without complication R78.62    S/P laparoscopic cholecystectomy Z90.49    Type 2 diabetes mellitus without complication, with long-term current use of insulin ([facility] E11.9, Z79.4    Fatty liver K76.0    Arthritis M19.90    H/O parotidectomy Z90.49    Venous stasis dermatitis of both lower extremities I87.2    Aortic stenosis I35.0    Morbid obesity ([facility]) E66.01    Asthma J45.909    Diabetes mellitus ([facility] E11.9    Hypertension I10    Sleep apnea G47.30    Family history of coronary artery disease Z82.49    Chest pain R07.9    SOB (shortness of breath) R06.02    Palpitations R00.2    Peripheral edema R60.9    Hypervolemia E87.70    Chronic respiratory failure with hypoxia and hypercapnia ([facility]) J96.11, J96.12    Acute exacerbation of chronic obstructive pulmonary disease (COPD) ([facility]) J44.1    Chronic obstructive pulmonary disease ([facility]) J44.9    Gait disturbance R26.9    Acute on chronic respiratory failure with hypoxia and hypercapnia ([facility]) J96.21, J96.22    Cellulitis L03.90    Skin ulcer of left foot with fat layer exposed ([facility] L97.522    Pneumonia J18.9    VHD (valvular heart disease) I38    Class 3 severe obesity due to excess calories with body mass index (BMI) greater than or equal to 70 in adult ([facility] E66.01, Z68.45    SIRS (systemic inflammatory response syndrome) ([facility] R65.10    Acute kidney injury ([facility] N17.9    Acute metabolic encephalopathy T31.81    Shock, unspecified ([facility] R57.9    Uncontrolled diabetes mellitus ([facility] E11.65    Sepsis (HCC) A41.9    Altered mental status R41.82    Wide-complex tachycardia (HCC) I47.2    Chronic diastolic heart failure (HCC) I50.32    S/P TAVR (transcatheter aortic valve replacement) Z95.2    Septic shock (HCC) A41.9, R65.21    Acute kidney injury superimposed on CKD (HCC) N17.9, N18.9    Bradycardia R00.1    Anxiety F41.9    Acute respiratory failure with hypoxia ([facility]) J96.01    Acute congestive heart failure (HCC) I50.9       ASSESSMENT AND RECOMMENDATIONS:    CHF,  Pneumonia, Levophed gtt,   HX of DM2, s/p TAVR, fatty liver     Anemia:  Acute on chronic  May be nutritional, chronic disease, bone marrow suppression d/t possible pneumonia, UTI  Hgb 8.2, s/p 1 unit PRBC  No gross blood loss noted since admission  Continue daily H&H, transfuse <7.0  Hx of Iron deficiency anemia, iron transfused   Maintain Protonix 40 mg daily   Diet as tolerated   Conservative GI management     Melena:  Improved   May be s/t gastritis  Protonix 40 mg daily as above for gastric mucosal prtection     Constipation, nausea: May be s/t gastroparesis, decreased mobility  Recommend Reglan 10 mg TID, amitiza 24 mcg BID, senna 2 tabs BID,  dulcolax suppository Q 6 hours  x 24 hours   Continue Miralax 17 g daily     Poor nutrition:  Recommend carb controlled protein supplement BID     EGD and colonoscopy high risk at this time due to morbid obesity and critical illness  GI workup if emergency or after clinically stable      Discussed plan of care with patient and Clara FRIED     Patient clinical, biochemical, and radiological information discussed with Dr. Selma Kelley. He agrees with the assessment and plan.      Melanie Acosta CNP  1/18/2021  5:44 PM     JANETTE  No overt bleed  Will need GI work up once pul status optimized  Probably before Dc or as Op    Continue PPI    I have seen and examined this patient personally, and independently of the nurse practitioner. The plan was developed mutually at the time of the visit with the patient. Deng Vogt and myself have spoken with patient, nursing staff and provided written and verbal instructions .     The above note has been reviewed and I agree with the Assessment,  Diagnosis, and Treatment plan as suggested by [facility]

## 2021-01-18 NOTE — CARE COORDINATION
Patient not able to participate (BIPAP, fogginess). Attempted to contact spouse Richardson Rosario at [phone]; [facility] for return call.  Lindsay Moran RN

## 2021-01-19 NOTE — CONSULTS
Occupational Therapy Evaluation Attempt Note  Therapy (OT/PT) attempted evaluation this date. RN states pt is not appropriate at this time. RN reports pt is on consistent BiPAP and is confused. Therapy will re-attempt when medically appropriate.

## 2021-01-19 NOTE — PROGRESS NOTES
modafinil  200 mg Oral Daily    furosemide  40 mg Intravenous BID    insulin lispro  0-6 Units Subcutaneous 2 times per day    lidocaine  5 mL Intradermal Once    amiodarone  200 mg Oral Daily    atorvastatin  40 mg Oral Nightly    busPIRone  10 mg Oral TID    clopidogrel  75 mg Oral Daily    [Held by provider] doxepin  10 mg Oral Nightly    gabapentin  400 mg Oral TID    metoprolol tartrate  12.5 mg Oral BID    pantoprazole  40 mg Oral Daily    polyethylene glycol  17 g Oral Daily    pramipexole  1 mg Oral BID    sertraline  25 mg Oral Nightly    [Held by provider] lisinopril  5 mg Oral Daily    [Held by provider] spironolactone  25 mg Oral Daily    vitamin D3  400 Units Oral Daily    sodium chloride flush  10 mL Intravenous 2 times per day    enoxaparin  40 mg Subcutaneous Daily    cefTRIAXone (ROCEPHIN) IV  1 g Intravenous Q24H    azithromycin  500 mg Intravenous Q24H    insulin lispro  0-12 Units Subcutaneous TID WC    furosemide  20 mg Intravenous See Admin Instructions    miconazole   Topical BID      Infusions:    norepinephrine 5 mcg/min (01/18/21 2059)    dextrose       PRN Meds:     albuterol sulfate HFA, 2 puff, Q4H PRN      sodium chloride flush, 10 mL, PRN      acetaminophen, 650 mg, Q6H PRN    Or      acetaminophen, 650 mg, Q6H PRN      potassium chloride, 40 mEq, PRN    Or      potassium alternative oral replacement, 40 mEq, PRN    Or      potassium chloride, 10 mEq, PRN      glucose, 15 g, PRN      dextrose, 12.5 g, PRN      glucagon (rDNA), 1 mg, PRN      dextrose, 100 mL/hr, PRN      Subjective:   Feels a bit better, on bipap    Objective:        Intake/Output Summary (Last 24 hours) at 1/19/2021 0857  Last data filed at 1/19/2021 0707  Gross per 24 hour   Intake 285 ml   Output 3500 ml   Net -3215 ml      Vitals:   Vitals:    01/19/21 0700   BP: 95/65   Pulse: 69   Resp: 12   Temp:    SpO2:      Physical Exam:   Gen:  awake, alert, no apparent distress  Head/Eyes:  Normocephalic atraumatic, EOMI   NECK:   symmetrical, trachea midline  LUNGS: Normal Effort on bipap  CARDIOVASCULAR:  Normal rate trace pedal edmea  ABDOMEN:  non distended  MUSCULOSKELETAL:  ROM limited  NEUROLOGIC: Alert and Oriented,  Cranial nerves II-XII are grossly intact.    SKIN:  no bruising or bleeding, normal skin color,  no redness      Data:       CBC   Recent Labs     01/17/21  0530 01/18/21  0615 01/19/21  0645   WBC 6.6 6.6 5.0   HGB 8.0* 8.2* 7.6*   HCT 29.7* 29.7* 27.9*    175 148      BMP   Recent Labs     01/17/21  0530 01/18/21  0615 01/19/21  0645   * 145  --    K 3.8 3.3* 3.1*    94* 96*   CO2 35* 44* 47*   BUN 33* 22 18   CREATININE 1.6* 1.3* 1.2*         Electronically signed by Peyton Brooks MD on 1/19/2021 at 8:57 AM

## 2021-01-19 NOTE — PROGRESS NOTES
Nephrology Progress Note  1/19/2021 10:44 AM  Subjective:      Interval History: Arabella Kelley is a 63 y.o. female  Doing ok on bipap today and anxious    Data:   Scheduled Meds:   [START ON 1/20/2021] enoxaparin  30 mg Subcutaneous Daily    lubiprostone  24 mcg Oral BID WC    bisacodyl  10 mg Rectal Q6H    metoclopramide  10 mg Intravenous Q6H    senna  2 tablet Oral Nightly    insulin glargine  10 Units Subcutaneous Nightly    modafinil  200 mg Oral Daily    furosemide  40 mg Intravenous BID    insulin lispro  0-6 Units Subcutaneous 2 times per day    lidocaine  5 mL Intradermal Once    amiodarone  200 mg Oral Daily    atorvastatin  40 mg Oral Nightly    busPIRone  10 mg Oral TID    clopidogrel  75 mg Oral Daily    [Held by provider] doxepin  10 mg Oral Nightly    gabapentin  400 mg Oral TID    metoprolol tartrate  12.5 mg Oral BID    pantoprazole  40 mg Oral Daily    polyethylene glycol  17 g Oral Daily    pramipexole  1 mg Oral BID    sertraline  25 mg Oral Nightly    [Held by provider] lisinopril  5 mg Oral Daily    [Held by provider] spironolactone  25 mg Oral Daily    vitamin D3  400 Units Oral Daily    sodium chloride flush  10 mL Intravenous 2 times per day    cefTRIAXone (ROCEPHIN) IV  1 g Intravenous Q24H    azithromycin  500 mg Intravenous Q24H    insulin lispro  0-12 Units Subcutaneous TID WC    furosemide  20 mg Intravenous See Admin Instructions    miconazole   Topical BID     Continuous Infusions:   norepinephrine 5 mcg/min (01/18/21 2059)    dextrose             CBC:   Recent Labs     01/17/21  0530 01/18/21  0615 01/19/21  0645   WBC 6.6 6.6 5.0   HGB 8.0* 8.2* 7.6*    175 148     BMP:    Recent Labs     01/17/21  0530 01/18/21  0615 01/19/21  0645   * 145 147*   K 3.8 3.3* 3.1*    94* 96*   CO2 35* 44* 47*   BUN 33* 22 18   CREATININE 1.6* 1.3* 1.2*   GLUCOSE 150* 138* 101*       Renal Labs  Albumin:    Lab Results   Component Value Date LABALBU 3.3 01/13/2021     Calcium:    Lab Results   Component Value Date    CALCIUM 8.8 01/19/2021     Phosphorus:    Lab Results   Component Value Date    PHOS 3.2 07/02/2020     U/A:    Lab Results   Component Value Date    NITRU NEGATIVE 01/13/2021    COLORU YELLOW 01/13/2021    WBCUA 2979 01/13/2021    RBCUA 483 01/13/2021    MUCUS RARE 12/09/2020    TRICHOMONAS NONE SEEN 01/13/2021    YEAST OCCASIONAL 12/09/2020    BACTERIA MANY 01/13/2021    CLARITYU CLOUDY 01/13/2021    SPECGRAV 1.018 01/13/2021    UROBILINOGEN NORMAL 01/13/2021    BILIRUBINUR NEGATIVE 01/13/2021    BLOODU LARGE 01/13/2021    KETUA NEGATIVE 01/13/2021           Objective:   I/O: 01/18 0701 - 01/19 0700  In: 285 [I.V.:285]  Out: 2850 [Urine:2850]  Vitals: BP 95/65   Pulse 69   Temp 98.1 °F (36.7 °C) (Oral)   Resp 12   Ht 5' 8\" (1.727 m)   Wt (!) 364 lb (165.1 kg) Comment: estimate, bed scale not working on bed  SpO2 94%   BMI 55.35 kg/m²   General appearance: sleepy on bipap  HEENT: Head: Normal, normocephalic, atraumatic.   Neck: supple, symmetrical, trachea midline  Lungs: diminished breath sounds bilaterally  Heart: S1, S2 normal  Abdomen: abnormal findings:  soft nt  Extremities: edema +  Neurologic: Mental status: alertness: tired on bipap        Assessment and Plan:      IMP:  arf from atn/chf  resp failure  chf  Hypokalemia/ hypernatremia  Dm2  anemia    Plan     1 renal holding and decrease diuretic  2 wean o2 as able  3 maintain diuresis gentle  4 watch na and K as adjust diuretic  5 ssi  6 hb monitor  Will follow and affect is key to improvement               Rhonda Rodrigues MD

## 2021-01-19 NOTE — PROGRESS NOTES
[facility] Gastroenterology        Progress Note       2021  7:18 AM    Patient:    Angelique Pablo  : 1955   63 y.o. MRN: [id number]  Admitted: 2021  2:15 PM ATT: Westley Don MD   [id number]  AdmitDx: Bradycardia [R00.1]  Hypercarbia [R06.89]  Peripheral edema [R60.9]  SIRS (systemic inflammatory response syndrome) (HCC) [R65.10]  Acute kidney injury ([facility] [N17.9]  Septic shock ([facility] [A41.9, R65.21]  Severe anemia [D64.9]  Hypotension, unspecified hypotension type [I95.9]  Acute congestive heart failure, unspecified heart failure type ([facility] [I50.9]  PCP: THEODORE Marc NP    SUBJECTIVE:  Chart reviewed, events noted  Patient feeling well. No complaints. Small bm yesterday, pt refusing laxatives. Good UOP . On BIPAP, if takes off 02 drops. Not eating, Denies N/V or abd pain.      ROS:  The positive ROS will be identified in bold     CONSTITUTIONAL:  Neg  Weight loss, fatigue, fever  MOUTH/THROAT:  Neg  Bleeding gums, hoarseness or sore throat  RESPIRATORY:   Neg SOB, wheeze, cough, hemoptysis or bronchitis  CARDIOVASCULAR:  Neg Chest pain, palpitations, dyspnea on exertion, edema  GASTROINTESTINAL:  SEE HPI  HEMATOLOGIC/LYMPHATIC:  Neg  Anemia, bleeding tendency  MUSCULOSKELETAL: Neg  New myalgias, joint pain, swelling or stiffness  NEUROLOGICAL:  Neg  Loss of Consciousness, memory loss, forgetfulness, periods of confusion, difficulty concentrating, seizures, insomnia, aphasia    SKIN:  Neg No itching, rashes, or sores  PSYCHIATRIC:  Neg Depression, personality changes, anxiety    OBJECTIVE:      BP 95/65   Pulse 69   Temp 98.1 °F (36.7 °C) (Oral)   Resp 12   Ht 5' 8\" (1.727 m)   Wt (!) 364 lb (165.1 kg) Comment: estimate, bed scale not working on bed  SpO2 94%   BMI 55.35 kg/m²     NAD, appears comfortable in bed  Lips and mucous membranes pink and moist  RRR, Nl s1s2  Lungs diminshed bilaterally, respirations even and unlabored on BIPAP  Abdomen soft, ND, NT, no HSM, bowel sounds normal  Skin pink, warm and dry, erythematous lower legs, sores on L big toe and & R bottom of foot. Dry legs   1+ edema bilateral lower extremities   AAOx1    CBC:   Recent Labs     01/17/21  0530 01/18/21  0615 01/19/21  0645   WBC 6.6 6.6 5.0   HGB 8.0* 8.2* 7.6*    175 148     BMP:    Recent Labs     01/17/21  0530 01/18/21  0615   * 145   K 3.8 3.3*    94*   CO2 35* 44*   BUN 33* 22   CREATININE 1.6* 1.3*   GLUCOSE 150* 138*     Magnesium:   Lab Results   Component Value Date    MG 1.6 01/18/2021     Hepatic: No results for input(s): AST, ALT, ALB, BILITOT, ALKPHOS in the last 72 hours. INR: No results for input(s): INR in the last 72 hours.       Intake/Output Summary (Last 24 hours) at 1/19/2021 0718  Last data filed at 1/19/2021 3333  Gross per 24 hour   Intake 285 ml   Output 3500 ml   Net -3215 ml         Medications    Scheduled Medications:    lubiprostone  24 mcg Oral BID WC    bisacodyl  10 mg Rectal Q6H    metoclopramide  10 mg Intravenous Q6H    senna  2 tablet Oral Nightly    insulin glargine  10 Units Subcutaneous Nightly    modafinil  200 mg Oral Daily    furosemide  40 mg Intravenous BID    insulin lispro  0-6 Units Subcutaneous 2 times per day    lidocaine  5 mL Intradermal Once    amiodarone  200 mg Oral Daily    atorvastatin  40 mg Oral Nightly    busPIRone  10 mg Oral TID    clopidogrel  75 mg Oral Daily    [Held by provider] doxepin  10 mg Oral Nightly    gabapentin  400 mg Oral TID    metoprolol tartrate  12.5 mg Oral BID    pantoprazole  40 mg Oral Daily    polyethylene glycol  17 g Oral Daily    pramipexole  1 mg Oral BID    sertraline  25 mg Oral Nightly    [Held by provider] lisinopril  5 mg Oral Daily    [Held by provider] spironolactone  25 mg Oral Daily    vitamin D3  400 Units Oral Daily    sodium chloride flush  10 mL Intravenous 2 times per day    enoxaparin  40 mg Subcutaneous Daily    cefTRIAXone (ROCEPHIN) IV  1 g Intravenous Q24H    azithromycin  500 mg Intravenous Q24H    insulin lispro  0-12 Units Subcutaneous TID WC    furosemide  20 mg Intravenous See Admin Instructions    miconazole   Topical BID     PRN Medications: albuterol sulfate HFA, sodium chloride flush, acetaminophen **OR** acetaminophen, potassium chloride **OR** potassium alternative oral replacement **OR** potassium chloride, glucose, dextrose, glucagon (rDNA), dextrose  Infusions:    norepinephrine 5 mcg/min (01/18/21 2059)    dextrose             Patient Active Problem List   Diagnosis Code    Morbid obesity with BMI of 70 and over, adult ([facility] E66.01, Z68.45    Dyslipidemia E78.5    Fecal incontinence R15.9    Mild intermittent asthma without complication K80.28    S/P laparoscopic cholecystectomy Z90.49    Type 2 diabetes mellitus without complication, with long-term current use of insulin ([facility]) E11.9, Z79.4    Fatty liver K76.0    Arthritis M19.90    H/O parotidectomy Z90.49    Venous stasis dermatitis of both lower extremities I87.2    Aortic stenosis I35.0    Morbid obesity ([facility]) E66.01    Asthma J45.909    Diabetes mellitus ([facility] E11.9    Hypertension I10    Sleep apnea G47.30    Family history of coronary artery disease Z82.49    Chest pain R07.9    SOB (shortness of breath) R06.02    Palpitations R00.2    Peripheral edema R60.9    Hypervolemia E87.70    Chronic respiratory failure with hypoxia and hypercapnia ([facility]) J96.11, J96.12    Acute exacerbation of chronic obstructive pulmonary disease (COPD) ([facility]) J44.1    Chronic obstructive pulmonary disease (HCC) J44.9    Gait disturbance R26.9    Acute on chronic respiratory failure with hypoxia and hypercapnia ([facility]) J96.21, J96.22    Cellulitis L03.90    Skin ulcer of left foot with fat layer exposed ([facility] L97.522    Pneumonia J18.9    VHD (valvular heart disease) I38    Class 3 severe obesity due to excess calories with body mass index (BMI) greater than or equal to 70 in adult ([facility]) E66.01, Z68.45    SIRS (systemic inflammatory response syndrome) ([facility]) R65.10    Acute kidney injury ([facility] N17.9    Acute metabolic encephalopathy V02.62    Shock, unspecified ([facility] R57.9    Uncontrolled diabetes mellitus (HCC) E11.65    Sepsis ([facility]) A41.9    Altered mental status R41.82    Wide-complex tachycardia ([facility]) I47.2    Chronic diastolic heart failure ([facility]) I50.32    S/P TAVR (transcatheter aortic valve replacement) Z95.2    Septic shock ([facility]) A41.9, R65.21    Acute kidney injury superimposed on CKD ([facility]) N17.9, N18.9    Bradycardia R00.1    Anxiety F41.9    Acute respiratory failure with hypoxia ([facility]) J96.01    Acute congestive heart failure ([facility]) I50.9     ASSESSMENT AND RECOMMENDATIONS:     CHF,  Pneumonia, Levophed gtt,   HX of DM2, s/p TAVR, fatty liver     Anemia:  Acute on chronic  May be nutritional, chronic disease, bone marrow suppression  ? Melena , no report overnight    Hgb 7.6, s/p 1 unit PRBC  Continue daily H&H, transfuse <7.0  Hx of Iron deficiency anemia, iron transfused   Maintain Protonix 40 mg daily   Diet as tolerated   Conservative GI management      Constipation: PT refusing laxative  No nausea overnight  May be s/t gastroparesis, decreased mobility  Recommend Reglan 10 mg TID, amitiza 24 mcg BID, senna 2 tabs BID   Continue Miralax 17 g daily      Poor nutrition:  Recommend carb controlled protein supplement BID      EGD and colonoscopy high risk at this time due to morbid obesity and critical illness  GI workup if emergency or after clinically stable, please call    Will sign off. Call for further needs. Discussed plan of care with patient and RN    Patient clinical, biochemical, and radiological information discussed with Dr. Joana Balbuena. He agrees with the assessment and plan.      Kirti Bishop CNP  1/19/2021  7:18 AM     Hb stable  plesae call when pul sdtatus stable for procedures  Continue PPI BID    I have seen and examined this patient personally, and independently of the nurse practitioner. The plan was developed mutually at the time of the visit with the patient. Kandy Steiner and myself have spoken with patient, nursing staff and provided written and verbal instructions .     The above note has been reviewed and I agree with the Assessment,  Diagnosis, and Treatment plan as suggested by Kandy Steiner [address] gastroenterology

## 2021-01-19 NOTE — PROGRESS NOTES
Lab has enough blood to run the add on of albumin- called and verified with [facility] and the [place]- the technition

## 2021-01-19 NOTE — PROGRESS NOTES
Progress Note( Dr. Sohail Feldman)  1/18/2021  Subjective:   Admit Date: 1/13/2021  PCP: THEODORE Marc NP    Admitted For :Severe shortness of breath    Consulted For: Better control of blood glucose    Interval History: Patient is somewhat better still on BiPAP almost continuously  Except at time of eating     Denies any chest pains,   Yes SOB . On BiPAP  Denies nausea or vomiting. No new bowel or bladder symptoms.        Intake/Output Summary (Last 24 hours) at 1/18/2021 2321  Last data filed at 1/18/2021 1707  Gross per 24 hour   Intake 664.41 ml   Output 5200 ml   Net -4535.59 ml       DATA    CBC:   Recent Labs     01/16/21  0558 01/17/21  0530 01/18/21  0615   WBC 6.7 6.6 6.6   HGB 7.9* 8.0* 8.2*    178 175    CMP:  Recent Labs     01/16/21  0558 01/17/21  0530 01/18/21  0615    147* 145   K 4.3 3.8 3.3*    100 94*   CO2 30 35* 44*   BUN 41* 33* 22   CREATININE 1.9* 1.6* 1.3*   CALCIUM 8.6 8.9 9.0     Lipids:   Lab Results   Component Value Date    CHOL 151 01/14/2021    HDL 46 01/14/2021    TRIG 94 01/14/2021     Glucose:  Recent Labs     01/18/21  1127 01/18/21  1615 01/18/21  2056   POCGLU 124* 93 120*     [id number]:  Lab Results   Component Value Date    LABA1C 6.1 01/14/2021     High Sensitivity TSH:   Lab Results   Component Value Date    TSHHS 3.490 12/09/2020     Free T3: No results found for: FT3  Free T4:  Lab Results   Component Value Date    T4FREE 1.07 09/04/2018       Echocardiogram Limited    Result Date: 1/15/2021  Transthoracic Echocardiography Report (TTE)  Demographics   Patient Name       Sunny ADAMS Date of Study       01/14/2021   Date of Birth      1955         Gender              Female   Age                63 year(s)         Race                   Patient Number     [id number]         Room Number         2105   Visit Number       [id number]   Corporate ID       [id number]   Accession Number   [id number]         Noa Hernandez Judd Palma   Ordering Physician Moise Durant   Interpreting        [address] Renee Maki MD      Physician           Jaclyn Morataya MD  Procedure Type of Study   TTE procedure:ECHOCARDIOGRAM LIMITED. Procedure Date Date: 01/14/2021 Start: 02:11 PM Study Location: Portable Technical Quality: Limited visualization due to body habitus. Indications:Congestive heart failure. Patient Status: Routine Height: 68 inches Weight: 378 pounds BSA: 2.68 m2 BMI: 57.47 kg/m2 HR: 62 bpm BP: 107/55 mmHg  Conclusions   Summary  This is a limited echocardiogram. Technically difficult due to body habitus  Left ventricular systolic function is normal.  Ejection fraction is visually estimated at 50-55%. Dilated right ventricle with pressure/volume overload. S/p TAVR. Mild tricuspid regurgitation is present. RVSP is 43 mmHg. Mild pericardial effusion. Inferior vena cava is dilated, measuring at 3.4 cm, and does not collapse  with respiration. Signature   ------------------------------------------------------------------  Electronically signed by Davina Dougherty MD  (Interpreting physician) on 01/15/2021 at 08:06 AM  Xr Pelvis (1-2 Views)       Xr Chest Portable    Result Date: 1/15/2021  EXAMINATION: ONE XRAY VIEW OF THE CHEST 1/15/2021 5:49 am COMPARISON: 01/13/2021 HISTORY: ORDERING SYSTEM PROVIDED HISTORY: Hypoxia      1. Stable cardiomegaly with pulmonary edema, bilateral pleural effusions, and associated bibasilar atelectasis. Xr Chest Portable    Result Date: 1/13/2021  EXAMINATION: ONE XRAY VIEW OF THE CHEST 1/13/2021 2:24 pm COMPARISON: Chest x-ray dated 12/09/2020 HISTORY: ORDERING SYSTEM PROVIDED HISTORY: sob       Interstitial/pulmonary edema. Cardiomegaly. Probable small bilateral pleural effusions.         Scheduled Medicines   Medications:    lubiprostone  24 mcg Oral BID WC    bisacodyl  10 mg Rectal Q6H    metoclopramide  10 mg Intravenous Q6H    senna  2 tablet Oral Nightly    atropine        insulin glargine  10 Units Subcutaneous Nightly    modafinil  200 mg Oral Daily    furosemide  40 mg Intravenous BID    insulin lispro  0-6 Units Subcutaneous 2 times per day    lidocaine  5 mL Intradermal Once    amiodarone  200 mg Oral Daily    atorvastatin  40 mg Oral Nightly    busPIRone  10 mg Oral TID    clopidogrel  75 mg Oral Daily    [Held by provider] doxepin  10 mg Oral Nightly    gabapentin  400 mg Oral TID    metoprolol tartrate  12.5 mg Oral BID    pantoprazole  40 mg Oral Daily    polyethylene glycol  17 g Oral Daily    pramipexole  1 mg Oral BID    sertraline  25 mg Oral Nightly    [Held by provider] lisinopril  5 mg Oral Daily    [Held by provider] spironolactone  25 mg Oral Daily    vitamin D3  400 Units Oral Daily    sodium chloride flush  10 mL Intravenous 2 times per day    enoxaparin  40 mg Subcutaneous Daily    cefTRIAXone (ROCEPHIN) IV  1 g Intravenous Q24H    azithromycin  500 mg Intravenous Q24H    insulin lispro  0-12 Units Subcutaneous TID WC    furosemide  20 mg Intravenous See Admin Instructions    miconazole   Topical BID      Infusions:    norepinephrine 5 mcg/min (01/18/21 2059)    dextrose           Objective:   Vitals: BP (!) 107/56   Pulse 82   Temp 98 °F (36.7 °C) (Oral)   Resp 13   Ht 5' 8\" (1.727 m)   Wt (!) 378 lb 15.5 oz (171.9 kg)   SpO2 92%   BMI 57.62 kg/m²   General appearance: alert and cooperative with exam  Neck: no JVD or bruit  Thyroid : Normal lobes   Lungs: Has Vesicular Breath sounds has wheezing and rales bilaterally  Heart:  regular rate and rhythm  Abdomen: soft, non-tender; bowel sounds normal; no masses,  no organomegaly  Musculoskeletal: Normal  Extremities: extremities normal, , no edema  Neurologic:  Awake, alert, oriented to name, place and time. Cranial nerves II-XII are grossly intact. Motor is  intact.   Sensory neuropathy t., and gait is abnormal.  Unstable    Assessment:     Patient Active Problem List:     Morbid obesity with BMI of 70 and over, adult ([address]     Dyslipidemia     Fecal incontinence     Mild intermittent asthma without complication     S/P laparoscopic cholecystectomy     Type 2 diabetes mellitus without complication, with long-term current use of insulin ([facility])     Fatty liver     Arthritis     H/O parotidectomy     Venous stasis dermatitis of both lower extremities     Aortic stenosis     Morbid obesity (HCC)     Asthma     Diabetes mellitus ([address]     Hypertension     Sleep apnea     Family history of coronary artery disease     Chest pain     SOB (shortness of breath)     Palpitations     Peripheral edema     Hypervolemia     Chronic respiratory failure with hypoxia and hypercapnia (HCC)     Acute exacerbation of chronic obstructive pulmonary disease (COPD) (HCC)     Chronic obstructive pulmonary disease (HCC)     Gait disturbance     Acute on chronic respiratory failure with hypoxia and hypercapnia ([facility])     Cellulitis     Skin ulcer of left foot with fat layer exposed ([address]     Pneumonia     VHD (valvular heart disease)     Class 3 severe obesity due to excess calories with body mass index (BMI) greater than or equal to 70 in adult ([facility])     SIRS (systemic inflammatory response syndrome) ([facility])     Acute kidney injury ([address]     Acute metabolic encephalopathy     Shock, unspecified ([address]     Uncontrolled diabetes mellitus ([address]     Sepsis ([address]     Altered mental status     Wide-complex tachycardia ([facility])     Chronic diastolic heart failure ([facility])     S/P TAVR (transcatheter aortic valve replacement)     Septic shock ([facility])     Acute kidney injury superimposed on CKD ([facility])     Bradycardia     Anxiety     Acute respiratory failure with hypoxia ([facility])     Acute congestive heart failure ([address]      Plan:     1. Reviewed POC blood glucose . Labs and X ray results   2. Reviewed Current Medicines   3.  On meal/ Correction bolus Humalog/ Basal Lantus Insulin regime   4. Monitor Blood glucose frequently   5. Modified  the dose of Insulin/ other medicines as needed   6. Will follow     .      Hannah Garcia MD

## 2021-01-20 NOTE — PROGRESS NOTES
Comprehensive Nutrition Assessment    Type and Reason for Visit:  Initial(LOS)    Nutrition Recommendations/Plan:   Add 2 GM sodium diet modifier  Add frozen ONS BID  Encourage po intake as able  Please document all po intake  Will monitor po intake, nutrition status, poc    Nutrition Assessment:  Pt admitted for bradycardiac, hypercarbia, peripheral edema, JOSE, acute congestive heart failure, PMH: CHF, DM, COPD, pt currently on carb control diet with diabetic ONS, no meals recorded in the past 72 hr per chart review, pt on 6 L high flow NC, pt at high nutrition risk    Malnutrition Assessment:  Malnutrition Status: At risk for malnutrition (Comment)    Context:  Acute Illness       Estimated Daily Nutrient Needs:  Energy (kcal):  5202-3496([place] [address] [place] w/ stress factor 1.0-1.1); Weight Used for Energy Requirements:  Current     Protein (g):  (1.25-1.5 g/kg); Weight Used for Protein Requirements:  Ideal          Nutrition Related Findings:  Labs: Na 149, K+ 3.2, Mag 1.5, Hemoglobin 7.5      Wounds:  Pressure Injury, Stage II       Current Nutrition Therapies:    DIET CARB CONTROL;   Dietary Nutrition Supplements: Diabetic Oral Supplement    Anthropometric Measures:  · Height: 5' 7.99\" (172.7 cm)  · Current Body Weight: 363 lb 15.7 oz (165.1 kg)(unknown weight source)   · Admission Body Weight: (n/a)    · Usual Body Weight: 378 lb 15.5 oz (171.9 kg)((8/25/20) per chart review)     · Ideal Body Weight: 140 lbs; % Ideal Body Weight 260 %   · BMI: 55.4   · BMI Categories: Obese Class 3 (BMI 40.0 or greater)       Nutrition Diagnosis:   · Inadequate oral intake related to acute injury/trauma as evidenced by intake 0-25%    Nutrition Interventions:   Food and/or Nutrient Delivery:  Modify Current Diet  Nutrition Education/Counseling:  No recommendation at this time   Coordination of Nutrition Care:  Continue to monitor while inpatient    Goals:  pt will consume greater than 50% of meals and supplements Nutrition Monitoring and Evaluation:   Behavioral-Environmental Outcomes:  None Identified   Food/Nutrient Intake Outcomes:  Supplement Intake, Food and Nutrient Intake  Physical Signs/Symptoms Outcomes:  Biochemical Data, Weight, GI Status, Hemodynamic Status, Fluid Status or Edema     Discharge Planning:     Too soon to determine     Electronically signed by Dwight Harris MS, RD, LD on 1/20/21 at 4:03 PM EST    Contact: [phone]

## 2021-01-20 NOTE — PLAN OF CARE
Problem: Falls - Risk of:  Goal: Will remain free from falls  Description: Will remain free from falls  Outcome: Ongoing  Goal: Absence of physical injury  Description: Absence of physical injury  Outcome: Ongoing     Problem: Skin Integrity:  Goal: Will show no infection signs and symptoms  Description: Will show no infection signs and symptoms  Outcome: Ongoing  Goal: Absence of new skin breakdown  Description: Absence of new skin breakdown  Outcome: Ongoing     Problem: HEMODYNAMIC STATUS  Goal: Patient has stable vital signs and fluid balance  Outcome: Ongoing     Problem: ACTIVITY INTOLERANCE/IMPAIRED MOBILITY  Goal: Mobility/activity is maintained at optimum level for patient  Outcome: Ongoing     Problem: Gas Exchange - Impaired:  Goal: Levels of oxygenation will improve  Description: Levels of oxygenation will improve  Outcome: Ongoing     Problem: Cardiac Output - Decreased:  Goal: Hemodynamic stability will improve  Description: Hemodynamic stability will improve  Outcome: Ongoing     Problem: Cardiac:  Goal: Ability to maintain vital signs within normal range will improve  Description: Ability to maintain vital signs within normal range will improve  Outcome: Ongoing  Goal: Cardiovascular alteration will improve  Description: Cardiovascular alteration will improve  Outcome: Ongoing     Problem: Health Behavior:  Goal: Will modify at least one risk factor affecting health status  Description: Will modify at least one risk factor affecting health status  Outcome: Ongoing  Goal: Identification of resources available to assist in meeting health care needs will improve  Description: Identification of resources available to assist in meeting health care needs will improve  Outcome: Ongoing     Problem: Physical Regulation:  Goal: Complications related to the disease process, condition or treatment will be avoided or minimized  Description: Complications related to the disease process, condition or treatment will be avoided or minimized  Outcome: Ongoing     Problem: Discharge Planning:  Goal: Discharged to appropriate level of care  Description: Discharged to appropriate level of care  Outcome: Ongoing     Problem: Serum Glucose Level - Abnormal:  Goal: Ability to maintain appropriate glucose levels will improve  Description: Ability to maintain appropriate glucose levels will improve  Outcome: Ongoing     Problem: Sensory Perception - Impaired:  Goal: Ability to maintain a stable neurologic state will improve  Description: Ability to maintain a stable neurologic state will improve  Outcome: Ongoing     Problem: Pain:  Goal: Pain level will decrease  Description: Pain level will decrease  Outcome: Ongoing  Goal: Control of acute pain  Description: Control of acute pain  Outcome: Ongoing  Goal: Control of chronic pain  Description: Control of chronic pain  Outcome: Ongoing

## 2021-01-20 NOTE — PROGRESS NOTES
Pulmonary and Critical Care  Progress Note      VITALS:  BP (!) 94/51   Pulse 60   Temp 97.7 °F (36.5 °C) (Axillary)   Resp 11   Ht 5' 8\" (1.727 m)   Wt (!) 364 lb (165.1 kg) Comment: estimate, bed scale not working on bed  SpO2 93%   BMI 55.35 kg/m²     Subjective:   CHIEF COMPLAINT :SOB     HPI:                The patient is lying in the bed. She is wide awake. She is off BIPAP and doing well. She is not in acute reps distress    Objective:   PHYSICAL EXAM:    LUNGS:Decreased air entry bilateral bases  Abd-distended,BS+,NT  Ext- Chronic LE venous stasis  CVS-s1s2, no murmurs      DATA:    CBC:  Recent Labs     01/18/21  0615 01/19/21  0645 01/20/21  0445   WBC 6.6 5.0 4.3   RBC 3.30* 3.04* 3.01*   HGB 8.2* 7.6* 7.5*   HCT 29.7* 27.9* 28.3*    148 153   MCV 90.0 91.8 94.0   MCH 24.8* 25.0* 24.9*   MCHC 27.6* 27.2* 26.5*   RDW 17.8* 17.9* 18.5*   SEGSPCT 65.8 58.6 57.7      BMP:  Recent Labs     01/18/21  0615 01/19/21  0645 01/20/21  0445    147* 149*   K 3.3* 3.1* 3.2*   CL 94* 96* 94*   CO2 44* 47* 48*   BUN 22 18 20   CREATININE 1.3* 1.2* 1.3*   CALCIUM 9.0 8.8 8.4   GLUCOSE 138* 101* 91      ABG:  Recent Labs     01/17/21  1500   PH 7.34   PO2ART 36*   RAP6MET 92.0*   O2SAT 67.5*     BNP  No results found for: BNP   D-Dimer:  Lab Results   Component Value Date    DDIMER 1729 (H) 06/27/2020      1.  Radiology: Stable exam      Assessment/Plan     Patient Active Problem List    Diagnosis Date Noted    Acute exacerbation of chronic obstructive pulmonary disease (COPD) ([facility]      Priority: High    Hypervolemia      Priority: High    Anxiety 09/01/2020     Priority: Medium     Class: Acute    Acute congestive heart failure (HCC)     Acute respiratory failure with hypoxia (HCC)     Bradycardia     Septic shock ([facility] 08/25/2020    Acute kidney injury superimposed on CKD ([facility] [address] 08/25/2020    S/P TAVR (transcatheter aortic valve replacement) 06/26/2020    Chronic diastolic heart failure ([facility] [address] 06/25/2020    Wide-complex tachycardia ([address]     Altered mental status     Sepsis ([address] 09/21/2019    Acute kidney injury ([address] 08/22/2019    Acute metabolic encephalopathy 52/24/6480    Shock, unspecified ([address] 08/22/2019    Uncontrolled diabetes mellitus ([address] 08/22/2019    SIRS (systemic inflammatory response syndrome) ([address] 08/12/2019    Class 3 severe obesity due to excess calories with body mass index (BMI) greater than or equal to 70 in adult [facility]) 06/16/2019    VHD (valvular heart disease) 04/02/2019     Overview Note:     H/o TAVR      Pneumonia 02/06/2019    Skin ulcer of left foot with fat layer exposed ([address]     Cellulitis 11/24/2018    Acute on chronic respiratory failure with hypoxia and hypercapnia (HCC)     Gait disturbance 02/10/2018    Chronic obstructive pulmonary disease ([address] 02/07/2018    Chronic respiratory failure with hypoxia and hypercapnia ([address] 01/30/2018    Morbid obesity ([address]     Asthma     Diabetes mellitus ([address]     Hypertension     Sleep apnea     Family history of coronary artery disease     Chest pain     SOB (shortness of breath)     Palpitations     Peripheral edema     Aortic stenosis     Morbid obesity with BMI of 70 and over, adult ([address] 10/27/2016    Dyslipidemia 10/27/2016    Fecal incontinence 10/27/2016    Mild intermittent asthma without complication 93/21/6981    S/P laparoscopic cholecystectomy 10/27/2016    Type 2 diabetes mellitus without complication, with long-term current use of insulin ([address] 10/27/2016    Fatty liver 10/27/2016    Arthritis 10/27/2016    H/O parotidectomy 10/27/2016    Venous stasis dermatitis of both lower extremities 10/27/2016   Hypokalemia  JOSE- improved  Morbid Obesity  ANMOL  OHS  Acute on Chronic Hypoxic Hypercapneic resp failure  Chronic Metabolic alkalosis  Pulmonary congestion- improved  Basal atelectasis  Chronic LE venous stasis  Suspected CAP       1. BIPAP  2. ICS  3. OOB  4.  PT/OT  5. OP follow up  6. BIPAP retitration study  7. Await placement  8. C/w present management  No follow-ups on file.     Electronically signed by Levi Haider MD on 1/20/2021 at 11:21 AM

## 2021-01-20 NOTE — CONSULTS
Casandra, 1955, -A, 2021    History  [place]:  The primary encounter diagnosis was Acute congestive heart failure, unspecified heart failure type ([address] Diagnoses of Acute kidney injury ([address] SIRS (systemic inflammatory response syndrome) ([address] Hypercarbia, Hypotension, unspecified hypotension type, Peripheral edema, Bradycardia, and Severe anemia were also pertinent to this visit. Patient  has a past medical history of Acute kidney injury ([address] Anxiety, Aortic stenosis, Asthma, Bacteremia due to group B Streptococcus, Cancer ([address] Carotid artery stenosis, Chest pain, CHF (congestive heart failure) ([address] Chronic back pain, COPD exacerbation ([address] Depression, Diabetes mellitus ([address] Family history of coronary artery disease, Fatty liver, GI bleed, H/O aortic valve stenosis, H/O echocardiogram, Headache, Heart murmur, History of nuclear stress test, Morbid obesity ([address] Neuropathy, NSTEMI (non-ST elevated myocardial infarction) ([address] Obesity, Osteoarthritis, Palpitations, Peripheral edema, Restless legs syndrome, Sleep apnea, Sleep apnea, and SOB (shortness of breath). Patient  has a past surgical history that includes Cholecystectomy;  section; Hysterectomy; Tubal ligation; Neck surgery; Gallbladder surgery; Aortic valve replacement (2017); IR NONTUNNELED VASCULAR CATHETER > 5 YEARS (2020); and IR NONTUNNELED VASCULAR CATHETER > 5 YEARS (1/15/2021). Subjective:  Patient states:  \"I wish I could have done more. \"    Pain:  Denies pain.     Communication with other providers:  Handoff to RN, OT  Restrictions: general precautions, fall risk    Home Setup/Prior level of function  Social/Functional History  Lives With: Spouse, Daughter  Type of Home: House  Home Layout: Two level, Able to Live on Main level with bedroom/bathroom  Home Access: Level entry  Bathroom Shower/Tub: Walk-in shower  Bathroom transfer status, impaired gait, impaired bed mobility, and impaired balance. Pt would benefit from continued acute care PT as well as SNF placement after discharge to continue to address impairments. Complexity: moderate  Prognosis: Good, no significant barriers to participation at this time.    Plan Times per week: 2-3+/week     Equipment: TBD    Goals:  Short term goals  Time Frame for Short term goals: 1 week  Short term goal 1: Pt to complete all bed mobility mod A  Short term goal 2: Pt to complete STS with LRAD max A  Short term goal 3: Pt to complete stand pivot transfer with LRAD max A       Treatment plan:  Bed mobility, transfers, balance, gait, TA, TX    Recommendations for NURSING mobility: FARAZ    Time:   Time in: 1002  Time out: 1115  Timed treatment minutes: 60  Total time: 73    Electronically signed by:    Lurdes Elizondo PT  1/20/2021, 1:10 PM

## 2021-01-20 NOTE — PROGRESS NOTES
Nephrology Progress Note  1/20/2021 2:41 PM  Subjective:      Interval History: Betito Haynes is a 63 y.o. female   -on high flow nasal cannula today   -serum creatinine is overall stable with good uop    Data:   Scheduled Meds:   midodrine  10 mg Oral TID WC    cefTRIAXone (ROCEPHIN) IV  1,000 mg Intravenous Q24H    enoxaparin  30 mg Subcutaneous Daily    furosemide  20 mg Intravenous BID    lubiprostone  24 mcg Oral BID WC    metoclopramide  10 mg Intravenous Q6H    senna  2 tablet Oral Nightly    insulin glargine  10 Units Subcutaneous Nightly    modafinil  200 mg Oral Daily    insulin lispro  0-6 Units Subcutaneous 2 times per day    lidocaine  5 mL Intradermal Once    amiodarone  200 mg Oral Daily    atorvastatin  40 mg Oral Nightly    busPIRone  10 mg Oral TID    clopidogrel  75 mg Oral Daily    [Held by provider] doxepin  10 mg Oral Nightly    gabapentin  400 mg Oral TID    [Held by provider] metoprolol tartrate  12.5 mg Oral BID    pantoprazole  40 mg Oral Daily    polyethylene glycol  17 g Oral Daily    pramipexole  1 mg Oral BID    sertraline  25 mg Oral Nightly    [Held by provider] lisinopril  5 mg Oral Daily    [Held by provider] spironolactone  25 mg Oral Daily    vitamin D3  400 Units Oral Daily    sodium chloride flush  10 mL Intravenous 2 times per day    insulin lispro  0-12 Units Subcutaneous TID WC    miconazole   Topical BID     Continuous Infusions:   dextrose       CBC:   Recent Labs     01/18/21  0615 01/19/21  0645 01/20/21  0445   WBC 6.6 5.0 4.3   HGB 8.2* 7.6* 7.5*    148 153     BMP:    Recent Labs     01/18/21  0615 01/19/21  0645 01/20/21  0445    147* 149*   K 3.3* 3.1* 3.2*   CL 94* 96* 94*   CO2 44* 47* 48*   BUN 22 18 20   CREATININE 1.3* 1.2* 1.3*   GLUCOSE 138* 101* 91       Renal Labs  Albumin:    Lab Results   Component Value Date    LABALBU 3.3 01/13/2021     Calcium:    Lab Results   Component Value Date    CALCIUM 8.4 01/20/2021     Phosphorus:    Lab Results   Component Value Date    PHOS 3.2 07/02/2020     U/A:    Lab Results   Component Value Date    NITRU NEGATIVE 01/13/2021    COLORU YELLOW 01/13/2021    WBCUA 2979 01/13/2021    RBCUA 483 01/13/2021    MUCUS RARE 12/09/2020    TRICHOMONAS NONE SEEN 01/13/2021    YEAST OCCASIONAL 12/09/2020    BACTERIA MANY 01/13/2021    CLARITYU CLOUDY 01/13/2021    SPECGRAV 1.018 01/13/2021    UROBILINOGEN NORMAL 01/13/2021    BILIRUBINUR NEGATIVE 01/13/2021    BLOODU LARGE 01/13/2021    KETUA NEGATIVE 01/13/2021     Objective:   I/O: 01/19 0701 - 01/20 0700  In: 478.3 [P.O.:250; I.V.:228.3]  Out: 9064 [Urine:2675]  Vitals: BP (!) 95/47   Pulse 65   Temp 97.7 °F (36.5 °C) (Oral)   Resp 14   Ht 5' 8\" (1.727 m)   Wt (!) 364 lb (165.1 kg) Comment: estimate, bed scale not working on bed  SpO2 100%   BMI 55.35 kg/m²      General appearance:  awake and verbally interactive   HEENT: Head: normocephalic, atraumatic.   Neck: supple, symmetrical, trachea midline  Cardiovascular: normal S1 and S2  Pulmonary: diminished lung sounds bilaterally   Abdomen:  soft / non-tender   Extremities: + edema to the bilateral lower legs     Assessment and Plan:  IMP:  arf from atn/chf  resp failure  chf  Hypokalemia   hypernatremia  Dm2  anemia    Plan     1.   -uop: 2.67 liters in the last 24 hours via hernandez  -overall stable serum creatinine   2.   -O2 sat: 99 - 100 % on 8 - 10 LPM via high flow nasal cannula  -presently on rocephin   3.   -monitor: O2 saturations / urine output and volume status  -on IV lasix 20 mg BiD   4.   -latest serum potassium: 3.2; following trend  -on potassium replacement protocol   5.   -latest serum sodium:149; following trend   6.  -on Lantus and SSI for diabetes management   7.   -latest Hb: 7.5; follow hemoglobin trend   -recommend transfusing with PRBC's if hemoglobin less than 7    Electronically signed by THEODORE Trinidad - CNP                             Nephrology Attending Progress Note  1/20/2021 4:21 PM  Subjective: Interval History: I have personally performed face to face diagnostic evaluation on this patient. I have personally reviewed pertinent labs and imaging and agree with the care plan above. My additional findings are as follows:   The patient is a 63 y.o. female who presents with weakness and more awake    Objective:   Vitals: BP (!) 105/93   Pulse 71   Temp 97.7 °F (36.5 °C) (Oral)   Resp 12   Ht 5' 7.99\" (1.727 m)   Wt (!) 364 lb (165.1 kg) Comment: estimate, bed scale not working on bed  SpO2 97%   BMI 55.36 kg/m²   Weak awake obese  Soft   edema    Assessment and Plan:  1 bp low stable  2 good diuresis monitor  3 renal stable  4 wean o2 keep lasix  5 replete K  6 watch na  Will follow neuro with above       Electronically signed by Guadalupe Tran MD on 1/20/2021 at 4:21 PM

## 2021-01-20 NOTE — PROGRESS NOTES
Progress Note( Dr. Hernando Castro)    Subjective:   Admit Date: 1/13/2021  PCP: THEODORE Gunter NP    Admitted For :Severe shortness of breath    Consulted For: Better control of blood glucose    Interval History: Patient is somewhat better still on BiPAP almost continuously  Except at time of eating     Denies any chest pains,   Yes SOB . On BiPAP  Denies nausea or vomiting. No new bowel or bladder symptoms.        Intake/Output Summary (Last 24 hours) at 1/20/2021 0008  Last data filed at 1/19/2021 1900  Gross per 24 hour   Intake 478.29 ml   Output 2250 ml   Net -1771.71 ml       DATA    CBC:   Recent Labs     01/17/21  0530 01/18/21  0615 01/19/21  0645   WBC 6.6 6.6 5.0   HGB 8.0* 8.2* 7.6*    175 148    CMP:  Recent Labs     01/17/21  0530 01/18/21  0615 01/19/21  0645   * 145 147*   K 3.8 3.3* 3.1*    94* 96*   CO2 35* 44* 47*   BUN 33* 22 18   CREATININE 1.6* 1.3* 1.2*   CALCIUM 8.9 9.0 8.8     Lipids:   Lab Results   Component Value Date    CHOL 151 01/14/2021    HDL 46 01/14/2021    TRIG 94 01/14/2021     Glucose:  Recent Labs     01/19/21  1107 01/19/21  1638 01/19/21  2116   POCGLU 101* 114* 109*     GpyiqpjiqjA2O:  Lab Results   Component Value Date    LABA1C 6.1 01/14/2021     High Sensitivity TSH:   Lab Results   Component Value Date    TSHHS 3.490 12/09/2020     Free T3: No results found for: FT3  Free T4:  Lab Results   Component Value Date    T4FREE 1.07 09/04/2018       Echocardiogram Limited    Result Date: 1/15/2021  Transthoracic Echocardiography Report (TTE)  Demographics   Patient Name       Quoc ADAMS Date of Study       01/14/2021   Date of Birth      1955         Gender              Female   Age                63 year(s)         Race                   Patient Number     [id number]         Room Number         2105   Visit Number       [id number]   Corporate ID       [id number]   Accession Number   [id number]         Katerin Tolentino Ro Jeong   Ordering Physician Olivia Yi   Interpreting        Annamarie Manriquez MD      Physician           Julian Hines MD  Procedure Type of Study   TTE procedure:ECHOCARDIOGRAM LIMITED. Procedure Date Date: 01/14/2021 Start: 02:11 PM Study Location: Portable Technical Quality: Limited visualization due to body habitus. Indications:Congestive heart failure. Patient Status: Routine Height: 68 inches Weight: 378 pounds BSA: 2.68 m2 BMI: 57.47 kg/m2 HR: 62 bpm BP: 107/55 mmHg  Conclusions   Summary  This is a limited echocardiogram. Technically difficult due to body habitus  Left ventricular systolic function is normal.  Ejection fraction is visually estimated at 50-55%. Dilated right ventricle with pressure/volume overload. S/p TAVR. Mild tricuspid regurgitation is present. RVSP is 43 mmHg. Mild pericardial effusion. Inferior vena cava is dilated, measuring at 3.4 cm, and does not collapse  with respiration. Signature   ------------------------------------------------------------------  Electronically signed by Clemente Valle MD  (Interpreting physician) on 01/15/2021 at 08:06 AM  Xr Pelvis (1-2 Views)       Xr Chest Portable    Result Date: 1/15/2021  EXAMINATION: ONE XRAY VIEW OF THE CHEST 1/15/2021 5:49 am COMPARISON: 01/13/2021 HISTORY: ORDERING SYSTEM PROVIDED HISTORY: Hypoxia      1. Stable cardiomegaly with pulmonary edema, bilateral pleural effusions, and associated bibasilar atelectasis. Xr Chest Portable    Result Date: 1/13/2021  EXAMINATION: ONE XRAY VIEW OF THE CHEST 1/13/2021 2:24 pm COMPARISON: Chest x-ray dated 12/09/2020 HISTORY: ORDERING SYSTEM PROVIDED HISTORY: sob       Interstitial/pulmonary edema. Cardiomegaly. Probable small bilateral pleural effusions.         Scheduled Medicines   Medications:    enoxaparin  30 mg Subcutaneous Daily    furosemide  20 mg Intravenous BID    lubiprostone  24 mcg Oral BID     metoclopramide  10 mg Intravenous Q6H    senna  2 tablet Oral Nightly    insulin glargine  10 Units Subcutaneous Nightly    modafinil  200 mg Oral Daily    insulin lispro  0-6 Units Subcutaneous 2 times per day    lidocaine  5 mL Intradermal Once    amiodarone  200 mg Oral Daily    atorvastatin  40 mg Oral Nightly    busPIRone  10 mg Oral TID    clopidogrel  75 mg Oral Daily    [Held by provider] doxepin  10 mg Oral Nightly    gabapentin  400 mg Oral TID    metoprolol tartrate  12.5 mg Oral BID    pantoprazole  40 mg Oral Daily    polyethylene glycol  17 g Oral Daily    pramipexole  1 mg Oral BID    sertraline  25 mg Oral Nightly    [Held by provider] lisinopril  5 mg Oral Daily    [Held by provider] spironolactone  25 mg Oral Daily    vitamin D3  400 Units Oral Daily    sodium chloride flush  10 mL Intravenous 2 times per day    cefTRIAXone (ROCEPHIN) IV  1 g Intravenous Q24H    azithromycin  500 mg Intravenous Q24H    insulin lispro  0-12 Units Subcutaneous TID     miconazole   Topical BID      Infusions:    norepinephrine 2 mcg/min (01/19/21 2699)    dextrose           Objective:   Vitals: BP (!) 100/53   Pulse 58   Temp 97.8 °F (36.6 °C) (Oral)   Resp 13   Ht 5' 8\" (1.727 m)   Wt (!) 364 lb (165.1 kg) Comment: estimate, bed scale not working on bed  SpO2 93%   BMI 55.35 kg/m²   General appearance: alert and cooperative with exam  Neck: no JVD or bruit  Thyroid : Normal lobes   Lungs: Has Vesicular Breath sounds has wheezing and rales bilaterally  Heart:  regular rate and rhythm  Abdomen: soft, non-tender; bowel sounds normal; no masses,  no organomegaly  Musculoskeletal: Normal  Extremities: extremities normal, , no edema  Neurologic:  Awake, alert, oriented to name, place and time. Cranial nerves II-XII are grossly intact. Motor is  intact.   Sensory neuropathy t.,  and gait is abnormal.  Unstable    Assessment:     Patient Active Problem List:     Morbid obesity with BMI of 70 and over, adult ([facility]     Dyslipidemia     Fecal incontinence     Mild intermittent asthma without complication     S/P laparoscopic cholecystectomy     Type 2 diabetes mellitus without complication, with long-term current use of insulin (HCC)     Fatty liver     Arthritis     H/O parotidectomy     Venous stasis dermatitis of both lower extremities     Aortic stenosis     Morbid obesity (HCC)     Asthma     Diabetes mellitus ([facility] [address]     Hypertension     Sleep apnea     Family history of coronary artery disease     Chest pain     SOB (shortness of breath)     Palpitations     Peripheral edema     Hypervolemia     Chronic respiratory failure with hypoxia and hypercapnia (HCC)     Acute exacerbation of chronic obstructive pulmonary disease (COPD) (HCC)     Chronic obstructive pulmonary disease (HCC)     Gait disturbance     Acute on chronic respiratory failure with hypoxia and hypercapnia ([facility])     Cellulitis     Skin ulcer of left foot with fat layer exposed ([facility] [address]     Pneumonia     VHD (valvular heart disease)     Class 3 severe obesity due to excess calories with body mass index (BMI) greater than or equal to 70 in adult ([facility] [address]     SIRS (systemic inflammatory response syndrome) ([facility])     Acute kidney injury ([facility] [address]     Acute metabolic encephalopathy     Shock, unspecified ([facility] [address]     Uncontrolled diabetes mellitus ([facility] [address]     Sepsis ([facility] [address]     Altered mental status     Wide-complex tachycardia (HCC)     Chronic diastolic heart failure (HCC)     S/P TAVR (transcatheter aortic valve replacement)     Septic shock (HCC)     Acute kidney injury superimposed on CKD (HCC)     Bradycardia     Anxiety     Acute respiratory failure with hypoxia (HCC)     Acute congestive heart failure ([facility] [address]      Plan:     1. Reviewed POC blood glucose . Labs and X ray results   2. Reviewed Current Medicines   3. On meal/ Correction bolus Humalog/ Basal Lantus Insulin regime   4.  Monitor Blood glucose frequently   5. Modified  the dose of Insulin/ other medicines as needed   6. Will follow     .      Dariel Leggett MD

## 2021-01-20 NOTE — CONSULTS
3   [] 4      6. Eating meals? [] 1   [] 2   [] 2   [] 3   [] 3   [x] 4      Raw Score:  14     [24=0% impaired(CH), 23=1-19%(CI), 20-22=20-39%(CJ), 15-19=40-59%(CK), 10-14=60-79%(CL), 7-9=80-99%(CM), 6=100%(CN)]     Treatment:  Self Care Training:   Cues were given for safety, sequence, UE/LE placement, visual cues, and balance. Activities performed today included dressing and grooming. Therapeutic Activity Training:   Therapeutic activity training was instructed today. Cues were given for safety, sequence, UE/LE placement, awareness, and balance. Activities performed today included bed mobility training, sup-sit, sit-stand (attempted 3x). Safety Measures: Gait belt used, Left in bed, Alarm in place    Assessment:  Assessment  Performance deficits / Impairments: Decreased functional mobility , Decreased endurance, Decreased balance, Decreased high-level IADLs, Decreased ADL status, Decreased strength, Decreased posture  Treatment Diagnosis: Septic Shock  Prognosis: Good  Decision Making: Medium Complexity  REQUIRES OT FOLLOW UP: Yes  Discharge Recommendations: [address]     Pt is a 63year old F admitted for septic shock. Pt reports that prior to admission she required assist with LB ADLs and [place] care after toileting, is not responsible for IADLs, and is Janette for functional mobility. This date, pt demo decreased strength, endurance, and activity tolerance impacting ADL status. Pt is currently functioning below baseline and would benefit from skilled OT services at SNF. Plan:  Plan  Times per week: 2+  Times per day: Daily      Goals:  1. Pt will complete all aspects of bed mobility for EOB/OOB ADLs ModA. 2. Pt will complete UB/LB bathing with ModA and AE as needed. 3. Pt will complete all aspects of LB dressing with MaxA and AE as needed. 4. Pt will complete all functional transfers to and from bed, chair, toilet, shower chair with ModAx2 and AD.   5. Pt will complete all aspects of toileting task with MaxA. 6. Pt will complete ther ex/ther act with focus on UB strengthening. Time:   Time in: 1002  Time out: 1115  Timed treatment minutes: 60  Total time: 73      Electronically signed by:       BRISA Esquivel/L, [place], [id number]

## 2021-01-20 NOTE — PROGRESS NOTES
Hospitalist Progress Note      Name:  Bradford Last /Age/Sex: 1955  (63 y.o. female)   MRN & CSN:  [id number] & [id number] Admission Date/Time: 2021  2:15 PM   Location:  -A PCP: THEODORE Banegas - NP       Bradford Last is a 63 y.o.  female  who presents with Shortness of Breath and Foot Pain (bilateral, no known injury)      Assessment and Plan:   1. Acute on chronic hypoxic and hypercarbic respiratory failure likely 2/2 to CHF  - on bipap  CXR: Interstitial/pulmonary edema.    COVID-19 negative.  -Patient on BiPAP. -Pulmonology following, added modafinil and theophylline     2. Septic Shock has UTI  - on levo gtt wean as tolerated  - midodrine 10 TID added  - IV Rocephin   - urine cx: +citrobacter freundii  -Echo: EF 50-55%.    - Blood culture : NGTD. - sputum cx pending     3. Acute on chronic diastolic CHF  - neg 41Y  - echo : ef 50-55%  -CXR: Pulmonary edema.   - On IV Lasix 20 BID  - Holding beta-blocker, ACE inhibitor, Aldactone due to low BP.     4. Acute kidney injury  - stable  -Baseline creatinine 0.9.     Holding ACE inhibitor and Aldactone.    Nephrology following.     5. Anemia of chronic disease   Transfused 1 unit PRBC. -GI ordered Venofer x3 doses, and outpatient EGD and colonoscopy if no gross bleeding.  -monitor     6. Bradycardia  - hold metorpolol  -EKG: Junctional rhythm.    -Cardiology has signed off. HR has improved.    -Troponins negative.     7. Diabetes mellitus type 2  -On sliding scale insulin and Lantus.  Endocrinology following.  .  -A1c 6.1.     8. Hypermagnesemia  -Monitor.     9. HypoK  - replace per protocol  - check MG    10. HyperNa  - monitor                     Diet DIET CARB CONTROL;   Dietary Nutrition Supplements: Diabetic Oral Supplement   Code Status Full Code     Medications:   Medications:    midodrine  10 mg Oral TID WC    enoxaparin  30 mg Subcutaneous Daily    furosemide  20 mg Intravenous BID    lubiprostone  24

## 2021-01-20 NOTE — PROGRESS NOTES
Patient extubated per Dr Alonzo Perales orders and placed on O2@ 2L per NC. Patient tolerated procedure without difficulty. Will monitor.

## 2021-01-21 NOTE — PLAN OF CARE
Problem: Falls - Risk of:  Goal: Will remain free from falls  Description: Will remain free from falls  1/21/2021 0743 by Jade Aschoff, RN  Outcome: Ongoing  1/20/2021 2223 by Chuck Sánchez RN  Outcome: Ongoing  Goal: Absence of physical injury  Description: Absence of physical injury  1/21/2021 0743 by Jade Aschoff, RN  Outcome: Ongoing  1/20/2021 2223 by Tiffany Song RN  Outcome: Ongoing     Problem: Skin Integrity:  Goal: Will show no infection signs and symptoms  Description: Will show no infection signs and symptoms  1/21/2021 0743 by Jade Aschoff, RN  Outcome: Ongoing  1/20/2021 2223 by Chuck Sánchez RN  Outcome: Ongoing  Goal: Absence of new skin breakdown  Description: Absence of new skin breakdown  1/21/2021 0743 by Jade Aschoff, RN  Outcome: Ongoing  1/20/2021 2223 by Tiffany Song RN  Outcome: Ongoing     Problem: HEMODYNAMIC STATUS  Goal: Patient has stable vital signs and fluid balance  1/21/2021 0743 by Jade Aschoff, RN  Outcome: Ongoing  1/20/2021 2223 by Tiffany Song RN  Outcome: Ongoing     Problem: ACTIVITY INTOLERANCE/IMPAIRED MOBILITY  Goal: Mobility/activity is maintained at optimum level for patient  1/21/2021 0743 by Jade Aschoff, RN  Outcome: Ongoing  1/20/2021 2223 by Tiffany Song RN  Outcome: Ongoing     Problem: Gas Exchange - Impaired:  Goal: Levels of oxygenation will improve  Description: Levels of oxygenation will improve  1/21/2021 0743 by Jade Aschoff, RN  Outcome: Ongoing  1/20/2021 2223 by Chuck Sánchez RN  Outcome: Ongoing     Problem: Cardiac Output - Decreased:  Goal: Hemodynamic stability will improve  Description: Hemodynamic stability will improve  1/21/2021 0743 by Jade Aschoff, RN  Outcome: Ongoing  1/20/2021 2223 by Tiffany Song RN  Outcome: Ongoing     Problem: Cardiac:  Goal: Ability to maintain vital signs within normal range will improve  Description: Ability to maintain vital signs within normal range will improve  1/21/2021 0743 by Aly Marinelli RN  Outcome: Ongoing  1/20/2021 2223 by Rylee Martinez RN  Outcome: Ongoing  Goal: Cardiovascular alteration will improve  Description: Cardiovascular alteration will improve  1/21/2021 0743 by Aly Marinelli RN  Outcome: Ongoing  1/20/2021 2223 by Mariela Song RN  Outcome: Ongoing     Problem: Health Behavior:  Goal: Will modify at least one risk factor affecting health status  Description: Will modify at least one risk factor affecting health status  1/21/2021 0743 by Aly Marinelli RN  Outcome: Ongoing  1/20/2021 2223 by Rylee Martinez RN  Outcome: Ongoing  Goal: Identification of resources available to assist in meeting health care needs will improve  Description: Identification of resources available to assist in meeting health care needs will improve  1/21/2021 0743 by Aly Marinelli RN  Outcome: Ongoing  1/20/2021 2223 by Rylee Martinez RN  Outcome: Ongoing     Problem: Physical Regulation:  Goal: Complications related to the disease process, condition or treatment will be avoided or minimized  Description: Complications related to the disease process, condition or treatment will be avoided or minimized  1/21/2021 0743 by Aly Marinelli RN  Outcome: Ongoing  1/20/2021 2223 by Rylee Martinez RN  Outcome: Ongoing     Problem: Discharge Planning:  Goal: Discharged to appropriate level of care  Description: Discharged to appropriate level of care  1/21/2021 0743 by Aly Marinelli RN  Outcome: Ongoing  1/20/2021 2223 by Mariela Song RN  Outcome: Ongoing     Problem: Serum Glucose Level - Abnormal:  Goal: Ability to maintain appropriate glucose levels will improve  Description: Ability to maintain appropriate glucose levels will improve  1/21/2021 0743 by Aly Marinelli RN  Outcome: Ongoing  1/20/2021 2223 by Rylee Martinez RN  Outcome: Ongoing     Problem: Sensory Perception - Impaired:  Goal: Ability to maintain a stable neurologic state will improve  Description: Ability to maintain a stable neurologic state will improve  1/21/2021 0743 by Mary Ybarra RN  Outcome: Ongoing  1/20/2021 2223 by Shawn Bright RN  Outcome: Ongoing     Problem: Pain:  Goal: Pain level will decrease  Description: Pain level will decrease  1/21/2021 0743 by Mary Ybarra RN  Outcome: Ongoing  1/20/2021 2223 by Shawn Bright RN  Outcome: Ongoing  Goal: Control of acute pain  Description: Control of acute pain  1/21/2021 0743 by Mary Ybarra RN  Outcome: Ongoing  1/20/2021 2223 by Shawn Bright RN  Outcome: Ongoing  Goal: Control of chronic pain  Description: Control of chronic pain  1/21/2021 0743 by Mary Ybarra RN  Outcome: Ongoing  1/20/2021 2223 by Shawn Bright RN  Outcome: Ongoing

## 2021-01-21 NOTE — PROGRESS NOTES
Hospitalist Progress Note      Name:  Lucía Leggett /Age/Sex: 1955  (63 y.o. female)   MRN & CSN:  [id number] & [id number] Admission Date/Time: 2021  2:15 PM   Location:  -A PCP: THEODORE Maurer - NP       Lucía Leggett is a 63 y.o.  female  who presents with Shortness of Breath and Foot Pain (bilateral, no known injury)      Assessment and Plan:   1. Acute on chronic hypoxic and hypercarbic respiratory failure likely 2/2 to CHF  - on bipap  CXR: Interstitial/pulmonary edema.    COVID-19 negative.  -Patient on BiPAP, wean as tolerated  -Pulmonology following, added modafinil and theophylline     2. Septic Shock has UTI  - levo gtt off now  - midodrine 10 TID added  - IV Rocephin   - urine cx: +citrobacter freundii  -Echo: EF 50-55%.     - Blood culture : NGTD. - sputum cx pending     3. Acute on chronic diastolic CHF  - neg 90.3X  - echo : ef 50-55%  -CXR: Pulmonary edema.   - On IV Lasix 20 BID  - Holding beta-blocker, ACE inhibitor, Aldactone due to low BP.     4. Acute kidney injury  - stable  -Baseline creatinine 0.9.     Holding ACE inhibitor and Aldactone.    Nephrology following.     5. Anemia of chronic disease   Transfused 1 unit PRBC. -GI ordered Venofer x3 doses, and outpatient EGD and colonoscopy if no gross bleeding.  -monitor     6. Bradycardia  - hold metorpolol  -EKG: Junctional rhythm.    -Cardiology has signed off. HR has improved.    -Troponins negative.     7. Diabetes mellitus type 2  -On sliding scale insulin and Lantus.  Endocrinology following.  .  -A1c 6.1. HypoMg  -replace                 Diet DIET CARB CONTROL;   Dietary Nutrition Supplements: Diabetic Oral Supplement   Code Status Full Code     Medications:   Medications:    midodrine  10 mg Oral TID WC    cefTRIAXone (ROCEPHIN) IV  1,000 mg Intravenous Q24H    enoxaparin  30 mg Subcutaneous Daily    furosemide  20 mg Intravenous BID    lubiprostone  24 mcg Oral BID WC    metoclopramide  10 mg Intravenous Q6H    senna  2 tablet Oral Nightly    insulin glargine  10 Units Subcutaneous Nightly    modafinil  200 mg Oral Daily    insulin lispro  0-6 Units Subcutaneous 2 times per day    lidocaine  5 mL Intradermal Once    amiodarone  200 mg Oral Daily    atorvastatin  40 mg Oral Nightly    busPIRone  10 mg Oral TID    clopidogrel  75 mg Oral Daily    [Held by provider] doxepin  10 mg Oral Nightly    gabapentin  400 mg Oral TID    [Held by provider] metoprolol tartrate  12.5 mg Oral BID    pantoprazole  40 mg Oral Daily    polyethylene glycol  17 g Oral Daily    pramipexole  1 mg Oral BID    sertraline  25 mg Oral Nightly    [Held by provider] lisinopril  5 mg Oral Daily    [Held by provider] spironolactone  25 mg Oral Daily    vitamin D3  400 Units Oral Daily    sodium chloride flush  10 mL Intravenous 2 times per day    insulin lispro  0-12 Units Subcutaneous TID WC    miconazole   Topical BID      Infusions:    dextrose       PRN Meds:     albuterol sulfate HFA, 2 puff, Q4H PRN      sodium chloride flush, 10 mL, PRN      acetaminophen, 650 mg, Q6H PRN    Or      acetaminophen, 650 mg, Q6H PRN      potassium chloride, 40 mEq, PRN    Or      potassium alternative oral replacement, 40 mEq, PRN    Or      potassium chloride, 10 mEq, PRN      glucose, 15 g, PRN      dextrose, 12.5 g, PRN      glucagon (rDNA), 1 mg, PRN      dextrose, 100 mL/hr, PRN      Subjective: On bipap, no distress  Objective:        Intake/Output Summary (Last 24 hours) at 1/21/2021 1120  Last data filed at 1/21/2021 0800  Gross per 24 hour   Intake 41.42 ml   Output 450 ml   Net -408.58 ml      Vitals:   Vitals:    01/21/21 0741   BP:    Pulse:    Resp: 12   Temp:    SpO2:      Physical Exam:   Gen:  awake, alert, no apparent distress  Head/Eyes:  Normocephalic atraumatic, EOMI   NECK:   symmetrical, trachea midline  LUNGS: Normal Effort on bipap  CARDIOVASCULAR:  Normal rate  ABDOMEN:  non distended  MUSCULOSKELETAL:  ROM limited  NEUROLOGIC: Alert and Oriented,  Cranial nerves II-XII are grossly intact.    SKIN:  no bruising or bleeding, normal skin color,  no redness      Data:       CBC   Recent Labs     01/19/21  0645 01/20/21  0445 01/21/21  0545   WBC 5.0 4.3 5.6   HGB 7.6* 7.5* 7.5*   HCT 27.9* 28.3* 27.9*    153 147      BMP   Recent Labs     01/19/21  0645 01/20/21  0445 01/21/21  0545   * 149* 144   K 3.1* 3.2* 3.7   CL 96* 94* 92*   CO2 47* 48* 47*   BUN 18 20 26*   CREATININE 1.2* 1.3* 1.8*         Electronically signed by Sukh Gates MD on 1/21/2021 at 11:20 AM

## 2021-01-21 NOTE — PLAN OF CARE
will be avoided or minimized  Outcome: Ongoing     Problem: Discharge Planning:  Goal: Discharged to appropriate level of care  Description: Discharged to appropriate level of care  Outcome: Ongoing     Problem: Serum Glucose Level - Abnormal:  Goal: Ability to maintain appropriate glucose levels will improve  Description: Ability to maintain appropriate glucose levels will improve  Outcome: Ongoing     Problem: Sensory Perception - Impaired:  Goal: Ability to maintain a stable neurologic state will improve  Description: Ability to maintain a stable neurologic state will improve  Outcome: Ongoing     Problem: Pain:  Goal: Pain level will decrease  Description: Pain level will decrease  Outcome: Ongoing  Goal: Control of acute pain  Description: Control of acute pain  Outcome: Ongoing  Goal: Control of chronic pain  Description: Control of chronic pain  Outcome: Ongoing

## 2021-01-21 NOTE — PROGRESS NOTES
Altered mental status     Sepsis ([address] 09/21/2019    Acute kidney injury ([address] 08/22/2019    Acute metabolic encephalopathy 50/97/7100    Shock, unspecified ([address] 08/22/2019    Uncontrolled diabetes mellitus ([address] 08/22/2019    SIRS (systemic inflammatory response syndrome) ([address] 08/12/2019    Class 3 severe obesity due to excess calories with body mass index (BMI) greater than or equal to 70 in adult [facility]) 06/16/2019    VHD (valvular heart disease) 04/02/2019     Overview Note:     H/o TAVR      Pneumonia 02/06/2019    Skin ulcer of left foot with fat layer exposed ([address]     Cellulitis 11/24/2018    Acute on chronic respiratory failure with hypoxia and hypercapnia (HCC)     Gait disturbance 02/10/2018    Chronic obstructive pulmonary disease ([address] 02/07/2018    Chronic respiratory failure with hypoxia and hypercapnia ([address] 01/30/2018    Morbid obesity ([address]     Asthma     Diabetes mellitus ([address]     Hypertension     Sleep apnea     Family history of coronary artery disease     Chest pain     SOB (shortness of breath)     Palpitations     Peripheral edema     Aortic stenosis     Morbid obesity with BMI of 70 and over, adult ([address] 10/27/2016    Dyslipidemia 10/27/2016    Fecal incontinence 10/27/2016    Mild intermittent asthma without complication 77/54/8706    S/P laparoscopic cholecystectomy 10/27/2016    Type 2 diabetes mellitus without complication, with long-term current use of insulin ([address] 10/27/2016    Fatty liver 10/27/2016    Arthritis 10/27/2016    H/O parotidectomy 10/27/2016    Venous stasis dermatitis of both lower extremities 10/27/2016   JOSE- likely pre renal  Morbid Obesity  ANMOL  OHS  Acute on Chronic Hypoxic Hypercapneic resp failure  Chronic Metabolic alkalosis  Pulmonary congestion- improved  Basal atelectasis  Chronic LE venous stasis  Suspected CAP       1. Hold Lasix  2. BMP in am  3. Keep sats > 92%  4. PT/OT  5. ICS  6. OOB  7.  Await placement  8. BIPAP  9. C/w present management  No follow-ups on file.     Electronically signed by Sari Ferro MD on 1/21/2021 at 12:38 PM

## 2021-01-21 NOTE — PROGRESS NOTES
Nephrology Progress Note  1/21/2021 1:11 PM  Subjective:      Interval History: Elie Green is a 63 y.o. female   Weaning vent and is weak    Data:   Scheduled Meds:   magnesium sulfate  4,000 mg Intravenous Once    midodrine  10 mg Oral TID WC    cefTRIAXone (ROCEPHIN) IV  1,000 mg Intravenous Q24H    enoxaparin  30 mg Subcutaneous Daily    furosemide  20 mg Intravenous BID    lubiprostone  24 mcg Oral BID WC    metoclopramide  10 mg Intravenous Q6H    senna  2 tablet Oral Nightly    insulin glargine  10 Units Subcutaneous Nightly    modafinil  200 mg Oral Daily    insulin lispro  0-6 Units Subcutaneous 2 times per day    lidocaine  5 mL Intradermal Once    amiodarone  200 mg Oral Daily    atorvastatin  40 mg Oral Nightly    busPIRone  10 mg Oral TID    clopidogrel  75 mg Oral Daily    [Held by provider] doxepin  10 mg Oral Nightly    gabapentin  400 mg Oral TID    [Held by provider] metoprolol tartrate  12.5 mg Oral BID    pantoprazole  40 mg Oral Daily    polyethylene glycol  17 g Oral Daily    pramipexole  1 mg Oral BID    sertraline  25 mg Oral Nightly    [Held by provider] lisinopril  5 mg Oral Daily    [Held by provider] spironolactone  25 mg Oral Daily    vitamin D3  400 Units Oral Daily    sodium chloride flush  10 mL Intravenous 2 times per day    insulin lispro  0-12 Units Subcutaneous TID WC    miconazole   Topical BID     Continuous Infusions:   dextrose             CBC:   Recent Labs     01/19/21  0645 01/20/21  0445 01/21/21  0545   WBC 5.0 4.3 5.6   HGB 7.6* 7.5* 7.5*    153 147     BMP:    Recent Labs     01/19/21  0645 01/20/21  0445 01/21/21  0545   * 149* 144   K 3.1* 3.2* 3.7   CL 96* 94* 92*   CO2 47* 48* 47*   BUN 18 20 26*   CREATININE 1.2* 1.3* 1.8*   GLUCOSE 101* 91 82       Renal Labs  Albumin:    Lab Results   Component Value Date    LABALBU 3.3 01/13/2021     Calcium:    Lab Results   Component Value Date    CALCIUM 8.3 01/21/2021 Phosphorus:    Lab Results   Component Value Date    PHOS 3.2 07/02/2020     U/A:    Lab Results   Component Value Date    NITRU NEGATIVE 01/13/2021    COLORU YELLOW 01/13/2021    WBCUA 2979 01/13/2021    RBCUA 483 01/13/2021    MUCUS RARE 12/09/2020    TRICHOMONAS NONE SEEN 01/13/2021    YEAST OCCASIONAL 12/09/2020    BACTERIA MANY 01/13/2021    CLARITYU CLOUDY 01/13/2021    SPECGRAV 1.018 01/13/2021    UROBILINOGEN NORMAL 01/13/2021    BILIRUBINUR NEGATIVE 01/13/2021    BLOODU LARGE 01/13/2021    KETUA NEGATIVE 01/13/2021           Objective:   I/O: 01/20 0701 - 01/21 0700  In: 41.4   Out: 450 [Urine:450]  Vitals: BP (!) 100/38   Pulse 60   Temp 100.8 °F (38.2 °C) (Axillary)   Resp 14   Ht 5' 7.99\" (1.727 m)   Wt (!) 364 lb (165.1 kg) Comment: estimate, bed scale not working on bed  SpO2 95%   BMI 55.36 kg/m²   General appearance: arousable  HEENT: Head: Normal, normocephalic, atraumatic.   Neck: supple, symmetrical, trachea midline  Lungs: diminished breath sounds bilaterally  Heart: S1, S2 normal  Abdomen: abnormal findings:  soft nt  Extremities: edema +  Neurologic: Mental status: alertness:arousable        Assessment and Plan:      IMP:  arf from atn/chf  resp failure  chf  Hypokalemia/ hypernatremia  Dm2  anemia    Plan     1 creat 1.8 increase from diuresis and adjust and keep negative  2 o2 improved today  3 K stable  Na monitor  4 ssi and stable  5 hb monitor and prbc If < 7             Daren Munson MD

## 2021-01-22 NOTE — PROGRESS NOTES
Occupational Therapy  . Occupational Therapy Treatment Note  Name: Joanne Mayfield MRN: [id number] :   1955   Date:  2021   Admission Date: 2021 Room:  [address] [place], [place] [address]A     Restrictions/Precautions:    General precautions, fall risk    Communication with other providers:  Per Nurse Ligia patient ok for treatment. Assistance from [employer]. Subjective:  Patient states:  \"I dont want to be a bother\"  Pain:   Location, Type, Intensity (0/10 to 10/10): None stated  Objective:    Observation:  patient in semi fowlers upon arrival. C/o BM incontinence. Objective Measures:  O2. Tele, catheter    Treatment, including education:    ADL activity training was instructed today. Cues were given for safety, sequence, UE/LE placement, visual cues, and balance. Activities performed today included , toileting, hand hygiene, and grooming. Facial hygiene- SBA . Very effortful. Extra time needed. Applying chapstick-  SBA initially- Min A- or L UE shaking   toileting- BM DEP. Small BM incontinence. Attempted to use bed pan, however; unable to void bowls. [place] care- DEP x2 while supine  Feeding- SET UP with extra time. Therapeutic activity training was instructed today. Cues were given for safety, sequence, UE/LE placement, awareness, and balance. Activities performed today included bed mobility training,     Rolling L-R x4 Max A x2 plus vcs and tactile cues for bed rail assistance. Boosting up in bed in trendelenberg Max A x2 plus patient use of trapeze. Patient educated on role of OT , benefits of OT and rationale for therapeutic intervention. All therapeutic intervention performed c emphasis on BUE strengthening and endurance to  increase strength for functional tasks / transfers. Patient left safely in beds at end of session, with call light in reach, alarm on and nursing aware. Gait belt was used for func transfers / mobility.         Assessment / Impression:        Patient's tolerance of treatment:  fair  Adverse Reaction: none  Significant change in status and impact:  none  Barriers to improvement:  Weakness, body habitus, decreased strength and endurance  Plan for Next Session:    Continue with OT POC  Time in:  1305  Time out:  1401  Timed treatment minutes:  56  Total treatment time:  56  Electronically signed by:    MARLENI Mallory  1/22/2021, 1:51 PM    Previously filed values:

## 2021-01-22 NOTE — PROGRESS NOTES
Pulmonary and Critical Care  Progress Note      VITALS:  BP (!) 109/53   Pulse 50   Temp 98 °F (36.7 °C) (Axillary)   Resp 13   Ht 5' 7.99\" (1.727 m)   Wt (!) 2 lb 3.3 oz (1 kg) Comment: bed zeroed  SpO2 97%   BMI 0.34 kg/m²     Subjective:   CHIEF COMPLAINT :SOB     HPI:                The patient is lying in the bed. She is watching TV. She is not in acute reps distress. Objective:   PHYSICAL EXAM:    LUNGS:Decreased air entry bilateral bases  Abd-distended,BS+,NT  Ext- Chronic LE venous stasis  CVS-s1s2, no murmurs      DATA:    CBC:  Recent Labs     01/20/21 0445 01/21/21  0545 01/22/21  0600   WBC 4.3 5.6 6.6   RBC 3.01* 2.96* 2.92*   HGB 7.5* 7.5* 7.6*   HCT 28.3* 27.9* 27.6*    147 162   MCV 94.0 94.3 94.5   MCH 24.9* 25.3* 26.0*   MCHC 26.5* 26.9* 27.5*   RDW 18.5* 19.0* 19.3*   SEGSPCT 57.7  --   --       BMP:  Recent Labs     01/20/21 0445 01/21/21  0545 01/22/21  0600   * 144 148*   K 3.2* 3.7 3.6   CL 94* 92* 94*   CO2 48* 47* 46*   BUN 20 26* 30*   CREATININE 1.3* 1.8* 1.8*   CALCIUM 8.4 8.3 8.4   GLUCOSE 91 82 78      ABG:  No results for input(s): PH, PO2ART, AVN8QDW, HCO3, BEART, O2SAT in the last 72 hours. BNP  No results found for: BNP   D-Dimer:  Lab Results   Component Value Date    DDIMER 1729 (H) 06/27/2020      1.  Radiology: None      Assessment/Plan     Patient Active Problem List    Diagnosis Date Noted    Acute exacerbation of chronic obstructive pulmonary disease (COPD) [facility]      Priority: High    Hypervolemia      Priority: High    Anxiety 09/01/2020     Priority: Medium     Class: Acute    Acute congestive heart failure (HCC)     Acute respiratory failure with hypoxia (HCC)     Bradycardia     Septic shock ([address] 08/25/2020    Acute kidney injury superimposed on CKD ([address] 08/25/2020    S/P TAVR (transcatheter aortic valve replacement) 06/26/2020    Chronic diastolic heart failure ([facility] 06/25/2020    Wide-complex tachycardia ([facility]     Altered mental status     Sepsis ([address] 09/21/2019    Acute kidney injury ([address] 08/22/2019    Acute metabolic encephalopathy 65/40/1626    Shock, unspecified ([address] 08/22/2019    Uncontrolled diabetes mellitus ([address] 08/22/2019    SIRS (systemic inflammatory response syndrome) ([address] 08/12/2019    Class 3 severe obesity due to excess calories with body mass index (BMI) greater than or equal to 70 in adult [facility]) 06/16/2019    VHD (valvular heart disease) 04/02/2019     Overview Note:     H/o TAVR      Pneumonia 02/06/2019    Skin ulcer of left foot with fat layer exposed ([address]     Cellulitis 11/24/2018    Acute on chronic respiratory failure with hypoxia and hypercapnia (HCC)     Gait disturbance 02/10/2018    Chronic obstructive pulmonary disease ([address] 02/07/2018    Chronic respiratory failure with hypoxia and hypercapnia ([address] 01/30/2018    Morbid obesity ([address]     Asthma     Diabetes mellitus ([address]     Hypertension     Sleep apnea     Family history of coronary artery disease     Chest pain     SOB (shortness of breath)     Palpitations     Peripheral edema     Aortic stenosis     Morbid obesity with BMI of 70 and over, adult ([address] 10/27/2016    Dyslipidemia 10/27/2016    Fecal incontinence 10/27/2016    Mild intermittent asthma without complication 88/32/3905    S/P laparoscopic cholecystectomy 10/27/2016    Type 2 diabetes mellitus without complication, with long-term current use of insulin ([address] 10/27/2016    Fatty liver 10/27/2016    Arthritis 10/27/2016    H/O parotidectomy 10/27/2016    Venous stasis dermatitis of both lower extremities 10/27/2016   JOSE- likely pre renal  Morbid Obesity  ANMOL  OHS  Acute on Chronic Hypoxic Hypercapneic resp failure  Chronic Metabolic alkalosis  Pulmonary congestion- improved  Basal atelectasis  Chronic LE venous stasis  Suspected CAP       1. Oral fluids  2. BMP in am  3. BIPAP  4. ICS  5. OOB  6. PT/OT  7. Keep sats > 92%  8. Await placement  9.  C.w present management  No follow-ups on file.     Electronically signed by Julian Hdz MD on 1/22/2021 at 12:11 PM

## 2021-01-22 NOTE — PROGRESS NOTES
Nephrology Progress Note  1/22/2021 2:30 PM        Subjective:   Admit Date: 1/13/2021  PCP: Herson Brothers APRN - NP    Interval History: pt seen in early am     Diet: ? None     ROS:  With BIPAP- 22/12/ FIO2 45 %- lethargic   UOP dropped - 625 /d   Off pressor       Data:     Current meds:    cefTRIAXone (ROCEPHIN) IV  1,000 mg Intravenous Q24H    midodrine  10 mg Oral TID WC    enoxaparin  30 mg Subcutaneous Daily    furosemide  20 mg Intravenous BID    lubiprostone  24 mcg Oral BID WC    metoclopramide  10 mg Intravenous Q6H    senna  2 tablet Oral Nightly    insulin glargine  10 Units Subcutaneous Nightly    insulin lispro  0-6 Units Subcutaneous 2 times per day    lidocaine  5 mL Intradermal Once    amiodarone  200 mg Oral Daily    atorvastatin  40 mg Oral Nightly    busPIRone  10 mg Oral TID    clopidogrel  75 mg Oral Daily    [Held by provider] doxepin  10 mg Oral Nightly    gabapentin  400 mg Oral TID    [Held by provider] metoprolol tartrate  12.5 mg Oral BID    pantoprazole  40 mg Oral Daily    polyethylene glycol  17 g Oral Daily    pramipexole  1 mg Oral BID    sertraline  25 mg Oral Nightly    [Held by provider] lisinopril  5 mg Oral Daily    [Held by provider] spironolactone  25 mg Oral Daily    vitamin D3  400 Units Oral Daily    sodium chloride flush  10 mL Intravenous 2 times per day    insulin lispro  0-12 Units Subcutaneous TID WC    miconazole   Topical BID      dextrose           I/O last 3 completed shifts:   In: 100 [IV Piggyback:100]  Out: 625 [Urine:625]    CBC:   Recent Labs     01/20/21  0445 01/21/21  0545 01/22/21  0600   WBC 4.3 5.6 6.6   HGB 7.5* 7.5* 7.6*    147 162          Recent Labs     01/20/21  0445 01/21/21  0545 01/22/21  0600   * 144 148*   K 3.2* 3.7 3.6   CL 94* 92* 94*   CO2 48* 47* 46*   BUN 20 26* 30*   CREATININE 1.3* 1.8* 1.8*   GLUCOSE 91 82 78       Lab Results   Component Value Date    CALCIUM 8.4 01/22/2021    PHOS 3.2 07/02/2020       Objective:     Vitals: BP (!) 116/59   Pulse 58   Temp 98.3 °F (36.8 °C) (Axillary)   Resp 12   Ht 5' 7.99\" (1.727 m)   Wt (!) 2 lb 3.3 oz (1 kg) Comment: bed zeroed  SpO2 99%   BMI 0.34 kg/m²     General appearance:  lethargic - did not respond to verbal stimuli   HEENT:  No gross conj pallor  Neck:  supple  Lungs:  + adv BS - very limited exam   Heart:  bradycardia  Abdomen: soft , + abd wall edema \  LE edema - \  Has hernandez   Extremities:  See above     Skin:  Stasis dermatitis      Problem List :         Impression :     1. JOSE- non oliguric- likely from CRS type 1 and /?  sepsis / SIRS  But has risk for AIN   2. Sepsis / SIRS hemodynamically better - off pressor   3. ADHF  RHF >> LHF   4. Underlying DM/ obesity- valvular dxz S/p TAVR/ ANMOL/ ? Cor pulmonale etc  5. High na may have relative water deficit ( ironic but  Can happen )      Recommendation/Plan  :     1. Hold loop - drug holiday  2. CXR   3. Reduce Reglan  dose   4. Also with JOSE- hold gabapentin   5. Also d/C any non essential med's   6. Redo UA  7. Labs in am   8.  Follow clinically       Frank Spence MD

## 2021-01-22 NOTE — PROGRESS NOTES
Hospitalist Progress Note      Name:  Arabella Kelley /Age/Sex: 1955  (63 y.o. female)   MRN & CSN:  [id number] & [id number] Admission Date/Time: 2021  2:15 PM   Location:  -A PCP: THEODORE Ramesh - SAVANNAH       Arabella Kelley is a 63 y.o.  female  who presents with Shortness of Breath and Foot Pain (bilateral, no known injury)      Assessment and Plan:   1. Acute on chronic hypoxic and hypercarbic respiratory failure likely 2/2 to CHF  - on bipap  CXR: Interstitial/pulmonary edema.    COVID-19 negative.  -Patient on BiPAP, wean as tolerated  -Pulmonology following, added modafinil and theophylline     2. Septic Shock has UTI  - levo gtt off now  - midodrine 10 TID   - IV Rocephin end after total 7 days  - urine cx: +citrobacter freundii  -Echo: EF 50-55%.     - Blood culture : NGTD. - sputum cx pending     3. Acute on chronic diastolic CHF  - neg 69.4T  - echo : ef 50-55%  -CXR: Pulmonary edema.   - On IV Lasix 20 BID  - Holding beta-blocker, ACE inhibitor, Aldactone due to low BP.     4. Acute kidney injury  - increased from diuresis   Holding ACE inhibitor and Aldactone.    Nephrology following.     5. Anemia of chronic disease   Transfused 1 unit PRBC. -GI ordered Venofer x3 doses, and outpatient EGD and colonoscopy if no gross bleeding.  -monitor     6. Bradycardia  - hold metorpolol  -EKG: Junctional rhythm.    -Cardiology has signed off. HR has improved.    -Troponins negative.     7. Diabetes mellitus type 2  -On sliding scale insulin and Lantus.  Endocrinology following.  .  -A1c 6.1.     HypoMg  -replaced               Diet DIET CARB CONTROL;   Dietary Nutrition Supplements: Diabetic Oral Supplement   Code Status Full Code     Medications:   Medications:    midodrine  10 mg Oral TID     cefTRIAXone (ROCEPHIN) IV  1,000 mg Intravenous Q24H    enoxaparin  30 mg Subcutaneous Daily    furosemide  20 mg Intravenous BID    lubiprostone  24 mcg Oral BID WC    metoclopramide  10 mg Intravenous Q6H    senna  2 tablet Oral Nightly    insulin glargine  10 Units Subcutaneous Nightly    insulin lispro  0-6 Units Subcutaneous 2 times per day    lidocaine  5 mL Intradermal Once    amiodarone  200 mg Oral Daily    atorvastatin  40 mg Oral Nightly    busPIRone  10 mg Oral TID    clopidogrel  75 mg Oral Daily    [Held by provider] doxepin  10 mg Oral Nightly    gabapentin  400 mg Oral TID    [Held by provider] metoprolol tartrate  12.5 mg Oral BID    pantoprazole  40 mg Oral Daily    polyethylene glycol  17 g Oral Daily    pramipexole  1 mg Oral BID    sertraline  25 mg Oral Nightly    [Held by provider] lisinopril  5 mg Oral Daily    [Held by provider] spironolactone  25 mg Oral Daily    vitamin D3  400 Units Oral Daily    sodium chloride flush  10 mL Intravenous 2 times per day    insulin lispro  0-12 Units Subcutaneous TID WC    miconazole   Topical BID      Infusions:    dextrose       PRN Meds:     albuterol sulfate HFA, 2 puff, Q4H PRN      sodium chloride flush, 10 mL, PRN      acetaminophen, 650 mg, Q6H PRN    Or      acetaminophen, 650 mg, Q6H PRN      potassium chloride, 40 mEq, PRN    Or      potassium alternative oral replacement, 40 mEq, PRN    Or      potassium chloride, 10 mEq, PRN      glucose, 15 g, PRN      dextrose, 12.5 g, PRN      glucagon (rDNA), 1 mg, PRN      dextrose, 100 mL/hr, PRN      Subjective:   Doing ok, no distress    Objective:        Intake/Output Summary (Last 24 hours) at 1/22/2021 1138  Last data filed at 1/22/2021 0602  Gross per 24 hour   Intake 100 ml   Output 625 ml   Net -525 ml      Vitals:   Vitals:    01/22/21 0842   BP: (!) 109/53   Pulse: 50   Resp: 13   Temp: 98 °F (36.7 °C)   SpO2: 97%     Physical Exam:   Gen:  awake, alert, no apparent distress  Head/Eyes:  Normocephalic atraumatic, EOMI   NECK:   symmetrical, trachea midline  LUNGS: Normal Effort   CARDIOVASCULAR:  Normal rate  ABDOMEN:  non distended  MUSCULOSKELETAL:  ROM limited  NEUROLOGIC: Alert and Oriented,  Cranial nerves II-XII are grossly intact.    SKIN:  no bruising or bleeding, normal skin color,  no redness      Data:       CBC   Recent Labs     01/20/21  0445 01/21/21  0545 01/22/21  0600   WBC 4.3 5.6 6.6   HGB 7.5* 7.5* 7.6*   HCT 28.3* 27.9* 27.6*    147 162      BMP   Recent Labs     01/20/21  0445 01/21/21  0545 01/22/21  0600   * 144 148*   K 3.2* 3.7 3.6   CL 94* 92* 94*   CO2 48* 47* 46*   BUN 20 26* 30*   CREATININE 1.3* 1.8* 1.8*         Electronically signed by Woody Lunsford MD on 1/22/2021 at 11:38 AM

## 2021-01-22 NOTE — PROGRESS NOTES
Comprehensive Nutrition Assessment    Type and Reason for Visit:  Reassess    Nutrition Recommendations/Plan:   Add diabetic ONS TID   Encourage po intake as able  Please assist with meals as needed  Please document all po intake  Please reweigh pt as current wt appears to be an error  Will monitor nutrition status, po intake, poc    Nutrition Assessment:  Visited pt at bedside, pt reports decreased appetite, pt reports that she likes diabetic ONS, pt reports consuming ~25% of meals and ~40% of ONS, pt denies any chewing or swallowing difficulty, reports meals have tasted good, pt at high nutrition risk    Malnutrition Assessment:  Malnutrition Status: At risk for malnutrition (Comment)    Context:  Acute Illness     Findings of the 6 clinical characteristics of malnutrition:  Energy Intake:  7 - 50% or less of estimated energy requirements for 5 or more days  Weight Loss:  Unable to assess     Body Fat Loss:  No significant body fat loss     Muscle Mass Loss:  No significant muscle mass loss    Fluid Accumulation:  No significant fluid accumulation     Strength:  Not Performed    Estimated Daily Nutrient Needs:  Energy (kcal):  8015-3020([place] Shokavonker w/ stress factor 1.0-1.1); Weight Used for Energy Requirements:  Current     Protein (g):  (1.25-1.5 g/kg); Weight Used for Protein Requirements:  Ideal          Nutrition Related Findings:  Labs: Na 149, K+ 3.2, Mag 1.5, Hemoglobin 7.5      Wounds:  Pressure Injury, Stage II       Current Nutrition Therapies:    DIET CARB CONTROL;   Dietary Nutrition Supplements: Diabetic Oral Supplement    Anthropometric Measures:  · Height: 5' 7.99\" (172.7 cm)  · Current Body Weight: (inaccurate wt documented 1/22)   · Admission Body Weight: (n/a)    · Usual Body Weight: 378 lb 15.5 oz (171.9 kg)((8/25/20) per chart review)     · Ideal Body Weight: 140 lbs; % Ideal Body Weight 260 %   · BMI: 55.4  · BMI Categories: Obese Class 3 (BMI 40.0 or greater) Nutrition Diagnosis:   · Inadequate oral intake related to decreased appetite as evidenced by intake 0-25%    Nutrition Interventions:   Food and/or Nutrient Delivery:  Continue Current Diet, Modify Oral Nutrition Supplement  Nutrition Education/Counseling:  No recommendation at this time   Coordination of Nutrition Care:  Continue to monitor while inpatient    Goals:  pt will consume greater than 50% of meals and supplements       Nutrition Monitoring and Evaluation:   Behavioral-Environmental Outcomes:  None Identified   Food/Nutrient Intake Outcomes:  Supplement Intake, Food and Nutrient Intake  Physical Signs/Symptoms Outcomes:  Biochemical Data, Weight, GI Status, Hemodynamic Status, Fluid Status or Edema     Discharge Planning:     Too soon to determine     Electronically signed by Leo Gonsales MS, RD, LD on 1/22/21 at 1:33 PM EST    Contact: [phone]

## 2021-01-22 NOTE — CARE COORDINATION
Spoke to spouse Elke Bello in patient's room. Patient is alert and able to participate. Patient and spouse chose [facility] for rehab. Will call referral to [facility] intake.  Roselia Delgadillo RN

## 2021-01-23 NOTE — PROGRESS NOTES
Incontinent episode completed, cleaned patient up, she was on flat of her back for 15 minutes. Desated into the 80's on high [place] nasal cannula @ 6 L, lethargic. Raised patient up in high fowlers position and placed on bipap @ 40% (last setting noted). Patient remained in low 80'sO2, increased to 50% without any change, RRT called. Respiratory arrived first, RT Jazmyn increased the Fi O2 to 100%. It took patient about 10-15 minutes to get back into the low 90's. Respiratory reduced O2 to 50%, patient currently @ 95%. Staff present, Mina Washington, Supervisor, RT Jazmyn, Lawrence, Charge RN, Yudelka Zaidi, ICU charge, Viktor Rowland, RN, Adrianna Schaefer, RN. Outcome: Patients O2 sats returned to WNL within 15 min. Will remain on [facility].

## 2021-01-23 NOTE — PROGRESS NOTES
Nephrology Progress Note  1/23/2021 4:25 PM        Subjective:   Admit Date: 1/13/2021  PCP: Herson Brothers, APRN - NP    Interval History: transferred to floor from ICU - awake     Diet: some    ROS:  alert , not fully oriented - with NC - BIPAP at her bedside   UOP not recorded   feels thirsty     Data:     Current meds:    potassium phosphate IVPB  15 mmol Intravenous Once    cefTRIAXone (ROCEPHIN) IV  1,000 mg Intravenous Q24H    metoclopramide  5 mg Intravenous Q6H    midodrine  10 mg Oral TID WC    enoxaparin  30 mg Subcutaneous Daily    lubiprostone  24 mcg Oral BID WC    senna  2 tablet Oral Nightly    insulin glargine  10 Units Subcutaneous Nightly    insulin lispro  0-6 Units Subcutaneous 2 times per day    lidocaine  5 mL Intradermal Once    amiodarone  200 mg Oral Daily    atorvastatin  40 mg Oral Nightly    busPIRone  10 mg Oral TID    clopidogrel  75 mg Oral Daily    [Held by provider] doxepin  10 mg Oral Nightly    [Held by provider] metoprolol tartrate  12.5 mg Oral BID    pantoprazole  40 mg Oral Daily    polyethylene glycol  17 g Oral Daily    pramipexole  1 mg Oral BID    sertraline  25 mg Oral Nightly    [Held by provider] lisinopril  5 mg Oral Daily    [Held by provider] spironolactone  25 mg Oral Daily    vitamin D3  400 Units Oral Daily    sodium chloride flush  10 mL Intravenous 2 times per day    insulin lispro  0-12 Units Subcutaneous TID WC    miconazole   Topical BID      dextrose           I/O last 3 completed shifts:   In: 510 [P.O.:480; I.V.:30]  Out: -     CBC:   Recent Labs     01/21/21  0545 01/22/21  0600 01/23/21  0630   WBC 5.6 6.6 6.0   HGB 7.5* 7.6* 7.6*    162 201          Recent Labs     01/21/21  0545 01/22/21  0600 01/23/21  0630    148* 140   K 3.7 3.6 3.5   CL 92* 94* 90*   CO2 47* 46* 46*   BUN 26* 30* 27*   CREATININE 1.8* 1.8* 1.5*   GLUCOSE 82 78 83       Lab Results   Component Value Date    CALCIUM 8.7 01/23/2021    PHOS 2.4 (L) 01/23/2021       Objective:     Vitals: BP (!) 117/52   Pulse 67   Temp 98.5 °F (36.9 °C) (Oral)   Resp 17   Ht 5' 7.99\" (1.727 m)   Wt (!) 2 lb 3.3 oz (1 kg) Comment: bed zeroed  SpO2 95%   BMI 0.34 kg/m²     General appearance:  Fatigue- no ac distress  HEENT:  ++ conj pallor  Neck:  supple  Lungs:  Adv BS , poor air flow  Heart:  irregular  Abdomen: soft  Extremities:  minimal edema   Has hernandez       Problem List :         Impression :     1. JOSE- unknown  UOP- better   2. Sepsis- better with antimicrobial agent - source / ? lung   3. ADHF- RHF>> LHF - off loop now clinically better   4. Multiple comorbid d z- DM/ obesity- cor pulmonale / ANMOL/ AVR  ( TAVR ) etc     Recommendation/Plan  :     1. Pro BNP in am   2. Watch UOP]  3. will start l;oop soon after adequate capillary plasma refill  4. follow clinically  5. watch for  pulmo edema   6.  Labs in am       Sebastián Kovacs MD

## 2021-01-23 NOTE — PROGRESS NOTES
Hospitalist Progress Note      Name:  Aislinn Burn /Age/Sex: 1955  (63 y.o. female)   MRN & CSN:  [id number] & [id number] Admission Date/Time: 2021  2:15 PM   [facility]:  [facility] PCP: THEODORE Rainey NP       Aislinn Marie is a 63 y.o.  female  who presents with Shortness of Breath and Foot Pain (bilateral, no known injury)      Assessment and Plan:   1. Acute on chronic hypoxic and hypercarbic respiratory failure likely 2/2 to CHF  -on 10 L NC O2 now, wean as tolerated, bipap prn  CXR: Interstitial/pulmonary edema.    COVID-19 negative.  -Pulmonology following, added modafinil and theophylline     2. Septic Shock has UTI  - resolved  - levo gtt off now  - midodrine 10 TID   - IV Rocephin end after total 7 days  - urine cx: +citrobacter freundii  -Echo: EF 50-55%.     - Blood culture : NGTD. - sputum cx pending     3. Acute on chronic diastolic CHF  - improved  - neg 28.1L  - echo : ef 50-55%  -CXR: Pulmonary edema.   - off lasix now  - Holding beta-blocker, ACE inhibitor, Aldactone due to low BP.     4. Acute kidney injury  - increased from diuresis   Holding ACE inhibitor and Aldactone.    Nephrology following.     5. Anemia of chronic disease   Transfused 1 unit PRBC. -GI ordered Venofer x3 doses, and outpatient EGD and colonoscopy if no gross bleeding.  -monitor     6. Bradycardia  - hold metorpolol  -EKG: Junctional rhythm.    -Cardiology has signed off. HR has improved.    -Troponins negative.     7. Diabetes mellitus type 2  -On sliding scale insulin and Lantus.  Endocrinology following.  .  -A1c 6.1.     HypoMg  -replaced    HypoPhos  - replace               Diet DIET CARB CONTROL;   Dietary Nutrition Supplements: Diabetic Oral Supplement   Code Status Full Code     Medications:   Medications:    cefTRIAXone (ROCEPHIN) IV  1,000 mg Intravenous Q24H    metoclopramide  5 mg Intravenous Q6H    midodrine  10 mg Oral TID WC    enoxaparin  30 mg Subcutaneous Daily    lubiprostone  24 mcg Oral BID WC    senna  2 tablet Oral Nightly    insulin glargine  10 Units Subcutaneous Nightly    insulin lispro  0-6 Units Subcutaneous 2 times per day    lidocaine  5 mL Intradermal Once    amiodarone  200 mg Oral Daily    atorvastatin  40 mg Oral Nightly    busPIRone  10 mg Oral TID    clopidogrel  75 mg Oral Daily    [Held by provider] doxepin  10 mg Oral Nightly    [Held by provider] metoprolol tartrate  12.5 mg Oral BID    pantoprazole  40 mg Oral Daily    polyethylene glycol  17 g Oral Daily    pramipexole  1 mg Oral BID    sertraline  25 mg Oral Nightly    [Held by provider] lisinopril  5 mg Oral Daily    [Held by provider] spironolactone  25 mg Oral Daily    vitamin D3  400 Units Oral Daily    sodium chloride flush  10 mL Intravenous 2 times per day    insulin lispro  0-12 Units Subcutaneous TID     miconazole   Topical BID      Infusions:    dextrose       PRN Meds:     albuterol sulfate HFA, 2 puff, Q4H PRN      sodium chloride flush, 10 mL, PRN      acetaminophen, 650 mg, Q6H PRN    Or      acetaminophen, 650 mg, Q6H PRN      potassium chloride, 40 mEq, PRN    Or      potassium alternative oral replacement, 40 mEq, PRN    Or      potassium chloride, 10 mEq, PRN      glucose, 15 g, PRN      dextrose, 12.5 g, PRN      glucagon (rDNA), 1 mg, PRN      dextrose, 100 mL/hr, PRN      Subjective:     Doing ok,, eating breakfast  Objective:        Intake/Output Summary (Last 24 hours) at 1/23/2021 0950  Last data filed at 1/23/2021 0825  Gross per 24 hour   Intake 510 ml   Output --   Net 510 ml      Vitals:   Vitals:    01/23/21 0825   BP: (!) 120/43   Pulse: 65   Resp: 15   Temp: 98.3 °F (36.8 °C)   SpO2: 99%     Physical Exam:   Gen:  awake, alert, no apparent distress  Head/Eyes:  Normocephalic atraumatic, EOMI   NECK:   symmetrical, trachea midline  LUNGS: Normal Effort   CARDIOVASCULAR:  Normal rate  ABDOMEN:  non distended  MUSCULOSKELETAL:  ROM limited  NEUROLOGIC: Alert and Oriented,  Cranial nerves II-XII are grossly intact.    SKIN:  no bruising or bleeding, normal skin color,  no redness      Data:       CBC   Recent Labs     01/21/21  0545 01/22/21  0600 01/23/21  0630   WBC 5.6 6.6 6.0   HGB 7.5* 7.6* 7.6*   HCT 27.9* 27.6* 27.9*    162 201      BMP   Recent Labs     01/21/21  0545 01/22/21  0600 01/23/21  0630    148* 140   K 3.7 3.6 3.5   CL 92* 94* 90*   CO2 47* 46* 46*   PHOS  --   --  2.4*   BUN 26* 30* 27*   CREATININE 1.8* 1.8* 1.5*         Electronically signed by Shiloh Hanson MD on 1/23/2021 at 9:50 AM

## 2021-01-23 NOTE — PROGRESS NOTES
Pulmonary and Critical Care  Progress Note      VITALS:  BP (!) 120/43   Pulse 65   Temp 98.3 °F (36.8 °C) (Oral)   Resp 16   Ht 5' 7.99\" (1.727 m)   Wt (!) 2 lb 3.3 oz (1 kg) Comment: bed zeroed  SpO2 96%   BMI 0.34 kg/m²     Subjective:   CHIEF COMPLAINT :SOB     HPI:                The patient is sleepy but arousable. She is not in acute resp distress    Objective:   PHYSICAL EXAM:    LUNGS:Decreased air entry bilateral bases  Abd-distended,BS+,NT  Ext- Chronic LE venous stasis  CVS-s1s2, no murmurs      DATA:    CBC:  Recent Labs     01/21/21  0545 01/22/21  0600 01/23/21  0630   WBC 5.6 6.6 6.0   RBC 2.96* 2.92* 2.99*   HGB 7.5* 7.6* 7.6*   HCT 27.9* 27.6* 27.9*    162 201   MCV 94.3 94.5 93.3   MCH 25.3* 26.0* 25.4*   MCHC 26.9* 27.5* 27.2*   RDW 19.0* 19.3* 19.5*      BMP:  Recent Labs     01/21/21  0545 01/22/21  0600 01/23/21  0630    148* 140   K 3.7 3.6 3.5   CL 92* 94* 90*   CO2 47* 46* 46*   BUN 26* 30* 27*   CREATININE 1.8* 1.8* 1.5*   CALCIUM 8.3 8.4 8.7   GLUCOSE 82 78 83      ABG:  No results for input(s): PH, PO2ART, RYP9TCE, HCO3, BEART, O2SAT in the last 72 hours. BNP  No results found for: BNP   D-Dimer:  Lab Results   Component Value Date    DDIMER 1729 (H) 06/27/2020      1.  Radiology: None      Assessment/Plan     Patient Active Problem List    Diagnosis Date Noted    Acute exacerbation of chronic obstructive pulmonary disease (COPD) ([facility]      Priority: High    Hypervolemia      Priority: High    Anxiety 09/01/2020     Priority: Medium     Class: Acute    Acute congestive heart failure (HCC)     Acute respiratory failure with hypoxia (HCC)     Bradycardia     Septic shock ([facility] 08/25/2020    Acute kidney injury superimposed on CKD ([facility] 08/25/2020    S/P TAVR (transcatheter aortic valve replacement) 06/26/2020    Chronic diastolic heart failure ([facility] 06/25/2020    Wide-complex tachycardia ([facility]     Altered mental status     Sepsis ([facility] 09/21/2019    Acute kidney injury ([address] 08/22/2019    Acute metabolic encephalopathy 25/08/5510    Shock, unspecified ([address] 08/22/2019    Uncontrolled diabetes mellitus ([address] 08/22/2019    SIRS (systemic inflammatory response syndrome) ([address] 08/12/2019    Class 3 severe obesity due to excess calories with body mass index (BMI) greater than or equal to 70 in adult [facility]) 06/16/2019    VHD (valvular heart disease) 04/02/2019     Overview Note:     H/o TAVR      Pneumonia 02/06/2019    Skin ulcer of left foot with fat layer exposed ([address]     Cellulitis 11/24/2018    Acute on chronic respiratory failure with hypoxia and hypercapnia (HCC)     Gait disturbance 02/10/2018    Chronic obstructive pulmonary disease ([address] 02/07/2018    Chronic respiratory failure with hypoxia and hypercapnia ([address] 01/30/2018    Morbid obesity ([address]     Asthma     Diabetes mellitus ([address]     Hypertension     Sleep apnea     Family history of coronary artery disease     Chest pain     SOB (shortness of breath)     Palpitations     Peripheral edema     Aortic stenosis     Morbid obesity with BMI of 70 and over, adult ([address] 10/27/2016    Dyslipidemia 10/27/2016    Fecal incontinence 10/27/2016    Mild intermittent asthma without complication 16/09/5654    S/P laparoscopic cholecystectomy 10/27/2016    Type 2 diabetes mellitus without complication, with long-term current use of insulin ([address] 10/27/2016    Fatty liver 10/27/2016    Arthritis 10/27/2016    H/O parotidectomy 10/27/2016    Venous stasis dermatitis of both lower extremities 10/27/2016   JOSE- improving  Morbid Obesity  ANMOL  OHS  Acute on Chronic Hypoxic Hypercapneic resp failure  Chronic Metabolic alkalosis  Pulmonary congestion- improved  Basal atelectasis  Chronic LE venous stasis  Suspected CAP       1. BMP in am  2. BIPAP  3. ICS  4. OOB  5. PT/OT  6. Keep sats > 92%  7. Await placement  8. C.w present management  No follow-ups on file.     Electronically signed by Diane Evangelista MD on 1/23/2021 at 11:17 AM

## 2021-01-24 NOTE — PROGRESS NOTES
Progress Note( Dr. Amalia Skinner)    Subjective:   Admit Date: 1/13/2021  PCP: THEODORE Ramesh - NP    Admitted For :Severe shortness of breath    Consulted For: Better control of blood glucose    Interval History: Patient is somewhat better   Now BiPAP is used as needed  Patient was off ICU and transferred to medical floor    Denies any chest pains,   Yes SOB . On BiPAP now as needed as needed  Denies nausea or vomiting. No new bowel or bladder symptoms.        Intake/Output Summary (Last 24 hours) at 1/23/2021 2019  Last data filed at 1/23/2021 1845  Gross per 24 hour   Intake 510 ml   Output 725 ml   Net -215 ml       DATA    CBC:   Recent Labs     01/21/21  0545 01/22/21  0600 01/23/21  0630   WBC 5.6 6.6 6.0   HGB 7.5* 7.6* 7.6*    162 201    CMP:  Recent Labs     01/21/21  0545 01/22/21  0600 01/23/21  0630    148* 140   K 3.7 3.6 3.5   CL 92* 94* 90*   CO2 47* 46* 46*   BUN 26* 30* 27*   CREATININE 1.8* 1.8* 1.5*   CALCIUM 8.3 8.4 8.7   PROT  --   --  7.3   LABALBU  --   --  3.5   BILITOT  --   --  0.4   ALKPHOS  --   --  89   AST  --   --  12*   ALT  --   --  <5*     Lipids:   Lab Results   Component Value Date    CHOL 151 01/14/2021    HDL 46 01/14/2021    TRIG 94 01/14/2021     Glucose:  Recent Labs     01/23/21  0701 01/23/21  1110 01/23/21  1535   POCGLU 93 96 100*     [id number]:  Lab Results   Component Value Date    LABA1C 6.1 01/14/2021     High Sensitivity TSH:   Lab Results   Component Value Date    TSHHS 3.490 12/09/2020     Free T3: No results found for: FT3  Free T4:  Lab Results   Component Value Date    T4FREE 1.07 09/04/2018       Echocardiogram Limited    Result Date: 1/15/2021  Transthoracic Echocardiography Report (TTE)  Demographics   Patient Name       Vladislav ADAMS Date of Study       01/14/2021   Date of Birth      1955         Gender              Female   Age                63 year(s)         Race                   Patient Number     [id number] Room Number         2105   Visit Number       [id number]   Corporate ID       [id number]   Accession Number   [id number]         Jaquelin Davila   Ordering Physician Raul Keys MD      Physician           Kayy Grossman MD  Procedure Type of Study   TTE procedure:ECHOCARDIOGRAM LIMITED. Procedure Date Date: 01/14/2021 Start: 02:11 PM Study Location: Portable Technical Quality: Limited visualization due to body habitus. Indications:Congestive heart failure. Patient Status: Routine Height: 68 inches Weight: 378 pounds BSA: 2.68 m2 BMI: 57.47 kg/m2 HR: 62 bpm BP: 107/55 mmHg  Conclusions   Summary  This is a limited echocardiogram. Technically difficult due to body habitus  Left ventricular systolic function is normal.  Ejection fraction is visually estimated at 50-55%. Dilated right ventricle with pressure/volume overload. S/p TAVR. Mild tricuspid regurgitation is present. RVSP is 43 mmHg. Mild pericardial effusion. Inferior vena cava is dilated, measuring at 3.4 cm, and does not collapse  with respiration. Signature   ------------------------------------------------------------------  Electronically signed by Sacha Hammer MD  (Interpreting physician) on 01/15/2021 at 08:06 AM  Xr Pelvis (1-2 Views)       Xr Chest Portable    Result Date: 1/15/2021  EXAMINATION: ONE XRAY VIEW OF THE CHEST 1/15/2021 5:49 am COMPARISON: 01/13/2021 HISTORY: ORDERING SYSTEM PROVIDED HISTORY: Hypoxia      1. Stable cardiomegaly with pulmonary edema, bilateral pleural effusions, and associated bibasilar atelectasis. Xr Chest Portable    Result Date: 1/13/2021  EXAMINATION: ONE XRAY VIEW OF THE CHEST 1/13/2021 2:24 pm COMPARISON: Chest x-ray dated 12/09/2020 HISTORY: ORDERING SYSTEM PROVIDED HISTORY: sob       Interstitial/pulmonary edema. Cardiomegaly. Probable small bilateral pleural effusions. Scheduled Medicines   Medications:    cefTRIAXone (ROCEPHIN) IV  1,000 mg Intravenous Q24H    metoclopramide  5 mg Intravenous Q6H    midodrine  10 mg Oral TID WC    enoxaparin  30 mg Subcutaneous Daily    lubiprostone  24 mcg Oral BID WC    senna  2 tablet Oral Nightly    insulin glargine  10 Units Subcutaneous Nightly    insulin lispro  0-6 Units Subcutaneous 2 times per day    lidocaine  5 mL Intradermal Once    amiodarone  200 mg Oral Daily    atorvastatin  40 mg Oral Nightly    busPIRone  10 mg Oral TID    clopidogrel  75 mg Oral Daily    [Held by provider] doxepin  10 mg Oral Nightly    [Held by provider] metoprolol tartrate  12.5 mg Oral BID    pantoprazole  40 mg Oral Daily    polyethylene glycol  17 g Oral Daily    pramipexole  1 mg Oral BID    sertraline  25 mg Oral Nightly    [Held by provider] lisinopril  5 mg Oral Daily    [Held by provider] spironolactone  25 mg Oral Daily    vitamin D3  400 Units Oral Daily    sodium chloride flush  10 mL Intravenous 2 times per day    insulin lispro  0-12 Units Subcutaneous TID WC    miconazole   Topical BID      Infusions:    dextrose           Objective:   Vitals: BP (!) 129/51   Pulse 69   Temp 98.7 °F (37.1 °C) (Oral)   Resp 20   Ht 5' 7.99\" (1.727 m)   Wt (!) 2 lb 3.3 oz (1 kg) Comment: bed zeroed  SpO2 96%   BMI 0.34 kg/m²   General appearance: alert and cooperative with exam  Neck: no JVD or bruit  Thyroid : Normal lobes   Lungs: Has Vesicular Breath sounds has wheezing and rales bilaterally  Heart:  regular rate and rhythm  Abdomen: soft, non-tender; bowel sounds normal; no masses,  no organomegaly  Musculoskeletal: Normal  Extremities: extremities normal, , no edema  Neurologic:  Awake, alert, oriented to name, place and time. Cranial nerves II-XII are grossly intact. Motor is  intact.   Sensory neuropathy t.,  and gait is abnormal.  Unstable    Assessment:     Patient Active Problem List:     Morbid obesity with BMI of 70 and over, adult ([facility]     Dyslipidemia     Fecal incontinence     Mild intermittent asthma without complication     S/P laparoscopic cholecystectomy     Type 2 diabetes mellitus without complication, with long-term current use of insulin ([facility])     Fatty liver     Arthritis     H/O parotidectomy     Venous stasis dermatitis of both lower extremities     Aortic stenosis     Morbid obesity (HCC)     Asthma     Diabetes mellitus ([address]     Hypertension     Sleep apnea     Family history of coronary artery disease     Chest pain     SOB (shortness of breath)     Palpitations     Peripheral edema     Hypervolemia     Chronic respiratory failure with hypoxia and hypercapnia (HCC)     Acute exacerbation of chronic obstructive pulmonary disease (COPD) (HCC)     Chronic obstructive pulmonary disease (HCC)     Gait disturbance     Acute on chronic respiratory failure with hypoxia and hypercapnia ([facility])     Cellulitis     Skin ulcer of left foot with fat layer exposed ([address]     Pneumonia     VHD (valvular heart disease)     Class 3 severe obesity due to excess calories with body mass index (BMI) greater than or equal to 70 in adult ([facility])     SIRS (systemic inflammatory response syndrome) ([facility])     Acute kidney injury ([address]     Acute metabolic encephalopathy     Shock, unspecified ([address]     Uncontrolled diabetes mellitus ([address]     Sepsis ([address]     Altered mental status     Wide-complex tachycardia ([facility])     Chronic diastolic heart failure ([facility])     S/P TAVR (transcatheter aortic valve replacement)     Septic shock ([facility])     Acute kidney injury superimposed on CKD (HCC)     Bradycardia     Anxiety     Acute respiratory failure with hypoxia ([facility])     Acute congestive heart failure ([address]      Plan:     1. Reviewed POC blood glucose . Labs and X ray results   2. Reviewed Current Medicines   3. On meal/ Correction bolus Humalog/ Basal Lantus Insulin regime   4.  Monitor Blood glucose frequently   5. Modified  the dose of Insulin/ other medicines as needed   6. Will follow     .      Marga Flores MD

## 2021-01-24 NOTE — PROGRESS NOTES
Progress Note( Dr. Drake Vaca)    Subjective:   Admit Date: 1/13/2021  PCP: THEODORE Sparks - NP    I saw the patient on 1/21/2021 but the made the note later    Admitted For :Severe shortness of breath    Consulted For: Better control of blood glucose    Interval History: Patient is somewhat better still on BiPAP almost continuously  Except at time of eating     Denies any chest pains,   Yes SOB . On BiPAP  Denies nausea or vomiting. No new bowel or bladder symptoms.        Intake/Output Summary (Last 24 hours) at 1/23/2021 2021  Last data filed at 1/23/2021 1845  Gross per 24 hour   Intake 510 ml   Output 725 ml   Net -215 ml       DATA    CBC:   Recent Labs     01/21/21  0545 01/22/21  0600 01/23/21  0630   WBC 5.6 6.6 6.0   HGB 7.5* 7.6* 7.6*    162 201    CMP:  Recent Labs     01/21/21  0545 01/22/21  0600 01/23/21  0630    148* 140   K 3.7 3.6 3.5   CL 92* 94* 90*   CO2 47* 46* 46*   BUN 26* 30* 27*   CREATININE 1.8* 1.8* 1.5*   CALCIUM 8.3 8.4 8.7   PROT  --   --  7.3   LABALBU  --   --  3.5   BILITOT  --   --  0.4   ALKPHOS  --   --  89   AST  --   --  12*   ALT  --   --  <5*     Lipids:   Lab Results   Component Value Date    CHOL 151 01/14/2021    HDL 46 01/14/2021    TRIG 94 01/14/2021     Glucose:  Recent Labs     01/23/21  1110 01/23/21  1535 01/23/21 2014   POCGLU 96 100* 98     [id number]:  Lab Results   Component Value Date    LABA1C 6.1 01/14/2021     High Sensitivity TSH:   Lab Results   Component Value Date    TSHHS 3.490 12/09/2020     Free T3: No results found for: FT3  Free T4:  Lab Results   Component Value Date    T4FREE 1.07 09/04/2018       Echocardiogram Limited    Result Date: 1/15/2021  Transthoracic Echocardiography Report (TTE)  Demographics   Patient Name       Marva ADAMS Date of Study       01/14/2021   Date of Birth      1955         Gender              Female   Age                63 year(s)         Race                   Patient Number [id number]         Room Number         2105   Visit Number       [id number]   Corporate ID       [id number]   Accession Number   [id number]         Patrick Angelo   Ordering Physician Liliana Gan MD      Physician           Bertha Green MD  Procedure Type of Study   TTE procedure:ECHOCARDIOGRAM LIMITED. Procedure Date Date: 01/14/2021 Start: 02:11 PM Study Location: Portable Technical Quality: Limited visualization due to body habitus. Indications:Congestive heart failure. Patient Status: Routine Height: 68 inches Weight: 378 pounds BSA: 2.68 m2 BMI: 57.47 kg/m2 HR: 62 bpm BP: 107/55 mmHg  Conclusions   Summary  This is a limited echocardiogram. Technically difficult due to body habitus  Left ventricular systolic function is normal.  Ejection fraction is visually estimated at 50-55%. Dilated right ventricle with pressure/volume overload. S/p TAVR. Mild tricuspid regurgitation is present. RVSP is 43 mmHg. Mild pericardial effusion. Inferior vena cava is dilated, measuring at 3.4 cm, and does not collapse  with respiration. Signature   ------------------------------------------------------------------  Electronically signed by Osman Reyes MD  (Interpreting physician) on 01/15/2021 at 08:06 AM  Xr Pelvis (1-2 Views)       Xr Chest Portable    Result Date: 1/15/2021  EXAMINATION: ONE XRAY VIEW OF THE CHEST 1/15/2021 5:49 am COMPARISON: 01/13/2021 HISTORY: ORDERING SYSTEM PROVIDED HISTORY: Hypoxia      1. Stable cardiomegaly with pulmonary edema, bilateral pleural effusions, and associated bibasilar atelectasis. Xr Chest Portable    Result Date: 1/13/2021  EXAMINATION: ONE XRAY VIEW OF THE CHEST 1/13/2021 2:24 pm COMPARISON: Chest x-ray dated 12/09/2020 HISTORY: ORDERING SYSTEM PROVIDED HISTORY: sob       Interstitial/pulmonary edema. Patient Active Problem List:     Morbid obesity with BMI of 70 and over, adult ([facility]     Dyslipidemia     Fecal incontinence     Mild intermittent asthma without complication     S/P laparoscopic cholecystectomy     Type 2 diabetes mellitus without complication, with long-term current use of insulin (HCC)     Fatty liver     Arthritis     H/O parotidectomy     Venous stasis dermatitis of both lower extremities     Aortic stenosis     Morbid obesity (HCC)     Asthma     Diabetes mellitus ([facility]     Hypertension     Sleep apnea     Family history of coronary artery disease     Chest pain     SOB (shortness of breath)     Palpitations     Peripheral edema     Hypervolemia     Chronic respiratory failure with hypoxia and hypercapnia (HCC)     Acute exacerbation of chronic obstructive pulmonary disease (COPD) (HCC)     Chronic obstructive pulmonary disease (HCC)     Gait disturbance     Acute on chronic respiratory failure with hypoxia and hypercapnia ([facility])     Cellulitis     Skin ulcer of left foot with fat layer exposed ([facility]     Pneumonia     VHD (valvular heart disease)     Class 3 severe obesity due to excess calories with body mass index (BMI) greater than or equal to 70 in adult ([facility]     SIRS (systemic inflammatory response syndrome) ([facility])     Acute kidney injury ([facility]     Acute metabolic encephalopathy     Shock, unspecified ([facility]     Uncontrolled diabetes mellitus ([facility]     Sepsis ([facility]     Altered mental status     Wide-complex tachycardia (HCC)     Chronic diastolic heart failure ([facility])     S/P TAVR (transcatheter aortic valve replacement)     Septic shock (HCC)     Acute kidney injury superimposed on CKD (HCC)     Bradycardia     Anxiety     Acute respiratory failure with hypoxia (HCC)     Acute congestive heart failure ([facility]      Plan:     1. Reviewed POC blood glucose . Labs and X ray results   2. Reviewed Current Medicines   3. On meal/ Correction bolus Humalog/ Basal Lantus Insulin regime   4.  Monitor Blood glucose frequently   5. Modified  the dose of Insulin/ other medicines as needed   6. Will follow     .      Flores Craft MD

## 2021-01-24 NOTE — PROGRESS NOTES
Progress Note( Dr. Alpesh Moody)    Subjective:   Admit Date: 1/13/2021  PCP: THEODORE Bennett - NP    I saw the patient on 1/20//2021 but the made the note later    Admitted For :Severe shortness of breath    Consulted For: Better control of blood glucose    Interval History: Patient is somewhat better still on BiPAP almost continuously  Except at time of eating     Denies any chest pains,   Yes SOB . On BiPAP  Denies nausea or vomiting. No new bowel or bladder symptoms.        Intake/Output Summary (Last 24 hours) at 1/23/2021 2022  Last data filed at 1/23/2021 1845  Gross per 24 hour   Intake 510 ml   Output 725 ml   Net -215 ml       DATA    CBC:   Recent Labs     01/21/21  0545 01/22/21  0600 01/23/21  0630   WBC 5.6 6.6 6.0   HGB 7.5* 7.6* 7.6*    162 201    CMP:  Recent Labs     01/21/21  0545 01/22/21  0600 01/23/21  0630    148* 140   K 3.7 3.6 3.5   CL 92* 94* 90*   CO2 47* 46* 46*   BUN 26* 30* 27*   CREATININE 1.8* 1.8* 1.5*   CALCIUM 8.3 8.4 8.7   PROT  --   --  7.3   LABALBU  --   --  3.5   BILITOT  --   --  0.4   ALKPHOS  --   --  89   AST  --   --  12*   ALT  --   --  <5*     Lipids:   Lab Results   Component Value Date    CHOL 151 01/14/2021    HDL 46 01/14/2021    TRIG 94 01/14/2021     Glucose:  Recent Labs     01/23/21  1110 01/23/21  1535 01/23/21 2014   POCGLU 96 100* 98     [id number]:  Lab Results   Component Value Date    LABA1C 6.1 01/14/2021     High Sensitivity TSH:   Lab Results   Component Value Date    TSHHS 3.490 12/09/2020     Free T3: No results found for: FT3  Free T4:  Lab Results   Component Value Date    T4FREE 1.07 09/04/2018       Echocardiogram Limited    Result Date: 1/15/2021  Transthoracic Echocardiography Report (TTE)  Demographics   Patient Name       Jullie Cary M Date of Study       01/14/2021   Date of Birth      1955         Gender              Female   Age                63 year(s)         Race                   Patient Number [id number]         Room Number         2105   Visit Number       [id number]   Corporate ID       [id number]   Accession Number   [id number]         Isi Garrett   Ordering Physician Epifanio Kerr MD      Physician           Rebecca Almonte MD  Procedure Type of Study   TTE procedure:ECHOCARDIOGRAM LIMITED. Procedure Date Date: 01/14/2021 Start: 02:11 PM Study Location: Portable Technical Quality: Limited visualization due to body habitus. Indications:Congestive heart failure. Patient Status: Routine Height: 68 inches Weight: 378 pounds BSA: 2.68 m2 BMI: 57.47 kg/m2 HR: 62 bpm BP: 107/55 mmHg  Conclusions   Summary  This is a limited echocardiogram. Technically difficult due to body habitus  Left ventricular systolic function is normal.  Ejection fraction is visually estimated at 50-55%. Dilated right ventricle with pressure/volume overload. S/p TAVR. Mild tricuspid regurgitation is present. RVSP is 43 mmHg. Mild pericardial effusion. Inferior vena cava is dilated, measuring at 3.4 cm, and does not collapse  with respiration. Signature   ------------------------------------------------------------------  Electronically signed by Beryle Poling MD  (Interpreting physician) on 01/15/2021 at 08:06 AM  Xr Pelvis (1-2 Views)       Xr Chest Portable    Result Date: 1/15/2021  EXAMINATION: ONE XRAY VIEW OF THE CHEST 1/15/2021 5:49 am COMPARISON: 01/13/2021 HISTORY: ORDERING SYSTEM PROVIDED HISTORY: Hypoxia      1. Stable cardiomegaly with pulmonary edema, bilateral pleural effusions, and associated bibasilar atelectasis. Xr Chest Portable    Result Date: 1/13/2021  EXAMINATION: ONE XRAY VIEW OF THE CHEST 1/13/2021 2:24 pm COMPARISON: Chest x-ray dated 12/09/2020 HISTORY: ORDERING SYSTEM PROVIDED HISTORY: sob       Interstitial/pulmonary edema. Cardiomegaly. Probable small bilateral pleural effusions. Scheduled Medicines   Medications:    cefTRIAXone (ROCEPHIN) IV  1,000 mg Intravenous Q24H    metoclopramide  5 mg Intravenous Q6H    midodrine  10 mg Oral TID     enoxaparin  30 mg Subcutaneous Daily    lubiprostone  24 mcg Oral BID     senna  2 tablet Oral Nightly    insulin glargine  10 Units Subcutaneous Nightly    insulin lispro  0-6 Units Subcutaneous 2 times per day    lidocaine  5 mL Intradermal Once    amiodarone  200 mg Oral Daily    atorvastatin  40 mg Oral Nightly    busPIRone  10 mg Oral TID    clopidogrel  75 mg Oral Daily    [Held by provider] doxepin  10 mg Oral Nightly    [Held by provider] metoprolol tartrate  12.5 mg Oral BID    pantoprazole  40 mg Oral Daily    polyethylene glycol  17 g Oral Daily    pramipexole  1 mg Oral BID    sertraline  25 mg Oral Nightly    [Held by provider] lisinopril  5 mg Oral Daily    [Held by provider] spironolactone  25 mg Oral Daily    vitamin D3  400 Units Oral Daily    sodium chloride flush  10 mL Intravenous 2 times per day    insulin lispro  0-12 Units Subcutaneous TID     miconazole   Topical BID      Infusions:    dextrose           Objective:   Vitals: BP (!) 129/51   Pulse 69   Temp 98.7 °F (37.1 °C) (Oral)   Resp 20   Ht 5' 7.99\" (1.727 m)   Wt (!) 2 lb 3.3 oz (1 kg) Comment: bed zeroed  SpO2 96%   BMI 0.34 kg/m²   General appearance: alert and cooperative with exam  Neck: no JVD or bruit  Thyroid : Normal lobes   Lungs: Has Vesicular Breath sounds has wheezing and rales bilaterally  Heart:  regular rate and rhythm  Abdomen: soft, non-tender; bowel sounds normal; no masses,  no organomegaly  Musculoskeletal: Normal  Extremities: extremities normal, , no edema  Neurologic:  Awake, alert, oriented to name, place and time. Cranial nerves II-XII are grossly intact. Motor is  intact.   Sensory neuropathy t.,  and gait is abnormal.  Unstable    Assessment: Patient Active Problem List:     Morbid obesity with BMI of 70 and over, adult ([facility] [address]     Dyslipidemia     Fecal incontinence     Mild intermittent asthma without complication     S/P laparoscopic cholecystectomy     Type 2 diabetes mellitus without complication, with long-term current use of insulin (HCC)     Fatty liver     Arthritis     H/O parotidectomy     Venous stasis dermatitis of both lower extremities     Aortic stenosis     Morbid obesity (HCC)     Asthma     Diabetes mellitus ([facility] [address]     Hypertension     Sleep apnea     Family history of coronary artery disease     Chest pain     SOB (shortness of breath)     Palpitations     Peripheral edema     Hypervolemia     Chronic respiratory failure with hypoxia and hypercapnia (HCC)     Acute exacerbation of chronic obstructive pulmonary disease (COPD) (HCC)     Chronic obstructive pulmonary disease (HCC)     Gait disturbance     Acute on chronic respiratory failure with hypoxia and hypercapnia ([facility])     Cellulitis     Skin ulcer of left foot with fat layer exposed ([facility] [address]     Pneumonia     VHD (valvular heart disease)     Class 3 severe obesity due to excess calories with body mass index (BMI) greater than or equal to 70 in adult ([facility] [address]     SIRS (systemic inflammatory response syndrome) ([facility])     Acute kidney injury ([facility] [address]     Acute metabolic encephalopathy     Shock, unspecified ([facility] [address]     Uncontrolled diabetes mellitus ([facility] [address]     Sepsis ([facility] [address]     Altered mental status     Wide-complex tachycardia (HCC)     Chronic diastolic heart failure ([facility])     S/P TAVR (transcatheter aortic valve replacement)     Septic shock (HCC)     Acute kidney injury superimposed on CKD (HCC)     Bradycardia     Anxiety     Acute respiratory failure with hypoxia (HCC)     Acute congestive heart failure ([facility] [address]      Plan:     1. Reviewed POC blood glucose . Labs and X ray results   2. Reviewed Current Medicines   3. On meal/ Correction bolus Humalog/ Basal Lantus Insulin regime   4.  Monitor Blood glucose frequently   5. Modified  the dose of Insulin/ other medicines as needed   6. Will follow     .      Fouzia Temple MD

## 2021-01-24 NOTE — PROGRESS NOTES
Progress Note( Dr. Melida Rebollar)    Subjective:   Admit Date: 1/13/2021  PCP: THEODORE Jain - NP    I saw the patient on 1/22/2021 but the made the note later    Admitted For :Severe shortness of breath    Consulted For: Better control of blood glucose    Interval History: Patient is somewhat better still on BiPAP almost continuously  Except at time of eating     Denies any chest pains,   Yes SOB . On BiPAP  Denies nausea or vomiting. No new bowel or bladder symptoms.        Intake/Output Summary (Last 24 hours) at 1/23/2021 2021  Last data filed at 1/23/2021 1845  Gross per 24 hour   Intake 510 ml   Output 725 ml   Net -215 ml       DATA    CBC:   Recent Labs     01/21/21  0545 01/22/21  0600 01/23/21  0630   WBC 5.6 6.6 6.0   HGB 7.5* 7.6* 7.6*    162 201    CMP:  Recent Labs     01/21/21  0545 01/22/21  0600 01/23/21  0630    148* 140   K 3.7 3.6 3.5   CL 92* 94* 90*   CO2 47* 46* 46*   BUN 26* 30* 27*   CREATININE 1.8* 1.8* 1.5*   CALCIUM 8.3 8.4 8.7   PROT  --   --  7.3   LABALBU  --   --  3.5   BILITOT  --   --  0.4   ALKPHOS  --   --  89   AST  --   --  12*   ALT  --   --  <5*     Lipids:   Lab Results   Component Value Date    CHOL 151 01/14/2021    HDL 46 01/14/2021    TRIG 94 01/14/2021     Glucose:  Recent Labs     01/23/21  1110 01/23/21  1535 01/23/21 2014   POCGLU 96 100* 98     [id number]:  Lab Results   Component Value Date    LABA1C 6.1 01/14/2021     High Sensitivity TSH:   Lab Results   Component Value Date    TSHHS 3.490 12/09/2020     Free T3: No results found for: FT3  Free T4:  Lab Results   Component Value Date    T4FREE 1.07 09/04/2018       Echocardiogram Limited    Result Date: 1/15/2021  Transthoracic Echocardiography Report (TTE)  Demographics   Patient Name       Shivani ADASM Date of Study       01/14/2021   Date of Birth      1955         Gender              Female   Age                63 year(s)         Race                   Patient Number [id number]         Room Number         2105   Visit Number       [id number]   Corporate ID       [id number]   Accession Number   [id number]         Avril Patrick   Ordering Physician Je Dick MD      Physician           Karlyn Schilder MD  Procedure Type of Study   TTE procedure:ECHOCARDIOGRAM LIMITED. Procedure Date Date: 01/14/2021 Start: 02:11 PM Study Location: Portable Technical Quality: Limited visualization due to body habitus. Indications:Congestive heart failure. Patient Status: Routine Height: 68 inches Weight: 378 pounds BSA: 2.68 m2 BMI: 57.47 kg/m2 HR: 62 bpm BP: 107/55 mmHg  Conclusions   Summary  This is a limited echocardiogram. Technically difficult due to body habitus  Left ventricular systolic function is normal.  Ejection fraction is visually estimated at 50-55%. Dilated right ventricle with pressure/volume overload. S/p TAVR. Mild tricuspid regurgitation is present. RVSP is 43 mmHg. Mild pericardial effusion. Inferior vena cava is dilated, measuring at 3.4 cm, and does not collapse  with respiration. Signature   ------------------------------------------------------------------  Electronically signed by Keri Cedeño MD  (Interpreting physician) on 01/15/2021 at 08:06 AM  Xr Pelvis (1-2 Views)       Xr Chest Portable    Result Date: 1/15/2021  EXAMINATION: ONE XRAY VIEW OF THE CHEST 1/15/2021 5:49 am COMPARISON: 01/13/2021 HISTORY: ORDERING SYSTEM PROVIDED HISTORY: Hypoxia      1. Stable cardiomegaly with pulmonary edema, bilateral pleural effusions, and associated bibasilar atelectasis. Xr Chest Portable    Result Date: 1/13/2021  EXAMINATION: ONE XRAY VIEW OF THE CHEST 1/13/2021 2:24 pm COMPARISON: Chest x-ray dated 12/09/2020 HISTORY: ORDERING SYSTEM PROVIDED HISTORY: sob       Interstitial/pulmonary edema. Cardiomegaly. Probable small bilateral pleural effusions. Scheduled Medicines   Medications:    cefTRIAXone (ROCEPHIN) IV  1,000 mg Intravenous Q24H    metoclopramide  5 mg Intravenous Q6H    midodrine  10 mg Oral TID     enoxaparin  30 mg Subcutaneous Daily    lubiprostone  24 mcg Oral BID     senna  2 tablet Oral Nightly    insulin glargine  10 Units Subcutaneous Nightly    insulin lispro  0-6 Units Subcutaneous 2 times per day    lidocaine  5 mL Intradermal Once    amiodarone  200 mg Oral Daily    atorvastatin  40 mg Oral Nightly    busPIRone  10 mg Oral TID    clopidogrel  75 mg Oral Daily    [Held by provider] doxepin  10 mg Oral Nightly    [Held by provider] metoprolol tartrate  12.5 mg Oral BID    pantoprazole  40 mg Oral Daily    polyethylene glycol  17 g Oral Daily    pramipexole  1 mg Oral BID    sertraline  25 mg Oral Nightly    [Held by provider] lisinopril  5 mg Oral Daily    [Held by provider] spironolactone  25 mg Oral Daily    vitamin D3  400 Units Oral Daily    sodium chloride flush  10 mL Intravenous 2 times per day    insulin lispro  0-12 Units Subcutaneous TID     miconazole   Topical BID      Infusions:    dextrose           Objective:   Vitals: BP (!) 129/51   Pulse 69   Temp 98.7 °F (37.1 °C) (Oral)   Resp 20   Ht 5' 7.99\" (1.727 m)   Wt (!) 2 lb 3.3 oz (1 kg) Comment: bed zeroed  SpO2 96%   BMI 0.34 kg/m²   General appearance: alert and cooperative with exam  Neck: no JVD or bruit  Thyroid : Normal lobes   Lungs: Has Vesicular Breath sounds has wheezing and rales bilaterally  Heart:  regular rate and rhythm  Abdomen: soft, non-tender; bowel sounds normal; no masses,  no organomegaly  Musculoskeletal: Normal  Extremities: extremities normal, , no edema  Neurologic:  Awake, alert, oriented to name, place and time. Cranial nerves II-XII are grossly intact. Motor is  intact.   Sensory neuropathy t.,  and gait is abnormal.  Unstable    Assessment: Patient Active Problem List:     Morbid obesity with BMI of 70 and over, adult ([facility] [address]     Dyslipidemia     Fecal incontinence     Mild intermittent asthma without complication     S/P laparoscopic cholecystectomy     Type 2 diabetes mellitus without complication, with long-term current use of insulin (HCC)     Fatty liver     Arthritis     H/O parotidectomy     Venous stasis dermatitis of both lower extremities     Aortic stenosis     Morbid obesity (HCC)     Asthma     Diabetes mellitus ([facility] [address]     Hypertension     Sleep apnea     Family history of coronary artery disease     Chest pain     SOB (shortness of breath)     Palpitations     Peripheral edema     Hypervolemia     Chronic respiratory failure with hypoxia and hypercapnia (HCC)     Acute exacerbation of chronic obstructive pulmonary disease (COPD) (HCC)     Chronic obstructive pulmonary disease (HCC)     Gait disturbance     Acute on chronic respiratory failure with hypoxia and hypercapnia ([facility])     Cellulitis     Skin ulcer of left foot with fat layer exposed ([facility] [address]     Pneumonia     VHD (valvular heart disease)     Class 3 severe obesity due to excess calories with body mass index (BMI) greater than or equal to 70 in adult ([facility] [address]     SIRS (systemic inflammatory response syndrome) ([facility])     Acute kidney injury ([facility] [address]     Acute metabolic encephalopathy     Shock, unspecified ([facility] [address]     Uncontrolled diabetes mellitus ([facility] [address]     Sepsis ([facility] [address]     Altered mental status     Wide-complex tachycardia (HCC)     Chronic diastolic heart failure ([facility])     S/P TAVR (transcatheter aortic valve replacement)     Septic shock (HCC)     Acute kidney injury superimposed on CKD (HCC)     Bradycardia     Anxiety     Acute respiratory failure with hypoxia (HCC)     Acute congestive heart failure ([facility] [address]      Plan:     1. Reviewed POC blood glucose . Labs and X ray results   2. Reviewed Current Medicines   3. On meal/ Correction bolus Humalog/ Basal Lantus Insulin regime   4.  Monitor Blood glucose frequently   5. Modified  the dose of Insulin/ other medicines as needed   6. Will follow     .      Comfort Joyner MD

## 2021-01-24 NOTE — PROGRESS NOTES
Hospitalist Progress Note      Name:  Betito Haynes /Age/Sex: 1955  (63 y.o. female)   MRN & CSN:  [id number] & [id number] Admission Date/Time: 2021  2:15 PM   Location:  [facility] PCP: THEODORE Jain - SAVANNAH       Betito Haynes is a 63 y.o.  female  who presents with Shortness of Breath and Foot Pain (bilateral, no known injury)      Assessment and Plan:   1. Acute on chronic hypoxic and hypercarbic respiratory failure likely 2/2 to CHF  -on 5 L NC O2 now, wean as tolerated she wears 2L at home she states, bipap prn  CXR: Interstitial/pulmonary edema.    COVID-19 negative.  -Pulmonology following, added modafinil and theophylline     2. Septic Shock has UTI  - resolved  - levo gtt off now  - midodrine 10 TID   - IV Rocephin end after total 7 days  - urine cx: +citrobacter freundii  -Echo: EF 50-55%.     - Blood culture : NGTD. - sputum cx pending     3. Acute on chronic diastolic CHF  - improved  - neg 29.1L  - echo : ef 50-55%  -CXR: Pulmonary edema.   - off lasix now  - diuretics per Nephro   - Holding beta-blocker, ACE inhibitor, Aldactone due to low BP.     4. Acute kidney injury  - stable  - increased from diuresis   Holding ACE inhibitor and Aldactone.    Nephrology following.     5. Anemia of chronic disease   Transfused 1 unit PRBC. -GI ordered Venofer x3 doses, and outpatient EGD and colonoscopy if no gross bleeding.  -monitor     6. Bradycardia  - hold metorpolol  -EKG: Junctional rhythm.    -Cardiology has signed off. HR has improved.    -Troponins negative.     7. Diabetes mellitus type 2  -On sliding scale insulin and Lantus.  Endocrinology following.  .  -A1c 6.1.     HypoMg  -replaced     HypoPhos  - replace today               Diet DIET CARB CONTROL;   Dietary Nutrition Supplements: Diabetic Oral Supplement   Code Status Full Code     Medications:   Medications:    potassium phosphate IVPB  20 mmol Intravenous Once    cefTRIAXone (ROCEPHIN) IV  1,000 mg midline  LUNGS: Normal Effort   CARDIOVASCULAR:  Normal rate  ABDOMEN:  non distended  MUSCULOSKELETAL:  ROM limited  NEUROLOGIC: Alert and Oriented,  Cranial nerves II-XII are grossly intact.    SKIN:  no bruising or bleeding, normal skin color,  no redness      Data:       CBC   Recent Labs     01/22/21  0600 01/23/21  0630 01/24/21  0445   WBC 6.6 6.0 6.3   HGB 7.6* 7.6* 7.7*   HCT 27.6* 27.9* 28.0*    201 226      BMP   Recent Labs     01/22/21  0600 01/23/21  0630 01/24/21  0445   * 140 143   K 3.6 3.5 3.8   CL 94* 90* 91*   CO2 46* 46* 44*   PHOS  --  2.4* 2.1*   BUN 30* 27* 20   CREATININE 1.8* 1.5* 1.3*         Electronically signed by Orville Ha MD on 1/24/2021 at 10:17 AM

## 2021-01-24 NOTE — PROGRESS NOTES
01/24/21 0156   NIV Type   NIV Started/Stopped On   Equipment Type v60   Mode Bilevel   Mask Type Full face mask   Settings/Measurements   IPAP 22 cmH20   CPAP/EPAP 12 cmH2O   Resp 12   FiO2  45 %   I Time/ I Time % 1.1 s   Vt Exhaled 382 mL   Minute Volume 4.9 Liters   Mask Leak (lpm) 17 lpm   Alarm Settings   Alarms On Y   Press Low Alarm 5 cmH2O   High Pressure Alarm 30 cmH2O   Delay Alarm 20 sec(s)   Resp Rate Low Alarm 12   High Respiratory Rate 40 br/min

## 2021-01-24 NOTE — PROGRESS NOTES
Nephrology Progress Note  1/24/2021 11:41 AM        Subjective:   Admit Date: 1/13/2021  PCP: THEODORE Lee NP    Interval History: sleeping did wake up    Diet: some per pt    ROS:  No overt sob, with O2 per NC - BIPAP at her bedside   UOP  1450/d     Data:     Current meds:    potassium phosphate IVPB  20 mmol Intravenous Once    cefTRIAXone (ROCEPHIN) IV  1,000 mg Intravenous Q24H    metoclopramide  5 mg Intravenous Q6H    midodrine  10 mg Oral TID WC    enoxaparin  30 mg Subcutaneous Daily    lubiprostone  24 mcg Oral BID WC    senna  2 tablet Oral Nightly    insulin glargine  10 Units Subcutaneous Nightly    insulin lispro  0-6 Units Subcutaneous 2 times per day    lidocaine  5 mL Intradermal Once    amiodarone  200 mg Oral Daily    atorvastatin  40 mg Oral Nightly    busPIRone  10 mg Oral TID    clopidogrel  75 mg Oral Daily    [Held by provider] doxepin  10 mg Oral Nightly    [Held by provider] metoprolol tartrate  12.5 mg Oral BID    pantoprazole  40 mg Oral Daily    polyethylene glycol  17 g Oral Daily    pramipexole  1 mg Oral BID    sertraline  25 mg Oral Nightly    [Held by provider] lisinopril  5 mg Oral Daily    [Held by provider] spironolactone  25 mg Oral Daily    vitamin D3  400 Units Oral Daily    sodium chloride flush  10 mL Intravenous 2 times per day    insulin lispro  0-12 Units Subcutaneous TID WC    miconazole   Topical BID      dextrose           I/O last 3 completed shifts:   In: 30 [I.V.:30]  Out: 1450 [[id number]]    CBC:   Recent Labs     01/22/21  0600 01/23/21  0630 01/24/21  0445   WBC 6.6 6.0 6.3   HGB 7.6* 7.6* 7.7*    201 226          Recent Labs     01/22/21  0600 01/23/21  0630 01/24/21  0445   * 140 143   K 3.6 3.5 3.8   CL 94* 90* 91*   CO2 46* 46* 44*   BUN 30* 27* 20   CREATININE 1.8* 1.5* 1.3*   GLUCOSE 78 83 83       Lab Results   Component Value Date    CALCIUM 8.9 01/24/2021    PHOS 2.1 (L) 01/24/2021       Objective:

## 2021-01-24 NOTE — PROGRESS NOTES
Progress Note( Dr. Hali Sawyer)    Subjective:   Admit Date: 1/13/2021  PCP: THEODORE Nielsen - NP    Admitted For :Severe shortness of breath    Consulted For: Better control of blood glucose    Interval History: Patient is somewhat better   Now BiPAP is used as needed  Patient was off ICU and transferred to medical floor    Denies any chest pains,   Yes SOB . On BiPAP now as needed as needed  Denies nausea or vomiting. No new bowel or bladder symptoms.        Intake/Output Summary (Last 24 hours) at 1/24/2021 1806  Last data filed at 1/24/2021 0548  Gross per 24 hour   Intake --   Output 1450 ml   Net -1450 ml       DATA    CBC:   Recent Labs     01/22/21  0600 01/23/21  0630 01/24/21  0445   WBC 6.6 6.0 6.3   HGB 7.6* 7.6* 7.7*    201 226    CMP:  Recent Labs     01/22/21  0600 01/23/21  0630 01/24/21  0445   * 140 143   K 3.6 3.5 3.8   CL 94* 90* 91*   CO2 46* 46* 44*   BUN 30* 27* 20   CREATININE 1.8* 1.5* 1.3*   CALCIUM 8.4 8.7 8.9   PROT  --  7.3 7.4   LABALBU  --  3.5 3.5   BILITOT  --  0.4 0.4   ALKPHOS  --  89 86   AST  --  12* 10*   ALT  --  <5* <5*     Lipids:   Lab Results   Component Value Date    CHOL 151 01/14/2021    HDL 46 01/14/2021    TRIG 94 01/14/2021     Glucose:  Recent Labs     01/24/21  0755 01/24/21  1057 01/24/21  1601   POCGLU 101* 96 100*     JtkhtnyzldS6H:  Lab Results   Component Value Date    LABA1C 6.1 01/14/2021     High Sensitivity TSH:   Lab Results   Component Value Date    TSHHS 3.490 12/09/2020     Free T3: No results found for: FT3  Free T4:  Lab Results   Component Value Date    T4FREE 1.07 09/04/2018       Echocardiogram Limited    Result Date: 1/15/2021  Transthoracic Echocardiography Report (TTE)  Demographics   Patient Name       Jean ADAMS Date of Study       01/14/2021   Date of Birth      1955         Gender              Female   Age                63 year(s)         Race                   Patient Number     [id number] Room Number         2105   Visit Number       [id number]   Corporate ID       [id number]   Accession Number   [id number]         [facility]   Ordering Physician Kelin Sue MD      Physician           Sterling Mixon MD  Procedure Type of Study   TTE procedure:ECHOCARDIOGRAM LIMITED. Procedure Date Date: 01/14/2021 Start: 02:11 PM Study Location: Portable Technical Quality: Limited visualization due to body habitus. Indications:Congestive heart failure. Patient Status: Routine Height: 68 inches Weight: 378 pounds BSA: 2.68 m2 BMI: 57.47 kg/m2 HR: 62 bpm BP: 107/55 mmHg  Conclusions   Summary  This is a limited echocardiogram. Technically difficult due to body habitus  Left ventricular systolic function is normal.  Ejection fraction is visually estimated at 50-55%. Dilated right ventricle with pressure/volume overload. S/p TAVR. Mild tricuspid regurgitation is present. RVSP is 43 mmHg. Mild pericardial effusion. Inferior vena cava is dilated, measuring at 3.4 cm, and does not collapse  with respiration. Signature   ------------------------------------------------------------------  Electronically signed by Sawyer Marin MD  (Interpreting physician) on 01/15/2021 at 08:06 AM  Xr Pelvis (1-2 Views)       Xr Chest Portable    Result Date: 1/15/2021  EXAMINATION: ONE XRAY VIEW OF THE CHEST 1/15/2021 5:49 am COMPARISON: 01/13/2021 HISTORY: ORDERING SYSTEM PROVIDED HISTORY: Hypoxia      1. Stable cardiomegaly with pulmonary edema, bilateral pleural effusions, and associated bibasilar atelectasis. Xr Chest Portable    Result Date: 1/13/2021  EXAMINATION: ONE XRAY VIEW OF THE CHEST 1/13/2021 2:24 pm COMPARISON: Chest x-ray dated 12/09/2020 HISTORY: ORDERING SYSTEM PROVIDED HISTORY: sob       Interstitial/pulmonary edema. Cardiomegaly.   Probable small bilateral pleural effusions. Scheduled Medicines   Medications:    potassium phosphate IVPB  20 mmol Intravenous Once    cefTRIAXone (ROCEPHIN) IV  1,000 mg Intravenous Q24H    metoclopramide  5 mg Intravenous Q6H    midodrine  10 mg Oral TID     enoxaparin  30 mg Subcutaneous Daily    lubiprostone  24 mcg Oral BID     senna  2 tablet Oral Nightly    insulin glargine  10 Units Subcutaneous Nightly    insulin lispro  0-6 Units Subcutaneous 2 times per day    lidocaine  5 mL Intradermal Once    amiodarone  200 mg Oral Daily    atorvastatin  40 mg Oral Nightly    busPIRone  10 mg Oral TID    clopidogrel  75 mg Oral Daily    [Held by provider] doxepin  10 mg Oral Nightly    [Held by provider] metoprolol tartrate  12.5 mg Oral BID    pantoprazole  40 mg Oral Daily    polyethylene glycol  17 g Oral Daily    pramipexole  1 mg Oral BID    sertraline  25 mg Oral Nightly    [Held by provider] lisinopril  5 mg Oral Daily    [Held by provider] spironolactone  25 mg Oral Daily    vitamin D3  400 Units Oral Daily    sodium chloride flush  10 mL Intravenous 2 times per day    insulin lispro  0-12 Units Subcutaneous TID     miconazole   Topical BID      Infusions:    dextrose           Objective:   Vitals: BP (!) 148/52   Pulse 69   Temp 98.1 °F (36.7 °C) (Oral)   Resp 15   Ht 5' 7.99\" (1.727 m)   Wt (!) 2 lb 3.3 oz (1 kg) Comment: bed zeroed  SpO2 96%   BMI 0.34 kg/m²   General appearance: alert and cooperative with exam  Neck: no JVD or bruit  Thyroid : Normal lobes   Lungs: Has Vesicular Breath sounds has wheezing and rales bilaterally  Heart:  regular rate and rhythm  Abdomen: soft, non-tender; bowel sounds normal; no masses,  no organomegaly  Musculoskeletal: Normal  Extremities: extremities normal, , no edema  Neurologic:  Awake, alert, oriented to name, place and time. Cranial nerves II-XII are grossly intact. Motor is  intact.   Sensory neuropathy t.,  and gait is abnormal.  Unstable    Assessment:     Patient Active Problem List:     Morbid obesity with BMI of 70 and over, adult ([facility]     Dyslipidemia     Fecal incontinence     Mild intermittent asthma without complication     S/P laparoscopic cholecystectomy     Type 2 diabetes mellitus without complication, with long-term current use of insulin ([facility])     Fatty liver     Arthritis     H/O parotidectomy     Venous stasis dermatitis of both lower extremities     Aortic stenosis     Morbid obesity ([facility])     Asthma     Diabetes mellitus ([address]     Hypertension     Sleep apnea     Family history of coronary artery disease     Chest pain     SOB (shortness of breath)     Palpitations     Peripheral edema     Hypervolemia     Chronic respiratory failure with hypoxia and hypercapnia ([facility])     Acute exacerbation of chronic obstructive pulmonary disease (COPD) ([facility])     Chronic obstructive pulmonary disease ([facility])     Gait disturbance     Acute on chronic respiratory failure with hypoxia and hypercapnia ([facility])     Cellulitis     Skin ulcer of left foot with fat layer exposed ([address]     Pneumonia     VHD (valvular heart disease)     Class 3 severe obesity due to excess calories with body mass index (BMI) greater than or equal to 70 in adult ([facility])     SIRS (systemic inflammatory response syndrome) ([facility])     Acute kidney injury ([address]     Acute metabolic encephalopathy     Shock, unspecified ([address]     Uncontrolled diabetes mellitus ([address]     Sepsis ([address]     Altered mental status     Wide-complex tachycardia ([facility])     Chronic diastolic heart failure ([facility])     S/P TAVR (transcatheter aortic valve replacement)     Septic shock ([facility])     Acute kidney injury superimposed on CKD ([facility])     Bradycardia     Anxiety     Acute respiratory failure with hypoxia ([facility])     Acute congestive heart failure ([address]      Plan:     1. Reviewed POC blood glucose . Labs and X ray results   2. Reviewed Current Medicines   3.  On meal/ Correction bolus Humalog/ Basal Lantus Insulin regime   4. Monitor Blood glucose frequently   5. Modified  the dose of Insulin/ other medicines as needed   6. Will follow     .      [place] Wen MD

## 2021-01-25 NOTE — PROGRESS NOTES
Occupational Therapy  . Occupational Therapy Treatment Note  Name: Lelia Mohan MRN: [id number] :   1955   Date:  2021   Admission Date: 2021 Room:  [address] [place] [address]   Restrictions/Precautions:    General precautions; Fall Risk;     Communication with other providers:  Per chart review and Nurse Hallie Gallardo, patient is appropriate for therapeutic intervention. PTA Bobmarina Mini. Subjective:  Patient states:  Pt agreeable to Tx session. Pain:   Location, Type, Intensity (0/10 to 10/10):  0/10, denies pain     Objective:    Observation:  Pt received in semi-fowlers in bariatric bed c deloris, exhibits drowsiness(eyes closing frequently), requires cues for redirection to tasks, pt responding to all cues appropriately throughout. A&O x3  Objective Measures:  Telemetry, HR 66, O2 sat 99% on 6L O2 NC, RR 16. Pt had one run of tachycardia up to 171, resolved spontaneously. Nurse aware and following. Treatment, including education:  Therapeutic Activity Training:   Therapeutic activity training was instructed today. Cues were given for safety, sequence, UE/LE placement, awareness, and balance. Activities performed today included bed mobility training, sup-sit, sit-stand, SPT. Supine to sit: Mod A x2 + cues for sequencing  Sit to supine: Max A x2 + cues for sequencing  Scooting: Dep x2 for upward scoot in bed / Trendelenburg  Sitting balance / tolerance: SBA x1-2 + cues for arousal x25 minutes    Self Care Training:   Cues were given for safety, sequence, UE/LE placement, visual cues, and balance. Activities performed today included hand hygiene, and grooming. Grooming: Mod A for tangled hair, SBA seated + cues for face washing and hand hygiene, required increased time for completion. Therapeutic Exercise:  Cues were given for technique, safety, recruitment, and rationale. Cues were verbal and/or tactile.   Pt Sup + verbal / visual cues for technique to perform BUE AROM exercises to include: shoulder flex/extension, internal/external rotation, chest presses x10-15 reps each c rest breaks between each set. All therapeutic intervention performed c emphasis on grooming tasks, BUE therapeutic exercises, dynamic balance / sitting tolerance to inc strength, endurance and act tolerance for inc Indep c ADL tasks, func transfers / mobility. Safety  Patient safely in bed at end of session, with call light/phone in reach, and nursing aware. Assessment / Impression:        Patient's tolerance of treatment:  Well   Adverse Reaction: None  Significant change in status and impact:  Decreased arousal / fatigue - tolerated edge of bed activities, did not attempt sit to stands this date. Barriers to improvement:  Decreased strength / endurance    Plan for Next Session:    Continue per OT POC c plan to address sit<>stand transfers at edge of bed if pt has improved arousal.     Time in:  0900  Time out:  0945  Timed treatment minutes:  45  Total treatment time:  45    Electronically signed by:    MARLENI Hamlin  1/25/2021, 8:53 AM    Previously filed values:    Goals:  1. Pt will complete all aspects of bed mobility for EOB/OOB ADLs ModA. 2. Pt will complete UB/LB bathing with ModA and AE as needed. 3. Pt will complete all aspects of LB dressing with MaxA and AE as needed. 4. Pt will complete all functional transfers to and from bed, chair, toilet, shower chair with ModAx2 and AD. 5. Pt will complete all aspects of toileting task with MaxA. 6. Pt will complete ther ex/ther act with focus on UB strengthening.

## 2021-01-25 NOTE — PROGRESS NOTES
01/25/21 0154   NIV Type   NIV Started/Stopped On   Equipment Type v60   Mode Bilevel   Mask Type Full face mask   Mask Size Large   Settings/Measurements   IPAP 20 cmH20   CPAP/EPAP 12 cmH2O   Rate Ordered 12   Resp 17   FiO2  40 %   I Time/ I Time % 1.1 s   Vt Exhaled 427 mL   Mask Leak (lpm) 35 lpm   SpO2 90   Alarm Settings   Alarms On Y   Press Low Alarm 5 cmH2O   High Pressure Alarm 30 cmH2O   Delay Alarm 20 sec(s)   Resp Rate Low Alarm 12   High Respiratory Rate 40 br/min

## 2021-01-25 NOTE — PROGRESS NOTES
Nephrology Progress Note  1/25/2021 9:42 AM        Subjective:   Admit Date: 1/13/2021  PCP: THEODORE Brown NP    Interval History: rehab working with patient this morning; sitting   Up at edge of bed; doing some grooming. Data:     Current meds:    cefTRIAXone (ROCEPHIN) IV  1,000 mg Intravenous Q24H    metoclopramide  5 mg Intravenous Q6H    midodrine  10 mg Oral TID WC    enoxaparin  30 mg Subcutaneous Daily    lubiprostone  24 mcg Oral BID WC    senna  2 tablet Oral Nightly    insulin glargine  10 Units Subcutaneous Nightly    insulin lispro  0-6 Units Subcutaneous 2 times per day    lidocaine  5 mL Intradermal Once    amiodarone  200 mg Oral Daily    atorvastatin  40 mg Oral Nightly    busPIRone  10 mg Oral TID    clopidogrel  75 mg Oral Daily    [Held by provider] doxepin  10 mg Oral Nightly    [Held by provider] metoprolol tartrate  12.5 mg Oral BID    pantoprazole  40 mg Oral Daily    polyethylene glycol  17 g Oral Daily    pramipexole  1 mg Oral BID    sertraline  25 mg Oral Nightly    [Held by provider] lisinopril  5 mg Oral Daily    [Held by provider] spironolactone  25 mg Oral Daily    vitamin D3  400 Units Oral Daily    sodium chloride flush  10 mL Intravenous 2 times per day    insulin lispro  0-12 Units Subcutaneous TID WC    miconazole   Topical BID      dextrose           I/O last 3 completed shifts:   In: 720 [P.O.:720]  Out: 800 [Urine:800]    CBC:   Recent Labs     01/23/21  0630 01/24/21  0445 01/25/21  0625   WBC 6.0 6.3 5.1   HGB 7.6* 7.7* 7.9*    226 240          Recent Labs     01/23/21  0630 01/24/21  0445 01/25/21  0625    143 140   K 3.5 3.8 3.7   CL 90* 91* 90*   CO2 46* 44* 35*   BUN 27* 20 13   CREATININE 1.5* 1.3* 1.3*   GLUCOSE 83 83 75       Lab Results   Component Value Date    CALCIUM 7.8 (L) 01/25/2021    PHOS 2.1 (L) 01/24/2021       Objective:     Vitals: BP (!) 143/60   Pulse 64   Temp 97.8 °F (36.6 °C) (Oral)   Resp 15 Ht 5' 7.99\" (1.727 m)   Wt (!) 2 lb 3.3 oz (1 kg) Comment: bed zeroed  SpO2 100%   BMI 0.34 kg/m²     General appearance: , awake and verbally interactive   HEENT: Head: normocephalic, atraumatic. Neck: supple, symmetrical, trachea midline  Cardiovascular: normal S1 and S2  Pulmonary: diminished lung sounds bilaterally   Abdomen:  soft / non-tender   Extremities: + edema to the bilateral lower legs     Impression :     1. JOSE (ATN vs. Pre-renal azotemia)  2. Acute respiratory failure  3  DM2   4. CHF   5. Anemia   6. Hypocalcemia   7. Hypotension   8. UTI     Recommendation/Plan  :     1.    -uop: 800 ml in the last 24 hours (david)  -latest serum creatinine 1.3 with normal Na / K   2.   -O2 sat: 94 - 100 % on 7 LPM via high flow nasal cannula  3.   -on Lantus and SSI for diabetes management   4.   -monitor: O2 saturations / urine output and volume status   -lisinopril and metoprolol on hold   5.   -latest Hb: 7.9; follow hemoglobin trend   6.   -latest serum calcium: 7.8 / corrected calcium 8.8  7.   -BP trend: systolic BP's have recently been 110 - 148  -on midodrine with holding parameters   8.   -presently on IV rocephin    Electronically signed by THEODORE Gaffney - [facility]       Nephrology Attending Progress Note  1/25/2021 4:02 PM  Subjective: Interval History: I have personally performed face to face diagnostic evaluation on this patient. I have personally reviewed pertinent labs and imaging and agree with the care plan above. My additional findings are as follows:   The patient is a 63 y.o. female who presents with weakness and sob    Objective:   Vitals: BP (!) 131/49   Pulse 66   Temp 97.8 °F (36.6 °C) (Oral)   Resp 16   Ht 5' 7.99\" (1.727 m)   Wt (!) 2 lb 3.3 oz (1 kg) Comment: bed zeroed  SpO2 97%   BMI 0.34 kg/m²   Weak awake  Soft nt  Trace edema    Assessment and Plan:  1 bp stable overall  2 renal stable monitor uop  3 ssi and monitor  4 hb low stable  5 affect improved overall         Electronically signed by Shirley Ochoa MD on 1/25/2021 at 4:02 PM

## 2021-01-25 NOTE — PROGRESS NOTES
Patient continues to take off her BIPAP, SPO2 pulse check,  and her oxygen tubing. Went to the room to check on this patient noted that her lips were blue and her oxygen was in the 70's. Patient is alert with confusion. Applied the BIPAP mask back on. Notified RT who came upstairs to check on the patient. The SPO2 now is 96%.

## 2021-01-25 NOTE — PROGRESS NOTES
Hospitalist Progress Note      Name:  Fouzia Beard /Age/Sex: 1955  (63 y.o. female)   MRN & CSN:  [id number] & [id number] Admission Date/Time: 2021  2:15 PM   [facility]:  [facility] PCP: THEODORE Clarke NP       Fouzia Beard is a 63 y.o.  female  who presents with Shortness of Breath and Foot Pain (bilateral, no known injury)      Assessment and Plan:   1. Acute on chronic hypoxic and hypercarbic respiratory failure likely 2/2 to CHF  -on 7 L NC O2 now, wean as tolerated she wears 2L at home she states, bipap prn  CXR: Interstitial/pulmonary edema.    COVID-19 negative.  -Pulmonology following, added modafinil and theophylline     2. Septic Shock has UTI  - resolved  - levo gtt off now  - midodrine 10 TID   - IV Rocephin end after total 7 days  - urine cx: +citrobacter freundii  -Echo: EF 50-55%.     - Blood culture : NGTD.     3. Acute on chronic diastolic CHF  - improved  - neg 25.2L  - echo : ef 50-55%  -CXR: Pulmonary edema.   - off lasix now  - diuretics per Nephro   - Holding beta-blocker, ACE inhibitor, Aldactone due to low BP.     4. Acute kidney injury  - stable  - increased from diuresis   Holding ACE inhibitor and Aldactone.    Nephrology following.     5. Anemia of chronic disease   Transfused 1 unit PRBC. -GI ordered Venofer x3 doses, and outpatient EGD and colonoscopy if no gross bleeding.  -monitor     6. Bradycardia  - hold metorpolol  -EKG: Junctional rhythm.    -Cardiology has signed off. HR has improved.    -Troponins negative.     7. Diabetes mellitus type 2  -On sliding scale insulin and Lantus.  Endocrinology following.  .  -A1c 6.1.     HypoMg  -replaced     HypoPhos  - replaced       SNF pending            Diet DIET CARB CONTROL;   Dietary Nutrition Supplements: Diabetic Oral Supplement   Code Status Full Code     Medications:   Medications:    cefTRIAXone (ROCEPHIN) IV  1,000 mg Intravenous Q24H    metoclopramide  5 mg Intravenous Q6H    midodrine 10 mg Oral TID     enoxaparin  30 mg Subcutaneous Daily    lubiprostone  24 mcg Oral BID     senna  2 tablet Oral Nightly    insulin glargine  10 Units Subcutaneous Nightly    insulin lispro  0-6 Units Subcutaneous 2 times per day    lidocaine  5 mL Intradermal Once    amiodarone  200 mg Oral Daily    atorvastatin  40 mg Oral Nightly    busPIRone  10 mg Oral TID    clopidogrel  75 mg Oral Daily    [Held by provider] doxepin  10 mg Oral Nightly    [Held by provider] metoprolol tartrate  12.5 mg Oral BID    pantoprazole  40 mg Oral Daily    polyethylene glycol  17 g Oral Daily    pramipexole  1 mg Oral BID    sertraline  25 mg Oral Nightly    [Held by provider] lisinopril  5 mg Oral Daily    [Held by provider] spironolactone  25 mg Oral Daily    vitamin D3  400 Units Oral Daily    sodium chloride flush  10 mL Intravenous 2 times per day    insulin lispro  0-12 Units Subcutaneous TID     miconazole   Topical BID      Infusions:    dextrose       PRN Meds:     albuterol sulfate HFA, 2 puff, Q4H PRN      sodium chloride flush, 10 mL, PRN      acetaminophen, 650 mg, Q6H PRN    Or      acetaminophen, 650 mg, Q6H PRN      potassium chloride, 40 mEq, PRN    Or      potassium alternative oral replacement, 40 mEq, PRN    Or      potassium chloride, 10 mEq, PRN      glucose, 15 g, PRN      dextrose, 12.5 g, PRN      glucagon (rDNA), 1 mg, PRN      dextrose, 100 mL/hr, PRN      Subjective:     Didn't sleep well last night  Objective:        Intake/Output Summary (Last 24 hours) at 1/25/2021 1109  Last data filed at 1/25/2021 7619  Gross per 24 hour   Intake 720 ml   Output 800 ml   Net -80 ml      Vitals:   Vitals:    01/25/21 0856   BP:    Pulse:    Resp: 15   Temp:    SpO2: 100%     Physical Exam:   Gen:  awake, alert, no apparent distress  Head/Eyes:  Normocephalic atraumatic, EOMI   NECK:   symmetrical, trachea midline  LUNGS: Normal Effort   CARDIOVASCULAR:  Normal rate  ABDOMEN:  non distended  MUSCULOSKELETAL:  ROM limited  NEUROLOGIC: Alert and Oriented,  Cranial nerves II-XII are grossly intact.    SKIN:  no bruising or bleeding, normal skin color,  no redness      Data:       CBC   Recent Labs     01/23/21  0630 01/24/21  0445 01/25/21  0625   WBC 6.0 6.3 5.1   HGB 7.6* 7.7* 7.9*   HCT 27.9* 28.0* 28.9*    226 240      BMP   Recent Labs     01/23/21  0630 01/24/21  0445 01/25/21  0625    143 140   K 3.5 3.8 3.7   CL 90* 91* 90*   CO2 46* 44* 35*   PHOS 2.4* 2.1*  --    BUN 27* 20 13   CREATININE 1.5* 1.3* 1.3*         Electronically signed by Leoncio Moore MD on 1/25/2021 at 11:09 AM

## 2021-01-25 NOTE — PROGRESS NOTES
Pulmonary and Critical Care  Progress Note      VITALS:  BP (!) 131/49   Pulse 66   Temp 97.8 °F (36.6 °C) (Oral)   Resp 16   Ht 5' 7.99\" (1.727 m)   Wt (!) 2 lb 3.3 oz (1 kg) Comment: bed zeroed  SpO2 97%   BMI 0.34 kg/m²     Subjective:   CHIEF COMPLAINT :SOB     HPI:                The patient is lying in the bed. She is tolerating the BIPAP. She is not in acute resp distress. Objective:   PHYSICAL EXAM:    LUNGS:Decreased air entry bilateral bases  Abd-distended,BS+,NT  Ext- Chronic LE venous stasis  CVS-s1s2, no murmurs      DATA:    CBC:  Recent Labs     01/23/21  0630 01/24/21  0445 01/25/21  0625   WBC 6.0 6.3 5.1   RBC 2.99* 3.05* 3.10*   HGB 7.6* 7.7* 7.9*   HCT 27.9* 28.0* 28.9*    226 240   MCV 93.3 91.8 93.2   MCH 25.4* 25.2* 25.5*   MCHC 27.2* 27.5* 27.3*   RDW 19.5* 19.9* 19.6*      BMP:  Recent Labs     01/23/21  0630 01/24/21  0445 01/25/21  0625    143 140   K 3.5 3.8 3.7   CL 90* 91* 90*   CO2 46* 44* 35*   BUN 27* 20 13   CREATININE 1.5* 1.3* 1.3*   CALCIUM 8.7 8.9 7.8*   GLUCOSE 83 83 75      ABG:  No results for input(s): PH, PO2ART, KOQ4MOV, HCO3, BEART, O2SAT in the last 72 hours. BNP  No results found for: BNP   D-Dimer:  Lab Results   Component Value Date    DDIMER 1729 (H) 06/27/2020      1.  Radiology: None      Assessment/Plan     Patient Active Problem List    Diagnosis Date Noted    Acute exacerbation of chronic obstructive pulmonary disease (COPD) ([facility]      Priority: High    Hypervolemia      Priority: High    Anxiety 09/01/2020     Priority: Medium     Class: Acute    Acute congestive heart failure (HCC)     Acute respiratory failure with hypoxia (HCC)     Bradycardia     Septic shock ([address] 08/25/2020    Acute kidney injury superimposed on CKD ([address] 08/25/2020    S/P TAVR (transcatheter aortic valve replacement) 06/26/2020    Chronic diastolic heart failure ([facility] 06/25/2020    Wide-complex tachycardia ([facility]     Altered mental status     Sepsis ([facility] 09/21/2019    Acute kidney injury ([address] 08/22/2019    Acute metabolic encephalopathy 16/62/9759    Shock, unspecified ([address] 08/22/2019    Uncontrolled diabetes mellitus ([address] 08/22/2019    SIRS (systemic inflammatory response syndrome) ([address] 08/12/2019    Class 3 severe obesity due to excess calories with body mass index (BMI) greater than or equal to 70 in adult [facility]) 06/16/2019    VHD (valvular heart disease) 04/02/2019     Overview Note:     H/o TAVR      Pneumonia 02/06/2019    Skin ulcer of left foot with fat layer exposed ([address]     Cellulitis 11/24/2018    Acute on chronic respiratory failure with hypoxia and hypercapnia (HCC)     Gait disturbance 02/10/2018    Chronic obstructive pulmonary disease ([address] 02/07/2018    Chronic respiratory failure with hypoxia and hypercapnia ([address] 01/30/2018    Morbid obesity ([address]     Asthma     Diabetes mellitus ([address]     Hypertension     Sleep apnea     Family history of coronary artery disease     Chest pain     SOB (shortness of breath)     Palpitations     Peripheral edema     Aortic stenosis     Morbid obesity with BMI of 70 and over, adult ([address] 10/27/2016    Dyslipidemia 10/27/2016    Fecal incontinence 10/27/2016    Mild intermittent asthma without complication 31/42/2458    S/P laparoscopic cholecystectomy 10/27/2016    Type 2 diabetes mellitus without complication, with long-term current use of insulin ([address] 10/27/2016    Fatty liver 10/27/2016    Arthritis 10/27/2016    H/O parotidectomy 10/27/2016    Venous stasis dermatitis of both lower extremities 10/27/2016     JOSE- improved  Morbid Obesity  ANMOL  OHS  Acute on Chronic Hypoxic Hypercapneic resp failure  Chronic Metabolic alkalosis  Pulmonary congestion- improved  Basal atelectasis  Chronic LE venous stasis  Suspected CAP- treated     1. BIPAP  2. ICS  3. OOB  4. PT/OT  5. Keep sats > 92%  6. Await placement  7. C.w present management  No follow-ups on file.     Electronically signed by Naseem Frazier MD on 1/25/2021 at 12:33 PM

## 2021-01-25 NOTE — CARE COORDINATION
Spoke with Amalia/Mayda. She informed CM that she did not receive a referral for this pt. Referral given at this time. Clinicals faxed to Mayda. She will review and will notify CM if they will be able to accept pt or not.  Will fax updated PT/OT notes when they are in.  TE

## 2021-01-25 NOTE — PROGRESS NOTES
Physical Therapy    Physical Therapy Treatment Note  Name: Peyton Hernandez MRN: [id number] :   1955   Date:  2021   Admission Date: 2021 Room:  [address] [place] [address]   Restrictions/Precautions:        general precautions, fall risk  Communication with other providers:  Per nurse ok to tx. Co-tx with HANEY  Subjective:  Patient states:  Agreeable to tx \" I need to sit\"  Pain:   Location, Type, Intensity (0/10 to 10/10): Denies having pain when asked   Objective:    Observation:  Alert and and oriented but at times very sleepy and needing cues to stay awake and on task while sitting EOB. During ADLs pt's O2 taken off for washing face and brief drop in O2 and increased HR but recovered quickly once re-applied and cues for PLB. Treatment, including education/measures:  Sup to sit with bed deflated mod assist of 2  Sit to sup max assist of 2  Scooting to [facility] dependent of 2 with bed in trendelenburg  Pt sat EOB with sba and cues x 25 minutes. for ADLs, refer to OT note. LE ex in sitting with verbal and tactile cues; 10 reps aps and laqs  Safety  Patient left safely in the bed, with call light/phone in reach with alarm/ applied. Gait belt and mask were used for transfers and gait. Assessment / Impression:       Patient's tolerance of treatment:  good  Adverse Reaction: na  Significant change in status and impact:  na  Barriers to improvement:  Strength and safety  Plan for Next Session:    Cont. POC  Time in:  0900  Time out:  0945  Timed treatment minutes:  45  Total treatment time:  39    Previously filed items:  Social/Functional History  Lives With: Spouse, Daughter  Type of Home: House  Home Layout: Two level, Able to Live on Main level with bedroom/bathroom  Home Access: Level entry  Bathroom Shower/Tub: Walk-in shower  Bathroom Equipment: Shower chair, Commode  Home Equipment: Rolling walker, Electric scooter, Lift chair, Oxygen, Reacher(Pt typically ambulates with RW at baseline.  2L of O2 at baseline.  Pt has trapeze bar over her bed.)  Receives Help From: Family  ADL Assistance: Needs assistance(Pt reports assistance with LB dressing and kingston care after toileting.)  Homemaking Assistance: Needs assistance  Homemaking Responsibilities: No  Ambulation Assistance: Independent  Transfer Assistance: Independent  Active : No  Short term goals  Time Frame for Short term goals: 1 week  Short term goal 1: Pt to complete all bed mobility mod A  Short term goal 2: Pt to complete STS with LRAD max A  Short term goal 3: Pt to complete stand pivot transfer with LRAD max A       Electronically signed by:    Keri Helm PTA  1/25/2021, 8:47 AM

## 2021-01-25 NOTE — PROGRESS NOTES
01/24/21 2320   NIV Type   NIV Started/Stopped On   Equipment Type v60   Mode Bilevel   Mask Type Full face mask   Mask Size Large   Settings/Measurements   IPAP 20 cmH20   CPAP/EPAP 12 cmH2O   Rate Ordered 12   Resp 15   FiO2  45 %   I Time/ I Time % 1.1 s   Vt Exhaled 443 mL   Mask Leak (lpm) 35 lpm   Alarm Settings   Alarms On Y   Press Low Alarm 5 cmH2O   High Pressure Alarm 30 cmH2O   Delay Alarm 20 sec(s)   Resp Rate Low Alarm 12   High Respiratory Rate 40 br/min

## 2021-01-25 NOTE — DISCHARGE INSTR - COC
Continuity of Care Form    Patient Name: Angelique Pablo   :    MRN:  [id number]    Admit date:  2021  Discharge date:  2021    Code Status Order: Full Code   Advance Directives:   [facility] Documentation     Date/Time Healthcare Directive Type of Healthcare Directive Copy in [address] [facility] [address] Agent's Name Healthcare Agent's Phone Number    [phone]  No, patient does not have an advance directive for healthcare treatment -- -- -- -- --          Admitting Physician:  No admitting provider for patient encounter.   PCP: THEODORE Marc NP    Discharging Nurse: [address] [place] [address] Unit/Room#: 3101/3101-A  Discharging Unit Phone Number: [phone]    Emergency Contact:   Extended Emergency Contact Information  Primary Emergency Contact: [address] of [address] [place] [address] Phone: [phone]  Mobile Phone: [phone]  Relation: Spouse  Secondary Emergency Contact: [address] Phone: [phone]  Mobile Phone: [phone]  Relation: Child    Past Surgical History:  Past Surgical History:   Procedure Laterality Date    AORTIC VALVE REPLACEMENT  2017    29mm EvolutR TAVR     SECTION      CHOLECYSTECTOMY      GALLBLADDER SURGERY      HYSTERECTOMY      IR NONTUNNELED VASCULAR CATHETER  2020    IR NONTUNNELED VASCULAR CATHETER 2020 [facility] PROCED[facility]    IR NONTUNNELED VASCULAR CATHETER  1/15/2021    IR NONTUNNELED VASCULAR CATHETER 1/15/2021 [facility] SPECIAL PROCEDURES    NECK SURGERY      Tumor    TUBAL LIGATION         Immunization History:   Immunization History   Administered Date(s) Administered    Influenza Vaccine, unspecified formulation 10/02/2017    Influenza, Quadv, 6 mo and older, IM, PF (Flulaval, Fluarix) 2018    Influenza, Quadv, IM, PF (6 mo and older Fluzone, Flulaval, Fluarix, and 3 yrs and older Afluria) 09/25/2019       Active Problems:  Patient Active Problem List   Diagnosis Code    Morbid obesity with BMI of 70 and over, adult ([facility] E66.01, Z68.45    Dyslipidemia E78.5    Fecal incontinence R15.9    Mild intermittent asthma without complication O98.08    S/P laparoscopic cholecystectomy Z90.49    Type 2 diabetes mellitus without complication, with long-term current use of insulin ([facility] E11.9, Z79.4    Fatty liver K76.0    Arthritis M19.90    H/O parotidectomy Z90.49    Venous stasis dermatitis of both lower extremities I87.2    Aortic stenosis I35.0    Morbid obesity ([facility]) E66.01    Asthma J45.909    Diabetes mellitus ([facility] E11.9    Hypertension I10    Sleep apnea G47.30    Family history of coronary artery disease Z82.49    Chest pain R07.9    SOB (shortness of breath) R06.02    Palpitations R00.2    Peripheral edema R60.9    Hypervolemia E87.70    Chronic respiratory failure with hypoxia and hypercapnia ([facility]) J96.11, J96.12    Acute exacerbation of chronic obstructive pulmonary disease (COPD) ([facility]) J44.1    Chronic obstructive pulmonary disease ([facility]) J44.9    Gait disturbance R26.9    Acute on chronic respiratory failure with hypoxia and hypercapnia ([facility]) J96.21, J96.22    Cellulitis L03.90    Skin ulcer of left foot with fat layer exposed ([facility] L97.522    Pneumonia J18.9    VHD (valvular heart disease) I38    Class 3 severe obesity due to excess calories with body mass index (BMI) greater than or equal to 70 in adult ([facility] E66.01, Z68.45    SIRS (systemic inflammatory response syndrome) ([facility]) R65.10    Acute kidney injury ([facility] N17.9    Acute metabolic encephalopathy U21.57    Shock, unspecified ([facility]) R57.9    Uncontrolled diabetes mellitus ([facility]) E11.65    Sepsis ([facility]) A41.9    Altered mental status R41.82    Wide-complex tachycardia ([facility]) I47.2    Chronic diastolic heart failure ([facility]) I50.32    S/P TAVR (transcatheter aortic valve replacement) Z95.2    Septic shock Nurse Assessment:  Last Vital Signs: BP (!) 111/34   Pulse 67   Temp 98 °F (36.7 °C) (Oral)   Resp 18   Ht 5' 7.99\" (1.727 m)   Wt (!) 2 lb 3.3 oz (1 kg) Comment: bed zeroed  SpO2 94%   BMI 0.34 kg/m²     Last documented pain score (0-10 scale): Pain Level: 0  Last Weight:   Wt Readings from Last 1 Encounters:   01/22/21 (!) 2 lb 3.3 oz (1 kg)     Mental Status:  oriented    IV Access:  - None    Nursing Mobility/ADLs:  Walking   Assisted  Transfer  Assisted  Bathing  Dependent  Dressing  Dependent  Toileting  Dependent  Feeding  Independent  Med Admin  Independent  Med Delivery   whole    Wound Care Documentation and Therapy:  Wound 09/22/19 sacrum deep tissue injury/stage 2 (Active)   Number of days: 493       Wound 10/29/19 Thigh Right;Posterior (Active)   Wound Etiology Pressure Stage  2 01/16/21 0830   Dressing Status Clean;Dry; Intact 01/18/21 0900   Wound Cleansed Soap and water 01/17/21 1200   Dressing/Treatment Dry dressing 01/18/21 0900   Wound Assessment Erythema;Pink/red 01/18/21 0900   Drainage Amount Small 01/18/21 0900   Drainage Description Serosanguinous 01/18/21 0900   Odor None 01/15/21 1933   Number of days: 456       Wound 10/29/19 Heel Left;Medial cluster (Active)   Number of days: 455       Wound 10/29/19 Thigh Left;Posterior (Active)   Number of days: 456       Wound 10/31/19 Toe (Comment  which one) Left;Plantar left great toe fissure  (Active)   Number of days: 541       Wound 10/31/19 Foot Left;Plantar (Active)   Number of days: 894       Wound 10/31/19 Heel Right;Medial right medial heel fissure cluster (Active)   Number of days: 805       Wound 09/22/19 sacral/buttock cluster (Active)   Wound Etiology Pressure Stage  2 01/16/21 0830   Dressing Status Clean;Dry 01/16/21 1553   Wound Cleansed Soap and water 01/16/21 0830   Dressing/Treatment Moisture barrier;Protective barrier 01/18/21 0900   Wound Length (cm) 0 cm 01/18/21 0900   Wound Width (cm) 0 cm 01/18/21 0900   Wound Depth (cm) 0 cm 01/18/21 0900   Wound Surface Area (cm^2) 0 cm^2 01/18/21 0900   Change in Wound Size % (l*w) 100 01/18/21 0900   Wound Volume (cm^3) 0 cm^3 01/18/21 0900   Wound Healing % 100 01/18/21 0900   Wound Assessment Pink/red;Purple/maroon; Other (Comment) 01/18/21 0900   Drainage Amount None 01/18/21 0900   Odor None 01/18/21 0900   Sierra-wound Assessment Cool;Ecchymosis; Excoriated 01/15/21 1603   Number of days: 493       Wound 09/22/19 Foot Left;Plantar DFU (Active)   Number of days: 493       Wound 09/22/19 Foot Right;Plantar DFU (Active)   Number of days: 493       Wound 08/26/20 Other (Comment) gluteal fold (Active)   Number of days: 153       Wound 09/02/20 Back Lateral;Left (Active)   Number of days: 147       Wound 01/18/21 Thigh Left;Posterior (Active)   Wound Etiology Pressure Stage  2 01/26/21 0959   Dressing Status New dressing applied 01/26/21 0959   Wound Cleansed Cleansed with saline 01/26/21 0959   Dressing/Treatment Collagen 01/26/21 0959   Wound Length (cm) 0.6 cm 01/26/21 0959   Wound Width (cm) 1 cm 01/26/21 0959   Wound Depth (cm) 0.1 cm 01/26/21 0959   Wound Surface Area (cm^2) 0.6 cm^2 01/26/21 0959   Change in Wound Size % (l*w) 84 01/26/21 0959   Wound Volume (cm^3) 0.06 cm^3 01/26/21 0959   Wound Healing % 84 01/26/21 0959   Distance Tunneling (cm) 0 cm 01/26/21 0959   Tunneling Position ___ O'Clock 0 01/26/21 0959   Undermining Starts ___ O'Clock 0 01/26/21 0959   Undermining Ends___ O'Clock 0 01/26/21 0959   Undermining Maxium Distance (cm) 0 01/26/21 0959   Wound Assessment Pink/red 01/26/21 0959   Drainage Amount Moderate 01/26/21 0959   Drainage Description Serosanguinous 01/26/21 0959   Odor None 01/26/21 0959   Sierra-wound Assessment Dry/flaky 01/26/21 0959   Margins Defined edges 01/26/21 0959   Wound Thickness Description not for Pressure Injury Full thickness 01/26/21 0959   Number of days: 9        Elimination:  Continence:   · Bowel: No  · Bladder: No  Urinary Catheter: Indication for Use of Catheter: . Colostomy/Ileostomy/Ileal Conduit: No  [REMOVED] Rectal Tube With balloon-Stool Appearance: Watery  [REMOVED] Rectal Tube With balloon-Stool Color: Brown  [REMOVED] Rectal Tube With balloon-Stool Amount: Large    Date of Last BM: 01/27/2021    Intake/Output Summary (Last 24 hours) at 1/27/2021 1358  Last data filed at 1/27/2021 0938  Gross per 24 hour   Intake 408.62 ml   Output 1400 ml   Net -991.38 ml     I/O last 3 completed shifts: In: 398.6 [P.O.:300; I.V.:98.6]  Out: 1400 [Urine:1400]    Safety Concerns: At Risk for Falls    Impairments/Disabilities:      Vision    Patient's personal belongings (please select all that are sent with patient):  Glasses    RN SIGNATURE:  Electronically signed by Tristin Hodge RN on 1/27/21 at 1:44 PM EST    CASE MANAGEMENT/SOCIAL WORK SECTION    Inpatient Status Date: ***    Readmission Risk Assessment Score:  Readmission Risk              Risk of Unplanned Readmission:        38           Discharging to Facility/ Agency   · Name: Alyssa Child  · Address: [facility]  · Phone: [phone]  · Fax:  [phone]    Dialysis Facility (if applicable)   · Name:  · Address:  · Dialysis Schedule:  · Phone:  · Fax:      PHYSICIAN SECTION    Nutrition Therapy:  Current Nutrition Therapy:   - Oral Diet:  Carb Control 4 carbs/meal (1800kcals/day)    Routes of Feeding: Oral  Liquids: No Restrictions  Daily Fluid Restriction: no  Last Modified Barium Swallow with Video (Video Swallowing Test): not done    Treatments at the Time of Hospital Discharge:   Respiratory Treatments: duonebs  Oxygen Therapy:  is on oxygen at 4 L/min per nasal cannula.   Ventilator:   Bipap 20/12 40% oxygen rate 12    Rehab Therapies: Physical Therapy and Occupational Therapy  Weight Bearing Status/Restrictions: No weight bearing restirctions      Prognosis: Good    Condition at Discharge: Stable    Rehab Potential (if transferring to Rehab): Good    Recommended Labs or Other Treatments After Discharge: CBC and CMP in one week. Adjust diuretics and BP medications if not hypotensive and renal function stable as these were held due to acute kidney injury and hypotension. Stopped diabetic meds and glucose well controlled on diet and to prevent hypoglycemia. Follow up with Dr Julia Prakash if needs adjustments of diuretics in the future. Physician Certification: I certify the above information and transfer of Debra Haynes  is necessary for the continuing treatment of the diagnosis listed and that she requires [facility] for less 30 days.      Update Admission H&P: No change in H&P    PHYSICIAN SIGNATURE:  Electronically signed by Luis Leyva MD on 1/27/21 at 1:38 PM EST

## 2021-01-25 NOTE — CARE COORDINATION
Updated PT/OT notes faxed to [facility]. Notified Amalia/Mayda. She will start the pre-cert. D/c instructions are on front of packet located with the soft chart.    NINA

## 2021-01-26 NOTE — PROGRESS NOTES
Pulmonary and Critical Care  Progress Note      VITALS:  BP (!) 96/52   Pulse 77   Temp 97.9 °F (36.6 °C) (Axillary)   Resp 14   Ht 5' 7.99\" (1.727 m)   Wt (!) 2 lb 3.3 oz (1 kg) Comment: bed zeroed  SpO2 99%   BMI 0.34 kg/m²     Subjective:   CHIEF COMPLAINT :SOB     HPI:                The patient is sleepy but arousable. She is not in acute reps distress    Objective:   PHYSICAL EXAM:    LUNGS:Decreased air entry bilateral bases  Abd-distended,BS+,NT  Ext- Chronic LE venous stasis  CVS-s1s2, no murmurs      DATA:    CBC:  Recent Labs     01/24/21  0445 01/25/21  0625 01/26/21  0500   WBC 6.3 5.1 4.8   RBC 3.05* 3.10* 3.18*   HGB 7.7* 7.9* 7.9*   HCT 28.0* 28.9* 29.1*    240 257   MCV 91.8 93.2 91.5   MCH 25.2* 25.5* 24.8*   MCHC 27.5* 27.3* 27.1*   RDW 19.9* 19.6* 19.1*      BMP:  Recent Labs     01/24/21  0445 01/25/21  0625    145   K 3.8 3.9   CL 91* 94*   CO2 44* 42*   BUN 20 15   CREATININE 1.3* 1.1   CALCIUM 8.9 8.7   GLUCOSE 83 81      ABG:  No results for input(s): PH, PO2ART, QYV8DUM, HCO3, BEART, O2SAT in the last 72 hours. BNP  No results found for: BNP   D-Dimer:  Lab Results   Component Value Date    DDIMER 1729 (H) 06/27/2020      1.  Radiology: None      Assessment/Plan     Patient Active Problem List    Diagnosis Date Noted    Acute exacerbation of chronic obstructive pulmonary disease (COPD) ([facility]      Priority: High    Hypervolemia      Priority: High    Anxiety 09/01/2020     Priority: Medium     Class: Acute    Acute congestive heart failure (HCC)     Acute respiratory failure with hypoxia (HCC)     Bradycardia     Septic shock ([facility] 08/25/2020    Acute kidney injury superimposed on CKD ([facility] 08/25/2020    S/P TAVR (transcatheter aortic valve replacement) 06/26/2020    Chronic diastolic heart failure ([facility] 06/25/2020    [facility]     Altered mental status     Sepsis ([facility] 09/21/2019    Acute kidney injury ([facility] 08/22/2019    Acute metabolic encephalopathy 11/15/5084    Shock, unspecified ([address] 08/22/2019    Uncontrolled diabetes mellitus ([address] 08/22/2019    SIRS (systemic inflammatory response syndrome) ([address] 08/12/2019    Class 3 severe obesity due to excess calories with body mass index (BMI) greater than or equal to 70 in adult [facility]) 06/16/2019    VHD (valvular heart disease) 04/02/2019     Overview Note:     H/o TAVR      Pneumonia 02/06/2019    Skin ulcer of left foot with fat layer exposed ([address]     Cellulitis 11/24/2018    Acute on chronic respiratory failure with hypoxia and hypercapnia (HCC)     Gait disturbance 02/10/2018    Chronic obstructive pulmonary disease ([address] 02/07/2018    Chronic respiratory failure with hypoxia and hypercapnia ([address] 01/30/2018    Morbid obesity ([address]     Asthma     Diabetes mellitus ([address]     Hypertension     Sleep apnea     Family history of coronary artery disease     Chest pain     SOB (shortness of breath)     Palpitations     Peripheral edema     Aortic stenosis     Morbid obesity with BMI of 70 and over, adult ([address] 10/27/2016    Dyslipidemia 10/27/2016    Fecal incontinence 10/27/2016    Mild intermittent asthma without complication 49/64/0260    S/P laparoscopic cholecystectomy 10/27/2016    Type 2 diabetes mellitus without complication, with long-term current use of insulin ([address] 10/27/2016    Fatty liver 10/27/2016    Arthritis 10/27/2016    H/O parotidectomy 10/27/2016    Venous stasis dermatitis of both lower extremities 10/27/2016     JOSE- improved  Morbid Obesity  ANMOL  OHS  Acute on Chronic Hypoxic Hypercapneic resp failure  Chronic Metabolic alkalosis  Pulmonary congestion- improved  Basal atelectasis  Chronic LE venous stasis  Suspected CAP- treated       1. PT/OT  2. ICS  3. OOB  4. BIPAP  5. Keep sats > 92%  6. Await placement  7. C.w present management  No follow-ups on file.     Electronically signed by Queta Cool MD on 1/26/2021 at 1:02 PM

## 2021-01-26 NOTE — PROGRESS NOTES
Progress Note  Date:2021       Room:[address] [place] [address]-  Patient Name:Olivia Cohen     YOB: 1955     Age:65 y.o. Feels short of breath  Subjective    Subjective:  Symptoms:  Stable. Pain:  She complains of pain that is mild. Review of Systems  Objective         Vitals Last 24 Hours:  TEMPERATURE:  Temp  Av.1 °F (36.7 °C)  Min: 97.8 °F (36.6 °C)  Max: 98.6 °F (37 °C)  RESPIRATIONS RANGE: Resp  Av  Min: 15  Max: 22  PULSE OXIMETRY RANGE: SpO2  Av.7 %  Min: 93 %  Max: 100 %  PULSE RANGE: Pulse  Av.3  Min: 64  Max: 81  BLOOD PRESSURE RANGE: Systolic (66ENK), VRY:934 , Min:101 , YEF:978   ; Diastolic (07KNM), SX, Min:49, Max:70    I/O (24Hr): Intake/Output Summary (Last 24 hours) at 2021 0725  Last data filed at 2021 2311  Gross per 24 hour   Intake 10 ml   Output --   Net 10 ml     Objective:  General Appearance:  Comfortable. Vital signs: (most recent): Blood pressure (!) 96/52, pulse 77, temperature 97.9 °F (36.6 °C), temperature source Axillary, resp. rate 14, height 5' 7.99\" (1.727 m), weight (!) 2 lb 3.3 oz (1 kg), SpO2 99 %, not currently breastfeeding.  (6L). HEENT: Normal HEENT exam.    Lungs:  Normal effort. There are decreased breath sounds. Heart: Normal rate. Abdomen: Abdomen is soft. Bowel sounds are normal.     Extremities: Decreased range of motion. Neurological: Patient is alert. Pupils:  Pupils are equal, round, and reactive to light. Skin:  Warm.       Labs/Imaging/Diagnostics    Labs:  CBC:  Recent Labs     21  0445 21  0625 21  0500   WBC 6.3 5.1 4.8   RBC 3.05* 3.10* 3.18*   HGB 7.7* 7.9* 7.9*   HCT 28.0* 28.9* 29.1*   MCV 91.8 93.2 91.5   RDW 19.9* 19.6* 19.1*    240 257     CHEMISTRIES:  Recent Labs     21  0445 21  0625 21  1151    145  --    K 3.8 3.9  --    CL 91* 94*  --    CO2 44* 42*  --    BUN 20 15  --    CREATININE 1.3* 1.1  --    GLUCOSE 83 81  --

## 2021-01-26 NOTE — CONSULTS
[address] [id number] Continence Nurse  Consult Note       Aylin Hollis  AGE: 63 y.o.    GENDER: female  : 1955  TODAY'S DATE:  2021    Subjective:     Reason for  Evaluation and Assessment:       Aylin Hollis is a 63 y.o. female referred by:   [x] Physician  [] Nursing  [] Other:     Wound Identification:  Wound Type: pressure and moisture associated   Contributing Factors: edema, diabetes, chronic pressure, decreased mobility, obesity and incontinence of stool        PAST MEDICAL HISTORY        Diagnosis Date    Acute kidney injury ([address] 2019    Anxiety 2020    Aortic stenosis     Asthma     Bacteremia due to group B Streptococcus     T[facility] at [facility] in 2017 - felt to be due to cellulitis    Cancer ([address]     Cervical    Carotid artery stenosis     Chest pain     CHF (congestive heart failure) ([address]     Chronic back pain     COPD exacerbation ([address]     Depression     Diabetes mellitus [facility]     Family history of coronary artery disease     Fatty liver 10/27/2016    GI bleed 2017    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 - [facility] at [facility] H/O aortic valve stenosis     H/O echocardiogram 2016    EF 55%, Aortic valve area is 0.84 cm2 with a mean gradient of 36 suggestive of severe aortic stenosis, mild to mos LVH    Headache     Heart murmur     History of nuclear stress test 2016    lexiscan-normal,EF70%    Morbid obesity (HCC)     BMI=73.72 kg/m2    Neuropathy     NSTEMI (non-ST elevated myocardial infarction) ([facility] 2018    Obesity     Osteoarthritis     Palpitations     Peripheral edema     Restless legs syndrome     Sleep apnea     Has a CPAP    Sleep apnea     SOB (shortness of breath)        PAST SURGICAL HISTORY    Past Surgical History:   Procedure Laterality Date    AORTIC VALVE REPLACEMENT  2017    29mm EvolutR TAVR     SECTION      CHOLECYSTECTOMY      GALLBLADDER SURGERY      HYSTERECTOMY by mouth 2 times daily (Patient taking differently: Take 25 mg by mouth 2 times daily ) 60 tablet 3    ipratropium-albuterol (DUONEB) 0.5-2.5 (3) MG/3ML SOLN nebulizer solution Inhale 3 mLs into the lungs every 6 hours as needed for Shortness of Breath 360 mL 0    atorvastatin (LIPITOR) 80 MG tablet Take 0.5 tablets by mouth nightly 30 tablet 3    spironolactone (ALDACTONE) 25 MG tablet Take 1 tablet by mouth daily (Patient taking differently: Take 25 mg by mouth as needed ) 30 tablet 0    miconazole (MICOTIN) 2 % cream Apply topically 2 times daily. 3 Tube 2    Polyethylene Glycol 3350 (MIRALAX PO) Take by mouth as needed      gabapentin (NEURONTIN) 400 MG capsule Take 1 capsule by mouth 3 times daily for 7 days.  (Patient taking differently: Take 800 mg by mouth 3 times daily. ) 21 capsule 0    amiodarone (CORDARONE) 200 MG tablet Take 1 tablet by mouth daily (Patient taking differently: Take 200 mg by mouth 2 times daily ) 30 tablet 3    clopidogrel (PLAVIX) 75 MG tablet Take 1 tablet by mouth daily Patient has appointment on 3/27/18 more refills with be given after she keeps her office visit 90 tablet 3    BiPAP Machine MISC by BiPAP route nightly      metFORMIN (GLUCOPHAGE) 500 MG tablet Take 1 tablet by mouth 2 times daily (with meals) 60 tablet 0    Nebulizers (AIRIAL COMPACT MINI NEBULIZER) MISC 1 Device by Does not apply route 4 times daily 1 each 0    albuterol sulfate  (90 Base) MCG/ACT inhaler Inhale 2 puffs into the lungs           Objective:      BP (!) 136/52   Pulse 74   Temp 98.5 °F (36.9 °C) (Oral)   Resp 19   Ht 5' 7.99\" (1.727 m)   Wt (!) 2 lb 3.3 oz (1 kg) Comment: bed zeroed  SpO2 93%   BMI 0.34 kg/m²   Yaya Risk Score: Yaya Scale Score: 13    LABS    CBC:   Lab Results   Component Value Date    WBC 4.8 01/26/2021    RBC 3.18 01/26/2021    HGB 7.9 01/26/2021    HCT 29.1 01/26/2021    MCV 91.5 01/26/2021    MCH 24.8 01/26/2021    MCHC 27.1 01/26/2021    RDW 19.1 01/26/2021     01/26/2021    MPV 9.8 01/26/2021     CMP:    Lab Results   Component Value Date     01/25/2021    K 3.9 01/25/2021    CL 94 01/25/2021    CO2 42 01/25/2021    BUN 15 01/25/2021    CREATININE 1.1 01/25/2021    GFRAA >60 01/25/2021    LABGLOM 50 01/25/2021    GLUCOSE 81 01/25/2021    PROT 7.7 01/25/2021    LABALBU 3.5 01/25/2021    CALCIUM 8.7 01/25/2021    BILITOT 0.4 01/25/2021    ALKPHOS 85 01/25/2021    AST 12 01/25/2021    ALT <5 01/25/2021     Albumin:    Lab Results   Component Value Date    LABALBU 3.5 01/25/2021     PT/INR:    Lab Results   Component Value Date    PROTIME 13.0 06/27/2020    INR 1.07 06/27/2020     HgBA1c:    Lab Results   Component Value Date    LABA1C 6.1 01/14/2021         Assessment:     Patient Active Problem List   Diagnosis    Morbid obesity with BMI of 70 and over, adult ([facility]    Dyslipidemia    Fecal incontinence    Mild intermittent asthma without complication    S/P laparoscopic cholecystectomy    Type 2 diabetes mellitus without complication, with long-term current use of insulin (HCC)    Fatty liver    Arthritis    H/O parotidectomy    Venous stasis dermatitis of both lower extremities    Aortic stenosis    Morbid obesity (HCC)    Asthma    Diabetes mellitus ([address]    Hypertension    Sleep apnea    Family history of coronary artery disease    Chest pain    SOB (shortness of breath)    Palpitations    Peripheral edema    Hypervolemia    Chronic respiratory failure with hypoxia and hypercapnia (HCC)    Acute exacerbation of chronic obstructive pulmonary disease (COPD) (HCC)    Chronic obstructive pulmonary disease (HCC)    Gait disturbance    Acute on chronic respiratory failure with hypoxia and hypercapnia (HCC)    Cellulitis    Skin ulcer of left foot with fat layer exposed ([address]    Pneumonia    VHD (valvular heart disease)    Class 3 severe obesity due to excess calories with body mass index (BMI) greater than or equal to 79 in adult [facility])    SIRS (systemic inflammatory response syndrome) (HCC)    Acute kidney injury ([facility]    Acute metabolic encephalopathy    Shock, unspecified ([facility]    Uncontrolled diabetes mellitus ([facility]    Sepsis ([facility]    Altered mental status    Wide-complex tachycardia (HCC)    Chronic diastolic heart failure (HCC)    S/P TAVR (transcatheter aortic valve replacement)    Septic shock (HCC)    Acute kidney injury superimposed on CKD (HCC)    Bradycardia    Anxiety    Acute respiratory failure with hypoxia (HCC)    Acute congestive heart failure ([facility])       Measurements:  Wound 09/22/19 sacrum deep tissue injury/stage 2 (Active)   Number of days: 492       Wound 10/29/19 Thigh Right;Posterior (Active)   Wound Etiology Pressure Stage  2 01/16/21 0830   Dressing Status Clean;Dry; Intact 01/18/21 0900   Wound Cleansed Soap and water 01/17/21 1200   Dressing/Treatment Dry dressing 01/18/21 0900   Wound Assessment Erythema;Pink/red 01/18/21 0900   Drainage Amount Small 01/18/21 0900   Drainage Description Serosanguinous 01/18/21 0900   Odor None 01/15/21 1933   Number of days: 455       Wound 10/29/19 Heel Left;Medial cluster (Active)   Number of days: 836       Wound 10/29/19 Thigh Left;Posterior (Active)   Number of days: 455       Wound 10/31/19 Toe (Comment  which one) Left;Plantar left great toe fissure  (Active)   Number of days: 453       Wound 10/31/19 Foot Left;Plantar (Active)   Number of days: 453       Wound 10/31/19 Heel Right;Medial right medial heel fissure cluster (Active)   Number of days: 453       Wound 09/22/19 sacral/buttock cluster (Active)   Wound Etiology Pressure Stage  2 01/16/21 0830   Dressing Status Clean;Dry 01/16/21 1553   Wound Cleansed Soap and water 01/16/21 0830   Dressing/Treatment Moisture barrier;Protective barrier 01/18/21 0900   Wound Length (cm) 0 cm 01/18/21 0900   Wound Width (cm) 0 cm 01/18/21 0900   Wound Depth (cm) 0 cm 01/18/21 0900   Wound Surface Area (cm^2) 0 cm^2 01/18/21 0900   Change in Wound Size % (l*w) 100 01/18/21 0900   Wound Volume (cm^3) 0 cm^3 01/18/21 0900   Wound Healing % 100 01/18/21 0900   Wound Assessment Pink/red;Purple/maroon; Other (Comment) 01/18/21 0900   Drainage Amount None 01/18/21 0900   Odor None 01/18/21 0900   Sierra-wound Assessment Cool;Ecchymosis; Excoriated 01/15/21 1603   Number of days: 492       Wound 09/22/19 Foot Left;Plantar DFU (Active)   Number of days: 492       Wound 09/22/19 Foot Right;Plantar DFU (Active)   Number of days: 492       Wound 08/26/20 Other (Comment) gluteal fold (Active)   Number of days: 152       Wound 09/02/20 Back Lateral;Left (Active)   Number of days: 146       Wound 01/18/21 Thigh Left;Posterior (Active)   Wound Etiology Pressure Stage  2 01/26/21 0959   Dressing Status New dressing applied 01/26/21 0959   Wound Cleansed Cleansed with saline 01/26/21 0959   Dressing/Treatment Collagen 01/26/21 0959   Wound Length (cm) 0.6 cm 01/26/21 0959   Wound Width (cm) 1 cm 01/26/21 0959   Wound Depth (cm) 0.1 cm 01/26/21 0959   Wound Surface Area (cm^2) 0.6 cm^2 01/26/21 0959   Change in Wound Size % (l*w) 84 01/26/21 0959   Wound Volume (cm^3) 0.06 cm^3 01/26/21 0959   Wound Healing % 84 01/26/21 0959   Distance Tunneling (cm) 0 cm 01/26/21 0959   Tunneling Position ___ O'Clock 0 01/26/21 0959   Undermining Starts ___ O'Clock 0 01/26/21 0959   Undermining Ends___ O'Clock 0 01/26/21 0959   Undermining Maxium Distance (cm) 0 01/26/21 0959   Wound Assessment Pink/red 01/26/21 0959   Drainage Amount Moderate 01/26/21 0959   Drainage Description Serosanguinous 01/26/21 0959   Odor None 01/26/21 0959   Sierra-wound Assessment Dry/flaky 01/26/21 0959   Margins Defined edges 01/26/21 0959   Wound Thickness Description not for Pressure Injury Full thickness 01/26/21 0959   Number of days: 7       Response to treatment:  Well tolerated by patient.      Pain Assessment:  Severity:  0  Quality of pain: none  Wound Pain Timing/Severity:   Premedicated: no    Plan:     Plan of Care: Wound 10/29/19 Thigh Right;Posterior-Dressing/Treatment: Dry dressing(Pt has a clean, dry, intact 2 x 2 Mepilex dressing over area)  Wound 01/18/21 Thigh Left;Posterior-Dressing/Treatment: Collagen(sacral mepilex border)  [REMOVED] Wound Thigh Left;Posterior-Dressing/Treatment: Dry dressing  [REMOVED] Wound 10/29/19 Abdomen Anterior;Right cluster under abdominal fold-Dressing/Treatment: Other (comment)(medicated powder and inner dry)  Wound 09/22/19 sacral/buttock cluster-Dressing/Treatment: Moisture barrier, Protective barrier     Pt in bed agreeable to wound care eval. Pt has fissures to bilateral plantar/heels washed legs with alphabath rinsed and dried applied hydraguard cream. Left posterior thigh cleansed with NS measured and pictured dressing as above. Abd/groin/breast folds intact washed with soap and water applied micotin powder continue to use. Changed pad and linens. Pt turned to lt side. Heels floated. Pt is at moderate risk for skin breakdown AEB agapito score. Observe agapito orders. Specialty Bed Required : yes  [] Low Air Loss   [] Pressure Redistribution  [] Fluid Immersion  [x] Bariatric  [] Total Pressure Relief  [] Other:     Discharge Plan:  Placement for patient upon discharge:  tbd  Hospice Care: no  Patient appropriate for Outpatient [address]:  no    Patient/Caregiver Teaching:  Level of patient/caregiver understanding able to: cares explained as given. Electronically signed by Jojo Jacques.  FARIHA Washington, on 1/26/2021 at 11:07 AM

## 2021-01-26 NOTE — CARE COORDINATION
Pre-cert received for [facility]. They are able to take pt on up to 6L O2. Notified Dr Dudley Arauz. He plans to d/c pt tomorrow if stable. Rapid COVID ordered.   TE

## 2021-01-26 NOTE — PROGRESS NOTES
Nephrology Progress Note  1/26/2021 7:14 AM        Subjective:   Admit Date: 1/13/2021  PCP: THEODORE Marc - NP    Interval History: progressively improving serum creatinine with   Reasonable urine output. Data:     Current meds:    insulin lispro  0-6 Units Subcutaneous TID WC    cefTRIAXone (ROCEPHIN) IV  1,000 mg Intravenous Q24H    metoclopramide  5 mg Intravenous Q6H    midodrine  10 mg Oral TID WC    enoxaparin  30 mg Subcutaneous Daily    lubiprostone  24 mcg Oral BID WC    senna  2 tablet Oral Nightly    insulin lispro  0-6 Units Subcutaneous 2 times per day    lidocaine  5 mL Intradermal Once    amiodarone  200 mg Oral Daily    atorvastatin  40 mg Oral Nightly    busPIRone  10 mg Oral TID    clopidogrel  75 mg Oral Daily    [Held by provider] doxepin  10 mg Oral Nightly    [Held by provider] metoprolol tartrate  12.5 mg Oral BID    pantoprazole  40 mg Oral Daily    polyethylene glycol  17 g Oral Daily    pramipexole  1 mg Oral BID    sertraline  25 mg Oral Nightly    [Held by provider] lisinopril  5 mg Oral Daily    [Held by provider] spironolactone  25 mg Oral Daily    vitamin D3  400 Units Oral Daily    sodium chloride flush  10 mL Intravenous 2 times per day    miconazole   Topical BID      dextrose       I/O last 3 completed shifts:   In: 10 [I.V.:10]  Out: -     CBC:   Recent Labs     01/24/21  0445 01/25/21  0625 01/26/21  0500   WBC 6.3 5.1 4.8   HGB 7.7* 7.9* 7.9*    240 257          Recent Labs     01/24/21  0445 01/25/21  0625    145   K 3.8 3.9   CL 91* 94*   CO2 44* 42*   BUN 20 15   CREATININE 1.3* 1.1   GLUCOSE 83 81       Lab Results   Component Value Date    CALCIUM 8.7 01/25/2021    PHOS 2.1 (L) 01/24/2021       Objective:     Vitals: /70   Pulse 80   Temp 98.3 °F (36.8 °C) (Oral)   Resp 18   Ht 5' 7.99\" (1.727 m)   Wt (!) 2 lb 3.3 oz (1 kg) Comment: bed zeroed  SpO2 96%   BMI 0.34 kg/m²     General appearance: , awake and verbally interactive   HEENT: Head: normocephalic, atraumatic. Neck: supple, symmetrical, trachea midline  Cardiovascular: normal S1 and S2  Pulmonary: diminished lung sounds bilaterally   Abdomen:  soft / non-tender   Extremities: + edema to the bilateral lower legs     Impression :     1. JOSE (ATN vs. Pre-renal azotemia)  2. Acute respiratory failure  3  DM2   4. CHF   5. Anemia   6. Hypocalcemia   7. Hypotension   8. UTI     Recommendation/Plan  :     1.    -uop: 900 ml in the last 24 hours (estimated amount in hernandez bag)  -progressively improving serum creatinine   2.   -O2 sat: 93  - 98 % on 6 LPM via high flow nasal cannula  3.   -on Lantus and SSI for diabetes management   4.   -monitor: O2 saturations / urine output and volume status   -uop: 900 ml in the last 24 hours   -lisinopril and metoprolol on hold   5.   -latest Hb: 7.9; follow hemoglobin trend   6.   -latest calcium: 8.7  7.   -BP trend: systolic BP's have recently been 101 - 133  -on midodrine with holding parameters   8.   -on IV rocephin      Electronically signed by THEODORE Goodwin - LUNA         Nephrology Attending Progress Note  1/26/2021 12:11 PM  Subjective: Interval History: I have personally performed face to face diagnostic evaluation on this patient. I have personally reviewed pertinent labs and imaging and agree with the care plan above. My additional findings are as follows:   The patient is a 63 y.o. female who presents with weakness but more awake monitor    Objective:   Vitals: BP (!) 96/52   Pulse 77   Temp 97.9 °F (36.6 °C) (Axillary)   Resp 14   Ht 5' 7.99\" (1.727 m)   Wt (!) 2 lb 3.3 oz (1 kg) Comment: bed zeroed  SpO2 99%   BMI 0.34 kg/m²   Weak awake  Soft nt  obese    Assessment and Plan:  1 stable uop and mnitor as renal improve- will watch  2 o2 appear slight improve and wean NC  3 ssi and follow  4 hold ace arb for now  5 hb low stable  Work up rehab  No fever         Electronically signed by Antonio Singh Nisreen Arizmendi MD on 1/26/2021 at 12:11 PM

## 2021-01-26 NOTE — PROGRESS NOTES
Comprehensive Nutrition Assessment    Type and Reason for Visit:  Reassess    Nutrition Recommendations/Plan:   · Continue current diet and oral supplement, between meals  · Encourage po intake as able  · Please assist with meals as needed  · Please document all po intake  · Please reweigh pt as current wt appears to be an error    Nutrition Assessment:  Feeding self after set up on Carb Control 4 Diet. Fair intake, consuming less than half of meals and oral supplements. Pt is sleeping during room visit. Inaccurate current wt noted. Will continue to follow as high nutrition risk. Malnutrition Assessment:  Malnutrition Status: At risk for malnutrition    Context:  Acute Illness       Estimated Daily Nutrient Needs:  Energy (kcal):  5464-8267([address] [place] Saliva w/ stress factor 1.0-1.1); Weight Used for Energy Requirements:  Current     Protein (g):  (1.25-1.5 g/kg); Weight Used for Protein Requirements:  Ideal        Fluid (ml/day): 3523-7040  ; Method Used for Fluid Requirements:   1 ml/kcal      Nutrition Related Findings:  Labs: Na 149, K+ 3.2, Mag 1.5, Hemoglobin 7.5      Wounds:  Pressure Injury, Stage II       Current Nutrition Therapies:    DIET CARB CONTROL;   Dietary Nutrition Supplements: Diabetic Oral Supplement    Anthropometric Measures:  · Height: 5' 7.99\" (172.7 cm)  · Current Body Weight: (inaccurate wt documented 1/22)   · Admission Body Weight: (n/a)    · Usual Body Weight: 378 lb 15.5 oz (171.9 kg)((8/25/20) per chart review)     · Ideal Body Weight: 140 lbs; % Ideal Body Weight 260 %   · BMI: 55.4  · BMI Categories: Obese Class 3 (BMI 40.0 or greater)       Nutrition Diagnosis:   · Inadequate oral intake related to acute injury/trauma as evidenced by intake 0-25%    Nutrition Interventions:   Food and/or Nutrient Delivery:  Continue Current Diet, Continue Oral Nutrition Supplement  Nutrition Education/Counseling:  No recommendation at this time   Coordination of Nutrition Care: Continue to monitor while inpatient, Feeding Assistance/Environment Change    Goals:  pt will consume greater than 50% of meals and supplements       Nutrition Monitoring and Evaluation:   Behavioral-Environmental Outcomes:  None Identified   Food/Nutrient Intake Outcomes:  Supplement Intake, Food and Nutrient Intake  Physical Signs/Symptoms Outcomes:  Biochemical Data, Fluid Status or Edema, Meal Time Behavior, Skin, Weight     Discharge Planning:    Continue Oral Nutrition Supplement     Electronically signed by Martha Villavicencio RD, LD on 1/26/21 at 1:29 PM EST    Contact: [phone]

## 2021-01-26 NOTE — CARE COORDINATION
Pre-cert pending for [facility].   Pt is not medically ready for d/c today per Dr August Hallman.  Young Villanueva

## 2021-01-27 NOTE — PROGRESS NOTES
Pulmonary and Critical Care  Progress Note      VITALS:  BP (!) 133/52   Pulse 60   Temp 97.9 °F (36.6 °C) (Oral)   Resp 15   Ht 5' 7.99\" (1.727 m)   Wt (!) 2 lb 3.3 oz (1 kg) Comment: bed zeroed  SpO2 93%   BMI 0.34 kg/m²     Subjective:   CHIEF COMPLAINT :SOB     HPI:                The patient is lying in the bed. She is not in acute resp distress    Objective:   PHYSICAL EXAM:    LUNGS:Decreased air entry bilateral bases  Abd-distended,BS+,NT  Ext- Chronic LE venous stasis  CVS-s1s2, no murmurs      DATA:    CBC:  Recent Labs     01/25/21  0625 01/26/21  0500 01/27/21  0425   WBC 5.1 4.8 5.1   RBC 3.10* 3.18* 3.17*   HGB 7.9* 7.9* 8.1*   HCT 28.9* 29.1* 29.1*    257 298   MCV 93.2 91.5 91.8   MCH 25.5* 24.8* 25.6*   MCHC 27.3* 27.1* 27.8*   RDW 19.6* 19.1* 19.2*      BMP:  Recent Labs     01/25/21  0625 01/27/21  0945    136   K 3.9 3.6   CL 94* 89*   CO2 42* 38*   BUN 15 11   CREATININE 1.1 1.1   CALCIUM 8.7 8.6   GLUCOSE 81 88      ABG:  No results for input(s): PH, PO2ART, ZQR6GVO, HCO3, BEART, O2SAT in the last 72 hours. BNP  No results found for: BNP   D-Dimer:  Lab Results   Component Value Date    DDIMER 1729 (H) 06/27/2020      1.  Radiology:None      Assessment/Plan     Patient Active Problem List    Diagnosis Date Noted    Acute exacerbation of chronic obstructive pulmonary disease (COPD) ([facility]      Priority: High    Hypervolemia      Priority: High    Anxiety 09/01/2020     Priority: Medium     Class: Acute    Acute congestive heart failure (HCC)     Acute respiratory failure with hypoxia (HCC)     Bradycardia     Septic shock ([facility] 08/25/2020    Acute kidney injury superimposed on CKD ([facility] 08/25/2020    S/P TAVR (transcatheter aortic valve replacement) 06/26/2020    Chronic diastolic heart failure ([facility] 06/25/2020    Wide-complex tachycardia ([facility]     Altered mental status     Sepsis ([facility] 09/21/2019    Acute kidney injury ([facility] 08/22/2019    Acute metabolic encephalopathy 08/22/2019    Shock, unspecified ([address] 08/22/2019    Uncontrolled diabetes mellitus ([address] 08/22/2019    SIRS (systemic inflammatory response syndrome) ([address] 08/12/2019    Class 3 severe obesity due to excess calories with body mass index (BMI) greater than or equal to 70 in adult [facility]) 06/16/2019    VHD (valvular heart disease) 04/02/2019     Overview Note:     H/o TAVR      Pneumonia 02/06/2019    Skin ulcer of left foot with fat layer exposed ([address]     Cellulitis 11/24/2018    Acute on chronic respiratory failure with hypoxia and hypercapnia (HCC)     Gait disturbance 02/10/2018    Chronic obstructive pulmonary disease ([address] 02/07/2018    Chronic respiratory failure with hypoxia and hypercapnia ([address] 01/30/2018    Morbid obesity ([address]     Asthma     Diabetes mellitus ([address]     Hypertension     Sleep apnea     Family history of coronary artery disease     Chest pain     SOB (shortness of breath)     Palpitations     Peripheral edema     Aortic stenosis     Morbid obesity with BMI of 70 and over, adult ([address] 10/27/2016    Dyslipidemia 10/27/2016    Fecal incontinence 10/27/2016    Mild intermittent asthma without complication 89/72/9479    S/P laparoscopic cholecystectomy 10/27/2016    Type 2 diabetes mellitus without complication, with long-term current use of insulin ([address] 10/27/2016    Fatty liver 10/27/2016    Arthritis 10/27/2016    H/O parotidectomy 10/27/2016    Venous stasis dermatitis of both lower extremities 10/27/2016   Morbid Obesity  ANMOL  OHS  Acute on Chronic Hypoxic Hypercapneic resp failure  Chronic Metabolic alkalosis  Pulmonary congestion- improved  Basal atelectasis  Chronic LE venous stasis  Suspected CAP- treated       1. BIPAP  2. ICS  3. OB  4. PT/OT  5. Await placement  6. C/w present management  No follow-ups on file.     Electronically signed by Emeka Mccormack MD on 1/27/2021 at 11:50 AM

## 2021-01-27 NOTE — PROGRESS NOTES
Nephrology Progress Note  1/27/2021 8:50 AM        Subjective:   Admit Date: 1/13/2021  PCP: THEODORE Garcia NP    Interval History:  Dr. Jerilyn Rashid in visiting with the patient this am   Patient is voicing no pressing concerns during our visit    Data:     Current meds:    insulin lispro  0-6 Units Subcutaneous TID WC    metoclopramide  5 mg Intravenous Q6H    midodrine  10 mg Oral TID WC    enoxaparin  30 mg Subcutaneous Daily    lubiprostone  24 mcg Oral BID WC    senna  2 tablet Oral Nightly    insulin lispro  0-6 Units Subcutaneous 2 times per day    lidocaine  5 mL Intradermal Once    amiodarone  200 mg Oral Daily    atorvastatin  40 mg Oral Nightly    busPIRone  10 mg Oral TID    clopidogrel  75 mg Oral Daily    [Held by provider] doxepin  10 mg Oral Nightly    [Held by provider] metoprolol tartrate  12.5 mg Oral BID    pantoprazole  40 mg Oral Daily    polyethylene glycol  17 g Oral Daily    pramipexole  1 mg Oral BID    sertraline  25 mg Oral Nightly    [Held by provider] lisinopril  5 mg Oral Daily    [Held by provider] spironolactone  25 mg Oral Daily    vitamin D3  400 Units Oral Daily    sodium chloride flush  10 mL Intravenous 2 times per day    miconazole   Topical BID      dextrose       I/O last 3 completed shifts: In: 398.6 [P.O.:300;  I.V.:98.6]  Out: 1400 [Urine:1400]    CBC:   Recent Labs     01/25/21  0625 01/26/21  0500 01/27/21  0425   WBC 5.1 4.8 5.1   HGB 7.9* 7.9* 8.1*    257 298          Recent Labs     01/25/21  0625      K 3.9   CL 94*   CO2 42*   BUN 15   CREATININE 1.1   GLUCOSE 81       Lab Results   Component Value Date    CALCIUM 8.7 01/25/2021    PHOS 2.1 (L) 01/24/2021       Objective:     Vitals: BP (!) 137/56   Pulse 65   Temp 97.9 °F (36.6 °C) (Oral)   Resp 16   Ht 5' 7.99\" (1.727 m)   Wt (!) 2 lb 3.3 oz (1 kg) Comment: bed zeroed  SpO2 97%   BMI 0.34 kg/m²     General appearance: , awake and verbally interactive   HEENT: Head: normocephalic, atraumatic. Neck: supple, symmetrical, trachea midline  Cardiovascular: normal S1 and S2  Pulmonary: diminished lung sounds bilaterally   Abdomen:  soft / non-tender   Extremities: + edema to the bilateral lower legs and thighs     Impression :     1. JOSE (ATN vs. Pre-renal azotemia)  2. Acute respiratory failure  3  DM2   4. CHF   5. Anemia   6. Hypocalcemia   7. Hypotension   8. UTI     Recommendation/Plan  :     1.    -uop: 1.4 liters in the last 24 hours via hernandez   -chemistry results from this am are pending   -chemistry panel to be drawn Qam   2.   -O2 sat: 96 - 98 % on 4 LPM via high flow nasal cannula  3.   -on Lantus and SSI for diabetes management   4.   -monitor: O2 saturations / urine output and volume status   -lisinopril and metoprolol on hold   5.   -latest Hb: 8.1; follow hemoglobin trend   6.   -chemistry results from this am are pending   7. -BP trend: systolic BP's have recently been 76 - 148  8.   -course of rocephin has been completed     Post-hospitalization planning: per CM note from   7/12; pre[facility]    From a renal standpoint, it is okay to start the process   Toward discharging this patient.    -patient will need to schedule a post-hospital follow-up in 1 - 2 weeks   as well as complete the ordered lab work before the follow-up visit. Electronically signed by THEODORE Hurst - LUNA           Nephrology Attending Progress Note  1/27/2021 3:03 PM  Subjective: Interval History: I have personally performed face to face diagnostic evaluation on this patient. I have personally reviewed pertinent labs and imaging and agree with the care plan above. My additional findings are as follows:   The patient is a 63 y.o. female who presents with weak but more awake    Objective:   Vitals: BP (!) 111/34   Pulse 67   Temp 98 °F (36.7 °C) (Oral)   Resp 18   Ht 5' 7.99\" (1.727 m)   Wt (!) 2 lb 3.3 oz (1 kg) Comment: bed zeroed  SpO2 94%   BMI 0.34

## 2021-01-27 NOTE — PROGRESS NOTES
Progress Note  Date:2021       Room:[address] [place] [address]-  Patient Name:Cecelia Geryl Olszewski     YOB: 1955     Age:65 y.o. Feels better  Subjective    Subjective:  Symptoms:  Stable. Pain:  She complains of pain that is mild. Review of Systems  Objective         Vitals Last 24 Hours:  TEMPERATURE:  Temp  Av.2 °F (36.8 °C)  Min: 97.9 °F (36.6 °C)  Max: 98.5 °F (36.9 °C)  RESPIRATIONS RANGE: Resp  Av.1  Min: 14  Max: 19  PULSE OXIMETRY RANGE: SpO2  Av.3 %  Min: 93 %  Max: 99 %  PULSE RANGE: Pulse  Av.1  Min: 65  Max: 102  BLOOD PRESSURE RANGE: Systolic (48CZY), WNE:720 , Min:76 , RCI:407   ; Diastolic (86AGI), NWC:06, Min:42, Max:56    I/O (24Hr): Intake/Output Summary (Last 24 hours) at 2021 0724  Last data filed at 2021 0651  Gross per 24 hour   Intake 398.62 ml   Output 1400 ml   Net -1001.38 ml     Objective:  General Appearance:  Comfortable. Vital signs: (most recent): Blood pressure (!) 137/56, pulse 65, temperature 97.9 °F (36.6 °C), temperature source Oral, resp. rate 16, height 5' 7.99\" (1.727 m), weight (!) 2 lb 3.3 oz (1 kg), SpO2 97 %, not currently breastfeeding.  (6L). HEENT: Normal HEENT exam.    Lungs:  Normal effort. There are decreased breath sounds. Heart: Normal rate. Abdomen: Abdomen is soft. Bowel sounds are normal.     Extremities: Decreased range of motion. Neurological: Patient is alert. Pupils:  Pupils are equal, round, and reactive to light. Skin:  Warm.       Labs/Imaging/Diagnostics    Labs:  CBC:  Recent Labs     21  0625 21  0500 21  0425   WBC 5.1 4.8 5.1   RBC 3.10* 3.18* 3.17*   HGB 7.9* 7.9* 8.1*   HCT 28.9* 29.1* 29.1*   MCV 93.2 91.5 91.8   RDW 19.6* 19.1* 19.2*    257 298     CHEMISTRIES:  Recent Labs     21  0621  1151     --    K 3.9  --    CL 94*  --    CO2 42*  --    BUN 15  --    CREATININE 1.1  --    GLUCOSE 81  --    MG  --  2.3     PT/INR:No results for input(s): PROTIME, INR in the last 72 hours. APTT:No results for input(s): APTT in the last 72 hours. LIVER PROFILE:  Recent Labs     01/25/21  0625   AST 12*   ALT <5*   BILITOT 0.4   ALKPHOS 85       Imaging Last 24 Hours:  No results found.   Assessment//Plan           Hospital Problems           Last Modified POA    Septic shock ([facility] 1/13/2021 Yes    Acute congestive heart failure ([facility] 1/14/2021 Yes        Assessment & Plan  Acute on chornic hypoxic and hypercarbic resp failrue 2/2 acute on chronic diastolic CHF  -baseline 2L  -echo with EF 50%  -CXR pulm edema  -on amiodarone and midodrine  -Off lasix and diuretic held with JOSE  -Held BB, ACE, aldacton with low BP  -covid neg, legionella and strep neg  Septick shock due ot UTI  -Urine cx with citrobacter and treated with rocpehin  -bld cx neg  -midodrine  JOSE  -due tot diuresis  -held ACE and aldacotne  AOCM  -s/p 1PRBC  -venofer  -outpt egd and cscope  Bradycardia  -BB held  DM  -SSI  Hypomag and hypophos  -replaced  Morbid obesity  -dietary and life style modification  DVT prophylaxis  -lovenox      SNF today      Electronically signed by Evens Arzola MD on 1/26/21 at 7:25 AM EST

## 2021-01-27 NOTE — CARE COORDINATION
Attempted to set up bariatric transport to [place] today with multiple transport companies. First available transport is at 1000 tomorrow with [facility] [employer]. Notified pt, pt's nurse, pt's spouse, and [employer]. AVS faxed and placed in packet. Rapid COVID today.   TE

## 2021-01-27 NOTE — DISCHARGE SUMMARY
Physician Discharge Summary     Patient ID:  Fouzia Beard  [id number]  3 y.o.  1955    Admit date: 1/13/2021    Discharge date and time: discharge date 1/28/21     Admitting Physician: No admitting provider for patient encounter. Discharge Physician: Marceol Bradley Discharge date 1/28/21    Admission Diagnoses: Bradycardia [R00.1]  Hypercarbia [R06.89]  Peripheral edema [R60.9]  SIRS (systemic inflammatory response syndrome) ([facility]) [R65.10]  Acute kidney injury ([address] [N17.9]  Septic shock ([address] [A41.9, R65.21]  Severe anemia [D64.9]  Hypotension, unspecified hypotension type [I95.9]  Acute congestive heart failure, unspecified heart failure type ([address] [I50.9]    Discharge Diagnoses: acute on chronic hypoxic and hypercapneic resp failure due to acute on chronic diastolic CHF(baseline 2L)                                          Septic shock due ot UTI                                          Acute kidney injury due to diuresis                                           AOCD                                           Bradycardia and BB held                                            DM(diet controlled)                                           Hypotension                                           Morbid obesity    Admission Condition: fair    Discharged Condition: good    Indication for Admission: resp failure    Hospital Course:    63 y.o.  female with past medical history of DM type II, CHF, COPD, morbid obesity, hyperlipidemia, chronic respiratory failure who presents with shortness of breath. Patient states that her shortness of breath has been ongoing for 1 week and became worse today forcing her to come to the ED. She denies any fever. Cough is productive of scanty whitish phlegm. She has no chest pain. Admits to have been drinking plenty of fluids and missing her torsemide repeatedly. Denies any nausea or vomiting. She feels very weak and tired. Denies any urinary symptoms.   Could not tell if she had noticed any change in the color of her stool. Denies any rectal bleeding or hematemesis. Denies any contact with suspected or confirmed COVID-19 patients. She was admitted for resp failure for CHF and diuresed but had worsening kidney disease and her diuretics and medications affecting her kidneys were stopped. Will need to restart at SNF if has more than 5Lb weight gain. Her BB was also stopped due to hypotension and bradycardia and placed on midodrine. She was placed on antibiotic for sepsis and treated for citrobacter. GI consulted for anemia and required PRBC transfusion and GI recommended outpt egd and cscope. She was stable and discharged to SNF. Consults: cardiology, pulmonary/intensive care, GI and nephrology        Outstanding Order Results     No orders found from 12/15/2020 to 1/14/2021. Discharge Exam:  HEENT: Normal HEENT exam.    Lungs:  Normal effort. There are decreased breath sounds. Heart: Normal rate. Abdomen: Abdomen is soft. Bowel sounds are normal.     Extremities: Decreased range of motion. Neurological: Patient is alert. Pupils:  Pupils are equal, round, and reactive to light. Skin:  Warm.       Disposition: SNF    Patient Instructions:      Medication List      START taking these medications    midodrine 10 MG tablet  Commonly known as: PROAMATINE  Take 1 tablet by mouth 3 times daily (with meals) HOLD FOR SBP> 105        CHANGE how you take these medications    amiodarone 200 MG tablet  Commonly known as: CORDARONE  Take 1 tablet by mouth daily  What changed: when to take this        CONTINUE taking these medications    Airial Compact Mini Nebulizer Misc  1 Device by Does not apply route 4 times daily     albuterol sulfate  (90 Base) MCG/ACT inhaler     atorvastatin 80 MG tablet  Commonly known as: LIPITOR  Take 0.5 tablets by mouth nightly     BiPAP Machine Misc     busPIRone 10 MG tablet  Commonly known as: BUSPAR  Take 1 tablet by mouth 3 times daily     clopidogrel 75 MG tablet  Commonly known as: PLAVIX  Take 1 tablet by mouth daily Patient has appointment on 3/27/18 more refills with be given after she keeps her office visit     ipratropium-albuterol 0.5-2.5 (3) MG/3ML Soln nebulizer solution  Commonly known as: DUONEB  Inhale 3 mLs into the lungs every 6 hours as needed for Shortness of Breath     miconazole 2 % cream  Commonly known as: MICOTIN  Apply topically 2 times daily.      MIRALAX PO     pantoprazole 40 MG tablet  Commonly known as: PROTONIX  Take 1 tablet by mouth daily  Start taking on: January 28, 2021     pramipexole 1 MG tablet  Commonly known as: MIRAPEX  Take 1 tablet by mouth 2 times daily     vitamin D3 10 MCG (400 UNIT) Tabs tablet  Commonly known as: CHOLECALCIFEROL  Take 1 tablet by mouth daily        STOP taking these medications    doxepin 10 MG capsule  Commonly known as: SINEQUAN     ferrous sulfate 325 (65 Fe) MG tablet  Commonly known as: IRON 325     furosemide 40 MG tablet  Commonly known as: LASIX     gabapentin 400 MG capsule  Commonly known as: NEURONTIN     lisinopril 5 MG tablet  Commonly known as: PRINIVIL;ZESTRIL     metoprolol tartrate 25 MG tablet  Commonly known as: LOPRESSOR     oxybutynin 10 MG extended release tablet  Commonly known as: DITROPAN-XL     sertraline 25 MG tablet  Commonly known as: ZOLOFT     spironolactone 25 MG tablet  Commonly known as: ALDACTONE     torsemide 20 MG tablet  Commonly known as: DEMADEX        ASK your doctor about these medications    metFORMIN 500 MG tablet  Commonly known as: GLUCOPHAGE  Take 1 tablet by mouth 2 times daily (with meals)           Where to Get Your Medications      Information about where to get these medications is not yet available    Ask your nurse or doctor about these medications  · busPIRone 10 MG tablet  · midodrine 10 MG tablet  · pantoprazole 40 MG tablet  · vitamin D3 10 MCG (400 UNIT) Tabs tablet         Activity: activity as tolerated  Diet: diabetic diet and diet controlled  Wound Care: none needed    Follow-up with PCP.   Discharge time 35min  Signed:  Callum Reed  1/27/2021  1:14 PM

## 2021-01-27 NOTE — PROGRESS NOTES
Occupational Therapy  . Occupational Therapy Treatment Note  Name: Peyton Hernandez MRN: [id number] :   1955   Date:  2021   Admission Date: 2021 Room:  [address] [place] [address]   Restrictions/Precautions:    General precautions, fall risk, trapeze    Communication with other providers:  Per Nurse sara patient ok for treatment. Nurse tech x2 with assistance for rolling and toileting. Subjective:  Patient states:  I made a mess. Please help me clean it up. Pain:   Location, Type, Intensity (0/10 to 10/10):  none stated  Objective:    Observation:  Patient supine and asleep upon arrival. Wanting to get cleaned up d/t BM incontinence. Patient o2 dropped when [facility] lowered and during activity, Nurse techs placed BiPap on patient. Objective Measures:  O2, bipap, tele    Treatment, including education:    ADL activity training was instructed today. Cues were given for safety, sequence, UE/LE placement, visual cues, and balance. Activities performed today included dressing, toileting, hand hygiene, and grooming. Patient BM incontinence. toileting all aspects DEP x3  Facial hygiene- SBA vcs for PLB during activity d/t O2 drop. UB dressing DEP    Therapeutic activity training was instructed today. Cues were given for safety, sequence, UE/LE placement, awareness, and balance. Activities performed today included bed mobility training, sup-sit, sit-stand, SPT. Rolling L-R- Max A X3 with vcs and tactile cues for use of bed rails. Cues for PLB  During rolling. Patient educated on role of OT , benefits of OT and rationale for therapeutic intervention. Education on rehab at [facility] and what to expect. PLB. Education and instruction given for excercises and benefits, use of trapeze. All therapeutic intervention performed c emphasis on BUE strengthening and endurance to  increase strength for functional tasks / transfers.     Patient left safely in bed at end of session, with call light in reach, alarm on and nursing aware. Gait belt was used for func transfers / mobility.       Assessment / Impression:        Patient's tolerance of treatment: well  Adverse Reaction: none  Significant change in status and impact:  none  Barriers to improvement:  Weakness, body habitus, decreased endurance    Plan for Next Session:    Continue with Ot POC  Time in:  1511  Time out:  1550  Timed treatment minutes:  39  Total treatment time:  39  Electronically signed by:    MARLENI Holliday  1/27/2021, 3:11 PM    Previously filed values:

## 2021-01-28 NOTE — PROGRESS NOTES
Progress Note  Date:2021       Room:[address] [place] [address]-  Patient Name:Olivia Lambert     YOB: 1955     Age:65 y.o. Feels better and on 2L oxygen today  Subjective    Subjective:  Symptoms:  Stable. Pain:  She reports no pain. Review of Systems  Objective         Vitals Last 24 Hours:  TEMPERATURE:  Temp  Av °F (36.7 °C)  Min: 97.9 °F (36.6 °C)  Max: 98.1 °F (36.7 °C)  RESPIRATIONS RANGE: Resp  Av.5  Min: 15  Max: 21  PULSE OXIMETRY RANGE: SpO2  Av.2 %  Min: 93 %  Max: 99 %  PULSE RANGE: Pulse  Av  Min: 56  Max: 67  BLOOD PRESSURE RANGE: Systolic (84WHW), UHK:101 , Min:98 , KERNS:317   ; Diastolic (03LLH), FAA:70, Min:34, Max:62    I/O (24Hr): Intake/Output Summary (Last 24 hours) at 2021 0732  Last data filed at 2021 0416  Gross per 24 hour   Intake 30 ml   Output 525 ml   Net -495 ml     Objective:  General Appearance:  Comfortable. Vital signs: (most recent): Blood pressure (!) 141/47, pulse 58, temperature 98.1 °F (36.7 °C), temperature source Axillary, resp. rate 18, height 5' 7.99\" (1.727 m), weight (!) 2 lb 3.3 oz (1 kg), SpO2 95 %, not currently breastfeeding.  (6L). HEENT: Normal HEENT exam.    Lungs:  Normal effort. There are decreased breath sounds. Heart: Normal rate. Abdomen: Abdomen is soft. Bowel sounds are normal.     Extremities: Decreased range of motion. Neurological: Patient is alert. Pupils:  Pupils are equal, round, and reactive to light. Skin:  Warm.       Labs/Imaging/Diagnostics    Labs:  CBC:  Recent Labs     21  0500 21  0425 21  0428   WBC 4.8 5.1 4.7   RBC 3.18* 3.17* 3.12*   HGB 7.9* 8.1* 8.0*   HCT 29.1* 29.1* 28.1*   MCV 91.5 91.8 90.1   RDW 19.1* 19.2* 19.6*    298 313     CHEMISTRIES:  Recent Labs     21  1151 21  0945 21  0428   NA  --  136 147*   K  --  3.6 3.6   CL  --  89* 98*   CO2  --  38* 41*   BUN  --  11 12   CREATININE  --  1.1 1.7*   GLUCOSE  --  88 115*   MG 2.3  --   --      PT/INR:No results for input(s): PROTIME, INR in the last 72 hours. APTT:No results for input(s): APTT in the last 72 hours. LIVER PROFILE:  No results for input(s): AST, ALT, BILIDIR, BILITOT, ALKPHOS in the last 72 hours. Imaging Last 24 Hours:  No results found.   Assessment//Plan           Hospital Problems           Last Modified POA    Septic shock ([facility] 1/13/2021 Yes    Acute congestive heart failure ([facility] 1/14/2021 Yes        Assessment & Plan  Acute on chornic hypoxic and hypercarbic resp failrue 2/2 acute on chronic diastolic CHF  -baseline 2L  -echo with EF 50%  -CXR pulm edema  -on amiodarone and midodrine  -Off lasix and diuretic held with JOSE  -Held BB, ACE, aldacton with low BP  -covid neg, legionella and strep neg  Septic shock due ot UTI  -Urine cx with citrobacter and treated with rocpehin  -bld cx neg  -midodrine  JOSE  -due to diuresis  -held ACE and aldacotne  AOCM  -s/p 1PRBC  -venofer  -outpt egd and cscope  Bradycardia  -BB held  DM  -SSI  Hypomag and hypophos  -replaced  Morbid obesity  -dietary and life style modification  DVT prophylaxis  -lovenox      SNF today at 10pm. Will need BMP at SNF to check renal function      Electronically signed by Evens Arzola MD on 1/26/21 at 7:25 AM EST

## 2021-01-28 NOTE — PROGRESS NOTES
Progress Note( Dr. Michell Ahn)    Subjective:   Admit Date: 1/13/2021  PCP: THEODORE Grier - NP    Admitted For :Severe shortness of breath    Consulted For: Better control of blood glucose    Interval History: Patient is somewhat better   Now BiPAP is used as needed  Patient was off ICU and transferred to medical floor    Denies any chest pains,   Yes SOB . On BiPAP now as needed as needed  Denies nausea or vomiting. No new bowel or bladder symptoms.        Intake/Output Summary (Last 24 hours) at 1/27/2021 2358  Last data filed at 1/27/2021 1759  Gross per 24 hour   Intake 330 ml   Output 525 ml   Net -195 ml       DATA    CBC:   Recent Labs     01/25/21  0625 01/26/21  0500 01/27/21  0425   WBC 5.1 4.8 5.1   HGB 7.9* 7.9* 8.1*    257 298    CMP:  Recent Labs     01/25/21  0625 01/27/21  0945    136   K 3.9 3.6   CL 94* 89*   CO2 42* 38*   BUN 15 11   CREATININE 1.1 1.1   CALCIUM 8.7 8.6   PROT 7.7  --    LABALBU 3.5  --    BILITOT 0.4  --    ALKPHOS 85  --    AST 12*  --    ALT <5*  --      Lipids:   Lab Results   Component Value Date    CHOL 151 01/14/2021    HDL 46 01/14/2021    TRIG 94 01/14/2021     Glucose:  Recent Labs     01/27/21  1126 01/27/21  1700 01/27/21  2156   POCGLU 97 99 93     DqenndqlixH6Q:  Lab Results   Component Value Date    LABA1C 6.1 01/14/2021     High Sensitivity TSH:   Lab Results   Component Value Date    TSHHS 3.490 12/09/2020     Free T3: No results found for: FT3  Free T4:  Lab Results   Component Value Date    T4FREE 1.07 09/04/2018       Echocardiogram Limited    Result Date: 1/15/2021  Transthoracic Echocardiography Report (TTE)  Demographics   Patient Name       Kelvin ADAMS Date of Study       01/14/2021   Date of Birth      1955         Gender              Female   Age                63 year(s)         Race                   Patient Number     [id number]         Room Number         2105   Visit Number       [id number]   Corporate ID [id number]   Accession Number   [id number]         [facility]   Ordering Physician Olga Cronin MD      Physician           Jean-Claude Ravi MD  Procedure Type of Study   TTE procedure:ECHOCARDIOGRAM LIMITED. Procedure Date Date: 01/14/2021 Start: 02:11 PM Study Location: Portable Technical Quality: Limited visualization due to body habitus. Indications:Congestive heart failure. Patient Status: Routine Height: 68 inches Weight: 378 pounds BSA: 2.68 m2 BMI: 57.47 kg/m2 HR: 62 bpm BP: 107/55 mmHg  Conclusions   Summary  This is a limited echocardiogram. Technically difficult due to body habitus  Left ventricular systolic function is normal.  Ejection fraction is visually estimated at 50-55%. Dilated right ventricle with pressure/volume overload. S/p TAVR. Mild tricuspid regurgitation is present. RVSP is 43 mmHg. Mild pericardial effusion. Inferior vena cava is dilated, measuring at 3.4 cm, and does not collapse  with respiration. Signature   ------------------------------------------------------------------  Electronically signed by Venice Ram MD  (Interpreting physician) on 01/15/2021 at 08:06 AM  Xr Pelvis (1-2 Views)       Xr Chest Portable    Result Date: 1/15/2021  EXAMINATION: ONE XRAY VIEW OF THE CHEST 1/15/2021 5:49 am COMPARISON: 01/13/2021 HISTORY: ORDERING SYSTEM PROVIDED HISTORY: Hypoxia      1. Stable cardiomegaly with pulmonary edema, bilateral pleural effusions, and associated bibasilar atelectasis. Xr Chest Portable    Result Date: 1/13/2021  EXAMINATION: ONE XRAY VIEW OF THE CHEST 1/13/2021 2:24 pm COMPARISON: Chest x-ray dated 12/09/2020 HISTORY: ORDERING SYSTEM PROVIDED HISTORY: sob       Interstitial/pulmonary edema. Cardiomegaly. Probable small bilateral pleural effusions.         Scheduled Medicines   Medications:  insulin lispro  0-6 Units Subcutaneous TID     metoclopramide  5 mg Intravenous Q6H    midodrine  10 mg Oral TID WC    enoxaparin  30 mg Subcutaneous Daily    lubiprostone  24 mcg Oral BID WC    senna  2 tablet Oral Nightly    insulin lispro  0-6 Units Subcutaneous 2 times per day    lidocaine  5 mL Intradermal Once    amiodarone  200 mg Oral Daily    atorvastatin  40 mg Oral Nightly    busPIRone  10 mg Oral TID    clopidogrel  75 mg Oral Daily    [Held by provider] doxepin  10 mg Oral Nightly    [Held by provider] metoprolol tartrate  12.5 mg Oral BID    pantoprazole  40 mg Oral Daily    polyethylene glycol  17 g Oral Daily    pramipexole  1 mg Oral BID    sertraline  25 mg Oral Nightly    [Held by provider] lisinopril  5 mg Oral Daily    [Held by provider] spironolactone  25 mg Oral Daily    vitamin D3  400 Units Oral Daily    sodium chloride flush  10 mL Intravenous 2 times per day    miconazole   Topical BID      Infusions:    dextrose           Objective:   Vitals: /62   Pulse 63   Temp 97.9 °F (36.6 °C) (Oral)   Resp 17   Ht 5' 7.99\" (1.727 m)   Wt (!) 2 lb 3.3 oz (1 kg) Comment: bed zeroed  SpO2 93%   BMI 0.34 kg/m²   General appearance: alert and cooperative with exam  Neck: no JVD or bruit  Thyroid : Normal lobes   Lungs: Has Vesicular Breath sounds has wheezing and rales bilaterally  Heart:  regular rate and rhythm  Abdomen: soft, non-tender; bowel sounds normal; no masses,  no organomegaly  Musculoskeletal: Normal  Extremities: extremities normal, , no edema  Neurologic:  Awake, alert, oriented to name, place and time. Cranial nerves II-XII are grossly intact. Motor is  intact.   Sensory neuropathy t.,  and gait is abnormal.  Unstable    Assessment:     Patient Active Problem List:     Morbid obesity with BMI of 70 and over, adult ([facility] [address]     Dyslipidemia     Fecal incontinence     Mild intermittent asthma without complication     S/P laparoscopic cholecystectomy

## 2021-01-28 NOTE — PROGRESS NOTES
Nephrology Progress Note  1/28/2021 8:06 AM  Subjective:      Interval History: Angelique Pablo is a 63 y.o. female with weak but awake and still with hernandez plan ecf today        Data:   Scheduled Meds:   insulin lispro  0-6 Units Subcutaneous TID WC    metoclopramide  5 mg Intravenous Q6H    midodrine  10 mg Oral TID WC    enoxaparin  30 mg Subcutaneous Daily    lubiprostone  24 mcg Oral BID WC    senna  2 tablet Oral Nightly    insulin lispro  0-6 Units Subcutaneous 2 times per day    lidocaine  5 mL Intradermal Once    amiodarone  200 mg Oral Daily    atorvastatin  40 mg Oral Nightly    busPIRone  10 mg Oral TID    clopidogrel  75 mg Oral Daily    [Held by provider] doxepin  10 mg Oral Nightly    [Held by provider] metoprolol tartrate  12.5 mg Oral BID    pantoprazole  40 mg Oral Daily    polyethylene glycol  17 g Oral Daily    pramipexole  1 mg Oral BID    sertraline  25 mg Oral Nightly    [Held by provider] lisinopril  5 mg Oral Daily    [Held by provider] spironolactone  25 mg Oral Daily    vitamin D3  400 Units Oral Daily    sodium chloride flush  10 mL Intravenous 2 times per day    miconazole   Topical BID     Continuous Infusions:   dextrose             CBC:   Recent Labs     01/26/21  0500 01/27/21  0425 01/28/21  0428   WBC 4.8 5.1 4.7   HGB 7.9* 8.1* 8.0*    298 313     BMP:    Recent Labs     01/27/21  0945 01/28/21  0428    147*   K 3.6 3.6   CL 89* 98*   CO2 38* 41*   BUN 11 12   CREATININE 1.1 1.7*   GLUCOSE 88 115*       Renal Labs  Albumin:    Lab Results   Component Value Date    LABALBU 3.5 01/25/2021     Calcium:    Lab Results   Component Value Date    CALCIUM 8.4 01/28/2021     Phosphorus:    Lab Results   Component Value Date    PHOS 2.1 01/24/2021     U/A:    Lab Results   Component Value Date    NITRU NEGATIVE 01/13/2021    COLORU YELLOW 01/13/2021    WBCUA 2979 01/13/2021    RBCUA 483 01/13/2021    MUCUS RARE 12/09/2020    TRICHOMONAS NONE SEEN 01/13/2021    YEAST OCCASIONAL 12/09/2020    BACTERIA MANY 01/13/2021    CLARITYU CLOUDY 01/13/2021    SPECGRAV 1.018 01/13/2021    UROBILINOGEN NORMAL 01/13/2021    BILIRUBINUR NEGATIVE 01/13/2021    BLOODU LARGE 01/13/2021    KETUA NEGATIVE 01/13/2021           Objective:   I/O: 01/27 0701 - 01/28 0700  In: 30 [I.V.:30]  Out: 525 [Urine:525]  Vitals: BP (!) 141/47   Pulse 58   Temp 98.1 °F (36.7 °C) (Axillary)   Resp 18   Ht 5' 7.99\" (1.727 m)   Wt (!) 2 lb 3.3 oz (1 kg) Comment: bed zeroed  SpO2 95%   BMI 0.34 kg/m²   General appearance: awake weak  HEENT: Head: Normal, normocephalic, atraumatic. Neck: supple, symmetrical, trachea midline  Lungs: diminished breath sounds bilaterally  Heart: S1, S2 normal  Abdomen: abnormal findings:  soft nt obese  Extremities: edema + thick ;egs  Neurologic: Mental status: alertness: alert        Assessment and Plan:      Impression :      1. JOSE (ATN vs. Pre-renal azotemia)  2. Acute respiratory failure  3  DM2   4. CHF   5. Anemia   6. Hypocalcemia   7. Hypotension   8. UTI      Plan     1 pt overall 31 L negative if accurate and creat vary 1-1.8 and best to manage volume and then watch renal function and this levelstable for now.  Can dc hernandez and monitor uop  2 if weight > 5 lb increase can restart diuretic as outpt  3 bmp and fu with me 1 week and can be virtual from ecf  4 o2 improving with O2  5 watch negative balance  6 hb low stable  7 ca stable  8 bp overall improved and stay off ace arb for now  9 no fever treated uti  Ok to ecf today  dw nurse Lisa Weiner MD

## 2021-01-28 NOTE — PROGRESS NOTES
Hayden Maryam discharged to [facility]. Transported via EMS on cart. All belongings noted with pt. Bedside report given to EMS. EMS & pt denied any questions or concerns.

## 2021-01-28 NOTE — PROGRESS NOTES
Progress Note( Dr. Melida Rebollar)    Subjective:   Admit Date: 1/13/2021  PCP: THEODORE Jain - NP    Admitted For :Severe shortness of breath    Consulted For: Better control of blood glucose    Interval History: Patient is somewhat better   Now BiPAP is used as needed  Patient was off ICU and transferred to medical floor    Denies any chest pains,   Yes SOB . On BiPAP now as needed as needed  Denies nausea or vomiting. No new bowel or bladder symptoms.        Intake/Output Summary (Last 24 hours) at 1/27/2021 2357  Last data filed at 1/27/2021 1759  Gross per 24 hour   Intake 330 ml   Output 525 ml   Net -195 ml       DATA    CBC:   Recent Labs     01/25/21  0625 01/26/21  0500 01/27/21  0425   WBC 5.1 4.8 5.1   HGB 7.9* 7.9* 8.1*    257 298    CMP:  Recent Labs     01/25/21  0625 01/27/21  0945    136   K 3.9 3.6   CL 94* 89*   CO2 42* 38*   BUN 15 11   CREATININE 1.1 1.1   CALCIUM 8.7 8.6   PROT 7.7  --    LABALBU 3.5  --    BILITOT 0.4  --    ALKPHOS 85  --    AST 12*  --    ALT <5*  --      Lipids:   Lab Results   Component Value Date    CHOL 151 01/14/2021    HDL 46 01/14/2021    TRIG 94 01/14/2021     Glucose:  Recent Labs     01/27/21  1126 01/27/21  1700 01/27/21  2156   POCGLU 97 99 93     UpircbsubcP3F:  Lab Results   Component Value Date    LABA1C 6.1 01/14/2021     High Sensitivity TSH:   Lab Results   Component Value Date    TSHHS 3.490 12/09/2020     Free T3: No results found for: FT3  Free T4:  Lab Results   Component Value Date    T4FREE 1.07 09/04/2018       Echocardiogram Limited    Result Date: 1/15/2021  Transthoracic Echocardiography Report (TTE)  Demographics   Patient Name       Shivani ADAMS Date of Study       01/14/2021   Date of Birth      1955         Gender              Female   Age                63 year(s)         Race                   Patient Number     [id number]         Room Number         2105   Visit Number       [id number]   Corporate ID [id number]   Accession Number   [id number]         [facility]   Ordering Physician Mariana Julio MD      Physician           Connor Coronado MD  Procedure Type of Study   TTE procedure:ECHOCARDIOGRAM LIMITED. Procedure Date Date: 01/14/2021 Start: 02:11 PM Study Location: Portable Technical Quality: Limited visualization due to body habitus. Indications:Congestive heart failure. Patient Status: Routine Height: 68 inches Weight: 378 pounds BSA: 2.68 m2 BMI: 57.47 kg/m2 HR: 62 bpm BP: 107/55 mmHg  Conclusions   Summary  This is a limited echocardiogram. Technically difficult due to body habitus  Left ventricular systolic function is normal.  Ejection fraction is visually estimated at 50-55%. Dilated right ventricle with pressure/volume overload. S/p TAVR. Mild tricuspid regurgitation is present. RVSP is 43 mmHg. Mild pericardial effusion. Inferior vena cava is dilated, measuring at 3.4 cm, and does not collapse  with respiration. Signature   ------------------------------------------------------------------  Electronically signed by Lawson Isbell MD  (Interpreting physician) on 01/15/2021 at 08:06 AM  Xr Pelvis (1-2 Views)       Xr Chest Portable    Result Date: 1/15/2021  EXAMINATION: ONE XRAY VIEW OF THE CHEST 1/15/2021 5:49 am COMPARISON: 01/13/2021 HISTORY: ORDERING SYSTEM PROVIDED HISTORY: Hypoxia      1. Stable cardiomegaly with pulmonary edema, bilateral pleural effusions, and associated bibasilar atelectasis. Xr Chest Portable    Result Date: 1/13/2021  EXAMINATION: ONE XRAY VIEW OF THE CHEST 1/13/2021 2:24 pm COMPARISON: Chest x-ray dated 12/09/2020 HISTORY: ORDERING SYSTEM PROVIDED HISTORY: sob       Interstitial/pulmonary edema. Cardiomegaly. Probable small bilateral pleural effusions.         Scheduled Medicines   Medications:  insulin lispro  0-6 Units Subcutaneous TID     metoclopramide  5 mg Intravenous Q6H    midodrine  10 mg Oral TID WC    enoxaparin  30 mg Subcutaneous Daily    lubiprostone  24 mcg Oral BID WC    senna  2 tablet Oral Nightly    insulin lispro  0-6 Units Subcutaneous 2 times per day    lidocaine  5 mL Intradermal Once    amiodarone  200 mg Oral Daily    atorvastatin  40 mg Oral Nightly    busPIRone  10 mg Oral TID    clopidogrel  75 mg Oral Daily    [Held by provider] doxepin  10 mg Oral Nightly    [Held by provider] metoprolol tartrate  12.5 mg Oral BID    pantoprazole  40 mg Oral Daily    polyethylene glycol  17 g Oral Daily    pramipexole  1 mg Oral BID    sertraline  25 mg Oral Nightly    [Held by provider] lisinopril  5 mg Oral Daily    [Held by provider] spironolactone  25 mg Oral Daily    vitamin D3  400 Units Oral Daily    sodium chloride flush  10 mL Intravenous 2 times per day    miconazole   Topical BID      Infusions:    dextrose           Objective:   Vitals: /62   Pulse 63   Temp 97.9 °F (36.6 °C) (Oral)   Resp 17   Ht 5' 7.99\" (1.727 m)   Wt (!) 2 lb 3.3 oz (1 kg) Comment: bed zeroed  SpO2 93%   BMI 0.34 kg/m²   General appearance: alert and cooperative with exam  Neck: no JVD or bruit  Thyroid : Normal lobes   Lungs: Has Vesicular Breath sounds has wheezing and rales bilaterally  Heart:  regular rate and rhythm  Abdomen: soft, non-tender; bowel sounds normal; no masses,  no organomegaly  Musculoskeletal: Normal  Extremities: extremities normal, , no edema  Neurologic:  Awake, alert, oriented to name, place and time. Cranial nerves II-XII are grossly intact. Motor is  intact.   Sensory neuropathy t.,  and gait is abnormal.  Unstable    Assessment:     Patient Active Problem List:     Morbid obesity with BMI of 70 and over, adult ([facility] [address]     Dyslipidemia     Fecal incontinence     Mild intermittent asthma without complication     S/P laparoscopic cholecystectomy Type 2 diabetes mellitus without complication, with long-term current use of insulin (HCC)     Fatty liver     Arthritis     H/O parotidectomy     Venous stasis dermatitis of both lower extremities     Aortic stenosis     Morbid obesity (HCC)     Asthma     Diabetes mellitus ([address]     Hypertension     Sleep apnea     Family history of coronary artery disease     Chest pain     SOB (shortness of breath)     Palpitations     Peripheral edema     Hypervolemia     Chronic respiratory failure with hypoxia and hypercapnia (HCC)     Acute exacerbation of chronic obstructive pulmonary disease (COPD) (HCC)     Chronic obstructive pulmonary disease (HCC)     Gait disturbance     Acute on chronic respiratory failure with hypoxia and hypercapnia ([facility])     Cellulitis     Skin ulcer of left foot with fat layer exposed ([address]     Pneumonia     VHD (valvular heart disease)     Class 3 severe obesity due to excess calories with body mass index (BMI) greater than or equal to 70 in adult ([facility])     SIRS (systemic inflammatory response syndrome) ([facility])     Acute kidney injury ([address]     Acute metabolic encephalopathy     Shock, unspecified ([address]     Uncontrolled diabetes mellitus ([address]     Sepsis ([address]     Altered mental status     Wide-complex tachycardia ([facility])     Chronic diastolic heart failure ([facility])     S/P TAVR (transcatheter aortic valve replacement)     Septic shock (HCC)     Acute kidney injury superimposed on CKD (HCC)     Bradycardia     Anxiety     Acute respiratory failure with hypoxia (HCC)     Acute congestive heart failure ([address]      Plan:     1. Reviewed POC blood glucose . Labs and X ray results   2. Reviewed Current Medicines   3. On meal/ Correction bolus Humalog/ Basal Lantus Insulin regime   4. Monitor Blood glucose frequently   5. Modified  the dose of Insulin/ other medicines as needed   6. Will follow     .      [place] Wen MD

## 2021-01-31 NOTE — PROGRESS NOTES
Progress Note( Dr. Gilmer Esqueda)    Subjective:   Admit Date: 1/13/2021  PCP: THEODORE Last - NP    Admitted For :Severe shortness of breath    Consulted For: Better control of blood glucose    Interval History: Patient is somewhat better   Now BiPAP is used as needed  Patient was off ICU and transferred to medical floor    Denies any chest pains,   Yes SOB . On BiPAP now as needed as needed  Denies nausea or vomiting. No new bowel or bladder symptoms.      No intake or output data in the 24 hours ending 01/30/21 1947    DATA    CBC:   Recent Labs     01/28/21 0428   WBC 4.7   HGB 8.0*       CMP:  Recent Labs     01/28/21 0428   *   K 3.6   CL 98*   CO2 41*   BUN 12   CREATININE 1.7*   CALCIUM 8.4     Lipids:   Lab Results   Component Value Date    CHOL 151 01/14/2021    HDL 46 01/14/2021    TRIG 94 01/14/2021     Glucose:  Recent Labs     01/27/21  2156 01/28/21  0231 01/28/21  0648   POCGLU 93 81 97     QwhxnsczsdP3C:  Lab Results   Component Value Date    LABA1C 6.1 01/14/2021     High Sensitivity TSH:   Lab Results   Component Value Date    TSHHS 3.490 12/09/2020     Free T3: No results found for: FT3  Free T4:  Lab Results   Component Value Date    T4FREE 1.07 09/04/2018       Echocardiogram Limited    Result Date: 1/15/2021  Transthoracic Echocardiography Report (TTE)  Demographics   Patient Name       Allen ADAMS Date of Study       01/14/2021   Date of Birth      1955         Gender              Female   Age                63 year(s)         Race                   Patient Number     [id number]         Room Number         2105   Visit Number       [id number]   Corporate ID       [id number]   Accession Number   [id number]         [address] [facility], [address] [place] [address]   Ordering Physician Amy De La Fuente   Interpreting        Deborah Valadez MD      Physician           Dov Gant bruit  Thyroid : Normal lobes   Lungs: Has Vesicular Breath sounds has wheezing and rales bilaterally  Heart:  regular rate and rhythm  Abdomen: soft, non-tender; bowel sounds normal; no masses,  no organomegaly  Musculoskeletal: Normal  Extremities: extremities normal, , no edema  Neurologic:  Awake, alert, oriented to name, place and time. Cranial nerves II-XII are grossly intact. Motor is  intact.   Sensory neuropathy t.,  and gait is abnormal.  Unstable    Assessment:     Patient Active Problem List:     Morbid obesity with BMI of 70 and over, adult ([address]     Dyslipidemia     Fecal incontinence     Mild intermittent asthma without complication     S/P laparoscopic cholecystectomy     Type 2 diabetes mellitus without complication, with long-term current use of insulin ([facility])     Fatty liver     Arthritis     H/O parotidectomy     Venous stasis dermatitis of both lower extremities     Aortic stenosis     Morbid obesity (HCC)     Asthma     Diabetes mellitus ([address]     Hypertension     Sleep apnea     Family history of coronary artery disease     Chest pain     SOB (shortness of breath)     Palpitations     Peripheral edema     Hypervolemia     Chronic respiratory failure with hypoxia and hypercapnia ([facility])     Acute exacerbation of chronic obstructive pulmonary disease (COPD) ([facility])     Chronic obstructive pulmonary disease ([facility])     Gait disturbance     Acute on chronic respiratory failure with hypoxia and hypercapnia ([facility])     Cellulitis     Skin ulcer of left foot with fat layer exposed ([address]     Pneumonia     VHD (valvular heart disease)     Class 3 severe obesity due to excess calories with body mass index (BMI) greater than or equal to 70 in adult (HCC)     SIRS (systemic inflammatory response syndrome) ([facility])     Acute kidney injury ([address]     Acute metabolic encephalopathy     Shock, unspecified ([address]     Uncontrolled diabetes mellitus ([address]     Sepsis ([address]     Altered mental status     Wide-complex tachycardia (HCC)     Chronic diastolic heart failure (HCC)     S/P TAVR (transcatheter aortic valve replacement)     Septic shock (HCC)     Acute kidney injury superimposed on CKD (HCC)     Bradycardia     Anxiety     Acute respiratory failure with hypoxia (HCC)     Acute congestive heart failure ([facility] [address]      Plan:     1. Reviewed POC blood glucose . Labs and X ray results   2. Reviewed Current Medicines   3. On meal/ Correction bolus Humalog/  D. C  Basal Lantus Insulin   4. Monitor Blood glucose frequently   5. Modified  the dose of Insulin/ other medicines as needed   6. Will follow     .      Brooke Goins MD

## 2021-02-21 PROBLEM — J96.21 ACUTE ON CHRONIC RESPIRATORY FAILURE WITH HYPOXIA (HCC): Status: ACTIVE | Noted: 2021-01-01

## 2021-02-21 NOTE — H&P
History and Physical      Name:  Joanne Mayfield /Age/Sex: [id number]  (66 y.o. female)   MRN & CSN:  [id number] & [id number] Admission Date/Time: 2021 11:44 AM   Location:  [facility]/01TR-01 PCP: Keith Adame Day: 1        Admitting Physician: Dr. Sofia Rivas and Plan:   Joanne Mayfield is a 63 y.o. female who presents with  Shortness of Breath     Acute on chronic respiratory failure with hypoxia likely multifactorial 2/2 COPD body habitus/hypervolemia. Chest x-ray- pulmonary vascular congestion. Current treatment for respiratory infection with doxycycline at [facility]              Admit to Med/Surg              Pending screening labs-procalcitonin/inflammatory markers   Continue BiPAP wean as tolerated              Pending cultures              Bronchodilators               Pulmonary hygiene   Follows with Dr. Hay Mccurdy   Pending cultures recent UTI with Citrobacter (2021) and E. coli (2020)   Bilateral venous ultrasound rule out DVT. Patient was not able to fit in CT scan to evaluate for PE                  HFpEF. appears somewhat clinically hypervolemic. BNP 19,253. Troponin negative CXR: Nonacute. Last TTE EF 50 to 55%, dilated right ventricle, mild TR (2021)   IV Lasix   Telemetry monitoring troponin trend   Daily weights/strict I&Os/Low sodium diet- 1800 ml fluid restriction   Consult cardiology pending [facility]     Obesity- Body mass index is 66.18 kg/m². Lifestyle modifications needed     Hyperlipidemia - on Lipitor.      Restless legs syndrome - on Mirapex.  Anemia of chronic disease-H&H 10.9/37.3 appears stable. On iron supplementation     Patient case discussed with ED provider    Diet  low-sodium   DVT Prophylaxis [x] Lovenox, []  Heparin, [] SCDs, [] Ambulation  [] Long term AC   GI Prophylaxis [x] PPI,  [] H2 Blocker,  [] Carafate,  [] Diet/Tube Feeds   Code Status Full    Disposition Admit to inpatient. Patient plans to return to [facility] upon discharge   MDM [] Low, [] Moderate,[x]  High     -Patient assessment and plan discussed and reviewed with admitting physician: Matthias Tirado MD.       History of Present Illness:     Chief Complaint: Shortness of Breath (lost power at [facility], on CPAP, desat'ed during poweroutage)      Angelique Pablo is a 63 y.o. female who presents from [facility] ([facility] with concern for progressively worsening shortness of breath. The patient is somnolent but easily awakened by touch. Reports feeling poor over the past 2 days. States 3 weeks of shortness of breath did not return to baseline since previous admission to this facility. Apparently there was also a power outage to her BiPAP sometime today/overnight. Ten point ROS: reviewed negative, unless as noted in above HPI. Objective:   No intake or output data in the 24 hours ending 02/21/21 1617     Vitals:   Vitals:    02/21/21 1241 02/21/21 1257 02/21/21 1300 02/21/21 1309   BP: 92/78   136/87   Pulse: 77 76     Resp: 19 17     Temp:       SpO2:   100%    Weight:       Height:           Physical Exam: 02/21/21     GEN -slight chronically ill appearing female, sitting upright in bed , distressed appearing. Morbidly obese body habitus. Appears given age. EYES -PERRLA. No scleral erythema, discharge, or conjunctivitis. HENT -MM are moist. Oral pharynx without exudates, no evidence of thrush. NECK -Supple, no apparent thyromegaly or masses. RESP -diminished but clear. Symmetric chest movement while on BiPAP. C/V -S1/S2 auscultated. RRR without appreciable M/R/G. No JVD or carotid bruits. Peripheral pulses equal bilaterally and palpable. Cap refill <3 sec. +1 peripheral edema. GI -Abdomen is obese, soft, non distended, and without significant TTP. + BS. No masses or guarding. Rectal exam deferred. No HSM   -No CVA/ flank tenderness. Mata catheter is not present.   LYMPH-No palpable cervical lymphadenopathy and no hepatosplenomegaly. No petechiae or ecchymoses. MS -No gross joint deformities. SKIN -Normal coloration, warm, dry. NEURO-Cranial nerves appear grossly intact, normal speech, no lateralizing weakness. PSYC-asleep, alert, oriented x 2- person, place, time, situation, somewhat affect. Past Medical History:      Past Medical History:   Diagnosis Date    Acute kidney injury ([facility] [address] 2019    Anxiety 2020    Aortic stenosis     Asthma     Bacteremia due to group B Streptococcus     Treated at 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 - [facility] at [facility] H/O aortic valve stenosis     H/O echocardiogram 2016    EF 55%, Aortic valve area is 0.84 cm2 with a mean gradient of 36 suggestive of severe aortic stenosis, mild to mos LVH    Headache     Heart murmur     History of nuclear stress test 2016    lexiscan-normal,EF70%    Morbid obesity ([facility])     BMI=73.72 kg/m2    Neuropathy     NSTEMI (non-ST elevated myocardial infarction) ([facility] [address] 2018    Obesity     Osteoarthritis     Palpitations     Peripheral edema     Restless legs syndrome     Sleep apnea     Has a CPAP    Sleep apnea     SOB (shortness of breath)        Past Surgical  History:    has a past surgical history that includes Cholecystectomy;  section; Hysterectomy; Tubal ligation; Neck surgery; Gallbladder surgery; Aortic valve replacement (2017); IR NONTUNNELED VASCULAR CATHETER > 5 YEARS (2020); and IR NONTUNNELED VASCULAR CATHETER > 5 YEARS (1/15/2021).     Social History:    FAM HX: Reviewed  family history includes Anxiety Disorder in her daughter; Coronary Art Dis in her mother; Depression in her brother, daughter, mother, niece, and sister; Diabetes in her mother; Heart Attack in her mother; Heart Disease in her brother, father, and mother; Heart Failure in her brother, father, and mother; High Blood Pressure in her brother, father, and mother; Hypertension in her mother; Obesity in her brother, father, and mother.     Soc HX:   Social History     Socioeconomic History    Marital status:      Spouse name: Not on file    Number of children: Not on file    Years of education: Not on file    Highest education level: Not on file   Occupational History    Not on file   Social Needs    Financial resource strain: Not on file    Food insecurity     Worry: Not on file     Inability: Not on file    Transportation needs     Medical: Not on file     Non-medical: Not on file   Tobacco Use    Smoking status: Former Smoker     Types: Cigarettes     Quit date: 3/19/2003     Years since quittin.9    Smokeless tobacco: Never Used    Tobacco comment: quit 15 years ago   Substance and Sexual Activity    Alcohol use: No    Drug use: No    Sexual activity: Never   Lifestyle    Physical activity     Days per week: Not on file     Minutes per session: Not on file    Stress: Not on file   Relationships    Social connections     Talks on phone: Not on file     Gets together: Not on file     Attends Hindu service: Not on file     Active member of club or organization: Not on file     Attends meetings of clubs or organizations: Not on file     Relationship status: Not on file    Intimate partner violence     Fear of current or ex partner: Not on file     Emotionally abused: Not on file     Physically abused: Not on file     Forced sexual activity: Not on file   Other Topics Concern    Not on file   Social History Narrative    ** Merged History Encounter **          TOBACCO:   reports that she quit smoking about 17 years ago. Her smoking use included cigarettes. She has never used smokeless tobacco.  ETOH:   reports no history of alcohol use. Drugs:  reports no history of drug use. Allergies: No Known Allergies    Home Medications:     Prior to Admission medications    Medication Sig Start Date End Date Taking? Authorizing Provider   busPIRone (BUSPAR) 10 MG tablet Take 1 tablet by mouth 3 times daily 1/27/21   Daren Tapia MD   midodrine (PROAMATINE) 10 MG tablet Take 1 tablet by mouth 3 times daily (with meals) HOLD FOR SBP> 105 1/27/21   Daren Tapia MD   pantoprazole (PROTONIX) 40 MG tablet Take 1 tablet by mouth daily 1/28/21   Daren Tapia MD   vitamin D3 (CHOLECALCIFEROL) 10 MCG (400 UNIT) TABS tablet Take 1 tablet by mouth daily 1/27/21   Daren Tapia MD   pramipexole (MIRAPEX) 1 MG tablet Take 1 tablet by mouth 2 times daily 9/8/20   Henrry Vazquez MD   ipratropium-albuterol (DUONEB) 0.5-2.5 (3) MG/3ML SOLN nebulizer solution Inhale 3 mLs into the lungs every 6 hours as needed for Shortness of Breath 7/6/20   Kaykay Calderon MD   atorvastatin (LIPITOR) 80 MG tablet Take 0.5 tablets by mouth nightly 7/6/20   Kaykay Calderon MD   miconazole (MICOTIN) 2 % cream Apply topically 2 times daily.  7/6/20   Kaykay Calderon MD   Polyethylene Glycol 3350 (MIRALAX PO) Take by mouth as needed    Historical Provider, MD   amiodarone (CORDARONE) 200 MG tablet Take 1 tablet by mouth daily  Patient taking differently: Take 200 mg by mouth 2 times daily  11/4/19   Matthias Tirado MD   clopidogrel (PLAVIX) 75 MG tablet Take 1 tablet by mouth daily Patient has appointment on 3/27/18 more refills with be given after she keeps her office visit 8/20/18   Joanne Warner MD   BiPAP Machine MISC by BiPAP route nightly    Historical Provider, MD   metFORMIN (GLUCOPHAGE) 500 MG tablet Take 1 tablet by mouth 2 times daily (with meals) 2/16/18   MONI Domingo MD   Nebulizers (AIRIAL COMPACT MINI NEBULIZER) MISC 1 Device by Does not apply route 4 times daily 2/16/18   MONI Muller MD   albuterol sulfate  (90 Base) MCG/ACT inhaler Inhale 2 puffs into the lungs    Historical Provider, MD         Medications:   Medications:    furosemide  40 mg Intravenous Once      Infusions:   PRN Meds:      Data:     Laboratory this visit:  Reviewed  Recent Labs     02/21/21  1200   WBC 7.1   HGB 10.9*   HCT 37.3         Recent Labs     02/21/21  1200      K 3.7   CL 95*   CO2 33*   BUN 11   CREATININE 1.1     Recent Labs     02/21/21  1200   AST 9*   ALT <5*   BILITOT 0.9   ALKPHOS 74     Recent Labs     02/21/21  1200   INR 1.37         Radiology this visit:  Reviewed. Xr Chest Portable    Result Date: 2/21/2021  EXAMINATION: ONE XRAY VIEW OF THE CHEST 2/21/2021 12:23 pm COMPARISON: 01/22/2021. HISTORY: ORDERING SYSTEM PROVIDED HISTORY: Short of breath TECHNOLOGIST PROVIDED HISTORY: Reason for exam:->Short of breath Reason for Exam: sob FINDINGS: The heart size is enlarged. The pulmonary vasculature is congested. No acute infiltrates are seen. No pneumothoraces are noted. Note is made of a TAVR. 1. Cardiomegaly with vascular congestion. Xr Chest Portable    Result Date: 1/22/2021  EXAMINATION: ONE XRAY VIEW OF THE CHEST 1/22/2021 4:23 pm COMPARISON: 01/20/2021 HISTORY: ORDERING SYSTEM PROVIDED HISTORY: f/u on pulm edema TECHNOLOGIST PROVIDED HISTORY: Reason for exam:->f/u on pulm edema Reason for Exam: f/u on pulm edema Acuity: Acute Type of Exam: Initial Additional signs and symptoms: na Relevant Medical/Surgical History: chf FINDINGS: Cardiomediastinal silhouette is stable. Similar position of devices. Slightly improved bilateral pulmonary edema. Probable trace bilateral pleural effusions. No pneumothorax. Slightly improved bilateral pulmonary edema.          EKG this visit:  Reviewed       Electronically signed by THEODORE Suarez CNP on 2/21/2021 at 4:17 PM

## 2021-02-21 NOTE — ED NOTES
1511-PT DID NOT FIT THROUGH CT TUBE,ATTEMPTED AT 1500, spoke to [employer] and to ordering physician Rubi Dickinson. Pt is being admitted.   Pt was taken back to trauma room 1 and called respiratory and RN to inform them that pt was still on non-rebreather and needed to be put back on Bi-pap      1329-READY,GFR 50, 20G IV WRIST

## 2021-02-21 NOTE — PROGRESS NOTES
Myself and Deneen Garcia, performed a two person skin assessment. All skin folds have MASD, BLE with vascular arevalo, R lower leg with reddened area, buttocks with scarring.

## 2021-02-21 NOTE — ED PROVIDER NOTES
EMERGENCY DEPARTMENT ENCOUNTER      PCP: [address]    Chief Complaint   Patient presents with    Shortness of Breath     lost power at [facility], on CPAP, desat'ed during poweroutage       Of note, this patient was also evaluated by the attending physician, Dr. Diane Jones. HPI    Angelica Fox is a 63 y.o. female who presents with shortness of breath. Onset 3 weeks. Context is patient notes progressively worsening shortness of breath for 3 weeks. She denies chest pain or palpitations. She states she has history of CHF and COPD and uses CPAP at skilled nursing [facility], [facility]. Recently power outage caused her CPAP to not operate for undetermined amount of time and she notes worsening of her symptoms. She does note after prior was restored her symptoms improved.         REVIEW OF SYSTEMS    Constitutional:  Denies fever, chills, weight loss or weakness   HENT:  Denies sore throat or ear pain   Cardiovascular:  Denies chest pain, palpitations   Respiratory:  Denies cough or shortness of breath    GI:  Denies abdominal pain, nausea, vomiting, or diarrhea  :  Denies any urinary symptoms   Musculoskeletal:  Denies back pain  Skin:  Denies rash  Neurologic:  Denies headache, focal weakness or sensory changes   Endocrine:  Denies polyuria or polydypsia   Lymphatic:  Denies swollen glands     All other review of systems are negative  See HPI and nursing notes for additional information     PAST MEDICAL AND SURGICAL HISTORY    Past Medical History:   Diagnosis Date    Acute kidney injury ([address] 8/22/2019    Anxiety 9/1/2020    Aortic stenosis     Asthma     Bacteremia due to group B Streptococcus     Treated at [facility] in 2017 - felt to be due to cellulitis    Cancer [facility])     Cervical    Carotid artery stenosis     Chest pain     CHF (congestive heart failure) (HCC)     Chronic back pain     COPD exacerbation ([address]     Depression     Diabetes mellitus ([address]     Family of Breath 360 mL 0    atorvastatin (LIPITOR) 80 MG tablet Take 0.5 tablets by mouth nightly 30 tablet 3    miconazole (MICOTIN) 2 % cream Apply topically 2 times daily.  3 Tube 2    Polyethylene Glycol 3350 (MIRALAX PO) Take by mouth as needed      amiodarone (CORDARONE) 200 MG tablet Take 1 tablet by mouth daily (Patient taking differently: Take 200 mg by mouth 2 times daily ) 30 tablet 3    clopidogrel (PLAVIX) 75 MG tablet Take 1 tablet by mouth daily Patient has appointment on 3/27/18 more refills with be given after she keeps her office visit 90 tablet 3    BiPAP Machine MISC by BiPAP route nightly      metFORMIN (GLUCOPHAGE) 500 MG tablet Take 1 tablet by mouth 2 times daily (with meals) 60 tablet 0    Nebulizers ([employer] COMPACT MINI NEBULIZER) MISC 1 Device by Does not apply route 4 times daily 1 each 0    albuterol sulfate  (90 Base) MCG/ACT inhaler Inhale 2 puffs into the lungs         ALLERGIES    No Known Allergies    SOCIAL AND FAMILY HISTORY    Social History     Socioeconomic History    Marital status:      Spouse name: Not on file    Number of children: Not on file    Years of education: Not on file    Highest education level: Not on file   Occupational History    Not on file   Social Needs    Financial resource strain: Not on file    Food insecurity     Worry: Not on file     Inability: Not on file    Transportation needs     Medical: Not on file     Non-medical: Not on file   Tobacco Use    Smoking status: Former Smoker     Types: Cigarettes     Quit date: 3/19/2003     Years since quittin.9    Smokeless tobacco: Never Used    Tobacco comment: quit 15 years ago   Substance and Sexual Activity    Alcohol use: No    Drug use: No    Sexual activity: Never   Lifestyle    Physical activity     Days per week: Not on file     Minutes per session: Not on file    Stress: Not on file   Relationships    Social connections     Talks on phone: Not on file     Gets together: Not on file     Attends [employer] service: Not on file     Active member of club or organization: Not on file     Attends meetings of clubs or organizations: Not on file     Relationship status: Not on file    Intimate partner violence     Fear of current or ex partner: Not on file     Emotionally abused: Not on file     Physically abused: Not on file     Forced sexual activity: Not on file   Other Topics Concern    Not on file   Social History Narrative    ** Merged History Encounter **          Family History   Problem Relation Age of Onset    Heart Failure Mother     Heart Attack Mother     Hypertension Mother     Coronary Art Dis Mother         Had PTCA w/stenting.  Heart Disease Mother     High Blood Pressure Mother     Obesity Mother     Diabetes Mother     Depression Mother     Heart Failure Father     Heart Disease Father     High Blood Pressure Father     Obesity Father     Heart Failure Brother     Heart Disease Brother     High Blood Pressure Brother     Obesity Brother     Depression Brother     Depression Sister     Depression Daughter     Anxiety Disorder Daughter     Depression Niece         suicide attempt         PHYSICAL EXAM    VITAL SIGNS: /87   Pulse 76   Temp 98.3 °F (36.8 °C)   Resp 17   Ht 5' 6\" (1.676 m)   Wt (!) 410 lb (186 kg)   SpO2 100%   BMI 66.18 kg/m²    Constitutional:  Morbid obesity. No distress  HENT:  Normocephalic, Atraumatic, PERRL. EOMI. Sclera clear. Conjunctiva normal, No discharge. Neck/Lymphatics: supple, no JVD, no swollen nodes  Cardiovascular:   RRR,  no murmurs/rubs/gallops. No JVD  No carotid bruits or murmurs heard in carotids. Respiratory: Patient with moderately labored breathing, accessory muscle use. Patient has nonrebreather mask present at 15L/min. Patient speaks 5-7 word sentences. Lung sounds with moderate diminished airflow throughout, equatorial wheezes heard throughout.   No focal rhonchi or rales heard.  Abdomen: Exam somewhat limited due to patient's obese body habitus-soft, No tenderness, no masses. Musculoskeletal:    There is mild edema bilateral LE. No asymmetry, or calf / thigh tenderness bilaterally. No cyanosis. No cool or pale-appearing limb. Distal cap refill and pulses intact bilateral upper and lower extremities  Bilateral upper and lower extremity ROM intact without pain or obvious deficit  Integument:  Warm, Dry  Neurologic: Alert & oriented , No focal deficits noted. Cranial nerves II through XII grossly intact. Normal gross motor coordination & motor strength bilateral upper and lower extremities  Sensation intact.   Psychiatric:  Affect normal, Mood normal.       Labs:  Results for orders placed or performed during the hospital encounter of 02/21/21   COVID-19, Rapid    Specimen: Nasopharyngeal   Result Value Ref Range    Source THROAT     SARS-CoV-2, NAAT NOT DETECTED    CBC Auto Differential   Result Value Ref Range    WBC 7.1 4.0 - 10.5 K/CU MM    RBC 4.36 4.2 - 5.4 M/CU MM    Hemoglobin 10.9 (L) 12.5 - 16.0 GM/DL    Hematocrit 37.3 37 - 47 %    MCV 85.6 78 - 100 FL    MCH 25.0 (L) 27 - 31 PG    MCHC 29.2 (L) 32.0 - 36.0 %    RDW 19.3 (H) 11.7 - 14.9 %    Platelets 313 307 - 652 K/CU MM    MPV 9.6 7.5 - 11.1 FL    Differential Type AUTOMATED DIFFERENTIAL     Segs Relative 72.1 (H) 36 - 66 %    Lymphocytes % 15.7 (L) 24 - 44 %    Monocytes % 9.8 (H) 0 - 4 %    Eosinophils % 1.0 0 - 3 %    Basophils % 0.7 0 - 1 %    Segs Absolute 5.1 K/CU MM    Lymphocytes Absolute 1.1 K/CU MM    Monocytes Absolute 0.7 K/CU MM    Eosinophils Absolute 0.1 K/CU MM    Basophils Absolute 0.1 K/CU MM    Nucleated RBC % 0.0 %    Total Nucleated RBC 0.0 K/CU MM    Total Immature Neutrophil 0.05 K/CU MM    Immature Neutrophil % 0.7 (H) 0 - 0.43 %   Comprehensive Metabolic Panel w/ Reflex to MG   Result Value Ref Range    Sodium 136 135 - 145 MMOL/L    Potassium 3.7 3.5 - 5.1 MMOL/L    Chloride 95 (L) 99 - 110 mMol/L    CO2 33 (H) 21 - 32 MMOL/L    BUN 11 6 - 23 MG/DL    CREATININE 1.1 0.6 - 1.1 MG/DL    Glucose 94 70 - 99 MG/DL    Calcium 8.9 8.3 - 10.6 MG/DL    Albumin 3.5 3.4 - 5.0 GM/DL    Total Protein 8.4 (H) 6.4 - 8.2 GM/DL    Total Bilirubin 0.9 0.0 - 1.0 MG/DL    ALT <5 (L) 10 - 40 U/L    AST 9 (L) 15 - 37 IU/L    Alkaline Phosphatase 74 40 - 129 IU/L    GFR Non- 50 (L) >60 mL/min/1.73m2    GFR African American >60 >60 mL/min/1.73m2    Anion Gap 8 4 - 16   Troponin   Result Value Ref Range    Troponin T <0.010 <0.01 NG/ML   Brain Natriuretic Peptide   Result Value Ref Range    Pro-BNP 19,253 (H) <300 PG/ML   Protime-INR   Result Value Ref Range    Protime 16.6 (H) 11.7 - 14.5 SECONDS    INR 1.37 INDEX   APTT   Result Value Ref Range    aPTT 35.7 25.1 - 37.1 SECONDS   Lactic Acid, Plasma   Result Value Ref Range    Lactate 1.2 0.4 - 2.0 mMOL/L   D-Dimer, Quantitative   Result Value Ref Range    D-Dimer, Quant 633 (H) <230 NG/mL(DDU)   EKG 12 Lead   Result Value Ref Range    Ventricular Rate 83 BPM    Atrial Rate 83 BPM    P-R Interval 226 ms    QRS Duration 108 ms    Q-T Interval 386 ms    QTc Calculation (Bazett) 453 ms    P Axis 69 degrees    R Axis 99 degrees    T Axis 70 degrees    Diagnosis       Sinus rhythm with 1st degree AV block  Rightward axis  Anterior infarct , age undetermined  Abnormal ECG  When compared with ECG of 25-JAN-2021 10:25,  Questionable change in QRS duration  Anterior infarct is now present             EKG Interpretation  Please see ED physician's note for EKG interpretation - Dr. Michelle Horton   Final Result   1. Cardiomegaly with vascular congestion.                    ED COURSE & MEDICAL DECISION MAKING         -----------------------------------------------------------      CRITICAL CARE NOTE:  There was a high probability of clinically significant life-threatening deterioration of the patient's condition requiring my urgent intervention due to tachypnea, hypoxia. Patient presents with progressively worsening shortness of breath. Per skilled nursing facility nurse, Sarah Law, patient has been noted to have progressively worsening shortness of breath for at least 1 week. She uses baseline nasal cannula oxygen at 4 L/min for 24 hours/day. She notes that patient has been desatting to 78% oxygen level at which time CPAP has been routinely used. Patient also has left lower lung infiltrate for which she is taking doxycycline twice daily. I immediately initiated BiPAP, breathing treatments, cardiac evaluation. Patient tolerates BiPAP well and responded well to BiPAP, pulse oximetry normalized to mid 90s and serial rechecks reveal patient notes subjective improvement. Additionally, patient's BNP elevated for which patient given IV diuretic. Assement of the patient, including exam, was done simultaneously during critical care actions. Total critical care time is at least  35 minutes. This includes vital sign monitoring, pulse oximetry monitoring, telemetry monitoring, clinical response to the IV medications, reviewing the nursing notes, consultation time, dictation/documentation time, and interpretation of the lab work. This time excludes family discussion time and time spent performing separately billable procedure(s) (see below for more details)    If applicable, Any/all of the below separately billable procedures that were performed during this critical care intervention are documented separately - please see the appropriate note for details:        -ECG interpretation        ----------------------------------------------------------               Patient does have history of CHF and BNP is elevated today. Patient given diuretic intravenously. In addition to the above mentioned interventions, patient's D-dimer elevated today at 633.   I initially ordered CTA chest, however patient's weight and body habitus does not allow her to fit in the CT as I was notified by CT. Plan to discuss this with hospitalist, consider empiric anticoagulation. 1615 -I discussed patient case with hospitalist, NP Anali Bocanegra. He agrees to admit patient. He will evaluate patient at bedside and determine if VQ scan or empiric anticoagulation is warranted. Clinical  IMPRESSION    1. Dyspnea, unspecified type    2. Hypoxia    3. History of heart failure    4. Elevated d-dimer          Pt admitted. Comment: Please note this report has been produced using speech recognition software and may contain errors related to that system including errors in grammar, punctuation, and spelling, as well as words and phrases that may be inappropriate. If there are any questions or concerns please feel free to contact the dictating provider for clarification.          Jose Luis Blanc  02/21/21 6525

## 2021-02-21 NOTE — CARE COORDINATION
CM review of pt chart for readmission risk, last admission 1/13-28/21 Septic shock,  CHF. Pt discharged to via bariatric transport to [facility] for rehab. On 12/28/21.    11:44 Pt returns to ER today via EMS from [address] [place] [address], lost power at [facility], pt desat-ed during power outage while not on CPAP. Pt arrived at ER respiratory placed pt on BIPAP. Hist CHF. Triage report, worsening sob x 3 weeks. Pt has [employer] insurance, PCP,Toi Shelton. Pt spouse Beverly Loco [phone]. DME: walker, electric w/yudy. In December 2020 pt was living at home with . Pt had been to [facility] in the past for rehab. Prior to that pt may have been LTC at [facility] in June 2020. Elida Dumont ER provider, pt on BIPAP at this time. BNP 19,253, dimer 633. No alternative to admission, due to acute on chronic respiratory failure.  BAM,RN/CM

## 2021-02-21 NOTE — ED PROVIDER NOTES
I independently examined and evaluated Lelia Mohan. In brief, 60-year-old female with a history of chronic respiratory failure presents the emergency department for hypoxia. She does use CPAP. Poorly there was a recent power outage at her facility without her CPAP for time. She reported his episodes of hypoxia so sent to the emergency department for evaluation. She did have notable saturations in the emergency department and had to be placed on BiPAP. Delsa Severance Focused exam revealed patient was on BiPAP + SR, abdomen soft nonperitoneal, pelvis is stable, moving extremities, she is tolerating BiPAP well at this time. .    ED course: 60-year-old female presents from her facility with reported hypoxia. She does have chronic respiratory failure and does wear CPAP. She was placed on BiPAP in the emergency department given her work of breathing. Labs and imaging obtained. Patient was seen with ESPINOZA who did speak with the facility. Given that she is now requiring BiPAP, she will be admitted for further evaluation and management. She is tolerating BiPAP well at this time. EKG is interpreted by me. EKG shows sinus rhythm at 83 beats per minutes, axis is nondeviated, no remarkable ST segment elevations, there are some flattened T waves in precordial leads, SC interval of 226, QRS duration 108, QTC of 153. Final impression, nonspecific EKG, prolonged SC. Total critical care time today provided was 15 minutes. This excludes seperately billable procedure. Critical care time provided for respiratory failure that required close evaluation and/or intervention with concern for patient decompensation. All diagnostic, treatment, and disposition decisions were made by myself in conjunction with the advanced practice provider. For all further details of the patient's emergency department visit, please see the advanced practice provider's documentation.     Comment: Please note this report has been produced using speech recognition software and may contain errors related to that system including errors in grammar, punctuation, and spelling, as well as words and phrases that may be inappropriate. If there are any questions or concerns please feel free to contact the dictating provider for clarification.                      Erick Nowak MD  02/25/21 8188

## 2021-02-22 NOTE — PROGRESS NOTES
Hospitalist Progress Note      Name:  Elie Green /Age/Sex: 1955  (63 y.o. female)   MRN & CSN:  [id number] & [id number] Admission Date/Time: 2021 11:44 AM   Location:  -A PCP: Princess Pacheco is a 63 y.o.  female  who presents with Shortness of Breath (lost power at [facility], on CPAP, desat'ed during poweroutage)      Assessment and Plan:   Acute on chronic respiratory failure with hypoxia likely multifactorial 2/2 COPD body habitus/hypervolemia  - intubated  night as she was found unresponsive on med-surg floor and code blue called  - IV Zithromax x 5 days  - IV steroids q8hrs  - sedation per protocol on vent  - pulm consulted  - b/l venous duplex: neg DVT. Patient was not able to fit in CT scan to evaluate for PE  - check v/q scan  - CT head pending    Shock  - levo gtt  - midodrine 10 TID                 Acute on Chronic Diastolic CHF  - d/c IVF  - echo: ef 50-55%   - IV lasix 40 BID  - strict I and O  - cardio consulted    A. Fib  - heparin gtt  - amio gtt  - replace MG and K  - cardio consulted             Obesity- Body mass index is 66.18 kg/m². Lifestyle modifications needed  Hyperlipidemia - on Lipitor. Restless legs syndrome - on Mirapex.   Anemia of chronic disease  On iron supplementation                 Diet DIET LOW SODIUM 2 GM;   Code Status DNR-CCA     Medications:   Medications:    levetiracetam  1,000 mg Intravenous Q12H    amiodarone  200 mg Oral BID    atorvastatin  40 mg Oral Nightly    busPIRone  10 mg Oral TID    clopidogrel  75 mg Oral Daily    midodrine  10 mg Oral TID WC    pramipexole  1 mg Oral BID    pantoprazole  40 mg Oral Daily    sodium chloride flush  10 mL Intravenous 2 times per day    furosemide  40 mg Intravenous BID    polyvinyl alcohol  1 drop Both Eyes Q4H    And    artificial tears   Both Eyes Q4H    chlorhexidine  15 mL Mouth/Throat BID    famotidine (PEPCID) injection  20 mg Intravenous BID Infusions:    midazolam 3 mg/hr (02/22/21 0955)    sodium chloride Stopped (02/21/21 2000)    amiodarone 0.5 mg/min (02/22/21 0526)    fentaNYL 150 mcg/hr (02/22/21 0934)    propofol 80 mcg/kg/min (02/22/21 0910)    norepinephrine 1 mcg/min (02/22/21 0943)    heparin (PORCINE) Infusion Stopped (02/22/21 0851)     PRN Meds:     magnesium sulfate, 1,000 mg, PRN      ipratropium-albuterol, 3 mL, Q6H PRN      sodium chloride flush, 10 mL, PRN      promethazine, 12.5 mg, Q6H PRN    Or      ondansetron, 4 mg, Q6H PRN      polyethylene glycol, 17 g, Daily PRN      acetaminophen, 650 mg, Q6H PRN    Or      acetaminophen, 650 mg, Q6H PRN      hydrALAZINE (APRESOLINE) ivpb, 10 mg, Q6H PRN      fentanNYL, 25 mcg, Q1H PRN      heparin (porcine), 4,000 Units, PRN      heparin (porcine), 2,000 Units, PRN      Subjective:   sedated    Objective:        Intake/Output Summary (Last 24 hours) at 2/22/2021 1004  Last data filed at 2/22/2021 0649  Gross per 24 hour   Intake 1633.11 ml   Output 1780 ml   Net -146.89 ml      Vitals:   Vitals:    02/22/21 0930   BP: 133/63   Pulse: 67   Resp: 18   Temp:    SpO2:      Physical Exam:   Gen:  sedated  Head/Eyes:  Normocephalic atraumatic, eyes closed  NECK:   symmetrical, trachea midline  LUNGS: Normal Effort on vent  CARDIOVASCULAR:  Increased rate + trace pedal edema b/l  ABDOMEN:  non distended  MUSCULOSKELETAL:  ROM limited  NEUROLOGIC: sedated  SKIN:  no bruising or bleeding, normal skin color,  no redness      Data:       CBC   Recent Labs     02/21/21  1200 02/21/21 1958 02/22/21  0535   WBC 7.1 13.1* 12.6*   HGB 10.9* 11.0* 9.8*   HCT 37.3 41.7 33.0*    203 200      BMP   Recent Labs     02/21/21  1200 02/21/21 1958 02/22/21  0535    137 140   K 3.7 3.7 3.2*   CL 95* 95* 97*   CO2 33* 20* 31   BUN 11 12 16   CREATININE 1.1 1.2* 1.4*         Electronically signed by Jeanna Gonzalez MD on 2/22/2021 at 10:04 AM

## 2021-02-22 NOTE — CONSULTS
Subjective:   CHIEF COMPLAINT / HPI:  63year old female admitted with worsening sob and resp failure.her condition deteriorated.  She was intubated and placed on vent      Past Medical History:  Past Medical History:   Diagnosis Date    Acute kidney injury ([facility] [address] 2019    Anxiety 2020    Aortic stenosis     Asthma     Bacteremia due to group B Streptococcus     Treated at [facility] in 2017 - felt to be due to cellulitis    Cancer [facility])     Cervical    Carotid artery stenosis     Chest pain     [facility]congest[facility])     Chronic back pain     COPD exacerbation ([facility] [address]     Depression     Diabetes mellitus ([facility] [address]     Family history of coronary artery disease     Fatty liver 10/27/2016    GI bleed 2017    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 - [facility] H/O aortic valve stenosis     H/O echocardiogram 2016    EF 55%, Aortic valve area is 0.84 cm2 with a mean gradient of 36 suggestive of severe aortic stenosis, mild to mos LVH    Headache     Heart murmur     History of nuclear stress test 2016    lexiscan-normal,EF70%    Morbid obesity ([facility])     BMI=73.72 kg/m2    Neuropathy     NSTEMI (non-ST elevated myocardial infarction) ([facility] [address] 2018    Obesity     Osteoarthritis     Palpitations     Peripheral edema     Restless legs syndrome     Sleep apnea     Has a CPAP    Sleep apnea     SOB (shortness of breath)        Past Surgical History:        Procedure Laterality Date    AORTIC VALVE REPLACEMENT  2017    29mm EvolutR TAVR     SECTION      CHOLECYSTECTOMY      GALLBLADDER SURGERY      HYSTERECTOMY      IR NONTUNNELED VASCULAR CATHETER  2020    IR NONTUNNELED VASCULAR CATHETER 2020 [facility] SPECIAL PROCEDURES    IR NONTUNNELED VASCULAR CATHETER  1/15/2021    IR NONTUNNELED VASCULAR CATHETER 1/15/2021 [facility] SPECIAL PROCEDURES    NECK SURGERY      Tumor    TUBAL LIGATION         Current Medications: Current Facility-Administered Medications: midazolam (VERSED) 100 mg in dextrose 5 % 100 mL infusion, 1 mg/hr, Intravenous, Continuous  magnesium sulfate 1000 mg in dextrose 5% 100 mL IVPB, 1,000 mg, Intravenous, PRN  levETIRAcetam (KEPPRA) 1,000 mg in sodium chloride 0.9 % 100 mL IVPB, 1,000 mg, Intravenous, Q12H  levETIRAcetam (KEPPRA) 1,000 mg in sodium chloride 0.9 % 100 mL IVPB, 1,000 mg, Intravenous, Once  amiodarone (CORDARONE) tablet 200 mg, 200 mg, Oral, BID  atorvastatin (LIPITOR) tablet 40 mg, 40 mg, Oral, Nightly  busPIRone (BUSPAR) tablet 10 mg, 10 mg, Oral, TID  clopidogrel (PLAVIX) tablet 75 mg, 75 mg, Oral, Daily  ipratropium-albuterol (DUONEB) nebulizer solution 3 mL, 3 mL, Inhalation, Q6H PRN  midodrine (PROAMATINE) tablet 10 mg, 10 mg, Oral, TID WC  pramipexole (MIRAPEX) tablet 1 mg, 1 mg, Oral, BID  pantoprazole (PROTONIX) tablet 40 mg, 40 mg, Oral, Daily  sodium chloride flush 0.9 % injection 10 mL, 10 mL, Intravenous, 2 times per day  sodium chloride flush 0.9 % injection 10 mL, 10 mL, Intravenous, PRN  promethazine (PHENERGAN) tablet 12.5 mg, 12.5 mg, Oral, Q6H PRN **OR** ondansetron (ZOFRAN) injection 4 mg, 4 mg, Intravenous, Q6H PRN  polyethylene glycol (GLYCOLAX) packet 17 g, 17 g, Oral, Daily PRN  acetaminophen (TYLENOL) tablet 650 mg, 650 mg, Oral, Q6H PRN **OR** acetaminophen (TYLENOL) suppository 650 mg, 650 mg, Rectal, Q6H PRN  0.9 % sodium chloride infusion, , Intravenous, Continuous  furosemide (LASIX) injection 40 mg, 40 mg, Intravenous, BID  hydrALAZINE (APRESOLINE) 10 mg in sodium chloride 0.9 % 50 mL ivpb, 10 mg, Intravenous, Q6H PRN  [COMPLETED] amiodarone (CORDARONE) 150 mg in dextrose 5 % 100 mL bolus, 150 mg, Intravenous, Once **FOLLOWED BY** [] amiodarone (CORDARONE) 450 mg in dextrose 5 % 250 mL infusion, 1 mg/min, Intravenous, Continuous **FOLLOWED BY** amiodarone (CORDARONE) 450 mg in dextrose 5 % 250 mL infusion, 0.5 mg/min, Intravenous, Continuous  fentaNYL file     Active member of club or organization: Not on file     Attends meetings of clubs or organizations: Not on file     Relationship status: Not on file    Intimate partner violence     Fear of current or ex partner: Not on file     Emotionally abused: Not on file     Physically abused: Not on file     Forced sexual activity: Not on file   Other Topics Concern    Not on file   Social History Narrative    ** Merged History Encounter **            Family History:   Family History   Problem Relation Age of Onset    Heart Failure Mother     Heart Attack Mother     Hypertension Mother     Coronary Art Dis Mother         Had PTCA w/stenting.  Heart Disease Mother     High Blood Pressure Mother     Obesity Mother     Diabetes Mother     Depression Mother     Heart Failure Father     Heart Disease Father     High Blood Pressure Father     Obesity Father     Heart Failure Brother     Heart Disease Brother     High Blood Pressure Brother     Obesity Brother     Depression Brother     Depression Sister     Depression Daughter     Anxiety Disorder Daughter     Depression Niece         suicide attempt       Immunization:  Immunization History   Administered Date(s) Administered    Influenza Vaccine, unspecified formulation 10/02/2017    Influenza, Quadv, 6 mo and older, IM, PF (Flulaval, Fluarix) 11/25/2018    Influenza, Quadv, IM, PF (6 mo and older Fluzone, Flulaval, Fluarix, and 3 yrs and older Afluria) 09/25/2019       Objective:   PHYSICAL EXAM:      VITALS:    Vitals:    02/22/21 0615 02/22/21 0630 02/22/21 0645 02/22/21 0649   BP: (!) 104/55 (!) 107/55 (!) 98/52    Pulse: 66 64 62 62   Resp: 20 20 20 20   Temp:       TempSrc:       SpO2:    97%   Weight:       Height:               NECK:  Supple, symmetrical, trachea midline, no adenopathy, thyroid symmetric, not enlarged and no tenderness  CHEST: Chest expansion equal and symmetrical, no intercostal retraction.   LUNGS:  no increased work of breathing, has expiratory wheezes both lungs, no crackles. CARDIOVASCULAR: S1 and S2, no edema and no JVD  ABDOMEN:  normal bowel sounds, non-distended and no masses palpated, and no tenderness to palpation. No hepatospleenomegaly  LYMPHADENOPATHY:  no axillary or supraclavicular adenopathy. No cervical adnenopathy    MUSCULOSKELETAL: No obvious misalignment or effusion of the joints. No clubbing, cyanosis of the digits. RIGHT AND LEFT LOWER EXTREMITIES: No edema, no inflammation, no tenderness. SKIN:  normal skin color, texture, turgor and no redness, warmth, or swelling. No palpable nodules    DATA:                       EXAMINATION:   ONE XRAY VIEW OF THE CHEST       2/21/2021 11:30 pm       COMPARISON:   Same day study performed 2 hours earlier       HISTORY:   ORDERING SYSTEM PROVIDED HISTORY: CVC placement   TECHNOLOGIST PROVIDED HISTORY:   Reason for exam:->CVC placement   Reason for Exam: CVC placement   Acuity: Acute   Type of Exam: Subsequent/Follow-up       FINDINGS:   Interval placement of right central line with its distal tip projecting   within the distal SVC.  Endotracheal tube distal tip projects 3.8 cm above   the lizz.  The NG tube projects at the level of the body of the stomach. There is massive enlargement of cardiomediastinal silhouette, unchanged. Extensive consolidative changes of the right lung and the increased   interstitial marking of the left lung are unchanged.           Impression   Interval placement of right central line with its distal tip projecting   within the distal SVC.       The remainder of the findings are stable. Assessment:     1. Ac on ch hypoxic and hypercapneic resp failure  2. Ac copd  3. Ac chf          Plan:     1. Vent management  2. Vent protocol  3. Bd rx  4. Stress ulcer prophylaxis  5. Daily abg and cxr  6. Diuretics  7. Weight daily  8.  Thanks will follow

## 2021-02-22 NOTE — PROGRESS NOTES
Attempted to CT scan pt for second time today. CT table unable to advance into machine as it did not do for the attempted scan earlier in the day.

## 2021-02-22 NOTE — PROGRESS NOTES
Central Line    Date/Time: 2/21/2021 9:40 PM  Performed by: Yobani Burgos PA-C  Authorized by: Yobani Burgos PA-C   Consent: The procedure was performed in an emergent situation. Verbal consent obtained. Consent given by: spouse  Site marked: the operative site was marked  Patient identity confirmed: anonymous protocol, patient vented/unresponsive and arm band  Time out: Immediately prior to procedure a \"time out\" was called to verify the correct patient, procedure, equipment, support staff and site/side marked as required. Indications: vascular access  Anesthesia: local infiltration    Anesthesia:  Local Anesthetic: lidocaine 2% without epinephrine  Anesthetic total: 4 mL  Preparation: skin prepped with 2% chlorhexidine  Skin prep agent dried: skin prep agent completely dried prior to procedure  Sterile barriers: all five maximum sterile barriers used - cap, mask, sterile gown, sterile gloves, and large sterile sheet  Hand hygiene: hand hygiene performed prior to central venous catheter insertion  Location details: right internal jugular  Patient position: reverse Trendelenburg  Catheter type: triple lumen  Catheter size: 7 Fr  Pre-procedure: landmarks identified  Ultrasound guidance: yes  Sterile ultrasound techniques: sterile gel and sterile probe covers were used  Number of attempts: 1  Successful placement: yes  Post-procedure: line sutured and dressing applied  Assessment: blood return through all ports and free fluid flow  Patient tolerance: patient tolerated the procedure well with no immediate complications  Comments: Dr. Natacha Soni present for all portions of procedure as supervising physician. Chest xray pending to confirm placement.

## 2021-02-22 NOTE — PROGRESS NOTES
Catie Amaya MD.  Section of General Neurology - Adult  Consult Note        Reason for Consult:    Requesting Physician:  No referring provider defined for this encounter. Thank you for your kind referral.    CHIEF COMPLAINT:  obtundation         HISTORY OF PRESENT ILLNESS:              The patient is a 63 y.o. female with a history of obtundation. female who presents from [facility] ([facility] with concern for progressively worsening shortness of breath. The patient is somnolent but easily awakened by touch. Reports feeling poor over the past 2 days. States 3 weeks of shortness of breath did not return to baseline since previous admission to this facility. Apparently there was also a power outage to her BiPAP sometime today/overnight. pt was found unresponsive yesterday needing CPR as she was in PEA needing 15 min of of CPR before acheivng ROSC. Pt was intubated and admitted to the ICU.pt had twitching of the eyes and chest last night and was started on kepprs. pt also was placed on versed and deprivan.     Past Medical History:        Diagnosis Date    Acute kidney injury ([facility] [address] 8/22/2019    Anxiety 9/1/2020    Aortic stenosis     Asthma     Bacteremia due to group B Streptococcus     Treated at [facility] in 2017 - felt to be due to cellulitis    Cancer [facility])     Cervical    Carotid artery stenosis     Chest pain     CHF (congestive heart failure) (HCC)     Chronic back pain     COPD exacerbation ([facility] [address]     Depression     Diabetes mellitus ([facility] [address]     Family history of coronary artery disease     Fatty liver 10/27/2016    GI bleed 2017    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 - [facility] H/O aortic valve stenosis     H/O echocardiogram 11/22/2016    EF 55%, Aortic valve area is 0.84 cm2 with a mean gradient of 36 suggestive of severe aortic stenosis, mild to mos LVH    Headache     Heart murmur     History of nuclear stress test 11/17/2016    lexiscan-normal,EF70%  Morbid obesity (HCC)     BMI=73.72 kg/m2    Neuropathy     NSTEMI (non-ST elevated myocardial infarction) ([facility] [address] 2018    Obesity     Osteoarthritis     Palpitations     Peripheral edema     Restless legs syndrome     Sleep apnea     Has a CPAP    Sleep apnea     SOB (shortness of breath)      Past Surgical History:        Procedure Laterality Date    AORTIC VALVE REPLACEMENT  2017    29mm EvolutR TAVR     SECTION      CHOLECYSTECTOMY      GALLBLADDER SURGERY      HYSTERECTOMY      IR NONTUNNELED VASCULAR CATHETER  2020    IR NONTUNNELED VASCULAR CATHETER 2020 [facility] SPECIAL PROCEDURES    IR NONTUNNELED VASCULAR CATHETER  1/15/2021    IR NONTUNNELED VASCULAR CATHETER 1/15/2021 [facility] SPECIAL PROCEDURES    NECK SURGERY      Tumor    TUBAL LIGATION       Current Medications:   Current Facility-Administered Medications: midazolam (VERSED) 100 mg in dextrose 5 % 100 mL infusion, 1 mg/hr, Intravenous, Continuous  magnesium sulfate 1000 mg in dextrose 5% 100 mL IVPB, 1,000 mg, Intravenous, PRN  levETIRAcetam (KEPPRA) 1,000 mg in sodium chloride 0.9 % 100 mL IVPB, 1,000 mg, Intravenous, Q12H  azithromycin (ZITHROMAX) 500 mg in dextrose 5 % 250 mL IVPB, 500 mg, Intravenous, Q24H  methylPREDNISolone sodium (SOLU-MEDROL) injection 40 mg, 40 mg, Intravenous, Q8H  perflutren lipid microspheres (DEFINITY) injection, , ,   potassium bicarb-citric acid (EFFER-K) effervescent tablet 40 mEq, 40 mEq, Oral, BID WC  amiodarone (CORDARONE) tablet 200 mg, 200 mg, Oral, BID  atorvastatin (LIPITOR) tablet 40 mg, 40 mg, Oral, Nightly  busPIRone (BUSPAR) tablet 10 mg, 10 mg, Oral, TID  clopidogrel (PLAVIX) tablet 75 mg, 75 mg, Oral, Daily  ipratropium-albuterol (DUONEB) nebulizer solution 3 mL, 3 mL, Inhalation, Q6H PRN  midodrine (PROAMATINE) tablet 10 mg, 10 mg, Oral, TID WC  pramipexole (MIRAPEX) tablet 1 mg, 1 mg, Oral, BID  pantoprazole (PROTONIX) tablet 40 mg, 40 mg, Oral, Daily  sodium chloride flush 0.9 % injection 10 mL, 10 mL, Intravenous, 2 times per day  sodium chloride flush 0.9 % injection 10 mL, 10 mL, Intravenous, PRN  promethazine (PHENERGAN) tablet 12.5 mg, 12.5 mg, Oral, Q6H PRN **OR** ondansetron (ZOFRAN) injection 4 mg, 4 mg, Intravenous, Q6H PRN  polyethylene glycol (GLYCOLAX) packet 17 g, 17 g, Oral, Daily PRN  acetaminophen (TYLENOL) tablet 650 mg, 650 mg, Oral, Q6H PRN **OR** acetaminophen (TYLENOL) suppository 650 mg, 650 mg, Rectal, Q6H PRN  furosemide (LASIX) injection 40 mg, 40 mg, Intravenous, BID  hydrALAZINE (APRESOLINE) 10 mg in sodium chloride 0.9 % 50 mL ivpb, 10 mg, Intravenous, Q6H PRN  [COMPLETED] amiodarone (CORDARONE) 150 mg in dextrose 5 % 100 mL bolus, 150 mg, Intravenous, Once **FOLLOWED BY** [] amiodarone (CORDARONE) 450 mg in dextrose 5 % 250 mL infusion, 1 mg/min, Intravenous, Continuous **FOLLOWED BY** amiodarone (CORDARONE) 450 mg in dextrose 5 % 250 mL infusion, 0.5 mg/min, Intravenous, Continuous  fentaNYL (SUBLIMAZE) injection 25 mcg, 25 mcg, Intravenous, Q1H PRN  fentaNYL (SUBLIMAZE) 1,000 mcg in sodium chloride 0.9% 100 mL infusion, 25 mcg/hr, Intravenous, Continuous  propofol injection, 80 mcg/kg/min, Intravenous, Continuous  polyvinyl alcohol (LIQUIFILM TEARS) 1.4 % ophthalmic solution 1 drop, 1 drop, Both Eyes, Q4H **AND** lubrifresh P.M. (artificial tears) ophthalmic ointment, , Both Eyes, Q4H  chlorhexidine (PERIDEX) 0.12 % solution 15 mL, 15 mL, Mouth/Throat, BID  famotidine (PEPCID) injection 20 mg, 20 mg, Intravenous, BID  norepinephrine (LEVOPHED) 16 mg in dextrose 5% 250 mL infusion, 2 mcg/min, Intravenous, Continuous  heparin (porcine) injection 4,000 Units, 4,000 Units, Intravenous, PRN  heparin (porcine) injection 2,000 Units, 2,000 Units, Intravenous, PRN  heparin 25,000 units in dextrose 5% 250 mL (premix) infusion, 5-30 Units/kg/hr, Intravenous, Continuous  Allergies:  Patient has no known allergies.     Social History:  TOBACCO: reports that she quit smoking about 17 years ago. Her smoking use included cigarettes. She has never used smokeless tobacco.  ETOH:   reports no history of alcohol use. DRUGS:   reports no history of drug use. Family History:       Problem Relation Age of Onset    Heart Failure Mother     Heart Attack Mother     Hypertension Mother     Coronary Art Dis Mother         Had PTCA w/stenting.  Heart Disease Mother     High Blood Pressure Mother     Obesity Mother     Diabetes Mother     Depression Mother     Heart Failure Father     Heart Disease Father     High Blood Pressure Father     Obesity Father     Heart Failure Brother     Heart Disease Brother     High Blood Pressure Brother     Obesity Brother     Depression Brother     Depression Sister     Depression Daughter     Anxiety Disorder Daughter     Depression Niece         suicide attempt       REVIEW OF SYSTEMS:  CONSTITUTIONAL:  negative  HEENT:  negative  RESPIRATORY:  negative  CARDIOVASCULAR:  negative  GASTROINTESTINAL:  negative  GENITOURINARY:  negative  MUSCULOSKELETAL:  negative  BEHAVIOR/PSYCH:  Negative    ROS unavailable    Family hx neg    PHYSICAL EXAM  ------------------------  Vitals:  BP (!) 102/51   Pulse 54   Temp 99.3 °F (37.4 °C)   Resp 20   Ht 5' 6\" (1.676 m)   Wt (!) 395 lb 8.1 oz (179.4 kg)   SpO2 92%   BMI 63.84 kg/m²      General:  obtunded  Well developed, well nourished, well groomed. No apparent distress. HEENT:  Normocephalic, atraumatic. Pupils equal, round, reactive to light. No scleral icterus. No conjunctival injection. Normal lips, teeth, and gums. No nasal discharge. Neck:  Supple  Heart:  RRR, no murmurs, gallops, rubs  Lungs:  CTA bilaterally, bilat symmetrical expansion, no wheeze, rales, or rhonchi  Abdomen:   Bowel sounds present, soft, nontender, no masses, no organomegaly, no peritoneal signs  Extremities:  No clubbing, cyanosis, or edema  Skin:  Warm and dry, no open lesions or rash  Breast: deferred  Rectal: deferred  Genitalia:  deferred    NEUROLOGICAL EXAM  ---------------------------------    Mental Status Exam:              Obtunded no resposne to verbal commands  Sluggish oculocephalics pupils 3 mm roberto  Flaccid extremtiies   Equivocal toes  resp on the vent  No clinical twitching noted             CBC with Differential:    Lab Results   Component Value Date    WBC 12.6 02/22/2021    RBC 3.91 02/22/2021    HGB 9.8 02/22/2021    HCT 33.0 02/22/2021     02/22/2021    MCV 84.4 02/22/2021    MCH 25.1 02/22/2021    MCHC 29.7 02/22/2021    RDW 19.1 02/22/2021    NRBC 2 02/21/2021    SEGSPCT 89.7 02/22/2021    BANDSPCT 11 02/21/2021    LYMPHOPCT 4.9 02/22/2021    PROMYELOPCT 3 06/23/2019    MONOPCT 4.4 02/22/2021    MYELOPCT 1 01/16/2021    BASOPCT 0.1 02/22/2021    MONOSABS 0.6 02/22/2021    LYMPHSABS 0.6 02/22/2021    EOSABS 0.0 02/22/2021    BASOSABS 0.0 02/22/2021    DIFFTYPE AUTOMATED DIFFERENTIAL 02/22/2021     CMP:    Lab Results   Component Value Date     02/22/2021    K 3.2 02/22/2021    CL 97 02/22/2021    CO2 31 02/22/2021    BUN 16 02/22/2021    CREATININE 1.4 02/22/2021    GFRAA 46 02/22/2021    LABGLOM 38 02/22/2021    GLUCOSE 223 02/22/2021    PROT 7.1 02/22/2021    LABALBU 3.0 02/22/2021    CALCIUM 8.3 02/22/2021    BILITOT 0.9 02/22/2021    ALKPHOS 70 02/22/2021    AST 20 02/22/2021    ALT 7 02/22/2021     BMP:    Lab Results   Component Value Date     02/22/2021    K 3.2 02/22/2021    CL 97 02/22/2021    CO2 31 02/22/2021    BUN 16 02/22/2021    LABALBU 3.0 02/22/2021    CREATININE 1.4 02/22/2021    CALCIUM 8.3 02/22/2021    GFRAA 46 02/22/2021    LABGLOM 38 02/22/2021    GLUCOSE 223 02/22/2021     PT/INR:    Lab Results   Component Value Date    PROTIME 16.6 02/21/2021    INR 1.37 02/21/2021     PTT:    Lab Results   Component Value Date    APTT 181.4 02/22/2021   [APTT  U/A:    Lab Results   Component Value Date    COLORU YELLOW 01/13/2021    WBCUA 2979 01/13/2021    RBCUA 483 01/13/2021    MUCUS RARE 12/09/2020    TRICHOMONAS NONE SEEN 01/13/2021    YEAST OCCASIONAL 12/09/2020    BACTERIA MANY 01/13/2021    CLARITYU CLOUDY 01/13/2021    SPECGRAV 1.018 01/13/2021    LEUKOCYTESUR MODERATE 01/13/2021    UROBILINOGEN NORMAL 01/13/2021    BILIRUBINUR NEGATIVE 01/13/2021    BLOODU LARGE 01/13/2021     TSH:  No results found for: TSH  VITAMIN B12: No components found for: B12  FOLATE:    Lab Results   Component Value Date    FOLATE >20.0 08/25/2020     RPR:  No results found for: RPR  ALEXIS:  No results found for: ANATITER, ALEXIS  Urine Toxicology:  No components found for: IAMMENTA, IBARBIT, IBENZO, ICOCAINE, IMARTHC, IOPIATES, IPHENCYC     IMPRESSION:    Hypoxic encephalopathy    S/p cardiac arrest PEA with prolonged resuscitation for 15 min    Seizures-focal twitching-continue keppra-eeg     PLAN:    CT brain could not be done due to her weight    Continue IV keppra     DC deprivan and Versed    EEG    Continue supportive care    Discussed plan of care with pts nurse. Wonda Gilford MD  BOARD CERTIFIED-NEUROLOGY.

## 2021-02-22 NOTE — CONSULTS
INPATIENT CARDIOLOGY CONSULT NOTE       Reason for consultation:  Arrest     Referring physician:  Rahda Craig MD     Primary care physician: Laith Fu      Dear Radha Craig MD Thank you for the consult    Chief Complaint   Patient presents with    Shortness of Breath     lost power at [facility], on CPAP, desat'ed during poweroutage       History of present illness:Olivia is a 63 y. o.year old who  Is admitted with difficulty breathing. Cardiologist consulted overnight after patient was found unresponsive in her room. CODE BLUE called for pulseless electrical activity. ACLS protocol was pursued. ROSC was obtained 15 minutes. However it is unclear for how long patient was unresponsive for. Patient was noted to have an abnormal rhythm on EKG and cardiology consulted. EKG after the code showed atrial fibrillation with aberrancy. Telemetry today shows normal sinus rhythm with occasional PVCs       Past medical history:    has a past medical history of Acute kidney injury ([address] Anxiety, Aortic stenosis, Asthma, Bacteremia due to group B Streptococcus, Cancer ([address] Carotid artery stenosis, Chest pain, CHF (congestive heart failure) ([address] Chronic back pain, COPD exacerbation (HCC), Depression, Diabetes mellitus ([address] Family history of coronary artery disease, Fatty liver, GI bleed, H/O aortic valve stenosis, H/O echocardiogram, Headache, Heart murmur, History of nuclear stress test, Morbid obesity ([address] Neuropathy, NSTEMI (non-ST elevated myocardial infarction) ([address] Obesity, Osteoarthritis, Palpitations, Peripheral edema, Restless legs syndrome, Sleep apnea, Sleep apnea, and SOB (shortness of breath). Past surgical history:   has a past surgical history that includes Cholecystectomy;  section; Hysterectomy; Tubal ligation; Neck surgery; Gallbladder surgery; Aortic valve replacement (2017);  IR NONTUNNELED VASCULAR CATHETER > 5 YEARS (2020); and IR NONTUNNELED VASCULAR CATHETER > 5 YEARS (1/15/2021). Social History:   reports that she quit smoking about 17 years ago. Her smoking use included cigarettes. She has never used smokeless tobacco. She reports that she does not drink alcohol or use drugs.   Family history:   no family history of CAD, STROKE of DM    No Known Allergies        midazolam (VERSED) 100 mg in dextrose 5 % 100 mL infusion, Continuous      magnesium sulfate 1000 mg in dextrose 5% 100 mL IVPB, PRN      levETIRAcetam (KEPPRA) 1,000 mg in sodium chloride 0.9 % 100 mL IVPB, Q12H      azithromycin (ZITHROMAX) 500 mg in dextrose 5 % 250 mL IVPB, Q24H      potassium chloride (KLOR-CON M) extended release tablet 40 mEq, BID WC      methylPREDNISolone sodium (SOLU-MEDROL) injection 40 mg, Q8H      amiodarone (CORDARONE) tablet 200 mg, BID      atorvastatin (LIPITOR) tablet 40 mg, Nightly      busPIRone (BUSPAR) tablet 10 mg, TID      clopidogrel (PLAVIX) tablet 75 mg, Daily      ipratropium-albuterol (DUONEB) nebulizer solution 3 mL, Q6H PRN      midodrine (PROAMATINE) tablet 10 mg, TID WC      pramipexole (MIRAPEX) tablet 1 mg, BID      pantoprazole (PROTONIX) tablet 40 mg, Daily      sodium chloride flush 0.9 % injection 10 mL, 2 times per day      sodium chloride flush 0.9 % injection 10 mL, PRN      promethazine (PHENERGAN) tablet 12.5 mg, Q6H PRN    Or      ondansetron (ZOFRAN) injection 4 mg, Q6H PRN      polyethylene glycol (GLYCOLAX) packet 17 g, Daily PRN      acetaminophen (TYLENOL) tablet 650 mg, Q6H PRN    Or      acetaminophen (TYLENOL) suppository 650 mg, Q6H PRN      furosemide (LASIX) injection 40 mg, BID      hydrALAZINE (APRESOLINE) 10 mg in sodium chloride 0.9 % 50 mL ivpb, Q6H PRN      amiodarone (CORDARONE) 450 mg in dextrose 5 % 250 mL infusion, Continuous      fentaNYL (SUBLIMAZE) injection 25 mcg, Q1H PRN      fentaNYL (SUBLIMAZE) 1,000 mcg in sodium chloride 0.9% 100 mL infusion, Continuous      propofol injection, Continuous      polyvinyl alcohol (LIQUIFILM TEARS) 1.4 % ophthalmic solution 1 drop, Q4H    And      lubrifresh P.M. (artificial tears) ophthalmic ointment, Q4H      chlorhexidine (PERIDEX) 0.12 % solution 15 mL, BID      famotidine (PEPCID) injection 20 mg, BID      norepinephrine (LEVOPHED) 16 mg in dextrose 5% 250 mL infusion, Continuous      heparin (porcine) injection 4,000 Units, PRN      heparin (porcine) injection 2,000 Units, PRN      heparin 25,000 units in dextrose 5% 250 mL (premix) infusion, Continuous      Current Facility-Administered Medications   Medication Dose Route Frequency Provider Last Rate Last Admin    midazolam (VERSED) 100 mg in dextrose 5 % 100 mL infusion  1 mg/hr Intravenous Continuous Martell Koroma MD 7 mL/hr at 02/22/21 1232 7 mg/hr at 02/22/21 1232    magnesium sulfate 1000 mg in dextrose 5% 100 mL IVPB  1,000 mg Intravenous PRN Rosa Norman PA-C   Stopped at 02/22/21 0852    levETIRAcetam (KEPPRA) 1,000 mg in sodium chloride 0.9 % 100 mL IVPB  1,000 mg Intravenous Q12H Martell Koroma MD        azithromycin (ZITHROMAX) 500 mg in dextrose 5 % 250 mL IVPB  500 mg Intravenous Q24H Fredy Chun MD   Stopped at 02/22/21 1144    potassium chloride (KLOR-CON M) extended release tablet 40 mEq  40 mEq Oral BID WC Fredy Chun MD   40 mEq at 02/22/21 1050    methylPREDNISolone sodium (SOLU-MEDROL) injection 40 mg  40 mg Intravenous Q8H Fredy Chun MD   40 mg at 02/22/21 1050    amiodarone (CORDARONE) tablet 200 mg  200 mg Oral BID THEODORE York CNP   200 mg at 02/22/21 0911    atorvastatin (LIPITOR) tablet 40 mg  40 mg Oral Nightly THEODORE York CNP        busPIRone (BUSPAR) tablet 10 mg  10 mg Oral TID THEODORE York CNP   10 mg at 02/22/21 0912    clopidogrel (PLAVIX) tablet 75 mg  75 mg Oral Daily THEODORE York CNP   75 mg at 02/22/21 0911    ipratropium-albuterol (DUONEB) nebulizer solution 3 mL  3 mL Inhalation Q6H PRN Secor Archie, APRN - CNP   3 mL at 02/21/21 2157    midodrine (PROAMATINE) tablet 10 mg  10 mg Oral TID WC Secor Archie, APRN - CNP   10 mg at 02/22/21 1144    pramipexole (MIRAPEX) tablet 1 mg  1 mg Oral BID Secor Dar, APRN - CNP   1 mg at 02/22/21 0912    pantoprazole (PROTONIX) tablet 40 mg  40 mg Oral Daily Secor Archie, APRN - CNP   40 mg at 02/22/21 0912    sodium chloride flush 0.9 % injection 10 mL  10 mL Intravenous 2 times per day Secor Archie, APRN - CNP   10 mL at 02/22/21 0912    sodium chloride flush 0.9 % injection 10 mL  10 mL Intravenous PRN Ernesto Dar, APRN - CNP        promethazine (PHENERGAN) tablet 12.5 mg  12.5 mg Oral Q6H PRN Ernesto Archie, APRN - CNP        Or    ondansetron [facility]) injection 4 mg  4 mg Intravenous Q6H PRN Ernesto Archie, APRN - CNP        polyethylene glycol (GLYCOLAX) packet 17 g  17 g Oral Daily PRN Ernesto Archie, APRN - CNP        acetaminophen (TYLENOL) tablet 650 mg  650 mg Oral Q6H PRN Secor Archie, APRN - CNP        Or    acetaminophen (TYLENOL) suppository 650 mg  650 mg Rectal Q6H PRN Secor Dar, APRN - CNP        furosemide (LASIX) injection 40 mg  40 mg Intravenous BID Secor Archie, APRN - CNP   40 mg at 02/22/21 0911    hydrALAZINE (APRESOLINE) 10 mg in sodium chloride 0.9 % 50 mL ivpb  10 mg Intravenous Q6H PRN Secor Archie, APRN - CNP        amiodarone (CORDARONE) 450 mg in dextrose 5 % 250 mL infusion  0.5 mg/min Intravenous Continuous Rosa Norman PA-C 16.7 mL/hr at 02/22/21 0526 0.5 mg/min at 02/22/21 0526    fentaNYL (SUBLIMAZE) injection 25 mcg  25 mcg Intravenous Q1H PRN Rosa Norman PA-C        fentaNYL (SUBLIMAZE) 1,000 mcg in sodium chloride 0.9% 100 mL infusion  25 mcg/hr Intravenous Continuous Candice Rivera PA-C 15 mL/hr at 02/22/21 0934 150 mcg/hr at 02/22/21 0934    propofol injection  80 mcg/kg/min Intravenous Continuous Martell Koroma MD 89.3 mL/hr at 02/22/21 1129 80 mcg/kg/min at 02/22/21 1129    polyvinyl alcohol (LIQUIFILM TEARS) 1.4 % ophthalmic solution 1 drop  1 drop Both Eyes Q4H Rosa Norman PA-C   1 drop at 02/22/21 1144    And    lubrifresh P.M. (artificial tears) ophthalmic ointment   Both Eyes Q4H Rosa Norman PA-C   Given at 02/22/21 1048    chlorhexidine (PERIDEX) 0.12 % solution 15 mL  15 mL Mouth/Throat BID Rosa Norman PA-C   15 mL at 02/22/21 0911    famotidine (PEPCID) injection 20 mg  20 mg Intravenous BID Rosa Norman PA-C   20 mg at 02/22/21 0911    norepinephrine (LEVOPHED) 16 mg in dextrose 5% 250 mL infusion  2 mcg/min Intravenous Continuous Rosa Norman PA-C 0.9 mL/hr at 02/22/21 0943 1 mcg/min at 02/22/21 0943    heparin (porcine) injection 4,000 Units  4,000 Units Intravenous PRN Otf Peña MD        heparin (porcine) injection 2,000 Units  2,000 Units Intravenous PRN Otf Peña MD        heparin 25,000 units in dextrose 5% 250 mL (premix) infusion  5-30 Units/kg/hr Intravenous Continuous Otf Peña MD 14.9 mL/hr at 02/22/21 1053 8 Units/kg/hr at 02/22/21 1053         Review of Systems:    Could not be obtained       Physical Examination:      Vitals:    02/22/21 1230   BP: (!) 116/57   Pulse: 58   Resp: 20   Temp:    SpO2:       Wt Readings from Last 3 Encounters:   02/22/21 (!) 395 lb 8.1 oz (179.4 kg)   01/22/21 (!) 2 lb 3.3 oz (1 kg)   09/01/20 (!) 379 lb (171.9 kg)     Body mass index is 63.84 kg/m². General Appearance: Sedated and intubated   Constitutional:  Well developed, Well nourished  HEENT:  Normocephalic, Atraumatic, Oropharynx moist, No oral exudates,   Nose normal. Neck Supple Carotid: no carotid bruit  Eyes:  Conjunctiva normal, No discharge. Respiratory:    Coarse breath sounds. Mechanical ventilator support  Cardiovascular: S1-S2 No murmurs auscultated. No rubs, thrills or gallops. Normal  rhythm. Pedal pulses are normal. No pedal edema  GI:  Soft Non tender, non distended. Musculoskeletal:    No cyanosis, No clubbing.    Integument:  Warm, Dry, No erythema, No rash. Lymphatic:  No lymphadenopathy noted. Neurologic:  Sedated   Psychiatric:  Could not be assessed       Lab Review     Recent Labs     02/22/21  0535   WBC 12.6*   HGB 9.8*   HCT 33.0*         Recent Labs     02/22/21  0535      K 3.2*   CL 97*   CO2 31   BUN 16   CREATININE 1.4*     Recent Labs     02/22/21  0535   AST 20   ALT 7*   BILITOT 0.9   ALKPHOS 70     No results for input(s): TROPONINI in the last 72 hours. No results found for: BNP  Lab Results   Component Value Date    INR 1.37 02/21/2021    PROTIME 16.6 (H) 02/21/2021         All labs, images, EKGs were personally reviewed      Assessment: 63 y. o.year old with PMH of  has a past medical history of Acute kidney injury ([facility] Anxiety, Aortic stenosis, Asthma, Bacteremia due to group B Streptococcus, Cancer ([facility] [address] Carotid artery stenosis, Chest pain, CHF (congestive heart failure) ([facility] [address] Chronic back pain, COPD exacerbation ([facility] [address] Depression, Diabetes mellitus ([facility] [address] Family history of coronary artery disease, Fatty liver, GI bleed, H/O aortic valve stenosis, H/O echocardiogram, Headache, Heart murmur, History of nuclear stress test, Morbid obesity ([facility] Neuropathy, NSTEMI (non-ST elevated myocardial infarction) ([facility] Obesity, Osteoarthritis, Palpitations, Peripheral edema, Restless legs syndrome, Sleep apnea, Sleep apnea, and SOB (shortness of breath). Recommendations:      1. Pulseless electrical activity/CODE BLUE with restoration of spontaneous circulation after ACLS protocol - get limited ECHO   2. Acute respiratory failure s/p mechanical ventilation intubation. Patient has severe COPD and morbid obesity  3. Severe morbid obesity with BMI of 63.84  4. Paroxysmal atrial fibrillation: Started on IV heparin drip and IV amiodarone drip. Patient converted to sinus rhythm this morning. Continue with IV heparin and amiodarone currently. 5. Shock: Unclear etiology ? Sepsis ? PE.  VQ scan pending. Patient on IV antibiotics and steroids  6.  Hyperlipidemia: Continue Lipitor  7. ?  Neurological function needs to be assessed post arrest.  8. Once patient is extubated and stable will proceed with ischemic evaluation with left heart cath     Thank you for the consult    Dr. Trell Madrigal  2/22/2021 12:40 PM

## 2021-02-22 NOTE — PROGRESS NOTES
Bedside report and pt was found unresponsive; with bipap taken off; tele leads pulled off; blue around mouth and blue fingers. No BP could be obtained or O2; called for staff assist and then RRT.

## 2021-02-22 NOTE — CARE COORDINATION
Attempted to reach patient's spouse Dustin Blood at [phone]; [facility] for return call. Contacted [facility] at [phone]; spoke to [place]. Patient would need precert to return.  Lillian Canseco RN

## 2021-02-22 NOTE — SIGNIFICANT EVENT
SIGNIFICANT EVENT:  CODE BLUE    CODE BLUE was called for [facility] Patient is a 63 y.o. female admitted for respiratory distress-acute on chronic respiratory failure with hypoxia-multifactorial COPD, CHF. Rapid response was called around 1928. As per RN patient was found unresponsive. Later turned into CODE BLUE. By the time I entered the room, CPR was in progress, patient received 1 dose of epi. Patient was connected to the monitor and was in PEA. Multiple rounds of epi was given. Random blood glucose 151. Patient was intubated by CRNA. ROSC after 15 minutes of coding. EKG revealed wide QRS complex. /109, oxygen saturation 94%. Amiodarone bolus and drip ordered. Patient transferred to ICU. Consult to cardiology, pulmonology. Vent bundle ordered. Discussed with family-patient's , daughter, her fiancé-explained the critical condition of the patient. Srinivas London was involved during my conversation. Family opted to make the patient DNR CCA. Patient seen and examined in 4116/4116-A.           VITALS  Vitals:    02/21/21 1944 02/21/21 1945 02/21/21 1946 02/21/21 1950   BP: (!) 233/109   (!) 184/83   Pulse:   119    Resp:       Temp:       TempSrc:       SpO2:  94%     Weight:       Height:          Rodrigo Washburn MD 2/21/2021 8:08 PM

## 2021-02-22 NOTE — PROGRESS NOTES
Daughter Trinh called and given update at this time. All questions answered at this time, no other concerns voiced.

## 2021-02-23 NOTE — CONSULTS
Comprehensive Nutrition Assessment    Type and Reason for Visit:  Initial, Consult    Nutrition Recommendations/Plan:   · Continue NPO for now    Nutrition Assessment:  RD consulted for EN order and manage 2/2 to the pt being vented. Pt is currently hemodynamically unstable and it is not clinically appropriate to have EN to started at this time. Will follow up tomorrow for the ability to start EN    Malnutrition Assessment:  Malnutrition Status:  No malnutrition    Context:  Chronic Illness       Estimated Daily Nutrient Needs:  Energy (kcal):  2399-9915; Weight Used for Energy Requirements:  Current     Protein (g):  118; Weight Used for Protein Requirements:  Ideal        Fluid (ml/day):  1715-7219; Method Used for Fluid Requirements:  1 ml/kcal     Wounds:  None       Current Nutrition Therapies:    DIET LOW SODIUM 2 GM; Anthropometric Measures:  · Height: 5' 6\" (167.6 cm)  · Current Body Weight: 395 lb (179.2 kg)   · Admission Body Weight: 395 lb (179.2 kg)    · Usual Body Weight: 364 lb (165.1 kg)     · Ideal Body Weight: 130 lbs; % Ideal Body Weight 303.8 %   · BMI: 63.8  · Adjusted Body Weight:  ; No Adjustment   · Adjusted BMI:      · BMI Categories: Obese Class 3 (BMI 40.0 or greater)       Nutrition Diagnosis:   · Inadequate energy intake related to impaired respiratory function as evidenced by NPO or clear liquid status due to medical condition, intubation      Nutrition Interventions:   Food and/or Nutrient Delivery:  Continue NPO  Nutrition Education/Counseling:  No recommendation at this time   Coordination of Nutrition Care:  Continue to monitor while inpatient    Goals:  pt will have a source of nutrition started in the next 7 days       Nutrition Monitoring and Evaluation:   Behavioral-Environmental Outcomes:      Food/Nutrient Intake Outcomes:  Diet Advancement/Tolerance  Physical Signs/Symptoms Outcomes:  None Identified     Discharge Planning:     Too soon to determine     Electronically signed by Nia Yañez RD, LD on 6/59/93 at 9:19 PM EST    Contact: [phone]

## 2021-02-23 NOTE — PROGRESS NOTES
Hospitalist Progress Note      Name:  Aylin Hollis /Age/Sex: 1955  (63 y.o. female)   MRN & CSN:  [id number] & [id number] Admission Date/Time: 2021 11:44 AM   Location:  -A PCP: Deepika Phipps is a 63 y.o.  female  who presents with Shortness of Breath ([place] power at [facility], on CPAP, desat'ed during poweroutage)      Assessment and Plan:   Acute on chronic respiratory failure with hypoxia likely multifactorial 2/2 COPD body habitus/hypervolemia  - intubated  night as she was found unresponsive on med-surg floor and code blue called  - IV Zithromax x 5 days  - IV steroids q8hrs  - sedation per protocol on vent  - pulm consulted  - b/l venous duplex: neg DVT.  Patient was not able to fit in CT scan to evaluate for PE  - check v/q scan  - CT head pending  - EEG pending    Possible Seizures  - IV Keppra 1000mg BID     Shock  - resolved  - levo gtt weaned off  - midodrine 10 TID                 Acute on Chronic Diastolic CHF  - echo: ef 67-98%   - IV lasix 40 BID  - strict I and O  - cardio consulted    JOSE  - consult nephro for diuresis and fluid mx     A. Fib  - stable  - heparin gtt  - amio gtt  - replace MG and K  - cardio consulted             Obesity- Body mass index is 66.18 kg/m². Lifestyle modifications needed  Hyperlipidemia - on Lipitor. Restless legs syndrome - on Mirapex.   Anemia of chronic disease  On iron supplementation  S/p TAVR            Diet No diet orders on file   Code Status DNR-CCA     Medications:   Medications:    famotidine (PEPCID) injection  20 mg Intravenous BID    chlorhexidine  15 mL Mouth/Throat BID    artificial tears   Both Eyes Q4H    levetiracetam  1,000 mg Intravenous Q12H    azithromycin  500 mg Intravenous Q24H    methylPREDNISolone  40 mg Intravenous Q8H    atorvastatin  40 mg Oral Nightly    busPIRone  10 mg Oral TID    clopidogrel  75 mg Oral Daily    midodrine  10 mg Oral TID     pramipexole  1 mg Oral BID    sodium chloride flush  10 mL Intravenous 2 times per day    furosemide  40 mg Intravenous BID      Infusions:    heparin (PORCINE) Infusion 12 Units/kg/hr (02/23/21 1860)     PRN Meds:     magnesium sulfate, 1,000 mg, PRN      ipratropium-albuterol, 3 mL, Q6H PRN      sodium chloride flush, 10 mL, PRN      promethazine, 12.5 mg, Q6H PRN    Or      ondansetron, 4 mg, Q6H PRN      polyethylene glycol, 17 g, Daily PRN      acetaminophen, 650 mg, Q6H PRN    Or      acetaminophen, 650 mg, Q6H PRN      hydrALAZINE (APRESOLINE) ivpb, 10 mg, Q6H PRN      heparin (porcine), 4,000 Units, PRN      heparin (porcine), 2,000 Units, PRN      Subjective:   Off sedation for EEG, not very responsive    Objective:        Intake/Output Summary (Last 24 hours) at 2/23/2021 1010  Last data filed at 2/23/2021 0957  Gross per 24 hour   Intake 2126 ml   Output 1495 ml   Net 631 ml      Vitals:   Vitals:    02/23/21 0811   BP:    Pulse:    Resp: 20   Temp:    SpO2: 93%     Physical Exam:   Gen:  Not very responsive   Head/Eyes:  Normocephalic atraumatic, EOMI   NECK:   symmetrical, trachea midline  LUNGS: Normal Effort on vent   CARDIOVASCULAR:  Normal rate  ABDOMEN:  non distended  MUSCULOSKELETAL:  ROM none  NEUROLOGIC: moves legs occasionally, otherwise no activity  SKIN:  no bruising or bleeding, normal skin color,  no redness      Data:       CBC   Recent Labs     02/21/21 1958 02/22/21  0535 02/23/21  0530   WBC 13.1* 12.6* 6.9   HGB 11.0* 9.8* 9.6*   HCT 41.7 33.0* 32.5*    200 191      BMP   Recent Labs     02/21/21 1958 02/22/21  0535 02/23/21  0530    140 134*   K 3.7 3.2* 4.1   CL 95* 97* 91*   CO2 20* 31 29   BUN 12 16 24*   CREATININE 1.2* 1.4* 1.9*         Electronically signed by Niurka Hunter MD on 2/23/2021 at 10:10 AM

## 2021-02-23 NOTE — PROGRESS NOTES
CARDIOLOGY PROGRESS NOTE                                                  Name:  India Mosqueda /Age/Sex: 1955  (63 y.o. female)   MRN & CSN:  [id number] & [id number] Admission Date/Time: 2021 11:44 AM   Location:  -A PCP: Stewart Higgins         Admit Date:  2021  Hospital Day: 3      SUBJECTIVE:   Seen patient as follow up as consultation for Cardiac arrest    intubated    TELEMETRY: Sinus       Intake/Output Summary (Last 24 hours) at 2021 1022  Last data filed at 2021 0957  Gross per 24 hour   Intake 2126 ml   Output 1495 ml   Net 631 ml       Assessment/Plan:       1. Pulseless electrical activity/CODE BLUE with restoration of spontaneous circulation after ACLS protocol - limited ECHO shows preserved LVEF. 2. Acute respiratory failure s/p mechanical ventilation intubation. Patient has severe COPD and morbid obesity  3. Severe morbid obesity with BMI of 63.84  4. Paroxysmal atrial fibrillation post arrest: Started on IV heparin drip and IV amiodarone drip. Will stop IV Amiodarone drip. Patient converted to sinus rhythm and rhythm stable . Continue with IV heparin until PE is ruled out, if okay with neurology  5. Hx of AS s/p TAVR 2017 - valve function shows mild gradient on echo, normal finding   6. Shock: Unclear etiology ? Sepsis ? PE.  VQ scan pending. Patient on IV antibiotics and steroids  7. Hyperlipidemia: Continue Lipitor  8. ?  Neurological function needs to be assessed post arrest.  9. Once patient is extubated and stable will proceed with ischemic evaluation with left heart cath      Echocardiogram 2021     This is a limited echocardiogram.   Left ventricular systolic function is normal.   Ejection fraction is visually estimated at 50-55%. Moderately dilated right heart. S/p TAVR: #29 Medtronic Evolut R inserted 2017; mean P mmHg. Mild tricuspid regurgitation is present. RVSP is 48 mmHg.    No evidence of any pericardial effusion. Pleural effusion present. Inferior vena cava is dilated, measuring at 3.8 cm, and does not collapse   with respiration. Dilated hepatic veins also. Past medical history:    has a past medical history of Acute kidney injury ([facility] [address] Anxiety, Aortic stenosis, Asthma, Bacteremia due to group B Streptococcus, Cancer ([facility] [address] Carotid artery stenosis, Chest pain, CHF (congestive heart failure) ([facility] [address] Chronic back pain, COPD exacerbation ([facility] [address] Depression, Diabetes mellitus ([facility] [address] Family history of coronary artery disease, Fatty liver, GI bleed, H/O aortic valve stenosis, H/O echocardiogram, Headache, Heart murmur, History of nuclear stress test, Morbid obesity ([facility] [address] Neuropathy, NSTEMI (non-ST elevated myocardial infarction) ([facility] [address] Obesity, Osteoarthritis, Palpitations, Peripheral edema, Restless legs syndrome, Sleep apnea, Sleep apnea, and SOB (shortness of breath). Past surgical history:   has a past surgical history that includes Cholecystectomy;  section; Hysterectomy; Tubal ligation; Neck surgery; Gallbladder surgery; Aortic valve replacement (2017); IR NONTUNNELED VASCULAR CATHETER > 5 YEARS (2020); and IR NONTUNNELED VASCULAR CATHETER > 5 YEARS (1/15/2021). Social History:   reports that she quit smoking about 17 years ago. Her smoking use included cigarettes. She has never used smokeless tobacco. She reports that she does not drink alcohol or use drugs. Family history:  family history includes Anxiety Disorder in her daughter; Coronary Art Dis in her mother; Depression in her brother, daughter, mother, niece, and sister; Diabetes in her mother; Heart Attack in her mother; Heart Disease in her brother, father, and mother; Heart Failure in her brother, father, and mother; High Blood Pressure in her brother, father, and mother; Hypertension in her mother; Obesity in her brother, father, and mother.     OBJECTIVE:     /62   Pulse 59   Temp 97.9 °F (36.6 °C) (Rectal) HGB 11.0* 9.8* 9.6*   HCT 41.7 33.0* 32.5*   MCV 93.5 84.4 82.5    200 191     BMP:   Recent Labs     02/21/21 1958 02/22/21  0535 02/23/21  0530    140 134*   K 3.7 3.2* 4.1   CL 95* 97* 91*   CO2 20* 31 29   BUN 12 16 24*   CREATININE 1.2* 1.4* 1.9*     LIVER PROFILE:   Recent Labs     02/21/21  1200 02/21/21 1958 02/22/21  0535   AST 9* 13* 20   ALT <5* <5* 7*   BILITOT 0.9 0.7 0.9   ALKPHOS 74 81 70     PT/INR:   Recent Labs     02/21/21  1200   PROTIME 16.6*   INR 1.37     APTT:   Recent Labs     02/22/21  1715 02/23/21  0230 02/23/21  0948   APTT 51.9* 58.0* 75.8*     BNP:  No results for input(s): BNP in the last 72 hours.       Sidney Schwartz MD 2/23/2021 10:22 AM

## 2021-02-23 NOTE — CONSULTS
Nephrology Service Consultation    Patient:  Rangel Clayton  MRN: [id number]  Consulting physician:  Spenser Lancaster MD  Reason for Consult: arf     History Obtained From:  electronic medical record  PCP: Aleksandra Live    HISTORY OF PRESENT ILLNESS:   The patient is a 63 y.o. female who presents with weakness and sob from ecf was not very interactive and concern co2 retention admit to [facility] and started bipap. Pt then acute decline and cardiac arrest needing cpr and vent. In above setting noted to have arf and renal asked to evaluate.  Baseline creat about 1.2-1.3    Past Medical History:        Diagnosis Date    Acute kidney injury ([facility] [address] 2019    Anxiety 2020    Aortic stenosis     Asthma     Bacteremia due to group B Streptococcus     [facility] in 2017 - felt to be due to cellulitis    Cancer [facility])     Cervical    Carotid artery stenosis     Chest pain     CH[facility]congestive [facility] failure) [facility]     Chronic back pain     COPD exacerbation ([facility] [address]     Depression     Diabetes mellitus ([facility] [address]     Family history of coronary artery disease     Fatty liver 10/27/2016    GI bleed 2017    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 - [facility] H/O aortic valve stenosis     H/O echocardiogram 2016    EF 55%, Aortic valve area is 0.84 cm2 with a mean gradient of 36 suggestive of severe aortic stenosis, mild to mos LVH    Headache     Heart murmur     History of nuclear stress test 2016    lexiscan-normal,EF70%    Morbid obesity ([facility])     BMI=73.72 kg/m2    Neuropathy     NSTEMI (non-ST elevated myocardial infarction) ([facility] [address] 2018    Obesity     Osteoarthritis     Palpitations     Peripheral edema     Restless legs syndrome     Sleep apnea     Has a CPAP    Sleep apnea     SOB (shortness of breath)        Past Surgical History:        Procedure Laterality Date    AORTIC VALVE REPLACEMENT  2017    29mm EvolutR TAVR     SECTION      CHOLECYSTECTOMY      GALLBLADDER SURGERY      HYSTERECTOMY      IR NONTUNNELED VASCULAR CATHETER  8/25/2020    IR NONTUNNELED VASCULAR CATHETER 8/25/2020 [facility] PROCEDURES    IR NONTUNNELED VASCULAR CATHETER  1/15/2021    IR NONTUNNELED VASCULAR CATHETER 1/15/2021 1200 [facility] SPECIAL PROCEDURES    NECK SURGERY      Tumor    TUBAL LIGATION         Medications:   Scheduled Meds:   famotidine (PEPCID) injection  20 mg Intravenous BID    chlorhexidine  15 mL Mouth/Throat BID    artificial tears   Both Eyes Q4H    levetiracetam  1,000 mg Intravenous Q12H    azithromycin  500 mg Intravenous Q24H    methylPREDNISolone  40 mg Intravenous Q8H    atorvastatin  40 mg Oral Nightly    busPIRone  10 mg Oral TID    clopidogrel  75 mg Oral Daily    midodrine  10 mg Oral TID WC    pramipexole  1 mg Oral BID    sodium chloride flush  10 mL Intravenous 2 times per day     Continuous Infusions:   sodium chloride 50 mL/hr at 02/23/21 1244    heparin (PORCINE) Infusion 12 Units/kg/hr (02/23/21 1041)     PRN Meds:.magnesium sulfate, ipratropium-albuterol, sodium chloride flush, promethazine **OR** ondansetron, polyethylene glycol, acetaminophen **OR** acetaminophen, hydrALAZINE (APRESOLINE) ivpb, heparin (porcine), heparin (porcine)    Allergies:  Patient has no known allergies. Social History:   TOBACCO:   reports that she quit smoking about 17 years ago. Her smoking use included cigarettes. She has never used smokeless tobacco.  ETOH:   reports no history of alcohol use. OCCUPATION:      Family History:       Problem Relation Age of Onset    Heart Failure Mother     Heart Attack Mother     Hypertension Mother     Coronary Art Dis Mother         Had PTCA w/stenting.     Heart Disease Mother     High Blood Pressure Mother     Obesity Mother     Diabetes Mother     Depression Mother     Heart Failure Father     Heart Disease Father     High Blood Pressure Father     Obesity Father     Heart Failure Brother     Heart Disease Brother     High Blood Pressure Brother     Obesity Brother     Depression Brother     Depression Sister     Depression Daughter     Anxiety Disorder Daughter     Depression Niece         suicide attempt       REVIEW OF SYSTEMS:  Negative except for sedated on vent. Physical Exam:    I/O: 02/22 0701 - 02/23 0700  In: 2048 [I.V.:1840]  Out: 1495 [Urine:975]    Vitals: BP (!) 120/53   Pulse 67   Temp 97.2 °F (36.2 °C) (Rectal)   Resp 20   Ht 5' 6\" (1.676 m)   Wt (!) 395 lb 8.1 oz (179.4 kg)   SpO2 97%   BMI 63.84 kg/m²   General appearance: sedated ett  HEENT: Head: Normal, normocephalic, atraumatic. Neck: supple, symmetrical, trachea midline  Lungs: diminished breath sounds bilaterally  Heart: S1, S2 normal  Abdomen: abnormal findings:  hypoactive bowel sounds  Extremities: edema trace  Neurologic: Mental status: alertness: sedated    CBC:   Recent Labs     02/21/21 1958 02/22/21  0535 02/23/21  0530   WBC 13.1* 12.6* 6.9   HGB 11.0* 9.8* 9.6*    200 191     BMP:    Recent Labs     02/21/21 1958 02/22/21  0535 02/23/21  0530    140 134*   K 3.7 3.2* 4.1   CL 95* 97* 91*   CO2 20* 31 29   BUN 12 16 24*   CREATININE 1.2* 1.4* 1.9*   GLUCOSE 208* 223* 206*     Hepatic:   Recent Labs     02/21/21  1200 02/21/21 1958 02/22/21  0535   AST 9* 13* 20   ALT <5* <5* 7*   BILITOT 0.9 0.7 0.9   ALKPHOS 74 81 70     Troponin: No results for input(s): TROPONINI in the last 72 hours.   Mg, Phos:   Recent Labs     02/22/21 0125 02/22/21  0535 02/23/21  0530   MG 1.3* 1.6* 2.1       ABGs:   Lab Results   Component Value Date    PO2ART 73 02/23/2021    OGG4LSE 46.0 02/23/2021     INR:   Recent Labs     02/21/21  1200   INR 1.37     -----------------------------------------------------------------      Assessment and Recommendations     Patient Active Problem List   Diagnosis Code    Morbid obesity with BMI of 70 and over, adult ([facility] E66.01, Z68.45    Dyslipidemia E78.5    Fecal incontinence R15.9    Mild intermittent asthma without complication M12.05    S/P laparoscopic cholecystectomy Z90.49    Type 2 diabetes mellitus without complication, with long-term current use of insulin ([facility]) E11.9, Z79.4    Fatty liver K76.0    Arthritis M19.90    H/O parotidectomy Z90.49    Venous stasis dermatitis of both lower extremities I87.2    Aortic stenosis I35.0    Morbid obesity ([facility]) E66.01    Asthma J45.909    Diabetes mellitus ([facility] E11.9    Hypertension I10    Sleep apnea G47.30    Family history of coronary artery disease Z82.49    Chest pain R07.9    Dyspnea R06.00    Palpitations R00.2    Peripheral edema R60.9    Hypervolemia E87.70    Chronic respiratory failure with hypoxia and hypercapnia ([facility]) J96.11, J96.12    Acute exacerbation of chronic obstructive pulmonary disease (COPD) ([facility]) J44.1    Chronic obstructive pulmonary disease ([facility]) J44.9    Gait disturbance R26.9    Acute on chronic respiratory failure with hypoxia and hypercapnia ([facility]) J96.21, J96.22    Cellulitis L03.90    Skin ulcer of left foot with fat layer exposed ([facility] [address] L97.522    Pneumonia J18.9    VHD (valvular heart disease) I38    Class 3 severe obesity due to excess calories with body mass index (BMI) greater than or equal to 70 in adult ([facility] [address] E66.01, Z68.45    SIRS (systemic inflammatory response syndrome) ([facility]) R65.10    Acute kidney injury ([facility] [address] N17.9    Acute metabolic encephalopathy L59.58    Shock, unspecified ([facility]) R57.9    Uncontrolled diabetes mellitus ([facility]) E11.65    Sepsis ([facility]) A41.9    Altered mental status R41.82    Wide-complex tachycardia ([facility]) I47.2    Chronic diastolic heart failure ([facility]) I50.32    S/P TAVR (transcatheter aortic valve replacement) Z95.2    Septic shock ([facility]) A41.9, R65.21    Acute kidney injury superimposed on CKD ([facility]) N17.9, N18.9    Bradycardia R00.1    Anxiety F41.9    Acute respiratory failure with hypoxia ([facility]) J96.01    Acute congestive heart failure (HCC) I50.9    Acute on chronic respiratory failure with hypoxia (HCC) J96.21     Imp/plan  1 arf from atn on ckd 3   2 resp failure on vent  3 cardiac arrest  4 htn  5 anemia    Plan  1 renal monitor with hydration hold diuretic  2 maintain on vent support  3 fu cardiac eval and rec  4 bp stable  5 monitor hb  For now ph stable and pco2 46, monitor acidosis and no acute dialysis   Will follow    Electronically signed by Arnulfo Pearce MD on 2/23/2021 at 3:43 PM

## 2021-02-23 NOTE — PROGRESS NOTES
07:40: called lab to see if they had aPTT and BMP samples. I was told by Stephan Lees they did and would be ran soon. 09:09 Called lab again as labs are not resulted. Hayder told me she did not know why the BMP was not ran and would look into it and that they did not have the aPTT sample and would need a new one sent with an order.

## 2021-02-23 NOTE — ED NOTES
Pt tolerating bipap well, pt takes bipap off and desats into 60% - notified phone to this RN, placed immediately back on , tolerates well again. Pt not very tolerated well. Pt breathing shallowly, and with difficulty. Crackles, diminished, shallow breath sounds, but pt is stable and states she is comfortable in the bed.      Ailyn Galloway RN  02/22/21 2014

## 2021-02-23 NOTE — PROGRESS NOTES
pulmonary      SUBJECTIVE: intubated on vent     OBJECTIVE    VITALS:  /62   Pulse 58   Temp 97.9 °F (36.6 °C) (Rectal)   Resp 20   Ht 5' 6\" (1.676 m)   Wt (!) 395 lb 8.1 oz (179.4 kg)   SpO2 97%   BMI 63.84 kg/m²   HEAD AND FACE EXAM:  No throat injection, no active exudate,no thrush  NECK EXAM;No JVD, no masses, symmetrical  CHEST EXAM; Expansion equal and symmetrical, no masses  LUNG EXAM; Good breath sounds bilaterally. There are expiratory wheezes both lungs, there are crackles at both lung bases  CARDIOVASCULAR EXAM: Positive S1 and S2, no S3 or S4, no clicks ,no murmurs  RIGHT AND LEFT LOWER EXTRIMITY EXAM: No edema, no swelling, no inflamation            LABS   Lab Results   Component Value Date    WBC 6.9 02/23/2021    HGB 9.6 (L) 02/23/2021    HCT 32.5 (L) 02/23/2021    MCV 82.5 02/23/2021     02/23/2021     Lab Results   Component Value Date    CREATININE 1.4 (H) 02/22/2021    BUN 16 02/22/2021     02/22/2021    K 3.2 (L) 02/22/2021    CL 97 (L) 02/22/2021    CO2 31 02/22/2021     Lab Results   Component Value Date    INR 1.37 02/21/2021    PROTIME 16.6 (H) 02/21/2021          Lab Results   Component Value Date    PHOS 2.1 01/24/2021    PHOS 2.4 01/23/2021    PHOS 3.2 07/02/2020        Recent Labs     02/21/21  2115 02/22/21  0600 02/23/21  0600   PH 7.27* 7.57* 7.44   PO2ART 94 109* 73*   IEI0GTN 48.0* 34.0 46.0*   O2SAT 94.8* 96.9 92.4*         Wt Readings from Last 3 Encounters:   02/22/21 (!) 395 lb 8.1 oz (179.4 kg)   01/22/21 (!) 2 lb 3.3 oz (1 kg)   09/01/20 (!) 379 lb (171.9 kg)               ASSESMENT  Ac resp failure  Ac copd  Ac chf        PLAN  1. cpm  2. Cont full vent support  3.  Tube feed  4. cxr pending    2/23/2021  Nikunj Hernandez M.D.

## 2021-02-23 NOTE — CARE COORDINATION
Contacted spouse Beverly Loco by phone ([phone]). He stated that he understood her situation - if she is capable of recovery that he would have her return to [facility] but if she is declared [place] dead\" he would like to take the patient home. CM reinforced patient status (intubated/ventilated) and will contact spouse with any changes R/T discharge.  Hoang Rosales RN

## 2021-02-24 NOTE — PROGRESS NOTES
02/24/21 0408   Vent Information   Vent Type 980   Vent Mode AC/VC   Vt Ordered 500 mL   Rate Set 20 bmp   Peak Flow 55 L/min   Pressure Support 0 cmH20   FiO2  50 %   SpO2 98 %   SpO2/FiO2 ratio 196   Sensitivity 4   PEEP/CPAP 5   I Time/ I Time % 0 s   Humidification Source HME   Vent Patient Data   High Peep/I Pressure 0   Peak Inspiratory Pressure 29 cmH2O   Mean Airway Pressure 12 cmH20   Rate Measured 22 br/min   Vt Exhaled 512 mL   Minute Volume 11.1 Liters   I:E Ratio 1:1.80   Cough/Sputum   Sputum How Obtained Endotracheal;Suctioned   $Obtained Sample $Induced Sputum   Cough Productive   Sputum Amount Small   Sputum Color Dark red   Tenacity Thick   Spontaneous Breathing Trial (SBT) RT Doc   Pulse 87   Additional Respiratory  Assessments   Resp 22   Alarm Settings   High Pressure Alarm 45 cmH2O   Delay Alarm 20 sec(s)   Low Minute Volume Alarm 2.5 L/min   Apnea (secs) 20 secs   High Respiratory Rate 40 br/min   Low Exhaled Vt  200 mL   ETT (adult)   Placement Date/Time: 02/21/21 2021   Timeout: Patient;Procedure;Site/Side;Consent Confirmed  Preoxygenation: Yes  Mask Ventilation: Ventilated by mask (1)  Technique: Stylet;Direct laryngoscopy  Type: Cuffed  Tube Size: 7.5 mm  Location: Oral  Inserti. ..    Secured at 21 cm   Measured From Lips   ET Placement Right   Secured By Commercial tube cabral   Site Condition Dry   Cuff Pressure   (Minimal leak)

## 2021-02-24 NOTE — CONSULTS
Endocrinology   Consult Note      Dear Doctor Moriah Walls    Thank You for the Consult     Pt. Was Admitted for : Initially admitted for respiratory failure but subsequently had cardiac arrest with a CODE BLUE    Reason for Consult: Better control of blood glucose      History Obtained From:  EMR       HISTORY OF PRESENT ILLNESS:                The patient is a 63 y.o. female with significant past medical history of COPD, cervical cancer, carotid artery stenosis, congestive heart failure, aortic stenosis and had aortic valve replaced,, diabetes mellitus neuropathy was initially admitted to hospital with history of severe shortness of breath to medical [facility]. She has cardiac arrest with CODE BLUE was called in. Patient was intubated and placed on ventilator. Change status to DNR CCA. I was  consulted for better control of blood glucose. ROS:   Pt's ROS done in detail. Abnormal ROS are noted in Medical and Surgical History Section below:      Other Medical History:        Diagnosis Date    Acute kidney injury ([facility] [address] 8/22/2019    Anxiety 9/1/2020    Aortic stenosis     Asthma     Bacteremia due to group B Streptococcus     Treated at [facility] in 2017 - felt to be due to cellulitis    Cancer [facility])     Cervical    Carotid artery stenosis     Chest pain     CHF [facility]congestive heart failure) ([facility]     Chronic back pain     COPD exacerbation ([address]     Depression     Diabetes mellitus (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 - [facility] at [facility] H/O aortic valve stenosis     H/O echocardiogram 11/22/2016    EF 55%, Aortic valve area is 0.84 cm2 with a mean gradient of 36 suggestive of severe aortic stenosis, mild to mos LVH    Headache     Heart murmur     History of nuclear stress test 11/17/2016    lexiscan-normal,EF70%    Morbid obesity (HCC)     BMI=73.72 kg/m2    Neuropathy     NSTEMI (non-ST elevated myocardial infarction) ([facility] [address] 2018    Obesity     Osteoarthritis     Palpitations     Peripheral edema     Restless legs syndrome     Sleep apnea     Has a CPAP    Sleep apnea     SOB (shortness of breath)      Surgical History:        Procedure Laterality Date    AORTIC VALVE REPLACEMENT  2017    29mm EvolutR TAVR     SECTION      CHOLECYSTECTOMY      GALLBLADDER SURGERY      HYSTERECTOMY      IR NONTUNNELED VASCULAR CATHETER  2020    IR NONTUNNELED VASCULAR CATHETER 2020 [facility] SPECIAL PROCEDURES    IR NONTUNNELED VASCULAR CATHETER  1/15/2021    IR NONTUNNELED VASCULAR CATHETER 1/15/2021 [facility] SPECIAL PROCEDURES    NECK SURGERY      Tumor    TUBAL LIGATION         Allergies:  Patient has no known allergies. Family History:       Problem Relation Age of Onset    Heart Failure Mother     Heart Attack Mother     Hypertension Mother     Coronary Art Dis Mother         Had PTCA w/stenting.  Heart Disease Mother     High Blood Pressure Mother     Obesity Mother     Diabetes Mother     Depression Mother     Heart Failure Father     Heart Disease Father     High Blood Pressure Father     Obesity Father     Heart Failure Brother     Heart Disease Brother     High Blood Pressure Brother     Obesity Brother     Depression Brother     Depression Sister     Depression Daughter     Anxiety Disorder Daughter     Depression Niece         suicide attempt     REVIEW OF SYSTEMS:  Review of System Done as noted above     PHYSICAL EXAM:      Vitals:    BP (!) 108/44   Pulse 70   Temp 97.7 °F (36.5 °C) (Rectal)   Resp 20   Ht 5' 6\" (1.676 m)   Wt (!) 395 lb 8.1 oz (179.4 kg)   SpO2 93%   BMI 63.84 kg/m²     CONSTITUTIONAL: Patient is sedated, intubated and on ventilator EYES:  vision intact Fundoscopic Exam not performed   ENT:Normal  NECK:  Supple, No JVD.    Thyroid Exam:Normal   LUNGS:  Has Vesicular Breath Sounds has some wheezing in the lungs patient is intubated and on ventilator  CARDIOVASCULAR:  Normal apical impulse, regular rate and rhythm, normal S1 and S2, no S3 or S4, and has no  murmur   ABDOMEN:  No scars, normal bowel sounds, soft, non-distended, non-tender, no masses palpated, no hepatolienomegaly  Musculoskeletal: Normal  Extremities: Normal, peripheral pulses normal, , has no edema   NEUROLOGIC: Patient is sedated, intubated and on ventilator l.     DATA:    CBC:   Recent Labs     02/21/21 1958 02/22/21  0535 02/23/21  0530   WBC 13.1* 12.6* 6.9   HGB 11.0* 9.8* 9.6*    200 191    CMP:  Recent Labs     02/21/21  1200 02/21/21 1958 02/22/21  0535 02/23/21  0530    137 140 134*   K 3.7 3.7 3.2* 4.1   CL 95* 95* 97* 91*   CO2 33* 20* 31 29   BUN 11 12 16 24*   CREATININE 1.1 1.2* 1.4* 1.9*   CALCIUM 8.9 8.0* 8.3 8.2*   PROT 8.4* 7.5 7.1  --    LABALBU 3.5 3.3* 3.0*  --    BILITOT 0.9 0.7 0.9  --    ALKPHOS 74 81 70  --    AST 9* 13* 20  --    ALT <5* <5* 7*  --      Lipids:   Lab Results   Component Value Date    CHOL 151 01/14/2021    HDL 46 01/14/2021    TRIG 94 01/14/2021     Glucose:   Recent Labs     02/21/21 1941 02/23/21  0948   POCGLU 151* 229*     Hemoglobin A1C:   Lab Results   Component Value Date    LABA1C 6.1 01/14/2021     Free T4:   Lab Results   Component Value Date    T4FREE 1.07 09/04/2018     Free T3: No results found for: FT3  TSH High Sensitivity:   Lab Results   Component Value Date    TSHHS 0.685 02/23/2021       Echocardiogram Limited    Result Date: 2/22/2021  Transthoracic Echocardiography Report (TTE)  Demographics   Patient Name       [facility] Date of Study       02/22/2021   Date of Birth      1955         Gender              Female   Age                63 year(s)         Race                   Patient Number     [id number]         Room Number         2112   Visit Number       [id number]   Corporate ID       [id number]   Accession Number   [id number]         [id number] Artur Newton   Ordering Physician Bernardino Horan MD                 Physician           MD  Procedure Type of Study   TTE procedure:ECHOCARDIOGRAM LIMITED. Procedure Date Date: 2021 Start: 02:36 PM Study Location: Portable Technical Quality: Limited visualization due to body habitus. Indications:Cardiac arrest. Patient Status: Routine Contrast Medium: Definity. Amount - 5 ml Height: 66 inches Weight: 410 pounds BSA: 2.71 m2 BMI: 66.18 kg/m2 HR: 55 bpm BP: 102/51 mmHg  Conclusions   Summary  This is a limited echocardiogram.  Left ventricular systolic function is normal.  Ejection fraction is visually estimated at 50-55%. Moderately dilated right heart. S/p TAVR: #29 Medtronic Evolut R inserted 2017; mean P mmHg. Mild tricuspid regurgitation is present. RVSP is 48 mmHg. No evidence of any pericardial effusion. Pleural effusion present. Inferior vena cava is dilated, measuring at 3.8 cm, and does not collapse  with respiration. Dilated hepatic veins also. Signature   ------------------------------------------------------------------  Electronically signed by Abel Sage MD (Interpreting  physician) on 2021 at 05:34 PM       Xr Chest Portable    Result Date: 2021  EXAMINATION: ONE XRAY VIEW OF THE CHEST 2021 5:40 am COMPARISON: 2021 2324 hours HISTORY: ORDERING SYSTEM PROVIDED HISTORY: vent TECHNOLOGIST PROVIDED HISTORY: Reason for exam:->vent       1. Right more than left lung interstitial and airspace opacities compatible multifocal pneumonia as well as pulmonary edema. 2. Stable cardiomegaly. Xr Chest Portable    Result Date: 2021  EXAMINATION: ONE XRAY VIEW OF THE CHEST 2021 11:30 pm     Interval placement of right central line with its distal tip projecting within the distal SVC.  The remainder of the findings are stable. Xr Chest Portable    Result Date: 2/21/2021  EXAMINATION: ONE XRAY VIEW OF THE CHEST 2/21/2021 8:21 pm micheal. Xr Chest Portable    Result Date: 2/21/2021  EXAMINATION: ONE XRAY VIEW OF THE CHEST 2/21/2021 12:23 pm COMPARISON: 01/22/2021. HISTORY: ORDERING SYSTEM PROVIDED HISTORY: Short of breath TECHNOLOGIST PROVIDED HISTORY: Reason for exam:->Short of breath Reason for Exam: sob FINDINGS: The heart size is enlarged. The pulmonary vasculature is congested. No acute infiltrates are seen. No pneumothoraces are noted. Note is made of a TAVR. 1. Cardiomegaly with vascular congestion. Vl Dup Lower Extremity Venous Bilateral    Result Date: 2/22/2021  EXAMINATION: DUPLEX VENOUS ULTRASOUND OF THE BILATERAL LOWER EXTREMITIES, 2/22/2021 6:23 am   .     No evidence of DVT in either lower extremity with reservation that the study is markedly suboptimal with lack of visualization of all venous structures within the lower extremities as described above.        Scheduled Medicines   Medications:    famotidine (PEPCID) injection  20 mg Intravenous BID    chlorhexidine  15 mL Mouth/Throat BID    artificial tears   Both Eyes Q4H    levetiracetam  1,000 mg Intravenous Q12H    azithromycin  500 mg Intravenous Q24H    methylPREDNISolone  40 mg Intravenous Q8H    atorvastatin  40 mg Oral Nightly    busPIRone  10 mg Oral TID    clopidogrel  75 mg Oral Daily    midodrine  10 mg Oral TID WC    pramipexole  1 mg Oral BID    sodium chloride flush  10 mL Intravenous 2 times per day      Infusions:    sodium chloride 50 mL/hr at 02/23/21 1244    heparin (PORCINE) Infusion 14 Units/kg/hr (02/23/21 2314)         IMPRESSION    Patient Active Problem List   Diagnosis    Morbid obesity with BMI of 70 and over, adult ([facility] [address]    Dyslipidemia    Fecal incontinence    Mild intermittent asthma without complication    S/P laparoscopic cholecystectomy    Type 2 diabetes mellitus without complication, with long-term current use of insulin (HCC)    Fatty liver    Arthritis    H/O parotidectomy    Venous stasis dermatitis of both lower extremities    Aortic stenosis    Morbid obesity (HCC)    Asthma    Diabetes mellitus ([address]    Hypertension    Sleep apnea    Family history of coronary artery disease    Chest pain    Dyspnea    Palpitations    Peripheral edema    Hypervolemia    Chronic respiratory failure with hypoxia and hypercapnia (HCC)    Acute exacerbation of chronic obstructive pulmonary disease (COPD) (HCC)    Chronic obstructive pulmonary disease (HCC)    Gait disturbance    Acute on chronic respiratory failure with hypoxia and hypercapnia (HCC)    Cellulitis    Skin ulcer of left foot with fat layer exposed ([address]    Pneumonia    VHD (valvular heart disease)    Class 3 severe obesity due to excess calories with body mass index (BMI) greater than or equal to 70 in adult (HCC)    SIRS (systemic inflammatory response syndrome) ([facility]    Acute kidney injury ([address]    Acute metabolic encephalopathy    Shock, unspecified ([address]    Uncontrolled diabetes mellitus ([address]    Sepsis ([address]    Altered mental status    Wide-complex tachycardia (HCC)    Chronic diastolic heart failure (HCC)    S/P TAVR (transcatheter aortic valve replacement)    Septic shock (HCC)    Acute kidney injury superimposed on CKD (HCC)    Bradycardia    Anxiety    Acute respiratory failure with hypoxia (HCC)    Acute congestive heart failure (HCC)    Acute on chronic respiratory failure with hypoxia (HCC)         RECOMMENDATIONS:      1. Reviewed POC blood glucose . Labs and X ray results   2. Reviewed Home and Current Medicines   3. Will Start On Correction bolus Humalog/ Lantus Insulin regime  4. Monitor Blood glucose frequently   5. Modify  the dose of Insulin  as needed        Will follow with you  Again thank you for sharing pt's care with me.      Truly yours,       Masiha Martinez MD

## 2021-02-24 NOTE — CONSULTS
[facility] Palliative Medicine Consultation    Debra Haynes  1955  [id number]    PCP: Sheryl Colon    Reason for Consult:      __x__ Advance Care Planning  __x__ Transition of Care Planning  __x__ Psychosocial Support  __x__ Symptom Management    Recommendations: See impression for details     1. Continue with the excellent care of this patient with acute on chronic respiratory failure with hypoxia, congestive heart failure, status post cardiac arrest.    2.    Facility will remain a full code at this time unable to read family members to discuss CODE STATUS and her overall health after several attempts at both phone numbers and records    3. Palliative care will continue to follow for goals of care and family support. Requesting Physician:  Yue Major MD    CHIEF COMPLAINT:  Worsening SOB,   History Obtained From:  electronic records review    HISTORY OF PRESENT ILLNESS: Tiffanie Garrido is a 61-year-old female who presented to the emergency room on 2/21/2021 from [address] [place] [address] for 3-week onset of shortness of breath that have progressively worsened. Apparently she is normally on BiPAP however due to power outage at [facility] overnight she did not receive her BIPAP. PMH: CHF, COPD, ANMOL on CPAP    In the ED she denied any chest pain or palpitations. She is did appear to be more anxious, she stated anxiety was coming from not being able to wear her BiPAP overnight. She did state that once power was restored and she was able to wear her BiPAP her shortness of breath did significantly improved. ECF had reported that her O2 sats have been between 70 and 80%. Her baseline nasal cannula at 4 L/min 24/7. She is being treated at the nursing home for a lower left lung infiltrate for which she is taking doxy twice daily. Her O2 sat on arrival was noted at 78%. She was started on BiPAP and significantly improved her O2 sat into the mid 90s. In the emergency room her rapid Covid test was negative. echocardiogram 2016    EF 55%, Aortic valve area is 0.84 cm2 with a mean gradient of 36 suggestive of severe aortic stenosis, mild to mos LVH    Headache     Heart murmur     History of nuclear stress test 2016    lexiscan-normal,EF70%    Morbid obesity (HCC)     BMI=73.72 kg/m2    Neuropathy     NSTEMI (non-ST elevated myocardial infarction) ([facility] [address] 2018    Obesity     Osteoarthritis     Palpitations     Peripheral edema     Restless legs syndrome     Sleep apnea     Has a CPAP    Sleep apnea     SOB (shortness of breath)        Social History:    Social History     Socioeconomic History    Marital status:      Spouse name: Not on file    Number of children: Not on file    Years of education: Not on file    Highest education level: Not on file   Occupational History    Not on file   Social Needs    Financial resource strain: Not on file    Food insecurity     Worry: Not on file     Inability: Not on file    Transportation needs     Medical: Not on file     Non-medical: Not on file   Tobacco Use    Smoking status: Former Smoker     Types: Cigarettes     Quit date: 3/19/2003     Years since quittin.9    Smokeless tobacco: Never Used    Tobacco comment: quit 15 years ago   Substance and Sexual Activity    Alcohol use: No    Drug use: No    Sexual activity: Never   Lifestyle    Physical activity     Days per week: Not on file     Minutes per session: Not on file    Stress: Not on file   Relationships    Social connections     Talks on phone: Not on file     Gets together: Not on file     Attends Confucianism service: Not on file     Active member of club or organization: Not on file     Attends meetings of clubs or organizations: Not on file     Relationship status: Not on file    Intimate partner violence     Fear of current or ex partner: Not on file     Emotionally abused: Not on file     Physically abused: Not on file     Forced sexual activity: Not on file Other Topics Concern    Not on file   Social History Narrative    ** Merged History Encounter **            Family History:    Family History   Problem Relation Age of Onset    Heart Failure Mother     Heart Attack Mother     Hypertension Mother     Coronary Art Dis Mother         Had PTCA w/stenting.     Heart Disease Mother     High Blood Pressure Mother     Obesity Mother     Diabetes Mother     Depression Mother     Heart Failure Father     Heart Disease Father     High Blood Pressure Father     Obesity Father     Heart Failure Brother     Heart Disease Brother     High Blood Pressure Brother     Obesity Brother     Depression Brother     Depression Sister     Depression Daughter     Anxiety Disorder Daughter     Depression Niece         suicide attempt       Old records: reviewed    No Known Allergies           Current Medications:      Current Facility-Administered Medications:     predniSONE (DELTASONE) tablet 20 mg, 20 mg, Oral, Daily **FOLLOWED BY** [START ON 2/27/2021] predniSONE (DELTASONE) tablet 15 mg, 15 mg, Oral, Daily **FOLLOWED BY** [START ON 3/2/2021] predniSONE (DELTASONE) tablet 10 mg, 10 mg, Oral, Daily **FOLLOWED BY** [START ON 3/5/2021] predniSONE (DELTASONE) tablet 5 mg, 5 mg, Oral, Daily, Bienvenido Quintero MD    levoFLOXacin (LEVAQUIN) 750 MG/150ML infusion 750 mg, 750 mg, Intravenous, Q24H, Bienvenido Quintero MD    linezolid (ZYVOX) IVPB 600 mg, 600 mg, Intravenous, Q12H, Bienvenido Quintero MD    piperacillin-tazobactam (ZOSYN) 3,375 mg in dextrose 5 % 50 mL IVPB extended infusion (mini-bag), 3,375 mg, Intravenous, Q8H, Bienvenido Quintero MD    famotidine (PEPCID) injection 20 mg, 20 mg, Intravenous, BID, Kimberlyn Kuo MD, 20 mg at 02/24/21 0913    chlorhexidine (PERIDEX) 0.12 % solution 15 mL, 15 mL, Mouth/Throat, BID, Kimberlyn Kuo MD, 15 mL at 02/24/21 0913    lubrifresh P.M. (artificial tears) ophthalmic ointment, , Both Eyes, Q4H, Kimberlyn Kuo MD, Given at 02/24/21 0727    0.9 % sodium chloride infusion, , Intravenous, Continuous, Keshav Escalona MD, Last Rate: 50 mL/hr at 02/24/21 0928, New Bag at 02/24/21 0928    insulin lispro (HUMALOG) injection vial 0-12 Units, 0-12 Units, Subcutaneous, Q4H, Mejia Lee MD, 4 Units at 02/24/21 0919    magnesium sulfate 1000 mg in dextrose 5% 100 mL IVPB, 1,000 mg, Intravenous, PRN, Praful Torres PA-C, Stopped at 02/22/21 0852    levETIRAcetam (KEPPRA) 1,000 mg in sodium chloride 0.9 % 100 mL IVPB, 1,000 mg, Intravenous, Q12H, Faheem Ferguson MD, Stopped at 02/24/21 0943    atorvastatin (LIPITOR) tablet 40 mg, 40 mg, Oral, Nightly, Jesusita Lights, APRN - CNP, 40 mg at 02/23/21 2153    busPIRone (BUSPAR) tablet 10 mg, 10 mg, Oral, TID, Jesusita Lights, APRN - CNP, 10 mg at 02/24/21 0913    clopidogrel (PLAVIX) tablet 75 mg, 75 mg, Oral, Daily, Jesusita Lights, APRN - CNP, 75 mg at 02/24/21 0913    ipratropium-albuterol (DUONEB) nebulizer solution 3 mL, 3 mL, Inhalation, Q6H PRN, Jesusita Lights, APRN - CNP, 3 mL at 02/21/21 2157    midodrine (PROAMATINE) tablet 10 mg, 10 mg, Oral, TID WC, Suzie Morrissey MD, 10 mg at 02/23/21 1749    pramipexole (MIRAPEX) tablet 1 mg, 1 mg, Oral, BID, Jesusita Lights, APRN - CNP, 1 mg at 02/24/21 0913    sodium chloride flush 0.9 % injection 10 mL, 10 mL, Intravenous, 2 times per day, Jesusita Lights, APRN - CNP, 10 mL at 02/24/21 1157    sodium chloride flush 0.9 % injection 10 mL, 10 mL, Intravenous, PRN, Jesusita Lights, APRN - CNP    promethazine (PHENERGAN) tablet 12.5 mg, 12.5 mg, Oral, Q6H PRN **OR** ondansetron (ZOFRAN) injection 4 mg, 4 mg, Intravenous, Q6H PRN, Jesusita Lights, APRN - CNP    polyethylene glycol (GLYCOLAX) packet 17 g, 17 g, Oral, Daily PRN, Jesusita Lights, APRN - CNP    acetaminophen (TYLENOL) tablet 650 mg, 650 mg, Oral, Q6H PRN, 650 mg at 02/24/21 0924 **OR** acetaminophen (TYLENOL) suppository 650 mg, 650 mg, Rectal, Q6H PRN, Jesusita Lights, APRN - CNP    hydrALAZINE 1.4* 1.9* 2.1*   CALCIUM 8.3 8.2* 8.1*   GLUCOSE 223* 206* 195*      ABG:  Recent Labs     02/22/21  0600 02/23/21  0600 02/24/21  0600   PH 7.57* 7.44 7.42   PO2ART 109* 73* 78   ZPK2QUB 34.0 46.0* 48.0*   O2SAT 96.9 92.4* 93.7*     BNP  No results found for: BNP   D-Dimer:  Lab Results   Component Value Date    DDIMER 633 (H) 02/21/2021        Cultures:  2/24/2021 blood cultures 1 in 2 no growth to date  2/24/2021 Legionella antigen negative  2/24/2021 strep pneumoniae antigen negative    Radiology studies this hospitalization:  2/24/2021 CT head without contrast  No hemorrhage, exam is limited secondary to bone and soft tissue attenuation artifact, no obvious artifact. Sinusitis and mastoiditis present    2/24/2021 portable chest x-ray  No significant interval changes. Persistent multilobar airspace opacities most pronounced in the lung bases. Differential includes multilobar pneumonia and arts. Small bilateral pleural effusions. Stable mild enlargement of cardiac silhouette    2/23/2021 portable chest x-ray  Right more than left lung interstitial and airspace opacities compatible with multifocal pneumonia as well as pulmonary edema. Stable cardiomegaly    Assessment/plan:    1. Symptom Management/Pain Control:  Patient is obtunded, currently ventilated and not on sedation. 2. Plan/goals of care:  Patient lives at [facility] intermittently for the last year as she has been in and out of the hospital with UTIs and has required rehab after hospital discharge. Plans at discharge are unknown at this time due to patient's poor prognosis. Attempted to reach  Luca Puga at [phone] without success, attempted to reach daughter Trinh at [phone], multiple attempts phone rings busy    3. CODE STATUS:  DNR CCA  POA  Luca Puga    4.  Other Recommendations:  Please call with any palliative questions or concerns    Time spent assessing patient, reviewing old charts, speaking to medical team and consultants, speaking with nursing staff, attempt to reach family, 65 minutes.

## 2021-02-24 NOTE — PROGRESS NOTES
Alfred Conley MD.  Section of General Neurology - Adult  Consult Note        Reason for Consult:    Requesting Physician:  No referring provider defined for this encounter.   Thank you for your kind referral.    Pt remains obtunded    Pt barely opens eyes on calling her name    Pt squeezed fingers     Past Medical History:        Diagnosis Date    Acute kidney injury ([facility] 2019    Anxiety 2020    Aortic stenosis     Asthma     Bacteremia due to group B Streptococcus     T[facility] - felt to be due to cellulitis    Cancer [facility])     Cervical    Carotid artery stenosis     Chest pain     CHF (congestive heart failure) ([facility])     Chronic back pain     COPD exacerbation ([facility]     Depression     Diabetes mellitus ([facility]     Family history of coronary artery disease     Fatty liver 10/27/2016    GI bleed 2017    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 - [facility] H/O aortic valve stenosis     H/O echocardiogram 2016    EF 55%, Aortic valve area is 0.84 cm2 with a mean gradient of 36 suggestive of severe aortic stenosis, mild to mos LVH    Headache     Heart murmur     History of nuclear stress test 2016    lexiscan-normal,EF70%    Morbid obesity ([facility])     BMI=73.72 kg/m2    Neuropathy     NSTEMI (non-ST elevated myocardial infarction) ([facility] 2018    Obesity     Osteoarthritis     Palpitations     Peripheral edema     Restless legs syndrome     Sleep apnea     Has a CPAP    Sleep apnea     SOB (shortness of breath)      Past Surgical History:        Procedure Laterality Date    AORTIC VALVE REPLACEMENT  2017    29mm EvolutR TAVR     SECTION      CHOLECYSTECTOMY      GALLBLADDER SURGERY      HYSTERECTOMY      IR NONTUNNELED VASCULAR CATHETER  2020    IR NONTUNNELED VASCULAR CATHETER 2020 [facility]ED[facility]    IR NONTUNNELED VASCULAR CATHETER  1/15/2021    IR NONTUNNELED VASCULAR CATHETER 1/15/2021 [facility] SPECIAL PROCEDURES    NECK SURGERY      Tumor    TUBAL LIGATION       Current Medications:   Current Facility-Administered Medications: famotidine (PEPCID) injection 20 mg, 20 mg, Intravenous, BID  chlorhexidine (PERIDEX) 0.12 % solution 15 mL, 15 mL, Mouth/Throat, BID  lubrifresh P.M. (artificial tears) ophthalmic ointment, , Both Eyes, Q4H  0.9 % sodium chloride infusion, , Intravenous, Continuous  magnesium sulfate 1000 mg in dextrose 5% 100 mL IVPB, 1,000 mg, Intravenous, PRN  levETIRAcetam (KEPPRA) 1,000 mg in sodium chloride 0.9 % 100 mL IVPB, 1,000 mg, Intravenous, Q12H  azithromycin (ZITHROMAX) 500 mg in dextrose 5 % 250 mL IVPB, 500 mg, Intravenous, Q24H  methylPREDNISolone sodium (SOLU-MEDROL) injection 40 mg, 40 mg, Intravenous, Q8H  atorvastatin (LIPITOR) tablet 40 mg, 40 mg, Oral, Nightly  busPIRone (BUSPAR) tablet 10 mg, 10 mg, Oral, TID  clopidogrel (PLAVIX) tablet 75 mg, 75 mg, Oral, Daily  ipratropium-albuterol (DUONEB) nebulizer solution 3 mL, 3 mL, Inhalation, Q6H PRN  midodrine (PROAMATINE) tablet 10 mg, 10 mg, Oral, TID WC  pramipexole (MIRAPEX) tablet 1 mg, 1 mg, Oral, BID  sodium chloride flush 0.9 % injection 10 mL, 10 mL, Intravenous, 2 times per day  sodium chloride flush 0.9 % injection 10 mL, 10 mL, Intravenous, PRN  promethazine (PHENERGAN) tablet 12.5 mg, 12.5 mg, Oral, Q6H PRN **OR** ondansetron (ZOFRAN) injection 4 mg, 4 mg, Intravenous, Q6H PRN  polyethylene glycol (GLYCOLAX) packet 17 g, 17 g, Oral, Daily PRN  acetaminophen (TYLENOL) tablet 650 mg, 650 mg, Oral, Q6H PRN **OR** acetaminophen (TYLENOL) suppository 650 mg, 650 mg, Rectal, Q6H PRN  hydrALAZINE (APRESOLINE) 10 mg in sodium chloride 0.9 % 50 mL ivpb, 10 mg, Intravenous, Q6H PRN  heparin (porcine) injection 4,000 Units, 4,000 Units, Intravenous, PRN  heparin (porcine) injection 2,000 Units, 2,000 Units, Intravenous, PRN  heparin 25,000 units in dextrose 5% 250 mL (premix) infusion, 5-30 Units/kg/hr, Intravenous, Continuous  Allergies:  Patient has no known allergies. Social History:  TOBACCO:   reports that she quit smoking about 17 years ago. Her smoking use included cigarettes. She has never used smokeless tobacco.  ETOH:   reports no history of alcohol use. DRUGS:   reports no history of drug use. Family History:       Problem Relation Age of Onset    Heart Failure Mother     Heart Attack Mother     Hypertension Mother     Coronary Art Dis Mother         Had PTCA w/stenting.  Heart Disease Mother     High Blood Pressure Mother     Obesity Mother     Diabetes Mother     Depression Mother     Heart Failure Father     Heart Disease Father     High Blood Pressure Father     Obesity Father     Heart Failure Brother     Heart Disease Brother     High Blood Pressure Brother     Obesity Brother     Depression Brother     Depression Sister     Depression Daughter     Anxiety Disorder Daughter     Depression Niece         suicide attempt       REVIEW OF SYSTEMS:  CONSTITUTIONAL:  negative  HEENT:  negative  RESPIRATORY:  negative  CARDIOVASCULAR:  negative  GASTROINTESTINAL:  negative  GENITOURINARY:  negative  MUSCULOSKELETAL:  negative  BEHAVIOR/PSYCH:  Negative    ROS unavailable    Family hx neg    PHYSICAL EXAM  ------------------------  Vitals:  BP (!) 108/44   Pulse 70   Temp 99.7 °F (37.6 °C) (Rectal)   Resp 20   Ht 5' 6\" (1.676 m)   Wt (!) 395 lb 8.1 oz (179.4 kg)   SpO2 93%   BMI 63.84 kg/m²      General:  obtunded  Well developed, well nourished, well groomed. No apparent distress. HEENT:  Normocephalic, atraumatic. Pupils equal, round, reactive to light. No scleral icterus. No conjunctival injection. Normal lips, teeth, and gums. No nasal discharge. Neck:  Supple  Heart:  RRR, no murmurs, gallops, rubs  Lungs:  CTA bilaterally, bilat symmetrical expansion, no wheeze, rales, or rhonchi  Abdomen:   Bowel sounds present, soft, nontender, no masses, no organomegaly, 59.1 02/23/2021   [APTT  U/A:    Lab Results   Component Value Date    COLORU YELLOW 01/13/2021    WBCUA 2979 01/13/2021    RBCUA 483 01/13/2021    MUCUS RARE 12/09/2020    TRICHOMONAS NONE SEEN 01/13/2021    YEAST OCCASIONAL 12/09/2020    BACTERIA MANY 01/13/2021    CLARITYU CLOUDY 01/13/2021    SPECGRAV 1.018 01/13/2021    LEUKOCYTESUR MODERATE 01/13/2021    UROBILINOGEN NORMAL 01/13/2021    BILIRUBINUR NEGATIVE 01/13/2021    BLOODU LARGE 01/13/2021     TSH:  No results found for: TSH  VITAMIN B12: No components found for: B12  FOLATE:    Lab Results   Component Value Date    FOLATE 9.3 02/23/2021     RPR:  No results found for: RPR  ALEXIS:  No results found for: ANATITER, ALEXIS  Urine Toxicology:  No components found for: IAMMENTA, IBARBIT, IBENZO, ICOCAINE, IMARTHC, IOPIATES, IPHENCYC     IMPRESSION:    Hypoxic encephalopathy    S/p cardiac arrest PEA with prolonged resuscitation for 15 min    Seizures-focal twitching-continue keppra-eeg     PLAN:    CT brain could not be done due to her weight    Continue IV keppra     DC deprivan and Versed    EEG    Continue supportive care    Discussed plan of care with pts nurse. Milan De La Fuente MD  BOARD CERTIFIED-NEUROLOGY.

## 2021-02-24 NOTE — PROGRESS NOTES
Hospitalist Progress Note      Name:  Arabella Kelley /Age/Sex: 1955  (63 y.o. female)   MRN & CSN:  [id number] & [id number] Admission Date/Time: 2021 11:44 AM   Location:  -A PCP: Suraj Rajendra is a 63 y.o.  female  who presents with Shortness of Breath (lost power at [facility], on CPAP, desat'ed during poweroutage)      Assessment and Plan:   Acute on chronic respiratory failure with hypoxia likely multifactorial 2/2 COPD body habitus/hypervolemia  - intubated  night as she was found unresponsive on med-surg floor and code blue called  - IV steroids tapered to oral  - sedation per protocol on vent  - pulm consulted  - b/l venous duplex: neg DVT.  Patient was not able to fit in CT scan to evaluate for PE  - check v/q scan  - CT head unable to do due to body habitus  - EEG pending results  - consult palliative care, pt off sedation, no meaningful response/activity    Possible VAP  - procal elevated  - CXR probable multilobular infection, low grade fevers  - start empiric zyvox + levaquin + zosyn  - check blood cx  - check urine strep + legionella  - COVID 19 neg  - check mrsa nares     Possible Seizures  - IV Keppra 1000mg BID     Shock  - resolved  - levo gtt weaned off  - midodrine 10 TID                 Acute on Chronic Diastolic CHF  - echo: ef 77-33%   - IV lasix 40 BID  - strict I and O  - cardio consulted     JOSE  - IVF  - consult nephro for diuresis and fluid mx     A. Fib  - stable  - heparin gtt  - amio gtt  - replace MG and K  - cardio consulted             Obesity- Body mass index is 66.18 kg/m². Lifestyle modifications needed  Hyperlipidemia - on Lipitor. Restless legs syndrome - on Mirapex.   Anemia of chronic disease  On iron supplementation  S/p TAVR      Attempted to call spouse 2x today and phone is busy each time.               Diet No diet orders on file   Code Status DNR-CCA     Medications:   Medications:    famotidine (PEPCID) injection 20 mg Intravenous BID    chlorhexidine  15 mL Mouth/Throat BID    artificial tears   Both Eyes Q4H    insulin lispro  0-12 Units Subcutaneous Q4H    levetiracetam  1,000 mg Intravenous Q12H    azithromycin  500 mg Intravenous Q24H    methylPREDNISolone  40 mg Intravenous Q8H    atorvastatin  40 mg Oral Nightly    busPIRone  10 mg Oral TID    clopidogrel  75 mg Oral Daily    midodrine  10 mg Oral TID WC    pramipexole  1 mg Oral BID    sodium chloride flush  10 mL Intravenous 2 times per day      Infusions:    sodium chloride 50 mL/hr at 02/24/21 0928    heparin (PORCINE) Infusion 14 Units/kg/hr (02/24/21 0512)     PRN Meds:     magnesium sulfate, 1,000 mg, PRN      ipratropium-albuterol, 3 mL, Q6H PRN      sodium chloride flush, 10 mL, PRN      promethazine, 12.5 mg, Q6H PRN    Or      ondansetron, 4 mg, Q6H PRN      polyethylene glycol, 17 g, Daily PRN      acetaminophen, 650 mg, Q6H PRN    Or      acetaminophen, 650 mg, Q6H PRN      hydrALAZINE (APRESOLINE) ivpb, 10 mg, Q6H PRN      heparin (porcine), 4,000 Units, PRN      heparin (porcine), 2,000 Units, PRN      Subjective:     Does not respond to commands  Objective:        Intake/Output Summary (Last 24 hours) at 2/24/2021 0955  Last data filed at 2/24/2021 0352  Gross per 24 hour   Intake 1157 ml   Output 485 ml   Net 672 ml      Vitals:   Vitals:    02/24/21 0750   BP:    Pulse:    Resp:    Temp:    SpO2: 97%     Physical Exam:   Gen:  Not responsive  Head/Eyes:  Normocephalic atraumatic, eyes closed  NECK:   symmetrical, trachea midline  LUNGS: Normal Effort on vent  CARDIOVASCULAR:  Normal rate  ABDOMEN:  non distended  MUSCULOSKELETAL:  ROM limited  NEUROLOGIC: does not respond to commands  SKIN:  no bruising or bleeding, normal skin color,  no redness      Data:       CBC   Recent Labs     02/22/21  0535 02/23/21  0530 02/24/21  0330   WBC 12.6* 6.9 7.2   HGB 9.8* 9.6* 9.6*   HCT 33.0* 32.5* 32.7*    191 248      BMP Recent Labs     02/22/21  0535 02/23/21  0530 02/24/21  0330    134* 133*   K 3.2* 4.1 4.5   CL 97* 91* 91*   CO2 31 29 31   BUN 16 24* 33*   CREATININE 1.4* 1.9* 2.1*         Electronically signed by Sundar Valiente MD on 2/24/2021 at 9:56 AM

## 2021-02-24 NOTE — PROGRESS NOTES
pulmonary      SUBJECTIVE:  Intubated on vent     OBJECTIVE    VITALS:  /61   Pulse 83   Temp 100.2 °F (37.9 °C) (Rectal)   Resp 20   Ht 5' 6\" (1.676 m)   Wt (!) 402 lb (182.3 kg)   SpO2 94%   BMI 64.88 kg/m²   HEAD AND FACE EXAM:  No throat injection, no active exudate,no thrush  NECK EXAM;No JVD, no masses, symmetrical  CHEST EXAM; Expansion equal and symmetrical, no masses  LUNG EXAM; Good breath sounds bilaterally.  There are expiratory wheezes both lungs, there are crackles at both lung bases  CARDIOVASCULAR EXAM: Positive S1 and S2, no S3 or S4, no clicks ,no murmurs  RIGHT AND LEFT LOWER EXTRIMITY EXAM: No edema, no swelling, no inflamation            LABS   Lab Results   Component Value Date    WBC 7.2 02/24/2021    HGB 9.6 (L) 02/24/2021    HCT 32.7 (L) 02/24/2021    MCV 83.0 02/24/2021     02/24/2021     Lab Results   Component Value Date    CREATININE 2.1 (H) 02/24/2021    BUN 33 (H) 02/24/2021     (L) 02/24/2021    K 4.5 02/24/2021    CL 91 (L) 02/24/2021    CO2 31 02/24/2021     Lab Results   Component Value Date    INR 1.37 02/21/2021    PROTIME 16.6 (H) 02/21/2021          Lab Results   Component Value Date    PHOS 2.1 01/24/2021    PHOS 2.4 01/23/2021    PHOS 3.2 07/02/2020        Recent Labs     02/22/21  0600 02/23/21  0600 02/24/21  0600   PH 7.57* 7.44 7.42   PO2ART 109* 73* 78   [id number] 34.0 46.0* 48.0*   O2SAT 96.9 92.4* 93.7*         Wt Readings from Last 3 Encounters:   02/24/21 (!) 402 lb (182.3 kg)   01/22/21 (!) 2 lb 3.3 oz (1 kg)   09/01/20 (!) 379 lb (171.9 kg)        EXAMINATION:   ONE XRAY VIEW OF THE CHEST       2/24/2021 5:14 am       COMPARISON:   February 23, 2021       HISTORY:   ORDERING SYSTEM PROVIDED HISTORY: vent   TECHNOLOGIST PROVIDED HISTORY:   Reason for exam:->vent   Reason for Exam: Ventalator   Acuity: Acute   Type of Exam: Subsequent/Follow-up       FINDINGS:   ETT tip 3.7 cm above the lizz.  Right central catheter tip overlies the   lower

## 2021-02-24 NOTE — PROGRESS NOTES
Progress Note( Dr. Florina Hollingsworth)  2/24/2021  Subjective:   Admit Date: 2/21/2021  PCP: Liza Rick    Admitted For : Initially admitted for respiratory failure but subsequently had cardiac arrest with a CODE BLUE    Consulted For:  Better control of blood glucose    Interval History: Patient is still sedated intubated and on ventilator    .        Intake/Output Summary (Last 24 hours) at 2/24/2021 0715  Last data filed at 2/24/2021 0352  Gross per 24 hour   Intake 1227 ml   Output 520 ml   Net 707 ml       DATA    CBC:   Recent Labs     02/22/21  0535 02/23/21  0530 02/24/21  0330   WBC 12.6* 6.9 7.2   HGB 9.8* 9.6* 9.6*    191 248    CMP:  Recent Labs     02/21/21  1200 02/21/21  1958 02/22/21  0535 02/23/21  0530 02/24/21  0330    137 140 134* 133*   K 3.7 3.7 3.2* 4.1 4.5   CL 95* 95* 97* 91* 91*   CO2 33* 20* 31 29 31   BUN 11 12 16 24* 33*   CREATININE 1.1 1.2* 1.4* 1.9* 2.1*   CALCIUM 8.9 8.0* 8.3 8.2* 8.1*   PROT 8.4* 7.5 7.1  --   --    LABALBU 3.5 3.3* 3.0*  --   --    BILITOT 0.9 0.7 0.9  --   --    ALKPHOS 74 81 70  --   --    AST 9* 13* 20  --   --    ALT <5* <5* 7*  --   --      Lipids:   Lab Results   Component Value Date    CHOL 151 01/14/2021    HDL 46 01/14/2021    TRIG 94 01/14/2021     Glucose:  Recent Labs     02/23/21  0948 02/24/21  0052 02/24/21  0415   POCGLU 229* 197* 198*     [id number]:  Lab Results   Component Value Date    LABA1C 5.3 02/23/2021     High Sensitivity TSH:   Lab Results   Component Value Date    TSHHS 0.685 02/23/2021     Free T3: No results found for: FT3  Free T4:  Lab Results   Component Value Date    T4FREE 1.07 09/04/2018       Echocardiogram Limited    Result Date: 2/22/2021  Transthoracic Echocardiography Report (TTE)  Demographics   Patient Name       [facility] Date of Study       02/22/2021   Date of Birth      1955         Gender              Female   Age                63 year(s)         Race                   Patient Number [id number]         Room Number         2112   Visit Number       [id number]   Corporate ID       [id number]   Accession Number   [id number]         Rosetta Mustafa   Ordering Physician Ludy Dhillon MD                 Physician           MD  Procedure Type of Study   TTE procedure:ECHOCARDIOGRAM LIMITED. Procedure Date Date: 2021 Start: 02:36 PM Study Location: Portable Technical Quality: Limited visualization due to body habitus. Indications:Cardiac arrest. Patient Status: Routine Contrast Medium: Definity. Amount - 5 ml Height: 66 inches Weight: 410 pounds BSA: 2.71 m2 BMI: 66.18 kg/m2 HR: 55 bpm BP: 102/51 mmHg  Conclusions   Summary  This is a limited echocardiogram.  Left ventricular systolic function is normal.  Ejection fraction is visually estimated at 50-55%. Moderately dilated right heart. S/p TAVR: #29 Medtronic Evolut R inserted 2017; mean P mmHg. Mild tricuspid regurgitation is present. RVSP is 48 mmHg. No evidence of any pericardial effusion. Pleural effusion present. Inferior vena cava is dilated, measuring at 3.8 cm, and does not collapse  with respiration. Dilated hepatic veins also. Signature   ------------------------------------------------------------------  Electronically signed by Sherren Battles MD (Interpreting  physician) on 2021 at 05:34 PM  -Xr Chest Portable    Result Date: 2021  EXAMINATION: ONE XRAY VIEW OF THE CHEST 2021 5:40 am COMPARISON: 2021 2324 hours HISTORY: ORDERING SYSTEM PROVIDED HISTORY: vent TECHNOLOGIST PROVIDED HISTORY: Reason for exam:->vent Reason for Exam: vent Acuity: Acute Type of Exam: Subsequent/Follow-up FINDINGS: ETT tip 4.1 cm above the lizz. Right subclavian catheter tip in the lower SVC. Heart size is enlarged. Left more than right diffuse lung opacities.  No pneumothorax. 1. Right more than left lung interstitial and airspace opacities compatible multifocal pneumonia as well as pulmonary edema. 2. Stable cardiomegaly. Xr Chest Portable    Result Date: 2/21/2021  EXAMINATION: ONE XRAY VIEW OF THE CHEST 2/21/2021 12:23 pm COMPARISON: 01/22/2021. HISTORY: ORDERING SYSTEM PROVIDED HISTORY: Short of breath   1. Cardiomegaly with vascular congestion. Vl Dup Lower Extremity Venous Bilateral    Result Date: 2/22/2021  EXAMINATION: DUPLEX VENOUS ULTRASOUND OF THE BILATERAL LOWER EXTREMITIES, 2/22/2021 6:23 am   No evidence of DVT in either lower extremity with reservation that the study is markedly suboptimal with lack of visualization of all venous structures within the lower extremities as described above.        Scheduled Medicines   Medications:    famotidine (PEPCID) injection  20 mg Intravenous BID    chlorhexidine  15 mL Mouth/Throat BID    artificial tears   Both Eyes Q4H    insulin lispro  0-12 Units Subcutaneous Q4H    levetiracetam  1,000 mg Intravenous Q12H    azithromycin  500 mg Intravenous Q24H    methylPREDNISolone  40 mg Intravenous Q8H    atorvastatin  40 mg Oral Nightly    busPIRone  10 mg Oral TID    clopidogrel  75 mg Oral Daily    midodrine  10 mg Oral TID WC    pramipexole  1 mg Oral BID    sodium chloride flush  10 mL Intravenous 2 times per day      Infusions:    sodium chloride 50 mL/hr at 02/23/21 1244    heparin (PORCINE) Infusion 14 Units/kg/hr (02/24/21 0512)         Objective:   Vitals: BP (!) 108/44   Pulse 87   Temp 100.2 °F (37.9 °C)   Resp 22   Ht 5' 6\" (1.676 m)   Wt (!) 402 lb (182.3 kg)   SpO2 98%   BMI 64.88 kg/m²   General appearance: Patient is sedated intubated on ventilator  Neck: no JVD or bruit  Thyroid : Normal lobes   Lungs: Has Vesicular Breath sounds   Heart:  regular rate and rhythm  Abdomen: soft, non-tender; bowel sounds normal; no masses,  no organomegaly  Musculoskeletal: Normal  Extremities: extremities normal, , no edema  Neurologic:   Patient is sedated intubated on ventilator  l.     Assessment:     Patient Active Problem List:     Morbid obesity with BMI of 70 and over, adult ([address]     Dyslipidemia     Fecal incontinence     Mild intermittent asthma without complication     S/P laparoscopic cholecystectomy     Type 2 diabetes mellitus without complication, with long-term current use of insulin (HCC)     Fatty liver     Arthritis     H/O parotidectomy     Venous stasis dermatitis of both lower extremities     Aortic stenosis     Morbid obesity (HCC)     Asthma     Diabetes mellitus ([address]     Hypertension     Sleep apnea     Family history of coronary artery disease     Chest pain     Dyspnea     Palpitations     Peripheral edema     Hypervolemia     Chronic respiratory failure with hypoxia and hypercapnia (HCC)     Acute exacerbation of chronic obstructive pulmonary disease (COPD) (HCC)     Chronic obstructive pulmonary disease (HCC)     Gait disturbance     Acute on chronic respiratory failure with hypoxia and hypercapnia (HCC)     Cellulitis     Skin ulcer of left foot with fat layer exposed ([address]     Pneumonia     VHD (valvular heart disease)     Class 3 severe obesity due to excess calories with body mass index (BMI) greater than or equal to 70 in adult ([address]     SIRS (systemic inflammatory response syndrome) (HCC)     Acute kidney injury ([address]     Acute metabolic encephalopathy     Shock, unspecified ([address]     Uncontrolled diabetes mellitus ([address]     Sepsis ([address]     Altered mental status     Wide-complex tachycardia (HCC)     Chronic diastolic heart failure (HCC)     S/P TAVR (transcatheter aortic valve replacement)     Septic shock (HCC)     Acute kidney injury superimposed on CKD (HCC)     Bradycardia     Anxiety     Acute respiratory failure with hypoxia (HCC)     Acute congestive heart failure (HCC)     Acute on chronic respiratory failure with hypoxia ([address]      Plan: 1. Reviewed POC blood glucose . Labs and X ray results   2. Reviewed Current Medicines   3. On Correction bolus Humalog/ Insulin regime   4. Monitor Blood glucose frequently   5. Modified  the dose of Insulin/ other medicines as needed   6. Will follow     .      Aparna Ulloa MD

## 2021-02-24 NOTE — PROGRESS NOTES
CARDIOLOGY PROGRESS NOTE                                                  Name:  Elizabeth Santos /Age/Sex: 1955  (63 y.o. female)   MRN & CSN:  [id number] & [id number] Admission Date/Time: 2021 11:44 AM   Location:  -A PCP: Jennifer Parada         Admit Date:  2021  Hospital Day: 4      SUBJECTIVE:   Seen patient as follow up as consultation for Cardiac arrest    intubated    TELEMETRY: Sinus       Intake/Output Summary (Last 24 hours) at 2021 1117  Last data filed at 2021 1000  Gross per 24 hour   Intake 1249 ml   Output 520 ml   Net 729 ml       Assessment/Plan:       1. Pulseless electrical activity/CODE BLUE with restoration of spontaneous circulation after ACLS protocol - limited ECHO shows preserved LVEF. 2. Acute respiratory failure s/p mechanical ventilation intubation. Patient has severe COPD and morbid obesity  3. Severe morbid obesity with BMI of 63.84  4. Paroxysmal atrial fibrillation post arrest: Started on IV heparin drip and IV amiodarone drip. Will stop IV Amiodarone drip. Patient converted to sinus rhythm and rhythm stable . Continue with IV heparin until PE is ruled out, if okay with neurology  5. Hx of AS s/p TAVR 2017 - valve function shows mild gradient on echo, normal finding   6. Shock: Unclear etiology ? Sepsis ? PE.  VQ scan pending. Patient on IV antibiotics and steroids  7. Hyperlipidemia: Continue Lipitor  8. ?  Neurological function needs to be assessed post arrest.  9. Once patient is extubated and stable will proceed with ischemic evaluation with left heart cath    Please reconsult us once patient has been extubated and has a meaningful recovery. Echocardiogram 2021     This is a limited echocardiogram.   Left ventricular systolic function is normal.   Ejection fraction is visually estimated at 50-55%. Moderately dilated right heart. S/p TAVR: #29 Medtronic Evolut R inserted 2017; mean P mmHg.    Mild tricuspid regurgitation is present. RVSP is 48 mmHg. No evidence of any pericardial effusion. Pleural effusion present. Inferior vena cava is dilated, measuring at 3.8 cm, and does not collapse   with respiration. Dilated hepatic veins also. Past medical history:    has a past medical history of Acute kidney injury ([facility] [address] Anxiety, Aortic stenosis, Asthma, Bacteremia due to group B Streptococcus, Cancer ([facility] [address] Carotid artery stenosis, Chest pain, CHF (congestive heart failure) ([facility] [address] Chronic back pain, COPD exacerbation ([facility] [address] Depression, Diabetes mellitus ([facility] [address] Family history of coronary artery disease, Fatty liver, GI bleed, H/O aortic valve stenosis, H/O echocardiogram, Headache, Heart murmur, History of nuclear stress test, Morbid obesity ([facility] [address] Neuropathy, NSTEMI (non-ST elevated [facility] infarction) [facility] Obesity, Osteoarthritis, Palpitations, Peripheral edema, Restless legs syndrome, Sleep apnea, Sleep apnea, and SOB (shortness of breath). Past surgical history:   has a past surgical history that includes Cholecystectomy;  section; Hysterectomy; Tubal ligation; Neck surgery; Gallbladder surgery; Aortic valve replacement (2017); IR NONTUNNELED VASCULAR CATHETER > 5 YEARS (2020); and IR NONTUNNELED VASCULAR CATHETER > 5 YEARS (1/15/2021). Social History:   reports that she quit smoking about 17 years ago. Her smoking use included cigarettes. She has never used smokeless tobacco. She reports that she does not drink alcohol or use drugs.   Family history:  family history includes Anxiety Disorder in her daughter; Coronary Art Dis in her mother; Depression in her brother, daughter, mother, niece, and sister; Diabetes in her mother; Heart Attack in her mother; Heart Disease in her brother, father, and mother; Heart Failure in her brother, father, and mother; High Blood Pressure in her brother, father, and mother; Hypertension in her mother; Obesity in her brother, father, and mother. OBJECTIVE:     /61   Pulse 83   Temp 100.2 °F (37.9 °C) (Rectal)   Resp 20   Ht 5' 6\" (1.676 m)   Wt (!) 402 lb (182.3 kg)   SpO2 95%   BMI 64.88 kg/m²       Intake/Output Summary (Last 24 hours) at 2/24/2021 1117  Last data filed at 2/24/2021 1000  Gross per 24 hour   Intake 1249 ml   Output 520 ml   Net 729 ml       Physical Exam:    General Appearance: Sedated and intubated   Constitutional:  Well developed, Well nourished  HEENT:  Normocephalic, Atraumatic, Oropharynx moist, No oral exudates,   Nose normal. Neck Supple Carotid: no carotid bruit  Eyes:  Conjunctiva normal, No discharge. Respiratory:    Coarse breath sounds. Mechanical ventilator support  Cardiovascular: S1-S2 No murmurs auscultated. No rubs, thrills or gallops. Normal  rhythm. Pedal pulses are normal. No pedal edema  GI:  Soft Non tender, non distended. Musculoskeletal:    No cyanosis, No clubbing. Integument:  Warm, Dry, No erythema, No rash. Lymphatic:  No lymphadenopathy noted.    Neurologic:  Sedated   Psychiatric:  Could not be assessed         MEDICATIONS:     predniSONE  20 mg Oral Daily    Followed by   Jolyne Bloch ON 2/27/2021] predniSONE  15 mg Oral Daily    Followed by   Jolyne Bloch ON 3/2/2021] predniSONE  10 mg Oral Daily    Followed by   Jolyne Bloch ON 3/5/2021] predniSONE  5 mg Oral Daily    levofloxacin  750 mg Intravenous Q24H    linezolid  600 mg Intravenous Q12H    piperacillin-tazobactam  3,375 mg Intravenous Q8H    famotidine (PEPCID) injection  20 mg Intravenous BID    chlorhexidine  15 mL Mouth/Throat BID    artificial tears   Both Eyes Q4H    insulin lispro  0-12 Units Subcutaneous Q4H    levetiracetam  1,000 mg Intravenous Q12H    atorvastatin  40 mg Oral Nightly    busPIRone  10 mg Oral TID    clopidogrel  75 mg Oral Daily    midodrine  10 mg Oral TID WC    pramipexole  1 mg Oral BID    sodium chloride flush  10 mL Intravenous 2 times per day      sodium chloride 50 mL/hr at 02/24/21 0928    heparin (PORCINE) Infusion 14 Units/kg/hr (02/24/21 0512)     magnesium sulfate, ipratropium-albuterol, sodium chloride flush, promethazine **OR** ondansetron, polyethylene glycol, acetaminophen **OR** acetaminophen, hydrALAZINE (APRESOLINE) ivpb, heparin (porcine), heparin (porcine)  No Known Allergies    Lab Data:  CBC:   Recent Labs     02/22/21  0535 02/23/21  0530 02/24/21  0330   WBC 12.6* 6.9 7.2   HGB 9.8* 9.6* 9.6*   HCT 33.0* 32.5* 32.7*   MCV 84.4 82.5 83.0    191 248     BMP:   Recent Labs     02/22/21  0535 02/23/21  0530 02/24/21  0330    134* 133*   K 3.2* 4.1 4.5   CL 97* 91* 91*   CO2 31 29 31   BUN 16 24* 33*   CREATININE 1.4* 1.9* 2.1*     LIVER PROFILE:   Recent Labs     02/21/21  1200 02/21/21  1958 02/22/21  0535   AST 9* 13* 20   ALT <5* <5* 7*   BILITOT 0.9 0.7 0.9   ALKPHOS 74 81 70     PT/INR:   Recent Labs     02/21/21  1200   PROTIME 16.6*   INR 1.37     APTT:   Recent Labs     02/23/21 2215 02/24/21  0330 02/24/21  0954   APTT 88.5* 83.9* 116.6*     BNP:  No results for input(s): BNP in the last 72 hours.       Teresa Muñoz MD 2/24/2021 11:17 AM

## 2021-02-24 NOTE — PROGRESS NOTES
Nephrology Progress Note  2/24/2021 3:19 PM  Subjective:      Interval History: Dallas Maldonado is a 63 y.o. female with slight arousable today        Data:   Scheduled Meds:   predniSONE  20 mg Oral Daily    Followed by   Nasreen Burris ON 2/27/2021] predniSONE  15 mg Oral Daily    Followed by   Nasreen Burris ON 3/2/2021] predniSONE  10 mg Oral Daily    Followed by   Nasreen Burris ON 3/5/2021] predniSONE  5 mg Oral Daily    linezolid  600 mg Intravenous Q12H    piperacillin-tazobactam  3,375 mg Intravenous Q8H    levofloxacin  750 mg Intravenous Q48H    famotidine (PEPCID) injection  20 mg Intravenous BID    chlorhexidine  15 mL Mouth/Throat BID    artificial tears   Both Eyes Q4H    insulin lispro  0-12 Units Subcutaneous Q4H    levetiracetam  1,000 mg Intravenous Q12H    atorvastatin  40 mg Oral Nightly    clopidogrel  75 mg Oral Daily    midodrine  10 mg Oral TID WC    pramipexole  1 mg Oral BID    sodium chloride flush  10 mL Intravenous 2 times per day     Continuous Infusions:   sodium chloride 50 mL/hr at 02/24/21 0928    heparin (PORCINE) Infusion 12 Units/kg/hr (02/24/21 1402)           CBC:   Recent Labs     02/22/21  0535 02/23/21  0530 02/24/21  0330   WBC 12.6* 6.9 7.2   HGB 9.8* 9.6* 9.6*    191 248     BMP:    Recent Labs     02/22/21  0535 02/23/21  0530 02/24/21  0330    134* 133*   K 3.2* 4.1 4.5   CL 97* 91* 91*   CO2 31 29 31   BUN 16 24* 33*   CREATININE 1.4* 1.9* 2.1*   GLUCOSE 223* 206* 195*       Renal Labs  Albumin:    Lab Results   Component Value Date    LABALBU 3.0 02/22/2021     Calcium:    Lab Results   Component Value Date    CALCIUM 8.1 02/24/2021     Phosphorus:    Lab Results   Component Value Date    PHOS 2.1 01/24/2021     U/A:    Lab Results   Component Value Date    NITRU NEGATIVE 01/13/2021    COLORU YELLOW 01/13/2021    WBCUA 2979 01/13/2021    RBCUA 483 01/13/2021    MUCUS RARE 12/09/2020    TRICHOMONAS NONE SEEN 01/13/2021    YEAST OCCASIONAL 12/09/2020 BACTERIA MANY 01/13/2021    CLARITYU CLOUDY 01/13/2021    SPECGRAV 1.018 01/13/2021    UROBILINOGEN NORMAL 01/13/2021    BILIRUBINUR NEGATIVE 01/13/2021    BLOODU LARGE 01/13/2021    KETUA NEGATIVE 01/13/2021           Objective:   I/O: 02/23 0701 - 02/24 0700  In: 1227 [I.V.:757]  Out: 520 [Urine:450]  Vitals: BP (!) 136/58   Pulse 78   Temp 100.4 °F (38 °C) (Rectal)   Resp 20   Ht 5' 6\" (1.676 m)   Wt (!) 402 lb (182.3 kg)   SpO2 96%   BMI 64.88 kg/m²   General appearance: sedated ett  HEENT: Head: Normal, normocephalic, atraumatic.   Neck: supple, symmetrical, trachea midline  Lungs: diminished breath sounds bilaterally  Heart: S1, S2 normal  Abdomen: abnormal findings:  soft nt  Extremities: edema trace  Neurologic: Mental status: alertness: sedated        Assessment and Plan:      IMP:  1 arf from atn on ckd 3   2 resp failure on vent  3 cardiac arrest  4 htn  5 anemia    Plan     1 creat 2.1 from atn and nonoliguria and monitor, not uremic  2 ph stable and wean vent as able   3 monitor and fu neuro status  4 bp stable overall  5 monitor hb  Will follow and no dialysis for now  Gentle hydrate             Kenyatta Barker MD

## 2021-02-25 NOTE — PROGRESS NOTES
Call from Radiology command center regarding CXR results from this morning. Results sent to Dr. Steve Olson via [employer] at this time. Awaiting response.    Electronically signed by Kwaku Castano RN on 2/25/2021 at 6:48 AM

## 2021-02-25 NOTE — PLAN OF CARE
All plans of care remain ongoing at this time.   Electronically signed by Flory Skinner RN on 2/25/2021 at 5:29 AM

## 2021-02-25 NOTE — PROGRESS NOTES
pulmonary      SUBJECTIVE: intubated on vent     OBJECTIVE    VITALS:  BP (!) 130/49   Pulse 66   Temp 100.2 °F (37.9 °C) (Rectal)   Resp 20   Ht 5' 6\" (1.676 m)   Wt (!) 402 lb (182.3 kg)   SpO2 100%   BMI 64.88 kg/m²   HEAD AND FACE EXAM:  No throat injection, no active exudate,no thrush  NECK EXAM;No JVD, no masses, symmetrical  CHEST EXAM; Expansion equal and symmetrical, no masses  LUNG EXAM; Good breath sounds bilaterally. There are expiratory wheezes both lungs, there are crackles at both lung bases  CARDIOVASCULAR EXAM: Positive S1 and S2, no S3 or S4, no clicks ,no murmurs  RIGHT AND LEFT LOWER EXTRIMITY EXAM: No edema, no swelling, no inflamation            LABS   Lab Results   Component Value Date    WBC 5.7 02/25/2021    HGB 9.5 (L) 02/25/2021    HCT 31.3 (L) 02/25/2021    MCV 81.9 02/25/2021     02/25/2021     Lab Results   Component Value Date    CREATININE 2.0 (H) 02/25/2021    BUN 41 (H) 02/25/2021     (L) 02/25/2021    K 4.3 02/25/2021    CL 93 (L) 02/25/2021    CO2 27 02/25/2021     Lab Results   Component Value Date    INR 1.37 02/21/2021    PROTIME 16.6 (H) 02/21/2021          Lab Results   Component Value Date    PHOS 2.1 01/24/2021    PHOS 2.4 01/23/2021    PHOS 3.2 07/02/2020        Recent Labs     02/23/21  0600 02/24/21  0600 02/25/21  0600   PH 7.44 7.42 7.52*   PO2ART 73* 78 120*   AGG0UXN 46.0* 48.0* 37.0   O2SAT 92.4* 93.7* 95.6*         Wt Readings from Last 3 Encounters:   02/25/21 (!) 402 lb (182.3 kg)   01/22/21 (!) 2 lb 3.3 oz (1 kg)   09/01/20 (!) 379 lb (171.9 kg)        1. Diffuse opacification of the left hemithorax with shift of the heart and   mediastinum to the left suggestive for left lung collapse, likely related to   mucus plugging within the left mainstem bronchus.  This has worsened since   the prior study. 2. Diffuse infiltrates within the right lung. 3. Tip of the ET tube is 4.0 cm above the lizz.    The findings were sent to the Radiology Results Communication Center at 6:28   am on 2/25/2021to be communicated to a licensed caregiver. ASSESMENT  Ac resp failure  Sac copd  Ac chf  Hypoxic encephalopathy  S/p cpr prolongrd  Left lung atx        PLAN  1. cpm  2. Cont full vent support  3. Needs bronch.  Called  Zack Fritz [phone] and left message for him that why we need to bronch ibuprofen am not sure whether family want us to do this procedure so will wait before being told by family    2/25/2021  Jenifer Cueva M.D.

## 2021-02-25 NOTE — PROGRESS NOTES
Renae Osorio MD.  Section of General Neurology - Adult  Consult Note        Reason for Consult:    Requesting Physician:  No referring provider defined for this encounter.   Thank you for your kind referral.    No improvement    Pt remains obtunded    Pt does not open eyes on calling her name    Pt did not squeeze fingers     Past Medical History:        Diagnosis Date    Acute kidney injury ([facility] [address] 2019    Anxiety 2020    Aortic stenosis     Asthma     Bacteremia due to group B Streptococcus     Treated at [facility] in 2017 - felt to be due to cellulitis    Cancer [facility])     Cervical    Carotid artery stenosis     Chest pain     CH[facility]congestive [facility] failure) [facility]     Chronic back pain     COPD exacerbation ([facility] [address]     Depression     Diabetes mellitus ([facility] [address]     Family history of coronary artery disease     Fatty liver 10/27/2016    GI bleed 2017    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 - treated at [facility] H/O aortic valve stenosis     H/O echocardiogram 2016    EF 55%, Aortic valve area is 0.84 cm2 with a mean gradient of 36 suggestive of severe aortic stenosis, mild to mos LVH    Headache     Heart murmur     History of nuclear stress test 2016    lexiscan-normal,EF70%    Morbid obesity ([facility])     BMI=73.72 kg/m2    Neuropathy     NSTEMI (non-ST elevated myocardial infarction) ([facility] [address] 2018    Obesity     Osteoarthritis     Palpitations     Peripheral edema     Restless legs syndrome     Sleep apnea     Has a CPAP    Sleep apnea     SOB (shortness of breath)      Past Surgical History:        Procedure Laterality Date    AORTIC VALVE REPLACEMENT  2017    29mm EvolutR TAVR     SECTION      CHOLECYSTECTOMY      GALLBLADDER SURGERY      HYSTERECTOMY      IR NONTUNNELED VASCULAR CATHETER  2020    IR NONTUNNELED VASCULAR CATHETER 2020 [facility] SPECIAL PROCEDURES    IR NONTUNNELED VASCULAR CATHETER  1/15/2021    IR NONTUNNELED VASCULAR CATHETER 1/15/2021 [facility] SPECIAL PROCEDURES    NECK SURGERY      Tumor    TUBAL LIGATION       Current Medications:   Current Facility-Administered Medications: morphine (PF) injection 2 mg, 2 mg, Intravenous, Q15 Min PRN  LORazepam (ATIVAN) injection 0.5 mg, 0.5 mg, Intravenous, Q15 Min PRN  glycopyrrolate (ROBINUL) injection 0.2 mg, 0.2 mg, Intravenous, Q4H PRN  lubrifresh P.M. (artificial tears) ophthalmic ointment, , Both Eyes, PRN  famotidine (PEPCID) injection 20 mg, 20 mg, Intravenous, BID  chlorhexidine (PERIDEX) 0.12 % solution 15 mL, 15 mL, Mouth/Throat, BID  insulin lispro (HUMALOG) injection vial 0-12 Units, 0-12 Units, Subcutaneous, Q4H  levETIRAcetam (KEPPRA) 1,000 mg in sodium chloride 0.9 % 100 mL IVPB, 1,000 mg, Intravenous, Q12H  ipratropium-albuterol (DUONEB) nebulizer solution 3 mL, 3 mL, Inhalation, Q6H PRN  sodium chloride flush 0.9 % injection 10 mL, 10 mL, Intravenous, 2 times per day  sodium chloride flush 0.9 % injection 10 mL, 10 mL, Intravenous, PRN  promethazine (PHENERGAN) tablet 12.5 mg, 12.5 mg, Oral, Q6H PRN **OR** ondansetron (ZOFRAN) injection 4 mg, 4 mg, Intravenous, Q6H PRN  polyethylene glycol (GLYCOLAX) packet 17 g, 17 g, Oral, Daily PRN  acetaminophen (TYLENOL) tablet 650 mg, 650 mg, Oral, Q6H PRN **OR** acetaminophen (TYLENOL) suppository 650 mg, 650 mg, Rectal, Q6H PRN  hydrALAZINE (APRESOLINE) 10 mg in sodium chloride 0.9 % 50 mL ivpb, 10 mg, Intravenous, Q6H PRN  Allergies:  Patient has no known allergies. Social History:  TOBACCO:   reports that she quit smoking about 17 years ago. Her smoking use included cigarettes. She has never used smokeless tobacco.  ETOH:   reports no history of alcohol use. DRUGS:   reports no history of drug use. Family History:       Problem Relation Age of Onset    Heart Failure Mother     Heart Attack Mother     Hypertension Mother     Coronary Art Dis Mother         Had PTCA w/stenting.     Heart Disease Mother    [facility] Blood Pressure Mother     Obesity Mother     Diabetes Mother     Depression Mother     Heart Failure Father     Heart Disease Father     High Blood Pressure Father     Obesity Father     Heart Failure Brother     Heart Disease Brother     High Blood Pressure Brother     Obesity Brother     Depression Brother     Depression Sister     Depression Daughter     Anxiety Disorder Daughter     Depression Niece         suicide attempt       REVIEW OF SYSTEMS:  CONSTITUTIONAL:  negative  HEENT:  negative  RESPIRATORY:  negative  CARDIOVASCULAR:  negative  GASTROINTESTINAL:  negative  GENITOURINARY:  negative  MUSCULOSKELETAL:  negative  BEHAVIOR/PSYCH:  Negative    ROS unavailable    Family hx neg    PHYSICAL EXAM  ------------------------  Vitals:  BP (!) 140/49   Pulse 61   Temp 100 °F (37.8 °C) (Rectal)   Resp 20   Ht 5' 6\" (1.676 m)   Wt (!) 402 lb (182.3 kg)   SpO2 99%   BMI 64.88 kg/m²      General:  obtunded  Well developed, well nourished, well groomed. No apparent distress. HEENT:  Normocephalic, atraumatic. Pupils equal, round, reactive to light. No scleral icterus. No conjunctival injection. Normal lips, teeth, and gums. No nasal discharge. Neck:  Supple  Heart:  RRR, no murmurs, gallops, rubs  Lungs:  CTA bilaterally, bilat symmetrical expansion, no wheeze, rales, or rhonchi  Abdomen:   Bowel sounds present, soft, nontender, no masses, no organomegaly, no peritoneal signs  Extremities:  No clubbing, cyanosis, or edema  Skin:  Warm and dry, no open lesions or rash  Breast: deferred  Rectal: deferred  Genitalia:  deferred    NEUROLOGICAL EXAM  ---------------------------------    Mental Status Exam:              Obtunded no response to verbal commands  Does not open eyes to commands  absent oculocephalics pupils 3 mm roberto  Flaccid extremtiies   Equivocal toes  resp on the vent  No clinical twitching noted             CBC with Differential:    Lab Results   Component Value Date    WBC 5.7 02/25/2021    RBC 3.82 02/25/2021    HGB 9.5 02/25/2021    HCT 31.3 02/25/2021     02/25/2021    MCV 81.9 02/25/2021    MCH 24.9 02/25/2021    MCHC 30.4 02/25/2021    RDW 19.6 02/25/2021    NRBC 2 02/21/2021    SEGSPCT 89.7 02/22/2021    BANDSPCT 11 02/21/2021    LYMPHOPCT 4.9 02/22/2021    PROMYELOPCT 3 06/23/2019    MONOPCT 4.4 02/22/2021    MYELOPCT 1 01/16/2021    BASOPCT 0.1 02/22/2021    MONOSABS 0.6 02/22/2021    LYMPHSABS 0.6 02/22/2021    EOSABS 0.0 02/22/2021    BASOSABS 0.0 02/22/2021    DIFFTYPE AUTOMATED DIFFERENTIAL 02/22/2021     CMP:    Lab Results   Component Value Date     02/25/2021    K 4.3 02/25/2021    CL 93 02/25/2021    CO2 27 02/25/2021    BUN 41 02/25/2021    CREATININE 2.0 02/25/2021    GFRAA 30 02/25/2021    LABGLOM 25 02/25/2021    GLUCOSE 175 02/25/2021    PROT 7.1 02/22/2021    LABALBU 3.0 02/22/2021    CALCIUM 7.8 02/25/2021    BILITOT 0.9 02/22/2021    ALKPHOS 70 02/22/2021    AST 20 02/22/2021    ALT 7 02/22/2021     BMP:    Lab Results   Component Value Date     02/25/2021    K 4.3 02/25/2021    CL 93 02/25/2021    CO2 27 02/25/2021    BUN 41 02/25/2021    LABALBU 3.0 02/22/2021    CREATININE 2.0 02/25/2021    CALCIUM 7.8 02/25/2021    GFRAA 30 02/25/2021    LABGLOM 25 02/25/2021    GLUCOSE 175 02/25/2021     PT/INR:    Lab Results   Component Value Date    PROTIME 16.6 02/21/2021    INR 1.37 02/21/2021     PTT:    Lab Results   Component Value Date    APTT 79.4 02/24/2021   [APTT  U/A:    Lab Results   Component Value Date    COLORU YELLOW 01/13/2021    WBCUA 2979 01/13/2021    RBCUA 483 01/13/2021    MUCUS RARE 12/09/2020    TRICHOMONAS NONE SEEN 01/13/2021    YEAST OCCASIONAL 12/09/2020    BACTERIA MANY 01/13/2021    CLARITYU CLOUDY 01/13/2021    SPECGRAV 1.018 01/13/2021    LEUKOCYTESUR MODERATE 01/13/2021    UROBILINOGEN NORMAL 01/13/2021    BILIRUBINUR NEGATIVE 01/13/2021    BLOODU LARGE 01/13/2021     TSH:  No results found for: TSH  VITAMIN B12: No components found for: B12  FOLATE:    Lab Results   Component Value Date    FOLATE 9.3 02/23/2021     RPR:  No results found for: RPR  ALEXIS:  No results found for: ANATITER, ALEXIS  Urine Toxicology:  No components found for: IAMMENTA, IBARBIT, IBENZO, ICOCAINE, IMARTHC, IOPIATES, IPHENCYC     IMPRESSION:    Hypoxic encephalopathy    S/p cardiac arrest PEA with prolonged resuscitation for 15 min    Seizures-focal twitching-continue keppra-eeg     PLAN:    CT brain could not be done due to her weight    Continue IV keppra     DC deprivan and Versed    Discussed dx prognosis options of continuing care with pts  and daughter-Vanessa palliative care discussed with them as well at another meeting-they want pt to have terminal wean knowing that she more likely will die off the ventilator. Discussed plan of care with pts nurse. Warnell Kussmaul MD  BOARD CERTIFIED-NEUROLOGY.

## 2021-02-25 NOTE — PROGRESS NOTES
PRN  acetaminophen (TYLENOL) tablet 650 mg, 650 mg, Oral, Q6H PRN **OR** acetaminophen (TYLENOL) suppository 650 mg, 650 mg, Rectal, Q6H PRN  hydrALAZINE (APRESOLINE) 10 mg in sodium chloride 0.9 % 50 mL ivpb, 10 mg, Intravenous, Q6H PRN  heparin (porcine) injection 4,000 Units, 4,000 Units, Intravenous, PRN  heparin (porcine) injection 2,000 Units, 2,000 Units, Intravenous, PRN  heparin 25,000 units in dextrose 5% 250 mL (premix) infusion, 5-30 Units/kg/hr, Intravenous, Continuous  Allergies:  Patient has no known allergies. Social History:  TOBACCO:   reports that she quit smoking about 17 years ago. Her smoking use included cigarettes. She has never used smokeless tobacco.  ETOH:   reports no history of alcohol use. DRUGS:   reports no history of drug use. Family History:       Problem Relation Age of Onset    Heart Failure Mother     Heart Attack Mother     Hypertension Mother     Coronary Art Dis Mother         Had PTCA w/stenting.     Heart Disease Mother     High Blood Pressure Mother     Obesity Mother     Diabetes Mother     Depression Mother     Heart Failure Father     Heart Disease Father     High Blood Pressure Father     Obesity Father     Heart Failure Brother     Heart Disease Brother     High Blood Pressure Brother     Obesity Brother     Depression Brother     Depression Sister     Depression Daughter     Anxiety Disorder Daughter     Depression Niece         suicide attempt       REVIEW OF SYSTEMS:  CONSTITUTIONAL:  negative  HEENT:  negative  RESPIRATORY:  negative  CARDIOVASCULAR:  negative  GASTROINTESTINAL:  negative  GENITOURINARY:  negative  MUSCULOSKELETAL:  negative  BEHAVIOR/PSYCH:  Negative    ROS unavailable    Family hx neg    PHYSICAL EXAM  ------------------------  Vitals:  /63   Pulse 75   Temp 100.6 °F (38.1 °C) (Rectal)   Resp 20   Ht 5' 6\" (1.676 m)   Wt (!) 402 lb (182.3 kg)   SpO2 96%   BMI 64.88 kg/m²      General:  obtunded  Well developed, well nourished, well groomed. No apparent distress. HEENT:  Normocephalic, atraumatic. Pupils equal, round, reactive to light. No scleral icterus. No conjunctival injection. Normal lips, teeth, and gums. No nasal discharge. Neck:  Supple  Heart:  RRR, no murmurs, gallops, rubs  Lungs:  CTA bilaterally, bilat symmetrical expansion, no wheeze, rales, or rhonchi  Abdomen:   Bowel sounds present, soft, nontender, no masses, no organomegaly, no peritoneal signs  Extremities:  No clubbing, cyanosis, or edema  Skin:  Warm and dry, no open lesions or rash  Breast: deferred  Rectal: deferred  Genitalia:  deferred    NEUROLOGICAL EXAM  ---------------------------------    Mental Status Exam:              Obtunded no resposne to verbal commands  Sluggish oculocephalics pupils 3 mm roberto  Flaccid extremtiies   Equivocal toes  resp on the vent  No clinical twitching noted             CBC with Differential:    Lab Results   Component Value Date    WBC 7.2 02/24/2021    RBC 3.94 02/24/2021    HGB 9.6 02/24/2021    HCT 32.7 02/24/2021     02/24/2021    MCV 83.0 02/24/2021    MCH 24.4 02/24/2021    MCHC 29.4 02/24/2021    RDW 19.4 02/24/2021    NRBC 2 02/21/2021    SEGSPCT 89.7 02/22/2021    BANDSPCT 11 02/21/2021    LYMPHOPCT 4.9 02/22/2021    PROMYELOPCT 3 06/23/2019    MONOPCT 4.4 02/22/2021    MYELOPCT 1 01/16/2021    BASOPCT 0.1 02/22/2021    MONOSABS 0.6 02/22/2021    LYMPHSABS 0.6 02/22/2021    EOSABS 0.0 02/22/2021    BASOSABS 0.0 02/22/2021    DIFFTYPE AUTOMATED DIFFERENTIAL 02/22/2021     CMP:    Lab Results   Component Value Date     02/24/2021    K 4.5 02/24/2021    CL 91 02/24/2021    CO2 31 02/24/2021    BUN 33 02/24/2021    CREATININE 2.1 02/24/2021    GFRAA 29 02/24/2021    LABGLOM 24 02/24/2021    GLUCOSE 195 02/24/2021    PROT 7.1 02/22/2021    LABALBU 3.0 02/22/2021    CALCIUM 8.1 02/24/2021    BILITOT 0.9 02/22/2021    ALKPHOS 70 02/22/2021    AST 20 02/22/2021    ALT 7 02/22/2021

## 2021-02-25 NOTE — PROGRESS NOTES
Palliative Medicine Progress Note    Admit Date: 2/21/2021  Hospital Day:  Hospital Day: 5         Recommendations:   #1. Olivia's  Sailaja Duarte and daughter Trinh have discussed and decided to change CODE STATUS to DNR CC and have elected for compassionate extubation this afternoon.     #2.  Palliative care will be available at bedside for compassionate extubation      Subjective:     Scheduled Meds:   predniSONE  20 mg Oral Daily    Followed by   Moises Strickland ON 2/27/2021] predniSONE  15 mg Oral Daily    Followed by   Moises Garciaer ON 3/2/2021] predniSONE  10 mg Oral Daily    Followed by   Moises Miser ON 3/5/2021] predniSONE  5 mg Oral Daily    linezolid  600 mg Intravenous Q12H    piperacillin-tazobactam  3,375 mg Intravenous Q8H    levofloxacin  750 mg Intravenous Q48H    famotidine (PEPCID) injection  20 mg Intravenous BID    chlorhexidine  15 mL Mouth/Throat BID    artificial tears   Both Eyes Q4H    insulin lispro  0-12 Units Subcutaneous Q4H    levetiracetam  1,000 mg Intravenous Q12H    atorvastatin  40 mg Oral Nightly    clopidogrel  75 mg Oral Daily    midodrine  10 mg Oral TID WC    pramipexole  1 mg Oral BID    sodium chloride flush  10 mL Intravenous 2 times per day       Continuous Infusions:   sodium chloride 50 mL/hr at 02/25/21 0450    heparin (PORCINE) Infusion 12 Units/kg/hr (02/24/21 2338)       PRN Meds:morphine, LORazepam, glycopyrrolate, magnesium sulfate, ipratropium-albuterol, sodium chloride flush, promethazine **OR** ondansetron, polyethylene glycol, acetaminophen **OR** acetaminophen, hydrALAZINE (APRESOLINE) ivpb, heparin (porcine), heparin (porcine)    REVIEW OF SYSTEMS:   Unable to assess due to vent support and non responsive state      Objective:     Patient Vitals for the past 8 hrs:   BP Temp Temp src Pulse Resp SpO2 Weight   02/25/21 1000 (!) 135/50 -- -- 62 20 99 % --   02/25/21 0900 (!) 138/55 -- -- 62 20 98 % --   02/25/21 0800 (!) 138/54 100 °F (37.8 °C) Rectal 67 20 97 % --   02/25/21 0726 -- -- -- 66 20 100 % --   02/25/21 0724 -- -- -- 66 20 100 % --   02/25/21 0700 (!) 130/49 -- -- 67 20 100 % --   02/25/21 0600 (!) 130/50 -- -- 69 20 99 % --   02/25/21 0500 (!) 135/55 -- -- 68 20 -- --   02/25/21 0400 (!) 145/56 100.2 °F (37.9 °C) Rectal 75 20 96 % (!) 402 lb (182.3 kg)   02/25/21 0322 -- -- -- -- -- 96 % --     I/O last 3 completed shifts: In: 3927 [I.V.:3117; NG/GT:60; IV Piggyback:750]  Out: 9733 [Urine:1125; Emesis/NG output:70]  No intake/output data recorded. PHYSICAL EXAM:    CONSTITUTIONAL: Obtunded,  HEENT: Sclera nonicteric, oral mucosa moist, poor dentition, normocephalic, orally intubated, central line noted right neck  LUNGS: No spontaneous respiration effort, remains full vent support, bibasilar breath sounds diminished  CARDIOVASCULAR: RRR, sinus rhythm, no appreciable murmur   ABDOMEN: NT/ND/hypoactive bowel sounds   PSYCHIATRIC: Unable to assess  MUSCULOSKELETAL: No clubbing, cyanosis of the digits. SKIN: Warm, dry, normal color    WBC/Hgb/Hct/Plts:  5.7/9.5/31.3/245 (02/25 0500)           Assessment:     Active Problems:    Dyspnea    Acute on chronic respiratory failure with hypoxia (HCC)  Resolved Problems:    * No resolved hospital problems. *      Assessment/plan:    1. Symptom Management/Pain Control:  Remains on vent, obtunded, currently not on sedation    2. Plan/goals of care:  Family has decided to change CODE STATUS to DNR CC would like to have compassionate extubation this afternoon. Will address hospice as needed after extubation    3. CODE STATUS:  DNR CC  POA  Shiloh Tafoya    4.  Other Recommendations:  Please call with any palliative questions or concerns    Time spent with patient and/or family: 45 minutes  Time reviewing records: 10 minutes  Time communicating with providers: 15 minutes

## 2021-02-25 NOTE — PROGRESS NOTES
Nephrology Progress Note  2/25/2021 2:47 PM  Subjective: Interval History: Angelique Pablo is a 63 y.o. female  Seen this am and not moving on her own        Data:   Scheduled Meds:   famotidine (PEPCID) injection  20 mg Intravenous BID    chlorhexidine  15 mL Mouth/Throat BID    insulin lispro  0-12 Units Subcutaneous Q4H    levetiracetam  1,000 mg Intravenous Q12H    sodium chloride flush  10 mL Intravenous 2 times per day     Continuous Infusions:          CBC:   Recent Labs     02/23/21  0530 02/24/21  0330 02/25/21  0500   WBC 6.9 7.2 5.7   HGB 9.6* 9.6* 9.5*    248 245     BMP:    Recent Labs     02/23/21  0530 02/24/21  0330 02/25/21  0500   * 133* 133*   K 4.1 4.5 4.3   CL 91* 91* 93*   CO2 29 31 27   BUN 24* 33* 41*   CREATININE 1.9* 2.1* 2.0*   GLUCOSE 206* 195* 175*       Renal Labs  Albumin:    Lab Results   Component Value Date    LABALBU 3.0 02/22/2021     Calcium:    Lab Results   Component Value Date    CALCIUM 7.8 02/25/2021     Phosphorus:    Lab Results   Component Value Date    PHOS 2.1 01/24/2021     U/A:    Lab Results   Component Value Date    NITRU NEGATIVE 01/13/2021    COLORU YELLOW 01/13/2021    WBCUA 2979 01/13/2021    RBCUA 483 01/13/2021    MUCUS RARE 12/09/2020    TRICHOMONAS NONE SEEN 01/13/2021    YEAST OCCASIONAL 12/09/2020    BACTERIA MANY 01/13/2021    CLARITYU CLOUDY 01/13/2021    SPECGRAV 1.018 01/13/2021    UROBILINOGEN NORMAL 01/13/2021    BILIRUBINUR NEGATIVE 01/13/2021    BLOODU LARGE 01/13/2021    KETUA NEGATIVE 01/13/2021           Objective:   I/O: 02/24 0701 - 02/25 0700  In: 0055 [I.V.:3117]  Out: 4856 [Urine:1125]  Vitals: BP (!) 130/52   Pulse 63   Temp 100 °F (37.8 °C) (Rectal)   Resp 20   Ht 5' 6\" (1.676 m)   Wt (!) 402 lb (182.3 kg)   SpO2 100%   BMI 64.88 kg/m²   General appearance: sedated ett  HEENT: Head: Normal, normocephalic, atraumatic.   Neck: supple, symmetrical, trachea midline  Lungs: diminished breath sounds bilaterally  Heart: S1, S2 normal  Abdomen: abnormal findings:  soft nt  Extremities: edema trace  Neurologic: Mental status: alertness: sedated        Assessment and Plan:      IMP:  1 arf from atn on ckd 3   2 resp failure on vent  3 cardiac arrest  4 htn  5 anemia    Plan     1 renal monitor not worse  2 on vent not able extubate  3 not improved neuro  Plan withdraw today , saw pt this am             Phoenix Beck MD

## 2021-02-25 NOTE — PROGRESS NOTES
Comprehensive Nutrition Assessment    Type and Reason for Visit:  Reassess    Nutrition Recommendations/Plan:   · EN Order: semi-elemental at 70 mL/hr to provide the pt with 2016 kcal and 126 g of protein    Nutrition Assessment:  Pt is now hemodynamically stable. Will order EN and advance at a slow rate. Malnutrition Assessment:  Malnutrition Status:  No malnutrition    Context:  Chronic Illness       Estimated Daily Nutrient Needs:  Energy (kcal):  1360-4791; Weight Used for Energy Requirements:  Current     Protein (g):  118; Weight Used for Protein Requirements:  Ideal        Fluid (ml/day):  9084-9530; Method Used for Fluid Requirements:  1 ml/kcal      Wounds:  None       Current Nutrition Therapies:    No diet orders on file    Anthropometric Measures:  · Height: 5' 6\" (167.6 cm)  · Current Body Weight: 395 lb (179.2 kg)   · Admission Body Weight: 395 lb (179.2 kg)    · Usual Body Weight: 364 lb (165.1 kg)     · Ideal Body Weight: 130 lbs; % Ideal Body Weight 303.8 %   · BMI: 63.8  · BMI Categories: Obese Class 3 (BMI 40.0 or greater)       Nutrition Diagnosis:   · Inadequate energy intake related to impaired respiratory function as evidenced by NPO or clear liquid status due to medical condition, intubation      Nutrition Interventions:   Food and/or Nutrient Delivery:  Continue NPO  Nutrition Education/Counseling:  No recommendation at this time   Coordination of Nutrition Care:  Continue to monitor while inpatient    Goals:  pt will have a source of nutrition started in the next 7 days       Nutrition Monitoring and Evaluation:   Food/Nutrient Intake Outcomes:  Diet Advancement/Tolerance  Physical Signs/Symptoms Outcomes:  None Identified     Discharge Planning:     Too soon to determine     Electronically signed by Carol Stinson RD, LD on 1/18/02 at 10:41 PM EST    Contact: [phone]

## 2021-02-25 NOTE — FLOWSHEET NOTE
Dr. Paulo Clarke at bedside rounding on patient. Spouse and daughter at bedside, all questions answered.

## 2021-02-26 LAB
CULTURE: ABNORMAL
CULTURE: ABNORMAL
CULTURE: NORMAL
GRAM SMEAR: NORMAL
Lab: ABNORMAL
Lab: NORMAL
SPECIMEN: ABNORMAL
SPECIMEN: NORMAL

## 2021-02-26 NOTE — PATIENT CARE CONFERENCE
Meeting with family at patient's bedside. Family is still attempting to reach other members of family and friends who would like to say goodbye to Tomy Perea before we compassionately extubate.

## 2021-02-26 NOTE — DISCHARGE SUMMARY
Discharge Summary    Name:  Joanne Mayfield /Age/Sex:   (63 y.o. female)   MRN & CSN:  [id number] & [id number] Admission Date/Time: 2021 11:44 AM   Attending:  No att. providers found Discharging Physician: Augustine Nash MD     HPI:     Per H&P:  Chief Complaint: Shortness of Breath (lost power at [facility], on CPAP, desat'ed during poweroutage)        Joanne Mayfield is a 63 y.o. female who presents from [facility] with concern for progressively worsening shortness of breath. The patient is somnolent but easily awakened by touch. Reports feeling poor over the past 2 days. States 3 weeks of shortness of breath did not return to baseline since previous admission to this facility. Apparently there was also a power outage to her BiPAP sometime today/overnight. Hospital Course:     Yvonne Jacinto is a 70-year-old who presented to ED with shortness of breath. She is known to me from the past when I had taken care of her while she was hospitalized. She was at [place] [address], where she had BiPAP available (there had apparently been a power outage though). In the ED she was noted to be hypoxic. She was diagnosed with heart failure and was admitted on . Later that day she had a CODE BLUE in the evening. ROSC was achieved after 15 minutes of the CODE BLUE. I assumed care today, on  -she was on mechanical ventilation today and has been off of sedation for over 48 hours. She had signs of a severe hypoxic encephalopathy. I met with her  and 2 daughters at the bedside and they were talking about removing life support due to her poor neurologic condition. Neurology met with the family and the patient was compassionately extubated today, . She  shortly after that.     Consults this admission:  IP CONSULT TO HOSPITALIST  IP CONSULT TO DIETITIAN  IP CONSULT TO CARDIOLOGY  IP CONSULT TO PULMONOLOGY  IP CONSULT TO NEUROLOGY  IP CONSULT TO ENDOCRINOLOGY  IP CONSULT TO NEPHROLOGY  IP CONSULT TO PALLIATIVE CARE      Medications listed in electronic health record at the time of admission:      Bree Jimenez   Home Medication Instructions [facility]:[id number]    Printed on:02/26/21 0908   Medication Information                      albuterol sulfate  (90 Base) MCG/ACT inhaler  Inhale 2 puffs into the lungs             amiodarone (CORDARONE) 200 MG tablet  Take 1 tablet by mouth daily             atorvastatin (LIPITOR) 80 MG tablet  Take 0.5 tablets by mouth nightly             BiPAP Machine MISC  by BiPAP route nightly             busPIRone (BUSPAR) 10 MG tablet  Take 1 tablet by mouth 3 times daily             clopidogrel (PLAVIX) 75 MG tablet  Take 1 tablet by mouth daily Patient has appointment on 3/27/18 more refills with be given after she keeps her office visit             ipratropium-albuterol (DUONEB) 0.5-2.5 (3) MG/3ML SOLN nebulizer solution  Inhale 3 mLs into the lungs every 6 hours as needed for Shortness of Breath             metFORMIN (GLUCOPHAGE) 500 MG tablet  Take 1 tablet by mouth 2 times daily (with meals)             miconazole (MICOTIN) 2 % cream  Apply topically 2 times daily.              midodrine (PROAMATINE) 10 MG tablet  Take 1 tablet by mouth 3 times daily (with meals) HOLD FOR SBP> 105             Nebulizers (AIRIAL COMPACT MINI NEBULIZER) MISC  1 Device by Does not apply route 4 times daily             pantoprazole (PROTONIX) 40 MG tablet  Take 1 tablet by mouth daily             Polyethylene Glycol 3350 (MIRALAX PO)  Take by mouth as needed             pramipexole (MIRAPEX) 1 MG tablet  Take 1 tablet by mouth 2 times daily             vitamin D3 (CHOLECALCIFEROL) 10 MCG (400 UNIT) TABS tablet  Take 1 tablet by mouth daily                 Objective Findings at Discharge:   BP (!) 31/15   Pulse (!) 0   Temp 100 °F (37.8 °C) (Rectal)   Resp (!) 0   Ht 5' 6\" (1.676 m)   Wt (!) 402 lb (182.3 kg)   SpO2 (!) 49%   BMI 64.88 kg/m²            PHYSICAL EXAM   GEN when I saw her the first time she was on mechanical ventilation and on heparin drip. She was not on any sedating drips. I came back and gave my condolences to the family after she . LABS:    CBC:   Lab Results   Component Value Date    WBC 5.7 2021    HGB 9.5 2021    HCT 31.3 2021    MCV 81.9 2021     2021     BMP:   Lab Results   Component Value Date     2021    K 4.3 2021    CL 93 2021    CO2 27 2021    PHOS 2.1 2021    BUN 41 2021    CREATININE 2.0 2021    CALCIUM 7.8 2021     IMAGING:    XR CHEST PORTABLE [[id number]] Collected: 21      Order Status: Completed Updated: 21     Narrative:       EXAMINATION:   ONE XRAY VIEW OF THE CHEST     2021 5:39 am     COMPARISON:   Chest radiograph dated 2021     HISTORY:   ORDERING SYSTEM PROVIDED HISTORY: vent   TECHNOLOGIST PROVIDED HISTORY:   Reason for exam:->vent   Reason for Exam: vent   Acuity: Unknown   Type of Exam: Subsequent/Follow-up   Additional signs and symptoms: vent   Relevant Medical/Surgical History: vent     FINDINGS:   Tip of the ET tube is 4.0 cm above the lizz. The enteric tube extends   below the diaphragm. A right-sided subclavian line is seen with the tip   overlying the distal SVC region. The cardiomediastinal silhouette is   obscured. There is shift of the heart and mediastinum to the left. This has   worsened since the prior study. Findings could be related to collapse of the   left lung from mucus plugging within the left mainstem bronchus. Diffuse   opacities are seen within the right lung without significant change. There   is no evidence of a pneumothorax. Impression:       1.  Diffuse opacification of the left hemithorax with shift of the heart and   mediastinum to the left suggestive for left lung collapse, likely Reason for Exam: Ventalator   Acuity: Acute   Type of Exam: Subsequent/Follow-up     FINDINGS:   ETT tip 3.7 cm above the lizz. Right central catheter tip overlies the   lower SVC. Cardiac silhouette is mildly enlarged. Pulmonary vasculature is indistinct. There are bibasilar airspace opacities. Small bilateral pleural effusions. Impression:       1. No significant interval change. Persistent multi lobar airspace opacities   most pronounced in the lung bases. Differential includes multi lobar   pneumonia and ARDS. 2. Small bilateral pleural effusions. 3. Stable mild enlargement of the cardiac silhouette. XR CHEST PORTABLE [[id number]] Collected: 02/23/21 0720     Order Status: Completed Updated: 02/23/21 0725     Narrative:       EXAMINATION:   ONE XRAY VIEW OF THE CHEST     2/23/2021 5:40 am     COMPARISON:   02/21/2021 2324 hours     HISTORY:   ORDERING SYSTEM PROVIDED HISTORY: vent   TECHNOLOGIST PROVIDED HISTORY:   Reason for exam:->vent   Reason for Exam: vent   Acuity: Acute   Type of Exam: Subsequent/Follow-up     FINDINGS:   ETT tip 4.1 cm above the lizz. Right subclavian catheter tip in the lower   SVC. Heart size is enlarged. Left more than right diffuse lung opacities. No pneumothorax. Impression:       1. Right more than left lung interstitial and airspace opacities compatible   multifocal pneumonia as well as pulmonary edema. 2. Stable cardiomegaly.       NM LUNG VENT/PERFUSION (VQ) [[id number]]      Order Status: Canceled      XR CHEST PORTABLE [[id number]] Collected: 02/21/21 2334     Order Status: Completed Updated: 02/21/21 2347     Narrative:       EXAMINATION:   ONE XRAY VIEW OF THE CHEST     2/21/2021 11:30 pm     COMPARISON:   Same day study performed 2 hours earlier     HISTORY:   ORDERING SYSTEM PROVIDED HISTORY: CVC placement   TECHNOLOGIST PROVIDED HISTORY:   Reason for exam:->CVC placement   Reason for Exam: CVC placement   Acuity: Acute   Type of Exam: Subsequent/Follow-up     FINDINGS:   Interval placement of right central line with its distal tip projecting   within the distal SVC. Endotracheal tube distal tip projects 3.8 cm above   the lizz. The NG tube projects at the level of the body of the stomach. There is massive enlargement of cardiomediastinal silhouette, unchanged. Extensive consolidative changes of the right lung and the increased   interstitial marking of the left lung are unchanged. Impression:       Interval placement of right central line with its distal tip projecting   within the distal SVC. The remainder of the findings are stable. XR CHEST PORTABLE [[id number]] Collected: 02/21/21 2141     Order Status: Completed Updated: 02/21/21 2145     Narrative:       EXAMINATION:   ONE XRAY VIEW OF THE CHEST     2/21/2021 8:21 pm     COMPARISON:   None. HISTORY:   ORDERING SYSTEM PROVIDED HISTORY: ETT/NG placement     Acuity: Acute   Type of Exam: Initial   Additional signs and symptoms: best images possible due to patient   condition/Nurse approved images     FINDINGS:   The cardiac silhouette is enlarged. Calcifications involving the aorta   reflect atherosclerosis. The mediastinal and hilar silhouettes appear   unremarkable. Scattered pulmonary opacities bilateral mid and lower lungs. No pleural   effusion evident. No pneumothorax is seen. No acute osseous abnormality is identified. Endotracheal tube is in place,   tip at the level of the clavicular heads, tip 5.5 cm superior to the lizz. Impression:       1. Endotracheal tube is in place, tip at the level of the clavicular heads,   tip 5.5 cm superior to the lizz. 2. Nonspecific pulmonary infiltrates can be seen with atypical/viral   pneumonia. Increased compared with prior study 8 hours previous. Pulmonary   edema is a consideration as well   3. Calcific atherosclerosis aorta. 4. Cardiomegaly.       CT HEAD WO CONTRAST [[id number]]      Order Status: Canceled      CTA PULMONARY W CONTRAST [[id number]] Updated: 02/21/21 1439     Order Status: Canceled      XR CHEST PORTABLE [[id number]] Collected: 02/21/21 1252     Order Status: Completed Updated: 02/21/21 1255     Narrative:       EXAMINATION:   ONE XRAY VIEW OF THE CHEST     2/21/2021 12:23 pm     COMPARISON:   01/22/2021. HISTORY:   ORDERING SYSTEM PROVIDED HISTORY: Short of breath   TECHNOLOGIST PROVIDED HISTORY:   Reason for exam:->Short of breath   Reason for Exam: sob     FINDINGS:   The heart size is enlarged. The pulmonary vasculature is congested. No   acute infiltrates are seen. No pneumothoraces are noted. Note is made of a   TAVR. Impression:       1. Cardiomegaly with vascular congestion.           Discharge Time of 25 minutes    Electronically signed by Jazmin Joshua MD on 2/26/2021 at 9:51 AM

## 2021-02-27 LAB
CULTURE: ABNORMAL
CULTURE: ABNORMAL
Lab: ABNORMAL
SPECIMEN: ABNORMAL

## 2021-02-28 NOTE — PROGRESS NOTES
Progress Note( Dr. Rosa Connell)  2/27/2021  Subjective:   Admit Date: 2/21/2021  PCP: Juana Ramirez    Admitted For : Initially admitted for respiratory failure but subsequently had cardiac arrest with a CODE BLUE    Consulted For:  Better control of blood glucose    Interval History: Patient is still sedated intubated and on ventilator    . No intake or output data in the 24 hours ending 02/27/21 1951    DATA    CBC:   Recent Labs     02/25/21  0500   WBC 5.7   HGB 9.5*       CMP:  Recent Labs     02/25/21  0500   *   K 4.3   CL 93*   CO2 27   BUN 41*   CREATININE 2.0*   CALCIUM 7.8*     Lipids:   Lab Results   Component Value Date    CHOL 151 01/14/2021    HDL 46 01/14/2021    TRIG 94 01/14/2021     Glucose:  Recent Labs     02/25/21  0451 02/25/21  0859 02/25/21  1229   POCGLU 185* 180* 163*     [id number]:  Lab Results   Component Value Date    LABA1C 5.3 02/23/2021     High Sensitivity TSH:   Lab Results   Component Value Date    TSHHS 0.685 02/23/2021     Free T3: No results found for: FT3  Free T4:  Lab Results   Component Value Date    T4FREE 1.07 09/04/2018       Echocardiogram Limited    Result Date: 2/22/2021  Transthoracic Echocardiography Report (TTE)  Demographics   Patient Name       [facility] Date of Study       02/22/2021   Date of Birth      1955         Gender              Female   Age                63 year(s)         Race                   Patient Number     [id number]         Room Number         2112   Visit Number       [id number]   Corporate ID       [id number]   Accession Number   [id number]         Jefferson Forde   Ordering Physician Favio Wilkinson MD                 Physician           MD  Procedure Type of Study   TTE procedure:ECHOCARDIOGRAM LIMITED.   Procedure Date Date: 02/22/2021 Start: 02:36 PM Study Location: Portable Technical Quality: Limited visualization due to body habitus. Indications:Cardiac arrest. Patient Status: Routine Contrast Medium: Definity. Amount - 5 ml Height: 66 inches Weight: 410 pounds BSA: 2.71 m2 BMI: 66.18 kg/m2 HR: 55 bpm BP: 102/51 mmHg  Conclusions   Summary  This is a limited echocardiogram.  Left ventricular systolic function is normal.  Ejection fraction is visually estimated at 50-55%. Moderately dilated right heart. S/p TAVR: #29 [employer] Evolut R inserted 2017; mean P mmHg. Mild tricuspid regurgitation is present. RVSP is 48 mmHg. No evidence of any pericardial effusion. Pleural effusion present. Inferior vena cava is dilated, measuring at 3.8 cm, and does not collapse  with respiration. Dilated hepatic veins also. Signature   ------------------------------------------------------------------  Electronically signed by Jaylen Victoria MD (Interpreting  physician) on 2021 at 05:34 PM  -Xr Chest Portable    Result Date: 2021  EXAMINATION: ONE XRAY VIEW OF THE CHEST 2021 5:40 am COMPARISON: 2021 2324 hours HISTORY: ORDERING SYSTEM PROVIDED HISTORY: vent TECHNOLOGIST PROVIDED HISTORY: Reason for exam:->vent Reason for Exam: vent Acuity: Acute Type of Exam: Subsequent/Follow-up FINDINGS: ETT tip 4.1 cm above the lizz. Right subclavian catheter tip in the lower SVC. Heart size is enlarged. Left more than right diffuse lung opacities. No pneumothorax. 1. Right more than left lung interstitial and airspace opacities compatible multifocal pneumonia as well as pulmonary edema. 2. Stable cardiomegaly. Xr Chest Portable    Result Date: 2021  EXAMINATION: ONE XRAY VIEW OF THE CHEST 2021 12:23 pm COMPARISON: 2021. HISTORY: ORDERING SYSTEM PROVIDED HISTORY: Short of breath   1. Cardiomegaly with vascular congestion.      Vl Dup Lower Extremity Venous Bilateral    Result Date: 2021  EXAMINATION: DUPLEX VENOUS ULTRASOUND OF THE BILATERAL LOWER EXTREMITIES, 2/22/2021 6:23 am   No evidence of DVT in either lower extremity with reservation that the study is markedly suboptimal with lack of visualization of all venous structures within the lower extremities as described above. Scheduled Medicines   Medications:      Infusions:         Objective:   Vitals: BP (!) 31/15   Pulse (!) 0   Temp 100 °F (37.8 °C) (Rectal)   Resp (!) 0   Ht 5' 6\" (1.676 m)   Wt (!) 402 lb (182.3 kg)   SpO2 (!) 49%   BMI 64.88 kg/m²   General appearance: Patient is sedated intubated on ventilator  Neck: no JVD or bruit  Thyroid : Normal lobes   Lungs: Has Vesicular Breath sounds   Heart:  regular rate and rhythm  Abdomen: soft, non-tender; bowel sounds normal; no masses,  no organomegaly  Musculoskeletal: Normal  Extremities: extremities normal, , no edema  Neurologic:   Patient is sedated intubated on ventilator  l.     Assessment:     Patient Active Problem List:     Morbid obesity with BMI of 70 and over, adult ([address]     Dyslipidemia     Fecal incontinence     Mild intermittent asthma without complication     S/P laparoscopic cholecystectomy     Type 2 diabetes mellitus without complication, with long-term current use of insulin (HCC)     Fatty liver     Arthritis     H/O parotidectomy     Venous stasis dermatitis of both lower extremities     Aortic stenosis     Morbid obesity (HCC)     Asthma     Diabetes mellitus ([address]     Hypertension     Sleep apnea     Family history of coronary artery disease     Chest pain     Dyspnea     Palpitations     Peripheral edema     Hypervolemia     Chronic respiratory failure with hypoxia and hypercapnia (HCC)     Acute exacerbation of chronic obstructive pulmonary disease (COPD) (HCC)     Chronic obstructive pulmonary disease (HCC)     Gait disturbance     Acute on chronic respiratory failure with hypoxia and hypercapnia (HCC)     Cellulitis     Skin ulcer of left foot with fat layer exposed ([address] Pneumonia     VHD (valvular heart disease)     Class 3 severe obesity due to excess calories with body mass index (BMI) greater than or equal to 70 in adult ([facility])     SIRS (systemic inflammatory response syndrome) ([facility])     Acute kidney injury ([address]     Acute metabolic encephalopathy     Shock, unspecified ([address]     Uncontrolled diabetes mellitus ([address]     Sepsis ([address]     Altered mental status     Wide-complex tachycardia ([facility])     Chronic diastolic heart failure ([facility])     S/P TAVR (transcatheter aortic valve replacement)     Septic shock ([facility])     Acute kidney injury superimposed on CKD ([facility])     Bradycardia     Anxiety     Acute respiratory failure with hypoxia ([facility])     Acute congestive heart failure ([facility])     Acute on chronic respiratory failure with hypoxia ([facility]      Plan:     1. Reviewed POC blood glucose . Labs and X ray results   2. Reviewed Current Medicines   3. On Correction bolus Humalog/ Insulin regime   4. Monitor Blood glucose frequently   5. Modified  the dose of Insulin/ other medicines as needed   6. Family is discussing about going to  terminal care withdraw the life support  7. Will follow     .      Phil Etienne MD

## 2021-03-01 LAB
CULTURE: NORMAL
Lab: NORMAL
SPECIMEN: NORMAL

## 2021-03-23 ENCOUNTER — CLINICAL DOCUMENTATION (OUTPATIENT)
Dept: NEUROSURGERY | Age: 66
End: 2021-03-23

## 2021-03-24 NOTE — PROGRESS NOTES
EEG date done 2/23/2021     Francis Conrad MD.                                        BOARD CERTIFIED NEUROLOGY                                  [facility]. EEG REPORT    Background activity: Poorly formed disorganized background and intermittent severe diffuse generalized activity with low volatge background activity with intermittent fast activity seen. No epileptiform or epileptogenic brain activity was seen. IMPRESSION : This is a severely abnormal EEG with poorly formed background and intermittent severe slow wave activity and most of the eeg with low voltage not so discernable brain activity and fast activity suggestive of severe encephalopathy. Clinical correlation required. Delsa Severance Cameron Asai MD.  BOARD CERTIFIED NEUROLOGY.

## 2021-04-13 NOTE — PROGRESS NOTES
Pulmonary and Critical Care  Progress Note      VITALS:  BP 95/65   Pulse 69   Temp 98.1 °F (36.7 °C) (Oral)   Resp 12   Ht 5' 8\" (1.727 m)   Wt (!) 364 lb (165.1 kg) Comment: estimate, bed scale not working on bed  SpO2 94%   BMI 55.35 kg/m²     Subjective:   CHIEF COMPLAINT :SOB     HPI:                The patient is more awake. She is following commands. She has good ueine output. She is tolerating the BIPAP    Objective:   PHYSICAL EXAM:    LUNGS:Bilateral basal crackles  Abd-distended,BS+,NT  Ext- Chronic LE venous stasis  CVS-s1s2, no murmurs      DATA:    CBC:  Recent Labs     01/17/21  0530 01/18/21  0615 01/19/21  0645   WBC 6.6 6.6 5.0   RBC 3.24* 3.30* 3.04*   HGB 8.0* 8.2* 7.6*   HCT 29.7* 29.7* 27.9*    175 148   MCV 91.7 90.0 91.8   MCH 24.7* 24.8* 25.0*   MCHC 26.9* 27.6* 27.2*   RDW 17.9* 17.8* 17.9*   SEGSPCT 58.4 65.8 58.6      BMP:  Recent Labs     01/17/21  0530 01/18/21  0615 01/19/21  0645   * 145 147*   K 3.8 3.3* 3.1*    94* 96*   CO2 35* 44* 47*   BUN 33* 22 18   CREATININE 1.6* 1.3* 1.2*   CALCIUM 8.9 9.0 8.8   GLUCOSE 150* 138* 101*      ABG:  Recent Labs     01/17/21  0900 01/17/21  1500   PH 7.31* 7.34   PO2ART 75 36*   XUT5YFA 89.0* 92.0*   O2SAT 93.0* 67.5*     BNP  No results found for: BNP   D-Dimer:  Lab Results   Component Value Date    DDIMER 1729 (H) 06/27/2020      Radiology:   Stable cardiomegaly with pulmonary edema and bilateral pleural effusions with   associated bibasilar atelectasis, left side greater than right     1.        Assessment/Plan     Patient Active Problem List    Diagnosis Date Noted    Acute exacerbation of chronic obstructive pulmonary disease (COPD) ([facility]      Priority: High    Hypervolemia      Priority: High    Anxiety 09/01/2020     Priority: Medium     Class: Acute    Acute congestive heart failure (HCC)     Acute respiratory failure with hypoxia (HCC)     Bradycardia     Septic shock ([facility] 08/25/2020    Acute kidney injury superimposed on CKD ([address] 08/25/2020    S/P TAVR (transcatheter aortic valve replacement) 06/26/2020    Chronic diastolic heart failure ([address] 06/25/2020    Wide-complex tachycardia ([address]     Altered mental status     Sepsis ([address] 09/21/2019    Acute kidney injury ([address] 08/22/2019    Acute metabolic encephalopathy 62/99/0409    Shock, unspecified ([address] 08/22/2019    Uncontrolled diabetes mellitus ([address] 08/22/2019    SIRS (systemic inflammatory response syndrome) ([address] 08/12/2019    Class 3 severe obesity due to excess calories with body mass index (BMI) greater than or equal to 70 in adult [facility]) 06/16/2019    VHD (valvular heart disease) 04/02/2019     Overview Note:     H/o TAVR      Pneumonia 02/06/2019    Skin ulcer of left foot with fat layer exposed ([address]     Cellulitis 11/24/2018    Acute on chronic respiratory failure with hypoxia and hypercapnia (HCC)     Gait disturbance 02/10/2018    Chronic obstructive pulmonary disease ([address] 02/07/2018    Chronic respiratory failure with hypoxia and hypercapnia (HCC) 01/30/2018    Morbid obesity ([address]     Asthma     Diabetes mellitus ([address]     Hypertension     Sleep apnea     Family history of coronary artery disease     Chest pain     SOB (shortness of breath)     Palpitations     Peripheral edema     Aortic stenosis     Morbid obesity with BMI of 70 and over, adult ([address] 10/27/2016    Dyslipidemia 10/27/2016    Fecal incontinence 10/27/2016    Mild intermittent asthma without complication 74/89/2145    S/P laparoscopic cholecystectomy 10/27/2016    Type 2 diabetes mellitus without complication, with long-term current use of insulin ([address] 10/27/2016    Fatty liver 10/27/2016    Arthritis 10/27/2016    H/O parotidectomy 10/27/2016    Venous stasis dermatitis of both lower extremities 10/27/2016   Hyperantremia  Hypokalemia  JOSE- improved  Morbid Obesity  ANMOL  OHS  Acute on Chronic Hypoxic Hypercapneic resp failure  Chronic Griseofulvin Pregnancy And Lactation Text: This medication is Pregnancy Category X and is known to cause serious birth defects. It is unknown if this medication is excreted in breast milk but breast feeding should be avoided.

## 2023-01-10 NOTE — PROGRESS NOTES
Progress Note( Dr. Se Pruitt)    Subjective:   Admit Date: 1/13/2021  PCP: THEODORE Lobo - NP    Admitted For :Severe shortness of breath    Consulted For: Better control of blood glucose    Interval History: Patient is somewhat better   Now BiPAP is used as needed  Patient was off ICU and transferred to medical floor    Denies any chest pains,   Yes SOB . On BiPAP now as needed as needed  Denies nausea or vomiting. No new bowel or bladder symptoms.        Intake/Output Summary (Last 24 hours) at 1/27/2021 2356  Last data filed at 1/27/2021 1759  Gross per 24 hour   Intake 330 ml   Output 525 ml   Net -195 ml       DATA    CBC:   Recent Labs     01/25/21  0625 01/26/21  0500 01/27/21  0425   WBC 5.1 4.8 5.1   HGB 7.9* 7.9* 8.1*    257 298    CMP:  Recent Labs     01/25/21  0625 01/27/21  0945    136   K 3.9 3.6   CL 94* 89*   CO2 42* 38*   BUN 15 11   CREATININE 1.1 1.1   CALCIUM 8.7 8.6   PROT 7.7  --    LABALBU 3.5  --    BILITOT 0.4  --    ALKPHOS 85  --    AST 12*  --    ALT <5*  --      Lipids:   Lab Results   Component Value Date    CHOL 151 01/14/2021    HDL 46 01/14/2021    TRIG 94 01/14/2021     Glucose:  Recent Labs     01/27/21  1126 01/27/21  1700 01/27/21  2156   POCGLU 97 99 93     [id number]:  Lab Results   Component Value Date    LABA1C 6.1 01/14/2021     High Sensitivity TSH:   Lab Results   Component Value Date    TSHHS 3.490 12/09/2020     Free T3: No results found for: FT3  Free T4:  Lab Results   Component Value Date    T4FREE 1.07 09/04/2018       Echocardiogram Limited    Result Date: 1/15/2021  Transthoracic Echocardiography Report (TTE)  Demographics   Patient Name       Leeanne ADAMS Date of Study       01/14/2021   Date of Birth      1955         Gender              Female   Age                63 year(s)         Race                   Patient Number     [id number]         Room Number         2105   Visit Number       [id number]   Corporate ID Pt called and needs referrel to Dr Hussein Soria- does not want meds- wants to get fixed. Once completed notify thru mychart. Pt stated that someone in the office cancelled her 1/11/23 appt- would like that appt back- please call advise. [id number]   Accession Number   [id number]         Maria Guadalupe Mirza   Ordering Physician Gretchen Singh MD      Physician           Honey Townsend MD  Procedure Type of Study   TTE procedure:ECHOCARDIOGRAM LIMITED. Procedure Date Date: 01/14/2021 Start: 02:11 PM Study Location: Portable Technical Quality: Limited visualization due to body habitus. Indications:Congestive heart failure. Patient Status: Routine Height: 68 inches Weight: 378 pounds BSA: 2.68 m2 BMI: 57.47 kg/m2 HR: 62 bpm BP: 107/55 mmHg  Conclusions   Summary  This is a limited echocardiogram. Technically difficult due to body habitus  Left ventricular systolic function is normal.  Ejection fraction is visually estimated at 50-55%. Dilated right ventricle with pressure/volume overload. S/p TAVR. Mild tricuspid regurgitation is present. RVSP is 43 mmHg. Mild pericardial effusion. Inferior vena cava is dilated, measuring at 3.4 cm, and does not collapse  with respiration. Signature   ------------------------------------------------------------------  Electronically signed by Laci Palomo MD  (Interpreting physician) on 01/15/2021 at 08:06 AM  Xr Pelvis (1-2 Views)       Xr Chest Portable    Result Date: 1/15/2021  EXAMINATION: ONE XRAY VIEW OF THE CHEST 1/15/2021 5:49 am COMPARISON: 01/13/2021 HISTORY: ORDERING SYSTEM PROVIDED HISTORY: Hypoxia      1. Stable cardiomegaly with pulmonary edema, bilateral pleural effusions, and associated bibasilar atelectasis. Xr Chest Portable    Result Date: 1/13/2021  EXAMINATION: ONE XRAY VIEW OF THE CHEST 1/13/2021 2:24 pm COMPARISON: Chest x-ray dated 12/09/2020 HISTORY: ORDERING SYSTEM PROVIDED HISTORY: sob       Interstitial/pulmonary edema. Cardiomegaly. Probable small bilateral pleural effusions.         Scheduled Medicines   Medications:  insulin lispro  0-6 Units Subcutaneous TID     metoclopramide  5 mg Intravenous Q6H    midodrine  10 mg Oral TID WC    enoxaparin  30 mg Subcutaneous Daily    lubiprostone  24 mcg Oral BID WC    senna  2 tablet Oral Nightly    insulin lispro  0-6 Units Subcutaneous 2 times per day    lidocaine  5 mL Intradermal Once    amiodarone  200 mg Oral Daily    atorvastatin  40 mg Oral Nightly    busPIRone  10 mg Oral TID    clopidogrel  75 mg Oral Daily    [Held by provider] doxepin  10 mg Oral Nightly    [Held by provider] metoprolol tartrate  12.5 mg Oral BID    pantoprazole  40 mg Oral Daily    polyethylene glycol  17 g Oral Daily    pramipexole  1 mg Oral BID    sertraline  25 mg Oral Nightly    [Held by provider] lisinopril  5 mg Oral Daily    [Held by provider] spironolactone  25 mg Oral Daily    vitamin D3  400 Units Oral Daily    sodium chloride flush  10 mL Intravenous 2 times per day    miconazole   Topical BID      Infusions:    dextrose           Objective:   Vitals: /62   Pulse 63   Temp 97.9 °F (36.6 °C) (Oral)   Resp 17   Ht 5' 7.99\" (1.727 m)   Wt (!) 2 lb 3.3 oz (1 kg) Comment: bed zeroed  SpO2 93%   BMI 0.34 kg/m²   General appearance: alert and cooperative with exam  Neck: no JVD or bruit  Thyroid : Normal lobes   Lungs: Has Vesicular Breath sounds has wheezing and rales bilaterally  Heart:  regular rate and rhythm  Abdomen: soft, non-tender; bowel sounds normal; no masses,  no organomegaly  Musculoskeletal: Normal  Extremities: extremities normal, , no edema  Neurologic:  Awake, alert, oriented to name, place and time. Cranial nerves II-XII are grossly intact. Motor is  intact.   Sensory neuropathy t.,  and gait is abnormal.  Unstable    Assessment:     Patient Active Problem List:     Morbid obesity with BMI of 70 and over, adult ([facility]     Dyslipidemia     Fecal incontinence     Mild intermittent asthma without complication     S/P laparoscopic cholecystectomy Type 2 diabetes mellitus without complication, with long-term current use of insulin (HCC)     Fatty liver     Arthritis     H/O parotidectomy     Venous stasis dermatitis of both lower extremities     Aortic stenosis     Morbid obesity (HCC)     Asthma     Diabetes mellitus ([address]     Hypertension     Sleep apnea     Family history of coronary artery disease     Chest pain     SOB (shortness of breath)     Palpitations     Peripheral edema     Hypervolemia     Chronic respiratory failure with hypoxia and hypercapnia (HCC)     Acute exacerbation of chronic obstructive pulmonary disease (COPD) (HCC)     Chronic obstructive pulmonary disease (HCC)     Gait disturbance     Acute on chronic respiratory failure with hypoxia and hypercapnia ([facility])     Cellulitis     Skin ulcer of left foot with fat layer exposed ([address]     Pneumonia     VHD (valvular heart disease)     Class 3 severe obesity due to excess calories with body mass index (BMI) greater than or equal to 70 in adult ([facility])     SIRS (systemic inflammatory response syndrome) ([facility])     Acute kidney injury ([address]     Acute metabolic encephalopathy     Shock, unspecified ([address]     Uncontrolled diabetes mellitus ([address]     Sepsis ([address]     Altered mental status     Wide-complex tachycardia (HCC)     Chronic diastolic heart failure (HCC)     S/P TAVR (transcatheter aortic valve replacement)     Septic shock (HCC)     Acute kidney injury superimposed on CKD (HCC)     Bradycardia     Anxiety     Acute respiratory failure with hypoxia (HCC)     Acute congestive heart failure ([address]      Plan:     1. Reviewed POC blood glucose . Labs and X ray results   2. Reviewed Current Medicines   3. On meal/ Correction bolus Humalog/  D. C  Basal Lantus Insulin   4. Monitor Blood glucose frequently   5. Modified  the dose of Insulin/ other medicines as needed   6. Will follow     .      Flores Craft MD

## 2023-01-25 NOTE — PROGRESS NOTES
[FreeTextEntry1] : BARIATRIC SURGERY HISTORY: none\par OBESITY CO-MORBIDITIES: hypothyroid \par ANTI-OBESITY MEDICATIONS: none\par OBESITY MEDICATION SIDE EFFECTS: glp not covered, qysmia- anxiety \par \par Plan: \par continue whole food high fiber, lean animal protein diet\par Avoid added fat, sugar, refined carbohydrates \par keep food journal \par front load food, dinner should be smallest meal as early as possible \par 3 meals \par encouraged daily physical activity \par will hold off on meds for now. may consider topiramate \par \par \par f/u 3-4 weeks \par        HOSPITALIST PROGRESS NOTE  Date: 6/20/2019   Name: Remington Rogers   MRN: [id number]   YOB: 1955      Subjective/Interval Hx:   Had a lot of visitors today, and felt tired  Had questions about the YANI planned for tomorrow    Objective:   Physical Exam:   BP (!) 143/65   Pulse 85   Temp 98 °F (36.7 °C) (Oral)   Resp 20   Ht 5' 6\" (1.676 m)   Wt (!) 453 lb 3.2 oz (205.6 kg)   SpO2 95%   BMI 73.15 kg/m²   Physical Exam   Constitutional: She appears well-developed. Pulmonary/Chest: Effort normal.   Neurological: She is alert.            Meds:   Meds:    insulin glargine  40 Units Subcutaneous Nightly    insulin lispro  0-18 Units Subcutaneous TID WC    insulin lispro  0-18 Units Subcutaneous 2 times per day    linagliptin  5 mg Oral Daily    insulin lispro  25 Units Subcutaneous TID WC    ampicillin-sulbactam  3 g Intravenous Q6H    gabapentin  800 mg Oral TID    pramipexole  1 mg Oral TID    sodium chloride flush  10 mL Intravenous 2 times per day    sodium chloride flush  10 mL Intravenous 2 times per day    enoxaparin  40 mg Subcutaneous Daily    pantoprazole  40 mg Intravenous Daily    nystatin   Topical BID      Infusions:    sodium chloride 100 mL/hr at 06/17/19 1828    dextrose       PRN Meds:     sodium chloride (PF) 10 mL ONCE PRN   sodium chloride flush 10 mL PRN   sodium chloride flush 10 mL PRN   magnesium hydroxide 30 mL Daily PRN   ondansetron 4 mg Q6H PRN   ipratropium-albuterol 1 ampule Q4H PRN   acetaminophen 650 mg Q4H PRN   glucose 15 g PRN   dextrose 12.5 g PRN   glucagon (rDNA) 1 mg PRN   dextrose 100 mL/hr PRN       Data/Labs:     Recent Labs     06/19/19  0450 06/19/19  1210 06/20/19  0520   WBC 7.9 6.3 7.0   HGB 6.8  HGB CALLED TO RICARDO SHAIKH RN 6/19 0515 BY CARLYN GUEVARA  * 7.1* 7.1*   HCT 24.1* 25.2* 25.4*    208 232      Recent Labs     06/18/19  0650 06/19/19  0450 06/20/19  0520    137 139   K 4.1 4.1 4.2   CL 95* 96* 98*   CO2 33* 35* 34*   BUN 13 18 21   CREATININE 0.7 0.8 0.8     No results for input(s): AST, ALT, ALB, BILIDIR, BILITOT, ALKPHOS in the last 72 hours. No results for input(s): INR in the last 72 hours. No results for input(s): CKTOTAL, CKMB, CKMBINDEX, TROPONINT in the last 72 hours. I/O last 3 completed shifts: In: 150 [P.O.:150]  Out: 1200 [Urine:1200]    Intake/Output Summary (Last 24 hours) at 6/20/2019 2135  Last data filed at 6/20/2019 1755  Gross per 24 hour   Intake 90 ml   Output 1500 ml   Net -1410 ml        Assessment/Plan:   1. Sepsis due to Group B strep  -6/19 - CT abdomen to look for source, continue antibiotics  -6/20 - CT no obvious source - YANI planned as she is s/p TAVR  2. Insulin-dependent diabetes mellitus-Lantus at night, sliding scale, monitor blood glucose - continue to monitor sugar - continue insulin - blood sugar at goal today  3. GERD-Protonix IV daily  4. Diabetic neuropathy-gabapentin 3 times a day  5. Anemia - HGB 6.8 on 6/19 - hospital day 3 - 1 units given, will consult oncology - she missed appts with Dr. Sheela Josue due to mobility issues  6/20 - HGB is 7.1 today  DVT Prophylaxis: lovenox  Diet: DIET CARB CONTROL;   Dietary Nutrition Supplements: Low Calorie High Protein Supplement  Diet NPO, After Midnight  Code Status: Full Code    Dispo: when stable     Electronically signed by Gal Jefferson MD on 6/20/2019 at 9:35 PM

## 2023-03-09 NOTE — PROGRESS NOTES
CC:  Chris Wilkins is here today for Ear Problem (Bilateral ear pain) and Office Visit     Medications: currently is not taking any medications  Refills needed today?  NO  denies Latex allergy or sensitivity  Patient would like communication of their results via:    [facility]    Cell Phone:   Telephone Information:   Mobile [phone]   Mobile [phone]     Okay to leave a message containing results? Yes  Tobacco history: verified  Patient's current [facility] status: Active.    Pharmacy Verified?  Yes    Health Maintenance Due   Topic Date Due   • COVID-19 Vaccine (1) Never done       Patient is due for topics as listed above but is not proceeding with Immunization(s) COVID-19 at this time. Patient declines at this time.            [address] [facility],[address]  Phone: [phone]    Fax [phone]                  Gen Bruner MD, Harris Littlejohn MD, Shraddha Quinteros MD, Gareth Coke, MD Dasie Bass, MD Bertina Goods, MD Tobie Lapping, APRN Emmett Rolling, APRN Manual Schaumann, APRN    Cardiology Progress Note     Today's Plan: Discuss with ID if Transthoracic Echo needed. Admit Date:  2019    Consult reason/ Seen today for: Need for YANI    Subjective and  Overnight Events:  She is tearful but she states she understands why the YANI can not be done. She just wants to feel better. Assessment / Plan / Recommendation:     1. YANI: Unable to be done with anesthesia today due to respiratory risk and body habitus. Also patient has several loose teeth. Will discuss if a Transthoracic Echo is sufficient with ID.  2. Sign off, Please re consult if further cardiology Recommendations are needed. History of Presenting Illness:    Chief complain on admission : 60 y. o.year old who is admitted for  Chief Complaint   Patient presents with    Shortness of Breath    Headache    Foot Pain     diabetic ulcer        Past medical history:    has a past medical history of Aortic stenosis, Asthma, Cancer ([address] Carotid artery stenosis, Chest pain, CHF (congestive heart failure) ([address] Chronic back pain, COPD exacerbation ([address] Depression, Diabetes mellitus ([address] Family history of coronary artery disease, Fatty liver, H/O aortic valve stenosis, H/O echocardiogram, Headache, Heart murmur, History of nuclear stress test, Morbid obesity ([address] Neuropathy, NSTEMI (non-ST elevated myocardial infarction) ([address] Obesity, Osteoarthritis, Palpitations, Peripheral edema, Restless legs syndrome, Sleep apnea, Sleep apnea, and SOB (shortness of breath). Past surgical history:   has a past surgical history that includes Cholecystectomy;  section;  Hysterectomy; Tubal ligation; Neck surgery; Gallbladder surgery; and Aortic valve replacement (11/13/2017). Social History:   reports that she quit smoking about 16 years ago. Her smoking use included cigarettes. She has never used smokeless tobacco. She reports that she does not drink alcohol or use drugs. Family history:  family history includes Coronary Art Dis in her mother; Diabetes in her mother; Heart Attack in her mother; Heart Disease in her brother, father, and mother; Heart Failure in her brother, father, and mother; High Blood Pressure in her brother, father, and mother; Hypertension in her mother; Obesity in her brother, father, and mother. No Known Allergies    Review of Systems:   All 14 systems were reviewed and are negative  Except for the positive findings  which as documented     BP (!) 152/67   Pulse 88   Temp 98.3 °F (36.8 °C) (Oral)   Resp 22   Ht 5' 6\" (1.676 m)   Wt (!) 453 lb 3.2 oz (205.6 kg)   SpO2 96%   BMI 73.15 kg/m²       Intake/Output Summary (Last 24 hours) at 6/21/2019 1231  Last data filed at 6/21/2019 0602  Gross per 24 hour   Intake 200 ml   Output 2350 ml   Net -2150 ml       Physical Exam:  Constitutional:  Well developed, Well nourished, No acute distress, Non-toxic appearance. HENT:  Normocephalic, Atraumatic, Bilateral external ears normal, Oropharynx moist, No oral exudates, Nose normal. Neck- Normal range of motion, No tenderness, Supple, No stridor. Eyes:  PERRL, EOMI, Conjunctiva normal, No discharge. Respiratory:  Diminished breath sounds, No respiratory distress, No wheezing, No chest tenderness. Cardiovascular:  Normal heart rate, Normal rhythm, No murmurs appreciated, No rubs appreciated, No gallops appreciated, JVP not elevated  Abdomen/GI:  Bowel sounds normal, Soft, No tenderness, No masses, No pulsatile masses. Musculoskeletal: Intact distal pulses, No edema, No tenderness, No cyanosis, No clubbing. Back- No tenderness.  Tremors to hands noted  Integument: Severe morbid obesity, Warm, Dry, No erythema, No rash. Lymphatic:  No lymphadenopathy noted. Neurologic:  Alert & oriented x 3, Normal motor function, Normal sensory function, No focal deficits noted. Psychiatric:  Affect  and  Mood : sad but accepting    Telemetry Reviewed:   Sinus with 1st degree AV block Rate 84    Medications:    iron sucrose  300 mg Intravenous Q24H    insulin glargine  40 Units Subcutaneous Nightly    insulin lispro  0-18 Units Subcutaneous TID WC    insulin lispro  0-18 Units Subcutaneous 2 times per day    linagliptin  5 mg Oral Daily    insulin lispro  25 Units Subcutaneous TID WC    ampicillin-sulbactam  3 g Intravenous Q6H    gabapentin  800 mg Oral TID    pramipexole  1 mg Oral TID    sodium chloride flush  10 mL Intravenous 2 times per day    sodium chloride flush  10 mL Intravenous 2 times per day    enoxaparin  40 mg Subcutaneous Daily    pantoprazole  40 mg Intravenous Daily    nystatin   Topical BID      sodium chloride 100 mL/hr at 06/17/19 1828    dextrose       sodium chloride (PF), sodium chloride flush, sodium chloride flush, magnesium hydroxide, ondansetron, ipratropium-albuterol, acetaminophen, glucose, dextrose, glucagon (rDNA), dextrose    Lab Data:  CBC:   Recent Labs     06/19/19  1210 06/20/19  0520 06/21/19  0400   WBC 6.3 7.0 6.8   HGB 7.1* 7.1* 7.5*   HCT 25.2* 25.4* 27.1*   MCV 81.6 82.7 82.1    232 275     BMP:   Recent Labs     06/19/19  0450 06/20/19  0520 06/21/19  0400    139 140   K 4.1 4.2 4.4   CL 96* 98* 98*   CO2 35* 34* 35*   BUN 18 21 16   CREATININE 0.8 0.8 0.6     PT/INR: No results for input(s): PROTIME, INR in the last 72 hours. BNP:  No results for input(s): PROBNP in the last 72 hours. TROPONIN: No results for input(s): TROPONINT in the last 72 hours.      ECHO :   Echocardiogram 2/6/2019    Limited study due to patients body habitus.   Definity given during procedure.   Left ventricular function is low normal, EF is estimated at 50-55 %.   Grade III diastolic dysfunction.   Severe left ventricular hypertrophy.   S/P 29 mm EvolutR TAVR 11/13/2017   No para valvular leak or significant gradient across the valve   Structurally normal mitral valve.   Trace mitral regurgitation is present.   Tricuspid valve is structurally normal.   RVSP is 31 mmHg.   Trace tricuspid regurgitation.   Trace/physiological pericardial fluid noted, No difference compared to   previous echo in Feb 2018   No evidence of pleural effusion.        All labs, medications and tests reviewed by myself , continue all other medications of all above medical condition listed as is except for changes mentioned above. Thank you very much for consult , please call with questions. Electronically signed by THEODORE Caldwell CNP on 6/21/2019 at 12:31 PM      I have seen ,spoken to  and examined this patient personally, independently of the nurse practitioner. I have reviewed the hospital care given to date and reviewed all pertinent labs and imaging. The plan was developed mutually at the time of the visit with the patient, Clinical Nurse Practitioner  and myself. I have spoken with patient, nursing staff and provided written and verbal instructions . The above note has been reviewed and I agree with the assessment, diagnosis, and treatment plan with changes made by me as follows     CARDIOLOGY ATTENDING ADDENDUM    HPI:  I have reviewed the above HPI  And agree with above   Td Herron is a 60 y. o.year old who and presents with had concerns including Shortness of Breath; Headache; and Foot Pain (diabetic ulcer).   Chief Complaint   Patient presents with    Shortness of Breath    Headache    Foot Pain     diabetic ulcer       Physical Exam:  General:  Awake, alert, NAD  Head:normal  Eye:normal  Neck:  No JVD   Chest:  Clear to auscultation, respiration easy  Cardiovascular:  RRR S1S2  Abdomen:   nontender  Extremities:  2+ edema  Pulses; palpable  Neuro: grossly normal      MEDICAL DECISION MAKING;    I agree with the above plan, which was planned by myself and discussed with NP.       Radha Yang MD [address] [facility]

## 2024-07-03 NOTE — PROGRESS NOTES
01/13/2020    K 3.7 01/13/2020    CL 98 01/13/2020    CO2 29 01/13/2020    BUN 20 01/13/2020    CREATININE 0.8 01/13/2020    GLUCOSE 115 01/13/2020    CALCIUM 8.1 01/13/2020      Lactic acid 3.4-->2.1-->2.1-->1. 2    Telemetry EKG strip was personally reviewed. NSR. Assessment/Plan:     · Severe sepsis (tachycardia, fever, leukocytosis, mild hypotension) with Proteus mirabillis UTI. Blood culture negative x72h. Vitals are improving. Switch meropenem to ampiricllin 1g iv q6h from the sensitivity result. D/c iv fluid. · Chronic hypoxic respiratory failure on home BiPAP and O2. Continue nocturnal BiPAP and home O2. CXR showed mild edema. Watch respiratory status. Stable on nasal O2. D/c iv fluid today. · Chronic diastolic CHF. Holding outpatient diuretics for sepsis. D/c iv fluid. Resume diuretics if she continues to be stable for another few days. · H/o wide complex tachycardia on amiodarone. Continue to monitor. Continue amiodarone. · Type 2 DM on insulin. Continue current insulin regimens. BS fair. On carb control diet. · Morbid obesity with BMI 66. Needs long term life style modifications. · Left foot ulcer without infection. Wound team eval.   · ANMOL on BiPAP. · RLS on mirapex. · Deconditiong. Pt is usually independent at home with home PT/OT. PT/OT ordered today. DVT prophylaxis on lovenox. The above assessment/plan has been explained to the patient/family, who indicated understanding.     Chanda Jones MD  1/13/2020 12:48 PM Never